# Patient Record
Sex: FEMALE | Race: WHITE | NOT HISPANIC OR LATINO | Employment: PART TIME | URBAN - METROPOLITAN AREA
[De-identification: names, ages, dates, MRNs, and addresses within clinical notes are randomized per-mention and may not be internally consistent; named-entity substitution may affect disease eponyms.]

---

## 2018-12-07 ENCOUNTER — HOSPITAL ENCOUNTER (EMERGENCY)
Facility: HOSPITAL | Age: 43
Discharge: HOME | End: 2018-12-07
Attending: EMERGENCY MEDICINE | Admitting: EMERGENCY MEDICINE
Payer: COMMERCIAL

## 2018-12-07 ENCOUNTER — APPOINTMENT (EMERGENCY)
Dept: RADIOLOGY | Facility: HOSPITAL | Age: 43
End: 2018-12-07
Payer: COMMERCIAL

## 2018-12-07 VITALS
HEART RATE: 62 BPM | RESPIRATION RATE: 18 BRPM | WEIGHT: 293 LBS | SYSTOLIC BLOOD PRESSURE: 121 MMHG | TEMPERATURE: 97.9 F | DIASTOLIC BLOOD PRESSURE: 80 MMHG | OXYGEN SATURATION: 95 %

## 2018-12-07 DIAGNOSIS — R20.0 RIGHT ARM NUMBNESS: ICD-10-CM

## 2018-12-07 DIAGNOSIS — M54.12 RIGHT CERVICAL RADICULOPATHY: Primary | ICD-10-CM

## 2018-12-07 PROCEDURE — 73030 X-RAY EXAM OF SHOULDER: CPT

## 2018-12-07 PROCEDURE — 72125 CT NECK SPINE W/O DYE: CPT

## 2018-12-07 PROCEDURE — 96374 THER/PROPH/DIAG INJ IV PUSH: CPT

## 2018-12-07 PROCEDURE — 99284 EMERGENCY DEPT VISIT MOD MDM: CPT

## 2018-12-07 RX ORDER — KETOROLAC TROMETHAMINE 30 MG/ML
15 INJECTION, SOLUTION INTRAMUSCULAR; INTRAVENOUS ONCE
Status: COMPLETED | OUTPATIENT
Start: 2018-12-07 | End: 2018-12-07

## 2018-12-07 RX ADMIN — KETOROLAC TROMETHAMINE 15 MG: 30 INJECTION, SOLUTION INTRAMUSCULAR at 17:41

## 2018-12-07 NOTE — ED PROVIDER NOTES
History  Chief Complaint   Patient presents with    Numbness     Pt states that she woke from a nap unable to move her right arm  Pt states that she has pain from the right shoulder that extends down to the hand  Pt states that she has had similar symptoms before when she had a shoulder injury   Arm Pain     This is a 16-year-old female with a history of psychiatric disorder and diabetes who presents with right shoulder pain and right arm numbness  Patient states that she woke up this morning at 6:30 a m  And everything was normal   She then took a nap and woke up at 2:45 a m  Dara Soulier She noticed severe right shoulder pain and right arm numbness/tingling  States that she had this sensation before when she had a shoulder sprain  When asked why she did not call EMS sooner, the patient states that she needed to get dressed for work  Denies any trauma to the area  Denies fever/chills, nausea/vomiting, lightheadedness/dizziness, headache, change in vision, URI symptoms, neck pain, chest pain, palpitations, shortness of breath, cough, back pain, flank pain, abdominal pain, diarrhea, hematochezia, melena, dysuria, hematuria, abnormal vaginal discharge/bleeding  None       Past Medical History:   Diagnosis Date    Anxiety     Chronic pain     Diabetes mellitus ([facility] [address] )     Disease of thyroid gland     Lymphedema     left leg    Manic bipolar I disorder (HCC)     OCD (obsessive compulsive disorder)     PTSD (post-traumatic stress disorder)        No past surgical history on file  No family history on file  I have reviewed and agree with the history as documented  Social History   Substance Use Topics    Smoking status: Not on file    Smokeless tobacco: Not on file    Alcohol use Not on file        Review of Systems   Constitutional: Negative for chills and fever  HENT: Negative for rhinorrhea, sore throat and trouble swallowing  Eyes: Negative for photophobia and visual disturbance  Respiratory: Negative for cough, chest tightness and shortness of breath  Cardiovascular: Negative for chest pain, palpitations and leg swelling  Gastrointestinal: Negative for abdominal pain, blood in stool, diarrhea, nausea and vomiting  Endocrine: Negative for polyuria  Genitourinary: Negative for dysuria, flank pain, hematuria, vaginal bleeding and vaginal discharge  Musculoskeletal: Positive for arthralgias  Negative for back pain and neck pain  Skin: Negative for color change and rash  Allergic/Immunologic: Negative for immunocompromised state  Neurological: Positive for weakness and numbness  Negative for dizziness, light-headedness and headaches  All other systems reviewed and are negative  Physical Exam  ED Triage Vitals [12/07/18 1631]   Temperature Pulse Respirations Blood Pressure SpO2   97 9 °F (36 6 °C) 62 18 119/67 96 %      Temp Source Heart Rate Source Patient Position - Orthostatic VS BP Location FiO2 (%)   Oral Monitor Sitting Left arm --      Pain Score       8           Orthostatic Vital Signs  Vitals:    12/07/18 1631 12/07/18 1745 12/07/18 1930   BP: 119/67 117/62 121/80   Pulse: 62 64 62   Patient Position - Orthostatic VS: Sitting Sitting        Physical Exam   Constitutional: Vital signs are normal  She appears well-developed  She is cooperative  No distress  HENT:   Mouth/Throat: Uvula is midline, oropharynx is clear and moist and mucous membranes are normal    Eyes: Pupils are equal, round, and reactive to light  Conjunctivae and EOM are normal    Neck: Trachea normal  No thyroid mass and no thyromegaly present  Cardiovascular: Normal rate, regular rhythm, normal heart sounds, intact distal pulses and normal pulses  No murmur heard  Pulmonary/Chest: Effort normal and breath sounds normal    Abdominal: Soft  Normal appearance and bowel sounds are normal  There is no tenderness  There is no rebound, no guarding and no CVA tenderness     Obese abdomen Musculoskeletal:   No evidence of trauma throughout right upper extremity  Tenderness over right shoulder  No sensation to light touch in median, ulnar, radial distribution  No sensation to sharp/pain in the radial, ulnar, median distribution  4/5 strength throughout right upper extremity  She has a lot of pain with passive range of motion of right shoulder  No C-spine tenderness  No reproduction of symptoms with axial loading of neck  Neurological: She is alert  Skin: Skin is warm, dry and intact  Psychiatric: She has a normal mood and affect  Her speech is normal and behavior is normal  Thought content normal        ED Medications  Medications   ketorolac (TORADOL) injection 15 mg (15 mg Intravenous Given 12/7/18 8079)       Diagnostic Studies  Results Reviewed     None                 XR shoulder 2+ views RIGHT   Final Result by Ben Fry MD (12/08 7261)      No acute osseous abnormality  Workstation performed: [id number]         CT cervical spine without contrast   Final Result by Gloria Carmona MD (12/07 8617)   Degenerative change noted with a C5-6 disc osteophyte complex including right greater than left uncovertebral hypertrophy resulting in moderate right and mild left foraminal encroachment  Correlate clinically for right greater than left C6    radiculopathy  Of note, degenerative changes appear stable compared with prior study  Remaining foramina are patent  Workstation performed: [id number]               Procedures  Procedures      Phone Consults  ED Phone Contact    ED Course                               MDM  Number of Diagnoses or Management Options  Diagnosis management comments: Likely a peripheral nerve palsy  I will check a CT neck and an x-ray of the right shoulder  Likely place arm in a sling and follow up with Neurology      CritCare Time    Disposition  Final diagnoses:   Right cervical radiculopathy   Right arm numbness     Time reflects when diagnosis was documented in both MDM as applicable and the Disposition within this note     Time User Action Codes Description Comment    12/7/2018  7:56 PM Song Avila Add [O12 01] Right cervical radiculopathy     12/7/2018  7:58 PM RuslanSully Iraida Add [R20 2] Right arm numbness       ED Disposition     ED Disposition Condition Comment    Discharge  Sierra Fallow discharge to home/self care  Condition at discharge: Stable        Follow-up Information     Follow up With Specialties Details Why Contact Info    Maynor Reeder MD Family Medicine  within 2-3 days for reevaluation  Aleksandr Payne [address] [place] [id number]      Margareth Guillen MD Orthopedic Surgery  Call to schedule appointment for further evaluation of presenting symptoms [address] [place] [address]  [phone]            There are no discharge medications for this patient  No discharge procedures on file  ED Provider  Attending physically available and evaluated Sierra Tracy  I managed the patient along with the ED Attending      Electronically Signed by         Yoel Blanton MD  12/08/18 5758

## 2018-12-07 NOTE — ED ATTENDING ATTESTATION
Yaa Cordoba MD, saw and evaluated the patient  I have discussed the patient with the resident/non-physician practitioner and agree with the resident's/non-physician practitioner's findings, Plan of Care, and MDM as documented in the resident's/non-physician practitioner's note, except where noted  All available labs and Radiology studies were reviewed  At this point I agree with the current assessment done in the Emergency Department  I have conducted an independent evaluation of this patient a history and physical is as follows:      Critical Care Time  CritCare Time    Procedures     45 yo female c/o right arm numbness started at 2:45 pm when she woke up  Pt with hx of similar symptoms with right shoulder injury many years ago  Pt with numbness from shoulder down and not able to move it, with pain in shoulder  No cp, no sob, no headache, no neck pain, no n/v/d, no fever  pmh ptsd, dm, bipolar  Vss, afebrile, lungs cta, rrr, abdomen soft nontender, decreased sensation on right arm, 4/5 strength in arm, no spinal tenderness  Shoulder xray, ct cspine, likely peripheral etiology, no concern for central etiology like stroke

## 2018-12-08 NOTE — DISCHARGE INSTRUCTIONS
Use sling when not sleeping  Follow up with Ortho Spine for further evaluation of presenting symptoms  Return to ED if new or worsening symptoms for immediate reevaluation  Cervical Radiculopathy   WHAT YOU NEED TO KNOW:   Cervical radiculopathy is a painful condition that happens when a spinal nerve in your neck is pinched or irritated  DISCHARGE INSTRUCTIONS:   Medicines: You may need any of the following:  · NSAIDs  help decrease swelling and pain  This medicine can be bought without a doctor's order  This medicine can cause stomach bleeding or kidney problems in certain people  If you take blood thinner medicine, always ask your healthcare provider if NSAIDs are safe for you  Always read the medicine label and follow the directions on it before using this medicine  · Prescription pain medicine  helps decrease pain  Do not wait until the pain is severe before you take this medicine  · Steroids  help decrease pain and swelling  These may be given as a pill or as an injection in your neck  You may need more than 1 injection if your symptoms do not improve after the first treatment  · Take your medicine as directed  Contact your healthcare provider if you think your medicine is not helping or if you have side effects  Tell him of her if you are allergic to any medicine  Keep a list of the medicines, vitamins, and herbs you take  Include the amounts, and when and why you take them  Bring the list or the pill bottles to follow-up visits  Carry your medicine list with you in case of an emergency  Follow up with your healthcare provider or spine specialist as directed:  Write down your questions so you remember to ask them during your visits  Physical therapy:  Your healthcare provider may suggest physical therapy to stretch and strengthen your muscles  Your physical therapist can teach you how to improve your posture and the way you hold your neck   He may also teach you how to be safely active and avoid further injury  He can also help you develop an exercise program that is safe for your back and neck  Self-care:   · Ice  helps decrease swelling and pain  Ice may also help prevent tissue damage  Use an ice pack, or put crushed ice in a plastic bag  Cover it with a towel and place it on your neck for 15 to 20 minutes every hour or as directed  · Rest  when you feel it is needed  Slowly start to do more each day  Return to your daily activities as directed  · Wear a soft collar  You may be given a soft collar to support your neck while you sleep  Wear the soft collar only as directed  · Do light stretches and regular exercise  Your healthcare provider may suggest light stretches to help decrease stiffness in your neck and arm as you recover  After your pain is controlled, you may benefit from regular exercise  Ask what type of exercise is safe for your back and neck  · Review your work area  A comfortable work area can help prevent neck strain  Ask your employer for an ergonomic review to check the position of your desk, chair, phone, and computer  Make any necessary adjustments for your comfort  Contact your healthcare provider or spine specialist if:   · You have a fever  · You are losing weight without trying  · Your pain is worse, even with medicine  · One or both hands feel more numb than before, or you cannot move your fingers well  · You have questions or concerns about your condition or care  © 2017 [address] Information is for End User's use only and may not be sold, redistributed or otherwise used for commercial purposes  All illustrations and images included in CareNotes® are the copyrighted property of A D A M [employer] Inc  or [employer]  The above information is an  only  It is not intended as medical advice for individual conditions or treatments   Talk to your doctor, nurse or pharmacist before following any medical regimen to see if it is safe and effective for you

## 2019-05-08 ENCOUNTER — HOSPITAL ENCOUNTER (EMERGENCY)
Facility: HOSPITAL | Age: 44
Discharge: HOME | End: 2019-05-08
Attending: EMERGENCY MEDICINE | Admitting: EMERGENCY MEDICINE
Payer: COMMERCIAL

## 2019-05-08 ENCOUNTER — APPOINTMENT (EMERGENCY)
Dept: RADIOLOGY | Facility: HOSPITAL | Age: 44
End: 2019-05-08
Payer: COMMERCIAL

## 2019-05-08 ENCOUNTER — PROCEDURE VISIT (OUTPATIENT)
Dept: PODIATRY | Facility: CLINIC | Age: 44
End: 2019-05-08
Payer: COMMERCIAL

## 2019-05-08 VITALS
WEIGHT: 269 LBS | DIASTOLIC BLOOD PRESSURE: 88 MMHG | BODY MASS INDEX: 44.82 KG/M2 | SYSTOLIC BLOOD PRESSURE: 148 MMHG | HEIGHT: 65 IN

## 2019-05-08 VITALS
TEMPERATURE: 97 F | HEIGHT: 65 IN | WEIGHT: 269 LBS | RESPIRATION RATE: 16 BRPM | HEART RATE: 62 BPM | BODY MASS INDEX: 44.82 KG/M2 | DIASTOLIC BLOOD PRESSURE: 78 MMHG | OXYGEN SATURATION: 99 % | SYSTOLIC BLOOD PRESSURE: 128 MMHG

## 2019-05-08 DIAGNOSIS — M71.072 ABSCESS OF BURSA OF LEFT FOOT: ICD-10-CM

## 2019-05-08 DIAGNOSIS — E11.42 DIABETIC POLYNEUROPATHY ASSOCIATED WITH TYPE 2 DIABETES MELLITUS (HCC): ICD-10-CM

## 2019-05-08 DIAGNOSIS — E66.01 CLASS 3 SEVERE OBESITY DUE TO EXCESS CALORIES WITH BODY MASS INDEX (BMI) OF 45.0 TO 49.9 IN ADULT, UNSPECIFIED WHETHER SERIOUS COMORBIDITY PRESENT (HCC): Primary | ICD-10-CM

## 2019-05-08 DIAGNOSIS — I89.0 LYMPHEDEMA: ICD-10-CM

## 2019-05-08 DIAGNOSIS — L97.522 FOOT ULCER WITH FAT LAYER EXPOSED, LEFT (HCC): Primary | ICD-10-CM

## 2019-05-08 DIAGNOSIS — L97.522 ULCER OF LEFT FOOT, WITH FAT LAYER EXPOSED (HCC): ICD-10-CM

## 2019-05-08 DIAGNOSIS — L03.116 CELLULITIS OF LEFT FOOT: ICD-10-CM

## 2019-05-08 LAB
ANION GAP SERPL CALCULATED.3IONS-SCNC: 6 MMOL/L (ref 4–13)
BASOPHILS # BLD AUTO: 0.03 THOUSANDS/ΜL (ref 0–0.1)
BASOPHILS NFR BLD AUTO: 0 % (ref 0–1)
BUN SERPL-MCNC: 9 MG/DL (ref 5–25)
CALCIUM SERPL-MCNC: 9.1 MG/DL (ref 8.3–10.1)
CHLORIDE SERPL-SCNC: 103 MMOL/L (ref 100–108)
CO2 SERPL-SCNC: 28 MMOL/L (ref 21–32)
CREAT SERPL-MCNC: 0.86 MG/DL (ref 0.6–1.3)
EOSINOPHIL # BLD AUTO: 0.44 THOUSAND/ΜL (ref 0–0.61)
EOSINOPHIL NFR BLD AUTO: 5 % (ref 0–6)
ERYTHROCYTE [DISTWIDTH] IN BLOOD BY AUTOMATED COUNT: 13.2 % (ref 11.6–15.1)
GFR SERPL CREATININE-BSD FRML MDRD: 83 ML/MIN/1.73SQ M
GLUCOSE SERPL-MCNC: 131 MG/DL (ref 65–140)
HCT VFR BLD AUTO: 46.6 % (ref 34.8–46.1)
HGB BLD-MCNC: 14.8 G/DL (ref 11.5–15.4)
IMM GRANULOCYTES # BLD AUTO: 0.03 THOUSAND/UL (ref 0–0.2)
IMM GRANULOCYTES NFR BLD AUTO: 0 % (ref 0–2)
LYMPHOCYTES # BLD AUTO: 3.09 THOUSANDS/ΜL (ref 0.6–4.47)
LYMPHOCYTES NFR BLD AUTO: 33 % (ref 14–44)
MCH RBC QN AUTO: 27.9 PG (ref 26.8–34.3)
MCHC RBC AUTO-ENTMCNC: 31.8 G/DL (ref 31.4–37.4)
MCV RBC AUTO: 88 FL (ref 82–98)
MONOCYTES # BLD AUTO: 0.55 THOUSAND/ΜL (ref 0.17–1.22)
MONOCYTES NFR BLD AUTO: 6 % (ref 4–12)
NEUTROPHILS # BLD AUTO: 5.23 THOUSANDS/ΜL (ref 1.85–7.62)
NEUTS SEG NFR BLD AUTO: 56 % (ref 43–75)
NRBC BLD AUTO-RTO: 0 /100 WBCS
PLATELET # BLD AUTO: 264 THOUSANDS/UL (ref 149–390)
PMV BLD AUTO: 9.7 FL (ref 8.9–12.7)
POTASSIUM SERPL-SCNC: 4 MMOL/L (ref 3.5–5.3)
RBC # BLD AUTO: 5.31 MILLION/UL (ref 3.81–5.12)
SODIUM SERPL-SCNC: 137 MMOL/L (ref 136–145)
WBC # BLD AUTO: 9.37 THOUSAND/UL (ref 4.31–10.16)

## 2019-05-08 PROCEDURE — 99284 EMERGENCY DEPT VISIT MOD MDM: CPT | Performed by: PODIATRIST

## 2019-05-08 PROCEDURE — 36415 COLL VENOUS BLD VENIPUNCTURE: CPT | Performed by: EMERGENCY MEDICINE

## 2019-05-08 PROCEDURE — 85025 COMPLETE CBC W/AUTO DIFF WBC: CPT | Performed by: EMERGENCY MEDICINE

## 2019-05-08 PROCEDURE — 96374 THER/PROPH/DIAG INJ IV PUSH: CPT

## 2019-05-08 PROCEDURE — 99284 EMERGENCY DEPT VISIT MOD MDM: CPT | Performed by: EMERGENCY MEDICINE

## 2019-05-08 PROCEDURE — 80048 BASIC METABOLIC PNL TOTAL CA: CPT | Performed by: EMERGENCY MEDICINE

## 2019-05-08 PROCEDURE — 99213 OFFICE O/P EST LOW 20 MIN: CPT | Performed by: PODIATRIST

## 2019-05-08 PROCEDURE — 99284 EMERGENCY DEPT VISIT MOD MDM: CPT

## 2019-05-08 PROCEDURE — 73701 CT LOWER EXTREMITY W/DYE: CPT

## 2019-05-08 RX ORDER — OXYCODONE HCL 5 MG/5 ML
SOLUTION, ORAL ORAL
COMMUNITY
Start: 2019-03-19 | End: 2021-09-08 | Stop reason: HOSPADM

## 2019-05-08 RX ORDER — ZIPRASIDONE HYDROCHLORIDE 80 MG/1
80 CAPSULE ORAL 2 TIMES DAILY WITH MEALS
COMMUNITY
Start: 2018-05-15

## 2019-05-08 RX ORDER — HYDROCODONE BITARTRATE AND ACETAMINOPHEN 5; 325 MG/1; MG/1
TABLET ORAL
COMMUNITY
Start: 2019-04-13 | End: 2021-09-08 | Stop reason: HOSPADM

## 2019-05-08 RX ORDER — CLONIDINE HYDROCHLORIDE 0.1 MG/1
0.2 TABLET ORAL EVERY 12 HOURS SCHEDULED
COMMUNITY
Start: 2016-04-25 | End: 2021-09-08 | Stop reason: HOSPADM

## 2019-05-08 RX ORDER — LEVOTHYROXINE SODIUM 0.05 MG/1
50 TABLET ORAL DAILY
COMMUNITY
Start: 2019-03-08

## 2019-05-08 RX ORDER — INSULIN LISPRO 100 U/ML
INJECTION, SOLUTION SUBCUTANEOUS
COMMUNITY
Start: 2019-03-20 | End: 2021-09-08 | Stop reason: HOSPADM

## 2019-05-08 RX ORDER — CEPHALEXIN 250 MG/1
500 CAPSULE ORAL ONCE
Status: COMPLETED | OUTPATIENT
Start: 2019-05-08 | End: 2019-05-08

## 2019-05-08 RX ORDER — SYRING-NEEDL,DISP,INSUL,0.3 ML 31 GX5/16"
SYRINGE, EMPTY DISPOSABLE MISCELLANEOUS
COMMUNITY
Start: 2019-02-02 | End: 2022-06-16 | Stop reason: HOSPADM

## 2019-05-08 RX ORDER — ESCITALOPRAM OXALATE 10 MG/1
175 TABLET ORAL
COMMUNITY
Start: 2017-05-27 | End: 2021-09-08 | Stop reason: HOSPADM

## 2019-05-08 RX ORDER — ONDANSETRON 4 MG/1
TABLET, ORALLY DISINTEGRATING ORAL
COMMUNITY
Start: 2019-03-19

## 2019-05-08 RX ORDER — HYDROXYZINE PAMOATE 50 MG/1
50 CAPSULE ORAL
COMMUNITY
Start: 2018-08-07 | End: 2021-09-08 | Stop reason: HOSPADM

## 2019-05-08 RX ORDER — CEPHALEXIN 500 MG/1
500 CAPSULE ORAL EVERY 6 HOURS SCHEDULED
Qty: 28 CAPSULE | Refills: 0 | OUTPATIENT
Start: 2019-05-08 | End: 2019-05-15

## 2019-05-08 RX ORDER — HYDROMORPHONE HCL/PF 1 MG/ML
1 SYRINGE (ML) INJECTION ONCE
Status: COMPLETED | OUTPATIENT
Start: 2019-05-08 | End: 2019-05-08

## 2019-05-08 RX ORDER — BLOOD-GLUCOSE METER
EACH MISCELLANEOUS
COMMUNITY
Start: 2019-03-20 | End: 2022-06-16 | Stop reason: HOSPADM

## 2019-05-08 RX ORDER — INSULIN GLARGINE 100 [IU]/ML
INJECTION, SOLUTION SUBCUTANEOUS
COMMUNITY
Start: 2019-03-20 | End: 2021-09-08 | Stop reason: HOSPADM

## 2019-05-08 RX ORDER — CEPHALEXIN 500 MG/1
500 CAPSULE ORAL EVERY 6 HOURS SCHEDULED
Qty: 28 CAPSULE | Refills: 0 | Status: SHIPPED | OUTPATIENT
Start: 2019-05-08 | End: 2019-05-15

## 2019-05-08 RX ORDER — ALBUTEROL SULFATE 90 UG/1
2 AEROSOL, METERED RESPIRATORY (INHALATION) EVERY 6 HOURS PRN
COMMUNITY
Start: 2018-12-04 | End: 2019-12-04

## 2019-05-08 RX ORDER — BUPROPION HYDROCHLORIDE 300 MG/1
300 TABLET ORAL
COMMUNITY
End: 2021-09-08 | Stop reason: HOSPADM

## 2019-05-08 RX ORDER — PANTOPRAZOLE SODIUM 40 MG/1
40 TABLET, DELAYED RELEASE ORAL DAILY
COMMUNITY
Start: 2019-04-23 | End: 2020-04-22

## 2019-05-08 RX ADMIN — HYDROMORPHONE HYDROCHLORIDE 1 MG: 1 INJECTION, SOLUTION INTRAMUSCULAR; INTRAVENOUS; SUBCUTANEOUS at 16:18

## 2019-05-08 RX ADMIN — IOHEXOL 100 ML: 350 INJECTION, SOLUTION INTRAVENOUS at 17:00

## 2019-05-08 RX ADMIN — CEPHALEXIN 500 MG: 250 CAPSULE ORAL at 17:54

## 2019-05-24 ENCOUNTER — APPOINTMENT (OUTPATIENT)
Dept: WOUND CARE | Facility: HOSPITAL | Age: 44
End: 2019-05-24
Payer: COMMERCIAL

## 2019-05-24 PROCEDURE — 99213 OFFICE O/P EST LOW 20 MIN: CPT

## 2019-05-24 PROCEDURE — 11042 DBRDMT SUBQ TIS 1ST 20SQCM/<: CPT

## 2019-05-31 ENCOUNTER — APPOINTMENT (OUTPATIENT)
Dept: WOUND CARE | Facility: HOSPITAL | Age: 44
End: 2019-05-31
Payer: COMMERCIAL

## 2019-05-31 PROCEDURE — 11042 DBRDMT SUBQ TIS 1ST 20SQCM/<: CPT

## 2019-06-14 ENCOUNTER — APPOINTMENT (OUTPATIENT)
Dept: WOUND CARE | Facility: HOSPITAL | Age: 44
End: 2019-06-14
Payer: COMMERCIAL

## 2019-06-14 PROCEDURE — 99211 OFF/OP EST MAY X REQ PHY/QHP: CPT

## 2019-06-21 ENCOUNTER — APPOINTMENT (OUTPATIENT)
Dept: WOUND CARE | Facility: HOSPITAL | Age: 44
End: 2019-06-21
Payer: COMMERCIAL

## 2019-06-21 PROCEDURE — 99211 OFF/OP EST MAY X REQ PHY/QHP: CPT

## 2019-06-28 ENCOUNTER — APPOINTMENT (INPATIENT)
Dept: RADIOLOGY | Facility: HOSPITAL | Age: 44
DRG: 380 | End: 2019-06-28
Payer: COMMERCIAL

## 2019-06-28 ENCOUNTER — APPOINTMENT (OUTPATIENT)
Dept: WOUND CARE | Facility: HOSPITAL | Age: 44
End: 2019-06-28
Payer: COMMERCIAL

## 2019-06-28 ENCOUNTER — HOSPITAL ENCOUNTER (INPATIENT)
Facility: HOSPITAL | Age: 44
LOS: 3 days | Discharge: HOME HEALTH | DRG: 380 | End: 2019-07-01
Attending: PODIATRIST | Admitting: PODIATRIST
Payer: COMMERCIAL

## 2019-06-28 DIAGNOSIS — F41.9 ANXIETY: ICD-10-CM

## 2019-06-28 DIAGNOSIS — L97.426 DIABETIC ULCER OF LEFT MIDFOOT ASSOCIATED WITH DIABETES MELLITUS DUE TO UNDERLYING CONDITION, WITH BONE INVOLVEMENT WITHOUT EVIDENCE OF NECROSIS (HCC): Primary | ICD-10-CM

## 2019-06-28 DIAGNOSIS — L03.116 CELLULITIS OF LEFT FOOT: ICD-10-CM

## 2019-06-28 DIAGNOSIS — E11.42 TYPE 2 DIABETES MELLITUS WITH DIABETIC POLYNEUROPATHY, WITH LONG-TERM CURRENT USE OF INSULIN (HCC): ICD-10-CM

## 2019-06-28 DIAGNOSIS — L03.116 CELLULITIS OF LEFT FOOT: Primary | ICD-10-CM

## 2019-06-28 DIAGNOSIS — E66.01 MORBID OBESITY (HCC): ICD-10-CM

## 2019-06-28 DIAGNOSIS — Z79.4 TYPE 2 DIABETES MELLITUS WITH DIABETIC POLYNEUROPATHY, WITH LONG-TERM CURRENT USE OF INSULIN (HCC): ICD-10-CM

## 2019-06-28 DIAGNOSIS — Z98.84 H/O BARIATRIC SURGERY: ICD-10-CM

## 2019-06-28 DIAGNOSIS — E08.621 DIABETIC ULCER OF LEFT MIDFOOT ASSOCIATED WITH DIABETES MELLITUS DUE TO UNDERLYING CONDITION, WITH BONE INVOLVEMENT WITHOUT EVIDENCE OF NECROSIS (HCC): Primary | ICD-10-CM

## 2019-06-28 PROBLEM — Z01.818 PREOPERATIVE CLEARANCE: Status: ACTIVE | Noted: 2019-06-28

## 2019-06-28 PROBLEM — L97.406: Status: ACTIVE | Noted: 2019-06-28

## 2019-06-28 LAB
ALBUMIN SERPL BCP-MCNC: 3.2 G/DL (ref 3.5–5)
ALP SERPL-CCNC: 216 U/L (ref 46–116)
ALT SERPL W P-5'-P-CCNC: 31 U/L (ref 12–78)
ANION GAP SERPL CALCULATED.3IONS-SCNC: 6 MMOL/L (ref 4–13)
AST SERPL W P-5'-P-CCNC: 18 U/L (ref 5–45)
ATRIAL RATE: 78 BPM
B-HCG SERPL-ACNC: <2 MIU/ML
BASOPHILS # BLD AUTO: 0.04 THOUSANDS/ΜL (ref 0–0.1)
BASOPHILS NFR BLD AUTO: 0 % (ref 0–1)
BILIRUB SERPL-MCNC: 0.2 MG/DL (ref 0.2–1)
BUN SERPL-MCNC: 11 MG/DL (ref 5–25)
CALCIUM SERPL-MCNC: 9.4 MG/DL (ref 8.3–10.1)
CHLORIDE SERPL-SCNC: 103 MMOL/L (ref 100–108)
CO2 SERPL-SCNC: 27 MMOL/L (ref 21–32)
CREAT SERPL-MCNC: 0.72 MG/DL (ref 0.6–1.3)
CRP SERPL QL: 42.9 MG/L
EOSINOPHIL # BLD AUTO: 0.45 THOUSAND/ΜL (ref 0–0.61)
EOSINOPHIL NFR BLD AUTO: 5 % (ref 0–6)
ERYTHROCYTE [DISTWIDTH] IN BLOOD BY AUTOMATED COUNT: 13.6 % (ref 11.6–15.1)
ERYTHROCYTE [SEDIMENTATION RATE] IN BLOOD: 50 MM/HOUR (ref 0–20)
GFR SERPL CREATININE-BSD FRML MDRD: 103 ML/MIN/1.73SQ M
GLUCOSE SERPL-MCNC: 119 MG/DL (ref 65–140)
GLUCOSE SERPL-MCNC: 121 MG/DL (ref 65–140)
GLUCOSE SERPL-MCNC: 135 MG/DL (ref 65–140)
GLUCOSE SERPL-MCNC: 157 MG/DL (ref 65–140)
HCT VFR BLD AUTO: 44.9 % (ref 34.8–46.1)
HGB BLD-MCNC: 14.3 G/DL (ref 11.5–15.4)
IMM GRANULOCYTES # BLD AUTO: 0.06 THOUSAND/UL (ref 0–0.2)
IMM GRANULOCYTES NFR BLD AUTO: 1 % (ref 0–2)
LYMPHOCYTES # BLD AUTO: 3.01 THOUSANDS/ΜL (ref 0.6–4.47)
LYMPHOCYTES NFR BLD AUTO: 31 % (ref 14–44)
MCH RBC QN AUTO: 27.6 PG (ref 26.8–34.3)
MCHC RBC AUTO-ENTMCNC: 31.8 G/DL (ref 31.4–37.4)
MCV RBC AUTO: 87 FL (ref 82–98)
MONOCYTES # BLD AUTO: 0.5 THOUSAND/ΜL (ref 0.17–1.22)
MONOCYTES NFR BLD AUTO: 5 % (ref 4–12)
NEUTROPHILS # BLD AUTO: 5.57 THOUSANDS/ΜL (ref 1.85–7.62)
NEUTS SEG NFR BLD AUTO: 58 % (ref 43–75)
NRBC BLD AUTO-RTO: 0 /100 WBCS
P AXIS: 42 DEGREES
PLATELET # BLD AUTO: 426 THOUSANDS/UL (ref 149–390)
PMV BLD AUTO: 9.2 FL (ref 8.9–12.7)
POTASSIUM SERPL-SCNC: 4 MMOL/L (ref 3.5–5.3)
PR INTERVAL: 170 MS
PROCALCITONIN SERPL-MCNC: <0.05 NG/ML
PROT SERPL-MCNC: 7.9 G/DL (ref 6.4–8.2)
QRS AXIS: 38 DEGREES
QRSD INTERVAL: 96 MS
QT INTERVAL: 408 MS
QTC INTERVAL: 465 MS
RBC # BLD AUTO: 5.19 MILLION/UL (ref 3.81–5.12)
SODIUM SERPL-SCNC: 136 MMOL/L (ref 136–145)
T WAVE AXIS: 38 DEGREES
VENTRICULAR RATE: 78 BPM
WBC # BLD AUTO: 9.63 THOUSAND/UL (ref 4.31–10.16)

## 2019-06-28 PROCEDURE — 85025 COMPLETE CBC W/AUTO DIFF WBC: CPT | Performed by: STUDENT IN AN ORGANIZED HEALTH CARE EDUCATION/TRAINING PROGRAM

## 2019-06-28 PROCEDURE — 84702 CHORIONIC GONADOTROPIN TEST: CPT | Performed by: PODIATRIST

## 2019-06-28 PROCEDURE — 0JBR0ZZ EXCISION OF LEFT FOOT SUBCUTANEOUS TISSUE AND FASCIA, OPEN APPROACH: ICD-10-PCS | Performed by: PODIATRIST

## 2019-06-28 PROCEDURE — 82948 REAGENT STRIP/BLOOD GLUCOSE: CPT

## 2019-06-28 PROCEDURE — 85652 RBC SED RATE AUTOMATED: CPT | Performed by: STUDENT IN AN ORGANIZED HEALTH CARE EDUCATION/TRAINING PROGRAM

## 2019-06-28 PROCEDURE — 87040 BLOOD CULTURE FOR BACTERIA: CPT | Performed by: STUDENT IN AN ORGANIZED HEALTH CARE EDUCATION/TRAINING PROGRAM

## 2019-06-28 PROCEDURE — 80053 COMPREHEN METABOLIC PANEL: CPT | Performed by: STUDENT IN AN ORGANIZED HEALTH CARE EDUCATION/TRAINING PROGRAM

## 2019-06-28 PROCEDURE — 73630 X-RAY EXAM OF FOOT: CPT

## 2019-06-28 PROCEDURE — 71045 X-RAY EXAM CHEST 1 VIEW: CPT

## 2019-06-28 PROCEDURE — 93005 ELECTROCARDIOGRAM TRACING: CPT

## 2019-06-28 PROCEDURE — 99223 1ST HOSP IP/OBS HIGH 75: CPT | Performed by: INTERNAL MEDICINE

## 2019-06-28 PROCEDURE — 84145 PROCALCITONIN (PCT): CPT | Performed by: STUDENT IN AN ORGANIZED HEALTH CARE EDUCATION/TRAINING PROGRAM

## 2019-06-28 PROCEDURE — 87075 CULTR BACTERIA EXCEPT BLOOD: CPT | Performed by: STUDENT IN AN ORGANIZED HEALTH CARE EDUCATION/TRAINING PROGRAM

## 2019-06-28 PROCEDURE — 86140 C-REACTIVE PROTEIN: CPT | Performed by: STUDENT IN AN ORGANIZED HEALTH CARE EDUCATION/TRAINING PROGRAM

## 2019-06-28 PROCEDURE — 87205 SMEAR GRAM STAIN: CPT | Performed by: STUDENT IN AN ORGANIZED HEALTH CARE EDUCATION/TRAINING PROGRAM

## 2019-06-28 PROCEDURE — 87147 CULTURE TYPE IMMUNOLOGIC: CPT | Performed by: STUDENT IN AN ORGANIZED HEALTH CARE EDUCATION/TRAINING PROGRAM

## 2019-06-28 PROCEDURE — 11042 DBRDMT SUBQ TIS 1ST 20SQCM/<: CPT | Performed by: PODIATRIST

## 2019-06-28 PROCEDURE — 99212 OFFICE O/P EST SF 10 MIN: CPT

## 2019-06-28 PROCEDURE — 93010 ELECTROCARDIOGRAM REPORT: CPT | Performed by: INTERNAL MEDICINE

## 2019-06-28 PROCEDURE — 87070 CULTURE OTHR SPECIMN AEROBIC: CPT | Performed by: STUDENT IN AN ORGANIZED HEALTH CARE EDUCATION/TRAINING PROGRAM

## 2019-06-28 PROCEDURE — 99223 1ST HOSP IP/OBS HIGH 75: CPT | Performed by: PODIATRIST

## 2019-06-28 PROCEDURE — 87186 SC STD MICRODIL/AGAR DIL: CPT | Performed by: STUDENT IN AN ORGANIZED HEALTH CARE EDUCATION/TRAINING PROGRAM

## 2019-06-28 RX ORDER — LEVOTHYROXINE SODIUM 0.05 MG/1
50 TABLET ORAL
Status: DISCONTINUED | OUTPATIENT
Start: 2019-06-28 | End: 2019-06-29

## 2019-06-28 RX ORDER — ALBUTEROL SULFATE 90 UG/1
2 AEROSOL, METERED RESPIRATORY (INHALATION) EVERY 6 HOURS PRN
Status: DISCONTINUED | OUTPATIENT
Start: 2019-06-28 | End: 2019-07-01 | Stop reason: HOSPADM

## 2019-06-28 RX ORDER — CEFAZOLIN SODIUM 2 G/50ML
2000 SOLUTION INTRAVENOUS EVERY 8 HOURS
Status: DISCONTINUED | OUTPATIENT
Start: 2019-06-28 | End: 2019-06-30

## 2019-06-28 RX ORDER — OXYCODONE HYDROCHLORIDE 10 MG/1
10 TABLET ORAL EVERY 4 HOURS PRN
Status: DISCONTINUED | OUTPATIENT
Start: 2019-06-28 | End: 2019-07-01 | Stop reason: HOSPADM

## 2019-06-28 RX ORDER — BUPROPION HYDROCHLORIDE 150 MG/1
300 TABLET ORAL DAILY
Status: DISCONTINUED | OUTPATIENT
Start: 2019-06-28 | End: 2019-07-01 | Stop reason: HOSPADM

## 2019-06-28 RX ORDER — HYDROMORPHONE HCL/PF 1 MG/ML
0.2 SYRINGE (ML) INJECTION EVERY 4 HOURS PRN
Status: DISCONTINUED | OUTPATIENT
Start: 2019-06-28 | End: 2019-07-01 | Stop reason: HOSPADM

## 2019-06-28 RX ORDER — ONDANSETRON 2 MG/ML
4 INJECTION INTRAMUSCULAR; INTRAVENOUS EVERY 6 HOURS PRN
Status: DISCONTINUED | OUTPATIENT
Start: 2019-06-28 | End: 2019-07-01 | Stop reason: HOSPADM

## 2019-06-28 RX ORDER — POLYETHYLENE GLYCOL 3350 17 G/17G
17 POWDER, FOR SOLUTION ORAL DAILY PRN
Status: DISCONTINUED | OUTPATIENT
Start: 2019-06-28 | End: 2019-07-01 | Stop reason: HOSPADM

## 2019-06-28 RX ORDER — OXYCODONE HYDROCHLORIDE 5 MG/1
5 TABLET ORAL EVERY 4 HOURS PRN
Status: DISCONTINUED | OUTPATIENT
Start: 2019-06-28 | End: 2019-06-28

## 2019-06-28 RX ORDER — CLONIDINE HYDROCHLORIDE 0.1 MG/1
0.1 TABLET ORAL EVERY MORNING
Status: DISCONTINUED | OUTPATIENT
Start: 2019-06-29 | End: 2019-07-01 | Stop reason: HOSPADM

## 2019-06-28 RX ORDER — METRONIDAZOLE 500 MG/1
500 TABLET ORAL EVERY 8 HOURS SCHEDULED
Status: DISCONTINUED | OUTPATIENT
Start: 2019-06-28 | End: 2019-07-01

## 2019-06-28 RX ORDER — OXYCODONE HYDROCHLORIDE 5 MG/1
5 TABLET ORAL EVERY 4 HOURS PRN
Status: DISCONTINUED | OUTPATIENT
Start: 2019-06-28 | End: 2019-07-01 | Stop reason: HOSPADM

## 2019-06-28 RX ORDER — ESCITALOPRAM OXALATE 10 MG/1
10 TABLET ORAL EVERY MORNING
Status: DISCONTINUED | OUTPATIENT
Start: 2019-06-28 | End: 2019-07-01 | Stop reason: HOSPADM

## 2019-06-28 RX ORDER — OXYCODONE HYDROCHLORIDE 5 MG/1
5 TABLET ORAL EVERY 6 HOURS PRN
Status: DISCONTINUED | OUTPATIENT
Start: 2019-06-28 | End: 2019-06-28

## 2019-06-28 RX ORDER — CLONIDINE HYDROCHLORIDE 0.1 MG/1
0.2 TABLET ORAL EVERY EVENING
Status: DISCONTINUED | OUTPATIENT
Start: 2019-06-28 | End: 2019-07-01 | Stop reason: HOSPADM

## 2019-06-28 RX ORDER — PANTOPRAZOLE SODIUM 40 MG/1
40 TABLET, DELAYED RELEASE ORAL
Status: DISCONTINUED | OUTPATIENT
Start: 2019-06-29 | End: 2019-07-01 | Stop reason: HOSPADM

## 2019-06-28 RX ORDER — INSULIN GLARGINE 100 [IU]/ML
55 INJECTION, SOLUTION SUBCUTANEOUS EVERY 12 HOURS SCHEDULED
Status: DISCONTINUED | OUTPATIENT
Start: 2019-06-28 | End: 2019-06-29

## 2019-06-28 RX ORDER — CLONIDINE HYDROCHLORIDE 0.1 MG/1
0.1 TABLET ORAL 3 TIMES DAILY
Status: DISCONTINUED | OUTPATIENT
Start: 2019-06-28 | End: 2019-06-28

## 2019-06-28 RX ORDER — ZIPRASIDONE HYDROCHLORIDE 40 MG/1
80 CAPSULE ORAL 2 TIMES DAILY WITH MEALS
Status: DISCONTINUED | OUTPATIENT
Start: 2019-06-28 | End: 2019-07-01 | Stop reason: HOSPADM

## 2019-06-28 RX ORDER — HYDROXYZINE 50 MG/1
50 TABLET, FILM COATED ORAL
Status: DISCONTINUED | OUTPATIENT
Start: 2019-06-28 | End: 2019-07-01 | Stop reason: HOSPADM

## 2019-06-28 RX ADMIN — METRONIDAZOLE 500 MG: 500 TABLET, FILM COATED ORAL at 21:25

## 2019-06-28 RX ADMIN — OXYCODONE HYDROCHLORIDE 5 MG: 5 TABLET ORAL at 13:46

## 2019-06-28 RX ADMIN — ENOXAPARIN SODIUM 40 MG: 40 INJECTION SUBCUTANEOUS at 17:13

## 2019-06-28 RX ADMIN — CLONIDINE HYDROCHLORIDE 0.2 MG: 0.1 TABLET ORAL at 21:25

## 2019-06-28 RX ADMIN — CEFAZOLIN SODIUM 2000 MG: 2 SOLUTION INTRAVENOUS at 21:24

## 2019-06-28 RX ADMIN — ZIPRASIDONE HCL 80 MG: 40 CAPSULE ORAL at 21:24

## 2019-06-28 RX ADMIN — INSULIN GLARGINE 55 UNITS: 100 INJECTION, SOLUTION SUBCUTANEOUS at 13:52

## 2019-06-28 RX ADMIN — HYDROXYZINE HYDROCHLORIDE 50 MG: 50 TABLET, FILM COATED ORAL at 21:20

## 2019-06-28 RX ADMIN — HYDROMORPHONE HYDROCHLORIDE 0.2 MG: 1 INJECTION, SOLUTION INTRAMUSCULAR; INTRAVENOUS; SUBCUTANEOUS at 18:48

## 2019-06-28 RX ADMIN — OXYCODONE HYDROCHLORIDE 10 MG: 10 TABLET ORAL at 21:25

## 2019-06-28 RX ADMIN — METRONIDAZOLE 500 MG: 500 TABLET, FILM COATED ORAL at 13:46

## 2019-06-28 RX ADMIN — CEFAZOLIN SODIUM 2000 MG: 2 SOLUTION INTRAVENOUS at 14:16

## 2019-06-28 NOTE — H&P
H&P Exam - Penelope Chavez 38 y o  female MRN: [id number]    Unit/Bed#: [facility] 756-20 Encounter: [id number]    Assessment:  45yo female with IDDM, recent bariatric surgery presents with left foot DFU with cellulitis and clinical suspicion of osteomyelitis    1) Left foot diabetic foot ulcer with suspicion for osteomyelitis  2) Left foot cellulitis  3) Insulin-dependent diabetes  4) S/p bariatric surgery  5) Anxiety   6) Morbid obesity  7) Smoker    Plan:  - admit under podiatry service Dr Jaime Jolley, expect greater than 2 midnight stay for IV antibiotics and surgical intervention  - started on IV ancef/PO flagyl, f/u wound and blood cx  - f/u left foot XR and MRI, ESR, CRP, clinical suspicion of osteomyelitis   - patient is afebrile and hemodynamically stable, f/u admission labs  - all home meds continued with insulin sliding scale added, appreciate SLIM for medical mgmt and pre-op clearance, per patient she has not needed any insulin as her blood sugar has been around 100 since bariatric surgery  - appreciate ID consultation for abx mgmt  - wound was excisionally debrided with 10 blade to remove all nonviable and devitalized tissue down to red and granular base, post debridement measures 2 cm x 2 cm x 1 cm, probes to capsule, <20cmsq of tissue removed  - patient to WBAT in surgical shoe for now    History of Present Illness   Ms Bernabe Campo is a 27-year-old female with past medical history significant for insulin-dependent diabetes, chronic lymphedema of left lower leg, recent history of bariatric surgery, anxiety presents direct admission from Dr Jaime Jolley, she was seen at [facility] this AM and there was concern for possible osteomyelitis of her left foot  Patient states she has had a wound on the bottom her foot for about a month and has been seeing Dr Jaime Jolley since then  She has visiting nurses that comes every M/W for dressing changes    She has been experiencing increase of pain on her left foot and started seeing redness last weekend however she didn't have an appointment until today and did not see anyone else since then  States she usually does not have much feeling in her feet  Also complaining of some fatigue since last week in and loss of appetite  States she has diabetes and has been using insulin for about 3 years  She recently underwent bariatric surgery in March and has not been taking her insulin as much due to decreased and blood sugar  Admits to smoking about a half a pack a day  Review of Systems   Constitutional: Positive for appetite change and fatigue  Negative for chills and fever  HENT: Negative  Negative for facial swelling and sore throat  Eyes: Negative  Negative for discharge and redness  Respiratory: Negative  Negative for cough, chest tightness and shortness of breath  Cardiovascular: Negative  Negative for chest pain and palpitations  Gastrointestinal: Negative  Negative for diarrhea, nausea and vomiting  Genitourinary: Negative  Negative for dysuria and hematuria  Musculoskeletal: Positive for myalgias  Skin: Positive for color change and wound         Historical Information   Past Medical History:   Diagnosis Date    Anxiety     Arthritis     Asthma     Chronic pain     Diabetes mellitus ([address] )     Disease of thyroid gland     Lymphedema     left leg    Manic bipolar I disorder (HCC)     Morbid obesity (HCC)     OCD (obsessive compulsive disorder)     PTSD (post-traumatic stress disorder)      Past Surgical History:   Procedure Laterality Date    CHOLECYSTECTOMY       Social History   Social History     Substance and Sexual Activity   Alcohol Use Never    Frequency: Never    Drinks per session: Patient refused    Binge frequency: Never     Social History     Substance and Sexual Activity   Drug Use Never     Social History     Tobacco Use   Smoking Status Current Every Day Smoker    Packs/day: 0 50    Years: 15 00  Pack years: 7 50   Smokeless Tobacco Never Used     Family History: History reviewed  No pertinent family history  Meds/Allergies   PTA meds:   Prior to Admission Medications   Prescriptions Last Dose Informant Patient Reported? Taking?    ADMELOG SOLOSTAR 100 units/mL injection pen   Yes No   BASAGLAR KWIKPEN 100 units/mL injection pen   Yes No   EASY COMFORT PEN NEEDLES 32G X 4 MM MISC   Yes No   EASY TOUCH INSULIN SYRINGE 31G X 5/16" 0 3 ML MISC   Yes No   HYDROcodone-acetaminophen (NORCO) 5-325 mg per tablet   Yes No   SUMAtriptan Succinate (IMITREX STATDOSE REFILL) 6 MG/0 5ML SOCT   Yes No   Sig: Inject 6 mg under the skin every 2 (two) hours as needed   albuterol (VENTOLIN HFA) 90 mcg/act inhaler   Yes No   Sig: Inhale 2 puffs every 6 (six) hours as needed   buPROPion (WELLBUTRIN XL) 300 mg 24 hr tablet   Yes No   Sig: Take 300 mg by mouth   cholecalciferol (VITAMIN D3) 1,000 units tablet   Yes No   Sig: Take by mouth Three times a day   cloNIDine (CATAPRES) 0 1 mg tablet   Yes No   Si 1 mg Three times a day   escitalopram (LEXAPRO) 10 mg tablet   Yes No   Sig: TAKE 1 TABLET BY MOUTH IN THE MORNING   hydrOXYzine pamoate (VISTARIL) 50 mg capsule   Yes No   Sig: Take 50 mg by mouth   insulin glargine (LANTUS SOLOSTAR) 100 units/mL injection pen   Yes No   Sig: Inject 55 Units under the skin 2 (two) times a day   insulin lispro (HUMALOG KWIKPEN) 100 units/mL injection pen   Yes No   Sig: Inject 1-15 Units under the skin   levothyroxine 50 mcg tablet   Yes No   Sig: Take 50 mcg by mouth daily   ondansetron (ZOFRAN-ODT) 4 mg disintegrating tablet   Yes No   oxyCODONE (ROXICODONE) 5 mg/5 mL solution   Yes No   pantoprazole (PROTONIX) 40 mg tablet   Yes No   Sig: Take 40 mg by mouth daily   ziprasidone (GEODON) 80 mg capsule   Yes No   Sig: Take 80 mg by mouth      Facility-Administered Medications: None     Allergies   Allergen Reactions    Aspirin     Barium Iodide Itching     Face became very red and itchy     Codeine     Latex     Vancomycin     Penicillins Other (See Comments) and Rash       Objective   First Vitals:   Blood Pressure: 127/88 (06/28/19 1115)  Pulse: 94 (06/28/19 1115)  Temperature: 97 9 °F (36 6 °C) (06/28/19 1115)  Temp Source: Oral (06/28/19 1115)  Respirations: 18 (06/28/19 1115)  Height: 5' 5" (165 1 cm) (06/28/19 1115)  Weight - Scale: 120 kg (264 lb 8 8 oz) (06/28/19 1115)  SpO2: 97 % (06/28/19 1115)    Current Vitals:   Blood Pressure: 127/88 (06/28/19 1115)  Pulse: 94 (06/28/19 1115)  Temperature: 97 9 °F (36 6 °C) (06/28/19 1115)  Temp Source: Oral (06/28/19 1115)  Respirations: 18 (06/28/19 1115)  Height: 5' 5" (165 1 cm) (06/28/19 1115)  Weight - Scale: 120 kg (264 lb 8 8 oz) (06/28/19 1115)  SpO2: 97 % (06/28/19 1115)    No intake or output data in the 24 hours ending 06/28/19 1311    Invasive Devices     None                 Physical Exam   Constitutional: She is oriented to person, place, and time  She appears well-developed and well-nourished  HENT:   Head: Normocephalic and atraumatic  Eyes: Pupils are equal, round, and reactive to light  EOM are normal    Neck: Normal range of motion  Neck supple  No tracheal deviation present  Cardiovascular: Normal rate, regular rhythm, normal heart sounds and intact distal pulses  DP/PT pulses 2/4 bilateral   Pulmonary/Chest: Effort normal and breath sounds normal  No respiratory distress  Abdominal: Soft  Bowel sounds are normal  There is no tenderness  Musculoskeletal: Normal range of motion  She exhibits tenderness  Tenderness to palpation of left dorsal foot   Neurological: She is alert and oriented to person, place, and time  A sensory deficit is present  Gross sensation diminished; protective sensation absent   Skin: Skin is warm and dry  Capillary refill takes less than 2 seconds  There is erythema     Wound on plantar aspect of left foot, likely [id number], wound bed is mostly red and granular with some fibrotic tissue centrally, measures 2cm x 2cm x 1cm, probes to capsule, no malodor, no drainage                     Code Status: Level 1 - Full Code  Advance Directive and Living Will:      Power of :    POLST:      Counseling / Coordination of Care: Total floor / unit time spent today 30 minutes

## 2019-06-28 NOTE — SOCIAL WORK
Cm met with patient to explain Cm role and discuss discharge planning  Patient lives alone in 4th floor highrise apartment with elevator access  Patient's emergency contacts are her friend Dora Ty [phone] and son Lianne Crigler [phone]  Patient does not have a POA/living will  Patient reported being independent with ADLs PTA and uses cane as needed (when in pain per patient)  Patient [facility] (SNF name unknownby patient), reported being open to [facility] for RN services Monday & Wednesday (A post acute care recommendation was made by your care team for Radha 79  Discussed Freedom of Choice with patient  Choice is to return/continue services  Referral placed to resume care per patient's choice/request), reported mental health diagnoses of biploar, PTSD, anxiety, and OCD (is prescribed meds through psychiatrist & reported  stay at [facility] 1 1/2 yrs ago), and denied ETOH treatment in the past   Patient does not drive and reported using [employer]  Patient is on medical leave from work at this time  Pharmacy: [facility]  PCP: Dr Steven Bourne  Cm following  CM reviewed d/c planning process including the following: identifying help at home, patient preference for d/c planning needs, Discharge Lounge, [facility] Meds to Bed program, availability of treatment team to discuss questions or concerns patient and/or family may have regarding understanding medications and recognizing signs and symptoms once discharged  CM also encouraged patient to follow up with all recommended appointments after discharge  Patient advised of importance for patient and family to participate in managing patients medical well being

## 2019-06-28 NOTE — PLAN OF CARE
Problem: PAIN - ADULT  Goal: Verbalizes/displays adequate comfort level or baseline comfort level  Description  Interventions:  - Encourage patient to monitor pain and request assistance  - Assess pain using appropriate pain scale  - Administer analgesics based on type and severity of pain and evaluate response  - Implement non-pharmacological measures as appropriate and evaluate response  - Consider cultural and social influences on pain and pain management  - Notify physician/advanced practitioner if interventions unsuccessful or patient reports new pain  Outcome: Progressing     Problem: INFECTION - ADULT  Goal: Absence or prevention of progression during hospitalization  Description  INTERVENTIONS:  - Assess and monitor for signs and symptoms of infection  - Monitor lab/diagnostic results  - Monitor all insertion sites, i e  indwelling lines, tubes, and drains  - Monitor endotracheal (as able) and nasal secretions for changes in amount and color  - [place] appropriate cooling/warming therapies per order  - Administer medications as ordered  - Instruct and encourage patient and family to use good hand hygiene technique  - Identify and instruct in appropriate isolation precautions for identified infection/condition  Outcome: Progressing  Goal: Absence of fever/infection during neutropenic period  Description  INTERVENTIONS:  - Monitor WBC  - Implement neutropenic guidelines  Outcome: Progressing     Problem: SAFETY ADULT  Goal: Patient will remain free of falls  Description  INTERVENTIONS:  - Assess patient frequently for physical needs  -  Identify cognitive and physical deficits and behaviors that affect risk of falls    -  [place] fall precautions as indicated by assessment   - Educate patient/family on patient safety including physical limitations  - Instruct patient to call for assistance with activity based on assessment  - Modify environment to reduce risk of injury  - Consider OT/PT consult to assist with strengthening/mobility  Outcome: Progressing  Goal: Maintain or return to baseline ADL function  Description  INTERVENTIONS:  -  Assess patient's ability to carry out ADLs; assess patient's baseline for ADL function and identify physical deficits which impact ability to perform ADLs (bathing, care of mouth/teeth, toileting, grooming, dressing, etc )  - Assess/evaluate cause of self-care deficits   - Assess range of motion  - Assess patient's mobility; develop plan if impaired  - Assess patient's need for assistive devices and provide as appropriate  - Encourage maximum independence but intervene and supervise when necessary  ¯ Involve family in performance of ADLs  ¯ Assess for home care needs following discharge   ¯ Request OT consult to assist with ADL evaluation and planning for discharge  ¯ Provide patient education as appropriate  Outcome: Progressing  Goal: Maintain or return mobility status to optimal level  Description  INTERVENTIONS:  - Assess patient's baseline mobility status (ambulation, transfers, stairs, etc )    - Identify cognitive and physical deficits and behaviors that affect mobility  - Identify mobility aids required to assist with transfers and/or ambulation (gait belt, sit-to-stand, lift, walker, cane, etc )  - [place] fall precautions as indicated by assessment  - Record patient progress and toleration of activity level on Mobility SBAR; progress patient to next Phase/Stage  - Instruct patient to call for assistance with activity based on assessment  - Request Rehabilitation consult to assist with strengthening/weightbearing, etc   Outcome: Progressing     Problem: DISCHARGE PLANNING  Goal: Discharge to home or other facility with appropriate resources  Description  INTERVENTIONS:  - Identify barriers to discharge w/patient and caregiver  - Arrange for needed discharge resources and transportation as appropriate  - Identify discharge learning needs (meds, wound care, etc )  - Arrange for interpretive services to assist at discharge as needed  - Refer to Case Management Department for coordinating discharge planning if the patient needs post-hospital services based on physician/advanced practitioner order or complex needs related to functional status, cognitive ability, or social support system  Outcome: Progressing     Problem: Knowledge Deficit  Goal: Patient/family/caregiver demonstrates understanding of disease process, treatment plan, medications, and discharge instructions  Description  Complete learning assessment and assess knowledge base    Interventions:  - Provide teaching at level of understanding  - Provide teaching via preferred learning methods  Outcome: Progressing

## 2019-06-28 NOTE — CONSULTS
Consultation - Infectious Disease   Roberto Carlos Danielson 38 y o  female MRN: [id number]  Unit/Bed#: [facility] 182-41 Encounter: [id number]      IMPRESSION & RECOMMENDATIONS:   Impression/Recommendations: This is a 38 y o  female, with history of DM, venous stasis and chronic left ulcer, admitted earlier today with left foot cellulitis and concern for osteomyelitis  Patient has no fever leukocytosis  1  Left foot cellulitis  Source is most likely a chronic foot ulcer  Patient is clinically and systemically well, without sepsis or systemic toxicity  She has no fever leukocytosis  Therefore, it is reasonable to treat patient with narrow spectrum antibiotic to cover for likely skin pathogens  If she does not improve clinically, we can consider escalating antibiotic regimen then  Agree with IV cefazolin/p o  Flagyl for now  Follow-up on wound culture  Follow-up on blood cultures  Serial foot exams  2  Chronic left foot ulcer  Given chronicity of ulcer and relatively deep probing, there is moderately high risk of osteomyelitis  Foot x-ray did not show obvious osteomyelitis, but does not have sensitivity for picking up early osteomyelitis  Patient should get MRI  Antibiotic plan as in above  Recommend MRI  3  Venous stasis, with moderate leg edema  This may contribute to poor wound healing  Patient should keep her legs elevated to control edema  Keep legs elevated  4  DM  Blood sugar reasonably well control  Management per Medicine Service  5  Cigarette abuse  This probably contributes to poor wound healing  Patient counseled regarding the need to stop smoking  Discussed with patient in detail regarding the above plan    Thank you for this consultation  We will follow along with you  HISTORY OF PRESENT ILLNESS:  Reason for Consult:  Left foot cellulitis      HPI: Roberto Carlos Danielson is a 38 y o  female, with history of DM and venous stasis, has chronic left foot ulcer, followed by   Lizbeth Kitchen from Podiatry at [facility]  At her routine visit today, she was found to have cellulitis, with concern for osteomyelitis  Therefore, patient was admitted for IV antibiotic and workup  Cefazolin/Flagyl were started  We are asked to evaluate the patient  At present, patient is comfortable  She has minimal pain around the ulcer  However, she is neuropathic and has no sensation in the bottom of her foot  Patient states she had brief fever/chills late last week but none since  Patient thinks that she developed foot ulcer from wearing flip-flop earlier  Patient denies prior surgery in foot  Patient is morbidly obese  She underwent gastric bypass in March of this year  Since then, she has lost 50 lb  She smokes half a pack a day  REVIEW OF SYSTEMS:  A complete 12 point system-based review was done  Except for what is noted in HPI above, ROS of systems is otherwise negative  PAST MEDICAL HISTORY:  Past Medical History:   Diagnosis Date    Anxiety     Arthritis     Asthma     Chronic pain     Diabetes mellitus ([facility] [address] )     Disease of thyroid gland     Lymphedema     left leg    Manic bipolar I disorder ([facility] [address] [facility]     Morbid obesity (HCC)     OCD (obsessive compulsive disorder)     PTSD (post-traumatic stress disorder)      Past Surgical History:   Procedure Laterality Date    CHOLECYSTECTOMY       Problem list reviewed      FAMILY HISTORY:  Non-contributory    SOCIAL HISTORY:  Social History     Substance and Sexual Activity   Alcohol Use Never    Frequency: Never    Drinks per session: Patient refused    Binge frequency: Never     Social History     Substance and Sexual Activity   Drug Use Never     Social History     Tobacco Use   Smoking Status Current Every Day Smoker    Packs/day: 0 50    Years: 15 00    Pack years: 7 50   Smokeless Tobacco Never Used       ALLERGIES:  Allergies   Allergen Reactions    Aspirin     Barium Iodide Itching     Face became very red and itchy     Codeine     Latex     Vancomycin     Penicillins Other (See Comments) and Rash       MEDICATIONS:  All current active medications have been reviewed  Patient is currently on cefazolin/Flagyl  PHYSICAL EXAM:  Vitals:  Temp:  [97 9 °F (36 6 °C)-99 1 °F (37 3 °C)] 99 1 °F (37 3 °C)  HR:  [88-94] 88  Resp:  [18-20] 20  BP: (127-135)/(84-88) 135/84  SpO2:  [95 %-97 %] 95 %  Temp (24hrs), Av 5 °F (36 9 °C), Min:97 9 °F (36 6 °C), Max:99 1 °F (37 3 °C)  Current: Temperature: 99 1 °F (37 3 °C)     Physical Exam:  General:  Morbidly obese, comfortable, nontoxic, in no acute distress  Awake, alert and oriented x 3  Eyes:  Conjunctive clear with no hemorrhages or effusions  Oropharynx:  No ulcers, no lesions, pharynx benign, no tonsillitis  Neck:  Supple, no lymphadenopathy, no mass, nontender  Lungs:  Expansion symmetric, no rales, no wheezing, no accessory muscle use  Cardiac:  Regular rate and rhythm, normal S1, normal S2, no murmurs  Abdomen:  Soft, nondistended, non-tender, no HSM  Extremities:  1+ leg edema  Left foot with ulcer on plantar surface, at 3rd MT head  Base with fibrotic tissue but no necrosis  No purulence  Mild to moderate surrounding erythema  No tenderness  Ulcer not quite probed to bone  Skin:  No rashes, no ulcers  Neurological:  Moves all four extremities spontaneously, sensation grossly intact    LABS, IMAGING, & OTHER STUDIES:  Lab Results:  I have personally reviewed pertinent labs  Results from last 7 days   Lab Units 19  1705   POTASSIUM mmol/L 4 0   CHLORIDE mmol/L 103   CO2 mmol/L 27   BUN mg/dL 11   CREATININE mg/dL 0 72   EGFR ml/min/1 73sq m 103   CALCIUM mg/dL 9 4   AST U/L 18   ALT U/L 31   ALK PHOS U/L 216*     Results from last 7 days   Lab Units 19  1311   WBC Thousand/uL 9 63   HEMOGLOBIN g/dL 14 3   PLATELETS Thousands/uL 426*           Imaging Studies:   I have personally reviewed pertinent imaging study reports and images in PACS    Left foot x-ray reviewed personally  No evidence of osteomyelitis  EKG, Pathology, and Other Studies:   I have personally reviewed pertinent reports

## 2019-06-28 NOTE — PLAN OF CARE
Problem: PAIN - ADULT  Goal: Verbalizes/displays adequate comfort level or baseline comfort level  Description  Interventions:  - Encourage patient to monitor pain and request assistance  - Assess pain using appropriate pain scale  - Administer analgesics based on type and severity of pain and evaluate response  - Implement non-pharmacological measures as appropriate and evaluate response  - Consider cultural and social influences on pain and pain management  - Notify physician/advanced practitioner if interventions unsuccessful or patient reports new pain  Outcome: Progressing     Problem: INFECTION - ADULT  Goal: Absence or prevention of progression during hospitalization  Description  INTERVENTIONS:  - Assess and monitor for signs and symptoms of infection  - Monitor lab/diagnostic results  - Monitor all insertion sites, i e  indwelling lines, tubes, and drains  - Monitor endotracheal (as able) and nasal secretions for changes in amount and color  - [place] appropriate cooling/warming therapies per order  - Administer medications as ordered  - Instruct and encourage patient and family to use good hand hygiene technique  - Identify and instruct in appropriate isolation precautions for identified infection/condition  Outcome: Progressing  Goal: Absence of fever/infection during neutropenic period  Description  INTERVENTIONS:  - Monitor WBC  - Implement neutropenic guidelines  Outcome: Progressing

## 2019-06-29 PROBLEM — Z71.6 ENCOUNTER FOR SMOKING CESSATION COUNSELING: Status: ACTIVE | Noted: 2019-06-29

## 2019-06-29 PROBLEM — E03.9 HYPOTHYROIDISM: Status: ACTIVE | Noted: 2019-06-29

## 2019-06-29 PROBLEM — F31.10 MANIC BIPOLAR I DISORDER (HCC): Status: ACTIVE | Noted: 2019-06-29

## 2019-06-29 PROBLEM — R78.81 GRAM-POSITIVE BACTEREMIA: Status: ACTIVE | Noted: 2019-06-29

## 2019-06-29 LAB
ANION GAP SERPL CALCULATED.3IONS-SCNC: 6 MMOL/L (ref 4–13)
BUN SERPL-MCNC: 12 MG/DL (ref 5–25)
CALCIUM SERPL-MCNC: 9.6 MG/DL (ref 8.3–10.1)
CHLORIDE SERPL-SCNC: 102 MMOL/L (ref 100–108)
CO2 SERPL-SCNC: 30 MMOL/L (ref 21–32)
CREAT SERPL-MCNC: 0.83 MG/DL (ref 0.6–1.3)
ERYTHROCYTE [DISTWIDTH] IN BLOOD BY AUTOMATED COUNT: 13.6 % (ref 11.6–15.1)
GFR SERPL CREATININE-BSD FRML MDRD: 87 ML/MIN/1.73SQ M
GLUCOSE SERPL-MCNC: 125 MG/DL (ref 65–140)
GLUCOSE SERPL-MCNC: 152 MG/DL (ref 65–140)
GLUCOSE SERPL-MCNC: 168 MG/DL (ref 65–140)
GLUCOSE SERPL-MCNC: 174 MG/DL (ref 65–140)
GLUCOSE SERPL-MCNC: 180 MG/DL (ref 65–140)
GLUCOSE SERPL-MCNC: 187 MG/DL (ref 65–140)
GLUCOSE SERPL-MCNC: 194 MG/DL (ref 65–140)
HCT VFR BLD AUTO: 45 % (ref 34.8–46.1)
HGB BLD-MCNC: 14.2 G/DL (ref 11.5–15.4)
MCH RBC QN AUTO: 27.4 PG (ref 26.8–34.3)
MCHC RBC AUTO-ENTMCNC: 31.6 G/DL (ref 31.4–37.4)
MCV RBC AUTO: 87 FL (ref 82–98)
PLATELET # BLD AUTO: 412 THOUSANDS/UL (ref 149–390)
PMV BLD AUTO: 8.9 FL (ref 8.9–12.7)
POTASSIUM SERPL-SCNC: 3.8 MMOL/L (ref 3.5–5.3)
RBC # BLD AUTO: 5.18 MILLION/UL (ref 3.81–5.12)
SODIUM SERPL-SCNC: 138 MMOL/L (ref 136–145)
T4 FREE SERPL-MCNC: 1.1 NG/DL (ref 0.76–1.46)
TSH SERPL DL<=0.05 MIU/L-ACNC: 5.58 UIU/ML (ref 0.36–3.74)
WBC # BLD AUTO: 8.44 THOUSAND/UL (ref 4.31–10.16)

## 2019-06-29 PROCEDURE — 80048 BASIC METABOLIC PNL TOTAL CA: CPT | Performed by: STUDENT IN AN ORGANIZED HEALTH CARE EDUCATION/TRAINING PROGRAM

## 2019-06-29 PROCEDURE — 84443 ASSAY THYROID STIM HORMONE: CPT | Performed by: NURSE PRACTITIONER

## 2019-06-29 PROCEDURE — 99253 IP/OBS CNSLTJ NEW/EST LOW 45: CPT | Performed by: INTERNAL MEDICINE

## 2019-06-29 PROCEDURE — 82948 REAGENT STRIP/BLOOD GLUCOSE: CPT

## 2019-06-29 PROCEDURE — 85027 COMPLETE CBC AUTOMATED: CPT | Performed by: STUDENT IN AN ORGANIZED HEALTH CARE EDUCATION/TRAINING PROGRAM

## 2019-06-29 PROCEDURE — 84439 ASSAY OF FREE THYROXINE: CPT | Performed by: NURSE PRACTITIONER

## 2019-06-29 PROCEDURE — 99232 SBSQ HOSP IP/OBS MODERATE 35: CPT | Performed by: INTERNAL MEDICINE

## 2019-06-29 PROCEDURE — 99232 SBSQ HOSP IP/OBS MODERATE 35: CPT | Performed by: PODIATRIST

## 2019-06-29 RX ORDER — INSULIN GLARGINE 100 [IU]/ML
20 INJECTION, SOLUTION SUBCUTANEOUS EVERY 12 HOURS SCHEDULED
Status: DISCONTINUED | OUTPATIENT
Start: 2019-06-29 | End: 2019-06-29

## 2019-06-29 RX ORDER — LEVOTHYROXINE SODIUM 0.07 MG/1
75 TABLET ORAL
Status: DISCONTINUED | OUTPATIENT
Start: 2019-06-30 | End: 2019-07-01 | Stop reason: HOSPADM

## 2019-06-29 RX ADMIN — CLONIDINE HYDROCHLORIDE 0.1 MG: 0.1 TABLET ORAL at 08:40

## 2019-06-29 RX ADMIN — OXYCODONE HYDROCHLORIDE 10 MG: 10 TABLET ORAL at 15:57

## 2019-06-29 RX ADMIN — ENOXAPARIN SODIUM 40 MG: 40 INJECTION SUBCUTANEOUS at 17:09

## 2019-06-29 RX ADMIN — INSULIN LISPRO 2 UNITS: 100 INJECTION, SOLUTION INTRAVENOUS; SUBCUTANEOUS at 12:16

## 2019-06-29 RX ADMIN — OXYCODONE HYDROCHLORIDE 10 MG: 10 TABLET ORAL at 03:20

## 2019-06-29 RX ADMIN — Medication 1 TABLET: at 12:17

## 2019-06-29 RX ADMIN — CEFAZOLIN SODIUM 2000 MG: 2 SOLUTION INTRAVENOUS at 13:26

## 2019-06-29 RX ADMIN — OXYCODONE HYDROCHLORIDE 10 MG: 10 TABLET ORAL at 20:02

## 2019-06-29 RX ADMIN — METRONIDAZOLE 500 MG: 500 TABLET, FILM COATED ORAL at 21:54

## 2019-06-29 RX ADMIN — ZIPRASIDONE HCL 80 MG: 40 CAPSULE ORAL at 07:42

## 2019-06-29 RX ADMIN — CEFAZOLIN SODIUM 2000 MG: 2 SOLUTION INTRAVENOUS at 05:58

## 2019-06-29 RX ADMIN — CLONIDINE HYDROCHLORIDE 0.2 MG: 0.1 TABLET ORAL at 21:55

## 2019-06-29 RX ADMIN — METRONIDAZOLE 500 MG: 500 TABLET, FILM COATED ORAL at 13:26

## 2019-06-29 RX ADMIN — INSULIN LISPRO 2 UNITS: 100 INJECTION, SOLUTION INTRAVENOUS; SUBCUTANEOUS at 17:09

## 2019-06-29 RX ADMIN — LEVOTHYROXINE SODIUM 50 MCG: 50 TABLET ORAL at 05:58

## 2019-06-29 RX ADMIN — CEFAZOLIN SODIUM 2000 MG: 2 SOLUTION INTRAVENOUS at 21:56

## 2019-06-29 RX ADMIN — INSULIN LISPRO 2 UNITS: 100 INJECTION, SOLUTION INTRAVENOUS; SUBCUTANEOUS at 10:00

## 2019-06-29 RX ADMIN — PANTOPRAZOLE SODIUM 40 MG: 40 TABLET, DELAYED RELEASE ORAL at 07:41

## 2019-06-29 RX ADMIN — BUPROPION HYDROCHLORIDE 300 MG: 150 TABLET, FILM COATED, EXTENDED RELEASE ORAL at 08:40

## 2019-06-29 RX ADMIN — ESCITALOPRAM OXALATE 10 MG: 10 TABLET ORAL at 08:40

## 2019-06-29 RX ADMIN — HYDROXYZINE HYDROCHLORIDE 50 MG: 50 TABLET, FILM COATED ORAL at 21:55

## 2019-06-29 RX ADMIN — INSULIN LISPRO 2 UNITS: 100 INJECTION, SOLUTION INTRAVENOUS; SUBCUTANEOUS at 21:59

## 2019-06-29 RX ADMIN — OXYCODONE HYDROCHLORIDE 10 MG: 10 TABLET ORAL at 08:40

## 2019-06-29 RX ADMIN — ZIPRASIDONE HCL 80 MG: 40 CAPSULE ORAL at 21:55

## 2019-06-29 RX ADMIN — METRONIDAZOLE 500 MG: 500 TABLET, FILM COATED ORAL at 05:58

## 2019-06-29 RX ADMIN — HYDROMORPHONE HYDROCHLORIDE 0.2 MG: 1 INJECTION, SOLUTION INTRAMUSCULAR; INTRAVENOUS; SUBCUTANEOUS at 10:28

## 2019-06-29 NOTE — ASSESSMENT & PLAN NOTE
Performed at [facility] AT THE [facility] on 3/19/2019  GASTRECTOMY, LAPAROSCOPIC SLEEVE AND EGD   - pt dt to affordability cannot afford post gastic bypass vitamins she states she was told ok to take the flintstone complete I ordered it for the pt now   - 06/29/2019 discussed with nutritionist to arrange that patient gets frequent small meals due to her recent gastric bypass  - at this time patient continues to receive regular meals she reports that they did call her in the trying to work it in the system  - this morning patient had vomiting episodes status post full meal I suspect due to the fact that it is a larger portion that she should be having

## 2019-06-29 NOTE — UTILIZATION REVIEW
Initial Clinical Review    Admission: Date/Time/Statement: 6/28/19 @ 1109     Orders Placed This Encounter   Procedures    Inpatient Admission     Standing Status:   Standing     Number of Occurrences:   1     Order Specific Question:   Admitting Physician     Answer:   Zelalem Sizer     Order Specific Question:   Level of Care     Answer:   Med Surg [[id number]]     Order Specific Question:   Estimated length of stay     Answer:   More than 2 Midnights     Order Specific Question:   Certification     Answer:   I certify that inpatient services are medically necessary for this patient for a duration of greater than two midnights  See H&P and MD Progress Notes for additional information about the patient's course of treatment  ED Arrival Information     Patient not seen in ED -- Direct admission from podiatrist's office                     Chief complaint:  Left foot wound    Assessment/Plan: 37 y/o female with Pmhx of IDDM, chronic lymphedema of LLE, recent h/o bariatric surgery, anxiety presents as direct admission from Dr Jorge Tang, she was seen [facility] this AM and there was concern for possible osteomyelitis of her left foot  Pt states she had a wound on the bottom of her foot for ~1 mo & has seeing seeing podiatry for it, has visiting nurses who come to house every M/W for dressing changes  She has been experiencing increased pain and redness since last weekend  Dx:  Left foot DFU with cellulitis and clinical suspicion of osteomyelitis  Plan:  Admit under inpatient status to M/S unit, IV abx, wound and blood cxs taken, continue all home meds and insulin, fingerstick glucose checks ac & hs, ID consulted, wound was excisionally debrided with 10 blade to remove all nonviable and devitalized tissue down to red and granular base, post debridement measures 2 cm x 2 cm x 1 cm, probes to capsule, <20cmsq of tissue removed, WBAT in surgical shoe for now      ID consult 6/28 --- left foot cellulitis -- agree with IV cefazolin/po, flagyl for now, f/u on wound and blood cxs, MRI of foot, keep legs elevated    6/29 -- Med consult -- No c/o pain today   1/2 blood cxs (+) for gram-pos cocci in pairs and chains, ID aware and recommending current abx regimen, wound dressed with maxorb     Additional Vital Signs:   Date/Time  Temp  Pulse  Resp  BP  MAP (mmHg)  SpO2  O2 Device   06/29/19 07:48:08  97 4 °F (36 3 °C)Abnormal   70  20  126/75  92  94 %     06/29/19 07:47:40  97 4 °F (36 3 °C)Abnormal   66  20  126/75  92  97 %     06/28/19 22:52:22  98 8 °F (37 1 °C)  80  18  121/84  96  95 %     06/28/19 21:24:21    84    123/84  97  95 %     06/28/19 2124        123/84         06/28/19 14:56:49  99 1 °F (37 3 °C)  88  20  135/84  101  95 %     06/28/19 1115  97 9 °F (36 6 °C)  94  18  127/88  101  97 %  None (Room air)       Pertinent Labs/Diagnostic Test Results:   Results from last 7 days   Lab Units 06/29/19  0525 06/28/19  1311   WBC Thousand/uL 8 44 9 63   HEMOGLOBIN g/dL 14 2 14 3   HEMATOCRIT % 45 0 44 9   PLATELETS Thousands/uL 412* 426*   NEUTROS ABS Thousands/µL  --  5 57     Results from last 7 days   Lab Units 06/29/19  0525 06/28/19  1705   SODIUM mmol/L 138 136   POTASSIUM mmol/L 3 8 4 0   CHLORIDE mmol/L 102 103   CO2 mmol/L 30 27   ANION GAP mmol/L 6 6   BUN mg/dL 12 11   CREATININE mg/dL 0 83 0 72   EGFR ml/min/1 73sq m 87 103   CALCIUM mg/dL 9 6 9 4     Results from last 7 days   Lab Units 06/28/19  1705   AST U/L 18   ALT U/L 31   ALK PHOS U/L 216*   TOTAL PROTEIN g/dL 7 9   ALBUMIN g/dL 3 2*   TOTAL BILIRUBIN mg/dL 0 20     Results from last 7 days   Lab Units 06/29/19  1349 06/29/19  1049 06/29/19  0958 06/28/19  2052 06/28/19  1826 06/28/19  1346   POC GLUCOSE mg/dl 187* 168* 194* 121 135 157*     Results from last 7 days   Lab Units 06/29/19  0525 06/28/19  1705   GLUCOSE RANDOM mg/dL 125 119     Results from last 7 days   Lab Units 06/28/19  1705 PROCALCITONIN ng/ml <0 05     Results from last 7 days   Lab Units 06/28/19  1705 06/28/19  1309   CRP mg/L 42 9*  --    SED RATE mm/hour  --  50*     Results from last 7 days   Lab Units 06/29/19  1058 06/28/19  1310   GRAM STAIN RESULT  Gram positive cocci in pairs and chains* No polys seen*  2+ RBC's*  2+ Gram positive cocci in pairs*  1+ Gram positive rods*   WOUND CULTURE   --  Culture results to follow  XR left foot 6/28 --- No radiographic evidence of osteomyelitis  CXR 6/28 -- No acute cardiopulmonary disease        Past Medical History:   Diagnosis Date    Anxiety     Arthritis     Asthma     Chronic pain     [facility] mellitus ([facility] [address] [facility]     Disease of thyroid gland     Lymphedema     left leg    Manic bipolar I disorder (HCC)     Morbid obesity (HCC)     OCD (obsessive compulsive disorder)     PTSD (post-traumatic stress disorder)      Present on Admission:   Cellulitis of left foot   Hypothyroidism   Manic bipolar I disorder ([facility] [address] [facility]   Gram-positive bacteremia      Admitting Diagnosis: Cellulitis of left foot [L03 [id number]]  Age/Sex: 38 y o  female  Admission Orders:  Fingerstick glucose checks ac & hs  Up as robson  SCD's to RLE    Current Facility-Administered Medications:  albuterol 2 puff Inhalation Q6H PRN   buPROPion 300 mg Oral Daily   cefazolin 2,000 mg Intravenous Q8H   cloNIDine 0 1 mg Oral QAM   cloNIDine 0 2 mg Oral QPM   enoxaparin 40 mg Subcutaneous Q24H   escitalopram 10 mg Oral QAM   HYDROmorphone 0 2 mg Intravenous Q4H PRN   hydrOXYzine HCL 50 mg Oral HS   insulin glargine 20 Units Subcutaneous Q12H RADHA   insulin lispro 2-12 Units Subcutaneous TID AC   insulin lispro 2-12 Units Subcutaneous HS   [START ON 6/30/2019] levothyroxine 75 mcg Oral Early Morning   metroNIDAZOLE 500 mg Oral Q8H RADHA   multivitamin 1 tablet Oral Daily   ondansetron 4 mg Intravenous Q6H PRN   oxyCODONE 10 mg Oral Q4H PRN   oxyCODONE 5 mg Oral Q4H PRN   pantoprazole 40 mg Oral Daily Before Breakfast polyethylene glycol 17 g Oral Daily PRN   ziprasidone 80 mg Oral BID With Meals       IP CONSULT TO INFECTIOUS DISEASES  IP CONSULT TO INTERNAL MEDICINE  IP CONSULT TO AlberKali Delgado Utilization Review Department  Phone: [phone]; Fax [phone]  [email or web address]  ATTENTION: Please call with any questions or concerns to [phone]  and carefully listen to the prompts so that you are directed to the right person  Send all requests for admission clinical reviews, approved or denied determinations and any other requests to fax [phone]   All voicemails are confidential

## 2019-06-29 NOTE — ASSESSMENT & PLAN NOTE
Close follow up with [facility] bariatric surgery   - close nutritional follow up   - monitor labs for anemia

## 2019-06-29 NOTE — PROGRESS NOTES
Progress Note - Infectious Disease   Dianna Martin 38 y o  female MRN: [id number]  Unit/Bed#: [facility] 814-01 Encounter: [id number]      Impression/Recommendations:  1  Left foot cellulitis  Source is most likely a chronic foot ulcer  Patient is clinically and systemically well, without sepsis or systemic toxicity  She has no fever or leukocytosis  Cellulitis is improved on current antibiotic regimen  Agree with IV cefazolin/p o  Flagyl for now  Follow-up on wound culture  Serial foot exams      2   Bacteremia, with 1 out of 2 admission blood cultures positive for GPC in pairs and chains  This will most likely beta-hemolytic streptococci  Above antibiotic regimen should be adequate  Continue current antibiotic regimen  Follow-up on final blood cultures  Recheck blood cultures in a [facility] Check 2D echo  3  Chronic left foot ulcer  Given chronicity of ulcer and relatively deep probing, there is moderately high risk of osteomyelitis  Foot x-ray did not show obvious osteomyelitis, but does not have sensitivity for picking up early osteomyelitis  Patient should get MRI  Antibiotic plan as in above  Follow-up MRI      4  Venous stasis, with moderate leg edema  This may contribute to poor wound healing  Patient should keep her legs elevated to control edema  Keep legs elevated      5  DM  Blood sugar reasonably well control  Management per Medicine Service      6  Cigarette abuse  This probably contributes to poor wound healing  Patient counseled regarding the need to stop smoking      Discussed with patient in detail regarding the above plan  Discussed with slim service  Antibiotics:  Cefazolin/Flagyl # 2     Subjective:  Patient still has pain on top of foot  No pain in bottom of foot  Temperature is down  No further chills  She is tolerating antibiotic well  No nausea, vomiting or diarrhea      Objective:  Vitals:  Temp:  [97 4 °F (36 3 °C)-99 1 °F (37 3 °C)] 97 4 °F (36 3 °C)  HR:  [place] 70  Resp:  [18-20] 20  BP: (121-135)/(75-84) 126/75  SpO2:  [94 %-97 %] 94 %  Temp (24hrs), Av 2 °F (36 8 °C), Min:97 4 °F (36 3 °C), Max:99 1 °F (37 3 °C)  Current: Temperature: (!) 97 4 °F (36 3 °C)    Physical Exam:     General: Awake, alert, cooperative, no distress  Neck:  Supple  No mass  No lymphadenopathy  Lungs: Expansion symmetric, no rales, no wheezing, respirations unlabored  Heart:  Regular rate and rhythm, S1 and S2 normal, no murmur  Abdomen: Soft, nondistended, non-tender, bowel sounds active all four quadrants,        no masses, no organomegaly  Extremities: Stable leg edema  Stable foot ulcer  No purulence  Improved erythema/warmth  Tenderness dorsally   Skin:  No rash  Neuro: Moves all extremities  Invasive Devices     Peripheral Intravenous Line            Peripheral IV 19 Left Antecubital 1 day                Labs studies:   I have personally reviewed pertinent labs  Results from last 7 days   Lab Units 19  0525 19  1705   POTASSIUM mmol/L 3 8 4 0   CHLORIDE mmol/L 102 103   CO2 mmol/L 30 27   BUN mg/dL 12 11   CREATININE mg/dL 0 83 0 72   EGFR ml/min/1 73sq m 87 103   CALCIUM mg/dL 9 6 9 4   AST U/L  --  18   ALT U/L  --  31   ALK PHOS U/L  --  216*     Results from last 7 days   Lab Units 19  0525 19  1311   WBC Thousand/uL 8 44 9 63   HEMOGLOBIN g/dL 14 2 14 3   PLATELETS Thousands/uL 412* 426*     Results from last 7 days   Lab Units 19  1058 19  1310   GRAM STAIN RESULT  Gram positive cocci in pairs and chains* No polys seen*  2+ RBC's*  2+ Gram positive cocci in pairs*  1+ Gram positive rods*   WOUND CULTURE   --  Culture results to follow  Imaging Studies:   I have personally reviewed pertinent imaging study reports and images in PACS  EKG, Pathology, and Other Studies:   I have personally reviewed pertinent reports

## 2019-06-29 NOTE — ASSESSMENT & PLAN NOTE
One blood culture of pts two is positive for gram positive cocci in pairs and chains ( alpha hemolytic strep) , discussed with Dr Poncho Barroso   -continue current regimen (Flagyl and cefazolin day 3)  - follow up and repeat cultures  - wound cultures with   - will need 2 d echo

## 2019-06-29 NOTE — CONSULTS
Consult- Bisi Garcia 1975, 38 y o  female MRN: [id number]    Unit/Bed#: [facility] 814-01 Encounter: [id number]    Primary Care Provider: Sterling Tang DO   Date and time admitted to hospital: 6/28/2019 11:09 AM      Inpatient consult to Internal Medicine  Consult performed by: JOSEE Rod  Consult ordered by: Mikhail Abrams DPM          Gram-positive bacteremia  Assessment & Plan  One blood culture of pts two is positive for gram positive cocci in pairs and chains, discussed with Dr Luberta Nageotte   Likely strep continue current regimen of abx     Preoperative clearance  Assessment & Plan  Attending Dr Leamon Barthel will follow later this morning for final recommendation  - EKG on admission : with Normal sinus however compared to prior pts qt interval has increased 408  (pt on geodon, wellbutrin , and lexapro) in addition to cardiac   meds   - Continue  Clonidine 0 1mg in am and 0 2mg at hs currently with adequate bp control since admission  - pt was seen by [facility] cardiology  on 3/5/2019 for preop clearance for bariatric surgery   ( [facility] )   - recommendations at that time were to continue cardiac medication   - stick to heart healthy diet (low sat fat, trans fat and cholesterol)  - limit concentrated sweets (soda, desserts and candy)   - Healthy weight loss   - follow up in three months from that visit   * pt reports that she has had to see cards preop because after either her "gallbladder or hernia repair surgery, she woke up and "they " told her she had flat lined "that was years ago"  - Pt was ordered a nuclear stress test that was performed on: 3/12/19 which demonstrated as quoted from [facility] report:     "IMPRESSIONS         1  The myocardial perfusion imaging is abnormal  There is a small      size, mild intensity, fixed, anterior and anteroseptal perfusion      defect  Prone imaging not performed  Findings consistent with a prior      LAD territory infarct or breast attenuation artifact  In the setting      of normal wall motion, favor breast attenuation artifact        2  There is TID present  TID value 1 29 with Lexiscan administration       This is the upper limit of normal for a pharmacological nuclear      stress test         3  Normal left ventricular size and ejection fraction  LVEF > 70%        Normal left ventricular wall motion  Normal left ventricular wall      thickening        4   No abnormal ST or  T wave changes with Lexiscan administration "    * 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, with bone involvement without evidence of necrosis [facility])  Assessment & Plan  Admitted under Dr Vj Dietz for left foot diabetic ulcer with suspicion for osteomyelitis   - on iv antibiotics started on po flagyl and iv ancef per primary service   - left foot xray with: No radiographic evidence of osteomyelitis  - will perform MRI of foot per ID and podiatry   - pt remains afebrile and stable will continue to monitor labs  - appreciate ID   - ON admission 6/28  podiatry performed excisional debridement at the bedside   - patient may be WBAT in a surgical shoe at this time         No results found for: HGBA1C    Recent Labs     06/28/19  1826 06/28/19  2052 06/29/19  0958 06/29/19  1049   POCGLU 135 121 194* 168*       Blood Sugar Average: Last 72 hrs:  (P) 155   a1c ordered for am   Ada diet   Will modify insulin regimen when npo for surgery   home dose lantus 55 units q12 hrs (pt did receive 55 units last night and refused dose today, however she was getting bs chks 2 post eating (changed sliding scale and reduced 55 lantus to 20 units bid )   Pt states since her surgery she has not taken any lantus at home   Continue ssi changed to with meal chks  Currently stable       Manic bipolar I disorder ([facility] [address] )  Assessment & Plan  Pt sees psychiatrist outpt   - for hx of anxiety , manic bipolar, OCD , and PTSD   - she reports compliance with medications   - states she was just put on the Wellbutrin for smoking cessation       Encounter for smoking cessation counseling  Assessment & Plan  Pt reports 1/2 pack a day smoker  "she reports usually less than that"  - pt was recently started on wellbutrin for smoking cessation    Cellulitis of left foot  Assessment & Plan  Appreciate ID  - source suspected to be active chronic foot ulcer, just debrided 6/28 at bedside  (unsure what plan is for surgery or what anticipated surgery would be per podiatry note)   - ok per id to treat with narrow spectrum abx  - fu wound cultures   No polys seenAbnormal        2+ RBC'sAbnormal      2+ Gram positive cocci in pairsAbnormal      1+ Gram positive rodsAbnormal        - follow blood cultures one of two drawn in process  - recommends MRI of foot regardless of xray results : still pending  - good diabetic management and education s/p bariatric surgery     Morbid obesity ([facility] [address] )  Assessment & Plan  Close follow up with [facility] bariatric surgery   - close nutritional follow up   - monitor labs for anemia     H/O bariatric surgery  Assessment & Plan  Performed at [address] [place] [address] [address] on 3/19/2019  GASTRECTOMY, LAPAROSCOPIC SLEEVE AND EGD   - pt dt to affordability cannot afford post gastic bypass vitamins she states she was told ok to take the flintstone complete I ordered it for the pt now       Hypothyroidism  Assessment & Plan  Continue synthroid will chk tsh 6/29 5 580 (will obtain t4 off of am lab)   ? Absorption since pt just had gastric bypass but she reports that she is due about now to have thyroid labs chkd     Educated pt on the fact that I am increasing to 75mcg daily starting 6/30 she will need to have repeat labs in 4 - 6 wks   On 2/21/19 tsh was 3 53     Anxiety  Assessment & Plan  Continue psychiatric meds for control   Continue to monitor       VTE Prophylaxis: Sequential compression device (Venodyne)  and Enoxaparin (Lovenox)  / sequential compression device     Recommendations for Discharge:  · Follow with pcp in one week   · Continue to follow with bariatric surgery to optimized weight management and diabetic control    Counseling / Coordination of Care Time: 45 minutes  Greater than 50% of total time spent on patient counseling and coordination of care  Collaboration of Care: Were Recommendations Directly Discussed with Primary Treatment Team? - No     History of Present Illness:    Rohith Dudley is a 38 y o  female who is originally admitted to the podiatry teams service due to left leg cellulitis and possible osteomyelitis   We are consulted for preop clearance, diabetic and hypertensive management  Pt has hx of morbid obesity with recent gastric bypass and uncontrolled type 2 diabetes, HTN, bipolar, anxiety, hypothyroidism,  lymphedema of left lower leg  and hx of smoker  She was admitted under the podiatry teams service  Pt was at the [facility] wound care center morning of admission and was asked to come over to the hospital with concern for possible osteomyelitis  Pt had reported that she has had a wound on her foot for about a month for which she has been seeing Dr Karen Calero  Pt does have visiting nurses that come to her home every Monday and Wednesday for her dressing changes  Pt had reported an increase in pain in her left foot for which she had noted that it was starting to have worsening erythema but she only really reported it when she came into her scheduled appointment   Pt does report some signs of neuropathy along with increased fatigue and a loss of appetite which started about a week prior to this admission  Pt is noted to be a diabetic for which she states she was started on insulin about three years ago   In march of this year pt underwent gastric bypass surgery at [facility] and reports that her diabetic insulin needs have decreased, in fact she has not been taking lantus since her surgery  Pt continues to smoke about half a pack a day    We are consulted for preop clearance and medical management     Review of Systems:    Review of Systems   Constitutional: Positive for activity change, appetite change, fatigue and fever (pt reports noted at wound care center and at home)  HENT: Positive for sore throat (started about a week ago)  Respiratory: Positive for cough (recently started about a week ago )  Negative for choking and chest tightness  Cardiovascular: Negative for chest pain, palpitations and leg swelling  Gastrointestinal: Positive for nausea (occasional nausea)  Negative for abdominal distention, abdominal pain, constipation and diarrhea  Genitourinary: Negative for difficulty urinating and frequency  Musculoskeletal: Positive for gait problem (due to neuropathy and wound on foot )  Negative for arthralgias, back pain, neck pain and neck stiffness  Skin: Positive for wound (foot covered by curlex )  Negative for color change, pallor and rash  Neurological: Positive for weakness and numbness (bl lower extremeties some area with sensation)  Negative for dizziness, tremors, syncope, speech difficulty and headaches  Psychiatric/Behavioral: Positive for sleep disturbance  Negative for agitation and self-injury  The patient is nervous/anxious (pt reports ptsd, ocd , depresson and manic bipolar)  Past Medical and Surgical History:     Past Medical History:   Diagnosis Date    Anxiety     Arthritis     Asthma     Chronic pain     Diabetes mellitus ([facility] [address] )     Disease of thyroid gland     Lymphedema     left leg    Manic bipolar I disorder (HCC)     Morbid obesity (HCC)     OCD (obsessive compulsive disorder)     PTSD (post-traumatic stress disorder)        Past Surgical History:   Procedure Laterality Date    CHOLECYSTECTOMY      CHOLECYSTECTOMY         Meds/Allergies:    PTA meds:   Prior to Admission Medications   Prescriptions Last Dose Informant Patient Reported? Taking?    ADMELOG SOLOSTAR 100 units/mL injection pen   Yes No   BASAGLAR KWIKPEN 100 units/mL injection pen   Yes No   EASY COMFORT PEN NEEDLES 32G X 4 MM MISC   Yes No   EASY TOUCH INSULIN SYRINGE 31G X 5/16" 0 3 ML MISC   Yes No   HYDROcodone-acetaminophen (NORCO) 5-325 mg per tablet   Yes No   SUMAtriptan Succinate (IMITREX STATDOSE REFILL) 6 MG/0 5ML SOCT   Yes No   Sig: Inject 6 mg under the skin every 2 (two) hours as needed   albuterol (VENTOLIN HFA) 90 mcg/act inhaler   Yes No   Sig: Inhale 2 puffs every 6 (six) hours as needed   buPROPion (WELLBUTRIN XL) 300 mg 24 hr tablet   Yes No   Sig: Take 300 mg by mouth   cholecalciferol (VITAMIN D3) 1,000 units tablet   Yes No   Sig: Take by mouth Three times a day   cloNIDine (CATAPRES) 0 1 mg tablet   Yes No   Si 1 mg 2 (two) times a day Per pt she takes 0 1mg in am and 0 2mg in pm   escitalopram (LEXAPRO) 10 mg tablet Not Taking at Unknown time  Yes No   Sig: TAKE 1 TABLET BY MOUTH IN THE MORNING   hydrOXYzine pamoate (VISTARIL) 50 mg capsule   Yes No   Sig: Take 50 mg by mouth   insulin glargine (LANTUS SOLOSTAR) 100 units/mL injection pen   Yes No   Sig: Inject 55 Units under the skin 2 (two) times a day   insulin lispro (HUMALOG KWIKPEN) 100 units/mL injection pen   Yes No   Sig: Inject 1-15 Units under the skin   levothyroxine 50 mcg tablet   Yes No   Sig: Take 50 mcg by mouth daily   ondansetron (ZOFRAN-ODT) 4 mg disintegrating tablet   Yes No   oxyCODONE (ROXICODONE) 5 mg/5 mL solution   Yes No   pantoprazole (PROTONIX) 40 mg tablet   Yes No   Sig: Take 40 mg by mouth daily   ziprasidone (GEODON) 80 mg capsule   Yes No   Sig: Take 80 mg by mouth      Facility-Administered Medications: None       Allergies:    Allergies   Allergen Reactions    Aspirin     Barium Iodide Itching     Face became very red and itchy     Codeine     Latex     Vancomycin     Penicillins Other (See Comments) and Rash       Social History:     Marital Status: Single/ lives alone    Substance Use History:   Social History     Substance and Sexual Activity Alcohol Use Never    Frequency: Never    Drinks per session: Patient refused    Binge frequency: Never     Social History     Tobacco Use   Smoking Status Current Every Day Smoker    Packs/day: 0 50    Years: 15 00    Pack years: 7 50   Smokeless Tobacco Never Used     Social History     Substance and Sexual Activity   Drug Use Never       Family History:    Family History   Problem Relation Age of Onset    Hyperlipidemia Mother     Diabetes Mother     Hypertension Mother     Heart attack Father        Physical Exam:     Vitals:   Blood Pressure: 126/75 (06/29/19 0748)  Pulse: 70 (06/29/19 0748)  Temperature: (!) 97 4 °F (36 3 °C) (06/29/19 0748)  Temp Source: Axillary (06/29/19 0748)  Respirations: 20 (06/29/19 0748)  Height: 5' 5" (165 1 cm) (06/28/19 1115)  Weight - Scale: 120 kg (264 lb 8 8 oz) (06/28/19 1115)  SpO2: 94 % (06/29/19 0748)    Physical Exam   Constitutional: She is oriented to person, place, and time  Morbidly obese   HENT:   Head: Normocephalic and atraumatic  Eyes: Conjunctivae are normal  Right eye exhibits no discharge  Left eye exhibits no discharge  No scleral icterus  Neck: No JVD present  No tracheal deviation present  No thyromegaly present  Cardiovascular: Normal rate  Exam reveals no gallop and no friction rub  No murmur heard  Pulmonary/Chest: No stridor  No respiratory distress  She has no wheezes  She has no rales  She exhibits no tenderness  Poor effort    Abdominal: Soft  She exhibits no distension and no mass  There is no tenderness  There is no rebound and no guarding  No hernia  Multiple about three trocar scars from surgery healed    Musculoskeletal: She exhibits no edema, tenderness or deformity  Lymphadenopathy:     She has no cervical adenopathy  Neurological: She is oriented to person, place, and time  Skin: She is not diaphoretic  Psychiatric:   Flat ?  Lethargic            Additional Data:     Lab Results: I have personally reviewed pertinent reports  Results from last 7 days   Lab Units 06/29/19  0525 06/28/19  1311   WBC Thousand/uL 8 44 9 63   HEMOGLOBIN g/dL 14 2 14 3   HEMATOCRIT % 45 0 44 9   PLATELETS Thousands/uL 412* 426*   NEUTROS PCT %  --  58   LYMPHS PCT %  --  31   MONOS PCT %  --  5   EOS PCT %  --  5     Results from last 7 days   Lab Units 06/29/19  0525 06/28/19  1705   SODIUM mmol/L 138 136   POTASSIUM mmol/L 3 8 4 0   CHLORIDE mmol/L 102 103   CO2 mmol/L 30 27   BUN mg/dL 12 11   CREATININE mg/dL 0 83 0 72   ANION GAP mmol/L 6 6   CALCIUM mg/dL 9 6 9 4   ALBUMIN g/dL  --  3 2*   TOTAL BILIRUBIN mg/dL  --  0 20   ALK PHOS U/L  --  216*   ALT U/L  --  31   AST U/L  --  18   GLUCOSE RANDOM mg/dL 125 119             No results found for: HGBA1C  Results from last 7 days   Lab Units 06/29/19  1049 06/29/19  0958 06/28/19  2052 06/28/19  1826 06/28/19  1346   POC GLUCOSE mg/dl 168* 194* 121 135 157*     Results from last 7 days   Lab Units 06/28/19  1705   PROCALCITONIN ng/ml <0 05       Imaging: I have personally reviewed pertinent reports  XR chest portable   Final Result by Leslie Suarez MD (06/28 1508)      No acute cardiopulmonary disease  Workstation performed: [id number]         XR foot 3+ vw left   Final Result by Leslie Suarez MD (06/28 1516)      No radiographic evidence of osteomyelitis  Workstation performed: [id number]         MRI foot/forefoot toes left w wo contrast    (Results Pending)       EKG, Pathology, and Other Studies Reviewed on Admission:   EKG :Normal sinus rhythm  Low voltage QRS  Borderline ECG  When compared with ECG of 09-SEP-2015 16:11,  QT has lengthened  · Confirmed by Kathya Hough ([id number]) on 6/28/2019 1:17:22 PM:     ** Please Note: This note has been constructed using a voice recognition system   **

## 2019-06-29 NOTE — PROGRESS NOTES
Progress Note - Podiatry  Jatin Elkinsi 38 y o  female MRN: [id number]  Unit/Bed#: [facility] 260-42 Encounter: [id number]    Assessment:    71-year-old female with diabetes and recent bariatric surgery presents with a left foot diabetic foot ulcers and cellulitis- suspect osteomyelitis    1  Left foot diabetic foot ulcer with possible osteomyelitis  2  Left foot cellulitis  3  Insulin-dependent diabetes  4  Status post bariatric surgery  5  Anxiety  6  Morbid obesity  7  Smoker     Plan:  - patient is afebrile and hemodynamically stable  - left foot wound continues probe to capsule, tendon and muscle  Cellulitis present on the dorsal aspect of the left foot  Dressed maxorb and DSD  - MRI pending, talked to MRI technician MRI mostly tomorrow or Monday  If MRI positive for bone infection patient will most likely need some sort of podiatric surgical intervention during this admission    - continue IV and 7 and p o  Flagyl, appreciate ID for antibiotic recommendation  - preliminary wound cultures growing 2+ Gram-positive cocci in pairs and 1+ Gram-positive rods  - preliminary 1 of the blood culture growing gram-positive cocci in pairs and chains  - patient to weightbear as tolerated in surgical shoe for now  - appreciate slim for medical management and preop clearance    Subjective/Objective   Chief Complaint: No chief complaint on file  Subjective: 38 y o  y/o female was seen and evaluated at bedside  Patient denies any acute events overnight  States pain is controlled with pain medications  Blood pressure 126/75, pulse 70, temperature (!) 97 4 °F (36 3 °C), temperature source Axillary, resp  rate 20, height 5' 5" (1 651 m), weight 120 kg (264 lb 8 8 oz), SpO2 94 %  ,Body mass index is 44 02 kg/m²      Invasive Devices     Peripheral Intravenous Line            Peripheral IV 06/28/19 Left Antecubital less than 1 day                Physical Exam:   General: Alert, cooperative and no distress  Lungs: Non labored breathing  Heart: Positive S1, S2  Abdomen: Soft, non-tender  Extremity:  Left plantar submetatarsal 3 head wound appears to be probing to capsule muscle and tendon  There is serosanguineous drainage from the wound  Mild malodor noted  Wound bed appears to be mixture of granular and fibrotic  Yvonne wound appears to be hyperkeratotic  Erythema present dorsal aspect of the forefoot extending proximally to the level of midfoot  Lymphedema noted  Neurovascular and musculoskeletal status at baseline                  Lab, Imaging and other studies:   CBC:   Lab Results   Component Value Date    WBC 8 44 06/29/2019    HGB 14 2 06/29/2019    HCT 45 0 06/29/2019    MCV 87 06/29/2019     (H) 06/29/2019    MCH 27 4 06/29/2019    MCHC 31 6 06/29/2019    RDW 13 6 06/29/2019    MPV 8 9 06/29/2019   , CMP:   Lab Results   Component Value Date    SODIUM 138 06/29/2019    K 3 8 06/29/2019     06/29/2019    CO2 30 06/29/2019    BUN 12 06/29/2019    CREATININE 0 83 06/29/2019    CALCIUM 9 6 06/29/2019    AST 18 06/28/2019    ALT 31 06/28/2019    ALKPHOS 216 (H) 06/28/2019    EGFR 87 06/29/2019       Imaging: I have personally reviewed pertinent films in PACS  EKG, Pathology, and Other Studies: I have personally reviewed pertinent reports    VTE Pharmacologic Prophylaxis: Enoxaparin (Lovenox)

## 2019-06-29 NOTE — MALNUTRITION/BMI
This medical record reflects one or more clinical indicators suggestive of morbid obesity  BMI Findings:  BMI Classifications: Morbid Obesity 40-44 9     BMI 42 61    See Nutrition note dated 6/29/19 for additional details  Completed nutrition assessment is viewable in the nutrition documentation

## 2019-06-29 NOTE — ASSESSMENT & PLAN NOTE
Attending Dr Lauryn Guzman will follow later this morning for final recommendation  - EKG on admission : with Normal sinus however compared to prior pts qt interval has increased 408  (pt on geodon, wellbutrin , and lexapro) in addition to cardiac   meds   - Continue  Clonidine 0 1mg in am and 0 2mg at hs currently with adequate bp control since admission  - pt was seen by [facility] cardiology  on 3/5/2019 for preop clearance for bariatric surgery   ( [facility] )   - recommendations at that time were to continue cardiac medication   - stick to heart healthy diet (low sat fat, trans fat and cholesterol)  - limit concentrated sweets (soda, desserts and candy)   - Healthy weight loss   - follow up in three months from that visit   * pt reports that she has had to see cards preop because after either her "gallbladder or hernia repair surgery, she woke up and "they " told her she had flat lined "that was years ago"  - Pt was ordered a nuclear stress test that was performed on: 3/12/19 which demonstrated as quoted from [facility] report:     "IMPRESSIONS         1  The myocardial perfusion imaging is abnormal  There is a small      size, mild intensity, fixed, anterior and anteroseptal perfusion      defect  Prone imaging not performed  Findings consistent with a prior      LAD territory infarct or breast attenuation artifact  In the setting      of normal wall motion, favor breast attenuation artifact        2  There is TID present  TID value 1 29 with Lexiscan administration       This is the upper limit of normal for a pharmacological nuclear      stress test         3  Normal left ventricular size and ejection fraction  LVEF > 70%        Normal left ventricular wall motion  Normal left ventricular wall      thickening        4   No abnormal ST or  T wave changes with Lexiscan administration "

## 2019-06-29 NOTE — ASSESSMENT & PLAN NOTE
Pt reports 1/2 pack a day smoker  "she reports usually less than that"  - pt was recently started on wellbutrin for smoking cessation

## 2019-06-29 NOTE — ASSESSMENT & PLAN NOTE
Pt sees psychiatrist outpt   - for hx of anxiety , manic bipolar, OCD , and PTSD   - she reports compliance with medications   - states she was just put on the Wellbutrin for smoking cessation

## 2019-06-29 NOTE — ASSESSMENT & PLAN NOTE
Admitted under Dr Small Room for left foot diabetic ulcer with suspicion for osteomyelitis   - on iv antibiotics started on po flagyl and iv ancef per primary service   - left foot xray with: No radiographic evidence of osteomyelitis  - will perform MRI of foot per ID and podiatry   - pt remains afebrile and stable will continue to monitor labs  - appreciate ID   - ON admission 6/28  podiatry performed excisional debridement at the bedside   - patient may be WBAT in a surgical shoe at this time         No results found for: HGBA1C    Recent Labs     06/28/19  1826 06/28/19  2052 06/29/19  0958 06/29/19  1049   POCGLU 135 121 194* 168*       Blood Sugar Average: Last 72 hrs:  (P) 155   a1c ordered for am   Ada diet   Will modify insulin regimen when npo for surgery   home dose lantus 55 units q12 hrs (pt did receive 55 units last night and refused dose today, however she was getting bs chks 2 post eating (changed sliding scale and reduced 55 lantus to 20 units bid )   Pt states since her surgery she has not taken any lantus at home   Continue ssi changed to with meal chks  Currently stable

## 2019-06-29 NOTE — ASSESSMENT & PLAN NOTE
Continue synthroid will chk tsh 6/29 5 580 (will obtain t4 off of am lab)   ? Absorption since pt just had gastric bypass but she reports that she is due about now to have thyroid labs padma     Educated pt on the fact that I am increasing to 75mcg daily starting 6/30 she will need to have repeat labs in 4 - 6 wks   On 2/21/19 tsh was 3 53

## 2019-06-29 NOTE — ASSESSMENT & PLAN NOTE
Appreciate ID  - source suspected to be active chronic foot ulcer, just debrided 6/28 at bedside  (unsure what plan is for surgery or what anticipated surgery would be per podiatry note)   - ok per id to treat with narrow spectrum abx  - fu wound cultures     No polys seenAbnormal        2+ RBC'sAbnormal      2+ Gram positive cocci in pairsAbnormal      1+ Gram positive rodsAbnormal        - follow blood cultures one of two drawn in process  - recommends MRI of foot regardless of xray results : still pending  - good diabetic management and education s/p bariatric surgery

## 2019-06-29 NOTE — ASSESSMENT & PLAN NOTE
Admitted under Dr Vj Dietz for left foot diabetic ulcer with suspicion for osteomyelitis   - on iv antibiotics started on po flagyl and iv ancef per primary service   - left foot xray with: No radiographic evidence of osteomyelitis  - will perform MRI of foot per ID and podiatry   - pt remains afebrile and stable will continue to monitor labs  - appreciate ID   - ON admission 6/28  podiatry performed excisional debridement at the bedside   - patient may be WBAT in a surgical shoe at this time         No results found for: HGBA1C    Recent Labs     06/28/19  1346 06/28/19  1826 06/28/19  2052   POCGLU 157* 135 121       Blood Sugar Average: Last 72 hrs:  (P) 462 [id number]   a1c ordered for am   Ada diet   Will modify insulin regimen when npo for surgery   home dose lantus 55 units q12 hrs (pt did receive 55 units last night and refused dose today, however she was getting bs chks 2 post eating (changed sliding scale and reduced 55 lantus to 20 units bid )   Pt states since her surgery she has not taken any lantus at home   Continue ssi changed to with meal chks  Currently stable

## 2019-06-29 NOTE — ASSESSMENT & PLAN NOTE
Attending Dr Lauryn Guzman will follow later this morning for final recommendation  - EKG on admission : with Normal sinus however compared to prior pts qt interval has increased 408  (pt on geodon, wellbutrin , and lexapro) in addition to cardiac   meds   - Continue  Clonidine 0 1mg in am and 0 2mg at hs currently with adequate bp control since admission  - pt was seen by [facility] cardiology  on 3/5/2019 for preop clearance for bariatric surgery   ( [facility] )   - recommendations at that time were to continue cardiac medication   - stick to heart healthy diet (low sat fat, trans fat and cholesterol)  - limit concentrated sweets (soda, desserts and candy)   - Healthy weight loss   - follow up in three months from that visit   * pt reports that she has had to see cards preop because after either her "gallbladder or hernia repair surgery, she woke up and "they " told her she had flat lined "that was years ago"  - Pt was ordered a nuclear stress test that was performed on: 3/12/19 which demonstrated as quoted from [facility] report:     "IMPRESSIONS         1  The myocardial perfusion imaging is abnormal  There is a small      size, mild intensity, fixed, anterior and anteroseptal perfusion      defect  Prone imaging not performed  Findings consistent with a prior      LAD territory infarct or breast attenuation artifact  In the setting      of normal wall motion, favor breast attenuation artifact        2  There is TID present  TID value 1 29 with Lexiscan administration       This is the upper limit of normal for a pharmacological nuclear      stress test         3  Normal left ventricular size and ejection fraction  LVEF > 70%        Normal left ventricular wall motion  Normal left ventricular wall      thickening        4   No abnormal ST or  T wave changes with Lexiscan administration "

## 2019-06-29 NOTE — ASSESSMENT & PLAN NOTE
Appreciate ID  - source suspected to be active chronic foot ulcer, just debrided 6/28 at bedside  (unsure what plan is for surgery or what anticipated surgery would be per podiatry note)   - ok per id to treat with narrow spectrum abx  - wound cultures        2+ Growth of Enterobacter cloacae complexAbnormal       2+ Growth of Staphylococcus aureusAbnormal      3+ Growth of Beta Hemolytic Streptococcus Group BAbnormal        - follow blood cultures one of two drawn 1 of 2 demonstrates alpha hemolytic strep  - recommends MRI of foot regardless of xray results : still pending final result  - good diabetic management and education s/p bariatric surgery patient refusing long-acting insulin

## 2019-06-30 ENCOUNTER — APPOINTMENT (INPATIENT)
Dept: NON INVASIVE DIAGNOSTICS | Facility: HOSPITAL | Age: 44
DRG: 380 | End: 2019-06-30
Payer: COMMERCIAL

## 2019-06-30 ENCOUNTER — APPOINTMENT (INPATIENT)
Dept: RADIOLOGY | Facility: HOSPITAL | Age: 44
DRG: 380 | End: 2019-06-30
Payer: COMMERCIAL

## 2019-06-30 LAB
ANION GAP SERPL CALCULATED.3IONS-SCNC: 6 MMOL/L (ref 4–13)
BACTERIA SPEC ANAEROBE CULT: NORMAL
BUN SERPL-MCNC: 13 MG/DL (ref 5–25)
CALCIUM SERPL-MCNC: 9.1 MG/DL (ref 8.3–10.1)
CHLORIDE SERPL-SCNC: 101 MMOL/L (ref 100–108)
CO2 SERPL-SCNC: 29 MMOL/L (ref 21–32)
CREAT SERPL-MCNC: 0.86 MG/DL (ref 0.6–1.3)
ERYTHROCYTE [DISTWIDTH] IN BLOOD BY AUTOMATED COUNT: 13.5 % (ref 11.6–15.1)
GFR SERPL CREATININE-BSD FRML MDRD: 83 ML/MIN/1.73SQ M
GLUCOSE SERPL-MCNC: 127 MG/DL (ref 65–140)
GLUCOSE SERPL-MCNC: 138 MG/DL (ref 65–140)
GLUCOSE SERPL-MCNC: 139 MG/DL (ref 65–140)
GLUCOSE SERPL-MCNC: 154 MG/DL (ref 65–140)
GLUCOSE SERPL-MCNC: 165 MG/DL (ref 65–140)
GLUCOSE SERPL-MCNC: 167 MG/DL (ref 65–140)
GLUCOSE SERPL-MCNC: 215 MG/DL (ref 65–140)
HCT VFR BLD AUTO: 44.9 % (ref 34.8–46.1)
HGB BLD-MCNC: 14.2 G/DL (ref 11.5–15.4)
MCH RBC QN AUTO: 27.5 PG (ref 26.8–34.3)
MCHC RBC AUTO-ENTMCNC: 31.6 G/DL (ref 31.4–37.4)
MCV RBC AUTO: 87 FL (ref 82–98)
PLATELET # BLD AUTO: 391 THOUSANDS/UL (ref 149–390)
PMV BLD AUTO: 9.2 FL (ref 8.9–12.7)
POTASSIUM SERPL-SCNC: 4 MMOL/L (ref 3.5–5.3)
RBC # BLD AUTO: 5.16 MILLION/UL (ref 3.81–5.12)
SODIUM SERPL-SCNC: 136 MMOL/L (ref 136–145)
WBC # BLD AUTO: 7.36 THOUSAND/UL (ref 4.31–10.16)

## 2019-06-30 PROCEDURE — 99232 SBSQ HOSP IP/OBS MODERATE 35: CPT | Performed by: INTERNAL MEDICINE

## 2019-06-30 PROCEDURE — 82948 REAGENT STRIP/BLOOD GLUCOSE: CPT

## 2019-06-30 PROCEDURE — A9585 GADOBUTROL INJECTION: HCPCS | Performed by: PODIATRIST

## 2019-06-30 PROCEDURE — 99232 SBSQ HOSP IP/OBS MODERATE 35: CPT | Performed by: PODIATRIST

## 2019-06-30 PROCEDURE — 93306 TTE W/DOPPLER COMPLETE: CPT

## 2019-06-30 PROCEDURE — 93306 TTE W/DOPPLER COMPLETE: CPT | Performed by: INTERNAL MEDICINE

## 2019-06-30 PROCEDURE — 73720 MRI LWR EXTREMITY W/O&W/DYE: CPT

## 2019-06-30 PROCEDURE — 87040 BLOOD CULTURE FOR BACTERIA: CPT | Performed by: INTERNAL MEDICINE

## 2019-06-30 PROCEDURE — 85027 COMPLETE CBC AUTOMATED: CPT | Performed by: STUDENT IN AN ORGANIZED HEALTH CARE EDUCATION/TRAINING PROGRAM

## 2019-06-30 PROCEDURE — 80048 BASIC METABOLIC PNL TOTAL CA: CPT | Performed by: STUDENT IN AN ORGANIZED HEALTH CARE EDUCATION/TRAINING PROGRAM

## 2019-06-30 RX ADMIN — HYDROXYZINE HYDROCHLORIDE 50 MG: 50 TABLET, FILM COATED ORAL at 21:00

## 2019-06-30 RX ADMIN — ZIPRASIDONE HCL 80 MG: 40 CAPSULE ORAL at 08:10

## 2019-06-30 RX ADMIN — METRONIDAZOLE 500 MG: 500 TABLET, FILM COATED ORAL at 13:31

## 2019-06-30 RX ADMIN — METRONIDAZOLE 500 MG: 500 TABLET, FILM COATED ORAL at 05:19

## 2019-06-30 RX ADMIN — HYDROMORPHONE HYDROCHLORIDE 0.2 MG: 1 INJECTION, SOLUTION INTRAMUSCULAR; INTRAVENOUS; SUBCUTANEOUS at 21:54

## 2019-06-30 RX ADMIN — Medication 1 TABLET: at 08:11

## 2019-06-30 RX ADMIN — CEFAZOLIN SODIUM 2000 MG: 2 SOLUTION INTRAVENOUS at 13:31

## 2019-06-30 RX ADMIN — PANTOPRAZOLE SODIUM 40 MG: 40 TABLET, DELAYED RELEASE ORAL at 05:20

## 2019-06-30 RX ADMIN — OXYCODONE HYDROCHLORIDE 10 MG: 10 TABLET ORAL at 05:19

## 2019-06-30 RX ADMIN — INSULIN LISPRO 4 UNITS: 100 INJECTION, SOLUTION INTRAVENOUS; SUBCUTANEOUS at 11:58

## 2019-06-30 RX ADMIN — OXYCODONE HYDROCHLORIDE 10 MG: 10 TABLET ORAL at 19:40

## 2019-06-30 RX ADMIN — CLONIDINE HYDROCHLORIDE 0.1 MG: 0.1 TABLET ORAL at 08:10

## 2019-06-30 RX ADMIN — OXYCODONE HYDROCHLORIDE 10 MG: 10 TABLET ORAL at 00:38

## 2019-06-30 RX ADMIN — GADOBUTROL 12 ML: 604.72 INJECTION INTRAVENOUS at 10:30

## 2019-06-30 RX ADMIN — OXYCODONE HYDROCHLORIDE 10 MG: 10 TABLET ORAL at 11:06

## 2019-06-30 RX ADMIN — CLONIDINE HYDROCHLORIDE 0.2 MG: 0.1 TABLET ORAL at 21:00

## 2019-06-30 RX ADMIN — ONDANSETRON 4 MG: 2 INJECTION INTRAMUSCULAR; INTRAVENOUS at 12:55

## 2019-06-30 RX ADMIN — INSULIN LISPRO 2 UNITS: 100 INJECTION, SOLUTION INTRAVENOUS; SUBCUTANEOUS at 08:02

## 2019-06-30 RX ADMIN — ENOXAPARIN SODIUM 40 MG: 40 INJECTION SUBCUTANEOUS at 17:48

## 2019-06-30 RX ADMIN — HYDROMORPHONE HYDROCHLORIDE 0.2 MG: 1 INJECTION, SOLUTION INTRAMUSCULAR; INTRAVENOUS; SUBCUTANEOUS at 14:35

## 2019-06-30 RX ADMIN — LEVOTHYROXINE SODIUM 75 MCG: 75 TABLET ORAL at 05:19

## 2019-06-30 RX ADMIN — INSULIN LISPRO 2 UNITS: 100 INJECTION, SOLUTION INTRAVENOUS; SUBCUTANEOUS at 21:00

## 2019-06-30 RX ADMIN — ZIPRASIDONE HCL 80 MG: 40 CAPSULE ORAL at 21:00

## 2019-06-30 RX ADMIN — CEFEPIME HYDROCHLORIDE 2000 MG: 2 INJECTION, POWDER, FOR SOLUTION INTRAVENOUS at 14:36

## 2019-06-30 RX ADMIN — CEFAZOLIN SODIUM 2000 MG: 2 SOLUTION INTRAVENOUS at 05:19

## 2019-06-30 RX ADMIN — BUPROPION HYDROCHLORIDE 300 MG: 150 TABLET, FILM COATED, EXTENDED RELEASE ORAL at 08:09

## 2019-06-30 RX ADMIN — METRONIDAZOLE 500 MG: 500 TABLET, FILM COATED ORAL at 21:00

## 2019-06-30 NOTE — PROGRESS NOTES
Progress Note - Podiatry  Mariaelena Duarte 38 y o  female MRN: [id number]  Unit/Bed#: [facility] 131-72 Encounter: [id number]    Assessment:  44-year-old female with diabetes and recent bariatric surgery presents with a left foot diabetic foot ulcers and cellulitis- suspect osteomyelitis     1  Left foot diabetic foot ulcer with possible osteomyelitis  2  Left foot cellulitis  3  Insulin-dependent diabetes  4  Status post bariatric surgery  5  Anxiety  6  Morbid obesity  7  Smoker      Plan:  - patient is afebrile and hemodynamically stable  - left foot wound continues to probe to capsule tendon and muscle  Cellulitis present on the dorsal aspect of the left foot which appears to be regressing  Dressed with Maxorb and DSD   - MRI pending  - appreciate ID for IV antibiotics, change and set to cefepime and continue Flagyl  - wound cultures permanently results growing 2+ growth of Enterobacter cloacae complex, 2+ growth of Staph aureus, 3+ growth of beta-hemolytic Streptococcus group B  - new blood cultures pending, previous blood culture grew alpha hemolytic strep  - weightbear as tolerated in surgical shoe for now  - appreciated slim for medical management  Subjective/Objective   Chief Complaint: No chief complaint on file  Subjective: 38 y o  y/o female was seen and evaluated at bedside  Patient denies any acute events overnight  Denies nausea vomiting fever chills shortness of breath  Blood pressure 112/75, pulse 68, temperature 98 5 °F (36 9 °C), resp  rate 18, height 5' 5" (1 651 m), weight 120 kg (264 lb 8 8 oz), SpO2 93 %  ,Body mass index is 44 02 kg/m²  Invasive Devices     Peripheral Intravenous Line            Peripheral IV 06/28/19 Left Antecubital 2 days                Physical Exam:   General: Alert, cooperative and no distress  Lungs: Non labored breathing  Heart: Positive S1, S2  Abdomen: Soft, non-tender  Extremity:  Left foot cellulitis appears to be regressing    Tender to touch dorsal aspect of the foot  Left plantar submetatarsal 3 head wound continues to appeared similar to her last evaluated  Neurovascular musculoskeletal status at baseline  Lab, Imaging and other studies:   CBC:   Lab Results   Component Value Date    WBC 7 36 06/30/2019    HGB 14 2 06/30/2019    HCT 44 9 06/30/2019    MCV 87 06/30/2019     (H) 06/30/2019    MCH 27 5 06/30/2019    MCHC 31 6 06/30/2019    RDW 13 5 06/30/2019    MPV 9 2 06/30/2019   , CMP:   Lab Results   Component Value Date    SODIUM 136 06/30/2019    K 4 0 06/30/2019     06/30/2019    CO2 29 06/30/2019    BUN 13 06/30/2019    CREATININE 0 86 06/30/2019    CALCIUM 9 1 06/30/2019    EGFR 83 06/30/2019       Imaging: I have personally reviewed pertinent films in PACS  EKG, Pathology, and Other Studies: I have personally reviewed pertinent reports    VTE Pharmacologic Prophylaxis: Enoxaparin (Lovenox)

## 2019-06-30 NOTE — PROGRESS NOTES
Progress Note - Infectious Disease   Robert Gould 38 y o  female MRN: [id number]  Unit/Bed#: [facility] 814-01 Encounter: [id number]      Impression/Recommendations:  1  Left foot cellulitis   Source is most likely a chronic foot ulcer   Patient is clinically and systemically well, without sepsis or systemic toxicity   She has no fever or leukocytosis  Cellulitis is improved on current antibiotic regimen patient has persistent significant pain on dorsum of foot  Wound culture thus far is growing Staph aureus, GBS and Enterobacter  Although not all pathogens in wound culture is pathogenic, given persistent significant pain, will broaden antibiotic regimen  Change cefazolin to cefepime  Continue Flagyl  Follow-up on final wound culture  Serial foot exams      2   Bacteremia, with 1 out of 2 admission blood cultures positive for alpha strep  Patient remains systemically and hemodynamically well  I suspect this is contaminant  Antibiotic plan as in above  Follow-up on final blood cultures  Recheck blood cultures  Follow-up 2D echo      3  Chronic left foot ulcer   Given chronicity of ulcer and relatively deep probing, there is moderately high risk of osteomyelitis   Foot x-ray did not show obvious osteomyelitis, but does not have sensitivity for picking up early osteomyelitis   Patient is status post MRI earlier today  Antibiotic plan as in above  Follow-up MRI results      4  Venous stasis, with moderate leg edema   This may contribute to poor wound healing   Patient should keep her legs elevated to control edema  Keep legs elevated      5  DM   Blood sugar reasonably well control  Management per Medicine Service      6  Cigarette abuse   This probably contributes to poor wound healing   Patient counseled regarding the need to stop smoking      Discussed with patient in detail regarding the above plan    Discussed with slim service earlier      Antibiotics:  Cefazolin/Flagyl # 3      Subjective:  Patient still has a lot of pain on top of foot  No pain in bottom of foot  Temperature stays down  No further chills  She is tolerating antibiotic well  No nausea, vomiting or diarrhea  Objective:  Vitals:  Temp:  [98 5 °F (36 9 °C)-98 9 °F (37 2 °C)] 98 5 °F (36 9 °C)  HR:  [68-82] 68  Resp:  [16-20] 18  BP: (112-114)/(75-79) 112/75  SpO2:  [93 %-95 %] 93 %  Temp (24hrs), Av 6 °F (37 °C), Min:98 5 °F (36 9 °C), Max:98 9 °F (37 2 °C)  Current: Temperature: 98 5 °F (36 9 °C)    Physical Exam:     General: Awake, alert, cooperative, no distress  Neck:  Supple  No mass  No lymphadenopathy  Lungs: Expansion symmetric, no rales, no wheezing, respirations unlabored  Heart:  Regular rate and rhythm, S1 and S2 normal, no murmur  Abdomen: Soft, nondistended, non-tender, bowel sounds active all four quadrants,        no masses, no organomegaly  Extremities: Stable edema  Stable ulcer on plantar surface  No purulence  Slightly improved erythema/warmth  Unchanged tenderness on dorsal foot  Skin:  No rash  Neuro: Moves all extremities  Invasive Devices     Peripheral Intravenous Line            Peripheral IV 19 Left Antecubital 1 day                Labs studies:   I have personally reviewed pertinent labs    Results from last 7 days   Lab Units 19  0540 19  0525 19  1705   POTASSIUM mmol/L 4 0 3 8 4 0   CHLORIDE mmol/L 101 102 103   CO2 mmol/L 29 30 27   BUN mg/dL 13 12 11   CREATININE mg/dL 0 86 0 83 0 72   EGFR ml/min/1 73sq m 83 87 103   CALCIUM mg/dL 9 1 9 6 9 4   AST U/L  --   --  18   ALT U/L  --   --  31   ALK PHOS U/L  --   --  216*     Results from last 7 days   Lab Units 19  0540 19  0525 19  1311   WBC Thousand/uL 7 36 8 44 9 63   HEMOGLOBIN g/dL 14 2 14 2 14 3   PLATELETS Thousands/uL 391* 412* 426*     Results from last 7 days   Lab Units 19  1311 19  1310 19   BLOOD CULTURE  No Growth at 24 hrs   --  Alpha Hemolytic Streptococcus NOT Enterococcus, NOT S  pneumoniae*   GRAM STAIN RESULT   --  No polys seen*  2+ RBC's*  2+ Gram positive cocci in pairs*  1+ Gram positive rods* Gram positive cocci in pairs and chains*   WOUND CULTURE   --  2+ Growth of Enterobacter cloacae complex*  2+ Growth of Staphylococcus aureus*  3+ Growth of Beta Hemolytic Streptococcus Group B*  --        Imaging Studies:   I have personally reviewed pertinent imaging study reports and images in PACS  EKG, Pathology, and Other Studies:   I have personally reviewed pertinent reports

## 2019-06-30 NOTE — PLAN OF CARE
Problem: PAIN - ADULT  Goal: Verbalizes/displays adequate comfort level or baseline comfort level  Description  Interventions:  - Encourage patient to monitor pain and request assistance  - Assess pain using appropriate pain scale  - Administer analgesics based on type and severity of pain and evaluate response  - Implement non-pharmacological measures as appropriate and evaluate response  - Consider cultural and social influences on pain and pain management  - Notify physician/advanced practitioner if interventions unsuccessful or patient reports new pain  Outcome: Progressing     Problem: INFECTION - ADULT  Goal: Absence or prevention of progression during hospitalization  Description  INTERVENTIONS:  - Assess and monitor for signs and symptoms of infection  - Monitor lab/diagnostic results  - Monitor all insertion sites, i e  indwelling lines, tubes, and drains  - Monitor endotracheal (as able) and nasal secretions for changes in amount and color  - [place] appropriate cooling/warming therapies per order  - Administer medications as ordered  - Instruct and encourage patient and family to use good hand hygiene technique  - Identify and instruct in appropriate isolation precautions for identified infection/condition  Outcome: Progressing  Goal: Absence of fever/infection during neutropenic period  Description  INTERVENTIONS:  - Monitor WBC  - Implement neutropenic guidelines  Outcome: Progressing     Problem: SAFETY ADULT  Goal: Patient will remain free of falls  Description  INTERVENTIONS:  - Assess patient frequently for physical needs  -  Identify cognitive and physical deficits and behaviors that affect risk of falls    -  [place] fall precautions as indicated by assessment   - Educate patient/family on patient safety including physical limitations  - Instruct patient to call for assistance with activity based on assessment  - Modify environment to reduce risk of injury  - Consider OT/PT consult to assist with strengthening/mobility  Outcome: Progressing  Goal: Maintain or return to baseline ADL function  Description  INTERVENTIONS:  -  Assess patient's ability to carry out ADLs; assess patient's baseline for ADL function and identify physical deficits which impact ability to perform ADLs (bathing, care of mouth/teeth, toileting, grooming, dressing, etc )  - Assess/evaluate cause of self-care deficits   - Assess range of motion  - Assess patient's mobility; develop plan if impaired  - Assess patient's need for assistive devices and provide as appropriate  - Encourage maximum independence but intervene and supervise when necessary  ¯ Involve family in performance of ADLs  ¯ Assess for home care needs following discharge   ¯ Request OT consult to assist with ADL evaluation and planning for discharge  ¯ Provide patient education as appropriate  Outcome: Progressing  Goal: Maintain or return mobility status to optimal level  Description  INTERVENTIONS:  - Assess patient's baseline mobility status (ambulation, transfers, stairs, etc )    - Identify cognitive and physical deficits and behaviors that affect mobility  - Identify mobility aids required to assist with transfers and/or ambulation (gait belt, sit-to-stand, lift, walker, cane, etc )  - [place] fall precautions as indicated by assessment  - Record patient progress and toleration of activity level on Mobility SBAR; progress patient to next Phase/Stage  - Instruct patient to call for assistance with activity based on assessment  - Request Rehabilitation consult to assist with strengthening/weightbearing, etc   Outcome: Progressing     Problem: DISCHARGE PLANNING  Goal: Discharge to home or other facility with appropriate resources  Description  INTERVENTIONS:  - Identify barriers to discharge w/patient and caregiver  - Arrange for needed discharge resources and transportation as appropriate  - Identify discharge learning needs (meds, wound care, etc )  - Arrange for interpretive services to assist at discharge as needed  - Refer to Case Management Department for coordinating discharge planning if the patient needs post-hospital services based on physician/advanced practitioner order or complex needs related to functional status, cognitive ability, or social support system  Outcome: Progressing     Problem: Knowledge Deficit  Goal: Patient/family/caregiver demonstrates understanding of disease process, treatment plan, medications, and discharge instructions  Description  Complete learning assessment and assess knowledge base  Interventions:  - Provide teaching at level of understanding  - Provide teaching via preferred learning methods  Outcome: Progressing     Problem: Nutrition/Hydration-ADULT  Goal: Nutrient/Hydration intake appropriate for improving, restoring or maintaining nutritional needs  Description  Monitor and assess patient's nutrition/hydration status for malnutrition (ex- brittle hair, bruises, dry skin, pale skin and conjunctiva, muscle wasting, smooth red tongue, and disorientation)  Collaborate with interdisciplinary team and initiate plan and interventions as ordered  Monitor patient's weight and dietary intake as ordered or per policy  Utilize nutrition screening tool and intervene per policy  Determine patient's food preferences and provide high-protein, high-caloric foods as appropriate       INTERVENTIONS:  - Monitor oral intake, urinary output, labs, and treatment plans  - Assess nutrition and hydration status and recommend course of action  - Evaluate amount of meals eaten  - Assist patient with eating if necessary   - Allow adequate time for meals  - Recommend/ encourage appropriate diets, oral nutritional supplements, and vitamin/mineral supplements  - Order, calculate, and assess calorie counts as needed  - Recommend, monitor, and adjust tube feedings and TPN/PPN based on assessed needs  - Assess need for intravenous fluids  - Provide specific nutrition/hydration education as appropriate  - Include patient/family/caregiver in decisions related to nutrition  Outcome: Progressing

## 2019-06-30 NOTE — PROGRESS NOTES
Progress Note - Ricardo Lambert 1975, 38 y o  female MRN: [id number]    Unit/Bed#: [facility] 814-01 Encounter: [id number]    Primary Care Provider: Donivan Goldberg, DO   Date and time admitted to hospital: 6/28/2019 11:09 AM        Gram-positive bacteremia  Assessment & Plan  One blood culture of pts two is positive for gram positive cocci in pairs and chains ( alpha hemolytic strep) , discussed with Dr Gerald Dc   -continue current regimen (Flagyl and cefazolin day 3)  - follow up and repeat cultures  - wound cultures with   - will need 2 d echo    Preoperative clearance  Assessment & Plan  Attending Dr Stuart Nascimento will follow later this morning for final recommendation  - EKG on admission : with Normal sinus however compared to prior pts qt interval has increased 408  (pt on geodon, wellbutrin , and lexapro) in addition to cardiac   meds   - Continue  Clonidine 0 1mg in am and 0 2mg at hs currently with adequate bp control since admission  - pt was seen by [facility] cardiology  on 3/5/2019 for preop clearance for bariatric surgery   ( [facility] )   - recommendations at that time were to continue cardiac medication   - stick to heart healthy diet (low sat fat, trans fat and cholesterol)  - limit concentrated sweets (soda, desserts and candy)   - Healthy weight loss   - follow up in three months from that visit   * pt reports that she has had to see cards preop because after either her "gallbladder or hernia repair surgery, she woke up and "they " told her she had flat lined "that was years ago"  - Pt was ordered a nuclear stress test that was performed on: 3/12/19 which demonstrated as quoted from [facility] report:     "IMPRESSIONS         1  The myocardial perfusion imaging is abnormal  There is a small      size, mild intensity, fixed, anterior and anteroseptal perfusion      defect  Prone imaging not performed  Findings consistent with a prior      LAD territory infarct or breast attenuation artifact   In the setting      of normal wall motion, favor breast attenuation artifact        2  There is TID present  TID value 1 29 with Lexiscan administration       This is the upper limit of normal for a pharmacological nuclear      stress test         3  Normal left ventricular size and ejection fraction  LVEF > 70%        Normal left ventricular wall motion  Normal left ventricular wall      thickening        4   No abnormal ST or  T wave changes with Lexiscan administration "    * 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, with bone involvement without evidence of necrosis [facility])  Assessment & Plan  Admitted under Dr Geronimo Mendoza for left foot diabetic ulcer with suspicion for osteomyelitis   - on iv antibiotics started on po flagyl and iv ancef per primary service   - left foot xray with: No radiographic evidence of osteomyelitis  - will perform MRI of foot per ID and podiatry   - pt remains afebrile and stable will continue to monitor labs  - appreciate ID   - ON admission 6/28  podiatry performed excisional debridement at the bedside   - patient may be WBAT in a surgical shoe at this time         No results found for: HGBA1C    Recent Labs     06/28/19  1826 06/28/19  2052 06/29/19  0958 06/29/19  1049   POCGLU 135 121 194* 168*       Blood Sugar Average: Last 72 hrs:  (P) 155   a1c ordered for am   Ada diet   Will modify insulin regimen when npo for surgery   home dose lantus 55 units q12 hrs (pt did receive 55 units last night and refused dose today, however she was getting bs chks 2 post eating (changed sliding scale and reduced 55 lantus to 20 units bid )   Pt states since her surgery she has not taken any lantus at home   Continue ssi changed to with meal chks  Currently stable       Manic bipolar I disorder ([facility] [address] )  Assessment & Plan  Pt sees psychiatrist outpt   - for hx of anxiety , manic bipolar, OCD , and PTSD   - she reports compliance with medications   - states she was just put on the Wellbutrin for smoking cessation       Hypothyroidism  Assessment & Plan  Continue synthroid will chk tsh 6/29 5 580 (will obtain t4 off of am lab)   ? Absorption since pt just had gastric bypass but she reports that she is due about now to have thyroid labs chkd  Educated pt on the fact that I am increasing to 75mcg daily starting 6/30 she will need to have repeat labs in 4 - 6 wks   On 2/21/19 tsh was 3 53     Encounter for smoking cessation counseling  Assessment & Plan  Pt reports 1/2 pack a day smoker  "she reports usually less than that"  - pt was recently started on wellbutrin for smoking cessation    Cellulitis of left foot  Assessment & Plan  Appreciate ID  - source suspected to be active chronic foot ulcer, just debrided 6/28 at bedside  (unsure what plan is for surgery or what anticipated surgery would be per podiatry note)   - ok per id to treat with narrow spectrum abx  - wound cultures        2+ Growth of Enterobacter cloacae complexAbnormal       2+ Growth of Staphylococcus aureusAbnormal      3+ Growth of Beta Hemolytic Streptococcus Group BAbnormal        - follow blood cultures one of two drawn 1 of 2 demonstrates alpha hemolytic strep  - recommends MRI of foot regardless of xray results : still pending final result  - good diabetic management and education s/p bariatric surgery patient refusing long-acting insulin    Morbid obesity ([address] )  Assessment & Plan  Close follow up with [facility] bariatric surgery   - close nutritional follow up   - monitor labs for anemia     Anxiety  Assessment & Plan  Continue psychiatric meds for control   Continue to monitor     H/O bariatric surgery  Assessment & Plan  Performed at [address] on 3/19/2019  GASTRECTOMY, LAPAROSCOPIC SLEEVE AND EGD   - pt dt to affordability cannot afford post gastic bypass vitamins she states she was told ok to take the flintstone complete I ordered it for the pt now   - 06/29/2019 discussed with nutritionist to arrange that patient gets frequent small meals due to her recent gastric bypass  - at this time patient continues to receive regular meals she reports that they did call her in the trying to work it in the system  - this morning patient had vomiting episodes status post full meal I suspect due to the fact that it is a larger portion that she should be having  VTE Pharmacologic Prophylaxis:   Pharmacologic: Enoxaparin (Lovenox)  Mechanical VTE Prophylaxis in Place: Yes    Patient Centered Rounds: I have performed bedside rounds with nursing staff today  Discussions with Specialists or Other Care Team Provider: nursing     Education and Discussions with Family / Patient: patient    Time Spent for Care: 30 minutes  More than 50% of total time spent on counseling and coordination of care as described above  Current Length of Stay: 2 day(s)    Current Patient Status: Inpatient   Certification Statement: The patient will continue to require additional inpatient hospital stay due to ongoing workup per primary team iv abx and possible osteo    Discharge Plan: will depend on outcome of findings and plan of care     Code Status: Level 1 - Full Code      Subjective:   Patient is sitting in chair  She states that she feels really sleepy  Patient reports that she did have a vomiting episode this morning she thinks it is because she ate too much  Yesterday I had discussion with nutritional services to supply patient with frequent small meals  Discussed with patient thus far unable to do so I suspect patient vomited due to eating too much at 1 meal      Objective:     Vitals:   Temp (24hrs), Av 6 °F (37 °C), Min:98 5 °F (36 9 °C), Max:98 9 °F (37 2 °C)    Temp:  [98 5 °F (36 9 °C)-98 9 °F (37 2 °C)] 98 6 °F (37 °C)  HR:  [68-82] 70  Resp:  [16-20] 18  BP: (112-125)/(63-79) 125/63  SpO2:  [93 %-95 %] 93 %  Body mass index is 44 02 kg/m²  Input and Output Summary (last 24 hours):        Intake/Output Summary (Last 24 hours) at 6/30/2019 1600  Last data filed at 6/30/2019 1346  Gross per 24 hour   Intake 730 ml   Output 1300 ml   Net -570 ml       Physical Exam:     Physical Exam   Constitutional: She is oriented to person, place, and time  HENT:   Head: Normocephalic and atraumatic  Mouth/Throat: No oropharyngeal exudate  hirsutism   Eyes: Conjunctivae are normal  Right eye exhibits no discharge  Left eye exhibits no discharge  No scleral icterus  Neck: Normal range of motion  No JVD present  No tracheal deviation present  No thyromegaly present  Cardiovascular: Normal rate  Exam reveals no gallop and no friction rub  No murmur heard  Pulmonary/Chest: Effort normal  No stridor  No respiratory distress  She has no wheezes  She has no rales  She exhibits no tenderness  Abdominal: Soft  She exhibits no distension and no mass  There is no tenderness  There is no rebound and no guarding  No hernia  Obese    Musculoskeletal: She exhibits edema (lower extremity right with curlex wrap )  She exhibits no tenderness or deformity  Lymphadenopathy:     She has no cervical adenopathy  Neurological: She is oriented to person, place, and time  Skin: No rash noted  No erythema  No pallor  Psychiatric:   flat       Additional Data:     Labs:    Results from last 7 days   Lab Units 06/30/19  0540  06/28/19  1311   WBC Thousand/uL 7 36   < > 9 63   HEMOGLOBIN g/dL 14 2   < > 14 3   HEMATOCRIT % 44 9   < > 44 9   PLATELETS Thousands/uL 391*   < > 426*   NEUTROS PCT %  --   --  58   LYMPHS PCT %  --   --  31   MONOS PCT %  --   --  5   EOS PCT %  --   --  5    < > = values in this interval not displayed       Results from last 7 days   Lab Units 06/30/19  0540  06/28/19  1705   SODIUM mmol/L 136   < > 136   POTASSIUM mmol/L 4 0   < > 4 0   CHLORIDE mmol/L 101   < > 103   CO2 mmol/L 29   < > 27   BUN mg/dL 13   < > 11   CREATININE mg/dL 0 86   < > 0 72   ANION GAP mmol/L 6   < > 6   CALCIUM mg/dL 9 1   < > 9 4   ALBUMIN g/dL --   --  3 2*   TOTAL BILIRUBIN mg/dL  --   --  0 20   ALK PHOS U/L  --   --  216*   ALT U/L  --   --  31   AST U/L  --   --  18   GLUCOSE RANDOM mg/dL 138   < > 119    < > = values in this interval not displayed  Results from last 7 days   Lab Units 06/30/19  1448 06/30/19  1120 06/30/19  0800 06/30/19  0525 06/29/19  2050 06/29/19  1842 06/29/19  1637 06/29/19  1349 06/29/19  1049 06/29/19  0958 06/28/19  2052 06/28/19  1826   POC GLUCOSE mg/dl 165* 215* 167* 139 152* 174* 180* 187* 168* 194* 121 135         Results from last 7 days   Lab Units 06/28/19  1705   PROCALCITONIN ng/ml <0 05           * I Have Reviewed All Lab Data Listed Above  * Additional Pertinent Lab Tests Reviewed:  All Labs Within Last 24 Hours Reviewed    Imaging:    Imaging Reports Reviewed Today Include: reviewed     Recent Cultures (last 7 days):     Results from last 7 days   Lab Units 06/28/19  1311 06/28/19  1310 06/28/19   BLOOD CULTURE  No Growth at 24 hrs   --  Alpha Hemolytic Streptococcus NOT Enterococcus, NOT S  pneumoniae*   GRAM STAIN RESULT   --  No polys seen*  2+ RBC's*  2+ Gram positive cocci in pairs*  1+ Gram positive rods* Gram positive cocci in pairs and chains*   WOUND CULTURE   --  2+ Growth of Enterobacter cloacae complex*  2+ Growth of Staphylococcus aureus*  3+ Growth of Beta Hemolytic Streptococcus Group B*  --        Last 24 Hours Medication List:     Current Facility-Administered Medications:  albuterol 2 puff Inhalation Q6H PRN Calderon Christina, DPM    buPROPion 300 mg Oral Daily Calderon Christina, DPM    cefepime 2,000 mg Intravenous Q12H Duyen Grace MD Last Rate: 2,000 mg (06/30/19 1436)   cloNIDine 0 1 mg Oral QAM Cheryle Randall PA-C    cloNIDine 0 2 mg Oral QPM Cheryle Randall PA-C    enoxaparin 40 mg Subcutaneous Q24H Calderon Christina, DPHOLLAND    escitalopram 10 mg Oral QAM Calderon Corn, DPM    HYDROmorphone 0 2 mg Intravenous Q4H PRN Cheryle Randall PA-C    hydrOXYzine HCL 50 mg Oral HS Calderon Corn, DPM insulin lispro 2-12 Units Subcutaneous TID AC JOSEE Sotelo    insulin lispro 2-12 Units Subcutaneous HS JOSEE Wallace    levothyroxine 75 mcg Oral Early Morning JOSEE Wallace    metroNIDAZOLE 500 mg Oral [facility] Marguerite Mtoa DPM    multivitamin 1 tablet Oral Daily JOSEE Wallace    ondansetron 4 mg Intravenous Q6H PRN Marguerite Mota DPM    oxyCODONE 10 mg Oral Q4H PRN Cheryle Randall PA-C    oxyCODONE 5 mg Oral Q4H PRN Shi Savage MD    pantoprazole 40 mg Oral Daily Before Breakfast Marguerite Mota DPM    polyethylene glycol 17 g Oral Daily PRN Marguerite Mota DPM    ziprasidone 80 mg Oral BID With Meals Marguerite Mota DPM         Today, Patient Was Seen By: JOSEE Wallace    ** Please Note: Dictation voice to text software may have been used in the creation of this document   **

## 2019-07-01 VITALS
DIASTOLIC BLOOD PRESSURE: 75 MMHG | WEIGHT: 264.55 LBS | HEIGHT: 65 IN | SYSTOLIC BLOOD PRESSURE: 115 MMHG | OXYGEN SATURATION: 92 % | RESPIRATION RATE: 16 BRPM | BODY MASS INDEX: 44.08 KG/M2 | HEART RATE: 52 BPM | TEMPERATURE: 98 F

## 2019-07-01 PROBLEM — R78.81 GRAM-POSITIVE BACTEREMIA: Status: RESOLVED | Noted: 2019-06-29 | Resolved: 2019-07-01

## 2019-07-01 LAB
ANION GAP SERPL CALCULATED.3IONS-SCNC: 4 MMOL/L (ref 4–13)
BACTERIA BLD CULT: ABNORMAL
BACTERIA WND AEROBE CULT: ABNORMAL
BUN SERPL-MCNC: 10 MG/DL (ref 5–25)
CALCIUM SERPL-MCNC: 9.2 MG/DL (ref 8.3–10.1)
CHLORIDE SERPL-SCNC: 100 MMOL/L (ref 100–108)
CO2 SERPL-SCNC: 31 MMOL/L (ref 21–32)
CREAT SERPL-MCNC: 0.86 MG/DL (ref 0.6–1.3)
ERYTHROCYTE [DISTWIDTH] IN BLOOD BY AUTOMATED COUNT: 13.6 % (ref 11.6–15.1)
GFR SERPL CREATININE-BSD FRML MDRD: 83 ML/MIN/1.73SQ M
GLUCOSE SERPL-MCNC: 153 MG/DL (ref 65–140)
GLUCOSE SERPL-MCNC: 160 MG/DL (ref 65–140)
GLUCOSE SERPL-MCNC: 173 MG/DL (ref 65–140)
GRAM STN SPEC: ABNORMAL
HCT VFR BLD AUTO: 46.6 % (ref 34.8–46.1)
HGB BLD-MCNC: 14.9 G/DL (ref 11.5–15.4)
MCH RBC QN AUTO: 28.1 PG (ref 26.8–34.3)
MCHC RBC AUTO-ENTMCNC: 32 G/DL (ref 31.4–37.4)
MCV RBC AUTO: 88 FL (ref 82–98)
PLATELET # BLD AUTO: 401 THOUSANDS/UL (ref 149–390)
PMV BLD AUTO: 9.2 FL (ref 8.9–12.7)
POTASSIUM SERPL-SCNC: 4.3 MMOL/L (ref 3.5–5.3)
RBC # BLD AUTO: 5.3 MILLION/UL (ref 3.81–5.12)
SODIUM SERPL-SCNC: 135 MMOL/L (ref 136–145)
WBC # BLD AUTO: 8.61 THOUSAND/UL (ref 4.31–10.16)

## 2019-07-01 PROCEDURE — 80048 BASIC METABOLIC PNL TOTAL CA: CPT | Performed by: STUDENT IN AN ORGANIZED HEALTH CARE EDUCATION/TRAINING PROGRAM

## 2019-07-01 PROCEDURE — 82948 REAGENT STRIP/BLOOD GLUCOSE: CPT

## 2019-07-01 PROCEDURE — 99232 SBSQ HOSP IP/OBS MODERATE 35: CPT | Performed by: NURSE PRACTITIONER

## 2019-07-01 PROCEDURE — 85027 COMPLETE CBC AUTOMATED: CPT | Performed by: STUDENT IN AN ORGANIZED HEALTH CARE EDUCATION/TRAINING PROGRAM

## 2019-07-01 PROCEDURE — 99232 SBSQ HOSP IP/OBS MODERATE 35: CPT | Performed by: INTERNAL MEDICINE

## 2019-07-01 PROCEDURE — 99232 SBSQ HOSP IP/OBS MODERATE 35: CPT | Performed by: PODIATRIST

## 2019-07-01 RX ORDER — DOXYCYCLINE HYCLATE 100 MG/1
100 CAPSULE ORAL EVERY 12 HOURS SCHEDULED
Qty: 14 CAPSULE | Refills: 0 | Status: SHIPPED | OUTPATIENT
Start: 2019-07-01 | End: 2019-07-08

## 2019-07-01 RX ORDER — CEPHALEXIN 500 MG/1
500 CAPSULE ORAL EVERY 6 HOURS SCHEDULED
Qty: 28 CAPSULE | Refills: 0 | Status: SHIPPED | OUTPATIENT
Start: 2019-07-01 | End: 2019-07-08

## 2019-07-01 RX ORDER — DOXYCYCLINE HYCLATE 100 MG/1
100 CAPSULE ORAL EVERY 12 HOURS SCHEDULED
Status: DISCONTINUED | OUTPATIENT
Start: 2019-07-01 | End: 2019-07-01 | Stop reason: HOSPADM

## 2019-07-01 RX ORDER — CEPHALEXIN 500 MG/1
500 CAPSULE ORAL EVERY 6 HOURS SCHEDULED
Status: DISCONTINUED | OUTPATIENT
Start: 2019-07-01 | End: 2019-07-01 | Stop reason: HOSPADM

## 2019-07-01 RX ADMIN — INSULIN LISPRO 2 UNITS: 100 INJECTION, SOLUTION INTRAVENOUS; SUBCUTANEOUS at 07:42

## 2019-07-01 RX ADMIN — ZIPRASIDONE HCL 80 MG: 40 CAPSULE ORAL at 07:45

## 2019-07-01 RX ADMIN — OXYCODONE HYDROCHLORIDE 10 MG: 10 TABLET ORAL at 06:00

## 2019-07-01 RX ADMIN — METRONIDAZOLE 500 MG: 500 TABLET, FILM COATED ORAL at 06:00

## 2019-07-01 RX ADMIN — PANTOPRAZOLE SODIUM 40 MG: 40 TABLET, DELAYED RELEASE ORAL at 06:00

## 2019-07-01 RX ADMIN — DOXYCYCLINE 100 MG: 100 CAPSULE ORAL at 14:07

## 2019-07-01 RX ADMIN — CEFEPIME HYDROCHLORIDE 2000 MG: 2 INJECTION, POWDER, FOR SOLUTION INTRAVENOUS at 01:51

## 2019-07-01 RX ADMIN — INSULIN LISPRO 2 UNITS: 100 INJECTION, SOLUTION INTRAVENOUS; SUBCUTANEOUS at 12:00

## 2019-07-01 RX ADMIN — LEVOTHYROXINE SODIUM 75 MCG: 75 TABLET ORAL at 06:00

## 2019-07-01 RX ADMIN — BUPROPION HYDROCHLORIDE 300 MG: 150 TABLET, FILM COATED, EXTENDED RELEASE ORAL at 08:47

## 2019-07-01 RX ADMIN — CLONIDINE HYDROCHLORIDE 0.1 MG: 0.1 TABLET ORAL at 08:47

## 2019-07-01 RX ADMIN — CEPHALEXIN 500 MG: 500 CAPSULE ORAL at 14:06

## 2019-07-01 RX ADMIN — Medication 1 TABLET: at 08:45

## 2019-07-01 NOTE — UTILIZATION REVIEW
Initial Clinical Review     Admission: Date/Time/Statement: 6/28/19 @ 1109            Orders Placed This Encounter   Procedures    Inpatient Admission       Standing Status:   Standing       Number of Occurrences:   1       Order Specific Question:   Admitting Physician       Answer:   [de-identified] [[id number]]       Order Specific Question:   Level of Care       Answer:   Med Surg [16]       Order Specific Question:   Estimated length of stay       Answer:   More than 2 Midnights       Order Specific Question:   Certification       Answer:   I certify that inpatient services are medically necessary for this patient for a duration of greater than two midnights  See H&P and MD Progress Notes for additional information about the patient's course of treatment           ED Arrival Information          Patient not seen in ED -- Direct admission from podiatrist's office                         Chief complaint:  Left foot wound     Assessment/Plan: 37 y/o female with Pmhx of IDDM, chronic lymphedema of LLE, recent h/o bariatric surgery, anxiety presents as direct admission from Dr Donovan Lui, she was seen at [address] [place] [address] there was concern for possible osteomyelitis of her left foot  Pt states she had a wound on the bottom of her foot for ~1 mo & has seeing seeing podiatry for it, has visiting nurses who come to house every M/W for dressing changes  She has been experiencing increased pain and redness since last weekend  Dx:  Left foot DFU with cellulitis and clinical suspicion of osteomyelitis    Plan:  Admit under inpatient status to M/S unit, IV abx, wound and blood cxs taken, continue all home meds and insulin, fingerstick glucose checks ac & hs, ID consulted, wound was excisionally debrided with 10 blade to remove all nonviable and devitalized tissue down to red and granular base, post debridement measures 2 cm x 2 cm x 1 cm, probes to capsule, <20cmsq of tissue removed, WBAT in surgical shoe for now      ID consult 6/28 --- left foot cellulitis -- agree with IV cefazolin/po, flagyl for now, f/u on wound and blood cxs, MRI of foot, keep legs elevated     6/29 -- Med consult -- No c/o pain today   1/2 blood cxs (+) for gram-pos cocci in pairs and chains, ID aware and recommending current abx regimen, wound dressed with maxorb      Additional Vital Signs:   Date/Time   Temp   Pulse   Resp   BP   MAP (mmHg)   SpO2   O2 Device   06/29/19 07:48:08   97 4 °F (36 3 °C)Abnormal    70   20   126/75   92   94 %      06/29/19 07:47:40   97 4 °F (36 3 °C)Abnormal    66   20   126/75   92   97 %      06/28/19 22:52:22   98 8 °F (37 1 °C)   80   18   121/84   96   95 %      06/28/19 21:24:21      84      123/84   97   95 %      06/28/19 2124            123/84            06/28/19 14:56:49   99 1 °F (37 3 °C)   88   20   135/84   101   95 %      06/28/19 1115   97 9 °F (36 6 °C)   94   18   127/88   101   97 %   None (Room air)         Pertinent Labs/Diagnostic Test Results:         Results from last 7 days   Lab Units 06/29/19  0525 06/28/19  1311   WBC Thousand/uL 8 44 9 63   HEMOGLOBIN g/dL 14 2 14 3   HEMATOCRIT % 45 0 44 9   PLATELETS Thousands/uL 412* 426*   NEUTROS ABS Thousands/µL  --  5 57            Results from last 7 days   Lab Units 06/29/19  0525 06/28/19  1705   SODIUM mmol/L 138 136   POTASSIUM mmol/L 3 8 4 0   CHLORIDE mmol/L 102 103   CO2 mmol/L 30 27   ANION GAP mmol/L 6 6   BUN mg/dL 12 11   CREATININE mg/dL 0 83 0 72   EGFR ml/min/1 73sq m 87 103   CALCIUM mg/dL 9 6 9 4           Results from last 7 days   Lab Units 06/28/19  1705   AST U/L 18   ALT U/L 31   ALK PHOS U/L 216*   TOTAL PROTEIN g/dL 7 9   ALBUMIN g/dL 3 2*   TOTAL BILIRUBIN mg/dL 0 20      Results from last 7 days   Lab Units 06/29/19  1349 06/29/19  1049 06/29/19  0958 06/28/19  2052 06/28/19  1826 06/28/19  1346   POC GLUCOSE mg/dl 187* 168* 194* 121 135 157*            Results from last 7 days   Lab Units 06/29/19  0525 06/28/19  1705   GLUCOSE RANDOM mg/dL 125 119           Results from last 7 days   Lab Units 06/28/19  1705   PROCALCITONIN ng/ml <0 05            Results from last 7 days   Lab Units 06/28/19  1705 06/28/19  1309   CRP mg/L 42 9*  --    SED RATE mm/hour  --  50*            Results from last 7 days   Lab Units 06/29/19  1058 06/28/19  1310   GRAM STAIN RESULT   Gram positive cocci in pairs and chains* No polys seen*  2+ RBC's*  2+ Gram positive cocci in pairs*  1+ Gram positive rods*   WOUND CULTURE    --  Culture results to follow       XR left foot 6/28 --- No radiographic evidence of osteomyelitis    CXR 6/28 -- No acute cardiopulmonary disease         Medical History        Past Medical History:   Diagnosis Date    Anxiety      Arthritis      Asthma      Chronic pain      Diabetes mellitus ([address] )      Disease of thyroid gland      Lymphedema       left leg    Manic bipolar I disorder (HCC)      Morbid obesity (HCC)      OCD (obsessive compulsive disorder)      PTSD (post-traumatic stress disorder)           Present on Admission:   Cellulitis of left foot   Hypothyroidism   Manic bipolar I disorder (HCC)   Gram-positive bacteremia        Admitting Diagnosis: Cellulitis of left foot [L03 [id number]]  Age/Sex: 38 y o  female  Admission Orders:  Fingerstick glucose checks ac & hs  Up as robson  SCD's to RLE     Current Facility-Administered Medications:  albuterol 2 puff Inhalation Q6H PRN   buPROPion 300 mg Oral Daily   cefazolin 2,000 mg Intravenous Q8H   cloNIDine 0 1 mg Oral QAM   cloNIDine 0 2 mg Oral QPM   enoxaparin 40 mg Subcutaneous Q24H   escitalopram 10 mg Oral QAM   HYDROmorphone 0 2 mg Intravenous Q4H PRN   hydrOXYzine HCL 50 mg Oral HS   insulin glargine 20 Units Subcutaneous Q12H RADHA   insulin lispro 2-12 Units Subcutaneous TID AC   insulin lispro 2-12 Units Subcutaneous HS   [START ON 6/30/2019] levothyroxine 75 mcg Oral Early Morning   metroNIDAZOLE 500 mg Oral [facility] multivitamin 1 tablet Oral Daily   ondansetron 4 mg Intravenous Q6H PRN   oxyCODONE 10 mg Oral Q4H PRN   oxyCODONE 5 mg Oral Q4H PRN   pantoprazole 40 mg Oral Daily Before Breakfast   polyethylene glycol 17 g Oral Daily PRN   ziprasidone 80 mg Oral BID With Meals         IP CONSULT TO INFECTIOUS DISEASES  IP CONSULT TO INTERNAL MEDICINE  IP CONSULT TO NUTRITION SERVICES     Network Utilization Review Department  Phone: [phone]; Fax [phone]  [email or web address]  ATTENTION: Please call with any questions or concerns to [phone]  and carefully listen to the prompts so that you are directed to the right person  Send all requests for admission clinical reviews, approved or denied determinations and any other requests to fax [phone]   All voicemails are confidential

## 2019-07-01 NOTE — PROGRESS NOTES
Progress Note - Jorge Vasquez 1975, 38 y o  female MRN: [id number]    Unit/Bed#: [facility] 814-01 Encounter: [id number]    Primary Care Provider: Heather Barclay DO   Date and time admitted to hospital: 6/28/2019 11:09 AM        Preoperative clearance  Assessment & Plan  Attending Dr Silverio Vázquez will follow later this morning for final recommendation  - EKG on admission : with Normal sinus however compared to prior pts qt interval has increased 408  (pt on geodon, wellbutrin , and lexapro) in addition to cardiac   meds   - Continue  Clonidine 0 1mg in am and 0 2mg at hs currently with adequate bp control since admission  - pt was seen by [facility] cardiology  on 3/5/2019 for preop clearance for bariatric surgery   ( [facility] )   - recommendations at that time were to continue cardiac medication   - stick to heart healthy diet (low sat fat, trans fat and cholesterol)  - limit concentrated sweets (soda, desserts and candy)   - Healthy weight loss   - follow up in three months from that visit   * pt reports that she has had to see cards preop because after either her "gallbladder or hernia repair surgery, she woke up and "they " told her she had flat lined "that was years ago"  - Pt was ordered a nuclear stress test that was performed on: 3/12/19 which demonstrated as quoted from [facility] report:     "IMPRESSIONS         1  The myocardial perfusion imaging is abnormal  There is a small      size, mild intensity, fixed, anterior and anteroseptal perfusion      defect  Prone imaging not performed  Findings consistent with a prior      LAD territory infarct or breast attenuation artifact  In the setting      of normal wall motion, favor breast attenuation artifact        2  There is TID present  TID value 1 29 with Lexiscan administration       This is the upper limit of normal for a pharmacological nuclear      stress test         3  Normal left ventricular size and ejection fraction  LVEF > 70%        Normal left ventricular wall motion  Normal left ventricular wall      thickening        4   No abnormal ST or  T wave changes with Lexiscan administration "    * 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, with bone involvement without evidence of necrosis [facility])  Assessment & Plan  Admitted under Dr Monty Vasquez for left foot diabetic ulcer with suspicion for osteomyelitis   - on iv antibiotics started on po flagyl and iv ancef per primary service   - left foot xray with: No radiographic evidence of osteomyelitis  - will perform MRI of foot per ID and podiatry   - pt remains afebrile and stable will continue to monitor labs  - appreciate ID   - ON admission 6/28  podiatry performed excisional debridement at the bedside   - patient may be WBAT in a surgical shoe at this time         No results found for: HGBA1C    Recent Labs     06/30/19  1448 06/30/19  1624 06/30/19  2051 07/01/19  0719   POCGLU 165* 127 154* 173*       Blood Sugar Average: Last 72 hrs:  (P) 163   a1c ordered for am   Ada diet   home dose lantus 55 units q12 hrs however pt reports no longer taking at home since her gastric bypass    Continue ssi changed to with meal chks  Currently stable       Gram-positive bacteremiaresolved as of 7/1/2019  Assessment & Plan  One blood culture of pts two is positive for gram positive cocci in pairs and chains ( alpha hemolytic strep) , discussed with Dr Carmella Hernandez  Who has now handed over to dr Pankaj Aeljandro discussed with her this am  -continue current regimen (Flagyl and cefazolin day 4)  - follow up and repeat cultures, thus far at 4 days no growth   - wound cultures with  mrsa this was discussed with podiatry with plan to dc today  - 2 d echo ef 60% no vegetation noted    Manic bipolar I disorder ([facility] [address] )  Assessment & Plan  Pt sees psychiatrist outpt   - for hx of anxiety , manic bipolar, OCD , and PTSD   - she reports compliance with medications   - states she was just put on the Wellbutrin for smoking cessation       Hypothyroidism  Assessment & Plan  Continue synthroid will chk tsh 6/29 5 580 normal obtain labs in 4 weeks  ? Absorption since pt just had gastric bypass but she reports that she is due about now to have thyroid labs chkd  Educated pt on the fact that I am increasing to 75mcg daily starting 6/30 she will need to have repeat labs in 4 - 6 wks   On 2/21/19 tsh was 3 53     Encounter for smoking cessation counseling  Assessment & Plan  Pt reports 1/2 pack a day smoker  "she reports usually less than that"  - pt was recently started on wellbutrin for smoking cessation    Cellulitis of left foot  Assessment & Plan  Appreciate ID  - source suspected to be active chronic foot ulcer, just debrided 6/28 at bedside  (unsure what plan is for surgery or what anticipated surgery would be per podiatry note)   - ok per id to treat with narrow spectrum abx  - wound cultures        2+ Growth of Enterobacter cloacae complexAbnormal       2+ Growth of Staphylococcus aureusAbnormal      3+ Growth of Beta Hemolytic Streptococcus Group BAbnormal        - follow blood cultures one of two drawn 1 of 2 demonstrates alpha hemolytic strep  - recommends MRI of foot regardless of xray results : still pending final result  - good diabetic management and education s/p bariatric surgery patient refusing long-acting insulin    Morbid obesity ([address] )  Assessment & Plan  Close follow up with [facility] bariatric surgery   - close nutritional follow up   - monitor labs for anemia     Anxiety  Assessment & Plan  Continue psychiatric meds for control   Continue to monitor     H/O bariatric surgery  Assessment & Plan  Performed at [facility] AT THE [facility] on 3/19/2019  GASTRECTOMY, LAPAROSCOPIC SLEEVE AND EGD   - pt dt to affordability cannot afford post gastic bypass vitamins she states she was told ok to take the flintstone complete I ordered it for the pt now   - 06/29/2019 discussed with nutritionist to arrange that patient gets frequent small meals due to her recent gastric bypass  - at this time patient continues to receive regular meals she reports that they did call her in the trying to work it in the system  - this morning patient had vomiting episodes status post full meal I suspect due to the fact that it is a larger portion that she should be having  VTE Pharmacologic Prophylaxis:   Pharmacologic: Enoxaparin (Lovenox)  Mechanical VTE Prophylaxis in Place: Yes    Patient Centered Rounds: I have performed bedside rounds with nursing staff today  Discussions with Specialists or Other Care Team Provider: nursing    Education and Discussions with Family / Patient: patient    Time Spent for Care: 30 minutes  More than 50% of total time spent on counseling and coordination of care as described above  Current Length of Stay: 3 day(s)    Current Patient Status: Inpatient   Certification Statement: The patient will continue to require additional inpatient hospital stay due to per primary team and id     Discharge Plan: discharge home most likely per podiatry    Code Status: Prior      Subjective:   Pt is flat   No complaints rather tired and a little disinterested in talking  Dicussed her follow up with pcp     Objective:     Vitals:   No data recorded  Body mass index is 44 02 kg/m²  Input and Output Summary (last 24 hours):     No intake or output data in the 24 hours ending 07/04/19 2015    Physical Exam:     Physical Exam   Constitutional: She is oriented to person, place, and time  Eyes: Pupils are equal, round, and reactive to light  Conjunctivae are normal  Right eye exhibits no discharge  Left eye exhibits no discharge  Neck: No tracheal deviation present  No thyromegaly present  Cardiovascular: Exam reveals no gallop and no friction rub  No murmur heard  Pulmonary/Chest:   Poor effort    Abdominal: Soft  She exhibits no distension and no mass  There is no tenderness  There is no rebound and no guarding   No hernia  Musculoskeletal: She exhibits deformity (foot dsd with curlex )  She exhibits no edema or tenderness  Neurological: She is oriented to person, place, and time  Skin: No erythema  No pallor  Psychiatric:   flat         Additional Data:     Labs:    Results from last 7 days   Lab Units 07/01/19  0557  06/28/19  1311   WBC Thousand/uL 8 61   < > 9 63   HEMOGLOBIN g/dL 14 9   < > 14 3   HEMATOCRIT % 46 6*   < > 44 9   PLATELETS Thousands/uL 401*   < > 426*   NEUTROS PCT %  --   --  58   LYMPHS PCT %  --   --  31   MONOS PCT %  --   --  5   EOS PCT %  --   --  5    < > = values in this interval not displayed  Results from last 7 days   Lab Units 07/01/19  0557  06/28/19  1705   SODIUM mmol/L 135*   < > 136   POTASSIUM mmol/L 4 3   < > 4 0   CHLORIDE mmol/L 100   < > 103   CO2 mmol/L 31   < > 27   BUN mg/dL 10   < > 11   CREATININE mg/dL 0 86   < > 0 72   ANION GAP mmol/L 4   < > 6   CALCIUM mg/dL 9 2   < > 9 4   ALBUMIN g/dL  --   --  3 2*   TOTAL BILIRUBIN mg/dL  --   --  0 20   ALK PHOS U/L  --   --  216*   ALT U/L  --   --  31   AST U/L  --   --  18   GLUCOSE RANDOM mg/dL 153*   < > 119    < > = values in this interval not displayed  Results from last 7 days   Lab Units 07/01/19  1102 07/01/19  0719 06/30/19  2051 06/30/19  1624 06/30/19  1448 06/30/19  1120 06/30/19  0800 06/30/19  0525 06/29/19  2050 06/29/19  1842 06/29/19  1637 06/29/19  1349   POC GLUCOSE mg/dl 160* 173* 154* 127 165* 215* 167* 139 152* 174* 180* 187*         Results from last 7 days   Lab Units 06/28/19  1705   PROCALCITONIN ng/ml <0 05           * I Have Reviewed All Lab Data Listed Above  * Additional Pertinent Lab Tests Reviewed: All Labs Within Last 24 Hours Reviewed    Imaging:    Imaging Reports Reviewed Today Include: reviewed     Recent Cultures (last 7 days):     Results from last 7 days   Lab Units 06/30/19  0550 06/28/19  1311 06/28/19  1310 06/28/19   BLOOD CULTURE  No Growth After 4 Days    No Growth After 4 Days  No Growth After 5 Days  --  Alpha Hemolytic Streptococcus NOT Enterococcus, NOT S  pneumoniae*   GRAM STAIN RESULT   --   --  No polys seen*  2+ RBC's*  2+ Gram positive cocci in pairs*  1+ Gram positive rods* Gram positive cocci in pairs and chains*   WOUND CULTURE   --   --  2+ Growth of Enterobacter cloacae complex*  2+ Growth of Methicillin Resistant Staphylococcus aureus*  3+ Growth of Beta Hemolytic Streptococcus Group B*  3+ Growth of   --        Last 24 Hours Medication List:        Today, Patient Was Seen By: JOSEE Jha    ** Please Note: Dictation voice to text software may have been used in the creation of this document   **

## 2019-07-01 NOTE — ASSESSMENT & PLAN NOTE
Performed at [facility] AT THE [facility] on 3/19/2019  GASTRECTOMY, LAPAROSCOPIC SLEEVE AND EGD   - pt dt to affordability cannot afford post gastic bypass vitamins she states she was told ok to take the flintstone complete I ordered it for the pt now   - 06/29/2019 discussed with nutritionist to arrange that patient gets frequent small meals due to her recent gastric bypass  - at this time patient continues to receive regular meals she reports that they did call her in the trying to work it in the system  - this morning patient had vomiting episodes status post full meal I suspect due to the fact that it is a larger portion that she should be having

## 2019-07-01 NOTE — ASSESSMENT & PLAN NOTE
One blood culture of pts two is positive for gram positive cocci in pairs and chains ( alpha hemolytic strep) , discussed with Dr Diana Wei  Who has now handed over to dr Rayshawn Dee discussed with her this am  -continue current regimen (Flagyl and cefazolin day 4)  - follow up and repeat cultures, thus far at 4 days no growth   - wound cultures with  mrsa this was discussed with podiatry with plan to dc today  - 2 d echo ef 60% no vegetation noted

## 2019-07-01 NOTE — PROGRESS NOTES
Progress Note - Infectious Disease   Bailey Cerna 38 y o  female MRN: [id number]  Unit/Bed#: [facility] 814-01 Encounter: [id number]      Impression/Recommendations:  1  Left foot cellulitis   Source is most likely a chronic foot ulcer   Wound culture with MRSA, GBS, Enterobacter  Patient is clinically and systemically well, without sepsis or systemic toxicity  Rec:  · Change antibiotics to Keflex/doxycycline to continue for 7 more days through 2019     2   Alpha strep bacteremia  Single set with late growth likely represents contaminant  Repeat blood cultures, TTE negative  Rec:  · No additional workup or treatment from an ID perspective     3   Chronic left foot DM ulcer   No probe to bone and X-ray and MRI negative for osteomyelitis  Rec:  · Continue LWC and close outpatient follow-up per Podiatry      4  Venous stasis, with moderate leg edema  Risk factor for poor wound healing  Rec:  · Continue edema control and leg elevation  Keep legs elevated      5  DM   Blood sugar reasonably well control  Rec:  · Continue management as per the Medicine service     6  Tobacco abuse   Risk factor for poor wound healing  Need cessation      Discussed with patient and the podiatry service in detail regarding the above plan  The patient is stable from an ID standpoint for D/C home     Antibiotics:  Cefepime #2  Flagyl #4    Subjective:  Patient seen on PM rounds  Denies fevers, chills, sweats, nausea, vomiting, or diarrhea  "Tired"    24 Hour Events:  No documented fevers, chills, sweats, nausea, vomiting, or diarrhea      Objective:  Vitals:  Temp:  [98 °F (36 7 °C)-99 2 °F (37 3 °C)] 98 °F (36 7 °C)  HR:  [52-70] 52  Resp:  [16-18] 16  BP: (100-125)/(63-76) 115/75  SpO2:  [92 %] 92 %  Temp (24hrs), Av 6 °F (37 °C), Min:98 °F (36 7 °C), Max:99 2 °F (37 3 °C)  Current: Temperature: 98 °F (36 7 °C)    Physical Exam:   General:  No acute distress  Psychiatric:  Awake and alert  Pulmonary:  Normal respiratory excursion without accessory muscle use  Abdomen:  Soft, nontender  Extremities:  Nonpitting leg edema  Skin:  No rashes  Left foot:  Podiatry pictures from today reviewed  Clean base ulcer over plantar surface  Mild residual erythema on dorsum of foot slightly improved from admission  Lab Results:  I have personally reviewed pertinent labs  Results from last 7 days   Lab Units 07/01/19  0557 06/30/19  0540 06/29/19  0525 06/28/19  1705   POTASSIUM mmol/L 4 3 4 0 3 8 4 0   CHLORIDE mmol/L 100 101 102 103   CO2 mmol/L 31 29 30 27   BUN mg/dL 10 13 12 11   CREATININE mg/dL 0 86 0 86 0 83 0 72   EGFR ml/min/1 73sq m 83 83 87 103   CALCIUM mg/dL 9 2 9 1 9 6 9 4   AST U/L  --   --   --  18   ALT U/L  --   --   --  31   ALK PHOS U/L  --   --   --  216*     Results from last 7 days   Lab Units 07/01/19  0557 06/30/19  0540 06/29/19  0525   WBC Thousand/uL 8 61 7 36 8 44   HEMOGLOBIN g/dL 14 9 14 2 14 2   PLATELETS Thousands/uL 401* 391* 412*     Results from last 7 days   Lab Units 06/30/19  0550 06/28/19  1311 06/28/19  1310 06/28/19   BLOOD CULTURE  No Growth at 24 hrs  No Growth at 24 hrs  No Growth at 48 hrs  --  Alpha Hemolytic Streptococcus NOT Enterococcus, NOT S  pneumoniae*   GRAM STAIN RESULT   --   --  No polys seen*  2+ RBC's*  2+ Gram positive cocci in pairs*  1+ Gram positive rods* Gram positive cocci in pairs and chains*   WOUND CULTURE   --   --  2+ Growth of Enterobacter cloacae complex*  2+ Growth of Methicillin Resistant Staphylococcus aureus*  3+ Growth of Beta Hemolytic Streptococcus Group B*  3+ Growth of   --        Imaging Studies:   I have personally reviewed pertinent imaging study reports and images in PACS  MRI no osteomyelitis    EKG, Pathology, and Other Studies:   I have personally reviewed pertinent reports

## 2019-07-01 NOTE — DISCHARGE INSTRUCTIONS
Discharge Instructions - Podiatry    Weight Bearing Status:   Weight bear as tolerated in CAM boot to left foot                                          Pain: Continue analgesics as directed    F/u appointment Instructions: Please make an appointment within one week of discharge with Dr Dameon Wilson at [address] [place] [address]   Contact sooner if any increase in pain, or signs of infection occur    Wound Care: Change dressings every other day: alginate, DSD to left foot wound

## 2019-07-01 NOTE — DISCHARGE SUMMARY
Discharge Summary -   Oumou Andre 38 y o  female MRN: [id number]  Unit/Bed#: [facility] 314-49 Encounter: [id number]    Admission Date: 6/28/2019     Admitting Diagnosis: Cellulitis of left foot [L03 [id number]]    HPI: Ms Marty Baires is a 50-year-old female with PMH significant for insulin-dependent diabetes, chronic lymphedema of left lower leg, recent history of bariatric surgery, anxiety presents as direct admission from Dr Narcisa Torres  She was seen at [address] [place] [address] the morning prior and there was concern for possible acute osteomyelitis, cellulitis of left foot  Patient states she's had a wound on the bottom of her foot for about a month and has been seeing Dr Narcisa Torres at the wound care center since  She has visiting nurses that comes every M/W for dressing changes  She has been experiencing increase of pain to her left foot and started seeing redness last weekend however didn't have an appointment until day of admission  She states she usually does not have much feeling to her feet  She is also complaining of some fatigue since last weekend and loss of appetite  States she has diabetes and has been using insulin for about 3 years  She recently underwent bariatric surgery in March and has not been taking her insulin much due to decreased in blood sugar  She also states she has chronic pain and gets vicodin prescribed by her PCP yearly, 3 scripts at a time  Admits to smoking about a half pack a day  Procedures Performed: * Cannot find OR case *    Hospital Course: Patient was admitted under podiatry service Dr Fran Magdaleno for left foot cellulitis and possible osteomyelitis  She was started on empiric antibiotic IV cefazolin and oral metronidazole  All home medications were continued  The following services were consulted for multidisciplinary approach: internal medicine and infectious disease  Wound and blood cultures were obtained on admission   1 of 2 blood cultures had late growth of alpha hemolytic streptococcus which infectious disease is speculating to be a contaminant  Repeat blood cultures were done on 6/30/19 with no growth to date  Patient also underwent 2D echo which was negative for endocarditis  A left foot x-ray was ordered on admission which was negative however with wound probing to capsule and sudden change with cellulitis, MRI was ordered as well  This was completed on 6/30/19 and results were negative for osteomyelitis of left 3rd metatarsal, however there were reactive edema noted which correlates to open wound  Wound cultures were positive for MRSA, enterobacter cloacae and beta hemolytic streptococcus group B  Her antibiotics has been adjusted according to sensitivities, ID is recommending oral Keflex and doxycycline to continue until 7/8/19  Due to negative osteomyelitis from MRI and patient has remained afebrile and hemodynamically stable, she is stable for discharge from all services  Significant Findings, Care, Treatment and Services Provided:   MRI -LEFT FOOT WITH AND WITHOUT CONTRAST     INDICATION:   nonhealing wound; suspicious for OM of 3rd metatarsal; surgical planning for resection      COMPARISON:  Left foot plain films from 6/28/2019, and left foot CT from 5/8/2019      TECHNIQUE:  MR sequences were obtained of the left foot including the following:  Precontrast sequences: localizers, sagittal STIR, sagittal T1, coronal T1, coronal T2 fat sat, axial T2 fat sat, axial PD, coronal T1 fat sat  Postcontrast sequences:   Coronal T1 fat sat, sagittal T1 fat sat        IV Contrast:  12 mL of gadobutrol injection (MULTI-DOSE)      FINDINGS:     SUBCUTANEOUS TISSUES: There is a 3rd submetatarsal foot ulcer, with surrounding subcutaneous edema consistent with cellulitis but no abscess    Ulcer is in close proximity to the 3rd metatarsophalangeal joint (series 11 image 30 )     BONES:  Associated with the 3rd submetatarsal foot ulcer, there is minimal reactive edema in the 3rd toe proximal phalanx base, without osteomyelitis (series 6 image 11, and series 11 images 27 through 36 )     FIRST MTP JOINT:  Intact      SESAMOID BONES:  Intact      OTHER ARTICULAR SURFACES:  Normal      PLANTAR FASCIA:  Intact      LISFRANC LIGAMENT:  Intact      FOREFOOT TENDONS:  Intact      INTERMETATARSAL REGIONS:  No bursitis or Emery's neuroma      MUSCULATURE: Intact      IMPRESSION:     There is a 3rd submetatarsal foot ulcer, with surrounding cellulitis but no abscess (series 11 image 30  )  In the adjacent bony structures, there is only mild reactive marrow edema in the 3rd toe proximal phalanx base, without osteomyelitis (series 6   image 11, and series 11 images 27 through 36 )     Transthoracic Echocardiogram     Study date:  2019     Patient: Francy HOLDER  MR number: [id number]  Account number: [de-identified]  : 1975  Age: 38 years  Gender: Female  Status: Inpatient  Location: Bedside  Height: 65 in  Weight: 260 lb  BP: 114/ 79 mmHg     Indications: R/O Endocarditis     Sonographer:  JALEESA Fortune  Referring Physician: Lorie Ramirez MD  Group:  [facility] [id number]  Interpreting Physician:  Vanessa Stern MD     SUMMARY     LEFT VENTRICLE:  Systolic function was normal  Ejection fraction was estimated to be 60 %  There were no regional wall motion abnormalities  Wall thickness was mildly increased      LEFT ATRIUM:  The atrium was mildly dilated      TRICUSPID VALVE:  There was trace regurgitation  Pulmonary artery systolic pressure was within the normal range      PULMONIC VALVE:  There was trace regurgitation      HISTORY: PRIOR HISTORY: S/P Bariatric surgery; Lymphedema; IDDM, Obesity; Psychiatric disorder; Bacteremia      PROCEDURE: The procedure was performed at the bedside  This was a routine study  The transthoracic approach was used  The heart rate was 67 bpm, at the start of the study   Images were obtained from the parasternal, apical, subcostal, and  suprasternal notch acoustic windows  Image quality was adequate      LEFT VENTRICLE: Size was normal  Systolic function was normal  Ejection fraction was estimated to be 60 %  There were no regional wall motion abnormalities  Wall thickness was mildly increased  No evidence of apical thrombus  DOPPLER: Left  ventricular diastolic function parameters were normal      RIGHT VENTRICLE: The size was normal  Systolic function was normal  Wall thickness was normal      LEFT ATRIUM: The atrium was mildly dilated      RIGHT ATRIUM: Size was normal      MITRAL VALVE: Valve structure was normal  There was normal leaflet separation  DOPPLER: The transmitral velocity was within the normal range  There was no evidence for stenosis  There was no significant regurgitation      AORTIC VALVE: The valve was trileaflet  Leaflets exhibited mildly increased thickness and normal cuspal separation  DOPPLER: Transaortic velocity was within the normal range  There was no evidence for stenosis  There was no significant  regurgitation      TRICUSPID VALVE: The valve structure was normal  There was normal leaflet separation  DOPPLER: The transtricuspid velocity was within the normal range  There was no evidence for stenosis  There was trace regurgitation  Pulmonary artery  systolic pressure was within the normal range      PULMONIC VALVE: Leaflets exhibited normal thickness, no calcification, and normal cuspal separation  DOPPLER: The transpulmonic velocity was within the normal range  There was trace regurgitation      PERICARDIUM: There was no pericardial effusion   The pericardium was normal in appearance      AORTA: The root exhibited normal size      SYSTEMIC VEINS: IVC: The inferior vena cava was normal in size      SYSTEM MEASUREMENT TABLES     2D  %FS: 43 17 %  Ao Diam: 3 23 cm  EDV(Teich): 58 9 ml  EF(Teich): 75 11 %  ESV(Teich): 14 66 ml  IVSd: 1 44 cm  LA Area: 12 38 cm2  LA Diam: 4 08 cm  LVEDV MOD A4C: 66 33 ml  LVEF MOD A4C: 75 28 %  LVESV MOD A4C: 16 4 ml  LVIDd: 3 72 cm  LVIDs: 2 11 cm  LVLd A4C: 6 85 cm  LVLs A4C: 5 46 cm  LVPWd: 1 2 cm  RA Area: 9 15 cm2  RVIDd: 2 55 cm  SV MOD A4C: 49 94 ml  SV(Teich): 44 24 ml     CW  AV Vmax: 1 56 m/s  AV maxP 8 mmHg  TR Vmax: 1 62 m/s  TR maxPG: 10 56 mmHg     MM  TAPSE: 2 15 cm     PW  E': 0 1 m/s  E/E': 9 78  MV A Flynn: 0 86 m/s  MV Dec Nicholas: 3 32 m/s2  MV DecT: 305 32 ms  MV E Flynn: 1 01 m/s  MV E/A Ratio: 1 17  MV PHT: 88 54 ms  MVA By PHT: 2 48 cm2     [facility] Accredited Echocardi[facility]     Prepared and electronically signed by     Rama Servin MD  Signed 2019 29:84:95     Complications: none    Discharge Diagnosis: Left foot cellulitis    Condition at Discharge: good     Discharge instructions/Information to patient and family:   See after visit summary for information provided to patient and family  Provisions for Follow-Up Care/Important appointments:    See after visit summary for information related to follow-up care and any pertinent home health orders  Disposition: See After Visit Summary for discharge disposition information  Planned Readmission: No    Discharge Statement   I spent 30 minutes discharging the patient  This time was spent on the day of discharge  I had direct contact with the patient on the day of discharge  The details of this patient's discharge     Discharge Medications:  See after visit summary for reconciled discharge medications provided to patient and family

## 2019-07-01 NOTE — PROGRESS NOTES
Progress Note - Podiatry  Dianna Martin 38 y o  female MRN: [id number]  Unit/Bed#: [facility] 149-54 Encounter: [id number]    Assessment:  28-year-old female with diabetes and recent bariatric surgery presents with a left foot diabetic foot ulcers and cellulitis     1  Left foot diabetic foot ulcer  2  Left foot cellulitis - resolving   3  Insulin-dependent diabetes  4  Status post bariatric surgery  5  Anxiety  6  Morbid obesity  7  Smoker   8  Lymphedema      Plan:  - Left foot MRI reviewed shows: There is a 3rd submetatarsal foot ulcer, with surrounding cellulitis but no abscess  In the adjacent bony structures, there is only mild reactive marrow edema in the 3rd toe proximal phalanx base, without osteomyelitis  - Left foot wound continues to probe deep but not to bone  Stable with no clinical sings of infection  Erythema to the left dorsal foot has resolved  Swelling present secondary to lymphedema  Dressed with maxorb DSD    - WBAT in cam walker to the left lower extremity   - New blood cultures pending, one of the previous blood culture grew alpha hemolytic strep  - Wound cultures growing MRSA, Enterobacter cloacae complex, beta hemolytic strep group B   - Continue abx as per ID recommendations, changed ancef to cefepime and continue flagyl   - appreciate slim for medical management   - Will update attending     Subjective/Objective   Chief Complaint: No chief complaint on file  Subjective: 38 y o  y/o female was seen and evaluated at bedside  Patient denies any acute events overnight  Complaints of left lower extremity pain which is not new  She states due to lymphedema she always have pain  Denies any other complaints  Blood pressure 115/75, pulse (!) 52, temperature 98 °F (36 7 °C), resp  rate 16, height 5' 5" (1 651 m), weight 120 kg (264 lb 8 8 oz), SpO2 92 %  ,Body mass index is 44 02 kg/m²      Invasive Devices     Peripheral Intravenous Line            Peripheral IV 06/28/19 Left Antecubital 2 days Physical Exam:   General: Alert, cooperative and no distress  Lungs: Non labored breathing  Heart: Positive S1, S2  Abdomen: Soft, non-tender  Extremity: left foot wound continues to probe deep however not to bone  Wound base is mixture of granular and fibrotic with hyperkeratotic periwound  Mild serosanguinous drainage and mild malodor noted from the wound  Erythema to the dorsal foot as resolved significantly  Edema presents to the left lower extremity  No clinica sings of infection noted  7/1 7/1        Lab, Imaging and other studies:   CBC:   Lab Results   Component Value Date    WBC 8 61 07/01/2019    HGB 14 9 07/01/2019    HCT 46 6 (H) 07/01/2019    MCV 88 07/01/2019     (H) 07/01/2019    MCH 28 1 07/01/2019    MCHC 32 0 07/01/2019    RDW 13 6 07/01/2019    MPV 9 2 07/01/2019   , CMP:   Lab Results   Component Value Date    SODIUM 135 (L) 07/01/2019    K 4 3 07/01/2019     07/01/2019    CO2 31 07/01/2019    BUN 10 07/01/2019    CREATININE 0 86 07/01/2019    CALCIUM 9 2 07/01/2019    EGFR 83 07/01/2019       Imaging: I have personally reviewed pertinent films in PACS  EKG, Pathology, and Other Studies: I have personally reviewed pertinent reports    VTE Pharmacologic Prophylaxis: Enoxaparin (Lovenox)

## 2019-07-01 NOTE — ASSESSMENT & PLAN NOTE
Continue synthroid will chk tsh 6/29 5 580 normal obtain labs in 4 weeks  ? Absorption since pt just had gastric bypass but she reports that she is due about now to have thyroid labs chanai     Educated pt on the fact that I am increasing to 75mcg daily starting 6/30 she will need to have repeat labs in 4 - 6 wks   On 2/21/19 tsh was 3 53

## 2019-07-01 NOTE — ASSESSMENT & PLAN NOTE
Admitted under Dr Neris Jacobs for left foot diabetic ulcer with suspicion for osteomyelitis   - on iv antibiotics started on po flagyl and iv ancef per primary service   - left foot xray with: No radiographic evidence of osteomyelitis  - will perform MRI of foot per ID and podiatry   - pt remains afebrile and stable will continue to monitor labs  - appreciate ID   - ON admission 6/28  podiatry performed excisional debridement at the bedside   - patient may be WBAT in a surgical shoe at this time         No results found for: HGBA1C    Recent Labs     06/30/19  1448 06/30/19  1624 06/30/19  2051 07/01/19  0719   POCGLU 165* 127 154* 173*       Blood Sugar Average: Last 72 hrs:  (P) 163   a1c ordered for am   Ada diet   home dose lantus 55 units q12 hrs however pt reports no longer taking at home since her gastric bypass    Continue ssi changed to with meal chks  Currently stable

## 2019-07-01 NOTE — SOCIAL WORK
Cm reviewed patient during care coordination rounds  Patient stable for discharge today  VAUGHN aware of discharge and to resume nursing services for wound care upon discharge  Patient reported she has been unable to reach anyone to assist with transporting her home  Patient continues to try reaching family/friends  If unable to reach, patient has credit card to pay for cab if needed  Cm following and awaiting determination if patient will find help to transport home vs needing cab  RN Haider Brambila aware as well  Patient unable to find transport through family/friends  Shuttle services through [facility] able to transport patient home  Patient aware and agreeable to transport  Aide to take patient to entrance A  Cm following

## 2019-07-03 LAB — BACTERIA BLD CULT: NORMAL

## 2019-07-05 ENCOUNTER — APPOINTMENT (OUTPATIENT)
Dept: WOUND CARE | Facility: HOSPITAL | Age: 44
End: 2019-07-05
Payer: COMMERCIAL

## 2019-07-05 LAB
BACTERIA BLD CULT: NORMAL
BACTERIA BLD CULT: NORMAL

## 2019-07-05 PROCEDURE — 29445 APPL RIGID TOT CNTC LEG CAST: CPT | Performed by: PODIATRIST

## 2019-07-05 NOTE — ASSESSMENT & PLAN NOTE
Attending Dr Pedrito Ravi will follow later this morning for final recommendation  - EKG on admission : with Normal sinus however compared to prior pts qt interval has increased 408  (pt on geodon, wellbutrin , and lexapro) in addition to cardiac   meds   - Continue  Clonidine 0 1mg in am and 0 2mg at hs currently with adequate bp control since admission  - pt was seen by [facility] cardiology  on 3/5/2019 for preop clearance for bariatric surgery   ( [facility] )   - recommendations at that time were to continue cardiac medication   - stick to heart healthy diet (low sat fat, trans fat and cholesterol)  - limit concentrated sweets (soda, desserts and candy)   - Healthy weight loss   - follow up in three months from that visit   * pt reports that she has had to see cards preop because after either her "gallbladder or hernia repair surgery, she woke up and "they " told her she had flat lined "that was years ago"  - Pt was ordered a nuclear stress test that was performed on: 3/12/19 which demonstrated as quoted from [facility] report:     "IMPRESSIONS         1  The myocardial perfusion imaging is abnormal  There is a small      size, mild intensity, fixed, anterior and anteroseptal perfusion      defect  Prone imaging not performed  Findings consistent with a prior      LAD territory infarct or breast attenuation artifact  In the setting      of normal wall motion, favor breast attenuation artifact        2  There is TID present  TID value 1 29 with Lexiscan administration       This is the upper limit of normal for a pharmacological nuclear      stress test         3  Normal left ventricular size and ejection fraction  LVEF > 70%        Normal left ventricular wall motion  Normal left ventricular wall      thickening        4   No abnormal ST or  T wave changes with Lexiscan administration "

## 2019-07-12 ENCOUNTER — APPOINTMENT (OUTPATIENT)
Dept: WOUND CARE | Facility: HOSPITAL | Age: 44
End: 2019-07-12
Payer: COMMERCIAL

## 2019-07-12 PROCEDURE — 29445 APPL RIGID TOT CNTC LEG CAST: CPT

## 2019-07-19 ENCOUNTER — APPOINTMENT (OUTPATIENT)
Dept: WOUND CARE | Facility: HOSPITAL | Age: 44
End: 2019-07-19
Payer: COMMERCIAL

## 2019-07-19 PROCEDURE — 11042 DBRDMT SUBQ TIS 1ST 20SQCM/<: CPT

## 2019-07-26 ENCOUNTER — APPOINTMENT (OUTPATIENT)
Dept: WOUND CARE | Facility: HOSPITAL | Age: 44
End: 2019-07-26
Payer: COMMERCIAL

## 2019-07-26 PROCEDURE — 29445 APPL RIGID TOT CNTC LEG CAST: CPT

## 2019-08-02 ENCOUNTER — APPOINTMENT (OUTPATIENT)
Dept: WOUND CARE | Facility: HOSPITAL | Age: 44
End: 2019-08-02
Payer: COMMERCIAL

## 2019-08-02 PROCEDURE — 11042 DBRDMT SUBQ TIS 1ST 20SQCM/<: CPT

## 2019-08-09 ENCOUNTER — APPOINTMENT (OUTPATIENT)
Dept: WOUND CARE | Facility: HOSPITAL | Age: 44
End: 2019-08-09
Payer: COMMERCIAL

## 2019-08-09 PROCEDURE — 29445 APPL RIGID TOT CNTC LEG CAST: CPT

## 2019-08-16 ENCOUNTER — APPOINTMENT (OUTPATIENT)
Dept: WOUND CARE | Facility: HOSPITAL | Age: 44
End: 2019-08-16
Payer: COMMERCIAL

## 2019-08-16 PROCEDURE — 99211 OFF/OP EST MAY X REQ PHY/QHP: CPT

## 2019-08-23 ENCOUNTER — TRANSCRIBE ORDERS (OUTPATIENT)
Dept: RADIOLOGY | Facility: HOSPITAL | Age: 44
End: 2019-08-23

## 2019-08-23 ENCOUNTER — APPOINTMENT (OUTPATIENT)
Dept: WOUND CARE | Facility: HOSPITAL | Age: 44
End: 2019-08-23
Payer: COMMERCIAL

## 2019-08-23 ENCOUNTER — HOSPITAL ENCOUNTER (OUTPATIENT)
Dept: RADIOLOGY | Facility: HOSPITAL | Age: 44
Discharge: HOME | End: 2019-08-23
Attending: PODIATRIST
Payer: COMMERCIAL

## 2019-08-23 DIAGNOSIS — L97.426 DIABETIC ULCER OF LEFT MIDFOOT ASSOCIATED WITH DIABETES MELLITUS DUE TO UNDERLYING CONDITION, WITH BONE INVOLVEMENT WITHOUT EVIDENCE OF NECROSIS (HCC): ICD-10-CM

## 2019-08-23 DIAGNOSIS — E08.621 DIABETIC ULCER OF LEFT MIDFOOT ASSOCIATED WITH DIABETES MELLITUS DUE TO UNDERLYING CONDITION, WITH BONE INVOLVEMENT WITHOUT EVIDENCE OF NECROSIS (HCC): ICD-10-CM

## 2019-08-23 PROCEDURE — 11042 DBRDMT SUBQ TIS 1ST 20SQCM/<: CPT

## 2019-08-23 PROCEDURE — 73630 X-RAY EXAM OF FOOT: CPT

## 2019-08-30 ENCOUNTER — APPOINTMENT (OUTPATIENT)
Dept: WOUND CARE | Facility: HOSPITAL | Age: 44
End: 2019-08-30
Payer: COMMERCIAL

## 2019-08-30 DIAGNOSIS — L97.426 DIABETIC ULCER OF LEFT MIDFOOT ASSOCIATED WITH DIABETES MELLITUS DUE TO UNDERLYING CONDITION, WITH BONE INVOLVEMENT WITHOUT EVIDENCE OF NECROSIS (HCC): Primary | ICD-10-CM

## 2019-08-30 DIAGNOSIS — E08.621 DIABETIC ULCER OF LEFT MIDFOOT ASSOCIATED WITH DIABETES MELLITUS DUE TO UNDERLYING CONDITION, WITH BONE INVOLVEMENT WITHOUT EVIDENCE OF NECROSIS (HCC): Primary | ICD-10-CM

## 2019-08-30 PROCEDURE — 99213 OFFICE O/P EST LOW 20 MIN: CPT

## 2019-08-30 RX ORDER — CLINDAMYCIN HYDROCHLORIDE 300 MG/1
300 CAPSULE ORAL 4 TIMES DAILY
Qty: 28 CAPSULE | Refills: 0 | Status: SHIPPED | OUTPATIENT
Start: 2019-08-30 | End: 2019-09-11 | Stop reason: HOSPADM

## 2019-09-06 ENCOUNTER — APPOINTMENT (INPATIENT)
Dept: RADIOLOGY | Facility: HOSPITAL | Age: 44
DRG: 305 | End: 2019-09-06
Payer: COMMERCIAL

## 2019-09-06 ENCOUNTER — HOSPITAL ENCOUNTER (INPATIENT)
Facility: HOSPITAL | Age: 44
LOS: 5 days | Discharge: HOME HEALTH | DRG: 305 | End: 2019-09-11
Attending: PODIATRIST | Admitting: PODIATRIST
Payer: COMMERCIAL

## 2019-09-06 ENCOUNTER — APPOINTMENT (OUTPATIENT)
Dept: WOUND CARE | Facility: HOSPITAL | Age: 44
DRG: 305 | End: 2019-09-06
Payer: COMMERCIAL

## 2019-09-06 DIAGNOSIS — E08.621 DIABETIC ULCER OF LEFT MIDFOOT ASSOCIATED WITH DIABETES MELLITUS DUE TO UNDERLYING CONDITION, WITH BONE INVOLVEMENT WITHOUT EVIDENCE OF NECROSIS (HCC): ICD-10-CM

## 2019-09-06 DIAGNOSIS — E03.9 HYPOTHYROIDISM, UNSPECIFIED TYPE: ICD-10-CM

## 2019-09-06 DIAGNOSIS — E11.9 TYPE 2 DIABETES MELLITUS (HCC): ICD-10-CM

## 2019-09-06 DIAGNOSIS — F31.10 MANIC BIPOLAR I DISORDER (HCC): ICD-10-CM

## 2019-09-06 DIAGNOSIS — M86.172 OTHER ACUTE OSTEOMYELITIS OF LEFT FOOT (HCC): ICD-10-CM

## 2019-09-06 DIAGNOSIS — F41.9 ANXIETY: ICD-10-CM

## 2019-09-06 DIAGNOSIS — Z01.818 PREOPERATIVE CLEARANCE: Primary | ICD-10-CM

## 2019-09-06 DIAGNOSIS — L03.116 CELLULITIS OF LEFT FOOT: ICD-10-CM

## 2019-09-06 DIAGNOSIS — L97.426 DIABETIC ULCER OF LEFT MIDFOOT ASSOCIATED WITH DIABETES MELLITUS DUE TO UNDERLYING CONDITION, WITH BONE INVOLVEMENT WITHOUT EVIDENCE OF NECROSIS (HCC): ICD-10-CM

## 2019-09-06 PROBLEM — E11.49 TYPE 2 DIABETES MELLITUS WITH NEUROLOGIC COMPLICATION, WITH LONG-TERM CURRENT USE OF INSULIN (HCC): Status: ACTIVE | Noted: 2019-09-06

## 2019-09-06 PROBLEM — Z79.4 TYPE 2 DIABETES MELLITUS WITH NEUROLOGIC COMPLICATION, WITH LONG-TERM CURRENT USE OF INSULIN (HCC): Status: ACTIVE | Noted: 2019-09-06

## 2019-09-06 PROBLEM — Z72.0 TOBACCO ABUSE: Status: ACTIVE | Noted: 2019-09-06

## 2019-09-06 LAB
ALBUMIN SERPL BCP-MCNC: 3.6 G/DL (ref 3.5–5)
ALP SERPL-CCNC: 216 U/L (ref 46–116)
ALT SERPL W P-5'-P-CCNC: 25 U/L (ref 12–78)
ANION GAP SERPL CALCULATED.3IONS-SCNC: 8 MMOL/L (ref 4–13)
AST SERPL W P-5'-P-CCNC: 12 U/L (ref 5–45)
ATRIAL RATE: 93 BPM
BASOPHILS # BLD AUTO: 0.05 THOUSANDS/ΜL (ref 0–0.1)
BASOPHILS NFR BLD AUTO: 0 % (ref 0–1)
BILIRUB SERPL-MCNC: 0.34 MG/DL (ref 0.2–1)
BUN SERPL-MCNC: 10 MG/DL (ref 5–25)
CALCIUM SERPL-MCNC: 10.1 MG/DL (ref 8.3–10.1)
CHLORIDE SERPL-SCNC: 101 MMOL/L (ref 100–108)
CO2 SERPL-SCNC: 26 MMOL/L (ref 21–32)
CREAT SERPL-MCNC: 0.79 MG/DL (ref 0.6–1.3)
CRP SERPL QL: >90 MG/L
EOSINOPHIL # BLD AUTO: 0.51 THOUSAND/ΜL (ref 0–0.61)
EOSINOPHIL NFR BLD AUTO: 4 % (ref 0–6)
ERYTHROCYTE [DISTWIDTH] IN BLOOD BY AUTOMATED COUNT: 13.7 % (ref 11.6–15.1)
ERYTHROCYTE [SEDIMENTATION RATE] IN BLOOD: 69 MM/HOUR (ref 0–20)
EST. AVERAGE GLUCOSE BLD GHB EST-MCNC: 180 MG/DL
GFR SERPL CREATININE-BSD FRML MDRD: 92 ML/MIN/1.73SQ M
GLUCOSE SERPL-MCNC: 149 MG/DL (ref 65–140)
GLUCOSE SERPL-MCNC: 159 MG/DL (ref 65–140)
GLUCOSE SERPL-MCNC: 167 MG/DL (ref 65–140)
GLUCOSE SERPL-MCNC: 176 MG/DL (ref 65–140)
HBA1C MFR BLD: 7.9 % (ref 4.2–6.3)
HCT VFR BLD AUTO: 42.3 % (ref 34.8–46.1)
HGB BLD-MCNC: 13.8 G/DL (ref 11.5–15.4)
IMM GRANULOCYTES # BLD AUTO: 0.07 THOUSAND/UL (ref 0–0.2)
IMM GRANULOCYTES NFR BLD AUTO: 1 % (ref 0–2)
LYMPHOCYTES # BLD AUTO: 4.04 THOUSANDS/ΜL (ref 0.6–4.47)
LYMPHOCYTES NFR BLD AUTO: 27 % (ref 14–44)
MCH RBC QN AUTO: 27.9 PG (ref 26.8–34.3)
MCHC RBC AUTO-ENTMCNC: 32.6 G/DL (ref 31.4–37.4)
MCV RBC AUTO: 86 FL (ref 82–98)
MONOCYTES # BLD AUTO: 0.87 THOUSAND/ΜL (ref 0.17–1.22)
MONOCYTES NFR BLD AUTO: 6 % (ref 4–12)
NEUTROPHILS # BLD AUTO: 9.21 THOUSANDS/ΜL (ref 1.85–7.62)
NEUTS SEG NFR BLD AUTO: 62 % (ref 43–75)
NRBC BLD AUTO-RTO: 0 /100 WBCS
P AXIS: 32 DEGREES
PLATELET # BLD AUTO: 426 THOUSANDS/UL (ref 149–390)
PLATELET # BLD AUTO: 466 THOUSANDS/UL (ref 149–390)
PMV BLD AUTO: 9.2 FL (ref 8.9–12.7)
PMV BLD AUTO: 9.5 FL (ref 8.9–12.7)
POTASSIUM SERPL-SCNC: 4.2 MMOL/L (ref 3.5–5.3)
PR INTERVAL: 168 MS
PROT SERPL-MCNC: 8.3 G/DL (ref 6.4–8.2)
QRS AXIS: 40 DEGREES
QRSD INTERVAL: 94 MS
QT INTERVAL: 364 MS
QTC INTERVAL: 452 MS
RBC # BLD AUTO: 4.95 MILLION/UL (ref 3.81–5.12)
SODIUM SERPL-SCNC: 135 MMOL/L (ref 136–145)
T WAVE AXIS: 30 DEGREES
VENTRICULAR RATE: 93 BPM
WBC # BLD AUTO: 14.75 THOUSAND/UL (ref 4.31–10.16)

## 2019-09-06 PROCEDURE — 85049 AUTOMATED PLATELET COUNT: CPT | Performed by: STUDENT IN AN ORGANIZED HEALTH CARE EDUCATION/TRAINING PROGRAM

## 2019-09-06 PROCEDURE — 87040 BLOOD CULTURE FOR BACTERIA: CPT | Performed by: STUDENT IN AN ORGANIZED HEALTH CARE EDUCATION/TRAINING PROGRAM

## 2019-09-06 PROCEDURE — 11044 DBRDMT BONE 1ST 20 SQ CM/<: CPT | Performed by: PODIATRIST

## 2019-09-06 PROCEDURE — 82948 REAGENT STRIP/BLOOD GLUCOSE: CPT

## 2019-09-06 PROCEDURE — 87077 CULTURE AEROBIC IDENTIFY: CPT | Performed by: STUDENT IN AN ORGANIZED HEALTH CARE EDUCATION/TRAINING PROGRAM

## 2019-09-06 PROCEDURE — 87070 CULTURE OTHR SPECIMN AEROBIC: CPT | Performed by: STUDENT IN AN ORGANIZED HEALTH CARE EDUCATION/TRAINING PROGRAM

## 2019-09-06 PROCEDURE — 86140 C-REACTIVE PROTEIN: CPT | Performed by: STUDENT IN AN ORGANIZED HEALTH CARE EDUCATION/TRAINING PROGRAM

## 2019-09-06 PROCEDURE — 99254 IP/OBS CNSLTJ NEW/EST MOD 60: CPT | Performed by: FAMILY MEDICINE

## 2019-09-06 PROCEDURE — 80053 COMPREHEN METABOLIC PANEL: CPT | Performed by: STUDENT IN AN ORGANIZED HEALTH CARE EDUCATION/TRAINING PROGRAM

## 2019-09-06 PROCEDURE — 83036 HEMOGLOBIN GLYCOSYLATED A1C: CPT | Performed by: STUDENT IN AN ORGANIZED HEALTH CARE EDUCATION/TRAINING PROGRAM

## 2019-09-06 PROCEDURE — 85652 RBC SED RATE AUTOMATED: CPT | Performed by: STUDENT IN AN ORGANIZED HEALTH CARE EDUCATION/TRAINING PROGRAM

## 2019-09-06 PROCEDURE — 73630 X-RAY EXAM OF FOOT: CPT

## 2019-09-06 PROCEDURE — 99212 OFFICE O/P EST SF 10 MIN: CPT

## 2019-09-06 PROCEDURE — 93005 ELECTROCARDIOGRAM TRACING: CPT

## 2019-09-06 PROCEDURE — 87147 CULTURE TYPE IMMUNOLOGIC: CPT | Performed by: STUDENT IN AN ORGANIZED HEALTH CARE EDUCATION/TRAINING PROGRAM

## 2019-09-06 PROCEDURE — 87186 SC STD MICRODIL/AGAR DIL: CPT | Performed by: STUDENT IN AN ORGANIZED HEALTH CARE EDUCATION/TRAINING PROGRAM

## 2019-09-06 PROCEDURE — 73718 MRI LOWER EXTREMITY W/O DYE: CPT

## 2019-09-06 PROCEDURE — 85025 COMPLETE CBC W/AUTO DIFF WBC: CPT | Performed by: STUDENT IN AN ORGANIZED HEALTH CARE EDUCATION/TRAINING PROGRAM

## 2019-09-06 PROCEDURE — 99222 1ST HOSP IP/OBS MODERATE 55: CPT | Performed by: PODIATRIST

## 2019-09-06 PROCEDURE — 0QBP0ZZ EXCISION OF LEFT METATARSAL, OPEN APPROACH: ICD-10-PCS | Performed by: PODIATRIST

## 2019-09-06 PROCEDURE — 93010 ELECTROCARDIOGRAM REPORT: CPT | Performed by: INTERNAL MEDICINE

## 2019-09-06 PROCEDURE — 87205 SMEAR GRAM STAIN: CPT | Performed by: STUDENT IN AN ORGANIZED HEALTH CARE EDUCATION/TRAINING PROGRAM

## 2019-09-06 RX ORDER — LEVOTHYROXINE SODIUM 0.05 MG/1
50 TABLET ORAL
Status: DISCONTINUED | OUTPATIENT
Start: 2019-09-06 | End: 2019-09-11 | Stop reason: HOSPADM

## 2019-09-06 RX ORDER — CLONIDINE HYDROCHLORIDE 0.1 MG/1
0.1 TABLET ORAL EVERY 12 HOURS SCHEDULED
Status: DISCONTINUED | OUTPATIENT
Start: 2019-09-06 | End: 2019-09-06

## 2019-09-06 RX ORDER — ZIPRASIDONE HYDROCHLORIDE 40 MG/1
80 CAPSULE ORAL 2 TIMES DAILY WITH MEALS
Status: DISCONTINUED | OUTPATIENT
Start: 2019-09-06 | End: 2019-09-06

## 2019-09-06 RX ORDER — MELATONIN
500 DAILY
Status: DISCONTINUED | OUTPATIENT
Start: 2019-09-06 | End: 2019-09-11 | Stop reason: HOSPADM

## 2019-09-06 RX ORDER — HYDROXYZINE 50 MG/1
50 TABLET, FILM COATED ORAL
Refills: 0 | COMMUNITY
Start: 2019-09-03

## 2019-09-06 RX ORDER — ONDANSETRON 2 MG/ML
4 INJECTION INTRAMUSCULAR; INTRAVENOUS EVERY 6 HOURS PRN
Status: DISCONTINUED | OUTPATIENT
Start: 2019-09-06 | End: 2019-09-11 | Stop reason: HOSPADM

## 2019-09-06 RX ORDER — PANTOPRAZOLE SODIUM 40 MG/1
40 TABLET, DELAYED RELEASE ORAL DAILY
Status: DISCONTINUED | OUTPATIENT
Start: 2019-09-06 | End: 2019-09-11 | Stop reason: HOSPADM

## 2019-09-06 RX ORDER — SUMATRIPTAN 6 MG/.5ML
6 INJECTION, SOLUTION SUBCUTANEOUS ONCE AS NEEDED
Status: DISCONTINUED | OUTPATIENT
Start: 2019-09-06 | End: 2019-09-11 | Stop reason: HOSPADM

## 2019-09-06 RX ORDER — METRONIDAZOLE 500 MG/1
500 TABLET ORAL EVERY 8 HOURS SCHEDULED
Status: DISCONTINUED | OUTPATIENT
Start: 2019-09-06 | End: 2019-09-06

## 2019-09-06 RX ORDER — ACETAMINOPHEN 325 MG/1
975 TABLET ORAL EVERY 6 HOURS SCHEDULED
Status: DISCONTINUED | OUTPATIENT
Start: 2019-09-06 | End: 2019-09-11 | Stop reason: HOSPADM

## 2019-09-06 RX ORDER — ESCITALOPRAM OXALATE 10 MG/1
10 TABLET ORAL EVERY MORNING
Status: DISCONTINUED | OUTPATIENT
Start: 2019-09-06 | End: 2019-09-06

## 2019-09-06 RX ORDER — NICOTINE 21 MG/24HR
1 PATCH, TRANSDERMAL 24 HOURS TRANSDERMAL DAILY
Status: DISCONTINUED | OUTPATIENT
Start: 2019-09-06 | End: 2019-09-06

## 2019-09-06 RX ORDER — OXYCODONE HYDROCHLORIDE 5 MG/1
5 TABLET ORAL EVERY 4 HOURS PRN
Status: DISCONTINUED | OUTPATIENT
Start: 2019-09-06 | End: 2019-09-11 | Stop reason: HOSPADM

## 2019-09-06 RX ORDER — ZIPRASIDONE HYDROCHLORIDE 40 MG/1
80 CAPSULE ORAL 2 TIMES DAILY WITH MEALS
Status: DISCONTINUED | OUTPATIENT
Start: 2019-09-06 | End: 2019-09-11 | Stop reason: HOSPADM

## 2019-09-06 RX ORDER — BUPROPION HYDROCHLORIDE 150 MG/1
300 TABLET ORAL DAILY
Status: DISCONTINUED | OUTPATIENT
Start: 2019-09-06 | End: 2019-09-06 | Stop reason: SDUPTHER

## 2019-09-06 RX ORDER — DIPHENHYDRAMINE HCL 25 MG
25 TABLET ORAL EVERY 6 HOURS PRN
Status: DISCONTINUED | OUTPATIENT
Start: 2019-09-06 | End: 2019-09-11 | Stop reason: HOSPADM

## 2019-09-06 RX ORDER — CLONIDINE HYDROCHLORIDE 0.1 MG/1
0.1 TABLET ORAL DAILY
Status: DISCONTINUED | OUTPATIENT
Start: 2019-09-07 | End: 2019-09-11 | Stop reason: HOSPADM

## 2019-09-06 RX ORDER — HEPARIN SODIUM 5000 [USP'U]/ML
5000 INJECTION, SOLUTION INTRAVENOUS; SUBCUTANEOUS EVERY 8 HOURS SCHEDULED
Status: DISCONTINUED | OUTPATIENT
Start: 2019-09-06 | End: 2019-09-11 | Stop reason: HOSPADM

## 2019-09-06 RX ORDER — DOCUSATE SODIUM 100 MG/1
100 CAPSULE, LIQUID FILLED ORAL 2 TIMES DAILY PRN
Status: DISCONTINUED | OUTPATIENT
Start: 2019-09-06 | End: 2019-09-09

## 2019-09-06 RX ORDER — HYDROXYZINE 50 MG/1
50 TABLET, FILM COATED ORAL
Status: DISCONTINUED | OUTPATIENT
Start: 2019-09-06 | End: 2019-09-11 | Stop reason: HOSPADM

## 2019-09-06 RX ORDER — CLONIDINE HYDROCHLORIDE 0.1 MG/1
0.2 TABLET ORAL
Status: DISCONTINUED | OUTPATIENT
Start: 2019-09-06 | End: 2019-09-11 | Stop reason: HOSPADM

## 2019-09-06 RX ORDER — ACETAMINOPHEN 325 MG/1
650 TABLET ORAL EVERY 6 HOURS PRN
Status: DISCONTINUED | OUTPATIENT
Start: 2019-09-06 | End: 2019-09-06

## 2019-09-06 RX ORDER — ALBUTEROL SULFATE 90 UG/1
2 AEROSOL, METERED RESPIRATORY (INHALATION) EVERY 6 HOURS PRN
Status: DISCONTINUED | OUTPATIENT
Start: 2019-09-06 | End: 2019-09-11 | Stop reason: HOSPADM

## 2019-09-06 RX ORDER — BUPROPION HYDROCHLORIDE 150 MG/1
300 TABLET ORAL DAILY
Status: DISCONTINUED | OUTPATIENT
Start: 2019-09-06 | End: 2019-09-11 | Stop reason: HOSPADM

## 2019-09-06 RX ORDER — TRAMADOL HYDROCHLORIDE 50 MG/1
50 TABLET ORAL EVERY 6 HOURS PRN
Status: DISCONTINUED | OUTPATIENT
Start: 2019-09-06 | End: 2019-09-11 | Stop reason: HOSPADM

## 2019-09-06 RX ORDER — CLINDAMYCIN PHOSPHATE 600 MG/50ML
600 INJECTION INTRAVENOUS EVERY 8 HOURS
Status: DISCONTINUED | OUTPATIENT
Start: 2019-09-06 | End: 2019-09-07

## 2019-09-06 RX ADMIN — INSULIN LISPRO 1 UNITS: 100 INJECTION, SOLUTION INTRAVENOUS; SUBCUTANEOUS at 13:10

## 2019-09-06 RX ADMIN — CLINDAMYCIN PHOSPHATE 600 MG: 600 INJECTION, SOLUTION INTRAVENOUS at 21:55

## 2019-09-06 RX ADMIN — HEPARIN SODIUM 5000 UNITS: 5000 INJECTION INTRAVENOUS; SUBCUTANEOUS at 13:06

## 2019-09-06 RX ADMIN — BUPROPION HYDROCHLORIDE 300 MG: 150 TABLET, FILM COATED, EXTENDED RELEASE ORAL at 13:04

## 2019-09-06 RX ADMIN — CLINDAMYCIN PHOSPHATE 600 MG: 600 INJECTION, SOLUTION INTRAVENOUS at 14:27

## 2019-09-06 RX ADMIN — HYDROXYZINE HYDROCHLORIDE 50 MG: 50 TABLET ORAL at 21:55

## 2019-09-06 RX ADMIN — CLONIDINE HYDROCHLORIDE 0.2 MG: 0.1 TABLET ORAL at 21:55

## 2019-09-06 RX ADMIN — LEVOTHYROXINE SODIUM 50 MCG: 50 TABLET ORAL at 13:03

## 2019-09-06 RX ADMIN — TRAMADOL HYDROCHLORIDE 50 MG: 50 TABLET, FILM COATED ORAL at 13:46

## 2019-09-06 RX ADMIN — OXYCODONE HYDROCHLORIDE 5 MG: 5 TABLET ORAL at 17:34

## 2019-09-06 RX ADMIN — HEPARIN SODIUM 5000 UNITS: 5000 INJECTION INTRAVENOUS; SUBCUTANEOUS at 22:00

## 2019-09-06 RX ADMIN — CLONIDINE HYDROCHLORIDE 0.1 MG: 0.1 TABLET ORAL at 13:05

## 2019-09-06 RX ADMIN — ACETAMINOPHEN 975 MG: 325 TABLET ORAL at 17:34

## 2019-09-06 RX ADMIN — OXYCODONE HYDROCHLORIDE 5 MG: 5 TABLET ORAL at 21:55

## 2019-09-06 RX ADMIN — ZIPRASIDONE HCL 80 MG: 40 CAPSULE ORAL at 21:58

## 2019-09-06 RX ADMIN — INSULIN LISPRO 1 UNITS: 100 INJECTION, SOLUTION INTRAVENOUS; SUBCUTANEOUS at 16:52

## 2019-09-06 NOTE — H&P
H&P Exam - Podiatry   Cristel Cody 38 y o  female MRN: [id number]  Unit/Bed#: [facility] 815-01 Encounter: [id number]    Assessment/Plan     Assessment:  1  Ulceration located on plantar left foot with cellulitis and suspicion for osteomyelitis  2  Diabetes mellitus  3  Hypothyroidism  4  Lymphedema    Plan:  · Local wound care for now  Plan is for surgical intervention pending LEADS and advanced imaging results  · Pre-debridement wound measures 1 cm x 1 cm x 1 5 cm  Excisional debridement was performed with a #15 blade to remove non-viable necrotic tissue, skin and fat  Wound was debrided down to the level of periosteum and bone  Hemostasis was controlled with pressure  No pain noted during procedure  Post debridement measures 1 cm x 1cm x 2 cm  for a total of <20 square centimeters  Wound appears to be fibrotic in nature with hyperkeratotic margins and periwound maceration associated erythema and edema of the left  · Applied plain packing, Betadine, 4 x 4 gauze, and Kerlix to left foot  · Weight-bearing as tolerated with use of surgical shoe on the left foot  · Ordered plain film radiographs and MRI of left foot  · Ordered LEADS to assess healing potential   · CRP and ESR pending  · Wound culture and blood culture results pending  · Started on empiric IV clindamycin  Will tailor accordingly as soon as wound and blood culture results are finalized  · Consulted Internal Medicine for surgical clearance and medical management  History of Present Illness     HPI:  Cristel Cody is a 38 y o  female who presents with a chronic nonhealing ulceration on the plantar aspect of the left foot with cellulitis  States that she frequently receives normal wound care and dressing changes  She has home nurse visit 2 times per week and goes to podiatrist Dr Tyrell Zimmerman every Friday  Within the last several weeks her foot has become increasingly erythematous and edematous    She sees podiatrist Dr Yoko Wilson as outpatient  She was placed on oral clindamycin 300 mg q 6h for 7 days  Oral antibiotic therapy failed  She was directly admitted by Dr Marylynn Cushing to [facility] today  She rates her pain as 7/10 on a visual analog scale today  She reports feeling chills at this time  She denies nausea, vomiting, fever, shortness of breath, or chest pain  Review of Systems   Constitutional: Negative  HENT: Negative  Eyes: Negative  Respiratory: Negative  Cardiovascular: Negative  Gastrointestinal: Negative  Musculoskeletal: Negative  Skin:  Ulceration, erythema, edema   Neurological: Negative          Historical Information   Past Medical History:   Diagnosis Date    Anxiety     Arthritis     Asthma     Chronic pain     Diabetes mellitus ([address] )     Disease of thyroid gland     Lymphedema     left leg    Manic bipolar I disorder (HCC)     Morbid obesity (HCC)     OCD (obsessive compulsive disorder)     PTSD (post-traumatic stress disorder)      Past Surgical History:   Procedure Laterality Date    CHOLECYSTECTOMY      CHOLECYSTECTOMY       Social History   Social History     Substance and Sexual Activity   Alcohol Use Never    Frequency: Never    Drinks per session: Patient refused    Binge frequency: Never     Social History     Substance and Sexual Activity   Drug Use Never     Social History     Tobacco Use   Smoking Status Current Every Day Smoker    Packs/day: 0 50    Years: 15 00    Pack years: 7 50   Smokeless Tobacco Never Used     Family History:   Family History   Problem Relation Age of Onset    Hyperlipidemia Mother     Diabetes Mother     Hypertension Mother     Heart attack Father        Meds/Allergies   Medications Prior to Admission   Medication    ADMELOG SOLOSTAR 100 units/mL injection pen    albuterol (VENTOLIN HFA) 90 mcg/act inhaler    BASAGLAR KWIKPEN 100 units/mL injection pen    buPROPion (WELLBUTRIN XL) 300 mg 24 hr tablet    cholecalciferol (VITAMIN D3) 1,000 units tablet    clindamycin (CLEOCIN) 300 MG capsule    cloNIDine (CATAPRES) 0 1 mg tablet    EASY COMFORT PEN NEEDLES 32G X 4 MM MISC    EASY TOUCH INSULIN SYRINGE 31G X 5/16" 0 3 ML MISC    escitalopram (LEXAPRO) 10 mg tablet    HYDROcodone-acetaminophen (NORCO) 5-325 mg per tablet    hydrOXYzine HCL (ATARAX) 50 mg tablet    hydrOXYzine pamoate (VISTARIL) 50 mg capsule    insulin glargine (LANTUS SOLOSTAR) 100 units/mL injection pen    insulin lispro (HUMALOG KWIKPEN) 100 units/mL injection pen    levothyroxine 50 mcg tablet    ondansetron (ZOFRAN-ODT) 4 mg disintegrating tablet    oxyCODONE (ROXICODONE) 5 mg/5 mL solution    pantoprazole (PROTONIX) 40 mg tablet    SUMAtriptan Succinate (IMITREX STATDOSE REFILL) 6 MG/0 5ML SOCT    ziprasidone (GEODON) 80 mg capsule     Allergies   Allergen Reactions    Aspirin     Barium Iodide Itching     Face became very red and itchy     Codeine     Latex     Vancomycin     Penicillins Other (See Comments) and Rash       Objective   First Vitals:   Blood Pressure: 122/77 (09/06/19 1102)  Pulse: 95 (09/06/19 1102)  Temperature: 98 3 °F (36 8 °C) (09/06/19 1102)  Respirations: 16 (09/06/19 1102)  Height: 5' 5" (165 1 cm) (09/06/19 1102)  Weight - Scale: 112 kg (248 lb) (09/06/19 1102)  SpO2: 95 % (09/06/19 1102)    Current Vitals:   Blood Pressure: 122/77 (09/06/19 1102)  Pulse: 95 (09/06/19 1102)  Temperature: 98 3 °F (36 8 °C) (09/06/19 1102)  Respirations: 16 (09/06/19 1102)  Height: 5' 5" (165 1 cm) (09/06/19 1102)  Weight - Scale: 112 kg (248 lb) (09/06/19 1102)  SpO2: 95 % (09/06/19 1102)        /77 (BP Location: Right arm)   Pulse 95   Temp 98 3 °F (36 8 °C)   Resp 16   Ht 5' 5" (1 651 m)   Wt 112 kg (248 lb)   SpO2 95%   BMI 41 27 kg/m²     General Appearance: Alert, cooperative, no distress    HEENT: Normocephalic head, moist mucous membranes  Ocular motion intact      Neck: Supple, no JVD    Heart: Regular rate and rhythm  Positive S1 & S2   No gallop  Lungs: Clear breath sounds bilaterally  No rales or wheezing  Abdomen: Soft  No tenderness  Positive bowel sounds  Back: No tenderness  Extremities:   Vascular:  PT pulses are 2/4 bilaterally  DP pulse is nonpalpable on the left, DP pulse is 1 out of 4 on the right  Capillary refill time is less than 3 seconds at the digits bilaterally  Lower extremity edema noted bilaterally, but worse on the left  Pedal hair absent  Musculoskeletal: MMT 5/5 in all muscle groups bilaterally  No gross deformities noted  Dermatological:  Ulceration located on the plantar aspect of the left forefoot  Wound measures approximately 1 x 1 x 2 cm  Positive probe to bone  Wound base is fibrotic in nature  No drainage or malodor noted  Circumferential undermining noted  Wound margins are well demarcated and hyperkeratotic  Periwound area is macerated  There is global erythema and edema of the left foot  Neurological:  Gross sensation is intact  Protective sensation is absent  Left foot- Pre debridement      Left foot dorsal      Left foot Post debridement      Lab Results:   Admission on 09/06/2019   Component Date Value    POC Glucose 09/06/2019 176*                   Invalid input(s): LABAEARO          Imaging: I have personally reviewed pertinent films in PACS  EKG, Pathology, and Other Studies: I have personally reviewed pertinent reports          Code Status: Level 1 - Full Code  Advance Directive and Living Will:      Power of :    POLST:

## 2019-09-06 NOTE — ASSESSMENT & PLAN NOTE
- continue home medications including Atarax 50 mg q h s , Geodon 80 mg b i d , clonidine 0 1 mg a m   And 0 2 mg p m , Wellbutrin 300 mg daily  - the patient reports that she is no longer taking Lexapro

## 2019-09-06 NOTE — ASSESSMENT & PLAN NOTE
- the patient reports smoking 6 to 7 cigarettes daily, she declines nicotine patch as it contains latex which she has an allergy to

## 2019-09-06 NOTE — ASSESSMENT & PLAN NOTE
- patient was undergoing outpatient wound care as well as oral clindamycin for diabetic foot ulcer   - podiatry managing currently on IV clindamycin 600 mg q 8 hours with wound care pending decision for surgery given results of foot x-ray and MRI of foot per Podiatry if concern for osteomyelitis  - pending blood cultures, CRP, ESR, BMP, CBC and wound culture, will follow up on results of these tests  - patient reports that she has self discontinued her insulin for her diabetes given that her home blood sugars have been 90 to 120 since discharge from the hospital last month, she is also status post bariatric surgery for she had sleeve in March 2019 and she has subsequently lost 100 lb since then    - she has not had a follow-up A1c which will be drawn today, will start on sliding scale insulin at meals and at bedtime and see the patient's insulin needs, advised her that the importance of taking insulin to control her blood sugars for wound healing

## 2019-09-06 NOTE — SOCIAL WORK
Cm met with patient to explain Cm role and discuss discharge planning  Patient lives alone in 4th floor highrise apartment with elevator access  Patient's emergency contacts are her friend Anderson Taveras [phone] and son Lenoer Camilo [phone]  Patient does not have a POA/living will  Patient reported being independent with ADLs PTA and uses cane as needed  Patient reported being at SNF in [place] 3 years ago (SNF name unknownby patient), reported being open to [facility] for RN services Monday & Wednesday (A post acute care recommendation was made by your care team for Rad[facility] 79  Discussed Freedom of Choice with patient  Choice is to return/continue services  Referral placed to resume care per patient's choice/request), reported mental health diagnoses of biploar, PTSD, anxiety, and OCD (is prescribed meds through psychiatrist & reported  stay at [facility] 1 1/2 yrs ago), and denied ETOH treatment in the past   Patient does not drive and reported using [employer]  Patient is on medical leave from work at this time  Pharmacy: [facility] off [facility]  PCP: Dr Elan Luo  Patient may need transport home at time of discharge  Cm following      CM reviewed d/c planning process including the following: identifying help at home, patient preference for d/c planning needs, Discharge Lounge, [facility] Meds to Bed program, availability of treatment team to discuss questions or concerns patient and/or family may have regarding understanding medications and recognizing signs and symptoms once discharged  CM also encouraged patient to follow up with all recommended appointments after discharge  Patient advised of importance for patient and family to participate in managing patients medical well being

## 2019-09-06 NOTE — CONSULTS
Consult- Kiera Pathak 1975, 38 y o  female MRN: [id number]    Unit/Bed#: [facility] 815-01 Encounter: [id number]    Primary Care Provider: Silverio Prather DO   Date and time admitted to hospital: 9/6/2019 10:30 AM      Inpatient consult to Internal Medicine  Consult performed by: Nkechi Zamudio DO  Consult ordered by: Valentín Lion DPM        Tobacco abuse  Assessment & Plan  - the patient reports smoking 6 to 7 cigarettes daily, she declines nicotine patch as it contains latex which she has an allergy to  Pre-operative examination  Assessment & Plan  - patient is low risk for low risk operation, pending BMP for creatinine however creatinine was 0 8 1 month ago  MACE score is 1 due to insulin-dependent diabetes, however, home sugars have been in normal range since loosing 100 lbs s/p bariatric surgery  - reviewed EKG from 6/30/19 which was normal except prolonged QTC therefore overall order another EKG  Also reports reviewed echo report in June which was normal   No additional testing needed aside from EKG for surgery  Manic bipolar I disorder ([facility] [address] )  Assessment & Plan  - continue home medications including Atarax 50 mg q h s , Geodon 80 mg b i d , clonidine 0 1 mg a m   And 0 2 mg p m , Wellbutrin 300 mg daily  - the patient reports that she is no longer taking Lexapro    Hypothyroidism  Assessment & Plan  - continue home regimen of levothyroxine 50 mcg daily    Cellulitis of left foot  Assessment & Plan  - continue IV clindamycin 600 mg Q 8 as above    Anxiety  Assessment & Plan  Well controlled on Atarax 50 mg at bedtime and clonidine    * 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, with bone involvement without evidence of necrosis [facility])  Assessment & Plan  - patient was undergoing outpatient wound care as well as oral clindamycin for diabetic foot ulcer   - podiatry managing currently on IV clindamycin 600 mg q 8 hours with wound care pending decision for surgery given results of foot x-ray and MRI of foot per Podiatry if concern for osteomyelitis  - pending blood cultures, CRP, ESR, BMP, CBC and wound culture, will follow up on results of these tests  - patient reports that she has self discontinued her insulin for her diabetes given that her home blood sugars have been 90 to 120 since discharge from the hospital last month, she is also status post bariatric surgery for she had sleeve in March 2019 and she has subsequently lost 100 lb since then  - she has not had a follow-up A1c which will be drawn today, will start on sliding scale insulin at meals and at bedtime and see the patient's insulin needs, advised her that the importance of taking insulin to control her blood sugars for wound healing        VTE Prophylaxis: Heparin  / sequential compression device       Counseling / Coordination of Care Time: 40 minutes  Greater than 50% of total time spent on patient counseling and coordination of care  Collaboration of Care: Were Recommendations Directly Discussed with Primary Treatment Team? - No     Inpatient Medical Consultation - [facility] Internal Medicine    Patient Information: Brittany Waterman 38 y o  female MRN: [id number]  [facility] Encounter: [id number]  PCP: Moriah Baez DO  Date of Admission:  9/6/2019  Date of Consultation: 09/06/19  Requesting Physician: Gregg Mann DPM    Reason For Consultation:  Preop examination and medical management for bipolar disorder, type 2 diabetes, and hypothyroidism    History of Present Illness:    Brittany Waterman is a 38 y o  female who is originally admitted to the Podiatry service on 9/6/2019 due to  ulceration on plantar left foot with associated cellulitis and suspicion for osteomyelitis  We are consulted for preop examination and medical management of chronic conditions including bipolar disorder, hypothyroidism, type 2 diabetes    The patient reports since last month she has been dealing with her foot ulceration that she has been taking p o  Clindamycin and getting wound care  She reports that in March 2019 she underwent bariatric surgery in which a sleeve was placed and since then has lost 100 lb  She reports that she has been checking her home glucose which has ranged from 90 to 120 and the highest reading she has gotten at home has been 135, therefore she has not been taking her home Lantus or Humalog and she does not remember her previous dose  For her bipolar disorder she is well controlled on Wellbutrin, clonidine, Atarax, Geodon  She reports that she is no longer taking Lexapro  She denies history of MI or stroke  She does follow up with Endocrinology and has not seen them since her surgery in March 2019 therefore has not had any follow-up A1cs and did not contact them that she discontinued her insulin regimen  She also has hypothyroidism that she is compliant with levothyroxine 50 mcg daily  She denies recent illness  She denies chest pain or shortness of breath  She reports that she is a smoker 6 to 7 cigarettes per day however declines nicotine patch given allergy to Latex  She reports that she has prescription for oxycodone 5 to 325 in which she takes as an as needed basis once a day and reports her last dose was last night  PDMP was checked and appropriate  Review of Systems:    Review of Systems   Constitutional: Negative for chills, fatigue and fever  HENT: Negative for congestion, rhinorrhea and sore throat  Respiratory: Negative for cough and shortness of breath  Cardiovascular: Positive for leg swelling  Negative for chest pain and palpitations  Gastrointestinal: Negative for abdominal distention, abdominal pain, blood in stool, constipation, diarrhea, nausea and vomiting  Endocrine: Negative for polydipsia, polyphagia and polyuria  Genitourinary: Negative for difficulty urinating, dysuria and hematuria  Musculoskeletal: Negative for back pain  Skin: Positive for rash and wound  As noted in HPI   Neurological: Negative for dizziness and headaches  Psychiatric/Behavioral: Negative for agitation and behavioral problems  Past Medical and Surgical History:     Past Medical History:   Diagnosis Date    Anxiety     Arthritis     Asthma     Chronic pain     Diabetes mellitus ([address] )     Disease of thyroid gland     Lymphedema     left leg    Manic bipolar I disorder (HCC)     Morbid obesity (HCC)     OCD (obsessive compulsive disorder)     PTSD (post-traumatic stress disorder)        Past Surgical History:   Procedure Laterality Date    CHOLECYSTECTOMY      CHOLECYSTECTOMY         Meds/Allergies:    all medications and allergies reviewed    Allergies:    Allergies   Allergen Reactions    Aspirin     Barium Iodide Itching     Face became very red and itchy     Codeine     Latex     Vancomycin     Penicillins Other (See Comments) and Rash       Social History:     Marital Status: Single    Substance Use History:   Social History     Substance and Sexual Activity   Alcohol Use Never    Frequency: Never    Drinks per session: Patient refused    Binge frequency: Never     Social History     Tobacco Use   Smoking Status Current Every Day Smoker    Packs/day: 0 50    Years: 15 00    Pack years: 7 50   Smokeless Tobacco Never Used     Social History     Substance and Sexual Activity   Drug Use Never       Family History:    Family History   Problem Relation Age of Onset    Hyperlipidemia Mother     Diabetes Mother     Hypertension Mother     Heart attack Father        Physical Exam:     Vitals:   Blood Pressure: 122/77 (09/06/19 1102)  Pulse: 95 (09/06/19 1102)  Temperature: 98 3 °F (36 8 °C) (09/06/19 1102)  Respirations: 16 (09/06/19 1102)  Height: 5' 5" (165 1 cm) (09/06/19 1102)  Weight - Scale: 112 kg (248 lb) (09/06/19 1102)  SpO2: 95 % (09/06/19 1102)    Physical Exam   Constitutional: She is oriented to person, place, and time  She appears well-developed and well-nourished  No distress  HENT:   Mouth/Throat: Oropharynx is clear and moist    Neck: Normal range of motion  Neck supple  Cardiovascular: Normal rate, regular rhythm, normal heart sounds and intact distal pulses  Exam reveals no gallop and no friction rub  No murmur heard  Pulmonary/Chest: Effort normal and breath sounds normal  No stridor  No respiratory distress  She has no wheezes  She has no rales  Abdominal: Soft  Bowel sounds are normal  She exhibits no distension  There is no tenderness  Musculoskeletal: She exhibits edema  Neurological: She is alert and oriented to person, place, and time  Skin: Skin is warm and dry  Capillary refill takes less than 2 seconds  She is not diaphoretic  Left lower extremity is wrapped, swelling and erythema present in all toes of the left foot consistent with cellulitis  1+ pitting edema up to mid shin  Psychiatric: She has a normal mood and affect  Her behavior is normal  Judgment and thought content normal    Vitals reviewed  Additional Data:     Lab Results:  Pending blood culture, CBC, CMP, ESR, CRP  Imaging: pending XRAY of foot     X-ray Foot Left 3+ Views    Result Date: 8/30/2019  Narrative: LEFT FOOT INDICATION:   Left plantar foot ulcer    COMPARISON:  6/28/2019 VIEWS:  XR FOOT 3+ VW LEFT FINDINGS: There is no acute fracture or dislocation  Small calcaneal plantar and posterior spurs are present No lytic or blastic lesions seen  Plantar soft tissue defect and soft tissue swelling noted  No radiopaque foreign body  No soft tissue air  Impression: No acute osseous abnormality  Workstation performed: [id number]       EKG, Pathology, and Other Studies Reviewed on Admission:   · EKG: pending results EKG ordered today, previous EKG on 6/30/19 was NSR with lengthened QTc    ** Please Note: This note has been constructed using a voice recognition system   **

## 2019-09-06 NOTE — ASSESSMENT & PLAN NOTE
- patient is low risk for low risk operation, pending BMP for creatinine however creatinine was 0 8 1 month ago  MACE score is 1 due to insulin-dependent diabetes, however, home sugars have been in normal range since loosing 100 lbs s/p bariatric surgery  - reviewed EKG from 6/30/19 which was normal except prolonged QTC therefore overall order another EKG  Also reports reviewed echo report in June which was normal   No additional testing needed aside from EKG for surgery

## 2019-09-07 ENCOUNTER — APPOINTMENT (INPATIENT)
Dept: NON INVASIVE DIAGNOSTICS | Facility: HOSPITAL | Age: 44
DRG: 305 | End: 2019-09-07
Payer: COMMERCIAL

## 2019-09-07 ENCOUNTER — ANESTHESIA EVENT (INPATIENT)
Dept: PERIOP | Facility: HOSPITAL | Age: 44
DRG: 305 | End: 2019-09-07
Payer: COMMERCIAL

## 2019-09-07 PROBLEM — M86.9 PYOGENIC INFLAMMATION OF BONE (HCC): Status: ACTIVE | Noted: 2019-09-06

## 2019-09-07 LAB
ANION GAP SERPL CALCULATED.3IONS-SCNC: 8 MMOL/L (ref 4–13)
BUN SERPL-MCNC: 9 MG/DL (ref 5–25)
CALCIUM SERPL-MCNC: 9.4 MG/DL (ref 8.3–10.1)
CHLORIDE SERPL-SCNC: 103 MMOL/L (ref 100–108)
CO2 SERPL-SCNC: 25 MMOL/L (ref 21–32)
CREAT SERPL-MCNC: 0.76 MG/DL (ref 0.6–1.3)
ERYTHROCYTE [DISTWIDTH] IN BLOOD BY AUTOMATED COUNT: 13.5 % (ref 11.6–15.1)
GFR SERPL CREATININE-BSD FRML MDRD: 96 ML/MIN/1.73SQ M
GLUCOSE SERPL-MCNC: 134 MG/DL (ref 65–140)
GLUCOSE SERPL-MCNC: 153 MG/DL (ref 65–140)
GLUCOSE SERPL-MCNC: 161 MG/DL (ref 65–140)
GLUCOSE SERPL-MCNC: 174 MG/DL (ref 65–140)
GLUCOSE SERPL-MCNC: 219 MG/DL (ref 65–140)
HCT VFR BLD AUTO: 40.7 % (ref 34.8–46.1)
HGB BLD-MCNC: 13.1 G/DL (ref 11.5–15.4)
MCH RBC QN AUTO: 27.8 PG (ref 26.8–34.3)
MCHC RBC AUTO-ENTMCNC: 32.2 G/DL (ref 31.4–37.4)
MCV RBC AUTO: 86 FL (ref 82–98)
PLATELET # BLD AUTO: 373 THOUSANDS/UL (ref 149–390)
PMV BLD AUTO: 9.5 FL (ref 8.9–12.7)
POTASSIUM SERPL-SCNC: 3.9 MMOL/L (ref 3.5–5.3)
RBC # BLD AUTO: 4.71 MILLION/UL (ref 3.81–5.12)
SODIUM SERPL-SCNC: 136 MMOL/L (ref 136–145)
WBC # BLD AUTO: 10.09 THOUSAND/UL (ref 4.31–10.16)

## 2019-09-07 PROCEDURE — 82948 REAGENT STRIP/BLOOD GLUCOSE: CPT

## 2019-09-07 PROCEDURE — 99233 SBSQ HOSP IP/OBS HIGH 50: CPT | Performed by: PODIATRIST

## 2019-09-07 PROCEDURE — 99232 SBSQ HOSP IP/OBS MODERATE 35: CPT | Performed by: NURSE PRACTITIONER

## 2019-09-07 PROCEDURE — 93923 UPR/LXTR ART STDY 3+ LVLS: CPT

## 2019-09-07 PROCEDURE — 93925 LOWER EXTREMITY STUDY: CPT

## 2019-09-07 PROCEDURE — 99255 IP/OBS CONSLTJ NEW/EST HI 80: CPT | Performed by: INTERNAL MEDICINE

## 2019-09-07 PROCEDURE — 80048 BASIC METABOLIC PNL TOTAL CA: CPT | Performed by: STUDENT IN AN ORGANIZED HEALTH CARE EDUCATION/TRAINING PROGRAM

## 2019-09-07 PROCEDURE — 85027 COMPLETE CBC AUTOMATED: CPT | Performed by: STUDENT IN AN ORGANIZED HEALTH CARE EDUCATION/TRAINING PROGRAM

## 2019-09-07 RX ORDER — CLINDAMYCIN PHOSPHATE 600 MG/50ML
600 INJECTION INTRAVENOUS EVERY 8 HOURS
Status: DISCONTINUED | OUTPATIENT
Start: 2019-09-07 | End: 2019-09-07

## 2019-09-07 RX ORDER — CLINDAMYCIN PHOSPHATE 900 MG/50ML
900 INJECTION INTRAVENOUS EVERY 8 HOURS
Status: DISCONTINUED | OUTPATIENT
Start: 2019-09-07 | End: 2019-09-07

## 2019-09-07 RX ORDER — METRONIDAZOLE 500 MG/1
500 TABLET ORAL EVERY 8 HOURS SCHEDULED
Status: DISCONTINUED | OUTPATIENT
Start: 2019-09-07 | End: 2019-09-08

## 2019-09-07 RX ORDER — SODIUM CHLORIDE 9 MG/ML
75 INJECTION, SOLUTION INTRAVENOUS CONTINUOUS
Status: DISCONTINUED | OUTPATIENT
Start: 2019-09-08 | End: 2019-09-08

## 2019-09-07 RX ORDER — METRONIDAZOLE 500 MG/1
500 TABLET ORAL EVERY 8 HOURS SCHEDULED
Status: DISCONTINUED | OUTPATIENT
Start: 2019-09-07 | End: 2019-09-07

## 2019-09-07 RX ADMIN — ZIPRASIDONE HCL 80 MG: 40 CAPSULE ORAL at 08:42

## 2019-09-07 RX ADMIN — CLINDAMYCIN PHOSPHATE 600 MG: 600 INJECTION, SOLUTION INTRAVENOUS at 13:30

## 2019-09-07 RX ADMIN — CLONIDINE HYDROCHLORIDE 0.1 MG: 0.1 TABLET ORAL at 08:41

## 2019-09-07 RX ADMIN — HEPARIN SODIUM 5000 UNITS: 5000 INJECTION INTRAVENOUS; SUBCUTANEOUS at 05:36

## 2019-09-07 RX ADMIN — ACETAMINOPHEN 975 MG: 325 TABLET ORAL at 05:38

## 2019-09-07 RX ADMIN — CEFEPIME HYDROCHLORIDE 1000 MG: 1 INJECTION, POWDER, FOR SOLUTION INTRAMUSCULAR; INTRAVENOUS at 22:41

## 2019-09-07 RX ADMIN — BUPROPION HYDROCHLORIDE 300 MG: 150 TABLET, FILM COATED, EXTENDED RELEASE ORAL at 08:41

## 2019-09-07 RX ADMIN — OXYCODONE HYDROCHLORIDE 5 MG: 5 TABLET ORAL at 19:52

## 2019-09-07 RX ADMIN — LEVOTHYROXINE SODIUM 50 MCG: 50 TABLET ORAL at 05:36

## 2019-09-07 RX ADMIN — ACETAMINOPHEN 975 MG: 325 TABLET ORAL at 11:26

## 2019-09-07 RX ADMIN — INSULIN LISPRO 2 UNITS: 100 INJECTION, SOLUTION INTRAVENOUS; SUBCUTANEOUS at 11:26

## 2019-09-07 RX ADMIN — PANTOPRAZOLE SODIUM 40 MG: 40 TABLET, DELAYED RELEASE ORAL at 05:36

## 2019-09-07 RX ADMIN — OXYCODONE HYDROCHLORIDE 5 MG: 5 TABLET ORAL at 10:32

## 2019-09-07 RX ADMIN — HEPARIN SODIUM 5000 UNITS: 5000 INJECTION INTRAVENOUS; SUBCUTANEOUS at 13:29

## 2019-09-07 RX ADMIN — INSULIN LISPRO 1 UNITS: 100 INJECTION, SOLUTION INTRAVENOUS; SUBCUTANEOUS at 17:16

## 2019-09-07 RX ADMIN — PANTOPRAZOLE SODIUM 40 MG: 40 TABLET, DELAYED RELEASE ORAL at 21:35

## 2019-09-07 RX ADMIN — HEPARIN SODIUM 5000 UNITS: 5000 INJECTION INTRAVENOUS; SUBCUTANEOUS at 21:35

## 2019-09-07 RX ADMIN — OXYCODONE HYDROCHLORIDE 5 MG: 5 TABLET ORAL at 01:57

## 2019-09-07 RX ADMIN — CLINDAMYCIN PHOSPHATE 600 MG: 600 INJECTION, SOLUTION INTRAVENOUS at 05:35

## 2019-09-07 RX ADMIN — METRONIDAZOLE 500 MG: 500 TABLET, FILM COATED ORAL at 21:35

## 2019-09-07 RX ADMIN — HYDROXYZINE HYDROCHLORIDE 50 MG: 50 TABLET ORAL at 21:35

## 2019-09-07 RX ADMIN — ZIPRASIDONE HCL 80 MG: 40 CAPSULE ORAL at 21:35

## 2019-09-07 RX ADMIN — INSULIN LISPRO 1 UNITS: 100 INJECTION, SOLUTION INTRAVENOUS; SUBCUTANEOUS at 08:42

## 2019-09-07 RX ADMIN — ACETAMINOPHEN 975 MG: 325 TABLET ORAL at 17:17

## 2019-09-07 NOTE — UTILIZATION REVIEW
Initial Clinical Review    Admission: Date/Time/Statement: Inpatient Admission Orders (From admission, onward)     Ordered        09/06/19 1212  Inpatient Admission  Once             Orders Placed This Encounter   Procedures    Inpatient Admission     Standing Status:   Standing     Number of Occurrences:   1     Order Specific Question:   Admitting Physician     Answer:   Frances Marks [[id number]]     Order Specific Question:   Level of Care     Answer:   Med Surg [[id number]]     Order Specific Question:   Estimated length of stay     Answer:   More than 2 Midnights     Order Specific Question:   Certification     Answer:   I certify that inpatient services are medically necessary for this patient for a duration of greater than two midnights  See H&P and MD Progress Notes for additional information about the patient's course of treatment  ED Arrival Information     Patient not seen in ED                     Chief complaint:  Nonhealing ulcer on Left foot    Assessment/Plan: 39 y/o female who presents to hospital directly form podiatrist office with chronic nonhealing ulceration on plantar aspect of L foot with cellulitis  Pt has home nurse 2x/wk for wound care and dressing changes, and sees podiatrist q Friday  Within last week foot has become increasingly more erythematous and edematous  Started clindamycin po for 7 days with no improvement  Rates pain 7/10  Admit to M/S unit with ulceration plantar L foot with cellulitis and suspicion for osteomyelitis, local wound care, LEADS, WBAT, IV abx, reg diet        Left foot dorsal  Left foot- Pre debridement        9/7 --- MRI revealed acute OM on 3rd metatarsal head    Plan to take to OR 9/8 for partial 3rd ray resection with possible toe fillet for wound closure  Npo after MN, IVF, NWB LLE,       ED Triage Vitals   Temperature Pulse Respirations Blood Pressure SpO2   09/06/19 1102 09/06/19 1102 09/06/19 1102 09/06/19 1102 09/06/19 1102   98 3 °F (36 8 °C) 95 16 122/77 95 %      Temp src Heart Rate Source Patient Position - Orthostatic VS BP Location FiO2 (%)   -- -- 09/06/19 1102 09/06/19 1102 --     Sitting Right arm       Pain Score       09/06/19 1346       Worst Possible Pain        Wt Readings from Last 1 Encounters:   09/06/19 112 kg (248 lb)     Additional Vital Signs:   Date/Time  Temp  Pulse  Resp  BP  MAP (mmHg)  SpO2  O2 Device   09/07/19 15:49:46  98 3 °F (36 8 °C)  78  16  108/71  83  96 %     09/07/19 07:27:02  98 1 °F (36 7 °C)  60  16  111/65  80  96 %     09/06/19 23:19:21  98 6 °F (37 °C)  74  18  99/61  74  95 %     09/06/19 15:26:27  99 7 °F (37 6 °C)  89  18  114/74  87  95 %     09/06/19 11:02:53  98 3 °F (36 8 °C)  95  16  122/77  92  95 %  None (Room air)       Pertinent Labs/Diagnostic Test Results:   Results from last 7 days   Lab Units 09/07/19  0603 09/06/19  1732 09/06/19  1324   WBC Thousand/uL 10 09 14 75*  --    HEMOGLOBIN g/dL 13 1 13 8  --    HEMATOCRIT % 40 7 42 3  --    PLATELETS Thousands/uL 373 426* 466*   NEUTROS ABS Thousands/µL  --  9 21*  --      Results from last 7 days   Lab Units 09/07/19  0603 09/06/19  1732   SODIUM mmol/L 136 135*   POTASSIUM mmol/L 3 9 4 2   CHLORIDE mmol/L 103 101   CO2 mmol/L 25 26   ANION GAP mmol/L 8 8   BUN mg/dL 9 10   CREATININE mg/dL 0 76 0 79   EGFR ml/min/1 73sq m 96 92   CALCIUM mg/dL 9 4 10 1     Results from last 7 days   Lab Units 09/06/19  1732   AST U/L 12   ALT U/L 25   ALK PHOS U/L 216*   TOTAL PROTEIN g/dL 8 3*   ALBUMIN g/dL 3 6   TOTAL BILIRUBIN mg/dL 0 34     Results from last 7 days   Lab Units 09/07/19  1117 09/07/19  0739 09/06/19  2127 09/06/19  1635 09/06/19  1115   POC GLUCOSE mg/dl 219* 174* 149* 167* 176*     Results from last 7 days   Lab Units 09/07/19  0603 09/06/19  1732   GLUCOSE RANDOM mg/dL 153* 159*       Results from last 7 days   Lab Units 09/06/19  1325   HEMOGLOBIN A1C % 7 9*   EAG mg/dl 180     Results from last 7 days   Lab Units 09/06/19  1325   CRP mg/L >90 0*   SED RATE mm/hour 69*     Results from last 7 days   Lab Units 09/06/19  1252   GRAM STAIN RESULT  No polys seen*  3+ Gram positive cocci in pairs and chains*  2+ Gram negative rods*   WOUND CULTURE  3+ Growth of Beta Hemolytic Streptococcus Group B*  2+ Growth of Gram Negative Dino*     XR L foot 9/7 -- No osseous destruction  Widened third MCP joint compatible with septic arthritis seen on subsequent MRI  MRI foot 9/7 -- Small plantar soft tissue wound/ulcer at the level of the 3rd MTP joint and regional cellulitis   10 x 10 x 8 mm underlying subcutaneous focal hyperintensity may represent saturated post debridement packing material   Small abscess not excluded  Acute osteomyelitis plantar 3rd metatarsal head  Reactive edema remainder of the 3rd metatarsal and 3rd and 4th proximal phalangeal bases  3rd MTP complex effusion suspicious for septic arthropathy      Past Medical History:   Diagnosis Date    Anxiety     Arthritis     Asthma     Chronic pain     Diabetes mellitus ([facility] [address] )     Disease of thyroid gland     Lymphedema     left leg    [facility] bipolar [facility] disorder [facility])     Morbid obesity ([facility])     OCD (obsessive compulsive disorder)     PTSD (post-traumatic stress disorder)      Present on Admission:   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, with bone involvement without evidence of necrosis ([facility])   Cellulitis of left foot   Hypothyroidism   Manic bipolar I disorder ([facility] [address] )   Anxiety      Admitting Diagnosis: Left foot infection [L08 9]  Age/Sex: 38 y o  female  Admission Orders:  Current Facility-Administered Medications:  acetaminophen 975 mg Oral Q6H [address]   albuterol 2 puff Inhalation Q6H PRN   buPROPion 300 mg Oral Daily   cholecalciferol 500 Units Oral Daily   clindamycin 600 mg Intravenous Q8H   cloNIDine 0 1 mg Oral Daily   cloNIDine 0 2 mg Oral HS   diphenhydrAMINE 25 mg Oral Q6H PRN   docusate sodium 100 mg Oral BID PRN   heparin (porcine) 5,000 Units Subcutaneous Q8H [address]   hydrOXYzine HCL 50 mg Oral HS   insulin lispro 1-6 Units Subcutaneous TID AC   insulin lispro 1-6 Units Subcutaneous HS   levothyroxine 50 mcg Oral Early Morning   ondansetron 4 mg Intravenous Q6H PRN   oxyCODONE 5 mg Oral Q4H PRN 9/6 x2, 9/7 x2   pantoprazole 40 mg Oral Daily   [START ON 9/8/2019] sodium chloride 75 mL/hr Intravenous Continuous   SUMAtriptan 6 mg Subcutaneous Once PRN   traMADol 50 mg Oral Q6H PRN 9/6 x1   ziprasidone 80 mg Oral BID With Meals     NWB LLE  SCD's  Up as robson  Wound care L foot with maxorb, 4x4, abd pad, kerlex  Cons carb diet, npo after MN 9/8        IP CONSULT TO INTERNAL MEDICINE  IP CONSULT TO INFECTIOUS DISEASES  IP CONSULT TO CASE MANAGEMENT    Network Utilization Review Department  Phone: [phone]; Fax [phone]  [email or web address]  ATTENTION: Please call with any questions or concerns to [phone]  and carefully listen to the prompts so that you are directed to the right person  Send all requests for admission clinical reviews, approved or denied determinations and any other requests to fax [phone]   All voicemails are confidential

## 2019-09-07 NOTE — ASSESSMENT & PLAN NOTE
- patient is low risk for low risk operation,creatinine 0 79   MACE score is 1 due to insulin-dependent diabetes, however, home sugars have been in normal range since loosing 100 lbs s/p bariatric surgery  - reviewed EKG from 6/30/19 which was normal except prolonged QTC therefore overall order another EKG    - echo report in June which was normal     - No additional testing    -ekg performed with no significant change per cards

## 2019-09-07 NOTE — ANESTHESIA PREPROCEDURE EVALUATION
Review of Systems/Medical History  Patient summary reviewed        Cardiovascular  Negative cardio ROS    Pulmonary  Negative pulmonary ROS Asthma ,        GI/Hepatic  Negative GI/hepatic ROS          Negative  ROS        Endo/Other  Negative endo/other ROS Diabetes type 2 , History of thyroid disease , hypothyroidism,   Obesity  morbid obesity   GYN  Negative gynecology ROS          Hematology  Negative hematology ROS      Musculoskeletal  Negative musculoskeletal ROS        Neurology  Negative neurology ROS      Psychology   Negative psychology ROS Anxiety,              Physical Exam    Airway    Mallampati score: III  TM Distance: >3 FB  Neck ROM: full     Dental   No notable dental hx     Cardiovascular  Comment: Negative ROS,     Pulmonary      Other Findings        Anesthesia Plan  ASA Score- 3     Anesthesia Type- IV sedation with anesthesia with ASA Monitors  Additional Monitors:   Airway Plan:     Comment: I have seen the patient and reviewed the history  Patient to receive IV sedation with full ASA monitors  Risks discussed with the patient, consent signed        Plan Factors-    Induction- intravenous  Postoperative Plan-     Informed Consent- Anesthetic plan and risks discussed with patient  I personally reviewed this patient with the CRNA  Discussed and agreed on the Anesthesia Plan with the CRNA  Atiya Perkins

## 2019-09-07 NOTE — PROGRESS NOTES
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, with bone involvement without evidence of necrosis [facility])  Assessment & Plan  - patient was undergoing outpatient wound care as well as oral clindamycin for diabetic foot ulcer   - podiatry managing currently on IV clindamycin 600 mg q 8 hours with wound care with plan for surgery per podiatry sp foot x-ray and MRI of foot per Podiatry with concern for osteomyelitis  - pending blood cultures in process ,   - CRP, ESR,   -  wound culture, will follow up on results of these tests  - patient reports that she has self discontinued her insulin for her diabetes given that her home blood sugars have been 90 to 120 since discharge from the hospital last month, she is also status post bariatric surgery for she had sleeve in March 2019 and she has subsequently lost 100 lb since then  -  A1c  7 9    sliding scale insulin at meals and at bedtime and see the patient's insulin needs,   advised her that the importance of taking insulin to control her blood sugars for wound healing pt however adamant that she no longer has diabetes due to her bariatric procedure         Pre-operative examination  Assessment & Plan  - patient is low risk for low risk operation,creatinine 0 79   MACE score is 1 due to insulin-dependent diabetes, however, home sugars have been in normal range since loosing 100 lbs s/p bariatric surgery  - reviewed EKG from 6/30/19 which was normal except prolonged QTC therefore overall order another EKG    - echo report in June which was normal     - No additional testing    -ekg performed with no significant change per cards     Manic bipolar I disorder ([facility] [address] )  Assessment & Plan  - continue home medications including Atarax 50 mg q h s , Geodon 80 mg b i d , clonidine 0 1 mg a m  And 0 2 mg p m , Wellbutrin 300 mg daily  - the patient reports that she is no longer taking Lexapro    Hypothyroidism  Assessment & Plan  - continue home regimen of levothyroxine 50 mcg daily    Cellulitis of left foot  Assessment & Plan  - continue IV clindamycin 600 mg Q 8 as above, per primary team for or in am     Morbid obesity ([facility] [address] )  Assessment & Plan  In setting of recent bariatric surgery  - continue diet consultation pt needs ongoing education   - lives alone   - dietary consult     Anxiety  Assessment & Plan  Well controlled on Atarax 50 mg at bedtime and clonidine    Tobacco abuse  Assessment & Plan  - the patient reports smoking 6 to 7 cigarettes daily, she declines nicotine patch as it contains latex which she has an allergy to  VTE Pharmacologic Prophylaxis:   Pharmacologic: Heparin  Mechanical VTE Prophylaxis in Place: Yes    Patient Centered Rounds: I have performed bedside rounds with nursing staff today  Discussions with Specialists or Other Care Team Provider: nursing     Education and Discussions with Family / Patient: patient     Time Spent for Care: 45 minutes  More than 50% of total time spent on counseling and coordination of care as described above  Current Length of Stay: 1 day(s)    Current Patient Status: Inpatient   Certification Statement: The patient will continue to require additional inpatient hospital stay due to pending surgery tomorrow under primary service care     Discharge Plan: will need significant home care and nursing lives alone     Code Status: Level 1 - Full Code      Subjective:   Patient reports she has little to no sensation on lower extremity however there is pain is on the dorsal aspect of the foot  No n/v/d no sob at this time   No chestpain or palpitations     Objective:     Vitals:   Temp (24hrs), Av 3 °F (36 8 °C), Min:98 1 °F (36 7 °C), Max:98 6 °F (37 °C)    Temp:  [98 1 °F (36 7 °C)-98 6 °F (37 °C)] 98 3 °F (36 8 °C)  HR:  [60-79] 79  Resp:  [16-18] 16  BP: ()/(61-71) 106/71  SpO2:  [95 %-96 %] 95 %  Body mass index is 41 27 kg/m²  Input and Output Summary (last 24 hours): Intake/Output Summary (Last 24 hours) at 9/7/2019 2229  Last data filed at 9/7/2019 2125  Gross per 24 hour   Intake 1432 ml   Output 1800 ml   Net -368 ml       Physical Exam:     Physical Exam   Constitutional: She is oriented to person, place, and time  No distress  HENT:   Head: Normocephalic and atraumatic  Eyes: Right eye exhibits no discharge  Left eye exhibits no discharge  No scleral icterus  Neck: No tracheal deviation present  No thyromegaly present  Cardiovascular: Normal rate  Exam reveals no gallop and no friction rub  No murmur heard  Pulmonary/Chest: Effort normal  No stridor  No respiratory distress  She has no wheezes  She has no rales  Abdominal: Soft  She exhibits no distension and no mass  There is no tenderness  There is no rebound and no guarding  No hernia  Musculoskeletal: She exhibits edema  She exhibits no tenderness  Lymphadenopathy:     She has no cervical adenopathy  Neurological: She is oriented to person, place, and time  Skin: No rash noted  She is not diaphoretic  No erythema  No pallor  Psychiatric: She has a normal mood and affect           Additional Data:     Labs:    Results from last 7 days   Lab Units 09/07/19  0603 09/06/19  1732   WBC Thousand/uL 10 09 14 75*   HEMOGLOBIN g/dL 13 1 13 8   HEMATOCRIT % 40 7 42 3   PLATELETS Thousands/uL 373 426*   NEUTROS PCT %  --  62   LYMPHS PCT %  --  27   MONOS PCT %  --  6   EOS PCT %  --  4     Results from last 7 days   Lab Units 09/07/19  0603 09/06/19  1732   SODIUM mmol/L 136 135*   POTASSIUM mmol/L 3 9 4 2   CHLORIDE mmol/L 103 101   CO2 mmol/L 25 26   BUN mg/dL 9 10   CREATININE mg/dL 0 76 0 79   ANION GAP mmol/L 8 8   CALCIUM mg/dL 9 4 10 1   ALBUMIN g/dL  --  3 6 TOTAL BILIRUBIN mg/dL  --  0 34   ALK PHOS U/L  --  216*   ALT U/L  --  25   AST U/L  --  12   GLUCOSE RANDOM mg/dL 153* 159*         Results from last 7 days   Lab Units 09/07/19  2134 09/07/19  1551 09/07/19  1117 09/07/19  0739 09/06/19  2127 09/06/19  1635 09/06/19  1115   POC GLUCOSE mg/dl 134 161* 219* 174* 149* 167* 176*     Results from last 7 days   Lab Units 09/06/19  1325   HEMOGLOBIN A1C % 7 9*               * I Have Reviewed All Lab Data Listed Above  * Additional Pertinent Lab Tests Reviewed: All Labs Within Last 24 Hours Reviewed    Imaging:    Imaging Reports Reviewed Today Include: reviewed     Recent Cultures (last 7 days):     Results from last 7 days   Lab Units 09/06/19  1341 09/06/19  1324 09/06/19  1252   BLOOD CULTURE  No Growth at 24 hrs   No Growth at 24 hrs   --    GRAM STAIN RESULT   --   --  No polys seen*  3+ Gram positive cocci in pairs and chains*  2+ Gram negative rods*   WOUND CULTURE   --   --  3+ Growth of Beta Hemolytic Streptococcus Group B*  2+ Growth of Gram Negative Dino*       Last 24 Hours Medication List:     Current Facility-Administered Medications:  acetaminophen 975 mg Oral Q6H [address] Tosha Binning, DPM   albuterol 2 puff Inhalation Q6H PRN Oleta Pillow, DPM   buPROPion 300 mg Oral Daily Oleta Pillow, DPM   cefepime 1,000 mg Intravenous Q12H Laverne Warren MD   cholecalciferol 500 Units Oral Daily Oleta Pillow, DPM   cloNIDine 0 1 mg Oral Daily Mackenzie Mis, DO   cloNIDine 0 2 mg Oral HS Mackenzie Mis, DO   diphenhydrAMINE 25 mg Oral Q6H PRN Oleta Pillow, DPM   docusate sodium 100 mg Oral BID PRN Oleta Pillow, DPM   heparin (porcine) 5,000 Units Subcutaneous [facility] Oleta Pillow, DPM   hydrOXYzine HCL 50 mg Oral HS Oleta Pillow, DPM   insulin lispro 1-6 Units Subcutaneous TID AC Oleta Pillow, DPM   insulin lispro 1-6 Units Subcutaneous HS Oleta Pillow, DPM   levothyroxine 50 mcg Oral Early Morning Oleta Pillow, DPM   metroNIDAZOLE 500 mg Oral Q8H [address] Shi Hayden MD   ondansetron 4 mg Intravenous Q6H PRN Caryl Servin DPM   oxyCODONE 5 mg Oral Q4H PRN Verner Rua, DPM   pantoprazole 40 mg Oral Daily Caryl Servin DPM   [START ON 9/8/2019] sodium chloride 75 mL/hr Intravenous Continuous Verner Rua, DPM   SUMAtriptan 6 mg Subcutaneous Once PRN Caryl Servin DPM   traMADol 50 mg Oral Q6H PRN Caryl Servin DPM   ziprasidone 80 mg Oral BID With Meals Noel Walsh DO        Today, Patient Was Seen By: JOSEE Obregon    ** Please Note: Dictation voice to text software may have been used in the creation of this document   **

## 2019-09-07 NOTE — PLAN OF CARE
Problem: Potential for Falls  Goal: Patient will remain free of falls  Description  INTERVENTIONS:  - Assess patient frequently for physical needs  -  Identify cognitive and physical deficits and behaviors that affect risk of falls  -  Joseph fall precautions as indicated by assessment   - Educate patient/family on patient safety including physical limitations  - Instruct patient to call for assistance with activity based on assessment  - Modify environment to reduce risk of injury  - Consider OT/PT consult to assist with strengthening/mobility  Outcome: Progressing     Problem: Nutrition/Hydration-ADULT  Goal: Nutrient/Hydration intake appropriate for improving, restoring or maintaining nutritional needs  Description  Monitor and assess patient's nutrition/hydration status for malnutrition  Collaborate with interdisciplinary team and initiate plan and interventions as ordered  Monitor patient's weight and dietary intake as ordered or per policy  Utilize nutrition screening tool and intervene as necessary  Determine patient's food preferences and provide high-protein, high-caloric foods as appropriate       INTERVENTIONS:  - Monitor oral intake, urinary output, labs, and treatment plans  - Assess nutrition and hydration status and recommend course of action  - Evaluate amount of meals eaten  - Assist patient with eating if necessary   - Allow adequate time for meals  - Recommend/ encourage appropriate diets, oral nutritional supplements, and vitamin/mineral supplements  - Order, calculate, and assess calorie counts as needed  - Recommend, monitor, and adjust tube feedings and TPN/PPN based on assessed needs  - Assess need for intravenous fluids  - Provide specific nutrition/hydration education as appropriate  - Include patient/family/caregiver in decisions related to nutrition  Outcome: Progressing

## 2019-09-07 NOTE — ASSESSMENT & PLAN NOTE
In setting of recent bariatric surgery  - continue diet consultation pt needs ongoing education   - lives alone   - dietary consult

## 2019-09-07 NOTE — PROGRESS NOTES
Dressing changed left foot maxsorb to plantar wound site, foul smelling sersang drainage from wound , covered with 4X3's and curlex gauze pt wanted the 4x3's taped to her foot for security

## 2019-09-07 NOTE — ASSESSMENT & PLAN NOTE
- patient was undergoing outpatient wound care as well as oral clindamycin for diabetic foot ulcer   - podiatry managing currently on IV clindamycin 600 mg q 8 hours with wound care with plan for surgery per podiatry sp foot x-ray and MRI of foot per Podiatry with concern for osteomyelitis  - pending blood cultures in process ,   - CRP, ESR,   -  wound culture, will follow up on results of these tests  - patient reports that she has self discontinued her insulin for her diabetes given that her home blood sugars have been 90 to 120 since discharge from the hospital last month, she is also status post bariatric surgery for she had sleeve in March 2019 and she has subsequently lost 100 lb since then    -  A1c  7 9    sliding scale insulin at meals and at bedtime and see the patient's insulin needs,   advised her that the importance of taking insulin to control her blood sugars for wound healing pt however adamant that she no longer has diabetes due to her bariatric procedure

## 2019-09-07 NOTE — PROGRESS NOTES
Progress Note - Podiatry  Ricardo Lambert 38 y o  female MRN: [id number]  Unit/Bed#: [facility] 815-01 Encounter: [id number]    Assessment/Plan:  45yo female with left foot osteomyelitis and cellulitis    1) Left foot osteomyelitis and cellulitis  2) Diabetes with polyneuropathy  3) Hypothyroidism  4) Hx of bariatric surgery  5) Anxiety   6) Bipolar disorder    Plan:  - MRI reviewed, acute OM noted on 3rd metatarsal head  - to OR 9/8 for partial 3rd ray resection with possible toe fillet for wound closure, informed consent was reviewed with patient by attending, all questions and concerns were addressed to patient's satisfaction, appreciate risk stratification by SLIM  - NPO at MN w IVF  - NWB to LLE  - LEADs pending  - wound cultures prelim reviewed, GBS and GNR, continue with IV clindamycin 600 q8, ID consult pending  - medical mgmt per SLIM    Subjective/Objective   Chief Complaint: No chief complaint on file  Subjective: 38 y o  y/o female was seen and evaluated at bedside  No acute events overnight  Reports pain to left foot, rates 7 out of 10  Denies nausea, vomiting, fever, chills, shortness of breath, chest pain, diarrhea, abdominal pain  Blood pressure 111/65, pulse 60, temperature 98 1 °F (36 7 °C), resp  rate 16, height 5' 5" (1 651 m), weight 112 kg (248 lb), SpO2 96 %, not currently breastfeeding  ,Body mass index is 41 27 kg/m²      Invasive Devices     Peripheral Intravenous Line            Peripheral IV 09/06/19 Left Antecubital less than 1 day                Physical Exam:   General: Alert, cooperative and no distress  Lungs: Non labored breathing  Heart: Regular rhythm and rate  Abdomen: non-tender, non-distended  Lower Extremities: left foot dressings left clean, dry and intact, no tenderness to calf bilaterally, cap refill time to digits is brisk bilaterally      Lab, Imaging and other studies:   CBC:   Lab Results   Component Value Date    WBC 10 09 09/07/2019    HGB 13 1 09/07/2019    HCT 40 7 09/07/2019    MCV 86 09/07/2019     09/07/2019    MCH 27 8 09/07/2019    MCHC 32 2 09/07/2019    RDW 13 5 09/07/2019    MPV 9 5 09/07/2019    NRBC 0 09/06/2019       Imaging: I have personally reviewed pertinent films in PACS  EKG, Pathology, and Other Studies: I have personally reviewed pertinent reports  VTE Pharmacologic Prophylaxis: Heparin    Portions of the record may have been created with voice recognition software  Occasional wrong word or "sound a like" substitutions may have occurred due to the inherent limitations of voice recognition software  Read the chart carefully and recognize, using context, where substitutions have occurred

## 2019-09-07 NOTE — UTILIZATION REVIEW
Notification of Inpatient Admission/Inpatient Authorization Request  This is a Notification of Inpatient Admission/Request for Inpatient Authorization for our facility [place] [address]  Be advised that this patient was admitted to our facility under Inpatient Status  Please contact the Utilization Review Department where the patient is receiving care services for additional admission information  Place of Service Code: 24   Place of Service Name: Inpatient Hospital  Presentation Date & Time: 9/6/2019 10:30 AM  Inpatient Admission Date & Time: 9/6/19 1030  Discharge Date & Time: No discharge date for patient encounter  Discharge Disposition (if discharged): Home with [address] [facility]  Attending Physician and NPI#: Lamin Wooten [[id number]]  Admission Orders (From admission, onward)     Ordered        09/06/19 1212  Inpatient Admission  Once         09/06/19 1212  Inpatient Admission  Once                 Facility: [address])  [facility] Utilization Review Department  Phone: [phone]; Fax [phone]  [email or web address]  ATTENTION: Please call with any questions or concerns to [phone]  and carefully listen to the prompts so that you are directed to the right person  Send all requests for admission clinical reviews, approved or denied determinations and any other requests to fax [phone]   All voicemails are confidential

## 2019-09-07 NOTE — UTILIZATION REVIEW
Notification of Inpatient Admission/Inpatient Authorization Request  This is a Notification of Inpatient Admission/Request for Inpatient Authorization for our facility [place] [address]  Be advised that this patient was admitted to our facility under Inpatient Status  Please contact the Utilization Review Department where the patient is receiving care services for additional admission information  Place of Service Code: 24   Place of Service Name: Inpatient Hospital  Presentation Date & Time: 9/6/2019 10:30 AM  Inpatient Admission Date & Time: 9/6/19 1030  Discharge Date & Time: No discharge date for patient encounter  Discharge Disposition (if discharged): Home with [facility]  Attending Physician and NPI#: Clarence Katz [[id number]]  Admission Orders (From admission, onward)   Ordered     09/06/19 1212 Inpatient Admission  Once        09/06/19 1212 Inpatient Admission  Once           Facility: [address])  [facility] Utilization Review Department  Phone: [phone]; Fax [phone]  [email or web address]  ATTENTION: Please call with any questions or concerns to [phone]  and carefully listen to the prompts so that you are directed to the right person  Send all requests for admission clinical reviews, approved or denied determinations and any other requests to fax [phone]   All voicemails are confidential

## 2019-09-08 ENCOUNTER — ANESTHESIA (INPATIENT)
Dept: PERIOP | Facility: HOSPITAL | Age: 44
DRG: 305 | End: 2019-09-08
Payer: COMMERCIAL

## 2019-09-08 ENCOUNTER — APPOINTMENT (INPATIENT)
Dept: RADIOLOGY | Facility: HOSPITAL | Age: 44
DRG: 305 | End: 2019-09-08
Payer: COMMERCIAL

## 2019-09-08 LAB
ANION GAP SERPL CALCULATED.3IONS-SCNC: 7 MMOL/L (ref 4–13)
BACTERIA WND AEROBE CULT: ABNORMAL
BUN SERPL-MCNC: 9 MG/DL (ref 5–25)
CALCIUM SERPL-MCNC: 9 MG/DL (ref 8.3–10.1)
CHLORIDE SERPL-SCNC: 102 MMOL/L (ref 100–108)
CO2 SERPL-SCNC: 25 MMOL/L (ref 21–32)
CREAT SERPL-MCNC: 0.76 MG/DL (ref 0.6–1.3)
ERYTHROCYTE [DISTWIDTH] IN BLOOD BY AUTOMATED COUNT: 13.6 % (ref 11.6–15.1)
GFR SERPL CREATININE-BSD FRML MDRD: 96 ML/MIN/1.73SQ M
GLUCOSE SERPL-MCNC: 114 MG/DL (ref 65–140)
GLUCOSE SERPL-MCNC: 133 MG/DL (ref 65–140)
GLUCOSE SERPL-MCNC: 147 MG/DL (ref 65–140)
GLUCOSE SERPL-MCNC: 151 MG/DL (ref 65–140)
GLUCOSE SERPL-MCNC: 158 MG/DL (ref 65–140)
GRAM STN SPEC: ABNORMAL
HCT VFR BLD AUTO: 42.4 % (ref 34.8–46.1)
HGB BLD-MCNC: 13.9 G/DL (ref 11.5–15.4)
MCH RBC QN AUTO: 28.1 PG (ref 26.8–34.3)
MCHC RBC AUTO-ENTMCNC: 32.8 G/DL (ref 31.4–37.4)
MCV RBC AUTO: 86 FL (ref 82–98)
PLATELET # BLD AUTO: 395 THOUSANDS/UL (ref 149–390)
PMV BLD AUTO: 9.4 FL (ref 8.9–12.7)
POTASSIUM SERPL-SCNC: 4 MMOL/L (ref 3.5–5.3)
RBC # BLD AUTO: 4.94 MILLION/UL (ref 3.81–5.12)
SODIUM SERPL-SCNC: 134 MMOL/L (ref 136–145)
WBC # BLD AUTO: 9.94 THOUSAND/UL (ref 4.31–10.16)

## 2019-09-08 PROCEDURE — 87176 TISSUE HOMOGENIZATION CULTR: CPT | Performed by: PODIATRIST

## 2019-09-08 PROCEDURE — 88307 TISSUE EXAM BY PATHOLOGIST: CPT | Performed by: PATHOLOGY

## 2019-09-08 PROCEDURE — 28810 AMPUTATION TOE & METATARSAL: CPT | Performed by: PODIATRIST

## 2019-09-08 PROCEDURE — 87186 SC STD MICRODIL/AGAR DIL: CPT | Performed by: PODIATRIST

## 2019-09-08 PROCEDURE — 93925 LOWER EXTREMITY STUDY: CPT | Performed by: SURGERY

## 2019-09-08 PROCEDURE — 99232 SBSQ HOSP IP/OBS MODERATE 35: CPT | Performed by: INTERNAL MEDICINE

## 2019-09-08 PROCEDURE — 82948 REAGENT STRIP/BLOOD GLUCOSE: CPT

## 2019-09-08 PROCEDURE — 85027 COMPLETE CBC AUTOMATED: CPT | Performed by: STUDENT IN AN ORGANIZED HEALTH CARE EDUCATION/TRAINING PROGRAM

## 2019-09-08 PROCEDURE — 88311 DECALCIFY TISSUE: CPT | Performed by: PATHOLOGY

## 2019-09-08 PROCEDURE — 14350 FILLETED FINGER/TOE FLAP: CPT | Performed by: PODIATRIST

## 2019-09-08 PROCEDURE — 87205 SMEAR GRAM STAIN: CPT | Performed by: PODIATRIST

## 2019-09-08 PROCEDURE — 87076 CULTURE ANAEROBE IDENT EACH: CPT | Performed by: PODIATRIST

## 2019-09-08 PROCEDURE — 0JXR0ZC TRANSFER LEFT FOOT SUBCUTANEOUS TISSUE AND FASCIA WITH SKIN, SUBCUTANEOUS TISSUE AND FASCIA, OPEN APPROACH: ICD-10-PCS | Performed by: PODIATRIST

## 2019-09-08 PROCEDURE — 93922 UPR/L XTREMITY ART 2 LEVELS: CPT | Performed by: SURGERY

## 2019-09-08 PROCEDURE — 99232 SBSQ HOSP IP/OBS MODERATE 35: CPT | Performed by: NURSE PRACTITIONER

## 2019-09-08 PROCEDURE — 87077 CULTURE AEROBIC IDENTIFY: CPT | Performed by: PODIATRIST

## 2019-09-08 PROCEDURE — 87185 SC STD ENZYME DETCJ PER NZM: CPT | Performed by: PODIATRIST

## 2019-09-08 PROCEDURE — 87070 CULTURE OTHR SPECIMN AEROBIC: CPT | Performed by: PODIATRIST

## 2019-09-08 PROCEDURE — 80048 BASIC METABOLIC PNL TOTAL CA: CPT | Performed by: STUDENT IN AN ORGANIZED HEALTH CARE EDUCATION/TRAINING PROGRAM

## 2019-09-08 PROCEDURE — 87147 CULTURE TYPE IMMUNOLOGIC: CPT | Performed by: PODIATRIST

## 2019-09-08 PROCEDURE — 87075 CULTR BACTERIA EXCEPT BLOOD: CPT | Performed by: PODIATRIST

## 2019-09-08 PROCEDURE — 73630 X-RAY EXAM OF FOOT: CPT

## 2019-09-08 PROCEDURE — 0Y6N0ZC DETACHMENT AT LEFT FOOT, PARTIAL 3RD RAY, OPEN APPROACH: ICD-10-PCS | Performed by: PODIATRIST

## 2019-09-08 RX ORDER — ONDANSETRON 2 MG/ML
INJECTION INTRAMUSCULAR; INTRAVENOUS AS NEEDED
Status: DISCONTINUED | OUTPATIENT
Start: 2019-09-08 | End: 2019-09-08 | Stop reason: SURG

## 2019-09-08 RX ORDER — SODIUM CHLORIDE, SODIUM LACTATE, POTASSIUM CHLORIDE, CALCIUM CHLORIDE 600; 310; 30; 20 MG/100ML; MG/100ML; MG/100ML; MG/100ML
125 INJECTION, SOLUTION INTRAVENOUS CONTINUOUS
Status: DISCONTINUED | OUTPATIENT
Start: 2019-09-08 | End: 2019-09-08

## 2019-09-08 RX ORDER — MEPERIDINE HYDROCHLORIDE 25 MG/ML
12.5 INJECTION INTRAMUSCULAR; INTRAVENOUS; SUBCUTANEOUS AS NEEDED
Status: DISCONTINUED | OUTPATIENT
Start: 2019-09-08 | End: 2019-09-08 | Stop reason: HOSPADM

## 2019-09-08 RX ORDER — MIDAZOLAM HYDROCHLORIDE 1 MG/ML
INJECTION INTRAMUSCULAR; INTRAVENOUS AS NEEDED
Status: DISCONTINUED | OUTPATIENT
Start: 2019-09-08 | End: 2019-09-08 | Stop reason: SURG

## 2019-09-08 RX ORDER — FENTANYL CITRATE 50 UG/ML
INJECTION, SOLUTION INTRAMUSCULAR; INTRAVENOUS AS NEEDED
Status: DISCONTINUED | OUTPATIENT
Start: 2019-09-08 | End: 2019-09-08 | Stop reason: SURG

## 2019-09-08 RX ORDER — CEFAZOLIN SODIUM 2 G/50ML
2000 SOLUTION INTRAVENOUS ONCE
Status: COMPLETED | OUTPATIENT
Start: 2019-09-08 | End: 2019-09-08

## 2019-09-08 RX ORDER — PROPOFOL 10 MG/ML
INJECTION, EMULSION INTRAVENOUS CONTINUOUS PRN
Status: DISCONTINUED | OUTPATIENT
Start: 2019-09-08 | End: 2019-09-08 | Stop reason: SURG

## 2019-09-08 RX ORDER — ONDANSETRON 2 MG/ML
4 INJECTION INTRAMUSCULAR; INTRAVENOUS ONCE AS NEEDED
Status: DISCONTINUED | OUTPATIENT
Start: 2019-09-08 | End: 2019-09-08 | Stop reason: HOSPADM

## 2019-09-08 RX ORDER — ALBUTEROL SULFATE 2.5 MG/3ML
2.5 SOLUTION RESPIRATORY (INHALATION) ONCE AS NEEDED
Status: DISCONTINUED | OUTPATIENT
Start: 2019-09-08 | End: 2019-09-08 | Stop reason: HOSPADM

## 2019-09-08 RX ORDER — PROMETHAZINE HYDROCHLORIDE 25 MG/ML
12.5 INJECTION, SOLUTION INTRAMUSCULAR; INTRAVENOUS ONCE AS NEEDED
Status: DISCONTINUED | OUTPATIENT
Start: 2019-09-08 | End: 2019-09-08 | Stop reason: HOSPADM

## 2019-09-08 RX ORDER — HYDROMORPHONE HCL/PF 1 MG/ML
0.5 SYRINGE (ML) INJECTION
Status: DISCONTINUED | OUTPATIENT
Start: 2019-09-08 | End: 2019-09-08 | Stop reason: HOSPADM

## 2019-09-08 RX ORDER — LABETALOL 20 MG/4 ML (5 MG/ML) INTRAVENOUS SYRINGE
5
Status: DISCONTINUED | OUTPATIENT
Start: 2019-09-08 | End: 2019-09-08 | Stop reason: HOSPADM

## 2019-09-08 RX ORDER — PROPOFOL 10 MG/ML
INJECTION, EMULSION INTRAVENOUS AS NEEDED
Status: DISCONTINUED | OUTPATIENT
Start: 2019-09-08 | End: 2019-09-08 | Stop reason: SURG

## 2019-09-08 RX ORDER — KETAMINE HYDROCHLORIDE 50 MG/ML
INJECTION, SOLUTION, CONCENTRATE INTRAMUSCULAR; INTRAVENOUS AS NEEDED
Status: DISCONTINUED | OUTPATIENT
Start: 2019-09-08 | End: 2019-09-08 | Stop reason: SURG

## 2019-09-08 RX ORDER — FENTANYL CITRATE/PF 50 MCG/ML
25 SYRINGE (ML) INJECTION
Status: DISCONTINUED | OUTPATIENT
Start: 2019-09-08 | End: 2019-09-08 | Stop reason: HOSPADM

## 2019-09-08 RX ORDER — MAGNESIUM HYDROXIDE 1200 MG/15ML
LIQUID ORAL AS NEEDED
Status: DISCONTINUED | OUTPATIENT
Start: 2019-09-08 | End: 2019-09-08 | Stop reason: HOSPADM

## 2019-09-08 RX ADMIN — ONDANSETRON 4 MG: 2 INJECTION INTRAMUSCULAR; INTRAVENOUS at 08:54

## 2019-09-08 RX ADMIN — OXYCODONE HYDROCHLORIDE 5 MG: 5 TABLET ORAL at 05:53

## 2019-09-08 RX ADMIN — ACETAMINOPHEN 975 MG: 325 TABLET ORAL at 05:53

## 2019-09-08 RX ADMIN — ACETAMINOPHEN 975 MG: 325 TABLET ORAL at 18:13

## 2019-09-08 RX ADMIN — SODIUM CHLORIDE 75 ML/HR: 0.9 INJECTION, SOLUTION INTRAVENOUS at 00:01

## 2019-09-08 RX ADMIN — METRONIDAZOLE 500 MG: 500 TABLET, FILM COATED ORAL at 05:53

## 2019-09-08 RX ADMIN — FENTANYL CITRATE 25 MCG: 50 INJECTION, SOLUTION INTRAMUSCULAR; INTRAVENOUS at 08:36

## 2019-09-08 RX ADMIN — KETAMINE HYDROCHLORIDE 30 MG: 50 INJECTION, SOLUTION INTRAMUSCULAR; INTRAVENOUS at 08:19

## 2019-09-08 RX ADMIN — CLONIDINE HYDROCHLORIDE 0.2 MG: 0.1 TABLET ORAL at 21:29

## 2019-09-08 RX ADMIN — ZIPRASIDONE HCL 80 MG: 40 CAPSULE ORAL at 10:30

## 2019-09-08 RX ADMIN — CEFAZOLIN SODIUM 2000 MG: 2 SOLUTION INTRAVENOUS at 08:01

## 2019-09-08 RX ADMIN — HEPARIN SODIUM 5000 UNITS: 5000 INJECTION INTRAVENOUS; SUBCUTANEOUS at 21:29

## 2019-09-08 RX ADMIN — METRONIDAZOLE 500 MG: 500 TABLET, FILM COATED ORAL at 13:36

## 2019-09-08 RX ADMIN — MIDAZOLAM 2 MG: 1 INJECTION INTRAMUSCULAR; INTRAVENOUS at 08:06

## 2019-09-08 RX ADMIN — ACETAMINOPHEN 975 MG: 325 TABLET ORAL at 12:34

## 2019-09-08 RX ADMIN — SODIUM CHLORIDE: 0.9 INJECTION, SOLUTION INTRAVENOUS at 09:06

## 2019-09-08 RX ADMIN — OXYCODONE HYDROCHLORIDE 5 MG: 5 TABLET ORAL at 12:34

## 2019-09-08 RX ADMIN — MIDAZOLAM 2 MG: 1 INJECTION INTRAMUSCULAR; INTRAVENOUS at 08:00

## 2019-09-08 RX ADMIN — HYDROXYZINE HYDROCHLORIDE 50 MG: 50 TABLET ORAL at 21:29

## 2019-09-08 RX ADMIN — BUPROPION HYDROCHLORIDE 300 MG: 150 TABLET, FILM COATED, EXTENDED RELEASE ORAL at 10:28

## 2019-09-08 RX ADMIN — PANTOPRAZOLE SODIUM 40 MG: 40 TABLET, DELAYED RELEASE ORAL at 21:29

## 2019-09-08 RX ADMIN — FENTANYL CITRATE 50 MCG: 50 INJECTION, SOLUTION INTRAMUSCULAR; INTRAVENOUS at 09:07

## 2019-09-08 RX ADMIN — ZIPRASIDONE HCL 80 MG: 40 CAPSULE ORAL at 21:29

## 2019-09-08 RX ADMIN — PROPOFOL 50 MG: 10 INJECTION, EMULSION INTRAVENOUS at 08:09

## 2019-09-08 RX ADMIN — HEPARIN SODIUM 5000 UNITS: 5000 INJECTION INTRAVENOUS; SUBCUTANEOUS at 13:36

## 2019-09-08 RX ADMIN — OXYCODONE HYDROCHLORIDE 5 MG: 5 TABLET ORAL at 18:13

## 2019-09-08 RX ADMIN — OXYCODONE HYDROCHLORIDE 5 MG: 5 TABLET ORAL at 22:47

## 2019-09-08 RX ADMIN — LEVOTHYROXINE SODIUM 50 MCG: 50 TABLET ORAL at 05:53

## 2019-09-08 RX ADMIN — CEFEPIME HYDROCHLORIDE 1000 MG: 1 INJECTION, POWDER, FOR SOLUTION INTRAMUSCULAR; INTRAVENOUS at 10:37

## 2019-09-08 RX ADMIN — FENTANYL CITRATE 25 MCG: 50 INJECTION, SOLUTION INTRAMUSCULAR; INTRAVENOUS at 08:13

## 2019-09-08 RX ADMIN — CEFEPIME HYDROCHLORIDE 1000 MG: 1 INJECTION, POWDER, FOR SOLUTION INTRAMUSCULAR; INTRAVENOUS at 21:30

## 2019-09-08 RX ADMIN — INSULIN LISPRO 1 UNITS: 100 INJECTION, SOLUTION INTRAVENOUS; SUBCUTANEOUS at 21:30

## 2019-09-08 RX ADMIN — PROPOFOL 100 MCG/KG/MIN: 10 INJECTION, EMULSION INTRAVENOUS at 08:09

## 2019-09-08 NOTE — OP NOTE
OPERATIVE REPORT  PATIENT NAME: Robert Gould    :  1975  MRN: [id number]  Pt Location: BE OR ROOM 05    SURGERY DATE: 2019    Surgeon(s) and Role:     * Juana Rinne, DPM - Primary     * Charlotte Guerra DPM - Assisting    Preop Diagnosis:  Cellulitis of left foot [L03 116]  Other acute osteomyelitis of left foot ([facility] [address] ) [M86 172]    Post-Op Diagnosis Codes:     * Cellulitis of left foot [L03 116]     * Other acute osteomyelitis of left foot ([facility] [address] ) [M86 172]    Procedure(s) (LRB):  PARTIAL 3RD RAY RESECTION, TOE FILLET FLAP (Left)  61513 CPT Code filleted finger/toe flap  30137 Amputation, metatarsal, with toe, single    Specimen(s):  ID Type Source Tests Collected by Time Destination   1 : 3rd Ray bone and tissue, left  Tissue Toe, Left TISSUE EXAM Juana Rinne, DPM 2019 0841    A : 3rd MTP joint deep and superficial tissue Tissue Foot, Left ANAEROBIC CULTURE AND GRAM STAIN, CULTURE, TISSUE AND GRAM STAIN Juana Rinne, DPM 2019 0833    B : 3rd MTP joint deep tissue Tissue Foot, Left ANAEROBIC CULTURE AND GRAM STAIN, CULTURE, TISSUE AND GRAM STAIN Juana Rinne, DPM 2019 0834        Estimated Blood Loss:   Minimal    Drains:  Open Drain Left Foot (Active)   Number of days: 0       Anesthesia Type:   Choice    Operative Indications:  Cellulitis of left foot [L03 116]  Other acute osteomyelitis of left foot (HCC) [M86 172]    Operative Findings:  Septic 3rd MTPJ with purulent drainage, necrotic 3rd met head; presumed surgical cure    Complications:   None    Procedure and Technique:  Under mild sedation patient was brought operating was transferred open table in supine position  IV sedation was achieved by anesthesia team and universal time-out was performed were all parties in agreement the correct patient, correct procedure and correct site  20 cc of 1 1 mixture 1% lidocaine plain and 0 25% Marcaine plain was injected into the left foot and of 3rd ray block fashion    A pneumatic ankle tourniquet was placed over left ankle with ample padding  The foot was then prepped and draped in usual aseptic manner  An Esmarch bandage was used exsanguinate the foot and the pneumatic tourniquet was inflated to 250 mmHg  Attention was directed to the plantar ulceration on the left foot  An excisional eclipse incision was made over the wound using a 15 blade, dissection was carried down using scalpel down to the level of bone  The 3rd metatarsal head and 3rd MTPJ was identified, purulence drainage noted at the MPJ  Deep wound cultures were obtained at this time  Using a sagittal saw approximately a 3rd of the distal aspect of 3rd metatarsal was removed and sent off for routine pathology review  Using a 15 blade the incision was extended distally, the entire nail and nail matrix was removed, each additional phalanges from the 3rd toe was then filleted thus creating of toe filet flap  The flap was then rotated plantarly to help cover the excised wound  The wound was then irrigated with copious amounts of normal saline using pulse lavage  All remaining soft tissue and bone appeared to be viable and healthy  The skin was reapproximated using 3-0 nylon  An 8 in Penrose drain was inserted in a plantar aspect  The pneumatic tourniquet was deflated at approximately 40 minutes and proper hyperemic response was noted to all digits, adequate perfusion was noted at the flap  The incision was dressed with Adaptic and dry sterile dressing and Ace wrap  Dr Lanie Christensen was present for the entire procedure and participated in all key aspects of the procedure      Patient Disposition:  PACU  and hemodynamically stable    SIGNATURE: Felix Santos DPM  DATE: September 8, 2019  TIME: 9:21 AM

## 2019-09-08 NOTE — ASSESSMENT & PLAN NOTE
- patient was undergoing outpatient wound care as well as oral clindamycin for diabetic foot ulcer   - podiatry managing currently on IV clindamycin 600 mg q 8 hours with wound care with plan for surgery per podiatry sp foot x-ray and MRI of foot per Podiatry with concern for osteomyelitis  - pending blood cultures in process ,no growth in 24 hrs    - now Day# 0 left foot partial 3rd ray resection with psb toe fillet flap  Pt is non weight bearing at this time did have to discuss with her as upon arrival to the room foot is resting on the floor in dsd  - wound cultures Klebsiella oxytoca on cefepime could likely narrow will discuss with dr Aurora Kothari ID   - patient reports that she has self discontinued her insulin for her diabetes given that her home blood sugars have been 90 to 120 since discharge from the hospital last month, she is also status post bariatric surgery for she had sleeve in March 2019 and she has subsequently lost 100 lb since then    -  A1c  7 9    sliding scale insulin at meals and at bedtime and see the patient's insulin needs,   advised her that the importance of taking insulin to control her blood sugars for wound healing pt however adamant that she no longer has diabetes due to her bariatric procedure

## 2019-09-08 NOTE — PROGRESS NOTES
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   2) Left foot cellulitis  3) Diabetes mellitus with polyneuropathy  4) Hypothroidism  5) Hx of bariatric surgery  6) Anxiety  7) Bipolar disorder    Plan:  - to OR today for left foot partial 3rd ray resection with psb toe fillet flap for wound closure, patient is NPO w IVF  - patient will be NWB to LLE, PT/OT consult pending  - prelim results from LEADs showed adequate perfusion and no significant focal stenosis per vasc tech  - continue with IV cefepime and oral metronidazole per ID  - medical mgmt per SLIM      Subjective/Objective   Chief Complaint: No chief complaint on file  Subjective: 38 y o  y/o female was seen and evaluated at bedside  No acute events overnight  Reports being anxious about procedure today  Denies nausea, vomiting, fever, chills, shortness of breath, chest pain  Blood pressure 113/90, pulse 79, temperature 98 4 °F (36 9 °C), resp  rate 16, height 5' 5" (1 651 m), weight 112 kg (248 lb), SpO2 94 %, not currently breastfeeding  ,Body mass index is 41 27 kg/m²  Invasive Devices     Peripheral Intravenous Line            Peripheral IV 09/06/19 Left Antecubital 1 day                Physical Exam:   General: Alert, cooperative and no distress  Lungs: Non labored breathing  Heart: Regular rhythm and rate  Abdomen: non-tender, non-distended  Lower Extremities: Left foot dressings clean, dry and intact, cap refill time at digits is brisk      Lab, Imaging and other studies:   I have personally reviewed pertinent lab results  Imaging: I have personally reviewed pertinent films in PACS  EKG, Pathology, and Other Studies: I have personally reviewed pertinent reports    VTE Pharmacologic Prophylaxis: Enoxaparin (Lovenox), currently on hold for OR    Portions of the record may have been created with voice recognition software  Occasional wrong word or "sound a like" substitutions may have occurred due to the inherent limitations of voice recognition software  Read the chart carefully and recognize, using context, where substitutions have occurred

## 2019-09-08 NOTE — ANESTHESIA POSTPROCEDURE EVALUATION
Post-Op Assessment Note    CV Status:  Stable  Pain Score: 0    Pain management: adequate     Mental Status:  Alert and awake   Hydration Status:  Stable   PONV Controlled:  None   Airway Patency:  Patent   Post Op Vitals Reviewed: Yes      Staff: CRNA           BP   109/66   Temp   97 3   Pulse  96   Resp   16   SpO2   95

## 2019-09-08 NOTE — ASSESSMENT & PLAN NOTE
- patient is low risk for low risk operation,creatinine 0 79   MACE score is 1 due to insulin-dependent diabetes, however, home sugars have been in normal range since loosing 100 lbs s/p bariatric surgery  - reviewed EKG from 6/30/19 which was normal except prolonged QTC therefore overall order another EKG  - echo report in June which was normal     - No additional testing    - ekg performed with no significant change per cards   - pt now sp or day #0 left foot partial 3rd ray resection with psb toe fillet flap for wound closure

## 2019-09-08 NOTE — PHYSICAL THERAPY NOTE
Physical Therapy Cancellation Note    PT order received  Chart review performed  At this time, PT evaluation held as patient in OR  PT will follow and evaluate as appropriate      Susannah Sumanth, PT

## 2019-09-08 NOTE — PROGRESS NOTES
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, with bone involvement without evidence of necrosis [facility])  Assessment & Plan  - patient was undergoing outpatient wound care as well as oral clindamycin for diabetic foot ulcer   - podiatry managing currently on IV clindamycin 600 mg q 8 hours with wound care with plan for surgery per podiatry sp foot x-ray and MRI of foot per Podiatry with concern for osteomyelitis  - pending blood cultures in process ,no growth in 24 hrs    - now Day# 0 left foot partial 3rd ray resection with psb toe fillet flap  Pt is non weight bearing at this time did have to discuss with her as upon arrival to the room foot is resting on the floor in dsd  - wound cultures Klebsiella oxytoca on cefepime could likely narrow will discuss with dr Aurora Kothari ID   - patient reports that she has self discontinued her insulin for her diabetes given that her home blood sugars have been 90 to 120 since discharge from the hospital last month, she is also status post bariatric surgery for she had sleeve in March 2019 and she has subsequently lost 100 lb since then  -  A1c  7 9    sliding scale insulin at meals and at bedtime and see the patient's insulin needs,   advised her that the importance of taking insulin to control her blood sugars for wound healing pt however adamant that she no longer has diabetes due to her bariatric procedure         Pre-operative examination  Assessment & Plan  - patient is low risk for low risk operation,creatinine 0 79   MACE score is 1 due to insulin-dependent diabetes, however, home sugars have been in normal range since loosing 100 lbs s/p bariatric surgery     - reviewed EKG from 6/30/19 which was normal except prolonged QTC therefore overall order another EKG  - echo report in June which was normal     - No additional testing    - ekg performed with no significant change per cards   - pt now sp or day #0 left foot partial 3rd ray resection with psb toe fillet flap for wound closure  [facility]  Assessment & Plan  - continue home medications including Atarax 50 mg q h s , Geodon 80 mg b i d , clonidine 0 1 mg a m  And 0 2 mg p m , Wellbutrin 300 mg daily  - the patient reports that she is no longer taking Lexapro    Hypothyroidism  Assessment & Plan  - continue home regimen of levothyroxine 50 mcg daily    Cellulitis of left foot  Assessment & Plan  - continue IV clindamycin 600 mg Q 8 as above, per primary team for or in am     Morbid obesity ([facility] )  Assessment & Plan  In setting of recent bariatric surgery  - continue diet consultation pt needs ongoing education   - lives alone   - dietary consult     Anxiety  Assessment & Plan  Well controlled on Atarax 50 mg at bedtime and clonidine    Tobacco abuse  Assessment & Plan  - the patient reports smoking 6 to 7 cigarettes daily, she declines nicotine patch as it contains latex which she has an allergy to  VTE Pharmacologic Prophylaxis:   Pharmacologic: Heparin  Mechanical VTE Prophylaxis in Place: Yes    Patient Centered Rounds: I have performed bedside rounds with nursing staff today  Discussions with Specialists or Other Care Team Provider: nursing     Education and Discussions with Family / Patient: patient     Time Spent for Care: 45 minutes  More than 50% of total time spent on counseling and coordination of care as described above      Current Length of Stay: 2 day(s)    Current Patient Status: Inpatient   Certification Statement: The patient will continue to require additional inpatient hospital stay due to sp toe amputation a few hrs ago     Discharge Plan: pending pt eval     Code Status: Level 1 - Full Code      Subjective: Patient is sitting on edge of bed upon arrival to room  She is just postop  Left foot resting on the floor and dry sterile dressing  Report patient she should be elevating leg and should not be bearing weight on her lower extremity  Reiterated with her  Nursing at bedside  Patient had concerns about walking to the bathroom  Recommended having bedside commode placed extubated so patient stand and pivot at this time until seen by Physical therapy for further recommendations and her ability to not bear weight to that lower extremity  Patient denies any chest pain shortness of breath no nausea vomiting or diarrhea  No dizziness or lightheadedness    Objective:     Vitals:   Temp (24hrs), Av 1 °F (36 7 °C), Min:97 3 °F (36 3 °C), Max:98 4 °F (36 9 °C)    Temp:  [97 3 °F (36 3 °C)-98 4 °F (36 9 °C)] 98 4 °F (36 9 °C)  HR:  [74-82] 74  Resp:  [15-20] 15  BP: (100-116)/(66-97) 101/72  SpO2:  [92 %-96 %] 95 %  Body mass index is 41 27 kg/m²  Input and Output Summary (last 24 hours): Intake/Output Summary (Last 24 hours) at 2019 1227  Last data filed at 2019 6024  Gross per 24 hour   Intake 1902 ml   Output 1600 ml   Net 302 ml       Physical Exam:     Physical Exam   Constitutional: She is oriented to person, place, and time  HENT:   Head: Normocephalic and atraumatic  Mouth/Throat: No oropharyngeal exudate  Eyes: Conjunctivae are normal  Right eye exhibits no discharge  Left eye exhibits no discharge  No scleral icterus  Neck: Normal range of motion  No JVD present  No tracheal deviation present  No thyromegaly present  Cardiovascular: Normal rate  Exam reveals no gallop and no friction rub  No murmur heard  Pulmonary/Chest: Effort normal  No stridor  No respiratory distress  She has no wheezes  She has no rales  She exhibits no tenderness  Abdominal: Soft  She exhibits no distension and no mass  There is no tenderness  There is no rebound and no guarding  No hernia  Musculoskeletal: She exhibits deformity (left foot dsd intact with ace wraps and curlex )  She exhibits no tenderness  Lymphadenopathy:     She has no cervical adenopathy  Neurological: She is oriented to person, place, and time  Skin: No rash noted  No erythema  No pallor  Psychiatric: She has a normal mood and affect  Additional Data:     Labs:    Results from last 7 days   Lab Units 09/08/19  0545  09/06/19  1732   WBC Thousand/uL 9 94   < > 14 75*   HEMOGLOBIN g/dL 13 9   < > 13 8   HEMATOCRIT % 42 4   < > 42 3   PLATELETS Thousands/uL 395*   < > 426*   NEUTROS PCT %  --   --  62   LYMPHS PCT %  --   --  27   MONOS PCT %  --   --  6   EOS PCT %  --   --  4    < > = values in this interval not displayed  Results from last 7 days   Lab Units 09/08/19  0545  09/06/19  1732   SODIUM mmol/L 134*   < > 135*   POTASSIUM mmol/L 4 0   < > 4 2   CHLORIDE mmol/L 102   < > 101   CO2 mmol/L 25   < > 26   BUN mg/dL 9   < > 10   CREATININE mg/dL 0 76   < > 0 79   ANION GAP mmol/L 7   < > 8   CALCIUM mg/dL 9 0   < > 10 1   ALBUMIN g/dL  --   --  3 6   TOTAL BILIRUBIN mg/dL  --   --  0 34   ALK PHOS U/L  --   --  216*   ALT U/L  --   --  25   AST U/L  --   --  12   GLUCOSE RANDOM mg/dL 133   < > 159*    < > = values in this interval not displayed  Results from last 7 days   Lab Units 09/08/19  1017 09/08/19  0923 09/07/19  2134 09/07/19  1551 09/07/19  1117 09/07/19  0739 09/06/19  2127 09/06/19  1635 09/06/19  1115   POC GLUCOSE mg/dl 147* 151* 134 161* 219* 174* 149* 167* 176*     Results from last 7 days   Lab Units 09/06/19  1325   HEMOGLOBIN A1C % 7 9*               * I Have Reviewed All Lab Data Listed Above  * Additional Pertinent Lab Tests Reviewed:  All Labs Within Last 24 Hours Reviewed    Imaging:    Imaging Reports Reviewed Today Include: reviewed   Recent Cultures (last 7 days):     Results from last 7 days   Lab Units 09/06/19  1341 09/06/19  1324 09/06/19  1252   BLOOD CULTURE  No Growth at 24 hrs  No Growth at 24 hrs   --    GRAM STAIN RESULT   --   --  No polys seen*  3+ Gram positive cocci in pairs and chains*  2+ Gram negative rods*   WOUND CULTURE   --   --  3+ Growth of Beta Hemolytic Streptococcus Group B*  2+ Growth of Klebsiella oxytoca*  2+ Growth of        Last 24 Hours Medication List:     Current Facility-Administered Medications:  acetaminophen 975 mg Oral Q6H [facility] Kelly [place], DPM    albuterol 2 puff Inhalation Q6H PRN Kelly [place], DPM    buPROPion 300 mg Oral Daily Kelly [place], DPM    cefepime 1,000 mg Intravenous Q12H Kelly [place], DPM Last Rate: 1,000 mg (09/08/19 1037)   cholecalciferol 500 Units Oral Daily Kelly [place], DPM    cloNIDine 0 1 mg Oral Daily Kelly [place], DPM    cloNIDine 0 2 mg Oral HS Kelly [place], DPM    diphenhydrAMINE 25 mg Oral Q6H PRN Kelly [place], DPM    docusate sodium 100 mg Oral BID PRN Kelly [place], DPM    heparin (porcine) 5,000 Units Subcutaneous [facility] Kelly [place], DPM    hydrOXYzine HCL 50 mg Oral HS [facility] [place], DPM    insulin lispro 1-6 Units Subcutaneous TID AC Jassie Marco, DPM    insulin lispro 1-6 Units Subcutaneous HS Kelly [place], DPM    levothyroxine 50 mcg Oral Early Morning Kelly [place], DPM    metroNIDAZOLE 500 mg Oral [facility], DPM    ondansetron 4 mg Intravenous Q6H PRN Kelly [place], DPM    oxyCODONE 5 mg Oral Q4H PRN Kelly [place], DPM    pantoprazole 40 mg Oral Daily Kelly [place], DPM    SUMAtriptan 6 mg Subcutaneous Once PRN Kelly [place], DPM    traMADol 50 mg Oral Q6H PRN Kelly [place], DPM    ziprasidone 80 mg Oral BID With Meals Kelly [place], DPM         Today, Patient Was Seen By: JOSEE Scott    ** Please Note: Dictation voice to text software may have been used in the creation of this document   **

## 2019-09-09 PROBLEM — E13.9 DIABETES 1.5, MANAGED AS TYPE 2 (HCC): Status: ACTIVE | Noted: 2019-09-09

## 2019-09-09 LAB
ANION GAP SERPL CALCULATED.3IONS-SCNC: 7 MMOL/L (ref 4–13)
BUN SERPL-MCNC: 9 MG/DL (ref 5–25)
CALCIUM SERPL-MCNC: 9.2 MG/DL (ref 8.3–10.1)
CHLORIDE SERPL-SCNC: 102 MMOL/L (ref 100–108)
CO2 SERPL-SCNC: 25 MMOL/L (ref 21–32)
CREAT SERPL-MCNC: 0.84 MG/DL (ref 0.6–1.3)
ERYTHROCYTE [DISTWIDTH] IN BLOOD BY AUTOMATED COUNT: 13.6 % (ref 11.6–15.1)
GFR SERPL CREATININE-BSD FRML MDRD: 85 ML/MIN/1.73SQ M
GLUCOSE SERPL-MCNC: 118 MG/DL (ref 65–140)
GLUCOSE SERPL-MCNC: 142 MG/DL (ref 65–140)
GLUCOSE SERPL-MCNC: 156 MG/DL (ref 65–140)
GLUCOSE SERPL-MCNC: 158 MG/DL (ref 65–140)
GLUCOSE SERPL-MCNC: 175 MG/DL (ref 65–140)
HCT VFR BLD AUTO: 40.3 % (ref 34.8–46.1)
HGB BLD-MCNC: 12.7 G/DL (ref 11.5–15.4)
MCH RBC QN AUTO: 27.3 PG (ref 26.8–34.3)
MCHC RBC AUTO-ENTMCNC: 31.5 G/DL (ref 31.4–37.4)
MCV RBC AUTO: 87 FL (ref 82–98)
PLATELET # BLD AUTO: 374 THOUSANDS/UL (ref 149–390)
PMV BLD AUTO: 9.1 FL (ref 8.9–12.7)
POTASSIUM SERPL-SCNC: 4.1 MMOL/L (ref 3.5–5.3)
RBC # BLD AUTO: 4.65 MILLION/UL (ref 3.81–5.12)
SODIUM SERPL-SCNC: 134 MMOL/L (ref 136–145)
WBC # BLD AUTO: 8.52 THOUSAND/UL (ref 4.31–10.16)

## 2019-09-09 PROCEDURE — 85027 COMPLETE CBC AUTOMATED: CPT | Performed by: STUDENT IN AN ORGANIZED HEALTH CARE EDUCATION/TRAINING PROGRAM

## 2019-09-09 PROCEDURE — 97167 OT EVAL HIGH COMPLEX 60 MIN: CPT

## 2019-09-09 PROCEDURE — 80048 BASIC METABOLIC PNL TOTAL CA: CPT | Performed by: STUDENT IN AN ORGANIZED HEALTH CARE EDUCATION/TRAINING PROGRAM

## 2019-09-09 PROCEDURE — 99024 POSTOP FOLLOW-UP VISIT: CPT | Performed by: PODIATRIST

## 2019-09-09 PROCEDURE — G8988 SELF CARE GOAL STATUS: HCPCS

## 2019-09-09 PROCEDURE — G8978 MOBILITY CURRENT STATUS: HCPCS

## 2019-09-09 PROCEDURE — G8979 MOBILITY GOAL STATUS: HCPCS

## 2019-09-09 PROCEDURE — G8987 SELF CARE CURRENT STATUS: HCPCS

## 2019-09-09 PROCEDURE — 99232 SBSQ HOSP IP/OBS MODERATE 35: CPT | Performed by: INTERNAL MEDICINE

## 2019-09-09 PROCEDURE — 82948 REAGENT STRIP/BLOOD GLUCOSE: CPT

## 2019-09-09 PROCEDURE — 97163 PT EVAL HIGH COMPLEX 45 MIN: CPT

## 2019-09-09 RX ORDER — DOCUSATE SODIUM 100 MG/1
100 CAPSULE, LIQUID FILLED ORAL 2 TIMES DAILY
Status: DISCONTINUED | OUTPATIENT
Start: 2019-09-09 | End: 2019-09-09

## 2019-09-09 RX ORDER — DOCUSATE SODIUM 100 MG/1
100 CAPSULE, LIQUID FILLED ORAL 2 TIMES DAILY
Status: DISCONTINUED | OUTPATIENT
Start: 2019-09-09 | End: 2019-09-11 | Stop reason: HOSPADM

## 2019-09-09 RX ORDER — POLYETHYLENE GLYCOL 3350 17 G/17G
17 POWDER, FOR SOLUTION ORAL DAILY
Status: DISCONTINUED | OUTPATIENT
Start: 2019-09-09 | End: 2019-09-11 | Stop reason: HOSPADM

## 2019-09-09 RX ADMIN — DOCUSATE SODIUM 100 MG: 100 CAPSULE, LIQUID FILLED ORAL at 11:22

## 2019-09-09 RX ADMIN — ACETAMINOPHEN 975 MG: 325 TABLET ORAL at 11:23

## 2019-09-09 RX ADMIN — LEVOTHYROXINE SODIUM 50 MCG: 50 TABLET ORAL at 05:52

## 2019-09-09 RX ADMIN — OXYCODONE HYDROCHLORIDE 5 MG: 5 TABLET ORAL at 11:01

## 2019-09-09 RX ADMIN — OXYCODONE HYDROCHLORIDE 5 MG: 5 TABLET ORAL at 16:04

## 2019-09-09 RX ADMIN — ACETAMINOPHEN 975 MG: 325 TABLET ORAL at 17:44

## 2019-09-09 RX ADMIN — HEPARIN SODIUM 5000 UNITS: 5000 INJECTION INTRAVENOUS; SUBCUTANEOUS at 05:53

## 2019-09-09 RX ADMIN — CLONIDINE HYDROCHLORIDE 0.2 MG: 0.1 TABLET ORAL at 23:06

## 2019-09-09 RX ADMIN — HEPARIN SODIUM 5000 UNITS: 5000 INJECTION INTRAVENOUS; SUBCUTANEOUS at 13:50

## 2019-09-09 RX ADMIN — ACETAMINOPHEN 975 MG: 325 TABLET ORAL at 05:52

## 2019-09-09 RX ADMIN — OXYCODONE HYDROCHLORIDE 5 MG: 5 TABLET ORAL at 23:13

## 2019-09-09 RX ADMIN — ZIPRASIDONE HCL 80 MG: 40 CAPSULE ORAL at 08:12

## 2019-09-09 RX ADMIN — INSULIN LISPRO 1 UNITS: 100 INJECTION, SOLUTION INTRAVENOUS; SUBCUTANEOUS at 11:51

## 2019-09-09 RX ADMIN — CEFEPIME HYDROCHLORIDE 1000 MG: 1 INJECTION, POWDER, FOR SOLUTION INTRAMUSCULAR; INTRAVENOUS at 11:00

## 2019-09-09 RX ADMIN — HEPARIN SODIUM 5000 UNITS: 5000 INJECTION INTRAVENOUS; SUBCUTANEOUS at 23:09

## 2019-09-09 RX ADMIN — HYDROXYZINE HYDROCHLORIDE 50 MG: 50 TABLET ORAL at 23:06

## 2019-09-09 RX ADMIN — CEFEPIME HYDROCHLORIDE 1000 MG: 1 INJECTION, POWDER, FOR SOLUTION INTRAMUSCULAR; INTRAVENOUS at 23:46

## 2019-09-09 RX ADMIN — ACETAMINOPHEN 975 MG: 325 TABLET ORAL at 23:05

## 2019-09-09 RX ADMIN — OXYCODONE HYDROCHLORIDE 5 MG: 5 TABLET ORAL at 03:06

## 2019-09-09 RX ADMIN — PANTOPRAZOLE SODIUM 40 MG: 40 TABLET, DELAYED RELEASE ORAL at 23:06

## 2019-09-09 RX ADMIN — ZIPRASIDONE HCL 80 MG: 40 CAPSULE ORAL at 23:06

## 2019-09-09 RX ADMIN — VITAMIN D, TAB 1000IU (100/BT) 500 UNITS: 25 TAB at 08:12

## 2019-09-09 RX ADMIN — DOCUSATE SODIUM 100 MG: 100 CAPSULE, LIQUID FILLED ORAL at 17:44

## 2019-09-09 RX ADMIN — BUPROPION HYDROCHLORIDE 300 MG: 150 TABLET, FILM COATED, EXTENDED RELEASE ORAL at 08:12

## 2019-09-09 RX ADMIN — INSULIN LISPRO 1 UNITS: 100 INJECTION, SOLUTION INTRAVENOUS; SUBCUTANEOUS at 23:06

## 2019-09-09 RX ADMIN — CLONIDINE HYDROCHLORIDE 0.1 MG: 0.1 TABLET ORAL at 08:12

## 2019-09-09 RX ADMIN — POLYETHYLENE GLYCOL 3350 17 G: 17 POWDER, FOR SOLUTION ORAL at 11:22

## 2019-09-09 NOTE — PLAN OF CARE
Problem: OCCUPATIONAL THERAPY ADULT  Goal: Performs self-care activities at highest level of function for planned discharge setting  See evaluation for individualized goals  Description  Treatment Interventions: ADL retraining, Functional transfer training, Endurance training, Patient/family training, Equipment evaluation/education, Compensatory technique education, Energy conservation, Activityengagement  Equipment Recommended: Bedside commode       See flowsheet documentation for full assessment, interventions and recommendations  Note:   Limitation: Decreased ADL status, Decreased Safe judgement during ADL, Decreased endurance, Decreased self-care trans, Decreased high-level ADLs  Prognosis: Fair  Assessment: Pt is a 38 y o  female who was admitted to [facility] on 9/6/2019 with 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, with bone involvement without evidence of necrosis ([facility] ) s/p Left partial 3rd ray resection with flap on 9/8/19 and now NWB to LLE, aniexty, manic bipolar I disorder, tobacco use, pyogenic inflammation of bone, morbid obesity, diabetic foot ulcer  Pt's problem list also includes PMH of history of bariatric surgery, cellulitis of left foot, diabetes type 2, smoking cessation  At baseline pt was completing ADl;s/IADL's with I, no AD with functional ambulation Pt lives alone in a 4th floor apartment with elevator access  Currently pt requires min a  for overall ADLS and min a for functional mobility/transfers  Pt currently presents with impairments in the following categories -limited home support, difficulty performing ADLS, difficulty performing IADLS , limited insight into deficits, compliance and health management  activity tolerance, endurance, standing balance/tolerance, insight, safety  and judgement    These impairments, as well as pt's fatigue, decreased caregiver support, risk for falls and home environment  limit pt's ability to safely engage in all baseline areas of occupation, includingbathing, dressing, toileting, functional mobility/transfers, community mobility, laundry , driving, house maintenance, medication management, meal prep and cleaning From OT standpoint, recommend home OT services upon D/C  OT will continue to follow to address the below stated goals        OT Discharge Recommendation: Home OT  OT - OK to Discharge: Yes(when medically cleared)

## 2019-09-09 NOTE — PLAN OF CARE
Problem: PHYSICAL THERAPY ADULT  Goal: Performs mobility at highest level of function for planned discharge setting  See evaluation for individualized goals  Description  Treatment/Interventions: Functional transfer training, LE strengthening/ROM, Therapeutic exercise, Endurance training, Cognitive reorientation, Patient/family training, Equipment eval/education, Bed mobility, Gait training, Spoke to nursing, Spoke to case management  Equipment Recommended: Wheelchair, RaySat  Knee scooter for increased mobility)       See flowsheet documentation for full assessment, interventions and recommendations  Note:   Prognosis: Good  Problem List: Decreased strength, Decreased endurance, Decreased range of motion, Impaired balance, Decreased coordination, Decreased mobility, Decreased cognition, Decreased safety awareness, Decreased skin integrity, Orthopedic restrictions  Assessment: Pt  is a 39 yo F who presents s/p L partial 3rd Ray Resection with Flap  Dx: Diabetic foot ulcer of Midfoot  order placed for PT eval and tx, w/ activity order of NWB L LE  pt presents w/ comorbidities of Anxiety, mood disorder, cellulitis of L foot, hypothyroidism, manic bipolar 1 disorder, Tobacco abuse, DM2 and personal factors of mobilizing w/ assistive device, inability to ambulate household distances, inability to navigate community distances, inability to navigate level surfaces w/o external assistance, unable to perform dynamic tasks in community, limited home support, decreased initiation and engagement, impulsivity, unable to perform physical activity, inability to perform IADLs, inability to perform ADLs and inability to live alone  pt presents w/ weakness, decreased endurance, impaired balance, gait deviations, impaired coordination, decreased safety awareness, fall risk and LE edema   these impairments are evident in findings from physical examination (weakness and impaired coordination), mobility assessment (need for Lazaro assist w/ all phases of mobility when usually mobilizing independently, tolerance to only 0 feet of ambulation and need for cueing for mobility technique), and Barthel Index: 40/100  pt needed input for task focus and mobility technique  pt is at risk for falls due to physical and safety awareness deficits  pt's clinical presentation is unstable/unpredictable (evident in need for assist w/ all phases of mobility when usually mobilizing independently, tolerance to only 0 feet of ambulation, need for input for mobility technique, need for input for task focus and mobility technique and need for input for mobility technique/safety)  pt needs inpatient PT tx to improve mobility deficits  discharge recommendation is for home w/ family support and home PT to reduce fall risk and maximize level of functional independence  Recommendation: Home with family support, Home PT     PT - OK to Discharge: Yes    See flowsheet documentation for full assessment

## 2019-09-09 NOTE — ASSESSMENT & PLAN NOTE
Lab Results   Component Value Date    HGBA1C 7 9 (H) 09/06/2019       Recent Labs     09/08/19  1550 09/08/19  2101 09/09/19  0726 09/09/19  1105   POCGLU 114 158* 142* 158*       Blood Sugar Average: Last 72 hrs:  (P) 605 [id number]     Patient reports that she has been off insulin since March after her bariatric surgery  Does not take any insulin at home  At home, her blood sugar ranges between  per her  A1c of 7 9 noted  Continue with insulin sliding scale and Accu-Cheks  Consider discharging on metformin versus 5 units Lantus when otherwise stable  Follows up with endocrinologist at [facility]

## 2019-09-09 NOTE — ASSESSMENT & PLAN NOTE
· Status post partial 3rd ray resection with filleted flap on 9/8  · Cultures from 09/06 are showing beta-hemolytic strep group B and Klebsiella oxytoca that is intermediate to cefazolin  · Cultures from 9/8 are growing gram-positive cocci in pairs  · Blood cultures are negative for 48 hours  · Follow final culture results  · Continue IV cefepime per Infectious Disease for at least 48 hours postop  Management of antibiotics per Infectious Diseases    · Wound care per primary team

## 2019-09-09 NOTE — PROGRESS NOTES
Progress Note - Infectious Disease   Ellen Powellfast 38 y o  female MRN: [id number]  Unit/Bed#: [facility] 815-01 Encounter: [id number]      Impression:  1  Infected DM left foot ulcer with osteomyelitis of the 3rd metatarsal head and septic MTP arthritis with secondary cellulitis  S/P partial 3rd ray resection with filleted flap POD 1    Recommendations  Patient was seen postop after her left partial 3rd ray resection with filleted finger/toe flap  1  Check final wound cultures from  better showing 2+ beta-hemolytic strep group B and a gram-negative claudia that is likely going to turn out to be Klebsiella oxytoca     Cultures from  are showing beta-hemolytic strep group B and Klebsiella oxytoca that is intermediate to cefazolin  2   For now will continue cefepime 1 g q  12 hours IV   Will possibly be able to discontinue all antibiotics 48 hours postop if patient is stable      Antibiotics:  1  Cefepime 1 g q 12 hours IV, day 3 total Rx     Subjective: The patient still has some postoperative pain  Denies fevers, chills, or sweats  Denies nausea, vomiting, or diarrhea  Objective:  Vitals:  Temp:  [97 8 °F (36 6 °C)-98 6 °F (37 °C)] 98 1 °F (36 7 °C)  HR:  [67-70] 67  Resp:  [16] 16  BP: (111-119)/(73-84) 119/73  SpO2:  [94 %-96 %] 94 %  Temp (24hrs), Av 2 °F (36 8 °C), Min:97 8 °F (36 6 °C), Max:98 6 °F (37 °C)  Current: Temperature: 98 1 °F (36 7 °C)    Physical Exam:     General Appearance:  Alert, nontoxic, no acute distress  Throat: Oropharynx moist without lesions  Lips, mucosa, and tongue normal   Neck: Supple, symmetrical, trachea midline, no adenopathy,  no tenderness/mass/nodules   Lungs:   Clear to auscultation bilaterally, no audible wheezes, rhonchi or rales; respirations unlabored   Heart:  Regular rate and rhythm, S1, S2 normal, no murmur, rub or gallop   Abdomen:   Soft, non-tender, non-distended, positive bowel sounds    No masses, no organomegaly    No CVA tenderness   Extremities: Left foot pictures noted  There may be some necrotic tissue at the plantar aspect of the wound   Skin: As above  Invasive Devices     Peripheral Intravenous Line            Peripheral IV 09/06/19 Left Antecubital 3 days                Labs, Imaging, & Other studies:   All pertinent labs were personally reviewed  Results from last 7 days   Lab Units 09/09/19  0526 09/08/19  0545 09/07/19  0603   WBC Thousand/uL 8 52 9 94 10 09   HEMOGLOBIN g/dL 12 7 13 9 13 1   PLATELETS Thousands/uL 374 395* 373     Results from last 7 days   Lab Units 09/09/19  0526 09/08/19  0545 09/07/19  0603 09/06/19  1732   SODIUM mmol/L 134* 134* 136 135*   POTASSIUM mmol/L 4 1 4 0 3 9 4 2   CHLORIDE mmol/L 102 102 103 101   CO2 mmol/L 25 25 25 26   BUN mg/dL 9 9 9 10   CREATININE mg/dL 0 84 0 76 0 76 0 79   EGFR ml/min/1 73sq m 85 96 96 92   CALCIUM mg/dL 9 2 9 0 9 4 10 1   AST U/L  --   --   --  12   ALT U/L  --   --   --  25   ALK PHOS U/L  --   --   --  216*     Results from last 7 days   Lab Units 09/08/19  0834 09/08/19  0833 09/06/19  1341 09/06/19  1324 09/06/19  1252   BLOOD CULTURE   --   --  No Growth at 72 hrs   No Growth at 72 hrs   --    GRAM STAIN RESULT  1+ Polys*  1+ Gram positive cocci in pairs* 1+ Polys*  1+ Gram positive cocci in pairs*  --   --  No polys seen*  3+ Gram positive cocci in pairs and chains*  2+ Gram negative rods*   WOUND CULTURE   --   --   --   --  3+ Growth of Beta Hemolytic Streptococcus Group B*  2+ Growth of Klebsiella oxytoca*  2+ Growth of

## 2019-09-09 NOTE — PLAN OF CARE
Problem: Potential for Falls  Goal: Patient will remain free of falls  Description  INTERVENTIONS:  - Assess patient frequently for physical needs  -  Identify cognitive and physical deficits and behaviors that affect risk of falls  -  [place] fall precautions as indicated by assessment   - Educate patient/family on patient safety including physical limitations  - Instruct patient to call for assistance with activity based on assessment  - Modify environment to reduce risk of injury  - Consider OT/PT consult to assist with strengthening/mobility  Outcome: Progressing     Problem: Nutrition/Hydration-ADULT  Goal: Nutrient/Hydration intake appropriate for improving, restoring or maintaining nutritional needs  Description  Monitor and assess patient's nutrition/hydration status for malnutrition  Collaborate with interdisciplinary team and initiate plan and interventions as ordered  Monitor patient's weight and dietary intake as ordered or per policy  Utilize nutrition screening tool and intervene as necessary  Determine patient's food preferences and provide high-protein, high-caloric foods as appropriate       INTERVENTIONS:  - Monitor oral intake, urinary output, labs, and treatment plans  - Assess nutrition and hydration status and recommend course of action  - Evaluate amount of meals eaten  - Assist patient with eating if necessary   - Allow adequate time for meals  - Recommend/ encourage appropriate diets, oral nutritional supplements, and vitamin/mineral supplements  - Order, calculate, and assess calorie counts as needed  - Recommend, monitor, and adjust tube feedings and TPN/PPN based on assessed needs  - Assess need for intravenous fluids  - Provide specific nutrition/hydration education as appropriate  - Include patient/family/caregiver in decisions related to nutrition  Outcome: Progressing     Problem: Prexisting or High Potential for Compromised Skin Integrity  Goal: Skin integrity is maintained or improved  Description  INTERVENTIONS:  - Identify patients at risk for skin breakdown  - Assess and monitor skin integrity  - Assess and monitor nutrition and hydration status  - Monitor labs   - Assess for incontinence   - Turn and reposition patient  - Assist with mobility/ambulation  - Relieve pressure over bony prominences  - Avoid friction and shearing  - Provide appropriate hygiene as needed including keeping skin clean and dry  - Evaluate need for skin moisturizer/barrier cream  - Collaborate with interdisciplinary team   - Patient/family teaching  - Consider wound care consult   Outcome: Progressing

## 2019-09-09 NOTE — CONSULTS
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, with bone involvement without evidence of necrosis [facility])  Assessment & Plan  · Status post partial 3rd ray resection with filleted flap  on 9/8  · Cultures from 09/06 are showing beta-hemolytic strep group B and Klebsiella oxytoca that is intermediate to cefazolin  · Cultures from 9/8 are growing gram-positive cocci in pairs  · Blood cultures are negative for 48 hours  · Follow final culture results  · Continue IV cefepime per Infectious Disease for at least 48 hours postop  Management of antibiotics per Infectious Diseases  · Wound care per primary team     Diabetes 1 5, managed as type 2 [facility])  Assessment & Plan  Lab Results   Component Value Date    HGBA1C 7 9 (H) 09/06/2019       Recent Labs     09/08/19  1550 09/08/19  2101 09/09/19  0726 09/09/19  1105   POCGLU 114 158* 142* 158*       Blood Sugar Average: Last 72 hrs:  (P) 285 [id number]     Patient reports that she has been off insulin since March after her bariatric surgery  Does not take any insulin at home  At home, her blood sugar ranges between  per her  A1c of 7 9 noted  Continue with insulin sliding scale and Accu-Cheks  Consider discharging on metformin versus 5 units Lantus when otherwise stable  Follows up with endocrinologist at [facility]  Tobacco abuse  Assessment & Plan  - the patient reports smoking 6 to 7 cigarettes daily, she declines nicotine patch as it contains latex which she has an allergy to  Manic bipolar I disorder ([facility] [address] )  Assessment & Plan  - continue home medications including Atarax 50 mg q h s , Geodon 80 mg b i d , clonidine 0 1 mg a m   And 0 2 mg p m , Wellbutrin 300 mg daily  - the patient reports that she is no longer taking Lexapro    Hypothyroidism  Assessment & Plan  - continue home regimen of levothyroxine 50 mcg daily    Morbid obesity ([address] )  Assessment & Plan  In setting of recent bariatric surgery  - continue diet consultation pt needs ongoing education   - lives alone   - dietary consult     Anxiety  Assessment & Plan  Well controlled on Atarax 50 mg at bedtime and clonidine        [facility] Consultation Follow Up Progress Note  Patient: Nicolle Cost 38 y o  female   MRN: [id number]  PCP: Belen Zuñiga DO  Unit/Bed#: [facility] 815-01 Encounter: [id number]  Date Of Visit: 19    Primary Service: Eldon Null DPM  Reason for Consultation:  Medical management    Assessment & Plan:    · As above    VTE Pharmacologic Prophylaxis: Heparin / sequential compression device    Education and Discussions with Family / Patient:  Discussed plan of care with the patient    Time Spent for Care: 30 minutes  More than 50% of total time spent on counseling and coordination of care as described above  Subjective:   No overnight events reported  Patient denies any acute discomfort  Blood sugars ranging between 120-160  No hypoglycemia events noted  Patient reports that she has not been using any insulin since March after bariatric surgery and her blood sugar at home ranges between   Objective:     Vitals:   Temp (24hrs), Av 4 °F (36 9 °C), Min:97 8 °F (36 6 °C), Max:98 7 °F (37 1 °C)    Temp:  [97 8 °F (36 6 °C)-98 7 °F (37 1 °C)] 97 8 °F (36 6 °C)  HR:  [69-78] 70  Resp:  [16] 16  BP: (103-117)/(65-88) 111/79  SpO2:  [89 %-96 %] 96 %  Body mass index is 41 27 kg/m²  Input and Output Summary (last 24 hours): Intake/Output Summary (Last 24 hours) at 2019 1334  Last data filed at 2019 1250  Gross per 24 hour   Intake 1650 ml   Output 3575 ml   Net -1925 ml       Physical Exam:     Physical Exam   Constitutional: No distress     Eyes: Pupils are equal, round, and reactive to light  Cardiovascular: Normal rate, regular rhythm and normal heart sounds  No murmur heard  Pulmonary/Chest: Effort normal and breath sounds normal  No respiratory distress  She has no wheezes  She has no rales  Abdominal: Soft  Bowel sounds are normal  She exhibits no distension  There is no tenderness  Musculoskeletal: She exhibits no edema  Left lower extremity dressing noted without any active discharge   Neurological: She is alert  Awake and communicative  Nonfocal   Skin: Skin is warm  Additional Data:     Results from last 7 days   Lab Units 09/09/19  0526  09/06/19  1732   WBC Thousand/uL 8 52   < > 14 75*   HEMOGLOBIN g/dL 12 7   < > 13 8   HEMATOCRIT % 40 3   < > 42 3   PLATELETS Thousands/uL 374   < > 426*   NEUTROS PCT %  --   --  62   LYMPHS PCT %  --   --  27   MONOS PCT %  --   --  6   EOS PCT %  --   --  4    < > = values in this interval not displayed  Results from last 7 days   Lab Units 09/09/19  0526  09/06/19  1732   POTASSIUM mmol/L 4 1   < > 4 2   CHLORIDE mmol/L 102   < > 101   CO2 mmol/L 25   < > 26   BUN mg/dL 9   < > 10   CREATININE mg/dL 0 84   < > 0 79   CALCIUM mg/dL 9 2   < > 10 1   ALK PHOS U/L  --   --  216*   ALT U/L  --   --  25   AST U/L  --   --  12    < > = values in this interval not displayed  * I Have Reviewed All Lab Data Listed Above  * Additional Pertinent Lab Tests Reviewed: All Labs Within Last 24 Hours Reviewed    Imaging: I have personally reviewed pertinent reports        Last 24 Hours Medication List:     Current Facility-Administered Medications:  acetaminophen 975 mg Oral Q6H [address] Alma Ghazi, DPM    albuterol 2 puff Inhalation Q6H PRN Alma Ghazi, DPM    buPROPion 300 mg Oral Daily Alma Ghazi, DPM    cefepime 1,000 mg Intravenous Q12H Alma Ghazi, DPM Last Rate: Stopped (09/09/19 1200)   cholecalciferol 500 Units Oral Daily Alma Ghazi, DPM    cloNIDine 0 1 mg Oral Daily Kathi Cheri Hernandez, DPM    cloNIDine 0 2 mg Oral HS Kelly Woodbridge, DPM    diphenhydrAMINE 25 mg Oral Q6H PRN Kelly Woodbridge, DPM    docusate sodium 100 mg Oral BID Kelly Woodbridge, DPM    heparin (porcine) 5,000 Units Subcutaneous [facility] Kelly Woodbridge, DPM    hydrOXYzine HCL 50 mg Oral HS Kelly Woodbridge, DPM    insulin lispro 1-6 Units Subcutaneous TID AC Jassie Marco, DPM    insulin lispro 1-6 Units Subcutaneous HS Kelly Woodbridge, DPM    levothyroxine 50 mcg Oral Early Morning Kelly Woodbridge, DPM    ondansetron 4 mg Intravenous Q6H PRN Kelly Woodbridge, DPM    oxyCODONE 5 mg Oral Q4H PRN Kelly Woodbridge, DPM    pantoprazole 40 mg Oral Daily Kelly Woodbridge, DPM    polyethylene glycol 17 g Oral Daily Kelly Woodbridge, DPM    SUMAtriptan 6 mg Subcutaneous Once PRN Kelly Woodbridge, DPM    traMADol 50 mg Oral Q6H PRN Kelly Woodbridge, DPM    ziprasidone 80 mg Oral BID With Meals Kelly Woodbridge, DPM         Today, Patient Was Seen By: Jose Colbert MD    ** Please Note: This note has been constructed using a voice recognition system   **

## 2019-09-09 NOTE — PHYSICAL THERAPY NOTE
PHYSICAL THERAPY Evaluation NOTE    Patient Name: Jorge Vasquez  ATQIB'Q Date: 9/9/2019     AGE:   38 y o  Mrn:   [id number]  ADMIT DX:  Left foot infection [L08 9]    Past Medical History:   Diagnosis Date    Anxiety     Arthritis     Asthma     Chronic pain     [facility]     Disease of thyroid gland     Lymphedema     left leg    Manic bipolar I disorder ([facility] )     Morbid obesity (HCC)     OCD (obsessive compulsive disorder)     PTSD (post-traumatic stress disorder)      Length Of Stay: 3  PHYSICAL THERAPY EVALUATION :    09/09/19 1256   Note Type   Note type Eval only   Pain Assessment   Pain Assessment 0-10   Pain Score No Pain   Home Living   Type of Home Apartment  (4th floor, elevator access)   Home Layout One level   Bathroom Shower/Tub Tub/shower unit   Bathroom Toilet Standard   Bathroom Equipment Grab bars in shower; Shower chair   Bathroom Accessibility Accessible   Additional Comments Pt  lives alone in a 4th floor apartment with elevator access   Prior Function   Level of [place] Independent with ADLs and functional mobility   Lives With Alone   Receives Help From Family   ADL Assistance Independent   IADLs Independent   Falls in the last 6 months 0   Vocational Unemployed   Comments PTA, Pt  reports INDEP with ADLs, IADLs and functional mobility without AD   Restrictions/Precautions   Weight Bearing Precautions Per Order Yes   LLE Weight Bearing Per Order NWB   Other Precautions WBS; Fall Risk; Chair Alarm; Bed Alarm; Impulsive   General   Additional Pertinent History Pt  is a 37 yo F who presents s/p L partial 3rd Ray Resection with Flap  Dx: Diabetic foot ulcer of Midfoot  Comorbiditites include, Anxiety, mood disorder, cellulitis of L foot, hypothyroidism, manic bipolar 1 disorder, Tobacco abuse, DM2      Family/Caregiver Present No   Cognition   Overall Cognitive Status Impaired Arousal/Participation Cooperative   Orientation Level Oriented X4   Memory Decreased recall of precautions   Following Commands Follows one step commands inconsistently   Comments Pt  identified with full name and birthdate, noted to be impulsive with all functional tasks   RLE Assessment   RLE Assessment   (functionally > 3+/5)   LLE Assessment   LLE Assessment   (functioanlly > 3+/5)   Coordination   Movements are Fluid and Coordinated 0   Coordination and Movement Description impulsive and unsteady   Sensation WFL   Light Touch   RLE Light Touch Grossly intact   LLE Light Touch Grossly intact   Bed Mobility   Supine to Sit 6  Modified independent   Additional items HOB elevated; Bedrails   Sit to Supine Unable to assess   Transfers   Sit to Stand 5  Supervision   Additional items Assist x 1; Increased time required;Verbal cues  (for hand placement and sequencing)   Stand to Sit 5  Supervision   Additional items Assist x 1; Impulsive;Verbal cues  (to reach back to control descent)   Stand pivot 4  Minimal assistance   Additional items Assist x 1; Increased time required;Verbal cues  (for hand placement and sequenicng)   Balance   Static Sitting Fair +   Dynamic Sitting Fair   Static Standing Fair -   Dynamic Standing Fair -   Ambulatory Fair -   Endurance Deficit   Endurance Deficit Yes   Endurance Deficit Description postural and gait degradation noted with faitgue   Activity Tolerance   Activity Tolerance Patient limited by fatigue   Medical Staff Made Aware Spoke to CM and OT   Nurse Made Aware Spoke to RN   Assessment   Prognosis Good   Problem List Decreased strength;Decreased endurance;Decreased range of motion; Impaired balance;Decreased coordination;Decreased mobility; Decreased cognition;Decreased safety awareness;Decreased skin integrity;Orthopedic restrictions   Assessment Pt  is a 39 yo F who presents s/p L partial 3rd Ray Resection with Flap  Dx: Diabetic foot ulcer of Midfoot   order placed for PT eval and tx, w/ activity order of NWB L LE  pt presents w/ comorbidities of Anxiety, mood disorder, cellulitis of L foot, hypothyroidism, manic bipolar 1 disorder, Tobacco abuse, DM2 and personal factors of mobilizing w/ assistive device, inability to ambulate household distances, inability to navigate community distances, inability to navigate level surfaces w/o external assistance, unable to perform dynamic tasks in community, limited home support, decreased initiation and engagement, impulsivity, unable to perform physical activity, inability to perform IADLs, inability to perform ADLs and inability to live alone  pt presents w/ weakness, decreased endurance, impaired balance, gait deviations, impaired coordination, decreased safety awareness, fall risk and LE edema  these impairments are evident in findings from physical examination (weakness and impaired coordination), mobility assessment (need for Lazaro assist w/ all phases of mobility when usually mobilizing independently, tolerance to only 0 feet of ambulation and need for cueing for mobility technique), and Barthel Index: 40/100  pt needed input for task focus and mobility technique  pt is at risk for falls due to physical and safety awareness deficits  pt's clinical presentation is unstable/unpredictable (evident in need for assist w/ all phases of mobility when usually mobilizing independently, tolerance to only 0 feet of ambulation, need for input for mobility technique, need for input for task focus and mobility technique and need for input for mobility technique/safety)  pt needs inpatient PT tx to improve mobility deficits  discharge recommendation is for home w/ family support and home PT to reduce fall risk and maximize level of functional independence  Goals   Patient Goals to get a Knee scooter so I can do more on my own   STG Expiration Date 09/19/19   Short Term Goal #1 pt will:  Increase bilateral LE strength 1/2 grade to facilitate independent mobility, Perform all bed mobility tasks independently to decrease fall risk factors, Perform all transfers w/ supervision to improve independence, Increase all balance 1/2 grade to decrease risk for falls and Improve Barthel Index score to 65 or greater to facilitate independence   Treatment Day 0   Plan   Treatment/Interventions Functional transfer training;LE strengthening/ROM; Therapeutic exercise; Endurance training;Cognitive reorientation;Patient/family training;Equipment eval/education; Bed mobility;Gait training;Spoke to nursing;Spoke to case management   PT Frequency   (3-6x/week)   Recommendation   Recommendation Home with family support;Home PT   Equipment Recommended Wheelchair;Walker  (vs  Knee scooter for increased mobility)   PT - OK to Discharge Yes   Additional Comments when medically approriate   Barthel Index   Feeding 10   Bathing 0   Grooming Score 0   Dressing Score 5   Bladder Score 0   Bowels Score 10   Toilet Use Score 5   Transfers (Bed/Chair) Score 10   Mobility (Level Surface) Score 0   Stairs Score 0   Barthel Index Score 40      Skilled PT recommended while in hospital and upon DC to progress pt toward treatment goals       Valarie Rockwell, PT, DPT 9/9/2019

## 2019-09-09 NOTE — PROGRESS NOTES
Progress Note - Podiatry  Levyjanessa Gould 38 y o  female MRN: [id number]  [facility] Encounter: [id number]    Assessment/Plan:  45yo female with left foot osteomyelitis and cellulitis s/p left partial 3rd ray resection with toe fillet flap (DOS 9/8/19)    1) Left foot OM of 3rd met head s/p partial 3rd ray resection with toe fillet flap  2) Diabetes mellitus with polyneuropathy  3) Hypothyroidism  4) Hx of bariatric surgery  5) Anxiety  6) Bipolar disorder    Plan:   - penrose drain removed today, packed with 1/2" plain packing, incision site appears well approximated with sutures intact, capillary refill time at fillet flap is brisk with no signs of necrosis at this time, some residual erythema noted  - patient to remain NWB on LLE during post-operative period, PT/OT consult noted, recommend home PT/OT with use of wheelchair to maintain safety while at home  - continue IV abx per ID, follow-up intra-op cultures from 9/8/19  - appreciate medial mgmt per SLIM  - dispo planning    Subjective/Objective   Chief Complaint: No chief complaint on file  Subjective: 38 y o  y/o female was seen and evaluated at bedside  No acute events overnight  Reports some pain to surgical foot but well controlled with current pain regiment  Reports one episode of vomiting due to lunch  States she's doing well otherwise  Blood pressure 111/79, pulse 70, temperature 97 8 °F (36 6 °C), resp  rate 16, height 5' 5" (1 651 m), weight 112 kg (248 lb), SpO2 96 %, not currently breastfeeding  ,Body mass index is 41 27 kg/m²      Invasive Devices     Peripheral Intravenous Line            Peripheral IV 09/06/19 Left Antecubital 3 days          Drain            Open Drain Left Foot 1 day                Physical Exam:   General: Alert, cooperative and no distress  Lungs: Non labored breathing  Heart: Regular rhythm and rate  Abdomen: non-tender, non-distended  Lower Extremities: left surgical incision appears well approximated with sutures intact, fillet flap appears well perfused, cap refill time is brisk to fillet flap with no signs of necrosis at this time, some residual erythema on dorsal foot noted                  Lab, Imaging and other studies:   I have personally reviewed pertinent lab results  Imaging: I have personally reviewed pertinent films in PACS  EKG, Pathology, and Other Studies: I have personally reviewed pertinent reports  VTE Pharmacologic Prophylaxis: Enoxaparin (Lovenox)    Portions of the record may have been created with voice recognition software  Occasional wrong word or "sound a like" substitutions may have occurred due to the inherent limitations of voice recognition software  Read the chart carefully and recognize, using context, where substitutions have occurred

## 2019-09-09 NOTE — PLAN OF CARE
Problem: Potential for Falls  Goal: Patient will remain free of falls  Description  INTERVENTIONS:  - Assess patient frequently for physical needs  -  Identify cognitive and physical deficits and behaviors that affect risk of falls    -  [place] fall precautions as indicated by assessment   - Educate patient/family on patient safety including physical limitations  - Instruct patient to call for assistance with activity based on assessment  - Modify environment to reduce risk of injury  - Consider OT/PT consult to assist with strengthening/mobility  Outcome: Progressing

## 2019-09-09 NOTE — DISCHARGE INSTRUCTIONS
Discharge Instructions - Podiatry    Weight Bearing Status:  NON weight bearing to left lower extremity                                           Pain: Continue analgesics as directed    F/u appointment Instructions: Please make an appointment within one week of discharge with Dr Nathanial Buerger   Contact sooner if any increase in pain, or signs of infection occur    Wound Care: change packing daily to left plantar foot with 1/2" plain packing, adaptic over incision and 4x4, ABD and kerlex, 4" ACE

## 2019-09-09 NOTE — OCCUPATIONAL THERAPY NOTE
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, with bone involvement without evidence of necrosis ([facility] )  Active Problems:    Anxiety    Morbid obesity ([facility] )    Hypothyroidism    Manic bipolar I disorder ([facility] )    Pre-operative examination    Tobacco abuse    Pyogenic inflammation of bone (HCC)    Diabetes 1 5, managed as type 2 ([facility] )    Past Medical History  Past Medical History:   Diagnosis Date    Anxiety     Arthritis     Asthma     Chronic pain     Diabetes mellitus ([facility] )     Disease of thyroid gland     Lymphedema     left leg    Manic bipolar I disorder ([facility] )     Morbid obesity ([facility] )     OCD (obsessive compulsive disorder)     PTSD (post-traumatic stress disorder)      Past Surgical History  Past Surgical History:   Procedure Laterality Date    CHOLECYSTECTOMY      CHOLECYSTECTOMY      [place] AMPUTATION METATARSAL+TOE,SINGLE Left 9/8/2019    Procedure: PARTIAL 3RD RAY RESECTION, PSB TOE FILLET FLAP;  Surgeon: Shyam Brennan DPM;  Location:  MAIN OR;  Service: Podiatry             09/09/19 1257   Note Type   Note type Eval only   Restrictions/Precautions   Weight Bearing Precautions Per Order Yes   LLE Weight Bearing Per Order NWB   Other Precautions WBS; Fall Risk;Pain;Telemetry;Contact/isolation;Cognitive; Impulsive   Pain Assessment   Pain Assessment No/denies pain   Pain Score No Pain   Home Living   Type of Home Apartment   Home Layout Elevator; One level  (4th floor apartment)   Bathroom Shower/Tub Tub/shower unit   Bathroom Toilet Standard   Bathroom Equipment Grab bars in shower; Shower chair   Bathroom Accessibility Accessible   Prior Function   Level of Young Independent with ADLs and functional mobility   Lives With Alone   Receives Help From Family   ADL Assistance Independent   IADLs Independent   Falls in the last 6 months 0   Vocational Unemployed   Lifestyle   Autonomy I ADl's/IADL's, no AD with functional ambulation, unemployed, +drives   Reciprocal Relationships family   Service to Others unemployed   [address] staying active   Psychosocial   Psychosocial (WDL) WDL   Subjective   Subjective pt agreeable to therapy and stating "I want a knee scooter the doctor said I should have one:"   ADL   Where Assessed Edge of bed   Eating Assistance 7  Independent   Grooming Assistance 7  [address]  Supervision/Setup   LB [address] 4  Minimal Assistance   LB [address] 5  Supervision/Setup   LB Dressing Assistance 5  Supervision/Setup   LB Dressing Deficit Don/doff R sock  (Right LE supported on bed)   Toileting Assistance  5  Supervision/Setup   Bed Mobility   Supine to Sit 6  Modified independent   Additional items HOB elevated   Transfers   Sit to Stand 5  Supervision   Additional items Assist x 1;Verbal cues; Impulsive  (+RW)   Stand to Sit 5  Supervision   Additional items Impulsive;Verbal cues   Stand pivot 4  Minimal assistance   Additional items Assist x 1; Increased time required;Verbal cues; Impulsive  (impulsive, verbal cues for safety/WBS-educated on safe/proper stand pivot transfer with RW)   Functional Mobility   Additional Comments unsafe to assess   Balance   Static Sitting Good   Dynamic Sitting Fair +   Static Standing Fair   Dynamic Standing Fair -   Ambulatory Poor +   Activity Tolerance   Activity Tolerance Patient limited by fatigue   Medical Staff Made Aware CM, PT   Nurse Made Aware RN cleared pt for therapy   RUE Assessment   RUE Assessment WFL   LUE Assessment   LUE Assessment WFL   Hand Function   Gross Motor Coordination Functional   Fine Motor Coordination Functional   Vision-Basic Assessment   Current Vision Wears glasses all the time   Cognition   Overall Cognitive Status Impaired   Arousal/Participation Alert; Responsive   Attention Difficulty attending to directions   Orientation Level Oriented X4   Memory Decreased recall of precautions   Following Commands Follows one step commands inconsistently   Comments Decreased safety awareness/judgement with impulsivity during mobility tasks and decreased insight with fair (-) ability to maintain WBS with mobility tasks  (Suspect pt is impulsive 2* to baseline [place] level)   Assessment   Limitation Decreased ADL status; Decreased Safe judgement during ADL;Decreased endurance;Decreased self-care trans;Decreased high-level ADLs   Prognosis Fair   Assessment Pt is a 38 y o  female who was [facility] to [facility] on [facility]/6/2019 with 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, with bone involvement without evidence of necrosis ([facility] ) s/p Left partial 3rd ray resection with flap on 9/8/19 and now NWB to LLE, aniexty, manic bipolar I disorder, tobacco use, pyogenic inflammation of bone, morbid obesity, diabetic foot ulcer  Pt's problem list also includes PMH of history of bariatric surgery, cellulitis of left foot, diabetes type 2, smoking cessation  At baseline pt was completing ADl;s/IADL's with I, no AD with functional ambulation Pt lives alone in a 4th floor apartment with elevator access  Currently pt requires min a  for overall ADLS and min a for functional mobility/transfers  Pt currently presents with impairments in the following categories -limited home support, difficulty performing ADLS, difficulty performing IADLS , limited insight into deficits, compliance and health management  activity tolerance, endurance, standing balance/tolerance, insight, safety  and judgement    These impairments, as well as pt's fatigue, decreased caregiver support, risk for falls and home environment  limit pt's ability to safely engage in all baseline areas of occupation, includingbathing, dressing, toileting, functional mobility/transfers, community mobility, laundry , driving, house maintenance, medication management, meal prep and cleaning From OT standpoint, recommend home OT services upon D/C  OT will continue to follow to address the below stated goals  Goals   Patient Goals use a knee scooter   LTG Time Frame 10-14   Plan   Treatment Interventions ADL retraining;Functional transfer training; Endurance training;Patient/family training;Equipment evaluation/education; Compensatory technique education; Energy conservation; Activityengagement   Goal Expiration Date 09/23/19   OT Frequency 3-5x/wk   Recommendation   OT Discharge Recommendation Home OT   Equipment Recommended Bedside commode   OT - OK to Discharge Yes  (when medically cleared)   Barthel Index   Feeding 10   Bathing 0   Grooming Score 5   Dressing Score 5   Bladder Score 10   Bowels Score 10   Toilet Use Score 5   Transfers (Bed/Chair) Score 10   Mobility (Level Surface) Score 0   Stairs Score 0   Barthel Index Score 55   Modified Bi Scale   Modified Coffee Scale 4      Occupational Therapy Goals:    *Mod I with bed mobility to engage in functional tasks  *Mod I Adl's after setup with use of AE PRN  *Mod I toileting and clothing management   *Mod I transfers to/from all surfaces with Fair + dynamic balance and safety maintaining NWB to LLE  *OTR to assess functional mobility w/c or knee scooter level  *Assess DME needs   *Increase activity tolerance to 25-30 minutes for participation in adls and enjoyable activities  *Pt to participate in further cognitive testing with good attention and participation to assist with safe discharge and driving recommendations  *Mod I with Simulated IADL management task at w/c or knee scooter level    Julieth RAHMAN, OTR/L

## 2019-09-10 PROBLEM — E11.621 DIABETIC FOOT ULCER (HCC): Status: ACTIVE | Noted: 2019-09-10

## 2019-09-10 PROBLEM — E11.9 TYPE 2 DIABETES MELLITUS (HCC): Status: ACTIVE | Noted: 2019-09-09

## 2019-09-10 PROBLEM — L97.509 DIABETIC FOOT ULCER (HCC): Status: ACTIVE | Noted: 2019-09-10

## 2019-09-10 LAB
ANION GAP SERPL CALCULATED.3IONS-SCNC: 4 MMOL/L (ref 4–13)
BUN SERPL-MCNC: 13 MG/DL (ref 5–25)
CALCIUM SERPL-MCNC: 9.5 MG/DL (ref 8.3–10.1)
CHLORIDE SERPL-SCNC: 102 MMOL/L (ref 100–108)
CO2 SERPL-SCNC: 28 MMOL/L (ref 21–32)
CREAT SERPL-MCNC: 0.87 MG/DL (ref 0.6–1.3)
GFR SERPL CREATININE-BSD FRML MDRD: 82 ML/MIN/1.73SQ M
GLUCOSE SERPL-MCNC: 131 MG/DL (ref 65–140)
GLUCOSE SERPL-MCNC: 133 MG/DL (ref 65–140)
GLUCOSE SERPL-MCNC: 147 MG/DL (ref 65–140)
GLUCOSE SERPL-MCNC: 160 MG/DL (ref 65–140)
GLUCOSE SERPL-MCNC: 166 MG/DL (ref 65–140)
POTASSIUM SERPL-SCNC: 4.4 MMOL/L (ref 3.5–5.3)
SODIUM SERPL-SCNC: 134 MMOL/L (ref 136–145)

## 2019-09-10 PROCEDURE — 99232 SBSQ HOSP IP/OBS MODERATE 35: CPT | Performed by: INTERNAL MEDICINE

## 2019-09-10 PROCEDURE — 97116 GAIT TRAINING THERAPY: CPT

## 2019-09-10 PROCEDURE — 82948 REAGENT STRIP/BLOOD GLUCOSE: CPT

## 2019-09-10 PROCEDURE — 99024 POSTOP FOLLOW-UP VISIT: CPT | Performed by: PODIATRIST

## 2019-09-10 PROCEDURE — 80048 BASIC METABOLIC PNL TOTAL CA: CPT | Performed by: INTERNAL MEDICINE

## 2019-09-10 RX ORDER — GABAPENTIN 100 MG/1
100 CAPSULE ORAL ONCE
Status: COMPLETED | OUTPATIENT
Start: 2019-09-10 | End: 2019-09-10

## 2019-09-10 RX ORDER — LANOLIN ALCOHOL/MO/W.PET/CERES
3 CREAM (GRAM) TOPICAL
Status: DISCONTINUED | OUTPATIENT
Start: 2019-09-10 | End: 2019-09-11 | Stop reason: HOSPADM

## 2019-09-10 RX ADMIN — CEFEPIME HYDROCHLORIDE 1000 MG: 1 INJECTION, POWDER, FOR SOLUTION INTRAMUSCULAR; INTRAVENOUS at 10:34

## 2019-09-10 RX ADMIN — ACETAMINOPHEN 975 MG: 325 TABLET ORAL at 17:51

## 2019-09-10 RX ADMIN — PANTOPRAZOLE SODIUM 40 MG: 40 TABLET, DELAYED RELEASE ORAL at 21:05

## 2019-09-10 RX ADMIN — ACETAMINOPHEN 975 MG: 325 TABLET ORAL at 23:20

## 2019-09-10 RX ADMIN — HEPARIN SODIUM 5000 UNITS: 5000 INJECTION INTRAVENOUS; SUBCUTANEOUS at 13:42

## 2019-09-10 RX ADMIN — CLONIDINE HYDROCHLORIDE 0.1 MG: 0.1 TABLET ORAL at 08:55

## 2019-09-10 RX ADMIN — ACETAMINOPHEN 975 MG: 325 TABLET ORAL at 11:37

## 2019-09-10 RX ADMIN — HYDROXYZINE HYDROCHLORIDE 50 MG: 50 TABLET ORAL at 21:04

## 2019-09-10 RX ADMIN — OXYCODONE HYDROCHLORIDE 5 MG: 5 TABLET ORAL at 21:04

## 2019-09-10 RX ADMIN — VITAMIN D, TAB 1000IU (100/BT) 500 UNITS: 25 TAB at 08:55

## 2019-09-10 RX ADMIN — CEFEPIME HYDROCHLORIDE 1000 MG: 1 INJECTION, POWDER, FOR SOLUTION INTRAMUSCULAR; INTRAVENOUS at 21:04

## 2019-09-10 RX ADMIN — HEPARIN SODIUM 5000 UNITS: 5000 INJECTION INTRAVENOUS; SUBCUTANEOUS at 05:07

## 2019-09-10 RX ADMIN — ZIPRASIDONE HCL 80 MG: 40 CAPSULE ORAL at 08:55

## 2019-09-10 RX ADMIN — INSULIN LISPRO 1 UNITS: 100 INJECTION, SOLUTION INTRAVENOUS; SUBCUTANEOUS at 12:12

## 2019-09-10 RX ADMIN — HEPARIN SODIUM 5000 UNITS: 5000 INJECTION INTRAVENOUS; SUBCUTANEOUS at 21:09

## 2019-09-10 RX ADMIN — ZIPRASIDONE HCL 80 MG: 40 CAPSULE ORAL at 21:04

## 2019-09-10 RX ADMIN — OXYCODONE HYDROCHLORIDE 5 MG: 5 TABLET ORAL at 16:24

## 2019-09-10 RX ADMIN — INSULIN LISPRO 1 UNITS: 100 INJECTION, SOLUTION INTRAVENOUS; SUBCUTANEOUS at 17:51

## 2019-09-10 RX ADMIN — BUPROPION HYDROCHLORIDE 300 MG: 150 TABLET, FILM COATED, EXTENDED RELEASE ORAL at 08:55

## 2019-09-10 RX ADMIN — OXYCODONE HYDROCHLORIDE 5 MG: 5 TABLET ORAL at 11:40

## 2019-09-10 RX ADMIN — MELATONIN 3 MG: 3 TAB ORAL at 01:39

## 2019-09-10 RX ADMIN — MELATONIN 3 MG: 3 TAB ORAL at 21:04

## 2019-09-10 RX ADMIN — GABAPENTIN 100 MG: 100 CAPSULE ORAL at 01:39

## 2019-09-10 RX ADMIN — LEVOTHYROXINE SODIUM 50 MCG: 50 TABLET ORAL at 05:07

## 2019-09-10 NOTE — ASSESSMENT & PLAN NOTE
· Status post partial 3rd ray resection with filleted flap on 9/8  · Polymicrobial  wound culture  · Blood cultures are negative for 72 hours    · On IV cefepime per ID  · Wound care per primary team

## 2019-09-10 NOTE — SOCIAL WORK
Cm reviewed patient during care coordination rounds with Podiatry  Patient anticipated for possible discharge today vs tomorrow pending ID clearance and DME status  Patient stated she did not hear from Dr Tomas Nieves office regarding status of insurance approval for knee scooter yet and did not hear update from [facility] regarding the same  PT worked with patient and stated patient is able to discharge home with Radha Kearney with family support and bariatric RW  Patient stated her friend Elvia Marie is living with her and is able to help with needs  Patient agreeable to pricing out bariatric RW and shower bench  Patient not agreeable to [facility]  Cm placed [facility] DME referral for bariatric RW and shower bench  Awaiting cost/coverage determination  Patient may need transport arranged vs patient's friend or son to transport  Cm following

## 2019-09-10 NOTE — PROGRESS NOTES
Progress Note - Penelope Chavez 1975, 38 y o  female MRN: [id number]    Unit/Bed#: [facility] 815-01 Encounter: [id number]    Primary Care Provider: Betzaida Graham DO   Date and time admitted to hospital: 9/6/2019 10:30 AM        Type 2 diabetes mellitus [facility])  Assessment & Plan  Lab Results   Component Value Date    HGBA1C 7 9 (H) 09/06/2019       Recent Labs     09/09/19  1604 09/09/19  2305 09/10/19  0728 09/10/19  1057   POCGLU 118 175* 133 166*       Blood Sugar Average: Last 72 hrs:  (P) 153 [id number]     Patient reports that she has been off insulin since March after her bariatric surgery  Does not take any insulin at home  At home, her blood sugar ranges between  per her  A1c of 7 9 noted  Continue with insulin sliding scale and Accu-Cheks  Patient does not want to take insulin at home  Plan  to discharge on metformin b i d  Follows up with endocrinologist at [facility]  Tobacco abuse  Assessment & Plan  Patient reports smoking 6 to 7 cigarettes daily, she declines nicotine patch as it contains latex which she has an allergy to  Manic bipolar I disorder (HCC)  Assessment & Plan  Continue home medications including Atarax 50 mg q h s , Geodon 80 mg b i d , clonidine 0 1 mg a m  And 0 2 mg p m , Wellbutrin 300 mg daily  Patient reports that she is no longer taking Lexapro    Morbid obesity ([facility] )  Assessment & Plan  S/p recent bariatric surgery  Continue supportive care    * 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, with bone involvement without evidence of necrosis ([facility] [address] )  Assessment & Plan  · Status post partial 3rd ray resection with filleted flap on 9/8  · Polymicrobial  wound culture  · Blood cultures are negative for 72 hours    · On IV cefepime per ID  · Wound care per primary team            VTE Pharmacologic Prophylaxis:   Pharmacologic: Heparin  Mechanical VTE Prophylaxis in Place: Yes    Patient Centered Rounds: I have performed bedside rounds with nursing staff today  Discussions with Specialists or Other Care Team Provider:     Education and Discussions with Family / Patient:  Patient    Time Spent for Care: 30 minutes  More than 50% of total time spent on counseling and coordination of care as described above  Current Length of Stay: 4 day(s)    Current Patient Status: Inpatient   Certification Statement: The patient will continue to require additional inpatient hospital stay due to Above      Code Status: Level 1 - Full Code      Subjective:   Patient seen and examined  Comfortable sitting in bed, eating lunch  No chest pain or shortness of breath    Objective:     Vitals:   Temp (24hrs), Av 1 °F (36 7 °C), Min:98 1 °F (36 7 °C), Max:98 1 °F (36 7 °C)    Temp:  [98 1 °F (36 7 °C)] 98 1 °F (36 7 °C)  HR:  [53-76] 53  Resp:  [16-18] 16  BP: (114-127)/(73-75) 127/75  SpO2:  [94 %-98 %] 98 %  Body mass index is 41 27 kg/m²  Input and Output Summary (last 24 hours): Intake/Output Summary (Last 24 hours) at 9/10/2019 1216  Last data filed at 9/10/2019 0900  Gross per 24 hour   Intake 240 ml   Output 750 ml   Net -510 ml       Physical Exam:     Physical Exam  Patient is awake alert in no acute distress  Lung clear to auscultation bilateral  Heart positive S1-S2 no murmur  Abdomen soft nontender  Trace left lower extremity edema  Left foot heavily dressed    Additional Data:     Labs:    Results from last 7 days   Lab Units 19  0526  19  1732   WBC Thousand/uL 8 52   < > 14 75*   HEMOGLOBIN g/dL 12 7   < > 13 8   HEMATOCRIT % 40 3   < > 42 3   PLATELETS Thousands/uL 374   < > 426*   NEUTROS PCT %  --   --  62   LYMPHS PCT %  --   --  27   MONOS PCT %  --   --  6   EOS PCT %  --   --  4    < > = values in this interval not displayed       Results from last 7 days   Lab Units 09/10/19  0504  19  1732   POTASSIUM mmol/L 4 4   < > 4 2   CHLORIDE mmol/L 102   < > 101   CO2 mmol/L 28   < > 26   BUN mg/dL 13   < > 10   CREATININE mg/dL 0 87   < > 0 79   CALCIUM mg/dL 9 5   < > 10 1   ALK PHOS U/L  --   --  216*   ALT U/L  --   --  25   AST U/L  --   --  12    < > = values in this interval not displayed  * I Have Reviewed All Lab Data Listed Above  * Additional Pertinent Lab Tests Reviewed: [address] Admission Reviewed    Imaging:    Imaging Reports Reviewed Today Include:   Imaging Personally Reviewed by Myself Includes:      Recent Cultures (last 7 days):     Results from last 7 days   Lab Units 09/08/19  0834 09/08/19  0833 09/06/19  1341 09/06/19  1324 09/06/19  1252   BLOOD CULTURE   --   --  No Growth at 72 hrs   No Growth at 72 hrs   --    GRAM STAIN RESULT  1+ Polys*  1+ Gram positive cocci in pairs* 1+ Polys*  1+ Gram positive cocci in pairs*  --   --  No polys seen*  3+ Gram positive cocci in pairs and chains*  2+ Gram negative rods*   WOUND CULTURE   --   --   --   --  3+ Growth of Beta Hemolytic Streptococcus Group B*  2+ Growth of Klebsiella oxytoca*  2+ Growth of        Last 24 Hours Medication List:     Current Facility-Administered Medications:  acetaminophen 975 mg Oral Q6H [address] Leary Punch, DPM    albuterol 2 puff Inhalation Q6H PRN Leary Punch, DPM    buPROPion 300 mg Oral Daily Leary Punch, DPM    cefepime 1,000 mg Intravenous Q12H Leary Punch, DPM Last Rate: 1,000 mg (09/10/19 1034)   cholecalciferol 500 Units Oral Daily Leary Punch, DPM    cloNIDine 0 1 mg Oral Daily Leary Punch, DPM    cloNIDine 0 2 mg Oral HS Leary Punch, DPM    diphenhydrAMINE 25 mg Oral Q6H PRN Leary Punch, DPM    docusate sodium 100 mg Oral BID Leary Punch, DPM    heparin (porcine) 5,000 Units Subcutaneous [facility] Leary Punch, DPM    hydrOXYzine HCL 50 mg Oral HS Jassie Marco, DPM    insulin lispro 1-6 Units Subcutaneous TID AC Jassie Marco, DPM    insulin lispro 1-6 Units Subcutaneous HS Leary Punch, DPM    levothyroxine 50 mcg Oral Early Morning Frost Agreste, DPM    melatonin 3 mg Oral HS Mary ZORAIDA Berry    ondansetron 4 mg Intravenous Q6H PRN Frost Agreste, DPM    oxyCODONE 5 mg Oral Q4H PRN Frost Agreste, DPM    pantoprazole 40 mg Oral Daily Frost Agreste, DPM    polyethylene glycol 17 g Oral Daily Frost Agreste, DPM    SUMAtriptan 6 mg Subcutaneous Once PRN Frost Agreste, DPM    traMADol 50 mg Oral Q6H PRN Frost Agreste, DPM    ziprasidone 80 mg Oral BID With Meals Frost Agreste, DPM         Today, Patient Was Seen By: Esperanza Dolan DO    ** Please Note: This note has been constructed using a voice recognition system   **

## 2019-09-10 NOTE — ASSESSMENT & PLAN NOTE
Patient reports smoking 6 to 7 cigarettes daily, she declines nicotine patch as it contains latex which she has an allergy to

## 2019-09-10 NOTE — PHYSICAL THERAPY NOTE
PHYSICAL THERAPY Treatment NOTE  Patient Name: Ike Villatoro  LGNLO'R Date: 9/10/2019        09/10/19 1545   Pain Assessment   Pain Assessment 0-10   Pain Score 6   Pain Location Foot   Pain Orientation Left   Restrictions/Precautions   Weight Bearing Precautions Per Order Yes   LLE Weight Bearing Per Order NWB   Other Precautions WBS; Fall Risk;Telemetry;Contact/isolation;Cognitive; Impulsive   General   Chart Reviewed Yes   Additional Pertinent History Pt  is a 37 yo F who presents s/p 3rd Ray resection of LLE   Family/Caregiver Present No   Cognition   Overall Cognitive Status Impaired   Arousal/Participation Cooperative   Attention Attends with cues to redirect   Orientation Level Oriented X4   Memory Within functional limits   Following Commands Follows multistep commands with increased time or repetition   Comments Pt  was identified with full name and birthdate   Subjective   Subjective Pt  agreeable to PT session   Bed Mobility   Additional Comments unable to assess Pt  seated OOB in chair prior to and following PT session   Transfers   Sit to Stand 5  Supervision   Additional items Assist x 1; Increased time required;Verbal cues  (for hand placement and sequencing with WBS)   Stand to Sit 5  Supervision   Additional items Assist x 1; Impulsive;Verbal cues  (to reach back to control descent)   Ambulation/Elevation   Gait pattern   (RLE hoping)   Gait Assistance 4  Minimal assist   Additional items Assist x 1;Verbal cues  (for AD managment and posture)   Assistive Device Bariatric Rolling walker   Distance 30'x1   Balance   Static Sitting Good   Dynamic Sitting Fair +   Static Standing Fair   Dynamic Standing Fair -   Ambulatory Poor +   Endurance Deficit   Endurance Deficit Yes   Endurance Deficit Description Moderate SOB noted following ambulaiton   Activity Tolerance   Activity Tolerance Patient limited by fatigue   Medical Staff Made Aware Spoke to CM and OT   Nurse Made Aware Spoke to RN   Assessment   Prognosis Good   Problem List Decreased strength; Impaired balance;Decreased endurance;Decreased range of motion;Decreased mobility; Decreased coordination;Decreased cognition;Decreased safety awareness;Decreased skin integrity;Orthopedic restrictions   Assessment Pt  is a 39 yo F who presents s/p 3rd Ray resection of LLE  NWB LLE  Pt  was identified with full name and birthdate  Pt  agreeable to PT session  Pt  Demonstrated improved independence with functional mobility and increased ability to perform gait training with bariatric RW and hoping for means household mobility  Pt  Demonstrated NWB of LLE throughout entirety of session  Pt  Will benefit from continued skilled therapy to increase functional independence and aid patient in return to PLOF with decreased burden of care  Goals   Patient Goals to get home   STG Expiration Date 09/19/19   Treatment Day 1   Plan   Treatment/Interventions Functional transfer training;LE strengthening/ROM; Therapeutic exercise; Endurance training;Cognitive reorientation;Patient/family training;Equipment eval/education; Bed mobility;Gait training;Spoke to nursing;Spoke to case management;OT   Progress Progressing toward goals   PT Frequency   (3-6x/week)   Recommendation   Recommendation Home with family support   Equipment Recommended Walker  (Bariatric RW)   PT - OK to Discharge Yes   Additional Comments when medically appropriate     Alexey Zhong, PT, DPT 9/10/2019

## 2019-09-10 NOTE — PROGRESS NOTES
Progress Note - Infectious Disease   Janis Fajardo 38 y o  female MRN: [id number]  Unit/Bed#: [facility] 815-01 Encounter: [id number]      Impression:  1  Infected DM left foot ulcer with osteomyelitis of the 3rd metatarsal head and septic MTP arthritis with secondary cellulitis  S/P partial 3rd ray resection with filleted flap POD 2    Recommendations  Currently denies pain  1  Check final wound cultures from  better showing 2+ beta-hemolytic strep group B and 1+  Klebsiella oxytoca  And enterococcus faecalis  Cultures from  are showing beta-hemolytic strep group B and Klebsiella oxytoca that is intermediate to cefazolin  2   With wound healing well will not cover the Enterococcus  For now will continue cefepime 1 g q  12 hours IV until discharge in a m  3  Patient seen and discussed with Podiatry service     Antibiotics:  1  Cefepime 1 g q 12 hours IV, day 4 total Rx     Subjective:  Pain currently controlled  Denies fevers, chills, or sweats  Denies nausea, vomiting, or diarrhea  Objective:  Vitals:  Temp:  [97 9 °F (36 6 °C)-98 1 °F (36 7 °C)] 97 9 °F (36 6 °C)  HR:  [53-76] 61  Resp:  [16-18] 16  BP: ()/(63-75) 92/63  SpO2:  [94 %-98 %] 95 %  Temp (24hrs), Av °F (36 7 °C), Min:97 9 °F (36 6 °C), Max:98 1 °F (36 7 °C)  Current: Temperature: 97 9 °F (36 6 °C)    Physical Exam:     General Appearance:  Alert, nontoxic, no acute distress  Throat: Oropharynx moist without lesions  Lips, mucosa, and tongue normal   Neck: Supple, symmetrical, trachea midline, no adenopathy,  no tenderness/mass/nodules   Lungs:   Clear to auscultation bilaterally, no audible wheezes, rhonchi or rales; respirations unlabored   Heart:  Regular rate and rhythm, S1, S2 normal, no murmur, rub or gallop   Abdomen:   Soft, non-tender, non-distended, positive bowel sounds  No masses, no organomegaly    No CVA tenderness   Extremities: Left foot pictures noted  There is no evidence of acute infection       Skin: As above  Invasive Devices     Peripheral Intravenous Line            Peripheral IV 09/10/19 Left Forearm less than 1 day                Labs, Imaging, & Other studies:   All pertinent labs were personally reviewed  Results from last 7 days   Lab Units 09/09/19  0526 09/08/19  0545 09/07/19  0603   WBC Thousand/uL 8 52 9 94 10 09   HEMOGLOBIN g/dL 12 7 13 9 13 1   PLATELETS Thousands/uL 374 395* 373     Results from last 7 days   Lab Units 09/10/19  0504 09/09/19  0526 09/08/19  0545  09/06/19  1732   SODIUM mmol/L 134* 134* 134*   < > 135*   POTASSIUM mmol/L 4 4 4 1 4 0   < > 4 2   CHLORIDE mmol/L 102 102 102   < > 101   CO2 mmol/L 28 25 25   < > 26   BUN mg/dL 13 9 9   < > 10   CREATININE mg/dL 0 87 0 84 0 76   < > 0 79   EGFR ml/min/1 73sq m 82 85 96   < > 92   CALCIUM mg/dL 9 5 9 2 9 0   < > 10 1   AST U/L  --   --   --   --  12   ALT U/L  --   --   --   --  25   ALK PHOS U/L  --   --   --   --  216*    < > = values in this interval not displayed  Results from last 7 days   Lab Units 09/08/19  0834 09/08/19  0833 09/06/19  1341 09/06/19  1324 09/06/19  1252   BLOOD CULTURE   --   --  No Growth at 72 hrs   No Growth at 72 hrs   --    GRAM STAIN RESULT  1+ Polys*  1+ Gram positive cocci in pairs* 1+ Polys*  1+ Gram positive cocci in pairs*  --   --  No polys seen*  3+ Gram positive cocci in pairs and chains*  2+ Gram negative rods*   WOUND CULTURE   --   --   --   --  3+ Growth of Beta Hemolytic Streptococcus Group B*  2+ Growth of Klebsiella oxytoca*  2+ Growth of

## 2019-09-10 NOTE — PLAN OF CARE
Problem: Potential for Falls  Goal: Patient will remain free of falls  Description  INTERVENTIONS:  - Assess patient frequently for physical needs  -  Identify cognitive and physical deficits and behaviors that affect risk of falls    -  [place] fall precautions as indicated by assessment   - Educate patient/family on patient safety including physical limitations  - Instruct patient to call for assistance with activity based on assessment  - Modify environment to reduce risk of injury  - Consider OT/PT consult to assist with strengthening/mobility  9/10/2019 1032 by Mandeep Jarrett RN  Outcome: Progressing  9/10/2019 1031 by Mandeep Jarrett RN  Outcome: Progressing

## 2019-09-10 NOTE — PLAN OF CARE
Problem: Potential for Falls  Goal: Patient will remain free of falls  Description  INTERVENTIONS:  - Assess patient frequently for physical needs  -  Identify cognitive and physical deficits and behaviors that affect risk of falls  -  [place] fall precautions as indicated by assessment   - Educate patient/family on patient safety including physical limitations  - Instruct patient to call for assistance with activity based on assessment  - Modify environment to reduce risk of injury  - Consider OT/PT consult to assist with strengthening/mobility  Outcome: Progressing     Problem: Nutrition/Hydration-ADULT  Goal: Nutrient/Hydration intake appropriate for improving, restoring or maintaining nutritional needs  Description  Monitor and assess patient's nutrition/hydration status for malnutrition  Collaborate with interdisciplinary team and initiate plan and interventions as ordered  Monitor patient's weight and dietary intake as ordered or per policy  Utilize nutrition screening tool and intervene as necessary  Determine patient's food preferences and provide high-protein, high-caloric foods as appropriate       INTERVENTIONS:  - Monitor oral intake, urinary output, labs, and treatment plans  - Assess nutrition and hydration status and recommend course of action  - Evaluate amount of meals eaten  - Assist patient with eating if necessary   - Allow adequate time for meals  - Recommend/ encourage appropriate diets, oral nutritional supplements, and vitamin/mineral supplements  - Order, calculate, and assess calorie counts as needed  - Recommend, monitor, and adjust tube feedings and TPN/PPN based on assessed needs  - Assess need for intravenous fluids  - Provide specific nutrition/hydration education as appropriate  - Include patient/family/caregiver in decisions related to nutrition  Outcome: Progressing     Problem: Prexisting or High Potential for Compromised Skin Integrity  Goal: Skin integrity is maintained or improved  Description  INTERVENTIONS:  - Identify patients at risk for skin breakdown  - Assess and monitor skin integrity  - Assess and monitor nutrition and hydration status  - Monitor labs   - Assess for incontinence   - Turn and reposition patient  - Assist with mobility/ambulation  - Relieve pressure over bony prominences  - Avoid friction and shearing  - Provide appropriate hygiene as needed including keeping skin clean and dry  - Evaluate need for skin moisturizer/barrier cream  - Collaborate with interdisciplinary team   - Patient/family teaching  - Consider wound care consult   Outcome: Progressing     Problem: GASTROINTESTINAL - ADULT  Goal: Minimal or absence of nausea and/or vomiting  Description  INTERVENTIONS:  - Administer IV fluids if ordered to ensure adequate hydration  - Maintain NPO status until nausea and vomiting are resolved  - Nasogastric tube if ordered  - Administer ordered antiemetic medications as needed  - Provide nonpharmacologic comfort measures as appropriate  - Advance diet as tolerated, if ordered  - Consider nutrition services referral to assist patient with adequate nutrition and appropriate food choices  Outcome: Progressing     Problem: METABOLIC, FLUID AND ELECTROLYTES - ADULT  Goal: Glucose maintained within target range  Description  INTERVENTIONS:  - Monitor Blood Glucose as ordered  - Assess for signs and symptoms of hyperglycemia and hypoglycemia  - Administer ordered medications to maintain glucose within target range  - Assess nutritional intake and initiate nutrition service referral as needed  Outcome: Progressing     Problem: SKIN/TISSUE INTEGRITY - ADULT  Goal: Incision(s), wounds(s) or drain site(s) healing without S/S of infection  Description  INTERVENTIONS  - Assess and document risk factors for skin impairment   - Assess and document dressing, incision, wound bed, drain sites and surrounding tissue  - Consider nutrition services referral as needed  - Oral mucous membranes remain intact  - Provide patient/ family education  Outcome: Progressing     Problem: MUSCULOSKELETAL - ADULT  Goal: Maintain or return mobility to safest level of function  Description  INTERVENTIONS:  - Assess patient's ability to carry out ADLs; assess patient's baseline for ADL function and identify physical deficits which impact ability to perform ADLs (bathing, care of mouth/teeth, toileting, grooming, dressing, etc )  - Assess/evaluate cause of self-care deficits   - Assess range of motion  - Assess patient's mobility  - Assess patient's need for assistive devices and provide as appropriate  - Encourage maximum independence but intervene and supervise when necessary  - Involve family in performance of ADLs  - Assess for home care needs following discharge   - Consider OT consult to assist with ADL evaluation and planning for discharge  - Provide patient education as appropriate  Outcome: Progressing  Goal: Maintain proper alignment of affected body part  Description  INTERVENTIONS:  - Support, maintain and protect limb and body alignment  - Provide patient/ family with appropriate education  Outcome: Progressing

## 2019-09-10 NOTE — PLAN OF CARE
Problem: Potential for Falls  Goal: Patient will remain free of falls  Description  INTERVENTIONS:  - Assess patient frequently for physical needs  -  Identify cognitive and physical deficits and behaviors that affect risk of falls  -  [place] fall precautions as indicated by assessment   - Educate patient/family on patient safety including physical limitations  - Instruct patient to call for assistance with activity based on assessment  - Modify environment to reduce risk of injury  - Consider OT/PT consult to assist with strengthening/mobility  Outcome: Progressing     Problem: Nutrition/Hydration-ADULT  Goal: Nutrient/Hydration intake appropriate for improving, restoring or maintaining nutritional needs  Description  Monitor and assess patient's nutrition/hydration status for malnutrition  Collaborate with interdisciplinary team and initiate plan and interventions as ordered  Monitor patient's weight and dietary intake as ordered or per policy  Utilize nutrition screening tool and intervene as necessary  Determine patient's food preferences and provide high-protein, high-caloric foods as appropriate       INTERVENTIONS:  - Monitor oral intake, urinary output, labs, and treatment plans  - Assess nutrition and hydration status and recommend course of action  - Evaluate amount of meals eaten  - Assist patient with eating if necessary   - Allow adequate time for meals  - Recommend/ encourage appropriate diets, oral nutritional supplements, and vitamin/mineral supplements  - Order, calculate, and assess calorie counts as needed  - Recommend, monitor, and adjust tube feedings and TPN/PPN based on assessed needs  - Assess need for intravenous fluids  - Provide specific nutrition/hydration education as appropriate  - Include patient/family/caregiver in decisions related to nutrition  Outcome: Progressing     Problem: Prexisting or High Potential for Compromised Skin Integrity  Goal: Skin integrity is maintained or improved  Description  INTERVENTIONS:  - Identify patients at risk for skin breakdown  - Assess and monitor skin integrity  - Assess and monitor nutrition and hydration status  - Monitor labs   - Assess for incontinence   - Turn and reposition patient  - Assist with mobility/ambulation  - Relieve pressure over bony prominences  - Avoid friction and shearing  - Provide appropriate hygiene as needed including keeping skin clean and dry  - Evaluate need for skin moisturizer/barrier cream  - Collaborate with interdisciplinary team   - Patient/family teaching  - Consider wound care consult   Outcome: Progressing

## 2019-09-10 NOTE — ASSESSMENT & PLAN NOTE
Lab Results   Component Value Date    HGBA1C 7 9 (H) 09/06/2019       Recent Labs     09/09/19  1604 09/09/19  2305 09/10/19  0728 09/10/19  1057   POCGLU 118 175* 133 166*       Blood Sugar Average: Last 72 hrs:  (P) 153 [id number]     Patient reports that she has been off insulin since March after her bariatric surgery  Does not take any insulin at home  At home, her blood sugar ranges between  per her  A1c of 7 9 noted  Continue with insulin sliding scale and Accu-Cheks  Patient does not want to take insulin at home  Plan  to discharge on metformin b i d  Follows up with endocrinologist at [facility]

## 2019-09-10 NOTE — PLAN OF CARE
Problem: PHYSICAL THERAPY ADULT  Goal: Performs mobility at highest level of function for planned discharge setting  See evaluation for individualized goals  Description  Treatment/Interventions: Functional transfer training, LE strengthening/ROM, Therapeutic exercise, Endurance training, Cognitive reorientation, Patient/family training, Equipment eval/education, Bed mobility, Gait training, Spoke to nursing, Spoke to case management  Equipment Recommended: Wheelchair, [employer]  Knee scooter for increased mobility)       See flowsheet documentation for full assessment, interventions and recommendations  Outcome: Progressing  Note:   Prognosis: Good  Problem List: Decreased strength, Impaired balance, Decreased endurance, Decreased range of motion, Decreased mobility, Decreased coordination, Decreased cognition, Decreased safety awareness, Decreased skin integrity, Orthopedic restrictions  Assessment: Pt  is a 39 yo F who presents s/p 3rd Ray resection of LLE  NWB LLE  Pt  was identified with full name and birthdate  Pt  agreeable to PT session  Pt  Demonstrated improved independence with functional mobility and increased ability to perform gait training with bariatric RW and hoping for means household mobility  Pt  Demonstrated NWB of LLE throughout entirety of session  Pt  Will benefit from continued skilled therapy to increase functional independence and aid patient in return to PLOF with decreased burden of care  Recommendation: Home with family support     PT - OK to Discharge: Yes    See flowsheet documentation for full assessment

## 2019-09-10 NOTE — ASSESSMENT & PLAN NOTE
Continue home medications including Atarax 50 mg q h s , Geodon 80 mg b i d , clonidine 0 1 mg a m   And 0 2 mg p m , Wellbutrin 300 mg daily  Patient reports that she is no longer taking Lexapro

## 2019-09-10 NOTE — PLAN OF CARE
Problem: Potential for Falls  Goal: Patient will remain free of falls  Description  INTERVENTIONS:  - Assess patient frequently for physical needs  -  Identify cognitive and physical deficits and behaviors that affect risk of falls    -  [place] fall precautions as indicated by assessment   - Educate patient/family on patient safety including physical limitations  - Instruct patient to call for assistance with activity based on assessment  - Modify environment to reduce risk of injury  - Consider OT/PT consult to assist with strengthening/mobility  9/10/2019 1230 by Brittnee Barrera RN  Outcome: Progressing  9/10/2019 1032 by Brittnee Barrera RN  Outcome: Progressing  9/10/2019 1031 by Brittnee Barrera RN  Outcome: Progressing

## 2019-09-10 NOTE — PROGRESS NOTES
Progress Note - Podiatry  Penelope Chavez 38 y o  female MRN: [id number]  Unit/Bed#: [facility] 815-01 Encounter: [id number]    Assessment:  45yo female with left foot osteomyelitis and cellulitis s/p left partial 3rd ray resection with toe fillet flap (DOS 9/8/19)     1) Left foot OM of 3rd met head s/p partial 3rd ray resection with toe fillet flap  2) Diabetes mellitus with polyneuropathy  3) Hypothyroidism  4) Hx of bariatric surgery  5) Anxiety  6) Bipolar disorder    Plan:  - left foot incision site appears stable with intact sutures, cap refill time at flap less then 2 seconds and is viable  Packed with 1/2 plain packing in the proximal open wound  Overall incision site appears stable with minimal maceration and no signs of acute infection or necrosis at this time  - Patient to remain NWB to LLE, with knee scooter or bariatric RW and shower chair as per PT recommendations   - continue abx as per ID recommendations, will discuss with Dr Ferro Members about transitioning to PO abx    - final intra-op wound cultures pending   - appreciate medical management per SLIM   - appreciate CM for VNA setup and discharge planning   - anticipate discharge possibly today     Subjective/Objective   Chief Complaint: No chief complaint on file  Subjective: 38 y o  y/o female was seen and evaluated at bedside  Patient denies any acute events overnight  Blood pressure 127/75, pulse (!) 53, temperature 98 1 °F (36 7 °C), resp  rate 16, height 5' 5" (1 651 m), weight 112 kg (248 lb), SpO2 98 %, not currently breastfeeding  ,Body mass index is 41 27 kg/m²  Invasive Devices     Peripheral Intravenous Line            Peripheral IV 09/10/19 Left Forearm less than 1 day                Physical Exam:   General: Alert, cooperative and no distress  Lungs: Non labored breathing  Heart: Positive S1, S2  Abdomen: Soft, non-tender    Extremity: lefy foot incision site appears stable with intact sutures, cap refill time at flap less then 2 seconds, well perfused and is viable  NO signs of flap necrosis  Overall incision site appears stable with minimal maceration and no signs of acute infection  9/10    9/10        Lab, Imaging and other studies:   CBC: No results found for: WBC, HGB, HCT, MCV, PLT, ADJUSTEDWBC, MCH, MCHC, RDW, MPV, NRBC, CMP:   Lab Results   Component Value Date    SODIUM 134 (L) 09/10/2019    K 4 4 09/10/2019     09/10/2019    CO2 28 09/10/2019    BUN 13 09/10/2019    CREATININE 0 87 09/10/2019    CALCIUM 9 5 09/10/2019    EGFR 82 09/10/2019       Imaging: I have personally reviewed pertinent films in PACS  EKG, Pathology, and Other Studies: I have personally reviewed pertinent reports

## 2019-09-11 VITALS
RESPIRATION RATE: 18 BRPM | BODY MASS INDEX: 41.32 KG/M2 | HEART RATE: 56 BPM | HEIGHT: 65 IN | OXYGEN SATURATION: 95 % | SYSTOLIC BLOOD PRESSURE: 118 MMHG | TEMPERATURE: 98 F | WEIGHT: 248 LBS | DIASTOLIC BLOOD PRESSURE: 74 MMHG

## 2019-09-11 LAB
BACTERIA BLD CULT: NORMAL
BACTERIA BLD CULT: NORMAL
BACTERIA TISS AEROBE CULT: ABNORMAL
GLUCOSE SERPL-MCNC: 140 MG/DL (ref 65–140)
GLUCOSE SERPL-MCNC: 155 MG/DL (ref 65–140)
GRAM STN SPEC: ABNORMAL

## 2019-09-11 PROCEDURE — 99232 SBSQ HOSP IP/OBS MODERATE 35: CPT | Performed by: INTERNAL MEDICINE

## 2019-09-11 PROCEDURE — 99024 POSTOP FOLLOW-UP VISIT: CPT | Performed by: PODIATRIST

## 2019-09-11 PROCEDURE — 82948 REAGENT STRIP/BLOOD GLUCOSE: CPT

## 2019-09-11 RX ADMIN — LEVOTHYROXINE SODIUM 50 MCG: 50 TABLET ORAL at 05:00

## 2019-09-11 RX ADMIN — BUPROPION HYDROCHLORIDE 300 MG: 150 TABLET, FILM COATED, EXTENDED RELEASE ORAL at 08:45

## 2019-09-11 RX ADMIN — HEPARIN SODIUM 5000 UNITS: 5000 INJECTION INTRAVENOUS; SUBCUTANEOUS at 05:00

## 2019-09-11 RX ADMIN — ACETAMINOPHEN 975 MG: 325 TABLET ORAL at 05:00

## 2019-09-11 RX ADMIN — VITAMIN D, TAB 1000IU (100/BT) 500 UNITS: 25 TAB at 08:45

## 2019-09-11 RX ADMIN — OXYCODONE HYDROCHLORIDE 5 MG: 5 TABLET ORAL at 08:46

## 2019-09-11 RX ADMIN — ZIPRASIDONE HCL 80 MG: 40 CAPSULE ORAL at 08:47

## 2019-09-11 RX ADMIN — OXYCODONE HYDROCHLORIDE 5 MG: 5 TABLET ORAL at 13:45

## 2019-09-11 RX ADMIN — CLONIDINE HYDROCHLORIDE 0.1 MG: 0.1 TABLET ORAL at 08:46

## 2019-09-11 RX ADMIN — CEFEPIME HYDROCHLORIDE 1000 MG: 1 INJECTION, POWDER, FOR SOLUTION INTRAMUSCULAR; INTRAVENOUS at 11:00

## 2019-09-11 NOTE — SOCIAL WORK
Cm reviewed patient during care coordination rounds with [facility]  Patient anticipated for discharge home today  RW to be delivered to patient's bedside  Patient not agreeable to transfer bench cost ($60 OOP)  [facility] aware  [facility] following for RN/PT/OT services and aware of discharge today  Patient's friend Mena Bunch to transport  Cm following

## 2019-09-11 NOTE — PLAN OF CARE
Problem: Potential for Falls  Goal: Patient will remain free of falls  Description  INTERVENTIONS:  - Assess patient frequently for physical needs  -  Identify cognitive and physical deficits and behaviors that affect risk of falls  -  [place] fall precautions as indicated by assessment   - Educate patient/family on patient safety including physical limitations  - Instruct patient to call for assistance with activity based on assessment  - Modify environment to reduce risk of injury  - Consider OT/PT consult to assist with strengthening/mobility  Outcome: Progressing     Problem: Nutrition/Hydration-ADULT  Goal: Nutrient/Hydration intake appropriate for improving, restoring or maintaining nutritional needs  Description  Monitor and assess patient's nutrition/hydration status for malnutrition  Collaborate with interdisciplinary team and initiate plan and interventions as ordered  Monitor patient's weight and dietary intake as ordered or per policy  Utilize nutrition screening tool and intervene as necessary  Determine patient's food preferences and provide high-protein, high-caloric foods as appropriate       INTERVENTIONS:  - Monitor oral intake, urinary output, labs, and treatment plans  - Assess nutrition and hydration status and recommend course of action  - Evaluate amount of meals eaten  - Assist patient with eating if necessary   - Allow adequate time for meals  - Recommend/ encourage appropriate diets, oral nutritional supplements, and vitamin/mineral supplements  - Order, calculate, and assess calorie counts as needed  - Recommend, monitor, and adjust tube feedings and TPN/PPN based on assessed needs  - Assess need for intravenous fluids  - Provide specific nutrition/hydration education as appropriate  - Include patient/family/caregiver in decisions related to nutrition  Outcome: Progressing     Problem: Prexisting or High Potential for Compromised Skin Integrity  Goal: Skin integrity is maintained or improved  Description  INTERVENTIONS:  - Identify patients at risk for skin breakdown  - Assess and monitor skin integrity  - Assess and monitor nutrition and hydration status  - Monitor labs   - Assess for incontinence   - Turn and reposition patient  - Assist with mobility/ambulation  - Relieve pressure over bony prominences  - Avoid friction and shearing  - Provide appropriate hygiene as needed including keeping skin clean and dry  - Evaluate need for skin moisturizer/barrier cream  - Collaborate with interdisciplinary team   - Patient/family teaching  - Consider wound care consult   Outcome: Progressing     Problem: GASTROINTESTINAL - ADULT  Goal: Minimal or absence of nausea and/or vomiting  Description  INTERVENTIONS:  - Administer IV fluids if ordered to ensure adequate hydration  - Maintain NPO status until nausea and vomiting are resolved  - Nasogastric tube if ordered  - Administer ordered antiemetic medications as needed  - Provide nonpharmacologic comfort measures as appropriate  - Advance diet as tolerated, if ordered  - Consider nutrition services referral to assist patient with adequate nutrition and appropriate food choices  Outcome: Progressing     Problem: METABOLIC, FLUID AND ELECTROLYTES - ADULT  Goal: Glucose maintained within target range  Description  INTERVENTIONS:  - Monitor Blood Glucose as ordered  - Assess for signs and symptoms of hyperglycemia and hypoglycemia  - Administer ordered medications to maintain glucose within target range  - Assess nutritional intake and initiate nutrition service referral as needed  Outcome: Progressing     Problem: SKIN/TISSUE INTEGRITY - ADULT  Goal: Incision(s), wounds(s) or drain site(s) healing without S/S of infection  Description  INTERVENTIONS  - Assess and document risk factors for skin impairment   - Assess and document dressing, incision, wound bed, drain sites and surrounding tissue  - Consider nutrition services referral as needed  - Oral mucous membranes remain intact  - Provide patient/ family education  Outcome: Progressing     Problem: MUSCULOSKELETAL - ADULT  Goal: Maintain or return mobility to safest level of function  Description  INTERVENTIONS:  - Assess patient's ability to carry out ADLs; assess patient's baseline for ADL function and identify physical deficits which impact ability to perform ADLs (bathing, care of mouth/teeth, toileting, grooming, dressing, etc )  - Assess/evaluate cause of self-care deficits   - Assess range of motion  - Assess patient's mobility  - Assess patient's need for assistive devices and provide as appropriate  - Encourage maximum independence but intervene and supervise when necessary  - Involve family in performance of ADLs  - Assess for home care needs following discharge   - Consider OT consult to assist with ADL evaluation and planning for discharge  - Provide patient education as appropriate  Outcome: Progressing  Goal: Maintain proper alignment of affected body part  Description  INTERVENTIONS:  - Support, maintain and protect limb and body alignment  - Provide patient/ family with appropriate education  Outcome: Progressing

## 2019-09-11 NOTE — PROGRESS NOTES
Progress Note - Podiatry  Penelope Chavez 38 y o  female MRN: [id number]  Unit/Bed#: [facility] 815-01 Encounter: [id number]    Assessment:  1  POD#3 s/p partial 3rd ray resection with toe fillet flap (DOS: 09/08/2019) secondary to left foot osteomyelitis  2  Diabetes mellitus with polyneuropathy  3  Hypothyroidism  4  Hx of bariatric surgery  5  Anxiety  6  Bipolar disorder    Plan:  · Incision site located on the distal and plantar aspect of the left foot appears stable at this time  Wound margins remain well approximated and sutures intact  Small area of minimal maceration on the lateral margin of the flap, but we are not concerned at this time  Capillary fill time to the flap remains less than 3 seconds  There are no signs of infection noted at this time  · Small wound posterior to the plantar incision site was packed open with half-inch plain packing  Adaptic was applied to surgical site and the entire foot was dressed with DSD  · Patient to continue nonweightbearing to the left lower extremity with the use of a walker  · Possible discharge today pending approval for DME  Referral was placed at [facility] for a walker  · Continue antibiotics per Infectious Disease  Subjective/Objective   Chief Complaint: No chief complaint on file  Subjective: 38 y o  y/o female was seen and evaluated at bedside  She was awake, alert, in good spirits  Denies pain at this time  States that her chills has subsided since the surgery  Denies N/V/F/SOB/CP  Blood pressure 118/74, pulse 56, temperature 98 °F (36 7 °C), resp  rate 18, height 5' 5" (1 651 m), weight 112 kg (248 lb), SpO2 95 %, not currently breastfeeding  ,Body mass index is 41 27 kg/m²  Invasive Devices     Peripheral Intravenous Line            Peripheral IV 09/10/19 Left Forearm 1 day                Physical Exam:   General: Alert, cooperative and no distress  Lungs: Non labored breathing  Abdomen: Soft, non-tender  Extremity:     NVS at baseline B/l  MSK function at baseline B/l  No calf tenderness noted B/l  Left Foot- plantar      Lab, Imaging and other studies:   I have personally reviewed pertinent lab results  , CBC: No results found for: WBC, HGB, HCT, MCV, PLT, ADJUSTEDWBC, MCH, MCHC, RDW, MPV, NRBC, CMP: No results found for: SODIUM, K, CL, CO2, ANIONGAP, BUN, CREATININE, GLUCOSE, CALCIUM, AST, ALT, ALKPHOS, PROT, BILITOT, EGFR    Imaging: I have personally reviewed pertinent films in PACS  EKG, Pathology, and Other Studies: I have personally reviewed pertinent reports  Portions of the record may have been created with voice recognition software  Occasional wrong word or "sound a like" substitutions may have occurred due to the inherent limitations of voice recognition software  Read the chart carefully and recognize, using context, where substitutions have occurred

## 2019-09-11 NOTE — PROGRESS NOTES
Progress Note - Anne-Marie Frazier 1975, 38 y o  female MRN: [id number]    [facility] Encounter: [id number]    Primary Care Provider: Verna Driver DO   Date and time admitted to hospital: 9/6/2019 10:30 AM        Type 2 diabetes mellitus [facility])  Assessment & Plan  Lab Results   Component Value Date    HGBA1C 7 9 (H) 09/06/2019       Recent Labs     09/10/19  1616 09/10/19  2055 09/11/19  0732 09/11/19  1055   POCGLU 160* 147* 155* 140       Blood Sugar Average: Last 72 hrs:  (P) 043 [id number]     Patient reports that she has been off insulin since March after her bariatric surgery  Does not take any insulin at home  At home, her blood sugar ranges between  per her  A1c of 7 9 noted  Continue with insulin sliding scale and Accu-Cheks  Patient does not want to take insulin at home  Plan  to discharge on metformin b i d  Follows up with endocrinologist at [place]  Prescription left on the chart      Manic bipolar I disorder (HCC)  Assessment & Plan  Continue home medications including Atarax 50 mg q h s , Geodon 80 mg b i d , clonidine 0 1 mg a m  And 0 2 mg p m , Wellbutrin 300 mg daily  Patient reports that she is no longer taking Lexapro    Morbid obesity ([facility] [address] )  Assessment & Plan  S/p recent bariatric surgery  Continue supportive care    * 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, with bone involvement without evidence of necrosis ([facility] [address] )  Assessment & Plan  · Status post partial 3rd ray resection with filleted flap on 9/8  · Polymicrobial  wound culture  · Blood cultures are negative for 72 hours  · On IV cefepime per ID  · Wound care per primary team           Stable from [address] for discharge home    VTE Pharmacologic Prophylaxis:   Pharmacologic: Heparin  Mechanical VTE Prophylaxis in Place: Yes    Patient Centered Rounds: I have performed bedside rounds with nursing staff today      Discussions with Specialists or Other Care Team Provider:     Education and Discussions with Family / Patient:  Patient    Time Spent for Care: 30 minutes  More than 50% of total time spent on counseling and coordination of care as described above  Current Length of Stay: 5 day(s)    Current Patient Status: Inpatient       Code Status: Level 1 - Full Code      Subjective:   Patient seen and examined  Comfortable sitting in bed  Tolerating oral diet  Anxious to go home    Objective:     Vitals:   Temp (24hrs), Av °F (36 7 °C), Min:97 9 °F (36 6 °C), Max:98 °F (36 7 °C)    Temp:  [97 9 °F (36 6 °C)-98 °F (36 7 °C)] 98 °F (36 7 °C)  HR:  [56-62] 56  Resp:  [16-18] 18  BP: ()/(60-74) 118/74  SpO2:  [95 %-97 %] 95 %  Body mass index is 41 27 kg/m²  Input and Output Summary (last 24 hours): Intake/Output Summary (Last 24 hours) at 2019 1142  Last data filed at 2019 1130  Gross per 24 hour   Intake 290 ml   Output 2000 ml   Net -1710 ml       Physical Exam:     Physical Exam    Patient is awake alert in no acute distress  Lung clear to auscultation bilateral  Heart positive S1-S2 no murmur  Abdomen soft nontender  Trace left lower extremity edema  Left foot heavily dressed       Additional Data:     Labs:    Results from last 7 days   Lab Units 19  0526  19  1732   WBC Thousand/uL 8 52   < > 14 75*   HEMOGLOBIN g/dL 12 7   < > 13 8   HEMATOCRIT % 40 3   < > 42 3   PLATELETS Thousands/uL 374   < > 426*   NEUTROS PCT %  --   --  62   LYMPHS PCT %  --   --  27   MONOS PCT %  --   --  6   EOS PCT %  --   --  4    < > = values in this interval not displayed       Results from last 7 days   Lab Units 09/10/19  0504  19  1732   POTASSIUM mmol/L 4 4   < > 4 2   CHLORIDE mmol/L 102   < > 101   CO2 mmol/L 28   < > 26   BUN mg/dL 13   < > 10   CREATININE mg/dL 0 87   < > 0 79   CALCIUM mg/dL 9 5   < > 10 1   ALK PHOS U/L  --   --  216*   ALT U/L  --   --  25   AST U/L  --   --  12    < > = values in this interval not displayed  * I Have Reviewed All Lab Data Listed Above  * Additional Pertinent Lab Tests Reviewed: [address] Admission Reviewed    Imaging:    Imaging Reports Reviewed Today Include:   Imaging Personally Reviewed by Myself Includes:      Recent Cultures (last 7 days):     Results from last 7 days   Lab Units 09/08/19  0834 09/08/19  0833 09/06/19  1341 09/06/19  1324 09/06/19  1252   BLOOD CULTURE   --   --  No Growth After 4 Days  No Growth After 4 Days    --    GRAM STAIN RESULT  1+ Polys*  1+ Gram positive cocci in pairs* 1+ Polys*  1+ Gram positive cocci in pairs*  --   --  No polys seen*  3+ Gram positive cocci in pairs and chains*  2+ Gram negative rods*   WOUND CULTURE   --   --   --   --  3+ Growth of Beta Hemolytic Streptococcus Group B*  2+ Growth of Klebsiella oxytoca*  2+ Growth of        Last 24 Hours Medication List:     Current Facility-Administered Medications:  acetaminophen 975 mg Oral Q6H [facility] Fallon Alec, DPM    albuterol 2 puff Inhalation Q6H PRN Fallon Alec, DPM    buPROPion 300 mg Oral Daily Fallon Alec, DPM    cefepime 1,000 mg Intravenous Q12H Fallon Alec, DPM Last Rate: Stopped (09/11/19 1130)   cholecalciferol 500 Units Oral Daily Fallon Alec, DPM    cloNIDine 0 1 mg Oral Daily Fallon Alec, DPM    cloNIDine 0 2 mg Oral HS Fallon Alec, DPM    diphenhydrAMINE 25 mg Oral Q6H PRN Fallon Alec, DPM    docusate sodium 100 mg Oral BID Fallon Alec, DPM    heparin (porcine) 5,000 Units Subcutaneous [facility] Fallon Alec, DPM    hydrOXYzine HCL 50 mg Oral HS Dilanssmohsen Marco, DPM    insulin lispro 1-6 Units Subcutaneous TID AC Jassie Marco, DPM    insulin lispro 1-6 Units Subcutaneous HS Fallon Alec, DPM    levothyroxine 50 mcg Oral Early Morning Fallon Alec, DPM    melatonin 3 mg Oral HS Betsy De Guzman PA-C    ondansetron 4 mg Intravenous Q6H PRN Fallon Alec, DPM    oxyCODONE 5 mg Oral Q4H PRN Fallon Alec, DPM    pantoprazole 40 mg Oral Daily Chantel Hilts, DPM    polyethylene glycol 17 g Oral Daily Chantel Hilts, DPM    SUMAtriptan 6 mg Subcutaneous Once PRN Chantel Hilts, DPM    traMADol 50 mg Oral Q6H PRN Chantel Hilts, DPM    ziprasidone 80 mg Oral BID With Meals Chantel Hilts, DPM         Today, Patient Was Seen By: Venkatesh Carlson DO    ** Please Note: This note has been constructed using a voice recognition system   **

## 2019-09-11 NOTE — DISCHARGE SUMMARY
Discharge Summary -   Magali Chun 38 y o  female MRN: [id number]  Unit/Bed#: [facility] 226-50 Encounter: [id number]    Admission Date: 9/6/2019     Admitting Diagnosis: Left foot infection [L08 9]    HPI: Magali Chun is a 38 y o  female who presented with a chronic nonhealing ulceration on the plantar aspect of the left foot with cellulitis on 9/6/19  Stated that she frequently receives normal wound care and dressing changes  She has home nurse visit 2 times per week and goes to podiatrist Dr Easley Prior every Friday  Within the last several weeks her foot has become increasingly erythematous and edematous  She sees podiatrist Dr Maggie Willingham as outpatient  She was placed on oral clindamycin 300 mg q 6h for 7 days  Oral antibiotic therapy failed  She was directly admitted by Dr Maggie Willingham to [facility] today  She rates her pain as 7/10 on a visual analog scale today  She reported feeling chills at this time  She denied nausea, vomiting, fever, shortness of breath, or chest pain  Procedures Performed: PARTIAL 3RD RAY RESECTION, PSB TOE FILLET FLAP: 43756 (CPT®)    Hospital Course: Magali Chun a 39 y/o female was admitted under podiatry Dr Adriana Chavez service for evaluation of left plantar foot wound infection  Upon admission left foot xray, MRI and LEADs were ordered  Patient was also started on IV antibiotics  ID was consulted for antibiotic management placed patient on cefepime and flagyl  SLIM was consulted for pre-operative clearance and medical management  MRI was consistent with reactive edema of the 3rd metatarsal and 3rd and 4th proximal phalangeal bases  Patient was scheduled for OR for partial 3rd ray amputation with toe fillet flap  Patient is recommended strict non weight bearing  PT was consulted to assist patient in ambulation with LLE NWB  PT recommended knee scooter or a roller walker   Patient is working with her insurance company for knee scooter approval meantime patient is OK from PT to discharge home with home PT and bariatric roller walker with home PT  Patient will need VNA daily for dressing changes for short term  Patient will follow up with Dr Jorge Tang at [facility] wound [facility] center once a week  As per ID patient does not need any oral antibiotic upon discharge as wound is healing well and low concern for soft tissue infection currently  Patient is to start all her home medications upon discharge  Patient is stable for discharge on 9/11/19 from all medical teams  Denies N/V/F/SOB and chest pain today  Significant Findings, Care, Treatment and Services Provided: please see hospital course     Complications: none    Discharge Diagnosis: Status post left foot surgery     Condition at Discharge: stable     Discharge instructions/Information to patient and family:   See after visit summary for information provided to patient and family  Provisions for Follow-Up Care/Important appointments:    See after visit summary for information related to follow-up care and any pertinent home health orders  Disposition: Home    Planned Readmission: No    Discharge Statement   I spent 30 minutes discharging the patient  This time was spent on the day of discharge  I had direct contact with the patient on the day of discharge  The details of this patient's discharge     Discharge Medications:  See after visit summary for reconciled discharge medications provided to patient and family

## 2019-09-12 LAB
BACTERIA SPEC ANAEROBE CULT: ABNORMAL
BACTERIA SPEC ANAEROBE CULT: ABNORMAL

## 2019-09-12 NOTE — UTILIZATION REVIEW
Notification of Discharge  This is a Notification of Discharge from our facility [address]  Please be advised that this patient has been discharge from our facility  Below you will find the admission and discharge date and time including the patients disposition  PRESENTATION DATE: 9/6/2019 10:30 AM  OBS ADMISSION DATE:   IP ADMISSION DATE: 9/6/19 1030   DISCHARGE DATE: 9/11/2019  3:05 PM  DISPOSITION: Home with [facility] with [facility]   Admission Orders listed below:  Admission Orders (From admission, onward)     Ordered        09/06/19 1212  Inpatient Admission  Once         09/06/19 1212  Inpatient Admission  Once                   Please contact the UR Department if additional information is required to close this patient's authorization/case  [address]  Utilization Review Department  Phone: [phone]; Fax [phone]  [email or web address]  ATTENTION: Please call with any questions or concerns to [phone]  and carefully listen to the prompts so that you are directed to the right person  Send all requests for admission clinical reviews, approved or denied determinations and any other requests to fax [phone]   All voicemails are confidential

## 2019-09-13 ENCOUNTER — APPOINTMENT (OUTPATIENT)
Dept: WOUND CARE | Facility: HOSPITAL | Age: 44
End: 2019-09-13
Payer: COMMERCIAL

## 2019-09-13 PROCEDURE — 99213 OFFICE O/P EST LOW 20 MIN: CPT

## 2019-09-20 ENCOUNTER — APPOINTMENT (OUTPATIENT)
Dept: WOUND CARE | Facility: HOSPITAL | Age: 44
End: 2019-09-20
Payer: COMMERCIAL

## 2019-09-20 PROCEDURE — 99212 OFFICE O/P EST SF 10 MIN: CPT

## 2019-09-27 ENCOUNTER — APPOINTMENT (OUTPATIENT)
Dept: WOUND CARE | Facility: HOSPITAL | Age: 44
End: 2019-09-27
Payer: COMMERCIAL

## 2019-09-27 PROCEDURE — 11042 DBRDMT SUBQ TIS 1ST 20SQCM/<: CPT

## 2019-10-04 ENCOUNTER — APPOINTMENT (OUTPATIENT)
Dept: WOUND CARE | Facility: HOSPITAL | Age: 44
End: 2019-10-04
Payer: COMMERCIAL

## 2019-10-04 PROCEDURE — 29445 APPL RIGID TOT CNTC LEG CAST: CPT

## 2019-10-11 ENCOUNTER — APPOINTMENT (OUTPATIENT)
Dept: WOUND CARE | Facility: HOSPITAL | Age: 44
End: 2019-10-11
Payer: COMMERCIAL

## 2019-10-11 PROCEDURE — 29445 APPL RIGID TOT CNTC LEG CAST: CPT

## 2019-10-18 ENCOUNTER — APPOINTMENT (OUTPATIENT)
Dept: WOUND CARE | Facility: HOSPITAL | Age: 44
End: 2019-10-18
Payer: COMMERCIAL

## 2019-10-18 PROCEDURE — 29445 APPL RIGID TOT CNTC LEG CAST: CPT

## 2019-10-25 ENCOUNTER — APPOINTMENT (OUTPATIENT)
Dept: WOUND CARE | Facility: HOSPITAL | Age: 44
End: 2019-10-25
Payer: COMMERCIAL

## 2019-10-25 PROCEDURE — 29445 APPL RIGID TOT CNTC LEG CAST: CPT

## 2019-11-01 ENCOUNTER — APPOINTMENT (OUTPATIENT)
Dept: WOUND CARE | Facility: HOSPITAL | Age: 44
End: 2019-11-01
Payer: COMMERCIAL

## 2019-11-01 PROCEDURE — 29445 APPL RIGID TOT CNTC LEG CAST: CPT

## 2019-11-08 ENCOUNTER — APPOINTMENT (OUTPATIENT)
Dept: WOUND CARE | Facility: HOSPITAL | Age: 44
End: 2019-11-08
Payer: COMMERCIAL

## 2019-11-08 PROCEDURE — 29445 APPL RIGID TOT CNTC LEG CAST: CPT

## 2019-11-15 ENCOUNTER — APPOINTMENT (OUTPATIENT)
Dept: WOUND CARE | Facility: HOSPITAL | Age: 44
End: 2019-11-15
Payer: COMMERCIAL

## 2019-11-15 PROCEDURE — 99212 OFFICE O/P EST SF 10 MIN: CPT

## 2019-11-22 ENCOUNTER — APPOINTMENT (OUTPATIENT)
Dept: WOUND CARE | Facility: HOSPITAL | Age: 44
End: 2019-11-22
Payer: COMMERCIAL

## 2019-11-22 PROCEDURE — 99212 OFFICE O/P EST SF 10 MIN: CPT

## 2019-12-09 ENCOUNTER — OFFICE VISIT (OUTPATIENT)
Dept: PODIATRY | Facility: CLINIC | Age: 44
End: 2019-12-09
Payer: COMMERCIAL

## 2019-12-09 VITALS
BODY MASS INDEX: 42.02 KG/M2 | DIASTOLIC BLOOD PRESSURE: 85 MMHG | WEIGHT: 252.2 LBS | HEIGHT: 65 IN | HEART RATE: 80 BPM | SYSTOLIC BLOOD PRESSURE: 125 MMHG

## 2019-12-09 DIAGNOSIS — Z72.0 TOBACCO ABUSE: ICD-10-CM

## 2019-12-09 DIAGNOSIS — E11.42 TYPE 2 DIABETES MELLITUS WITH DIABETIC POLYNEUROPATHY, WITH LONG-TERM CURRENT USE OF INSULIN (HCC): Primary | ICD-10-CM

## 2019-12-09 DIAGNOSIS — Z79.4 TYPE 2 DIABETES MELLITUS WITH DIABETIC POLYNEUROPATHY, WITH LONG-TERM CURRENT USE OF INSULIN (HCC): Primary | ICD-10-CM

## 2019-12-09 DIAGNOSIS — Z89.422 ACQUIRED ABSENCE OF OTHER LEFT TOE(S) (HCC): ICD-10-CM

## 2019-12-09 PROBLEM — Z01.818 PRE-OPERATIVE EXAMINATION: Status: RESOLVED | Noted: 2019-09-06 | Resolved: 2019-12-09

## 2019-12-09 PROCEDURE — 99213 OFFICE O/P EST LOW 20 MIN: CPT | Performed by: PODIATRIST

## 2019-12-09 NOTE — PROGRESS NOTES
Assessment/Plan:     Diagnoses and all orders for this visit:    Type 2 diabetes mellitus with diabetic polyneuropathy, with long-term current use of insulin ([facility] [address] )  -     Diabetic Shoe    Tobacco abuse    Acquired absence of other left toe(s) ([facility] [address] )  -     Diabetic Shoe      -Educated on DM risk to lower extremities, proper shoe gear, and daily monitoring of feet    -Educated on A1C and the risks of poorly controlled Diabetes   -Discussed weight loss and suitable exercise regiment    -Lengthy discussion about smoking cessation and risks to lower extremity  She has decreased her habit, I am hopeful this will continue    -High risk foot care every 3 months  Subjective:      Patient ID: Farzad John is a 38 y o  female  PAtient underwent left 3rd toe and metatarsal amputation in September from a DM foot infection  She healed in wound care with casting  She has cut smoking down to 3 per day  She feels well  She has no acute concerns  The following portions of the patient's history were reviewed and updated as appropriate: allergies, current medications, past family history, past medical history, past social history, past surgical history and problem list     Review of Systems   Constitutional: Negative  Respiratory: Negative for shortness of breath  Gastrointestinal: Negative for diarrhea and nausea  Musculoskeletal: Negative for arthralgias and gait problem  Skin: Negative for color change  Neurological: Positive for numbness  Objective:      /85   Pulse 80   Ht 5' 5" (1 651 m) Comment: verbal  Wt 114 kg (252 lb 3 2 oz)   BMI 41 97 kg/m²          Physical Exam   Constitutional: She appears well-developed and well-nourished  No distress  Obese     Cardiovascular: Pulses are no weak pulses  Pulses:       Dorsalis pedis pulses are 2+ on the right side, and 2+ on the left side  Posterior tibial pulses are 2+ on the right side, and 2+ on the left side     Feet:   Right Foot:   Skin Integrity: Negative for ulcer or callus  Left Foot:   Skin Integrity: Negative for ulcer or callus  Psychiatric: Her mood appears not anxious  She is not agitated  Vitals reviewed  Diabetic Foot Exam    Patient's shoes and socks removed  Right Foot/Ankle   Right Foot Inspection  Skin Exam: skin intact and abnormal color (nails thick and yellow x9) no callus, no pre-ulcer, no ulcer and no callus                          Toe Exam: swellingno tenderness and  no right toe deformity  Sensory   Vibration: absent  Proprioception: absent   Monofilament testing: absent  Vascular  Capillary refills: < 3 seconds  The right DP pulse is 2+  The right PT pulse is 2+  Left Foot/Ankle  Left Foot Inspection  Skin Exam: skin intactno pre-ulcer, no ulcer and no callus                         Toe Exam: swelling and left toe deformity (amputation 3rd toe)no tenderness                   Sensory   Vibration: absent  Proprioception: absent  Monofilament: absent  Vascular  Capillary refills: < 3 seconds  The left DP pulse is 2+  The left PT pulse is 2+  Assign Risk Category:  Deformity present; Loss of protective sensation;  No weak pulses       Risk: 2

## 2019-12-09 NOTE — PATIENT INSTRUCTIONS
Foot Care for People with Diabetes   WHAT YOU NEED TO KNOW:   · Foot care helps protect your feet and prevent foot ulcers or sores  Long-term high blood sugar levels can damage the blood vessels and nerves in your legs and feet  This damage makes it hard to feel pressure, pain, temperature, and touch  You may not be able to feel a cut or sore, or shoes that are too tight  Foot care is needed to prevent serious problems, such as an infection or amputation  · Diabetes may cause your toes to become crooked or curved under  These changes may affect the way you walk and can lead to increased pressure on your foot  The pressure can decrease blood flow to your feet  Lack of blood flow increases your risk for a foot ulcer  Do not ignore small problems, such as dry skin or small wounds  These can become life-threatening over time without proper care  DISCHARGE INSTRUCTIONS:   Contact your healthcare provider if:   · Your feet become numb, weak, or hard to move  · You have pus draining from a sore on your foot  · You have a wound on your foot that gets bigger, deeper, or does not heal      · You see blisters, cuts, scratches, calluses, or sores on your foot  · You have a fever, and your feet become red, warm, and swollen  · Your toenails become thick, curled, or yellow  · You find it hard to check your feet because your vision is poor  · You have questions or concerns about your condition or care  Foot care:   · Check your feet each day  Look at your whole foot, including the bottom, and between and under your toes  Check for wounds, corns, and calluses  Use a mirror to see the bottom of your feet  The skin on your feet may be shiny, tight, or darker than normal  Your feet may also be cold and pale  Feel your feet by running your hands along the tops, bottoms, sides, and between your toes  Redness, swelling, and warmth are signs of blood flow problems that can lead to a foot ulcer   Do not try to remove corns or calluses yourself  · Wash your feet each day with soap and warm water  Do not use hot water, because this can injure your foot  Dry your feet gently with a towel after you wash them  Dry between and under your toes  · Apply lotion or a moisturizer on your dry feet  Ask your healthcare provider what lotions are best to use  Do not put lotion or moisturizer between your toes  · Cut your toenails correctly  File or cut your toenails straight across  Use a soft brush to clean around your toenails  If your toenails are very thick, you may need to have a healthcare provider or specialist cut them  · Protect your feet  Do not walk barefoot or wear your shoes without socks  Check your shoes for rocks or other objects that can hurt your feet  Wear cotton socks to help keep your feet dry  Wear socks without toe seams, or wear them with the seams inside out  Change your socks each day  Do not wear socks that are dirty or damp  · Wear shoes that fit well  Wear shoes that do not rub against any area of your feet  Your shoes should be ½ to ¾ inch (1 to 2 centimeters) longer than your feet  Your shoes should also have extra space around the widest part of your feet  Walking or athletic shoes with laces or straps that adjust are best  Ask your healthcare provider for help to choose shoes that fit you best  Ask him if you need to wear an insert, orthotic, or bandage on your feet  Follow up with your healthcare provider or foot specialist as directed: You will need to have your feet checked at least once a year  You may need a foot exam more often if you have nerve damage, foot deformities, or ulcers  Write down your questions so you remember to ask them during your visits  © 2017 [employer] Information is for End User's use only and may not be sold, redistributed or otherwise used for commercial purposes   All illustrations and images included in CareNotes® are the copyrighted property of [employer]  or [employer]  The above information is an  only  It is not intended as medical advice for individual conditions or treatments  Talk to your doctor, nurse or pharmacist before following any medical regimen to see if it is safe and effective for you

## 2020-08-19 ENCOUNTER — HOSPITAL ENCOUNTER (EMERGENCY)
Facility: HOSPITAL | Age: 45
Discharge: HOME | End: 2020-08-19
Attending: EMERGENCY MEDICINE
Payer: COMMERCIAL

## 2020-08-19 ENCOUNTER — APPOINTMENT (EMERGENCY)
Dept: RADIOLOGY | Facility: HOSPITAL | Age: 45
End: 2020-08-19
Payer: COMMERCIAL

## 2020-08-19 VITALS
HEART RATE: 68 BPM | TEMPERATURE: 97.8 F | DIASTOLIC BLOOD PRESSURE: 69 MMHG | BODY MASS INDEX: 43.32 KG/M2 | HEIGHT: 65 IN | RESPIRATION RATE: 18 BRPM | SYSTOLIC BLOOD PRESSURE: 133 MMHG | OXYGEN SATURATION: 97 % | WEIGHT: 260 LBS

## 2020-08-19 DIAGNOSIS — R10.9 ABDOMINAL PAIN: Primary | ICD-10-CM

## 2020-08-19 LAB
ALBUMIN SERPL BCP-MCNC: 3.6 G/DL (ref 3.5–5)
ALP SERPL-CCNC: 99 U/L (ref 46–116)
ALT SERPL W P-5'-P-CCNC: 31 U/L (ref 12–78)
ANION GAP SERPL CALCULATED.3IONS-SCNC: 8 MMOL/L (ref 4–13)
AST SERPL W P-5'-P-CCNC: 34 U/L (ref 5–45)
BASOPHILS # BLD AUTO: 0.05 THOUSANDS/ΜL (ref 0–0.1)
BASOPHILS NFR BLD AUTO: 1 % (ref 0–1)
BILIRUB SERPL-MCNC: 0.3 MG/DL (ref 0.2–1)
BILIRUB UR QL STRIP: NEGATIVE
BUN SERPL-MCNC: 10 MG/DL (ref 5–25)
CALCIUM SERPL-MCNC: 9.1 MG/DL (ref 8.3–10.1)
CHLORIDE SERPL-SCNC: 106 MMOL/L (ref 100–108)
CLARITY UR: CLEAR
CO2 SERPL-SCNC: 24 MMOL/L (ref 21–32)
COLOR UR: YELLOW
COLOR, POC: YELLOW
CREAT SERPL-MCNC: 0.96 MG/DL (ref 0.6–1.3)
EOSINOPHIL # BLD AUTO: 0.39 THOUSAND/ΜL (ref 0–0.61)
EOSINOPHIL NFR BLD AUTO: 4 % (ref 0–6)
ERYTHROCYTE [DISTWIDTH] IN BLOOD BY AUTOMATED COUNT: 13.1 % (ref 11.6–15.1)
EXT PREG TEST URINE: NEGATIVE
EXT. CONTROL ED NAV: NORMAL
GFR SERPL CREATININE-BSD FRML MDRD: 72 ML/MIN/1.73SQ M
GLUCOSE SERPL-MCNC: 152 MG/DL (ref 65–140)
GLUCOSE UR STRIP-MCNC: NEGATIVE MG/DL
HCT VFR BLD AUTO: 45.5 % (ref 34.8–46.1)
HGB BLD-MCNC: 14.5 G/DL (ref 11.5–15.4)
HGB UR QL STRIP.AUTO: NEGATIVE
IMM GRANULOCYTES # BLD AUTO: 0.03 THOUSAND/UL (ref 0–0.2)
IMM GRANULOCYTES NFR BLD AUTO: 0 % (ref 0–2)
KETONES UR STRIP-MCNC: ABNORMAL MG/DL
LEUKOCYTE ESTERASE UR QL STRIP: NEGATIVE
LIPASE SERPL-CCNC: 154 U/L (ref 73–393)
LYMPHOCYTES # BLD AUTO: 3.47 THOUSANDS/ΜL (ref 0.6–4.47)
LYMPHOCYTES NFR BLD AUTO: 37 % (ref 14–44)
MCH RBC QN AUTO: 26.8 PG (ref 26.8–34.3)
MCHC RBC AUTO-ENTMCNC: 31.9 G/DL (ref 31.4–37.4)
MCV RBC AUTO: 84 FL (ref 82–98)
MONOCYTES # BLD AUTO: 0.54 THOUSAND/ΜL (ref 0.17–1.22)
MONOCYTES NFR BLD AUTO: 6 % (ref 4–12)
NEUTROPHILS # BLD AUTO: 4.97 THOUSANDS/ΜL (ref 1.85–7.62)
NEUTS SEG NFR BLD AUTO: 52 % (ref 43–75)
NITRITE UR QL STRIP: NEGATIVE
NRBC BLD AUTO-RTO: 0 /100 WBCS
PH UR STRIP.AUTO: 5.5 [PH] (ref 4.5–8)
PLATELET # BLD AUTO: 308 THOUSANDS/UL (ref 149–390)
PMV BLD AUTO: 9.7 FL (ref 8.9–12.7)
POTASSIUM SERPL-SCNC: 4.5 MMOL/L (ref 3.5–5.3)
PROT SERPL-MCNC: 7.7 G/DL (ref 6.4–8.2)
PROT UR STRIP-MCNC: NEGATIVE MG/DL
RBC # BLD AUTO: 5.41 MILLION/UL (ref 3.81–5.12)
SODIUM SERPL-SCNC: 138 MMOL/L (ref 136–145)
SP GR UR STRIP.AUTO: >=1.03 (ref 1–1.03)
UROBILINOGEN UR QL STRIP.AUTO: 0.2 E.U./DL
WBC # BLD AUTO: 9.45 THOUSAND/UL (ref 4.31–10.16)

## 2020-08-19 PROCEDURE — 74177 CT ABD & PELVIS W/CONTRAST: CPT

## 2020-08-19 PROCEDURE — 85025 COMPLETE CBC W/AUTO DIFF WBC: CPT | Performed by: EMERGENCY MEDICINE

## 2020-08-19 PROCEDURE — 36415 COLL VENOUS BLD VENIPUNCTURE: CPT

## 2020-08-19 PROCEDURE — G1004 CDSM NDSC: HCPCS

## 2020-08-19 PROCEDURE — 83690 ASSAY OF LIPASE: CPT | Performed by: EMERGENCY MEDICINE

## 2020-08-19 PROCEDURE — 81025 URINE PREGNANCY TEST: CPT | Performed by: EMERGENCY MEDICINE

## 2020-08-19 PROCEDURE — 96375 TX/PRO/DX INJ NEW DRUG ADDON: CPT

## 2020-08-19 PROCEDURE — 80053 COMPREHEN METABOLIC PANEL: CPT | Performed by: EMERGENCY MEDICINE

## 2020-08-19 PROCEDURE — 99285 EMERGENCY DEPT VISIT HI MDM: CPT | Performed by: EMERGENCY MEDICINE

## 2020-08-19 PROCEDURE — 99284 EMERGENCY DEPT VISIT MOD MDM: CPT

## 2020-08-19 PROCEDURE — 81003 URINALYSIS AUTO W/O SCOPE: CPT

## 2020-08-19 PROCEDURE — 96374 THER/PROPH/DIAG INJ IV PUSH: CPT

## 2020-08-19 RX ORDER — HYDROMORPHONE HCL/PF 1 MG/ML
1 SYRINGE (ML) INJECTION ONCE
Status: COMPLETED | OUTPATIENT
Start: 2020-08-19 | End: 2020-08-19

## 2020-08-19 RX ORDER — KETOROLAC TROMETHAMINE 30 MG/ML
15 INJECTION, SOLUTION INTRAMUSCULAR; INTRAVENOUS ONCE
Status: COMPLETED | OUTPATIENT
Start: 2020-08-19 | End: 2020-08-19

## 2020-08-19 RX ORDER — ONDANSETRON 2 MG/ML
4 INJECTION INTRAMUSCULAR; INTRAVENOUS ONCE
Status: COMPLETED | OUTPATIENT
Start: 2020-08-19 | End: 2020-08-19

## 2020-08-19 RX ADMIN — HYDROMORPHONE HYDROCHLORIDE 1 MG: 1 INJECTION, SOLUTION INTRAMUSCULAR; INTRAVENOUS; SUBCUTANEOUS at 21:20

## 2020-08-19 RX ADMIN — KETOROLAC TROMETHAMINE 15 MG: 30 INJECTION, SOLUTION INTRAMUSCULAR at 19:26

## 2020-08-19 RX ADMIN — IOHEXOL 100 ML: 350 INJECTION, SOLUTION INTRAVENOUS at 21:30

## 2020-08-19 RX ADMIN — ONDANSETRON 4 MG: 2 INJECTION INTRAMUSCULAR; INTRAVENOUS at 19:26

## 2020-08-19 NOTE — ED PROVIDER NOTES
hHistory  Chief Complaint   Patient presents with    Flank Pain     pt stated right sided flank pain that started about 25min ago  pt stated, "It hurts when i breath abd cough "     78-year-old female presents for right lower abdominal pain  Onset was about 30 minutes prior to arrival when she was sitting in a chair  Pain was sudden acute and on onset, she describes as a sharp stabbing pain that is constant  Patient denies radiation of pain  Patient has nausea without vomiting  Patient was in her usual state of health earlier in the day  She denies urinary symptoms or complaints, vaginal bleeding or discharge, constipation  Patient denies previous history of kidney stones, she has had a cholecystectomy however still has her appendix  Patient reports a history of a ovarian cyst many years ago  She denies trauma or falls to this area  Patient is a , she has history of tubal ligation  Prior to Admission Medications   Prescriptions Last Dose Informant Patient Reported? Taking?    ADMELOG SOLOSTAR 100 units/mL injection pen   Yes No   BASAGLAR KWIKPEN 100 units/mL injection pen   Yes No   EASY COMFORT PEN NEEDLES 32G X 4 MM MISC   Yes No   EASY TOUCH INSULIN SYRINGE 31G X 5/16" 0 3 ML MISC   Yes No   HYDROcodone-acetaminophen (NORCO) 5-325 mg per tablet   Yes No   SUMAtriptan Succinate (IMITREX STATDOSE REFILL) 6 MG/0 5ML SOCT   Yes No   Sig: Inject 6 mg under the skin every 2 (two) hours as needed   buPROPion (WELLBUTRIN XL) 300 mg 24 hr tablet   Yes No   Sig: Take 300 mg by mouth   cholecalciferol (VITAMIN D3) 1,000 units tablet   Yes No   Sig: Take by mouth Three times a day   cloNIDine (CATAPRES) 0 1 mg tablet   Yes No   Si 1 mg 2 (two) times a day Per pt she takes 0 1mg in am and 0 2mg in pm   escitalopram (LEXAPRO) 10 mg tablet   Yes No   Sig: TAKE 1 TABLET BY MOUTH IN THE MORNING   hydrOXYzine HCL (ATARAX) 50 mg tablet   Yes No   Sig: Take 50 mg by mouth daily at bedtime   hydrOXYzine pamoate (VISTARIL) 50 mg capsule   Yes No   Sig: Take 50 mg by mouth   insulin glargine (LANTUS SOLOSTAR) 100 units/mL injection pen   Yes No   Sig: Inject 55 Units under the skin 2 (two) times a day   insulin lispro (HUMALOG KWIKPEN) 100 units/mL injection pen   Yes No   Sig: Inject 1-15 Units under the skin   levothyroxine 50 mcg tablet   Yes No   Sig: Take 50 mcg by mouth daily   metFORMIN (GLUCOPHAGE) 500 mg tablet   No No   Sig: Take 1 tablet (500 mg total) by mouth 2 (two) times a day with meals   ondansetron (ZOFRAN-ODT) 4 mg disintegrating tablet   Yes No   oxyCODONE (ROXICODONE) 5 mg/5 mL solution   Yes No   ziprasidone (GEODON) 80 mg capsule   Yes No   Sig: Take 80 mg by mouth      Facility-Administered Medications: None       Past Medical History:   Diagnosis Date    Anxiety     Arthritis     Asthma     Chronic pain     Diabetes mellitus (HCC)     Disease of thyroid gland     Lymphedema     left leg    Manic bipolar I disorder (HCC)     Morbid obesity (HCC)     OCD (obsessive compulsive disorder)     PTSD (post-traumatic stress disorder)        Past Surgical History:   Procedure Laterality Date    CHOLECYSTECTOMY      CHOLECYSTECTOMY      VT AMPUTATION METATARSAL+TOE,SINGLE Left 9/8/2019    Procedure: PARTIAL 3RD RAY RESECTION, PSB TOE FILLET FLAP;  Surgeon: Gabbi Gonzalez DPM;  Location: [facility];  Service: Podiatry       Family History   Problem Relation Age of Onset    Hyperlipidemia Mother     Diabetes Mother     Hypertension Mother     Heart attack Father      I have reviewed and agree with the history as documented      E-Cigarette/Vaping     E-Cigarette/Vaping Substances     Social History     Tobacco Use    Smoking status: Current Every Day Smoker     Packs/day: 0 50     Years: 15 00     Pack years: 7 50    Smokeless tobacco: Never Used   Substance Use Topics    Alcohol use: Never     Frequency: Never     Drinks per session: Patient refused     Binge frequency: Never    Drug use: Never        Review of Systems   Constitutional: Negative for appetite change, chills and fever  Respiratory: Negative for shortness of breath  Cardiovascular: Negative for chest pain  Gastrointestinal: Positive for abdominal pain and nausea  Negative for constipation and vomiting  Genitourinary: Negative for dysuria, flank pain, menstrual problem, vaginal bleeding and vaginal discharge  Musculoskeletal: Negative for back pain  Skin: Negative for wound  All other systems reviewed and are negative  Physical Exam  ED Triage Vitals   Temperature Pulse Respirations Blood Pressure SpO2   08/19/20 1905 08/19/20 1904 08/19/20 1904 08/19/20 1904 08/19/20 1904   97 8 °F (36 6 °C) 76 18 138/65 98 %      Temp Source Heart Rate Source Patient Position - Orthostatic VS BP Location FiO2 (%)   08/19/20 1905 08/19/20 1904 08/19/20 1904 08/19/20 1904 --   Oral Monitor Lying Right arm       Pain Score       08/19/20 1904       8             Orthostatic Vital Signs  Vitals:    08/19/20 1904 08/19/20 2029   BP: 138/65 133/69   Pulse: 76 68   Patient Position - Orthostatic VS: Lying        Physical Exam  Vitals signs reviewed  Constitutional:       Appearance: She is obese  She is not toxic-appearing or diaphoretic  HENT:      Head: Normocephalic and atraumatic  Eyes:      General: No scleral icterus  Right eye: No discharge  Left eye: No discharge  Cardiovascular:      Rate and Rhythm: Normal rate and regular rhythm  Pulmonary:      Effort: Pulmonary effort is normal  No respiratory distress  Breath sounds: Normal breath sounds  Abdominal:      General: There is no distension  Palpations: Abdomen is soft  Tenderness: There is abdominal tenderness (RLQ)  There is no right CVA tenderness, left CVA tenderness, guarding or rebound  Musculoskeletal:         General: Deformity (right toe amputation) present  No signs of injury  Skin:     General: Skin is warm  Coloration: Skin is not pale  Findings: No erythema  Neurological:      General: No focal deficit present  Mental Status: She is alert        Coordination: Coordination normal          ED Medications  Medications   ketorolac (TORADOL) injection 15 mg (15 mg Intravenous Given 8/19/20 1926)   ondansetron (ZOFRAN) injection 4 mg (4 mg Intravenous Given 8/19/20 1926)   HYDROmorphone (DILAUDID) injection 1 mg (1 mg Intravenous Given 8/19/20 2120)   iohexol (OMNIPAQUE) 350 MG/ML injection (MULTI-DOSE) 100 mL (100 mL Intravenous Given 8/19/20 2130)       Diagnostic Studies  Results Reviewed     Procedure Component Value Units Date/Time    POCT urinalysis dipstick [[id number]]  (Normal) Resulted:  08/19/20 2020    Lab Status:  Final result Specimen:  Urine Updated:  08/19/20 2020     Color, UA yellow    POCT pregnancy, urine [[id number]]  (Normal) Resulted:  08/19/20 2020    Lab Status:  Final result Updated:  08/19/20 2020     EXT PREG TEST UR (Ref: Negative) negative     Control valid    Urine Macroscopic, POC [[id number]]  (Abnormal) Collected:  08/19/20 2016    Lab Status:  Final result Specimen:  Urine Updated:  08/19/20 2018     Color, UA Yellow     Clarity, UA Clear     pH, UA 5 5     Leukocytes, UA Negative     Nitrite, UA Negative     Protein, UA Negative mg/dl      Glucose, UA Negative mg/dl      Ketones, UA Trace mg/dl      Urobilinogen, UA 0 2 E U /dl      Bilirubin, UA Negative     Blood, UA Negative     Specific Gravity, UA >=1 030    Narrative:       CLINITEK RESULT    Lipase [[id number]]  (Normal) Collected:  08/19/20 1921    Lab Status:  Final result Specimen:  Blood from Arm, Left Updated:  08/19/20 1956     Lipase 154 u/L     Comprehensive metabolic panel [[id number]]  (Abnormal) Collected:  08/19/20 1921    Lab Status:  Final result Specimen:  Blood from Arm, Left Updated:  08/19/20 1956     Sodium 138 mmol/L      Potassium 4 5 mmol/L      Chloride 106 mmol/L      CO2 24 mmol/L      ANION GAP 8 mmol/L      BUN 10 mg/dL      Creatinine 0 96 mg/dL      Glucose 152 mg/dL      Calcium 9 1 mg/dL      AST 34 U/L      ALT 31 U/L      Alkaline Phosphatase 99 U/L      Total Protein 7 7 g/dL      Albumin 3 6 g/dL      Total Bilirubin 0 30 mg/dL      eGFR 72 ml/min/1 73sq m     Narrative:       National Kidney Disease Foundation guidelines for Chronic Kidney Disease (CKD):     Stage 1 with normal or high GFR (GFR > 90 mL/min/1 73 square meters)    Stage 2 Mild CKD (GFR = 60-89 mL/min/1 73 square meters)    Stage 3A Moderate CKD (GFR = 45-59 mL/min/1 73 square meters)    Stage 3B Moderate CKD (GFR = 30-44 mL/min/1 73 square meters)    Stage 4 Severe CKD (GFR = 15-29 mL/min/1 73 square meters)    Stage 5 End Stage CKD (GFR <15 mL/min/1 73 square meters)  Note: GFR calculation is accurate only with a steady state creatinine    CBC and differential [[id number]]  (Abnormal) Collected:  08/19/20 1921    Lab Status:  Final result Specimen:  Blood from Arm, Left Updated:  08/19/20 1942     WBC 9 45 Thousand/uL      RBC 5 41 Million/uL      Hemoglobin 14 5 g/dL      Hematocrit 45 5 %      MCV 84 fL      MCH 26 8 pg      MCHC 31 9 g/dL      RDW 13 1 %      MPV 9 7 fL      Platelets 040 Thousands/uL      nRBC 0 /100 WBCs      Neutrophils Relative 52 %      Immat GRANS % 0 %      Lymphocytes Relative 37 %      Monocytes Relative 6 %      Eosinophils Relative 4 %      Basophils Relative 1 %      Neutrophils Absolute 4 97 Thousands/µL      Immature Grans Absolute 0 03 Thousand/uL      Lymphocytes Absolute 3 47 Thousands/µL      Monocytes Absolute 0 54 Thousand/µL      Eosinophils Absolute 0 39 Thousand/µL      Basophils Absolute 0 05 Thousands/µL                  CT abdomen pelvis with contrast   Final Result by Olimpia Hernandez MD (08/19 2205)         1  Hepatic steatosis and hepatomegaly  2  Colonic diverticulosis  Other chronic findings as above              Workstation performed: [id number] Procedures  Procedures      ED Course  ED Course as of Aug 21 1256   Wed Aug 19, 2020   2110 Patient updated on lab results, pain not improved  2220 Pain improved, CT results discussed  Patient will be discharged home, she is going call her sister for a ride home  US AUDIT      Most Recent Value   Initial Alcohol Screen: US AUDIT-C    1  How often do you have a drink containing alcohol?  0 Filed at: 08/19/2020 1906   2  How many drinks containing alcohol do you have on a typical day you are drinking? 0 Filed at: 08/19/2020 1906   3b  FEMALE Any Age, or MALE 65+: How often do you have 4 or more drinks on one occassion? 0 Filed at: 08/19/2020 1906   Audit-C Score  0 Filed at: 08/19/2020 1906                  EDGARD/DAST-10      Most Recent Value   How many times in the past year have you    Used an illegal drug or used a prescription medication for non-medical reasons? Never Filed at: 08/19/2020 1906                              [facility]  Number of Diagnoses or Management Options  Abdominal pain:   Diagnosis management comments: 40yo female presents for acute onset abdominal pain  Hx of tubal ligation, cholecystectomy, remote history of ovarian cyst  On examination she has pain in her RLQ but appears non-toxic  Labs show no acute abnormalities, CT a/p shows no evidence of intraabdominal or  source of pain  Pain was greatly relieved with medication, she was PO challenged successfully, and discharged to home  Disposition  Final diagnoses:   Abdominal pain     Time reflects when diagnosis was documented in both MDM as applicable and the Disposition within this note     Time User Action Codes Description Comment    8/19/2020 10:24 PM Rell Diaz Add [R10 9] Abdominal pain       ED Disposition     ED Disposition Condition Date/Time Comment    Discharge Stable Wed Aug 19, 2020 10:24 PM Eve De Jesus discharge to home/self care              Follow-up Information     Follow up With Specialties Details Why Contact Info Additional Information    Sharri Estrada, DO Internal Medicine  As needed [address]  [address] [facility] [address] [phone]       [facility] [address] Emergency Department Emergency Medicine  If symptoms worsen [address]  [phone]  ED, [address] [facility] [address], [place], [address] [facility], [address]   [phone]          Discharge Medication List as of 8/19/2020 10:24 PM      CONTINUE these medications which have NOT CHANGED    Details   ADMELOG SOLOSTAR 100 units/mL injection pen Starting Wed 3/20/2019, Historical Med      !!  BASAGLAR KWIKPEN 100 units/mL injection pen Starting Wed 3/20/2019, Historical Med      buPROPion (WELLBUTRIN XL) 300 mg 24 hr tablet Take 300 mg by mouth, Historical Med      cholecalciferol (VITAMIN D3) 1,000 units tablet Take by mouth Three times a day, Historical Med      cloNIDine (CATAPRES) 0 1 mg tablet 0 1 mg 2 (two) times a day Per pt she takes 0 1mg in am and 0 2mg in pm, Starting Mon 4/25/2016, Historical Med      EASY COMFORT PEN NEEDLES 32G X 4 MM MISC Starting Wed 3/20/2019, Historical Med      EASY TOUCH INSULIN SYRINGE 31G X 5/16" 0 3 ML MISC Starting Sat 2/2/2019, Historical Med      escitalopram (LEXAPRO) 10 mg tablet TAKE 1 TABLET BY MOUTH IN THE MORNING, Historical Med      HYDROcodone-acetaminophen (NORCO) 5-325 mg per tablet Starting Sat 4/13/2019, Historical Med      hydrOXYzine HCL (ATARAX) 50 mg tablet Take 50 mg by mouth daily at bedtime, Starting Tue 9/3/2019, Historical Med      hydrOXYzine pamoate (VISTARIL) 50 mg capsule Take 50 mg by mouth, Starting Tue 8/7/2018, Historical Med      !! insulin glargine (LANTUS SOLOSTAR) 100 units/mL injection pen Inject 55 Units under the skin 2 (two) times a day, Starting Wed 3/20/2019, Historical Med      insulin lispro (HUMALOG KWIKPEN) 100 units/mL injection pen Inject 1-15 Units under the skin, Starting Wed 3/20/2019, Historical Med levothyroxine 50 mcg tablet Take 50 mcg by mouth daily, Starting Fri 3/8/2019, Historical Med      metFORMIN (GLUCOPHAGE) 500 mg tablet Take 1 tablet (500 mg total) by mouth 2 (two) times a day with meals, Starting Tue 9/10/2019, Print      ondansetron (ZOFRAN-ODT) 4 mg disintegrating tablet Starting Tue 3/19/2019, Historical Med      oxyCODONE (ROXICODONE) 5 mg/5 mL solution Starting Tue 3/19/2019, Historical Med      SUMAtriptan Succinate (IMITREX STATDOSE REFILL) 6 MG/0 5ML SOCT Inject 6 mg under the skin every 2 (two) hours as needed, Starting Tue 11/29/2016, Historical Med      ziprasidone (GEODON) 80 mg capsule Take 80 mg by mouth, Starting Tue 5/15/2018, Historical Med       !! - Potential duplicate medications found  Please discuss with provider  No discharge procedures on file  PDMP Review     None           ED Provider  Attending physically available and evaluated Mynor Pena I managed the patient along with the ED Attending      Electronically Signed by         Geovanna Moody DO  08/21/20 2887

## 2020-08-20 NOTE — ED ATTENDING ATTESTATION
8/19/2020  IJada, DO, saw and evaluated the patient  I have discussed the patient with the resident/non-physician practitioner and agree with the resident's/non-physician practitioner's findings, Plan of Care, and MDM as documented in the resident's/non-physician practitioner's note, except where noted  All available labs and Radiology studies were reviewed  I was present for key portions of any procedure(s) performed by the resident/non-physician practitioner and I was immediately available to provide assistance  At this point I agree with the current assessment done in the Emergency Department  I have conducted an independent evaluation of this patient a history and physical is as follows:    38yo female presents with RLQ pain that was sudden in onset about 30 min pta  Pt states pain is stabbing and constant, worse when going over bumps in car  Also has nausea and vomiting  No urin complaints, no vag d/c  Has h/o ovarian cyst   On exam - nad, heart reg, no resp distress, abd soft, obese, tender in RLQ    Plan - CT abd, check labs and urine    ED Course         Critical Care Time  Procedures

## 2020-11-16 ENCOUNTER — OFFICE VISIT (OUTPATIENT)
Dept: PODIATRY | Facility: CLINIC | Age: 45
End: 2020-11-16
Payer: COMMERCIAL

## 2020-11-16 VITALS — BODY MASS INDEX: 42.99 KG/M2 | HEIGHT: 65 IN | WEIGHT: 258 LBS | TEMPERATURE: 96.4 F

## 2020-11-16 DIAGNOSIS — M72.2 PLANTAR FASCIITIS, BILATERAL: Primary | ICD-10-CM

## 2020-11-16 DIAGNOSIS — Z79.4 TYPE 2 DIABETES MELLITUS WITH DIABETIC POLYNEUROPATHY, WITH LONG-TERM CURRENT USE OF INSULIN (HCC): ICD-10-CM

## 2020-11-16 DIAGNOSIS — E11.42 TYPE 2 DIABETES MELLITUS WITH DIABETIC POLYNEUROPATHY, WITH LONG-TERM CURRENT USE OF INSULIN (HCC): ICD-10-CM

## 2020-11-16 DIAGNOSIS — Z72.0 TOBACCO ABUSE: ICD-10-CM

## 2020-11-16 PROCEDURE — 99213 OFFICE O/P EST LOW 20 MIN: CPT | Performed by: PODIATRIST

## 2020-11-16 RX ORDER — LAMOTRIGINE 25 MG/1
50 TABLET ORAL
COMMUNITY
Start: 2020-07-08 | End: 2021-09-08 | Stop reason: HOSPADM

## 2020-11-23 ENCOUNTER — EVALUATION (OUTPATIENT)
Dept: PHYSICAL THERAPY | Facility: REHABILITATION | Age: 45
End: 2020-11-23
Payer: COMMERCIAL

## 2020-11-23 DIAGNOSIS — M72.2 PLANTAR FASCIITIS, BILATERAL: ICD-10-CM

## 2020-11-23 DIAGNOSIS — M79.671 BILATERAL FOOT PAIN: Primary | ICD-10-CM

## 2020-11-23 DIAGNOSIS — M79.672 BILATERAL FOOT PAIN: Primary | ICD-10-CM

## 2020-11-23 PROCEDURE — 97110 THERAPEUTIC EXERCISES: CPT | Performed by: PHYSICAL THERAPIST

## 2020-11-23 PROCEDURE — 97161 PT EVAL LOW COMPLEX 20 MIN: CPT | Performed by: PHYSICAL THERAPIST

## 2020-12-02 ENCOUNTER — HOSPITAL ENCOUNTER (EMERGENCY)
Facility: HOSPITAL | Age: 45
Discharge: HOME | End: 2020-12-02
Attending: EMERGENCY MEDICINE | Admitting: EMERGENCY MEDICINE
Payer: COMMERCIAL

## 2020-12-02 ENCOUNTER — APPOINTMENT (EMERGENCY)
Dept: RADIOLOGY | Facility: HOSPITAL | Age: 45
End: 2020-12-02
Payer: COMMERCIAL

## 2020-12-02 VITALS
WEIGHT: 239 LBS | HEART RATE: 86 BPM | TEMPERATURE: 97.5 F | RESPIRATION RATE: 18 BRPM | OXYGEN SATURATION: 96 % | BODY MASS INDEX: 38.41 KG/M2 | HEIGHT: 66 IN | SYSTOLIC BLOOD PRESSURE: 120 MMHG | DIASTOLIC BLOOD PRESSURE: 75 MMHG

## 2020-12-02 DIAGNOSIS — L03.90 CELLULITIS: Primary | ICD-10-CM

## 2020-12-02 DIAGNOSIS — S30.0XXA COCCYX CONTUSION, INITIAL ENCOUNTER: ICD-10-CM

## 2020-12-02 LAB
ANION GAP SERPL CALCULATED.3IONS-SCNC: 5 MMOL/L (ref 4–13)
BASOPHILS # BLD AUTO: 0.06 THOUSANDS/ΜL (ref 0–0.1)
BASOPHILS NFR BLD AUTO: 1 % (ref 0–1)
BUN SERPL-MCNC: 11 MG/DL (ref 5–25)
CALCIUM SERPL-MCNC: 9.8 MG/DL (ref 8.3–10.1)
CHLORIDE SERPL-SCNC: 102 MMOL/L (ref 100–108)
CO2 SERPL-SCNC: 26 MMOL/L (ref 21–32)
CREAT SERPL-MCNC: 0.88 MG/DL (ref 0.6–1.3)
EOSINOPHIL # BLD AUTO: 0.4 THOUSAND/ΜL (ref 0–0.61)
EOSINOPHIL NFR BLD AUTO: 4 % (ref 0–6)
ERYTHROCYTE [DISTWIDTH] IN BLOOD BY AUTOMATED COUNT: 14 % (ref 11.6–15.1)
GFR SERPL CREATININE-BSD FRML MDRD: 80 ML/MIN/1.73SQ M
GLUCOSE SERPL-MCNC: 129 MG/DL (ref 65–140)
HCT VFR BLD AUTO: 44.4 % (ref 34.8–46.1)
HGB BLD-MCNC: 13.9 G/DL (ref 11.5–15.4)
IMM GRANULOCYTES # BLD AUTO: 0.03 THOUSAND/UL (ref 0–0.2)
IMM GRANULOCYTES NFR BLD AUTO: 0 % (ref 0–2)
LYMPHOCYTES # BLD AUTO: 2.52 THOUSANDS/ΜL (ref 0.6–4.47)
LYMPHOCYTES NFR BLD AUTO: 26 % (ref 14–44)
MCH RBC QN AUTO: 25.5 PG (ref 26.8–34.3)
MCHC RBC AUTO-ENTMCNC: 31.3 G/DL (ref 31.4–37.4)
MCV RBC AUTO: 82 FL (ref 82–98)
MONOCYTES # BLD AUTO: 0.56 THOUSAND/ΜL (ref 0.17–1.22)
MONOCYTES NFR BLD AUTO: 6 % (ref 4–12)
NEUTROPHILS # BLD AUTO: 6.03 THOUSANDS/ΜL (ref 1.85–7.62)
NEUTS SEG NFR BLD AUTO: 63 % (ref 43–75)
NRBC BLD AUTO-RTO: 0 /100 WBCS
PLATELET # BLD AUTO: 342 THOUSANDS/UL (ref 149–390)
PMV BLD AUTO: 9.5 FL (ref 8.9–12.7)
POTASSIUM SERPL-SCNC: 4 MMOL/L (ref 3.5–5.3)
RBC # BLD AUTO: 5.45 MILLION/UL (ref 3.81–5.12)
SODIUM SERPL-SCNC: 133 MMOL/L (ref 136–145)
WBC # BLD AUTO: 9.6 THOUSAND/UL (ref 4.31–10.16)

## 2020-12-02 PROCEDURE — 99285 EMERGENCY DEPT VISIT HI MDM: CPT | Performed by: EMERGENCY MEDICINE

## 2020-12-02 PROCEDURE — 96365 THER/PROPH/DIAG IV INF INIT: CPT

## 2020-12-02 PROCEDURE — 36415 COLL VENOUS BLD VENIPUNCTURE: CPT | Performed by: EMERGENCY MEDICINE

## 2020-12-02 PROCEDURE — 72220 X-RAY EXAM SACRUM TAILBONE: CPT

## 2020-12-02 PROCEDURE — 96375 TX/PRO/DX INJ NEW DRUG ADDON: CPT

## 2020-12-02 PROCEDURE — 99284 EMERGENCY DEPT VISIT MOD MDM: CPT

## 2020-12-02 PROCEDURE — 99244 OFF/OP CNSLTJ NEW/EST MOD 40: CPT | Performed by: PODIATRIST

## 2020-12-02 PROCEDURE — 80048 BASIC METABOLIC PNL TOTAL CA: CPT | Performed by: EMERGENCY MEDICINE

## 2020-12-02 PROCEDURE — 85025 COMPLETE CBC W/AUTO DIFF WBC: CPT | Performed by: EMERGENCY MEDICINE

## 2020-12-02 RX ORDER — PANTOPRAZOLE SODIUM 40 MG/1
40 TABLET, DELAYED RELEASE ORAL DAILY
COMMUNITY
Start: 2020-03-04 | End: 2021-03-04

## 2020-12-02 RX ORDER — DOXYCYCLINE HYCLATE 100 MG/1
100 CAPSULE ORAL 2 TIMES DAILY
Qty: 14 CAPSULE | Refills: 0 | Status: SHIPPED | OUTPATIENT
Start: 2020-12-02 | End: 2020-12-09

## 2020-12-02 RX ORDER — ALBUTEROL SULFATE 90 UG/1
2 AEROSOL, METERED RESPIRATORY (INHALATION) EVERY 6 HOURS PRN
COMMUNITY

## 2020-12-02 RX ORDER — HYDROMORPHONE HCL/PF 1 MG/ML
1 SYRINGE (ML) INJECTION ONCE
Status: COMPLETED | OUTPATIENT
Start: 2020-12-02 | End: 2020-12-02

## 2020-12-02 RX ORDER — HYDROCODONE BITARTRATE AND ACETAMINOPHEN 5; 325 MG/1; MG/1
1 TABLET ORAL EVERY 6 HOURS PRN
Qty: 7 TABLET | Refills: 0 | Status: SHIPPED | OUTPATIENT
Start: 2020-12-02

## 2020-12-02 RX ORDER — CLINDAMYCIN PHOSPHATE 600 MG/50ML
600 INJECTION INTRAVENOUS ONCE
Status: COMPLETED | OUTPATIENT
Start: 2020-12-02 | End: 2020-12-02

## 2020-12-02 RX ADMIN — HYDROMORPHONE HYDROCHLORIDE 1 MG: 1 INJECTION, SOLUTION INTRAMUSCULAR; INTRAVENOUS; SUBCUTANEOUS at 13:10

## 2020-12-02 RX ADMIN — CLINDAMYCIN IN 5 PERCENT DEXTROSE 600 MG: 12 INJECTION, SOLUTION INTRAVENOUS at 13:11

## 2021-01-04 ENCOUNTER — APPOINTMENT (EMERGENCY)
Dept: RADIOLOGY | Facility: HOSPITAL | Age: 46
End: 2021-01-04
Payer: COMMERCIAL

## 2021-01-04 ENCOUNTER — HOSPITAL ENCOUNTER (EMERGENCY)
Facility: HOSPITAL | Age: 46
Discharge: HOME | End: 2021-01-04
Attending: EMERGENCY MEDICINE
Payer: COMMERCIAL

## 2021-01-04 VITALS
BODY MASS INDEX: 38.84 KG/M2 | OXYGEN SATURATION: 98 % | SYSTOLIC BLOOD PRESSURE: 142 MMHG | RESPIRATION RATE: 18 BRPM | TEMPERATURE: 97.6 F | WEIGHT: 237 LBS | DIASTOLIC BLOOD PRESSURE: 81 MMHG | HEART RATE: 71 BPM

## 2021-01-04 DIAGNOSIS — E08.621 DIABETIC ULCER OF MIDFOOT ASSOCIATED WITH DIABETES MELLITUS DUE TO UNDERLYING CONDITION, WITH BONE INVOLVEMENT WITHOUT EVIDENCE OF NECROSIS, UNSPECIFIED LATERALITY (HCC): Primary | ICD-10-CM

## 2021-01-04 DIAGNOSIS — L97.406 DIABETIC ULCER OF MIDFOOT ASSOCIATED WITH DIABETES MELLITUS DUE TO UNDERLYING CONDITION, WITH BONE INVOLVEMENT WITHOUT EVIDENCE OF NECROSIS, UNSPECIFIED LATERALITY (HCC): Primary | ICD-10-CM

## 2021-01-04 LAB
ANION GAP SERPL CALCULATED.3IONS-SCNC: 5 MMOL/L (ref 4–13)
BASOPHILS # BLD AUTO: 0.05 THOUSANDS/ΜL (ref 0–0.1)
BASOPHILS NFR BLD AUTO: 1 % (ref 0–1)
BUN SERPL-MCNC: 8 MG/DL (ref 5–25)
CALCIUM SERPL-MCNC: 9.7 MG/DL (ref 8.3–10.1)
CHLORIDE SERPL-SCNC: 106 MMOL/L (ref 100–108)
CO2 SERPL-SCNC: 26 MMOL/L (ref 21–32)
CREAT SERPL-MCNC: 0.8 MG/DL (ref 0.6–1.3)
CRP SERPL QL: 16.8 MG/L
EOSINOPHIL # BLD AUTO: 0.34 THOUSAND/ΜL (ref 0–0.61)
EOSINOPHIL NFR BLD AUTO: 4 % (ref 0–6)
ERYTHROCYTE [DISTWIDTH] IN BLOOD BY AUTOMATED COUNT: 14.3 % (ref 11.6–15.1)
ERYTHROCYTE [SEDIMENTATION RATE] IN BLOOD: 36 MM/HOUR (ref 0–19)
GFR SERPL CREATININE-BSD FRML MDRD: 90 ML/MIN/1.73SQ M
GLUCOSE SERPL-MCNC: 112 MG/DL (ref 65–140)
HCT VFR BLD AUTO: 44.3 % (ref 34.8–46.1)
HGB BLD-MCNC: 13.9 G/DL (ref 11.5–15.4)
IMM GRANULOCYTES # BLD AUTO: 0.02 THOUSAND/UL (ref 0–0.2)
IMM GRANULOCYTES NFR BLD AUTO: 0 % (ref 0–2)
LYMPHOCYTES # BLD AUTO: 3.27 THOUSANDS/ΜL (ref 0.6–4.47)
LYMPHOCYTES NFR BLD AUTO: 41 % (ref 14–44)
MCH RBC QN AUTO: 25.4 PG (ref 26.8–34.3)
MCHC RBC AUTO-ENTMCNC: 31.4 G/DL (ref 31.4–37.4)
MCV RBC AUTO: 81 FL (ref 82–98)
MONOCYTES # BLD AUTO: 0.48 THOUSAND/ΜL (ref 0.17–1.22)
MONOCYTES NFR BLD AUTO: 6 % (ref 4–12)
NEUTROPHILS # BLD AUTO: 3.82 THOUSANDS/ΜL (ref 1.85–7.62)
NEUTS SEG NFR BLD AUTO: 48 % (ref 43–75)
NRBC BLD AUTO-RTO: 0 /100 WBCS
PLATELET # BLD AUTO: 357 THOUSANDS/UL (ref 149–390)
PMV BLD AUTO: 9.1 FL (ref 8.9–12.7)
POTASSIUM SERPL-SCNC: 4 MMOL/L (ref 3.5–5.3)
RBC # BLD AUTO: 5.47 MILLION/UL (ref 3.81–5.12)
SODIUM SERPL-SCNC: 137 MMOL/L (ref 136–145)
WBC # BLD AUTO: 7.98 THOUSAND/UL (ref 4.31–10.16)

## 2021-01-04 PROCEDURE — NC001 PR NO CHARGE: Performed by: PODIATRIST

## 2021-01-04 PROCEDURE — 99284 EMERGENCY DEPT VISIT MOD MDM: CPT | Performed by: EMERGENCY MEDICINE

## 2021-01-04 PROCEDURE — 86140 C-REACTIVE PROTEIN: CPT | Performed by: EMERGENCY MEDICINE

## 2021-01-04 PROCEDURE — 85025 COMPLETE CBC W/AUTO DIFF WBC: CPT | Performed by: EMERGENCY MEDICINE

## 2021-01-04 PROCEDURE — 80048 BASIC METABOLIC PNL TOTAL CA: CPT | Performed by: EMERGENCY MEDICINE

## 2021-01-04 PROCEDURE — 99284 EMERGENCY DEPT VISIT MOD MDM: CPT

## 2021-01-04 PROCEDURE — 85652 RBC SED RATE AUTOMATED: CPT | Performed by: EMERGENCY MEDICINE

## 2021-01-04 PROCEDURE — 36415 COLL VENOUS BLD VENIPUNCTURE: CPT | Performed by: EMERGENCY MEDICINE

## 2021-01-04 PROCEDURE — 73630 X-RAY EXAM OF FOOT: CPT

## 2021-01-04 RX ORDER — OXYCODONE HYDROCHLORIDE 5 MG/1
5 CAPSULE ORAL EVERY 6 HOURS PRN
Qty: 8 CAPSULE | Refills: 0 | Status: SHIPPED | OUTPATIENT
Start: 2021-01-04 | End: 2021-01-06

## 2021-01-04 RX ORDER — DOXYCYCLINE HYCLATE 100 MG/1
100 CAPSULE ORAL ONCE
Status: COMPLETED | OUTPATIENT
Start: 2021-01-04 | End: 2021-01-04

## 2021-01-04 RX ORDER — OXYCODONE HYDROCHLORIDE AND ACETAMINOPHEN 5; 325 MG/1; MG/1
1 TABLET ORAL ONCE
Status: COMPLETED | OUTPATIENT
Start: 2021-01-04 | End: 2021-01-04

## 2021-01-04 RX ORDER — DOXYCYCLINE HYCLATE 100 MG/1
100 CAPSULE ORAL 2 TIMES DAILY
Qty: 14 CAPSULE | Refills: 0 | Status: SHIPPED | OUTPATIENT
Start: 2021-01-04 | End: 2021-01-14 | Stop reason: HOSPADM

## 2021-01-04 RX ADMIN — OXYCODONE HYDROCHLORIDE AND ACETAMINOPHEN 1 TABLET: 5; 325 TABLET ORAL at 18:51

## 2021-01-04 RX ADMIN — DOXYCYCLINE 100 MG: 100 CAPSULE ORAL at 18:51

## 2021-01-04 NOTE — ED PROVIDER NOTES
History  Chief Complaint   Patient presents with    Toe Injury     Pt states, "I have a hole in my toe since saturday "      Pt is a 38yo F who presents for a hole in her toe  Patient states that 2 days ago she was having pain in her right foot  Patient states she had her son look at her foot at that time and he noticed a crack in the skin on the bottom of her 2nd toe  Patient states she took a shower and wash the area thoroughly and an attempt to not let it worsen  Patient states she went to work the next day and when she returned and took off her socks, she noted skin that came with it  Patient's son looked at her foot again at this time and stated there was a hole in the area  Patient states she is having pain at this site as well as at the entirety of the ball of her right foot  Patient states she still is able to ambulate, however due to pain she is not able to wear her diabetic shoes  Patient states she has mostly been wearing slippers due to this  Patient denies any wound or injury to the area prior to onset of symptoms  Patient states she has been taking Advil with minimal relief  Patient has history of osteomyelitis with amputation on her left foot, however has no history of any injury or infection to her right foot  Rest of review of systems is largely negative aside from chronic cough and chronic headache  Patient denies any fevers or chills  Patient states she has been taking her medications regularly and has no other concerns at this time  Prior to Admission Medications   Prescriptions Last Dose Informant Patient Reported? Taking?    ADMELOG SOLOSTAR 100 units/mL injection pen   Yes No   BASAGLAR KWIKPEN 100 units/mL injection pen   Yes No   EASY COMFORT PEN NEEDLES 32G X 4 MM MISC   Yes No   EASY TOUCH INSULIN SYRINGE 31G X 5/16" 0 3 ML MISC   Yes No   HYDROcodone-acetaminophen (NORCO) 5-325 mg per tablet   Yes No   HYDROcodone-acetaminophen (NORCO) 5-325 mg per tablet   No No   Sig: Take 1 tablet by mouth every 6 (six) hours as needed for painMax Daily Amount: 4 tablets   SUMAtriptan Succinate (IMITREX STATDOSE REFILL) 6 MG/0 5ML SOCT   Yes No   Sig: Inject 6 mg under the skin every 2 (two) hours as needed   albuterol (PROVENTIL HFA,VENTOLIN HFA) 90 mcg/act inhaler   Yes No   Sig: Inhale 2 puffs every 6 (six) hours as needed for wheezing   buPROPion (WELLBUTRIN XL) 300 mg 24 hr tablet   Yes No   Sig: Take 300 mg by mouth   cholecalciferol (VITAMIN D3) 1,000 units tablet   Yes No   Sig: Take by mouth Three times a day   cloNIDine (CATAPRES) 0 1 mg tablet   Yes No   Si 2 mg every 12 (twelve) hours    escitalopram (LEXAPRO) 10 mg tablet   Yes No   Sig: TAKE 1 TABLET BY MOUTH IN THE MORNING   hydrOXYzine HCL (ATARAX) 50 mg tablet   Yes No   Sig: Take 50 mg by mouth daily at bedtime   hydrOXYzine pamoate (VISTARIL) 50 mg capsule   Yes No   Sig: Take 50 mg by mouth   insulin glargine (LANTUS SOLOSTAR) 100 units/mL injection pen   Yes No   Sig: Inject 55 Units under the skin 2 (two) times a day   insulin lispro (HUMALOG KWIKPEN) 100 units/mL injection pen   Yes No   Sig: Inject 1-15 Units under the skin   lamoTRIgine (LaMICtal) 25 mg tablet   Yes No   Sig: Take 150 mg by mouth daily    levothyroxine 50 mcg tablet   Yes No   Sig: Take 50 mcg by mouth daily   metFORMIN (GLUCOPHAGE) 500 mg tablet   No No   Sig: Take 1 tablet (500 mg total) by mouth 2 (two) times a day with meals   ondansetron (ZOFRAN-ODT) 4 mg disintegrating tablet   Yes No   oxyCODONE (ROXICODONE) 5 mg/5 mL solution   Yes No   pantoprazole (PROTONIX) 40 mg tablet   Yes No   Sig: Take 40 mg by mouth daily   ziprasidone (GEODON) 80 mg capsule   Yes No   Sig: Take 80 mg by mouth 2 (two) times a day with meals       Facility-Administered Medications: None       Past Medical History:   Diagnosis Date    Anxiety     Arthritis     Asthma     Chronic pain     Diabetes mellitus (HCC)     Disease of thyroid gland     Lymphedema     left leg    Manic bipolar I disorder (HCC)     Morbid obesity (HCC)     OCD (obsessive compulsive disorder)     PTSD (post-traumatic stress disorder)        Past Surgical History:   Procedure Laterality Date    CHOLECYSTECTOMY      CHOLECYSTECTOMY      VT AMPUTATION METATARSAL+TOE,SINGLE Left 9/8/2019    Procedure: PARTIAL 3RD RAY RESECTION, PSB TOE FILLET FLAP;  Surgeon: Aaron Rosales DPM;  Location: [facility];  Service: Podiatry       Family History   Problem Relation Age of Onset    Hyperlipidemia Mother     Diabetes Mother     Hypertension Mother     Heart attack Father      I have reviewed and agree with the history as documented  E-Cigarette/Vaping    E-Cigarette Use Current Some Day User      E-Cigarette/Vaping Substances    Nicotine No     THC No     CBD No     Flavoring No     Other No     Unknown No      Social History     Tobacco Use    Smoking status: Current Every Day Smoker     Packs/day: 0 50     Years: 15 00     Pack years: 7 50    Smokeless tobacco: Never Used   Substance Use Topics    Alcohol use: Never     Frequency: Never     Drinks per session: Patient refused     Binge frequency: Never    Drug use: Never        Review of Systems   Constitutional: Negative for activity change, appetite change, chills and fever  HENT: Negative for ear pain, sinus pressure, sinus pain and sore throat  Eyes: Negative for pain  Respiratory: Positive for cough (chronic; unchanged)  Negative for chest tightness, shortness of breath and wheezing  Cardiovascular: Negative for chest pain and palpitations  Gastrointestinal: Negative for abdominal pain, diarrhea, nausea and vomiting  Genitourinary: Negative for dysuria, frequency, hematuria and urgency  Musculoskeletal: Negative for back pain, gait problem, neck pain and neck stiffness  R 3rd toe pain    Skin: Positive for color change (redness of R 3rd toe) and wound (R 3rd toe)   Negative for pallor and rash    Neurological: Positive for headaches (chronic; unchanged)  Negative for dizziness, tremors, seizures, syncope, weakness, light-headedness and numbness  Psychiatric/Behavioral: Negative for agitation, behavioral problems and confusion  The patient is not nervous/anxious  Physical Exam  ED Triage Vitals [01/04/21 1147]   Temperature Pulse Respirations Blood Pressure SpO2   97 6 °F (36 4 °C) 68 20 138/85 99 %      Temp Source Heart Rate Source Patient Position - Orthostatic VS BP Location FiO2 (%)   Tympanic Monitor Lying Left arm --      Pain Score       9             Orthostatic Vital Signs  Vitals:    01/04/21 1147 01/04/21 1928   BP: 138/85 142/81   Pulse: 68 71   Patient Position - Orthostatic VS: Lying        Physical Exam  Vitals signs reviewed  Constitutional:       General: She is not in acute distress  Appearance: Normal appearance  She is well-developed  She is obese  She is not ill-appearing, toxic-appearing or diaphoretic  HENT:      Head: Normocephalic and atraumatic  Right Ear: External ear normal       Left Ear: External ear normal       Nose: Nose normal    Eyes:      Conjunctiva/sclera: Conjunctivae normal       Pupils: Pupils are equal, round, and reactive to light  Neck:      Musculoskeletal: Normal range of motion and neck supple  Cardiovascular:      Rate and Rhythm: Normal rate and regular rhythm  Heart sounds: Normal heart sounds  No murmur  Pulmonary:      Effort: Pulmonary effort is normal  No respiratory distress  Breath sounds: Normal breath sounds  No stridor  No wheezing  Abdominal:      General: There is no distension  Palpations: Abdomen is soft  There is no mass  Tenderness: There is no abdominal tenderness  Musculoskeletal: Normal range of motion  Right lower leg: No edema  Left lower leg: No edema  Feet:    Skin:     General: Skin is warm and dry        Capillary Refill: Capillary refill takes less than 2 seconds  Neurological:      Mental Status: She is alert and oriented to person, place, and time  Psychiatric:         Mood and Affect: Mood normal          Behavior: Behavior normal          Thought Content: Thought content normal          Judgment: Judgment normal          ED Medications  Medications   oxyCODONE-acetaminophen (PERCOCET) 5-325 mg per tablet 1 tablet (1 tablet Oral Given 1/4/21 1851)   doxycycline hyclate (VIBRAMYCIN) capsule 100 mg (100 mg Oral Given 1/4/21 1851)       Diagnostic Studies  Results Reviewed     Procedure Component Value Units Date/Time    Sedimentation rate, automated [[id number]]  (Abnormal) Collected: 01/04/21 1744    Lab Status: Final result Specimen: Blood from Arm, Right Updated: 01/04/21 1836     Sed Rate 36 mm/hour     Narrative:      New method- Test performed using  automated Rheology Technology  If following a patient's inflammatory disease during treatment, a new baseline should be established      Basic metabolic panel [[id number]] Collected: 01/04/21 1745    Lab Status: Final result Specimen: Blood from Arm, Right Updated: 01/04/21 1817     Sodium 137 mmol/L      Potassium 4 0 mmol/L      Chloride 106 mmol/L      CO2 26 mmol/L      ANION GAP 5 mmol/L      BUN 8 mg/dL      Creatinine 0 80 mg/dL      Glucose 112 mg/dL      Calcium 9 7 mg/dL      eGFR 90 ml/min/1 73sq m     Narrative:      [place] guidelines for Chronic Kidney Disease (CKD):     Stage 1 with normal or high GFR (GFR > 90 mL/min/1 73 square meters)    Stage 2 Mild CKD (GFR = 60-89 mL/min/1 73 square meters)    Stage 3A Moderate CKD (GFR = 45-59 mL/min/1 73 square meters)    Stage 3B Moderate CKD (GFR = 30-44 mL/min/1 73 square meters)    Stage 4 Severe CKD (GFR = 15-29 mL/min/1 73 square meters)    Stage 5 End Stage CKD (GFR <15 mL/min/1 73 square meters)  Note: GFR calculation is accurate only with a steady state creatinine    C-reactive protein [[id number]]  (Abnormal) Collected: 01/04/21 1745    Lab Status: Final result Specimen: Blood from Arm, Right Updated: 01/04/21 1817     CRP 16 8 mg/L     CBC and differential [[id number]]  (Abnormal) Collected: 01/04/21 1744    Lab Status: Final result Specimen: Blood from Arm, Right Updated: 01/04/21 1801     WBC 7 98 Thousand/uL      RBC 5 47 Million/uL      Hemoglobin 13 9 g/dL      Hematocrit 44 3 %      MCV 81 fL      MCH 25 4 pg      MCHC 31 4 g/dL      RDW 14 3 %      MPV 9 1 fL      Platelets 060 Thousands/uL      nRBC 0 /100 WBCs      Neutrophils Relative 48 %      Immat GRANS % 0 %      Lymphocytes Relative 41 %      Monocytes Relative 6 %      Eosinophils Relative 4 %      Basophils Relative 1 %      Neutrophils Absolute 3 82 Thousands/µL      Immature Grans Absolute 0 02 Thousand/uL      Lymphocytes Absolute 3 27 Thousands/µL      Monocytes Absolute 0 48 Thousand/µL      Eosinophils Absolute 0 34 Thousand/µL      Basophils Absolute 0 05 Thousands/µL                  XR foot 3+ views RIGHT   Final Result by Nicolasa Tenorio MD (01/05 4873)      No radiographic evidence of osteomyelitis  Workstation performed: [id number]               Procedures  Procedures      ED Course  ED Course as of Jan 07 2241 Mon Jan 04, 2021   1711 Podiatry to see  2345 [facility] saw and recommended one week oral doxy upon DC as well as post-op shoe  Pt has follow-up 1/11  Pt also requesting note for work  1807 Reviewed and without actionable derangement  CBC and differential(!)   1820 Reviewed and without derangement  Basic metabolic panel   2187 Elevated CRP  Non-diagnostic  C-reactive protein(!)   1841 ESR elevated  Non-diagnostic  Sedimentation rate, automated(!)   1910 No obvious abnormality of 2nd toe on wet read      XR foot 3+ views RIGHT                                       MDM  Number of Diagnoses or Management Options  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, with bone involvement without evidence of necrosis, unspecified laterality [facility]):   Diagnosis management comments: Pt is a 40yo F who presents with "hole in her toe"  Exam pertinent for ulcer on the 2nd toe with surrounding erythema, significantly tender to palpation  Differential diagnosis to include but not limited to diabetic ulcer, osteomyelitis, cellulitis  Based on patient's presentation, will get x-ray to rule out obvious osteomyelitis  Will also get basic labs including inflammatory markers  Plan to consult Podiatry following lab and imaging results  Labs show elevated inflammatory markers without other actionable derangement  X-ray showed no obvious abnormality or evidence of osteomyelitis  Podiatry recommends 1 week of oral doxycycline as well as postop shoe  Patient placed in postop shoe and given instructions for her antibiotics  Patient has follow-up appointment with Podiatry in 1 week  Patient agreeable to plan  Plan to discharge patient with follow-up to Podiatry  Patient encouraged to keep her upcoming appointment in 1 week for follow-up  Patient given instructions for her postop shoe  Discussed returning the ED with significant worsening of symptoms or development of fevers  Discussed use of over the counter medications as stated on the bottle as needed for pain  Discussed using prescribed oxycodone only as needed if over-the-counter medications are not sufficient  Discussed taking antibiotic as prescribed and to completion  Pt expressed understanding of discharge instructions, return precautions, and medication instructions  All questions were answered and pt was discharged without incident              Amount and/or Complexity of Data Reviewed  Clinical lab tests: ordered and reviewed  Tests in the radiology section of CPT®: ordered and reviewed  Discussion of test results with the performing providers: yes        Disposition  Final diagnoses:   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, with bone involvement without evidence of necrosis, unspecified laterality ([address] )     Time reflects when diagnosis was documented in both MDM as applicable and the Disposition within this note     Time User Action Codes Description Comment    1/4/2021  7:09 PM Brody Sharp Add [X82 [id number],  [id number]] 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, with bone involvement without evidence of necrosis, unspecified laterality [facility])       ED Disposition     ED Disposition Condition Date/Time Comment    Discharge Stable Mon Jan 4, 2021  7:08 PM Yara Balderrama discharge to home/self care  Follow-up Information     Follow up With Specialties Details Why Contact Info    Elizabeth Jules DPM Podiatry, Wound Care Follow up  [id number] [place]  [phone]            Discharge Medication List as of 1/4/2021  7:16 PM      START taking these medications    Details   doxycycline hyclate (VIBRAMYCIN) 100 mg capsule Take 1 capsule (100 mg total) by mouth 2 (two) times a day for 7 days, Starting Mon 1/4/2021, Until Mon 1/11/2021, Normal      oxyCODONE (OXY-IR) 5 MG capsule Take 1 capsule (5 mg total) by mouth every 6 (six) hours as needed for severe pain for up to 2 daysMax Daily Amount: 20 mg, Starting Mon 1/4/2021, Until Wed 1/6/2021, Print         CONTINUE these medications which have NOT CHANGED    Details   ADMELOG SOLOSTAR 100 units/mL injection pen Starting Wed 3/20/2019, Historical Med      albuterol (PROVENTIL HFA,VENTOLIN HFA) 90 mcg/act inhaler Inhale 2 puffs every 6 (six) hours as needed for wheezing, Historical Med      !!  BASAGLAR KWIKPEN 100 units/mL injection pen Starting Wed 3/20/2019, Historical Med      buPROPion (WELLBUTRIN XL) 300 mg 24 hr tablet Take 300 mg by mouth, Historical Med      cholecalciferol (VITAMIN D3) 1,000 units tablet Take by mouth Three times a day, Historical Med cloNIDine (CATAPRES) 0 1 mg tablet 0 2 mg every 12 (twelve) hours , Starting Mon 4/25/2016, Historical Med      EASY COMFORT PEN NEEDLES 32G X 4 MM MISC Starting Wed 3/20/2019, Historical Med      EASY TOUCH INSULIN SYRINGE 31G X 5/16" 0 3 ML MISC Starting Sat 2/2/2019, Historical Med      escitalopram (LEXAPRO) 10 mg tablet TAKE 1 TABLET BY MOUTH IN THE MORNING, Historical Med      !! HYDROcodone-acetaminophen (NORCO) 5-325 mg per tablet Starting Sat 4/13/2019, Historical Med      !! HYDROcodone-acetaminophen (NORCO) 5-325 mg per tablet Take 1 tablet by mouth every 6 (six) hours as needed for painMax Daily Amount: 4 tablets, Starting Wed 12/2/2020, Normal      hydrOXYzine HCL (ATARAX) 50 mg tablet Take 50 mg by mouth daily at bedtime, Starting Tue 9/3/2019, Historical Med      hydrOXYzine pamoate (VISTARIL) 50 mg capsule Take 50 mg by mouth, Starting Tue 8/7/2018, Historical Med      !! insulin glargine (LANTUS SOLOSTAR) 100 units/mL injection pen Inject 55 Units under the skin 2 (two) times a day, Starting Wed 3/20/2019, Historical Med      insulin lispro (HUMALOG KWIKPEN) 100 units/mL injection pen Inject 1-15 Units under the skin, Starting Wed 3/20/2019, Historical Med      lamoTRIgine (LaMICtal) 25 mg tablet Take 150 mg by mouth daily , Starting Wed 7/8/2020, Until Thu 7/8/2021, Historical Med      levothyroxine 50 mcg tablet Take 50 mcg by mouth daily, Starting Fri 3/8/2019, Historical Med      metFORMIN (GLUCOPHAGE) 500 mg tablet Take 1 tablet (500 mg total) by mouth 2 (two) times a day with meals, Starting Tue 9/10/2019, Print      ondansetron (ZOFRAN-ODT) 4 mg disintegrating tablet Starting Tue 3/19/2019, Historical Med      oxyCODONE (ROXICODONE) 5 mg/5 mL solution Starting Tue 3/19/2019, Historical Med      pantoprazole (PROTONIX) 40 mg tablet Take 40 mg by mouth daily, Starting Wed 3/4/2020, Until Thu 3/4/2021, Historical Med      SUMAtriptan Succinate (IMITREX STATDOSE REFILL) 6 MG/0 5ML SOCT Inject 6 mg under the skin every 2 (two) hours as needed, Starting Tue 11/29/2016, Historical Med      ziprasidone (GEODON) 80 mg capsule Take 80 mg by mouth 2 (two) times a day with meals , Starting Tue 5/15/2018, Historical Med       !! - Potential duplicate medications found  Please discuss with provider  No discharge procedures on file  PDMP Review     None           ED Provider  Attending physically available and evaluated Jenna Arthur I managed the patient along with the ED Attending      Electronically Signed by         Thalia Estes MD  01/07/21 2304

## 2021-01-04 NOTE — Clinical Note
Alena Stockton was seen and treated in our emergency department on 1/4/2021  Diagnosis: Ulcer of R foot    Gerson Oconnor  may return to work on return date  She may return on this date: 01/11/2021         If you have any questions or concerns, please don't hesitate to call        Erick Kayser, MD    ______________________________           _______________          _______________  Hospital Representative                              Date                                Time

## 2021-01-04 NOTE — CONSULTS
Podiatry - Consultation    Patient Information:   Valente De Luna 41 y o  female MRN: [id number]  Unit/Bed#: ED 26 Encounter: [id number]  PCP: Yessi Snider DO  Date of Admission:  1/4/2021  Date of Consultation: 01/04/21  Requesting Physician: Marco Caballero MD      ASSESSMENT:    Valente De Luna is a 41 y o  female with:    1  Right 2rd digit wound- minor 1  2  T2DM (A1c  3  Obesity    PLAN:    · Right 2nd digit wound  Local wound care with maxorb, dsd (wound care supplies were provided to the pt)  Erythema, edema noted limited to second digit  No ascending erythema, no crepitus, no purulence, no active drainage, no fluctuance  · Xrays were reviewed: no crepitus, no osseous abnormalities noted  Increased radiodense dot signal noted to 2nd digit possibly a rock  Wound was cleaned  · WBAT with surgical shoe  · PO doxycycline 7 days out pt  · F/u with Dr Teresa Radford out pt  Pt already has an appointment for 01/11/20  · Rest of Medical care per primary team   · Will discuss this plan with my attending and update as needed  SUBJECTIVE:    History of Present Illness:    Valente De Luna is a 41 y o  female who was seen 1/4/2021 due to right 2nd toe wound  Patient has a past medical history of T2DM, obesity, anxiety  Pt reports that she first noticed the wound on Saturday (01/02/20)  Pt reports that the wound drained mildly  Pt states that she always walk with shoes and does not recall stepping on and or cutting the digit  Pt reports that edema and erythema has been present but has not progressed  Pt denies nausea, vomiting chest pain  Pt reports that she has an appointment to see Dr Teresa Radford 01/11/20  We are consulted for right second toe wound    Review of Systems:    Constitutional: Negative  HENT: Negative  Eyes: Negative  Respiratory: Negative  Cardiovascular: Negative  Gastrointestinal: Negative      Musculoskeletal: pain left digit  Skin:wound  Neurological: negative  Psych: Negative       Past Medical and Surgical History:     Past Medical History:   Diagnosis Date    Anxiety     Arthritis     Asthma     Chronic pain     Diabetes mellitus ([address] )     Disease of thyroid gland     Lymphedema     left leg    Manic bipolar I disorder (HCC)     Morbid obesity (HCC)     OCD (obsessive compulsive disorder)     PTSD (post-traumatic stress disorder)        Past Surgical History:   Procedure Laterality Date    CHOLECYSTECTOMY      CHOLECYSTECTOMY      TX AMPUTATION METATARSAL+TOE,SINGLE Left 9/8/2019    Procedure: PARTIAL 3RD RAY RESECTION, PSB TOE FILLET FLAP;  Surgeon: Jose Luis Nixon DPM;  Location: [facility];  Service: Podiatry       Meds/Allergies:    (Not in a hospital admission)      Allergies   Allergen Reactions    Aspirin     Barium Iodide Itching     Face became very red and itchy     Codeine     Latex     Vancomycin     Penicillins Rash and Other (See Comments)     Patient has tolerated cefepime without difficulty       Social History:     Marital Status: Single    Substance Use History:   Social History     Substance and Sexual Activity   Alcohol Use Never    Frequency: Never    Drinks per session: Patient refused    Binge frequency: Never     Social History     Tobacco Use   Smoking Status Current Every Day Smoker    Packs/day: 0 50    Years: 15 00    Pack years: 7 50   Smokeless Tobacco Never Used     Social History     Substance and Sexual Activity   Drug Use Never       Family History:    Family History   Problem Relation Age of Onset    Hyperlipidemia Mother     Diabetes Mother     Hypertension Mother     Heart attack Father          OBJECTIVE:    Vitals:   Blood Pressure: 138/85 (01/04/21 1147)  Pulse: 68 (01/04/21 1147)  Temperature: 97 6 °F (36 4 °C) (01/04/21 1147)  Temp Source: Tympanic (01/04/21 1147)  Respirations: 20 (01/04/21 1147)  Weight - Scale: 108 kg (237 lb) (01/04/21 1147)  SpO2: 99 % (01/04/21 1147)    Physical Exam:    General Appearance: Alert, cooperative, no distress  HEENT: Head normocephalic, atraumatic, without obvious abnormality  Heart: Normal rate and rhythm  Lungs: Non-labored breathing  No respiratory distress  Abdomen: Without distension  Psychiatric: AAOx3  Lower Extremity:  Vascular:   Right DP and PT pulses are present  Left DP and PT pulses are present  CRT < 3 seconds at the digits  +2/4 edema noted at right second digit  Pedal hair is present  Skin temperature is warm on the right wnl on the left bilaterally  Musculoskeletal:  MMT is 5/5 in all muscle compartments bilaterally  Pain on palpation of  right second digit  Dermatological:  Lower extremity wound(s) as noted below:    Wound 1 located right second digit, measures 3 5 cm x 3 cm x 0 3 cm  without sinus tracking or undermining  Wound bed appears granular, slough and fibrotic with light exudate  Deepest tissue noted Fat/adipose  Malodor is not noticed  Wound edge appears Attached  Yvonne-wound skin appears intact  Probe to bone is,  negative   Edema and erythema noted limited to right second digit  No crepitus, no fluctuance, no purulence noted at this time  Neurological:  Gross sensation is intact  Protective sensation is absent  Clinical Images 01/04/21:              Additional data:     Lab Results: I have personally reviewed pertinent labs including:              Invalid input(s): LABALBU        Cultures: I have personally reviewed pertinent cultures including:              Imaging: I have personally reviewed pertinent reports in PACS  EKG, Pathology, and Other Studies: I have personally reviewed pertinent reports  ** Please Note: Portions of the record may have been created with voice recognition software  Occasional wrong word or "sound a like" substitutions may have occurred due to the inherent limitations of voice recognition software   Read the chart carefully and recognize, using context, where substitutions have occurred   **

## 2021-01-04 NOTE — DISCHARGE INSTRUCTIONS
Discharge Instructions - Podiatry    Weight Bearing Status: Weight bearing as tolerated with surgical shoe  Rest, elevate, and offload right lower extremity as much as possible  Follow-up appointment instructions: Appointment with  Dr Yohan Mathews 1/11/20  Contact sooner if any increase in pain, or signs of infection occur  Wound Care: maxorb, gauze, Adolfo daily (wound care supplies provided)  Keep dressings clean between dressings changes

## 2021-01-04 NOTE — ED ATTENDING ATTESTATION
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, with bone involvement without evidence of necrosis, unspecified laterality ([facility] )           I, Sarah Qureshi MD, saw and evaluated the patient  All available labs and X-rays were ordered by me or the resident and have been reviewed by myself  I discussed the patient with the resident / non-physician and agree with the resident's / non-physician practitioner's findings and plan as documented in the resident's / non-physician practicitioner's note, except where noted  At this point, I agree with the current assessment done in the ED  I was present during key portions of all procedures performed unless otherwise stated  Chief Complaint   Patient presents with    Toe Injury     Pt states, "I have a hole in my toe since saturday "      This is a 41 y o  female presenting for evaluation of toe pain  The patient since Saturday, she has an ulcer of the RIGHT 2nd toe  She had bloody discharge then, saw a split in the skin, and washed it  She went to work and because it's been worsening, came in  She has been on her feet, using diabetic shoes  Today she had increasing pain + discharge at location of ulcer  No f/ch/n/v/cp/sob  Advil for pain hasn't helped  Took advil last dose yesterday  PMH:   has a past medical history of Anxiety, Arthritis, Asthma, Chronic pain, Diabetes mellitus ([facility] ), Disease of thyroid gland, Lymphedema, Manic bipolar I disorder ([facility] ), Morbid obesity ([facility] ), OCD (obsessive compulsive disorder), and PTSD (post-traumatic stress disorder)  Diabetes: recently taken off of insulin b/c she had a gastric bypass  LEFT 3rd toe was amputated 1 year ago  A1C 7 1% in January  PSH:   has a past surgical history that includes Cholecystectomy; Cholecystectomy; and pr amputation metatarsal+toe,single (Left, 9/8/2019)      Social:  Social History     Substance and Sexual Activity Alcohol Use Never    Frequency: Never    Drinks per session: Patient refused    Binge frequency: Never     Social History     Tobacco Use   Smoking Status Current Every Day Smoker    Packs/day: 0 50    Years: 15 00    Pack years: 7 50   Smokeless Tobacco Never Used     Social History     Substance and Sexual Activity   Drug Use Never     PE:  Vitals:    01/04/21 1147 01/04/21 1928   BP: 138/85 142/81   BP Location: Left arm    Pulse: 68 71   Resp: 20 18   Temp: 97 6 °F (36 4 °C)    TempSrc: Tympanic    SpO2: 99% 98%   Weight: 108 kg (237 lb)    General: VS reviewed  Appears in NAD  awake, alert  Well-nourished, well-developed  Appears stated age  Speaking normally in full sentences  Head: Normocephalic, atraumatic  Eyes: EOM-I  No diplopia  No hyphema  No subconjunctival hemorrhages  Symmetrical lids  ENT: Atraumatic external nose and ears  MMM  No malocclusion  No stridor  Normal phonation  No drooling  Normal swallowing  Neck: No JVD  CV: No pallor noted  Lungs:   No tachypnea  No respiratory distress  MSK:   FROM spontaneously  Skin: Dry, avulsion/ulcer of the planat aspect of the 2nd toe  Tender  No foul smell  Minimal discharge    Neuro: Awake, alert, GCS15, CN II-XII grossly intact  Motor grossly intact  Psychiatric/Behavioral: Appropriate mood and affect   Exam: deferred  A:  - Toe ulcer  P:  - XR  - Talk to podiatry  - 13 point ROS was performed and all are normal unless stated in the history above  - Nursing note reviewed  Vitals reviewed  - Orders placed by myself and/or advanced practitioner / resident     - Previous chart was reviewed  - No language barrier    - History obtained from patient  - There are no limitations to the history obtained  - Critical care time: Not applicable for this patient       Code Status: Prior  Advance Directive and Living Will:      Power of :    POLST:      Medications   oxyCODONE-acetaminophen (PERCOCET) 5-325 mg per tablet 1 tablet (1 tablet Oral Given 1/4/21 1851)   doxycycline hyclate (VIBRAMYCIN) capsule 100 mg (100 mg Oral Given 1/4/21 1851)     XR foot 3+ views RIGHT   Final Result      No radiographic evidence of osteomyelitis  Workstation performed: [id number]           Orders Placed This Encounter   Procedures    XR foot 3+ views RIGHT    CBC and differential    Basic metabolic panel    Sedimentation rate, automated    C-reactive protein    Inpatient consult to Podiatry     Labs Reviewed   CBC AND DIFFERENTIAL - Abnormal       Result Value Ref Range Status    WBC 7 98  4 31 - 10 16 Thousand/uL Final    RBC 5 47 (*) 3 81 - 5 12 Million/uL Final    Hemoglobin 13 9  11 5 - 15 4 g/dL Final    Hematocrit 44 3  34 8 - 46 1 % Final    MCV 81 (*) 82 - 98 fL Final    MCH 25 4 (*) 26 8 - 34 3 pg Final    MCHC 31 4  31 4 - 37 4 g/dL Final    RDW 14 3  11 6 - 15 1 % Final    MPV 9 1  8 9 - 12 7 fL Final    Platelets 332  735 - 390 Thousands/uL Final    nRBC 0  /100 WBCs Final    Neutrophils Relative 48  43 - 75 % Final    Immat GRANS % 0  0 - 2 % Final    Lymphocytes Relative 41  14 - 44 % Final    Monocytes Relative 6  4 - 12 % Final    Eosinophils Relative 4  0 - 6 % Final    Basophils Relative 1  0 - 1 % Final    Neutrophils Absolute 3 82  1 85 - 7 62 Thousands/µL Final    Immature Grans Absolute 0 02  0 00 - 0 20 Thousand/uL Final    Lymphocytes Absolute 3 27  0 60 - 4 47 Thousands/µL Final    Monocytes Absolute 0 48  0 17 - 1 22 Thousand/µL Final    Eosinophils Absolute 0 34  0 00 - 0 61 Thousand/µL Final    Basophils Absolute 0 05  0 00 - 0 10 Thousands/µL Final   SEDIMENTATION RATE - Abnormal    Sed Rate 36 (*) 0 - 19 mm/hour Final    Narrative:     New method- Test performed using  automated Rheology Technology  If following a patient's inflammatory disease during treatment, a new baseline should be established     C-REACTIVE PROTEIN - Abnormal    CRP 16 8 (*) <3 0 mg/L Final   BASIC METABOLIC PANEL    Sodium 728 136 - 145 mmol/L Final    Potassium 4 0  3 5 - 5 3 mmol/L Final    Chloride 106  100 - 108 mmol/L Final    CO2 26  21 - 32 mmol/L Final    ANION GAP 5  4 - 13 mmol/L Final    BUN 8  5 - 25 mg/dL Final    Creatinine 0 80  0 60 - 1 30 mg/dL Final    Comment: Standardized to IDMS reference method    Glucose 112  65 - 140 mg/dL Final    Comment: If the patient is fasting, the ADA then defines impaired fasting glucose as > 100 mg/dL and diabetes as > or equal to 123 mg/dL  Specimen collection should occur prior to Sulfasalazine administration due to the potential for falsely depressed results  Specimen collection should occur prior to Sulfapyridine administration due to the potential for falsely elevated results  Calcium 9 7  8 3 - 10 1 mg/dL Final    eGFR 90  ml/min/1 73sq m Final    Narrative:     [place] guidelines for Chronic Kidney Disease (CKD):     Stage 1 with normal or high GFR (GFR > 90 mL/min/1 73 square meters)    Stage 2 Mild CKD (GFR = 60-89 mL/min/1 73 square meters)    Stage 3A Moderate CKD (GFR = 45-59 mL/min/1 73 square meters)    Stage 3B Moderate CKD (GFR = 30-44 mL/min/1 73 square meters)    Stage 4 Severe CKD (GFR = 15-29 mL/min/1 73 square meters)    Stage 5 End Stage CKD (GFR <15 mL/min/1 73 square meters)  Note: GFR calculation is accurate only with a steady state creatinine     Time reflects when diagnosis was documented in both MDM as applicable and the Disposition within this note     Time User Action Codes Description Comment    1/4/2021  7:09 PM Malena Olea Add [E01 814,  B03 [id number]] 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, with bone involvement without evidence of necrosis, unspecified laterality [facility])       ED Disposition     ED Disposition Condition Date/Time Comment    Discharge Stable Mon Jan 4, 2021  7:08 PM Myriam Alvarado discharge to home/self care              Follow-up Information     Follow up With Specialties Details Why Contact Info    Paulina Hammond DPM Podiatry, Wound Care Follow up  [id number]  [phone]          Discharge Medication List as of 1/4/2021  7:16 PM      START taking these medications    Details   doxycycline hyclate (VIBRAMYCIN) 100 mg capsule Take 1 capsule (100 mg total) by mouth 2 (two) times a day for 7 days, Starting Mon 1/4/2021, Until Mon 1/11/2021, Normal      oxyCODONE (OXY-IR) 5 MG capsule Take 1 capsule (5 mg total) by mouth every 6 (six) hours as needed for severe pain for up to 2 daysMax Daily Amount: 20 mg, Starting Mon 1/4/2021, Until Wed 1/6/2021, Print         CONTINUE these medications which have NOT CHANGED    Details   ADMELOG SOLOSTAR 100 units/mL injection pen Starting Wed 3/20/2019, Historical Med      albuterol (PROVENTIL HFA,VENTOLIN HFA) 90 mcg/act inhaler Inhale 2 puffs every 6 (six) hours as needed for wheezing, Historical Med      !!  BASAGLAR KWIKPEN 100 units/mL injection pen Starting Wed 3/20/2019, Historical Med      buPROPion (WELLBUTRIN XL) 300 mg 24 hr tablet Take 300 mg by mouth, Historical Med      cholecalciferol (VITAMIN D3) 1,000 units tablet Take by mouth Three times a day, Historical Med      cloNIDine (CATAPRES) 0 1 mg tablet 0 2 mg every 12 (twelve) hours , Starting Mon 4/25/2016, Historical Med      EASY COMFORT PEN NEEDLES 32G X 4 MM MISC Starting Wed 3/20/2019, Historical Med      EASY TOUCH INSULIN SYRINGE 31G X 5/16" 0 3 ML MISC Starting Sat 2/2/2019, Historical Med      escitalopram (LEXAPRO) 10 mg tablet TAKE 1 TABLET BY MOUTH IN THE MORNING, Historical Med      !! HYDROcodone-acetaminophen (NORCO) 5-325 mg per tablet Starting Sat 4/13/2019, Historical Med      !! HYDROcodone-acetaminophen (NORCO) 5-325 mg per tablet Take 1 tablet by mouth every 6 (six) hours as needed for painMax Daily Amount: 4 tablets, Starting Wed 12/2/2020, Normal      hydrOXYzine HCL (ATARAX) 50 mg tablet Take 50 mg by mouth daily at bedtime, Starting Tue 9/3/2019, Historical Med      hydrOXYzine pamoate (VISTARIL) 50 mg capsule Take 50 mg by mouth, Starting Tue 8/7/2018, Historical Med      !! insulin glargine (LANTUS SOLOSTAR) 100 units/mL injection pen Inject 55 Units under the skin 2 (two) times a day, Starting Wed 3/20/2019, Historical Med      insulin lispro (HUMALOG KWIKPEN) 100 units/mL injection pen Inject 1-15 Units under the skin, Starting Wed 3/20/2019, Historical Med      lamoTRIgine (LaMICtal) 25 mg tablet Take 150 mg by mouth daily , Starting Wed 7/8/2020, Until Thu 7/8/2021, Historical Med      levothyroxine 50 mcg tablet Take 50 mcg by mouth daily, Starting Fri 3/8/2019, Historical Med      metFORMIN (GLUCOPHAGE) 500 mg tablet Take 1 tablet (500 mg total) by mouth 2 (two) times a day with meals, Starting Tue 9/10/2019, Print      ondansetron (ZOFRAN-ODT) 4 mg disintegrating tablet Starting Tue 3/19/2019, Historical Med      oxyCODONE (ROXICODONE) 5 mg/5 mL solution Starting Tue 3/19/2019, Historical Med      pantoprazole (PROTONIX) 40 mg tablet Take 40 mg by mouth daily, Starting Wed 3/4/2020, Until Thu 3/4/2021, Historical Med      SUMAtriptan Succinate (IMITREX STATDOSE REFILL) 6 MG/0 5ML SOCT Inject 6 mg under the skin every 2 (two) hours as needed, Starting Tue 11/29/2016, Historical Med      ziprasidone (GEODON) 80 mg capsule Take 80 mg by mouth 2 (two) times a day with meals , Starting Tue 5/15/2018, Historical Med       !! - Potential duplicate medications found  Please discuss with provider  No discharge procedures on file  Prior to Admission Medications   Prescriptions Last Dose Informant Patient Reported? Taking?    ADMELOG SOLOSTAR 100 units/mL injection pen   Yes No   BASAGLAR KWIKPEN 100 units/mL injection pen   Yes No   EASY COMFORT PEN NEEDLES 32G X 4 MM MISC   Yes No   EASY TOUCH INSULIN SYRINGE 31G X 5/16" 0 3 ML MISC   Yes No   HYDROcodone-acetaminophen (NORCO) 5-325 mg per tablet   Yes No HYDROcodone-acetaminophen (NORCO) 5-325 mg per tablet   No No   Sig: Take 1 tablet by mouth every 6 (six) hours as needed for painMax Daily Amount: 4 tablets   SUMAtriptan Succinate (IMITREX STATDOSE REFILL) 6 MG/0 5ML SOCT   Yes No   Sig: Inject 6 mg under the skin every 2 (two) hours as needed   albuterol (PROVENTIL HFA,VENTOLIN HFA) 90 mcg/act inhaler   Yes No   Sig: Inhale 2 puffs every 6 (six) hours as needed for wheezing   buPROPion (WELLBUTRIN XL) 300 mg 24 hr tablet   Yes No   Sig: Take 300 mg by mouth   cholecalciferol (VITAMIN D3) 1,000 units tablet   Yes No   Sig: Take by mouth Three times a day   cloNIDine (CATAPRES) 0 1 mg tablet   Yes No   Si 2 mg every 12 (twelve) hours    escitalopram (LEXAPRO) 10 mg tablet   Yes No   Sig: TAKE 1 TABLET BY MOUTH IN THE MORNING   hydrOXYzine HCL (ATARAX) 50 mg tablet   Yes No   Sig: Take 50 mg by mouth daily at bedtime   hydrOXYzine pamoate (VISTARIL) 50 mg capsule   Yes No   Sig: Take 50 mg by mouth   insulin glargine (LANTUS SOLOSTAR) 100 units/mL injection pen   Yes No   Sig: Inject 55 Units under the skin 2 (two) times a day   insulin lispro (HUMALOG KWIKPEN) 100 units/mL injection pen   Yes No   Sig: Inject 1-15 Units under the skin   lamoTRIgine (LaMICtal) 25 mg tablet   Yes No   Sig: Take 150 mg by mouth daily    levothyroxine 50 mcg tablet   Yes No   Sig: Take 50 mcg by mouth daily   metFORMIN (GLUCOPHAGE) 500 mg tablet   No No   Sig: Take 1 tablet (500 mg total) by mouth 2 (two) times a day with meals   ondansetron (ZOFRAN-ODT) 4 mg disintegrating tablet   Yes No   oxyCODONE (ROXICODONE) 5 mg/5 mL solution   Yes No   pantoprazole (PROTONIX) 40 mg tablet   Yes No   Sig: Take 40 mg by mouth daily   ziprasidone (GEODON) 80 mg capsule   Yes No   Sig: Take 80 mg by mouth 2 (two) times a day with meals       Facility-Administered Medications: None       Portions of the record may have been created with voice recognition software   Occasional wrong word or "sound a like" substitutions may have occurred due to the inherent limitations of voice recognition software  Read the chart carefully and recognize, using context, where substitutions have occurred      Electronically signed by:  oY Vazquez

## 2021-01-11 ENCOUNTER — HOSPITAL ENCOUNTER (INPATIENT)
Facility: HOSPITAL | Age: 46
LOS: 3 days | Discharge: HOME | DRG: 314 | End: 2021-01-14
Attending: PODIATRIST | Admitting: PODIATRIST
Payer: COMMERCIAL

## 2021-01-11 ENCOUNTER — APPOINTMENT (INPATIENT)
Dept: RADIOLOGY | Facility: HOSPITAL | Age: 46
DRG: 314 | End: 2021-01-11
Payer: COMMERCIAL

## 2021-01-11 ENCOUNTER — OFFICE VISIT (OUTPATIENT)
Dept: PODIATRY | Facility: CLINIC | Age: 46
End: 2021-01-11
Payer: COMMERCIAL

## 2021-01-11 VITALS
WEIGHT: 239.4 LBS | HEART RATE: 76 BPM | HEIGHT: 66 IN | DIASTOLIC BLOOD PRESSURE: 85 MMHG | SYSTOLIC BLOOD PRESSURE: 145 MMHG | BODY MASS INDEX: 38.47 KG/M2

## 2021-01-11 DIAGNOSIS — E03.9 ACQUIRED HYPOTHYROIDISM: ICD-10-CM

## 2021-01-11 DIAGNOSIS — Z01.818 PREOPERATIVE CLEARANCE: ICD-10-CM

## 2021-01-11 DIAGNOSIS — E11.621 DIABETIC ULCER OF TOE OF RIGHT FOOT ASSOCIATED WITH TYPE 2 DIABETES MELLITUS, WITH NECROSIS OF BONE (HCC): ICD-10-CM

## 2021-01-11 DIAGNOSIS — M86.9 OSTEOMYELITIS OF SECOND TOE OF RIGHT FOOT (HCC): ICD-10-CM

## 2021-01-11 DIAGNOSIS — Z79.4 TYPE 2 DIABETES MELLITUS WITH DIABETIC POLYNEUROPATHY, WITH LONG-TERM CURRENT USE OF INSULIN (HCC): ICD-10-CM

## 2021-01-11 DIAGNOSIS — L03.115 CELLULITIS OF RIGHT FOOT: Primary | ICD-10-CM

## 2021-01-11 DIAGNOSIS — L97.514 DIABETIC ULCER OF TOE OF RIGHT FOOT ASSOCIATED WITH TYPE 2 DIABETES MELLITUS, WITH NECROSIS OF BONE (HCC): ICD-10-CM

## 2021-01-11 DIAGNOSIS — Z79.4 TYPE 2 DIABETES MELLITUS WITH DIABETIC NEUROPATHY, WITH LONG-TERM CURRENT USE OF INSULIN (HCC): Primary | ICD-10-CM

## 2021-01-11 DIAGNOSIS — G89.18 POST-OP PAIN: ICD-10-CM

## 2021-01-11 DIAGNOSIS — F41.9 ANXIETY: ICD-10-CM

## 2021-01-11 DIAGNOSIS — L97.514 ULCER OF GREAT TOE, RIGHT, WITH NECROSIS OF BONE (HCC): ICD-10-CM

## 2021-01-11 DIAGNOSIS — L97.514 NEUROPATHIC ULCER OF RIGHT FOOT WITH NECROSIS OF BONE (HCC): ICD-10-CM

## 2021-01-11 DIAGNOSIS — E11.40 TYPE 2 DIABETES MELLITUS WITH DIABETIC NEUROPATHY, WITH LONG-TERM CURRENT USE OF INSULIN (HCC): Primary | ICD-10-CM

## 2021-01-11 DIAGNOSIS — L03.115 CELLULITIS OF RIGHT FOOT: ICD-10-CM

## 2021-01-11 DIAGNOSIS — Z72.0 TOBACCO ABUSE: ICD-10-CM

## 2021-01-11 DIAGNOSIS — E11.42 TYPE 2 DIABETES MELLITUS WITH DIABETIC POLYNEUROPATHY, WITH LONG-TERM CURRENT USE OF INSULIN (HCC): ICD-10-CM

## 2021-01-11 PROBLEM — I10 HTN (HYPERTENSION): Status: ACTIVE | Noted: 2021-01-11

## 2021-01-11 LAB
ALBUMIN SERPL BCP-MCNC: 3.3 G/DL (ref 3.5–5)
ALP SERPL-CCNC: 113 U/L (ref 46–116)
ALT SERPL W P-5'-P-CCNC: 19 U/L (ref 12–78)
ANION GAP SERPL CALCULATED.3IONS-SCNC: 5 MMOL/L (ref 4–13)
AST SERPL W P-5'-P-CCNC: 16 U/L (ref 5–45)
ATRIAL RATE: 79 BPM
BASOPHILS # BLD AUTO: 0.05 THOUSANDS/ΜL (ref 0–0.1)
BASOPHILS NFR BLD AUTO: 1 % (ref 0–1)
BILIRUB SERPL-MCNC: 0.39 MG/DL (ref 0.2–1)
BUN SERPL-MCNC: 9 MG/DL (ref 5–25)
CALCIUM ALBUM COR SERPL-MCNC: 10.3 MG/DL (ref 8.3–10.1)
CALCIUM SERPL-MCNC: 9.7 MG/DL (ref 8.3–10.1)
CHLORIDE SERPL-SCNC: 103 MMOL/L (ref 100–108)
CO2 SERPL-SCNC: 23 MMOL/L (ref 21–32)
CREAT SERPL-MCNC: 0.79 MG/DL (ref 0.6–1.3)
EOSINOPHIL # BLD AUTO: 0.4 THOUSAND/ΜL (ref 0–0.61)
EOSINOPHIL NFR BLD AUTO: 4 % (ref 0–6)
ERYTHROCYTE [DISTWIDTH] IN BLOOD BY AUTOMATED COUNT: 14.4 % (ref 11.6–15.1)
EST. AVERAGE GLUCOSE BLD GHB EST-MCNC: 143 MG/DL
GFR SERPL CREATININE-BSD FRML MDRD: 91 ML/MIN/1.73SQ M
GLUCOSE SERPL-MCNC: 126 MG/DL (ref 65–140)
GLUCOSE SERPL-MCNC: 135 MG/DL (ref 65–140)
GLUCOSE SERPL-MCNC: 150 MG/DL (ref 65–140)
HBA1C MFR BLD: 6.6 %
HCT VFR BLD AUTO: 44.4 % (ref 34.8–46.1)
HGB BLD-MCNC: 14.1 G/DL (ref 11.5–15.4)
IMM GRANULOCYTES # BLD AUTO: 0.03 THOUSAND/UL (ref 0–0.2)
IMM GRANULOCYTES NFR BLD AUTO: 0 % (ref 0–2)
LYMPHOCYTES # BLD AUTO: 2.57 THOUSANDS/ΜL (ref 0.6–4.47)
LYMPHOCYTES NFR BLD AUTO: 26 % (ref 14–44)
MCH RBC QN AUTO: 25.8 PG (ref 26.8–34.3)
MCHC RBC AUTO-ENTMCNC: 31.8 G/DL (ref 31.4–37.4)
MCV RBC AUTO: 81 FL (ref 82–98)
MONOCYTES # BLD AUTO: 0.62 THOUSAND/ΜL (ref 0.17–1.22)
MONOCYTES NFR BLD AUTO: 6 % (ref 4–12)
NEUTROPHILS # BLD AUTO: 6.42 THOUSANDS/ΜL (ref 1.85–7.62)
NEUTS SEG NFR BLD AUTO: 63 % (ref 43–75)
NRBC BLD AUTO-RTO: 0 /100 WBCS
P AXIS: 45 DEGREES
PLATELET # BLD AUTO: 428 THOUSANDS/UL (ref 149–390)
PMV BLD AUTO: 9 FL (ref 8.9–12.7)
POTASSIUM SERPL-SCNC: 4.1 MMOL/L (ref 3.5–5.3)
PR INTERVAL: 172 MS
PROT SERPL-MCNC: 7.7 G/DL (ref 6.4–8.2)
QRS AXIS: 35 DEGREES
QRSD INTERVAL: 98 MS
QT INTERVAL: 366 MS
QTC INTERVAL: 419 MS
RBC # BLD AUTO: 5.46 MILLION/UL (ref 3.81–5.12)
SODIUM SERPL-SCNC: 131 MMOL/L (ref 136–145)
T WAVE AXIS: 25 DEGREES
VENTRICULAR RATE: 79 BPM
WBC # BLD AUTO: 10.09 THOUSAND/UL (ref 4.31–10.16)

## 2021-01-11 PROCEDURE — 99222 1ST HOSP IP/OBS MODERATE 55: CPT | Performed by: PODIATRIST

## 2021-01-11 PROCEDURE — 93010 ELECTROCARDIOGRAM REPORT: CPT | Performed by: INTERNAL MEDICINE

## 2021-01-11 PROCEDURE — 83036 HEMOGLOBIN GLYCOSYLATED A1C: CPT | Performed by: STUDENT IN AN ORGANIZED HEALTH CARE EDUCATION/TRAINING PROGRAM

## 2021-01-11 PROCEDURE — 99213 OFFICE O/P EST LOW 20 MIN: CPT | Performed by: PODIATRIST

## 2021-01-11 PROCEDURE — 99253 IP/OBS CNSLTJ NEW/EST LOW 45: CPT | Performed by: INTERNAL MEDICINE

## 2021-01-11 PROCEDURE — 93005 ELECTROCARDIOGRAM TRACING: CPT

## 2021-01-11 PROCEDURE — 85025 COMPLETE CBC W/AUTO DIFF WBC: CPT | Performed by: STUDENT IN AN ORGANIZED HEALTH CARE EDUCATION/TRAINING PROGRAM

## 2021-01-11 PROCEDURE — 73630 X-RAY EXAM OF FOOT: CPT

## 2021-01-11 PROCEDURE — 87040 BLOOD CULTURE FOR BACTERIA: CPT | Performed by: STUDENT IN AN ORGANIZED HEALTH CARE EDUCATION/TRAINING PROGRAM

## 2021-01-11 PROCEDURE — 82948 REAGENT STRIP/BLOOD GLUCOSE: CPT

## 2021-01-11 PROCEDURE — 80053 COMPREHEN METABOLIC PANEL: CPT | Performed by: STUDENT IN AN ORGANIZED HEALTH CARE EDUCATION/TRAINING PROGRAM

## 2021-01-11 RX ORDER — DIPHENHYDRAMINE HCL 25 MG
25 TABLET ORAL EVERY 6 HOURS PRN
Status: DISCONTINUED | OUTPATIENT
Start: 2021-01-11 | End: 2021-01-14 | Stop reason: HOSPADM

## 2021-01-11 RX ORDER — PANTOPRAZOLE SODIUM 40 MG/1
40 TABLET, DELAYED RELEASE ORAL
Status: DISCONTINUED | OUTPATIENT
Start: 2021-01-12 | End: 2021-01-14 | Stop reason: HOSPADM

## 2021-01-11 RX ORDER — HYDROXYZINE 50 MG/1
50 TABLET, FILM COATED ORAL
Status: DISCONTINUED | OUTPATIENT
Start: 2021-01-11 | End: 2021-01-14 | Stop reason: HOSPADM

## 2021-01-11 RX ORDER — SUMATRIPTAN 6 MG/.5ML
6 INJECTION, SOLUTION SUBCUTANEOUS ONCE AS NEEDED
Status: DISCONTINUED | OUTPATIENT
Start: 2021-01-11 | End: 2021-01-14 | Stop reason: HOSPADM

## 2021-01-11 RX ORDER — HYDROMORPHONE HCL/PF 1 MG/ML
0.2 SYRINGE (ML) INJECTION EVERY 6 HOURS PRN
Status: DISCONTINUED | OUTPATIENT
Start: 2021-01-11 | End: 2021-01-14 | Stop reason: HOSPADM

## 2021-01-11 RX ORDER — OXYCODONE HYDROCHLORIDE 10 MG/1
10 TABLET ORAL EVERY 4 HOURS PRN
Status: DISCONTINUED | OUTPATIENT
Start: 2021-01-11 | End: 2021-01-14 | Stop reason: HOSPADM

## 2021-01-11 RX ORDER — SODIUM CHLORIDE 9 MG/ML
75 INJECTION, SOLUTION INTRAVENOUS CONTINUOUS
Status: DISCONTINUED | OUTPATIENT
Start: 2021-01-12 | End: 2021-01-11

## 2021-01-11 RX ORDER — HEPARIN SODIUM 5000 [USP'U]/ML
5000 INJECTION, SOLUTION INTRAVENOUS; SUBCUTANEOUS EVERY 8 HOURS SCHEDULED
Status: DISCONTINUED | OUTPATIENT
Start: 2021-01-11 | End: 2021-01-14 | Stop reason: HOSPADM

## 2021-01-11 RX ORDER — ACETAMINOPHEN 325 MG/1
650 TABLET ORAL EVERY 6 HOURS PRN
Status: DISCONTINUED | OUTPATIENT
Start: 2021-01-11 | End: 2021-01-14 | Stop reason: HOSPADM

## 2021-01-11 RX ORDER — ALBUTEROL SULFATE 90 UG/1
2 AEROSOL, METERED RESPIRATORY (INHALATION) EVERY 6 HOURS PRN
Status: DISCONTINUED | OUTPATIENT
Start: 2021-01-11 | End: 2021-01-14 | Stop reason: HOSPADM

## 2021-01-11 RX ORDER — MELATONIN
1000 DAILY
Status: DISCONTINUED | OUTPATIENT
Start: 2021-01-12 | End: 2021-01-14 | Stop reason: HOSPADM

## 2021-01-11 RX ORDER — LEVOTHYROXINE SODIUM 0.05 MG/1
50 TABLET ORAL
Status: DISCONTINUED | OUTPATIENT
Start: 2021-01-12 | End: 2021-01-14 | Stop reason: HOSPADM

## 2021-01-11 RX ORDER — CLONIDINE HYDROCHLORIDE 0.1 MG/1
0.2 TABLET ORAL EVERY 12 HOURS SCHEDULED
Status: DISCONTINUED | OUTPATIENT
Start: 2021-01-11 | End: 2021-01-14 | Stop reason: HOSPADM

## 2021-01-11 RX ORDER — ZIPRASIDONE HYDROCHLORIDE 40 MG/1
80 CAPSULE ORAL 2 TIMES DAILY WITH MEALS
Status: DISCONTINUED | OUTPATIENT
Start: 2021-01-11 | End: 2021-01-14 | Stop reason: HOSPADM

## 2021-01-11 RX ORDER — METRONIDAZOLE 500 MG/1
500 TABLET ORAL 3 TIMES DAILY
Status: DISCONTINUED | OUTPATIENT
Start: 2021-01-11 | End: 2021-01-14 | Stop reason: HOSPADM

## 2021-01-11 RX ORDER — SENNOSIDES 8.6 MG
1 TABLET ORAL
Status: DISCONTINUED | OUTPATIENT
Start: 2021-01-11 | End: 2021-01-14 | Stop reason: HOSPADM

## 2021-01-11 RX ORDER — CEFAZOLIN SODIUM 2 G/50ML
2000 SOLUTION INTRAVENOUS EVERY 8 HOURS
Status: DISCONTINUED | OUTPATIENT
Start: 2021-01-11 | End: 2021-01-14 | Stop reason: HOSPADM

## 2021-01-11 RX ORDER — OXYCODONE HYDROCHLORIDE 5 MG/1
5 TABLET ORAL EVERY 4 HOURS PRN
Status: DISCONTINUED | OUTPATIENT
Start: 2021-01-11 | End: 2021-01-14 | Stop reason: HOSPADM

## 2021-01-11 RX ADMIN — ZIPRASIDONE HYDROCHLORIDE 80 MG: 40 CAPSULE ORAL at 21:19

## 2021-01-11 RX ADMIN — METRONIDAZOLE 500 MG: 500 TABLET ORAL at 21:18

## 2021-01-11 RX ADMIN — METRONIDAZOLE 500 MG: 500 TABLET ORAL at 18:15

## 2021-01-11 RX ADMIN — DIPHENHYDRAMINE HCL 25 MG: 25 TABLET ORAL at 21:18

## 2021-01-11 RX ADMIN — CEFAZOLIN SODIUM 2000 MG: 2 SOLUTION INTRAVENOUS at 18:16

## 2021-01-11 RX ADMIN — OXYCODONE HYDROCHLORIDE 10 MG: 10 TABLET ORAL at 18:20

## 2021-01-11 RX ADMIN — HEPARIN SODIUM 5000 UNITS: 5000 INJECTION INTRAVENOUS; SUBCUTANEOUS at 18:15

## 2021-01-11 RX ADMIN — HYDROXYZINE HYDROCHLORIDE 50 MG: 50 TABLET, FILM COATED ORAL at 21:20

## 2021-01-11 RX ADMIN — CLONIDINE HYDROCHLORIDE 0.2 MG: 0.1 TABLET ORAL at 21:18

## 2021-01-11 NOTE — ASSESSMENT & PLAN NOTE
· Admitted by the podiatry service for possible amputation of right second toe  · Agree with current antibiotic regimen, continue cefazolin and metronidazole  · Await blood cultures  · monitor temps, WBCs  · Local wound care per primary service

## 2021-01-11 NOTE — H&P
[address] Podiatry - H&P  Felice Choi 40 y o  female MRN: [id number]  Unit/Bed#: [facility] 502-06 Encounter: [id number]  Admission Date: 01/11/21    ASSESSMENT:    Felice Choi is a 40 y o  female with:    1  Diabetic ulceration right 2nd digit with suspicion of osteomyelitis, Gimenez 3 - POA  2  Cellulitis of right foot  3  Type 2 diabetes mellitus with neuropathy  4  Hypothyroidism  5  Anxiety  6  Bipolar disorder    PLAN:    · Ordered right foot xray to assess underlying osseous structures for extent of osteomyelitis  Will follow up  · Will plan for right 2nd digit amputation  Date/time TBD  · NPO/IVF at midnight  · Ordered blood cultures, labs, chemistry  Will follow up  · Start IV Ancef/Flagyl  Patient has history of MRSA in 06/2019  Will consult ID for abx recs if no improvement noted  · Consult internal medicine  Continue home meds  Hold oral antihyperglycemics  Start SSI for coverage  · WBAT  · Will discuss this plan with my attending and update as needed  Antibiotics started: Ancef and metronidazole  Pharmacologic VTE Prophylaxis: Heparin   Mechanical VTE Prophylaxis: sequential compression device       Disposition:  Patient requires >2 midnight stay for right foot surgery, IV antibiotics  Code Status: Level 1 - Full Code      SUBJECTIVE    History of Present Illness:    Felice Choi is a 40 y o  female with pmh of DM2, neuropathy, tobacco abuse, hypothyroidism, morbid obesity, anxiety, bipolar disorder, who presents with right foot ulcer with cellulitis  Patient previously underwent left 3rd partial ray amp on 09/08/2019 after being found to have OM  She has followed up in outpatient setting with Dr Papo Olivarez  Patient previously seen in the ED on 01/04/2021 for right 2nd toe wound, xrays were negative, she was discharged with 7 days PO doxycycline which the patient was unable to tolerate (rash)   She followed up with podiatrist today who noted worsening of the right 2nd toe and erythema and sent the patient for admission to [facility] for IV antibiotics and foot surgery  Review of Systems:    Constitutional: Negative  HENT: Negative  Eyes: Negative  Respiratory: Negative  Cardiovascular: Negative  Gastrointestinal: Negative  Musculoskeletal: right foot pain   Skin:right 2nd toe ulcer, right foot cellulitis   Neurological: neuropathy  Psych: Negative       Past Medical and Surgical History:     Past Medical History:   Diagnosis Date    Anxiety     Arthritis     Asthma     Chronic pain     Diabetes mellitus ([address] )     Disease of thyroid gland     Lymphedema     left leg    Manic bipolar I disorder ([facility] )     Morbid obesity (HCC)     OCD (obsessive compulsive disorder)     PTSD (post-traumatic stress disorder)        Past Surgical History:   Procedure Laterality Date    CHOLECYSTECTOMY      CHOLECYSTECTOMY      MO AMPUTATION METATARSAL+TOE,SINGLE Left 9/8/2019    Procedure: PARTIAL 3RD RAY RESECTION, PSB TOE FILLET FLAP;  Surgeon: Ubaldo Boateng DPM;  Location: [facility];  Service: Podiatry       Meds/Allergies:    Medications Prior to Admission   Medication    ADMELOG SOLOSTAR 100 units/mL injection pen    albuterol (PROVENTIL HFA,VENTOLIN HFA) 90 mcg/act inhaler    BASAGLAR KWIKPEN 100 units/mL injection pen    buPROPion (WELLBUTRIN XL) 300 mg 24 hr tablet    cholecalciferol (VITAMIN D3) 1,000 units tablet    cloNIDine (CATAPRES) 0 1 mg tablet    doxycycline hyclate (VIBRAMYCIN) 100 mg capsule    EASY COMFORT PEN NEEDLES 32G X 4 MM MISC    EASY TOUCH INSULIN SYRINGE 31G X 5/16" 0 3 ML MISC    escitalopram (LEXAPRO) 10 mg tablet    HYDROcodone-acetaminophen (NORCO) 5-325 mg per tablet    HYDROcodone-acetaminophen (NORCO) 5-325 mg per tablet    hydrOXYzine HCL (ATARAX) 50 mg tablet    hydrOXYzine pamoate (VISTARIL) 50 mg capsule    insulin glargine (LANTUS SOLOSTAR) 100 units/mL injection pen    insulin lispro (HUMALOG KWIKPEN) 100 units/mL injection pen    lamoTRIgine (LaMICtal) 25 mg tablet    levothyroxine 50 mcg tablet    metFORMIN (GLUCOPHAGE) 500 mg tablet    ondansetron (ZOFRAN-ODT) 4 mg disintegrating tablet    oxyCODONE (ROXICODONE) 5 mg/5 mL solution    pantoprazole (PROTONIX) 40 mg tablet    SUMAtriptan Succinate (IMITREX STATDOSE REFILL) 6 MG/0 5ML SOCT    ziprasidone (GEODON) 80 mg capsule       Allergies   Allergen Reactions    Aspirin     Barium Iodide Itching     Face became very red and itchy     Codeine     Latex     Vancomycin     Penicillins Rash and Other (See Comments)     Patient has tolerated cefepime without difficulty       Social History:    Social History     Marital Status: Single    Substance Use History:   Social History     Substance and Sexual Activity   Alcohol Use Never    Frequency: Never    Drinks per session: Patient refused    Binge frequency: Never     Social History     Tobacco Use   Smoking Status Current Every Day Smoker    Packs/day: 0 50    Years: 15 00    Pack years: 7 50   Smokeless Tobacco Never Used     Social History     Substance and Sexual Activity   Drug Use Never       Family History:    Family History   Problem Relation Age of Onset    Hyperlipidemia Mother     Diabetes Mother     Hypertension Mother     Heart attack Father          OBJECTIVE:    First Vitals:   Blood Pressure: 140/73 (01/11/21 1500)  Pulse: 88 (01/11/21 1500)  Temperature: 97 5 °F (36 4 °C) (01/11/21 1500)  Temp Source: Oral (01/11/21 1500)  Respirations: 16 (01/11/21 1500)  SpO2: 99 % (01/11/21 1500)    Current Vitals:   Blood Pressure: 140/73 (01/11/21 1500)  Pulse: 88 (01/11/21 1500)  Temperature: 97 5 °F (36 4 °C) (01/11/21 1500)  Temp Source: Oral (01/11/21 1500)  Respirations: 16 (01/11/21 1500)  SpO2: 99 % (01/11/21 1500)      Physical Exam:    General Appearance: Alert, cooperative, no distress  HEENT: Head normocephalic, atraumatic, without obvious abnormality  Heart: Normal rate and rhythm    Lungs: Non-labored breathing  No respiratory distress  Abdomen: Without distension  Psychiatric: AAOx3  Lower Extremity:  Vascular:   Pedal pulses on the left are present  Pedal pulses on the right are present  CRT brisk at the digits  +2/4 edema noted at bilateral lower extremities  Pedal hair is absent  Skin temperature is warm on the right foot  Musculoskeletal:  MMT is 5/5 in all muscle compartments bilaterally  ROM at the 1st MPJ and ankle joint are decreased bilaterally with the leg extended  No Pain on palpation of right foot/2nd toe  Absent left 3rd toe noted  Dermatological:  Right foot erythema, increased temp compared to contralateral side, edema noted     Lower extremity wounds as noted below:    Wound (1) located plantar right 2nd toe, measures approximately 0 7 cm x 1 cm x 0 3 cm  without sinus tracking or undermining  Wound bed appears fibrotic with no drainage  The wound base is 0% red/granular, 100% yellow/fibrous, 0% black/necrotic  Deepest tissue noted in base is tendon/capsule  Malodor is not noticed  Wound edge appears Attached  Yvonne-wound skin appears intact and erythematous  Probe to bone is negative   Signs of infection are present at this time  Neurological:  Gross sensation is diminished  Protective sensation is absent  Patient Reports numbness and/or paresthesias  Clinical Images 01/11/21:    Right foot plantar      Right foot dorsal    Lab Results:   No visits with results within 1 Day(s) from this visit     Latest known visit with results is:   Admission on 01/04/2021, Discharged on 01/04/2021   Component Date Value    WBC 01/04/2021 7 98     RBC 01/04/2021 5 47*    Hemoglobin 01/04/2021 13 9     Hematocrit 01/04/2021 44 3     MCV 01/04/2021 81*    MCH 01/04/2021 25 4*    MCHC 01/04/2021 31 4     RDW 01/04/2021 14 3     MPV 01/04/2021 9 1     Platelets 03/83/7109 357     nRBC 01/04/2021 0     Neutrophils Relative 01/04/2021 48     Immat GRANS % 01/04/2021 0     Lymphocytes Relative 01/04/2021 41     Monocytes Relative 01/04/2021 6     Eosinophils Relative 01/04/2021 4     Basophils Relative 01/04/2021 1     Neutrophils Absolute 01/04/2021 3 82     Immature Grans Absolute 01/04/2021 0 02     Lymphocytes Absolute 01/04/2021 3 27     Monocytes Absolute 01/04/2021 0 48     Eosinophils Absolute 01/04/2021 0 34     Basophils Absolute 01/04/2021 0 05     Sodium 01/04/2021 137     Potassium 01/04/2021 4 0     Chloride 01/04/2021 106     CO2 01/04/2021 26     ANION GAP 01/04/2021 5     BUN 01/04/2021 8     Creatinine 01/04/2021 0 80     Glucose 01/04/2021 112     Calcium 01/04/2021 9 7     eGFR 01/04/2021 90     Sed Rate 01/04/2021 36*    CRP 01/04/2021 16 8*       Cultures:            Imaging: I have personally reviewed pertinent films in PACS  No results found  EKG, Pathology, and Other Studies: I have personally reviewed pertinent reports

## 2021-01-11 NOTE — PROGRESS NOTES
Assessment/Plan:    No problem-specific Assessment & Plan notes found for this encounter  Diagnoses and all orders for this visit:    Cellulitis of right foot  -     Transfer to other facility    Osteomyelitis of second toe of right foot ([facility] [address] )  -     Transfer to other facility    Neuropathic ulcer of right foot with necrosis of bone ([facility] )  -     Transfer to other facility    Type 2 diabetes mellitus with diabetic polyneuropathy, with long-term current use of insulin ([facility] )  -     Transfer to other facility    Tobacco abuse          Patient will need admission to [facility] for right 2nd toe amputation and IV antibiotics  Check blood cultures     the toe has gotten much worse since seen in the emergency room while the patient has been on oral doxycycline  Given today's exam the wound probes to the joint capsule with exposed tendon  Given the appearance clinically of the digit patient will likely need amputation of the 2nd toe  I am concerned with the cellulitis spreading onto the foot while she has been on doxycycline so I would recommend admission for IV antibiotics  We discussed the relationship between cigarette smoking, atherosclerotic disease, and its effects on the lower extremity  I emphasized the importance of smoking cessation       Subjective:      Patient ID: Christina Gonzalez is a 40 y o  female  Patient has acute right foot wound  She noticed it last week  She went to the ED, xray did not show bone infection but there was a small stone in the wound  She was put on doxycycline but developed a rash  She is still taking the medication and has been on it for a week  The following portions of the patient's history were reviewed and updated as appropriate: allergies, current medications, past family history, past medical history, past social history, past surgical history and problem list     Review of Systems   Constitutional: Negative      Gastrointestinal: Negative for diarrhea, nausea and vomiting  Musculoskeletal: Positive for joint swelling  Negative for arthralgias  Skin: Positive for color change and wound  Neurological: Positive for numbness  Negative for weakness  Objective:      /85   Pulse 76   Ht 5' 5 5" (1 664 m) Comment: verbal  Wt 109 kg (239 lb 6 4 oz)   BMI 39 23 kg/m²          Physical Exam  Vitals signs reviewed  Constitutional:       Appearance: She is obese  She is not ill-appearing or diaphoretic  Cardiovascular:      Rate and Rhythm: Normal rate  Pulses: Normal pulses  Pulmonary:      Effort: Pulmonary effort is normal  No respiratory distress  Musculoskeletal:         General: Deformity (  Third rays amputated on the left) present  Skin:     Capillary Refill: Capillary refill takes less than 2 seconds  Comments: Ulceration plantar aspect 2nd toe right foot with probe to tendon and possible joint capsule  Strong malodor and cellulitis and edema to the entire digit extending onto the foot   Neurological:      General: No focal deficit present  Mental Status: She is alert and oriented to person, place, and time  Sensory: Sensory deficit present        Gait: Gait normal

## 2021-01-11 NOTE — ASSESSMENT & PLAN NOTE
· EKG personally reviewed, normal sinus rhythm  · She denies any shortness of breath or chest pressure with any of her routine ADLs  · She is able to ascend a flight of stairs without dyspnea  · Based on the above she is able to tolerate greater then 4 metabolic equivalents and is considered average risk for age for cardiac complications in the perioperative period  This was discussed with her  · She may proceed to surgery as there are no modifiable risk factors to address

## 2021-01-11 NOTE — PLAN OF CARE
Problem: Potential for Falls  Goal: Patient will remain free of falls  Description: INTERVENTIONS:  - Assess patient frequently for physical needs  -  Identify cognitive and physical deficits and behaviors that affect risk of falls  -  Jennings fall precautions as indicated by assessment   - Educate patient/family on patient safety including physical limitations  - Instruct patient to call for assistance with activity based on assessment  - Modify environment to reduce risk of injury  - Consider OT/PT consult to assist with strengthening/mobility  Outcome: Progressing     Problem: Nutrition/Hydration-ADULT  Goal: Nutrient/Hydration intake appropriate for improving, restoring or maintaining nutritional needs  Description: Monitor and assess patient's nutrition/hydration status for malnutrition  Collaborate with interdisciplinary team and initiate plan and interventions as ordered  Monitor patient's weight and dietary intake as ordered or per policy  Utilize nutrition screening tool and intervene as necessary  Determine patient's food preferences and provide high-protein, high-caloric foods as appropriate       INTERVENTIONS:  - Monitor oral intake, urinary output, labs, and treatment plans  - Assess nutrition and hydration status and recommend course of action  - Evaluate amount of meals eaten  - Assist patient with eating if necessary   - Allow adequate time for meals  - Recommend/ encourage appropriate diets, oral nutritional supplements, and vitamin/mineral supplements  - Order, calculate, and assess calorie counts as needed  - Recommend, monitor, and adjust tube feedings and TPN/PPN based on assessed needs  - Assess need for intravenous fluids  - Provide specific nutrition/hydration education as appropriate  - Include patient/family/caregiver in decisions related to nutrition  Outcome: Progressing

## 2021-01-12 ENCOUNTER — ANESTHESIA EVENT (INPATIENT)
Dept: PERIOP | Facility: HOSPITAL | Age: 46
DRG: 314 | End: 2021-01-12
Payer: COMMERCIAL

## 2021-01-12 PROBLEM — M19.90 ARTHRITIS: Status: ACTIVE | Noted: 2021-01-12

## 2021-01-12 PROBLEM — G89.29 CHRONIC PAIN: Status: ACTIVE | Noted: 2021-01-12

## 2021-01-12 LAB
ANION GAP SERPL CALCULATED.3IONS-SCNC: 4 MMOL/L (ref 4–13)
BUN SERPL-MCNC: 10 MG/DL (ref 5–25)
CALCIUM SERPL-MCNC: 9.3 MG/DL (ref 8.3–10.1)
CHLORIDE SERPL-SCNC: 103 MMOL/L (ref 100–108)
CO2 SERPL-SCNC: 26 MMOL/L (ref 21–32)
CREAT SERPL-MCNC: 0.77 MG/DL (ref 0.6–1.3)
ERYTHROCYTE [DISTWIDTH] IN BLOOD BY AUTOMATED COUNT: 14.1 % (ref 11.6–15.1)
GFR SERPL CREATININE-BSD FRML MDRD: 94 ML/MIN/1.73SQ M
GLUCOSE SERPL-MCNC: 118 MG/DL (ref 65–140)
GLUCOSE SERPL-MCNC: 141 MG/DL (ref 65–140)
GLUCOSE SERPL-MCNC: 143 MG/DL (ref 65–140)
GLUCOSE SERPL-MCNC: 148 MG/DL (ref 65–140)
GLUCOSE SERPL-MCNC: 149 MG/DL (ref 65–140)
HCG SERPL QL: NEGATIVE
HCT VFR BLD AUTO: 40.8 % (ref 34.8–46.1)
HGB BLD-MCNC: 13 G/DL (ref 11.5–15.4)
MCH RBC QN AUTO: 25.5 PG (ref 26.8–34.3)
MCHC RBC AUTO-ENTMCNC: 31.9 G/DL (ref 31.4–37.4)
MCV RBC AUTO: 80 FL (ref 82–98)
PLATELET # BLD AUTO: 351 THOUSANDS/UL (ref 149–390)
PMV BLD AUTO: 9.4 FL (ref 8.9–12.7)
POTASSIUM SERPL-SCNC: 4 MMOL/L (ref 3.5–5.3)
RBC # BLD AUTO: 5.1 MILLION/UL (ref 3.81–5.12)
SODIUM SERPL-SCNC: 133 MMOL/L (ref 136–145)
WBC # BLD AUTO: 10.11 THOUSAND/UL (ref 4.31–10.16)

## 2021-01-12 PROCEDURE — 80048 BASIC METABOLIC PNL TOTAL CA: CPT | Performed by: STUDENT IN AN ORGANIZED HEALTH CARE EDUCATION/TRAINING PROGRAM

## 2021-01-12 PROCEDURE — 82948 REAGENT STRIP/BLOOD GLUCOSE: CPT

## 2021-01-12 PROCEDURE — 85027 COMPLETE CBC AUTOMATED: CPT | Performed by: STUDENT IN AN ORGANIZED HEALTH CARE EDUCATION/TRAINING PROGRAM

## 2021-01-12 PROCEDURE — 84703 CHORIONIC GONADOTROPIN ASSAY: CPT | Performed by: NURSE PRACTITIONER

## 2021-01-12 PROCEDURE — 99232 SBSQ HOSP IP/OBS MODERATE 35: CPT | Performed by: PODIATRIST

## 2021-01-12 RX ADMIN — ACETAMINOPHEN 650 MG: 325 TABLET, FILM COATED ORAL at 17:02

## 2021-01-12 RX ADMIN — LEVOTHYROXINE SODIUM 50 MCG: 50 TABLET ORAL at 05:19

## 2021-01-12 RX ADMIN — OXYCODONE HYDROCHLORIDE 10 MG: 10 TABLET ORAL at 17:02

## 2021-01-12 RX ADMIN — OXYCODONE HYDROCHLORIDE 10 MG: 10 TABLET ORAL at 03:43

## 2021-01-12 RX ADMIN — CEFAZOLIN SODIUM 2000 MG: 2 SOLUTION INTRAVENOUS at 16:48

## 2021-01-12 RX ADMIN — PANTOPRAZOLE SODIUM 40 MG: 40 TABLET, DELAYED RELEASE ORAL at 05:19

## 2021-01-12 RX ADMIN — CLONIDINE HYDROCHLORIDE 0.2 MG: 0.1 TABLET ORAL at 22:09

## 2021-01-12 RX ADMIN — HEPARIN SODIUM 5000 UNITS: 5000 INJECTION INTRAVENOUS; SUBCUTANEOUS at 14:37

## 2021-01-12 RX ADMIN — CLONIDINE HYDROCHLORIDE 0.2 MG: 0.1 TABLET ORAL at 08:50

## 2021-01-12 RX ADMIN — METRONIDAZOLE 500 MG: 500 TABLET ORAL at 08:41

## 2021-01-12 RX ADMIN — LAMOTRIGINE 150 MG: 100 TABLET ORAL at 08:41

## 2021-01-12 RX ADMIN — CEFAZOLIN SODIUM 2000 MG: 2 SOLUTION INTRAVENOUS at 08:41

## 2021-01-12 RX ADMIN — HEPARIN SODIUM 5000 UNITS: 5000 INJECTION INTRAVENOUS; SUBCUTANEOUS at 05:19

## 2021-01-12 RX ADMIN — METRONIDAZOLE 500 MG: 500 TABLET ORAL at 16:48

## 2021-01-12 RX ADMIN — ZIPRASIDONE HYDROCHLORIDE 80 MG: 40 CAPSULE ORAL at 08:41

## 2021-01-12 RX ADMIN — HEPARIN SODIUM 5000 UNITS: 5000 INJECTION INTRAVENOUS; SUBCUTANEOUS at 22:10

## 2021-01-12 RX ADMIN — METRONIDAZOLE 500 MG: 500 TABLET ORAL at 22:10

## 2021-01-12 RX ADMIN — CEFAZOLIN SODIUM 2000 MG: 2 SOLUTION INTRAVENOUS at 01:11

## 2021-01-12 RX ADMIN — ZIPRASIDONE HYDROCHLORIDE 80 MG: 40 CAPSULE ORAL at 22:09

## 2021-01-12 RX ADMIN — HYDROXYZINE HYDROCHLORIDE 50 MG: 50 TABLET, FILM COATED ORAL at 22:10

## 2021-01-12 NOTE — PROGRESS NOTES
Podiatry - Progress Note  Patient: Di Massey 40 y o  female   MRN: [id number]  PCP: Paula Christy DO  [facility] Encounter: [id number]  Date Of Visit: 21    ASSESSMENT:    Di Massey is a 40 y o  female with:    1  Diabetic ulceration right 2nd digit with suspicion of osteomyelitis, Gimenez 3 - POA  2  Cellulitis of right foot  3  Type 2 diabetes mellitus with neuropathy  4  Hypothyroidism  5  Anxiety  6  Bipolar disorder    PLAN:    · Plan for surgical intervention consisting of right second toe amputation 20  Consent signed and in pt chart  NPO at midnight  · Dressings were changed today  Wound continue to remain stable  Soft tissue erythema mildly decreased in comparison to previous pictures  · Xray reviewed by myself: no signs of osteomyelitis at this time  · Antibiotic: Ancef and metronidazole  · WBAT  · VSS, a febrile , no leukocytosis  · Appreciate internal medicine consult     Pharmacologic VTE Prophylaxis: Heparin   Mechanical VTE Prophylaxis: sequential compression device   SUBJECTIVE:     The patient was seen, evaluated, and assessed at bedside today  The patient was awake, alert, and in no acute distress  No acute events overnight  The patient reports she is feeling ok  Patient denies N/V/F/chills/SOB/CP  OBJECTIVE:     Vitals:   /78   Pulse 75   Temp 97 9 °F (36 6 °C) (Oral)   Resp 18   Ht 5' 5 5" (1 664 m)   Wt 109 kg (240 lb 4 8 oz)   SpO2 95%   BMI 39 38 kg/m²     Temp (24hrs), Av 8 °F (36 6 °C), Min:97 5 °F (36 4 °C), Max:98 1 °F (36 7 °C)      Physical Exam:     General:  Alert, cooperative, and in no distress  Lower extremity exam:  Cardiovascular status at baseline  Neurological status at baseline  Musculoskeletal status at baseline  No calf tenderness noted  Lower extremity wound(s) as noted below:    Wound 1 located planta second toe wound, measures 0 7 cm x 1 cm x 0 3 cm  without sinus tracking or undermining   Wound bed appears slough and fibrotic with no exudate  Deepest tissue noted Capsule and Tendon  Malodor is not noticed  Wound edge appears Attached  Yvonne-wound skin appears intact and erythematous  Soft tissue erythema appears to be mildly decreased when compared to previous pictures      Clinical Images 01/12/21:              Additional Data:     Labs:    Results from last 7 days   Lab Units 01/12/21  0522 01/11/21  1704   WBC Thousand/uL 10 11 10 09   HEMOGLOBIN g/dL 13 0 14 1   HEMATOCRIT % 40 8 44 4   PLATELETS Thousands/uL 351 428*   NEUTROS PCT %  --  63   LYMPHS PCT %  --  26   MONOS PCT %  --  6   EOS PCT %  --  4     Results from last 7 days   Lab Units 01/12/21  0522 01/11/21  1704   POTASSIUM mmol/L 4 0 4 1   CHLORIDE mmol/L 103 103   CO2 mmol/L 26 23   BUN mg/dL 10 9   CREATININE mg/dL 0 77 0 79   CALCIUM mg/dL 9 3 9 7   ALK PHOS U/L  --  113   ALT U/L  --  19   AST U/L  --  16           * I Have Reviewed All Lab Data Listed Above  Recent Cultures (last 7 days):     Results from last 7 days   Lab Units 01/11/21  1707 01/11/21  1706   BLOOD CULTURE  Received in Microbiology Lab  Culture in Progress  Received in Microbiology Lab  Culture in Progress  Imaging: I have personally reviewed pertinent films in PACS  EKG, Pathology, and Other Studies: I have personally reviewed pertinent reports  ** Please Note: Portions of the record may have been created with voice recognition software  Occasional wrong word or "sound a like" substitutions may have occurred due to the inherent limitations of voice recognition software  Read the chart carefully and recognize, using context, where substitutions have occurred   **

## 2021-01-12 NOTE — ASSESSMENT & PLAN NOTE
Lab Results   Component Value Date    HGBA1C 6 6 (H) 01/11/2021     · She denies use of insulin as an outpatient and states she only uses metformin  · Recommend holding metformin while inpatient  · Recommend level 2 diabetic diet  · Agree with sliding scale if patient is agreeable  · Start basal bolus insulin while inpatient if patient is agreeable, currently she is not         Recent Labs     01/11/21  1712   POCGLU 150*       Blood Sugar Average: Last 72 hrs:  (P) 150

## 2021-01-12 NOTE — UTILIZATION REVIEW
Notification of Inpatient Admission/Inpatient Authorization Request   This is a Notification of Inpatient Admission for [address]  Be advised that this patient was admitted to our facility under Inpatient Status  Contact Isiah Smith at [phone] for additional admission information  [facility] DE[facility]  DEDICATED -[phone]  Patient Name:   Stu Berg   YOB: 1975       [address]   [address]:   [place] [address]  Tax ID: [id number]  NPI: [id number] Attending Provider/NPI:  Phone:  Address: Mel Davison [[id number]]  [phone]  Same as BIANCA/[address]lim[address] of Service Code: 24 Place of Service Name:  [address] [place] [address]   Start Date: 1/11/21 1532 Discharge Date & Time: No discharge date for patient encounter  Type of Admission: Inpatient Status Discharge Disposition (if discharged): Home/Self Care   Patient Diagnoses: Cellulitis and abscess of toe, right [L03 [id number], L02 [id number]]     Orders: Admission Orders (From admission, onward)     Ordered        01/11/21 1624  Inpatient Admission  Once                    Assigned Utilization Review Contact: Isiah Smith  Utilization ,   Network Utilization Review Department  Phone: [phone]; Fax [phone]   Email: [email or web address]   ATTENTION PAYERS: Please call the assigned Utilization  directly with any questions or concerns ALL voicemails in the department are confidential  Send all requests for admission clinical reviews, approved or denied determinations and any other requests to dedicated fax number belonging to the campus where the patient is receiving treatment

## 2021-01-12 NOTE — CONSULTS
Consult- Valente De Luna 1975, 40 y o  female MRN: [id number]    Unit/Bed#: [facility] 318-01 Encounter: [id number]    Primary Care Provider: Yessi Snider DO   Date and time admitted to hospital: 1/11/2021  3:32 PM      Inpatient consult to Internal Medicine  Consult performed by: Davidson Callahan MD  Consult ordered by: Abel Bravo DPM          * Diabetic ulcer of toe of right foot associated with type 2 diabetes mellitus, with necrosis of bone ([facility] [address] )  Assessment & Plan  · Admitted by the podiatry service for possible amputation of right second toe  · Agree with current antibiotic regimen, continue cefazolin and metronidazole  · Await blood cultures  · monitor temps, WBCs  · Local wound care per primary service  Encounter for preoperative assessment  Assessment & Plan  · EKG personally reviewed, normal sinus rhythm  · She denies any shortness of breath or chest pressure with any of her routine ADLs  · She is able to ascend a flight of stairs without dyspnea  · Based on the above she is able to tolerate greater then 4 metabolic equivalents and is considered average risk for age for cardiac complications in the perioperative period  This was discussed with her  · She may proceed to surgery as there are no modifiable risk factors to address  Cellulitis of right foot  Assessment & Plan  · As above    Type 2 diabetes mellitus with neurologic complication, with long-term current use of insulin ([facility])  Assessment & Plan  Lab Results   Component Value Date    HGBA1C 6 6 (H) 01/11/2021     · She denies use of insulin as an outpatient and states she only uses metformin  · Recommend holding metformin while inpatient  · Recommend level 2 diabetic diet  · Agree with sliding scale if patient is agreeable  · Start basal bolus insulin while inpatient if patient is agreeable, currently she is not         Recent Labs     01/11/21  1712   POCGLU 150*       Blood Sugar Average: Last 72 hrs:  (P) 150    HTN (hypertension)  Assessment & Plan  · Bp is acceptable  · Continue clonidine      Inpatient Medical Consultation - [facility]    Patient Information: Stu Berg 40 y o  female MRN: [id number]  Unit/Bed#: [facility] 318-01 Encounter: [id number]  PCP: Tosha Alba DO  Date of Admission:  1/11/2021  Date of Consultation: 01/11/21  Requesting Physician: Denny Connell DPM    Reason For Consultation:   Preoperative evaluation    Assessment/Plan:    · See above    VTE Prophylaxis: Sequential compression device (Venodyne)  and Enoxaparin (Lovenox)  / sequential compression device     Recommendations for Discharge:  · To be determined    Counseling / Coordination of Care Time: 30 minutes  Greater than 50% of total time spent on patient counseling and coordination of care  Collaboration of Care: Were Recommendations Directly Discussed with Primary Treatment Team? - Yes     History of Present Illness:    Stu Berg is a 40 y o  female who is originally admitted to the podiatry service on 1/11/2021 due to cellulitis of the right 2nd toe  We are consulted for preoperative evaluation  The patient reports pain in her right 2nd toe, she presents to the Podiatry directly for inpatient treatment  She reports chills but has not measured her temperature  She does not there is a wound which is draining yellow pus  Currently Podiatry is planned to do an amputation of toe has requested Internal Medicine evaluate for preoperative cardiac risk  The patient states that she is able to perform all her ADLs without having to catch her breath  She denies any chest pain with ADLs  She is able to ascend a flight of steps without having to stop or developing any ischemic symptoms  Review of Systems:    Review of Systems   All other systems reviewed and are negative        Past Medical and Surgical History:     Past Medical History:   Diagnosis Date    Anxiety     Arthritis     Asthma     Chronic pain  Diabetes mellitus ([facility] [address] )     Disease of thyroid gland     Lymphedema     left leg    Manic bipolar I disorder (HCC)     Morbid obesity (HCC)     OCD (obsessive compulsive disorder)     PTSD (post-traumatic stress disorder)        Past Surgical History:   Procedure Laterality Date    CHOLECYSTECTOMY      CHOLECYSTECTOMY      NE AMPUTATION METATARSAL+TOE,SINGLE Left 9/8/2019    Procedure: PARTIAL 3RD RAY RESECTION, PSB TOE FILLET FLAP;  Surgeon: Nancy De La Cruz DPM;  Location: [facility];  Service: Podiatry       Meds/Allergies:    all medications and allergies reviewed    Allergies: Allergies   Allergen Reactions    Aspirin     Barium Iodide Itching     Face became very red and itchy     Codeine     Latex     Vancomycin     Penicillins Rash and Other (See Comments)     Patient has tolerated cefepime without difficulty       Social History:     Marital Status: Single    Substance Use History:   Social History     Substance and Sexual Activity   Alcohol Use Never    Frequency: Never    Drinks per session: Patient refused    Binge frequency: Never     Social History     Tobacco Use   Smoking Status Current Every Day Smoker    Packs/day: 0 50    Years: 15 00    Pack years: 7 50   Smokeless Tobacco Never Used     Social History     Substance and Sexual Activity   Drug Use Never       Family History:    non-contributory    Physical Exam:     Vitals:   Blood Pressure: 140/73 (01/11/21 1500)  Pulse: 88 (01/11/21 1500)  Temperature: 97 5 °F (36 4 °C) (01/11/21 1500)  Temp Source: Oral (01/11/21 1500)  Respirations: 16 (01/11/21 1500)  Height: 5' 5 5" (166 4 cm) (01/11/21 1721)  Weight - Scale: 109 kg (240 lb 4 8 oz) (01/11/21 1721)  SpO2: 99 % (01/11/21 1723)    Physical Exam  Constitutional:       General: She is not in acute distress  Appearance: She is obese  She is not toxic-appearing  HENT:      Head: Normocephalic and atraumatic        Nose: Nose normal       Mouth/Throat:      Mouth: Mucous membranes are moist       Pharynx: Oropharynx is clear  Eyes:      Extraocular Movements: Extraocular movements intact  Neck:      Comments: No JVD  Cardiovascular:      Rate and Rhythm: Normal rate and regular rhythm  Heart sounds: No murmur  Pulmonary:      Effort: Pulmonary effort is normal       Breath sounds: No wheezing or rales  Musculoskeletal:      Comments: Media section reviewed   Skin:     General: Skin is warm and dry  Neurological:      General: No focal deficit present  Mental Status: She is alert and oriented to person, place, and time  Psychiatric:         Mood and Affect: Mood normal          Behavior: Behavior normal              Additional Data:     Lab Results: I have personally reviewed pertinent reports  Results from last 7 days   Lab Units 01/11/21  1704   WBC Thousand/uL 10 09   HEMOGLOBIN g/dL 14 1   HEMATOCRIT % 44 4   PLATELETS Thousands/uL 428*   NEUTROS PCT % 63   LYMPHS PCT % 26   MONOS PCT % 6   EOS PCT % 4     Results from last 7 days   Lab Units 01/11/21  1704   POTASSIUM mmol/L 4 1   CHLORIDE mmol/L 103   CO2 mmol/L 23   BUN mg/dL 9   CREATININE mg/dL 0 79   CALCIUM mg/dL 9 7   ALK PHOS U/L 113   ALT U/L 19   AST U/L 16           Imaging: I have personally reviewed pertinent reports  Xr Foot 3+ Views Right    Result Date: 1/5/2021  Narrative: RIGHT FOOT INDICATION:   Ulcer 3rd digit; R/o osteo  COMPARISON:  None VIEWS:  XR FOOT 3+ VW RIGHT FINDINGS: There is no acute fracture or dislocation  No significant degenerative changes  No periosteal reaction or cortical destruction to suggest osteomyelitis  Ulceration of the soft tissues at the plantar aspect of the 2nd toe  No evidence of soft tissue gas  Impression: No radiographic evidence of osteomyelitis   Workstation performed: [id number]       EKG, Pathology, and Other Studies Reviewed on Admission:   · EKG: personally reviewed, normal sinus rhythm    ** Please Note: This note has been constructed using a voice recognition system   **

## 2021-01-12 NOTE — UTILIZATION REVIEW
Initial Clinical Review    Admission: Date/Time/Statement:   Admission Orders (From admission, onward)     Ordered        01/11/21 1624  Inpatient Admission  Once                   Orders Placed This Encounter   Procedures    Inpatient Admission     Standing Status:   Standing     Number of Occurrences:   1     Order Specific Question:   Admitting Physician     Answer:   Camilo Bello     Order Specific Question:   Level of Care     Answer:   Med Surg [16]     Order Specific Question:   Estimated length of stay     Answer:   More than 2 Midnights     Order Specific Question:   Certification     Answer:   I certify that inpatient services are medically necessary for this patient for a duration of greater than two midnights  See H&P and MD Progress Notes for additional information about the patient's course of treatment  Assessment/Plan:   Ms Joel Harmon is a 39 yo female who presents as a direct admission from [place] [address] with worsening R 2nd toe erythema and pain  She was seen in ED on 1/4 with R toe wound and was d/c on Doxycycline  She could not tolerate that medication r/t development of rash  She is admitted to INPATIENT status with worsening of R 2nd toe erythema and probable acute OM - IV antibiotics, Xrays , OR for R 2nd digit amputation, NPO at [facility], IV fluids, blood culture, ID consult, consult Medicine, WBAT, PRN analgesia  PMH: IDDM2, neuropathy, tobacco abuse, hypothyroidism, morbid obesity, anxiety, bipolar disorder, previous L 3rd partial ray amp on 09/08/2019 for OM       1/11 Medicine Consult - diabetic ulcer R toe with Type 2 DM and Necrosis to bone  - IV antibiotics, follow blood cultures, local wound care  Type 2 IDDM - SSI cover           Pain Score       01/11/21 1721       Worst Possible Pain          Wt Readings from Last 1 Encounters:   01/11/21 109 kg (240 lb 4 8 oz)     Additional Vital Signs:  01/12/21 0850        117/78           01/12/21 0700  97 9 °F (36 6 °C)  75  18  110/62    95 %  None (Room air)  Lying   01/11/21 2300  98 1 °F (36 7 °C)  78  18  106/66  80  97 %  None (Room air)  Lying   01/11/21 2000              None (Room air)     01/11/21 1723            99 %  None (Room air)     01/11/21 1500  97 5 °F (36 4 °C)  88  16  140/73    99 %  None (Room air)  Lying     Pertinent Labs/Diagnostic Test Results:     1/22 Xray R foot - No radiographic evidence of osteomyelitis      1/22 ECG - Normal sinus rhythm  Incomplete right bundle branch block  Normal ECG  When compared with ECG of 06-SEP-2019 14:52,  No significant change was found    Results from last 7 days   Lab Units 01/12/21  0522 01/11/21  1704   WBC Thousand/uL 10 11 10 09   HEMOGLOBIN g/dL 13 0 14 1   HEMATOCRIT % 40 8 44 4   PLATELETS Thousands/uL 351 428*   NEUTROS ABS Thousands/µL  --  6 42         Results from last 7 days   Lab Units 01/12/21  0522 01/11/21  1704   SODIUM mmol/L 133* 131*   POTASSIUM mmol/L 4 0 4 1   CHLORIDE mmol/L 103 103   CO2 mmol/L 26 23   ANION GAP mmol/L 4 5   BUN mg/dL 10 9   CREATININE mg/dL 0 77 0 79   EGFR ml/min/1 73sq m 94 91   CALCIUM mg/dL 9 3 9 7     Results from last 7 days   Lab Units 01/11/21  1704   AST U/L 16   ALT U/L 19   ALK PHOS U/L 113   TOTAL PROTEIN g/dL 7 7   ALBUMIN g/dL 3 3*   TOTAL BILIRUBIN mg/dL 0 39     Results from last 7 days   Lab Units 01/12/21  0613 01/11/21  2055 01/11/21  1712   POC GLUCOSE mg/dl 143* 126 150*     Results from last 7 days   Lab Units 01/12/21  0522 01/11/21  1704   GLUCOSE RANDOM mg/dL 141* 135         Results from last 7 days   Lab Units 01/11/21  1705   HEMOGLOBIN A1C % 6 6*   EAG mg/dl 143     Results from last 7 days   Lab Units 01/11/21  1707 01/11/21  1706   BLOOD CULTURE  Received in Microbiology Lab  Culture in Progress  Received in Microbiology Lab  Culture in Progress       Past Medical History:   Diagnosis Date    Anxiety     Arthritis     Asthma     Chronic pain     Diabetes mellitus ([address] )     Disease of thyroid gland     Lymphedema     left leg    Manic bipolar I disorder (HCC)     Morbid obesity (HCC)     OCD (obsessive compulsive disorder)     PTSD (post-traumatic stress disorder)      Present on Admission:   Diabetic ulcer of toe of right foot associated with type 2 diabetes mellitus, with necrosis of bone (HCC)   Cellulitis of right foot   Morbid obesity ([facility] [address] )   Hypothyroidism   Anxiety    Admitting Diagnosis: Cellulitis and abscess of toe, right [L03 031, L02 611]     Age/Sex: 40 y o  female     Admission Orders:    Scheduled Medications:    cefazolin, 2,000 mg, Intravenous, Q8H  cholecalciferol, 1,000 Units, Oral, Daily  cloNIDine, 0 2 mg, Oral, Q12H RADHA  heparin (porcine), 5,000 Units, Subcutaneous, Q8H RADHA  hydrOXYzine HCL, 50 mg, Oral, HS  insulin lispro, 1-6 Units, Subcutaneous, TID AC  insulin lispro, 1-6 Units, Subcutaneous, HS  lamoTRIgine, 150 mg, Oral, Daily  levothyroxine, 50 mcg, Oral, Early Morning  metroNIDAZOLE, 500 mg, Oral, TID  nicotine, 1 patch, Transdermal, Daily  pantoprazole, 40 mg, Oral, Early Morning  ziprasidone, 80 mg, Oral, BID With Meals    Continuous IV Infusions:     PRN Meds:  acetaminophen, 650 mg, Oral, Q6H PRN  albuterol, 2 puff, Inhalation, Q6H PRN  diphenhydrAMINE, 25 mg, Oral, Q6H PRN - x 1 1/11  HYDROmorphone, 0 2 mg, Intravenous, Q6H PRN  oxyCODONE, 10 mg, Oral, Q4H PRN - x 1 1/11, 1/12  oxyCODONE, 5 mg, Oral, Q4H PRN  senna, 1 tablet, Oral, HS PRN  SUMAtriptan, 6 mg, Subcutaneous, Once PRN    SCDs  POC GLUCOSE AC/HS WITH SSI COVERAGE   Xray R foot   IP CONSULT TO INTERNAL MEDICINE    Network Utilization Review Department  ATTENTION: Please call with any questions or concerns to [phone] and carefully listen to the prompts so that you are directed to the right person   All voicemails are confidential   Manasa Ling all requests for admission clinical reviews, approved or denied determinations and any other requests to dedicated fax number below belonging to the campus where the patient is receiving treatment   List of dedicated fax numbers for the Facilities:  [address] DENIALS (Administrative/Medical Necessity) [phone]   [address] (Maternity/NICU/Pediatrics) [phone] [address] Ervin [address]ac[address] (  [address]) [address] [place] [address] [address] [place] [address] [phone]

## 2021-01-12 NOTE — CASE MANAGEMENT
LOS: 1 day  Re-admission: No  Risk of re-admission: Green  Bundle: No    CM spoke with pt and introduced self/role with dcp  Pt reports she resides with her son, Vicente Ferris, in a 2 story home with 2 KORY  Bedroom/bathroom on 2nd floor  No half bathroom  Independent with ADLs and using a Rolator for ambulation  Pt reports she owns a shower chair but it is too big for her shower  Pt reports she is on SSI but works part time  Uses [employer] or friends for transportation  Hx of VNA  No hx of STR  [facility] treatment at [facility]  Pt reports he was at [facility] in 2020 for [facility] tx  No hx of drug/alcohol abuse  Pharmacy: [facility]  No POA or LW  Pt reports on, Vicente Ferris, is emergency contact  Pt reports her son is home and does not work  CM reviewed d/c planning process including the following: identifying help at home, patient preference for d/c planning needs, Discharge Lounge, [facility] Meds to Bed program, availability of treatment team to discuss questions or concerns patient and/or family may have regarding understanding medications and recognizing signs and symptoms once discharged  CM also encouraged patient to follow up with all recommended appointments after discharge  Patient advised of importance for patient and family to participate in managing patients medical well being

## 2021-01-13 ENCOUNTER — APPOINTMENT (INPATIENT)
Dept: RADIOLOGY | Facility: HOSPITAL | Age: 46
DRG: 314 | End: 2021-01-13
Payer: COMMERCIAL

## 2021-01-13 ENCOUNTER — ANESTHESIA (INPATIENT)
Dept: PERIOP | Facility: HOSPITAL | Age: 46
DRG: 314 | End: 2021-01-13
Payer: COMMERCIAL

## 2021-01-13 VITALS — HEART RATE: 72 BPM

## 2021-01-13 LAB
ANION GAP SERPL CALCULATED.3IONS-SCNC: 4 MMOL/L (ref 4–13)
BUN SERPL-MCNC: 11 MG/DL (ref 5–25)
CALCIUM SERPL-MCNC: 9.5 MG/DL (ref 8.3–10.1)
CHLORIDE SERPL-SCNC: 102 MMOL/L (ref 100–108)
CO2 SERPL-SCNC: 28 MMOL/L (ref 21–32)
CREAT SERPL-MCNC: 0.77 MG/DL (ref 0.6–1.3)
ERYTHROCYTE [DISTWIDTH] IN BLOOD BY AUTOMATED COUNT: 14 % (ref 11.6–15.1)
GFR SERPL CREATININE-BSD FRML MDRD: 94 ML/MIN/1.73SQ M
GLUCOSE SERPL-MCNC: 106 MG/DL (ref 65–140)
GLUCOSE SERPL-MCNC: 109 MG/DL (ref 65–140)
GLUCOSE SERPL-MCNC: 114 MG/DL (ref 65–140)
GLUCOSE SERPL-MCNC: 129 MG/DL (ref 65–140)
GLUCOSE SERPL-MCNC: 134 MG/DL (ref 65–140)
GLUCOSE SERPL-MCNC: 201 MG/DL (ref 65–140)
HCT VFR BLD AUTO: 42.2 % (ref 34.8–46.1)
HGB BLD-MCNC: 13.5 G/DL (ref 11.5–15.4)
MCH RBC QN AUTO: 25.6 PG (ref 26.8–34.3)
MCHC RBC AUTO-ENTMCNC: 32 G/DL (ref 31.4–37.4)
MCV RBC AUTO: 80 FL (ref 82–98)
PLATELET # BLD AUTO: 410 THOUSANDS/UL (ref 149–390)
PMV BLD AUTO: 8.9 FL (ref 8.9–12.7)
POTASSIUM SERPL-SCNC: 4 MMOL/L (ref 3.5–5.3)
RBC # BLD AUTO: 5.28 MILLION/UL (ref 3.81–5.12)
SODIUM SERPL-SCNC: 134 MMOL/L (ref 136–145)
WBC # BLD AUTO: 9.46 THOUSAND/UL (ref 4.31–10.16)

## 2021-01-13 PROCEDURE — 0Y6R0Z0 DETACHMENT AT RIGHT 2ND TOE, COMPLETE, OPEN APPROACH: ICD-10-PCS | Performed by: PODIATRIST

## 2021-01-13 PROCEDURE — NC001 PR NO CHARGE: Performed by: PODIATRIST

## 2021-01-13 PROCEDURE — 80048 BASIC METABOLIC PNL TOTAL CA: CPT | Performed by: STUDENT IN AN ORGANIZED HEALTH CARE EDUCATION/TRAINING PROGRAM

## 2021-01-13 PROCEDURE — 73630 X-RAY EXAM OF FOOT: CPT

## 2021-01-13 PROCEDURE — 28820 AMPUTATION OF TOE: CPT | Performed by: PODIATRIST

## 2021-01-13 PROCEDURE — 82948 REAGENT STRIP/BLOOD GLUCOSE: CPT

## 2021-01-13 PROCEDURE — 88305 TISSUE EXAM BY PATHOLOGIST: CPT | Performed by: PATHOLOGY

## 2021-01-13 PROCEDURE — 99232 SBSQ HOSP IP/OBS MODERATE 35: CPT | Performed by: PHYSICIAN ASSISTANT

## 2021-01-13 PROCEDURE — 88311 DECALCIFY TISSUE: CPT | Performed by: PATHOLOGY

## 2021-01-13 PROCEDURE — 85027 COMPLETE CBC AUTOMATED: CPT | Performed by: STUDENT IN AN ORGANIZED HEALTH CARE EDUCATION/TRAINING PROGRAM

## 2021-01-13 RX ORDER — PROPOFOL 10 MG/ML
INJECTION, EMULSION INTRAVENOUS CONTINUOUS PRN
Status: DISCONTINUED | OUTPATIENT
Start: 2021-01-13 | End: 2021-01-13

## 2021-01-13 RX ORDER — FENTANYL CITRATE/PF 50 MCG/ML
25 SYRINGE (ML) INJECTION
Status: DISCONTINUED | OUTPATIENT
Start: 2021-01-13 | End: 2021-01-13

## 2021-01-13 RX ORDER — SODIUM CHLORIDE 9 MG/ML
75 INJECTION, SOLUTION INTRAVENOUS CONTINUOUS
Status: DISCONTINUED | OUTPATIENT
Start: 2021-01-13 | End: 2021-01-13

## 2021-01-13 RX ORDER — ONDANSETRON 2 MG/ML
INJECTION INTRAMUSCULAR; INTRAVENOUS AS NEEDED
Status: DISCONTINUED | OUTPATIENT
Start: 2021-01-13 | End: 2021-01-13

## 2021-01-13 RX ORDER — MIDAZOLAM HYDROCHLORIDE 2 MG/2ML
INJECTION, SOLUTION INTRAMUSCULAR; INTRAVENOUS AS NEEDED
Status: DISCONTINUED | OUTPATIENT
Start: 2021-01-13 | End: 2021-01-13

## 2021-01-13 RX ORDER — ONDANSETRON 2 MG/ML
4 INJECTION INTRAMUSCULAR; INTRAVENOUS ONCE AS NEEDED
Status: DISCONTINUED | OUTPATIENT
Start: 2021-01-13 | End: 2021-01-13

## 2021-01-13 RX ORDER — GLYCOPYRROLATE 0.2 MG/ML
INJECTION INTRAMUSCULAR; INTRAVENOUS AS NEEDED
Status: DISCONTINUED | OUTPATIENT
Start: 2021-01-13 | End: 2021-01-13

## 2021-01-13 RX ORDER — SODIUM CHLORIDE, SODIUM LACTATE, POTASSIUM CHLORIDE, CALCIUM CHLORIDE 600; 310; 30; 20 MG/100ML; MG/100ML; MG/100ML; MG/100ML
75 INJECTION, SOLUTION INTRAVENOUS CONTINUOUS
Status: DISCONTINUED | OUTPATIENT
Start: 2021-01-13 | End: 2021-01-14 | Stop reason: HOSPADM

## 2021-01-13 RX ORDER — KETAMINE HCL IN NACL, ISO-OSM 100MG/10ML
SYRINGE (ML) INJECTION AS NEEDED
Status: DISCONTINUED | OUTPATIENT
Start: 2021-01-13 | End: 2021-01-13

## 2021-01-13 RX ADMIN — Medication 1000 UNITS: at 07:53

## 2021-01-13 RX ADMIN — SODIUM CHLORIDE 75 ML/HR: 0.9 INJECTION, SOLUTION INTRAVENOUS at 08:09

## 2021-01-13 RX ADMIN — GLYCOPYRROLATE 0.1 MG: 0.2 INJECTION, SOLUTION INTRAMUSCULAR; INTRAVENOUS at 11:55

## 2021-01-13 RX ADMIN — INSULIN LISPRO 2 UNITS: 100 INJECTION, SOLUTION INTRAVENOUS; SUBCUTANEOUS at 17:11

## 2021-01-13 RX ADMIN — Medication 10 MG: at 12:06

## 2021-01-13 RX ADMIN — MIDAZOLAM 2 MG: 1 INJECTION INTRAMUSCULAR; INTRAVENOUS at 11:48

## 2021-01-13 RX ADMIN — ZIPRASIDONE HYDROCHLORIDE 80 MG: 40 CAPSULE ORAL at 07:50

## 2021-01-13 RX ADMIN — SODIUM CHLORIDE, SODIUM LACTATE, POTASSIUM CHLORIDE, AND CALCIUM CHLORIDE 75 ML/HR: .6; .31; .03; .02 INJECTION, SOLUTION INTRAVENOUS at 14:10

## 2021-01-13 RX ADMIN — Medication 10 MG: at 12:00

## 2021-01-13 RX ADMIN — METRONIDAZOLE 500 MG: 500 TABLET ORAL at 21:06

## 2021-01-13 RX ADMIN — HEPARIN SODIUM 5000 UNITS: 5000 INJECTION INTRAVENOUS; SUBCUTANEOUS at 06:00

## 2021-01-13 RX ADMIN — ONDANSETRON 4 MG: 2 INJECTION INTRAMUSCULAR; INTRAVENOUS at 11:55

## 2021-01-13 RX ADMIN — Medication 10 MG: at 12:09

## 2021-01-13 RX ADMIN — METRONIDAZOLE 500 MG: 500 TABLET ORAL at 17:11

## 2021-01-13 RX ADMIN — FENTANYL CITRATE 25 MCG: 50 INJECTION INTRAMUSCULAR; INTRAVENOUS at 13:53

## 2021-01-13 RX ADMIN — HEPARIN SODIUM 5000 UNITS: 5000 INJECTION INTRAVENOUS; SUBCUTANEOUS at 14:42

## 2021-01-13 RX ADMIN — LAMOTRIGINE 150 MG: 100 TABLET ORAL at 07:51

## 2021-01-13 RX ADMIN — CEFAZOLIN SODIUM 2000 MG: 2 SOLUTION INTRAVENOUS at 17:11

## 2021-01-13 RX ADMIN — FENTANYL CITRATE 25 MCG: 50 INJECTION INTRAMUSCULAR; INTRAVENOUS at 13:50

## 2021-01-13 RX ADMIN — LEVOTHYROXINE SODIUM 50 MCG: 50 TABLET ORAL at 06:00

## 2021-01-13 RX ADMIN — PROPOFOL 25 MCG/KG/MIN: 10 INJECTION, EMULSION INTRAVENOUS at 12:17

## 2021-01-13 RX ADMIN — Medication 10 MG: at 12:12

## 2021-01-13 RX ADMIN — Medication 10 MG: at 12:40

## 2021-01-13 RX ADMIN — Medication 10 MG: at 12:03

## 2021-01-13 RX ADMIN — ZIPRASIDONE HYDROCHLORIDE 80 MG: 40 CAPSULE ORAL at 21:06

## 2021-01-13 RX ADMIN — OXYCODONE HYDROCHLORIDE 10 MG: 10 TABLET ORAL at 02:41

## 2021-01-13 RX ADMIN — CEFAZOLIN SODIUM 2000 MG: 2 SOLUTION INTRAVENOUS at 00:35

## 2021-01-13 RX ADMIN — CLONIDINE HYDROCHLORIDE 0.2 MG: 0.1 TABLET ORAL at 07:53

## 2021-01-13 RX ADMIN — OXYCODONE HYDROCHLORIDE 10 MG: 10 TABLET ORAL at 20:04

## 2021-01-13 RX ADMIN — Medication 10 MG: at 12:30

## 2021-01-13 RX ADMIN — CLONIDINE HYDROCHLORIDE 0.2 MG: 0.1 TABLET ORAL at 21:06

## 2021-01-13 RX ADMIN — METRONIDAZOLE 500 MG: 500 TABLET ORAL at 07:51

## 2021-01-13 RX ADMIN — HYDROXYZINE HYDROCHLORIDE 50 MG: 50 TABLET, FILM COATED ORAL at 21:07

## 2021-01-13 NOTE — OP NOTE
OPERATIVE REPORT - Podiatry  PATIENT NAME: Christina Gonzalez    :  1975  MRN: [id number]  Pt Location: [facility] OR ROOM 10    SURGERY DATE: 2021    Surgeon(s) and Role:     * Luda Rutherford DPM - Primary     * Mimi Bryant - Assisting    Pre-op Diagnosis:  Cellulitis of right foot [L03 115]  Diabetic ulcer of toe of right foot associated with type 2 diabetes mellitus, with necrosis of bone ([facility] [address] ) [K54 878, L97 514]    Post-Op Diagnosis Codes:     * Cellulitis of right foot [L03 115]     * Diabetic ulcer of toe of right foot associated with type 2 diabetes mellitus, with necrosis of bone ([address] ) [E11 621, L97 514]    Procedure(s) (LRB):  AMPUTATION 2ND TOE (Right)    Specimen(s):  ID Type Source Tests Collected by Time Destination   1 : right second toe Tissue Toe, Right TISSUE EXAM Luda Rutherford DPM 2021 1227        Estimated Blood Loss:   Minimal    Drains:  * No LDAs found *    Anesthesia Type:   Choice with 20 ml of 1% Lidocaine and 0 5% Bupivacaine in a 1:1 mixture    Hemostasis:  Surgical dissection  Materials:  Nylon    Operative Findings:  No abscess, no sinus tract, purulence noted at this time  Viable bleeding tissue noted    Complications:   None    Procedure and Technique:     Under mild sedation, the patient was brought into the operating room and placed on the operating room table in the supine position  A time out was performed to confirm the correct patient, procedure and site with all parties in agreement  Following IV sedation, a local block was performed consisting of 20 ml of 1% Lidocaine and 0 5% Bupivacaine in a 1:1 mixture  The foot was then scrubbed, prepped and draped in the usual aseptic manner  The foot was placed on the operating room table  Attention was directed to the Laterally: right 2 digit where a racket type of incision was made  Utilizing a sterile 15 blade, this incision was carried deep straight to bone   Soft tissue structures were then reflected off the proximal phalanx  Utilizing a sterile 15 blade, all capsular structures were severed and the toe was then disarticulated at the level of the MTPJ and then placed on the back table  It was noted that all tissue margins were bleeding and viable  No deep sinus tracts or areas of purulence were visualized  The remaining bone on the proximal aspect of the joint was noted to be of hard and viable quality  Any remaining tendinous structures were identified and severed proximally to remove any nidus of infection  The surgical incision was irrigated with copious amounts of normal sterile saline  Skin edges were reapproximated and closure was obtained utilizing interrupted retention sutures utilizing 3-0  Nylon  The foot was then cleansed and dried  The incision site was dressed with Xeroform, 4x4 gauze  This was then covered with a fletcher and an ACE wrap  The patient tolerated the procedure and anesthesia well and was transported to the PACU with vital signs stable  Dr Dre Luna was present during the entire procedure and participated in all key aspects  SIGNATURE: Mimi Acuña  DATE: January 13, 2021  TIME: 1:23 PM      Portions of the record may have been created with voice recognition software  Occasional wrong word or "sound a like" substitutions may have occurred due to the inherent limitations of voice recognition software  Read the chart carefully and recognize, using context, where substitutions have occurred

## 2021-01-13 NOTE — ASSESSMENT & PLAN NOTE
Lab Results   Component Value Date    HGBA1C 6 6 (H) 01/11/2021     Recent Labs     01/12/21  2103 01/13/21  0635 01/13/21  1029 01/13/21  1306   POCGLU 149* 106 129 109       Blood Sugar Average: Last 72 hrs:  (P) 623 [id number]     · She denies use of insulin as an outpatient and states she only uses metformin  · Recommend holding metformin while inpatient  · Continue level 2 diabetic diet  · QID accuchecks with SSI coverage   · Monitor and adjust regimen as needed

## 2021-01-13 NOTE — ANESTHESIA PREPROCEDURE EVALUATION
Procedure:  AMPUTATION 2ND TOE (Right Toe)    Relevant Problems   CARDIO   (+) HTN (hypertension)      ENDO   (+) Hypothyroidism   (+) Type 2 diabetes mellitus (HCC)   (+) Type 2 diabetes mellitus with neurologic complication, with long-term current use of insulin (HCC)      GI/HEPATIC   (+) H/O bariatric surgery      MUSCULOSKELETAL   (+) Arthritis      NEURO/PSYCH   (+) Anxiety   (+) Chronic pain   (+) OCD (obsessive compulsive disorder)   (+) PTSD (post-traumatic stress disorder)      PULMONARY   (+) Smoking      Other   (+) Diabetic foot ulcer (HCC)   (+) Manic bipolar I disorder (HCC)   (+) Pyogenic inflammation of bone ([address] )      Recent labs personally reviewed:  Lab Results   Component Value Date    WBC 9 46 2021    HGB 13 5 2021     (H) 2021     Lab Results   Component Value Date     2015    K 4 0 2021    BUN 11 2021    CREATININE 0 77 2021    GLUCOSE 99 2015     No results found for: PTT   No results found for: INR    Lab Results   Component Value Date    HGBA1C 6 6 (H) 2021     Transthoracic Echocardiogram     Study date:  2019     Patient: Ashlee HOLDER  MR number: [id number]  Account number: [de-identified]  : 1975  Age: 38 years  Gender: Female  Status: Inpatient  Location: Bedside  Height: 65 in  Weight: 260 lb  BP: 114/ 79 mmHg     Indications: R/O Endocarditis     Sonographer:  JALEESA Roa  Referring Physician: Meme Porras MD  Group:  [id number]  Interpreting Physician:  Moriah Mayfield MD     SUMMARY     LEFT VENTRICLE:  Systolic function was normal  Ejection fraction was estimated to be 60 %  There were no regional wall motion abnormalities  Wall thickness was mildly increased      LEFT ATRIUM:  The atrium was mildly dilated      TRICUSPID VALVE:  There was trace regurgitation    Pulmonary artery systolic pressure was within the normal range      PULMONIC VALVE:  There was trace regurgitation      HISTORY: PRIOR HISTORY: S/P Bariatric surgery; Lymphedema; IDDM, Obesity; Psychiatric disorder; Bacteremia      PROCEDURE: The procedure was performed at the bedside  This was a routine study  The transthoracic approach was used  The heart rate was 67 bpm, at the start of the study  Images were obtained from the parasternal, apical, subcostal, and  suprasternal notch acoustic windows  Image quality was adequate      LEFT VENTRICLE: Size was normal  Systolic function was normal  Ejection fraction was estimated to be 60 %  There were no regional wall motion abnormalities  Wall thickness was mildly increased  No evidence of apical thrombus  DOPPLER: Left  ventricular diastolic function parameters were normal      RIGHT VENTRICLE: The size was normal  Systolic function was normal  Wall thickness was normal      LEFT ATRIUM: The atrium was mildly dilated      RIGHT ATRIUM: Size was normal      MITRAL VALVE: Valve structure was normal  There was normal leaflet separation  DOPPLER: The transmitral velocity was within the normal range  There was no evidence for stenosis  There was no significant regurgitation      AORTIC VALVE: The valve was trileaflet  Leaflets exhibited mildly increased thickness and normal cuspal separation  DOPPLER: Transaortic velocity was within the normal range  There was no evidence for stenosis  There was no significant  regurgitation      TRICUSPID VALVE: The valve structure was normal  There was normal leaflet separation  DOPPLER: The transtricuspid velocity was within the normal range  There was no evidence for stenosis  There was trace regurgitation  Pulmonary artery  systolic pressure was within the normal range      PULMONIC VALVE: Leaflets exhibited normal thickness, no calcification, and normal cuspal separation  DOPPLER: The transpulmonic velocity was within the normal range  There was trace regurgitation      PERICARDIUM: There was no pericardial effusion   The pericardium was normal in appearance      AORTA: The root exhibited normal size      SYSTEMIC VEINS: IVC: The inferior vena cava was normal in size      Physical Exam    Airway    Mallampati score: III  TM Distance: >3 FB  Neck ROM: full     Dental       Cardiovascular  Rhythm: regular, Rate: normal, Cardiovascular exam normal    Pulmonary  Pulmonary exam normal Breath sounds clear to auscultation,     Other Findings        Anesthesia Plan  ASA Score- 3     Anesthesia Type- IV sedation with anesthesia with ASA Monitors  Additional Monitors:   Airway Plan:     Comment: Plan for IV Sedation with GA backup  All risks/benefits and alternatives were discussed with the patient including the possibility of escalation of care in terms of anesthesia to GA and the possibility of rare anesthetic and surgical emergencies  Patient agreed and had no further questions prior to procedure  Austyn Nice MD, have personally seen and evaluated the patient prior to anesthetic care  I have reviewed the pre-anesthetic record, and other medical records if appropriate to the anesthetic care  If a CRNA is involved in the case, I have reviewed the CRNA assessment, if present, and agree          Plan Factors-Exercise tolerance (METS): <4 METS  Chart reviewed  EKG reviewed  Existing labs reviewed  Patient summary reviewed  Patient is not a current smoker  Induction- intravenous  Postoperative Plan- Plan for postoperative opioid use  Informed Consent- Anesthetic plan and risks discussed with patient  I personally reviewed this patient with the CRNA  Discussed and agreed on the Anesthesia Plan with the CRNA  Matheus Lundy

## 2021-01-13 NOTE — PLAN OF CARE
Problem: Potential for Falls  Goal: Patient will remain free of falls  Description: INTERVENTIONS:  - Assess patient frequently for physical needs  -  Identify cognitive and physical deficits and behaviors that affect risk of falls  -  [place] fall precautions as indicated by assessment   - Educate patient/family on patient safety including physical limitations  - Instruct patient to call for assistance with activity based on assessment  - Modify environment to reduce risk of injury  - Consider OT/PT consult to assist with strengthening/mobility  Outcome: Progressing     Problem: Nutrition/Hydration-ADULT  Goal: Nutrient/Hydration intake appropriate for improving, restoring or maintaining nutritional needs  Description: Monitor and assess patient's nutrition/hydration status for malnutrition  Collaborate with interdisciplinary team and initiate plan and interventions as ordered  Monitor patient's weight and dietary intake as ordered or per policy  Utilize nutrition screening tool and intervene as necessary  Determine patient's food preferences and provide high-protein, high-caloric foods as appropriate       INTERVENTIONS:  - Monitor oral intake, urinary output, labs, and treatment plans  - Assess nutrition and hydration status and recommend course of action  - Evaluate amount of meals eaten  - Assist patient with eating if necessary   - Allow adequate time for meals  - Recommend/ encourage appropriate diets, oral nutritional supplements, and vitamin/mineral supplements  - Order, calculate, and assess calorie counts as needed  - Recommend, monitor, and adjust tube feedings and TPN/PPN based on assessed needs  - Assess need for intravenous fluids  - Provide specific nutrition/hydration education as appropriate  - Include patient/family/caregiver in decisions related to nutrition  Outcome: Progressing     Problem: METABOLIC, FLUID AND ELECTROLYTES - ADULT  Goal: Glucose maintained within target range  Description: INTERVENTIONS:  - Monitor Blood Glucose as ordered  - Assess for signs and symptoms of hyperglycemia and hypoglycemia  - Administer ordered medications to maintain glucose within target range  - Assess nutritional intake and initiate nutrition service referral as needed  Outcome: Progressing     Problem: SKIN/TISSUE INTEGRITY - ADULT  Goal: Skin integrity remains intact  Description: INTERVENTIONS  - Identify patients at risk for skin breakdown  - Assess and monitor skin integrity  - Assess and monitor nutrition and hydration status  - Monitor labs (i e  albumin)  - Assess for incontinence   - Turn and reposition patient  - Assist with mobility/ambulation  - Relieve pressure over bony prominences  - Avoid friction and shearing  - Provide appropriate hygiene as needed including keeping skin clean and dry  - Evaluate need for skin moisturizer/barrier cream  - Collaborate with interdisciplinary team (i e  Nutrition, Rehabilitation, etc )   - Patient/family teaching  Outcome: Progressing  Goal: Incision(s), wounds(s) or drain site(s) healing without S/S of infection  Description: INTERVENTIONS  - Assess and document risk factors for skin impairment   - Assess and document dressing, incision, wound bed, drain sites and surrounding tissue  - Consider nutrition services referral as needed  - Oral mucous membranes remain intact  - Provide patient/ family education  Outcome: Progressing     Problem: MUSCULOSKELETAL - ADULT  Goal: Maintain or return mobility to safest level of function  Description: INTERVENTIONS:  - Assess patient's ability to carry out ADLs; assess patient's baseline for ADL function and identify physical deficits which impact ability to perform ADLs (bathing, care of mouth/teeth, toileting, grooming, dressing, etc )  - Assess/evaluate cause of self-care deficits   - Assess range of motion  - Assess patient's mobility  - Assess patient's need for assistive devices and provide as appropriate  - Encourage maximum independence but intervene and supervise when necessary  - Involve family in performance of ADLs  - Assess for home care needs following discharge   - Consider OT consult to assist with ADL evaluation and planning for discharge  - Provide patient education as appropriate  Outcome: Progressing  Goal: Maintain proper alignment of affected body part  Description: INTERVENTIONS:  - Support, maintain and protect limb and body alignment  - Provide patient/ family with appropriate education  Outcome: Progressing     Problem: PAIN - ADULT  Goal: Verbalizes/displays adequate comfort level or baseline comfort level  Description: Interventions:  - Encourage patient to monitor pain and request assistance  - Assess pain using appropriate pain scale  - Administer analgesics based on type and severity of pain and evaluate response  - Implement non-pharmacological measures as appropriate and evaluate response  - Consider cultural and social influences on pain and pain management  - Notify physician/advanced practitioner if interventions unsuccessful or patient reports new pain  Outcome: Progressing     Problem: INFECTION - ADULT  Goal: Absence or prevention of progression during hospitalization  Description: INTERVENTIONS:  - Assess and monitor for signs and symptoms of infection  - Monitor lab/diagnostic results  - Monitor all insertion sites, i e  indwelling lines, tubes, and drains  - Monitor endotracheal if appropriate and nasal secretions for changes in amount and color  - [place] appropriate cooling/warming therapies per order  - Administer medications as ordered  - Instruct and encourage patient and family to use good hand hygiene technique  - Identify and instruct in appropriate isolation precautions for identified infection/condition  Outcome: Progressing     Problem: SAFETY ADULT  Goal: Patient will remain free of falls  Description: INTERVENTIONS:  - Assess patient frequently for physical needs  -  Identify cognitive and physical deficits and behaviors that affect risk of falls    -  [place] fall precautions as indicated by assessment   - Educate patient/family on patient safety including physical limitations  - Instruct patient to call for assistance with activity based on assessment  - Modify environment to reduce risk of injury  - Consider OT/PT consult to assist with strengthening/mobility  Outcome: Progressing  Goal: Maintain or return to baseline ADL function  Description: INTERVENTIONS:  -  Assess patient's ability to carry out ADLs; assess patient's baseline for ADL function and identify physical deficits which impact ability to perform ADLs (bathing, care of mouth/teeth, toileting, grooming, dressing, etc )  - Assess/evaluate cause of self-care deficits   - Assess range of motion  - Assess patient's mobility; develop plan if impaired  - Assess patient's need for assistive devices and provide as appropriate  - Encourage maximum independence but intervene and supervise when necessary  - Involve family in performance of ADLs  - Assess for home care needs following discharge   - Consider OT consult to assist with ADL evaluation and planning for discharge  - Provide patient education as appropriate  Outcome: Progressing  Goal: Maintain or return mobility status to optimal level  Description: INTERVENTIONS:  - Assess patient's baseline mobility status (ambulation, transfers, stairs, etc )    - Identify cognitive and physical deficits and behaviors that affect mobility  - Identify mobility aids required to assist with transfers and/or ambulation (gait belt, sit-to-stand, lift, walker, cane, etc )  - [place] fall precautions as indicated by assessment  - Record patient progress and toleration of activity level on Mobility SBAR; progress patient to next Phase/Stage  - Instruct patient to call for assistance with activity based on assessment  - Consider rehabilitation consult to assist with strengthening/weightbearing, etc   Outcome: Progressing     Problem: DISCHARGE PLANNING  Goal: Discharge to home or other facility with appropriate resources  Description: INTERVENTIONS:  - Identify barriers to discharge w/patient and caregiver  - Arrange for needed discharge resources and transportation as appropriate  - Identify discharge learning needs (meds, wound care, etc )  - Arrange for interpretive services to assist at discharge as needed  - Refer to Case Management Department for coordinating discharge planning if the patient needs post-hospital services based on physician/advanced practitioner order or complex needs related to functional status, cognitive ability, or social support system  Outcome: Progressing     Problem: Knowledge Deficit  Goal: Patient/family/caregiver demonstrates understanding of disease process, treatment plan, medications, and discharge instructions  Description: Complete learning assessment and assess knowledge base    Interventions:  - Provide teaching at level of understanding  - Provide teaching via preferred learning methods  Outcome: Progressing

## 2021-01-13 NOTE — PROGRESS NOTES
Podiatry - Progress Note  Patient: Aleksander Maldonado 40 y o  female   MRN: [id number]  PCP: Enrrique Richey DO  Unit/Bed#: [facility] 318-01 Encounter: [id number]  Date Of Visit: 21    ASSESSMENT:    Aleksander Maldonado is a 40 y o  female with:    1  Diabetic ulceration right 2nd digit with suspicion of osteomyelitis, [place] 3 - POA  2  Cellulitis of right foot  3  Type 2 diabetes mellitus with neuropathy  4  Hypothyroidism  5  Anxiety  6  Bipolar disorder    PLAN:    · Patient to go to OR today, 21, for  Right 2nd toe amputation with Dr Dre Luna  · Consent placed in chart  To be signed with surgeon prior to procedure  · Confirmed NPO status  · H&P, vitals, and current labs reviewed  No acute changes noted  · Alternatives, risks, and complications discussed with patient  · All questions answered  No guarantees given to outcome of procedure  SUBJECTIVE:     The patient was seen, evaluated, and assessed at bedside today  The patient was awake, alert, and in no acute distress  Patient confirmed NPO status  Pt reports that she is hungry  All questions and concerns regarding the surgical procedure addressed  Patient understands risks vs benefits of procedure and remains amenable with plan for surgery today  Patient denies N/V/F/chills/SOB/CP  OBJECTIVE:     Vitals:   /60   Pulse 63   Temp 98 6 °F (37 °C) (Oral)   Resp 20   Ht 5' 5 5" (1 664 m)   Wt 109 kg (240 lb 4 8 oz)   SpO2 96%   BMI 39 38 kg/m²     Temp (24hrs), Av 3 °F (36 8 °C), Min:98 °F (36 7 °C), Max:98 6 °F (37 °C)      Physical Exam:     General:  Alert, cooperative, and in no distress  Lower extremity exam:  Cardiovascular status at baseline  Neurological status at baseline  Musculoskeletal status at baseline  No calf tenderness noted bilaterally  Dressing left intact to the Operating Room          Additional Data:     Labs:    Results from last 7 days   Lab Units 21  0522  21  1704   WBC Thousand/uL 9 46 < > 10 09   HEMOGLOBIN g/dL 13 5   < > 14 1   HEMATOCRIT % 42 2   < > 44 4   PLATELETS Thousands/uL 410*   < > 428*   NEUTROS PCT %  --   --  63   LYMPHS PCT %  --   --  26   MONOS PCT %  --   --  6   EOS PCT %  --   --  4    < > = values in this interval not displayed  Results from last 7 days   Lab Units 01/13/21  0522  01/11/21  1704   POTASSIUM mmol/L 4 0   < > 4 1   CHLORIDE mmol/L 102   < > 103   CO2 mmol/L 28   < > 23   BUN mg/dL 11   < > 9   CREATININE mg/dL 0 77   < > 0 79   CALCIUM mg/dL 9 5   < > 9 7   ALK PHOS U/L  --   --  113   ALT U/L  --   --  19   AST U/L  --   --  16    < > = values in this interval not displayed  * I Have Reviewed All Lab Data Listed Above  Recent Cultures (last 7 days):     Results from last 7 days   Lab Units 01/11/21  1707 01/11/21  1706   BLOOD CULTURE  No Growth at 24 hrs  No Growth at 24 hrs  Imaging: I have personally reviewed pertinent films in PACS  EKG, Pathology, and Other Studies: I have personally reviewed pertinent reports  ** Please Note: Portions of the record may have been created with voice recognition software  Occasional wrong word or "sound a like" substitutions may have occurred due to the inherent limitations of voice recognition software  Read the chart carefully and recognize, using context, where substitutions have occurred   **

## 2021-01-13 NOTE — PROGRESS NOTES
Progress Note - Chrissy Chavez 1975, 40 y o  female MRN: [id number]  Unit/Bed#: [facility] 318-01 Encounter: [id number]  Primary Care Provider: Lidia Merida DO   Date and time admitted to hospital: 1/11/2021  3:32 PM    Patient was not personally seen or examined today  Chart thoroughly reviewed including vital sings, laboratory studies and progress notes  Patient is POD #0 R 2nd toe amputation with Podiatry  Antibiotics and local wound care per primary  Blood sugars acceptable, continue SSI with meals and at bedtime  Will continue to follow and adjust insulin regimen as needed       * Diabetic ulcer of toe of right foot associated with type 2 diabetes mellitus, with necrosis of bone ([facility] [place] [address] )  Assessment & Plan  · Admitted by the podiatry service   · S/p amputation of R 2nd toe   · Antibiotics and local wound care per primary   · Blood cultures negative x 24 hours    Cellulitis of right foot  Assessment & Plan  · As above    Type 2 diabetes mellitus with neurologic complication, with long-term current use of insulin [facility])  Assessment & Plan  Lab Results   Component Value Date    HGBA1C 6 6 (H) 01/11/2021     Recent Labs     01/12/21  2103 01/13/21  0635 01/13/21  1029 01/13/21  1306   POCGLU 149* 106 129 109       Blood Sugar Average: Last 72 hrs:  (P) 180 [id number]     · She denies use of insulin as an outpatient and states she only uses metformin  · Recommend holding metformin while inpatient  · Continue level 2 diabetic diet  · QID accuchecks with SSI coverage   · Monitor and adjust regimen as needed     Essential hypertension  Assessment & Plan  · BP is acceptable  · Continue home dose clonidine    Morbid obesity (HCC)  Assessment & Plan  · Encourage diet and lifestyle modification     Hypothyroidism  Assessment & Plan  · Continue home dose synthroid

## 2021-01-13 NOTE — ANESTHESIA POSTPROCEDURE EVALUATION
Post-Op Assessment Note    CV Status:  Stable  Pain Score: 0    Pain management: adequate     Mental Status:  Awake   Hydration Status:  Stable   PONV Controlled:  None   Airway Patency:  Patent      Post Op Vitals Reviewed: Yes      Staff: CRNA   Comments: Pt  to [facility]  Report given  Course uneventful  VSS  Airway patent  BS= 109  Neuro  intact  No complications documented      BP 93/76 (01/13/21 1304)    Temp      Pulse 77 (01/13/21 1304)   Resp 18 (01/13/21 1304)    SpO2 98 % (01/13/21 1304)

## 2021-01-13 NOTE — ASSESSMENT & PLAN NOTE
· Admitted by the podiatry service   · S/p amputation of R 2nd toe   · Antibiotics and local wound care per primary   · Blood cultures negative x 24 hours

## 2021-01-14 VITALS
HEART RATE: 54 BPM | TEMPERATURE: 98 F | SYSTOLIC BLOOD PRESSURE: 87 MMHG | OXYGEN SATURATION: 96 % | RESPIRATION RATE: 18 BRPM | DIASTOLIC BLOOD PRESSURE: 47 MMHG | WEIGHT: 240.3 LBS | HEIGHT: 66 IN | BODY MASS INDEX: 38.62 KG/M2

## 2021-01-14 PROBLEM — L97.514 DIABETIC ULCER OF TOE OF RIGHT FOOT ASSOCIATED WITH TYPE 2 DIABETES MELLITUS, WITH NECROSIS OF BONE (HCC): Status: RESOLVED | Noted: 2021-01-11 | Resolved: 2021-01-14

## 2021-01-14 PROBLEM — E11.621 DIABETIC ULCER OF TOE OF RIGHT FOOT ASSOCIATED WITH TYPE 2 DIABETES MELLITUS, WITH NECROSIS OF BONE (HCC): Status: RESOLVED | Noted: 2021-01-11 | Resolved: 2021-01-14

## 2021-01-14 PROBLEM — L03.115 CELLULITIS OF RIGHT FOOT: Status: RESOLVED | Noted: 2021-01-11 | Resolved: 2021-01-14

## 2021-01-14 LAB
ANION GAP SERPL CALCULATED.3IONS-SCNC: 3 MMOL/L (ref 4–13)
BUN SERPL-MCNC: 10 MG/DL (ref 5–25)
CALCIUM SERPL-MCNC: 10 MG/DL (ref 8.3–10.1)
CHLORIDE SERPL-SCNC: 102 MMOL/L (ref 100–108)
CO2 SERPL-SCNC: 29 MMOL/L (ref 21–32)
CREAT SERPL-MCNC: 0.81 MG/DL (ref 0.6–1.3)
ERYTHROCYTE [DISTWIDTH] IN BLOOD BY AUTOMATED COUNT: 14 % (ref 11.6–15.1)
GFR SERPL CREATININE-BSD FRML MDRD: 88 ML/MIN/1.73SQ M
GLUCOSE SERPL-MCNC: 117 MG/DL (ref 65–140)
GLUCOSE SERPL-MCNC: 122 MG/DL (ref 65–140)
GLUCOSE SERPL-MCNC: 163 MG/DL (ref 65–140)
HCT VFR BLD AUTO: 44 % (ref 34.8–46.1)
HGB BLD-MCNC: 13.7 G/DL (ref 11.5–15.4)
MCH RBC QN AUTO: 25.6 PG (ref 26.8–34.3)
MCHC RBC AUTO-ENTMCNC: 31.1 G/DL (ref 31.4–37.4)
MCV RBC AUTO: 82 FL (ref 82–98)
PLATELET # BLD AUTO: 350 THOUSANDS/UL (ref 149–390)
PMV BLD AUTO: 9.6 FL (ref 8.9–12.7)
POTASSIUM SERPL-SCNC: 4.4 MMOL/L (ref 3.5–5.3)
RBC # BLD AUTO: 5.35 MILLION/UL (ref 3.81–5.12)
SODIUM SERPL-SCNC: 134 MMOL/L (ref 136–145)
WBC # BLD AUTO: 9.67 THOUSAND/UL (ref 4.31–10.16)

## 2021-01-14 PROCEDURE — 80048 BASIC METABOLIC PNL TOTAL CA: CPT | Performed by: STUDENT IN AN ORGANIZED HEALTH CARE EDUCATION/TRAINING PROGRAM

## 2021-01-14 PROCEDURE — 99238 HOSP IP/OBS DSCHRG MGMT 30/<: CPT | Performed by: PODIATRIST

## 2021-01-14 PROCEDURE — 82948 REAGENT STRIP/BLOOD GLUCOSE: CPT

## 2021-01-14 PROCEDURE — 85027 COMPLETE CBC AUTOMATED: CPT | Performed by: STUDENT IN AN ORGANIZED HEALTH CARE EDUCATION/TRAINING PROGRAM

## 2021-01-14 RX ORDER — SENNOSIDES 8.6 MG
650 CAPSULE ORAL EVERY 8 HOURS PRN
Qty: 24 TABLET | Refills: 0 | Status: SHIPPED | OUTPATIENT
Start: 2021-01-14 | End: 2021-01-14 | Stop reason: HOSPADM

## 2021-01-14 RX ORDER — DOXYCYCLINE 100 MG/1
100 TABLET ORAL 2 TIMES DAILY
Qty: 20 TABLET | Refills: 0 | Status: SHIPPED | OUTPATIENT
Start: 2021-01-14 | End: 2021-01-14 | Stop reason: HOSPADM

## 2021-01-14 RX ORDER — LANOLIN ALCOHOL/MO/W.PET/CERES
400 CREAM (GRAM) TOPICAL DAILY
Status: DISCONTINUED | OUTPATIENT
Start: 2021-01-14 | End: 2021-01-14 | Stop reason: HOSPADM

## 2021-01-14 RX ORDER — SULFAMETHOXAZOLE AND TRIMETHOPRIM 800; 160 MG/1; MG/1
1 TABLET ORAL EVERY 12 HOURS SCHEDULED
Qty: 14 TABLET | Refills: 0 | Status: SHIPPED | OUTPATIENT
Start: 2021-01-14 | End: 2021-01-21

## 2021-01-14 RX ADMIN — ZIPRASIDONE HYDROCHLORIDE 80 MG: 40 CAPSULE ORAL at 08:42

## 2021-01-14 RX ADMIN — LAMOTRIGINE 150 MG: 100 TABLET ORAL at 08:43

## 2021-01-14 RX ADMIN — CEFAZOLIN SODIUM 2000 MG: 2 SOLUTION INTRAVENOUS at 08:44

## 2021-01-14 RX ADMIN — LEVOTHYROXINE SODIUM 50 MCG: 50 TABLET ORAL at 06:12

## 2021-01-14 RX ADMIN — HEPARIN SODIUM 5000 UNITS: 5000 INJECTION INTRAVENOUS; SUBCUTANEOUS at 06:12

## 2021-01-14 RX ADMIN — OXYCODONE HYDROCHLORIDE 10 MG: 10 TABLET ORAL at 00:10

## 2021-01-14 RX ADMIN — OXYCODONE HYDROCHLORIDE 10 MG: 10 TABLET ORAL at 04:51

## 2021-01-14 RX ADMIN — METRONIDAZOLE 500 MG: 500 TABLET ORAL at 08:41

## 2021-01-14 RX ADMIN — CLONIDINE HYDROCHLORIDE 0.2 MG: 0.1 TABLET ORAL at 08:40

## 2021-01-14 RX ADMIN — CEFAZOLIN SODIUM 2000 MG: 2 SOLUTION INTRAVENOUS at 00:08

## 2021-01-14 RX ADMIN — OXYCODONE HYDROCHLORIDE 10 MG: 10 TABLET ORAL at 08:42

## 2021-01-14 NOTE — QUICK NOTE
Patient was not personally seen or examined today  Chart thoroughly reviewed including vital signs, laboratory studies and progress notes  Patient is POD #1 right 2nd toe amputation with podiatry  Antibiotics and local wound care as per primary  Blood pressure and blood sugars remain adequately controlled  Resume home medical management discharge    Folic acid supplementation added today per patient request

## 2021-01-14 NOTE — PLAN OF CARE
Problem: Potential for Falls  Goal: Patient will remain free of falls  Description: INTERVENTIONS:  - Assess patient frequently for physical needs  -  Identify cognitive and physical deficits and behaviors that affect risk of falls  -  [place] fall precautions as indicated by assessment   - Educate patient/family on patient safety including physical limitations  - Instruct patient to call for assistance with activity based on assessment  - Modify environment to reduce risk of injury  - Consider OT/PT consult to assist with strengthening/mobility  1/14/2021 1430 by Coral Simon RN  Outcome: Adequate for Discharge  1/14/2021 1146 by Coral Simon RN  Outcome: Progressing     Problem: Nutrition/Hydration-ADULT  Goal: Nutrient/Hydration intake appropriate for improving, restoring or maintaining nutritional needs  Description: Monitor and assess patient's nutrition/hydration status for malnutrition  Collaborate with interdisciplinary team and initiate plan and interventions as ordered  Monitor patient's weight and dietary intake as ordered or per policy  Utilize nutrition screening tool and intervene as necessary  Determine patient's food preferences and provide high-protein, high-caloric foods as appropriate       INTERVENTIONS:  - Monitor oral intake, urinary output, labs, and treatment plans  - Assess nutrition and hydration status and recommend course of action  - Evaluate amount of meals eaten  - Assist patient with eating if necessary   - Allow adequate time for meals  - Recommend/ encourage appropriate diets, oral nutritional supplements, and vitamin/mineral supplements  - Order, calculate, and assess calorie counts as needed  - Recommend, monitor, and adjust tube feedings and TPN/PPN based on assessed needs  - Assess need for intravenous fluids  - Provide specific nutrition/hydration education as appropriate  - Include patient/family/caregiver in decisions related to nutrition  1/14/2021 1430 by Pedro Sim RN  Outcome: Adequate for Discharge  1/14/2021 1146 by Pedro Sim RN  Outcome: Progressing     Problem: METABOLIC, FLUID AND ELECTROLYTES - ADULT  Goal: Glucose maintained within target range  Description: INTERVENTIONS:  - Monitor Blood Glucose as ordered  - Assess for signs and symptoms of hyperglycemia and hypoglycemia  - Administer ordered medications to maintain glucose within target range  - Assess nutritional intake and initiate nutrition service referral as needed  1/14/2021 1430 by Pedro Sim RN  Outcome: Adequate for Discharge  1/14/2021 1146 by Pedro Sim RN  Outcome: Progressing     Problem: SKIN/TISSUE INTEGRITY - ADULT  Goal: Skin integrity remains intact  Description: INTERVENTIONS  - Identify patients at risk for skin breakdown  - Assess and monitor skin integrity  - Assess and monitor nutrition and hydration status  - Monitor labs (i e  albumin)  - Assess for incontinence   - Turn and reposition patient  - Assist with mobility/ambulation  - Relieve pressure over bony prominences  - Avoid friction and shearing  - Provide appropriate hygiene as needed including keeping skin clean and dry  - Evaluate need for skin moisturizer/barrier cream  - Collaborate with interdisciplinary team (i e  Nutrition, Rehabilitation, etc )   - Patient/family teaching  1/14/2021 1430 by Pedro Sim RN  Outcome: Adequate for Discharge  1/14/2021 1146 by Pedro iSm RN  Outcome: Progressing  Goal: Incision(s), wounds(s) or drain site(s) healing without S/S of infection  Description: INTERVENTIONS  - Assess and document risk factors for skin impairment   - Assess and document dressing, incision, wound bed, drain sites and surrounding tissue  - Consider nutrition services referral as needed  - Oral mucous membranes remain intact  - Provide patient/ family education  1/14/2021 1430 by Pedro Sim RN  Outcome: Adequate for Discharge  1/14/2021 1146 by Pedro Sim RN  Outcome: Progressing     Problem: MUSCULOSKELETAL - ADULT  Goal: Maintain or return mobility to safest level of function  Description: INTERVENTIONS:  - Assess patient's ability to carry out ADLs; assess patient's baseline for ADL function and identify physical deficits which impact ability to perform ADLs (bathing, care of mouth/teeth, toileting, grooming, dressing, etc )  - Assess/evaluate cause of self-care deficits   - Assess range of motion  - Assess patient's mobility  - Assess patient's need for assistive devices and provide as appropriate  - Encourage maximum independence but intervene and supervise when necessary  - Involve family in performance of ADLs  - Assess for home care needs following discharge   - Consider OT consult to assist with ADL evaluation and planning for discharge  - Provide patient education as appropriate  1/14/2021 1430 by Vinay Jones RN  Outcome: Adequate for Discharge  1/14/2021 1146 by Vinay Jones RN  Outcome: Progressing  Goal: Maintain proper alignment of affected body part  Description: INTERVENTIONS:  - Support, maintain and protect limb and body alignment  - Provide patient/ family with appropriate education  1/14/2021 1430 by Vinay Jones RN  Outcome: Adequate for Discharge  1/14/2021 1146 by Vinay Jones RN  Outcome: Progressing     Problem: PAIN - ADULT  Goal: Verbalizes/displays adequate comfort level or baseline comfort level  Description: Interventions:  - Encourage patient to monitor pain and request assistance  - Assess pain using appropriate pain scale  - Administer analgesics based on type and severity of pain and evaluate response  - Implement non-pharmacological measures as appropriate and evaluate response  - Consider cultural and social influences on pain and pain management  - Notify physician/advanced practitioner if interventions unsuccessful or patient reports new pain  1/14/2021 1430 by Vinay Jones RN  Outcome: Adequate for Discharge  1/14/2021 1146 by Pedro Smi RN  Outcome: Progressing     Problem: PAIN - ADULT  Goal: Verbalizes/displays adequate comfort level or baseline comfort level  Description: Interventions:  - Encourage patient to monitor pain and request assistance  - Assess pain using appropriate pain scale  - Administer analgesics based on type and severity of pain and evaluate response  - Implement non-pharmacological measures as appropriate and evaluate response  - Consider cultural and social influences on pain and pain management  - Notify physician/advanced practitioner if interventions unsuccessful or patient reports new pain  1/14/2021 1430 by Pedro Sim RN  Outcome: Adequate for Discharge  1/14/2021 1146 by Pedro Sim RN  Outcome: Progressing

## 2021-01-14 NOTE — NURSING NOTE
Patient was given AVS -Patient verbalized understanding of all follow-up appointments and medications  Patient denied any questions in regards to discharge instructions

## 2021-01-14 NOTE — DISCHARGE INSTRUCTIONS
Discharge Instructions - Podiatry    Weight Bearing Status: Weight bearing as tolerated with surgical shoes  Rest and elevate with 2-3 pillows right lower extremity as much as possible  Pain: Continue analgesics as directed    Follow-up appointment instructions: Please make an appointment within one week of discharge with Dr Colby Do   Contact sooner if any increase in pain, or signs of infection occur    Wound Care: Leave dressings clean, dry, and intact between professional dressing changes

## 2021-01-14 NOTE — DISCHARGE SUMMARY
PODIATRY DISCHARGE SUMMARY     Patient Name: Christina Gonzalez   Age & Sex: 40 y o  female   MRN: [id number]  Unit/Bed#: [facility] 318-01   Encounter: [id number]  Length of Stay: 3 days    Active Problems: Morbid obesity ([address] )    Hypothyroidism    Type 2 diabetes mellitus with neurologic complication, with long-term current use of insulin ([facility] [address] )    Essential hypertension      No new Assessment & Plan notes have been filed under this hospital service since the last note was generated  Service: Podiatry      SUBJECTIVE     The patient was seen, evaluated, and assessed at bedside today  The patient was awake, alert, and in no acute distress  No acute events overnight  The patient reports she is feeling well  Pt states that she feels no pain at this time  She states pain of 0/10  She states that sometimes she feels pain  She states the "pain is normally 2 x a day"  Patient denies N/V/F/chills/SOB/CP  Review of Systems  Constitutional: Negative  HENT: Negative  Eyes: Negative  Respiratory: Negative  Cardiovascular: Negative  Gastrointestinal: Negative  Musculoskeletal: negative  Skin: Incision site right second toe amp  Neurological: decreased sensation to extremity  Psych: Negative  OBJECTIVE     Physical Exam:     General:  Alert, cooperative, and in no distress  Lower extremity exam:  Cardiovascular status at baseline  Neurological status at baseline  Musculoskeletal status at baseline  No calf tenderness noted bilaterally  Incision site assessed to right second toe amputation site appears well coapted w/o dehiscence with all sutures intact, no SOI  No ascending erythema, no drainage, no purulence, no crepitus  Clinical Images 01/14/21:              [address] is a 40 y o  female with pmh of DM2, neuropathy, tobacco abuse, hypothyroidism, morbid obesity, anxiety, bipolar disorder, who presents with right foot ulcer with cellulitis   Patient previously underwent left 3rd partial ray amp on 09/08/2019 after being found to have OM  Throughout  hospital stay pt was started on IV Ancef and PO metronidazole  Internal medicine was consulted for pre op clearance and medical management  New xray to right foot was order to further asccess bone involvement  Pt was taken to OR on 1/13/20 for right second toe amputation at the level of MTPJ  Pt is hemodynamically stable  Pt is to follow up with Dr Leana Guerra out pt for surgical incision site check  WBAT with surgical shoes  Pt was instructed to elevate and rest right lower extremity as much as possible  Pt was D/c with Bactrim for 7 days  DISCHARGE INFORMATION     PCP at Discharge: Tosha Alba DO    Admitting Provider: Leana Guerra  Admission Date: 1/11/2021     Discharge Provider: Aleksandra Wilson  Discharge Date: 01/14/21    Discharge Disposition: Home  Discharge Condition: Stable  Discharge with Lines: No  Discharge Diet: diabetic  Activity Restrictions: WBAT with surgical shoes  Test Results Pending at Discharge: none  Medications at Discharge: See after visit summary for reconciled discharge medications provided to patient and family  Discharge Diagnoses: Active Problems: Morbid obesity ([address] )    Hypothyroidism    Type 2 diabetes mellitus with neurologic complication, with long-term current use of insulin ([address] )    Essential hypertension      Consulting Providers:  Dr Rhys Henry     Diagnostic & Therapeutic Procedures Performed:  Xr Foot 3+ Vw Right    Result Date: 1/13/2021  Impression: No x-ray evidence of osteomyelitis Workstation performed: [id number]       Code Status: Level 1 - Full Code  Advance Directive and Living Will:      Power of :    POLST:      FOLLOW-UP     PCP Outpatient Follow-up: Dr Higinio Servin Providers Follow-up: none    Active Issues Requiring Follow-Up: Right second toe amputation incision site check      Discharge Statement:  I spent 25 minutes discharging the patient  This time was spent on the day of discharge  I had direct contact with the patient on the day of discharge  Additional documentation is required if more than 30 minutes were spent on discharge  Portions of the record may have been created with voice recognition software  Occasional wrong word or "sound a like" substitutions may have occurred due to the inherent limitations of voice recognition software    Read the chart carefully and recognize, using context, where substitutions have occurred   ==  Christina Torres, [address] [facility]  Podiatric Medicine & Surgery PGY-2

## 2021-01-14 NOTE — ASSESSMENT & PLAN NOTE
Lab Results   Component Value Date    HGBA1C 6 6 (H) 01/11/2021     Recent Labs     01/13/21  1306 01/13/21  1615 01/13/21 2052 01/14/21  0758   POCGLU 109 201* 134 122       Blood Sugar Average: Last 72 hrs:  (P) 136 25     · She denies use of insulin as an outpatient and states she only uses metformin  · Recommend holding metformin while inpatient and resume at discharge   · Continue level 2 diabetic diet  · QID accuchecks with SSI coverage   · Monitor and adjust regimen as needed

## 2021-01-14 NOTE — PLAN OF CARE
Problem: Potential for Falls  Goal: Patient will remain free of falls  Description: INTERVENTIONS:  - Assess patient frequently for physical needs  -  Identify cognitive and physical deficits and behaviors that affect risk of falls  -  [place] fall precautions as indicated by assessment   - Educate patient/family on patient safety including physical limitations  - Instruct patient to call for assistance with activity based on assessment  - Modify environment to reduce risk of injury  - Consider OT/PT consult to assist with strengthening/mobility  Outcome: Progressing     Problem: Nutrition/Hydration-ADULT  Goal: Nutrient/Hydration intake appropriate for improving, restoring or maintaining nutritional needs  Description: Monitor and assess patient's nutrition/hydration status for malnutrition  Collaborate with interdisciplinary team and initiate plan and interventions as ordered  Monitor patient's weight and dietary intake as ordered or per policy  Utilize nutrition screening tool and intervene as necessary  Determine patient's food preferences and provide high-protein, high-caloric foods as appropriate       INTERVENTIONS:  - Monitor oral intake, urinary output, labs, and treatment plans  - Assess nutrition and hydration status and recommend course of action  - Evaluate amount of meals eaten  - Assist patient with eating if necessary   - Allow adequate time for meals  - Recommend/ encourage appropriate diets, oral nutritional supplements, and vitamin/mineral supplements  - Order, calculate, and assess calorie counts as needed  - Recommend, monitor, and adjust tube feedings and TPN/PPN based on assessed needs  - Assess need for intravenous fluids  - Provide specific nutrition/hydration education as appropriate  - Include patient/family/caregiver in decisions related to nutrition  Outcome: Progressing     Problem: METABOLIC, FLUID AND ELECTROLYTES - ADULT  Goal: Glucose maintained within target range  Description: INTERVENTIONS:  - Monitor Blood Glucose as ordered  - Assess for signs and symptoms of hyperglycemia and hypoglycemia  - Administer ordered medications to maintain glucose within target range  - Assess nutritional intake and initiate nutrition service referral as needed  Outcome: Progressing     Problem: SKIN/TISSUE INTEGRITY - ADULT  Goal: Skin integrity remains intact  Description: INTERVENTIONS  - Identify patients at risk for skin breakdown  - Assess and monitor skin integrity  - Assess and monitor nutrition and hydration status  - Monitor labs (i e  albumin)  - Assess for incontinence   - Turn and reposition patient  - Assist with mobility/ambulation  - Relieve pressure over bony prominences  - Avoid friction and shearing  - Provide appropriate hygiene as needed including keeping skin clean and dry  - Evaluate need for skin moisturizer/barrier cream  - Collaborate with interdisciplinary team (i e  Nutrition, Rehabilitation, etc )   - Patient/family teaching  Outcome: Progressing  Goal: Incision(s), wounds(s) or drain site(s) healing without S/S of infection  Description: INTERVENTIONS  - Assess and document risk factors for skin impairment   - Assess and document dressing, incision, wound bed, drain sites and surrounding tissue  - Consider nutrition services referral as needed  - Oral mucous membranes remain intact  - Provide patient/ family education  Outcome: Progressing     Problem: MUSCULOSKELETAL - ADULT  Goal: Maintain or return mobility to safest level of function  Description: INTERVENTIONS:  - Assess patient's ability to carry out ADLs; assess patient's baseline for ADL function and identify physical deficits which impact ability to perform ADLs (bathing, care of mouth/teeth, toileting, grooming, dressing, etc )  - Assess/evaluate cause of self-care deficits   - Assess range of motion  - Assess patient's mobility  - Assess patient's need for assistive devices and provide as appropriate  - Encourage maximum independence but intervene and supervise when necessary  - Involve family in performance of ADLs  - Assess for home care needs following discharge   - Consider OT consult to assist with ADL evaluation and planning for discharge  - Provide patient education as appropriate  Outcome: Progressing  Goal: Maintain proper alignment of affected body part  Description: INTERVENTIONS:  - Support, maintain and protect limb and body alignment  - Provide patient/ family with appropriate education  Outcome: Progressing     Problem: PAIN - ADULT  Goal: Verbalizes/displays adequate comfort level or baseline comfort level  Description: Interventions:  - Encourage patient to monitor pain and request assistance  - Assess pain using appropriate pain scale  - Administer analgesics based on type and severity of pain and evaluate response  - Implement non-pharmacological measures as appropriate and evaluate response  - Consider cultural and social influences on pain and pain management  - Notify physician/advanced practitioner if interventions unsuccessful or patient reports new pain  Outcome: Progressing     Problem: INFECTION - ADULT  Goal: Absence or prevention of progression during hospitalization  Description: INTERVENTIONS:  - Assess and monitor for signs and symptoms of infection  - Monitor lab/diagnostic results  - Monitor all insertion sites, i e  indwelling lines, tubes, and drains  - Monitor endotracheal if appropriate and nasal secretions for changes in amount and color  - [place] appropriate cooling/warming therapies per order  - Administer medications as ordered  - Instruct and encourage patient and family to use good hand hygiene technique  - Identify and instruct in appropriate isolation precautions for identified infection/condition  Outcome: Progressing     Problem: SAFETY ADULT  Goal: Patient will remain free of falls  Description: INTERVENTIONS:  - Assess patient frequently for physical needs  -  Identify cognitive and physical deficits and behaviors that affect risk of falls    -  [place] fall precautions as indicated by assessment   - Educate patient/family on patient safety including physical limitations  - Instruct patient to call for assistance with activity based on assessment  - Modify environment to reduce risk of injury  - Consider OT/PT consult to assist with strengthening/mobility  Outcome: Progressing  Goal: Maintain or return to baseline ADL function  Description: INTERVENTIONS:  -  Assess patient's ability to carry out ADLs; assess patient's baseline for ADL function and identify physical deficits which impact ability to perform ADLs (bathing, care of mouth/teeth, toileting, grooming, dressing, etc )  - Assess/evaluate cause of self-care deficits   - Assess range of motion  - Assess patient's mobility; develop plan if impaired  - Assess patient's need for assistive devices and provide as appropriate  - Encourage maximum independence but intervene and supervise when necessary  - Involve family in performance of ADLs  - Assess for home care needs following discharge   - Consider OT consult to assist with ADL evaluation and planning for discharge  - Provide patient education as appropriate  Outcome: Progressing  Goal: Maintain or return mobility status to optimal level  Description: INTERVENTIONS:  - Assess patient's baseline mobility status (ambulation, transfers, stairs, etc )    - Identify cognitive and physical deficits and behaviors that affect mobility  - Identify mobility aids required to assist with transfers and/or ambulation (gait belt, sit-to-stand, lift, walker, cane, etc )  - [place] fall precautions as indicated by assessment  - Record patient progress and toleration of activity level on Mobility SBAR; progress patient to next Phase/Stage  - Instruct patient to call for assistance with activity based on assessment  - Consider rehabilitation consult to assist with strengthening/weightbearing, etc   Outcome: Progressing     Problem: DISCHARGE PLANNING  Goal: Discharge to home or other facility with appropriate resources  Description: INTERVENTIONS:  - Identify barriers to discharge w/patient and caregiver  - Arrange for needed discharge resources and transportation as appropriate  - Identify discharge learning needs (meds, wound care, etc )  - Arrange for interpretive services to assist at discharge as needed  - Refer to Case Management Department for coordinating discharge planning if the patient needs post-hospital services based on physician/advanced practitioner order or complex needs related to functional status, cognitive ability, or social support system  Outcome: Progressing     Problem: Knowledge Deficit  Goal: Patient/family/caregiver demonstrates understanding of disease process, treatment plan, medications, and discharge instructions  Description: Complete learning assessment and assess knowledge base    Interventions:  - Provide teaching at level of understanding  - Provide teaching via preferred learning methods  Outcome: Progressing

## 2021-01-15 NOTE — UTILIZATION REVIEW
Notification of Discharge  This is a Notification of Discharge from our facility [address]  Please be advised that this patient has been discharge from our facility  Below you will find the admission and discharge date and time including the patients disposition  PRESENTATION DATE: 1/11/2021  3:32 PM  OBS ADMISSION DATE:   IP ADMISSION DATE: 1/11/21 1532   DISCHARGE DATE: 1/14/2021  2:29 PM  DISPOSITION: Home/Self Care Home/Self Care   Admission Orders listed below:  Admission Orders (From admission, onward)     Ordered        01/11/21 1624  Inpatient Admission  Once                   Please contact the UR Department if additional information is required to close this patient's authorization/case  [address] Utilization Review Department  Main: [phone] x carefully listen to the prompts  All voicemails are confidential   [email or web address]  Send all requests for admission clinical reviews, approved or denied determinations and any other requests to dedicated fax number below belonging to the campus where the patient is receiving treatment   List of dedicated fax numbers:  [address] DENIALS (Administrative/Medical Necessity) [phone]   [address] (Maternity/NICU/Pediatrics) [phone] [address] [facility] [phone]   William Sole [phone]   Rony Rose [phone]   [address] [phone]   [facility]  [phone]   [address] [facility] [address]  [address] [address] [address] [facility] [phone]

## 2021-01-16 LAB
BACTERIA BLD CULT: NORMAL
BACTERIA BLD CULT: NORMAL

## 2021-01-18 ENCOUNTER — OFFICE VISIT (OUTPATIENT)
Dept: PODIATRY | Facility: CLINIC | Age: 46
End: 2021-01-18

## 2021-01-18 VITALS — HEIGHT: 66 IN | BODY MASS INDEX: 39.38 KG/M2

## 2021-01-18 DIAGNOSIS — Z09 POSTOP CHECK: ICD-10-CM

## 2021-01-18 DIAGNOSIS — L97.514 NEUROPATHIC ULCER OF RIGHT FOOT WITH NECROSIS OF BONE (HCC): Primary | ICD-10-CM

## 2021-01-18 PROCEDURE — 99024 POSTOP FOLLOW-UP VISIT: CPT | Performed by: PODIATRIST

## 2021-01-18 NOTE — PROGRESS NOTES
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 ([address] )    Postop check        Patient is stable postop  There is some mild maceration  Instructed her to keep weight on her heel  She is to change the dressing every other day  Swab with Betadine and covered dry sterile dressing which she has  Check incision site next week  Finish antibiotics as written from the hospital     Subjective:     Patient ID: Jameson Lieberman is a 40 y o  female  Patient underwent right 2nd toe amputation last Wednesday  She is 5 days postop  She is doing well  Dressing is intact  Review of Systems   Constitutional: Negative  Objective:     Physical Exam    Right foot exam:   Mild maceration to the 2nd toe amputation site but no significant dehiscence or erythema  No cellulitis  No pus  Sutures intact  Pedal pulses intact  Lack of pinprick sensation  No calf pain with compression

## 2021-01-25 ENCOUNTER — OFFICE VISIT (OUTPATIENT)
Dept: PODIATRY | Facility: CLINIC | Age: 46
End: 2021-01-25
Payer: COMMERCIAL

## 2021-01-25 VITALS — BODY MASS INDEX: 39.38 KG/M2 | HEIGHT: 66 IN

## 2021-01-25 DIAGNOSIS — L97.514 NEUROPATHIC ULCER OF RIGHT FOOT WITH NECROSIS OF BONE (HCC): Primary | ICD-10-CM

## 2021-01-25 DIAGNOSIS — T87.81 DEHISCENCE OF AMPUTATION STUMP (HCC): ICD-10-CM

## 2021-01-25 DIAGNOSIS — M86.9 OSTEOMYELITIS OF SECOND TOE OF RIGHT FOOT (HCC): ICD-10-CM

## 2021-01-25 DIAGNOSIS — G89.18 ACUTE POST-OPERATIVE PAIN: ICD-10-CM

## 2021-01-25 PROCEDURE — 99214 OFFICE O/P EST MOD 30 MIN: CPT | Performed by: PODIATRIST

## 2021-01-25 RX ORDER — SENNOSIDES 8.6 MG
650 CAPSULE ORAL EVERY 8 HOURS PRN
Qty: 30 TABLET | Refills: 0 | Status: SHIPPED | OUTPATIENT
Start: 2021-01-25 | End: 2021-03-08 | Stop reason: SDUPTHER

## 2021-01-25 RX ORDER — CEPHALEXIN 500 MG/1
500 CAPSULE ORAL EVERY 6 HOURS SCHEDULED
Qty: 20 CAPSULE | Refills: 0 | Status: SHIPPED | OUTPATIENT
Start: 2021-01-25 | End: 2021-01-30

## 2021-01-25 NOTE — PROGRESS NOTES
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 ([address] )  -     cephalexin (KEFLEX) 500 mg capsule; Take 1 capsule (500 mg total) by mouth every 6 (six) hours for 5 days    Osteomyelitis of second toe of right foot (HCC)  -     cephalexin (KEFLEX) 500 mg capsule; Take 1 capsule (500 mg total) by mouth every 6 (six) hours for 5 days    Dehiscence of amputation stump (HCC)  -     cephalexin (KEFLEX) 500 mg capsule; Take 1 capsule (500 mg total) by mouth every 6 (six) hours for 5 days    Acute post-operative pain  -     acetaminophen (TYLENOL) 650 mg CR tablet; Take 1 tablet (650 mg total) by mouth every 8 (eight) hours as needed for mild pain      patient has some mild superficial dehiscence of her amputation site  Given the amount of pain and mild increase in drainage I recommended a 5 day course of Keflex  I did extensive review of her recent hospitalization including notes from Internal Medicine infectious disease as well as her lab work  Patient is still smoking which is causing ischemia to her surgical incision and likely dehiscence  We continue to struggle with getting her to protect her foot and control her blood sugars well as refrain from nicotine cessation  Some of the sutures were removed today as they were torn from the incision site and no longer functioning  She is to put Betadine wet to dry on this area  I would like to see next week  Patient is at extremely high risk for reinfection, further loss of limb, amputation of limb given her comorbid conditions and nicotine addiction  Extra 30 minutes spent during the visit on counseling  Subjective:     Patient ID: Jenna Arthur is a 40 y o  female  PAtient had right 2nd toe amputation 1/13/2021  She has been painting with betadyne and covering with DSD in surgical shoe  She is still smoking daily  She is getting some "phantom pain" in the toe         Review of Systems HENT: Negative for sinus pressure and sinus pain  Respiratory: Negative for cough and shortness of breath  Cardiovascular: Negative for chest pain and leg swelling  Gastrointestinal: Negative for diarrhea, nausea and vomiting  Musculoskeletal: Positive for joint swelling  Negative for arthralgias  Skin: Positive for color change  Negative for rash and wound  Neurological: Positive for numbness  Negative for weakness  Objective:     Physical Exam      Vitals reviewed    Constitutional: Patient is not distressed  Patient is well developed  Patient is obese  Vascular: Dorsalis pedis and posterior tibial pulses palpable  Capillary refill time within normal limits to all digits  No erythema  No edema  No significant varicosities  Dermatology: No rash  There is some dehiscence of the surgical wounds incision on the plantar distal aspect of the 2nd toe amputation on the right foot  Open area measures 1 x 0 5 x 0 3 cm  Wound bed is granular  There is no probe to bone or deeper structure  It appears to be superficial dermal dehiscence  Sutures are loose in this area and torn through skin  The surgical site is tender to palpation  Absent pedal hair  Skin has poor turgor  Musculoskeletal: Normal range of motion to ankle, subtalar joint, and midtarsal joint  Normal range of motion first MTPJ  Manual muscle testing 5 out of 5 for inversion/eversion/dorsiflexion/plantarflexion  On stance patients feet show pes planus with calcaneal valgus  No posterior tibial tendon pain  The 2nd digit on the right foot is amputated  The 3rd digit on the left foot is amputated  Neurological: Monofilament sensation is absent  Vibratory sensation is absent  Achilles reflex is normal    Proprioception is diminished    Respiratory: Normal respiratory effort, no distress    Psych: Patient is AAOx3  Normal mood       Lymphatic: nonpalpable popliteal lymph nodes  Nonpalpable groin lymph nodes

## 2021-02-01 ENCOUNTER — TELEPHONE (OUTPATIENT)
Dept: PODIATRY | Facility: CLINIC | Age: 46
End: 2021-02-01

## 2021-02-01 NOTE — TELEPHONE ENCOUNTER
Called patient and left message to reschedule her appointment for today with Dr Yohan Mathews due to inclement weather

## 2021-02-08 ENCOUNTER — OFFICE VISIT (OUTPATIENT)
Dept: PODIATRY | Facility: CLINIC | Age: 46
End: 2021-02-08
Payer: COMMERCIAL

## 2021-02-08 VITALS — BODY MASS INDEX: 39.38 KG/M2 | HEIGHT: 66 IN

## 2021-02-08 DIAGNOSIS — T87.81 DEHISCENCE OF AMPUTATION STUMP (HCC): Primary | ICD-10-CM

## 2021-02-08 DIAGNOSIS — E11.621 DIABETIC ULCER OF RIGHT MIDFOOT ASSOCIATED WITH TYPE 2 DIABETES MELLITUS, WITH FAT LAYER EXPOSED (HCC): ICD-10-CM

## 2021-02-08 DIAGNOSIS — L97.412 DIABETIC ULCER OF RIGHT MIDFOOT ASSOCIATED WITH TYPE 2 DIABETES MELLITUS, WITH FAT LAYER EXPOSED (HCC): ICD-10-CM

## 2021-02-08 PROCEDURE — 99214 OFFICE O/P EST MOD 30 MIN: CPT | Performed by: PODIATRIST

## 2021-02-08 NOTE — PROGRESS NOTES
Assessment/Plan:      Diagnoses and all orders for this visit:    Dehiscence of amputation stump ([facility] )    Diabetic ulcer of right midfoot associated with type 2 diabetes mellitus, with fat layer exposed ([facility] [address] )      patient has dehiscence which is now involved to a diabetic ulcer on the forefoot  This is likely secondary to poorly controlled diabetes, nicotine addiction, over ambulation  Her surgical shoe is heavily worn and it is unclear whether she keeps the dressing over her foot is there is debris within the wound  Continue to discuss compliance and basic wound care with patient  The wound is not deep in should resolve with rest and local wound care  I did provide wound care dressings and would like to check in 2 weeks  She should only put weight on the heel  She must quit smoking  There is no deep probe or sign of infection today  Imaging and antibiotics are unnecessary at this point but may be indicated in the future  She should call with any changes to the ulceration between now and next visit  We discussed the relationship between cigarette smoking, atherosclerotic disease, and its effects on the lower extremity  I emphasized the importance of smoking cessation       Acute or Chronic Illness or Injury that Poses a Threat to Life or Bodily Injury:  1  Diabetic neuropathy, nicotine addiction, previous amputation/ulceration     Dysvascular and Diabetic Lower Extremity Ulcer Risk Factors     1  The lifetime risk of developing a foot ulcer for someone with diabetes is 25%  [iv]  2  Every year, about 1-4% of people with diabetes develop a new foot ulcer  [iv]  3  Between 10-15% of diabetic foot ulcers do not heal [v]  4  Of diabetic foot ulcers that do not heal, 25% will require amputation  [v]     iv Blu Addison V , Samina Chua , and Daina LYLES , Foot Ulcers in the Diabetic Patient, Prevention and Treatment  Vascular Health Risk Management   2007 Feb; 3(1): 65-76  v KEILA Carter , Rebeca Guerrero , RAFAL Fontaine Charlestine Browning, [place]ul[place], T T , Risk factors for lower extremity amputation in patients with diabetic foot ulcers: a hospital-based case-control study  Diabetic Foot & Ankle, 2015  6:10  Subjective:     Patient ID: Lisset Lara is a 40 y o  female  Patient returns to discuss her toe amputation  Her surgery was January 13th where we removed the 2nd digit on the right foot  Patient has diabetic neuropathy and smokes heavily  There was some dehiscence at the distal amputation stump plantarly  She thinks the wound might be healed and has a very light dressing over it today  She is wearing a surgical shoe but it is heavily dirty  There is debris on the dressing  She would like to return to work  Review of Systems   Constitutional: Negative  HENT: Negative for sinus pressure and sinus pain  Respiratory: Negative for cough and shortness of breath  Cardiovascular: Negative for chest pain and leg swelling  Gastrointestinal: Negative for diarrhea, nausea and vomiting  Musculoskeletal: Negative for arthralgias and gait problem  Skin: Positive for color change  Negative for wound  Neurological: Positive for numbness  Negative for weakness  Psychiatric/Behavioral: The patient is not nervous/anxious  Objective:     Physical Exam      Vitals reviewed    Constitutional: Patient is not distressed  Patient is well developed  Patient is morbidly obese  Vascular: Dorsalis pedis and posterior tibial pulses palpable  Capillary refill time within normal limits to all digits  No erythema  Bilateral lower extremity edema  No significant varicosities  Dermatology: There is an ulceration to the previous amputation stump of the 2nd digit on the right foot plantar distally  Ulceration measures 0 5 x 0 5 x 0 4 cm  Wound bed fiber granular  No probe to bone or muscle  Minimal drainage  Wound is dry and desiccated    Periwound is intact without cellulitis  Musculoskeletal: Normal range of motion to ankle, subtalar joint, and midtarsal joint  Digital amputation bilaterally  Neurological: Monofilament sensation is absent  Vibratory sensation is absent  Achilles reflex is normal    Proprioception is absent    Respiratory: Normal respiratory effort, no distress    Psych: Patient is AAOx3  Normal mood       Lymphatic: nonpalpable popliteal lymph nodes  Nonpalpable groin lymph nodes

## 2021-02-22 ENCOUNTER — OFFICE VISIT (OUTPATIENT)
Dept: PODIATRY | Facility: CLINIC | Age: 46
End: 2021-02-22
Payer: COMMERCIAL

## 2021-02-22 VITALS
SYSTOLIC BLOOD PRESSURE: 137 MMHG | HEART RATE: 85 BPM | HEIGHT: 66 IN | DIASTOLIC BLOOD PRESSURE: 87 MMHG | BODY MASS INDEX: 39.38 KG/M2

## 2021-02-22 DIAGNOSIS — L97.514 NEUROPATHIC ULCER OF RIGHT FOOT WITH NECROSIS OF BONE (HCC): ICD-10-CM

## 2021-02-22 DIAGNOSIS — T87.81 DEHISCENCE OF AMPUTATION STUMP (HCC): Primary | ICD-10-CM

## 2021-02-22 PROCEDURE — 99213 OFFICE O/P EST LOW 20 MIN: CPT | Performed by: PODIATRIST

## 2021-02-22 NOTE — PROGRESS NOTES
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 ([facility] [address] )       Patient's toe amputation is slowly healing  I anticipate resolution in the next few weeks  I would like to check in 2 weeks as she is returning to work next week  She should be still be off her foot as much as possible  A small amount of Neosporin and dry gauze dressing should be applied daily  We discussed the relationship between cigarette smoking, atherosclerotic disease, and its effects on the lower extremity  I emphasized the importance of smoking cessation       -Educated on DM risk to lower extremities, proper shoe gear, and daily monitoring of feet    -Educated on A1C and the risks of poorly controlled Diabetes   -Discussed weight loss and suitable exercise regiment  Subjective:     Patient ID: Ovidio Colon is a 40 y o  female  Patient is s/p right 2nd toe amputation  She has mild dehiscence  She thinks it is healed  She states she is smoking less and may be only 1 or 2 per day  Review of Systems   Constitutional: Negative  Gastrointestinal: Negative for diarrhea, nausea and vomiting  Musculoskeletal: Negative for arthralgias and gait problem  Skin: Positive for color change and wound  Neurological: Positive for numbness  Negative for weakness  Objective:     Physical Exam  Vitals signs reviewed  Constitutional:       Appearance: She is obese  She is not ill-appearing or diaphoretic  Cardiovascular:      Pulses: Normal pulses  Dorsalis pedis pulses are 2+ on the right side and 2+ on the left side  Posterior tibial pulses are 2+ on the right side and 2+ on the left side  Musculoskeletal:      Right foot: Deformity present  Left foot: Deformity present  Feet:      Right foot:      Protective Sensation: 10 sites tested  0 sites sensed        Skin integrity: Ulcer ( 0 2 x 0 3 x 0 2 cm wound to the distal amputation stump of the 2nd digit  Wound is granular without any deep probe into fat or muscle  No sign of infection ) present  Left foot:      Protective Sensation: 10 sites tested  0 sites sensed  Skin:     Capillary Refill: Capillary refill takes less than 2 seconds  Neurological:      Mental Status: She is alert and oriented to person, place, and time  Sensory: Sensory deficit present             0 3cm open superficial

## 2021-02-22 NOTE — LETTER
February 22, 2021     Patient: Mckenna Cisneros   YOB: 1975   Date of Visit: 2/22/2021       To Whom it May Concern:    Echo Khoi is under my professional care  She was seen in my office on 2/22/2021  She may return to work on March 3     If you have any questions or concerns, please don't hesitate to call           Sincerely,          Nery Abraham DPM        CC: No Recipients

## 2021-03-08 ENCOUNTER — OFFICE VISIT (OUTPATIENT)
Dept: PODIATRY | Facility: CLINIC | Age: 46
End: 2021-03-08
Payer: COMMERCIAL

## 2021-03-08 VITALS — WEIGHT: 227 LBS | HEIGHT: 66 IN | BODY MASS INDEX: 36.48 KG/M2

## 2021-03-08 DIAGNOSIS — E11.42 DIABETIC POLYNEUROPATHY ASSOCIATED WITH TYPE 2 DIABETES MELLITUS (HCC): Primary | ICD-10-CM

## 2021-03-08 DIAGNOSIS — Z89.422 ACQUIRED ABSENCE OF OTHER LEFT TOE(S) (HCC): ICD-10-CM

## 2021-03-08 DIAGNOSIS — G89.18 ACUTE POST-OPERATIVE PAIN: ICD-10-CM

## 2021-03-08 PROCEDURE — 99212 OFFICE O/P EST SF 10 MIN: CPT | Performed by: PODIATRIST

## 2021-03-08 RX ORDER — CLONIDINE HYDROCHLORIDE 0.2 MG/1
0.2 TABLET ORAL 2 TIMES DAILY
COMMUNITY
Start: 2021-02-15

## 2021-03-08 RX ORDER — FENOFIBRATE 54 MG/1
TABLET ORAL
COMMUNITY
Start: 2021-02-27

## 2021-03-08 RX ORDER — SENNOSIDES 8.6 MG
650 CAPSULE ORAL EVERY 8 HOURS PRN
Qty: 30 TABLET | Refills: 2 | Status: SHIPPED | OUTPATIENT
Start: 2021-03-08 | End: 2021-04-23 | Stop reason: SDUPTHER

## 2021-03-08 RX ORDER — LAMOTRIGINE 150 MG/1
150 TABLET ORAL DAILY
COMMUNITY
Start: 2021-02-21 | End: 2021-09-08 | Stop reason: HOSPADM

## 2021-03-08 RX ORDER — FOLIC ACID 1 MG/1
1000 TABLET ORAL DAILY
COMMUNITY
Start: 2021-02-22

## 2021-03-08 RX ORDER — ERGOCALCIFEROL (VITAMIN D2) 1250 MCG
50000 CAPSULE ORAL
COMMUNITY
Start: 2021-03-05 | End: 2021-05-28

## 2021-03-08 RX ORDER — PANTOPRAZOLE SODIUM 40 MG/1
40 TABLET, DELAYED RELEASE ORAL
COMMUNITY
Start: 2020-12-07 | End: 2021-12-07

## 2021-03-08 RX ORDER — DIPHENOXYLATE HYDROCHLORIDE AND ATROPINE SULFATE 2.5; .025 MG/1; MG/1
1 TABLET ORAL DAILY
COMMUNITY

## 2021-03-08 RX ORDER — DOXYCYCLINE HYCLATE 100 MG
TABLET ORAL
COMMUNITY
Start: 2021-01-05 | End: 2021-09-08 | Stop reason: HOSPADM

## 2021-03-08 RX ORDER — ALCOHOL ANTISEPTIC PADS
PADS, MEDICATED (EA) TOPICAL
COMMUNITY
Start: 2021-02-25 | End: 2022-06-16 | Stop reason: HOSPADM

## 2021-03-08 RX ORDER — METHOCARBAMOL 750 MG/1
TABLET, FILM COATED ORAL
COMMUNITY
Start: 2021-03-02

## 2021-03-08 NOTE — PROGRESS NOTES
Assessment/Plan:      Diagnoses and all orders for this visit:    Diabetic polyneuropathy associated with type 2 diabetes mellitus ([facility] )  -     Diabetic Shoe  -     Diabetic Shoe Inserts    Acquired absence of other left toe(s) ([facility] )  -     Diabetic Shoe  -     Diabetic Shoe Inserts    Acute post-operative pain  -     acetaminophen (TYLENOL) 650 mg CR tablet; Take 1 tablet (650 mg total) by mouth every 8 (eight) hours as needed for mild pain    Other orders  -     glucose blood test strip; USE TO MONITOR BG 3 TIMES A DAY  E11 9  -     multivitamin (THERAGRAN) TABS; Take 1 tablet by mouth daily  -     Ultra Thin Lancets 31G MISC; USE TO MONITOR BLOOD GLUCOSE 3 TIMES A DAY AS DIRECTED  -     ergocalciferol (ERGOCALCIFEROL) 1 25 MG (74793 UT) capsule; Take 50,000 Units by mouth  -     pantoprazole (PROTONIX) 40 mg tablet; Take 40 mg by mouth daily  -     methocarbamol (ROBAXIN) 750 mg tablet; TAKE 1 TABLET (750 MG TOTAL) BY MOUTH 4 (FOUR) TIMES A DAY AS NEEDED FOR MUSCLE SPASMS  -     folic acid (FOLVITE) 1 mg tablet; Take 1,000 mcg by mouth daily  -     fenofibrate (TRICOR) 54 MG tablet; TAKE 1 TABLET (54 MG TOTAL) BY MOUTH DAILY  -     doxycycline hyclate (VIBRA-TABS) 100 mg tablet; TAKE 1 TABLET (100 MG TOTAL) BY MOUTH 2 (TWO) TIMES A DAY FOR 7 DAYS  -     lamoTRIgine (LaMICtal) 150 MG tablet; Take 150 mg by mouth daily  -     cloNIDine (CATAPRES) 0 2 mg tablet; Take 0 2 mg by mouth 2 (two) times a day        Amputation right 2nd digit is healed  She is back at work  She has some minimal tenderness but this should continue to resolve  Resume at-risk foot care  Subjective:     Patient ID: Lorena Waggoner is a 40 y o  female  HPI Patients right 2nd toe amputation has healed  She is back at work  Review of Systems      Objective:       Physical Exam      Right foot exam shows intact pedal pulses  The 2nd toe amputation site is fully healed    There is some mild skin discoloration but no cellulitis drainage or open wound

## 2021-03-22 ENCOUNTER — OFFICE VISIT (OUTPATIENT)
Dept: PODIATRY | Facility: CLINIC | Age: 46
End: 2021-03-22
Payer: COMMERCIAL

## 2021-03-22 VITALS — HEIGHT: 66 IN | BODY MASS INDEX: 36.48 KG/M2 | WEIGHT: 227 LBS

## 2021-03-22 DIAGNOSIS — E11.40 TYPE 2 DIABETES MELLITUS WITH DIABETIC NEUROPATHY, WITH LONG-TERM CURRENT USE OF INSULIN (HCC): ICD-10-CM

## 2021-03-22 DIAGNOSIS — E08.621 DIABETIC ULCER OF OTHER PART OF RIGHT FOOT ASSOCIATED WITH DIABETES MELLITUS DUE TO UNDERLYING CONDITION, WITH FAT LAYER EXPOSED (HCC): Primary | ICD-10-CM

## 2021-03-22 DIAGNOSIS — L97.512 DIABETIC ULCER OF OTHER PART OF RIGHT FOOT ASSOCIATED WITH DIABETES MELLITUS DUE TO UNDERLYING CONDITION, WITH FAT LAYER EXPOSED (HCC): Primary | ICD-10-CM

## 2021-03-22 DIAGNOSIS — Z79.4 TYPE 2 DIABETES MELLITUS WITH DIABETIC NEUROPATHY, WITH LONG-TERM CURRENT USE OF INSULIN (HCC): ICD-10-CM

## 2021-03-22 DIAGNOSIS — Z89.422 ACQUIRED ABSENCE OF OTHER LEFT TOE(S) (HCC): ICD-10-CM

## 2021-03-22 PROCEDURE — 99214 OFFICE O/P EST MOD 30 MIN: CPT | Performed by: PODIATRIST

## 2021-03-22 NOTE — PROGRESS NOTES
Assessment/Plan:         Diagnoses and all orders for this visit:    Diabetic ulcer of other part of right foot associated with diabetes mellitus due to underlying condition, with fat layer exposed ([address] )  -     mupirocin (BACTROBAN) 2 % ointment; Apply topically 3 (three) times a day  -     Ambulatory referral to Wound Care; Future    Type 2 diabetes mellitus with diabetic neuropathy, with long-term current use of insulin (HCC)    Acquired absence of other left toe(s) (HCC)       Diagnosis and options discussed  Refer to wound center  She should weightbear to heel in the surgical shoe which she has on today  Local wound care instructions well Bactroban ointment and dry sterile dressing with rest       No written to get patient out of work  Patient high risk for amputation given medical history, previous amputations, diabetic neuropathy  Data Reviewed: Independent review of image: none  Labs: A1C 2/25/21 was 6 6      Risk of Complication:  Acute or Chronic Illness or Injury that Poses a Threat to Life or Bodily Injury:  1  DM neuropathy, previous amputation, smoker     Dysvascular and Diabetic Lower Extremity Ulcer Risk Factors     1  The lifetime risk of developing a foot ulcer for someone with diabetes is 25%  [iv]  2  Every year, about 1-4% of people with diabetes develop a new foot ulcer  [iv]  3  Between 10-15% of diabetic foot ulcers do not heal [v]  4  Of diabetic foot ulcers that do not heal, 25% will require amputation  [v]     iv Blu Ruiz, V , Damion Crystal , and Daina D , Foot Ulcers in the Diabetic Patient, Prevention and Treatment  Vascular Health Risk Management  2007 Feb; 3(1): 65-76  v KEILA Leiva , RUFINO Liu , RAFAL Fontaine , MAXIMUS Schroeder , Franco T T , Risk factors for lower extremity amputation in patients with diabetic foot ulcers: a hospital-based case-control study  Diabetic Foot & Ankle, 2015  6:10      We discussed the relationship between cigarette smoking, atherosclerotic disease, and its effects on the lower extremity  I emphasized the importance of smoking cessation       Subjective:      Patient ID: Sai Serna is a 40 y o  female  Patient presents with a new wound on her right foot  Medical history significant for diabetic neuropathy, nicotine addiction, previous amputations from infection  About a week ago a wound opened up on the bottom of her right foot  She has been cleaning daily and dressing it  She has been wearing her surgical shoe to reduce the pressure to the area  She   Just had hemoglobin A1c tested at the end of February and was 6 6  She had stop smoking for 2 weeks but states the anxiety about her foot ulcer caused her to smoke last night  She does not want to smoke anymore and does not have any cigarettes on her today  The following portions of the patient's history were reviewed and updated as appropriate: allergies, current medications, past family history, past medical history, past social history, past surgical history and problem list     Review of Systems   Constitutional: Negative  HENT: Negative for sinus pressure and sinus pain  Respiratory: Negative for cough and shortness of breath  Cardiovascular: Negative for chest pain and leg swelling  Gastrointestinal: Negative for diarrhea, nausea and vomiting  Musculoskeletal: Positive for joint swelling  Negative for arthralgias and gait problem  Skin: Positive for color change and wound  Negative for rash  Neurological: Positive for numbness  Negative for weakness           Objective:      Ht 5' 5 5" (1 664 m) Comment: verbal  Wt 103 kg (227 lb) Comment: verbal, pt refused scale  BMI 37 20 kg/m²     HEMOGLOBIN A1C  Order: [id number]  Status:  Final result   Next appt:  None   Ref Range & Units 2/25/21 9:40 AM   Hemoglobin A1C <5 7 % 6 6High     Comment: Reference Range   Non-diabetic                     <5 7   Pre-diabetic                     5 7-6 4   Diabetic                         >=6 5   ADA target for diabetic control  <=7   eAG, EST AVG Glucose <154 mg/dL 143    Comment:    Ordering Provider: Liset Centeno   Report Copied to : Susannah Harris         Specimen Collected: 02/25/21  9:40 AM   Last Resulted: 02/25/21 10:28 PM   Received From: [facility]    Result Received: 03/08/21  9:15 AM                Physical Exam  Constitutional:       Appearance: She is obese  She is not ill-appearing or diaphoretic  Cardiovascular:      Pulses: Normal pulses  Dorsalis pedis pulses are 2+ on the right side and 2+ on the left side  Posterior tibial pulses are 2+ on the right side and 2+ on the left side  Pulmonary:      Effort: Pulmonary effort is normal  No respiratory distress  Musculoskeletal:         General: Deformity (  Right 2nd digit and left 3rd digit are amputated) present  Feet:      Right foot:      Protective Sensation: 10 sites tested  0 sites sensed  Skin integrity: Ulcer present  Toenail Condition: Right toenails are abnormally thick  Left foot:      Protective Sensation: 10 sites tested  0 sites sensed  Toenail Condition: Left toenails are abnormally thick  Skin:     Capillary Refill: Capillary refill takes less than 2 seconds  Neurological:      Mental Status: She is alert and oriented to person, place, and time  Sensory: Sensory deficit present  Motor: No weakness        Gait: Gait normal

## 2021-04-02 ENCOUNTER — OFFICE VISIT (OUTPATIENT)
Dept: WOUND CARE | Facility: HOSPITAL | Age: 46
End: 2021-04-02
Payer: COMMERCIAL

## 2021-04-02 VITALS
TEMPERATURE: 97.8 F | WEIGHT: 225 LBS | HEART RATE: 64 BPM | RESPIRATION RATE: 18 BRPM | DIASTOLIC BLOOD PRESSURE: 78 MMHG | SYSTOLIC BLOOD PRESSURE: 118 MMHG | BODY MASS INDEX: 37.49 KG/M2 | HEIGHT: 65 IN

## 2021-04-02 DIAGNOSIS — E11.40 TYPE 2 DIABETES MELLITUS WITH DIABETIC NEUROPATHY, WITH LONG-TERM CURRENT USE OF INSULIN (HCC): ICD-10-CM

## 2021-04-02 DIAGNOSIS — Z79.4 TYPE 2 DIABETES MELLITUS WITH DIABETIC NEUROPATHY, WITH LONG-TERM CURRENT USE OF INSULIN (HCC): ICD-10-CM

## 2021-04-02 DIAGNOSIS — L97.512 RIGHT FOOT ULCER, WITH FAT LAYER EXPOSED (HCC): Primary | ICD-10-CM

## 2021-04-02 PROBLEM — L97.509 DIABETIC FOOT ULCER (HCC): Status: RESOLVED | Noted: 2019-09-10 | Resolved: 2021-04-02

## 2021-04-02 PROBLEM — L03.116 CELLULITIS OF LEFT FOOT: Status: RESOLVED | Noted: 2019-06-28 | Resolved: 2021-04-02

## 2021-04-02 PROBLEM — E11.621 DIABETIC FOOT ULCER (HCC): Status: RESOLVED | Noted: 2019-09-10 | Resolved: 2021-04-02

## 2021-04-02 PROCEDURE — 11042 DBRDMT SUBQ TIS 1ST 20SQCM/<: CPT | Performed by: PODIATRIST

## 2021-04-02 PROCEDURE — 99213 OFFICE O/P EST LOW 20 MIN: CPT | Performed by: PODIATRIST

## 2021-04-02 NOTE — PATIENT INSTRUCTIONS
Orders Placed This Encounter   Procedures    Wound cleansing and dressings     Right foot     Wash your hands with soap and water  Remove old dressing, discard into plastic bag and place in trash  Cleanse the wound with NSS prior to applying a clean dressing  Do not use tissue or cotton balls  Do not scrub the wound  Pat dry using gauze  Shower: do not get dressing or wound wet      Apply derma gran to the wound bed  Cover with bulky football dressing    Dressing changed today at wound care center    Need to wear surgical shoe provided today at Wound center    Leave dressing intact until seen in wound care center next week     Standing Status:   Future     Standing Expiration Date:   4/2/2022     Debridement   WHAT YOU NEED TO KNOW:   Debridement is the removal of infected, damaged, or dead tissue so a wound can heal properly  You may need more than one debridement  DISCHARGE INSTRUCTIONS:   Medicines:   · Medicines  can help decrease pain or prevent or treat an infection  · Take your medicine as directed  Contact your healthcare provider if you think your medicine is not helping or if you have side effects  Tell him of her if you are allergic to any medicine  Keep a list of the medicines, vitamins, and herbs you take  Include the amounts, and when and why you take them  Bring the list or the pill bottles to follow-up visits  Carry your medicine list with you in case of an emergency  Follow up with your healthcare provider as directed: You may need to return to have your wound checked  Write down your questions so you remember to ask them during your visits  Care for your wound as directed:   · Keep your wound clean and dry  You may need to cover your wound when you bathe  · Limit movements,  such as stretching, to prevent bleeding, tearing, and swelling in your wound  · Protect your wound  Avoid sunlight for at least 6 months   Apply mild, unscented lotion or cream to the skin around your wound to keep it moist     · Do not smoke  If you smoke, it is never too late to quit  Smoking decreases blood flow to the wound and delays healing  Ask for information if you need help quitting  · Drink liquids as directed  Ask how much liquid to drink each day and which liquids are best for you  Liquids help keep your skin moist so your wound can heal      · Eat a variety of healthy foods  Foods rich in protein, such as meat, eggs, and dairy products, help repair tissue  Carbohydrate-rich foods, such as bread and cereals, help increase cell growth and decrease the risk for wound infection  Do not have caffeine  Ask if you should take vitamins  Vitamins A and C may help tissue formation and increase scar tissue strength  Contact your healthcare provider if:   · You have a fever  · Your pain gets worse or does not go away, even after treatment  · Your skin is red, swollen, or draining pus  · You have questions or concerns about your condition or care  Return to the emergency department if:   · Blood soaks through your bandage  · You have severe pain  © Copyright [address] Information is for End User's use only and may not be sold, redistributed or otherwise used for commercial purposes  All illustrations and images included in CareNotes® are the copyrighted property of A D A M , Inc  or [address] [place] [address] [place]   The above information is an  only  It is not intended as medical advice for individual conditions or treatments  Talk to your doctor, nurse or pharmacist before following any medical regimen to see if it is safe and effective for you

## 2021-04-02 NOTE — PROGRESS NOTES
Patient ID: Linda Felix is a 40 y o  female Date of Birth 1975     My role is Foot, Ankle and Wound Specialist    Chief Complaint   Patient presents with   [facility] Patient Visit     wound right foot       Diabetic foot ulcer    Subjective:   Neftali Murcia has developed a new plantar diabetic foot ulcer  It developed spontaneously she states         The following portions of the patient's history were reviewed and updated as appropriate:   Patient Active Problem List   Diagnosis    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, with bone involvement without evidence of necrosis ([address] )    H/O bariatric surgery    Anxiety    Morbid obesity ([address] )    Preoperative clearance    Cellulitis of left foot    Encounter for smoking cessation counseling    Hypothyroidism    Manic bipolar I disorder ([facility] )    Type 2 diabetes mellitus with neurologic complication, with long-term current use of insulin ([address] )    Tobacco abuse    Pyogenic inflammation of bone (HCC)    Type 2 diabetes mellitus ([address] )    Diabetic foot ulcer ([facility] )    Acquired absence of other left toe(s) ([address] )    Essential hypertension    Arthritis    Chronic pain    OCD (obsessive compulsive disorder)    PTSD (post-traumatic stress disorder)    Smoking     Past Medical History:   Diagnosis Date    Anxiety     Arthritis     Asthma     Chronic pain     Diabetes mellitus ([address] )     Disease of thyroid gland     Lymphedema     left leg    Manic bipolar I disorder ([address] )     Morbid obesity ([address] )     OCD (obsessive compulsive disorder)     PTSD (post-traumatic stress disorder)      Past Surgical History:   Procedure Laterality Date    CHOLECYSTECTOMY      CHOLECYSTECTOMY      VT AMPUTATION METATARSAL+TOE,SINGLE Left 9/8/2019    Procedure: PARTIAL 3RD RAY RESECTION, PSB TOE FILLET FLAP;  Surgeon: Luisana Christianson DPM;  Location: [facility];  Service: Podiatry    TOE AMPUTATION Right 1/13/2021    Procedure: AMPUTATION 2ND TOE;  Surgeon: Garland Bence, DPM;  Location: [facility];  Service: Podiatry     Social History     Socioeconomic History    Marital status: Single     Spouse name: None    Number of children: None    Years of education: None    Highest education level: None   Occupational History    None   Social Needs    Financial resource strain: None    Food insecurity     Worry: None     Inability: None    Transportation needs     Medical: None     Non-medical: None   Tobacco Use    Smoking status: Current Every Day Smoker     Packs/day: 0 50     Years: 15 00     Pack years: 7 50    Smokeless tobacco: Never Used   Substance and Sexual Activity    Alcohol use: Never     Frequency: Never     Drinks per session: Patient refused     Binge frequency: Never    Drug use: Never    Sexual activity: Not Currently   Lifestyle    Physical activity     Days per week: None     Minutes per session: None    Stress: None   Relationships    Social connections     Talks on phone: None     Gets together: None     Attends [employer] orthodox service: None     Active member of club or organization: None     Attends meetings of clubs or organizations: None     Relationship status: None    Intimate partner violence     Fear of current or ex partner: None     Emotionally abused: None     Physically abused: None     Forced sexual activity: None   Other Topics Concern    None   Social History Narrative    None        Current Outpatient Medications:     acetaminophen (TYLENOL) 650 mg CR tablet, Take 1 tablet (650 mg total) by mouth every 8 (eight) hours as needed for mild pain, Disp: 30 tablet, Rfl: 2    ADMELOG SOLOSTAR 100 units/mL injection pen, , Disp: , Rfl:     albuterol (PROVENTIL HFA,VENTOLIN HFA) 90 mcg/act inhaler, Inhale 2 puffs every 6 (six) hours as needed for wheezing, Disp: , Rfl:     BASAGLAR KWIKPEN 100 units/mL injection pen, , Disp: , Rfl:     buPROPion (WELLBUTRIN XL) 300 mg 24 hr tablet, Take 300 mg by mouth, Disp: , Rfl:     cholecalciferol (VITAMIN D3) 1,000 units tablet, Take by mouth Three times a day, Disp: , Rfl:     cloNIDine (CATAPRES) 0 1 mg tablet, 0 2 mg every 12 (twelve) hours , Disp: , Rfl:     cloNIDine (CATAPRES) 0 2 mg tablet, Take 0 2 mg by mouth 2 (two) times a day, Disp: , Rfl:     doxycycline hyclate (VIBRA-TABS) 100 mg tablet, TAKE 1 TABLET (100 MG TOTAL) BY MOUTH 2 (TWO) TIMES A DAY FOR 7 DAYS, Disp: , Rfl:     EASY COMFORT PEN NEEDLES 32G X 4 MM MISC, , Disp: , Rfl:     EASY TOUCH INSULIN SYRINGE 31G X 5/16" 0 3 ML MISC, , Disp: , Rfl:     ergocalciferol (ERGOCALCIFEROL) 1 25 MG (69855 UT) capsule, Take 50,000 Units by mouth, Disp: , Rfl:     escitalopram (LEXAPRO) 10 mg tablet, 175 mg , Disp: , Rfl:     fenofibrate (TRICOR) 54 MG tablet, TAKE 1 TABLET (54 MG TOTAL) BY MOUTH DAILY  , Disp: , Rfl:     folic acid (FOLVITE) 1 mg tablet, Take 1,000 mcg by mouth daily, Disp: , Rfl:     glucose blood test strip, USE TO MONITOR BG 3 TIMES A DAY   E11 9, Disp: , Rfl:     HYDROcodone-acetaminophen (NORCO) 5-325 mg per tablet, , Disp: , Rfl:     HYDROcodone-acetaminophen (NORCO) 5-325 mg per tablet, Take 1 tablet by mouth every 6 (six) hours as needed for painMax Daily Amount: 4 tablets (Patient not taking: Reported on 3/22/2021), Disp: 7 tablet, Rfl: 0    hydrOXYzine HCL (ATARAX) 50 mg tablet, Take 50 mg by mouth daily at bedtime, Disp: , Rfl: 0    hydrOXYzine pamoate (VISTARIL) 50 mg capsule, Take 50 mg by mouth, Disp: , Rfl:     insulin glargine (LANTUS SOLOSTAR) 100 units/mL injection pen, Inject 55 Units under the skin 2 (two) times a day, Disp: , Rfl:     insulin lispro (HUMALOG KWIKPEN) 100 units/mL injection pen, Inject 1-15 Units under the skin, Disp: , Rfl:     lamoTRIgine (LaMICtal) 150 MG tablet, Take 150 mg by mouth daily, Disp: , Rfl:     lamoTRIgine (LaMICtal) 25 mg tablet, Take 150 mg by mouth daily , Disp: , Rfl:     levothyroxine 50 mcg tablet, Take 50 mcg by mouth daily, Disp: , Rfl:     metFORMIN (GLUCOPHAGE) 500 mg tablet, Take 1 tablet (500 mg total) by mouth 2 (two) times a day with meals, Disp: 60 tablet, Rfl: 0    methocarbamol (ROBAXIN) 750 mg tablet, TAKE 1 TABLET (750 MG TOTAL) BY MOUTH 4 (FOUR) TIMES A DAY AS NEEDED FOR MUSCLE SPASMS , Disp: , Rfl:     multivitamin (THERAGRAN) TABS, Take 1 tablet by mouth daily, Disp: , Rfl:     mupirocin (BACTROBAN) 2 % ointment, Apply topically 3 (three) times a day, Disp: 22 g, Rfl: 0    ondansetron (ZOFRAN-ODT) 4 mg disintegrating tablet, , Disp: , Rfl:     oxyCODONE (ROXICODONE) 5 mg/5 mL solution, , Disp: , Rfl:     pantoprazole (PROTONIX) 40 mg tablet, Take 40 mg by mouth daily, Disp: , Rfl:     SUMAtriptan Succinate (IMITREX STATDOSE REFILL) 6 MG/0 5ML SOCT, Inject 6 mg under the skin every 2 (two) hours as needed, Disp: , Rfl:     Ultra Thin Lancets 31G MISC, USE TO MONITOR BLOOD GLUCOSE 3 TIMES A DAY AS DIRECTED , Disp: , Rfl:     ziprasidone (GEODON) 80 mg capsule, Take 80 mg by mouth 2 (two) times a day with meals , Disp: , Rfl:   Family History   Problem Relation Age of Onset    Hyperlipidemia Mother     Diabetes Mother     Hypertension Mother     Heart attack Father       Review of Systems   Constitutional: Negative  Respiratory: Negative  Cardiovascular: Negative  Gastrointestinal: Negative  Skin: Positive for wound  Neurological: Positive for numbness  Allergies  Aspirin, Barium iodide, Codeine, Latex - food allergy, Vancomycin, and Penicillins    Objective:  /78   Pulse 64   Temp 97 8 °F (36 6 °C)   Resp 18   Ht 5' 5" (1 651 m)   Wt 102 kg (225 lb)   BMI 37 44 kg/m²     Physical Exam  Vitals signs and nursing note reviewed  Constitutional:       General: She is not in acute distress  Appearance: She is obese  She is not ill-appearing, toxic-appearing or diaphoretic     Cardiovascular:      Pulses:           Dorsalis pedis pulses are 2+ on the right side and 2+ on the left side  Posterior tibial pulses are 2+ on the right side and 2+ on the left side  Musculoskeletal:      Comments: Third toe amputated Right  Feet:      Right foot:      Protective Sensation: 10 sites tested  0 sites sensed  Left foot:      Protective Sensation: 10 sites tested  0 sites sensed  Neurological:      Mental Status: She is alert  Wound 01/13/21 Heel Right (Active)   Wound Image    04/02/21 0952   Wound Description Yellow;[place] 04/02/21 0955   Wound Length (cm) 1 6 cm 04/02/21 0955   Wound Width (cm) 0 9 cm 04/02/21 0955   Wound Depth (cm) 0 1 cm 04/02/21 0955   Wound Surface Area (cm^2) 1 44 cm^2 04/02/21 0955   Wound Volume (cm^3) 0 14 cm^3 04/02/21 0955   Calculated Wound Volume (cm^3) 0 14 cm^3 04/02/21 0955   Drainage Amount Small 04/02/21 0955   Drainage Description Serosanguineous 04/02/21 0955   Non-staged Wound Description Full thickness 04/02/21 0955   Dressing Status Intact 04/02/21 0955           Wound 01/13/21 Heel Right (Active)   Wound Image    04/02/21 0952   Wound Description Yellow;[place] 04/02/21 0955   Wound Length (cm) 1 6 cm 04/02/21 0955   Wound Width (cm) 0 9 cm 04/02/21 0955   Wound Depth (cm) 0 1 cm 04/02/21 0955   Wound Surface Area (cm^2) 1 44 cm^2 04/02/21 0955   Wound Volume (cm^3) 0 14 cm^3 04/02/21 0955   Calculated Wound Volume (cm^3) 0 14 cm^3 04/02/21 0955   Drainage Amount Small 04/02/21 0955   Drainage Description Serosanguineous 04/02/21 0955   Non-staged Wound Description Full thickness 04/02/21 0955   Dressing Status Intact 04/02/21 0955                         Diagnosis:  1  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, with bone involvement without evidence of necrosis, unspecified laterality ([address] )  -     Wound cleansing and dressings; Future        Diagnosis ICD-10-CM Associated Orders   1   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, with bone involvement without evidence of necrosis, unspecified laterality ([facility] )  [id number] Wound cleansing and dressings    [id number]         ASSESSMENT    Problems:    Chronic illness / Problem not at goal with exacerbation, progression or side effects of treatment  1  Diabetic foot ulcer        PLAN    Discussion of Management /  Recommendations  Offloading with felt, bulky offloading "football" dressing  Dermagran gauze to wound base    Procedures Performed  Sharp debridement performed    Debridement   Wound 01/13/21 Heel Right    Universal Protocol:  Consent: Verbal consent obtained  Risks and benefits: risks, benefits and alternatives were discussed  Time out: Immediately prior to procedure a "time out" was called to verify the correct patient, procedure, equipment, support staff and site/side marked as required    Timeout called at: 4/2/2021 11:40 AM   Patient understanding: patient states understanding of the procedure being performed  Patient identity confirmed: verbally with patient      Performed by: physician  Debridement type: surgical  Level of debridement: subcutaneous tissue  Pain control: lidocaine 4%  Pre-debridement measurements  Length (cm): 1 6  Width (cm): 0 9  Depth (cm): 0 1  Surface Area (cm^2): 1 44  Volume (cm^3): 0 14    Post-debridement measurements  Length (cm): 1 6  Width (cm): 0 9  Depth (cm): 0 1  Percent debrided: 100%  Surface Area (cm^2): 1 44  Area debrided (cm^2): 1 44  Volume (cm^3): 0 14  Tissue and other material debrided: adipose and subcutaneous tissue  Devitalized tissue debrided: biofilm, callus and fibrin  Instrument(s) utilized: blade  Bleeding: small  Hemostasis obtained with: pressure  Procedural pain (0-10): insensate  Post-procedural pain: insensate   Response to treatment: procedure was tolerated well

## 2021-04-06 DIAGNOSIS — G89.18 ACUTE POST-OPERATIVE PAIN: ICD-10-CM

## 2021-04-06 RX ORDER — ACETAMINOPHEN 650 MG/1
TABLET, FILM COATED, EXTENDED RELEASE ORAL
Qty: 30 TABLET | Refills: 2 | OUTPATIENT
Start: 2021-04-06

## 2021-04-06 NOTE — TELEPHONE ENCOUNTER
Corina requesting refill of high dose tylenol  Three weeks ago I prescribed 30 pills with 2 refills  If she went through all of that already she is taking too much

## 2021-04-08 ENCOUNTER — OFFICE VISIT (OUTPATIENT)
Dept: WOUND CARE | Facility: HOSPITAL | Age: 46
End: 2021-04-08
Payer: COMMERCIAL

## 2021-04-08 VITALS
TEMPERATURE: 95 F | RESPIRATION RATE: 16 BRPM | SYSTOLIC BLOOD PRESSURE: 110 MMHG | DIASTOLIC BLOOD PRESSURE: 60 MMHG | HEART RATE: 64 BPM

## 2021-04-08 DIAGNOSIS — E11.40 TYPE 2 DIABETES MELLITUS WITH DIABETIC NEUROPATHY, WITH LONG-TERM CURRENT USE OF INSULIN (HCC): ICD-10-CM

## 2021-04-08 DIAGNOSIS — Z79.4 TYPE 2 DIABETES MELLITUS WITH DIABETIC NEUROPATHY, WITH LONG-TERM CURRENT USE OF INSULIN (HCC): ICD-10-CM

## 2021-04-08 DIAGNOSIS — L97.512 RIGHT FOOT ULCER, WITH FAT LAYER EXPOSED (HCC): Primary | ICD-10-CM

## 2021-04-08 PROCEDURE — 11042 DBRDMT SUBQ TIS 1ST 20SQCM/<: CPT | Performed by: PODIATRIST

## 2021-04-08 NOTE — PROGRESS NOTES
Patient ID: Lori Alejandre is a 40 y o  female Date of Birth 1975     Chief Complaint  Chief Complaint   Patient presents with    Follow Up Wound Care Visit     right foot wound       Allergies  Aspirin, Barium iodide, Codeine, Latex - food allergy, Vancomycin, and Penicillins    Assessment:    No problem-specific Assessment & Plan notes found for this encounter  Diagnoses and all orders for this visit:    Right foot ulcer, with fat layer exposed ([facility] [address] )  -     Wound cleansing and dressings; Future  -     Debridement    Type 2 diabetes mellitus with diabetic neuropathy, with long-term current use of insulin ([facility] [address] )  -     Debridement              Debridement   Wound 01/13/21 Heel Right    Universal Protocol:  Consent: Verbal consent obtained  Risks and benefits: risks, benefits and alternatives were discussed  Consent given by: patient  Time out: Immediately prior to procedure a "time out" was called to verify the correct patient, procedure, equipment, support staff and site/side marked as required  Timeout called at: 4/8/2021 9:10 AM   Patient understanding: patient states understanding of the procedure being performed  Patient identity confirmed: verbally with patient      Performed by: physician  Debridement type: surgical  Level of debridement: subcutaneous tissue  Pain control: lidocaine 4%  Post-debridement measurements  Length (cm): 1 7  Width (cm): 1 1  Depth (cm): 0 2  Percent debrided: 100%  Surface Area (cm^2): 1 87  Area debrided (cm^2): 1 87  Volume (cm^3): 0 37  Tissue and other material debrided: dermis, epidermis and subcutaneous tissue  Devitalized tissue debrided: biofilm, callus, fibrin and slough  Instrument(s) utilized: blade  Bleeding: small  Hemostasis obtained with: pressure  Procedural pain (0-10): 0  Post-procedural pain: 0   Response to treatment: procedure was tolerated well          Plan:  1  Continue serial debridement and bulky dressing    2  Possible TCC depending on the progress  3  Instructed her to call if there is any increased pain, worsening, or signs of infection  4  RA in 1 week  Wound 01/13/21 Heel Right (Active)   Wound Image Images linked 04/08/21 0843   Wound Description Yellow;Pink 04/08/21 0821   Yvonne-wound Assessment Maceration;Dry;Callus 04/08/21 0821   Wound Length (cm) 1 7 cm 04/08/21 0821   Wound Width (cm) 1 cm 04/08/21 0821   Wound Depth (cm) 0 2 cm 04/08/21 0821   Wound Surface Area (cm^2) 1 7 cm^2 04/08/21 0821   Wound Volume (cm^3) 0 34 cm^3 04/08/21 0821   Calculated Wound Volume (cm^3) 0 34 cm^3 04/08/21 0821   Change in Wound Size % -142 86 04/08/21 0821   Drainage Amount Small 04/08/21 0821   Drainage Description Serosanguineous 04/08/21 0821   Non-staged Wound Description Full thickness 04/08/21 0821       Wound 01/13/21 Heel Right (Active)   Date First Assessed/Time First Assessed: 01/13/21 1232   Location: Heel  Wound Location Orientation: Right  Incision's 1st Dressing: DRESSING XEROFORM 1 X 8 (x1), SPONGE GAUZE 4 X 4 16 PLY STRL PLASTIC TRAY LF (x1), BANDAGE SHERI 4 IN X 2 1 Y STRL LF     [REMOVED] Wound 06/28/19 Foot Left (Removed)   Resolved Date: 04/02/21  Date First Assessed/Time First Assessed: 06/28/19 1239   Pre-Existing Wound: Yes  Location: Foot  Wound Location Orientation: Left  Dressing Status: Intact       [REMOVED] Wound 09/08/19 Foot Left (Removed)   Resolved Date: 04/02/21  Date First Assessed/Time First Assessed: 09/08/19 0907   Location: Foot  Wound Location Orientation: Left  Incision's 1st Dressing: DRESSING OIL EMULSION 3 X 8 IN (x1), KERLIX SUPER SPONGE 6 IN, BANDAGE SHERI 4 IN X 2 1 Y STRL    Subjective:        HPI  Sonali Vicente presents for wound care  She has right foot ulcer  No pain  Presents with bulky dressing  BS is under control, but she did not check her BS today          The following portions of the patient's history were reviewed and updated as appropriate: allergies, current medications, past family history, past medical history, past social history, past surgical history and problem list     PAST MEDICAL HISTORY:  Past Medical History:   Diagnosis Date    Anxiety     Arthritis     Asthma     Chronic pain     Diabetes mellitus ([facility] [address] )     Disease of thyroid gland     Lymphedema     left leg    [facility]     Morbid obesity ([facility] )     OCD (obsessive compulsive disorder)     PTSD (post-traumatic stress disorder)        PAST SURGICAL HISTORY:  Past Surgical History:   Procedure Laterality Date    CHOLECYSTECTOMY      CHOLECYSTECTOMY      OH AMPUTATION METATARSAL+TOE,SINGLE Left 9/8/2019    Procedure: PARTIAL 3RD RAY RESECTION, PSB TOE FILLET FLAP;  Surgeon: Sasha Salazar DPM;  Location: [facility];  Service: Podiatry    TOE AMPUTATION Right 1/13/2021    Procedure: AMPUTATION 2ND TOE;  Surgeon: Christine Carl DPM;  Location: [facility];  Service: Podiatry        ALLERGIES:  Aspirin, Barium iodide, Codeine, Latex - food allergy, Vancomycin, and Penicillins    MEDICATIONS:  Current Outpatient Medications   Medication Sig Dispense Refill    acetaminophen (TYLENOL) 650 mg CR tablet Take 1 tablet (650 mg total) by mouth every 8 (eight) hours as needed for mild pain 30 tablet 2    ADMELOG SOLOSTAR 100 units/mL injection pen       albuterol (PROVENTIL HFA,VENTOLIN HFA) 90 mcg/act inhaler Inhale 2 puffs every 6 (six) hours as needed for wheezing      BASAGLAR KWIKPEN 100 units/mL injection pen       buPROPion (WELLBUTRIN XL) 300 mg 24 hr tablet Take 300 mg by mouth      cholecalciferol (VITAMIN D3) 1,000 units tablet Take by mouth Three times a day      cloNIDine (CATAPRES) 0 1 mg tablet 0 2 mg every 12 (twelve) hours       cloNIDine (CATAPRES) 0 2 mg tablet Take 0 2 mg by mouth 2 (two) times a day      doxycycline hyclate (VIBRA-TABS) 100 mg tablet TAKE 1 TABLET (100 MG TOTAL) BY MOUTH 2 (TWO) TIMES A DAY FOR 7 DAYS      EASY COMFORT PEN NEEDLES 32G X 4 MM MISC  EASY TOUCH INSULIN SYRINGE 31G X 5/16" 0 3 ML MISC       ergocalciferol (ERGOCALCIFEROL) 1 25 MG (15613 UT) capsule Take 50,000 Units by mouth      escitalopram (LEXAPRO) 10 mg tablet 175 mg       fenofibrate (TRICOR) 54 MG tablet TAKE 1 TABLET (54 MG TOTAL) BY MOUTH DAILY   folic acid (FOLVITE) 1 mg tablet Take 1,000 mcg by mouth daily      glucose blood test strip USE TO MONITOR BG 3 TIMES A DAY  E11 9      HYDROcodone-acetaminophen (NORCO) 5-325 mg per tablet       HYDROcodone-acetaminophen (NORCO) 5-325 mg per tablet Take 1 tablet by mouth every 6 (six) hours as needed for painMax Daily Amount: 4 tablets (Patient not taking: Reported on 3/22/2021) 7 tablet 0    hydrOXYzine HCL (ATARAX) 50 mg tablet Take 50 mg by mouth daily at bedtime  0    hydrOXYzine pamoate (VISTARIL) 50 mg capsule Take 50 mg by mouth      insulin glargine (LANTUS SOLOSTAR) 100 units/mL injection pen Inject 55 Units under the skin 2 (two) times a day      insulin lispro (HUMALOG KWIKPEN) 100 units/mL injection pen Inject 1-15 Units under the skin      lamoTRIgine (LaMICtal) 150 MG tablet Take 150 mg by mouth daily      lamoTRIgine (LaMICtal) 25 mg tablet Take 150 mg by mouth daily       levothyroxine 50 mcg tablet Take 50 mcg by mouth daily      metFORMIN (GLUCOPHAGE) 500 mg tablet Take 1 tablet (500 mg total) by mouth 2 (two) times a day with meals 60 tablet 0    methocarbamol (ROBAXIN) 750 mg tablet TAKE 1 TABLET (750 MG TOTAL) BY MOUTH 4 (FOUR) TIMES A DAY AS NEEDED FOR MUSCLE SPASMS        multivitamin (THERAGRAN) TABS Take 1 tablet by mouth daily      mupirocin (BACTROBAN) 2 % ointment Apply topically 3 (three) times a day 22 g 0    ondansetron (ZOFRAN-ODT) 4 mg disintegrating tablet       oxyCODONE (ROXICODONE) 5 mg/5 mL solution       pantoprazole (PROTONIX) 40 mg tablet Take 40 mg by mouth daily      SUMAtriptan Succinate (IMITREX STATDOSE REFILL) 6 MG/0 5ML SOCT Inject 6 mg under the skin every 2 (two) hours as needed      Ultra Thin Lancets 31G MISC USE TO MONITOR BLOOD GLUCOSE 3 TIMES A DAY AS DIRECTED   ziprasidone (GEODON) 80 mg capsule Take 80 mg by mouth 2 (two) times a day with meals        No current facility-administered medications for this visit  SOCIAL HISTORY:  Social History     Socioeconomic History    Marital status: Single     Spouse name: Not on file    Number of children: Not on file    Years of education: Not on file    Highest education level: Not on file   Occupational History    Not on file   Social Needs    Financial resource strain: Not on file    Food insecurity     Worry: Not on file     Inability: Not on file    Transportation needs     Medical: Not on file     Non-medical: Not on file   Tobacco Use    Smoking status: Current Every Day Smoker     Packs/day: 0 50     Years: 15 00     Pack years: 7 50    Smokeless tobacco: Never Used   Substance and Sexual Activity    Alcohol use: Never     Frequency: Never     Drinks per session: Patient refused     Binge frequency: Never    Drug use: Never    Sexual activity: Not Currently   Lifestyle    Physical activity     Days per week: Not on file     Minutes per session: Not on file    Stress: Not on file   Relationships    Social connections     Talks on phone: Not on file     Gets together: Not on file     Attends Latter day service: Not on file     Active member of club or organization: Not on file     Attends meetings of clubs or organizations: Not on file     Relationship status: Not on file    Intimate partner violence     Fear of current or ex partner: Not on file     Emotionally abused: Not on file     Physically abused: Not on file     Forced sexual activity: Not on file   Other Topics Concern    Not on file   Social History Narrative    Not on file      Review of Systems   Constitutional: Negative for appetite change, chills and fever  Respiratory: Negative for cough and shortness of breath  Cardiovascular: Negative for chest pain  Gastrointestinal: Negative for diarrhea, nausea and vomiting  Skin: Positive for wound  Objective:       Wound 01/13/21 Heel Right (Active)   Wound Image Images linked 04/08/21 0843   Wound Description Yellow;Pink 04/08/21 0821   Yvonne-wound Assessment Maceration;Dry;Callus 04/08/21 0821   Wound Length (cm) 1 7 cm 04/08/21 0821   Wound Width (cm) 1 cm 04/08/21 0821   Wound Depth (cm) 0 2 cm 04/08/21 0821   Wound Surface Area (cm^2) 1 7 cm^2 04/08/21 0821   Wound Volume (cm^3) 0 34 cm^3 04/08/21 0821   Calculated Wound Volume (cm^3) 0 34 cm^3 04/08/21 0821   Change in Wound Size % -142 86 04/08/21 0821   Drainage Amount Small 04/08/21 0821   Drainage Description Serosanguineous 04/08/21 0821   Non-staged Wound Description Full thickness 04/08/21 0821       /60   Pulse 64   Temp (!) 95 °F (35 °C) (Tympanic)   Resp 16     Physical Exam  Vitals signs and nursing note reviewed  Constitutional:       General: She is not in acute distress  Appearance: She is obese  She is not toxic-appearing  Cardiovascular:      Rate and Rhythm: Normal rate and regular rhythm  Pulses: Normal pulses  Pulmonary:      Effort: Pulmonary effort is normal  No respiratory distress  Musculoskeletal:         General: No tenderness or signs of injury  Right foot: No foot drop  Left foot: No foot drop  Skin:     General: Skin is warm  Capillary Refill: Capillary refill takes less than 2 seconds  Coloration: Skin is not cyanotic or mottled  Findings: No erythema or rash  Nails: There is no clubbing  Comments: Ulcer right plantar foot  No deep probing or sinus tract  No infection  Increased granular tissues  See wound assessments  Neurological:      General: No focal deficit present  Mental Status: She is alert and oriented to person, place, and time  Cranial Nerves: No cranial nerve deficit        Sensory: Sensory deficit present  Psychiatric:         Mood and Affect: Mood normal          Behavior: Behavior normal            Wound Instructions:  Orders Placed This Encounter   Procedures    Wound cleansing and dressings     Wound cleansing and dressings       Right foot      Wash your hands with soap and water  Remove old dressing, discard into plastic bag and place in trash  Cleanse the wound with NSS prior to applying a clean dressing  Do not use tissue or cotton balls  Do not scrub the wound  Pat dry using gauze      Shower: do not get dressing or wound wet       Apply derma gran to the wound bed  Cover with bulky football dressing     Dressing changed today at wound care center     Need to wear surgical shoe provided today at Wound center     Leave dressing intact until seen in wound care center next week     Standing Status:   Future     Standing Expiration Date:   4/8/2022    Debridement     This order was created via procedure documentation        Diagnosis ICD-10-CM Associated Orders   1  Right foot ulcer, with fat layer exposed ([facility] [address] )  L97 512 Wound cleansing and dressings     Debridement   2   Type 2 diabetes mellitus with diabetic neuropathy, with long-term current use of insulin ([facility])  E11 40 Debridement    Z79 4

## 2021-04-08 NOTE — PATIENT INSTRUCTIONS
Orders Placed This Encounter   Procedures    Wound cleansing and dressings     Wound cleansing and dressings       Right foot      Wash your hands with soap and water  Remove old dressing, discard into plastic bag and place in trash  Cleanse the wound with NSS prior to applying a clean dressing  Do not use tissue or cotton balls  Do not scrub the wound  Pat dry using gauze      Shower: do not get dressing or wound wet       Apply derma gran to the wound bed    Cover with bulky football dressing     Dressing changed today at wound care center     Need to wear surgical shoe provided today at Wound center     Leave dressing intact until seen in wound care center next week     Standing Status:   Future     Standing Expiration Date:   4/8/2022

## 2021-04-10 ENCOUNTER — HOSPITAL ENCOUNTER (EMERGENCY)
Facility: HOSPITAL | Age: 46
Discharge: HOME | End: 2021-04-10
Attending: EMERGENCY MEDICINE
Payer: COMMERCIAL

## 2021-04-10 VITALS
OXYGEN SATURATION: 98 % | RESPIRATION RATE: 16 BRPM | TEMPERATURE: 97 F | DIASTOLIC BLOOD PRESSURE: 59 MMHG | HEART RATE: 78 BPM | SYSTOLIC BLOOD PRESSURE: 127 MMHG

## 2021-04-10 DIAGNOSIS — Z20.822 CLOSE EXPOSURE TO COVID-19 VIRUS: Primary | ICD-10-CM

## 2021-04-10 LAB — SARS-COV-2 RNA RESP QL NAA+PROBE: NEGATIVE

## 2021-04-10 PROCEDURE — 99283 EMERGENCY DEPT VISIT LOW MDM: CPT

## 2021-04-10 PROCEDURE — 99281 EMR DPT VST MAYX REQ PHY/QHP: CPT | Performed by: PHYSICIAN ASSISTANT

## 2021-04-10 PROCEDURE — 87635 SARS-COV-2 COVID-19 AMP PRB: CPT | Performed by: PHYSICIAN ASSISTANT

## 2021-04-10 NOTE — ED PROVIDER NOTES
History  Chief Complaint   Patient presents with    Evaluation of Abnormal Diagnostic Test     "Im here for a COVID test" no symptoms but was exposed to a family member a few days ago     40yo female who presets to ER for evaluation of Covid19  States her daughter in-law whom she was in contact with 4 days ago just tested positive  Patient states she is just worried about being exposed and needs to know if she has it or not  Denies symptoms  States she is eating and drinking normally  No cough, sob, or chest pain  No n/v  She is up to date with her doctors regarding her chronic medical conditions  History provided by:  Patient  Evaluation of Abnormal Diagnostic Test      Prior to Admission Medications   Prescriptions Last Dose Informant Patient Reported? Taking?    ADMELOG SOLOSTAR 100 units/mL injection pen   Yes No   BASAGLAR KWIKPEN 100 units/mL injection pen   Yes No   EASY COMFORT PEN NEEDLES 32G X 4 MM MISC   Yes No   EASY TOUCH INSULIN SYRINGE 31G X 5/16" 0 3 ML MISC   Yes No   HYDROcodone-acetaminophen (NORCO) 5-325 mg per tablet   Yes No   HYDROcodone-acetaminophen (NORCO) 5-325 mg per tablet   No No   Sig: Take 1 tablet by mouth every 6 (six) hours as needed for painMax Daily Amount: 4 tablets   Patient not taking: Reported on 3/22/2021   SUMAtriptan Succinate (IMITREX STATDOSE REFILL) 6 MG/0 5ML SOCT   Yes No   Sig: Inject 6 mg under the skin every 2 (two) hours as needed   Ultra Thin Lancets 31G MISC   Yes No   Sig: USE TO MONITOR BLOOD GLUCOSE 3 TIMES A DAY AS DIRECTED    acetaminophen (TYLENOL) 650 mg CR tablet   No No   Sig: Take 1 tablet (650 mg total) by mouth every 8 (eight) hours as needed for mild pain   albuterol (PROVENTIL HFA,VENTOLIN HFA) 90 mcg/act inhaler   Yes No   Sig: Inhale 2 puffs every 6 (six) hours as needed for wheezing   buPROPion (WELLBUTRIN XL) 300 mg 24 hr tablet   Yes No   Sig: Take 300 mg by mouth   cholecalciferol (VITAMIN D3) 1,000 units tablet   Yes No   Sig: Take by mouth Three times a day   cloNIDine (CATAPRES) 0 1 mg tablet   Yes No   Si 2 mg every 12 (twelve) hours    cloNIDine (CATAPRES) 0 2 mg tablet   Yes No   Sig: Take 0 2 mg by mouth 2 (two) times a day   doxycycline hyclate (VIBRA-TABS) 100 mg tablet   Yes No   Sig: TAKE 1 TABLET (100 MG TOTAL) BY MOUTH 2 (TWO) TIMES A DAY FOR 7 DAYS   ergocalciferol (ERGOCALCIFEROL) 1 25 MG (02858 UT) capsule   Yes No   Sig: Take 50,000 Units by mouth   escitalopram (LEXAPRO) 10 mg tablet  Self Yes No   Si mg    fenofibrate (TRICOR) 54 MG tablet   Yes No   Sig: TAKE 1 TABLET (54 MG TOTAL) BY MOUTH DAILY  folic acid (FOLVITE) 1 mg tablet   Yes No   Sig: Take 1,000 mcg by mouth daily   glucose blood test strip   Yes No   Sig: USE TO MONITOR BG 3 TIMES A DAY   E11 9   hydrOXYzine HCL (ATARAX) 50 mg tablet   Yes No   Sig: Take 50 mg by mouth daily at bedtime   hydrOXYzine pamoate (VISTARIL) 50 mg capsule   Yes No   Sig: Take 50 mg by mouth   insulin glargine (LANTUS SOLOSTAR) 100 units/mL injection pen   Yes No   Sig: Inject 55 Units under the skin 2 (two) times a day   insulin lispro (HUMALOG KWIKPEN) 100 units/mL injection pen   Yes No   Sig: Inject 1-15 Units under the skin   lamoTRIgine (LaMICtal) 150 MG tablet   Yes No   Sig: Take 150 mg by mouth daily   lamoTRIgine (LaMICtal) 25 mg tablet   Yes No   Sig: Take 150 mg by mouth daily    levothyroxine 50 mcg tablet   Yes No   Sig: Take 50 mcg by mouth daily   metFORMIN (GLUCOPHAGE) 500 mg tablet   No No   Sig: Take 1 tablet (500 mg total) by mouth 2 (two) times a day with meals   methocarbamol (ROBAXIN) 750 mg tablet   Yes No   Sig: TAKE 1 TABLET (750 MG TOTAL) BY MOUTH 4 (FOUR) TIMES A DAY AS NEEDED FOR MUSCLE SPASMS    multivitamin (THERAGRAN) TABS   Yes No   Sig: Take 1 tablet by mouth daily   mupirocin (BACTROBAN) 2 % ointment   No No   Sig: Apply topically 3 (three) times a day   ondansetron (ZOFRAN-ODT) 4 mg disintegrating tablet   Yes No   oxyCODONE (ROXICODONE) 5 mg/5 mL solution   Yes No   pantoprazole (PROTONIX) 40 mg tablet   Yes No   Sig: Take 40 mg by mouth daily   ziprasidone (GEODON) 80 mg capsule   Yes No   Sig: Take 80 mg by mouth 2 (two) times a day with meals       Facility-Administered Medications: None       Past Medical History:   Diagnosis Date    Anxiety     Arthritis     Asthma     Chronic pain     Diabetes mellitus ([facility] [address] )     Disease of thyroid gland     Lymphedema     left leg    Manic bipolar I disorder (HCC)     Morbid obesity (HCC)     OCD (obsessive compulsive disorder)     PTSD (post-traumatic stress disorder)        Past Surgical History:   Procedure Laterality Date    CHOLECYSTECTOMY      CHOLECYSTECTOMY      MA AMPUTATION METATARSAL+TOE,SINGLE Left 9/8/2019    Procedure: PARTIAL 3RD RAY RESECTION, PSB TOE FILLET FLAP;  Surgeon: Nael Calvin DPM;  Location: [facility];  Service: Podiatry    TOE AMPUTATION Right 1/13/2021    Procedure: AMPUTATION 2ND TOE;  Surgeon: Glenroy Solomon DPM;  Location: [facility];  Service: Podiatry       Family History   Problem Relation Age of Onset    Hyperlipidemia Mother     Diabetes Mother     Hypertension Mother     Heart attack Father      I have reviewed and agree with the history as documented  E-Cigarette/Vaping    E-Cigarette Use Current Some Day User      E-Cigarette/Vaping Substances    Nicotine No     THC No     CBD No     Flavoring No     Other No     Unknown No      Social History     Tobacco Use    Smoking status: Current Every Day Smoker     Packs/day: 0 50     Years: 15 00     Pack years: 7 50    Smokeless tobacco: Never Used   Substance Use Topics    Alcohol use: Never     Frequency: Never     Drinks per session: Patient refused     Binge frequency: Never    Drug use: Never       Review of Systems   Constitutional: Negative for chills and fever  HENT: Negative for congestion  Respiratory: Negative for cough and shortness of breath      Cardiovascular: Negative for chest pain  Gastrointestinal: Negative for nausea and vomiting  Genitourinary: Negative for difficulty urinating  Musculoskeletal: Negative for myalgias  Skin: Negative for rash  Neurological: Negative for dizziness and headaches  Physical Exam  Physical Exam  Vitals signs and nursing note reviewed  Constitutional:       Appearance: Normal appearance  HENT:      Head: Normocephalic and atraumatic  Eyes:      General: No scleral icterus  Conjunctiva/sclera: Conjunctivae normal    Cardiovascular:      Rate and Rhythm: Normal rate  Pulmonary:      Effort: No respiratory distress  Abdominal:      General: There is no distension  Musculoskeletal: Normal range of motion  Skin:     Findings: No erythema  Neurological:      Mental Status: She is alert and oriented to person, place, and time  Psychiatric:         Mood and Affect: Mood normal          Vital Signs  ED Triage Vitals [04/10/21 1023]   Temperature Pulse Respirations Blood Pressure SpO2   (!) 97 °F (36 1 °C) 78 16 127/59 98 %      Temp src Heart Rate Source Patient Position - Orthostatic VS BP Location FiO2 (%)   -- -- -- -- --      Pain Score       --           Vitals:    04/10/21 1023   BP: 127/59   Pulse: 78         Visual Acuity      ED Medications  Medications - No data to display    Diagnostic Studies  Results Reviewed     Procedure Component Value Units Date/Time    Novel Coronavirus [facility] [[id number]] Updated: 04/10/21 1050    Lab Status: In process Specimen: Nares from Nose                  No orders to display              Procedures  Procedures         ED Course                                           MDM  Number of Diagnoses or Management Options  Close exposure to COVID-19 virus:   Risk of Complications, Morbidity, and/or Mortality  General comments: Discussed with patient that we will call her with COVID results      Patient Progress  Patient progress: stable      Disposition  Final diagnoses: Close exposure to COVID-19 virus     Time reflects when diagnosis was documented in both MDM as applicable and the Disposition within this note     Time User Action Codes Description Comment    4/10/2021 10:29 AM Aishwarya Cantu Add [Z20 822] Close exposure to COVID-19 virus       ED Disposition     ED Disposition Condition Date/Time Comment    Discharge Stable Sat Apr 10, 2021 10:29 AM Willistoncelio Aquinoraymond discharge to home/self care  Follow-up Information     Follow up With Specialties Details Why Contact Info Additional [address], DO Internal Medicine In 3 days  [address]  [address] [place] [id number]  [facility] Emergency Department Emergency Medicine  If symptoms worsen [address]  [address] [facility] [address] Emergency Department, [address], [place], [facility] [address]          Discharge Medication List as of 4/10/2021 10:36 AM      CONTINUE these medications which have NOT CHANGED    Details   acetaminophen (TYLENOL) 650 mg CR tablet Take 1 tablet (650 mg total) by mouth every 8 (eight) hours as needed for mild pain, Starting Mon 3/8/2021, Normal      ADMELOG SOLOSTAR 100 units/mL injection pen Starting Wed 3/20/2019, Historical Med      albuterol (PROVENTIL HFA,VENTOLIN HFA) 90 mcg/act inhaler Inhale 2 puffs every 6 (six) hours as needed for wheezing, Historical Med      !!  BASAGLAR KWIKPEN 100 units/mL injection pen Starting Wed 3/20/2019, Historical Med      buPROPion (WELLBUTRIN XL) 300 mg 24 hr tablet Take 300 mg by mouth, Historical Med      cholecalciferol (VITAMIN D3) 1,000 units tablet Take by mouth Three times a day, Historical Med      !! cloNIDine (CATAPRES) 0 1 mg tablet 0 2 mg every 12 (twelve) hours , Starting Mon 4/25/2016, Historical Med      !! cloNIDine (CATAPRES) 0 2 mg tablet Take 0 2 mg by mouth 2 (two) times a day, Starting Mon 2/15/2021, Historical Med      doxycycline hyclate (VIBRA-TABS) 100 mg tablet TAKE 1 TABLET (100 MG TOTAL) BY MOUTH 2 (TWO) TIMES A DAY FOR 7 DAYS, Historical Med      EASY COMFORT PEN NEEDLES 32G X 4 MM MISC Starting Wed 3/20/2019, Historical Med      EASY TOUCH INSULIN SYRINGE 31G X 5/16" 0 3 ML MISC Starting Sat 2/2/2019, Historical Med      ergocalciferol (ERGOCALCIFEROL) 1 25 MG (79902 UT) capsule Take 50,000 Units by mouth, Starting Fri 3/5/2021, Until Fri 5/28/2021, Historical Med      escitalopram (LEXAPRO) 10 mg tablet 175 mg , Historical Med      fenofibrate (TRICOR) 54 MG tablet TAKE 1 TABLET (54 MG TOTAL) BY MOUTH DAILY  , Historical Med      folic acid (FOLVITE) 1 mg tablet Take 1,000 mcg by mouth daily, Starting Mon 2/22/2021, Historical Med      glucose blood test strip USE TO MONITOR BG 3 TIMES A DAY   E11 9, Historical Med      !! HYDROcodone-acetaminophen (NORCO) 5-325 mg per tablet Starting Sat 4/13/2019, Historical Med      !! HYDROcodone-acetaminophen (NORCO) 5-325 mg per tablet Take 1 tablet by mouth every 6 (six) hours as needed for painMax Daily Amount: 4 tablets, Starting Wed 12/2/2020, Normal      hydrOXYzine HCL (ATARAX) 50 mg tablet Take 50 mg by mouth daily at bedtime, Starting Tue 9/3/2019, Historical Med      hydrOXYzine pamoate (VISTARIL) 50 mg capsule Take 50 mg by mouth, Starting Tue 8/7/2018, Historical Med      !! insulin glargine (LANTUS SOLOSTAR) 100 units/mL injection pen Inject 55 Units under the skin 2 (two) times a day, Starting Wed 3/20/2019, Historical Med      insulin lispro (HUMALOG KWIKPEN) 100 units/mL injection pen Inject 1-15 Units under the skin, Starting Wed 3/20/2019, Historical Med      !! lamoTRIgine (LaMICtal) 150 MG tablet Take 150 mg by mouth daily, Starting Sun 2/21/2021, Historical Med      !! lamoTRIgine (LaMICtal) 25 mg tablet Take 150 mg by mouth daily , Starting Wed 7/8/2020, Until Thu 7/8/2021, Historical Med      levothyroxine 50 mcg tablet Take 50 mcg by mouth daily, Starting Fri 3/8/2019, Historical Med      metFORMIN (GLUCOPHAGE) 500 mg tablet Take 1 tablet (500 mg total) by mouth 2 (two) times a day with meals, Starting Tue 9/10/2019, Print      methocarbamol (ROBAXIN) 750 mg tablet TAKE 1 TABLET (750 MG TOTAL) BY MOUTH 4 (FOUR) TIMES A DAY AS NEEDED FOR MUSCLE SPASMS , Historical Med      multivitamin (THERAGRAN) TABS Take 1 tablet by mouth daily, Historical Med      mupirocin (BACTROBAN) 2 % ointment Apply topically 3 (three) times a day, Starting Mon 3/22/2021, Normal      ondansetron (ZOFRAN-ODT) 4 mg disintegrating tablet Starting Tue 3/19/2019, Historical Med      oxyCODONE (ROXICODONE) 5 mg/5 mL solution Starting Tue 3/19/2019, Historical Med      pantoprazole (PROTONIX) 40 mg tablet Take 40 mg by mouth daily, Starting Mon 12/7/2020, Until Tue 12/7/2021, Historical Med      SUMAtriptan Succinate (IMITREX STATDOSE REFILL) 6 MG/0 5ML SOCT Inject 6 mg under the skin every 2 (two) hours as needed, Starting Tue 11/29/2016, Historical Med      Ultra Thin Lancets 31G MISC USE TO MONITOR BLOOD GLUCOSE 3 TIMES A DAY AS DIRECTED , Historical Med      ziprasidone (GEODON) 80 mg capsule Take 80 mg by mouth 2 (two) times a day with meals , Starting Tue 5/15/2018, Historical Med       !! - Potential duplicate medications found  Please discuss with provider  No discharge procedures on file      PDMP Review     None          ED Provider  Electronically Signed by           Viral Zhong PA-C  04/10/21 3886

## 2021-04-10 NOTE — DISCHARGE INSTRUCTIONS
COVID-19 and Chronic Health Conditions   WHAT YOU NEED TO KNOW:   Your chronic condition may increase your risk for COVID-19 or serious problems it can cause, such as pneumonia  Healthcare providers might need to make changes that affect how you usually manage your chronic health condition  Providers may change hours of operation or not have patients come in to be seen  You may not be able to make appointments to get blood drawn or to have tests or procedures  This may continue until the virus that causes COVID-19 is controlled  Until then, you can take steps to manage your condition  The steps will also lower your risk for COVID-19 or the serious problems it causes  DISCHARGE INSTRUCTIONS:   If you think you may be infected with the new coronavirus,  do the following to protect others:  · If emergency care is needed,  tell the  about the possible infection, or call ahead and tell the emergency department  · Call a healthcare provider  for instructions if symptoms are mild  Do not  arrive without calling first  Your provider will need to protect staff members and other patients  · Cover your mouth and nose  while you are getting medical care  This will help lower the risk of infecting others  Call your local emergency number [phone] in the [address] [place] [address],[address]) or emergency department if:   · You have trouble breathing or shortness of breath  · You have chest pain or pressure that lasts longer than 5 minutes  · You become confused or hard to wake  · Your lips or face are blue  · You have a fever of 104°F (40°C) or higher  Call your doctor or healthcare provider if:   · You do not  have symptoms of COVID-19 but had close physical contact within 14 days with someone who tested positive  · You have questions or concerns about your COVID-19 or your chronic condition      The following may increase your risk for serious effects of COVID-19:   · Age 72 years or older    · Obesity, diabetes, cancer, or a liver disease    · Kidney failure, chronic kidney disease, or sickle cell disease    · Lung disease, COPD, moderate-to-severe asthma, cystic fibrosis, or pulmonary fibrosis (scarring in your lungs)    · A severe heart disease, such as coronary artery disease or heart failure    · A brain blood vessel disorder or disease, or a condition such as dementia    · Living in a nursing home or long-term care facility    · Smoking cigarettes    · Hypertension (high blood pressure) or thalassemia    · An immune system problem, HIV or AIDS, or a blood or bone marrow transplant    · Long-term use of certain medicines, such as corticosteroids    · Pregnancy    Manage your chronic health condition during this time:  If you do not have a regular healthcare provider, experts recommend you contact a local [facility] health center or health department  The following can help you manage your condition and prevent COVID-19:  · Get emergency care for your condition if needed  Talk to your healthcare providers about symptoms of your chronic condition that need immediate care  Your providers can help you create a plan or add exacerbation management to your plan  The plan will include when to go to an emergency department and when to call your local emergency number  This will depend on where you live and the services that are available during this time  · Go to dialysis appointments as scheduled  It is important to stay on schedule  You will need to have enough food to be able to follow the emergency diet plan if you must miss a session  The emergency diet needs to be part of the management plan for your condition  · Reschedule any upcoming appointments as needed  Medical facilities may be closed until the new coronavirus is better controlled  This means you may need to reschedule a surgery, procedure, or check-up appointment  If you cannot have a phone or video appointment, you will need to make a new appointment   Some providers may be scheduling appointments several months in advance  Some surgeries and procedures will happen as scheduled  This depends on the medical condition and the reason for the surgery or procedure  You may need to have extra testing for COVID-19 several days before  · Follow any regular management plan you use  Your healthcare provider will tell you if you need to make any changes to your regular management plan  For example, if you have asthma, continue to follow your asthma action plan  If you have diabetes, you may need to check your blood sugar level more often  Stress and illness can make blood sugar levels go up  You may need to adjust medicine such as insulin  If you have a heart condition or high blood pressure, you may need to check your blood pressure more often  Stress and illness can also raise your blood pressure  · Talk to your healthcare providers about your medicines  You may be able to get more than 1 month of medicine at a time  This will lower the number of times you need to go to a pharmacy to get your medicines  Make sure you have enough medicine if you have a condition that can lead to an emergency  Examples include asthma medicines, insulin, or an epinephrine pen  Check the expiration dates on the medicines you currently have  Ask for refills as soon as possible, if needed  If it is not time to refill prescriptions, you may be able to get an emergency supply of some medicines  Medicine plans vary, so ask your healthcare provider or pharmacist for options  · Have supplies available in your home  If possible, get extra supplies you use regularly  Examples include absorbent pads, syringes, and wound cleaning solutions  This will limit the number of trips out of your home to get supplies  · Know the signs and symptoms of COVID-19  Signs and symptoms usually start about 5 days after infection but can take 2 to 14 days  Signs and symptoms range from mild to severe   You may feel like you have the flu or a bad cold  Your chronic health condition may cause some of the same symptoms COVID-19 causes  This can make it hard to know if a symptom is from COVID-19 or your chronic condition  Keep a record of any new or worsening symptom you have  This is especially important if you have a condition that often causes shortness of breath  Your provider can tell you if you should be tested for COVID-19  Information is still being learned  Tell your healthcare provider if you think you were infected but develop signs or symptoms not listed below:    ? A cough    ? Shortness of breath or trouble breathing that may become severe    ? A fever of at least 100 4°F, or 38°C (may be lower in adults 65 or older)    ? Chills that might include shaking    ? Muscle pain, body aches, or a headache    ? A sore throat    ? Suddenly not being able to taste or smell anything    ? Feeling very tired (fatigue)    ? Congestion (stuffy head and nose), or a runny nose    ? Diarrhea, nausea, or vomiting    What you can do to prevent having to go out of your home during this time:   · Ask your healthcare provider for other ways to have appointments  You may be able to have appointments without having to go into the provider's office  Some providers offer phone, video, or other types of appointments  · Have food, medicines, and other supplies delivered  Some pharmacies can send certain medicines to you through the mail  Grocery stores and restaurants may be able to deliver food and other items  If possible, have delivered items placed somewhere  Try not to have someone hand you an item  You will be so close to the person that the virus can spread between you  Wash your hands after you put the delivered items away  · Ask someone to get items you need  The person can get groceries, medicines, or other needed items for you  Choose a person who does not have signs or symptoms of COVID-19 or has tested negative for it   The person should not be waiting for test results  He or she should not have a condition that increases the risk for COVID-19 or serious problems it causes  Lower your risk for COVID-19:  The virus that causes COVID-19 spreads quickly and easily  It mainly spreads through droplets that form when a person talks, coughs, or sneezes  An infected person may also be able to leave the virus on objects and surfaces  Another person can get the virus on his or her hands by touching the object or surface  Infection happens if the person then touches his or her eyes or mouth with unwashed hands  The best way to prevent infection is to avoid anyone who is infected, but this can be hard to do  An infected person can spread the virus before signs or symptoms begin, or even if signs or symptoms never develop  The following are ways to prevent the spread of the virus and lower your risk for COVID-19:      · Wash your hands often throughout the day  Use soap and water  Rub your soapy hands together, lacing your fingers  Wash the front and back of each hand, and in between your fingers  Use the fingers of one hand to scrub under the fingernails of the other hand  Wash for at least 20 seconds  Rinse with warm, running water for several seconds  Then dry your hands with a clean towel or paper towel  Use hand  that contains alcohol if soap and water are not available  Do not touch your eyes, nose, or mouth without washing your hands first  Teach children how to wash their hands  · Cover sneezes and coughs  Turn your face away and cover your mouth and nose with a tissue  Throw the tissue away  Use the bend of your arm if a tissue is not available  Then wash your hands well with soap and water or use hand   Turn your head and cover your face if someone near you is coughing or sneezing  Teach children how to cover a cough or sneeze  Remind them to wash their hands  · Make a habit of not touching your face    If you get the virus on your hands, you can transfer it to your eyes, nose, or mouth and become infected  · Clean and disinfect high-touch surfaces and objects in your home often  Do this regularly, even if you think no one living in or coming to your home is infected with the virus  Surfaces include countertops, cupboard doors, desks, handles, handrails, doorknobs, toilet handles, faucets, chairs, and light switches  Objects include keys, phones, computer keyboards and mice, video games, remote controls, and children's toys  Some surfaces and objects may need to be cleaned several times each day, depending on how often they are used  ? Use disinfecting wipes or a disinfecting solution  You can make a solution by mixing 4 teaspoons of bleach with 1 quart (4 cups) of water  Clean with a sponge or cloth that can be thrown away or washed in hot, soapy water and reused  Clean used dishes and utensils in hot, soapy water or in the   ? Be careful with   Do not use any cleaning or disinfecting products that can trigger an asthma attack or other breathing problems  Open windows or have circulating air as you clean  Do not  mix ammonia with bleach  This will create toxic fumes  Read the labels of all products you use  · Ask about vaccines you may need  No vaccine is available yet for the new coronavirus  It is still a good idea to get other vaccines to help protect your immune system  Get the influenza (flu) vaccine as soon as recommended each year, usually starting in September or October  A flu infection can make COVID-19 worse if you have them at the same time  The flu can also cause signs and symptoms that are similar to COVID-19  Get the pneumonia vaccine if recommended  Your healthcare provider can tell you if you also need other vaccines, and when to get them    Follow social distancing guidelines if you must go out of your home during this time:  Social distancing means people stay far enough apart physically that the virus cannot spread from one person to another  National and local social distancing rules vary  Rules may change over time as restrictions are lifted, but they may return if an outbreak happens where you live  It is important to know and follow all current social distancing rules in your area  The following are general guidelines:  · Limit trips out of your home  Most local guidelines allow leaving the home to buy food or supplies, or to seek medical care if necessary  · Stay at least 6 feet (2 meters) away from anyone who does not live in your home  Do not shake hands with, hug, or kiss a person as a greeting  Stand or walk as far from others as possible, especially around anyone who is sneezing or coughing  If you must use public transportation (such as a bus or subway), try to sit or stand away from others  You can stay safely connected with others through phone calls, e-mail messages, social media websites, and video chats  Check in on anyone who may be having a hard time socially distancing, or who lives alone  Ask administrators at nursing homes or long-term care facilities how you can safely communicate with someone living there  · Wear a cloth face covering (mask) when you must go out  Only do this if your chronic condition does not cause breathing problems  You can make a face covering out of a thick cloth  This will save medical masks for healthcare workers and first responders  Choose fabric that holds its shape after it is washed and dried, such as cotton  Put the top half of a paper coffee filter in the middle of the fabric to create an extra filter for you  Check that you can breathe through the fabric when it is folded into several layers  Fold the fabric into a long band  Put each end through a rubber band or hair tie to create ear loops  Fold the ends of the fabric toward the middle  Hold the covering to your face   Adjust it so it covers your nose and mouth completely  Hook the loops onto your ears to keep the covering in place  The following are important points to remember:     ? Do not use a plastic face shield instead of a cloth covering  A face shield will not protect others from droplets  If a face shield must be used, choose one that wraps around both sides of the face and goes below the chin  Do not use disposable shields more than 1 time  Arleta Speaks that can be used again need to be cleaned and disinfected between uses  Do not put a face shield or a cloth covering on a  or infant  These increase the risk for sudden infant death syndrome (SIDS)  ? Continue social distancing while you are out  A face covering does not mean you can have close physical contact with others  You still need to stay at least 6 feet (2 meters) away from others  ? Do not touch your face covering or eyes while you are wearing the covering  Your eyes will not be covered by the cloth  You can be infected if the virus is on your hand and you touch your eyes  Wash your hands with soap and water for 20 seconds or use hand  before you remove your face covering  ? Do not remove your face covering to talk, sneeze, or cough  Your face covering will help protect you and others around you  ? Wash face coverings often  Wash them in a washing machine in the warmest water the fabric allows  Make sure the fabric is completely dry before you use the face covering again  Only wear clean coverings when you go out  Use a new coffee filter each time  ? Certain individuals should not use face coverings  Do not put a face covering on anyone who is younger than 2 years  Lugenia Pouch children may not be able to breathe through the covering  Do not put a face covering on anyone who cannot remove it by himself or herself  ? You can use a clear face covering if others need to read your lips    A clear covering may be helpful if you communicate with someone who is deaf or hard of hearing  A clear covering is made of cloth but has plastic over the mouth area  This will help the person see your lips more easily  Do not use a plastic face shield instead  ? Do not use face coverings that have breathing valves or vents  Valves or vents on the sides are designed to allow breathed air to go out  This makes breathing easier, but the virus can travel out of the valve or vent and be spread to others  · Do not have anyone over to your home unless it is necessary  It is okay to have medical workers in to provide care  Wear your face covering, and remind medical workers to wear a mask or face covering  Remind them to wash their hands when they arrive and before they leave  Do not  have family members, friends, or neighbors over, even if they are not sick  A person can pass the new virus to others before symptoms of COVID-19 begin  Some people never even develop symptoms  Children commonly have mild symptoms or no symptoms  It is important that you continue social distancing with everyone, including children  It may be hard to tell a child not to hug or kiss you  Explain that this is how he or she can help you stay healthy  · Do not go to someone else's home unless it is necessary  Do not go over to visit, even if the person is lonely  Only go if you need to help him or her  · Avoid large gatherings and crowds  Gatherings or crowds of 10 or more individuals can cause the virus to spread  Examples of gatherings include parties, sporting events, [employer] orth[employer] services, and conferences  Crowds may form at beaches, bach, and tourist attractions  You can continue to protect yourself by staying away from large gatherings and crowds  · Stay safe if you must go out to work  You may have a job only done outside your home that is considered essential  Keep physical distance between you and other workers as much as possible  Follow your employer's rules so everyone stays safe      Help strengthen your immune system:   · Do not smoke  Nicotine and other chemicals in cigarettes and cigars can cause lung damage and increase your risk for infections such as bronchitis or pneumonia  Ask your healthcare provider for information if you currently smoke and need help to quit  E-cigarettes or smokeless tobacco still contain nicotine  Talk to your healthcare provider before you use these products  · Eat a variety of healthy foods  Examples include vegetables, fruits, whole-grain breads and cereals, lean meats and poultry, fish, low-fat dairy products, and cooked beans  Healthy foods contain nutrients that help keep your immune system strong  · Find ways to manage stress  You may be feeling more stressed than usual because of the COVID-19 outbreak  The situation is very stressful to many people  Talk to your healthcare providers about ways to manage stress during this time  Stress can lead to breathing problems or make the problems worse  Stress can trigger an attack or exacerbation of many health conditions  It is important to do things that help you feel more relaxed, such as the following:     ? Pick 1 or 2 times a day to watch the news  Constant news watching can increase your stress levels  ? Talk to a friend on the phone or through a video chat  ? Take a warm, soothing bath  ? Listen to music  ? Exercise can also help relieve stress  This may be hard if your regular gym or outdoor exercise area is closed  If you do not have exercise equipment at home, try walking inside your home  You can walk quickly or turn on music and dance  Follow up with your doctor or healthcare provider as directed: Your providers will tell you when you can come in for tests, procedures, or check-ups  Bring your symptom record with you to all appointments  Write down your questions so you remember to ask them during your visits    For more information:   · Centers for Disease Control and Prevention  [address]   [place] , [address]  Phone: 1- 768 - 1881394  Phone: 0- 402 - 2804725  Web Address: [employer] com br    © Copyright [address] Information is for End User's use only and may not be sold, redistributed or otherwise used for commercial purposes  All illustrations and images included in CareNotes® are the copyrighted property of [employer]  or [facility] Citlali Segundo   The above information is an  only  It is not intended as medical advice for individual conditions or treatments  Talk to your doctor, nurse or pharmacist before following any medical regimen to see if it is safe and effective for you

## 2021-04-16 ENCOUNTER — OFFICE VISIT (OUTPATIENT)
Dept: WOUND CARE | Facility: HOSPITAL | Age: 46
End: 2021-04-16
Payer: COMMERCIAL

## 2021-04-16 VITALS
DIASTOLIC BLOOD PRESSURE: 92 MMHG | TEMPERATURE: 97.3 F | RESPIRATION RATE: 18 BRPM | HEART RATE: 72 BPM | SYSTOLIC BLOOD PRESSURE: 150 MMHG

## 2021-04-16 DIAGNOSIS — E11.40 TYPE 2 DIABETES MELLITUS WITH DIABETIC NEUROPATHY, WITH LONG-TERM CURRENT USE OF INSULIN (HCC): ICD-10-CM

## 2021-04-16 DIAGNOSIS — Z79.4 TYPE 2 DIABETES MELLITUS WITH DIABETIC NEUROPATHY, WITH LONG-TERM CURRENT USE OF INSULIN (HCC): ICD-10-CM

## 2021-04-16 DIAGNOSIS — L97.512 RIGHT FOOT ULCER, WITH FAT LAYER EXPOSED (HCC): Primary | ICD-10-CM

## 2021-04-16 PROCEDURE — 29445 APPL RIGID TOT CNTC LEG CAST: CPT | Performed by: PODIATRIST

## 2021-04-16 PROCEDURE — 99213 OFFICE O/P EST LOW 20 MIN: CPT | Performed by: PODIATRIST

## 2021-04-16 NOTE — PROGRESS NOTES
Patient ID: Vani Holman is a 40 y o  female Date of Birth 1975     My role is Foot, Ankle and Wound Specialist    Chief Complaint   Patient presents with    Follow Up Wound Care Visit     right foot wound        Diabetic foot ulcer    Subjective:   Berny Thorpe here with  plantar diabetic foot ulcer  She's in a bulky offloading dressing that is somewhat disrupted noted by RN staff  Patient relates she is "sleepwalking" a lot on the foot HS        The following portions of the patient's history were reviewed and updated as appropriate:   Patient Active Problem List   Diagnosis    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, with bone involvement without evidence of necrosis ([address] )    H/O bariatric surgery    Anxiety    Morbid obesity ([address] )    Preoperative clearance    Encounter for smoking cessation counseling    Hypothyroidism    Manic bipolar I disorder ([address] )    Type 2 diabetes mellitus with neurologic complication, with long-term current use of insulin ([address] )    Tobacco abuse    Pyogenic inflammation of bone ([address] )    Type 2 diabetes mellitus ([address] )    Acquired absence of other left toe(s) ([address] )    Essential hypertension    Arthritis    Chronic pain    OCD (obsessive compulsive disorder)    PTSD (post-traumatic stress disorder)    Smoking    Right foot ulcer, with fat layer exposed ([address] )     Past Medical History:   Diagnosis Date    Anxiety     Arthritis     Asthma     Chronic pain     Diabetes mellitus ([address] )     Disease of thyroid gland     Lymphedema     left leg    Manic bipolar I disorder ([address] )     Morbid obesity ([address] )     OCD (obsessive compulsive disorder)     PTSD (post-traumatic stress disorder)      Past Surgical History:   Procedure Laterality Date    CHOLECYSTECTOMY      CHOLECYSTECTOMY      NV AMPUTATION METATARSAL+TOE,SINGLE Left 9/8/2019    Procedure: PARTIAL 3RD RAY RESECTION, PSB TOE FILLET FLAP;  Surgeon: Earnest Pond, SORAYA;  [facility]: [facility];  Service: Podiatry    TOE AMPUTATION Right 1/13/2021    Procedure: AMPUTATION 2ND TOE;  Surgeon: Martha Patton DPM;  Location: [facility];  Service: Podiatry     Social History     Socioeconomic History    Marital status: Single     Spouse name: None    Number of children: None    Years of education: None    Highest education level: None   Occupational History    None   Social Needs    Financial resource strain: None    Food insecurity     Worry: None     Inability: None    Transportation needs     Medical: None     Non-medical: None   Tobacco Use    Smoking status: Current Every Day Smoker     Packs/day: 0 50     Years: 15 00     Pack years: 7 50    Smokeless tobacco: Never Used   Substance and Sexual Activity    Alcohol use: Never     Frequency: Never     Drinks per session: Patient refused     Binge frequency: Never    Drug use: Never    Sexual activity: Not Currently   Lifestyle    Physical activity     Days per week: None     Minutes per session: None    Stress: None   Relationships    Social connections     Talks on phone: None     Gets together: None     Attends Oriental orthodox service: None     Active member of club or organization: None     Attends meetings of clubs or organizations: None     Relationship status: None    Intimate partner violence     Fear of current or ex partner: None     Emotionally abused: None     Physically abused: None     Forced sexual activity: None   Other Topics Concern    None   Social History Narrative    None        Current Outpatient Medications:     acetaminophen (TYLENOL) 650 mg CR tablet, Take 1 tablet (650 mg total) by mouth every 8 (eight) hours as needed for mild pain, Disp: 30 tablet, Rfl: 2    ADMELOG SOLOSTAR 100 units/mL injection pen, , Disp: , Rfl:     albuterol (PROVENTIL HFA,VENTOLIN HFA) 90 mcg/act inhaler, Inhale 2 puffs every 6 (six) hours as needed for wheezing, Disp: , Rfl:     BASAGLAR KWIKPEN 100 units/mL injection pen, , Disp: , Rfl:     buPROPion (WELLBUTRIN XL) 300 mg 24 hr tablet, Take 300 mg by mouth, Disp: , Rfl:     cholecalciferol (VITAMIN D3) 1,000 units tablet, Take by mouth Three times a day, Disp: , Rfl:     cloNIDine (CATAPRES) 0 1 mg tablet, 0 2 mg every 12 (twelve) hours , Disp: , Rfl:     cloNIDine (CATAPRES) 0 2 mg tablet, Take 0 2 mg by mouth 2 (two) times a day, Disp: , Rfl:     doxycycline hyclate (VIBRA-TABS) 100 mg tablet, TAKE 1 TABLET (100 MG TOTAL) BY MOUTH 2 (TWO) TIMES A DAY FOR 7 DAYS, Disp: , Rfl:     EASY COMFORT PEN NEEDLES 32G X 4 MM MISC, , Disp: , Rfl:     EASY TOUCH INSULIN SYRINGE 31G X 5/16" 0 3 ML MISC, , Disp: , Rfl:     ergocalciferol (ERGOCALCIFEROL) 1 25 MG (88560 UT) capsule, Take 50,000 Units by mouth, Disp: , Rfl:     escitalopram (LEXAPRO) 10 mg tablet, 175 mg , Disp: , Rfl:     fenofibrate (TRICOR) 54 MG tablet, TAKE 1 TABLET (54 MG TOTAL) BY MOUTH DAILY  , Disp: , Rfl:     folic acid (FOLVITE) 1 mg tablet, Take 1,000 mcg by mouth daily, Disp: , Rfl:     glucose blood test strip, USE TO MONITOR BG 3 TIMES A DAY   E11 9, Disp: , Rfl:     HYDROcodone-acetaminophen (NORCO) 5-325 mg per tablet, , Disp: , Rfl:     HYDROcodone-acetaminophen (NORCO) 5-325 mg per tablet, Take 1 tablet by mouth every 6 (six) hours as needed for painMax Daily Amount: 4 tablets (Patient not taking: Reported on 3/22/2021), Disp: 7 tablet, Rfl: 0    hydrOXYzine HCL (ATARAX) 50 mg tablet, Take 50 mg by mouth daily at bedtime, Disp: , Rfl: 0    hydrOXYzine pamoate (VISTARIL) 50 mg capsule, Take 50 mg by mouth, Disp: , Rfl:     insulin glargine (LANTUS SOLOSTAR) 100 units/mL injection pen, Inject 55 Units under the skin 2 (two) times a day, Disp: , Rfl:     insulin lispro (HUMALOG KWIKPEN) 100 units/mL injection pen, Inject 1-15 Units under the skin, Disp: , Rfl:     lamoTRIgine (LaMICtal) 150 MG tablet, Take 150 mg by mouth daily, Disp: , Rfl:     lamoTRIgine (LaMICtal) 25 mg tablet, Take 50 mg by mouth daily at bedtime , Disp: , Rfl:     levothyroxine 50 mcg tablet, Take 50 mcg by mouth daily, Disp: , Rfl:     metFORMIN (GLUCOPHAGE) 500 mg tablet, Take 1 tablet (500 mg total) by mouth 2 (two) times a day with meals, Disp: 60 tablet, Rfl: 0    methocarbamol (ROBAXIN) 750 mg tablet, TAKE 1 TABLET (750 MG TOTAL) BY MOUTH 4 (FOUR) TIMES A DAY AS NEEDED FOR MUSCLE SPASMS , Disp: , Rfl:     multivitamin (THERAGRAN) TABS, Take 1 tablet by mouth daily, Disp: , Rfl:     mupirocin (BACTROBAN) 2 % ointment, Apply topically 3 (three) times a day, Disp: 22 g, Rfl: 0    ondansetron (ZOFRAN-ODT) 4 mg disintegrating tablet, , Disp: , Rfl:     oxyCODONE (ROXICODONE) 5 mg/5 mL solution, , Disp: , Rfl:     pantoprazole (PROTONIX) 40 mg tablet, Take 40 mg by mouth daily, Disp: , Rfl:     SUMAtriptan Succinate (IMITREX STATDOSE REFILL) 6 MG/0 5ML SOCT, Inject 6 mg under the skin every 2 (two) hours as needed, Disp: , Rfl:     Ultra Thin Lancets 31G MISC, USE TO MONITOR BLOOD GLUCOSE 3 TIMES A DAY AS DIRECTED , Disp: , Rfl:     ziprasidone (GEODON) 80 mg capsule, Take 80 mg by mouth 2 (two) times a day with meals , Disp: , Rfl:   Family History   Problem Relation Age of Onset    Hyperlipidemia Mother     Diabetes Mother     Hypertension Mother     Heart attack Father       Review of Systems   Constitutional: Negative  Respiratory: Negative  Cardiovascular: Negative  Gastrointestinal: Negative  Skin: Positive for wound  Neurological: Positive for numbness  Allergies  Aspirin, Barium iodide, Codeine, Latex, Vancomycin, and Penicillins    Objective:  /92   Pulse 72   Temp (!) 97 3 °F (36 3 °C)   Resp 18   LMP  (LMP Unknown)     Physical Exam  Vitals signs and nursing note reviewed  Constitutional:       General: She is not in acute distress  Appearance: She is obese  She is not ill-appearing, toxic-appearing or diaphoretic     Cardiovascular:      Pulses: Dorsalis pedis pulses are 2+ on the right side and 2+ on the left side  Posterior tibial pulses are 2+ on the right side and 2+ on the left side  Musculoskeletal:      Comments: Third toe amputated Right  Feet:      Right foot:      Protective Sensation: 10 sites tested  0 sites sensed  Left foot:      Protective Sensation: 10 sites tested  0 sites sensed  Neurological:      Mental Status: She is alert  Wound 01/13/21 Diabetic Ulcer Foot Right (Active)   Wound Image   04/16/21 0952   Wound Description Yellow;Pink 04/16/21 0957   Yvonne-wound Assessment Dry;Callus 04/16/21 0957   Wound Length (cm) 1 4 cm 04/16/21 0957   Wound Width (cm) 0 9 cm 04/16/21 0957   Wound Depth (cm) 0 2 cm 04/16/21 0957   Wound Surface Area (cm^2) 1 26 cm^2 04/16/21 0957   Wound Volume (cm^3) 0 25 cm^3 04/16/21 0957   Calculated Wound Volume (cm^3) 0 25 cm^3 04/16/21 0957   Change in Wound Size % -78 57 04/16/21 0957   Drainage Amount Small 04/16/21 0957   Drainage Description Serosanguineous 04/16/21 0957   Non-staged Wound Description Full thickness 04/16/21 0957   Dressing Status Intact 04/02/21 0955           Wound 01/13/21 Diabetic Ulcer Foot Right (Active)   Wound Image   04/16/21 0952   Wound Description Yellow;Pink 04/16/21 0957   Yvonne-wound Assessment Dry;Callus 04/16/21 0957   Wound Length (cm) 1 4 cm 04/16/21 0957   Wound Width (cm) 0 9 cm 04/16/21 0957   Wound Depth (cm) 0 2 cm 04/16/21 0957   Wound Surface Area (cm^2) 1 26 cm^2 04/16/21 0957   Wound Volume (cm^3) 0 25 cm^3 04/16/21 0957   Calculated Wound Volume (cm^3) 0 25 cm^3 04/16/21 0957   Change in Wound Size % -78 57 04/16/21 0957   Drainage Amount Small 04/16/21 0957   Drainage Description Serosanguineous 04/16/21 0957   Non-staged Wound Description Full thickness 04/16/21 0957   Dressing Status Intact 04/02/21 0955                         Diagnosis:  1   Right foot ulcer, with fat layer exposed ([facility] )  -     Wound cleansing and dressings; Future  -     Wound off loading; Future    2  Type 2 diabetes mellitus with diabetic neuropathy, with long-term current use of insulin ([facility])  -     Wound cleansing and dressings; Future  -     Wound off loading; Future        Diagnosis ICD-10-CM Associated Orders   1  Right foot ulcer, with fat layer exposed ([facility] [address] )  L97 512 Wound cleansing and dressings     Wound off loading   2  Type 2 diabetes mellitus with diabetic neuropathy, with long-term current use of insulin ([facility])  E11 40 Wound cleansing and dressings    Z79 4 Wound off loading        ASSESSMENT    Problems:    Chronic illness / Problem not at goal with exacerbation, progression or side effects of treatment  1  Diabetic foot ulcer        PLAN    Discussion of Management /  Recommendations    I feel she needs a TCC to heal in light of the situation  She is agreeable  Procedures Performed  I recommended application of the TCC to facilitate healing particularly in light of the failure to heal previously without TCC  After application of the primary dressing, a TCC was applied in a modified Carville method using stockinette, cast padding between the toes, foam over the toes, cast padding toes to knee, additional felt over the [facility] and tibial crest and finally with the foot at a 90 degree angle to the leg, a well-formed fiberglass shell with additional thickness added plantarly to avoid breakdown  A cast shoe was applied for traction  Strict instructions to keep the cast clean,dry and intact were given and also instructions not to introduce powders or foreign objects into the cast was given  Instructions were given to call if any cast breakdown, or any irritation, odor, drainage or pain was noted within the cast      I explained that good wound care and compliance are necessary to allow healing and to prevent toe loss or limb loss       No guarantees of wound healing were given and I explained clearly that there is some risk with the use of TCC such as cast "rub", cast ulcers, or hidden infection but that the benefits of the TCC far outweight the risks if patient compliance is in place  Cast Application    Date/Time: 4/16/2021 11:45 AM  Performed by: Marga Sanchez DPM  Authorized by: Marga Sanchez DPM   Universal Protocol:  Consent: Verbal consent obtained  Risks and benefits: risks, benefits and alternatives were discussed  Consent given by: patient  Time out: Immediately prior to procedure a "time out" was called to verify the correct patient, procedure, equipment, support staff and site/side marked as required  Timeout called at: 4/16/2021 11:45 AM   Patient understanding: patient states understanding of the procedure being performed  Patient identity confirmed: verbally with patient      Pre-procedure details:     Sensation:  Numbness  Procedure details:     Location:  Leg    Leg:  R lower legCast type:   Total contact    Supplies:  Cotton padding and fiberglass

## 2021-04-16 NOTE — PATIENT INSTRUCTIONS
Orders Placed This Encounter   Procedures    Wound cleansing and dressings     Right foot wound:  Do not get cast wet  Use cast cover or sponge bath    At wound care center today  Foot washed with soap and water  Wound cleansed with normal saline  Patted dry  Silver alginate applied to wound  Covered with foam  Secured with tape  tcc ez applied    Change dressing weekly at wound care center  Standing Status:   Future     Standing Expiration Date:   4/16/2022    Wound off loading     Off-loading Instructions: Total Contact Cast Instructions: Do not get cast wet  Contact wound center if there is a foul odor or becomes uncomfortable due to feeling tight or swelling  Do not use objects down inside of cast to scratch  Do not walk on cast without walking boot  Standing Status:   Future     Standing Expiration Date:   4/16/2022      TOTAL CONTACT CAST  Total Contact Casting is a method of pressure relief that has been shown to have the highest rate of wound healing  Total Contact Casting is considered the gold standard for reducing foot wound pressure  What is a Total Contact Cast?  This casting method helps promote healing and reduce pressure in the area of the wound  The cast works to redistribute weight, minimize pressure and reduce friction on the foot  The cast consists of two parts - the hard cast and a walking boot  Certain types of wounds heal better and more efficiently with Total Contact Casting  Your doctor has determined that this method of treatment is best for your wound  How long will I be wearing a cast?  The length of treatment varies from patient to patient  We will reassess your wound weekly for continued need for casting  Usually, the cast is changed weekly  Sometimes, as wounds become smaller and drain less, the cast may be changed every 2-3 weeks depending on your doctor's assessment  How will this cast change my lifestyle?    The cast takes 24 hours to cure and we ask that you absolutely minimize weight bearing during this time   Never put anything into the cast such as powders or something to scratch the skin under the cast     You will need to wear the walking boot at all times - you can cover it with a pillow case while in bed  A pillow case on the cast and a long thick sock on your other foot will protect the non-casted foot from getting irritated by the cast    The cast must be kept dry at all times  Do not take showers while the cast is on  Cover the cast with a plastic bag to protect it from getting wet during poor weather  It's VERY important that you call the office immediately if you get the cast wet (or) you feel any type of pain or even mild irritation in the cast    Do not walk without the outer walking boot firmly attached to the cast    Since you will not be walking as much as you normally do, you will need some assistance from family and friends for certain tasks  This is common when wearing any type of cast and you will find that people are eager to assist you while you are taking the necessary time to heal   If you have any questions about what you should or should not be doing, please feel free to contact us  Once your wound is healed, you will need to be fitted for proper footwear to ensure that the wound does not return  You may be casted until this process is completed  It is important to avoid old shoes and habits that may have led to the development of your wound

## 2021-04-20 ENCOUNTER — TELEPHONE (OUTPATIENT)
Dept: PODIATRY | Facility: CLINIC | Age: 46
End: 2021-04-20

## 2021-04-20 NOTE — TELEPHONE ENCOUNTER
Carolina Murray is calling for a refill of Tylenol 650 mg , she still has pain on the bottom of her right foot and she is reaching out to you as you originally prescribe it

## 2021-04-20 NOTE — TELEPHONE ENCOUNTER
I called [facility] and relayed the message from Dr Ayaka Merino  She will address the medication at her next wound care appointment

## 2021-04-23 ENCOUNTER — OFFICE VISIT (OUTPATIENT)
Dept: WOUND CARE | Facility: HOSPITAL | Age: 46
End: 2021-04-23
Payer: COMMERCIAL

## 2021-04-23 VITALS
RESPIRATION RATE: 16 BRPM | HEART RATE: 76 BPM | DIASTOLIC BLOOD PRESSURE: 80 MMHG | SYSTOLIC BLOOD PRESSURE: 100 MMHG | TEMPERATURE: 96 F

## 2021-04-23 DIAGNOSIS — Z79.4 TYPE 2 DIABETES MELLITUS WITH DIABETIC NEUROPATHY, WITH LONG-TERM CURRENT USE OF INSULIN (HCC): ICD-10-CM

## 2021-04-23 DIAGNOSIS — G89.18 ACUTE POST-OPERATIVE PAIN: ICD-10-CM

## 2021-04-23 DIAGNOSIS — E11.40 TYPE 2 DIABETES MELLITUS WITH DIABETIC NEUROPATHY, WITH LONG-TERM CURRENT USE OF INSULIN (HCC): ICD-10-CM

## 2021-04-23 DIAGNOSIS — L97.512 RIGHT FOOT ULCER, WITH FAT LAYER EXPOSED (HCC): Primary | ICD-10-CM

## 2021-04-23 PROCEDURE — 29445 APPL RIGID TOT CNTC LEG CAST: CPT | Performed by: PODIATRIST

## 2021-04-23 PROCEDURE — 99213 OFFICE O/P EST LOW 20 MIN: CPT | Performed by: PODIATRIST

## 2021-04-23 RX ORDER — SENNOSIDES 8.6 MG
650 CAPSULE ORAL EVERY 8 HOURS PRN
Qty: 30 TABLET | Refills: 2 | Status: SHIPPED | OUTPATIENT
Start: 2021-04-23 | End: 2021-04-26

## 2021-04-23 NOTE — PATIENT INSTRUCTIONS
Orders Placed This Encounter   Procedures    Wound cleansing and dressings     Right foot wound:  Do not get cast wet  Use cast cover or sponge bath     At wound care center today  Foot washed with soap and water  Wound cleansed with normal saline  Patted dry  acticoat 7 applied to wound  Covered with foam  Secured with tape  tcc ez applied with extra fiberglass layer      Change dressing weekly at wound care center                Standing Status:   Future     Standing Expiration Date:   4/23/2022    Wound off loading         Wound off loading      Off-loading Instructions:     Total Contact Cast Instructions: Do not get cast wet  Contact wound center if there is a foul odor or becomes uncomfortable due to feeling tight or swelling  Do not use objects down inside of cast to scratch  Do not walk on cast without walking boot       Standing Status:   Future     Standing Expiration Date:   4/23/2022

## 2021-04-23 NOTE — PROGRESS NOTES
Patient ID: Racheal Charles is a 40 y o  female Date of Birth 1975     My role is Foot, Ankle and Wound Specialist    Chief Complaint   Patient presents with    Follow Up Wound Care Visit     RLE wound       DFU    Subjective:   Mackenzie Camps here with  plantar diabetic foot ulcer  She's in a TCC EZ  She notes it "broke" during the week         The following portions of the patient's history were reviewed and updated as appropriate:   Patient Active Problem List   Diagnosis    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, with bone involvement without evidence of necrosis ([address] )    H/O bariatric surgery    Anxiety    Morbid obesity ([address] )    Preoperative clearance    Encounter for smoking cessation counseling    Hypothyroidism    Manic bipolar I disorder ([address] )    Type 2 diabetes mellitus with neurologic complication, with long-term current use of insulin ([address] )    Tobacco abuse    Pyogenic inflammation of bone ([address] )    Type 2 diabetes mellitus ([address] )    Acquired absence of other left toe(s) ([address] )    Essential hypertension    Arthritis    Chronic pain    OCD (obsessive compulsive disorder)    PTSD (post-traumatic stress disorder)    Smoking    Right foot ulcer, with fat layer exposed ([address] )     Past Medical History:   Diagnosis Date    Anxiety     Arthritis     Asthma     Chronic pain     Diabetes mellitus ([address] )     Disease of thyroid gland     Lymphedema     left leg    Manic bipolar I disorder ([address] )     Morbid obesity ([address] )     OCD (obsessive compulsive disorder)     PTSD (post-traumatic stress disorder)      Past Surgical History:   Procedure Laterality Date    CHOLECYSTECTOMY      CHOLECYSTECTOMY      CT AMPUTATION METATARSAL+TOE,SINGLE Left 9/8/2019    Procedure: PARTIAL 3RD RAY RESECTION, PSB TOE FILLET FLAP;  Surgeon: Blanquita Crews DPM;  Location: [facility];  Service: Podiatry    TOE AMPUTATION Right 1/13/2021    Procedure: AMPUTATION 2ND TOE;  Surgeon: Nissa Morris DPM;  Location: [facility];  Service: Podiatry     Social History     Socioeconomic History    Marital status: Single     Spouse name: Not on file    Number of children: Not on file    Years of education: Not on file    Highest education level: Not on file   Occupational History    Not on file   Social Needs    Financial resource strain: Not on file    Food insecurity     Worry: Not on file     Inability: Not on file    Transportation needs     Medical: Not on file     Non-medical: Not on file   Tobacco Use    Smoking status: Current Every Day Smoker     Packs/day: 0 50     Years: 15 00     Pack years: 7 50    Smokeless tobacco: Never Used   Substance and Sexual Activity    Alcohol use: Never     Frequency: Never     Drinks per session: Patient refused     Binge frequency: Never    Drug use: Never    Sexual activity: Not Currently   Lifestyle    Physical activity     Days per week: Not on file     Minutes per session: Not on file    Stress: Not on file   Relationships    Social connections     Talks on phone: Not on file     Gets together: Not on file     Attends Congregation service: Not on file     Active member of club or organization: Not on file     Attends meetings of clubs or organizations: Not on file     Relationship status: Not on file    Intimate partner violence     Fear of current or ex partner: Not on file     Emotionally abused: Not on file     Physically abused: Not on file     Forced sexual activity: Not on file   Other Topics Concern    Not on file   Social History Narrative    Not on file        Current Outpatient Medications:     acetaminophen (TYLENOL) 650 mg CR tablet, Take 1 tablet (650 mg total) by mouth every 8 (eight) hours as needed for mild pain, Disp: 30 tablet, Rfl: 2    ADMELOG SOLOSTAR 100 units/mL injection pen, , Disp: , Rfl:     albuterol (PROVENTIL HFA,VENTOLIN HFA) 90 mcg/act inhaler, Inhale 2 puffs every 6 (six) hours as needed for wheezing, Disp: , Rfl:     BASAGLAR KWIKPEN 100 units/mL injection pen, , Disp: , Rfl:     buPROPion (WELLBUTRIN XL) 300 mg 24 hr tablet, Take 300 mg by mouth, Disp: , Rfl:     cholecalciferol (VITAMIN D3) 1,000 units tablet, Take by mouth Three times a day, Disp: , Rfl:     cloNIDine (CATAPRES) 0 1 mg tablet, 0 2 mg every 12 (twelve) hours , Disp: , Rfl:     cloNIDine (CATAPRES) 0 2 mg tablet, Take 0 2 mg by mouth 2 (two) times a day, Disp: , Rfl:     doxycycline hyclate (VIBRA-TABS) 100 mg tablet, TAKE 1 TABLET (100 MG TOTAL) BY MOUTH 2 (TWO) TIMES A DAY FOR 7 DAYS, Disp: , Rfl:     EASY COMFORT PEN NEEDLES 32G X 4 MM MISC, , Disp: , Rfl:     EASY TOUCH INSULIN SYRINGE 31G X 5/16" 0 3 ML MISC, , Disp: , Rfl:     ergocalciferol (ERGOCALCIFEROL) 1 25 MG (22859 UT) capsule, Take 50,000 Units by mouth, Disp: , Rfl:     escitalopram (LEXAPRO) 10 mg tablet, 175 mg , Disp: , Rfl:     fenofibrate (TRICOR) 54 MG tablet, TAKE 1 TABLET (54 MG TOTAL) BY MOUTH DAILY  , Disp: , Rfl:     folic acid (FOLVITE) 1 mg tablet, Take 1,000 mcg by mouth daily, Disp: , Rfl:     glucose blood test strip, USE TO MONITOR BG 3 TIMES A DAY   E11 9, Disp: , Rfl:     HYDROcodone-acetaminophen (NORCO) 5-325 mg per tablet, , Disp: , Rfl:     HYDROcodone-acetaminophen (NORCO) 5-325 mg per tablet, Take 1 tablet by mouth every 6 (six) hours as needed for painMax Daily Amount: 4 tablets (Patient not taking: Reported on 3/22/2021), Disp: 7 tablet, Rfl: 0    hydrOXYzine HCL (ATARAX) 50 mg tablet, Take 50 mg by mouth daily at bedtime, Disp: , Rfl: 0    hydrOXYzine pamoate (VISTARIL) 50 mg capsule, Take 50 mg by mouth, Disp: , Rfl:     insulin glargine (LANTUS SOLOSTAR) 100 units/mL injection pen, Inject 55 Units under the skin 2 (two) times a day, Disp: , Rfl:     insulin lispro (HUMALOG KWIKPEN) 100 units/mL injection pen, Inject 1-15 Units under the skin, Disp: , Rfl:     lamoTRIgine (LaMICtal) 150 MG tablet, Take 150 mg by mouth daily, Disp: , Rfl:     lamoTRIgine (LaMICtal) 25 mg tablet, Take 50 mg by mouth daily at bedtime , Disp: , Rfl:     levothyroxine 50 mcg tablet, Take 50 mcg by mouth daily, Disp: , Rfl:     metFORMIN (GLUCOPHAGE) 500 mg tablet, Take 1 tablet (500 mg total) by mouth 2 (two) times a day with meals, Disp: 60 tablet, Rfl: 0    methocarbamol (ROBAXIN) 750 mg tablet, TAKE 1 TABLET (750 MG TOTAL) BY MOUTH 4 (FOUR) TIMES A DAY AS NEEDED FOR MUSCLE SPASMS , Disp: , Rfl:     multivitamin (THERAGRAN) TABS, Take 1 tablet by mouth daily, Disp: , Rfl:     mupirocin (BACTROBAN) 2 % ointment, Apply topically 3 (three) times a day, Disp: 22 g, Rfl: 0    ondansetron (ZOFRAN-ODT) 4 mg disintegrating tablet, , Disp: , Rfl:     oxyCODONE (ROXICODONE) 5 mg/5 mL solution, , Disp: , Rfl:     pantoprazole (PROTONIX) 40 mg tablet, Take 40 mg by mouth daily, Disp: , Rfl:     SUMAtriptan Succinate (IMITREX STATDOSE REFILL) 6 MG/0 5ML SOCT, Inject 6 mg under the skin every 2 (two) hours as needed, Disp: , Rfl:     Ultra Thin Lancets 31G MISC, USE TO MONITOR BLOOD GLUCOSE 3 TIMES A DAY AS DIRECTED , Disp: , Rfl:     ziprasidone (GEODON) 80 mg capsule, Take 80 mg by mouth 2 (two) times a day with meals , Disp: , Rfl:   Family History   Problem Relation Age of Onset    Hyperlipidemia Mother     Diabetes Mother     Hypertension Mother     Heart attack Father       Review of Systems   Constitutional: Negative  Respiratory: Negative  Cardiovascular: Negative  Gastrointestinal: Negative  Skin: Positive for wound  Neurological: Positive for numbness         Allergies  Aspirin, Barium iodide, Codeine, Latex, Vancomycin, and Penicillins    Objective:  /80   Pulse 76   Temp (!) 96 °F (35 6 °C)   Resp 16   LMP  (LMP Unknown)     Physical Exam      Wound 01/13/21 Diabetic Ulcer Foot Right (Active)   Wound Image   04/16/21 0933   Wound Description Pink;Yellow 04/23/21 0942   Yvonne-wound Assessment Maceration 04/23/21 0942   Wound Length (cm) 1 5 cm 04/23/21 0942   Wound Width (cm) 0 8 cm 04/23/21 0942   Wound Depth (cm) 0 3 cm 04/23/21 0942   Wound Surface Area (cm^2) 1 2 cm^2 04/23/21 0942   Wound Volume (cm^3) 0 36 cm^3 04/23/21 0942   Calculated Wound Volume (cm^3) 0 36 cm^3 04/23/21 0942   Change in Wound Size % -157 14 04/23/21 0942   Undermining 0 4 04/23/21 0942   Undermining is depth extending from 1-4oclock 04/23/21 0942   Drainage Amount Small 04/23/21 0942   Drainage Description Serosanguineous; Chris 04/23/21 0942   Non-staged Wound Description Full thickness 04/23/21 0942   Dressing Status Intact 04/23/21 0942           Wound 01/13/21 Diabetic Ulcer Foot Right (Active)   Wound Image   04/16/21 0952   Wound Description Pink;Yellow 04/23/21 0942   Yvonne-wound Assessment Maceration 04/23/21 0942   Wound Length (cm) 1 5 cm 04/23/21 0942   Wound Width (cm) 0 8 cm 04/23/21 0942   Wound Depth (cm) 0 3 cm 04/23/21 0942   Wound Surface Area (cm^2) 1 2 cm^2 04/23/21 0942   Wound Volume (cm^3) 0 36 cm^3 04/23/21 0942   Calculated Wound Volume (cm^3) 0 36 cm^3 04/23/21 0942   Change in Wound Size % -157 14 04/23/21 0942   Undermining 0 4 04/23/21 0942   Undermining is depth extending from 1-4oclock 04/23/21 0942   Drainage Amount Small 04/23/21 0942   Drainage Description Serosanguineous; Chris 04/23/21 0942   Non-staged Wound Description Full thickness 04/23/21 0942   Dressing Status Intact 04/23/21 0942                         Diagnosis:  1  Right foot ulcer, with fat layer exposed ([address] )  -     Wound cleansing and dressings; Future  -     Wound off loading; Future    2  Type 2 diabetes mellitus with diabetic neuropathy, with long-term current use of insulin ([facility])  -     Wound cleansing and dressings; Future  -     Wound off loading; Future    3  Acute post-operative pain  -     acetaminophen (TYLENOL) 650 mg CR tablet;  Take 1 tablet (650 mg total) by mouth every 8 (eight) hours as needed for mild pain        Diagnosis ICD-10-CM Associated Orders   1  Right foot ulcer, with fat layer exposed ([facility] )  L97 512 Wound cleansing and dressings     Wound off loading   2  Type 2 diabetes mellitus with diabetic neuropathy, with long-term current use of insulin ([facility]CC)  E11 40 Wound cleansing and dressings    Z79 4 Wound off loading   3  Acute post-operative pain  G89 18 acetaminophen (TYLENOL) 650 mg CR tablet        ASSESSMENT    Problems:    Chronic illness / Problem not at goal with exacerbation, progression or side effects of treatment  1  DFU      PLAN    Discussion of Management /  Recommendations  I recommend we continue TCC management  Procedures Performed  I recommended application of the TCC to facilitate healing particularly in light of the failure to heal previously without TCC  After application of the primary dressing, a TCC was applied in a modified Carville method using stockinette, cast padding between the toes, foam over the toes, cast padding toes to knee, additional felt over the [facility] and tibial crest and finally with the foot at a 90 degree angle to the leg, a well-formed fiberglass shell with additional thickness added plantarly to avoid breakdown  A cast shoe was applied for traction  Strict instructions to keep the cast clean,dry and intact were given and also instructions not to introduce powders or foreign objects into the cast was given  Instructions were given to call if any cast breakdown, or any irritation, odor, drainage or pain was noted within the cast      I explained that good wound care and compliance are necessary to allow healing and to prevent toe loss or limb loss  No guarantees of wound healing were given and I explained clearly that there is some risk with the use of TCC such as cast "rub", cast ulcers, or hidden infection but that the benefits of the TCC far outweight the risks if patient compliance is in place       Cast Application    Date/Time: 4/23/2021 10:27 AM  Performed by: Hanna Darby DPM  Authorized by: Hanna Darby DPM   Universal Protocol:  Consent: Verbal consent obtained  Risks and benefits: risks, benefits and alternatives were discussed  Consent given by: patient  Time out: Immediately prior to procedure a "time out" was called to verify the correct patient, procedure, equipment, support staff and site/side marked as required  Timeout called at: 4/23/2021 10:27 AM   Patient understanding: patient states understanding of the procedure being performed  Patient identity confirmed: verbally with patient      Pre-procedure details:     Sensation:  Numbness  Procedure details:     Location:  Leg    Leg:  R lower legCast type:   Total contact    Supplies:  Cotton padding and fiberglass

## 2021-04-26 RX ORDER — SENNOSIDES 8.6 MG
650 CAPSULE ORAL EVERY 8 HOURS PRN
Qty: 30 TABLET | Refills: 2 | Status: SHIPPED | OUTPATIENT
Start: 2021-04-26 | End: 2021-06-25 | Stop reason: SDUPTHER

## 2021-04-30 ENCOUNTER — OFFICE VISIT (OUTPATIENT)
Dept: WOUND CARE | Facility: HOSPITAL | Age: 46
End: 2021-04-30
Payer: COMMERCIAL

## 2021-04-30 VITALS
DIASTOLIC BLOOD PRESSURE: 68 MMHG | RESPIRATION RATE: 16 BRPM | TEMPERATURE: 96.3 F | HEART RATE: 64 BPM | SYSTOLIC BLOOD PRESSURE: 94 MMHG

## 2021-04-30 DIAGNOSIS — L97.512 RIGHT FOOT ULCER, WITH FAT LAYER EXPOSED (HCC): Primary | ICD-10-CM

## 2021-04-30 DIAGNOSIS — E11.40 TYPE 2 DIABETES MELLITUS WITH DIABETIC NEUROPATHY, WITH LONG-TERM CURRENT USE OF INSULIN (HCC): ICD-10-CM

## 2021-04-30 DIAGNOSIS — Z79.4 TYPE 2 DIABETES MELLITUS WITH DIABETIC NEUROPATHY, WITH LONG-TERM CURRENT USE OF INSULIN (HCC): ICD-10-CM

## 2021-04-30 PROCEDURE — 29445 APPL RIGID TOT CNTC LEG CAST: CPT | Performed by: PODIATRIST

## 2021-04-30 NOTE — PATIENT INSTRUCTIONS
Orders Placed This Encounter   Procedures    Wound cleansing and dressings     Right foot wound:  Do not get cast wet  Use cast cover or sponge bath  At wound care center today  Foot washed with soap and water  Wound cleansed with normal saline  Patted dry  Acticoat Flex 7 applied to wound  Covered with foam  Secured with tape  TCC ez applied with extra fiberglass layer  Change dressing weekly at wound care center  Follow up in 1 week with Dr Mary Loco     Standing Status:   Future     Standing Expiration Date:   4/30/2022    Wound off loading     Total Contact Cast Instructions:   Do not get cast wet  Contact wound center if there is a foul odor or becomes uncomfortable due to feeling tight or swelling  Do not use objects down inside of cast to scratch  Do not walk on cast without walking boot       Standing Status:   Future     Standing Expiration Date:   4/30/2022

## 2021-05-03 PROBLEM — M86.9 PYOGENIC INFLAMMATION OF BONE (HCC): Status: RESOLVED | Noted: 2019-09-06 | Resolved: 2021-05-03

## 2021-05-03 NOTE — PROGRESS NOTES
Patient ID: Linda Felix is a 40 y o  female Date of Birth 1975     My role is Foot, Ankle and Wound Specialist    Chief Complaint   Patient presents with    Follow Up Wound Care Visit     RLE       Diabetic foot ulcer    Subjective:   Neftali Murcia here with  plantar diabetic foot ulcer  She's in a TCC EZ         The following portions of the patient's history were reviewed and updated as appropriate:   Patient Active Problem List   Diagnosis    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, with bone involvement without evidence of necrosis ([address] )    H/O bariatric surgery    Anxiety    Morbid obesity ([address] )    Preoperative clearance    Encounter for smoking cessation counseling    Hypothyroidism    Manic bipolar I disorder ([address] )    Type 2 diabetes mellitus with neurologic complication, with long-term current use of insulin ([address] )    Tobacco abuse    Type 2 diabetes mellitus ([address] )    Acquired absence of other left toe(s) ([address] )    Essential hypertension    Arthritis    Chronic pain    OCD (obsessive compulsive disorder)    PTSD (post-traumatic stress disorder)    Smoking    Right foot ulcer, with fat layer exposed ([address] )     Past Medical History:   Diagnosis Date    Anxiety     Arthritis     Asthma     Chronic pain     Diabetes mellitus ([address] )     Disease of thyroid gland     Lymphedema     left leg    Manic bipolar I disorder ([address] )     Morbid obesity ([address] )     OCD (obsessive compulsive disorder)     PTSD (post-traumatic stress disorder)      Past Surgical History:   Procedure Laterality Date    CHOLECYSTECTOMY      CHOLECYSTECTOMY      CO AMPUTATION METATARSAL+TOE,SINGLE Left 9/8/2019    Procedure: PARTIAL 3RD RAY RESECTION, PSB TOE FILLET FLAP;  Surgeon: Luisana Christianson DPM;  [facility];  Service: Podiatry    TOE AMPUTATION Right 1/13/2021    Procedure: AMPUTATION 2ND TOE;  Surgeon: Rebecca Nicolas DPM;  Location: [facility]; Service: Podiatry     Social History     Socioeconomic History    Marital status: Single     Spouse name: None    Number of children: None    Years of education: None    Highest education level: None   Occupational History    None   Social Needs    Financial resource strain: None    Food insecurity     Worry: None     Inability: None    Transportation needs     Medical: None     Non-medical: None   Tobacco Use    Smoking status: Former Smoker     Packs/day: 0 50     Years: 15 00     Pack years: 7 50    Smokeless tobacco: Never Used   Substance and Sexual Activity    Alcohol use: Never     Frequency: Never     Drinks per session: Patient refused     Binge frequency: Never    Drug use: Never    Sexual activity: Not Currently   Lifestyle    Physical activity     Days per week: None     Minutes per session: None    Stress: None   Relationships    Social connections     Talks on phone: None     Gets together: None     Attends [employer] service: None     Active member of club or organization: None     Attends meetings of clubs or organizations: None     Relationship status: None    Intimate partner violence     Fear of current or ex partner: None     Emotionally abused: None     Physically abused: None     Forced sexual activity: None   Other Topics Concern    None   Social History Narrative    None        Current Outpatient Medications:     acetaminophen (TYLENOL) 650 mg CR tablet, TAKE 1 TABLET (650 MG TOTAL) BY MOUTH EVERY 8 (EIGHT) HOURS AS NEEDED FOR MILD PAIN, Disp: 30 tablet, Rfl: 2    ADMELOG SOLOSTAR 100 units/mL injection pen, , Disp: , Rfl:     albuterol (PROVENTIL HFA,VENTOLIN HFA) 90 mcg/act inhaler, Inhale 2 puffs every 6 (six) hours as needed for wheezing, Disp: , Rfl:     BASAGLAR KWIKPEN 100 units/mL injection pen, , Disp: , Rfl:     buPROPion (WELLBUTRIN XL) 300 mg 24 hr tablet, Take 300 mg by mouth, Disp: , Rfl:     cholecalciferol (VITAMIN D3) 1,000 units tablet, Take by mouth Three times a day, Disp: , Rfl:     cloNIDine (CATAPRES) 0 1 mg tablet, 0 2 mg every 12 (twelve) hours , Disp: , Rfl:     cloNIDine (CATAPRES) 0 2 mg tablet, Take 0 2 mg by mouth 2 (two) times a day, Disp: , Rfl:     doxycycline hyclate (VIBRA-TABS) 100 mg tablet, TAKE 1 TABLET (100 MG TOTAL) BY MOUTH 2 (TWO) TIMES A DAY FOR 7 DAYS, Disp: , Rfl:     EASY COMFORT PEN NEEDLES 32G X 4 MM MISC, , Disp: , Rfl:     EASY TOUCH INSULIN SYRINGE 31G X 5/16" 0 3 ML MISC, , Disp: , Rfl:     ergocalciferol (ERGOCALCIFEROL) 1 25 MG (32510 UT) capsule, Take 50,000 Units by mouth, Disp: , Rfl:     escitalopram (LEXAPRO) 10 mg tablet, 175 mg , Disp: , Rfl:     fenofibrate (TRICOR) 54 MG tablet, TAKE 1 TABLET (54 MG TOTAL) BY MOUTH DAILY  , Disp: , Rfl:     folic acid (FOLVITE) 1 mg tablet, Take 1,000 mcg by mouth daily, Disp: , Rfl:     glucose blood test strip, USE TO MONITOR BG 3 TIMES A DAY   E11 9, Disp: , Rfl:     HYDROcodone-acetaminophen (NORCO) 5-325 mg per tablet, , Disp: , Rfl:     HYDROcodone-acetaminophen (NORCO) 5-325 mg per tablet, Take 1 tablet by mouth every 6 (six) hours as needed for painMax Daily Amount: 4 tablets (Patient not taking: Reported on 3/22/2021), Disp: 7 tablet, Rfl: 0    hydrOXYzine HCL (ATARAX) 50 mg tablet, Take 50 mg by mouth daily at bedtime, Disp: , Rfl: 0    hydrOXYzine pamoate (VISTARIL) 50 mg capsule, Take 50 mg by mouth, Disp: , Rfl:     insulin glargine (LANTUS SOLOSTAR) 100 units/mL injection pen, Inject 55 Units under the skin 2 (two) times a day, Disp: , Rfl:     insulin lispro (HUMALOG KWIKPEN) 100 units/mL injection pen, Inject 1-15 Units under the skin, Disp: , Rfl:     lamoTRIgine (LaMICtal) 150 MG tablet, Take 150 mg by mouth daily, Disp: , Rfl:     lamoTRIgine (LaMICtal) 25 mg tablet, Take 50 mg by mouth daily at bedtime , Disp: , Rfl:     levothyroxine 50 mcg tablet, Take 50 mcg by mouth daily, Disp: , Rfl:     metFORMIN (GLUCOPHAGE) 500 mg tablet, Take 1 tablet (500 mg total) by mouth 2 (two) times a day with meals, Disp: 60 tablet, Rfl: 0    methocarbamol (ROBAXIN) 750 mg tablet, TAKE 1 TABLET (750 MG TOTAL) BY MOUTH 4 (FOUR) TIMES A DAY AS NEEDED FOR MUSCLE SPASMS , Disp: , Rfl:     multivitamin (THERAGRAN) TABS, Take 1 tablet by mouth daily, Disp: , Rfl:     mupirocin (BACTROBAN) 2 % ointment, Apply topically 3 (three) times a day, Disp: 22 g, Rfl: 0    ondansetron (ZOFRAN-ODT) 4 mg disintegrating tablet, , Disp: , Rfl:     oxyCODONE (ROXICODONE) 5 mg/5 mL solution, , Disp: , Rfl:     pantoprazole (PROTONIX) 40 mg tablet, Take 40 mg by mouth daily, Disp: , Rfl:     SUMAtriptan Succinate (IMITREX STATDOSE REFILL) 6 MG/0 5ML SOCT, Inject 6 mg under the skin every 2 (two) hours as needed, Disp: , Rfl:     Ultra Thin Lancets 31G MISC, USE TO MONITOR BLOOD GLUCOSE 3 TIMES A DAY AS DIRECTED , Disp: , Rfl:     ziprasidone (GEODON) 80 mg capsule, Take 80 mg by mouth 2 (two) times a day with meals , Disp: , Rfl:   Family History   Problem Relation Age of Onset    Hyperlipidemia Mother     Diabetes Mother     Hypertension Mother     Heart attack Father       Review of Systems   Constitutional: Negative  Respiratory: Negative  Cardiovascular: Negative  Gastrointestinal: Negative  Skin: Positive for wound  Neurological: Positive for numbness  Allergies  Aspirin, Barium iodide, Codeine, Latex, Vancomycin, and Penicillins    Objective:  BP 94/68   Pulse 64   Temp (!) 96 3 °F (35 7 °C)   Resp 16   LMP  (LMP Unknown)     Physical Exam  Vitals signs and nursing note reviewed  Constitutional:       General: She is not in acute distress  Appearance: She is obese  She is not ill-appearing, toxic-appearing or diaphoretic  Cardiovascular:      Pulses:           Dorsalis pedis pulses are 2+ on the right side and 2+ on the left side  Posterior tibial pulses are 2+ on the right side and 2+ on the left side     Musculoskeletal:      Comments: Third toe amputated Right  Feet:      Right foot:      Protective Sensation: 10 sites tested  0 sites sensed  Left foot:      Protective Sensation: 10 sites tested  0 sites sensed  Neurological:      Mental Status: She is alert  Wound 01/13/21 Diabetic Ulcer Foot Right (Active)   Wound Image   04/30/21 1123   Wound Description Yellow;Pink;Epithelialization 04/30/21 1123   Yvonne-wound Assessment Callus 04/30/21 1123   Wound Length (cm) 0 9 cm 04/30/21 1123   Wound Width (cm) 0 4 cm 04/30/21 1123   Wound Depth (cm) 0 1 cm 04/30/21 1123   Wound Surface Area (cm^2) 0 36 cm^2 04/30/21 1123   Wound Volume (cm^3) 0 04 cm^3 04/30/21 1123   Calculated Wound Volume (cm^3) 0 04 cm^3 04/30/21 1123   Change in Wound Size % 71 43 04/30/21 1123   Undermining 0 4 04/23/21 0942   Undermining is depth extending from 1-4oclock 04/23/21 0942   Drainage Amount Small 04/30/21 1123   Drainage Description Serosanguineous 04/30/21 1123   Non-staged Wound Description Full thickness 04/30/21 1123   Dressing Status Intact 04/23/21 0942           Wound 01/13/21 Diabetic Ulcer Foot Right (Active)   Wound Image   04/30/21 1123   Wound Description Yellow;Pink;Epithelialization 04/30/21 1123   Yvonne-wound Assessment Callus 04/30/21 1123   Wound Length (cm) 0 9 cm 04/30/21 1123   Wound Width (cm) 0 4 cm 04/30/21 1123   Wound Depth (cm) 0 1 cm 04/30/21 1123   Wound Surface Area (cm^2) 0 36 cm^2 04/30/21 1123   Wound Volume (cm^3) 0 04 cm^3 04/30/21 1123   Calculated Wound Volume (cm^3) 0 04 cm^3 04/30/21 1123   Change in Wound Size % 71 43 04/30/21 1123   Undermining 0 4 04/23/21 0942   Undermining is depth extending from 1-4oclock 04/23/21 0942   Drainage Amount Small 04/30/21 1123   Drainage Description Serosanguineous 04/30/21 1123   Non-staged Wound Description Full thickness 04/30/21 1123   Dressing Status Intact 04/23/21 0937                         Diagnosis:  1   Right foot ulcer, with fat layer exposed ([facility] [address] )  -     Wound cleansing and dressings; Future  -     Wound off loading; Future    2  Type 2 diabetes mellitus with diabetic neuropathy, with long-term current use of insulin ([facility] [address] )        Diagnosis ICD-10-CM Associated Orders   1  Right foot ulcer, with fat layer exposed ([facility] [address] )  L97 512 Wound cleansing and dressings     Wound off loading   2  Type 2 diabetes mellitus with diabetic neuropathy, with long-term current use of insulin ([facility])  E11 40     Z79 4         ASSESSMENT    Problems:    Chronic illness / Problem not at goal with exacerbation, progression or side effects of treatment  1  Diabetic foot ulcer        PLAN    Discussion of Management /  Recommendations  I recommend we continue TCC  I recommended application of the TCC to facilitate healing particularly in light of the failure to heal previously without TCC  After application of the primary dressing, a TCC was applied in a modified Carville method using stockinette, cast padding between the toes, foam over the toes, cast padding toes to knee, additional felt over the [facility] and tibial crest and finally with the foot at a 90 degree angle to the leg, a well-formed fiberglass shell with additional thickness added plantarly to avoid breakdown  A cast shoe was applied for traction  Strict instructions to keep the cast clean,dry and intact were given and also instructions not to introduce powders or foreign objects into the cast was given  Instructions were given to call if any cast breakdown, or any irritation, odor, drainage or pain was noted within the cast      I explained that good wound care and compliance are necessary to allow healing and to prevent toe loss or limb loss  No guarantees of wound healing were given and I explained clearly that there is some risk with the use of TCC such as cast "rub", cast ulcers, or hidden infection but that the benefits of the TCC far outweight the risks if patient compliance is in place   Cast Application    Date/Time: 5/3/2021 7:53 AM  Performed by: Sasha Salazar DPM  Authorized by: Sasha Salazar DPM   Universal Protocol:  Consent: Verbal consent obtained  Risks and benefits: risks, benefits and alternatives were discussed  Consent given by: patient  Time out: Immediately prior to procedure a "time out" was called to verify the correct patient, procedure, equipment, support staff and site/side marked as required  Timeout called at: 4/30/2021 7:53 AM   Patient understanding: patient states understanding of the procedure being performed  Patient identity confirmed: verbally with patient      Pre-procedure details:     Sensation:  Numbness  Procedure details:     Laterality:  Right    Location:  Leg    Leg:  R lower legCast type:   Total contact      Supplies:  Cotton padding and fiberglass

## 2021-05-07 ENCOUNTER — OFFICE VISIT (OUTPATIENT)
Dept: WOUND CARE | Facility: HOSPITAL | Age: 46
End: 2021-05-07
Payer: COMMERCIAL

## 2021-05-07 VITALS
RESPIRATION RATE: 16 BRPM | HEART RATE: 64 BPM | SYSTOLIC BLOOD PRESSURE: 118 MMHG | DIASTOLIC BLOOD PRESSURE: 62 MMHG | TEMPERATURE: 97.1 F

## 2021-05-07 DIAGNOSIS — E11.40 TYPE 2 DIABETES MELLITUS WITH DIABETIC NEUROPATHY, WITH LONG-TERM CURRENT USE OF INSULIN (HCC): ICD-10-CM

## 2021-05-07 DIAGNOSIS — Z79.4 TYPE 2 DIABETES MELLITUS WITH DIABETIC NEUROPATHY, WITH LONG-TERM CURRENT USE OF INSULIN (HCC): ICD-10-CM

## 2021-05-07 DIAGNOSIS — L97.512 RIGHT FOOT ULCER, WITH FAT LAYER EXPOSED (HCC): Primary | ICD-10-CM

## 2021-05-07 PROCEDURE — 29445 APPL RIGID TOT CNTC LEG CAST: CPT | Performed by: PODIATRIST

## 2021-05-07 NOTE — PATIENT INSTRUCTIONS
Orders Placed This Encounter   Procedures    Wound cleansing and dressings     Right foot wound:  Do not get cast wet  Use cast cover or sponge bath  At wound care center today  Foot washed with soap and water  Wound cleansed with normal saline  Patted dry  Acticoat Flex 7 applied to wound  Covered with foam  Secured with tape  TCC ez applied with extra fiberglass layer  Change dressing weekly at wound care center  Follow up in 1 week with Dr Ponce Jasso     Standing Status:   Future     Standing Expiration Date:   5/7/2022    Wound off loading     Total Contact Cast Instructions:  Do not get cast wet  Contact wound center if there is a foul odor or becomes uncomfortable due to feeling tight or swelling  Do not use objects down inside of cast to scratch    Do not walk on cast without walking boot     -----Please ensure to take smaller steps as well as lighter foot steps to help prevent the cast from cracking-----     Standing Status:   Future     Standing Expiration Date:   5/7/2022

## 2021-05-07 NOTE — PROGRESS NOTES
Patient ID: Luc Lizama is a 40 y o  female Date of Birth 1975     My role is Foot, Ankle and Wound Specialist    Chief Complaint   Patient presents with    Follow Up Wound Care Visit     right foot wound       Diabetic foot ulcer    Subjective:   Rob Springerjad here with  plantar diabetic foot ulcer  She's in a TCC EZ         The following portions of the patient's history were reviewed and updated as appropriate:   Patient Active Problem List   Diagnosis    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, with bone involvement without evidence of necrosis ([address] )    H/O bariatric surgery    Anxiety    Morbid obesity ([address] )    Preoperative clearance    Encounter for smoking cessation counseling    Hypothyroidism    Manic bipolar I disorder ([address] )    Type 2 diabetes mellitus with neurologic complication, with long-term current use of insulin ([address] )    Tobacco abuse    Type 2 diabetes mellitus ([address] )    Acquired absence of other left toe(s) ([address] )    Essential hypertension    Arthritis    Chronic pain    OCD (obsessive compulsive disorder)    PTSD (post-traumatic stress disorder)    Smoking    Right foot ulcer, with fat layer exposed ([address] )     Past Medical History:   Diagnosis Date    Anxiety     Arthritis     Asthma     Chronic pain     Diabetes mellitus ([address] )     Disease of thyroid gland     Lymphedema     left leg    Manic bipolar I disorder ([address] )     Morbid obesity ([address] )     OCD (obsessive compulsive disorder)     PTSD (post-traumatic stress disorder)      Past Surgical History:   Procedure Laterality Date    CHOLECYSTECTOMY      CHOLECYSTECTOMY      PA AMPUTATION METATARSAL+TOE,SINGLE Left 9/8/2019    Procedure: PARTIAL 3RD RAY RESECTION, PSB TOE FILLET FLAP;  Surgeon: Lynne Mccabe DPM;  [facility];  Service: Podiatry    TOE AMPUTATION Right 1/13/2021    Procedure: AMPUTATION 2ND TOE;  Surgeon: Sasha Blanco DPM;  [facility];  Service: Podiatry     Social History     Socioeconomic History    Marital status: Single     Spouse name: None    Number of children: None    Years of education: None    Highest education level: None   Occupational History    None   Social Needs    Financial resource strain: None    Food insecurity     Worry: None     Inability: None    Transportation needs     Medical: None     Non-medical: None   Tobacco Use    Smoking status: Former Smoker     Packs/day: 0 50     Years: 15 00     Pack years: 7 50    Smokeless tobacco: Never Used   Substance and Sexual Activity    Alcohol use: Never     Frequency: Never     Drinks per session: Patient refused     Binge frequency: Never    Drug use: Never    Sexual activity: Not Currently   Lifestyle    Physical activity     Days per week: None     Minutes per session: None    Stress: None   Relationships    Social connections     Talks on phone: None     Gets together: None     Attends Baptism service: None     Active member of club or organization: None     Attends meetings of clubs or organizations: None     Relationship status: None    Intimate partner violence     Fear of current or ex partner: None     Emotionally abused: None     Physically abused: None     Forced sexual activity: None   Other Topics Concern    None   Social History Narrative    None        Current Outpatient Medications:     acetaminophen (TYLENOL) 650 mg CR tablet, TAKE 1 TABLET (650 MG TOTAL) BY MOUTH EVERY 8 (EIGHT) HOURS AS NEEDED FOR MILD PAIN, Disp: 30 tablet, Rfl: 2    ADMELOG SOLOSTAR 100 units/mL injection pen, , Disp: , Rfl:     albuterol (PROVENTIL HFA,VENTOLIN HFA) 90 mcg/act inhaler, Inhale 2 puffs every 6 (six) hours as needed for wheezing, Disp: , Rfl:     BASAGLAR KWIKPEN 100 units/mL injection pen, , Disp: , Rfl:     buPROPion (WELLBUTRIN XL) 300 mg 24 hr tablet, Take 300 mg by mouth, Disp: , Rfl:     cholecalciferol (VITAMIN D3) 1,000 units tablet, Take by mouth Three times a day, Disp: , Rfl:     cloNIDine (CATAPRES) 0 1 mg tablet, 0 2 mg every 12 (twelve) hours , Disp: , Rfl:     cloNIDine (CATAPRES) 0 2 mg tablet, Take 0 2 mg by mouth 2 (two) times a day, Disp: , Rfl:     doxycycline hyclate (VIBRA-TABS) 100 mg tablet, TAKE 1 TABLET (100 MG TOTAL) BY MOUTH 2 (TWO) TIMES A DAY FOR 7 DAYS, Disp: , Rfl:     EASY COMFORT PEN NEEDLES 32G X 4 MM MISC, , Disp: , Rfl:     EASY TOUCH INSULIN SYRINGE 31G X 5/16" 0 3 ML MISC, , Disp: , Rfl:     ergocalciferol (ERGOCALCIFEROL) 1 25 MG (06893 UT) capsule, Take 50,000 Units by mouth, Disp: , Rfl:     escitalopram (LEXAPRO) 10 mg tablet, 175 mg , Disp: , Rfl:     fenofibrate (TRICOR) 54 MG tablet, TAKE 1 TABLET (54 MG TOTAL) BY MOUTH DAILY  , Disp: , Rfl:     folic acid (FOLVITE) 1 mg tablet, Take 1,000 mcg by mouth daily, Disp: , Rfl:     glucose blood test strip, USE TO MONITOR BG 3 TIMES A DAY   E11 9, Disp: , Rfl:     HYDROcodone-acetaminophen (NORCO) 5-325 mg per tablet, , Disp: , Rfl:     HYDROcodone-acetaminophen (NORCO) 5-325 mg per tablet, Take 1 tablet by mouth every 6 (six) hours as needed for painMax Daily Amount: 4 tablets (Patient not taking: Reported on 3/22/2021), Disp: 7 tablet, Rfl: 0    hydrOXYzine HCL (ATARAX) 50 mg tablet, Take 50 mg by mouth daily at bedtime, Disp: , Rfl: 0    hydrOXYzine pamoate (VISTARIL) 50 mg capsule, Take 50 mg by mouth, Disp: , Rfl:     insulin glargine (LANTUS SOLOSTAR) 100 units/mL injection pen, Inject 55 Units under the skin 2 (two) times a day, Disp: , Rfl:     insulin lispro (HUMALOG KWIKPEN) 100 units/mL injection pen, Inject 1-15 Units under the skin, Disp: , Rfl:     lamoTRIgine (LaMICtal) 150 MG tablet, Take 150 mg by mouth daily, Disp: , Rfl:     lamoTRIgine (LaMICtal) 25 mg tablet, Take 50 mg by mouth daily at bedtime , Disp: , Rfl:     levothyroxine 50 mcg tablet, Take 50 mcg by mouth daily, Disp: , Rfl:     metFORMIN (GLUCOPHAGE) 500 mg tablet, Take 1 tablet (500 mg total) by mouth 2 (two) times a day with meals, Disp: 60 tablet, Rfl: 0    methocarbamol (ROBAXIN) 750 mg tablet, TAKE 1 TABLET (750 MG TOTAL) BY MOUTH 4 (FOUR) TIMES A DAY AS NEEDED FOR MUSCLE SPASMS , Disp: , Rfl:     multivitamin (THERAGRAN) TABS, Take 1 tablet by mouth daily, Disp: , Rfl:     mupirocin (BACTROBAN) 2 % ointment, Apply topically 3 (three) times a day, Disp: 22 g, Rfl: 0    ondansetron (ZOFRAN-ODT) 4 mg disintegrating tablet, , Disp: , Rfl:     oxyCODONE (ROXICODONE) 5 mg/5 mL solution, , Disp: , Rfl:     pantoprazole (PROTONIX) 40 mg tablet, Take 40 mg by mouth daily, Disp: , Rfl:     SUMAtriptan Succinate (IMITREX STATDOSE REFILL) 6 MG/0 5ML SOCT, Inject 6 mg under the skin every 2 (two) hours as needed, Disp: , Rfl:     Ultra Thin Lancets 31G MISC, USE TO MONITOR BLOOD GLUCOSE 3 TIMES A DAY AS DIRECTED , Disp: , Rfl:     ziprasidone (GEODON) 80 mg capsule, Take 80 mg by mouth 2 (two) times a day with meals , Disp: , Rfl:   Family History   Problem Relation Age of Onset    Hyperlipidemia Mother     Diabetes Mother     Hypertension Mother     Heart attack Father       Review of Systems   Constitutional: Negative  Respiratory: Negative  Cardiovascular: Negative  Gastrointestinal: Negative  Skin: Positive for wound  Neurological: Positive for numbness  Allergies  Aspirin, Barium iodide, Codeine, Latex, Vancomycin, and Penicillins    Objective:  /62   Pulse 64   Temp (!) 97 1 °F (36 2 °C)   Resp 16   LMP  (LMP Unknown)     Physical Exam  Vitals signs and nursing note reviewed  Constitutional:       General: She is not in acute distress  Appearance: She is obese  She is not ill-appearing, toxic-appearing or diaphoretic  Cardiovascular:      Pulses:           Dorsalis pedis pulses are 2+ on the right side and 2+ on the left side  Posterior tibial pulses are 2+ on the right side and 2+ on the left side     Musculoskeletal: Comments: Third toe amputated Right  Feet:      Right foot:      Protective Sensation: 10 sites tested  0 sites sensed  Left foot:      Protective Sensation: 10 sites tested  0 sites sensed  Neurological:      Mental Status: She is alert  Wound 01/13/21 Diabetic Ulcer Foot Right (Active)   Wound Image   05/07/21 1022   Wound Description Epithelialization;Pink 05/07/21 1022   Yvonne-wound Assessment Callus 05/07/21 1022   Wound Length (cm) 1 cm 05/07/21 1022   Wound Width (cm) 0 3 cm 05/07/21 1022   Wound Depth (cm) 0 1 cm 05/07/21 1022   Wound Surface Area (cm^2) 0 3 cm^2 05/07/21 1022   Wound Volume (cm^3) 0 03 cm^3 05/07/21 1022   Calculated Wound Volume (cm^3) 0 03 cm^3 05/07/21 1022   Change in Wound Size % 78 57 05/07/21 1022   Undermining 0 4 04/23/21 0942   Undermining is depth extending from 1-4oclock 04/23/21 0942   Drainage Amount Small 05/07/21 1022   Drainage Description Serosanguineous 05/07/21 1022   Non-staged Wound Description Full thickness 05/07/21 1022   Dressing Status Intact 04/23/21 0942           Wound 01/13/21 Diabetic Ulcer Foot Right (Active)   Wound Image   05/07/21 1022   Wound Description Epithelialization;Pink 05/07/21 1022   Yvonne-wound Assessment Callus 05/07/21 1022   Wound Length (cm) 1 cm 05/07/21 1022   Wound Width (cm) 0 3 cm 05/07/21 1022   Wound Depth (cm) 0 1 cm 05/07/21 1022   Wound Surface Area (cm^2) 0 3 cm^2 05/07/21 1022   Wound Volume (cm^3) 0 03 cm^3 05/07/21 1022   Calculated Wound Volume (cm^3) 0 03 cm^3 05/07/21 1022   Change in Wound Size % 78 57 05/07/21 1022   Undermining 0 4 04/23/21 0942   Undermining is depth extending from 1-4oclock 04/23/21 0942   Drainage Amount Small 05/07/21 1022   Drainage Description Serosanguineous 05/07/21 1022   Non-staged Wound Description Full thickness 05/07/21 1022   Dressing Status Intact 04/23/21 0942                         Diagnosis:  1   Right foot ulcer, with fat layer exposed ([address] )  -     Wound cleansing and dressings; Future  -     Wound off loading; Future    2  Type 2 diabetes mellitus with diabetic neuropathy, with long-term current use of insulin ([facility] )        Diagnosis ICD-10-CM Associated Orders   1  Right foot ulcer, with fat layer exposed ([facility] )  L97 512 Wound cleansing and dressings     Wound off loading   2  Type 2 diabetes mellitus with diabetic neuropathy, with long-term current use of insulin ([facility])  E11 40     Z79 4         ASSESSMENT    Problems:    Chronic illness / Problem not at goal with exacerbation, progression or side effects of treatment  1  Diabetic foot ulcer          PLAN    Discussion of Management /  Recommendations  Continue TCC    Procedures Performed  I recommended application of the TCC to facilitate healing particularly in light of the failure to heal previously without TCC  After application of the primary dressing, a TCC was applied in a modified Carville method using stockinette, cast padding between the toes, foam over the toes, cast padding toes to knee, additional felt over the [facility] and tibial crest and finally with the foot at a 90 degree angle to the leg, a well-formed fiberglass shell with additional thickness added plantarly to avoid breakdown  A cast shoe was applied for traction  Strict instructions to keep the cast clean,dry and intact were given and also instructions not to introduce powders or foreign objects into the cast was given  Instructions were given to call if any cast breakdown, or any irritation, odor, drainage or pain was noted within the cast      I explained that good wound care and compliance are necessary to allow healing and to prevent toe loss or limb loss  No guarantees of wound healing were given and I explained clearly that there is some risk with the use of TCC such as cast "rub", cast ulcers, or hidden infection but that the benefits of the TCC far outweight the risks if patient compliance is in place       Cast Application    Date/Time: 5/7/2021 11:31 AM  Performed by: Luisana Christianson DPM  Authorized by: Luisana Christianson DPM   Universal Protocol:  Consent: Verbal consent obtained  Risks and benefits: risks, benefits and alternatives were discussed  Consent given by: patient  Time out: Immediately prior to procedure a "time out" was called to verify the correct patient, procedure, equipment, support staff and site/side marked as required  Timeout called at: 5/7/2021 11:31 AM   Patient understanding: patient states understanding of the procedure being performed  Patient identity confirmed: verbally with patient      Pre-procedure details:     Sensation:  Numbness  Procedure details:     Laterality:  Right    Location:  Leg    Leg:  R lower legCast type:   Total contact      Supplies:  Fiberglass and cotton padding            Review prior external note  Review result  Test Ordered  Independent Historian Information  Independent Interpretation of Test  Test Interpretation Discussion with Patient  Referrals Made  Prescriptions Given  Risk of Complications and/or Morbidity or Mortality  Discussion Regarding Surgery  Discussion Regarding Hospitalization  Drug Therapy Requiring Toxicity Monitoring

## 2021-05-14 ENCOUNTER — OFFICE VISIT (OUTPATIENT)
Dept: WOUND CARE | Facility: HOSPITAL | Age: 46
End: 2021-05-14
Payer: COMMERCIAL

## 2021-05-14 VITALS
SYSTOLIC BLOOD PRESSURE: 107 MMHG | TEMPERATURE: 97.3 F | HEART RATE: 66 BPM | RESPIRATION RATE: 16 BRPM | DIASTOLIC BLOOD PRESSURE: 54 MMHG

## 2021-05-14 DIAGNOSIS — L97.512 RIGHT FOOT ULCER, WITH FAT LAYER EXPOSED (HCC): Primary | ICD-10-CM

## 2021-05-14 PROCEDURE — 29445 APPL RIGID TOT CNTC LEG CAST: CPT | Performed by: PODIATRIST

## 2021-05-14 NOTE — PROGRESS NOTES
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, with bone involvement without evidence of necrosis ([address] )    H/O bariatric surgery    Anxiety    Morbid obesity ([address] )    Preoperative clearance    Encounter for smoking cessation counseling    Hypothyroidism    Manic bipolar I disorder ([address] )    Type 2 diabetes mellitus with neurologic complication, with long-term current use of insulin ([address] )    Tobacco abuse    Type 2 diabetes mellitus ([address] )    Acquired absence of other left toe(s) ([address] )    Essential hypertension    Arthritis    Chronic pain    OCD (obsessive compulsive disorder)    PTSD (post-traumatic stress disorder)    Smoking    Right foot ulcer, with fat layer exposed ([address] )     Past Medical History:   Diagnosis Date    Anxiety     Arthritis     Asthma     Chronic pain     Diabetes mellitus ([address] )     Disease of thyroid gland     Lymphedema     left leg    Manic bipolar I disorder ([address] )     Morbid obesity ([address] )     OCD (obsessive compulsive disorder)     PTSD (post-traumatic stress disorder)      Past Surgical History:   Procedure Laterality Date    CHOLECYSTECTOMY      CHOLECYSTECTOMY      NC AMPUTATION METATARSAL+TOE,SINGLE Left 9/8/2019    Procedure: PARTIAL 3RD RAY RESECTION, PSB TOE FILLET FLAP;  Surgeon: Johann Collins DPM;  [facility];  Service: Podiatry    TOE AMPUTATION Right 1/13/2021    Procedure: AMPUTATION 2ND TOE;  Surgeon: Garland Bence, DPM;  [facility];  Service: Podiatry     Social History     Socioeconomic History    Marital status: Single     Spouse name: Not on file    Number of children: Not on file    Years of education: Not on file    Highest education level: Not on file   Occupational History    Not on file   Social Needs    Financial resource strain: Not on file    Food insecurity     Worry: Not on file     Inability: Not on file    Transportation needs     Medical: Not on file     Non-medical: Not on file   Tobacco Use    Smoking status: Former Smoker     Packs/day: 0 50     Years: 15 00     Pack years: 7 50    Smokeless tobacco: Never Used   Substance and Sexual Activity    Alcohol use: Never     Frequency: Never     Drinks per session: Patient refused     Binge frequency: Never    Drug use: Never    Sexual activity: Not Currently   Lifestyle    Physical activity     Days per week: Not on file     Minutes per session: Not on file    Stress: Not on file   Relationships    Social connections     Talks on phone: Not on file     Gets together: Not on file     Attends Confucianism service: Not on file     Active member of club or organization: Not on file     Attends meetings of clubs or organizations: Not on file     Relationship status: Not on file    Intimate partner violence     Fear of current or ex partner: Not on file     Emotionally abused: Not on file     Physically abused: Not on file     Forced sexual activity: Not on file   Other Topics Concern    Not on file   Social History Narrative    Not on file        Current Outpatient Medications:     acetaminophen (TYLENOL) 650 mg CR tablet, TAKE 1 TABLET (650 MG TOTAL) BY MOUTH EVERY 8 (EIGHT) HOURS AS NEEDED FOR MILD PAIN, Disp: 30 tablet, Rfl: 2    ADMELOG SOLOSTAR 100 units/mL injection pen, , Disp: , Rfl:     albuterol (PROVENTIL HFA,VENTOLIN HFA) 90 mcg/act inhaler, Inhale 2 puffs every 6 (six) hours as needed for wheezing, Disp: , Rfl:     BASAGLAR KWIKPEN 100 units/mL injection pen, , Disp: , Rfl:     buPROPion (WELLBUTRIN XL) 300 mg 24 hr tablet, Take 300 mg by mouth, Disp: , Rfl:     cholecalciferol (VITAMIN D3) 1,000 units tablet, Take by mouth Three times a day, Disp: , Rfl:     cloNIDine (CATAPRES) 0 1 mg tablet, 0 2 mg every 12 (twelve) hours , Disp: , Rfl:     cloNIDine (CATAPRES) 0 2 mg tablet, Take 0 2 mg by mouth 2 (two) times a day, Disp: , Rfl:     doxycycline hyclate (VIBRA-TABS) 100 mg tablet, TAKE 1 TABLET (100 MG TOTAL) BY MOUTH 2 (TWO) TIMES A DAY FOR 7 DAYS, Disp: , Rfl:     EASY COMFORT PEN NEEDLES 32G X 4 MM MISC, , Disp: , Rfl:     EASY TOUCH INSULIN SYRINGE 31G X 5/16" 0 3 ML MISC, , Disp: , Rfl:     ergocalciferol (ERGOCALCIFEROL) 1 25 MG (58996 UT) capsule, Take 50,000 Units by mouth, Disp: , Rfl:     escitalopram (LEXAPRO) 10 mg tablet, 175 mg , Disp: , Rfl:     fenofibrate (TRICOR) 54 MG tablet, TAKE 1 TABLET (54 MG TOTAL) BY MOUTH DAILY  , Disp: , Rfl:     folic acid (FOLVITE) 1 mg tablet, Take 1,000 mcg by mouth daily, Disp: , Rfl:     glucose blood test strip, USE TO MONITOR BG 3 TIMES A DAY   E11 9, Disp: , Rfl:     HYDROcodone-acetaminophen (NORCO) 5-325 mg per tablet, , Disp: , Rfl:     HYDROcodone-acetaminophen (NORCO) 5-325 mg per tablet, Take 1 tablet by mouth every 6 (six) hours as needed for painMax Daily Amount: 4 tablets (Patient not taking: Reported on 3/22/2021), Disp: 7 tablet, Rfl: 0    hydrOXYzine HCL (ATARAX) 50 mg tablet, Take 50 mg by mouth daily at bedtime, Disp: , Rfl: 0    hydrOXYzine pamoate (VISTARIL) 50 mg capsule, Take 50 mg by mouth, Disp: , Rfl:     insulin glargine (LANTUS SOLOSTAR) 100 units/mL injection pen, Inject 55 Units under the skin 2 (two) times a day, Disp: , Rfl:     insulin lispro (HUMALOG KWIKPEN) 100 units/mL injection pen, Inject 1-15 Units under the skin, Disp: , Rfl:     lamoTRIgine (LaMICtal) 150 MG tablet, Take 150 mg by mouth daily, Disp: , Rfl:     lamoTRIgine (LaMICtal) 25 mg tablet, Take 50 mg by mouth daily at bedtime , Disp: , Rfl:    levothyroxine 50 mcg tablet, Take 50 mcg by mouth daily, Disp: , Rfl:     metFORMIN (GLUCOPHAGE) 500 mg tablet, Take 1 tablet (500 mg total) by mouth 2 (two) times a day with meals, Disp: 60 tablet, Rfl: 0    methocarbamol (ROBAXIN) 750 mg tablet, TAKE 1 TABLET (750 MG TOTAL) BY MOUTH 4 (FOUR) TIMES A DAY AS NEEDED FOR MUSCLE SPASMS , Disp: , Rfl:     multivitamin (THERAGRAN) TABS, Take 1 tablet by mouth daily, Disp: , Rfl:     mupirocin (BACTROBAN) 2 % ointment, Apply topically 3 (three) times a day, Disp: 22 g, Rfl: 0    ondansetron (ZOFRAN-ODT) 4 mg disintegrating tablet, , Disp: , Rfl:     oxyCODONE (ROXICODONE) 5 mg/5 mL solution, , Disp: , Rfl:     pantoprazole (PROTONIX) 40 mg tablet, Take 40 mg by mouth daily, Disp: , Rfl:     SUMAtriptan Succinate (IMITREX STATDOSE REFILL) 6 MG/0 5ML SOCT, Inject 6 mg under the skin every 2 (two) hours as needed, Disp: , Rfl:     Ultra Thin Lancets 31G MISC, USE TO MONITOR BLOOD GLUCOSE 3 TIMES A DAY AS DIRECTED , Disp: , Rfl:     ziprasidone (GEODON) 80 mg capsule, Take 80 mg by mouth 2 (two) times a day with meals , Disp: , Rfl:   Family History   Problem Relation Age of Onset    Hyperlipidemia Mother     Diabetes Mother     Hypertension Mother     Heart attack Father       Review of Systems   Constitutional: Negative  Respiratory: Negative  Cardiovascular: Negative  Gastrointestinal: Negative  Skin: Positive for wound  Neurological: Positive for numbness  Allergies  Aspirin, Barium iodide, Codeine, Latex, Vancomycin, and Penicillins    Objective: There were no vitals taken for this visit  Physical Exam  Vitals signs and nursing note reviewed  Constitutional:       General: She is not in acute distress  Appearance: She is obese  She is not ill-appearing, toxic-appearing or diaphoretic  Cardiovascular:      Pulses:           Dorsalis pedis pulses are 2+ on the right side and 2+ on the left side          Posterior tibial pulses are 2+ on the right side and 2+ on the left side  Musculoskeletal:      Comments: Third toe amputated Right  Feet:      Right foot:      Protective Sensation: 10 sites tested  0 sites sensed  Left foot:      Protective Sensation: 10 sites tested  0 sites sensed  Neurological:      Mental Status: She is alert  Wound 01/13/21 Diabetic Ulcer Foot Right (Active)   Wound Image   05/14/21 1059   Wound Description Yellow;Pink 05/14/21 1059   Yvonne-wound Assessment Callus 05/14/21 1059   Wound Length (cm) 0 6 cm 05/14/21 1059   Wound Width (cm) 0 2 cm 05/14/21 1059   Wound Depth (cm) 0 1 cm 05/14/21 1059   Wound Surface Area (cm^2) 0 12 cm^2 05/14/21 1059   Wound Volume (cm^3) 0 01 cm^3 05/14/21 1059   Calculated Wound Volume (cm^3) 0 01 cm^3 05/14/21 1059   Change in Wound Size % 92 86 05/14/21 1059   Drainage Amount Scant 05/14/21 1059   Drainage Description Serosanguineous 05/14/21 1059   Non-staged Wound Description Full thickness 05/14/21 1059           Wound 01/13/21 Diabetic Ulcer Foot Right (Active)   Wound Image   05/14/21 1059   Wound Description Yellow;Pink 05/14/21 1059   Yvonne-wound Assessment Callus 05/14/21 1059   Wound Length (cm) 0 6 cm 05/14/21 1059   Wound Width (cm) 0 2 cm 05/14/21 1059   Wound Depth (cm) 0 1 cm 05/14/21 1059   Wound Surface Area (cm^2) 0 12 cm^2 05/14/21 1059   Wound Volume (cm^3) 0 01 cm^3 05/14/21 1059   Calculated Wound Volume (cm^3) 0 01 cm^3 05/14/21 1059   Change in Wound Size % 92 86 05/14/21 1059   Drainage Amount Scant 05/14/21 1059   Drainage Description Serosanguineous 05/14/21 1059   Non-staged Wound Description Full thickness 05/14/21 1059                         Diagnosis:  1  Right foot ulcer, with fat layer exposed ([address] )  -     Wound cleansing and dressings; Future  -     Wound off loading; Future        Diagnosis ICD-10-CM Associated Orders   1   Right foot ulcer, with fat layer exposed ([facility] [address] [facility] )  L97 512 Wound cleansing and dressings     Wound off loading ASSESSMENT    Problems:    Chronic illness / Problem not at goal with exacerbation, progression or side effects of treatment  1  Diabetic foot ulcer      PLAN    Discussion of Management /  Recommendations  Continue TCC    Cast Application    Date/Time: 5/14/2021 11:11 AM  Performed by: Kristine Hollis DPM  Authorized by: Kristine Hollis DPM   Universal Protocol:  Consent: Verbal consent obtained  Risks and benefits: risks, benefits and alternatives were discussed  Consent given by: patient  Time out: Immediately prior to procedure a "time out" was called to verify the correct patient, procedure, equipment, support staff and site/side marked as required  Timeout called at: 5/14/2021 11:11 AM   Patient understanding: patient states understanding of the procedure being performed  Patient identity confirmed: verbally with patient      Pre-procedure details:     Sensation:  Numbness  Procedure details:     Location:  Leg    Leg:  R lower legCast type:   Total contact      Supplies:  [place]otton padding and fiberglass

## 2021-05-14 NOTE — PATIENT INSTRUCTIONS
Orders Placed This Encounter   Procedures    Wound cleansing and dressings     Right foot wound:  Do not get cast wet  Use cast cover or sponge bath  At wound care center today  Foot washed with soap and water  Wound cleansed with normal saline  Patted dry  Acticoat Flex 7 applied to wound  Covered with foam  Secured with tape  TCC ez applied with extra fiberglass layer  Change dressing weekly at wound care center  Follow up in 1 week with Dr Tristin Conde     Standing Status:   Future     Standing Expiration Date:   5/14/2022    Wound off loading     Total Contact Cast Instructions:  Do not get cast wet  Contact wound center if there is a foul odor or becomes uncomfortable due to feeling tight or swelling  Do not use objects down inside of cast to scratch    Do not walk on cast without walking boot       -----Please ensure to take smaller steps as well as lighter foot steps to help prevent the cast from cracking-----     Standing Status:   Future     Standing Expiration Date:   5/14/2022

## 2021-05-21 ENCOUNTER — OFFICE VISIT (OUTPATIENT)
Dept: WOUND CARE | Facility: HOSPITAL | Age: 46
End: 2021-05-21
Payer: COMMERCIAL

## 2021-05-21 VITALS
HEART RATE: 77 BPM | SYSTOLIC BLOOD PRESSURE: 129 MMHG | TEMPERATURE: 97.2 F | DIASTOLIC BLOOD PRESSURE: 78 MMHG | RESPIRATION RATE: 18 BRPM

## 2021-05-21 DIAGNOSIS — L97.512 RIGHT FOOT ULCER, WITH FAT LAYER EXPOSED (HCC): Primary | ICD-10-CM

## 2021-05-21 DIAGNOSIS — L97.416 DIABETIC ULCER OF RIGHT MIDFOOT ASSOCIATED WITH DIABETES MELLITUS DUE TO UNDERLYING CONDITION, WITH BONE INVOLVEMENT WITHOUT EVIDENCE OF NECROSIS (HCC): ICD-10-CM

## 2021-05-21 DIAGNOSIS — E08.621 DIABETIC ULCER OF RIGHT MIDFOOT ASSOCIATED WITH DIABETES MELLITUS DUE TO UNDERLYING CONDITION, WITH BONE INVOLVEMENT WITHOUT EVIDENCE OF NECROSIS (HCC): ICD-10-CM

## 2021-05-21 PROCEDURE — 99213 OFFICE O/P EST LOW 20 MIN: CPT | Performed by: PODIATRIST

## 2021-05-21 NOTE — PATIENT INSTRUCTIONS
Orders Placed This Encounter   Procedures    Wound cleansing and dressings     As of today your right foot is healed  The doctor has made a shoe for you to wear every step until you are refitted  Please look at your feet everyday to look for any wounds or blisters  No need to cover wound at this time as per doctor  The skin is fragile so please be careful with the wound  You should not return to work until your shoes are refitted  See back in 2 weeks to reassure wound stays closed  Standing Status:   Future     Standing Expiration Date:   5/21/2022     Foot Care for People with Diabetes   WHAT YOU NEED TO KNOW:   · Foot care helps protect your feet and prevent foot ulcers or sores  Long-term high blood sugar levels can damage the blood vessels and nerves in your legs and feet  This damage makes it hard to feel pressure, pain, temperature, and touch  You may not be able to feel a cut or sore, or shoes that are too tight  Foot care is needed to prevent serious problems, such as an infection or amputation  · Diabetes may cause your toes to become crooked or curved under  These changes may affect the way you walk and can lead to increased pressure on your foot  The pressure can decrease blood flow to your feet  Lack of blood flow increases your risk for a foot ulcer  Do not ignore small problems, such as dry skin or small wounds  These can become life-threatening over time without proper care  DISCHARGE INSTRUCTIONS:   Call your care team provider if:   · Your feet become numb, weak, or hard to move  · You have pus draining from a sore on your foot  · You have a wound on your foot that gets bigger, deeper, or does not heal      · You see blisters, cuts, scratches, calluses, or sores on your foot  · You have a fever, and your feet become red, warm, and swollen  · Your toenails become thick, curled, or yellow  · You find it hard to check your feet because your vision is poor       · You have questions or concerns about your condition or care  Foot care:   · Check your feet each day  Look at your whole foot, including the bottom, and between and under your toes  Check for wounds, corns, and calluses  Use a mirror to see the bottom of your feet  The skin on your feet may be shiny, tight, or darker than normal  Your feet may also be cold and pale  Feel your feet by running your hands along the tops, bottoms, sides, and between your toes  Redness, swelling, and warmth are signs of blood flow problems that can lead to a foot ulcer  Do not try to remove corns or calluses yourself  · Wash your feet each day with soap and warm water  Do not use hot water, because this can injure your foot  Dry your feet gently with a towel after you wash them  Dry between and under your toes  · Apply lotion or a moisturizer on your dry feet  Ask your care team provider what lotions are best to use  Do not put lotion or moisturizer between your toes  Moisture between your toes could lead to skin breakdown  · Cut your toenails correctly  File or cut your toenails straight across  Use a soft brush to clean around your toenails  If your toenails are very thick, you may need to have a care team provider or specialist cut them  · Protect your feet  Do not walk barefoot or wear your shoes without socks  Check your shoes for rocks or other objects that can hurt your feet  Wear cotton socks to help keep your feet dry  Wear socks without toe seams, or wear them with the seams inside out  Change your socks each day  Do not wear socks that are dirty or damp  · Wear shoes that fit well  Wear shoes that do not rub against any area of your feet  Your shoes should be ½ to ¾ inch (1 to 2 centimeters) longer than your feet  Your shoes should also have extra space around the widest part of your feet   Walking or athletic shoes with laces or straps that adjust are best  Ask your care team provider for help to choose shoes that fit you best  Ask him or her if you need to wear an insert, orthotic, or bandage on your feet  · Do not smoke  Smoking can damage your blood vessels and put you at increased risk for foot ulcers  Ask your care team provider for information if you currently smoke and need help to quit  E-cigarettes or smokeless tobacco still contain nicotine  Talk to your care team provider before you use these products  Follow up with your diabetes care team provider or foot specialist as directed: You will need to have your feet checked at least once a year  You may need a foot exam more often if you have nerve damage, foot deformities, or ulcers  Write down your questions so you remember to ask them during your visits  © Copyright [address] Information is for End User's use only and may not be sold, redistributed or otherwise used for commercial purposes  All illustrations and images included in CareNotes® are the copyrighted property of A D A M , Inc  or [facility] Citlali Segundo   The above information is an  only  It is not intended as medical advice for individual conditions or treatments  Talk to your doctor, nurse or pharmacist before following any medical regimen to see if it is safe and effective for you

## 2021-05-24 NOTE — PROGRESS NOTES
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, with bone involvement without evidence of necrosis ([address] )    H/O bariatric surgery    Anxiety    Morbid obesity ([address] )    Preoperative clearance    Encounter for smoking cessation counseling    Hypothyroidism    Manic bipolar I disorder ([address] )    Type 2 diabetes mellitus with neurologic complication, with long-term current use of insulin ([address] )    Tobacco abuse    Type 2 diabetes mellitus ([address] )    Acquired absence of other left toe(s) ([address] )    Essential hypertension    Arthritis    Chronic pain    OCD (obsessive compulsive disorder)    PTSD (post-traumatic stress disorder)    Smoking    Right foot ulcer, with fat layer exposed ([address] )     Past Medical History:   Diagnosis Date    Anxiety     Arthritis     Asthma     Chronic pain     Diabetes mellitus ([address] )     Disease of thyroid gland     Lymphedema     left leg    Manic bipolar I disorder ([address] )     Morbid obesity ([address] )     OCD (obsessive compulsive disorder)     PTSD (post-traumatic stress disorder)      Past Surgical History:   Procedure Laterality Date    CHOLECYSTECTOMY      CHOLECYSTECTOMY      NM AMPUTATION METATARSAL+TOE,SINGLE Left 9/8/2019    Procedure: PARTIAL 3RD RAY RESECTION, PSB TOE FILLET FLAP;  Surgeon: Karlene Bruno DPM;  [facility];  Service: Podiatry    TOE AMPUTATION Right 1/13/2021    Procedure: AMPUTATION 2ND TOE;  Surgeon: Fidel Griffiths DPM;  Location[facility];  Service: Podiatry     Social History     Socioeconomic History    Marital status: Single     Spouse name: None    Number of children: None    Years of education: None    Highest education level: None   Occupational History    None   Tobacco Use    Smoking status: Former Smoker     Packs/day: 0 50     Years: 15 00     Pack years: 7 50    Smokeless tobacco: Never Used   Vaping Use    Vaping Use: Some days   Substance and Sexual Activity    Alcohol use: Never    Drug use: Never    Sexual activity: Not Currently   Other Topics Concern    None   Social History Narrative    None     Social Determinants of Health     Financial Resource Strain:     Difficulty of Paying Living Expenses:    Food Insecurity:     Worried About Running Out of Food in the Last Year:     Ran Out of Food in the Last Year:    Transportation Needs:     Lack of Transportation (Medical):      Lack of Transportation (Non-Medical):    Physical Activity:     Days of Exercise per Week:     Minutes of Exercise per Session:    Stress:     Feeling of Stress :    Social Connections:     Frequency of Communication with Friends and Family:     Frequency of Social Gatherings with Friends and Family:     Attends Yarsani Services:     Active Member of Clubs or Organizations:     Attends Club or Organization Meetings:     Marital Status:    Intimate Partner Violence:     Fear of Current or Ex-Partner:     Emotionally Abused:     Physically Abused:     Sexually Abused:         Current Outpatient Medications:     acetaminophen (TYLENOL) 650 mg CR tablet, TAKE 1 TABLET (650 MG TOTAL) BY MOUTH EVERY 8 (EIGHT) HOURS AS NEEDED FOR MILD PAIN, Disp: 30 tablet, Rfl: 2    ADMELOG SOLOSTAR 100 units/mL injection pen, , Disp: , Rfl:     albuterol (PROVENTIL HFA,VENTOLIN HFA) 90 mcg/act inhaler, Inhale 2 puffs every 6 (six) hours as needed for wheezing, Disp: , Rfl:     BASAGLAR KWIKPEN 100 units/mL injection pen, , Disp: , Rfl:     buPROPion (WELLBUTRIN XL) 300 mg 24 hr tablet, Take 300 mg by mouth, Disp: , Rfl:     cholecalciferol (VITAMIN D3) 1,000 units tablet, Take by mouth Three times a day, Disp: , Rfl:     cloNIDine (CATAPRES) 0 1 mg tablet, 0 2 mg every 12 (twelve) hours , Disp: , Rfl:     cloNIDine (CATAPRES) 0 2 mg tablet, Take 0 2 mg by mouth 2 (two) times a day, Disp: , Rfl:     doxycycline hyclate (VIBRA-TABS) 100 mg tablet, TAKE 1 TABLET (100 MG TOTAL) BY MOUTH 2 (TWO) TIMES A DAY FOR 7 DAYS, Disp: , Rfl:     EASY COMFORT PEN NEEDLES 32G X 4 MM MISC, , Disp: , Rfl:     EASY TOUCH INSULIN SYRINGE 31G X 5/16" 0 3 ML MISC, , Disp: , Rfl:     ergocalciferol (ERGOCALCIFEROL) 1 25 MG (88636 UT) capsule, Take 50,000 Units by mouth, Disp: , Rfl:     escitalopram (LEXAPRO) 10 mg tablet, 175 mg , Disp: , Rfl:     fenofibrate (TRICOR) 54 MG tablet, TAKE 1 TABLET (54 MG TOTAL) BY MOUTH DAILY  , Disp: , Rfl:     folic acid (FOLVITE) 1 mg tablet, Take 1,000 mcg by mouth daily, Disp: , Rfl:     glucose blood test strip, USE TO MONITOR BG 3 TIMES A DAY   E11 9, Disp: , Rfl:     HYDROcodone-acetaminophen (NORCO) 5-325 mg per tablet, , Disp: , Rfl:     HYDROcodone-acetaminophen (NORCO) 5-325 mg per tablet, Take 1 tablet by mouth every 6 (six) hours as needed for painMax Daily Amount: 4 tablets (Patient not taking: Reported on 3/22/2021), Disp: 7 tablet, Rfl: 0    hydrOXYzine HCL (ATARAX) 50 mg tablet, Take 50 mg by mouth daily at bedtime, Disp: , Rfl: 0    hydrOXYzine pamoate (VISTARIL) 50 mg capsule, Take 50 mg by mouth, Disp: , Rfl:     insulin glargine (LANTUS SOLOSTAR) 100 units/mL injection pen, Inject 55 Units under the skin 2 (two) times a day, Disp: , Rfl:     insulin lispro (HUMALOG KWIKPEN) 100 units/mL injection pen, Inject 1-15 Units under the skin, Disp: , Rfl:     lamoTRIgine (LaMICtal) 150 MG tablet, Take 150 mg by mouth daily, Disp: , Rfl:     lamoTRIgine (LaMICtal) 25 mg tablet, Take 50 mg by mouth daily at bedtime , Disp: , Rfl:     levothyroxine 50 mcg tablet, Take 50 mcg by mouth daily, Disp: , Rfl:     metFORMIN (GLUCOPHAGE) 500 mg tablet, Take 1 tablet (500 mg total) by mouth 2 (two) times a day with meals, Disp: 60 tablet, Rfl: 0    methocarbamol (ROBAXIN) 750 mg tablet, TAKE 1 TABLET (750 MG TOTAL) BY MOUTH 4 (FOUR) TIMES A DAY AS NEEDED FOR MUSCLE SPASMS , Disp: , Rfl:     multivitamin (THERAGRAN) TABS, Take 1 tablet by mouth daily, Disp: , Rfl:     mupirocin (BACTROBAN) 2 % ointment, Apply topically 3 (three) times a day, Disp: 22 g, Rfl: 0    ondansetron (ZOFRAN-ODT) 4 mg disintegrating tablet, , Disp: , Rfl:     oxyCODONE (ROXICODONE) 5 mg/5 mL solution, , Disp: , Rfl:     pantoprazole (PROTONIX) 40 mg tablet, Take 40 mg by mouth daily, Disp: , Rfl:     SUMAtriptan Succinate (IMITREX STATDOSE REFILL) 6 MG/0 5ML SOCT, Inject 6 mg under the skin every 2 (two) hours as needed, Disp: , Rfl:     Ultra Thin Lancets 31G MISC, USE TO MONITOR BLOOD GLUCOSE 3 TIMES A DAY AS DIRECTED , Disp: , Rfl:     ziprasidone (GEODON) 80 mg capsule, Take 80 mg by mouth 2 (two) times a day with meals , Disp: , Rfl:   Family History   Problem Relation Age of Onset    Hyperlipidemia Mother     Diabetes Mother     Hypertension Mother     Heart attack Father       Review of Systems   Constitutional: Negative  Respiratory: Negative  Cardiovascular: Negative  Gastrointestinal: Negative  Skin: Positive for wound  Neurological: Positive for numbness  Allergies  Aspirin, Barium iodide, Codeine, Latex, Vancomycin, and Penicillins    Objective:  /78   Pulse 77   Temp (!) 97 2 °F (36 2 °C)   Resp 18     Physical Exam  Vitals signs and nursing note reviewed  Constitutional:       General: She is not in acute distress  Appearance: She is obese  She is not ill-appearing, toxic-appearing or diaphoretic  Cardiovascular:      Pulses:           Dorsalis pedis pulses are 2+ on the right side and 2+ on the left side  Posterior tibial pulses are 2+ on the right side and 2+ on the left side  Musculoskeletal:      Comments: Third toe amputated Right  Feet:      Right foot:      Protective Sensation: 10 sites tested  0 sites sensed  Left foot:      Protective Sensation: 10 sites tested  0 sites sensed  Neurological:      Mental Status: She is alert  Wound 01/13/21 Diabetic Ulcer Foot Right (Active)   Wound Image   05/21/21 1057   Wound Description Pink 05/21/21 1000   Yvonne-wound Assessment Callus 05/21/21 1000   Wound Length (cm) 0 1 cm 05/21/21 1000   Wound Width (cm) 0 1 cm 05/21/21 1000   Wound Depth (cm) 0 1 cm 05/21/21 1000   Wound Surface Area (cm^2) 0 01 cm^2 05/21/21 1000   Wound Volume (cm^3) 0 cm^3 05/21/21 1000   Calculated Wound Volume (cm^3) 0 cm^3 05/21/21 1000   Change in Wound Size % 100 05/21/21 1000   Undermining 0 4 04/23/21 0942   Undermining is depth extending from 1-4oclock 04/23/21 0942   Drainage Amount Scant 05/21/21 1000   Drainage Description Serosanguineous 05/21/21 1000   Non-staged Wound Description Full thickness 05/21/21 1000   Dressing Status Intact; Other (Comment) 05/21/21 1000           Wound 01/13/21 Diabetic Ulcer Foot Right (Active)   Wound Image   05/21/21 1057   Wound Description Pink 05/21/21 1000   Yvonne-wound Assessment Callus 05/21/21 1000   Wound Length (cm) 0 1 cm 05/21/21 1000   Wound Width (cm) 0 1 cm 05/21/21 1000   Wound Depth (cm) 0 1 cm 05/21/21 1000   Wound Surface Area (cm^2) 0 01 cm^2 05/21/21 1000   Wound Volume (cm^3) 0 cm^3 05/21/21 1000   Calculated Wound Volume (cm^3) 0 cm^3 05/21/21 1000   Change in Wound Size % 100 05/21/21 1000   Undermining 0 4 04/23/21 0942   Undermining is depth extending from 1-4oclock 04/23/21 0942   Drainage Amount Scant 05/21/21 1000   Drainage Description Serosanguineous 05/21/21 1000   Non-staged Wound Description Full thickness 05/21/21 1000   Dressing Status Intact; Other (Comment) 05/21/21 1000 Diagnosis:  1  Right foot ulcer, with fat layer exposed ([facility] [address] )    2  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, with bone involvement without evidence of necrosis ([facility] )        Diagnosis ICD-10-CM Associated Orders   1  Right foot ulcer, with fat layer exposed ([facility] [address] )  L97 [id number]    2  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, with bone involvement without evidence of necrosis [facility])  [id number]     L97 [id number]         ASSESSMENT    Problems:    Chronic illness / Problem not at goal with exacerbation, progression or side effects of treatment  1  Healed diabetic foot ulcer        PLAN    Discussion of Management /  Recommendations    High risk  foot precautions reviewed with patient including the need to wear protective shoegear and her custom offloading insoles at all times when walking including in the home, the need to check feet all surfaces daily with a mirror and report and skin breaks to podiatry, the need to apply an emollient to skin of feet daily

## 2021-06-04 ENCOUNTER — OFFICE VISIT (OUTPATIENT)
Dept: WOUND CARE | Facility: HOSPITAL | Age: 46
End: 2021-06-04
Payer: COMMERCIAL

## 2021-06-04 VITALS
TEMPERATURE: 97.7 F | RESPIRATION RATE: 16 BRPM | SYSTOLIC BLOOD PRESSURE: 130 MMHG | HEART RATE: 74 BPM | DIASTOLIC BLOOD PRESSURE: 79 MMHG

## 2021-06-04 DIAGNOSIS — L97.512 RIGHT FOOT ULCER, WITH FAT LAYER EXPOSED (HCC): Primary | ICD-10-CM

## 2021-06-04 DIAGNOSIS — S91.301S OPEN WOUND OF FOOT, RIGHT, SEQUELA: ICD-10-CM

## 2021-06-04 DIAGNOSIS — L97.512 DIABETIC ULCER OF TOE OF RIGHT FOOT ASSOCIATED WITH TYPE 2 DIABETES MELLITUS, WITH FAT LAYER EXPOSED (HCC): ICD-10-CM

## 2021-06-04 DIAGNOSIS — E11.621 DIABETIC ULCER OF TOE OF RIGHT FOOT ASSOCIATED WITH TYPE 2 DIABETES MELLITUS, WITH FAT LAYER EXPOSED (HCC): ICD-10-CM

## 2021-06-04 PROCEDURE — 99213 OFFICE O/P EST LOW 20 MIN: CPT | Performed by: PODIATRIST

## 2021-06-04 PROCEDURE — 29445 APPL RIGID TOT CNTC LEG CAST: CPT | Performed by: PODIATRIST

## 2021-06-04 NOTE — PATIENT INSTRUCTIONS
Orders Placed This Encounter   Procedures    Wound cleansing and dressings     Right foot wound:  Do not get cast wet  Use cast cover or sponge bath    At wound care center today  Wound cleansed with normal saline  Patted dry  Silver alginate applied to wound  Covered with ABD  Secured with tape  TCC ez applied with extra fiberglass layer  Change dressing weekly at wound care center                    Standing Status:   Future     Standing Expiration Date:   6/4/2022    Wound off loading      Wound off loading      Total Contact Cast Instructions:  Do not get cast wet  Contact wound center if there is a foul odor or becomes uncomfortable due to feeling tight or swelling  Do not use objects down inside of cast to scratch  Do not walk on cast without walking boot         -----Please ensure to take smaller steps as well as lighter foot steps to help prevent the cast from cracking-----     Standing Status:   Future     Standing Expiration Date:   6/4/2022      TOTAL CONTACT CAST  Total Contact Casting is a method of pressure relief that has been shown to have the highest rate of wound healing  Total Contact Casting is considered the gold standard for reducing foot wound pressure  What is a Total Contact Cast?  This casting method helps promote healing and reduce pressure in the area of the wound  The cast works to redistribute weight, minimize pressure and reduce friction on the foot  The cast consists of two parts - the hard cast and a walking boot  Certain types of wounds heal better and more efficiently with Total Contact Casting  Your doctor has determined that this method of treatment is best for your wound  How long will I be wearing a cast?  The length of treatment varies from patient to patient  We will reassess your wound weekly for continued need for casting  Usually, the cast is changed weekly    Sometimes, as wounds become smaller and drain less, the cast may be changed every 2-3 weeks depending on your doctor's assessment  How will this cast change my lifestyle?  The cast takes 24 hours to cure and we ask that you absolutely minimize weight bearing during this time   Never put anything into the cast such as powders or something to scratch the skin under the cast     You will need to wear the walking boot at all times - you can cover it with a pillow case while in bed  A pillow case on the cast and a long thick sock on your other foot will protect the non-casted foot from getting irritated by the cast    The cast must be kept dry at all times  Do not take showers while the cast is on  Cover the cast with a plastic bag to protect it from getting wet during poor weather  It's VERY important that you call the office immediately if you get the cast wet (or) you feel any type of pain or even mild irritation in the cast    Do not walk without the outer walking boot firmly attached to the cast    Since you will not be walking as much as you normally do, you will need some assistance from family and friends for certain tasks  This is common when wearing any type of cast and you will find that people are eager to assist you while you are taking the necessary time to heal   If you have any questions about what you should or should not be doing, please feel free to contact us  Once your wound is healed, you will need to be fitted for proper footwear to ensure that the wound does not return  You may be casted until this process is completed  It is important to avoid old shoes and habits that may have led to the development of your wound

## 2021-06-04 NOTE — PROGRESS NOTES
Patient ID: Anthony Herrera is a 40 y o  female Date of Birth 1975     My role is Foot, Ankle and Wound Specialist    Chief Complaint   Patient presents with    Follow Up Wound Care Visit     right foot wound        DFU    Subjective:   Abhilash Rey is back for evaluation of her previously healed wound site  She was healed and was wearing her wedge shoe (consistently she says) and transitioned yesterday to her DM shoe with insole yesterday  A recurrent wound is noted by the RN on intake today        The following portions of the patient's history were reviewed and updated as appropriate:   Patient Active Problem List   Diagnosis    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, with bone involvement without evidence of necrosis ([address] )    H/O bariatric surgery    Anxiety    Morbid obesity ([address] )    Preoperative clearance    Encounter for smoking cessation counseling    Hypothyroidism    Manic bipolar I disorder ([address] )    Type 2 diabetes mellitus with neurologic complication, with long-term current use of insulin ([address] )    Tobacco abuse    Type 2 diabetes mellitus ([address] )    Acquired absence of other left toe(s) ([address] )    Essential hypertension    Arthritis    Chronic pain    OCD (obsessive compulsive disorder)    PTSD (post-traumatic stress disorder)    Smoking    Right foot ulcer, with fat layer exposed ([address] )     Past Medical History:   Diagnosis Date    Anxiety     Arthritis     Asthma     Chronic pain     Diabetes mellitus ([address] )     Disease of thyroid gland     Lymphedema     left leg    Manic bipolar I disorder ([address] )     Morbid obesity ([address] )     OCD (obsessive compulsive disorder)     PTSD (post-traumatic stress disorder)      Past Surgical History:   Procedure Laterality Date    CHOLECYSTECTOMY      CHOLECYSTECTOMY      OH AMPUTATION METATARSAL+TOE,SINGLE Left 9/8/2019    Procedure: PARTIAL 3RD RAY RESECTION, PSB TOE FILLET FLAP;  Surgeon: Markie Crooks Saniya Nolan DPM;  [facility];  Service: Podiatry    TOE AMPUTATION Right 1/13/2021    Procedure: AMPUTATION 2ND TOE;  Surgeon: Marly Bone DPM;  [facility];  Service: Podiatry     Social History     Socioeconomic History    Marital status: Single     Spouse name: None    Number of children: None    Years of education: None    Highest education level: None   Occupational History    None   Social Needs    Financial resource strain: None    Food insecurity     Worry: None     Inability: None    Transportation needs     Medical: None     Non-medical: None   Tobacco Use    Smoking status: Former Smoker     Packs/day: 0 50     Years: 15 00     Pack years: 7 50    Smokeless tobacco: Never Used   Substance and Sexual Activity    Alcohol use: Never     Frequency: Never     Drinks per session: Patient refused     Binge frequency: Never    Drug use: Never    Sexual activity: Not Currently   Lifestyle    Physical activity     Days per week: None     Minutes per session: None    Stress: None   Relationships    Social connections     Talks on phone: None     Gets together: None     Attends Muslim service: None     Active member of club or organization: None     Attends meetings of clubs or organizations: None     Relationship status: None    Intimate partner violence     Fear of current or ex partner: None     Emotionally abused: None     Physically abused: None     Forced sexual activity: None   Other Topics Concern    None   Social History Narrative    None        Current Outpatient Medications:     acetaminophen (TYLENOL) 650 mg CR tablet, TAKE 1 TABLET (650 MG TOTAL) BY MOUTH EVERY 8 (EIGHT) HOURS AS NEEDED FOR MILD PAIN, Disp: 30 tablet, Rfl: 2    ADMELOG SOLOSTAR 100 units/mL injection pen, , Disp: , Rfl:     albuterol (PROVENTIL HFA,VENTOLIN HFA) 90 mcg/act inhaler, Inhale 2 puffs every 6 (six) hours as needed for wheezing, Disp: , Rfl:     BASAGLAR KWIKPEN 100 units/mL injection pen, , Disp: , Rfl:     buPROPion (WELLBUTRIN XL) 300 mg 24 hr tablet, Take 300 mg by mouth, Disp: , Rfl:     cholecalciferol (VITAMIN D3) 1,000 units tablet, Take by mouth Three times a day, Disp: , Rfl:     cloNIDine (CATAPRES) 0 1 mg tablet, 0 2 mg every 12 (twelve) hours , Disp: , Rfl:     cloNIDine (CATAPRES) 0 2 mg tablet, Take 0 2 mg by mouth 2 (two) times a day, Disp: , Rfl:     doxycycline hyclate (VIBRA-TABS) 100 mg tablet, TAKE 1 TABLET (100 MG TOTAL) BY MOUTH 2 (TWO) TIMES A DAY FOR 7 DAYS, Disp: , Rfl:     EASY COMFORT PEN NEEDLES 32G X 4 MM MISC, , Disp: , Rfl:     EASY TOUCH INSULIN SYRINGE 31G X 5/16" 0 3 ML MISC, , Disp: , Rfl:     ergocalciferol (ERGOCALCIFEROL) 1 25 MG (71217 UT) capsule, Take 50,000 Units by mouth, Disp: , Rfl:     escitalopram (LEXAPRO) 10 mg tablet, 175 mg , Disp: , Rfl:     fenofibrate (TRICOR) 54 MG tablet, TAKE 1 TABLET (54 MG TOTAL) BY MOUTH DAILY  , Disp: , Rfl:     folic acid (FOLVITE) 1 mg tablet, Take 1,000 mcg by mouth daily, Disp: , Rfl:     glucose blood test strip, USE TO MONITOR BG 3 TIMES A DAY   E11 9, Disp: , Rfl:     HYDROcodone-acetaminophen (NORCO) 5-325 mg per tablet, , Disp: , Rfl:     HYDROcodone-acetaminophen (NORCO) 5-325 mg per tablet, Take 1 tablet by mouth every 6 (six) hours as needed for painMax Daily Amount: 4 tablets (Patient not taking: Reported on 3/22/2021), Disp: 7 tablet, Rfl: 0    hydrOXYzine HCL (ATARAX) 50 mg tablet, Take 50 mg by mouth daily at bedtime, Disp: , Rfl: 0    hydrOXYzine pamoate (VISTARIL) 50 mg capsule, Take 50 mg by mouth, Disp: , Rfl:     insulin glargine (LANTUS SOLOSTAR) 100 units/mL injection pen, Inject 55 Units under the skin 2 (two) times a day, Disp: , Rfl:     insulin lispro (HUMALOG KWIKPEN) 100 units/mL injection pen, Inject 1-15 Units under the skin, Disp: , Rfl:     lamoTRIgine (LaMICtal) 150 MG tablet, Take 150 mg by mouth daily, Disp: , Rfl:     lamoTRIgine (LaMICtal) 25 mg tablet, Take 50 mg by mouth daily at bedtime , Disp: , Rfl:     levothyroxine 50 mcg tablet, Take 50 mcg by mouth daily, Disp: , Rfl:     metFORMIN (GLUCOPHAGE) 500 mg tablet, Take 1 tablet (500 mg total) by mouth 2 (two) times a day with meals, Disp: 60 tablet, Rfl: 0    methocarbamol (ROBAXIN) 750 mg tablet, TAKE 1 TABLET (750 MG TOTAL) BY MOUTH 4 (FOUR) TIMES A DAY AS NEEDED FOR MUSCLE SPASMS , Disp: , Rfl:     multivitamin (THERAGRAN) TABS, Take 1 tablet by mouth daily, Disp: , Rfl:     mupirocin (BACTROBAN) 2 % ointment, Apply topically 3 (three) times a day, Disp: 22 g, Rfl: 0    ondansetron (ZOFRAN-ODT) 4 mg disintegrating tablet, , Disp: , Rfl:     oxyCODONE (ROXICODONE) 5 mg/5 mL solution, , Disp: , Rfl:     pantoprazole (PROTONIX) 40 mg tablet, Take 40 mg by mouth daily, Disp: , Rfl:     SUMAtriptan Succinate (IMITREX STATDOSE REFILL) 6 MG/0 5ML SOCT, Inject 6 mg under the skin every 2 (two) hours as needed, Disp: , Rfl:     Ultra Thin Lancets 31G MISC, USE TO MONITOR BLOOD GLUCOSE 3 TIMES A DAY AS DIRECTED , Disp: , Rfl:     ziprasidone (GEODON) 80 mg capsule, Take 80 mg by mouth 2 (two) times a day with meals , Disp: , Rfl:   Family History   Problem Relation Age of Onset    Hyperlipidemia Mother     Diabetes Mother     Hypertension Mother     Heart attack Father       Review of Systems   Constitutional: Negative  Respiratory: Negative  Cardiovascular: Negative  Gastrointestinal: Negative  Skin: Positive for wound  Neurological: Positive for numbness  Allergies  Aspirin, Barium iodide, Codeine, Latex, Vancomycin, and Penicillins    Objective:  /79   Pulse 74   Temp 97 7 °F (36 5 °C)   Resp 16     Physical Exam  Vitals signs and nursing note reviewed  Constitutional:       General: She is not in acute distress  Appearance: She is obese  She is not ill-appearing, toxic-appearing or diaphoretic     Cardiovascular:      Pulses:           Dorsalis pedis pulses are 2+ on the right side and 2+ on the left side  Posterior tibial pulses are 2+ on the right side and 2+ on the left side  Musculoskeletal:      Comments: Third toe amputated Right  Feet:      Right foot:      Protective Sensation: 10 sites tested  0 sites sensed  Left foot:      Protective Sensation: 10 sites tested  0 sites sensed  Neurological:      Mental Status: She is alert  Wound 01/13/21 Diabetic Ulcer Foot Right (Active)   Wound Image   06/04/21 0813   Wound Description Granulation tissue; Yellow 06/04/21 0810   Yvonne-wound Assessment Callus; Maceration 06/04/21 0810   Wound Length (cm) 0 3 cm 06/04/21 0810   Wound Width (cm) 0 1 cm 06/04/21 0810   Wound Depth (cm) 0 2 cm 06/04/21 0810   Wound Surface Area (cm^2) 0 03 cm^2 06/04/21 0810   Wound Volume (cm^3) 0 01 cm^3 06/04/21 0810   Calculated Wound Volume (cm^3) 0 01 cm^3 06/04/21 0810   Change in Wound Size % 92 86 06/04/21 0810   Undermining 0 4 04/23/21 0942   Undermining is depth extending from 1-4oclock 04/23/21 0942   Drainage Amount Scant 06/04/21 0810   Drainage Description Serosanguineous 06/04/21 0810   Non-staged Wound Description Full thickness 06/04/21 0810   Dressing Status Intact; Other (Comment) 05/21/21 1000           Wound 01/13/21 Diabetic Ulcer Foot Right (Active)   Wound Image   06/04/21 0813   Wound Description Granulation tissue; Yellow 06/04/21 0810   Yvonne-wound Assessment Callus; Maceration 06/04/21 0810   Wound Length (cm) 0 3 cm 06/04/21 0810   Wound Width (cm) 0 1 cm 06/04/21 0810   Wound Depth (cm) 0 2 cm 06/04/21 0810   Wound Surface Area (cm^2) 0 03 cm^2 06/04/21 0810   Wound Volume (cm^3) 0 01 cm^3 06/04/21 0810   Calculated Wound Volume (cm^3) 0 01 cm^3 06/04/21 0810   Change in Wound Size % 92 86 06/04/21 0810   Undermining 0 4 04/23/21 0942   Undermining is depth extending from 1-4oclock 04/23/21 0942   Drainage Amount Scant 06/04/21 0810   Drainage Description Serosanguineous 06/04/21 0810 Non-staged Wound Description Full thickness 06/04/21 0810   Dressing Status Intact; Other (Comment) 05/21/21 1000                         Diagnosis:  1  Open wound of foot, right, sequela  -     Wound cleansing and dressings; Future  -     Wound off loading; Future        Diagnosis ICD-10-CM Associated Orders   1  Open wound of foot, right, sequela  S91 301S Wound cleansing and dressings     Wound off loading        ASSESSMENT    Problems:    Chronic illness / Problem not at goal with exacerbation, progression or side effects of treatment  1  Diabetic peripheral neuropathy with foot ulcer        PLAN    Discussion of Management /  Recommendations  Re-institute TCC today  Will need to customize her insole to better offload the area when we attempt to transition back to shoegear once healed  Cast Application    Date/Time: 6/4/2021 8:33 AM  Performed by: Karlene Bruno DPM  Authorized by: Karlene Bruno DPM   Universal Protocol:  Consent: Verbal consent obtained  Risks and benefits: risks, benefits and alternatives were discussed  Consent given by: patient  Time out: Immediately prior to procedure a "time out" was called to verify the correct patient, procedure, equipment, support staff and site/side marked as required  Timeout called at: 6/4/2021 8:33 AM   Patient understanding: patient states understanding of the procedure being performed  Patient identity confirmed: verbally with patient      Pre-procedure details:     Sensation:  Numbness  Procedure details:     Location:  Leg    Leg:  R upper legCast type:   Total contact      Supplies:  Fiberglass and cotton padding

## 2021-06-11 ENCOUNTER — OFFICE VISIT (OUTPATIENT)
Dept: WOUND CARE | Facility: HOSPITAL | Age: 46
End: 2021-06-11
Payer: COMMERCIAL

## 2021-06-11 VITALS
DIASTOLIC BLOOD PRESSURE: 82 MMHG | TEMPERATURE: 97.1 F | HEART RATE: 63 BPM | SYSTOLIC BLOOD PRESSURE: 130 MMHG | RESPIRATION RATE: 16 BRPM

## 2021-06-11 DIAGNOSIS — S91.301S OPEN WOUND OF FOOT, RIGHT, SEQUELA: Primary | ICD-10-CM

## 2021-06-11 DIAGNOSIS — L97.512 RIGHT FOOT ULCER, WITH FAT LAYER EXPOSED (HCC): ICD-10-CM

## 2021-06-11 PROCEDURE — 29445 APPL RIGID TOT CNTC LEG CAST: CPT | Performed by: PODIATRIST

## 2021-06-11 NOTE — PROGRESS NOTES
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, with bone involvement without evidence of necrosis ([address] )    H/O bariatric surgery    Anxiety    Morbid obesity ([address] )    Preoperative clearance    Encounter for smoking cessation counseling    Hypothyroidism    Manic bipolar I disorder ([address] )    Type 2 diabetes mellitus with neurologic complication, with long-term current use of insulin ([address] )    Tobacco abuse    Type 2 diabetes mellitus ([address] )    Acquired absence of other left toe(s) ([address] )    Essential hypertension    Arthritis    Chronic pain    OCD (obsessive compulsive disorder)    PTSD (post-traumatic stress disorder)    Smoking    Right foot ulcer, with fat layer exposed ([address] )     Past Medical History:   Diagnosis Date    Anxiety     Arthritis     Asthma     Chronic pain     Diabetes mellitus ([address] )     Disease of thyroid gland     Lymphedema     left leg    Manic bipolar I disorder ([address] )     Morbid obesity ([address] )     OCD (obsessive compulsive disorder)     PTSD (post-traumatic stress disorder)      Past Surgical History:   Procedure Laterality Date    CHOLECYSTECTOMY      CHOLECYSTECTOMY      AR AMPUTATION METATARSAL+TOE,SINGLE Left 9/8/2019    Procedure: PARTIAL 3RD RAY RESECTION, PSB TOE FILLET FLAP;  Surgeon: Jenny Song DPM;  [facility];  Service: Podiatry    TOE AMPUTATION Right 1/13/2021    Procedure: AMPUTATION 2ND TOE;  Surgeon: Mariam Langford DPM;  [facility];  Service: Podiatry     Social History     Socioeconomic History    Marital status: Single     Spouse name: None    Number of children: None    Years of education: None    Highest education level: None   Occupational History    None   Social Needs    Financial resource strain: None    Food insecurity     Worry: None     Inability: None    Transportation needs     Medical: None     Non-medical: None   Tobacco Use    Smoking status: Former Smoker     Packs/day: 0 50     Years: 15 00     Pack years: 7 50    Smokeless tobacco: Never Used   Substance and Sexual Activity    Alcohol use: Never     Frequency: Never     Drinks per session: Patient refused     Binge frequency: Never    Drug use: Never    Sexual activity: Not Currently   Lifestyle    Physical activity     Days per week: None     Minutes per session: None    Stress: None   Relationships    Social connections     Talks on phone: None     Gets together: None     Attends Christianity service: None     Active member of club or organization: None     Attends meetings of clubs or organizations: None     Relationship status: None    Intimate partner violence     Fear of current or ex partner: None     Emotionally abused: None     Physically abused: None     Forced sexual activity: None   Other Topics Concern    None   Social History Narrative    None        Current Outpatient Medications:     acetaminophen (TYLENOL) 650 mg CR tablet, TAKE 1 TABLET (650 MG TOTAL) BY MOUTH EVERY 8 (EIGHT) HOURS AS NEEDED FOR MILD PAIN, Disp: 30 tablet, Rfl: 2    ADMELOG SOLOSTAR 100 units/mL injection pen, , Disp: , Rfl:     albuterol (PROVENTIL HFA,VENTOLIN HFA) 90 mcg/act inhaler, Inhale 2 puffs every 6 (six) hours as needed for wheezing, Disp: , Rfl:     BASAGLAR KWIKPEN 100 units/mL injection pen, , Disp: , Rfl:     buPROPion (WELLBUTRIN XL) 300 mg 24 hr tablet, Take 300 mg by mouth, Disp: , Rfl:     cholecalciferol (VITAMIN D3) 1,000 units tablet, Take by mouth Three times a day, Disp: , Rfl:     cloNIDine (CATAPRES) 0 1 mg tablet, 0 2 mg every 12 (twelve) hours , Disp: , Rfl:     cloNIDine (CATAPRES) 0 2 mg tablet, Take 0 2 mg by mouth 2 (two) times a day, Disp: , Rfl:     doxycycline hyclate (VIBRA-TABS) 100 mg tablet, TAKE 1 TABLET (100 MG TOTAL) BY MOUTH 2 (TWO) TIMES A DAY FOR 7 DAYS, Disp: , Rfl:     EASY COMFORT PEN NEEDLES 32G X 4 MM MISC, , Disp: , Rfl:     EASY TOUCH INSULIN SYRINGE 31G X 5/16" 0 3 ML MISC, , Disp: , Rfl:     ergocalciferol (ERGOCALCIFEROL) 1 25 MG (08818 UT) capsule, Take 50,000 Units by mouth, Disp: , Rfl:     escitalopram (LEXAPRO) 10 mg tablet, 175 mg , Disp: , Rfl:     fenofibrate (TRICOR) 54 MG tablet, TAKE 1 TABLET (54 MG TOTAL) BY MOUTH DAILY  , Disp: , Rfl:     folic acid (FOLVITE) 1 mg tablet, Take 1,000 mcg by mouth daily, Disp: , Rfl:     glucose blood test strip, USE TO MONITOR BG 3 TIMES A DAY   E11 9, Disp: , Rfl:     HYDROcodone-acetaminophen (NORCO) 5-325 mg per tablet, , Disp: , Rfl:     HYDROcodone-acetaminophen (NORCO) 5-325 mg per tablet, Take 1 tablet by mouth every 6 (six) hours as needed for painMax Daily Amount: 4 tablets (Patient not taking: Reported on 3/22/2021), Disp: 7 tablet, Rfl: 0    hydrOXYzine HCL (ATARAX) 50 mg tablet, Take 50 mg by mouth daily at bedtime, Disp: , Rfl: 0    hydrOXYzine pamoate (VISTARIL) 50 mg capsule, Take 50 mg by mouth, Disp: , Rfl:     insulin glargine (LANTUS SOLOSTAR) 100 units/mL injection pen, Inject 55 Units under the skin 2 (two) times a day, Disp: , Rfl:     insulin lispro (HUMALOG KWIKPEN) 100 units/mL injection pen, Inject 1-15 Units under the skin, Disp: , Rfl:     lamoTRIgine (LaMICtal) 150 MG tablet, Take 150 mg by mouth daily, Disp: , Rfl:     lamoTRIgine (LaMICtal) 25 mg tablet, Take 50 mg by mouth daily at bedtime , Disp: , Rfl:     levothyroxine 50 mcg tablet, Take 50 mcg by mouth daily, Disp: , Rfl:     metFORMIN (GLUCOPHAGE) 500 mg tablet, Take 1 tablet (500 mg total) by mouth 2 (two) times a day with meals, Disp: 60 tablet, Rfl: 0    methocarbamol (ROBAXIN) 750 mg tablet, TAKE 1 TABLET (750 MG TOTAL) BY MOUTH 4 (FOUR) TIMES A DAY AS NEEDED FOR MUSCLE SPASMS , Disp: , Rfl:     multivitamin (THERAGRAN) TABS, Take 1 tablet by mouth daily, Disp: , Rfl:     mupirocin (BACTROBAN) 2 % ointment, Apply topically 3 (three) times a day, Disp: 22 g, Rfl: 0    ondansetron (ZOFRAN-ODT) 4 mg disintegrating tablet, , Disp: , Rfl:     oxyCODONE (ROXICODONE) 5 mg/5 mL solution, , Disp: , Rfl:     pantoprazole (PROTONIX) 40 mg tablet, Take 40 mg by mouth daily, Disp: , Rfl:     SUMAtriptan Succinate (IMITREX STATDOSE REFILL) 6 MG/0 5ML SOCT, Inject 6 mg under the skin every 2 (two) hours as needed, Disp: , Rfl:     Ultra Thin Lancets 31G MISC, USE TO MONITOR BLOOD GLUCOSE 3 TIMES A DAY AS DIRECTED , Disp: , Rfl:     ziprasidone (GEODON) 80 mg capsule, Take 80 mg by mouth 2 (two) times a day with meals , Disp: , Rfl:   Family History   Problem Relation Age of Onset    Hyperlipidemia Mother     Diabetes Mother     Hypertension Mother     Heart attack Father       Review of Systems   Constitutional: Negative  Respiratory: Negative  Cardiovascular: Negative  Gastrointestinal: Negative  Skin: Positive for wound  Neurological: Positive for numbness  Allergies  Aspirin, Barium iodide, Codeine, Latex, Vancomycin, and Penicillins    Objective:  /82   Pulse 63   Temp (!) 97 1 °F (36 2 °C)   Resp 16     Physical Exam  Vitals signs and nursing note reviewed  Constitutional:       General: She is not in acute distress  Appearance: She is obese  She is not ill-appearing, toxic-appearing or diaphoretic  Cardiovascular:      Pulses:           Dorsalis pedis pulses are 2+ on the right side and 2+ on the left side  Posterior tibial pulses are 2+ on the right side and 2+ on the left side  Musculoskeletal:      Comments: Third toe amputated Right  Feet:      Right foot:      Protective Sensation: 10 sites tested  0 sites sensed  Left foot:      Protective Sensation: 10 sites tested  0 sites sensed  Neurological:      Mental Status: She is alert  Wound 01/13/21 Diabetic Ulcer Foot Right (Active)   Wound Image   06/11/21 0910   Wound Description Granulation tissue; Yellow 06/11/21 0920   Yvonne-wound Assessment Callus 06/11/21 0920   Wound Length (cm) 0 6 cm 06/11/21 0920   Wound Width (cm) 0 3 cm 06/11/21 0920   Wound Depth (cm) 0 1 cm 06/11/21 0920   Wound Surface Area (cm^2) 0 18 cm^2 06/11/21 0920   Wound Volume (cm^3) 0 02 cm^3 06/11/21 0920   Calculated Wound Volume (cm^3) 0 02 cm^3 06/11/21 0920   Change in Wound Size % 85 71 06/11/21 0920   Undermining 0 1 06/11/21 0920   Undermining is depth extending from 8-2 oclock 06/11/21 0920   Drainage Amount Moderate 06/11/21 0920   Drainage Description Serosanguineous 06/11/21 0920   Non-staged Wound Description Full thickness 06/11/21 0920   Dressing Status Intact 06/11/21 0920           Wound 01/13/21 Diabetic Ulcer Foot Right (Active)   Wound Image   06/11/21 0910   Wound Description Granulation tissue; Yellow 06/11/21 0920   Yvonne-wound Assessment Callus 06/11/21 0920   Wound Length (cm) 0 6 cm 06/11/21 0920   Wound Width (cm) 0 3 cm 06/11/21 0920   Wound Depth (cm) 0 1 cm 06/11/21 0920   Wound Surface Area (cm^2) 0 18 cm^2 06/11/21 0920   Wound Volume (cm^3) 0 02 cm^3 06/11/21 0920   Calculated Wound Volume (cm^3) 0 02 cm^3 06/11/21 0920   Change in Wound Size % 85 71 06/11/21 0920   Undermining 0 1 06/11/21 0920   Undermining is depth extending from 8-2 oclock 06/11/21 0920   Drainage Amount Moderate 06/11/21 0920   Drainage Description Serosanguineous 06/11/21 0920   Non-staged Wound Description Full thickness 06/11/21 0920   Dressing Status Intact 06/11/21 0920                         Diagnosis:  1  Open wound of foot, right, sequela  -     Wound off loading;  Future  -     Wound cleansing and dressings; Future    2  Right foot ulcer, with fat layer exposed ([address] )  -     Wound off loading; Future  -     Wound cleansing and dressings; Future        Diagnosis ICD-10-CM Associated Orders   1  Open wound of foot, right, sequela  S91 301S Wound off loading     Wound cleansing and dressings   2  Right foot ulcer, with fat layer exposed ([address] )  L97 512 Wound off loading     Wound cleansing and dressings        ASSESSMENT    Problems:    Chronic illness / Problem not at goal with exacerbation, progression or side effects of treatment  1  DFU        PLAN    Discussion of Management /  Recommendations  Continue TCC    Procedures Performed    Cast Application    Date/Time: 6/11/2021 10:09 AM  Performed by: Lynne Mccabe DPM  Authorized by: Lynne Mccabe DPM   Universal Protocol:  Consent: Verbal consent obtained  Risks and benefits: risks, benefits and alternatives were discussed  Consent given by: patient  Time out: Immediately prior to procedure a "time out" was called to verify the correct patient, procedure, equipment, support staff and site/side marked as required  Timeout called at: 6/11/2021 10:09 AM   Patient understanding: patient states understanding of the procedure being performed  Patient identity confirmed: verbally with patient      Pre-procedure details:     Sensation:  Numbness  Procedure details:     Location:  Leg    Leg:  R lower legCast type:   Total contact      Supplies:  [employer] padding and fiberglass

## 2021-06-25 ENCOUNTER — OFFICE VISIT (OUTPATIENT)
Dept: WOUND CARE | Facility: HOSPITAL | Age: 46
End: 2021-06-25
Payer: COMMERCIAL

## 2021-06-25 VITALS
RESPIRATION RATE: 12 BRPM | HEART RATE: 63 BPM | SYSTOLIC BLOOD PRESSURE: 124 MMHG | DIASTOLIC BLOOD PRESSURE: 77 MMHG | TEMPERATURE: 96.5 F

## 2021-06-25 DIAGNOSIS — M79.673 PAIN OF FOOT, UNSPECIFIED LATERALITY: ICD-10-CM

## 2021-06-25 DIAGNOSIS — L97.512 DIABETIC ULCER OF RIGHT FOOT ASSOCIATED WITH TYPE 2 DIABETES MELLITUS, WITH FAT LAYER EXPOSED, UNSPECIFIED PART OF FOOT (HCC): ICD-10-CM

## 2021-06-25 DIAGNOSIS — E11.621 DIABETIC ULCER OF RIGHT FOOT ASSOCIATED WITH TYPE 2 DIABETES MELLITUS, WITH FAT LAYER EXPOSED, UNSPECIFIED PART OF FOOT (HCC): ICD-10-CM

## 2021-06-25 DIAGNOSIS — L97.512 RIGHT FOOT ULCER, WITH FAT LAYER EXPOSED (HCC): Primary | ICD-10-CM

## 2021-06-25 PROCEDURE — 29445 APPL RIGID TOT CNTC LEG CAST: CPT | Performed by: PODIATRIST

## 2021-06-25 RX ORDER — SENNOSIDES 8.6 MG
650 CAPSULE ORAL EVERY 8 HOURS PRN
Qty: 30 TABLET | Refills: 2 | Status: SHIPPED | OUTPATIENT
Start: 2021-06-25 | End: 2021-09-08 | Stop reason: HOSPADM

## 2021-06-25 RX ORDER — LAMOTRIGINE 200 MG/1
200 TABLET ORAL EVERY MORNING
COMMUNITY
Start: 2021-06-16

## 2021-06-25 NOTE — PROGRESS NOTES
Patient ID: Eve De Jesus is a 40 y o  female Date of Birth 1975     My role is Foot, Ankle and Wound Specialist    Chief Complaint   Patient presents with    Follow Up Wound Care Visit     right foot wound       [facility]    Subjective:   Shaquille Villaseñor here with  plantar diabetic foot ulcer  She's in a TCC EZ         The following portions of the patient's history were reviewed and updated as appropriate:   Patient Active Problem List   Diagnosis    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, with bone involvement without evidence of necrosis ([address] )    H/O bariatric surgery    Anxiety    Morbid obesity ([address] )    Preoperative clearance    Encounter for smoking cessation counseling    Hypothyroidism    Manic bipolar I disorder ([address] )    Type 2 diabetes mellitus with neurologic complication, with long-term current use of insulin ([address] )    Tobacco abuse    Type 2 diabetes mellitus ([address] )    Acquired absence of other left toe(s) ([address] )    Essential hypertension    Arthritis    Chronic pain    OCD (obsessive compulsive disorder)    PTSD (post-traumatic stress disorder)    Smoking    Right foot ulcer, with fat layer exposed ([address] )     Past Medical History:   Diagnosis Date    Anxiety     Arthritis     Asthma     Chronic pain     Diabetes mellitus ([address]     Disease of thyroid gland     Lymphedema     left leg    Manic bipolar I disorder ([address]     Morbid obesity ([address]     OCD (obsessive compulsive disorder)     PTSD (post-traumatic stress disorder)      Past Surgical History:   Procedure Laterality Date    CHOLECYSTECTOMY      CHOLECYSTECTOMY      MO AMPUTATION METATARSAL+TOE,SINGLE Left 9/8/2019    Procedure: PARTIAL 3RD RAY RESECTION, PSB TOE FILLET FLAP;  Surgeon: Adeel Pineda DPM;  [facility];  Service: Podiatry    TOE AMPUTATION Right 1/13/2021    Procedure: AMPUTATION 2ND TOE;  Surgeon: Vee Mae DPM;  Location: [facility]; Service: Podiatry     Social History     Socioeconomic History    Marital status: Single     Spouse name: Not on file    Number of children: Not on file    Years of education: Not on file    Highest education level: Not on file   Occupational History    Not on file   Tobacco Use    Smoking status: Former Smoker     Packs/day: 0 50     Years: 15 00     Pack years: 7 50    Smokeless tobacco: Never Used   Vaping Use    Vaping Use: Some days   Substance and Sexual Activity    Alcohol use: Never    Drug use: Never    Sexual activity: Not Currently   Other Topics Concern    Not on file   Social History Narrative    Not on file     Social Determinants of Health     Financial Resource Strain:     Difficulty of Paying Living Expenses:    Food Insecurity:     Worried About Running Out of Food in the Last Year:     [address] in the Last Year:    Transportation Needs:     Lack of Transportation (Medical):      Lack of Transportation (Non-Medical):    Physical Activity:     Days of Exercise per Week:     Minutes of Exercise per Session:    Stress:     Feeling of Stress :    Social Connections:     Frequency of Communication with Friends and Family:     Frequency of Social Gatherings with Friends and Family:     Attends Mu-ism Services:     Active Member of Clubs or Organizations:     Attends Club or Organization Meetings:     Marital Status:    Intimate Partner Violence:     Fear of Current or Ex-Partner:     Emotionally Abused:     Physically Abused:     Sexually Abused:         Current Outpatient Medications:     acetaminophen (TYLENOL) 650 mg CR tablet, Take 1 tablet (650 mg total) by mouth every 8 (eight) hours as needed for mild pain, Disp: 30 tablet, Rfl: 2    ADMELOG SOLOSTAR 100 units/mL injection pen, , Disp: , Rfl:     albuterol (PROVENTIL HFA,VENTOLIN HFA) 90 mcg/act inhaler, Inhale 2 puffs every 6 (six) hours as needed for wheezing, Disp: , Rfl:     BASAGLAR KWIKPEN 100 units/mL injection pen, , Disp: , Rfl:     buPROPion (WELLBUTRIN XL) 300 mg 24 hr tablet, Take 300 mg by mouth, Disp: , Rfl:     cholecalciferol (VITAMIN D3) 1,000 units tablet, Take by mouth Three times a day, Disp: , Rfl:     cloNIDine (CATAPRES) 0 1 mg tablet, 0 2 mg every 12 (twelve) hours , Disp: , Rfl:     cloNIDine (CATAPRES) 0 2 mg tablet, Take 0 2 mg by mouth 2 (two) times a day, Disp: , Rfl:     doxycycline hyclate (VIBRA-TABS) 100 mg tablet, TAKE 1 TABLET (100 MG TOTAL) BY MOUTH 2 (TWO) TIMES A DAY FOR 7 DAYS, Disp: , Rfl:     EASY COMFORT PEN NEEDLES 32G X 4 MM MISC, , Disp: , Rfl:     EASY TOUCH INSULIN SYRINGE 31G X 5/16" 0 3 ML MISC, , Disp: , Rfl:     ergocalciferol (ERGOCALCIFEROL) 1 25 MG (89095 UT) capsule, Take 50,000 Units by mouth, Disp: , Rfl:     escitalopram (LEXAPRO) 10 mg tablet, 175 mg , Disp: , Rfl:     fenofibrate (TRICOR) 54 MG tablet, TAKE 1 TABLET (54 MG TOTAL) BY MOUTH DAILY  , Disp: , Rfl:     folic acid (FOLVITE) 1 mg tablet, Take 1,000 mcg by mouth daily, Disp: , Rfl:     glucose blood test strip, USE TO MONITOR BG 3 TIMES A DAY   E11 9, Disp: , Rfl:     HYDROcodone-acetaminophen (NORCO) 5-325 mg per tablet, , Disp: , Rfl:     HYDROcodone-acetaminophen (NORCO) 5-325 mg per tablet, Take 1 tablet by mouth every 6 (six) hours as needed for painMax Daily Amount: 4 tablets (Patient not taking: Reported on 3/22/2021), Disp: 7 tablet, Rfl: 0    hydrOXYzine HCL (ATARAX) 50 mg tablet, Take 50 mg by mouth daily at bedtime, Disp: , Rfl: 0    hydrOXYzine pamoate (VISTARIL) 50 mg capsule, Take 50 mg by mouth, Disp: , Rfl:     insulin glargine (LANTUS SOLOSTAR) 100 units/mL injection pen, Inject 55 Units under the skin 2 (two) times a day, Disp: , Rfl:     insulin lispro (HUMALOG KWIKPEN) 100 units/mL injection pen, Inject 1-15 Units under the skin, Disp: , Rfl:     lamoTRIgine (LaMICtal) 150 MG tablet, Take 150 mg by mouth daily (Patient not taking: Reported on 6/25/2021), Disp: , Rfl:     lamoTRIgine (LaMICtal) 200 MG tablet, Take 200 mg by mouth every morning, Disp: , Rfl:     lamoTRIgine (LaMICtal) 25 mg tablet, Take 50 mg by mouth daily at bedtime  (Patient not taking: Reported on 6/25/2021), Disp: , Rfl:     levothyroxine 50 mcg tablet, Take 50 mcg by mouth daily, Disp: , Rfl:     metFORMIN (GLUCOPHAGE) 500 mg tablet, Take 1 tablet (500 mg total) by mouth 2 (two) times a day with meals, Disp: 60 tablet, Rfl: 0    methocarbamol (ROBAXIN) 750 mg tablet, TAKE 1 TABLET (750 MG TOTAL) BY MOUTH 4 (FOUR) TIMES A DAY AS NEEDED FOR MUSCLE SPASMS , Disp: , Rfl:     multivitamin (THERAGRAN) TABS, Take 1 tablet by mouth daily, Disp: , Rfl:     mupirocin (BACTROBAN) 2 % ointment, Apply topically 3 (three) times a day, Disp: 22 g, Rfl: 0    ondansetron (ZOFRAN-ODT) 4 mg disintegrating tablet, , Disp: , Rfl:     oxyCODONE (ROXICODONE) 5 mg/5 mL solution, , Disp: , Rfl:     pantoprazole (PROTONIX) 40 mg tablet, Take 40 mg by mouth daily, Disp: , Rfl:     SUMAtriptan Succinate (IMITREX STATDOSE REFILL) 6 MG/0 5ML SOCT, Inject 6 mg under the skin every 2 (two) hours as needed, Disp: , Rfl:     Ultra Thin Lancets 31G MISC, USE TO MONITOR BLOOD GLUCOSE 3 TIMES A DAY AS DIRECTED , Disp: , Rfl:     ziprasidone (GEODON) 80 mg capsule, Take 80 mg by mouth 2 (two) times a day with meals , Disp: , Rfl:   Family History   Problem Relation Age of Onset    Hyperlipidemia Mother     Diabetes Mother     Hypertension Mother     Heart attack Father       Review of Systems   Constitutional: Negative  Respiratory: Negative  Cardiovascular: Negative  Gastrointestinal: Negative  Skin: Positive for wound  Neurological: Positive for numbness  Allergies  Aspirin, Barium iodide, Codeine, Latex, Vancomycin, and Penicillins    Objective:  /77   Pulse 63   Temp (!) 96 5 °F (35 8 °C)   Resp 12     Physical Exam  Vitals and nursing note reviewed     Constitutional:       General: She is not in acute distress  Appearance: She is obese  She is not ill-appearing, toxic-appearing or diaphoretic  Cardiovascular:      Pulses:           Dorsalis pedis pulses are 2+ on the right side and 2+ on the left side  Posterior tibial pulses are 2+ on the right side and 2+ on the left side  Musculoskeletal:      Comments: Third toe amputated Right  Feet:      Right foot:      Protective Sensation: 10 sites tested  0 sites sensed  Left foot:      Protective Sensation: 10 sites tested  0 sites sensed  Neurological:      Mental Status: She is alert  Wound 01/13/21 Diabetic Ulcer Foot Right (Active)   Wound Image   06/25/21 0854   Wound Description Granulation tissue; Yellow 06/25/21 0854   Yvonne-wound Assessment Callus; Maceration 06/25/21 0854   Wound Length (cm) 0 3 cm 06/25/21 0854   Wound Width (cm) 0 1 cm 06/25/21 0854   Wound Depth (cm) 0 2 cm 06/25/21 0854   Wound Surface Area (cm^2) 0 03 cm^2 06/25/21 0854   Wound Volume (cm^3) 0 006 cm^3 06/25/21 0854   Calculated Wound Volume (cm^3) 0 01 cm^3 06/25/21 0854   Change in Wound Size % 92 86 06/25/21 0854   Undermining 0 2 06/25/21 0854   Undermining is depth extending from 8-3 o'clock 06/25/21 0854   Drainage Amount Moderate 06/25/21 0854   Drainage Description Serosanguineous 06/25/21 0854   Non-staged Wound Description Full thickness 06/25/21 0854   Dressing Status Intact 06/25/21 0854           Wound 01/13/21 Diabetic Ulcer Foot Right (Active)   Wound Image   06/25/21 0854   Wound Description Granulation tissue; Yellow 06/25/21 0854   Yvonne-wound Assessment Callus; Maceration 06/25/21 0854   Wound Length (cm) 0 3 cm 06/25/21 0854   Wound Width (cm) 0 1 cm 06/25/21 0854   Wound Depth (cm) 0 2 cm 06/25/21 0854   Wound Surface Area (cm^2) 0 03 cm^2 06/25/21 0854   Wound Volume (cm^3) 0 006 cm^3 06/25/21 0854   Calculated Wound Volume (cm^3) 0 01 cm^3 06/25/21 0854   Change in Wound Size % 92 86 06/25/21 0854   Undermining 0 2 06/25/21 0854   Undermining is depth extending from 8-3 o'clock 06/25/21 0854   Drainage Amount Moderate 06/25/21 0854   Drainage Description Serosanguineous 06/25/21 0854   Non-staged Wound Description Full thickness 06/25/21 0854   Dressing Status Intact 06/25/21 0854                         Diagnosis:  1  Open wound of foot, right, sequela  -     Wound cleansing and dressings; Future  -     Wound off loading; Future    2  Acute post-operative pain  -     acetaminophen (TYLENOL) 650 mg CR tablet; Take 1 tablet (650 mg total) by mouth every 8 (eight) hours as needed for mild pain        Diagnosis ICD-10-CM Associated Orders   1  Open wound of foot, right, sequela  S91 301S Wound cleansing and dressings     Wound off loading   2  Acute post-operative pain  G89 18 acetaminophen (TYLENOL) 650 mg CR tablet        ASSESSMENT    Problems:    Chronic illness / Problem not at goal with exacerbation, progression or side effects of treatment  1  Diabetic peripheral neuropathy with foot ulcer      PLAN    Discussion of Management /  Recommendations  Good progress noted  Continue TCC  Procedures Performed  I recommended application of the TCC to facilitate healing particularly in light of the failure to heal previously without TCC  After application of the primary dressing, a TCC was applied in a modified Carville method using stockinette, cast padding between the toes, foam over the toes, cast padding toes to knee, additional felt over the [facility] and tibial crest and finally with the foot at a 90 degree angle to the leg, a well-formed fiberglass shell with additional thickness added plantarly to avoid breakdown  A cast shoe was applied for traction  Strict instructions to keep the cast clean,dry and intact were given and also instructions not to introduce powders or foreign objects into the cast was given   Instructions were given to call if any cast breakdown, or any irritation, odor, drainage or pain was noted within the cast      I explained that good wound care and compliance are necessary to allow healing and to prevent toe loss or limb loss  No guarantees of wound healing were given and I explained clearly that there is some risk with the use of TCC such as cast "rub", cast ulcers, or hidden infection but that the benefits of the TCC far outweight the risks if patient compliance is in place  Cast Application    Date/Time: 6/25/2021 9:31 AM  Performed by: Jenny Song DPM  Authorized by: Jenny Song DPM   Universal Protocol:  Consent: Verbal consent obtained  Risks and benefits: risks, benefits and alternatives were discussed  Consent given by: patient  Time out: Immediately prior to procedure a "time out" was called to verify the correct patient, procedure, equipment, support staff and site/side marked as required  Timeout called at: 6/25/2021 9:31 AM   Patient understanding: patient states understanding of the procedure being performed  Patient identity confirmed: verbally with patient      Pre-procedure details:     Sensation:  Numbness  Procedure details:     Location:  Leg    Leg:  R lower legCast type:   Total contact      Supplies:  Cotton padding and fiberglass

## 2021-06-25 NOTE — PATIENT INSTRUCTIONS
Orders Placed This Encounter   Procedures    Wound cleansing and dressings     Right foot wound:  Do not get cast wet  Use cast cover or sponge bathe      At wound care center today  Leg washed with soap and water  Wound cleansed with normal saline  Patted dry with gauze  Puraocl to wound base covered with Silver alginate applied to wound  Covered with ABD secured with cast padding      Change dressing weekly at wound care center  Standing Status:   Future     Standing Expiration Date:   6/25/2022    Wound off loading     Wound off loading  TCC EZ Instructions:  Do not get cast wet  Contact wound center if there is a foul odor or becomes uncomfortable due to feeling tight or swelling  Do not use objects down inside of cast to scratch  Do not walk on cast without walking boot  Ensure to take smaller steps as well as lighter foot steps to help prevent the cast from cracking; extra layer of fiberglass applied around ankle and dorsal foot area  Standing Status:   Future     Standing Expiration Date:   6/25/2022     TOTAL CONTACT CAST  Total Contact Casting is a method of pressure relief that has been shown to have the highest rate of wound healing  Total Contact Casting is considered the gold standard for reducing foot wound pressure  What is a Total Contact Cast?  This casting method helps promote healing and reduce pressure in the area of the wound  The cast works to redistribute weight, minimize pressure and reduce friction on the foot  The cast consists of two parts - the hard cast and a walking boot  Certain types of wounds heal better and more efficiently with Total Contact Casting  Your doctor has determined that this method of treatment is best for your wound  How long will I be wearing a cast?  The length of treatment varies from patient to patient  We will reassess your wound weekly for continued need for casting  Usually, the cast is changed weekly    Sometimes, as wounds become smaller and drain less, the cast may be changed every 2-3 weeks depending on your doctor's assessment  How will this cast change my lifestyle?  The cast takes 24 hours to cure and we ask that you absolutely minimize weight bearing during this time   Never put anything into the cast such as powders or something to scratch the skin under the cast     You will need to wear the walking boot at all times - you can cover it with a pillow case while in bed  A pillow case on the cast and a long thick sock on your other foot will protect the non-casted foot from getting irritated by the cast    The cast must be kept dry at all times  Do not take showers while the cast is on  Cover the cast with a plastic bag to protect it from getting wet during poor weather  It's VERY important that you call the office immediately if you get the cast wet (or) you feel any type of pain or even mild irritation in the cast    Do not walk without the outer walking boot firmly attached to the cast    Since you will not be walking as much as you normally do, you will need some assistance from family and friends for certain tasks  This is common when wearing any type of cast and you will find that people are eager to assist you while you are taking the necessary time to heal   If you have any questions about what you should or should not be doing, please feel free to contact us  Once your wound is healed, you will need to be fitted for proper footwear to ensure that the wound does not return  You may be casted until this process is completed  It is important to avoid old shoes and habits that may have led to the development of your wound

## 2021-07-02 ENCOUNTER — OFFICE VISIT (OUTPATIENT)
Dept: WOUND CARE | Facility: HOSPITAL | Age: 46
End: 2021-07-02
Payer: COMMERCIAL

## 2021-07-02 VITALS
RESPIRATION RATE: 16 BRPM | TEMPERATURE: 97.2 F | HEART RATE: 63 BPM | SYSTOLIC BLOOD PRESSURE: 113 MMHG | DIASTOLIC BLOOD PRESSURE: 53 MMHG

## 2021-07-02 DIAGNOSIS — L97.512 DIABETIC ULCER OF RIGHT FOOT ASSOCIATED WITH TYPE 2 DIABETES MELLITUS, WITH FAT LAYER EXPOSED, UNSPECIFIED PART OF FOOT (HCC): Primary | ICD-10-CM

## 2021-07-02 DIAGNOSIS — E11.621 DIABETIC ULCER OF RIGHT FOOT ASSOCIATED WITH TYPE 2 DIABETES MELLITUS, WITH FAT LAYER EXPOSED, UNSPECIFIED PART OF FOOT (HCC): Primary | ICD-10-CM

## 2021-07-02 PROCEDURE — 11042 DBRDMT SUBQ TIS 1ST 20SQCM/<: CPT | Performed by: PODIATRIST

## 2021-07-02 PROCEDURE — 99213 OFFICE O/P EST LOW 20 MIN: CPT | Performed by: PODIATRIST

## 2021-07-02 NOTE — PROGRESS NOTES
Patient ID: Ric Lira is a 40 y o  female Date of Birth 1975     My role is Foot, Ankle and Wound Specialist    Chief Complaint   Patient presents with    Follow Up Wound Care Visit     RIGHT FOOT WOUND        [facility]    Subjective:   Yesenia Terrell here with  plantar diabetic foot ulcer  She's in a T[facility] EZ         The following portions of the patient's history were reviewed and updated as appropriate:   Patient Active Problem List   Diagnosis    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, with bone involvement without evidence of necrosis ([address] )    H/O bariatric surgery    Anxiety    Morbid obesity ([address] )    Preoperative clearance    Encounter for smoking cessation counseling    Hypothyroidism    Manic bipolar I disorder ([address] )    Type 2 diabetes mellitus with neurologic complication, with long-term current use of insulin ([address] )    Tobacco abuse    Type 2 diabetes mellitus ([address] )    Acquired absence of other left toe(s) ([address] )    Essential hypertension    Arthritis    Chronic pain    OCD (obsessive compulsive disorder)    PTSD (post-traumatic stress disorder)    Smoking    Right foot ulcer, with fat layer exposed ([address] )    Pain of foot     Past Medical History:   Diagnosis Date    Anxiety     Arthritis     Asthma     Chronic pain     Diabetes mellitus ([address] )     Disease of thyroid gland     Lymphedema     left leg    Manic bipolar I disorder ([address] )     Morbid obesity ([address] )     OCD (obsessive compulsive disorder)     PTSD (post-traumatic stress disorder)      Past Surgical History:   Procedure Laterality Date    CHOLECYSTECTOMY      CHOLECYSTECTOMY      VT AMPUTATION METATARSAL+TOE,SINGLE Left 9/8/2019    Procedure: PARTIAL 3RD RAY RESECTION, PSB TOE FILLET FLAP;  Surgeon: Fernando Martin DPM;  [facility];  Service: Podiatry    TOE AMPUTATION Right 1/13/2021    Procedure: AMPUTATION 2ND TOE;  Surgeon: Brendan Bhatt DPM;  [facility];  Service: Podiatry     Social History     Socioeconomic History    Marital status: Single     Spouse name: None    Number of children: None    Years of education: None    Highest education level: None   Occupational History    None   Tobacco Use    Smoking status: Former Smoker     Packs/day: 0 50     Years: 15 00     Pack years: 7 50    Smokeless tobacco: Never Used   Vaping Use    Vaping Use: Some days   Substance and Sexual Activity    Alcohol use: Never    Drug use: Never    Sexual activity: Not Currently   Other Topics Concern    None   Social History Narrative    None     Social Determinants of Health     Financial Resource Strain:     Difficulty of Paying Living Expenses:    Food Insecurity:     Worried About Running Out of Food in the Last Year:     Ran Out of Food in the Last Year:    Transportation Needs:     Lack of Transportation (Medical):      Lack of Transportation (Non-Medical):    Physical Activity:     Days of Exercise per Week:     Minutes of Exercise per Session:    Stress:     Feeling of Stress :    Social Connections:     Frequency of Communication with Friends and Family:     Frequency of Social Gatherings with Friends and Family:     Attends Yarsani Services:     Active Member of Clubs or Organizations:     Attends Club or Organization Meetings:     Marital Status:    Intimate Partner Violence:     Fear of Current or Ex-Partner:     Emotionally Abused:     Physically Abused:     Sexually Abused:         Current Outpatient Medications:     acetaminophen (TYLENOL) 650 mg CR tablet, Take 1 tablet (650 mg total) by mouth every 8 (eight) hours as needed for mild pain, Disp: 30 tablet, Rfl: 2    ADMELOG SOLOSTAR 100 units/mL injection pen, , Disp: , Rfl:     albuterol (PROVENTIL HFA,VENTOLIN HFA) 90 mcg/act inhaler, Inhale 2 puffs every 6 (six) hours as needed for wheezing, Disp: , Rfl:     BASAGLAR KWIKPEN 100 units/mL injection pen, , Disp: , Rfl:   buPROPion (WELLBUTRIN XL) 300 mg 24 hr tablet, Take 300 mg by mouth, Disp: , Rfl:     cholecalciferol (VITAMIN D3) 1,000 units tablet, Take by mouth Three times a day, Disp: , Rfl:     cloNIDine (CATAPRES) 0 1 mg tablet, 0 2 mg every 12 (twelve) hours , Disp: , Rfl:     cloNIDine (CATAPRES) 0 2 mg tablet, Take 0 2 mg by mouth 2 (two) times a day, Disp: , Rfl:     doxycycline hyclate (VIBRA-TABS) 100 mg tablet, TAKE 1 TABLET (100 MG TOTAL) BY MOUTH 2 (TWO) TIMES A DAY FOR 7 DAYS, Disp: , Rfl:     EASY COMFORT PEN NEEDLES 32G X 4 MM MISC, , Disp: , Rfl:     EASY TOUCH INSULIN SYRINGE 31G X 5/16" 0 3 ML MISC, , Disp: , Rfl:     ergocalciferol (ERGOCALCIFEROL) 1 25 MG (77160 UT) capsule, Take 50,000 Units by mouth, Disp: , Rfl:     escitalopram (LEXAPRO) 10 mg tablet, 175 mg , Disp: , Rfl:     fenofibrate (TRICOR) 54 MG tablet, TAKE 1 TABLET (54 MG TOTAL) BY MOUTH DAILY  , Disp: , Rfl:     folic acid (FOLVITE) 1 mg tablet, Take 1,000 mcg by mouth daily, Disp: , Rfl:     glucose blood test strip, USE TO MONITOR BG 3 TIMES A DAY   E11 9, Disp: , Rfl:     HYDROcodone-acetaminophen (NORCO) 5-325 mg per tablet, , Disp: , Rfl:     HYDROcodone-acetaminophen (NORCO) 5-325 mg per tablet, Take 1 tablet by mouth every 6 (six) hours as needed for painMax Daily Amount: 4 tablets (Patient not taking: Reported on 3/22/2021), Disp: 7 tablet, Rfl: 0    hydrOXYzine HCL (ATARAX) 50 mg tablet, Take 50 mg by mouth daily at bedtime, Disp: , Rfl: 0    hydrOXYzine pamoate (VISTARIL) 50 mg capsule, Take 50 mg by mouth, Disp: , Rfl:     insulin glargine (LANTUS SOLOSTAR) 100 units/mL injection pen, Inject 55 Units under the skin 2 (two) times a day, Disp: , Rfl:     insulin lispro (HUMALOG KWIKPEN) 100 units/mL injection pen, Inject 1-15 Units under the skin, Disp: , Rfl:     lamoTRIgine (LaMICtal) 150 MG tablet, Take 150 mg by mouth daily (Patient not taking: Reported on 6/25/2021), Disp: , Rfl:     lamoTRIgine (LaMICtal) 200 MG tablet, Take 200 mg by mouth every morning, Disp: , Rfl:     lamoTRIgine (LaMICtal) 25 mg tablet, Take 50 mg by mouth daily at bedtime  (Patient not taking: Reported on 6/25/2021), Disp: , Rfl:     levothyroxine 50 mcg tablet, Take 50 mcg by mouth daily, Disp: , Rfl:     metFORMIN (GLUCOPHAGE) 500 mg tablet, Take 1 tablet (500 mg total) by mouth 2 (two) times a day with meals, Disp: 60 tablet, Rfl: 0    methocarbamol (ROBAXIN) 750 mg tablet, TAKE 1 TABLET (750 MG TOTAL) BY MOUTH 4 (FOUR) TIMES A DAY AS NEEDED FOR MUSCLE SPASMS , Disp: , Rfl:     multivitamin (THERAGRAN) TABS, Take 1 tablet by mouth daily, Disp: , Rfl:     mupirocin (BACTROBAN) 2 % ointment, Apply topically 3 (three) times a day, Disp: 22 g, Rfl: 0    ondansetron (ZOFRAN-ODT) 4 mg disintegrating tablet, , Disp: , Rfl:     oxyCODONE (ROXICODONE) 5 mg/5 mL solution, , Disp: , Rfl:     pantoprazole (PROTONIX) 40 mg tablet, Take 40 mg by mouth daily, Disp: , Rfl:     SUMAtriptan Succinate (IMITREX STATDOSE REFILL) 6 MG/0 5ML SOCT, Inject 6 mg under the skin every 2 (two) hours as needed, Disp: , Rfl:     Ultra Thin Lancets 31G MISC, USE TO MONITOR BLOOD GLUCOSE 3 TIMES A DAY AS DIRECTED , Disp: , Rfl:     ziprasidone (GEODON) 80 mg capsule, Take 80 mg by mouth 2 (two) times a day with meals , Disp: , Rfl:   Family History   Problem Relation Age of Onset    Hyperlipidemia Mother     Diabetes Mother     Hypertension Mother     Heart attack Father       Review of Systems   Constitutional: Negative  Respiratory: Negative  Cardiovascular: Negative  Gastrointestinal: Negative  Skin: Positive for wound  Neurological: Positive for numbness  Allergies  Aspirin, Barium iodide, Codeine, Latex, Vancomycin, and Penicillins    Objective:  /53   Pulse 63   Temp (!) 97 2 °F (36 2 °C)   Resp 16     Physical Exam  Vitals and nursing note reviewed  Constitutional:       General: She is not in acute distress  Appearance: She is obese  She is not ill-appearing, toxic-appearing or diaphoretic  Cardiovascular:      Pulses:           Dorsalis pedis pulses are 2+ on the right side and 2+ on the left side  Posterior tibial pulses are 2+ on the right side and 2+ on the left side  Musculoskeletal:      Comments: Third toe amputated Right  Feet:      Right foot:      Protective Sensation: 10 sites tested  0 sites sensed  Left foot:      Protective Sensation: 10 sites tested  0 sites sensed  Neurological:      Mental Status: She is alert  Wound 01/13/21 Diabetic Ulcer Foot Right (Active)   Wound Image    07/02/21 1110   Wound Description Yellow;Pink 07/02/21 1113   Yvonne-wound Assessment Callus; Maceration 07/02/21 1113   Wound Length (cm) 0 4 cm 07/02/21 1113   Wound Width (cm) 0 3 cm 07/02/21 1113   Wound Depth (cm) 0 2 cm 07/02/21 1113   Wound Surface Area (cm^2) 0 12 cm^2 07/02/21 1113   Wound Volume (cm^3) 0 024 cm^3 07/02/21 1113   Calculated Wound Volume (cm^3) 0 02 cm^3 07/02/21 1113   Change in Wound Size % 85 71 07/02/21 1113   Undermining 0 2 06/25/21 0854   Undermining is depth extending from 8-3 o'clock 06/25/21 0854   Drainage Amount Small 07/02/21 1113   Drainage Description Serosanguineous; Chris 07/02/21 1113   Non-staged Wound Description Full thickness 07/02/21 1113   Dressing Status Intact 06/25/21 0854           Wound 01/13/21 Diabetic Ulcer Foot Right (Active)   Wound Image    07/02/21 1110   Wound Description Yellow;Pink 07/02/21 1113   Yvonne-wound Assessment Callus; Maceration 07/02/21 1113   Wound Length (cm) 0 4 cm 07/02/21 1113   Wound Width (cm) 0 3 cm 07/02/21 1113   Wound Depth (cm) 0 2 cm 07/02/21 1113   Wound Surface Area (cm^2) 0 12 cm^2 07/02/21 1113   Wound Volume (cm^3) 0 024 cm^3 07/02/21 1113   Calculated Wound Volume (cm^3) 0 02 cm^3 07/02/21 1113   Change in Wound Size % 85 71 07/02/21 1113   Undermining 0 2 06/25/21 0854   Undermining is depth extending from 8-3 o'clock 06/25/21 0854   Drainage Amount Small 07/02/21 1113   Drainage Description Serosanguineous; Chris 07/02/21 1113   Non-staged Wound Description Full thickness 07/02/21 1113   Dressing Status Intact 06/25/21 0854                         Diagnosis:  1  Diabetic ulcer of right foot associated with type 2 diabetes mellitus, with fat layer exposed, unspecified part of foot ([address] )  -     Wound off loading; Future  -     Wound cleansing and dressings; Future        Diagnosis ICD-10-CM Associated Orders   1  Diabetic ulcer of right foot associated with type 2 diabetes mellitus, with fat layer exposed, unspecified part of foot ([address] )  E11 621 Wound off loading    L97 512 Wound cleansing and dressings        ASSESSMENT    Problems:    Chronic illness / Problem not at goal with exacerbation, progression or side effects of treatment  1  DFU          PLAN    Discussion of Management /  Recommendations  Will transition now from TCC to offloading football dressing    Procedures Performed  Sharp debridement performed  Debridement   Wound 01/13/21 Diabetic Ulcer Foot Right    Universal Protocol:  Consent: Verbal consent obtained  Consent given by: patient  Time out: Immediately prior to procedure a "time out" was called to verify the correct patient, procedure, equipment, support staff and site/side marked as required    Timeout called at: 7/2/2021 11:33 AM   Patient understanding: patient states understanding of the procedure being performed  Patient identity confirmed: verbally with patient      Performed by: physician  Debridement type: surgical  Level of debridement: subcutaneous tissue  Pain control: lidocaine 4%  Pre-debridement measurements  Length (cm): 0 4  Width (cm): 0 3  Depth (cm): 0 2  Surface Area (cm^2): 0 12  Volume (cm^3): 0 02    Post-debridement measurements  Length (cm): 0 4  Width (cm): 0 3  Depth (cm): 0 2  Percent debrided: 100%  Surface Area (cm^2): 0 12  Area debrided (cm^2): 0 12  Volume (cm^3): 0 02  Tissue and other material debrided: subcutaneous tissue  Devitalized tissue debrided: biofilm and callus  Instrument(s) utilized: blade  Bleeding: small  Hemostasis obtained with: pressure  Procedural pain (0-10): insensate  Post-procedural pain: insensate   Response to treatment: procedure was tolerated well

## 2021-07-02 NOTE — PATIENT INSTRUCTIONS
Orders Placed This Encounter   Procedures    Wound off loading     Off-loading Instructions:    Keep weight and pressure off wound at all times  and Wear off-loading device as directed by your physician (surgical shoe)  Put on immediately when rising in the morning and remove when going to bed  Standing Status:   Future     Standing Expiration Date:   7/2/2022    Wound cleansing and dressings     Right foot wound  Do not get dressing wet  Use cast cover or sponge bathe  At wound care center today Leg washed with soap and water  Wound cleansed with normal saline  Patted dry with gauze  Felt padding nestor wound x2   Acticoat 7 to wound base covered with gauze  Secured with cast padding x2 , kerlix, coban   tubifast blue      Change dressing weekly at wound care center     Standing Status:   Future     Standing Expiration Date:   7/2/2022

## 2021-07-09 ENCOUNTER — OFFICE VISIT (OUTPATIENT)
Dept: WOUND CARE | Facility: HOSPITAL | Age: 46
End: 2021-07-09
Payer: COMMERCIAL

## 2021-07-09 VITALS
SYSTOLIC BLOOD PRESSURE: 110 MMHG | HEART RATE: 55 BPM | RESPIRATION RATE: 16 BRPM | TEMPERATURE: 97.9 F | DIASTOLIC BLOOD PRESSURE: 53 MMHG

## 2021-07-09 DIAGNOSIS — E11.621 DIABETIC ULCER OF RIGHT FOOT ASSOCIATED WITH TYPE 2 DIABETES MELLITUS, WITH FAT LAYER EXPOSED, UNSPECIFIED PART OF FOOT (HCC): Primary | ICD-10-CM

## 2021-07-09 DIAGNOSIS — L97.512 DIABETIC ULCER OF RIGHT FOOT ASSOCIATED WITH TYPE 2 DIABETES MELLITUS, WITH FAT LAYER EXPOSED, UNSPECIFIED PART OF FOOT (HCC): Primary | ICD-10-CM

## 2021-07-09 PROCEDURE — 99213 OFFICE O/P EST LOW 20 MIN: CPT | Performed by: PODIATRIST

## 2021-07-09 PROCEDURE — 99212 OFFICE O/P EST SF 10 MIN: CPT | Performed by: PODIATRIST

## 2021-07-09 NOTE — PATIENT INSTRUCTIONS
Orders Placed This Encounter   Procedures    Wound cleansing and dressings     The wound is healed  At wound center today felt applied to offload the very fragile healed skin  You may remove the felt when you get home and begin using the re-modified inserts in your shoes  Do not take any steps without the shoes with inserts  If the wound reopens call for appointment       Standing Status:   Future     Standing Expiration Date:   7/9/2022

## 2021-07-09 NOTE — PROGRESS NOTES
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, with bone involvement without evidence of necrosis ([address] )    H/O bariatric surgery    Anxiety    Morbid obesity ([address] )    Preoperative clearance    Encounter for smoking cessation counseling    Hypothyroidism    Manic bipolar I disorder ([address] )    Type 2 diabetes mellitus with neurologic complication, with long-term current use of insulin ([address] )    Tobacco abuse    Type 2 diabetes mellitus ([address] )    Acquired absence of other left toe(s) ([address] )    Essential hypertension    Arthritis    Chronic pain    OCD (obsessive compulsive disorder)    PTSD (post-traumatic stress disorder)    Smoking    Right foot ulcer, with fat layer exposed ([address] )    Pain of foot     Past Medical History:   Diagnosis Date    Anxiety     Arthritis     Asthma     Chronic pain     Diabetes mellitus ([address] )     Disease of thyroid gland     Lymphedema     left leg    Manic bipolar I disorder ([address] )     Morbid obesity ([address] )     OCD (obsessive compulsive disorder)     PTSD (post-traumatic stress disorder)      Past Surgical History:   Procedure Laterality Date    CHOLECYSTECTOMY      CHOLECYSTECTOMY      WI AMPUTATION METATARSAL+TOE,SINGLE Left 9/8/2019    Procedure: PARTIAL 3RD RAY RESECTION, PSB TOE FILLET FLAP;  Surgeon: Darius Lowery DPM;  [facility];  Service: Podiatry    TOE AMPUTATION Right 1/13/2021    Procedure: AMPUTATION 2ND TOE;  Surgeon: Miguel A Leavitt DPM;  [facility];  Service: Podiatry     Social History     Socioeconomic History    Marital status: Single     Spouse name: Not on file    Number of children: Not on file    Years of education: Not on file    Highest education level: Not on file   Occupational History    Not on file   Tobacco Use    Smoking status: Former Smoker     Packs/day: 0 50     Years: 15 00     Pack years: 7 50    Smokeless tobacco: Never Used   Vaping Use    Vaping Use: Some days   Substance and Sexual Activity    Alcohol use: Never    Drug use: Never    Sexual activity: Not Currently   Other Topics Concern    Not on file   Social History Narrative    Not on file     Social Determinants of Health     Financial Resource Strain:     Difficulty of Paying Living Expenses:    Food Insecurity:     Worried About Running Out of Food in the Last Year:     [address] in the Last Year:    Transportation Needs:     Lack of Transportation (Medical):      Lack of Transportation (Non-Medical):    Physical Activity:     Days of Exercise per Week:     Minutes of Exercise per Session:    Stress:     Feeling of Stress :    Social Connections:     Frequency of Communication with Friends and Family:     Frequency of Social Gatherings with Friends and Family:     Attends Mandaeism Services:     Active Member of Clubs or Organizations:     Attends Club or Organization Meetings:     Marital Status:    Intimate Partner Violence:     Fear of Current or Ex-Partner:     Emotionally Abused:     Physically Abused:     Sexually Abused:         Current Outpatient Medications:     acetaminophen (TYLENOL) 650 mg CR tablet, Take 1 tablet (650 mg total) by mouth every 8 (eight) hours as needed for mild pain, Disp: 30 tablet, Rfl: 2    ADMELOG SOLOSTAR 100 units/mL injection pen, , Disp: , Rfl:     albuterol (PROVENTIL HFA,VENTOLIN HFA) 90 mcg/act inhaler, Inhale 2 puffs every 6 (six) hours as needed for wheezing, Disp: , Rfl:     BASAGLAR KWIKPEN 100 units/mL injection pen, , Disp: , Rfl:     buPROPion (WELLBUTRIN XL) 300 mg 24 hr tablet, Take 300 mg by mouth, Disp: , Rfl:     cholecalciferol (VITAMIN D3) 1,000 units tablet, Take by mouth Three times a day, Disp: , Rfl:     cloNIDine (CATAPRES) 0 1 mg tablet, 0 2 mg every 12 (twelve) hours , Disp: , Rfl:     cloNIDine (CATAPRES) 0 2 mg tablet, Take 0 2 mg by mouth 2 (two) times a day, Disp: , Rfl:     doxycycline hyclate (VIBRA-TABS) 100 mg tablet, TAKE 1 TABLET (100 MG TOTAL) BY MOUTH 2 (TWO) TIMES A DAY FOR 7 DAYS, Disp: , Rfl:     EASY COMFORT PEN NEEDLES 32G X 4 MM MISC, , Disp: , Rfl:     EASY TOUCH INSULIN SYRINGE 31G X 5/16" 0 3 ML MISC, , Disp: , Rfl:     ergocalciferol (ERGOCALCIFEROL) 1 25 MG (70861 UT) capsule, Take 50,000 Units by mouth, Disp: , Rfl:     escitalopram (LEXAPRO) 10 mg tablet, 175 mg , Disp: , Rfl:     fenofibrate (TRICOR) 54 MG tablet, TAKE 1 TABLET (54 MG TOTAL) BY MOUTH DAILY  , Disp: , Rfl:     folic acid (FOLVITE) 1 mg tablet, Take 1,000 mcg by mouth daily, Disp: , Rfl:     glucose blood test strip, USE TO MONITOR BG 3 TIMES A DAY   E11 9, Disp: , Rfl:     HYDROcodone-acetaminophen (NORCO) 5-325 mg per tablet, , Disp: , Rfl:     HYDROcodone-acetaminophen (NORCO) 5-325 mg per tablet, Take 1 tablet by mouth every 6 (six) hours as needed for painMax Daily Amount: 4 tablets (Patient not taking: Reported on 3/22/2021), Disp: 7 tablet, Rfl: 0    hydrOXYzine HCL (ATARAX) 50 mg tablet, Take 50 mg by mouth daily at bedtime, Disp: , Rfl: 0    hydrOXYzine pamoate (VISTARIL) 50 mg capsule, Take 50 mg by mouth, Disp: , Rfl:     insulin glargine (LANTUS SOLOSTAR) 100 units/mL injection pen, Inject 55 Units under the skin 2 (two) times a day, Disp: , Rfl:     insulin lispro (HUMALOG KWIKPEN) 100 units/mL injection pen, Inject 1-15 Units under the skin, Disp: , Rfl:     lamoTRIgine (LaMICtal) 150 MG tablet, Take 150 mg by mouth daily (Patient not taking: Reported on 6/25/2021), Disp: , Rfl:     lamoTRIgine (LaMICtal) 200 MG tablet, Take 200 mg by mouth every morning, Disp: , Rfl:     lamoTRIgine (LaMICtal) 25 mg tablet, Take 50 mg by mouth daily at bedtime  (Patient not taking: Reported on 6/25/2021), Disp: , Rfl:     levothyroxine 50 mcg tablet, Take 50 mcg by mouth daily, Disp: , Rfl:     metFORMIN (GLUCOPHAGE) 500 mg tablet, Take 1 tablet (500 mg total) by mouth 2 (two) times a day with meals, Disp: 60 tablet, Rfl: 0    methocarbamol (ROBAXIN) 750 mg tablet, TAKE 1 TABLET (750 MG TOTAL) BY MOUTH 4 (FOUR) TIMES A DAY AS NEEDED FOR MUSCLE SPASMS , Disp: , Rfl:     multivitamin (THERAGRAN) TABS, Take 1 tablet by mouth daily, Disp: , Rfl:     mupirocin (BACTROBAN) 2 % ointment, Apply topically 3 (three) times a day, Disp: 22 g, Rfl: 0    ondansetron (ZOFRAN-ODT) 4 mg disintegrating tablet, , Disp: , Rfl:     oxyCODONE (ROXICODONE) 5 mg/5 mL solution, , Disp: , Rfl:     pantoprazole (PROTONIX) 40 mg tablet, Take 40 mg by mouth daily, Disp: , Rfl:     SUMAtriptan Succinate (IMITREX STATDOSE REFILL) 6 MG/0 5ML SOCT, Inject 6 mg under the skin every 2 (two) hours as needed, Disp: , Rfl:     Ultra Thin Lancets 31G MISC, USE TO MONITOR BLOOD GLUCOSE 3 TIMES A DAY AS DIRECTED , Disp: , Rfl:     ziprasidone (GEODON) 80 mg capsule, Take 80 mg by mouth 2 (two) times a day with meals , Disp: , Rfl:   Family History   Problem Relation Age of Onset    Hyperlipidemia Mother     Diabetes Mother     Hypertension Mother     Heart attack Father       Review of Systems   Constitutional: Negative  Respiratory: Negative  Cardiovascular: Negative  Gastrointestinal: Negative  Skin: Positive for wound  Neurological: Positive for numbness  Allergies  Aspirin, Barium iodide, Codeine, Latex, Vancomycin, and Penicillins    Objective: There were no vitals taken for this visit  Physical Exam  Vitals and nursing note reviewed  Constitutional:       General: She is not in acute distress  Appearance: She is obese   She is not ill-appearing, toxic-appearing or diaphoretic  Cardiovascular:      Pulses:           Dorsalis pedis pulses are 2+ on the right side and 2+ on the left side  Posterior tibial pulses are 2+ on the right side and 2+ on the left side  Musculoskeletal:      Comments: Third toe amputated Right  Feet:      Right foot:      Protective Sensation: 10 sites tested  0 sites sensed  Left foot:      Protective Sensation: 10 sites tested  0 sites sensed  Neurological:      Mental Status: She is alert  Wound 01/13/21 Diabetic Ulcer Foot Right (Active)           Wound 01/13/21 Diabetic Ulcer Foot Right (Active)                         Diagnosis:  There are no diagnoses linked to this encounter  No diagnosis found  ASSESSMENT    Problems:    Chronic illness / Problem not at goal with exacerbation, progression or side effects of treatment  1  Healed DFU          PLAN    Discussion of Management /  Recommendations  She may discontinue TCC  She will resume her diabetic shoe and the insoles which I modified to offload the wound area  She may return to work  She will reappoint here if she notes any recurrent drainage  High risk  foot precautions reviewed with patient including the need to wear protective shoegear at all times when walking including in the home, the need to check feet all surfaces daily with a mirror and report and skin breaks to podiatry, the need to apply an emollient to skin of feet daily

## 2021-07-09 NOTE — LETTER
July 9, 2021     Patient: Penelope Chavez   YOB: 1975   Date of Visit: 7/9/2021       To Whom it May Concern:    Spikebunny Campo is under my professional care  She was seen in my office on 7/9/2021  She may return to work on 7/10/21  If you have any questions or concerns, please don't hesitate to call           Sincerely,          Barbra Alas DPM        CC:   No Recipients

## 2021-08-31 ENCOUNTER — APPOINTMENT (EMERGENCY)
Dept: RADIOLOGY | Facility: HOSPITAL | Age: 46
DRG: 305 | End: 2021-08-31
Payer: COMMERCIAL

## 2021-08-31 ENCOUNTER — HOSPITAL ENCOUNTER (EMERGENCY)
Facility: HOSPITAL | Age: 46
Discharge: HOME | DRG: 305 | End: 2021-08-31
Attending: EMERGENCY MEDICINE
Payer: COMMERCIAL

## 2021-08-31 VITALS
HEART RATE: 82 BPM | OXYGEN SATURATION: 97 % | TEMPERATURE: 98.6 F | DIASTOLIC BLOOD PRESSURE: 63 MMHG | RESPIRATION RATE: 18 BRPM | SYSTOLIC BLOOD PRESSURE: 109 MMHG

## 2021-08-31 DIAGNOSIS — E11.621 DIABETIC FOOT ULCER (HCC): ICD-10-CM

## 2021-08-31 DIAGNOSIS — L97.519 ULCER OF RIGHT FOOT (HCC): Primary | ICD-10-CM

## 2021-08-31 DIAGNOSIS — L97.509 DIABETIC FOOT ULCER (HCC): ICD-10-CM

## 2021-08-31 DIAGNOSIS — M86.9 OSTEOMYELITIS (HCC): ICD-10-CM

## 2021-08-31 LAB
ANION GAP SERPL CALCULATED.3IONS-SCNC: 4 MMOL/L (ref 4–13)
BASOPHILS # BLD AUTO: 0.04 THOUSANDS/ΜL (ref 0–0.1)
BASOPHILS NFR BLD AUTO: 0 % (ref 0–1)
BUN SERPL-MCNC: 9 MG/DL (ref 5–25)
CALCIUM SERPL-MCNC: 9.2 MG/DL (ref 8.3–10.1)
CHLORIDE SERPL-SCNC: 105 MMOL/L (ref 100–108)
CO2 SERPL-SCNC: 26 MMOL/L (ref 21–32)
CREAT SERPL-MCNC: 0.72 MG/DL (ref 0.6–1.3)
CRP SERPL QL: 29.3 MG/L
EOSINOPHIL # BLD AUTO: 0.49 THOUSAND/ΜL (ref 0–0.61)
EOSINOPHIL NFR BLD AUTO: 4 % (ref 0–6)
ERYTHROCYTE [DISTWIDTH] IN BLOOD BY AUTOMATED COUNT: 14.4 % (ref 11.6–15.1)
ERYTHROCYTE [SEDIMENTATION RATE] IN BLOOD: 46 MM/HOUR (ref 0–19)
GFR SERPL CREATININE-BSD FRML MDRD: 101 ML/MIN/1.73SQ M
GLUCOSE SERPL-MCNC: 97 MG/DL (ref 65–140)
HCT VFR BLD AUTO: 44.3 % (ref 34.8–46.1)
HGB BLD-MCNC: 14 G/DL (ref 11.5–15.4)
IMM GRANULOCYTES # BLD AUTO: 0.03 THOUSAND/UL (ref 0–0.2)
IMM GRANULOCYTES NFR BLD AUTO: 0 % (ref 0–2)
LYMPHOCYTES # BLD AUTO: 4.08 THOUSANDS/ΜL (ref 0.6–4.47)
LYMPHOCYTES NFR BLD AUTO: 33 % (ref 14–44)
MCH RBC QN AUTO: 25.9 PG (ref 26.8–34.3)
MCHC RBC AUTO-ENTMCNC: 31.6 G/DL (ref 31.4–37.4)
MCV RBC AUTO: 82 FL (ref 82–98)
MONOCYTES # BLD AUTO: 0.94 THOUSAND/ΜL (ref 0.17–1.22)
MONOCYTES NFR BLD AUTO: 8 % (ref 4–12)
NEUTROPHILS # BLD AUTO: 6.92 THOUSANDS/ΜL (ref 1.85–7.62)
NEUTS SEG NFR BLD AUTO: 56 % (ref 43–75)
PLATELET # BLD AUTO: 444 THOUSANDS/UL (ref 149–390)
PMV BLD AUTO: 9.4 FL (ref 8.9–12.7)
POTASSIUM SERPL-SCNC: 5.3 MMOL/L (ref 3.5–5.3)
RBC # BLD AUTO: 5.4 MILLION/UL (ref 3.81–5.12)
SODIUM SERPL-SCNC: 135 MMOL/L (ref 136–145)
WBC # BLD AUTO: 12.5 THOUSAND/UL (ref 4.31–10.16)

## 2021-08-31 PROCEDURE — 87186 SC STD MICRODIL/AGAR DIL: CPT | Performed by: STUDENT IN AN ORGANIZED HEALTH CARE EDUCATION/TRAINING PROGRAM

## 2021-08-31 PROCEDURE — 87040 BLOOD CULTURE FOR BACTERIA: CPT | Performed by: STUDENT IN AN ORGANIZED HEALTH CARE EDUCATION/TRAINING PROGRAM

## 2021-08-31 PROCEDURE — 87185 SC STD ENZYME DETCJ PER NZM: CPT | Performed by: STUDENT IN AN ORGANIZED HEALTH CARE EDUCATION/TRAINING PROGRAM

## 2021-08-31 PROCEDURE — 96375 TX/PRO/DX INJ NEW DRUG ADDON: CPT

## 2021-08-31 PROCEDURE — 87075 CULTR BACTERIA EXCEPT BLOOD: CPT | Performed by: STUDENT IN AN ORGANIZED HEALTH CARE EDUCATION/TRAINING PROGRAM

## 2021-08-31 PROCEDURE — 85025 COMPLETE CBC W/AUTO DIFF WBC: CPT | Performed by: STUDENT IN AN ORGANIZED HEALTH CARE EDUCATION/TRAINING PROGRAM

## 2021-08-31 PROCEDURE — 99285 EMERGENCY DEPT VISIT HI MDM: CPT | Performed by: EMERGENCY MEDICINE

## 2021-08-31 PROCEDURE — 85652 RBC SED RATE AUTOMATED: CPT | Performed by: STUDENT IN AN ORGANIZED HEALTH CARE EDUCATION/TRAINING PROGRAM

## 2021-08-31 PROCEDURE — 80048 BASIC METABOLIC PNL TOTAL CA: CPT | Performed by: STUDENT IN AN ORGANIZED HEALTH CARE EDUCATION/TRAINING PROGRAM

## 2021-08-31 PROCEDURE — 87205 SMEAR GRAM STAIN: CPT | Performed by: STUDENT IN AN ORGANIZED HEALTH CARE EDUCATION/TRAINING PROGRAM

## 2021-08-31 PROCEDURE — 86140 C-REACTIVE PROTEIN: CPT | Performed by: STUDENT IN AN ORGANIZED HEALTH CARE EDUCATION/TRAINING PROGRAM

## 2021-08-31 PROCEDURE — 87070 CULTURE OTHR SPECIMN AEROBIC: CPT | Performed by: STUDENT IN AN ORGANIZED HEALTH CARE EDUCATION/TRAINING PROGRAM

## 2021-08-31 PROCEDURE — NC001 PR NO CHARGE: Performed by: PODIATRIST

## 2021-08-31 PROCEDURE — 87076 CULTURE ANAEROBE IDENT EACH: CPT | Performed by: STUDENT IN AN ORGANIZED HEALTH CARE EDUCATION/TRAINING PROGRAM

## 2021-08-31 PROCEDURE — 36415 COLL VENOUS BLD VENIPUNCTURE: CPT | Performed by: STUDENT IN AN ORGANIZED HEALTH CARE EDUCATION/TRAINING PROGRAM

## 2021-08-31 PROCEDURE — 96374 THER/PROPH/DIAG INJ IV PUSH: CPT

## 2021-08-31 PROCEDURE — 73630 X-RAY EXAM OF FOOT: CPT

## 2021-08-31 PROCEDURE — 99284 EMERGENCY DEPT VISIT MOD MDM: CPT

## 2021-08-31 RX ORDER — SULFAMETHOXAZOLE AND TRIMETHOPRIM 800; 160 MG/1; MG/1
1 TABLET ORAL 2 TIMES DAILY
Qty: 14 TABLET | Refills: 0 | Status: SHIPPED | OUTPATIENT
Start: 2021-08-31 | End: 2021-09-08 | Stop reason: HOSPADM

## 2021-08-31 RX ORDER — SULFAMETHOXAZOLE AND TRIMETHOPRIM 800; 160 MG/1; MG/1
1 TABLET ORAL ONCE
Status: COMPLETED | OUTPATIENT
Start: 2021-08-31 | End: 2021-08-31

## 2021-08-31 RX ORDER — ACETAMINOPHEN 325 MG/1
975 TABLET ORAL ONCE
Status: COMPLETED | OUTPATIENT
Start: 2021-08-31 | End: 2021-08-31

## 2021-08-31 RX ORDER — KETOROLAC TROMETHAMINE 30 MG/ML
15 INJECTION, SOLUTION INTRAMUSCULAR; INTRAVENOUS ONCE
Status: COMPLETED | OUTPATIENT
Start: 2021-08-31 | End: 2021-08-31

## 2021-08-31 RX ORDER — MORPHINE SULFATE 4 MG/ML
4 INJECTION, SOLUTION INTRAMUSCULAR; INTRAVENOUS ONCE
Status: DISCONTINUED | OUTPATIENT
Start: 2021-08-31 | End: 2021-08-31

## 2021-08-31 RX ORDER — HYDROMORPHONE HCL/PF 1 MG/ML
0.5 SYRINGE (ML) INJECTION ONCE
Status: COMPLETED | OUTPATIENT
Start: 2021-08-31 | End: 2021-08-31

## 2021-08-31 RX ADMIN — ACETAMINOPHEN 975 MG: 325 TABLET, FILM COATED ORAL at 16:48

## 2021-08-31 RX ADMIN — KETOROLAC TROMETHAMINE 15 MG: 30 INJECTION, SOLUTION INTRAMUSCULAR; INTRAVENOUS at 16:48

## 2021-08-31 RX ADMIN — HYDROMORPHONE HYDROCHLORIDE 0.5 MG: 1 INJECTION, SOLUTION INTRAMUSCULAR; INTRAVENOUS; SUBCUTANEOUS at 18:36

## 2021-08-31 RX ADMIN — SULFAMETHOXAZOLE AND TRIMETHOPRIM 1 TABLET: 800; 160 TABLET ORAL at 19:46

## 2021-08-31 NOTE — ED NOTES
Per pt "was supposed to go to wound care in [place] office but my ride never showed up so I came here because I dont want my foot chopped off  My foot wound opened up about 1 5 weeks ago   Theres a foul order coming from it but I dont have any other bandages because I didn't get to see wound care yet "     iNcky Rouse, ANDER  08/31/21 4731

## 2021-08-31 NOTE — ED ATTENDING ATTESTATION
8/31/2021  IAbdulaziz MD, saw and evaluated the patient  I have discussed the patient with the resident/non-physician practitioner and agree with the resident's/non-physician practitioner's findings, Plan of Care, and MDM as documented in the resident's/non-physician practitioner's note, except where noted  All available labs and Radiology studies were reviewed  I was present for key portions of any procedure(s) performed by the resident/non-physician practitioner and I was immediately available to provide assistance  At this point I agree with the current assessment done in the Emergency Department    I have conducted an independent evaluation of this patient a history and physical is as follows:  Wound evaluation  1 and a half weeks    Right plantar at 3rd mtp area     minimal surrounding erythema some necrotic tissue in the middle the wound the wound is quite deep   There is some necrotic tissue in the middle of the wound there is no significant drainage noted there is no crepitation noted  Neurovascular status is intact  Planned x-ray Labs podiatry consult  ED Course         Critical Care Time  Procedures

## 2021-08-31 NOTE — ED CARE HANDOFF
Emergency Department Sign Out Note        Sign out and transfer of care from Dr Brianda Garay  See Separate Emergency Department note  The patient, Ellen Marie, was evaluated by the previous provider for Foot ulcer  Workup Completed:  See ED course    ED Course / Workup Pending (followup):  See ED course                                  ED Course as of Aug 31 1955   Tue Aug 31, 2021   1852 SO  Diabetic foot wound  Podiatry eval currently happening  Dispo per podiatry recommendations       5404 Alerted by podiatry that there is a high suspicion for osteomyelitis  Patient wants to leave AMA and patient should at least be on bactrim  1953 Patient demonstrated capacity regarding this decision by verbalizing the risks of leaving against medical advice, insight into the nature of their condition, and appropriate rationalization of their desire leave/not undergo care  Patient also demonstrated the ability to manipulate data by rephrasing the above-mentioned information  I discussed with the patient alternatives including: outpatient antibiotics  She was in agreement with this and states that she will return to the ED tomorrow once her dog is taken care of  A cell phone  was offered at this time, as the patient stated that she needed to make arrangements; however, she declined and stated that she wants to go home  There are no apparent signs of intoxication or psychiatric disturbance on my evaluation                      Procedures  MDM  Number of Diagnoses or Management Options  Diabetic foot ulcer ([address] )  Osteomyelitis ([address] )  Ulcer of right foot ([address] )  Diagnosis management comments: See ED course      Disposition  Final diagnoses:   Ulcer of right foot ([address] )   Osteomyelitis ([address] )   Diabetic foot ulcer ([address] )     Time reflects when diagnosis was documented in both MDM as applicable and the Disposition within this note     Time User Action Codes Description Comment    8/31/2021  3:44 PM Jil Roman Add [L97 519] Ulcer of right foot ([facility] )     8/31/2021  7:42 PM Gisele Smith FANY Add [M86 9] Osteomyelitis ([facility] )     8/31/2021  7:44 PM Mundo Jimenes Add [B78 123,  L97 509] Diabetic foot ulcer [facility])       ED Disposition     ED Disposition Condition Date/Time Comment    LINDAFANY Matamoros Aug 31, 2021  7:39 PM       Follow-up Information    None       Patient's Medications   Discharge Prescriptions    SULFAMETHOXAZOLE-TRIMETHOPRIM (BACTRIM DS) 800-160 MG PER TABLET    Take 1 tablet by mouth 2 (two) times a day for 7 days smx-tmp DS (BACTRIM) 800-160 mg tabs (1tab q12 D10)       Start Date: 8/31/2021 End Date: 9/7/2021       Order Dose: 1 tablet       Quantity: 14 tablet    Refills: 0     No discharge procedures on file         ED Provider  Electronically Signed by     Sheba Dallas MD  08/31/21 9583

## 2021-08-31 NOTE — ED NOTES
Pt reports I will be back tomorrow, I have to go home and take care of my dog and find someone to watch him while I am in the hospital     Noé Vasquez RN  08/31/21 1949

## 2021-08-31 NOTE — DISCHARGE INSTRUCTIONS
You were evaluated in the emergency department for: foot ulcer  You can access your current and pending results through [facility]  A radiologist will take a second look at your X-Rays, if you had any, and you will be contacted with any new findings  You are leaving against medical advice  You have been prescribed an antibiotic; however, you should return to the hospital as soon as possible  You are welcome back any time  You should follow-up with your primary care provider, as soon as possible, for re-evaluation  If you do not have a primary care provider, I have referred you to [facility]  You will be contacted about scheduling an appointment  Their phone number is also included on this paperwork  You should return to the emergency department as soon as possible  Additionally, please, return to the emergency department if you experience new or worsening symptoms, fever, chest pain, shortness of breath, difficulty breathing, dizziness, abdominal pain, persistent nausea/vomiting, syncope or passing out, blood in your urine or stool, coughing up blood, leg swelling/pain, urinary retention, bowel or bladder incontinence, numbness between your legs

## 2021-08-31 NOTE — CONSULTS
[address] Podiatry - Consultation    Patient Information:   Rohith Dudley 40 y o  female MRN: [id number]  Unit/Bed#: [facility] Encounter: [id number]  PCP: Magalys Carranza DO  Date of Admission:  8/31/2021  Date of Consultation: 08/31/21  Requesting Physician: Trudy Mcdaniel MD      ASSESSMENT:    Rohith Dudley is a 40 y o  female with:    1  Right foot diabetic ulcer minor - 3 POA  2  Clinical osteomyelitis  3  T2DM  4  Tobacco abuse    PLAN:    · Patient is leaving Against medical advice because there is nobody that can take care of her dog and she can't leave him alone  · Anaerobic and Aerobic cultures taken  · Reviewed right foot Xrays negative for osteomyelitis and KORY  · Recommend Bactrim for 7 days  · Recommend admission for Osteomyelitis work up and possible surgery  Positive probe to bone and malodor  · Recommend MRI for Osteomyelitis  · Rest of care per primary team   · Will discuss this plan with my attending and update as needed  Antibiotics: Bactrim  Weight Bearing Status: NWB to right lower extremity    SUBJECTIVE    History of Present Illness:    Rohith Dudley is a 40 y o  female with past medical history including T2DM, anxiety, bipolar disorder, obesity, hypothyroidism, and smoking who presents with a right foot diabetic ulcer  Amputation of the Left 3rd digit and ray by Dr Karen Calero  On the right foot she previously had a 2nd digit amputation 1/13/21 with Dr Miriam Block  She has been following outpatient for woundcare with Dr Karen Calero and was last seen 7/9/21 at which time total contact casting was discontinued and she transitioned into diabetic shoes  She states that she was cured and discharged from wound care  She now presents with an ulcer in the submetatarsal 3 region the RIGHT foot that she reports has been present for about 2 weeks  In the last 1 5 weeks she has had increase in pain and swelling to the area until she needed to come in today   She has been dressing it with betadine and gauze at home and has run out of dressing materials  She reports she was supposed to go to wound care in [facility] but her ride never showed up so she came to the ED  After discussing the severity of her situation and describing the probably bone infection and possibility that this could get a lot worse really quickly with the possible need for urgent surgery, the patient refused admission on account of her dog being home alone and needing someone to take care of it  She states that she could not make arrangements from the hospital and must go home  She promises that she'll come back but really can't stay tonight  I explained that this was against medical advice and I recommend admission  Patient understands and is willing to take the risk upon herself  Review of Systems:    Constitutional: Negative  HENT: Negative  Eyes: Negative  Respiratory: Negative  Cardiovascular: Negative  Gastrointestinal: Negative  Musculoskeletal: pain Right foot   Skin:Ulcer right foot   Neurological: neuropathy   Psych: Negative       Past Medical and Surgical History:     Past Medical History:   Diagnosis Date    Anxiety     Arthritis     Asthma     Chronic pain     [facility] )     Disease of thyroid gland     Lymphedema     left leg    Man[facility] I disorder [facility]     Morbid obesity (HCC)     OCD (obsessive compulsive disorder)     PTSD (post-traumatic stress disorder)        Past Surgical History:   Procedure Laterality Date    CHOLECYSTECTOMY      CHOLECYSTECTOMY      MI AMPUTATION METATARSAL+TOE,SINGLE Left 9/8/2019    Procedure: PARTIAL 3RD RAY RESECTION, PSB TOE FILLET FLAP;  Surgeon: Ching Tapia DPM;  Location: [facility];  Service: Podiatry    TOE AMPUTATION Right 1/13/2021    Procedure: AMPUTATION 2ND TOE;  Surgeon: Fabian Rangel DPM;  Location: [facility];  Service: Podiatry       Meds/Allergies:    (Not in a hospital admission)      Allergies   Allergen Reactions    Aspirin     Barium Iodide Itching     Face became very red and itchy     Codeine     Latex     Vancomycin     Penicillins Rash and Other (See Comments)     Patient has tolerated cefepime without difficulty       Social History:     Marital Status: Single    Substance Use History:   Social History     Substance and Sexual Activity   Alcohol Use Never     Social History     Tobacco Use   Smoking Status Former Smoker    Packs/day: 0 50    Years: 15 00    Pack years: 7 50   Smokeless Tobacco Never Used     Social History     Substance and Sexual Activity   Drug Use Never       Family History:    Family History   Problem Relation Age of Onset    Hyperlipidemia Mother     Diabetes Mother     Hypertension Mother     Heart attack Father          OBJECTIVE:    Vitals:   Blood Pressure: 120/81 (08/31/21 1358)  Pulse: 96 (08/31/21 1358)  Temperature: 98 4 °F (36 9 °C) (08/31/21 1358)  Respirations: 18 (08/31/21 1358)  SpO2: 96 % (08/31/21 1358)    Physical Exam:     General Appearance: Alert, cooperative, no distress  HEENT: Head normocephalic, atraumatic, without obvious abnormality  Heart: Normal rate and rhythm  Lungs: Non-labored breathing  No respiratory distress  Abdomen: Without distension  Psychiatric: AAOx3  Lower Extremity:  Vascular:   DP/PT pedal pulses on the left are weak  DP/PT pedal pulses on the right are weak  CRT brisk at the digits  Pedal hair is absent  1+ edema noted at bilateral lower extremities  Skin temperature is abnormal warm onthe RIGHT compared to the left foot     Musculoskeletal:  MMT is 5/5 in all muscle compartments bilaterally  Passive ROM at the 1st MPJ and ankle joint are Normal bilaterally with the leg extended  Active ROM at the lesser digits is intact  No Pain on palpation of foot and ankle (due to Dilaudid)  Deformities noted  Amputation of R second toe  R Hallux valgus  L 3rd digit amputation  Dermatological:  Swollen to the right forefoot   With erythema immediately around the ulceration and dorsally to the top of the foot and lesser digits  Ulceration described bellow  Wound #: 1  Location: Right plantar forefoot sub 3rd metatarsal head  Length 1 5cm: Width 2 0cm: Depth 4 0cm:   Deepest Tissue Noted in Base: bone  Probe to Bone: Yes  Peripheral Skin Description: Attached and hyperkaratotic  Granulation: 0% Fibrotic Tissue: 90% Necrotic Tissue: 10%   Drainage Amount: minimal  Signs of Infection: Yes      Neurological:  Gross sensation is diminished  Protective sensation is absent  Patient Reports numbness and/or paresthesias  Clinical Images 08/31/21: Additional Data:     Lab Results: I have personally reviewed pertinent labs including:              Invalid input(s): LABALBU        Cultures: I have personally reviewed pertinent cultures including:              Imaging: I have personally reviewed pertinent films in PACS  EKG, Pathology, and Other Studies: I have personally reviewed pertinent reports  ** Please Note: Portions of the record may have been created with voice recognition software  Occasional wrong word or "sound a like" substitutions may have occurred due to the inherent limitations of voice recognition software  Read the chart carefully and recognize, using context, where substitutions have occurred   **

## 2021-09-01 ENCOUNTER — APPOINTMENT (INPATIENT)
Dept: RADIOLOGY | Facility: HOSPITAL | Age: 46
DRG: 305 | End: 2021-09-01
Payer: COMMERCIAL

## 2021-09-01 ENCOUNTER — HOSPITAL ENCOUNTER (INPATIENT)
Facility: HOSPITAL | Age: 46
LOS: 7 days | Discharge: HOME HEALTH | DRG: 305 | End: 2021-09-08
Attending: EMERGENCY MEDICINE | Admitting: INTERNAL MEDICINE
Payer: COMMERCIAL

## 2021-09-01 DIAGNOSIS — E11.9 TYPE 2 DIABETES MELLITUS (HCC): ICD-10-CM

## 2021-09-01 DIAGNOSIS — K59.00 CONSTIPATION, UNSPECIFIED CONSTIPATION TYPE: ICD-10-CM

## 2021-09-01 DIAGNOSIS — M86.271 SUBACUTE OSTEOMYELITIS OF RIGHT FOOT (HCC): ICD-10-CM

## 2021-09-01 DIAGNOSIS — E11.621 DIABETIC FOOT ULCER (HCC): Primary | ICD-10-CM

## 2021-09-01 DIAGNOSIS — L97.512 RIGHT FOOT ULCER, WITH FAT LAYER EXPOSED (HCC): ICD-10-CM

## 2021-09-01 DIAGNOSIS — L97.509 DIABETIC FOOT ULCER (HCC): Primary | ICD-10-CM

## 2021-09-01 PROBLEM — K21.9 GERD (GASTROESOPHAGEAL REFLUX DISEASE): Status: ACTIVE | Noted: 2021-09-01

## 2021-09-01 PROBLEM — G43.909 MIGRAINE WITHOUT STATUS MIGRAINOSUS, NOT INTRACTABLE: Status: ACTIVE | Noted: 2021-09-01

## 2021-09-01 LAB
ANION GAP SERPL CALCULATED.3IONS-SCNC: 4 MMOL/L (ref 4–13)
BASOPHILS # BLD AUTO: 0.03 THOUSANDS/ΜL (ref 0–0.1)
BASOPHILS NFR BLD AUTO: 0 % (ref 0–1)
BUN SERPL-MCNC: 11 MG/DL (ref 5–25)
CALCIUM SERPL-MCNC: 9.2 MG/DL (ref 8.3–10.1)
CHLORIDE SERPL-SCNC: 106 MMOL/L (ref 100–108)
CO2 SERPL-SCNC: 28 MMOL/L (ref 21–32)
CREAT SERPL-MCNC: 1.12 MG/DL (ref 0.6–1.3)
EOSINOPHIL # BLD AUTO: 0.39 THOUSAND/ΜL (ref 0–0.61)
EOSINOPHIL NFR BLD AUTO: 4 % (ref 0–6)
ERYTHROCYTE [DISTWIDTH] IN BLOOD BY AUTOMATED COUNT: 14.2 % (ref 11.6–15.1)
EST. AVERAGE GLUCOSE BLD GHB EST-MCNC: 146 MG/DL
GFR SERPL CREATININE-BSD FRML MDRD: 59 ML/MIN/1.73SQ M
GLUCOSE SERPL-MCNC: 112 MG/DL (ref 65–140)
GLUCOSE SERPL-MCNC: 141 MG/DL (ref 65–140)
HBA1C MFR BLD: 6.7 %
HCT VFR BLD AUTO: 41.7 % (ref 34.8–46.1)
HGB BLD-MCNC: 13.1 G/DL (ref 11.5–15.4)
IMM GRANULOCYTES # BLD AUTO: 0.02 THOUSAND/UL (ref 0–0.2)
IMM GRANULOCYTES NFR BLD AUTO: 0 % (ref 0–2)
LYMPHOCYTES # BLD AUTO: 3.11 THOUSANDS/ΜL (ref 0.6–4.47)
LYMPHOCYTES NFR BLD AUTO: 30 % (ref 14–44)
MCH RBC QN AUTO: 25.8 PG (ref 26.8–34.3)
MCHC RBC AUTO-ENTMCNC: 31.4 G/DL (ref 31.4–37.4)
MCV RBC AUTO: 82 FL (ref 82–98)
MONOCYTES # BLD AUTO: 0.69 THOUSAND/ΜL (ref 0.17–1.22)
MONOCYTES NFR BLD AUTO: 7 % (ref 4–12)
NEUTROPHILS # BLD AUTO: 6.26 THOUSANDS/ΜL (ref 1.85–7.62)
NEUTS SEG NFR BLD AUTO: 59 % (ref 43–75)
NRBC BLD AUTO-RTO: 0 /100 WBCS
PLATELET # BLD AUTO: 398 THOUSANDS/UL (ref 149–390)
PMV BLD AUTO: 8.9 FL (ref 8.9–12.7)
POTASSIUM SERPL-SCNC: 3.7 MMOL/L (ref 3.5–5.3)
RBC # BLD AUTO: 5.07 MILLION/UL (ref 3.81–5.12)
SODIUM SERPL-SCNC: 138 MMOL/L (ref 136–145)
WBC # BLD AUTO: 10.5 THOUSAND/UL (ref 4.31–10.16)

## 2021-09-01 PROCEDURE — 36415 COLL VENOUS BLD VENIPUNCTURE: CPT | Performed by: PHYSICIAN ASSISTANT

## 2021-09-01 PROCEDURE — 80048 BASIC METABOLIC PNL TOTAL CA: CPT | Performed by: PHYSICIAN ASSISTANT

## 2021-09-01 PROCEDURE — 85025 COMPLETE CBC W/AUTO DIFF WBC: CPT | Performed by: PHYSICIAN ASSISTANT

## 2021-09-01 PROCEDURE — 82948 REAGENT STRIP/BLOOD GLUCOSE: CPT

## 2021-09-01 PROCEDURE — 83036 HEMOGLOBIN GLYCOSYLATED A1C: CPT | Performed by: PHYSICIAN ASSISTANT

## 2021-09-01 PROCEDURE — 99223 1ST HOSP IP/OBS HIGH 75: CPT | Performed by: INTERNAL MEDICINE

## 2021-09-01 PROCEDURE — 99284 EMERGENCY DEPT VISIT MOD MDM: CPT | Performed by: EMERGENCY MEDICINE

## 2021-09-01 PROCEDURE — 99244 OFF/OP CNSLTJ NEW/EST MOD 40: CPT | Performed by: PODIATRIST

## 2021-09-01 PROCEDURE — 73630 X-RAY EXAM OF FOOT: CPT

## 2021-09-01 PROCEDURE — 99285 EMERGENCY DEPT VISIT HI MDM: CPT

## 2021-09-01 RX ORDER — CLONIDINE HYDROCHLORIDE 0.2 MG/1
0.2 TABLET ORAL 2 TIMES DAILY
Status: DISCONTINUED | OUTPATIENT
Start: 2021-09-01 | End: 2021-09-08 | Stop reason: HOSPADM

## 2021-09-01 RX ORDER — SENNOSIDES 8.6 MG
1 TABLET ORAL DAILY
Status: DISCONTINUED | OUTPATIENT
Start: 2021-09-01 | End: 2021-09-08 | Stop reason: HOSPADM

## 2021-09-01 RX ORDER — LAMOTRIGINE 100 MG/1
200 TABLET ORAL EVERY MORNING
Status: DISCONTINUED | OUTPATIENT
Start: 2021-09-02 | End: 2021-09-08 | Stop reason: HOSPADM

## 2021-09-01 RX ORDER — SUMATRIPTAN 6 MG/.5ML
6 INJECTION, SOLUTION SUBCUTANEOUS
Status: DISCONTINUED | OUTPATIENT
Start: 2021-09-01 | End: 2021-09-08 | Stop reason: HOSPADM

## 2021-09-01 RX ORDER — ONDANSETRON 2 MG/ML
4 INJECTION INTRAMUSCULAR; INTRAVENOUS EVERY 6 HOURS PRN
Status: DISCONTINUED | OUTPATIENT
Start: 2021-09-01 | End: 2021-09-08 | Stop reason: HOSPADM

## 2021-09-01 RX ORDER — HYDROMORPHONE HCL/PF 1 MG/ML
0.5 SYRINGE (ML) INJECTION EVERY 4 HOURS PRN
Status: DISCONTINUED | OUTPATIENT
Start: 2021-09-01 | End: 2021-09-02

## 2021-09-01 RX ORDER — HYDROXYZINE HYDROCHLORIDE 25 MG/1
50 TABLET, FILM COATED ORAL
Status: DISCONTINUED | OUTPATIENT
Start: 2021-09-01 | End: 2021-09-02

## 2021-09-01 RX ORDER — FENOFIBRATE 48 MG/1
48 TABLET, COATED ORAL DAILY
Status: DISCONTINUED | OUTPATIENT
Start: 2021-09-01 | End: 2021-09-08 | Stop reason: HOSPADM

## 2021-09-01 RX ORDER — CALCIUM CARBONATE 200(500)MG
1000 TABLET,CHEWABLE ORAL DAILY PRN
Status: DISCONTINUED | OUTPATIENT
Start: 2021-09-01 | End: 2021-09-08 | Stop reason: HOSPADM

## 2021-09-01 RX ORDER — OXYCODONE HYDROCHLORIDE 10 MG/1
10 TABLET ORAL EVERY 4 HOURS PRN
Status: DISCONTINUED | OUTPATIENT
Start: 2021-09-01 | End: 2021-09-08 | Stop reason: HOSPADM

## 2021-09-01 RX ORDER — HYDROXYZINE 50 MG/1
50 TABLET, FILM COATED ORAL EVERY MORNING
COMMUNITY

## 2021-09-01 RX ORDER — PANTOPRAZOLE SODIUM 40 MG/1
40 TABLET, DELAYED RELEASE ORAL
Status: DISCONTINUED | OUTPATIENT
Start: 2021-09-01 | End: 2021-09-08 | Stop reason: HOSPADM

## 2021-09-01 RX ORDER — LEVOTHYROXINE SODIUM 0.05 MG/1
50 TABLET ORAL DAILY
Status: DISCONTINUED | OUTPATIENT
Start: 2021-09-01 | End: 2021-09-08 | Stop reason: HOSPADM

## 2021-09-01 RX ORDER — FOLIC ACID 1 MG/1
1000 TABLET ORAL DAILY
Status: DISCONTINUED | OUTPATIENT
Start: 2021-09-01 | End: 2021-09-08 | Stop reason: HOSPADM

## 2021-09-01 RX ORDER — POLYETHYLENE GLYCOL 3350 17 G/17G
17 POWDER, FOR SOLUTION ORAL DAILY
Status: DISCONTINUED | OUTPATIENT
Start: 2021-09-01 | End: 2021-09-08 | Stop reason: HOSPADM

## 2021-09-01 RX ORDER — METHOCARBAMOL 750 MG/1
750 TABLET, FILM COATED ORAL 4 TIMES DAILY PRN
Status: DISCONTINUED | OUTPATIENT
Start: 2021-09-01 | End: 2021-09-08 | Stop reason: HOSPADM

## 2021-09-01 RX ORDER — DOCUSATE SODIUM 100 MG/1
100 CAPSULE, LIQUID FILLED ORAL 2 TIMES DAILY
Status: DISCONTINUED | OUTPATIENT
Start: 2021-09-01 | End: 2021-09-08 | Stop reason: HOSPADM

## 2021-09-01 RX ORDER — CEFAZOLIN SODIUM 2 G/50ML
2000 SOLUTION INTRAVENOUS ONCE
Status: COMPLETED | OUTPATIENT
Start: 2021-09-01 | End: 2021-09-01

## 2021-09-01 RX ORDER — ZIPRASIDONE HYDROCHLORIDE 40 MG/1
80 CAPSULE ORAL 2 TIMES DAILY WITH MEALS
Status: DISCONTINUED | OUTPATIENT
Start: 2021-09-01 | End: 2021-09-08 | Stop reason: HOSPADM

## 2021-09-01 RX ORDER — CLINDAMYCIN PHOSPHATE 900 MG/50ML
900 INJECTION INTRAVENOUS ONCE
Status: DISCONTINUED | OUTPATIENT
Start: 2021-09-01 | End: 2021-09-01

## 2021-09-01 RX ORDER — OXYCODONE HYDROCHLORIDE 5 MG/1
5 TABLET ORAL EVERY 4 HOURS PRN
Status: DISCONTINUED | OUTPATIENT
Start: 2021-09-01 | End: 2021-09-08 | Stop reason: HOSPADM

## 2021-09-01 RX ORDER — ALBUTEROL SULFATE 90 UG/1
2 AEROSOL, METERED RESPIRATORY (INHALATION) EVERY 6 HOURS PRN
Status: DISCONTINUED | OUTPATIENT
Start: 2021-09-01 | End: 2021-09-08 | Stop reason: HOSPADM

## 2021-09-01 RX ORDER — ACETAMINOPHEN 325 MG/1
650 TABLET ORAL EVERY 6 HOURS PRN
Status: DISCONTINUED | OUTPATIENT
Start: 2021-09-01 | End: 2021-09-08 | Stop reason: HOSPADM

## 2021-09-01 RX ORDER — HYDROXYZINE HYDROCHLORIDE 25 MG/1
50 TABLET, FILM COATED ORAL EVERY MORNING
Status: DISCONTINUED | OUTPATIENT
Start: 2021-09-02 | End: 2021-09-08 | Stop reason: HOSPADM

## 2021-09-01 RX ADMIN — METRONIDAZOLE 500 MG: 500 INJECTION, SOLUTION INTRAVENOUS at 18:35

## 2021-09-01 RX ADMIN — OXYCODONE HYDROCHLORIDE 10 MG: 10 TABLET ORAL at 21:40

## 2021-09-01 RX ADMIN — HYDROXYZINE HYDROCHLORIDE 50 MG: 25 TABLET ORAL at 21:36

## 2021-09-01 RX ADMIN — PANTOPRAZOLE SODIUM 40 MG: 40 TABLET, DELAYED RELEASE ORAL at 21:37

## 2021-09-01 RX ADMIN — HYDROMORPHONE HYDROCHLORIDE 0.5 MG: 1 INJECTION, SOLUTION INTRAMUSCULAR; INTRAVENOUS; SUBCUTANEOUS at 17:53

## 2021-09-01 RX ADMIN — CEFAZOLIN SODIUM 2000 MG: 2 SOLUTION INTRAVENOUS at 16:03

## 2021-09-01 RX ADMIN — CLONIDINE HYDROCHLORIDE 0.2 MG: 0.2 TABLET ORAL at 21:37

## 2021-09-01 RX ADMIN — HYDROMORPHONE HYDROCHLORIDE 0.5 MG: 1 INJECTION, SOLUTION INTRAMUSCULAR; INTRAVENOUS; SUBCUTANEOUS at 23:23

## 2021-09-01 RX ADMIN — ACETAMINOPHEN 650 MG: 325 TABLET, FILM COATED ORAL at 16:27

## 2021-09-01 NOTE — H&P
[address] [facility]  H&P- Robert Gould 1975, 40 y o  female MRN: [id number]  Unit/Bed#: ED 25 Encounter: [id number]  Primary Care Provider: Suzanna Bennett DO   Date and time admitted to hospital: 9/1/2021  1:35 PM    * 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, with bone involvement without evidence of necrosis [facility])  Assessment & Plan  Lab Results   Component Value Date    HGBA1C 6 6 (H) 02/25/2021     No results for input(s): POCGLU in the last 72 hours  Blood Sugar Average: Last 72 hrs:    · Patient arrived to ED yesterday 8/31 for evaluation of a diabetic foot ulcer  Patient left AMA yesterday, returned today for treatment  Patient reports that she noticed burning on the sole of her R foot around 1 5 weeks ago, which progressively got worse  Patient also notices some fluid draining a couple of days ago and a foul odor  She had attempted dressing and cleaning the wound, was supposed to go to outpatient wound care in [facility] but her ride never showed up, arrived to the ED on 8/31  · S/p left 3rd toe amputation, s/p right toe amputation 1/2021 from previous diabetic foot ulcers  · Was given Bactrim after leaving [place], received 1 dose last night  · Right foot Xray 8/31: no acute fracture  Suspicion of periosteal response, 3rd proximal phalangeal shaft, high suspicion for osteomyelitis  · Pending repeat Xray today  · Elevated CRP, ESR  · Blood cultures x2 pending  · Wound culture sensitivity pending  · WBCs elevated at 12 5 yesterday 8/31, pending CBC today  · Patient afebrile, vitals stable, does not currently meet sepsis criteria  · Podiatry following:  · IV Ancef and Flagyl  · MRI ordered  · Concern for abscess in R foot  · Consider consulting surgery, concern for osteomyelitis, pending MRI results  · PT/OT evaluation  · Pain regimen on board:  Tylenol, oxycodone, Dilaudid p r n      Migraine without status migrainosus, not intractable  Assessment & Plan  · Has been managed well with Imitrex injections p r n , continue    GERD (gastroesophageal reflux disease)  Assessment & Plan  · Continue Protonix    Chronic pain  Assessment & Plan  · History of chronic back pain  · Continue Robaxin    Essential hypertension  Assessment & Plan  · Stable, continue clonodine    Acquired absence of other left toe(s) ([facility] )  Assessment & Plan  · 2/2 diabetic foot ulcers  · See above    Type 2 diabetes mellitus with neurologic complication, without long-term current use of insulin ([facility])  Assessment & Plan  Lab Results   Component Value Date    HGBA1C 6 6 (H) 02/25/2021       No results for input(s): POCGLU in the last 72 hours  Blood Sugar Average: Last 72 hrs:  · Takes metformin at home, refuses insulin at home  · SSI and hypoglycemia protocol while inpatient    Manic bipolar I disorder ([place] [address] )  Assessment & Plan  · Managed by psychiatry outpatient, has been managed well recently  · Continue lamictal and Geodon    Hypothyroidism  Assessment & Plan  · Takes levothyroxine at home, last TSH wnl  · Continue levothyroxine    Morbid obesity ([place] [address] )  Assessment & Plan  · S/p sleeve gastrectomy  · Encourage dietary/lifestyle modifications    Anxiety  Assessment & Plan  · Takes Atarax at home, continue    H/O bariatric surgery  Assessment & Plan  · S/p sleeve gastrectomy 2577, no complications  · Continue folic acid    VTE Pharmacologic Prophylaxis: VTE Score: 3 Moderate Risk (Score 3-4) - Pharmacological DVT Prophylaxis Ordered: enoxaparin (Lovenox)  Code Status: Level 1 - Full Code   Discussion with family: Updated  (son) via phone  Anticipated Length of Stay: Patient will be admitted on an inpatient basis with an anticipated length of stay of greater than 2 midnights secondary to IV antibiotic treatment      Total Time for Visit, including Counseling / Coordination of Care: 45 minutes Greater than 50% of this total time spent on direct patient counseling and coordination of care  Chief Complaint: R foot ulcer    History of Present Illness:  Akhil Person is a 40 y o  female with a PMH of type 2 DM with neuropathy and foot ulcers, HTN, bipolar disorder, anxiety, s/p sleeve gastrectomy, hypothyroidism, who presents with a right foot diabetic ulcer  Around 1 5 weeks ago, patient started noticing burning on the sole of her right foot, pain progressively got worse, started noticing fluid draining a couple days ago and a foul odor  Patient denies chills, sweats, fever, nausea/vomiting  Patient attempted to clean the wound with Betadine, dressed with gauze, but ran out of materials at home, was supposed to have outpatient wound care appointment on 08/31, however her ride did not show up, which led to patient coming to ED  Initial evaluation included X-ray on right foot, which showed suspicion for osteomyelitis, labs showed elevated CRP and ESR, leukocytosis at 12 5, wound cultures and blood cultures pending  Patient was followed by Podiatry, was planning on starting on IV antibiotics, however patient left AMA on 8/31, as she had to go home and get care set up for her dogs, returned today for treatment  Patient was given Bactrim after leaving AMA, does admit to having 1 dose of Bactrim last night  Patient follows with Dr Violetta Cuenca for wound care, last seen 07/09/2021  Patient has a history of diabetic foot ulcers, complicated by neuropathy, has toe amputations on both left and right foot from previous diabetic ulcers  Patient reports compliance with oral diabetic medications, however adamantly refuses to take insulin at home  Patient denies any recent antibiotic use, denies recent surgeries  Review of Systems:  Review of Systems   Constitutional: Negative for activity change, appetite change, chills, diaphoresis, fatigue, fever and unexpected weight change  HENT: Negative for trouble swallowing      Respiratory: Negative for cough, chest tightness, shortness of breath and wheezing  Cardiovascular: Positive for leg swelling  Negative for chest pain and palpitations  Gastrointestinal: Negative for abdominal pain, constipation, diarrhea, nausea and vomiting  Genitourinary: Negative for difficulty urinating, dysuria and urgency  Musculoskeletal: Positive for back pain and joint swelling  Negative for gait problem  Skin: Positive for color change and wound  Negative for pallor and rash  Neurological: Positive for numbness  Negative for dizziness, tremors, syncope, weakness, light-headedness and headaches  Hematological: Does not bruise/bleed easily  Psychiatric/Behavioral: Negative for confusion  Past Medical and Surgical History:   Past Medical History:   Diagnosis Date    Anxiety     Arthritis     Asthma     Chronic pain     Diabetes mellitus ([facility] )     Disease of thyroid gland     Lymphedema     left leg    Manic bipolar I disorder ([facility] )     Morbid obesity (HCC)     OCD (obsessive compulsive disorder)     PTSD (post-traumatic stress disorder)        Past Surgical History:   Procedure Laterality Date    CHOLECYSTECTOMY      CHOLECYSTECTOMY      UT AMPUTATION METATARSAL+TOE,SINGLE Left 9/8/2019    Procedure: PARTIAL 3RD RAY RESECTION, PSB TOE FILLET FLAP;  Surgeon: Dora Hauser DPM;  [facility];  Service: Podiatry    TOE AMPUTATION Right 1/13/2021    Procedure: AMPUTATION 2ND TOE;  Surgeon: Kasey Perez DPM;  [facility];  Service: Podiatry       Meds/Allergies:  Prior to Admission medications    Medication Sig Start Date End Date Taking?  Authorizing Provider   hydrOXYzine HCL (ATARAX) 50 mg tablet Take 50 mg by mouth every morning   Yes Historical Provider, MD   acetaminophen (TYLENOL) 650 mg CR tablet Take 1 tablet (650 mg total) by mouth every 8 (eight) hours as needed for mild pain  Patient not taking: Reported on 9/1/2021 6/25/21   Dora Hauser DPM   ADMELOG SOLOSTAR 100 units/mL injection pen  3/20/19   Historical Provider, MD   albuterol (PROVENTIL HFA,VENTOLIN HFA) 90 mcg/act inhaler Inhale 2 puffs every 6 (six) hours as needed for wheezing    Historical Provider, MD Perla Beltran 100 units/mL injection pen  3/20/19   Historical Provider, MD   buPROPion (WELLBUTRIN XL) 300 mg 24 hr tablet Take 300 mg by mouth  Patient not taking: Reported on 9/1/2021    Historical Provider, MD   cholecalciferol (VITAMIN D3) 1,000 units tablet Take by mouth once a week     Historical Provider, MD   cloNIDine (CATAPRES) 0 1 mg tablet 0 2 mg every 12 (twelve) hours   Patient not taking: Reported on 9/1/2021 4/25/16   Historical Provider, MD   cloNIDine (CATAPRES) 0 2 mg tablet Take 0 2 mg by mouth 2 (two) times a day 2/15/21   Historical Provider, MD   doxycycline hyclate (VIBRA-TABS) 100 mg tablet TAKE 1 TABLET (100 MG TOTAL) BY MOUTH 2 (TWO) TIMES A DAY FOR 7 DAYS  Patient not taking: Reported on 9/1/2021 1/5/21   Historical Provider, MD   EASY COMFORT PEN NEEDLES 32G X 4 MM [address]  3/20/19   Historical Provider, MD   [address] [facility] X 5/16" 0 3 ML MISC  2/2/19   Historical Provider, MD   ergocalciferol (ERGOCALCIFEROL) 1 25 MG (13259 UT) capsule Take 50,000 Units by mouth 3/5/21 5/28/21  Historical Provider, MD   escitalopram (LEXAPRO) 10 mg tablet 175 mg   Patient not taking: Reported on 9/1/2021 5/27/17   Historical Provider, MD   fenofibrate (TRICOR) 54 MG tablet TAKE 1 TABLET (54 MG TOTAL) BY MOUTH DAILY  2/27/21   Historical Provider, MD   folic acid (FOLVITE) 1 mg tablet Take 1,000 mcg by mouth daily 2/22/21   Historical Provider, MD   glucose blood test strip USE TO MONITOR BG 3 TIMES A DAY   E11 9  Patient not taking: Reported on 9/1/2021 11/3/20   Historical Provider, MD   HYDROcodone-acetaminophen [facility]) 5-325 mg per tablet  4/13/19   Historical Provider, MD   HYDROcodone-acetaminophen (NORCO) 5-325 mg per tablet Take 1 tablet by mouth every 6 (six) hours as needed for painMax Daily Amount: 4 tablets  Patient not taking: Reported on 3/22/2021 12/2/20   Ulises Longoria MD   hydrOXYzine HCL (ATARAX) 50 mg tablet Take 50 mg by mouth daily at bedtime 9/3/19   Historical Provider, MD   hydrOXYzine pamoate (VISTARIL) 50 mg capsule Take 50 mg by mouth  Patient not taking: Reported on 9/1/2021 8/7/18   Historical Provider, MD   insulin glargine (LANTUS SOLOSTAR) 100 units/mL injection pen Inject 55 Units under the skin 2 (two) times a day  Patient not taking: Reported on 9/1/2021 3/20/19   Historical Provider, MD   insulin lispro (HUMALOG KWIKPEN) 100 units/mL injection pen Inject 1-15 Units under the skin  Patient not taking: Reported on 9/1/2021 3/20/19   Historical Provider, MD   lamoTRIgine (LaMICtal) 150 MG tablet Take 150 mg by mouth daily  Patient not taking: Reported on 6/25/2021 2/21/21   Historical Provider, MD   lamoTRIgine (LaMICtal) 200 MG tablet Take 200 mg by mouth every morning 6/16/21   Historical Provider, MD   lamoTRIgine (LaMICtal) 25 mg tablet Take 50 mg by mouth daily at bedtime   Patient not taking: Reported on 6/25/2021 7/8/20 7/8/21  Historical Provider, MD   levothyroxine 50 mcg tablet Take 50 mcg by mouth daily 3/8/19   Historical Provider, MD   metFORMIN (GLUCOPHAGE) 500 mg tablet Take 1 tablet (500 mg total) by mouth 2 (two) times a day with meals 9/10/19   Himanshu Leroy DO   methocarbamol (ROBAXIN) 750 mg tablet TAKE 1 TABLET (750 MG TOTAL) BY MOUTH 4 (FOUR) TIMES A DAY AS NEEDED FOR MUSCLE SPASMS  3/2/21   Historical Provider, MD   multivitamin (THERAGRAN) TABS Take 1 tablet by mouth daily  Patient not taking: Reported on 9/1/2021    Historical Provider, MD   mupirocin (BACTROBAN) 2 % ointment Apply topically 3 (three) times a day  Patient not taking: Reported on 9/1/2021 3/22/21   Laureen Grant DPM   ondansetron (ZOFRAN-ODT) 4 mg disintegrating tablet  3/19/19   Historical Provider, MD   oxyCODONE (ROXICODONE) 5 mg/5 mL solution  3/19/19   Historical Provider, MD   pantoprazole (PROTONIX) 40 mg tablet Take 40 mg by mouth daily at bedtime  12/7/20 12/7/21  Historical Provider, MD   sulfamethoxazole-trimethoprim (BACTRIM DS) 800-160 mg per tablet Take 1 tablet by mouth 2 (two) times a day for 7 days smx-tmp DS (BACTRIM) 800-160 mg tabs (1tab q12 D10) 8/31/21 9/7/21  Jyoti Johnson MD   SUMAtriptan Succinate (IMITREX STATDOSE REFILL) 6 MG/0 5ML SOCT Inject 6 mg under the skin every 2 (two) hours as needed 11/29/16   Historical Provider, MD   Ultra Thin Lancets 31G MISC USE TO MONITOR BLOOD GLUCOSE 3 TIMES A DAY AS DIRECTED  Patient not taking: Reported on 9/1/2021 2/25/21   Historical Provider, MD   ziprasidone (GEODON) 80 mg capsule Take 80 mg by mouth 2 (two) times a day with meals  5/15/18   Historical Provider, MD     I have reviewed home medications with patient personally  Allergies:    Allergies   Allergen Reactions    Aspirin Vomiting    Barium Iodide Itching     Face became very red and itchy     Codeine Vomiting    Latex Hives    Penicillins Rash and Other (See Comments)     Patient has tolerated cefepime without difficulty    Vancomycin Rash       Social History:  Marital Status: Single   Occupation: NA  Patient Pre-hospital Living Situation: Home  Patient Pre-hospital Level of Mobility: walks  Patient Pre-hospital Diet Restrictions: none  Substance Use History:   Social History     Substance and Sexual Activity   Alcohol Use Never     Social History     Tobacco Use   Smoking Status Former Smoker    Packs/day: 0 50    Years: 15 00    Pack years: 7 50   Smokeless Tobacco Never Used     Social History     Substance and Sexual Activity   Drug Use Never       Family History:  Family History   Problem Relation Age of Onset    Hyperlipidemia Mother     Diabetes Mother     Hypertension Mother     Heart attack Father        Physical Exam:     Vitals:   Blood Pressure: 129/57 (09/01/21 1615)  Pulse: 71 (09/01/21 1603)  Temperature: 97 7 °F (36 5 °C) (09/01/21 1344)  Temp Source: Tympanic (09/01/21 1344)  Respirations: 16 (09/01/21 1603)  SpO2: 100 % (09/01/21 1603)    Physical Exam  Constitutional:       General: She is not in acute distress  Appearance: She is obese  She is not ill-appearing, toxic-appearing or diaphoretic  HENT:      Mouth/Throat:      Mouth: Mucous membranes are moist    Cardiovascular:      Rate and Rhythm: Normal rate and regular rhythm  Heart sounds: Normal heart sounds  Comments: Diminished distal pulses in RLE 2/2 swelling, otherwise intact  Pulmonary:      Effort: Pulmonary effort is normal       Breath sounds: Normal breath sounds  Abdominal:      General: Bowel sounds are normal       Palpations: Abdomen is soft  Tenderness: There is no abdominal tenderness  Musculoskeletal:         General: Swelling and tenderness present  No signs of injury  Right lower leg: Edema present  Skin:     General: Skin is warm  Findings: Erythema and lesion present  No bruising  Comments: Skin on R forefoot is fluctuant  See pictures below   Neurological:      General: No focal deficit present  Mental Status: She is alert and oriented to person, place, and time  Sensory: Sensory deficit present  Motor: No weakness        Comments: Chronic decreased sensation B/L LEs   Psychiatric:         Mood and Affect: Mood normal                   Additional Data:     Lab Results:  Results from last 7 days   Lab Units 08/31/21  1553   WBC Thousand/uL 12 50*   HEMOGLOBIN g/dL 14 0   HEMATOCRIT % 44 3   PLATELETS Thousands/uL 444*   NEUTROS PCT % 56   LYMPHS PCT % 33   MONOS PCT % 8   EOS PCT % 4     Results from last 7 days   Lab Units 08/31/21  1553   SODIUM mmol/L 135*   POTASSIUM mmol/L 5 3   CHLORIDE mmol/L 105   CO2 mmol/L 26   BUN mg/dL 9   CREATININE mg/dL 0 72   ANION GAP mmol/L 4   CALCIUM mg/dL 9 2   GLUCOSE RANDOM mg/dL 97                       Imaging: Reviewed radiology reports from this admission including: xray(s)  MRI inpatient order    (Results Pending)   XR foot 3+ vw right    (Results Pending)       EKG and Other Studies Reviewed on Admission:   · EKG: No EKG obtained  ** Please Note: This note has been constructed using a voice recognition system   **

## 2021-09-01 NOTE — CONSULTS
[address] Podiatry - Consultation    Patient Information:   Roberto Carlos Danielson 40 y o  female MRN: [id number]  Unit/Bed#: ED 25 Encounter: [id number]  PCP: Helena Grissom DO  Date of Admission:  9/1/2021  Date of Consultation: 09/01/21  Requesting Physician: Cynthia Isaac MD      ASSESSMENT:    Roberto Carlos Danielson is a 40 y o  female with:    1  Right foot diabetic ulcer minor - 3 POA  2  Clinical osteomyelitis  3  T2DM  4  Tobacco abuse    PLAN:    · Patient being admitted for MRI, Osteomyelitis work up for surgical intervention  Elevated WBC, CRP, and ESR  · Bedside drainage performed today in ED  3-5cc of serosanguinous fluid expressed  · Antibiotics: Ancef and Flagyl per primary team  One dose of Bactrim yesterday  · MRI ordered  · Repeat xrays ordered: for progressive physical presentation since yesterday  · Toe offloading shoe ordered  · WB as for transfers and activities of daily living to the heel only  · Rest of care per primary team   · Will discuss this plan with my attending and update as needed  SUBJECTIVE    History of Present Illness:    Roberto Carlos Danielson is a 40 y o  female with past medical history of including type 2 diabetes, anxiety, bipolar disorder, obesity, hypothyroidism, and smoking  Who presents with right foot diabetic ulceration sub 3rd metatarsal head  Amputation of the 3rd digit on the left foot by Dr Jack Cedillo, and amputation of the 2nd digit on the right foot by Dr Thomas Carrion earlier this year was reported  She has been following with Dr Jack Cedillo wound care and was last seen on 07/09/2021 at which time total contact casting was discontinued she transitioned into diabetic shoes  She states that she was cured and discharge from wound care  She now presents with an ulcer in right forefoot which she states has been there for 2 weeks  In the last week and a half there was increased pain until yesterday when she came into the ED    She could not be admitted yesterday because her dog would be alone and she was worried about him  So she came back today to be admitted for workup of osteomyelitis and possible surgical intervention of the foot  Patient states there has been increase in pain  And she is not been off of the foot but has been walking on it which is evident by the dressings  She states that has increased in drainage since yesterday and swelling  Patient states that she did not receive her antibiotics yet but did have 1 dose of Bactrim in the ED last night  Other than that she has not touched the dressings that were placed on her foot yesterday  Review of Systems:    Constitutional: Negative  HENT: Negative  Eyes: Negative  Respiratory: Negative  Cardiovascular: Negative  Gastrointestinal: Negative  Musculoskeletal:  Pain right foot   Skin:  Ulcer sub 3rd metatarsal right   Neurological:  Neuropathy   Psych: Negative       Past Medical and Surgical History:     Past Medical History:   Diagnosis Date    Anxiety     Arthritis     Asthma     Chronic pain     [facility]     Disease of thyroid gland     Lymphedema     left leg    Manic bipolar I disorder ([facility]     Morbid obesity (HCC)     OCD (obsessive compulsive disorder)     PTSD (post-traumatic stress disorder)        Past Surgical History:   Procedure Laterality Date    CHOLECYSTECTOMY      CHOLECYSTECTOMY      ME AMPUTATION METATARSAL+TOE,SINGLE Left 9/8/2019    Procedure: PARTIAL 3RD RAY RESECTION, PSB TOE FILLET FLAP;  Surgeon: Kwasi Amaya DPM;  [facility];  Service: Podiatry    TOE AMPUTATION Right 1/13/2021    Procedure: AMPUTATION 2ND TOE;  Surgeon: Chantel Holland DPM;  [facility];  Service: Podiatry       Meds/Allergies:    (Not in a hospital admission)      Allergies   Allergen Reactions    Aspirin Vomiting    Barium Iodide Itching     Face became very red and itchy     Codeine Vomiting    Latex Hives    Penicillins Rash and Other (See Comments)     Patient has tolerated cefepime without difficulty    Vancomycin Rash       Social History:     Marital Status: Single    Substance Use History:   Social History     Substance and Sexual Activity   Alcohol Use Never     Social History     Tobacco Use   Smoking Status Former Smoker    Packs/day: 0 50    Years: 15 00    Pack years: 7 50   Smokeless Tobacco Never Used     Social History     Substance and Sexual Activity   Drug Use Never       Family History:    Family History   Problem Relation Age of Onset    Hyperlipidemia Mother     Diabetes Mother     Hypertension Mother     Heart attack Father          OBJECTIVE:    Vitals:   Blood Pressure: 135/71 (09/01/21 1344)  Pulse: 86 (09/01/21 1344)  Temperature: 97 7 °F (36 5 °C) (09/01/21 1344)  Temp Source: Tympanic (09/01/21 1344)  Respirations: 18 (09/01/21 1344)  SpO2: 97 % (09/01/21 1344)    Physical Exam:     General Appearance: Alert, cooperative, no distress  HEENT: Head normocephalic, atraumatic, without obvious abnormality  Heart: Normal rate and rhythm  Lungs: Non-labored breathing  No respiratory distress  Abdomen: Without distension  Psychiatric: AAOx3  Lower Extremity:  Vascular:   DP/PT pedal pulses on the left are weak  DP/PT pedal pulses on the right are weak  CRT brisk at the digits  Pedal hair is absent  1+ edema noted at bilateral lower extremities  Skin temperature is abnormal increased compared to the left      Musculoskeletal:  MMT is 5/5 in all muscle compartments bilaterally  Passive ROM at the 1st MPJ and ankle joint are Normal bilaterally with the leg extended  Active ROM at the lesser digits is intact  Pain on palpation of distal Right foot  Deformities noted:  Left 3rd digit amputation  Right 2nd digit amputation  Dermatological:  Swollen right forefoot  With erythema medially around the ulcer and extending up to the dorsum of the foot    New since yesterday is an area of fluctuance dorsally approximately 6 cm x 2 cm in size  Wound #: 1  Location: Right plantar forefoot and sub 3rd metatarsal head  Length 1 5cm: Width 2 0cm: Depth 4 0cm:   Deepest Tissue Noted in Base: bone  Probe to Bone: Yes  Peripheral Skin Description: Attached and hyperkaratosis  Granulation: 0% Fibrotic Tissue: 50% Necrotic Tissue: 50%   Drainage Amount: moderate 3-5 cc of serosanguinous fluid expressed  Signs of Infection: Yes malodor, erythema, swelling, increase in temperature, probe to bone  Total debrided 0 square centimeters      Neurological:  Gross sensation is diminished  Protective sensation is diminished  Patient Reports numbness and/or paresthesias  Clinical Images 09/01/21: Additional Data:     Lab Results: I have personally reviewed pertinent labs including:    Results from last 7 days   Lab Units 08/31/21  1553   WBC Thousand/uL 12 50*   HEMOGLOBIN g/dL 14 0   HEMATOCRIT % 44 3   PLATELETS Thousands/uL 444*   NEUTROS PCT % 56   LYMPHS PCT % 33   MONOS PCT % 8   EOS PCT % 4     Results from last 7 days   Lab Units 08/31/21  1553   POTASSIUM mmol/L 5 3   CHLORIDE mmol/L 105   CO2 mmol/L 26   BUN mg/dL 9   CREATININE mg/dL 0 72   CALCIUM mg/dL 9 2           Cultures: I have personally reviewed pertinent cultures including:    Results from last 7 days   Lab Units 08/31/21  1930 08/31/21  1553   BLOOD CULTURE   --  Received in Microbiology Lab  Culture in Progress  Received in Microbiology Lab  Culture in Progress  GRAM STAIN RESULT  4+ Gram positive cocci in pairs, chains and clusters*  3+ Gram negative rods*  2+ Polys*  --      Results from last 7 days   Lab Units 08/31/21  1930   ANAEROBIC CULTURE  Culture results to follow  Imaging: I have personally reviewed pertinent films in PACS  EKG, Pathology, and Other Studies: I have personally reviewed pertinent reports  ** Please Note: Portions of the record may have been created with voice recognition software   Occasional wrong word or "sound a like" substitutions may have occurred due to the inherent limitations of voice recognition software  Read the chart carefully and recognize, using context, where substitutions have occurred   **

## 2021-09-01 NOTE — ED PROVIDER NOTES
History  Chief Complaint   Patient presents with    Wound Infection     reports having a diabetic foot ulcer that re opened 1 5 weeks ago, was supposed to see wound center today but unable to get transport there  Patient is a 39 YO F, PMHx of DM on merformin, who presents to the ED for a wound to the bottom of her right foot  Patient states she first noticed the wound about 1 5 weeks ago  She states since onset, the wound has increased in size, has become more painful, and has begun to smell  Pain is worse with walking  There are no relieving factors  Associated symptoms include chills  She denies any recent trauma to her foot  She denies any chest pain, shortness of breath, fevers, abdominal pain, n/v/d, or any other new or concerning symptoms  Patient states that she takes her metformin like she is supposed to, and her most recent A1c was around 6 5    Of note, she states that she was supposed to go to the wound care doctor today for her wound, but could not get a ride  Prior to Admission Medications   Prescriptions Last Dose Informant Patient Reported? Taking?    ADMELOG SOLOSTAR 100 units/mL injection pen   Yes No   BASAGLAR KWIKPEN 100 units/mL injection pen   Yes No   EASY COMFORT PEN NEEDLES 32G X 4 MM MISC   Yes No   EASY TOUCH INSULIN SYRINGE 31G X 5/16" 0 3 ML MISC   Yes No   HYDROcodone-acetaminophen (NORCO) 5-325 mg per tablet   Yes No   HYDROcodone-acetaminophen (NORCO) 5-325 mg per tablet   No No   Sig: Take 1 tablet by mouth every 6 (six) hours as needed for painMax Daily Amount: 4 tablets   Patient not taking: Reported on 3/22/2021   SUMAtriptan Succinate (IMITREX STATDOSE REFILL) 6 MG/0 5ML SOCT   Yes No   Sig: Inject 6 mg under the skin every 2 (two) hours as needed   Ultra Thin Lancets 31G MISC   Yes No   Sig: USE TO MONITOR BLOOD GLUCOSE 3 TIMES A DAY AS DIRECTED    acetaminophen (TYLENOL) 650 mg CR tablet   No No   Sig: Take 1 tablet (650 mg total) by mouth every 8 (eight) hours as needed for mild pain   albuterol (PROVENTIL HFA,VENTOLIN HFA) 90 mcg/act inhaler   Yes No   Sig: Inhale 2 puffs every 6 (six) hours as needed for wheezing   buPROPion (WELLBUTRIN XL) 300 mg 24 hr tablet   Yes No   Sig: Take 300 mg by mouth   cholecalciferol (VITAMIN D3) 1,000 units tablet   Yes No   Sig: Take by mouth Three times a day   cloNIDine (CATAPRES) 0 1 mg tablet   Yes No   Si 2 mg every 12 (twelve) hours    cloNIDine (CATAPRES) 0 2 mg tablet   Yes No   Sig: Take 0 2 mg by mouth 2 (two) times a day   doxycycline hyclate (VIBRA-TABS) 100 mg tablet   Yes No   Sig: TAKE 1 TABLET (100 MG TOTAL) BY MOUTH 2 (TWO) TIMES A DAY FOR 7 DAYS   ergocalciferol (ERGOCALCIFEROL) 1 25 MG (51480 UT) capsule   Yes No   Sig: Take 50,000 Units by mouth   escitalopram (LEXAPRO) 10 mg tablet  Self Yes No   Si mg    fenofibrate (TRICOR) 54 MG tablet   Yes No   Sig: TAKE 1 TABLET (54 MG TOTAL) BY MOUTH DAILY  folic acid (FOLVITE) 1 mg tablet   Yes No   Sig: Take 1,000 mcg by mouth daily   glucose blood test strip   Yes No   Sig: USE TO MONITOR BG 3 TIMES A DAY   E11 9   hydrOXYzine HCL (ATARAX) 50 mg tablet   Yes No   Sig: Take 50 mg by mouth daily at bedtime   hydrOXYzine pamoate (VISTARIL) 50 mg capsule   Yes No   Sig: Take 50 mg by mouth   insulin glargine (LANTUS SOLOSTAR) 100 units/mL injection pen   Yes No   Sig: Inject 55 Units under the skin 2 (two) times a day   insulin lispro (HUMALOG KWIKPEN) 100 units/mL injection pen   Yes No   Sig: Inject 1-15 Units under the skin   lamoTRIgine (LaMICtal) 150 MG tablet   Yes No   Sig: Take 150 mg by mouth daily   Patient not taking: Reported on 2021   lamoTRIgine (LaMICtal) 200 MG tablet   Yes No   Sig: Take 200 mg by mouth every morning   lamoTRIgine (LaMICtal) 25 mg tablet   Yes No   Sig: Take 50 mg by mouth daily at bedtime    Patient not taking: Reported on 2021   levothyroxine 50 mcg tablet   Yes No   Sig: Take 50 mcg by mouth daily   metFORMIN (GLUCOPHAGE) 500 mg tablet   No No   Sig: Take 1 tablet (500 mg total) by mouth 2 (two) times a day with meals   methocarbamol (ROBAXIN) 750 mg tablet   Yes No   Sig: TAKE 1 TABLET (750 MG TOTAL) BY MOUTH 4 (FOUR) TIMES A DAY AS NEEDED FOR MUSCLE SPASMS    multivitamin (THERAGRAN) TABS   Yes No   Sig: Take 1 tablet by mouth daily   mupirocin (BACTROBAN) 2 % ointment   No No   Sig: Apply topically 3 (three) times a day   ondansetron (ZOFRAN-ODT) 4 mg disintegrating tablet   Yes No   oxyCODONE (ROXICODONE) 5 mg/5 mL solution   Yes No   pantoprazole (PROTONIX) 40 mg tablet   Yes No   Sig: Take 40 mg by mouth daily   ziprasidone (GEODON) 80 mg capsule   Yes No   Sig: Take 80 mg by mouth 2 (two) times a day with meals       Facility-Administered Medications: None       Past Medical History:   Diagnosis Date    Anxiety     Arthritis     Asthma     Chronic pain     Diabetes mellitus (HCC)     Disease of thyroid gland     Lymphedema     left leg    Manic bipolar I disorder (HCC)     Morbid obesity (HCC)     OCD (obsessive compulsive disorder)     PTSD (post-traumatic stress disorder)        Past Surgical History:   Procedure Laterality Date    CHOLECYSTECTOMY      CHOLECYSTECTOMY      NC AMPUTATION METATARSAL+TOE,SINGLE Left 9/8/2019    Procedure: PARTIAL 3RD RAY RESECTION, PSB TOE FILLET FLAP;  Surgeon: Esteban Aguilar DPM;  Location: [facility];  Service: Podiatry    TOE AMPUTATION Right 1/13/2021    Procedure: AMPUTATION 2ND TOE;  Surgeon: Ashley Ortiz DPM;  Location: [facility];  Service: Podiatry       Family History   Problem Relation Age of Onset    Hyperlipidemia Mother     Diabetes Mother     Hypertension Mother     Heart attack Father      I have reviewed and agree with the history as documented      E-Cigarette/Vaping    E-Cigarette Use Current Some Day User      E-Cigarette/Vaping Substances    Nicotine No     THC No     CBD No     Flavoring No     Other No     Unknown No Social History     Tobacco Use    Smoking status: Former Smoker     Packs/day: 0 50     Years: 15 00     Pack years: 7 50    Smokeless tobacco: Never Used   Vaping Use    Vaping Use: Some days   Substance Use Topics    Alcohol use: Never    Drug use: Never        Review of Systems   Constitutional: Positive for chills  Negative for fever  Respiratory: Negative for shortness of breath  Cardiovascular: Negative for chest pain  Gastrointestinal: Negative for abdominal pain, diarrhea, nausea and vomiting  Musculoskeletal: Positive for gait problem  Skin: Positive for wound  Negative for rash  All other systems reviewed and are negative  Physical Exam  ED Triage Vitals   Temperature Pulse Respirations Blood Pressure SpO2   08/31/21 1358 08/31/21 1358 08/31/21 1358 08/31/21 1358 08/31/21 1358   98 4 °F (36 9 °C) 96 18 120/81 96 %      Temp Source Heart Rate Source Patient Position - Orthostatic VS BP Location FiO2 (%)   08/31/21 1833 08/31/21 1833 08/31/21 1833 08/31/21 1833 --   Oral Monitor Sitting Left arm       Pain Score       08/31/21 1648       9             Orthostatic Vital Signs  Vitals:    08/31/21 1358 08/31/21 1833   BP: 120/81 109/63   Pulse: 96 82   Patient Position - Orthostatic VS:  Sitting       Physical Exam  Vitals and nursing note reviewed  Constitutional:       General: She is not in acute distress  Appearance: She is well-developed  She is not diaphoretic  HENT:      Head: Normocephalic and atraumatic  Right Ear: External ear normal       Left Ear: External ear normal       Nose: Nose normal    Eyes:      General: Lids are normal  No scleral icterus  Cardiovascular:      Rate and Rhythm: Normal rate and regular rhythm  Heart sounds: Normal heart sounds  No murmur heard  No friction rub  No gallop  Pulmonary:      Effort: Pulmonary effort is normal  No respiratory distress  Breath sounds: Normal breath sounds  No wheezing or rales     Abdominal: Palpations: Abdomen is soft  Tenderness: There is no abdominal tenderness  There is no guarding or rebound  Musculoskeletal:         General: No deformity  Skin:     General: Skin is warm and dry  Findings: Wound present  Comments: Pt's R foot is s/p 2nd toe removal  There is about a  75 cm wound to the bottom of the right foot, approximately overlying the 3rd metatarsal head  Wound appears deep with necrotic tissue present  Foul smelling  No obvious discharge  There is a mild amount of surrounding erythema  The area is tender to palpation  Decreased sensation of the bilateral feet, but patient states this is chronic and 2/2 to her diabetes  Peripheral pulses intact  No other wounds visualized  See attached pictures  Neurological:      General: No focal deficit present  Mental Status: She is alert  Psychiatric:         Mood and Affect: Mood normal          Behavior: Behavior normal                      ED Medications  Medications   acetaminophen (TYLENOL) tablet 975 mg (975 mg Oral Given 8/31/21 1648)   ketorolac (TORADOL) injection 15 mg (15 mg Intravenous Given 8/31/21 1648)   HYDROmorphone (DILAUDID) injection 0 5 mg (0 5 mg Intravenous Given 8/31/21 1836)   sulfamethoxazole-trimethoprim (BACTRIM DS) 800-160 mg per tablet 1 tablet (1 tablet Oral Given 8/31/21 1946)       Diagnostic Studies  Results Reviewed     Procedure Component Value Units Date/Time    Anaerobic culture and Gram stain [[id number]] Collected: 08/31/21 1930    Lab Status: Preliminary result Specimen: Wound Updated: 09/01/21 0950     Anaerobic Culture Culture results to follow      Wound culture and Gram stain [[id number]]  (Abnormal) Collected: 08/31/21 1930    Lab Status: Preliminary result Specimen: Wound from Foot, Right Updated: 09/01/21 9930     Gram Stain Result 4+ Gram positive cocci in pairs, chains and clusters      3+ Gram negative rods      2+ Polys    Blood culture #1 [[id number]] Collected: 08/31/21 1553    Lab Status: Preliminary result Specimen: Blood from Arm, Left Updated: 08/31/21 2001     Blood Culture Received in Microbiology Lab  Culture in Progress  Blood culture #2 [[id number]] Collected: 08/31/21 1553    Lab Status: Preliminary result Specimen: Blood from Arm, Left Updated: 08/31/21 2001     Blood Culture Received in Microbiology Lab  Culture in Progress      CBC and differential [[id number]]  (Abnormal) Collected: 08/31/21 1553    Lab Status: Final result Specimen: Blood from Arm, Left Updated: 08/31/21 1708     WBC 12 50 Thousand/uL      RBC 5 40 Million/uL      Hemoglobin 14 0 g/dL      Hematocrit 44 3 %      MCV 82 fL      MCH 25 9 pg      MCHC 31 6 g/dL      RDW 14 4 %      MPV 9 4 fL      Platelets 107 Thousands/uL      Neutrophils Relative 56 %      Immat GRANS % 0 %      Lymphocytes Relative 33 %      Monocytes Relative 8 %      Eosinophils Relative 4 %      Basophils Relative 0 %      Neutrophils Absolute 6 92 Thousands/µL      Immature Grans Absolute 0 03 Thousand/uL      Lymphocytes Absolute 4 08 Thousands/µL      Monocytes Absolute 0 94 Thousand/µL      Eosinophils Absolute 0 49 Thousand/µL      Basophils Absolute 0 04 Thousands/µL     Basic metabolic panel [[id number]]  (Abnormal) Collected: 08/31/21 1553    Lab Status: Final result Specimen: Blood from Arm, Left Updated: 08/31/21 1623     Sodium 135 mmol/L      Potassium 5 3 mmol/L      Chloride 105 mmol/L      CO2 26 mmol/L      ANION GAP 4 mmol/L      BUN 9 mg/dL      Creatinine 0 72 mg/dL      Glucose 97 mg/dL      Calcium 9 2 mg/dL      eGFR 101 ml/min/1 73sq m     Narrative:      [place] guidelines for Chronic Kidney Disease (CKD):     Stage 1 with normal or high GFR (GFR > 90 mL/min/1 73 square meters)    Stage 2 Mild CKD (GFR = 60-89 mL/min/1 73 square meters)    Stage 3A Moderate CKD (GFR = 45-59 mL/min/1 73 square meters)    Stage 3B Moderate CKD (GFR = 30-44 mL/min/1 73 square meters)    Stage 4 Severe CKD (GFR = 15-29 mL/min/1 73 square meters)    Stage 5 End Stage CKD (GFR <15 mL/min/1 73 square meters)  Note: GFR calculation is accurate only with a steady state creatinine    C-reactive protein [[id number]]  (Abnormal) Collected: 08/31/21 1553    Lab Status: Final result Specimen: Blood from Arm, Left Updated: 08/31/21 1623     CRP 29 3 mg/L     Sedimentation rate, automated [[id number]]  (Abnormal) Collected: 08/31/21 1553    Lab Status: Final result Specimen: Blood from Arm, Left Updated: 08/31/21 1612     Sed Rate 46 mm/hour                  XR foot 3+ views RIGHT   Final Result by Cristhian Marrero MD (09/01 1100)      No acute fracture  Although no bone destruction is seen, there is suspicion of periosteal response, 3rd proximal phalangeal shaft  If further evaluation is warranted from a clinical standpoint, consider MRI or nuclear white blood cell scan  Findings    concur with notes in Psychiatric from podiatry, describing high suspicion for osteomyelitis  The examination demonstrates a significant  finding and was documented as such in Psychiatric for liaison and referring practitioner notification  Workstation performed: [id number]               Procedures  Procedures      ED Course  ED Course as of Sep 01 1404   Tue Aug 31, 2021   1545 [place] text sent to Podiatry  Will give tylenol and Toradol for pain  1624 C-REACTIVE PROTEIN(!): 29 3   1624 Sodium(!): 135   1624 Potassium: 5 3   1708 WBC(!): 12 50   1708 Hemoglobin: 14 0   1757 Patient was re-evaluated  States she still in pain  Will give four of morphine  Pending podiatry eval      [id number] Patient refusing morphine  Requesting Dilaudid  Will give 0 5 mg of Dilaudid  1900 Patient signed out to Dr Marjorie Raza  Dispo pending podiatry evaluation                                SBIRT 20yo+      Most Recent Value   SBIRT (24 yo +)   In order to provide better care to our patients, we are screening all of our patients for alcohol and drug use  Would it be okay to ask you these screening questions? Unable to answer at this time Filed at: 08/31/2021 4461                [facility]  Number of Diagnoses or Management Options  Diabetic foot ulcer ([facility] [address] )  Osteomyelitis ([facility] [address] )  Ulcer of right foot ([facility] [address] )  Diagnosis management comments: Patient is a 40 y o  female who presents to the ED for a right foot wound  Significant physical exam findings include a wound to the bottom of the R w/ necrotic tissue  Patient is non-toxic appearing  Differential includes but is not limited to: Diabetic foot ulcer, osteomyelitis, cellulitis    Plan: Labs, plain films of the R foot, pain control p r n , podiatry consult                 Portions of the record may have been created with voice recognition software  Occasional wrong word or "sound a like" substitutions may have occurred due to the inherent limitations of voice recognition software  Read the chart carefully and recognize, using context, where substitutions have occurred           Amount and/or Complexity of Data Reviewed  Clinical lab tests: ordered and reviewed  Tests in the radiology section of CPT®: ordered and reviewed  Tests in the medicine section of CPT®: ordered and reviewed  Discuss the patient with other providers: yes  Independent visualization of images, tracings, or specimens: yes    Risk of Complications, Morbidity, and/or Mortality  Presenting problems: high  Diagnostic procedures: moderate  Management options: high    Patient Progress  Patient progress: stable      Disposition  Final diagnoses:   Ulcer of right foot ([facility] [place] [address] )   [facility]   Diabetic foot ulcer ([facility] [place] [address] )     Time reflects when diagnosis was documented in both MDM as applicable and the Disposition within this note     Time User Action Codes Description Comment    8/31/2021  3:44 PM Moises Garcia Add [G03 664] Ulcer of right foot ([facility] [place] [address] )     8/31/2021  7:42 PM Maria Guadalupe Vigil Add [M86 9] Osteomyelitis ([facility] [place] [address] )     8/31/2021  7:44 PM Josh Mariscal Add [F22 703,  L99 509] Diabetic foot ulcer [facility])       ED Disposition     ED Disposition Condition Date/Time Comment    BRYAN  Tue Aug 31, 2021  7:39 PM       Follow-up Information    None         Discharge Medication List as of 8/31/2021  7:46 PM      START taking these medications    Details   sulfamethoxazole-trimethoprim (BACTRIM DS) 800-160 mg per tablet Take 1 tablet by mouth 2 (two) times a day for 7 days smx-tmp DS (BACTRIM) 800-160 mg tabs (1tab q12 D10), Starting Tue 8/31/2021, Until Tue 9/7/2021, Normal         CONTINUE these medications which have NOT CHANGED    Details   acetaminophen (TYLENOL) 650 mg CR tablet Take 1 tablet (650 mg total) by mouth every 8 (eight) hours as needed for mild pain, Starting Fri 6/25/2021, Normal      ADMELOG SOLOSTAR 100 units/mL injection pen Starting Wed 3/20/2019, Historical Med      albuterol (PROVENTIL HFA,VENTOLIN HFA) 90 mcg/act inhaler Inhale 2 puffs every 6 (six) hours as needed for wheezing, Historical Med      !!  BASAGLAR KWIKPEN 100 units/mL injection pen Starting Wed 3/20/2019, Historical Med      buPROPion (WELLBUTRIN XL) 300 mg 24 hr tablet Take 300 mg by mouth, Historical Med      cholecalciferol (VITAMIN D3) 1,000 units tablet Take by mouth Three times a day, Historical Med      !! cloNIDine (CATAPRES) 0 1 mg tablet 0 2 mg every 12 (twelve) hours , Starting Mon 4/25/2016, Historical Med      !! cloNIDine (CATAPRES) 0 2 mg tablet Take 0 2 mg by mouth 2 (two) times a day, Starting Mon 2/15/2021, Historical Med      doxycycline hyclate (VIBRA-TABS) 100 mg tablet TAKE 1 TABLET (100 MG TOTAL) BY MOUTH 2 (TWO) TIMES A DAY FOR 7 DAYS, Historical Med      EASY COMFORT PEN NEEDLES 32G X 4 MM MISC Starting Wed 3/20/2019, Historical Med      EASY TOUCH INSULIN SYRINGE 31G X 5/16" 0 3 ML MISC Starting Sat 2/2/2019, Historical Med      ergocalciferol (ERGOCALCIFEROL) 1 25 MG (48525 UT) capsule Take 50,000 Units by mouth, Starting Fri 3/5/2021, Until Fri 5/28/2021, Historical Med      escitalopram (LEXAPRO) 10 mg tablet 175 mg , Historical Med      fenofibrate (TRICOR) 54 MG tablet TAKE 1 TABLET (54 MG TOTAL) BY MOUTH DAILY  , Historical Med      folic acid (FOLVITE) 1 mg tablet Take 1,000 mcg by mouth daily, Starting Mon 2/22/2021, Historical Med      glucose blood test strip USE TO MONITOR BG 3 TIMES A DAY   E11 9, Historical Med      !! HYDROcodone-acetaminophen (NORCO) 5-325 mg per tablet Starting Sat 4/13/2019, Historical Med      !! HYDROcodone-acetaminophen (NORCO) 5-325 mg per tablet Take 1 tablet by mouth every 6 (six) hours as needed for painMax Daily Amount: 4 tablets, Starting Wed 12/2/2020, Normal      hydrOXYzine HCL (ATARAX) 50 mg tablet Take 50 mg by mouth daily at bedtime, Starting Tue 9/3/2019, Historical Med      hydrOXYzine pamoate (VISTARIL) 50 mg capsule Take 50 mg by mouth, Starting Tue 8/7/2018, Historical Med      !! insulin glargine (LANTUS SOLOSTAR) 100 units/mL injection pen Inject 55 Units under the skin 2 (two) times a day, Starting Wed 3/20/2019, Historical Med      insulin lispro (HUMALOG KWIKPEN) 100 units/mL injection pen Inject 1-15 Units under the skin, Starting Wed 3/20/2019, Historical Med      !! lamoTRIgine (LaMICtal) 150 MG tablet Take 150 mg by mouth daily, Starting Sun 2/21/2021, Historical Med      !! lamoTRIgine (LaMICtal) 200 MG tablet Take 200 mg by mouth every morning, Starting Wed 6/16/2021, Historical Med      levothyroxine 50 mcg tablet Take 50 mcg by mouth daily, Starting Fri 3/8/2019, Historical Med      metFORMIN (GLUCOPHAGE) 500 mg tablet Take 1 tablet (500 mg total) by mouth 2 (two) times a day with meals, Starting Tue 9/10/2019, Print      methocarbamol (ROBAXIN) 750 mg tablet TAKE 1 TABLET (750 MG TOTAL) BY MOUTH 4 (FOUR) TIMES A DAY AS NEEDED FOR MUSCLE SPASMS , Historical Med      multivitamin (THERAGRAN) TABS Take 1 tablet by mouth daily, Historical Med      mupirocin (BACTROBAN) 2 % ointment Apply topically 3 (three) times a day, Starting Mon 3/22/2021, Normal      ondansetron (ZOFRAN-ODT) 4 mg disintegrating tablet Starting Tue 3/19/2019, Historical Med      oxyCODONE (ROXICODONE) 5 mg/5 mL solution Starting Tue 3/19/2019, Historical Med      pantoprazole (PROTONIX) 40 mg tablet Take 40 mg by mouth daily, Starting Mon 12/7/2020, Until Tue 12/7/2021, Historical Med      SUMAtriptan Succinate (IMITREX STATDOSE REFILL) 6 MG/0 5ML SOCT Inject 6 mg under the skin every 2 (two) hours as needed, Starting Tue 11/29/2016, Historical Med      Ultra Thin Lancets 31G MISC USE TO MONITOR BLOOD GLUCOSE 3 TIMES A DAY AS DIRECTED , Historical Med      ziprasidone (GEODON) 80 mg capsule Take 80 mg by mouth 2 (two) times a day with meals , Starting Tue 5/15/2018, Historical Med       !! - Potential duplicate medications found  Please discuss with provider  No discharge procedures on file  PDMP Review     None           ED Provider  Attending physically available and evaluated Roberto Carlos Danielson I managed the patient along with the ED Attending      Electronically Signed by         Melda Hatchet, DO  09/01/21 9063

## 2021-09-01 NOTE — ASSESSMENT & PLAN NOTE
Lab Results   Component Value Date    HGBA1C 6 6 (H) 02/25/2021       No results for input(s): POCGLU in the last 72 hours      Blood Sugar Average: Last 72 hrs:  · Takes metformin at home, refuses insulin at home  · SSI and hypoglycemia protocol while inpatient

## 2021-09-01 NOTE — ASSESSMENT & PLAN NOTE
Lab Results   Component Value Date    HGBA1C 6 6 (H) 02/25/2021     No results for input(s): POCGLU in the last 72 hours  Blood Sugar Average: Last 72 hrs:    · Patient arrived to ED yesterday 8/31 for evaluation of a diabetic foot ulcer  Patient left AMA yesterday, returned today for treatment  Patient reports that she noticed burning on the sole of her R foot around 1 5 weeks ago, which progressively got worse  Patient also notices some fluid draining a couple of days ago and a foul odor  She had attempted dressing and cleaning the wound, was supposed to go to outpatient wound care in [facility] but her ride never showed up, arrived to the ED on 8/31  · S/p left 3rd toe amputation, s/p right toe amputation 1/2021 from previous diabetic foot ulcers  · Was given Bactrim after leaving AM, received 1 dose last night  · Right foot Xray 8/31: no acute fracture  Suspicion of periosteal response, 3rd proximal phalangeal shaft, high suspicion for osteomyelitis  · Pending repeat Xray today  · Elevated CRP, ESR  · Blood cultures x2 pending  · Wound culture sensitivity pending  · WBCs elevated at 12 5 yesterday 8/31, pending CBC today  · Patient afebrile, vitals stable, does not currently meet sepsis criteria  · Podiatry following:  · IV Ancef and Flagyl  · MRI ordered  · Concern for abscess in R foot  · Consider consulting surgery, concern for osteomyelitis, pending MRI results  · PT/OT evaluation  · Pain regimen on board:  Tylenol, oxycodone, Dilaudid p r n

## 2021-09-01 NOTE — ED PROVIDER NOTES
History  Chief Complaint   Patient presents with    Foot Ulcer     pt returning after making arrangements for her dog to be admitted for a right foot ulcer that doctors wanted to treat with IV ABX      HPI  56-year-old woman with history of insulin-dependent diabetes presenting for admission  Patient has a ulceration to the right foot and was seen here yesterday, podiatry was consulted and they recommended admission on IV antibiotics  However the patient left AMA because she needed to set up care for her dog, she has now returned today  She states that the foot pain is about the same as it was yesterday, however she thinks the wound may look a little worse now  She denies any fever, chills, nausea or vomiting, any other systemic symptoms  Prior to Admission Medications   Prescriptions Last Dose Informant Patient Reported? Taking?    ADMELOG SOLOSTAR 100 units/mL injection pen   Yes No   BASAGLAR KWIKPEN 100 units/mL injection pen   Yes No   EASY COMFORT PEN NEEDLES 32G X 4 MM MISC   Yes No   EASY TOUCH INSULIN SYRINGE 31G X 5/16" 0 3 ML MISC   Yes No   HYDROcodone-acetaminophen (NORCO) 5-325 mg per tablet   Yes No   HYDROcodone-acetaminophen (NORCO) 5-325 mg per tablet   No No   Sig: Take 1 tablet by mouth every 6 (six) hours as needed for painMax Daily Amount: 4 tablets   Patient not taking: Reported on 3/22/2021   SUMAtriptan Succinate (IMITREX STATDOSE REFILL) 6 MG/0 5ML SOCT   Yes No   Sig: Inject 6 mg under the skin every 2 (two) hours as needed   Ultra Thin Lancets 31G MISC   Yes No   Sig: USE TO MONITOR BLOOD GLUCOSE 3 TIMES A DAY AS DIRECTED    acetaminophen (TYLENOL) 650 mg CR tablet   No No   Sig: Take 1 tablet (650 mg total) by mouth every 8 (eight) hours as needed for mild pain   albuterol (PROVENTIL HFA,VENTOLIN HFA) 90 mcg/act inhaler   Yes No   Sig: Inhale 2 puffs every 6 (six) hours as needed for wheezing   buPROPion (WELLBUTRIN XL) 300 mg 24 hr tablet   Yes No   Sig: Take 300 mg by mouth cholecalciferol (VITAMIN D3) 1,000 units tablet   Yes No   Sig: Take by mouth Three times a day   cloNIDine (CATAPRES) 0 1 mg tablet   Yes No   Si 2 mg every 12 (twelve) hours    cloNIDine (CATAPRES) 0 2 mg tablet   Yes No   Sig: Take 0 2 mg by mouth 2 (two) times a day   doxycycline hyclate (VIBRA-TABS) 100 mg tablet   Yes No   Sig: TAKE 1 TABLET (100 MG TOTAL) BY MOUTH 2 (TWO) TIMES A DAY FOR 7 DAYS   ergocalciferol (ERGOCALCIFEROL) 1 25 MG (27947 UT) capsule   Yes No   Sig: Take 50,000 Units by mouth   escitalopram (LEXAPRO) 10 mg tablet  Self Yes No   Si mg    fenofibrate (TRICOR) 54 MG tablet   Yes No   Sig: TAKE 1 TABLET (54 MG TOTAL) BY MOUTH DAILY  folic acid (FOLVITE) 1 mg tablet   Yes No   Sig: Take 1,000 mcg by mouth daily   glucose blood test strip   Yes No   Sig: USE TO MONITOR BG 3 TIMES A DAY  E11 9   hydrOXYzine HCL (ATARAX) 50 mg tablet   Yes No   Sig: Take 50 mg by mouth daily at bedtime   hydrOXYzine pamoate (VISTARIL) 50 mg capsule   Yes No   Sig: Take 50 mg by mouth   insulin glargine (LANTUS SOLOSTAR) 100 units/mL injection pen   Yes No   Sig: Inject 55 Units under the skin 2 (two) times a day   insulin lispro (HUMALOG KWIKPEN) 100 units/mL injection pen   Yes No   Sig: Inject 1-15 Units under the skin   lamoTRIgine (LaMICtal) 150 MG tablet   Yes No   Sig: Take 150 mg by mouth daily   Patient not taking: Reported on 2021   lamoTRIgine (LaMICtal) 200 MG tablet   Yes No   Sig: Take 200 mg by mouth every morning   lamoTRIgine (LaMICtal) 25 mg tablet   Yes No   Sig: Take 50 mg by mouth daily at bedtime    Patient not taking: Reported on 2021   levothyroxine 50 mcg tablet   Yes No   Sig: Take 50 mcg by mouth daily   metFORMIN (GLUCOPHAGE) 500 mg tablet   No No   Sig: Take 1 tablet (500 mg total) by mouth 2 (two) times a day with meals   methocarbamol (ROBAXIN) 750 mg tablet   Yes No   Sig: TAKE 1 TABLET (750 MG TOTAL) BY MOUTH 4 (FOUR) TIMES A DAY AS NEEDED FOR MUSCLE SPASMS  multivitamin (THERAGRAN) TABS   Yes No   Sig: Take 1 tablet by mouth daily   mupirocin (BACTROBAN) 2 % ointment   No No   Sig: Apply topically 3 (three) times a day   ondansetron (ZOFRAN-ODT) 4 mg disintegrating tablet   Yes No   oxyCODONE (ROXICODONE) 5 mg/5 mL solution   Yes No   pantoprazole (PROTONIX) 40 mg tablet   Yes No   Sig: Take 40 mg by mouth daily   sulfamethoxazole-trimethoprim (BACTRIM DS) 800-160 mg per tablet   No No   Sig: Take 1 tablet by mouth 2 (two) times a day for 7 days smx-tmp DS (BACTRIM) 800-160 mg tabs (1tab q12 D10)   ziprasidone (GEODON) 80 mg capsule   Yes No   Sig: Take 80 mg by mouth 2 (two) times a day with meals       Facility-Administered Medications: None       Past Medical History:   Diagnosis Date    Anxiety     Arthritis     Asthma     Chronic pain     Diabetes mellitus ([facility] )     Disease of thyroid gland     Lymphedema     left leg    Manic bipolar I disorder (HCC)     Morbid obesity (HCC)     OCD (obsessive compulsive disorder)     PTSD (post-traumatic stress disorder)        Past Surgical History:   Procedure Laterality Date    CHOLECYSTECTOMY      CHOLECYSTECTOMY      ME AMPUTATION METATARSAL+TOE,SINGLE Left 9/8/2019    Procedure: PARTIAL 3RD RAY RESECTION, PSB TOE FILLET FLAP;  Surgeon: Linda Payne DPM;  [facility];  Service: Podiatry    TOE AMPUTATION Right 1/13/2021    Procedure: AMPUTATION 2ND TOE;  Surgeon: Myranda Nicholson DPM;  [facility];  Service: Podiatry       Family History   Problem Relation Age of Onset    Hyperlipidemia Mother     Diabetes Mother     Hypertension Mother     Heart attack Father      I have reviewed and agree with the history as documented      E-Cigarette/Vaping    E-Cigarette Use Current Some Day User      E-Cigarette/Vaping Substances    Nicotine No     THC No     CBD No     Flavoring No     Other No     Unknown No      Social History     Tobacco Use    Smoking status: Former Smoker Packs/day: 0 50     Years: 15 00     Pack years: 7 50    Smokeless tobacco: Never Used   Vaping Use    Vaping Use: Some days   Substance Use Topics    Alcohol use: Never    Drug use: Never       Review of Systems   Constitutional: Negative for chills and fever  Respiratory: Negative for cough and shortness of breath  Cardiovascular: Negative for chest pain and palpitations  Gastrointestinal: Negative for abdominal pain, nausea and vomiting  Genitourinary: Negative  Musculoskeletal:        Per HPI   Hematological: Negative  Physical Exam  Physical Exam  Vitals reviewed  Constitutional:       General: She is not in acute distress  Appearance: Normal appearance  HENT:      Head: Normocephalic and atraumatic  Mouth/Throat:      Mouth: Mucous membranes are moist       Pharynx: Oropharynx is clear  Eyes:      Conjunctiva/sclera: Conjunctivae normal       Pupils: Pupils are equal, round, and reactive to light  Cardiovascular:      Rate and Rhythm: Normal rate and regular rhythm  Heart sounds: No murmur heard  No friction rub  No gallop  Pulmonary:      Effort: Pulmonary effort is normal  No respiratory distress  Breath sounds: Normal breath sounds  Abdominal:      General: There is no distension  Palpations: Abdomen is soft  Tenderness: There is no abdominal tenderness  Musculoskeletal:      Cervical back: Neck supple  Comments: There is a large, deep ulceration to the plantar aspect of the right foot over the 3rd MTP  Scant amount of yellow discharge  2+ DP pulse  Skin:     General: Skin is warm and dry  Neurological:      Mental Status: She is alert           Vital Signs  ED Triage Vitals [09/01/21 1344]   Temperature Pulse Respirations Blood Pressure SpO2   97 7 °F (36 5 °C) 86 18 135/71 97 %      Temp Source Heart Rate Source Patient Position - Orthostatic VS BP Location FiO2 (%)   Tympanic -- -- -- --      Pain Score       8 Vitals:    09/01/21 1344   BP: 135/71   Pulse: 86         Visual Acuity      ED Medications  Medications   clindamycin (CLEOCIN) IVPB (premix in dextrose) 900 mg 50 mL (has no administration in time range)       Diagnostic Studies  Results Reviewed     None                 No orders to display              Procedures  Procedures         ED Course                             SBIRT 20yo+      Most Recent Value   SBIRT (22 yo +)   In order to provide better care to our patients, we are screening all of our patients for alcohol and drug use  Would it be okay to ask you these screening questions? Yes Filed at: 09/01/2021 1400   Initial Alcohol Screen: US AUDIT-C    1  How often do you have a drink containing alcohol?  0 Filed at: 09/01/2021 1400   2  How many drinks containing alcohol do you have on a typical day you are drinking? 0 Filed at: 09/01/2021 1400   3a  Male UNDER 65: How often do you have five or more drinks on one occasion? 0 Filed at: 09/01/2021 1400   3b  FEMALE Any Age, or MALE 65+: How often do you have 4 or more drinks on one occassion? 0 Filed at: 09/01/2021 1400   Audit-C Score  0 Filed at: 09/01/2021 1400   EDGARD: How many times in the past year have you    Used an illegal drug or used a prescription medication for non-medical reasons? Never Filed at: 09/01/2021 1400                    MDM  Number of Diagnoses or Management Options  Diabetic foot ulcer ([facility] )  Right foot ulcer, with fat layer exposed ([facility] )  Diagnosis management comments: 28-year-old woman presenting with diabetic foot ulcer, it had been recommended to her that she be admitted yesterday for IV antibiotics but she left AMA, is now returning for admission  Will start the patient on IV antibiotics, consult Podiatry and admit  Patient is overall well-appearing clinically and has normal vital signs  Blood and wound cultures were sent yesterday        Disposition  Final diagnoses:   Right foot ulcer, with fat layer exposed [facility])   Diabetic foot ulcer ([facility] )     Time reflects when diagnosis was documented in both MDM as applicable and the Disposition within this note     Time User Action Codes Description Comment    9/1/2021  1:46 PM Dorcas Sprung A Add [W29 761] Right foot ulcer, with fat layer exposed ([facility] )     9/1/2021  2:10 PM Kelli Larch Add [X74 557,  T16 949] Diabetic foot ulcer ([facility] )     9/1/2021  2:10 PM Dorcas Sprung A Modify [B38 844] Right foot ulcer, with fat layer exposed ([facility] )     9/1/2021  2:10 PM Kelli Larch Modify [U61 221,  L97 509] Diabetic foot ulcer [facility])       ED Disposition     ED Disposition Condition Date/Time Comment    Admit Stable Wed Sep 1, 2021  2:10 PM Case was discussed with Dr Yakov Villalba and the patient's admission status was agreed to be Admission Status: inpatient status to the service of Dr Yakov Villalba   Follow-up Information    None         Patient's Medications   Discharge Prescriptions    No medications on file     No discharge procedures on file      PDMP Review     None          ED Provider  Electronically Signed by           Mayank Hillman MD  09/01/21 3595

## 2021-09-02 ENCOUNTER — APPOINTMENT (INPATIENT)
Dept: RADIOLOGY | Facility: HOSPITAL | Age: 46
DRG: 305 | End: 2021-09-02
Payer: COMMERCIAL

## 2021-09-02 LAB
ANION GAP SERPL CALCULATED.3IONS-SCNC: 4 MMOL/L (ref 4–13)
BASOPHILS # BLD AUTO: 0.06 THOUSANDS/ΜL (ref 0–0.1)
BASOPHILS NFR BLD AUTO: 1 % (ref 0–1)
BUN SERPL-MCNC: 13 MG/DL (ref 5–25)
CALCIUM SERPL-MCNC: 9.8 MG/DL (ref 8.3–10.1)
CHLORIDE SERPL-SCNC: 103 MMOL/L (ref 100–108)
CO2 SERPL-SCNC: 28 MMOL/L (ref 21–32)
CREAT SERPL-MCNC: 0.82 MG/DL (ref 0.6–1.3)
EOSINOPHIL # BLD AUTO: 0.41 THOUSAND/ΜL (ref 0–0.61)
EOSINOPHIL NFR BLD AUTO: 5 % (ref 0–6)
ERYTHROCYTE [DISTWIDTH] IN BLOOD BY AUTOMATED COUNT: 14.1 % (ref 11.6–15.1)
GFR SERPL CREATININE-BSD FRML MDRD: 87 ML/MIN/1.73SQ M
GLUCOSE SERPL-MCNC: 105 MG/DL (ref 65–140)
GLUCOSE SERPL-MCNC: 123 MG/DL (ref 65–140)
GLUCOSE SERPL-MCNC: 126 MG/DL (ref 65–140)
GLUCOSE SERPL-MCNC: 142 MG/DL (ref 65–140)
HCT VFR BLD AUTO: 43.9 % (ref 34.8–46.1)
HGB BLD-MCNC: 13.4 G/DL (ref 11.5–15.4)
IMM GRANULOCYTES # BLD AUTO: 0.03 THOUSAND/UL (ref 0–0.2)
IMM GRANULOCYTES NFR BLD AUTO: 0 % (ref 0–2)
LYMPHOCYTES # BLD AUTO: 3.37 THOUSANDS/ΜL (ref 0.6–4.47)
LYMPHOCYTES NFR BLD AUTO: 38 % (ref 14–44)
MCH RBC QN AUTO: 25.4 PG (ref 26.8–34.3)
MCHC RBC AUTO-ENTMCNC: 30.5 G/DL (ref 31.4–37.4)
MCV RBC AUTO: 83 FL (ref 82–98)
MONOCYTES # BLD AUTO: 0.82 THOUSAND/ΜL (ref 0.17–1.22)
MONOCYTES NFR BLD AUTO: 9 % (ref 4–12)
NEUTROPHILS # BLD AUTO: 4.29 THOUSANDS/ΜL (ref 1.85–7.62)
NEUTS SEG NFR BLD AUTO: 47 % (ref 43–75)
NRBC BLD AUTO-RTO: 0 /100 WBCS
PLATELET # BLD AUTO: 388 THOUSANDS/UL (ref 149–390)
PMV BLD AUTO: 9 FL (ref 8.9–12.7)
POTASSIUM SERPL-SCNC: 4 MMOL/L (ref 3.5–5.3)
RBC # BLD AUTO: 5.28 MILLION/UL (ref 3.81–5.12)
SODIUM SERPL-SCNC: 135 MMOL/L (ref 136–145)
WBC # BLD AUTO: 8.98 THOUSAND/UL (ref 4.31–10.16)

## 2021-09-02 PROCEDURE — G1004 CDSM NDSC: HCPCS

## 2021-09-02 PROCEDURE — 82948 REAGENT STRIP/BLOOD GLUCOSE: CPT

## 2021-09-02 PROCEDURE — A9585 GADOBUTROL INJECTION: HCPCS | Performed by: PHYSICIAN ASSISTANT

## 2021-09-02 PROCEDURE — 99233 SBSQ HOSP IP/OBS HIGH 50: CPT | Performed by: STUDENT IN AN ORGANIZED HEALTH CARE EDUCATION/TRAINING PROGRAM

## 2021-09-02 PROCEDURE — 80048 BASIC METABOLIC PNL TOTAL CA: CPT | Performed by: PHYSICIAN ASSISTANT

## 2021-09-02 PROCEDURE — 85025 COMPLETE CBC W/AUTO DIFF WBC: CPT | Performed by: PHYSICIAN ASSISTANT

## 2021-09-02 PROCEDURE — 97166 OT EVAL MOD COMPLEX 45 MIN: CPT

## 2021-09-02 PROCEDURE — 73720 MRI LWR EXTREMITY W/O&W/DYE: CPT

## 2021-09-02 RX ORDER — HYDROXYZINE HYDROCHLORIDE 25 MG/1
100 TABLET, FILM COATED ORAL
Status: DISCONTINUED | OUTPATIENT
Start: 2021-09-02 | End: 2021-09-08 | Stop reason: HOSPADM

## 2021-09-02 RX ORDER — CEFAZOLIN SODIUM 2 G/50ML
2000 SOLUTION INTRAVENOUS EVERY 8 HOURS
Status: DISCONTINUED | OUTPATIENT
Start: 2021-09-02 | End: 2021-09-04

## 2021-09-02 RX ORDER — HYDROMORPHONE HCL/PF 1 MG/ML
0.5 SYRINGE (ML) INJECTION EVERY 6 HOURS PRN
Status: DISCONTINUED | OUTPATIENT
Start: 2021-09-02 | End: 2021-09-05

## 2021-09-02 RX ADMIN — DOCUSATE SODIUM 100 MG: 100 CAPSULE, LIQUID FILLED ORAL at 08:09

## 2021-09-02 RX ADMIN — ZIPRASIDONE HYDROCHLORIDE 80 MG: 40 CAPSULE ORAL at 08:55

## 2021-09-02 RX ADMIN — METHOCARBAMOL TABLETS 750 MG: 750 TABLET, COATED ORAL at 08:08

## 2021-09-02 RX ADMIN — OXYCODONE HYDROCHLORIDE 10 MG: 10 TABLET ORAL at 19:32

## 2021-09-02 RX ADMIN — OXYCODONE HYDROCHLORIDE 10 MG: 10 TABLET ORAL at 13:01

## 2021-09-02 RX ADMIN — METRONIDAZOLE 500 MG: 500 INJECTION, SOLUTION INTRAVENOUS at 14:44

## 2021-09-02 RX ADMIN — FENOFIBRATE 48 MG: 48 TABLET ORAL at 08:56

## 2021-09-02 RX ADMIN — ENOXAPARIN SODIUM 40 MG: 40 INJECTION SUBCUTANEOUS at 08:15

## 2021-09-02 RX ADMIN — CLONIDINE HYDROCHLORIDE 0.2 MG: 0.2 TABLET ORAL at 21:17

## 2021-09-02 RX ADMIN — HYDROMORPHONE HYDROCHLORIDE 0.5 MG: 1 INJECTION, SOLUTION INTRAMUSCULAR; INTRAVENOUS; SUBCUTANEOUS at 04:14

## 2021-09-02 RX ADMIN — ZIPRASIDONE HYDROCHLORIDE 80 MG: 40 CAPSULE ORAL at 21:18

## 2021-09-02 RX ADMIN — LAMOTRIGINE 200 MG: 100 TABLET ORAL at 08:09

## 2021-09-02 RX ADMIN — LEVOTHYROXINE SODIUM 50 MCG: 50 TABLET ORAL at 06:22

## 2021-09-02 RX ADMIN — HYDROXYZINE HYDROCHLORIDE 50 MG: 25 TABLET, FILM COATED ORAL at 08:08

## 2021-09-02 RX ADMIN — OXYCODONE HYDROCHLORIDE 10 MG: 10 TABLET ORAL at 08:10

## 2021-09-02 RX ADMIN — HYDROXYZINE HYDROCHLORIDE 100 MG: 25 TABLET, FILM COATED ORAL at 21:16

## 2021-09-02 RX ADMIN — FOLIC ACID 1000 MCG: 1 TABLET ORAL at 08:09

## 2021-09-02 RX ADMIN — CLONIDINE HYDROCHLORIDE 0.2 MG: 0.2 TABLET ORAL at 08:09

## 2021-09-02 RX ADMIN — PANTOPRAZOLE SODIUM 40 MG: 40 TABLET, DELAYED RELEASE ORAL at 21:16

## 2021-09-02 RX ADMIN — SENNOSIDES 8.6 MG: 8.6 TABLET ORAL at 08:09

## 2021-09-02 RX ADMIN — HYDROMORPHONE HYDROCHLORIDE 0.5 MG: 1 INJECTION, SOLUTION INTRAMUSCULAR; INTRAVENOUS; SUBCUTANEOUS at 14:05

## 2021-09-02 RX ADMIN — CEFAZOLIN SODIUM 2000 MG: 2 SOLUTION INTRAVENOUS at 14:08

## 2021-09-02 RX ADMIN — METRONIDAZOLE 500 MG: 500 INJECTION, SOLUTION INTRAVENOUS at 22:30

## 2021-09-02 RX ADMIN — GADOBUTROL 10 ML: 604.72 INJECTION INTRAVENOUS at 23:50

## 2021-09-02 RX ADMIN — CEFAZOLIN SODIUM 2000 MG: 2 SOLUTION INTRAVENOUS at 21:16

## 2021-09-02 RX ADMIN — HYDROMORPHONE HYDROCHLORIDE 0.5 MG: 1 INJECTION, SOLUTION INTRAMUSCULAR; INTRAVENOUS; SUBCUTANEOUS at 21:29

## 2021-09-02 NOTE — ASSESSMENT & PLAN NOTE
Lab Results   Component Value Date    HGBA1C 6 7 (H) 09/01/2021     Recent Labs     09/01/21  1850 09/02/21  0606 09/02/21  1040   POCGLU 112 123 142*       Blood Sugar Average: Last 72 hrs:    · Patient arrived to ED 8/31 for evaluation of a diabetic foot ulcer  Patient left AMA at that time, returned 9/1 for treatment  Patient reports that she noticed burning on the sole of her R foot around 1 5 weeks ago, which progressively got worse  Patient also noticed some fluid draining a couple of days ago and a foul odor  She had attempted dressing and cleaning the wound, was supposed to go to outpatient wound care in [facility] but her ride never showed up, arrived to the ED on 8/31  · S/p left 3rd toe amputation, s/p right toe amputation 1/2021 from previous diabetic foot ulcers  · Was given Bactrim after leaving AM, received 1 dose on 8/31  · Right foot Xray 8/31: no acute fracture  Suspicion of periosteal response, 3rd proximal phalangeal shaft, high suspicion for osteomyelitis  · Repeat Right foot Xray 9/1: No acute interval change  Question faint periosteal reaction base of the 3rd proximal phalanx as before without overt bony destruction  Possibility of osteomyelitis cannot be entirely excluded  Soft tissue ulceration plantar aspect 3rd digit without diffuse subcutaneous emphysema  · Elevated CRP, ESR  · Blood cultures x2 on 8/31: NG @ 1 day  · Wound culture: 4+ staph aureus   · Leukocytosis res  · Patient afebrile, vitals stable, does not currently meet sepsis criteria  · Podiatry following:  · Continue IV Ancef and Flagyl for now  · MRI ordered; still pending   · Concern for abscess in R foot  · Consider consulting surgery, concern for osteomyelitis, pending MRI results  · PT/OT evaluation  · Pain regimen on board:  Tylenol, oxycodone, Dilaudid p r n

## 2021-09-02 NOTE — UTILIZATION REVIEW
Inpatient Admission Authorization Request   NOTIFICATION OF INPATIENT ADMISSION/INPATIENT AUTHORIZATION REQUEST   SERVICING FACILITY:   [facility]  Address: [address] [place] [address], [address] [place] [address]  Tax ID: [id number]  NPI: [id number]  Place of Service: Inpatient [address] [facility] Code: 24     ATTENDING PROVIDER:  Attending Name and NPI#: Montse Plata Md [[id number]]  Address: [address] [place] [address], [address] [place] [address]  Phone: [phone]     UTILIZATION REVIEW CONTACT:  Shi Sanchez, Utilization   Network Utilization Review Department  Phone: [phone]  Fax: [phone]  Email: [email or web address]     PHYSICIAN ADVISORY SERVICES:  FOR [facility] REVIEW - MEDICAL NECESSITY DENIAL  Phone: [phone]  Fax: [phone]  Email: [email or web address]     TYPE OF REQUEST:  Inpatient Status     ADMISSION INFORMATION:  ADMISSION DATE/TIME: 9/1/21  2:10 PM  PATIENT DIAGNOSIS CODE/DESCRIPTION:  Foot ulcer ([facility] ) [K63 116]  Diabetic foot ulcer ([facility] ) [R01 245, L97 509]  Right foot ulcer, with fat layer exposed ([facility] ) [L97 512]  DISCHARGE DATE/TIME: No discharge date for patient encounter  DISCHARGE DISPOSITION (IF DISCHARGED): Home/Self Care     IMPORTANT INFORMATION:  Please contact the Shi Sanchez directly with any questions or concerns regarding this request  Department voicemails are confidential     Send requests for admission clinical reviews, concurrent reviews, approvals, and administrative denials due to lack of clinical to fax [phone]

## 2021-09-02 NOTE — PROGRESS NOTES
[facility]  Progress Note - Cristel Cody 1975, 40 y o  female MRN: [id number]  Unit/Bed#: -[place] Encounter: [id number]  Primary Care Provider: Ivette Araujo DO   Date and time admitted to hospital: 9/1/2021  1:35 PM    * 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, with bone involvement without evidence of necrosis [facility])  Assessment & Plan  Lab Results   Component Value Date    HGBA1C 6 7 (H) 09/01/2021     Recent Labs     09/01/21  1850 09/02/21  0606 09/02/21  1040   POCGLU 112 123 142*       Blood Sugar Average: Last 72 hrs:    · Patient arrived to ED 8/31 for evaluation of a diabetic foot ulcer  Patient left AMA at that time, returned 9/1 for treatment  Patient reports that she noticed burning on the sole of her R foot around 1 5 weeks ago, which progressively got worse  Patient also noticed some fluid draining a couple of days ago and a foul odor  She had attempted dressing and cleaning the wound, was supposed to go to outpatient wound care in [facility] but her ride never showed up, arrived to the ED on 8/31  · S/p left 3rd toe amputation, s/p right toe amputation 1/2021 from previous diabetic foot ulcers  · Was given Bactrim after leaving [place], received 1 dose on 8/31  · Right foot Xray 8/31: no acute fracture  Suspicion of periosteal response, 3rd proximal phalangeal shaft, high suspicion for osteomyelitis  · Repeat Right foot Xray 9/1: No acute interval change  Question faint periosteal reaction base of the 3rd proximal phalanx as before without overt bony destruction  Possibility of osteomyelitis cannot be entirely excluded  Soft tissue ulceration plantar aspect 3rd digit without diffuse subcutaneous emphysema    · Elevated CRP, ESR  · Blood cultures x2 on 8/31: NG @ 1 day  · Wound culture: 4+ staph aureus   · Leukocytosis res  · Patient afebrile, vitals stable, does not currently meet sepsis criteria  · Podiatry following:  · Continue IV Ancef and Flagyl for now  · MRI ordered; still pending   · Concern for abscess in R foot  · Consider consulting surgery, concern for osteomyelitis, pending MRI results  · PT/OT evaluation  · Pain regimen on board:  Tylenol, oxycodone, Dilaudid p r n  Type 2 diabetes mellitus with neurologic complication, without long-term current use of insulin [facility])  Assessment & Plan  Lab Results   Component Value Date    HGBA1C 6 7 (H) 09/01/2021       Recent Labs     09/01/21  1850 09/02/21  0606 09/02/21  1040   POCGLU 112 123 142*       Blood Sugar Average: Last 72 hrs:  · Takes metformin at home, refuses insulin at home  · SSI and hypoglycemia protocol while inpatient    H/O bariatric surgery  Assessment & Plan  · S/p sleeve gastrectomy 0222, no complications  · Continue folic acid    Anxiety  Assessment & Plan  · Takes Atarax at home, continue    Morbid obesity ([facility] )  Assessment & Plan  · S/p sleeve gastrectomy  · Encourage dietary/lifestyle modifications    Hypothyroidism  Assessment & Plan  · Takes levothyroxine at home, last TSH wnl  · Continue levothyroxine    Manic bipolar I disorder ([facility] )  Assessment & Plan  · Managed by psychiatry outpatient, has been managed well recently  · Continue lamictal and Geodon    Acquired absence of other left toe(s) ([facility] )  Assessment & Plan  · 2/2 diabetic foot ulcers  · See above    Essential hypertension  Assessment & Plan  · Stable, continue clonodine    Chronic pain  Assessment & Plan  · History of chronic back pain  · Continue Robaxin    GERD (gastroesophageal reflux disease)  Assessment & Plan  · Continue Protonix    Migraine without status migrainosus, not intractable  Assessment & Plan  · Has been managed well with Imitrex injections p r n , continue          VTE Pharmacologic Prophylaxis: VTE Score: 3 Moderate Risk (Score 3-4) - Pharmacological DVT Prophylaxis Ordered: enoxaparin (Lovenox)      Patient Centered Rounds: I performed bedside rounds with nursing staff today  Discussions with Specialists or Other Care Team Provider: podiatry     Education and Discussions with Family / Patient: Attempted to update  (son) via phone  Left voicemail  Time Spent for Care: 35  More than 50% of total time spent on counseling and coordination of care as described above  Current Length of Stay: 1 day(s)  Current Patient Status: Inpatient   Certification Statement: The patient will continue to require additional inpatient hospital stay due to diabetic foot ulcer  Discharge Plan: Anticipate discharge in 48-72 hrs to home vs rehab pending clinical course and PT evaluation    Code Status: Level 1 - Full Code    Subjective:   Very sleepy on evaluation  Awakens to touch  States that her foot hurts but quickly falls back asleep  Objective:     Vitals:   Temp (24hrs), Av 9 °F (36 6 °C), Min:97 7 °F (36 5 °C), Max:98 1 °F (36 7 °C)    Temp:  [97 7 °F (36 5 °C)-98 1 °F (36 7 °C)] 97 7 °F (36 5 °C)  HR:  [70-74] 74  Resp:  [16-18] 18  BP: (112-139)/(57-81) 128/80  SpO2:  [96 %-100 %] 96 %  Body mass index is 36 87 kg/m²  Input and Output Summary (last 24 hours): Intake/Output Summary (Last 24 hours) at 2021 1354  Last data filed at 2021 1256  Gross per 24 hour   Intake 1110 ml   Output --   Net 1110 ml       Physical Exam:   Physical Exam  Vitals and nursing note reviewed  Constitutional:       General: She is not in acute distress  Appearance: She is obese  HENT:      Head: Normocephalic and atraumatic  Mouth/Throat:      Pharynx: Oropharynx is clear  Eyes:      Extraocular Movements: Extraocular movements intact  Cardiovascular:      Rate and Rhythm: Normal rate and regular rhythm  Pulses: Normal pulses  Heart sounds: No murmur heard  Pulmonary:      Effort: Pulmonary effort is normal  No respiratory distress  Breath sounds: Normal breath sounds     Abdominal:      General: Bowel sounds are normal  Palpations: Abdomen is soft  Tenderness: There is no abdominal tenderness  Musculoskeletal:      Left lower leg: No edema  Comments: R foot wrapped in gauze  Skin:     General: Skin is warm and dry  Neurological:      General: No focal deficit present  Mental Status: She is alert and oriented to person, place, and time  Mental status is at baseline  Additional Data:     Labs:  Results from last 7 days   Lab Units 09/02/21  0601   WBC Thousand/uL 8 98   HEMOGLOBIN g/dL 13 4   HEMATOCRIT % 43 9   PLATELETS Thousands/uL 388   NEUTROS PCT % 47   LYMPHS PCT % 38   MONOS PCT % 9   EOS PCT % 5     Results from last 7 days   Lab Units 09/02/21  0601   SODIUM mmol/L 135*   POTASSIUM mmol/L 4 0   CHLORIDE mmol/L 103   CO2 mmol/L 28   BUN mg/dL 13   CREATININE mg/dL 0 82   ANION GAP mmol/L 4   CALCIUM mg/dL 9 8   GLUCOSE RANDOM mg/dL 105         Results from last 7 days   Lab Units 09/02/21  1040 09/02/21  0606 09/01/21  1850   POC GLUCOSE mg/dl 142* 123 112     Results from last 7 days   Lab Units 09/01/21  1842   HEMOGLOBIN A1C % 6 7*           Lines/Drains:  Invasive Devices     Peripheral Intravenous Line            Peripheral IV 09/01/21 Right Antecubital <1 day                      Imaging: Reviewed radiology reports from this admission including: xray(s)    Recent Cultures (last 7 days):   Results from last 7 days   Lab Units 08/31/21  1930 08/31/21  1553   BLOOD CULTURE   --  No Growth at 24 hrs     GRAM STAIN RESULT  4+ Gram positive cocci in pairs, chains and clusters*  3+ Gram negative rods*  2+ Polys* Gram positive cocci in clusters*   WOUND CULTURE  4+ Growth of Staphylococcus aureus*  4+ Growth of   --        Last 24 Hours Medication List:   Current Facility-Administered Medications   Medication Dose Route Frequency Provider Last Rate    acetaminophen  650 mg Oral Q6H PRN [place] Cap, PA-C      albuterol  2 puff Inhalation Q6H PRN [place] Cap, PA-C      calcium carbonate 1,000 mg Oral Daily PRN Yessica Bad, PA-C      cefazolin  2,000 mg Intravenous Q8H Camron Garcia MD      cloNIDine  0 2 mg Oral BID Yessica Bad, PA-C      docusate sodium  100 mg Oral BID Yessica Bad, PA-C      enoxaparin  40 mg Subcutaneous Daily Yessica Bad, PA-C      fenofibrate  48 mg Oral Daily Yessica Bad, PA-C      folic acid  2,853 mcg Oral Daily Yessica Bad, PA-C      HYDROmorphone  0 5 mg Intravenous Q6H PRN Camron Garcia MD      hydrOXYzine HCL  50 mg Oral HS Thania Petit, PA-C      hydrOXYzine HCL  50 mg Oral QAM Yessica Bad, PA-C      insulin lispro  1-6 Units Subcutaneous TID AC Julián Martin MD      lamoTRIgine  200 mg Oral QAM Yessica Bad, PA-C      levothyroxine  50 mcg Oral Daily Yessica Bad, PA-C      methocarbamol  750 mg Oral 4x Daily PRN Yessica Bad, PA-C      metroNIDAZOLE  500 mg Intravenous Q8H Donnellson Zhen Root MD      ondansetron  4 mg Intravenous Q6H PRN Yessica Bad, PA-C      oxyCODONE  10 mg Oral Q4H PRN Yessica Bad, PA-C      oxyCODONE  5 mg Oral Q4H PRN Yessica Bad, PA-C      pantoprazole  40 mg Oral HS Yessica Bad, PA-C      polyethylene glycol  17 g Oral Daily Yessica Bad, PA-C      senna  1 tablet Oral Daily Yessica Bad, PA-C      SUMAtriptan  6 mg Subcutaneous Q2H PRN Max 3/day Yessica Bad, PA-C      ziprasidone  80 mg Oral BID With Meals Yessica Bad, PA-C          Today, Patient Was Seen By: Camron Garcia MD    **Please Note: This note may have been constructed using a voice recognition system  **

## 2021-09-02 NOTE — PLAN OF CARE
Problem: Nutrition/Hydration-ADULT  Goal: Nutrient/Hydration intake appropriate for improving, restoring or maintaining nutritional needs  Description: Monitor and assess patient's nutrition/hydration status for malnutrition  Collaborate with interdisciplinary team and initiate plan and interventions as ordered  Monitor patient's weight and dietary intake as ordered or per policy  Utilize nutrition screening tool and intervene as necessary  Determine patient's food preferences and provide high-protein, high-caloric foods as appropriate       INTERVENTIONS:  - Monitor oral intake, urinary output, labs, and treatment plans  - Assess nutrition and hydration status and recommend course of action  - Evaluate amount of meals eaten  - Assist patient with eating if necessary   - Allow adequate time for meals  - Recommend/ encourage appropriate diets, oral nutritional supplements, and vitamin/mineral supplements  - Order, calculate, and assess calorie counts as needed  - Recommend, monitor, and adjust tube feedings and TPN/PPN based on assessed needs  - Assess need for intravenous fluids  - Provide specific nutrition/hydration education as appropriate  - Include patient/family/caregiver in decisions related to nutrition  Outcome: Progressing     Problem: MOBILITY - ADULT  Goal: Maintain or return to baseline ADL function  Description: INTERVENTIONS:  -  Assess patient's ability to carry out ADLs; assess patient's baseline for ADL function and identify physical deficits which impact ability to perform ADLs (bathing, care of mouth/teeth, toileting, grooming, dressing, etc )  - Assess/evaluate cause of self-care deficits   - Assess range of motion  - Assess patient's mobility; develop plan if impaired  - Assess patient's need for assistive devices and provide as appropriate  - Encourage maximum independence but intervene and supervise when necessary  - Involve family in performance of ADLs  - Assess for home care needs following discharge   - Consider OT consult to assist with ADL evaluation and planning for discharge  - Provide patient education as appropriate  Outcome: Progressing     Problem: Potential for Falls  Goal: Patient will remain free of falls  Description: INTERVENTIONS:  - Educate patient/family on patient safety including physical limitations  - Instruct patient to call for assistance with activity   - Consult OT/PT to assist with strengthening/mobility   - Keep Call bell within reach  - Keep bed low and locked with side rails adjusted as appropriate  - Keep care items and personal belongings within reach  - Initiate and maintain comfort rounds  - Make Fall Risk Sign visible to staff  - Apply yellow socks and bracelet for high fall risk patients  - Consider moving patient to room near nurses station  Outcome: Progressing

## 2021-09-02 NOTE — RESTORATIVE TECHNICIAN NOTE
Restorative Technician Note      Patient Name: Cristel Cody     Restorative Tech Visit Date: 09/02/21  Note Type: Bracing, Initial consult  Patient Position Upon Consult: Bedside chair  Brace Applied: Darco Wedge Shoe (Toe Unloading) (size small, RLE)  Patient Position When Brace Applied: Seated  Education Provided: Yes  Patient Position at End of Consult: Bedside chair  Nurse Communication: Nurse aware of consult, application of brace    Please call [phone] at extension [phone] with any questions      DANIELLA Marti

## 2021-09-02 NOTE — CASE MANAGEMENT
Pt is not a <30 day readmission or current bundle  Risk of unplanned readmission score is 17 (green)  Pt admitted due to a diabetic foot ulcer, may require amputation  Pt documented as alert and oriented x4  CM met with pt at bedside to introduce CM role and begin discharge planning  Pt lives with her son, Razia Gaines ([phone]) in a 2 story house that has 2 KORY  Pt reports being independent with ADLs PTA, no use of DME but has access to a single point cane and shower chair that pt reports is too big for her shower  Pt reports history of [facility]  Pt indicates a history of STR but is unable to recall facility  Pt reports history of inpatient [address] treatment 14 years ago and chart review indicates history of [facility] in 2020  No reported history of D/A treatment  Pt receives SSI benefits and uses "[employer] for transportation  PCP is Dr Drinda Cabot ([phone]) and pt uses [address] for prescriptions  Pt reports that she may require assistance with transportation at time of discharge  CM department to remain available for discharge concerns or needs  CM reviewed d/c planning process including the following: identifying help at home, patient preference for d/c planning needs, Discharge Lounge, [facility] Meds to Bed program, availability of treatment team to discuss questions or concerns patient and/or family may have regarding understanding medications and recognizing signs and symptoms once discharged  CM also encouraged patient to follow up with all recommended appointments after discharge  Patient advised of importance for patient and family to participate in managing patients medical well being

## 2021-09-02 NOTE — OCCUPATIONAL THERAPY NOTE
Occupational Therapy Evaluation     Patient Name: Ric Lira  SQPHQ'B Date: 9/2/2021  Problem List  Principal Problem:    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, with bone involvement without evidence of necrosis ([place] [address] )  Active Problems:    H/O bariatric surgery    Anxiety    Morbid obesity ([facility] )    Hypothyroidism    Manic bipolar I disorder ([place] [address] )    Type 2 diabetes mellitus with neurologic complication, without long-term current use of insulin ([place] [address] )    Acquired absence of other left toe(s) ([place] [address] )    Essential hypertension    Chronic pain    GERD (gastroesophageal reflux disease)    Migraine without status migrainosus, not intractable    Past Medical History  Past Medical History:   Diagnosis Date    Anxiety     Arthritis     Asthma     Chronic pain     Diabetes mellitus ([place] [address] )     Disease of thyroid gland     Lymphedema     left leg    Manic bipolar I disorder ([place] [address] )     Morbid obesity ([place] [address] )     OCD (obsessive compulsive disorder)     PTSD (post-traumatic stress disorder)      Past Surgical History  Past Surgical History:   Procedure Laterality Date    CHOLECYSTECTOMY      CHOLECYSTECTOMY      AK AMPUTATION METATARSAL+TOE,SINGLE Left 9/8/2019    Procedure: PARTIAL 3RD RAY RESECTION, PSB TOE FILLET FLAP;  Surgeon: Fernando Martin DPM;  [facility];  Service: Podiatry    TOE AMPUTATION Right 1/13/2021    Procedure: AMPUTATION 2ND TOE;  Surgeon: Brendan Bhatt DPM;  [facility];  Service: Podiatry         09/02/21 0745   OT Last Visit   OT Visit Date 09/02/21   Note Type   Note type Evaluation   Restrictions/Precautions   Weight Bearing Precautions Per Order Yes   RLE Weight Bearing Per Order PWB  (placed in toe-unloading shoe-Pt only to bear weight on heel)   Braces or Orthoses Other (Comment)  (toe-unloading shoe)   Other Precautions Impulsive; Fall Risk;Pain   Pain Assessment   Pain Assessment Tool 0-10   Pain Score 7   Pain Location/Orientation Orientation: Right;Location: Foot   Home Living   Type of [address] Two level;Bed/bath upstairs;Stairs to enter without rails  (2STE)   Bathroom Shower/Tub Tub/shower unit   Bathroom Toilet Raised   Bathroom Equipment Other (Comment)  (Pt denies)   [address]   Additional Comments Pt reports living in a [facility] with 2STE   Prior Function   Level of [place] Independent with ADLs and functional mobility   Lives With Son   Receives Help From Family   ADL Assistance Independent   IADLs Needs assistance  (does not drive, manages meds and finances independently)   Falls in the last 6 months 1 to 4  (Pt reports 2)   Vocational Part time employment  (at [employer])   Comments Pt reports living with her son who works   Lifestyle   Autonomy independent in ADLs and functional mobility (no AD/DME) PTA, needs assist with driving   Reciprocal Relationships supportive son who can assist when he is home   Service to Others works PT    [address] enjoys taking care of her dog   Psychosocial   Psychosocial (WDL) [address] "What do I need occupational therapy for?"   ADL   Where Assessed Chair   Eating Assistance 5  Supervision/Setup   Grooming Assistance 5  Supervision/Setup   UB Bathing Assistance 5  Supervision/Setup   LB Bathing Assistance 5  Supervision/Setup   UB Dressing Assistance 5  Supervision/Setup   LB Dressing Assistance 5  [address]  5  Supervision/Setup   Bed Mobility   Supine to Sit 5  Supervision   Additional Comments Pt lying supine in bed upon OT arrival, Pt left in chair with all needs met and call bell in reach   Transfers   Sit to Stand 5  Supervision   Additional items Assist x 1; Increased time required;Verbal cues; Impulsive   Stand to Sit 5  Supervision   Additional items Assist x 1; Increased time required;Verbal cues; Impulsive   Additional Comments Pt utilized RW for functional transfers   Functional Mobility   Functional Mobility 5  Supervision   Additional items Rolling walker   Balance   Static Sitting Fair +   Dynamic Sitting Fair +   Static Standing Fair   Dynamic Standing [address] -   Activity Tolerance   Activity Tolerance Patient tolerated treatment well;Patient limited by pain   Medical Staff Made Aware OT Jose Linda   Nurse Made Aware nursing cleared session   RUE Assessment   RUE Assessment WFL   LUE Assessment   LUE Assessment WFL   Cognition   Overall Cognitive Status WFL   Arousal/Participation Alert; Responsive; Cooperative   Attention Attends with cues to redirect   Orientation Level Oriented X4   Memory Within functional limits   Following Commands Follows all commands and directions without difficulty   Comments Pt agreeable and cooperative with therapy   Assessment   Limitation Decreased ADL status; Decreased Safe judgement during ADL;Decreased high-level ADLs   Prognosis Fair   Assessment Pt is a 40 y o  female admitted to [facility] on 9/1/2021 with 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, with bone involvement without evidence of necrosis ([address] )  PT is currently PWB (heel only)  Pt placed in toe unloading shoe  Pt  has a past medical history of Anxiety, Arthritis, Asthma, Chronic pain, Diabetes mellitus ([address] ), Disease of thyroid gland, Lymphedema, Manic bipolar I disorder ([address] ), Morbid obesity ([address] ), OCD (obsessive compulsive disorder), and PTSD (post-traumatic stress disorder)  PTA, Pt's baseline was Independent in ADLs and functional mobility (no AD/DME)- Pt does not drive- does manage meds and finances  Pt lives at home with her son in a 2 story house with 2  KORY without railings  Pt is currently Supervision with ADLs and functional mobility (with use of RW)  Pt is demonstrating deficits including decreased ADL status, decreased self-care trans and decreased high-level ADLs   Pt has supportive son that can assist prn  No further OT needs indicated at this time- Home with social support- No AD/DME needed at this time  Pt ok to d/c when medically cleared  D/c from caseload  Goals   Patient Goals to not be in pain   Plan   OT Frequency Eval only   Recommendation   OT Discharge Recommendation No rehabilitation needs   OT - OK to Discharge Yes  (when medically cleared)   AM-PAC Daily Activity Inpatient   Lower Body Dressing 3   Bathing 3   Toileting 4   Upper Body Dressing 4   Grooming 4   Eating 4   Daily Activity Raw Score 22   Daily Activity Standardized Score (Calc for Raw Score >=11) 47  1   AM-PAC Applied Cognition Inpatient   Following a Speech/Presentation 3   Understanding Ordinary Conversation 4   Taking Medications 4   Remembering Where Things Are Placed or Put Away 4   Remembering List of 4-5 Errands 4   Taking Care of Complicated Tasks 3   Applied Cognition Raw Score 22   Applied Cognition Standardized Score 47 83       The patient's raw score on the AM-PAC Daily Activity inpatient short form is 22, standardized score is 47 1, greater than 39 4  Patients at this level are likely to benefit from discharge to home  Please refer to the recommendation of the Occupational Therapist for safe discharge planning       JULIAN Vallecillo

## 2021-09-02 NOTE — UTILIZATION REVIEW
Initial Clinical Review    Admission: Date/Time/Statement:   Admission Orders (From admission, onward)     Ordered        09/01/21 1410  Inpatient Admission  Once                   Orders Placed This Encounter   Procedures    Inpatient Admission     Standing Status:   Standing     Number of Occurrences:   1     Order Specific Question:   Level of Care     Answer:   Med Surg [16]     Order Specific Question:   Estimated length of stay     Answer:   More than 2 Midnights     Order Specific Question:   Certification     Answer:   I certify that inpatient services are medically necessary for this patient for a duration of greater than two midnights  See H&P and MD Progress Notes for additional information about the patient's course of treatment  ED Arrival Information     Expected Arrival Acuity    - 9/1/2021 13:20 Urgent         Means of arrival Escorted by Service Admission type    Walk-In Self Hospitalist Urgent         Arrival complaint    Foot wound check        Chief Complaint   Patient presents with    Foot Ulcer     pt returning after making arrangements for her dog to be admitted for a right foot ulcer that doctors wanted to treat with IV ABX      Initial Presentation:   46y Female to ED presents with right foot diabetic ulcer  PMH of DM type 2 with neuropathy and foot ulcers, HTN, bipolar disorder, anxiety, s/p sleeve gastrectomy, hypothyroidism  Noted burning on the sole of her right foot about 1 5 wks ago, pain progressively got worse, started noticing fluid draining a couple days ago and a foul odor  Initially seen in ED on 8/31 and had X-ray on right foot, which showed suspicion for osteomyelitis, labs showed elevated CRP and ESR, leukocytosis at 12 5, wound cultures and blood cultures pending  Podiatry was planning for IV antibiotics but pt left Against Medical Advice [facility]) on 8/31 as she had to go home and get care set up for her dogs, returned today for treatment   She was given Bactrim after leaving AMA, took 1 dose of Bactrim last night  Admit Inpatient level of care for Diabetic ulcer of midfoot assoc with DM with bone involvement  Noticed burning on the sole of her R foot around 1 5 weeks ago, which progressively got worse,  fluid draining few days ago and foul odor  S/p left 3rd toe amputation, s/p right toe amputation 1/2021 from previous diabetic foot ulcers  Repeat xray today  Elevated CRP and ER  Bld cultures x2 pending  Wound culture sensitivity pending  Wbc elevated yesterday, Cbc pending for today  Iv antibiotics  MRI right foot/forefoot, concern for right foot abscess  Pain control  9/1 Podiatry cons; Right foot diabetic ulcer minor - 3  Clinical osteomyelitis  MRI, Osteomyelitis work up for surgical intervention  Elevated WBC, CRP, and ESR  Bedside drainage performed today in ED  3-5cc of serosanguinous fluid expressed  Continue Iv antibiotics  Toe offloading shoe ordered  Date: 9/2   Day 2:   Progress notes; Wound culture: 4+ staph aureus  Continue Iv antibiotics  MRI still pending  Pain control  On exam; Very sleepy on evaluation  Awakens to touch  States that her foot hurts but quickly falls back asleep  Repeat Right foot Xray 9/1: No acute interval change  Question faint periosteal reaction base of the 3rd proximal phalanx as before without overt bony destruction   Possibility of osteomyelitis cannot be entirely excluded  Soft tissue ulceration plantar aspect 3rd digit without diffuse subcutaneous emphysema      ED Triage Vitals   Temperature Pulse Respirations Blood Pressure SpO2   09/01/21 1344 09/01/21 1344 09/01/21 1344 09/01/21 1344 09/01/21 1344   97 7 °F (36 5 °C) 86 18 135/71 97 %      Temp Source Heart Rate Source Patient Position - Orthostatic VS BP Location FiO2 (%)   09/01/21 1344 09/01/21 1603 09/01/21 1603 09/01/21 1603 --   Tympanic Monitor Lying Left arm       Pain Score       09/01/21 1344       8          Wt Readings from Last 1 Encounters:   09/01/21 102 kg (225 lb)     Additional Vital Signs:   09/02/21 07:31:32  97 7 °F (36 5 °C)  74  18  128/80  --  96 %  --  --   09/01/21 2323  --  --  --  --  --  --  None (Room air)  --   09/01/21 22:38:50  98 1 °F (36 7 °C)  71  18  112/74  --  96 %  --  --   09/01/21 2034  --  70  16  139/81  --  100 %         Pertinent Labs/Diagnostic Test Results:   9/1  Xray Right Foot - No acute interval change  Question faint periosteal reaction base of the 3rd proximal phalanx as before without overt bony destruction  Possibility of osteomyelitis cannot be entirely excluded  Soft tissue ulceration plantar aspect 3rd digit without diffuse subcutaneous emphysema  9/2  MRI Right foot/forefoot toes -       Results from last 7 days   Lab Units 09/02/21  0601 09/01/21 1842 08/31/21  1553   WBC Thousand/uL 8 98 10 50* 12 50*   HEMOGLOBIN g/dL 13 4 13 1 14 0   HEMATOCRIT % 43 9 41 7 44 3   PLATELETS Thousands/uL 388 398* 444*   NEUTROS ABS Thousands/µL 4 29 6 26 6 92         Results from last 7 days   Lab Units 09/02/21  0601 09/01/21  1842 08/31/21  1553   SODIUM mmol/L 135* 138 135*   POTASSIUM mmol/L 4 0 3 7 5 3   CHLORIDE mmol/L 103 106 105   CO2 mmol/L 28 28 26   ANION GAP mmol/L 4 4 4   BUN mg/dL 13 11 9   CREATININE mg/dL 0 82 1 12 0 72   EGFR ml/min/1 73sq m 87 59 101   CALCIUM mg/dL 9 8 9 2 9 2         Results from last 7 days   Lab Units 09/02/21  1040 09/02/21  0606 09/01/21  1850   POC GLUCOSE mg/dl 142* 123 112     Results from last 7 days   Lab Units 09/02/21  0601 09/01/21  1842 08/31/21  1553   GLUCOSE RANDOM mg/dL 105 141* 97         Results from last 7 days   Lab Units 09/01/21  1842   HEMOGLOBIN A1C % 6 7*   EAG mg/dl 146       Results from last 7 days   Lab Units 08/31/21  1553   CRP mg/L 29 3*   SED RATE mm/hour 46*       Results from last 7 days   Lab Units 08/31/21  1930 08/31/21  1553   BLOOD CULTURE   --  No Growth at 24 hrs     GRAM STAIN RESULT  4+ Gram positive cocci in pairs, chains and clusters*  3+ Gram negative rods*  2+ Polys* Gram positive cocci in clusters*     ED Treatment:   Medication Administration from 09/01/2021 1319 to 09/01/2021 2045       Date/Time Order Dose Route Action     09/01/2021 1603 ceFAZolin (ANCEF) IVPB (premix in dextrose) 2,000 mg 50 mL 2,000 mg Intravenous New Bag     09/01/2021 1835 metroNIDAZOLE (FLAGYL) IVPB (premix) 500 mg 100 mL 500 mg Intravenous New Bag     09/01/2021 1627 acetaminophen (TYLENOL) tablet 650 mg 650 mg Oral Given     09/01/2021 1753 HYDROmorphone (DILAUDID) injection 0 5 mg 0 5 mg Intravenous Given        Past Medical History:   Diagnosis Date    Anxiety     Arthritis     Asthma     Chronic pain     [facility] )     Disease of thyroid gland     Lymphedema     left leg    Manic bipolar I disorder (HCC)     [facility]     OCD (obsessive compulsive disorder)     PTSD (post-traumatic stress disorder)      Present on Admission:   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, with bone involvement without evidence of necrosis ([facility] )   Mor[facility]   Anxiety   Manic bipolar I disorder ([facility] )   Essential hypertension   Hypothyroidism   Chronic pain      Admitting Diagnosis: Foot ulcer ([place] [address] ) [L97 509]  Diabetic foot ulcer ([place] [address] ) [Z26 [id number], L97 509]  Right foot ulcer, with fat layer exposed ([place] [address] ) [L97 512]  Age/Sex: 40 y o  female     Admission Orders:  Scheduled Medications:  cloNIDine, 0 2 mg, Oral, BID  docusate sodium, 100 mg, Oral, BID  enoxaparin, 40 mg, Subcutaneous, Daily  fenofibrate, 48 mg, Oral, Daily  folic acid, 4,396 mcg, Oral, Daily  hydrOXYzine HCL, 50 mg, Oral, HS  hydrOXYzine HCL, 50 mg, Oral, QAM  insulin lispro, 1-6 Units, Subcutaneous, TID AC  lamoTRIgine, 200 mg, Oral, QAM  levothyroxine, 50 mcg, Oral, Daily  pantoprazole, 40 mg, Oral, HS  polyethylene glycol, 17 g, Oral, Daily  senna, 1 tablet, Oral, Daily  ziprasidone, 80 mg, Oral, BID With Meals    ceFAZolin (ANCEF) IVPB (premix in dextrose) 2,000 mg 50 mL   Dose: 2,000 mg  Freq: Every 8 hours Route: IV  Last Dose: 2,000 mg (09/02/21 1408)  Start: 09/02/21 1400    metroNIDAZOLE (FLAGYL) IVPB (premix) 500 mg 100 mL   Dose: 500 mg  Freq: Every 8 hours Route: IV  Last Dose: 500 mg (09/02/21 1444)  Start: 09/02/21 1400    Continuous IV Infusions: None     PRN Meds:  acetaminophen, 650 mg, Oral, Q6H PRN  albuterol, 2 puff, Inhalation, Q6H PRN  calcium carbonate, 1,000 mg, Oral, Daily PRN  HYDROmorphone, 0 5 mg, Intravenous, Q4H PRN 9/1 x1, 9/2 x1  methocarbamol, 750 mg, Oral, 4x Daily PRN 9/2 x1  ondansetron, 4 mg, Intravenous, Q6H PRN  oxyCODONE, 10 mg, Oral, Q4H PRN 9/1 x1, 9/2 x1  oxyCODONE, 5 mg, Oral, Q4H PRN  SUMAtriptan, 6 mg, Subcutaneous, Q2H PRN Max 3/day      Wound culture and Gram stain  Anaerobic culture and Gram stain  Bld cultures x2  IP CONSULT TO CASE MANAGEMENT    Network Utilization Review Department  ATTENTION: Please call with any questions or concerns to [phone] and carefully listen to the prompts so that you are directed to the right person  All voicemails are confidential   Luciano Gonzalez all requests for admission clinical reviews, approved or denied determinations and any other requests to dedicated fax number below belonging to the campus where the patient is receiving treatment   List of dedicated fax numbers for the Facilities:  [address] DENIALS (Administrative/Medical Necessity) [phone]   [address] (Maternity/NICU/Pediatrics) [phone] [address] [phone]   [address] Ervin Delacruz 3618 [id number] [place] [address] [phone] Stan [address] 8080 [facility] 95 [phone]

## 2021-09-02 NOTE — PHYSICAL THERAPY NOTE
Physical Therapy Cancellation Note    PT orders received chart review completed  Pt is currently pending surgical plan and not appropriate to participate in skilled PT at this time  PT will follow and eval as medically appropriate       09/02/21 1600   Note Type   Cancel Reasons Other     Jim Poag, PT

## 2021-09-03 ENCOUNTER — APPOINTMENT (INPATIENT)
Dept: RADIOLOGY | Facility: HOSPITAL | Age: 46
DRG: 305 | End: 2021-09-03
Payer: COMMERCIAL

## 2021-09-03 ENCOUNTER — ANESTHESIA EVENT (INPATIENT)
Dept: PERIOP | Facility: HOSPITAL | Age: 46
DRG: 305 | End: 2021-09-03
Payer: COMMERCIAL

## 2021-09-03 LAB
ANION GAP SERPL CALCULATED.3IONS-SCNC: 3 MMOL/L (ref 4–13)
BACTERIA BLD CULT: ABNORMAL
BACTERIA BLD CULT: ABNORMAL
BACTERIA SPEC ANAEROBE CULT: ABNORMAL
BACTERIA SPEC ANAEROBE CULT: ABNORMAL
BACTERIA WND AEROBE CULT: ABNORMAL
BACTERIA WND AEROBE CULT: ABNORMAL
BASOPHILS # BLD AUTO: 0.04 THOUSANDS/ΜL (ref 0–0.1)
BASOPHILS NFR BLD AUTO: 1 % (ref 0–1)
BUN SERPL-MCNC: 13 MG/DL (ref 5–25)
CALCIUM SERPL-MCNC: 9.7 MG/DL (ref 8.3–10.1)
CHLORIDE SERPL-SCNC: 104 MMOL/L (ref 100–108)
CO2 SERPL-SCNC: 29 MMOL/L (ref 21–32)
CREAT SERPL-MCNC: 0.8 MG/DL (ref 0.6–1.3)
EOSINOPHIL # BLD AUTO: 0.39 THOUSAND/ΜL (ref 0–0.61)
EOSINOPHIL NFR BLD AUTO: 5 % (ref 0–6)
ERYTHROCYTE [DISTWIDTH] IN BLOOD BY AUTOMATED COUNT: 14 % (ref 11.6–15.1)
GFR SERPL CREATININE-BSD FRML MDRD: 89 ML/MIN/1.73SQ M
GLUCOSE SERPL-MCNC: 113 MG/DL (ref 65–140)
GLUCOSE SERPL-MCNC: 124 MG/DL (ref 65–140)
GLUCOSE SERPL-MCNC: 132 MG/DL (ref 65–140)
GLUCOSE SERPL-MCNC: 170 MG/DL (ref 65–140)
GRAM STN SPEC: ABNORMAL
HCT VFR BLD AUTO: 42.6 % (ref 34.8–46.1)
HGB BLD-MCNC: 13.2 G/DL (ref 11.5–15.4)
IMM GRANULOCYTES # BLD AUTO: 0.01 THOUSAND/UL (ref 0–0.2)
IMM GRANULOCYTES NFR BLD AUTO: 0 % (ref 0–2)
LYMPHOCYTES # BLD AUTO: 3.02 THOUSANDS/ΜL (ref 0.6–4.47)
LYMPHOCYTES NFR BLD AUTO: 41 % (ref 14–44)
MAGNESIUM SERPL-MCNC: 2.4 MG/DL (ref 1.6–2.6)
MCH RBC QN AUTO: 25.6 PG (ref 26.8–34.3)
MCHC RBC AUTO-ENTMCNC: 31 G/DL (ref 31.4–37.4)
MCV RBC AUTO: 83 FL (ref 82–98)
MONOCYTES # BLD AUTO: 0.64 THOUSAND/ΜL (ref 0.17–1.22)
MONOCYTES NFR BLD AUTO: 9 % (ref 4–12)
NEUTROPHILS # BLD AUTO: 3.25 THOUSANDS/ΜL (ref 1.85–7.62)
NEUTS SEG NFR BLD AUTO: 44 % (ref 43–75)
NRBC BLD AUTO-RTO: 0 /100 WBCS
PLATELET # BLD AUTO: 384 THOUSANDS/UL (ref 149–390)
PMV BLD AUTO: 9 FL (ref 8.9–12.7)
POTASSIUM SERPL-SCNC: 4.3 MMOL/L (ref 3.5–5.3)
RBC # BLD AUTO: 5.16 MILLION/UL (ref 3.81–5.12)
SODIUM SERPL-SCNC: 136 MMOL/L (ref 136–145)
WBC # BLD AUTO: 7.35 THOUSAND/UL (ref 4.31–10.16)

## 2021-09-03 PROCEDURE — 71046 X-RAY EXAM CHEST 2 VIEWS: CPT

## 2021-09-03 PROCEDURE — 80048 BASIC METABOLIC PNL TOTAL CA: CPT | Performed by: STUDENT IN AN ORGANIZED HEALTH CARE EDUCATION/TRAINING PROGRAM

## 2021-09-03 PROCEDURE — 82948 REAGENT STRIP/BLOOD GLUCOSE: CPT

## 2021-09-03 PROCEDURE — 85025 COMPLETE CBC W/AUTO DIFF WBC: CPT | Performed by: STUDENT IN AN ORGANIZED HEALTH CARE EDUCATION/TRAINING PROGRAM

## 2021-09-03 PROCEDURE — 83735 ASSAY OF MAGNESIUM: CPT | Performed by: STUDENT IN AN ORGANIZED HEALTH CARE EDUCATION/TRAINING PROGRAM

## 2021-09-03 PROCEDURE — 99232 SBSQ HOSP IP/OBS MODERATE 35: CPT | Performed by: PHYSICIAN ASSISTANT

## 2021-09-03 RX ORDER — SODIUM CHLORIDE 9 MG/ML
75 INJECTION, SOLUTION INTRAVENOUS CONTINUOUS
Status: DISCONTINUED | OUTPATIENT
Start: 2021-09-04 | End: 2021-09-05

## 2021-09-03 RX ORDER — SODIUM CHLORIDE 9 MG/ML
75 INJECTION, SOLUTION INTRAVENOUS CONTINUOUS
Status: DISCONTINUED | OUTPATIENT
Start: 2021-09-05 | End: 2021-09-03

## 2021-09-03 RX ADMIN — ZIPRASIDONE HYDROCHLORIDE 80 MG: 40 CAPSULE ORAL at 22:08

## 2021-09-03 RX ADMIN — SENNOSIDES 8.6 MG: 8.6 TABLET ORAL at 08:07

## 2021-09-03 RX ADMIN — ZIPRASIDONE HYDROCHLORIDE 80 MG: 40 CAPSULE ORAL at 13:28

## 2021-09-03 RX ADMIN — HYDROXYZINE HYDROCHLORIDE 50 MG: 25 TABLET, FILM COATED ORAL at 08:07

## 2021-09-03 RX ADMIN — HYDROXYZINE HYDROCHLORIDE 100 MG: 25 TABLET, FILM COATED ORAL at 22:07

## 2021-09-03 RX ADMIN — LEVOTHYROXINE SODIUM 50 MCG: 50 TABLET ORAL at 05:59

## 2021-09-03 RX ADMIN — METRONIDAZOLE 500 MG: 500 INJECTION, SOLUTION INTRAVENOUS at 13:28

## 2021-09-03 RX ADMIN — ACETAMINOPHEN 650 MG: 325 TABLET, FILM COATED ORAL at 08:08

## 2021-09-03 RX ADMIN — HYDROMORPHONE HYDROCHLORIDE 0.5 MG: 1 INJECTION, SOLUTION INTRAMUSCULAR; INTRAVENOUS; SUBCUTANEOUS at 09:35

## 2021-09-03 RX ADMIN — HYDROMORPHONE HYDROCHLORIDE 0.5 MG: 1 INJECTION, SOLUTION INTRAMUSCULAR; INTRAVENOUS; SUBCUTANEOUS at 18:33

## 2021-09-03 RX ADMIN — METHOCARBAMOL TABLETS 750 MG: 750 TABLET, COATED ORAL at 08:08

## 2021-09-03 RX ADMIN — CEFAZOLIN SODIUM 2000 MG: 2 SOLUTION INTRAVENOUS at 05:59

## 2021-09-03 RX ADMIN — OXYCODONE HYDROCHLORIDE 10 MG: 10 TABLET ORAL at 05:59

## 2021-09-03 RX ADMIN — DOCUSATE SODIUM 100 MG: 100 CAPSULE, LIQUID FILLED ORAL at 08:07

## 2021-09-03 RX ADMIN — FENOFIBRATE 48 MG: 48 TABLET ORAL at 08:09

## 2021-09-03 RX ADMIN — CEFAZOLIN SODIUM 2000 MG: 2 SOLUTION INTRAVENOUS at 22:13

## 2021-09-03 RX ADMIN — POLYETHYLENE GLYCOL 3350 17 G: 17 POWDER, FOR SOLUTION ORAL at 08:07

## 2021-09-03 RX ADMIN — PANTOPRAZOLE SODIUM 40 MG: 40 TABLET, DELAYED RELEASE ORAL at 22:08

## 2021-09-03 RX ADMIN — METRONIDAZOLE 500 MG: 500 INJECTION, SOLUTION INTRAVENOUS at 08:08

## 2021-09-03 RX ADMIN — CLONIDINE HYDROCHLORIDE 0.2 MG: 0.2 TABLET ORAL at 08:07

## 2021-09-03 RX ADMIN — METHOCARBAMOL TABLETS 750 MG: 750 TABLET, COATED ORAL at 13:27

## 2021-09-03 RX ADMIN — OXYCODONE HYDROCHLORIDE 10 MG: 10 TABLET ORAL at 23:49

## 2021-09-03 RX ADMIN — CEFAZOLIN SODIUM 2000 MG: 2 SOLUTION INTRAVENOUS at 13:28

## 2021-09-03 RX ADMIN — OXYCODONE HYDROCHLORIDE 10 MG: 10 TABLET ORAL at 13:27

## 2021-09-03 RX ADMIN — METRONIDAZOLE 500 MG: 500 INJECTION, SOLUTION INTRAVENOUS at 23:01

## 2021-09-03 RX ADMIN — FOLIC ACID 1000 MCG: 1 TABLET ORAL at 08:07

## 2021-09-03 RX ADMIN — OXYCODONE HYDROCHLORIDE 10 MG: 10 TABLET ORAL at 17:09

## 2021-09-03 RX ADMIN — ENOXAPARIN SODIUM 40 MG: 40 INJECTION SUBCUTANEOUS at 08:07

## 2021-09-03 RX ADMIN — ACETAMINOPHEN 650 MG: 325 TABLET, FILM COATED ORAL at 13:27

## 2021-09-03 RX ADMIN — LAMOTRIGINE 200 MG: 100 TABLET ORAL at 08:07

## 2021-09-03 NOTE — PHYSICAL THERAPY NOTE
Physical Therapy Cancellation Note     09/03/21 1300   PT Last Visit   PT Visit Date 09/03/21   Note Type   Note type Evaluation   Cancel Reasons Other  (planned OR Monday- will defer PT eval until post op)       Orders received and chart review completed pt to OR on Monday- will defer PT eval until post op for mobilization and d/c needs

## 2021-09-03 NOTE — TREATMENT PLAN
MRI came back today for right foot  Changes indicating early osteomyelitis, septic arthropathy, abscess formation to the 3rd digit, metatarsal and surrounding tissue  Podiatry planning to take to the OR for transmetatarsal amputation Saturday morning 09/04/2021 with Dr Tejinder Baer from primary team for surgery pending  Discussed treatment with patient and she agrees with treatment plan  States that the sooner it has done better

## 2021-09-03 NOTE — ASSESSMENT & PLAN NOTE
· Patient arrived to ED 8/31 for evaluation of a diabetic foot ulcer  Patient left AMA at that time, returned 9/1 for treatment  Patient reports that she noticed burning on the sole of her R foot around 1 5 weeks ago, which progressively got worse  Patient also noticed some fluid draining a couple of days ago and a foul odor  She had attempted dressing and cleaning the wound, was supposed to go to outpatient wound care in [facility] but her ride never showed up  Provided Bactrim after leaving [place], received 1 dose on 8/31  · S/p left 3rd toe amputation and right toe amputation 1/2021 from previous diabetic foot ulcers  · Right foot Xray 8/31: no acute fracture  Suspicion of periosteal response, 3rd proximal phalangeal shaft, high suspicion for osteomyelitis  · Repeat Right foot Xray 9/1: No acute interval change  Question faint periosteal reaction base of the 3rd proximal phalanx as before without overt bony destruction  Possibility of osteomyelitis cannot be entirely excluded  Soft tissue ulceration plantar aspect 3rd digit without diffuse subcutaneous emphysema   · Elevated CRP, ESR  · Blood cultures x2 with no growth to date   · Wound culture: 4+ staph aureus   · MRI R foot 9/3:   1  Plantar skin ulceration at the 3rd MTP with marrow changes at the base of the 3rd proximal phalanx suspicious for early changes of osteomyelitis   No corresponding marrow abnormality at the 3rd metatarsal head although 3rd MTP septic arthropathy would still be difficult to exclude  2   Peripherally enhancing dorsal soft tissue collection between the 3rd and 4th rays measuring 1 4 x 1 0 x 1 0 cm worrisome for abscess formation communicating with the 3rd intermetatarsal bursa  3   Diminished enhancement of the plantar soft tissues between the 3rd and 5th MTPs concerning for early changes of tissue necrosis   No soft tissue emphysema at this time   Correlate with clinical examination    · Podiatry following, appreciate recommendations   · Continue IV Ancef and Flagyl for now  · Plan for transmetatarsal amputation Neo morning 09/05/2021 with Dr Randall Desir  · PT/OT evaluations postoperatively   · Analgesics as needed

## 2021-09-03 NOTE — ASSESSMENT & PLAN NOTE
Lab Results   Component Value Date    HGBA1C 6 7 (H) 09/01/2021       Recent Labs     09/02/21  1040 09/02/21  1602 09/03/21  0610 09/03/21  1112   POCGLU 142* 126 124 132       Blood Sugar Average: Last 72 hrs:  · Takes metformin at home, refuses insulin at home  · QID accuchecks with SSI while inpatient   · Diabetic diet   · Hypoglycemia protocol

## 2021-09-03 NOTE — PROGRESS NOTES
[facility]  Progress Note - Jorge Sicks 1975, 40 y o  female MRN: [id number]  Unit/Bed#: -[place] Encounter: [id number]  Primary Care Provider: Heather Barclay DO   Date and time admitted to hospital: 9/1/2021  1:35 PM    * 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, with bone involvement without evidence of necrosis [facility])  Assessment & Plan  · Patient arrived to ED 8/31 for evaluation of a diabetic foot ulcer  Patient left AMA at that time, returned 9/1 for treatment  Patient reports that she noticed burning on the sole of her R foot around 1 5 weeks ago, which progressively got worse  Patient also noticed some fluid draining a couple of days ago and a foul odor  She had attempted dressing and cleaning the wound, was supposed to go to outpatient wound care in [facility] but her ride never showed up  Provided Bactrim after leaving AM, received 1 dose on 8/31  · S/p left 3rd toe amputation and right toe amputation 1/2021 from previous diabetic foot ulcers  · Right foot Xray 8/31: no acute fracture  Suspicion of periosteal response, 3rd proximal phalangeal shaft, high suspicion for osteomyelitis  · Repeat Right foot Xray 9/1: No acute interval change  Question faint periosteal reaction base of the 3rd proximal phalanx as before without overt bony destruction  Possibility of osteomyelitis cannot be entirely excluded  Soft tissue ulceration plantar aspect 3rd digit without diffuse subcutaneous emphysema   · Elevated CRP, ESR  · Blood cultures x2 with no growth to date   · Wound culture: 4+ staph aureus   · MRI R foot 9/3:   1  Plantar skin ulceration at the 3rd MTP with marrow changes at the base of the 3rd proximal phalanx suspicious for early changes of osteomyelitis   No corresponding marrow abnormality at the 3rd metatarsal head although 3rd MTP septic arthropathy would still be difficult to exclude     2   Peripherally enhancing dorsal soft tissue collection between the 3rd and 4th rays measuring 1 4 x 1 0 x 1 0 cm worrisome for abscess formation communicating with the 3rd intermetatarsal bursa  3   Diminished enhancement of the plantar soft tissues between the 3rd and 5th MTPs concerning for early changes of tissue necrosis   No soft tissue emphysema at this time   Correlate with clinical examination    · Podiatry following, appreciate recommendations   · Continue IV Ancef and Flagyl for now  · Plan for transmetatarsal amputation Neo morning 09/05/2021 with Dr Navarro Santacruz  · PT/OT evaluations postoperatively   · Analgesics as needed     Type 2 diabetes mellitus with neurologic complication, without long-term current use of insulin [facility])  Assessment & Plan  Lab Results   Component Value Date    HGBA1C 6 7 (H) 09/01/2021       Recent Labs     09/02/21  1040 09/02/21  1602 09/03/21  0610 09/03/21  1112   POCGLU 142* 126 124 132       Blood Sugar Average: Last 72 hrs:  · Takes metformin at home, refuses insulin at home  · QID accuchecks with SSI while inpatient   · Diabetic diet   · Hypoglycemia protocol     Essential hypertension  Assessment & Plan  · Stable, continue clonodine    Chronic pain  Assessment & Plan  · History of chronic back pain  · Continue Robaxin    Manic bipolar I disorder ([facility] [address] )  Assessment & Plan  · Managed by psychiatry outpatient, has been managed well recently  · Continue lamictal and Geodon    Morbid obesity ([facility] [address] )  Assessment & Plan  · S/p sleeve gastrectomy  · Encourage dietary/lifestyle modifications    Migraine without status migrainosus, not intractable  Assessment & Plan  · Has been managed well with Imitrex injections p r n , continue    Hypothyroidism  Assessment & Plan  · Takes levothyroxine at home, last TSH wnl  · Continue levothyroxine    GERD (gastroesophageal reflux disease)  Assessment & Plan  · Continue Protonix    H/O bariatric surgery  Assessment & Plan  · S/p sleeve gastrectomy 2019, no complications  · Continue folic acid    Acquired absence of other left toe(s) ([address] )  Assessment & Plan  · 2/2 diabetic foot ulcers  · See above    Anxiety  Assessment & Plan  · Takes Atarax at home, continue      VTE Pharmacologic Prophylaxis: VTE Score: 3 Moderate Risk (Score 3-4) - Pharmacological DVT Prophylaxis Ordered: enoxaparin (Lovenox)  Patient Centered Rounds: I performed bedside rounds with nursing staff today  Discussions with Specialists or Other Care Team Provider: primary RN, case management     Education and Discussions with Family / Patient: Updated  (friends/family) at bedside  Time Spent for Care: 20 minutes  More than 50% of total time spent on counseling and coordination of care as described above  Current Length of Stay: 2 day(s)  Current Patient Status: Inpatient   Certification Statement: The patient will continue to require additional inpatient hospital stay due to OR with podaitry, therapy evaluations   Discharge Plan: Anticipate discharge in >72 hrs to discharge location to be determined pending rehab evaluations  Code Status: Level 1 - Full Code    Subjective:   Patient reports 7-8/10 pain in her foot at this time, she states pain is well controlled on current regimen  She is aware plan is for amputation on   She states she would not go to rehab facility if that were recommended  She notes that she has been urinating more frequently since admission  Objective:     Vitals:   Temp (24hrs), Av 7 °F (36 5 °C), Min:97 4 °F (36 3 °C), Max:97 9 °F (36 6 °C)    Temp:  [97 4 °F (36 3 °C)-97 9 °F (36 6 °C)] 97 4 °F (36 3 °C)  HR:  [64-66] 66  Resp:  [16-20] 20  BP: (109-125)/(70-78) 109/71  SpO2:  [93 %-99 %] 93 %  Body mass index is 36 87 kg/m²  Input and Output Summary (last 24 hours):      Intake/Output Summary (Last 24 hours) at 9/3/2021 1314  Last data filed at 9/3/2021 0741  Gross per 24 hour   Intake 330 ml   Output --   Net 330 ml Physical Exam:   Physical Exam  Vitals and nursing note reviewed  Constitutional:       General: She is not in acute distress  Cardiovascular:      Rate and Rhythm: Normal rate and regular rhythm  Pulses: Normal pulses  Heart sounds: No murmur heard  Pulmonary:      Effort: Pulmonary effort is normal  No respiratory distress  Breath sounds: No wheezing or rales  Abdominal:      General: Abdomen is flat  There is no distension  Palpations: Abdomen is soft  Tenderness: There is no abdominal tenderness  Musculoskeletal:      Right lower leg: No edema  Left lower leg: No edema  Skin:     General: Skin is warm and dry  Coloration: Skin is not pale  Findings: No erythema  Comments: R foot dressing c/d/i   Neurological:      General: No focal deficit present  Mental Status: She is alert and oriented to person, place, and time  Mental status is at baseline         Additional Data:     Labs:  Results from last 7 days   Lab Units 09/03/21  0604   WBC Thousand/uL 7 35   HEMOGLOBIN g/dL 13 2   HEMATOCRIT % 42 6   PLATELETS Thousands/uL 384   NEUTROS PCT % 44   LYMPHS PCT % 41   MONOS PCT % 9   EOS PCT % 5     Results from last 7 days   Lab Units 09/03/21  0604   SODIUM mmol/L 136   POTASSIUM mmol/L 4 3   CHLORIDE mmol/L 104   CO2 mmol/L 29   BUN mg/dL 13   CREATININE mg/dL 0 80   ANION GAP mmol/L 3*   CALCIUM mg/dL 9 7   GLUCOSE RANDOM mg/dL 113         Results from last 7 days   Lab Units 09/03/21  1112 09/03/21  0610 09/02/21  1602 09/02/21  1040 09/02/21  0606 09/01/21  1850   POC GLUCOSE mg/dl 132 124 126 142* 123 112     Results from last 7 days   Lab Units 09/01/21  1842   HEMOGLOBIN A1C % 6 7*           Lines/Drains:  Invasive Devices     Peripheral Intravenous Line            Peripheral IV 09/03/21 Left Antecubital <1 day                      Imaging: Reviewed radiology reports from this admission including: MRI foot    Recent Cultures (last 7 days): Results from last 7 days   Lab Units 08/31/21  1930 08/31/21  1553   BLOOD CULTURE   --  Staphylococcus coagulase negative*  Dermobacter hominis*  No Growth at 48 hrs     GRAM STAIN RESULT  4+ Gram positive cocci in pairs, chains and clusters*  3+ Gram negative rods*  2+ Polys* Gram positive cocci in clusters*   WOUND CULTURE  4+ Growth of Staphylococcus aureus*  4+ Growth of   --        Last 24 Hours Medication List:   Current Facility-Administered Medications   Medication Dose Route Frequency Provider Last Rate    acetaminophen  650 mg Oral Q6H PRN Yessica Bad, PA-C      albuterol  2 puff Inhalation Q6H PRN Yessica Bad, PA-C      calcium carbonate  1,000 mg Oral Daily PRN Yessica Bad, PA-C      cefazolin  2,000 mg Intravenous Q8H [place] Zhen Root MD 2,000 mg (09/03/21 0559)    cloNIDine  0 2 mg Oral BID Yessica Bad, PA-C      docusate sodium  100 mg Oral BID Yessica Bad, PA-C      enoxaparin  40 mg Subcutaneous Daily Yessica Bad, PA-C      fenofibrate  48 mg Oral Daily Yessica Bad, PA-C      folic acid  8,030 mcg Oral Daily Yessica Bad, PA-C      HYDROmorphone  0 5 mg Intravenous Q6H PRN Camron Garcia MD      hydrOXYzine HCL  100 mg Oral HS Alexa Taylor MD      hydrOXYzine HCL  50 mg Oral QAM Julieta Petit PA-C      insulin lispro  1-6 Units Subcutaneous TID AC Julián Martin MD      lamoTRIgine  200 mg Oral QAM Yessica Bad, PA-C      levothyroxine  50 mcg Oral Daily Yessica Bad, PA-C      methocarbamol  750 mg Oral 4x Daily PRN Yessica Bad, PA-C      metroNIDAZOLE  500 mg Intravenous Q8H Haritha Root  mg (09/03/21 0808)    ondansetron  4 mg Intravenous Q6H PRN Yessica Bad, PA-C      oxyCODONE  10 mg Oral Q4H PRN Yessica Bad, PA-C      oxyCODONE  5 mg Oral Q4H PRN Yessica Bad, PA-C      pantoprazole  40 mg Oral HS SUSAN Bhatia-BIANCA      polyethylene glycol  17 g Oral Daily Thania [place] ZORAIDA Petit      senna  1 tablet Oral Daily Sheron Mondragon PA-C      [START ON 9/5/2021] sodium chloride  75 mL/hr Intravenous Continuous [place] Dad, DPM      SUMAtriptan  6 mg Subcutaneous Q2H PRN Max 3/day Sheron Mondragon PA-C      ziprasidone  80 mg Oral BID With Meals Sheron Mondragon PA-C          Today, Patient Was Seen By: Kala Manning PA-C    **Please Note: This note may have been constructed using a voice recognition system  **

## 2021-09-03 NOTE — CASE MANAGEMENT
Patient reviewed during care coordination rounds with PA-BIANCA Held who informed that Podiatry plan is pending  Pt may need surgery followed by PT/OT hollis  CM met with pt and pt's PA [facility] , Ponce Lam ([phone]) at bedside  As pt has an ICM, pt qualifies for a level 2 PASRR if she were to need STR at time of discharge  CM informed ZORAIDA Held of same

## 2021-09-04 ENCOUNTER — APPOINTMENT (INPATIENT)
Dept: RADIOLOGY | Facility: HOSPITAL | Age: 46
DRG: 305 | End: 2021-09-04
Payer: COMMERCIAL

## 2021-09-04 ENCOUNTER — ANESTHESIA (INPATIENT)
Dept: PERIOP | Facility: HOSPITAL | Age: 46
DRG: 305 | End: 2021-09-04
Payer: COMMERCIAL

## 2021-09-04 LAB
GLUCOSE SERPL-MCNC: 160 MG/DL (ref 65–140)
GLUCOSE SERPL-MCNC: 194 MG/DL (ref 65–140)
GLUCOSE SERPL-MCNC: 197 MG/DL (ref 65–140)
GLUCOSE SERPL-MCNC: 89 MG/DL (ref 65–140)
GLUCOSE SERPL-MCNC: 99 MG/DL (ref 65–140)

## 2021-09-04 PROCEDURE — 88311 DECALCIFY TISSUE: CPT | Performed by: PATHOLOGY

## 2021-09-04 PROCEDURE — 73630 X-RAY EXAM OF FOOT: CPT

## 2021-09-04 PROCEDURE — 28805 AMPUTATION THRU METATARSAL: CPT | Performed by: PODIATRIST

## 2021-09-04 PROCEDURE — 0Y6M0ZD DETACHMENT AT RIGHT FOOT, PARTIAL 4TH RAY, OPEN APPROACH: ICD-10-PCS | Performed by: PODIATRIST

## 2021-09-04 PROCEDURE — 0Y6M0Z9 DETACHMENT AT RIGHT FOOT, PARTIAL 1ST RAY, OPEN APPROACH: ICD-10-PCS | Performed by: PODIATRIST

## 2021-09-04 PROCEDURE — 82948 REAGENT STRIP/BLOOD GLUCOSE: CPT

## 2021-09-04 PROCEDURE — 0Y6M0ZB DETACHMENT AT RIGHT FOOT, PARTIAL 2ND RAY, OPEN APPROACH: ICD-10-PCS | Performed by: PODIATRIST

## 2021-09-04 PROCEDURE — 88305 TISSUE EXAM BY PATHOLOGIST: CPT | Performed by: PATHOLOGY

## 2021-09-04 PROCEDURE — NC001 PR NO CHARGE: Performed by: PODIATRIST

## 2021-09-04 PROCEDURE — 99232 SBSQ HOSP IP/OBS MODERATE 35: CPT | Performed by: PHYSICIAN ASSISTANT

## 2021-09-04 PROCEDURE — 0Y6M0ZF DETACHMENT AT RIGHT FOOT, PARTIAL 5TH RAY, OPEN APPROACH: ICD-10-PCS | Performed by: PODIATRIST

## 2021-09-04 PROCEDURE — 0Y6M0ZC DETACHMENT AT RIGHT FOOT, PARTIAL 3RD RAY, OPEN APPROACH: ICD-10-PCS | Performed by: PODIATRIST

## 2021-09-04 RX ORDER — LIDOCAINE HYDROCHLORIDE 10 MG/ML
INJECTION, SOLUTION EPIDURAL; INFILTRATION; INTRACAUDAL; PERINEURAL AS NEEDED
Status: DISCONTINUED | OUTPATIENT
Start: 2021-09-04 | End: 2021-09-04

## 2021-09-04 RX ORDER — SODIUM CHLORIDE 9 MG/ML
75 INJECTION, SOLUTION INTRAVENOUS CONTINUOUS
Status: DISCONTINUED | OUTPATIENT
Start: 2021-09-04 | End: 2021-09-05

## 2021-09-04 RX ORDER — GLYCOPYRROLATE 0.2 MG/ML
INJECTION INTRAMUSCULAR; INTRAVENOUS AS NEEDED
Status: DISCONTINUED | OUTPATIENT
Start: 2021-09-04 | End: 2021-09-04

## 2021-09-04 RX ORDER — LIDOCAINE HYDROCHLORIDE 10 MG/ML
INJECTION, SOLUTION EPIDURAL; INFILTRATION; INTRACAUDAL; PERINEURAL AS NEEDED
Status: DISCONTINUED | OUTPATIENT
Start: 2021-09-04 | End: 2021-09-04 | Stop reason: HOSPADM

## 2021-09-04 RX ORDER — ONDANSETRON 2 MG/ML
4 INJECTION INTRAMUSCULAR; INTRAVENOUS ONCE AS NEEDED
Status: DISCONTINUED | OUTPATIENT
Start: 2021-09-04 | End: 2021-09-04 | Stop reason: HOSPADM

## 2021-09-04 RX ORDER — ONDANSETRON 2 MG/ML
INJECTION INTRAMUSCULAR; INTRAVENOUS AS NEEDED
Status: DISCONTINUED | OUTPATIENT
Start: 2021-09-04 | End: 2021-09-04

## 2021-09-04 RX ORDER — PROPOFOL 10 MG/ML
INJECTION, EMULSION INTRAVENOUS CONTINUOUS PRN
Status: DISCONTINUED | OUTPATIENT
Start: 2021-09-04 | End: 2021-09-04

## 2021-09-04 RX ORDER — MIDAZOLAM HYDROCHLORIDE 2 MG/2ML
INJECTION, SOLUTION INTRAMUSCULAR; INTRAVENOUS AS NEEDED
Status: DISCONTINUED | OUTPATIENT
Start: 2021-09-04 | End: 2021-09-04

## 2021-09-04 RX ORDER — CEFAZOLIN SODIUM 2 G/50ML
SOLUTION INTRAVENOUS AS NEEDED
Status: DISCONTINUED | OUTPATIENT
Start: 2021-09-04 | End: 2021-09-04

## 2021-09-04 RX ORDER — PROPOFOL 10 MG/ML
INJECTION, EMULSION INTRAVENOUS AS NEEDED
Status: DISCONTINUED | OUTPATIENT
Start: 2021-09-04 | End: 2021-09-04

## 2021-09-04 RX ORDER — HYDROMORPHONE HCL IN WATER/PF 6 MG/30 ML
0.2 PATIENT CONTROLLED ANALGESIA SYRINGE INTRAVENOUS
Status: DISCONTINUED | OUTPATIENT
Start: 2021-09-04 | End: 2021-09-04 | Stop reason: HOSPADM

## 2021-09-04 RX ORDER — SODIUM CHLORIDE 9 MG/ML
INJECTION, SOLUTION INTRAVENOUS CONTINUOUS PRN
Status: DISCONTINUED | OUTPATIENT
Start: 2021-09-04 | End: 2021-09-04

## 2021-09-04 RX ORDER — BUPIVACAINE HYDROCHLORIDE 5 MG/ML
INJECTION, SOLUTION PERINEURAL AS NEEDED
Status: DISCONTINUED | OUTPATIENT
Start: 2021-09-04 | End: 2021-09-04 | Stop reason: HOSPADM

## 2021-09-04 RX ORDER — KETAMINE HYDROCHLORIDE 50 MG/ML
INJECTION, SOLUTION, CONCENTRATE INTRAMUSCULAR; INTRAVENOUS AS NEEDED
Status: DISCONTINUED | OUTPATIENT
Start: 2021-09-04 | End: 2021-09-04

## 2021-09-04 RX ORDER — FENTANYL CITRATE/PF 50 MCG/ML
25 SYRINGE (ML) INJECTION
Status: DISCONTINUED | OUTPATIENT
Start: 2021-09-04 | End: 2021-09-04 | Stop reason: HOSPADM

## 2021-09-04 RX ORDER — MAGNESIUM HYDROXIDE 1200 MG/15ML
LIQUID ORAL AS NEEDED
Status: DISCONTINUED | OUTPATIENT
Start: 2021-09-04 | End: 2021-09-04 | Stop reason: HOSPADM

## 2021-09-04 RX ADMIN — CLONIDINE HYDROCHLORIDE 0.2 MG: 0.2 TABLET ORAL at 18:08

## 2021-09-04 RX ADMIN — LIDOCAINE HYDROCHLORIDE 50 MG: 10 INJECTION, SOLUTION EPIDURAL; INFILTRATION; INTRACAUDAL; PERINEURAL at 08:05

## 2021-09-04 RX ADMIN — FOLIC ACID 1000 MCG: 1 TABLET ORAL at 10:10

## 2021-09-04 RX ADMIN — SODIUM CHLORIDE 75 ML/HR: 0.9 INJECTION, SOLUTION INTRAVENOUS at 00:25

## 2021-09-04 RX ADMIN — PROPOFOL 100 MCG/KG/MIN: 10 INJECTION, EMULSION INTRAVENOUS at 08:05

## 2021-09-04 RX ADMIN — KETAMINE HYDROCHLORIDE 50 MG: 50 INJECTION, SOLUTION INTRAMUSCULAR; INTRAVENOUS at 08:05

## 2021-09-04 RX ADMIN — METRONIDAZOLE 500 MG: 500 INJECTION, SOLUTION INTRAVENOUS at 05:26

## 2021-09-04 RX ADMIN — OXYCODONE HYDROCHLORIDE 10 MG: 10 TABLET ORAL at 14:52

## 2021-09-04 RX ADMIN — ONDANSETRON 4 MG: 2 INJECTION INTRAMUSCULAR; INTRAVENOUS at 10:05

## 2021-09-04 RX ADMIN — MIDAZOLAM 2 MG: 1 INJECTION INTRAMUSCULAR; INTRAVENOUS at 07:56

## 2021-09-04 RX ADMIN — SODIUM CHLORIDE: 0.9 INJECTION, SOLUTION INTRAVENOUS at 07:59

## 2021-09-04 RX ADMIN — CEFAZOLIN SODIUM 2000 MG: 2 SOLUTION INTRAVENOUS at 05:26

## 2021-09-04 RX ADMIN — PROPOFOL 30 MG: 10 INJECTION, EMULSION INTRAVENOUS at 08:05

## 2021-09-04 RX ADMIN — ZIPRASIDONE HYDROCHLORIDE 80 MG: 40 CAPSULE ORAL at 21:29

## 2021-09-04 RX ADMIN — OXYCODONE HYDROCHLORIDE 10 MG: 10 TABLET ORAL at 10:12

## 2021-09-04 RX ADMIN — FENOFIBRATE 48 MG: 48 TABLET ORAL at 10:10

## 2021-09-04 RX ADMIN — ONDANSETRON 4 MG: 2 INJECTION INTRAMUSCULAR; INTRAVENOUS at 08:27

## 2021-09-04 RX ADMIN — GLYCOPYRROLATE 0.2 MG: 0.2 INJECTION, SOLUTION INTRAMUSCULAR; INTRAVENOUS at 08:05

## 2021-09-04 RX ADMIN — INSULIN LISPRO 1 UNITS: 100 INJECTION, SOLUTION INTRAVENOUS; SUBCUTANEOUS at 10:59

## 2021-09-04 RX ADMIN — INSULIN LISPRO 2 UNITS: 100 INJECTION, SOLUTION INTRAVENOUS; SUBCUTANEOUS at 16:35

## 2021-09-04 RX ADMIN — PANTOPRAZOLE SODIUM 40 MG: 40 TABLET, DELAYED RELEASE ORAL at 21:30

## 2021-09-04 RX ADMIN — HYDROXYZINE HYDROCHLORIDE 50 MG: 25 TABLET, FILM COATED ORAL at 10:11

## 2021-09-04 RX ADMIN — CLONIDINE HYDROCHLORIDE 0.2 MG: 0.2 TABLET ORAL at 10:09

## 2021-09-04 RX ADMIN — ZIPRASIDONE HYDROCHLORIDE 80 MG: 40 CAPSULE ORAL at 10:16

## 2021-09-04 RX ADMIN — OXYCODONE HYDROCHLORIDE 10 MG: 10 TABLET ORAL at 21:31

## 2021-09-04 RX ADMIN — HYDROMORPHONE HYDROCHLORIDE 0.5 MG: 1 INJECTION, SOLUTION INTRAMUSCULAR; INTRAVENOUS; SUBCUTANEOUS at 01:08

## 2021-09-04 RX ADMIN — HYDROXYZINE HYDROCHLORIDE 100 MG: 25 TABLET, FILM COATED ORAL at 21:30

## 2021-09-04 RX ADMIN — LAMOTRIGINE 200 MG: 100 TABLET ORAL at 10:11

## 2021-09-04 RX ADMIN — METHOCARBAMOL TABLETS 750 MG: 750 TABLET, COATED ORAL at 21:31

## 2021-09-04 RX ADMIN — CEFAZOLIN SODIUM 2000 MG: 2 SOLUTION INTRAVENOUS at 08:05

## 2021-09-04 RX ADMIN — ENOXAPARIN SODIUM 40 MG: 40 INJECTION SUBCUTANEOUS at 10:10

## 2021-09-04 RX ADMIN — HYDROMORPHONE HYDROCHLORIDE 0.5 MG: 1 INJECTION, SOLUTION INTRAMUSCULAR; INTRAVENOUS; SUBCUTANEOUS at 16:32

## 2021-09-04 NOTE — ANESTHESIA PREPROCEDURE EVALUATION
Procedure:  AMPUTATION TRANSMETATARSAL (TMA) (Right Foot)    Relevant Problems   CARDIO   (+) Essential hypertension      ENDO   (+) Hypothyroidism   (+) Type 2 diabetes mellitus (HCC)   (+) Type 2 diabetes mellitus with neurologic complication, without long-term current use of insulin (HCC)      GI/HEPATIC   (+) GERD (gastroesophageal reflux disease)   (+) H/O bariatric surgery      MUSCULOSKELETAL   (+) Arthritis      NEURO/PSYCH   (+) Anxiety   (+) Chronic pain   (+) OCD (obsessive compulsive disorder)   (+) PTSD (post-traumatic stress disorder)      PULMONARY   (+) Smoking             Anesthesia Plan  ASA Score- 3     Anesthesia Type- IV sedation with anesthesia with ASA Monitors  Additional Monitors:   Airway Plan:     Comment: IV sedation, GA back up; standard ASA monitors  Risks and benefits discussed with patient; patient consented and agrees to proceed  I saw and evaluated the patient  If seen with CRNA, we have discussed the anesthetic plan and I am in agreement that the plan is appropriate for the patient  Pt has had tubal ligation, 9 months since last period; I offered UPT, she declines  Plan Factors-    Induction- intravenous  Postoperative Plan-     Informed Consent- Anesthetic plan and risks discussed with patient  I personally reviewed this patient with the CRNA  Discussed and agreed on the Anesthesia Plan with the CRNA  Nany Forbes

## 2021-09-04 NOTE — PROGRESS NOTES
Podiatry - Progress Note  Patient: Rupert Alvares 40 y o  female   MRN: [id number]  PCP: Danielle Mcguire, DO  Unit/Bed#: -09 Encounter: [id number]  Date Of Visit: 21    ASSESSMENT:    Rupert Alvares is a 40 y o  female with:    1  Right foot diabetic ulcer- minor 3- POA with clinical OM  2  OM of 3rd digit  3  T2DM (HbA1c 21 6 7%)  4  Tobacco abuse      PLAN:    · Patient to go to OR today,21, for Right TMA with Dr Jess Javier  · Consent signed  · Confirmed NPO status  · H&P, vitals, and current labs reviewed  No acute changes noted  · Alternatives, risks, and complications discussed with patient  · All questions answered  No guarantees given of outcome  · Rest of medical care per primary team        SUBJECTIVE:     The patient was seen, evaluated, and assessed at bedside today  The patient was awake, alert, and in no acute distress  Patient confirmed NPO status  All questions and concerns regarding the surgical procedure addressed  Patient understands risks vs benefits of procedure and remains amenable with plan for surgery today  Patient denies N/V/F/chills/SOB/CP  OBJECTIVE:     Vitals:   /78 (BP Location: Right arm)   Pulse 64   Temp 97 7 °F (36 5 °C) (Oral)   Resp 18   Ht 5' 5 5" (1 664 m)   Wt 102 kg (225 lb)   SpO2 98%   BMI 36 87 kg/m²     Temp (24hrs), Av 7 °F (36 5 °C), Min:97 4 °F (36 3 °C), Max:98 °F (36 7 °C)      Physical Exam:     General:  Alert, cooperative, and in no distress  Lower extremity exam:  Cardiovascular status at baseline  Neurological status at baseline  Musculoskeletal status at baseline  No calf tenderness noted bilaterally  Dressing left intact to the Operating Room       Additional Data:     Labs:    Results from last 7 days   Lab Units 21  0604   WBC Thousand/uL 7 35   HEMOGLOBIN g/dL 13 2   HEMATOCRIT % 42 6   PLATELETS Thousands/uL 384   NEUTROS PCT % 44   LYMPHS PCT % 41   MONOS PCT % 9   EOS PCT % 5     Results from last 7 days   Lab Units 09/03/21  0604   POTASSIUM mmol/L 4 3   CHLORIDE mmol/L 104   CO2 mmol/L 29   BUN mg/dL 13   CREATININE mg/dL 0 80   CALCIUM mg/dL 9 7           * I Have Reviewed All Lab Data Listed Above  Recent Cultures (last 7 days):     Results from last 7 days   Lab Units 08/31/21 1930 08/31/21  1553   BLOOD CULTURE   --  No Growth at 72 hrs  Staphylococcus coagulase negative*  Dermobacter hominis*   GRAM STAIN RESULT  4+ Gram positive cocci in pairs, chains and clusters*  3+ Gram negative rods*  2+ Polys* Gram positive cocci in clusters*   WOUND CULTURE  4+ Growth of Staphylococcus aureus*  4+ Growth of   --      Results from last 7 days   Lab Units 08/31/21 1930   ANAEROBIC CULTURE  2+ Growth of - Prevotella timonensis - Prevotella species*  1+ Growth of - Presumptive Prevotella bergensis - Prevotella species*       Imaging: I have personally reviewed pertinent films in PACS  EKG, Pathology, and Other Studies: I have personally reviewed pertinent reports  ** Please Note: Portions of the record may have been created with voice recognition software  Occasional wrong word or "sound a like" substitutions may have occurred due to the inherent limitations of voice recognition software  Read the chart carefully and recognize, using context, where substitutions have occurred   **

## 2021-09-04 NOTE — PROGRESS NOTES
[facility]  Progress Note - Akhil Person 1975, 40 y o  female MRN: [id number]  Unit/Bed#: -01 Encounter: [id number]  Primary Care Provider: Manuel Chacko,    Date and time admitted to hospital: 9/1/2021  1:35 PM    * 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, with bone involvement without evidence of necrosis [facility])  Assessment & Plan  · Patient arrived to ED 8/31 for evaluation of a diabetic foot ulcer  Patient left AMA at that time, returned 9/1 for treatment  Patient reports that she noticed burning on the sole of her R foot around 1 5 weeks ago, which progressively got worse  Patient also noticed some fluid draining a couple of days ago and a foul odor  She had attempted dressing and cleaning the wound, was supposed to go to outpatient wound care in [facility] but her ride never showed up  Provided Bactrim after leaving [place], received 1 dose on 8/31  · S/p left 3rd toe amputation and right toe amputation 1/2021 from previous diabetic foot ulcers  · Right foot Xray 8/31: no acute fracture  Suspicion of periosteal response, 3rd proximal phalangeal shaft, high suspicion for osteomyelitis  · Repeat Right foot Xray 9/1: No acute interval change  Question faint periosteal reaction base of the 3rd proximal phalanx as before without overt bony destruction  Possibility of osteomyelitis cannot be entirely excluded  Soft tissue ulceration plantar aspect 3rd digit without diffuse subcutaneous emphysema   · Elevated CRP, ESR  · Blood cultures x2 with no growth to date   · Wound culture: 4+ staph aureus   · MRI R foot 9/3:   1  Plantar skin ulceration at the 3rd MTP with marrow changes at the base of the 3rd proximal phalanx suspicious for early changes of osteomyelitis   No corresponding marrow abnormality at the 3rd metatarsal head although 3rd MTP septic arthropathy would still be difficult to exclude     2   Peripherally enhancing dorsal soft tissue collection between the 3rd and 4th rays measuring 1 4 x 1 0 x 1 0 cm worrisome for abscess formation communicating with the 3rd intermetatarsal bursa  3   Diminished enhancement of the plantar soft tissues between the 3rd and 5th MTPs concerning for early changes of tissue necrosis   No soft tissue emphysema at this time   Correlate with clinical examination  · Podiatry following, appreciate recommendations   · S/p right transmetatarsal amputation today, 09/05  · Discontinue IV Ancef and Flagyl as surgical cure achieved  No signs of infection at the margin as per OR note    · PT/OT evaluations postoperatively   · Analgesics as needed     Type 2 diabetes mellitus with neurologic complication, without long-term current use of insulin [facility])  Assessment & Plan  Lab Results   Component Value Date    HGBA1C 6 7 (H) 09/01/2021       Recent Labs     09/03/21  0610 09/03/21  1112 09/03/21  1606 09/04/21  0713   POCGLU 124 132 170* 89       Blood Sugar Average: Last 72 hrs:  · Takes metformin at home, refuses insulin at home  · QID accuchecks with SSI while inpatient   · Diabetic diet   · Hypoglycemia protocol     Essential hypertension  Assessment & Plan  · Stable, continue clonodine    Manic bipolar I disorder ([facility] [address] )  Assessment & Plan  · Managed by psychiatry outpatient, has been managed well recently  · Continue lamictal and Geodon    Morbid obesity ([facility] [address] )  Assessment & Plan  · S/p sleeve gastrectomy  · Encourage dietary/lifestyle modifications    Migraine without status migrainosus, not intractable  Assessment & Plan  · Has been managed well with Imitrex injections p r n , continue    Hypothyroidism  Assessment & Plan  · Takes levothyroxine at home, last TSH wnl  · Continue levothyroxine    GERD (gastroesophageal reflux disease)  Assessment & Plan  · Continue Protonix    H/O bariatric surgery  Assessment & Plan  · S/p sleeve gastrectomy 3895, no complications  · Continue folic acid    VTE Pharmacologic Prophylaxis: VTE Score: 3 Moderate Risk (Score 3-4) - Pharmacological DVT Prophylaxis Ordered: enoxaparin (Lovenox)  Patient Centered Rounds: I performed bedside rounds with nursing staff today  Discussions with Specialists or Other Care Team Provider: primary RN, case management     Education and Discussions with Family / Patient: Patient declined call to   Time Spent for Care: 15 minutes  More than 50% of total time spent on counseling and coordination of care as described above  Current Length of Stay: 3 day(s)  Current Patient Status: Inpatient   Certification Statement: The patient will continue to require additional inpatient hospital stay due to postoperative monitoring, ongoing managemnet per podiatey, therapy evalutions  Discharge Plan: Anticipate discharge in >72 hrs to discharge location to be determined pending rehab evaluations  Code Status: Level 1 - Full Code    Subjective:   Patient seen and examined shortly after returning from OR  She is sleeping upright in bed, easily arouses to voice but falls right back to sleep  Denies pain at this time  Objective:     Vitals:   Temp (24hrs), Av 7 °F (36 5 °C), Min:97 2 °F (36 2 °C), Max:98 6 °F (37 °C)    Temp:  [97 2 °F (36 2 °C)-98 6 °F (37 °C)] 98 6 °F (37 °C)  HR:  [61-96] 66  Resp:  [16-18] 18  BP: (100-152)/(58-86) 100/58  SpO2:  [91 %-98 %] 91 %  Body mass index is 36 87 kg/m²  Input and Output Summary (last 24 hours): Intake/Output Summary (Last 24 hours) at 2021 1212  Last data filed at 2021 0855  Gross per 24 hour   Intake 1168 75 ml   Output 20 ml   Net 1148 75 ml       Physical Exam:   Physical Exam  Vitals and nursing note reviewed  Constitutional:       General: She is sleeping  She is not in acute distress  Cardiovascular:      Rate and Rhythm: Normal rate and regular rhythm  Heart sounds: No murmur heard       Pulmonary:      Effort: Pulmonary effort is normal  No respiratory distress  Abdominal:      General: Abdomen is flat  There is no distension  Palpations: Abdomen is soft  Tenderness: There is no abdominal tenderness  Skin:     Comments: R foot dressing c/d/i   Neurological:      Mental Status: She is easily aroused  Additional Data:     Labs:  Results from last 7 days   Lab Units 09/03/21  0604   WBC Thousand/uL 7 35   HEMOGLOBIN g/dL 13 2   HEMATOCRIT % 42 6   PLATELETS Thousands/uL 384   NEUTROS PCT % 44   LYMPHS PCT % 41   MONOS PCT % 9   EOS PCT % 5     Results from last 7 days   Lab Units 09/03/21  0604   SODIUM mmol/L 136   POTASSIUM mmol/L 4 3   CHLORIDE mmol/L 104   CO2 mmol/L 29   BUN mg/dL 13   CREATININE mg/dL 0 80   ANION GAP mmol/L 3*   CALCIUM mg/dL 9 7   GLUCOSE RANDOM mg/dL 113         Results from last 7 days   Lab Units 09/04/21  1048 09/04/21  0910 09/04/21  0713 09/03/21  1606 09/03/21  1112 09/03/21  0610 09/02/21  1602 09/02/21  1040 09/02/21  0606 09/01/21  1850   POC GLUCOSE mg/dl 160* 99 89 170* 132 124 126 142* 123 112     Results from last 7 days   Lab Units 09/01/21  1842   HEMOGLOBIN A1C % 6 7*           Lines/Drains:  Invasive Devices     Peripheral Intravenous Line            Peripheral IV 09/03/21 Left Antecubital 1 day                      Imaging: No pertinent imaging reviewed  Recent Cultures (last 7 days):   Results from last 7 days   Lab Units 08/31/21  1930 08/31/21  1553   BLOOD CULTURE   --  No Growth at 72 hrs    Staphylococcus coagulase negative*  Dermobacter hominis*   GRAM STAIN RESULT  4+ Gram positive cocci in pairs, chains and clusters*  3+ Gram negative rods*  2+ Polys* Gram positive cocci in clusters*   WOUND CULTURE  4+ Growth of Staphylococcus aureus*  4+ Growth of   --        Last 24 Hours Medication List:   Current Facility-Administered Medications   Medication Dose Route Frequency Provider Last Rate    acetaminophen  650 mg Oral Q6H PRN Jef Esters, DPM      albuterol  2 puff Inhalation Q6H PRN Monica Longoria, DPM      calcium carbonate  1,000 mg Oral Daily PRN Monica Longoria, JORDYM      cloNIDine  0 2 mg Oral BID Monica Longoria, [facility]      docusate sodium  100 mg Oral BID Monica Longoria, [facility]      enoxaparin  40 mg Subcutaneous Daily Monica Longoria, JORDYM      fenofibrate  48 mg Oral Daily Monica [facility] [place]      folic acid  5,010 mcg Oral Daily Monica [place], [place]      HYDROmorphone  0 5 mg Intravenous Q6H PRN Monica Longoria, DPM      hydrOXYzine HCL  100 mg Oral HS Monica Longoria, JORDYM      hydrOXYzine HCL  50 mg Oral QAM Monica Longoria, JORDYM      insulin lispro  1-6 Units Subcutaneous TID [facility] Monica Longoria, JORDYM      lamoTRIgine  200 mg Oral QAM Monica Longoria, DPM      levothyroxine  50 mcg Oral Daily Monica Longoria [place]      methocarbamol  750 mg Oral 4x Daily PRN Monica Longoria, SORAYA      ondansetron  4 mg Intravenous Q6H PRN Monica Longoria, JORDYM      oxyCODONE  10 mg Oral Q4H PRN Monica Longoria, JORDYM      oxyCODONE  5 mg Oral Q4H PRN Monica Longoria, DPM      pantoprazole  40 mg Oral HS Monica Longoria, [facility]      polyethylene glycol  17 g Oral Daily Monica Longoria [place]      senna  1 tablet Oral Daily Monica [place], [place]      sodium chloride  75 mL/hr Intravenous Continuous Monica Longoria DPM 75 mL/hr (09/04/21 0025)    sodium chloride  75 mL/hr Intravenous Continuous Kandis Marmolejo MD 75 mL/hr (09/04/21 0914)    SUMAtriptan  6 mg Subcutaneous Q2H PRN Max 3/day Monica Longoria, SORAYA      ziprasidone  80 mg Oral BID With Meals Monica Longoria DPM          Today, Patient Was Seen By: Sepideh Mathur PA-C    **Please Note: This note may have been constructed using a voice recognition system  **

## 2021-09-04 NOTE — ASSESSMENT & PLAN NOTE
· Patient arrived to ED 8/31 for evaluation of a diabetic foot ulcer  Patient left AMA at that time, returned 9/1 for treatment  Patient reports that she noticed burning on the sole of her R foot around 1 5 weeks ago, which progressively got worse  Patient also noticed some fluid draining a couple of days ago and a foul odor  She had attempted dressing and cleaning the wound, was supposed to go to outpatient wound care in [facility] but her ride never showed up  Provided Bactrim after leaving [place], received 1 dose on 8/31  · S/p left 3rd toe amputation and right toe amputation 1/2021 from previous diabetic foot ulcers  · Right foot Xray 8/31: no acute fracture  Suspicion of periosteal response, 3rd proximal phalangeal shaft, high suspicion for osteomyelitis  · Repeat Right foot Xray 9/1: No acute interval change  Question faint periosteal reaction base of the 3rd proximal phalanx as before without overt bony destruction  Possibility of osteomyelitis cannot be entirely excluded  Soft tissue ulceration plantar aspect 3rd digit without diffuse subcutaneous emphysema   · Elevated CRP, ESR  · Blood cultures x2 with no growth to date   · Wound culture: 4+ staph aureus   · MRI R foot 9/3:   1  Plantar skin ulceration at the 3rd MTP with marrow changes at the base of the 3rd proximal phalanx suspicious for early changes of osteomyelitis   No corresponding marrow abnormality at the 3rd metatarsal head although 3rd MTP septic arthropathy would still be difficult to exclude  2   Peripherally enhancing dorsal soft tissue collection between the 3rd and 4th rays measuring 1 4 x 1 0 x 1 0 cm worrisome for abscess formation communicating with the 3rd intermetatarsal bursa  3   Diminished enhancement of the plantar soft tissues between the 3rd and 5th MTPs concerning for early changes of tissue necrosis   No soft tissue emphysema at this time   Correlate with clinical examination    · Podiatry following, appreciate recommendations   · S/p right transmetatarsal amputation today, 09/05  · Discontinue IV Ancef and Flagyl as surgical cure achieved  No signs of infection at the margin as per OR note    · PT/OT evaluations postoperatively   · Analgesics as needed

## 2021-09-04 NOTE — PLAN OF CARE
Problem: Nutrition/Hydration-ADULT  Goal: Nutrient/Hydration intake appropriate for improving, restoring or maintaining nutritional needs  Description: Monitor and assess patient's nutrition/hydration status for malnutrition  Collaborate with interdisciplinary team and initiate plan and interventions as ordered  Monitor patient's weight and dietary intake as ordered or per policy  Utilize nutrition screening tool and intervene as necessary  Determine patient's food preferences and provide high-protein, high-caloric foods as appropriate       INTERVENTIONS:  - Monitor oral intake, urinary output, labs, and treatment plans  - Assess nutrition and hydration status and recommend course of action  - Evaluate amount of meals eaten  - Assist patient with eating if necessary   - Allow adequate time for meals  - Recommend/ encourage appropriate diets, oral nutritional supplements, and vitamin/mineral supplements  - Order, calculate, and assess calorie counts as needed  - Recommend, monitor, and adjust tube feedings and TPN/PPN based on assessed needs  - Assess need for intravenous fluids  - Provide specific nutrition/hydration education as appropriate  - Include patient/family/caregiver in decisions related to nutrition  Outcome: Progressing     Problem: MOBILITY - ADULT  Goal: Maintain or return to baseline ADL function  Description: INTERVENTIONS:  -  Assess patient's ability to carry out ADLs; assess patient's baseline for ADL function and identify physical deficits which impact ability to perform ADLs (bathing, care of mouth/teeth, toileting, grooming, dressing, etc )  - Assess/evaluate cause of self-care deficits   - Assess range of motion  - Assess patient's mobility; develop plan if impaired  - Assess patient's need for assistive devices and provide as appropriate  - Encourage maximum independence but intervene and supervise when necessary  - Involve family in performance of ADLs  - Assess for home care needs following discharge   - Consider OT consult to assist with ADL evaluation and planning for discharge  - Provide patient education as appropriate  Outcome: Progressing  Goal: Maintains/Returns to pre admission functional level  Description: INTERVENTIONS:  - Perform BMAT or MOVE assessment daily    - Set and communicate daily mobility goal to care team and patient/family/caregiver  - Collaborate with rehabilitation services on mobility goals if consulted  - Perform Range of Motion 3 times a day  - Reposition patient every 2 hours    - Dangle patient 3 times a day  - Stand patient 3 times a day  - Ambulate patient 3 times a day  - Out of bed to chair 3 times a day   - Out of bed for meals 3 times a day  - Out of bed for toileting  - Record patient progress and toleration of activity level   Outcome: Progressing     Problem: Potential for Falls  Goal: Patient will remain free of falls  Description: INTERVENTIONS:  - Educate patient/family on patient safety including physical limitations  - Instruct patient to call for assistance with activity   - Consult OT/PT to assist with strengthening/mobility   - Keep Call bell within reach  - Keep bed low and locked with side rails adjusted as appropriate  - Keep care items and personal belongings within reach  - Initiate and maintain comfort rounds  - Make Fall Risk Sign visible to staff  - Offer Toileting every 2 Hours, in advance of need  - Initiate/Maintain alarm  - Obtain necessary fall risk management equipment:   - Apply yellow socks and bracelet for high fall risk patients  - Consider moving patient to room near nurses station  Outcome: Progressing

## 2021-09-04 NOTE — OP NOTE
OPERATIVE REPORT - Podiatry  PATIENT NAME: Robert Gould    :  1975  MRN: [id number]  Pt Location:  OR ROOM 07    SURGERY DATE: 2021    Surgeon(s) and Role:     * Tristian Bennett DPM - Primary     * Reese Payan DPM - Assisting    Pre-op Diagnosis:  Diabetic foot ulcer ([facility] [address] ) [Q84 744, L97 509]  Subacute osteomyelitis of right foot ([address] ) [M86 271]    Post-Op Diagnosis Codes:     * Diabetic foot ulcer ([address] ) [H36 181, L97 509]     * Subacute osteomyelitis of right foot ([address] ) [M86 271]    Procedure(s) (LRB):  AMPUTATION TRANSMETATARSAL (TMA) (Right)    Specimen(s):  ID Type Source Tests Collected by Time Destination   1 : right TMA Tissue Foot, Right TISSUE EXAM Tristian Bennett DPM 2021 0820        Estimated Blood Loss:   20 mL    Drains:  * No LDAs found *    Anesthesia Type:   Choice with 20 ml of 1% Lidocaine and 0 5% Bupivacaine in a 1:1 mixture    Hemostasis:  Pneumatic ankle tourniquet set at 250 mmHg for 15 mins  Direct compression, electrocautery  EBL <150mL    Materials:  * No implants in log *      Injectables:  None    Operative Findings:  Consistent with Diagnosis    Complications:   None    Procedure and Technique:    Under mild sedation, the patient was brought into the operating room and placed on operating table in the supine position  IV sedation was achieved by anesthesia team and a universal timeout was performed where all parties are in agreement of correct patient, correct procedure and correct site  A pneumatic tourniquet was then placed over the patient's right lower extremity with ample padding  A ankle block was performed consisting of 20 ml of local anesthetics  The foot was then prepped and draped in the usual aseptic manner  The pneumatic tourniquet was then inflated to 250 mmHg    A inverted T-type incision was drawn out over the forefoot  Utilizing a #15 blade a full thickness incision was made down to bone   The forefoot was then sharply dissected out to isolate the metatarsal shafts  Utilizing a key elevator soft tissues were freed from each of the metatarsals  A sagittal saw was then used to make the transmetatarsal amputation osteotomies  Each metatarsal was cut in a dorsal distal to plantar proximal fashion with care taken to maintain the metatarsal parabola  The entire forefoot was then removed from the table and passed off  Any residual prominences were removed utilizing a rongeur  The remaining wound bed was then inspected  No remaining purulent sinus tracts were visualized  Any necrotic or nonviable soft tissues were sharply excised from the wound utilizing metzenbaum scissors  Any residual exposed tendons were excised proximally to prevent any infection from tracking proximally  Bones proximal to the amputation site was noted to be of hard viable quality  The remaining surgical wound was red granular with adequate bleeding noted  The surgical incision was irrigated with copious amounts of normal sterile saline  Subcutaneous closure was obtained utilizing vicryl  Skin edges were reapproximated and closure was obtained utilizing nylon  The foot was then cleansed and dried  The incision site was dressed with xeroform, gauze  This was then covered with a Kerlix and an ACE wrap  The tourniquet was deflated at approximately 15 min and normal hyperemic response was noted to TMA stump site along closed incision  The patient tolerated the procedure and anesthesia well without immediate complications and transferred to PACU with vital signs stable  As with many limb salvage procedures, we contemplate the possibility of performing further stages to this procedure  Procedures may include debridements, delayed closure, plastic surgery techniques, or more proximal amputations  This procedure may be considered part of a multi-staged limb salvage treatment plan       Dr Navarro Santacruz was present during the entire procedure and participated in all key aspects  SIGNATURE: Megan Crooks DPM  DATE: September 4, 2021  TIME: 9:09 AM      Portions of the record may have been created with voice recognition software  Occasional wrong word or "sound a like" substitutions may have occurred due to the inherent limitations of voice recognition software  Read the chart carefully and recognize, using context, where substitutions have occurred

## 2021-09-04 NOTE — ASSESSMENT & PLAN NOTE
Lab Results   Component Value Date    HGBA1C 6 7 (H) 09/01/2021       Recent Labs     09/03/21  0610 09/03/21  1112 09/03/21  1606 09/04/21  0713   POCGLU 124 132 170* 89       Blood Sugar Average: Last 72 hrs:  · Takes metformin at home, refuses insulin at home  · QID accuchecks with SSI while inpatient   · Diabetic diet   · Hypoglycemia protocol

## 2021-09-04 NOTE — ANESTHESIA POSTPROCEDURE EVALUATION
Post-Op Assessment Note    CV Status:  Stable  Pain Score: 0    Pain management: adequate     Mental Status:  Awake and sleepy   Hydration Status:  Euvolemic   PONV Controlled:  Controlled   Airway Patency:  Patent      Post Op Vitals Reviewed: Yes      Staff: CRNA         No complications documented      BP   125/82   Temp   97 2   Pulse  86   Resp   15   SpO2   100

## 2021-09-05 LAB
BACTERIA BLD CULT: NORMAL
GLUCOSE SERPL-MCNC: 122 MG/DL (ref 65–140)
GLUCOSE SERPL-MCNC: 134 MG/DL (ref 65–140)
GLUCOSE SERPL-MCNC: 190 MG/DL (ref 65–140)

## 2021-09-05 PROCEDURE — 99232 SBSQ HOSP IP/OBS MODERATE 35: CPT | Performed by: PHYSICIAN ASSISTANT

## 2021-09-05 PROCEDURE — 82948 REAGENT STRIP/BLOOD GLUCOSE: CPT

## 2021-09-05 PROCEDURE — 97163 PT EVAL HIGH COMPLEX 45 MIN: CPT

## 2021-09-05 PROCEDURE — 99024 POSTOP FOLLOW-UP VISIT: CPT | Performed by: PODIATRIST

## 2021-09-05 PROCEDURE — 97530 THERAPEUTIC ACTIVITIES: CPT

## 2021-09-05 RX ORDER — HYDROMORPHONE HCL IN WATER/PF 6 MG/30 ML
0.2 PATIENT CONTROLLED ANALGESIA SYRINGE INTRAVENOUS EVERY 6 HOURS PRN
Status: DISCONTINUED | OUTPATIENT
Start: 2021-09-05 | End: 2021-09-08 | Stop reason: HOSPADM

## 2021-09-05 RX ORDER — CEPHALEXIN 500 MG/1
500 CAPSULE ORAL EVERY 6 HOURS SCHEDULED
Status: DISCONTINUED | OUTPATIENT
Start: 2021-09-05 | End: 2021-09-08 | Stop reason: HOSPADM

## 2021-09-05 RX ADMIN — HYDROXYZINE HYDROCHLORIDE 100 MG: 25 TABLET, FILM COATED ORAL at 22:33

## 2021-09-05 RX ADMIN — FENOFIBRATE 48 MG: 48 TABLET ORAL at 08:22

## 2021-09-05 RX ADMIN — CEPHALEXIN 500 MG: 500 CAPSULE ORAL at 12:11

## 2021-09-05 RX ADMIN — OXYCODONE HYDROCHLORIDE 10 MG: 10 TABLET ORAL at 10:36

## 2021-09-05 RX ADMIN — ENOXAPARIN SODIUM 40 MG: 40 INJECTION SUBCUTANEOUS at 08:21

## 2021-09-05 RX ADMIN — ZIPRASIDONE HYDROCHLORIDE 80 MG: 40 CAPSULE ORAL at 22:34

## 2021-09-05 RX ADMIN — HYDROXYZINE HYDROCHLORIDE 50 MG: 25 TABLET, FILM COATED ORAL at 08:20

## 2021-09-05 RX ADMIN — LAMOTRIGINE 200 MG: 100 TABLET ORAL at 08:21

## 2021-09-05 RX ADMIN — CLONIDINE HYDROCHLORIDE 0.2 MG: 0.2 TABLET ORAL at 08:20

## 2021-09-05 RX ADMIN — CLONIDINE HYDROCHLORIDE 0.2 MG: 0.2 TABLET ORAL at 17:45

## 2021-09-05 RX ADMIN — CEPHALEXIN 500 MG: 500 CAPSULE ORAL at 22:33

## 2021-09-05 RX ADMIN — HYDROMORPHONE HYDROCHLORIDE 0.2 MG: 0.2 INJECTION, SOLUTION INTRAMUSCULAR; INTRAVENOUS; SUBCUTANEOUS at 19:43

## 2021-09-05 RX ADMIN — OXYCODONE HYDROCHLORIDE 10 MG: 10 TABLET ORAL at 22:33

## 2021-09-05 RX ADMIN — OXYCODONE HYDROCHLORIDE 10 MG: 10 TABLET ORAL at 06:03

## 2021-09-05 RX ADMIN — OXYCODONE HYDROCHLORIDE 10 MG: 10 TABLET ORAL at 17:45

## 2021-09-05 RX ADMIN — INSULIN LISPRO 2 UNITS: 100 INJECTION, SOLUTION INTRAVENOUS; SUBCUTANEOUS at 11:15

## 2021-09-05 RX ADMIN — ZIPRASIDONE HYDROCHLORIDE 80 MG: 40 CAPSULE ORAL at 10:36

## 2021-09-05 RX ADMIN — CEPHALEXIN 500 MG: 500 CAPSULE ORAL at 08:21

## 2021-09-05 RX ADMIN — PANTOPRAZOLE SODIUM 40 MG: 40 TABLET, DELAYED RELEASE ORAL at 22:33

## 2021-09-05 RX ADMIN — METHOCARBAMOL TABLETS 750 MG: 750 TABLET, COATED ORAL at 06:03

## 2021-09-05 RX ADMIN — FOLIC ACID 1000 MCG: 1 TABLET ORAL at 08:21

## 2021-09-05 RX ADMIN — HYDROMORPHONE HYDROCHLORIDE 0.5 MG: 1 INJECTION, SOLUTION INTRAMUSCULAR; INTRAVENOUS; SUBCUTANEOUS at 00:43

## 2021-09-05 RX ADMIN — HYDROMORPHONE HYDROCHLORIDE 0.5 MG: 1 INJECTION, SOLUTION INTRAMUSCULAR; INTRAVENOUS; SUBCUTANEOUS at 12:11

## 2021-09-05 RX ADMIN — LEVOTHYROXINE SODIUM 50 MCG: 50 TABLET ORAL at 06:03

## 2021-09-05 RX ADMIN — CEPHALEXIN 500 MG: 500 CAPSULE ORAL at 17:41

## 2021-09-05 RX ADMIN — METHOCARBAMOL TABLETS 750 MG: 750 TABLET, COATED ORAL at 19:43

## 2021-09-05 NOTE — PLAN OF CARE
Problem: PHYSICAL THERAPY ADULT  Goal: Performs mobility at highest level of function for planned discharge setting  See evaluation for individualized goals  Description: Treatment/Interventions: LE strengthening/ROM, Functional transfer training, ADL retraining, Elevations, Endurance training, Therapeutic exercise, Patient/family training, Gait training, Bed mobility  Equipment Recommended: Nir Celeste, Wheelchair       See flowsheet documentation for full assessment, interventions and recommendations  Note: Prognosis: Fair  Problem List: Decreased strength, Impaired balance, Decreased mobility, Decreased cognition  Assessment: Pt is 40 y o  female seen for high complexity PT evaluation s/p admission to [facility] on 9/1/21 for diabetic ulcer of midfoot associated with DM  Now post-op s/p R transmetatarsal amputation on 09/4  Comorbidities affecting pt's physical performance at time of assessment include: type 2 DM, essential HTN, maniac bipolar I disorder, morbid obesity, migraine, hypothyroidism, GERD, and history of bariatric surgery  PTA, pt was independent with ADLs, IADLs, and functional mobility without DME  Pt resides with her son in a [facility] with 1 KORY and 2nd floor bed/bath  Sleeps on the 1st floor but must go up stairs for the bathroom  Currently, pt is supervision for transfers, min A x 1 for ambulation with RW, and supervision for static standing with RW  Patient required constant cues to maintain NWB status especially when completing ADLs in standing position  Patient's decreased mobility level and increased fall risk is secondary to deficits in safety awareness, insight into deficits, standing tolerance/balance, gait dysfunction, and generalized weakness/deconditioning  Current clinical presentation is unstable/unpredictable seen in pt's presentation of ongoing medical management, increased reliance on assistance compared to PLOF, impaired judgement/safety awareness, and significant PMH   Pt to benefit from continued PT tx to address deficits as defined above and maximize level of functional independent mobility and consistency  From PT/mobility standpoint, recommendation at time of d/c would be STR however, pt is refusing STR  If patient continues to refuse STR, pt will need BSC, RW, and WC in order to maintain NWB status  Additionally, pt will need to be able to go up/down FF  PT Discharge Recommendation: (S) Post acute rehabilitation services (patient is refusing STR )     PT - OK to Discharge: Yes    See flowsheet documentation for full assessment

## 2021-09-05 NOTE — ASSESSMENT & PLAN NOTE
· Patient arrived to ED 8/31 for evaluation of a diabetic foot ulcer  Patient left AMA at that time, returned 9/1 for treatment  Patient reports that she noticed burning on the sole of her R foot around 1 5 weeks ago, which progressively got worse  Patient also noticed some fluid draining a couple of days ago and a foul odor  She had attempted dressing and cleaning the wound, was supposed to go to outpatient wound care in [facility] but her ride never showed up  Provided Bactrim after leaving AM, received 1 dose on 8/31  · S/p left 3rd toe amputation and right toe amputation 1/2021 from previous diabetic foot ulcers  · Right foot Xray 8/31: no acute fracture  Suspicion of periosteal response, 3rd proximal phalangeal shaft, high suspicion for osteomyelitis  · Repeat Right foot Xray 9/1: No acute interval change  Question faint periosteal reaction base of the 3rd proximal phalanx as before without overt bony destruction  Possibility of osteomyelitis cannot be entirely excluded  Soft tissue ulceration plantar aspect 3rd digit without diffuse subcutaneous emphysema   · Elevated CRP, ESR  · Blood cultures x2 with no growth to date   · Wound culture: 4+ staph aureus   · MRI R foot 9/3:   1  Plantar skin ulceration at the 3rd MTP with marrow changes at the base of the 3rd proximal phalanx suspicious for early changes of osteomyelitis   No corresponding marrow abnormality at the 3rd metatarsal head although 3rd MTP septic arthropathy would still be difficult to exclude  2   Peripherally enhancing dorsal soft tissue collection between the 3rd and 4th rays measuring 1 4 x 1 0 x 1 0 cm worrisome for abscess formation communicating with the 3rd intermetatarsal bursa  3   Diminished enhancement of the plantar soft tissues between the 3rd and 5th MTPs concerning for early changes of tissue necrosis   No soft tissue emphysema at this time   Correlate with clinical examination    · Appreciate ongoing Podiatry recommendations · S/p right transmetatarsal amputation on 09/05  · IV antibiotics discontinued as surgical cure achieved; no signs of infection at the margin as per OR note  · Podiatry recommending 5 days of PO Keflex given high risk of infection   · PT/OT evaluations postoperatively   · Analgesics as needed

## 2021-09-05 NOTE — ASSESSMENT & PLAN NOTE
Lab Results   Component Value Date    HGBA1C 6 7 (H) 09/01/2021       Recent Labs     09/04/21  1048 09/04/21  1541 09/04/21 2042 09/05/21  0606   POCGLU 160* 194* 197* 122       Blood Sugar Average: Last 72 hrs:  · Takes metformin at home, refuses insulin at home  · QID accuchecks with SSI while inpatient   · Diabetic diet   · Hypoglycemia protocol

## 2021-09-05 NOTE — PROGRESS NOTES
Progress Note - Podiatry  Jatin Baldwin 40 y o  female MRN: [id number]  Unit/Bed#: -01 Encounter: [id number]    Assessment  1  Postop day 1 right transmetatarsal amputation secondary to diabetic foot infection and osteomyelitis    Plan:  1  Patient has already walked on her foot and cause some acute postoperative bleeding  She was clearly told to be nonweightbearing but thought that meant she could walk to the bathroom  Overall the incision is stable and there is no significant dehiscence but I was very clear if she continues to put weight on her foot there will surely be complications which might result in further loss of limb  PT has been consulted for nonweightbearing to the right foot  She has a rolling walker at bedside  She was told to ask for assistance before getting up  2  Dressing was changed  See picture of amputation on media file of her chart  3  Although surgical cure obtained given high risk of infection recommend 5 days of oral antibiotic  Her wound cultures are Staph aureus  I ordered Keflex for 5 days 500 mg q 6 hours  4  From a podiatric standpoint patient is cleared for discharge  Pending physical therapy recommendations  Patient is high risk for complication of her amputation and I would suggest acute rehab however she think she can stay off of it at home with the help of her some  Subjective/Objective   Chief Complaint:   Chief Complaint   Patient presents with    Foot Ulcer     pt returning after making arrangements for her dog to be admitted for a right foot ulcer that doctors wanted to treat with IV ABX        Subjective: 40 y o  y/o female seen and evaluated at bedside  Patient reports some postoperative pain in her foot but is currently resting comfortably  She states she has been walking to the bathroom and there is fresh bleeding on her dressing    No acute events overnight  Denies N/F/V/SOB/CP/cough/diarrhea/constipation       Blood pressure 118/69, pulse 74, temperature 98 4 °F (36 9 °C), resp  rate 16, height 5' 5 5" (1 664 m), weight 102 kg (225 lb), SpO2 92 %, not currently breastfeeding  ,Body mass index is 36 87 kg/m²  Lab Results   Component Value Date    WBC 7 35 09/03/2021    HGB 13 2 09/03/2021    HCT 42 6 09/03/2021    MCV 83 09/03/2021     09/03/2021     Lab Results   Component Value Date    GLUCOSE 99 09/09/2015    CALCIUM 9 7 09/03/2021     09/09/2015    K 4 3 09/03/2021    CO2 29 09/03/2021     09/03/2021    BUN 13 09/03/2021    CREATININE 0 80 09/03/2021         Invasive Devices     Peripheral Intravenous Line            Peripheral IV 09/03/21 Left Antecubital 2 days                Physical Exam:   General: alert, cooperative and no distress  Patient is obese  Vascular:  Palpable pedal pulses  No cellulitis to the right foot  Capillary refill is brisk to the amputation flap  Dermatology:  T-shaped surgical incision over the transmetatarsal amputation  Sutures intact  There is some mild-to-moderate fresh bleeding on the dressing but no active bleeding from the foot currently  Neurological:  Lack of pinprick sensation bilateral lower extremity  MSK:  Right transmetatarsal amputation stable  No calf pain with compression  Normal ankle range of motion  Minimal pain with palpation to the plantar flap  Lab, Imaging and other studies:   Results from last 7 days   Lab Units 08/31/21 1930 08/31/21  1553   GRAM STAIN RESULT  4+ Gram positive cocci in pairs, chains and clusters*  3+ Gram negative rods*  2+ Polys* Gram positive cocci in clusters*       Results from last 7 days   Lab Units 08/31/21  1553   BLOOD CULTURE  No Growth After 4 Days    Staphylococcus coagulase negative*  Dermobacter hominis*         Results from last 7 days   Lab Units 08/31/21  1930   ANAEROBIC CULTURE  2+ Growth of - Prevotella timonensis - Prevotella species*  1+ Growth of - Presumptive Prevotella bergensis - Prevotella species* Results from last 7 days   Lab Units 08/31/21 1930   WOUND CULTURE  4+ Growth of Staphylococcus aureus*  4+ Growth of      Imaging: I have personally reviewed pertinent films in PACS          Portions of the record may have been created with voice recognition software  Occasional wrong word or "sound a like" substitutions may have occurred due to the inherent limitations of voice recognition software  Read the chart carefully and recognize, using context, where substitutions have occurred

## 2021-09-05 NOTE — PHYSICAL THERAPY NOTE
Physical Therapy Evaluation  Patient Name: Adelina Gallego    QTVCO'M Date: 9/5/2021     Problem List  Principal Problem:    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, with bone involvement without evidence of necrosis ([place] [address] )  Active Problems:    H/O bariatric surgery    Anxiety    Morbid obesity ([facility] )    Hypothyroidism    Manic bipolar I disorder ([place] [address] )    Type 2 diabetes mellitus with neurologic complication, without long-term current use of insulin ([facility] )    Acquired absence of other left toe(s) ([place] [address] )    Essential hypertension    Chronic pain    GERD (gastroesophageal reflux disease)    Migraine without status migrainosus, not intractable       Past Medical History  Past Medical History:   Diagnosis Date    Anxiety     Arthritis     Asthma     Chronic pain     Diabetes mellitus ([facility] )     Disease of thyroid gland     Lymphedema     left leg    Manic bipolar I disorder ([place] [address] )     Morbid obesity (HCC)     OCD (obsessive compulsive disorder)     PTSD (post-traumatic stress disorder)         Past Surgical History  Past Surgical History:   Procedure Laterality Date    CHOLECYSTECTOMY      CHOLECYSTECTOMY      W[place] AMPUTATION METATARSAL+TOE,SINGLE Left 9/8/2019    Procedure: PARTIAL 3RD RAY RESECTION, PSB TOE FILLET FLAP;  Surgeon: Diana Bush DPM;  Location: [facility];  Service: Podiatry    TOE AMPUTATION Right 1/13/2021    Procedure: AMPUTATION 2ND TOE;  Surgeon: Jordan Vasquez DPM;  Location: [facility];  Service: Podiatry        09/05/21 0856   PT Last Visit   PT Visit Date 09/05/21   Note Type   Note type Evaluation   Pain Assessment   Pain Assessment Tool 0-10   Pain Score 7   Pain Location/Orientation Orientation: Right;Location: Foot   Home Living   Type of Home House  [facility])   Home Layout Two level;Bed/bath upstairs  (Sleeps on 1st floor, no bathroom on 1st  floor)   Bathroom Toilet Raised Bathroom Equipment   (Shower bench that does not fit in shower)   Additional Comments t resides with her son in a [facility] with 1 KORY and 2nd floor bed/bath  Sleeps on the 1st floor but must go up stairs for the bathroom  Son works FT   Prior Function   Level of [place] Independent with ADLs and functional mobility   Lives With [employer] Help From Family   ADL Assistance Independent   IADLs Needs assistance   Falls in the last 6 months 1 to 4  (2 falls down the steps)   Restrictions/Precautions   Weight Bearing Precautions Per Order Yes   RLE Weight Bearing Per Order NWB   Cognition   Arousal/Participation Cooperative   Attention Within functional limits   Orientation Level Oriented X4   Memory Within functional limits   Following Commands Follows all commands and directions without difficulty   Comments Pleasant cueing for safety awareness, pacing, and NWB status   LUE Assessment   LUE Assessment WFL   Transfers   Sit to Stand 5  Supervision   Additional items Assist x 1; Increased time required;Verbal cues; Impulsive   Stand to Sit 5  Supervision   Additional items Assist x 1; Increased time required;Verbal cues   Toilet transfer 5  Supervision   Additional items Assist x 1; Increased time required;Verbal cues   Additional Comments Cues to maintain NWB status  Used RW   Ambulation/Elevation   Gait pattern Step to;Excessively slow; Improper Weight shift   Gait Assistance 4  Minimal assist   Additional items Assist x 1;Verbal cues   Assistive Device Rolling walker   Distance 10 feet   Balance   Static Sitting Fair -   Dynamic Sitting Fair -   Static Standing Poor +   Dynamic Standing Poor +   Ambulatory Poor +   Activity Tolerance   Activity Tolerance Patient limited by pain   Nurse Made Aware Yes, PT   Assessment   Prognosis Fair   Problem List Decreased strength; Impaired balance;Decreased mobility; Decreased cognition   Assessment Pt is 40 y o  female seen for high complexity PT evaluation s/p admission to [facility] on 9/1/21 for diabetic ulcer of midfoot associated with DM  Now post-op s/p R transmetatarsal amputation on 09/4  Comorbidities affecting pt's physical performance at time of assessment include: type 2 DM, essential HTN, maniac bipolar I disorder, morbid obesity, migraine, hypothyroidism, GERD, and history of bariatric surgery  PTA, pt was independent with ADLs, IADLs, and functional mobility without DME  Pt resides with her son in a [facility] with 1 KORY and 2nd floor bed/bath  Sleeps on the 1st floor but must go up stairs for the bathroom  Currently, pt is supervision for transfers, min A x 1 for ambulation with RW, and supervision for static standing with RW  Patient required constant cues to maintain NWB status especially when completing ADLs in standing position  Patient's decreased mobility level and increased fall risk is secondary to deficits in safety awareness, insight into deficits, standing tolerance/balance, gait dysfunction, and generalized weakness/deconditioning  Current clinical presentation is unstable/unpredictable seen in pt's presentation of ongoing medical management, increased reliance on assistance compared to PLOF, impaired judgement/safety awareness, and significant PMH  Pt to benefit from continued PT tx to address deficits as defined above and maximize level of functional independent mobility and consistency  From PT/mobility standpoint, recommendation at time of d/c would be STR however, pt is refusing STR  If patient continues to refuse STR, pt will need BSC, RW, and WC in order to maintain NWB status  Additionally, pt will need to be able to go up/down FF  Goals   Patient Goals To not go to rehab   STG Expiration Date 09/15/21   Short Term Goal #1 In 1-2 weeks, the patient will complete the following 1) Perform all aspect of bed mobility independently 2) Perform functional transfer with LRAD independently  3) Ambulate >50  feet with LRAD at mod I level   4) Negotiate 12 steps with 1 handrail and supervision while maintain NWB status  5) Patient will improve dynamic standing balance from poor + to fair + in order to perform everyday activities  6) Patient will continue with ongoing rehab services for family education, DME, and D/C planning 7) Maintain NWB status during all functional mobility   Plan   Treatment/Interventions LE strengthening/ROM; Functional transfer training;ADL retraining;Elevations; Endurance training; Therapeutic exercise;Patient/family training;Gait training;Bed mobility   PT Frequency   (3-5x/wk)   Recommendation   PT Discharge Recommendation Post acute rehabilitation services  (patient is refusing STR )   Equipment Recommended [employer]; Wheelchair   PT - OK to Discharge Yes   Additional Comments Yes to STR only (refusing STR)  No to home, need to trial steps    AM-PAC Basic Mobility Inpatient   Turning in Bed Without Bedrails 3   Lying on Back to Sitting on Edge of Flat Bed 3   Moving Bed to Chair 3   Standing Up From Chair 3   Walk in Room 3   Climb 3-5 Stairs 2   Basic Mobility Inpatient Raw Score 17   Basic Mobility Standardized Score 39 67     PT Treatment Note    Time in: 845  Time out: 856  Total time: 11 minutes    S: Receptive to education  O: Education on the importance of maintain NWB status to ensure proper healing of incision, educated on the benefits of STR to allow for proper healing of amputation, educated on discharge planning if pt continues to refuse STR and the recommended DME needed  - Static standing: approx ~1 minute to don underwear after bathroom, cues to maintain NWB status on RLE  - Ambulation 10 feet with RW - min A x 1 for steadying and line management  A: If pt refuses STR, pt will need to be able to complete the steps independently  Son works during the day  Educated if patient can put a BSC on the first floor however, pt was resistive to using a BSC on the first floor because then her son would have to empty it  P:PT to continue to follow   Plan to progress ambulation, elevations, functional transfers, there-ex, and balance exercises       Roya Virk PT, DPT

## 2021-09-05 NOTE — CASE MANAGEMENT
CM made aware patient is now refusing STR at discharge  CM made aware that patient will need RW, BSC, and WC for safe discharge home  [facility]  CM attempted to s/w patient bedside regarding VNA services, patient resting  CM attempted phone call to sonMundo, no answer  CM left VM requesting CB to discuss VNA services  CM department will continue to follow

## 2021-09-05 NOTE — PROGRESS NOTES
[facility] 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, with bone involvement without evidence of necrosis [facility])  Assessment & Plan  · Patient arrived to ED 8/31 for evaluation of a diabetic foot ulcer  Patient left AMA at that time, returned 9/1 for treatment  Patient reports that she noticed burning on the sole of her R foot around 1 5 weeks ago, which progressively got worse  Patient also noticed some fluid draining a couple of days ago and a foul odor  She had attempted dressing and cleaning the wound, was supposed to go to outpatient wound care in [facility] but her ride never showed up  Provided Bactrim after leaving [place], received 1 dose on 8/31  · S/p left 3rd toe amputation and right toe amputation 1/2021 from previous diabetic foot ulcers  · Right foot Xray 8/31: no acute fracture  Suspicion of periosteal response, 3rd proximal phalangeal shaft, high suspicion for osteomyelitis  · Repeat Right foot Xray 9/1: No acute interval change  Question faint periosteal reaction base of the 3rd proximal phalanx as before without overt bony destruction  Possibility of osteomyelitis cannot be entirely excluded  Soft tissue ulceration plantar aspect 3rd digit without diffuse subcutaneous emphysema   · Elevated CRP, ESR  · Blood cultures x2 with no growth to date   · Wound culture: 4+ staph aureus   · MRI R foot 9/3:   1  Plantar skin ulceration at the 3rd MTP with marrow changes at the base of the 3rd proximal phalanx suspicious for early changes of osteomyelitis   No corresponding marrow abnormality at the 3rd metatarsal head although 3rd MTP septic arthropathy would still be difficult to exclude     2   Peripherally enhancing dorsal soft tissue collection between the 3rd and 4th rays measuring 1 4 x 1 0 x 1 0 cm worrisome for abscess formation communicating with the 3rd intermetatarsal bursa  3   Diminished enhancement of the plantar soft tissues between the 3rd and 5th MTPs concerning for early changes of tissue necrosis   No soft tissue emphysema at this time   Correlate with clinical examination    · Appreciate ongoing Podiatry recommendations   · S/p right transmetatarsal amputation on 09/05  · IV antibiotics discontinued as surgical cure achieved; no signs of infection at the margin as per OR note  · Podiatry recommending 5 days of PO Keflex given high risk of infection   · PT/OT evaluations postoperatively   · Analgesics as needed     Type 2 diabetes mellitus with neurologic complication, without long-term current use of insulin [facility])  Assessment & Plan  Lab Results   Component Value Date    HGBA1C 6 7 (H) 09/01/2021       Recent Labs     09/04/21  1048 09/04/21  1541 09/04/21  2042 09/05/21  0606   POCGLU 160* 194* 197* 122       Blood Sugar Average: Last 72 hrs:  · Takes metformin at home, refuses insulin at home  · QID accuchecks with SSI while inpatient   · Diabetic diet   · Hypoglycemia protocol     Essential hypertension  Assessment & Plan  · Stable, continue clonodine    Manic bipolar I disorder ([facility] [address] )  Assessment & Plan  · Managed by psychiatry outpatient, has been managed well recently  · Continue lamictal and Geodon    Morbid obesity ([facility] [address] )  Assessment & Plan  · S/p sleeve gastrectomy  · Encourage dietary/lifestyle modifications    Migraine without status migrainosus, not intractable  Assessment & Plan  · Has been managed well with Imitrex injections p r n , continue    Hypothyroidism  Assessment & Plan  · Takes levothyroxine at home, last TSH wnl  · Continue levothyroxine    GERD (gastroesophageal reflux disease)  Assessment & Plan  · Continue Protonix    H/O bariatric surgery  Assessment & Plan  · S/p sleeve gastrectomy 5985, no complications  · Continue folic acid    VTE Pharmacologic Prophylaxis: VTE Score: 3 Moderate Risk (Score 3-4) - Pharmacological DVT Prophylaxis Ordered: enoxaparin (Lovenox)  Patient Centered Rounds: I performed bedside rounds with nursing staff today  Discussions with Specialists or Other Care Team Provider: primary RN, case management     Education and Discussions with Family / Patient: Patient declined call to   Time Spent for Care: 15 minutes  More than 50% of total time spent on counseling and coordination of care as described above  Current Length of Stay: 4 day(s)  Current Patient Status: Inpatient   Certification Statement: The patient will continue to require additional inpatient hospital stay due to pending pain control and therapy evaluations  Discharge Plan: Anticipate discharge in 24-48 hrs to discharge location to be determined pending rehab evaluations  Code Status: Level 1 - Full Code    Subjective:   Patient sleeping in recliner upon entering the room, easily arouses to voice  She complains of 8/10 pain in her foot at this time  She states podiatry was in to see her this morning and she "got in trouble" because she put weight on her foot causing bleeding  Reiterated the importance of NWB status and patient expressed understanding  She states she also worked with therapy this morning and again says she would not want to go to a rehab facility if recommended  Objective:     Vitals:   Temp (24hrs), Av 2 °F (36 8 °C), Min:97 3 °F (36 3 °C), Max:98 6 °F (37 °C)    Temp:  [97 3 °F (36 3 °C)-98 6 °F (37 °C)] 98 4 °F (36 9 °C)  HR:  [61-79] 74  Resp:  [16-19] 16  BP: (100-127)/(58-86) 118/69  SpO2:  [91 %-95 %] 92 %  Body mass index is 36 87 kg/m²  Input and Output Summary (last 24 hours):      Intake/Output Summary (Last 24 hours) at 2021 0946  Last data filed at 2021 0201  Gross per 24 hour   Intake 760 ml   Output --   Net 760 ml Physical Exam:   Physical Exam  Vitals and nursing note reviewed  Constitutional:       General: She is not in acute distress  Appearance: She is obese  Cardiovascular:      Rate and Rhythm: Normal rate and regular rhythm  Pulses: Normal pulses  Heart sounds: No murmur heard  Pulmonary:      Effort: Pulmonary effort is normal  No respiratory distress  Breath sounds: No wheezing or rales  Abdominal:      General: Abdomen is flat  There is no distension  Palpations: Abdomen is soft  Tenderness: There is no abdominal tenderness  Skin:     General: Skin is warm and dry  Coloration: Skin is not pale  Findings: No erythema  Comments: R foot dressing c/d/i   Neurological:      General: No focal deficit present  Mental Status: She is alert and oriented to person, place, and time  Mental status is at baseline            Additional Data:     Labs:  Results from last 7 days   Lab Units 09/03/21  0604   WBC Thousand/uL 7 35   HEMOGLOBIN g/dL 13 2   HEMATOCRIT % 42 6   PLATELETS Thousands/uL 384   NEUTROS PCT % 44   LYMPHS PCT % 41   MONOS PCT % 9   EOS PCT % 5     Results from last 7 days   Lab Units 09/03/21  0604   SODIUM mmol/L 136   POTASSIUM mmol/L 4 3   CHLORIDE mmol/L 104   CO2 mmol/L 29   BUN mg/dL 13   CREATININE mg/dL 0 80   ANION GAP mmol/L 3*   CALCIUM mg/dL 9 7   GLUCOSE RANDOM mg/dL 113         Results from last 7 days   Lab Units 09/05/21  0606 09/04/21  2042 09/04/21  1541 09/04/21  1048 09/04/21  0910 09/04/21  0713 09/03/21  1606 09/03/21  1112 09/03/21  0610 09/02/21  1602 09/02/21  1040 09/02/21  0606   POC GLUCOSE mg/dl 122 197* 194* 160* 99 89 170* 132 124 126 142* 123     Results from last 7 days   Lab Units 09/01/21  1842   HEMOGLOBIN A1C % 6 7*           Lines/Drains:  Invasive Devices     Peripheral Intravenous Line            Peripheral IV 09/03/21 Left Antecubital 2 days                      Imaging: No pertinent imaging reviewed  Recent Cultures (last 7 days):   Results from last 7 days   Lab Units 08/31/21  1930 08/31/21  1553   BLOOD CULTURE   --  No Growth After 4 Days    Staphylococcus coagulase negative*  Dermobacter hominis*   GRAM STAIN RESULT  4+ Gram positive cocci in pairs, chains and clusters*  3+ Gram negative rods*  2+ Polys* Gram positive cocci in clusters*   WOUND CULTURE  4+ Growth of Staphylococcus aureus*  4+ Growth of   --        Last 24 Hours Medication List:   Current Facility-Administered Medications   Medication Dose Route Frequency Provider Last Rate    acetaminophen  650 mg Oral Q6H PRN Daun Cooler, DPM      albuterol  2 puff Inhalation Q6H PRN Daun Cooler, DPM      calcium carbonate  1,000 mg Oral Daily PRN Daun Cooler, DPM      cephalexin  500 mg Oral Q6H Kirsty [place], DPM      cloNIDine  0 2 mg Oral BID Daun Cooler, DPM      docusate sodium  100 mg Oral BID Daun Cooler, DPM      enoxaparin  40 mg Subcutaneous Daily Daun Cooler, DPM      fenofibrate  48 mg Oral Daily Daun Cooler, [place]      folic acid  4,841 mcg Oral Daily Daun Cooler, [place]      HYDROmorphone  0 5 mg Intravenous Q6H PRN Daun Cooler, DPM      hydrOXYzine HCL  100 mg Oral HS Daun Cooler, DPM      hydrOXYzine HCL  50 mg Oral QAM Daun Cooler, DPM      insulin lispro  1-6 Units Subcutaneous TID [facility] Daun Cooler, DPM      lamoTRIgine  200 mg Oral QAM Daun Cooler, [facility]      levothyroxine  50 mcg Oral Daily Daun Cooler, [place]      methocarbamol  750 mg Oral 4x Daily PRN Daun Cooler, DPM      ondansetron  4 mg Intravenous Q6H PRN Daun Cooler, DPM      oxyCODONE  10 mg Oral Q4H PRN Daun Cooler, DPM      oxyCODONE  5 mg Oral Q4H PRN Daun Cooler, DPM      pantoprazole  40 mg Oral HS Daun Cooler, DPM      polyethylene glycol  17 g Oral Daily Daun Cooler, [place]      senna  1 tablet Oral Daily Daun Cooler, [place]      sodium chloride  75 mL/hr Intravenous Continuous Daun Cooler, DPM 75 mL/hr (09/04/21 0025)    sodium chloride  75 mL/hr Intravenous Continuous Comfort Herron MD 75 mL/hr (09/04/21 0914)    SUMAtriptan  6 mg Subcutaneous Q2H PRN Max 3/day Roro Olmedo DPM      ziprasidone  80 mg Oral BID With Meals Roro Olmedo DPM          Today, Patient Was Seen By: Mer Early PA-C    **Please Note: This note may have been constructed using a voice recognition system  **

## 2021-09-06 PROBLEM — K59.00 CONSTIPATION: Status: ACTIVE | Noted: 2021-09-06

## 2021-09-06 LAB
ANION GAP SERPL CALCULATED.3IONS-SCNC: 1 MMOL/L (ref 4–13)
BUN SERPL-MCNC: 14 MG/DL (ref 5–25)
CALCIUM SERPL-MCNC: 9.5 MG/DL (ref 8.3–10.1)
CHLORIDE SERPL-SCNC: 105 MMOL/L (ref 100–108)
CO2 SERPL-SCNC: 31 MMOL/L (ref 21–32)
CREAT SERPL-MCNC: 0.68 MG/DL (ref 0.6–1.3)
ERYTHROCYTE [DISTWIDTH] IN BLOOD BY AUTOMATED COUNT: 13.7 % (ref 11.6–15.1)
GFR SERPL CREATININE-BSD FRML MDRD: 106 ML/MIN/1.73SQ M
GLUCOSE SERPL-MCNC: 118 MG/DL (ref 65–140)
GLUCOSE SERPL-MCNC: 126 MG/DL (ref 65–140)
GLUCOSE SERPL-MCNC: 136 MG/DL (ref 65–140)
GLUCOSE SERPL-MCNC: 180 MG/DL (ref 65–140)
HCT VFR BLD AUTO: 39.5 % (ref 34.8–46.1)
HGB BLD-MCNC: 12.2 G/DL (ref 11.5–15.4)
MCH RBC QN AUTO: 25.7 PG (ref 26.8–34.3)
MCHC RBC AUTO-ENTMCNC: 30.9 G/DL (ref 31.4–37.4)
MCV RBC AUTO: 83 FL (ref 82–98)
PLATELET # BLD AUTO: 352 THOUSANDS/UL (ref 149–390)
PMV BLD AUTO: 9.5 FL (ref 8.9–12.7)
POTASSIUM SERPL-SCNC: 4.1 MMOL/L (ref 3.5–5.3)
RBC # BLD AUTO: 4.74 MILLION/UL (ref 3.81–5.12)
SODIUM SERPL-SCNC: 137 MMOL/L (ref 136–145)
WBC # BLD AUTO: 7.57 THOUSAND/UL (ref 4.31–10.16)

## 2021-09-06 PROCEDURE — 99232 SBSQ HOSP IP/OBS MODERATE 35: CPT | Performed by: PHYSICIAN ASSISTANT

## 2021-09-06 PROCEDURE — 80048 BASIC METABOLIC PNL TOTAL CA: CPT | Performed by: PHYSICIAN ASSISTANT

## 2021-09-06 PROCEDURE — 85027 COMPLETE CBC AUTOMATED: CPT | Performed by: PHYSICIAN ASSISTANT

## 2021-09-06 PROCEDURE — 82948 REAGENT STRIP/BLOOD GLUCOSE: CPT

## 2021-09-06 RX ADMIN — METHOCARBAMOL TABLETS 750 MG: 750 TABLET, COATED ORAL at 23:33

## 2021-09-06 RX ADMIN — FOLIC ACID 1000 MCG: 1 TABLET ORAL at 08:14

## 2021-09-06 RX ADMIN — HYDROMORPHONE HYDROCHLORIDE 0.2 MG: 0.2 INJECTION, SOLUTION INTRAMUSCULAR; INTRAVENOUS; SUBCUTANEOUS at 11:57

## 2021-09-06 RX ADMIN — CEPHALEXIN 500 MG: 500 CAPSULE ORAL at 21:48

## 2021-09-06 RX ADMIN — HYDROMORPHONE HYDROCHLORIDE 0.2 MG: 0.2 INJECTION, SOLUTION INTRAMUSCULAR; INTRAVENOUS; SUBCUTANEOUS at 19:55

## 2021-09-06 RX ADMIN — ENOXAPARIN SODIUM 40 MG: 40 INJECTION SUBCUTANEOUS at 08:14

## 2021-09-06 RX ADMIN — LEVOTHYROXINE SODIUM 50 MCG: 50 TABLET ORAL at 05:47

## 2021-09-06 RX ADMIN — OXYCODONE HYDROCHLORIDE 10 MG: 10 TABLET ORAL at 11:19

## 2021-09-06 RX ADMIN — METHOCARBAMOL TABLETS 750 MG: 750 TABLET, COATED ORAL at 17:24

## 2021-09-06 RX ADMIN — FENOFIBRATE 48 MG: 48 TABLET ORAL at 08:15

## 2021-09-06 RX ADMIN — CEPHALEXIN 500 MG: 500 CAPSULE ORAL at 11:17

## 2021-09-06 RX ADMIN — OXYCODONE HYDROCHLORIDE 10 MG: 10 TABLET ORAL at 17:25

## 2021-09-06 RX ADMIN — METHOCARBAMOL TABLETS 750 MG: 750 TABLET, COATED ORAL at 05:46

## 2021-09-06 RX ADMIN — METHOCARBAMOL TABLETS 750 MG: 750 TABLET, COATED ORAL at 12:01

## 2021-09-06 RX ADMIN — ZIPRASIDONE HYDROCHLORIDE 80 MG: 40 CAPSULE ORAL at 21:47

## 2021-09-06 RX ADMIN — ACETAMINOPHEN 650 MG: 325 TABLET, FILM COATED ORAL at 08:14

## 2021-09-06 RX ADMIN — CEPHALEXIN 500 MG: 500 CAPSULE ORAL at 05:47

## 2021-09-06 RX ADMIN — HYDROXYZINE HYDROCHLORIDE 50 MG: 25 TABLET, FILM COATED ORAL at 08:14

## 2021-09-06 RX ADMIN — OXYCODONE HYDROCHLORIDE 10 MG: 10 TABLET ORAL at 21:48

## 2021-09-06 RX ADMIN — OXYCODONE HYDROCHLORIDE 10 MG: 10 TABLET ORAL at 05:47

## 2021-09-06 RX ADMIN — HYDROXYZINE HYDROCHLORIDE 100 MG: 25 TABLET, FILM COATED ORAL at 21:47

## 2021-09-06 RX ADMIN — ACETAMINOPHEN 650 MG: 325 TABLET, FILM COATED ORAL at 23:33

## 2021-09-06 RX ADMIN — CLONIDINE HYDROCHLORIDE 0.2 MG: 0.2 TABLET ORAL at 08:13

## 2021-09-06 RX ADMIN — LAMOTRIGINE 200 MG: 100 TABLET ORAL at 08:13

## 2021-09-06 RX ADMIN — PANTOPRAZOLE SODIUM 40 MG: 40 TABLET, DELAYED RELEASE ORAL at 21:47

## 2021-09-06 RX ADMIN — CEPHALEXIN 500 MG: 500 CAPSULE ORAL at 17:25

## 2021-09-06 RX ADMIN — ZIPRASIDONE HYDROCHLORIDE 80 MG: 40 CAPSULE ORAL at 08:14

## 2021-09-06 NOTE — PROGRESS NOTES
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, with bone involvement without evidence of necrosis [facility])  Assessment & Plan  · Patient arrived to ED 8/31 for evaluation of a diabetic foot ulcer  Patient left AMA at that time, returned 9/1 for treatment  · Per record review, patient reported that she noticed burning on the sole of her R foot around 1 5 weeks ago, which progressively got worse  Patient also noticed some fluid draining a couple of days ago and a foul odor  She had attempted dressing and cleaning the wound, was supposed to go to outpatient wound care in [facility] but her ride never showed up  Provided Bactrim after leaving [place], received 1 dose on 8/31  · S/p left 3rd toe amputation and right toe amputation 1/2021 from previous diabetic foot ulcers  · Right foot Xray 8/31: no acute fracture  Suspicion of periosteal response, 3rd proximal phalangeal shaft, high suspicion for osteomyelitis  · Repeat Right foot Xray 9/1: No acute interval change  Question faint periosteal reaction base of the 3rd proximal phalanx as before without overt bony destruction  Possibility of osteomyelitis cannot be entirely excluded  Soft tissue ulceration plantar aspect 3rd digit without diffuse subcutaneous emphysema   · Elevated CRP, ESR  · 1 blood culture growing Dermobacter hominis while the other is negative after 5 days  Suspect contaminant   · Wound culture: 4+ staph aureus  · Anaerobic culture: 2+ Prevotella timonensis and 1+ Presumptive Prevotella bergensis   · MRI R foot 9/3:   1   Plantar skin ulceration at the 3rd MTP with marrow changes at the base of the 3rd proximal phalanx suspicious for early changes of osteomyelitis   No corresponding marrow abnormality at the 3rd metatarsal head although 3rd MTP septic arthropathy would still be difficult to exclude  2   Peripherally enhancing dorsal soft tissue collection between the 3rd and 4th rays measuring 1 4 x 1 0 x 1 0 cm worrisome for abscess formation communicating with the 3rd intermetatarsal bursa  3   Diminished enhancement of the plantar soft tissues between the 3rd and 5th MTPs concerning for early changes of tissue necrosis   No soft tissue emphysema at this time   Correlate with clinical examination  · Appreciate ongoing Podiatry recommendations   · S/p right transmetatarsal amputation on 09/05  · IV antibiotics discontinued as surgical cure achieved; no signs of infection at the margin as per OR note  · Podiatry recommending 5 days of PO Keflex given high risk of infection   · PT/OT recommending STR however patient is refusing  CM arranging VNA  · Analgesics as needed     Type 2 diabetes mellitus with neurologic complication, without long-term current use of insulin [facility])  Assessment & Plan  Lab Results   Component Value Date    HGBA1C 6 7 (H) 09/01/2021       Recent Labs     09/05/21  0606 09/05/21  1112 09/05/21  1602 09/06/21  0549   POCGLU 122 190* 134 126       Blood Sugar Average: Last 72 hrs:  · Takes metformin at home, refuses insulin at home  · QID accuchecks with SSI while inpatient   · Diabetic diet   · Hypoglycemia protocol     Constipation  Assessment & Plan  · Patient reports last BM was 6 days ago which she states is normal for her when she is in the hospital and she is refusing to take bowel regimen despite education   Patient denies abdominal pain, N/V and states she is passing gas    Manic bipolar I disorder ([facility] [address] )  Assessment & Plan  · Managed by psychiatry outpatient, has been managed well recently  · Continue lamictal and Geodon    Essential hypertension  Assessment & Plan  · Stable, continue clonodine    Chronic pain  Assessment & Plan  · History of chronic back pain  · Continue Robaxin    Acquired absence of other left toe(s) ([facility] [address] )  Assessment & Plan  · 2/2 diabetic foot ulcers  · See above    Hypothyroidism  Assessment & Plan  · Takes levothyroxine at home, last TSH wnl  · Continue levothyroxine    H/O bariatric surgery  Assessment & Plan  · S/p sleeve gastrectomy 9916, no complications  · Continue folic acid    Morbid obesity ([facility] [address] )  Assessment & Plan  · S/p sleeve gastrectomy  · Encourage dietary/lifestyle modifications    GERD (gastroesophageal reflux disease)  Assessment & Plan  · Continue Protonix    Anxiety  Assessment & Plan  · Takes Atarax at home, continue    Migraine without status migrainosus, not intractable  Assessment & Plan  · Has been managed well with Imitrex injections p r n , continue        VTE Pharmacologic Prophylaxis: VTE Score: 3 Moderate Risk (Score 3-4) - Pharmacological DVT Prophylaxis Ordered: enoxaparin (Lovenox)  Patient Centered Rounds: I performed bedside rounds with nursing staff today  Jose E Carver  Discussions with Specialists or Other Care Team Provider:     Education and Discussions with Family / Patient: Attempted to update  (son) via phone  Unable to contact  Vj     Time Spent for Care: 30 minutes  More than 50% of total time spent on counseling and coordination of care as described above  Current Length of Stay: 5 day(s)  Current Patient Status: Inpatient   Certification Statement: The patient will continue to require additional inpatient hospital stay due to safe discharge planning   Discharge Plan: Anticipate discharge in 24-48 hrs to home  Patient is refusing STR  Code Status: Level 1 - Full Code    Subjective:   Ms Juana Cordova reports she has not had a BM in 6 days which she says is "normal for me when I'm in the hospital"  She refuses to take bowel regimen  She denies N/V or abdominal pain  She says she is passing gas   She denies CP or SOB    Objective:     Vitals:   Temp (24hrs), Av °F (36 7 °C), Min:97 6 °F (36 4 °C), Max:98 3 °F (36 8 °C)    Temp:  [97 6 °F (36 4 °C)-98 3 °F (36 8 °C)] 98 1 °F (36 7 °C)  HR:  [58-72] 62  Resp:  [16-18] 18  BP: (113-121)/(63-72) 113/64  SpO2:  [93 %-96 %] 96 %  Body mass index is 36 87 kg/m²  Input and Output Summary (last 24 hours): Intake/Output Summary (Last 24 hours) at 2021 0848  Last data filed at 2021 0517  Gross per 24 hour   Intake 1545 ml   Output --   Net 1545 ml       Physical Exam:   Physical Exam  Vitals and nursing note reviewed  Exam conducted with a chaperone present  Constitutional:       Appearance: She is obese  Comments: Patient seen sitting upright in bedside chair with RN present   Cardiovascular:      Rate and Rhythm: Normal rate and regular rhythm  Pulmonary:      Effort: Pulmonary effort is normal  No respiratory distress  Breath sounds: Normal breath sounds  Abdominal:      General: Bowel sounds are normal       Palpations: Abdomen is soft  Tenderness: There is no abdominal tenderness  Musculoskeletal:      Right lower leg: No edema  Left lower leg: No edema  Skin:     General: Skin is warm  Neurological:      Mental Status: She is alert and oriented to person, place, and time     Psychiatric:         Mood and Affect: Mood normal          Behavior: Behavior normal          Additional Data:     Labs:  Results from last 7 days   Lab Units 21  0517 21  0604   WBC Thousand/uL 7 57 7 35   HEMOGLOBIN g/dL 12 2 13 2   HEMATOCRIT % 39 5 42 6   PLATELETS Thousands/uL 352 384   NEUTROS PCT %  --  44   LYMPHS PCT %  --  41   MONOS PCT %  --  9   EOS PCT %  --  5     Results from last 7 days   Lab Units 21  0517   SODIUM mmol/L 137   POTASSIUM mmol/L 4 1   CHLORIDE mmol/L 105   CO2 mmol/L 31   BUN mg/dL 14   CREATININE mg/dL 0 68   ANION GAP mmol/L 1*   CALCIUM mg/dL 9 5   GLUCOSE RANDOM mg/dL 118         Results from last 7 days   Lab Units 21  0549 09/05/21  1602 09/05/21  1112 09/05/21  0606 09/04/21  2042 09/04/21  1541 09/04/21  1048 09/04/21  0910 09/04/21  0713 09/03/21  1606 09/03/21  1112 09/03/21  0610   POC GLUCOSE mg/dl 126 134 190* 122 197* 194* 160* 99 89 170* 132 124     Results from last 7 days   Lab Units 09/01/21  1842   HEMOGLOBIN A1C % 6 7*           Lines/Drains:  Invasive Devices     Peripheral Intravenous Line            Peripheral IV 09/03/21 Left Antecubital 3 days                      Imaging: Reviewed radiology reports from this admission including: MRI foot    Recent Cultures (last 7 days):   Results from last 7 days   Lab Units 08/31/21  1930 08/31/21  1553   BLOOD CULTURE   --  No Growth After 5 Days    Staphylococcus coagulase negative*  Dermobacter hominis*   GRAM STAIN RESULT  4+ Gram positive cocci in pairs, chains and clusters*  3+ Gram negative rods*  2+ Polys* Gram positive cocci in clusters*   WOUND CULTURE  4+ Growth of Staphylococcus aureus*  4+ Growth of   --        Last 24 Hours Medication List:   Current Facility-Administered Medications   Medication Dose Route Frequency Provider Last Rate    acetaminophen  650 mg Oral Q6H PRN Gala Ro, DPM      albuterol  2 puff Inhalation Q6H PRN Gala Ro, DPM      calcium carbonate  1,000 mg Oral Daily PRN Gala Ro, DPM      cephalexin  500 mg Oral Q6H Zina Avina DPM      cloNIDine  0 2 mg Oral BID Gala Ro, DPM      docusate sodium  100 mg Oral BID Gala Ro, DPM      enoxaparin  40 mg Subcutaneous Daily Gala Ro, DPM      fenofibrate  48 mg Oral Daily Gala , [facility]      folic acid  2,662 mcg Oral Daily Gala , [facility]      HYDROmorphone  0 2 mg Intravenous Q6H PRN Simin Mendes PA-C      hydrOXYzine HCL  100 mg Oral HS Gala Ro, DPM      hydrOXYzine HCL  50 mg Oral QAM Gala Ro, DPM      insulin lispro  1-6 Units Subcutaneous TID [facility] Gala Ro, DPM      lamoTRIgine  200 mg Oral QAM Gala Ro, DPM      levothyroxine  50 mcg Oral Daily Gala Ro, [facility]      methocarbamol  750 mg Oral 4x Daily PRN Gala Ro, DPM      ondansetron  4 mg Intravenous Q6H PRN Gala Ro, DPM      oxyCODONE  10 mg Oral Q4H PRN Gala Ro, DPM      oxyCODONE  5 mg Oral Q4H PRN Gala Ro, DPM      pantoprazole  40 mg Oral HS Gala Ro, DPM      polyethylene glycol  17 g Oral Daily Gala Ro, [facility]      senna  1 tablet Oral Daily Gala Ro, [facility]      SUMAtriptan  6 mg Subcutaneous Q2H PRN Max 3/day Gala Ro, DPM      ziprasidone  80 mg Oral BID With Meals Gala Ro, DPM          Today, Patient Was Seen By: Kayli Rushing PA-C    **Please Note: This note may have been constructed using a voice recognition system  **

## 2021-09-06 NOTE — ASSESSMENT & PLAN NOTE
Lab Results   Component Value Date    HGBA1C 6 7 (H) 09/01/2021       Recent Labs     09/05/21  0606 09/05/21  1112 09/05/21  1602 09/06/21  0549   POCGLU 122 190* 134 126       Blood Sugar Average: Last 72 hrs:  · Takes metformin at home, refuses insulin at home  · QID accuchecks with SSI while inpatient   · Diabetic diet   · Hypoglycemia protocol

## 2021-09-06 NOTE — ASSESSMENT & PLAN NOTE
· Patient reports last BM was 6 days ago which she states is normal for her when she is in the hospital and she is refusing to take bowel regimen despite education   Patient denies abdominal pain, N/V and states she is passing gas

## 2021-09-06 NOTE — ASSESSMENT & PLAN NOTE
· Patient arrived to ED 8/31 for evaluation of a diabetic foot ulcer  Patient left [place] at that time, returned 9/1 for treatment  · Per record review, patient reported that she noticed burning on the sole of her R foot around 1 5 weeks ago, which progressively got worse  Patient also noticed some fluid draining a couple of days ago and a foul odor  She had attempted dressing and cleaning the wound, was supposed to go to outpatient wound care in [facility] but her ride never showed up  Provided Bactrim after leaving [place], received 1 dose on 8/31  · S/p left 3rd toe amputation and right toe amputation 1/2021 from previous diabetic foot ulcers  · Right foot Xray 8/31: no acute fracture  Suspicion of periosteal response, 3rd proximal phalangeal shaft, high suspicion for osteomyelitis  · Repeat Right foot Xray 9/1: No acute interval change  Question faint periosteal reaction base of the 3rd proximal phalanx as before without overt bony destruction  Possibility of osteomyelitis cannot be entirely excluded  Soft tissue ulceration plantar aspect 3rd digit without diffuse subcutaneous emphysema   · Elevated CRP, ESR  · 1 blood culture growing Dermobacter hominis while the other is negative after 5 days  Suspect contaminant   · Wound culture: 4+ staph aureus  · Anaerobic culture: 2+ Prevotella timonensis and 1+ Presumptive Prevotella bergensis   · MRI R foot 9/3:   1  Plantar skin ulceration at the 3rd MTP with marrow changes at the base of the 3rd proximal phalanx suspicious for early changes of osteomyelitis   No corresponding marrow abnormality at the 3rd metatarsal head although 3rd MTP septic arthropathy would still be difficult to exclude  2   Peripherally enhancing dorsal soft tissue collection between the 3rd and 4th rays measuring 1 4 x 1 0 x 1 0 cm worrisome for abscess formation communicating with the 3rd intermetatarsal bursa     3   Diminished enhancement of the plantar soft tissues between the 3rd and 5th MTPs concerning for early changes of tissue necrosis   No soft tissue emphysema at this time   Correlate with clinical examination  · Appreciate ongoing Podiatry recommendations   · S/p right transmetatarsal amputation on 09/05  · IV antibiotics discontinued as surgical cure achieved; no signs of infection at the margin as per OR note  · Podiatry recommending 5 days of PO Keflex given high risk of infection   · PT/OT recommending STR however patient is refusing   CM arranging VNA  · Analgesics as needed

## 2021-09-07 PROBLEM — K59.00 CONSTIPATION: Status: RESOLVED | Noted: 2021-09-06 | Resolved: 2021-09-07

## 2021-09-07 LAB
GLUCOSE SERPL-MCNC: 109 MG/DL (ref 65–140)
GLUCOSE SERPL-MCNC: 137 MG/DL (ref 65–140)
GLUCOSE SERPL-MCNC: 142 MG/DL (ref 65–140)

## 2021-09-07 PROCEDURE — NC001 PR NO CHARGE: Performed by: PODIATRIST

## 2021-09-07 PROCEDURE — 82948 REAGENT STRIP/BLOOD GLUCOSE: CPT

## 2021-09-07 PROCEDURE — 99232 SBSQ HOSP IP/OBS MODERATE 35: CPT | Performed by: PHYSICIAN ASSISTANT

## 2021-09-07 PROCEDURE — 97116 GAIT TRAINING THERAPY: CPT

## 2021-09-07 PROCEDURE — 97530 THERAPEUTIC ACTIVITIES: CPT

## 2021-09-07 RX ADMIN — OXYCODONE HYDROCHLORIDE 10 MG: 10 TABLET ORAL at 22:01

## 2021-09-07 RX ADMIN — HYDROXYZINE HYDROCHLORIDE 100 MG: 25 TABLET, FILM COATED ORAL at 22:01

## 2021-09-07 RX ADMIN — LEVOTHYROXINE SODIUM 50 MCG: 50 TABLET ORAL at 06:28

## 2021-09-07 RX ADMIN — HYDROXYZINE HYDROCHLORIDE 50 MG: 25 TABLET, FILM COATED ORAL at 09:16

## 2021-09-07 RX ADMIN — ACETAMINOPHEN 650 MG: 325 TABLET, FILM COATED ORAL at 15:41

## 2021-09-07 RX ADMIN — CLONIDINE HYDROCHLORIDE 0.2 MG: 0.2 TABLET ORAL at 09:16

## 2021-09-07 RX ADMIN — ZIPRASIDONE HYDROCHLORIDE 80 MG: 40 CAPSULE ORAL at 22:01

## 2021-09-07 RX ADMIN — OXYCODONE HYDROCHLORIDE 10 MG: 10 TABLET ORAL at 11:48

## 2021-09-07 RX ADMIN — ZIPRASIDONE HYDROCHLORIDE 80 MG: 40 CAPSULE ORAL at 09:17

## 2021-09-07 RX ADMIN — CEPHALEXIN 500 MG: 500 CAPSULE ORAL at 22:02

## 2021-09-07 RX ADMIN — ENOXAPARIN SODIUM 40 MG: 40 INJECTION SUBCUTANEOUS at 09:17

## 2021-09-07 RX ADMIN — OXYCODONE HYDROCHLORIDE 10 MG: 10 TABLET ORAL at 15:41

## 2021-09-07 RX ADMIN — FENOFIBRATE 48 MG: 48 TABLET ORAL at 09:17

## 2021-09-07 RX ADMIN — PANTOPRAZOLE SODIUM 40 MG: 40 TABLET, DELAYED RELEASE ORAL at 22:01

## 2021-09-07 RX ADMIN — CEPHALEXIN 500 MG: 500 CAPSULE ORAL at 06:28

## 2021-09-07 RX ADMIN — OXYCODONE HYDROCHLORIDE 10 MG: 10 TABLET ORAL at 06:28

## 2021-09-07 RX ADMIN — METHOCARBAMOL TABLETS 750 MG: 750 TABLET, COATED ORAL at 22:00

## 2021-09-07 RX ADMIN — DOCUSATE SODIUM 100 MG: 100 CAPSULE, LIQUID FILLED ORAL at 17:30

## 2021-09-07 RX ADMIN — CEPHALEXIN 500 MG: 500 CAPSULE ORAL at 17:30

## 2021-09-07 RX ADMIN — CEPHALEXIN 500 MG: 500 CAPSULE ORAL at 11:48

## 2021-09-07 RX ADMIN — FOLIC ACID 1000 MCG: 1 TABLET ORAL at 09:16

## 2021-09-07 RX ADMIN — LAMOTRIGINE 200 MG: 100 TABLET ORAL at 09:16

## 2021-09-07 RX ADMIN — METHOCARBAMOL TABLETS 750 MG: 750 TABLET, COATED ORAL at 06:28

## 2021-09-07 NOTE — ASSESSMENT & PLAN NOTE
· Patient arrived to ED 8/31 for evaluation of a diabetic foot ulcer  Patient left AMA at that time, returned 9/1 for treatment  · Per record review, patient reported that she noticed burning on the sole of her R foot around 1 5 weeks ago, which progressively got worse  Patient also noticed some fluid draining a couple of days ago and a foul odor  She had attempted dressing and cleaning the wound, was supposed to go to outpatient wound care in [facility] but her ride never showed up  Provided Bactrim after leaving AM, received 1 dose on 8/31  · S/p left 3rd toe amputation and right toe amputation 1/2021 from previous diabetic foot ulcers  · Right foot Xray 8/31: no acute fracture  Suspicion of periosteal response, 3rd proximal phalangeal shaft, high suspicion for osteomyelitis  · Repeat Right foot Xray 9/1: No acute interval change  Question faint periosteal reaction base of the 3rd proximal phalanx as before without overt bony destruction  Possibility of osteomyelitis cannot be entirely excluded  Soft tissue ulceration plantar aspect 3rd digit without diffuse subcutaneous emphysema   · Elevated CRP, ESR  · 1 blood culture growing Dermobacter hominis while the other is negative after 5 days  Suspect contaminant   · Wound culture: 4+ staph aureus  · Anaerobic culture: 2+ Prevotella timonensis and 1+ Presumptive Prevotella bergensis   · MRI R foot 9/3:   1  Plantar skin ulceration at the 3rd MTP with marrow changes at the base of the 3rd proximal phalanx suspicious for early changes of osteomyelitis   No corresponding marrow abnormality at the 3rd metatarsal head although 3rd MTP septic arthropathy would still be difficult to exclude  2   Peripherally enhancing dorsal soft tissue collection between the 3rd and 4th rays measuring 1 4 x 1 0 x 1 0 cm worrisome for abscess formation communicating with the 3rd intermetatarsal bursa     3   Diminished enhancement of the plantar soft tissues between the 3rd and 5th MTPs concerning for early changes of tissue necrosis   No soft tissue emphysema at this time   Correlate with clinical examination  · Appreciate ongoing Podiatry recommendations   · S/p right transmetatarsal amputation on 09/05  · IV antibiotics discontinued as surgical cure achieved; no signs of infection at the margin as per OR note  · Podiatry recommending 5 days of PO Keflex given high risk of infection   · PT/OT recommending STR however patient is refusing   CM arranging VNA  · Analgesics as needed

## 2021-09-07 NOTE — PHYSICAL THERAPY NOTE
Physical Therapy Progress Note     09/07/21 0850   PT Last Visit   PT Visit Date 09/07/21   Note Type   Note Type Treatment   Pain Assessment   Pain Assessment Tool Pain Assessment not indicated - pt denies pain   Restrictions/Precautions   RLE Weight Bearing Per Order NWB   Braces or Orthoses Other (Comment)  (toe offloading shoe LLE, not wearing at this time)   Other Precautions Impulsive;Pain; Fall Risk   Subjective   Subjective Pt encountered seated in recliner, pleasant and agreeable to treatment  Reports reports no new complaints  States she lives with son, but will not ask him to manage [facility] for her  Pt also reports railing on steps inside is possibly loose, and she has follen as a result in the past    Transfers   Sit to Stand 5  Supervision   Additional items Assist x 1; Armrests; Increased time required   Stand to Sit 5  Supervision   Additional items Assist x 1; Increased time required;Verbal cues  (for hand placement)   Ambulation/Elevation   Gait pattern Excessively slow; Short stride;Decreased L stance;Decreased foot clearance; Antalgic; Improper Weight shift; Shuffling  (3 point gait)   Gait Assistance 5  Supervision   Additional items Assist x 1   Assistive Device Rolling walker   Distance 60', 4' x 4   Stair Management Assistance 3  Moderate assist   Additional items Assist x 1   Stair Management Technique One rail L;Bumping  (mod A to sit/stand form steps)   Curbs x2 curb steps backwards ascending/forwards descending min A   Balance   Static Sitting Fair -   Static Standing Fair -   Ambulatory Fair -   Activity Tolerance   Activity Tolerance Patient tolerated treatment well   Nurse Made Aware yes   Assessment   Prognosis Fair   Problem List Decreased strength; Impaired balance;Decreased mobility; Decreased cognition   Assessment Pt demonstrated improved mobiilty this session, ambulating increased distances while maintaining NWB status, then took seated rests to recover    pt required instructions for hand placement for safety, but was inconsistant about use during session  Was able to negotiate curb steps as noted with RW to enter home, but demontrated significant difficulty sitting & standing from fight of steps to allow access to 2nd floor of home  Pt refuses 1st floor set up with BSC at this time  Will continue to benefit from therapy services to address functional deficits & practice stair trials to ensure safer mobility within home  Discussed discharge plan & safety awareness, but pt refused proposed home set up as noted above  Continue to recommend rehab at this time to ensure safe return to PLOF  Goals   Patient Goals to go home   STG Expiration Date 09/15/21   PT Treatment Day 1   Plan   Treatment/Interventions Functional transfer training;LE strengthening/ROM; Elevations; Therapeutic exercise; Endurance training;Patient/family training;Equipment eval/education; Bed mobility;Gait training   Progress Progressing toward goals   PT Frequency Other (Comment)  (3-5x/week)   Recommendation   PT Discharge Recommendation Post acute rehabilitation services   Equipment Recommended Walker; Other (Comment)  ([facility])   Walker Package Recommended Wheeled walker   AM-PAC Basic Mobility Inpatient   Turning in Bed Without Bedrails 4   Lying on Back to Sitting on Edge of Flat Bed 4   Moving Bed to Chair 4   Standing Up From Chair 4   Walk in Room 3   Climb 3-5 Stairs 2   Basic Mobility Inpatient Raw Score 21   Basic Mobility Standardized Score 45 55   The patient's AM-PAC Basic Mobility Inpatient Short Form Raw Score is 21, Standardized Score is 45 55  A standardized score less than 42 9 suggests the patient may benefit from discharge to post-acute rehabilitation services  Please also refer to the recommendation of the Physical Therapist for safe discharge planning            Guerda Hoffman, PTA

## 2021-09-07 NOTE — UTILIZATION REVIEW
Continued Stay Review    Date: 9/7                         Current Patient Class: Inpatient Current Level of Care: Med Surg    HPI:46 y o  female initially admitted on 9/1    Assessment/Plan: Diabetic ulcer of midfoot assoc with DM with bone involvement  S/p right transmetatarsal amputation on 09/05  Pain control  Short term rehab recommended at discharge but pt refusing  Plan to discharge home with VNA services  Per CM notes; DME - wheelchair and BSC to be delivered to pt's home tomorrow  She is currently NWB to foot  Pt requesting Lyft transport to home  CM explained that a Lyft would not be a safe method of transportation as she is to be NWB on her foot  Reviewed out of pocket cost associated  Pt very concerned about cost and reports that she doesn't even have the money to cover a Lyft    CM confirmed with pt CM department will cover cost for [facility] transport to home       Vital Signs:   09/07/21 15:45:33  97 8 °F (36 6 °C)  --  --  108/70  83  --  --   09/07/21 15:41:50  97 8 °F (36 6 °C)  --  16  --  --  --  --   09/07/21 08:15:43  97 7 °F (36 5 °C)  84  19  158/88  111  94 %       Pertinent Labs/Diagnostic Results:       Results from last 7 days   Lab Units 09/06/21 0517 09/03/21  0604 09/02/21  0601 09/01/21  1842   WBC Thousand/uL 7 57 7 35 8 98 10 50*   HEMOGLOBIN g/dL 12 2 13 2 13 4 13 1   HEMATOCRIT % 39 5 42 6 43 9 41 7   PLATELETS Thousands/uL 352 384 388 398*   NEUTROS ABS Thousands/µL  --  3 25 4 29 6 26         Results from last 7 days   Lab Units 09/06/21  0517 09/03/21  0604 09/02/21  0601 09/01/21  1842   SODIUM mmol/L 137 136 135* 138   POTASSIUM mmol/L 4 1 4 3 4 0 3 7   CHLORIDE mmol/L 105 104 103 106   CO2 mmol/L 31 29 28 28   ANION GAP mmol/L 1* 3* 4 4   BUN mg/dL 14 13 13 11   CREATININE mg/dL 0 68 0 80 0 82 1 12   EGFR ml/min/1 73sq m 106 89 87 59   CALCIUM mg/dL 9 5 9 7 9 8 9 2   MAGNESIUM mg/dL  --  2 4  --   --          Results from last 7 days   Lab Units 09/07/21  1623 09/07/21  1053 09/07/21  0543 09/06/21  1542 09/06/21  1037 09/06/21  0549 09/05/21  1602 09/05/21  1112 09/05/21  0606 09/04/21  2042 09/04/21  1541 09/04/21  1048   POC GLUCOSE mg/dl 137 142* 109 136 180* 126 134 190* 122 197* 194* 160*     Results from last 7 days   Lab Units 09/06/21  0517 09/03/21  0604 09/02/21  0601 09/01/21  1842   GLUCOSE RANDOM mg/dL 118 113 105 141*         Results from last 7 days   Lab Units 09/01/21  1842   HEMOGLOBIN A1C % 6 7*   EAG mg/dl 146       Results from last 7 days   Lab Units 08/31/21  1930   GRAM STAIN RESULT  4+ Gram positive cocci in pairs, chains and clusters*  3+ Gram negative rods*  2+ Polys*   WOUND CULTURE  4+ Growth of Staphylococcus aureus*  4+ Growth of      Medications:   Scheduled Medications:  cephalexin, 500 mg, Oral, Q6H RADHA  cloNIDine, 0 2 mg, Oral, BID  docusate sodium, 100 mg, Oral, BID  enoxaparin, 40 mg, Subcutaneous, Daily  fenofibrate, 48 mg, Oral, Daily  folic acid, 9,143 mcg, Oral, Daily  hydrOXYzine HCL, 100 mg, Oral, HS  hydrOXYzine HCL, 50 mg, Oral, QAM  insulin lispro, 1-6 Units, Subcutaneous, TID AC  lamoTRIgine, 200 mg, Oral, QAM  levothyroxine, 50 mcg, Oral, Daily  pantoprazole, 40 mg, Oral, HS  polyethylene glycol, 17 g, Oral, Daily  senna, 1 tablet, Oral, Daily  ziprasidone, 80 mg, Oral, BID With Meals      Continuous IV Infusions: None     PRN Meds:  acetaminophen, 650 mg, Oral, Q6H PRN 9/7 x1  albuterol, 2 puff, Inhalation, Q6H PRN  calcium carbonate, 1,000 mg, Oral, Daily PRN  HYDROmorphone, 0 2 mg, Intravenous, Q6H PRN  methocarbamol, 750 mg, Oral, 4x Daily PRN 9/7 x1  ondansetron, 4 mg, Intravenous, Q6H PRN  oxyCODONE, 10 mg, Oral, Q4H PRN 9/7 x3  oxyCODONE, 5 mg, Oral, Q4H PRN  SUMAtriptan, 6 mg, Subcutaneous, Q2H PRN Max 3/day    Discharge Plan: TBD    Network Utilization Review Department  ATTENTION: Please call with any questions or concerns to [phone] and carefully listen to the prompts so that you are directed to the right person   All voicemails are confidential   Mikie Garcia all requests for admission clinical reviews, approved or denied determinations and any other requests to dedicated fax number below belonging to the campus where the patient is receiving treatment   List of dedicated fax numbers for the Facilities:  [address] DENIALS (Administrative/Medical Necessity) [phone]   [address] (Maternity/NICU/Pediatrics) [phone]   [address] [place] [address] [place] [address] [id number] [place] [address] [address] [place] [address] [facility] [facility] [phone]

## 2021-09-07 NOTE — PROGRESS NOTES
[address] [facility]  Progress Note - Jaswant Stahl 1975, 40 y o  female MRN: [id number]  Unit/Bed#: -[place] Encounter: [id number]  Primary Care Provider: Júnior Jung DO   Date and time admitted to hospital: 9/1/2021  1:35 PM    * 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, with bone involvement without evidence of necrosis [facility])  Assessment & Plan  · Patient arrived to ED 8/31 for evaluation of a diabetic foot ulcer  Patient left AMA at that time, returned 9/1 for treatment  · Per record review, patient reported that she noticed burning on the sole of her R foot around 1 5 weeks ago, which progressively got worse  Patient also noticed some fluid draining a couple of days ago and a foul odor  She had attempted dressing and cleaning the wound, was supposed to go to outpatient wound care in [facility] but her ride never showed up  Provided Bactrim after leaving [place], received 1 dose on 8/31  · S/p left 3rd toe amputation and right toe amputation 1/2021 from previous diabetic foot ulcers  · Right foot Xray 8/31: no acute fracture  Suspicion of periosteal response, 3rd proximal phalangeal shaft, high suspicion for osteomyelitis  · Repeat Right foot Xray 9/1: No acute interval change  Question faint periosteal reaction base of the 3rd proximal phalanx as before without overt bony destruction  Possibility of osteomyelitis cannot be entirely excluded  Soft tissue ulceration plantar aspect 3rd digit without diffuse subcutaneous emphysema   · Elevated CRP, ESR  · 1 blood culture growing Dermobacter hominis while the other is negative after 5 days  Suspect contaminant   · Wound culture: 4+ staph aureus  · Anaerobic culture: 2+ Prevotella timonensis and 1+ Presumptive Prevotella bergensis   · MRI R foot 9/3:   1   Plantar skin ulceration at the 3rd MTP with marrow changes at the base of the 3rd proximal phalanx suspicious for early changes of osteomyelitis   No corresponding marrow abnormality at the 3rd metatarsal head although 3rd MTP septic arthropathy would still be difficult to exclude  2   Peripherally enhancing dorsal soft tissue collection between the 3rd and 4th rays measuring 1 4 x 1 0 x 1 0 cm worrisome for abscess formation communicating with the 3rd intermetatarsal bursa  3   Diminished enhancement of the plantar soft tissues between the 3rd and 5th MTPs concerning for early changes of tissue necrosis   No soft tissue emphysema at this time   Correlate with clinical examination  · Appreciate ongoing Podiatry recommendations   · S/p right transmetatarsal amputation on 09/05  · IV antibiotics discontinued as surgical cure achieved; no signs of infection at the margin as per OR note  · Podiatry recommending 5 days of PO Keflex given high risk of infection   · PT/OT recommending STR however patient is refusing   CM arranging VNA  · Analgesics as needed     Type 2 diabetes mellitus with neurologic complication, without long-term current use of insulin [facility])  Assessment & Plan  Lab Results   Component Value Date    HGBA1C 6 7 (H) 09/01/2021       Recent Labs     09/06/21  1037 09/06/21  1542 09/07/21  0543 09/07/21  1052   POCGLU 180* 136 109 142*       Blood Sugar Average: Last 72 hrs:  · Takes metformin at home, refuses insulin at home  · QID accuchecks with SSI while inpatient   · Diabetic diet   · Hypoglycemia protocol     Constipation-resolved as of 9/7/2021  Assessment & Plan  · Patient and nurse reports patient had had a BM yesterday  · Patient refusing bowel regimen    Manic bipolar I disorder ([facility] [address] )  Assessment & Plan  · Managed by psychiatry outpatient, has been managed well recently  · Continue lamictal and Geodon    Essential hypertension  Assessment & Plan  · Stable, continue clonodine    Chronic pain  Assessment & Plan  · History of chronic back pain  · Continue Robaxin    Acquired absence of other left toe(s) ([facility] [address] )  Assessment & Plan  · 2/2 diabetic foot ulcers  · See above    Hypothyroidism  Assessment & Plan  · Takes levothyroxine at home, last TSH wnl  · Continue levothyroxine    H/O bariatric surgery  Assessment & Plan  · S/p sleeve gastrectomy 511, no complications  · Continue folic acid    Morbid obesity ([address] )  Assessment & Plan  · S/p sleeve gastrectomy  · Encourage dietary/lifestyle modifications    GERD (gastroesophageal reflux disease)  Assessment & Plan  · Continue Protonix    Anxiety  Assessment & Plan  · Takes Atarax at home, continue        VTE Pharmacologic Prophylaxis: VTE Score: 3 Moderate Risk (Score 3-4) - Pharmacological DVT Prophylaxis Ordered: enoxaparin (Lovenox)  Patient Centered Rounds: I performed bedside rounds with nursing staff today  d/w RN [employer]   Discussions with Specialists or Other Care Team Provider: DEVON     Education and Discussions with Family / Patient: Updated  (son) via phone  Vj     Time Spent for Care: 30 minutes  More than 50% of total time spent on counseling and coordination of care as described above  Current Length of Stay: 6 day(s)  Current Patient Status: Inpatient   Certification Statement: The patient will continue to require additional inpatient hospital stay due to safe discharge planning  Discharge Plan: Anticipate discharge tomorrow to home with home services  Per my discussion with , wheelchair will not be delivered until tomorrow and her insurance will not pay for both wheelchair and walker  Patient continues to refuse rehab despite education  Also CM  Still does not have accepting VNA  Code Status: Level 1 - Full Code    Subjective:   Ms Crow Romo reports that she had a BM yesterday  She continues to refuse to go to rehab   Denies CP or SOB    Objective:     Vitals:   Temp (24hrs), Av 8 °F (36 6 °C), Min:97 7 °F (36 5 °C), Max:97 8 °F (36 6 °C)    Temp:  [97 7 °F (36 5 °C)-97 8 °F (36 6 °C)] 97 7 °F (36 5 °C)  HR:  [60-84] 84  Resp: [18-19] 19  BP: ()/(58-88) 158/88  SpO2:  [94 %-95 %] 94 %  Body mass index is 36 87 kg/m²  Input and Output Summary (last 24 hours): Intake/Output Summary (Last 24 hours) at 9/7/2021 1212  Last data filed at 9/6/2021 2201  Gross per 24 hour   Intake 840 ml   Output --   Net 840 ml       Physical Exam:   Physical Exam  Vitals and nursing note reviewed  Constitutional:       Appearance: She is obese  Comments: Patient seen sitting upright in bedside chair, NAD   Cardiovascular:      Rate and Rhythm: Normal rate and regular rhythm  Pulmonary:      Effort: Pulmonary effort is normal  No respiratory distress  Breath sounds: Normal breath sounds  Abdominal:      General: Bowel sounds are normal       Palpations: Abdomen is soft  Musculoskeletal:      Comments: Right foot wrapped   Neurological:      Mental Status: She is alert and oriented to person, place, and time     Psychiatric:         Mood and Affect: Mood normal          Additional Data:     Labs:  Results from last 7 days   Lab Units 09/06/21  0517 09/03/21  0604   WBC Thousand/uL 7 57 7 35   HEMOGLOBIN g/dL 12 2 13 2   HEMATOCRIT % 39 5 42 6   PLATELETS Thousands/uL 352 384   NEUTROS PCT %  --  44   LYMPHS PCT %  --  41   MONOS PCT %  --  9   EOS PCT %  --  5     Results from last 7 days   Lab Units 09/06/21  0517   SODIUM mmol/L 137   POTASSIUM mmol/L 4 1   CHLORIDE mmol/L 105   CO2 mmol/L 31   BUN mg/dL 14   CREATININE mg/dL 0 68   ANION GAP mmol/L 1*   CALCIUM mg/dL 9 5   GLUCOSE RANDOM mg/dL 118         Results from last 7 days   Lab Units 09/07/21  1052 09/07/21  0543 09/06/21  1542 09/06/21  1037 09/06/21  0549 09/05/21  1602 09/05/21  1112 09/05/21  0606 09/04/21  2042 09/04/21  1541 09/04/21  1048 09/04/21  0910   POC GLUCOSE mg/dl 142* 109 136 180* 126 134 190* 122 197* 194* 160* 99     Results from last 7 days   Lab Units 09/01/21  1842   HEMOGLOBIN A1C % 6 7*           Lines/Drains:  Invasive Devices     None Imaging: No pertinent imaging reviewed  Recent Cultures (last 7 days):   Results from last 7 days   Lab Units 08/31/21  1930 08/31/21  1553   BLOOD CULTURE   --  No Growth After 5 Days    Staphylococcus coagulase negative*  Dermobacter hominis*   GRAM STAIN RESULT  4+ Gram positive cocci in pairs, chains and clusters*  3+ Gram negative rods*  2+ Polys* Gram positive cocci in clusters*   WOUND CULTURE  4+ Growth of Staphylococcus aureus*  4+ Growth of   --        Last 24 Hours Medication List:   Current Facility-Administered Medications   Medication Dose Route Frequency Provider Last Rate    acetaminophen  650 mg Oral Q6H PRN Gerardine Hope, DPM      albuterol  2 puff Inhalation Q6H PRN Gerardine Hope, DPM      calcium carbonate  1,000 mg Oral Daily PRN Gerardine Hope, DPM      cephalexin  500 mg Oral Q6H [address] Kelley Medrano, DPM      cloNIDine  0 2 mg Oral BID Gerardine Hope, DPM      docusate sodium  100 mg Oral BID Gerardine Hope, DPM      enoxaparin  40 mg Subcutaneous Daily Gerardine Hope, DPM      fenofibrate  48 mg Oral Daily Gerardine [facility]      folic acid  9,882 mcg Oral Daily Gerardine Hope, DPM      HYDROmorphone  0 2 mg Intravenous Q6H PRN Simin Mendes PA-C      hydrOXYzine HCL  100 mg Oral HS Gerardine Hope, DPM      hydrOXYzine HCL  50 mg Oral QAM Gerardine Hope, DPM      insulin lispro  1-6 Units Subcutaneous TID [facility] Gerardine Hope, DPM      lamoTRIgine  200 mg Oral QAM Gerardine Hope, DPM      levothyroxine  50 mcg Oral Daily Gerardine [facility]      methocarbamol  750 mg Oral 4x Daily PRN Gerardine Hope, DPM      ondansetron  4 mg Intravenous Q6H PRN Gerardine Hope, DPM      oxyCODONE  10 mg Oral Q4H PRN Gerardine Hope, DPM      oxyCODONE  5 mg Oral Q4H PRN Gerardine Hope, DPM      pantoprazole  40 mg Oral HS Gerardine Hope, DPM      polyethylene glycol  17 g Oral Daily Gerardine [facility]      senna  1 tablet Oral Daily Gerardine Hope, DPM      SUMAtriptan  6 mg Subcutaneous Q2H PRN Max 3/day Elester Sprinkles, DPM      ziprasidone  80 mg Oral BID With Meals Elester Wanda, DPM          Today, Patient Was Seen By: Abraham Yadav PA-C    **Please Note: This note may have been constructed using a voice recognition system  **

## 2021-09-07 NOTE — PROGRESS NOTES
Podiatry - Progress Note  Patient: Bailey Cerna 40 y o  female   MRN: [id number]  PCP: Maricruz Lema, DO  Unit/Bed#: -02 Encounter: [id number]  Date Of Visit: 21    ASSESSMENT:    Bailey Cerna is a 40 y o  female with:    1  S/p right foot TMA (21)  2  T2DM (HbA1c 21 6 7%)  3  Tobacco abuse    PLAN:    · Patient stable for discharge from a podiatric standpoint  Continued to stress the importance of remaining NWB to the right foot  Discussed that weightbearing to the foot could cause dehiscence of the incision and increase risks of infection and further limb loss  She is understanding of this  · Post-surgical dressing changed today  · Elevation on green foam wedges or pillows when non-ambulatory  · Rest of care per primary team      Weightbearing status: strict NWB RLE    SUBJECTIVE:     The patient was seen, evaluated, and assessed at bedside today  The patient was awake, alert, and in no acute distress  No acute events overnight  The patient reports sensitivity with dressing changes  Patient denies N/V/F/chills/SOB/CP  OBJECTIVE:     Vitals:   /88   Pulse 84   Temp 97 7 °F (36 5 °C)   Resp 19   Ht 5' 5 5" (1 664 m)   Wt 102 kg (225 lb)   SpO2 94%   BMI 36 87 kg/m²     Temp (24hrs), Av 8 °F (36 6 °C), Min:97 7 °F (36 5 °C), Max:97 8 °F (36 6 °C)      Physical Exam:     General: Alert, cooperative and no distress  Lungs: Non labored breathing  Abdomen: Soft, non-tender  Lower extremity exam:  Cardiovascular status at baseline  Neurological status at baseline  No calf tenderness noted  Right foot TMA present with coapted T-shaped incision  There is no significant dehiscence present  Sutures intact without signs of active infection: no purulence, no malodor, no ascending erythema, no crepitus, no fluctuance  Plantar flap remains pink, warm, with brisk CFT      Clinical Images 21:          Additional Data:     Labs:    Results from last 7 days   Lab Units 09/06/21  0517 09/03/21  0604   WBC Thousand/uL 7 57 7 35   HEMOGLOBIN g/dL 12 2 13 2   HEMATOCRIT % 39 5 42 6   PLATELETS Thousands/uL 352 384   NEUTROS PCT %  --  44   LYMPHS PCT %  --  41   MONOS PCT %  --  9   EOS PCT %  --  5     Results from last 7 days   Lab Units 09/06/21  0517   POTASSIUM mmol/L 4 1   CHLORIDE mmol/L 105   CO2 mmol/L 31   BUN mg/dL 14   CREATININE mg/dL 0 68   CALCIUM mg/dL 9 5           * I Have Reviewed All Lab Data Listed Above  Recent Cultures (last 7 days):     Results from last 7 days   Lab Units 08/31/21  1930 08/31/21  1553   BLOOD CULTURE   --  No Growth After 5 Days  Staphylococcus coagulase negative*  Dermobacter hominis*   GRAM STAIN RESULT  4+ Gram positive cocci in pairs, chains and clusters*  3+ Gram negative rods*  2+ Polys* Gram positive cocci in clusters*   WOUND CULTURE  4+ Growth of Staphylococcus aureus*  4+ Growth of   --      Results from last 7 days   Lab Units 08/31/21 1930   ANAEROBIC CULTURE  2+ Growth of - Prevotella timonensis - Prevotella species*  1+ Growth of - Presumptive Prevotella bergensis - Prevotella species*       Imaging: I have personally reviewed pertinent films in PACS  EKG, Pathology, and Other Studies: I have personally reviewed pertinent reports  ** Please Note: Portions of the record may have been created with voice recognition software  Occasional wrong word or "sound a like" substitutions may have occurred due to the inherent limitations of voice recognition software  Read the chart carefully and recognize, using context, where substitutions have occurred   **

## 2021-09-07 NOTE — PLAN OF CARE
Problem: PHYSICAL THERAPY ADULT  Goal: Performs mobility at highest level of function for planned discharge setting  See evaluation for individualized goals  Description: Treatment/Interventions: LE strengthening/ROM, Functional transfer training, ADL retraining, Elevations, Endurance training, Therapeutic exercise, Patient/family training, Gait training, Bed mobility  Equipment Recommended: Santos Rand, Wheelchair       See flowsheet documentation for full assessment, interventions and recommendations  Outcome: Progressing  Note: Prognosis: Fair  Problem List: Decreased strength, Impaired balance, Decreased mobility, Decreased cognition  Assessment: Pt demonstrated improved mobiilty this session, ambulating increased distances while maintaining NWB status, then took seated rests to recover  pt required instructions for hand placement for safety, but was inconsistant about use during session  Was able to negotiate curb steps as noted with RW to enter home, but demontrated significant difficulty sitting & standing from fight of steps to allow access to 2nd floor of home  Pt refuses 1st floor set up with BSC at this time  Will continue to benefit from therapy services to address functional deficits & practice stair trials to ensure safer mobility within home  Discussed discharge plan & safety awareness, but pt refused proposed home set up as noted above  Continue to recommend rehab at this time to ensure safe return to PLOF  PT Discharge Recommendation: Post acute rehabilitation services     PT - OK to Discharge: Yes    See flowsheet documentation for full assessment

## 2021-09-07 NOTE — ASSESSMENT & PLAN NOTE
Lab Results   Component Value Date    HGBA1C 6 7 (H) 09/01/2021       Recent Labs     09/06/21  1037 09/06/21  1542 09/07/21  0543 09/07/21  1052   POCGLU 180* 136 109 142*       Blood Sugar Average: Last 72 hrs:  · Takes metformin at home, refuses insulin at home  · QID accuchecks with SSI while inpatient   · Diabetic diet   · Hypoglycemia protocol

## 2021-09-07 NOTE — CASE MANAGEMENT
CM spoke with pt who continues to decline STR through education on benefits of STR discharge  Pt requesting a referral to [facility], referral placed via [facility] for SN/PT/OT  Pt reports that she has not yet been contacted to schedule delivery of her DME  DEVON put pt's cell phone number in [place] communication   department to follow

## 2021-09-07 NOTE — CASE MANAGEMENT
Per [employer], [facility] able to accept for SN/PT/OT  Per [employer], DME will be delivered to pt's home tomorrow  ZORAIDA Segura aware of same  CM spoke with pt who reports that she has no ride home and wants to take a Lyft  CM explained that a Lyft would not be a safe method of transportation as she is to be NWB on her foot  CM discussed [facility] transportation at discharge and reviewed out of pocket cost associated  Pt very concerned about cost and reports that she doesn't even have the money to cover a Lyft  CM spoke with CC CM Irene Linda who confirmed that CM department is able to pay for [address] transportation home  CM sent a message via [employer] requesting pt's wheelchair be delivered to the hospital tomorrow and [facility] be delivered to pt's home tomorrow afternoon  CM spoke with Cheyanne Mcneal from [facility] who confirmed that they are able to provide [address] transportation to pt's residence tomorrow with a 1:30 PM pickup, [facility] aware that pt will have her own wheelchair  CM was informed that transportation will be about $70, email sent to [facility] CM informing of same

## 2021-09-08 ENCOUNTER — TELEPHONE (OUTPATIENT)
Dept: PODIATRY | Facility: CLINIC | Age: 46
End: 2021-09-08

## 2021-09-08 VITALS
DIASTOLIC BLOOD PRESSURE: 83 MMHG | RESPIRATION RATE: 18 BRPM | BODY MASS INDEX: 36.16 KG/M2 | HEART RATE: 87 BPM | HEIGHT: 66 IN | WEIGHT: 225 LBS | OXYGEN SATURATION: 94 % | SYSTOLIC BLOOD PRESSURE: 132 MMHG | TEMPERATURE: 97.6 F

## 2021-09-08 LAB
GLUCOSE SERPL-MCNC: 151 MG/DL (ref 65–140)
GLUCOSE SERPL-MCNC: 169 MG/DL (ref 65–140)

## 2021-09-08 PROCEDURE — 82948 REAGENT STRIP/BLOOD GLUCOSE: CPT

## 2021-09-08 PROCEDURE — 99239 HOSP IP/OBS DSCHRG MGMT >30: CPT | Performed by: PHYSICIAN ASSISTANT

## 2021-09-08 RX ORDER — SENNOSIDES 8.6 MG
8.6 TABLET ORAL DAILY
Qty: 5 TABLET | Refills: 0 | Status: SHIPPED | OUTPATIENT
Start: 2021-09-09

## 2021-09-08 RX ORDER — CEPHALEXIN 500 MG/1
500 CAPSULE ORAL EVERY 6 HOURS SCHEDULED
Qty: 7 CAPSULE | Refills: 0 | Status: SHIPPED | OUTPATIENT
Start: 2021-09-08 | End: 2021-09-10

## 2021-09-08 RX ORDER — DOCUSATE SODIUM 100 MG/1
100 CAPSULE, LIQUID FILLED ORAL 2 TIMES DAILY
Qty: 10 CAPSULE | Refills: 0 | Status: SHIPPED | OUTPATIENT
Start: 2021-09-08

## 2021-09-08 RX ADMIN — LAMOTRIGINE 200 MG: 100 TABLET ORAL at 09:26

## 2021-09-08 RX ADMIN — FENOFIBRATE 48 MG: 48 TABLET ORAL at 09:25

## 2021-09-08 RX ADMIN — ZIPRASIDONE HYDROCHLORIDE 80 MG: 40 CAPSULE ORAL at 07:35

## 2021-09-08 RX ADMIN — LEVOTHYROXINE SODIUM 50 MCG: 50 TABLET ORAL at 05:59

## 2021-09-08 RX ADMIN — OXYCODONE HYDROCHLORIDE 10 MG: 10 TABLET ORAL at 05:59

## 2021-09-08 RX ADMIN — CEPHALEXIN 500 MG: 500 CAPSULE ORAL at 05:59

## 2021-09-08 RX ADMIN — ENOXAPARIN SODIUM 40 MG: 40 INJECTION SUBCUTANEOUS at 09:26

## 2021-09-08 RX ADMIN — DOCUSATE SODIUM 100 MG: 100 CAPSULE, LIQUID FILLED ORAL at 09:25

## 2021-09-08 RX ADMIN — CLONIDINE HYDROCHLORIDE 0.2 MG: 0.2 TABLET ORAL at 09:25

## 2021-09-08 RX ADMIN — CEPHALEXIN 500 MG: 500 CAPSULE ORAL at 11:12

## 2021-09-08 RX ADMIN — SENNOSIDES 8.6 MG: 8.6 TABLET ORAL at 09:26

## 2021-09-08 RX ADMIN — OXYCODONE HYDROCHLORIDE 10 MG: 10 TABLET ORAL at 10:01

## 2021-09-08 RX ADMIN — POLYETHYLENE GLYCOL 3350 17 G: 17 POWDER, FOR SOLUTION ORAL at 09:26

## 2021-09-08 RX ADMIN — METHOCARBAMOL TABLETS 750 MG: 750 TABLET, COATED ORAL at 05:59

## 2021-09-08 RX ADMIN — HYDROXYZINE HYDROCHLORIDE 50 MG: 25 TABLET, FILM COATED ORAL at 09:25

## 2021-09-08 RX ADMIN — FOLIC ACID 1000 MCG: 1 TABLET ORAL at 09:25

## 2021-09-08 NOTE — DISCHARGE SUMMARY
Discharge Summary - [facility] Internal Medicine    Patient Information: Naz Chery 40 y o  female MRN: [id number]  Unit/Bed#: -01 Encounter: [id number]    Discharging Physician / Practitioner: Missy Centeno PA-C  PCP: Paul Beck DO  Admission Date: 9/1/2021  Discharge Date: 09/08/21    Reason for Admission: diabetic foot uler    Discharge Diagnoses:     Principal Problem:    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, with bone involvement without evidence of necrosis ([facility] )  Active Problems:    Type 2 diabetes mellitus with neurologic complication, without long-term current use of insulin ([facility] )    Manic bipolar I disorder [facility] )    Essential hypertension    Chronic pain    Acquired absence of other left toe(s) ([facility] )    Hypothyroidism    H/O bariatric surgery    Morbid obesity ([facility] )    GERD (gastroesophageal reflux disease)    Anxiety    Migraine without status migrainosus, not intractable  Resolved Problems:    Constipation      Consultations During Hospital Stay:  · Podiatry  · PT  · OT  · Case management     Procedures Performed:   · 9/1/21 XR right foot  · MRI foot-forefoot toes right  · CXR  · 9/4/21 XR foot right  · Wound culture and Gram stain  · Anaerobic culture and Gram stain  · Blood cultures  · Hemoglobin A1c  · CBC  · BMP    Significant Findings:  · 9/1/21 XR right foot:  No acute interval change  Question of faint periosteal reaction base of the 3rd proximal phalanx as before without overt bony destruction  Possibility of osteomyelitis cannot be entirely excluded  Soft tissue ulceration plantar aspect 3rd digit without diffuse subcutaneous emphysema  · MRI foot-forefoot toes right: 1  Plantar skin ulceration at the 3rd MTP with marrow changes at the base of the 3rd proximal phalanx suspicious for early changes of osteomyelitis    No corresponding marrow abnormality at the 3rd metatarsal head although 3rd MTP septic arthropathy would still be difficult to exclude  2  Peripherally enhancing dorsal soft tissue collection between the 3rd and 4th rays measuring 1 4 x 1 0 x 1 0 cm worrisome for abscess formation and communicating with the 3rd intermetatarsal bursa  Diminished enhancement of the plantar soft tissues between the 3rd and 5th MTPs concerning for early changes of tissue necrosis  No soft tissue emphysema at this time  Correlate with clinical examination  · CXR:  No acute cardiopulmonary disease  · 9/4/21 XR foot right:  Expected appearance of the right foot status post transmetatarsal amputation  · Wound culture and Gram stain:  4+ growth of Staphylococcus aureus  4+ growth of mixed skin reggie  · Anaerobic culture and Gram stain:  2+ growth of prevotella timonensis  1+ growth of presumptive prevotella bergensis  · Blood cultures:  1 positive for Staphylococcus coagulase negative dermobacter hominis  The other is negative after 5 days  · Hemoglobin A1c: 6 7  · WBCs:  10 50, 8 98, 7 35, 7 57    Incidental Findings:   · None     Test Results Pending at Discharge (will require follow up): · None     Outpatient Tests Requested:  · Follow up with Podiatry  · Follow up with PCP    Complications:  None    Hospital Course:     Bisi Garcia is a 40 y o  female with IDDM2, bipolar, hypothyroidism, obesity (Body mass index is 36 87 kg/m² ) s/p bariatric surgery, anxiety, HTN, GERD, chronic pain, and migraines who originally presented to the hospital on 9/1/2021 due to diabetic foot ulcer  The patient was recently seen in the ED on 8/31/21 but left AMA to care for her dogs  She returned on 9/1/21  X-ray was repeated on admission with no acute interval change per the results report as above  Podiatry was consulted  She was placed on IV antibiotics  An MRI of the right foot was obtained, results as above per the results report    The patient underwent successful right transmetatarsal amputation with no sign of infection at the margin and the infected ulcer was excised along with the forefoot amputation per Podiatry note  Patient was noted to walk on her foot and cause some acute postoperative bleeding  The importance of NWB status was reiterated to the patient  PT recommended rehab  The patient refused rehab  CM arranged for DME, [facility] and transport  Wound cultures as above, Complete course of PO Keflex per Podiatry recs  She had 1 positive blood culture, suspect contaminant; the other blood culture remained negative after 5 days  Patient refuses to use insulin at home  She reports that she is no longer on Wellbutrin, Lexapro, or Vistaril at home  She states that she already has Norco at home  Provide bowel regimen, avoid constipation  Condition at Discharge: stable     Discharge Day Visit / Exam:     Subjective:    Ms Niko Hill reports feeling tired today and having "throbbing" in her foot  She reports having a BM 2 days ago  She denies CP, SOB, abdominal pain    Vitals: Blood Pressure: 132/83 (09/08/21 0801)  Pulse: 87 (09/07/21 2350)  Temperature: 97 6 °F (36 4 °C) (09/08/21 0801)  Temp Source: Oral (09/03/21 2349)  Respirations: 18 (09/08/21 0801)  Height: 5' 5 5" (166 4 cm) (09/01/21 2317)  Weight - Scale: 102 kg (225 lb) (09/01/21 2317)  SpO2: 94 % (09/07/21 2350)  Exam:   Physical Exam  Vitals and nursing note reviewed  Constitutional:       General: She is not in acute distress  Appearance: She is obese  Comments: Patient seen sitting upright comfortably resting in bedside chair with legs elevated   Cardiovascular:      Rate and Rhythm: Normal rate and regular rhythm  Pulmonary:      Effort: Pulmonary effort is normal  No respiratory distress  Breath sounds: Normal breath sounds  Abdominal:      General: Bowel sounds are normal       Palpations: Abdomen is soft  Tenderness: There is no abdominal tenderness  Skin:     General: Skin is warm     Neurological:      Mental Status: She is alert and oriented to person, place, and time    Psychiatric:         Mood and Affect: Mood normal          Behavior: Behavior normal          Discharge instructions/Information to patient and family:   See after visit summary for information provided to patient and family  Provisions for Follow-Up Care:  See after visit summary for information related to follow-up care and any pertinent home health orders  Disposition:     Home with VNA Services (Reminder: Complete face to face encounter) Patient refusing rehab    For Discharges to [facility] SNF:   · Not Applicable to this Patient - Not Applicable to this Patient    Planned Readmission: None     Discharge Statement:  I spent 37 minutes discharging the patient  This time was spent on the day of discharge  I had direct contact with the patient on the day of discharge  Greater than 50% of the total time was spent examining patient, answering all patient questions, arranging and discussing plan of care with patient as well as directly providing post-discharge instructions  Additional time then spent on discharge activities  Discharge Medications:  See after visit summary for reconciled discharge medications provided to patient and family  ** Please Note: Dragon 360 Dictation voice to text software may have been used in the creation of this document   **

## 2021-09-08 NOTE — DISCHARGE INSTRUCTIONS
Discharge Instructions - Podiatry    Weight Bearing Status: Non-weight bearing Right lower extremity                   Pain: Continue analgesics as directed    Follow-up appointment instructions: Please make an appointment within one week of discharge with Dr Itz Valencia  Contact sooner if any increase in pain, or signs of infection occur    Wound Care: Leave dressings clean, dry, and intact between professional dressing changes  If the dressing gets wet or damaged please call the office and get in to have it changed! Nursing Instructions: Please apply Betadine soaked adaptic  Then cover with Gauze and secure with Kerlix and tape  Please change dressing every Monday, Wednesday, and Friday

## 2021-09-09 ENCOUNTER — TELEPHONE (OUTPATIENT)
Dept: OBGYN CLINIC | Facility: OTHER | Age: 46
End: 2021-09-09

## 2021-09-09 ENCOUNTER — TELEPHONE (OUTPATIENT)
Dept: PODIATRY | Facility: CLINIC | Age: 46
End: 2021-09-09

## 2021-09-09 LAB

## 2021-09-09 NOTE — TELEPHONE ENCOUNTER
Received message to schedule p/o appt with Dr Varun Schwartz  Scheduled appt for Tuesday, 9/13 at 2:30    Called patient and left detailed message with appt date/time/location

## 2021-09-09 NOTE — UTILIZATION REVIEW
Notification of Discharge   This is a Notification of Discharge from our facility [address]  Please be advised that this patient has been discharge from our facility  Below you will find the admission and discharge date and time including the patients disposition  UTILIZATION REVIEW CONTACT:  Tierra Núñez  Utilization   Network Utilization Review Department  Phone: [phone] x carefully listen to the prompts  All voicemails are confidential   Email: [email or web address]     PHYSICIAN ADVISORY SERVICES:  FOR [id number]-[id number] REVIEW - MEDICAL NECESSITY DENIAL  Phone: [phone]  Fax: [phone]  Email: [email or web address]     PRESENTATION DATE: 9/1/2021  1:35 PM  OBERVATION ADMISSION DATE:   INPATIENT ADMISSION DATE: 9/1/21  2:10 PM   DISCHARGE DATE: 9/8/2021  2:51 PM  DISPOSITION: Home with [place] with [address] [place] [address] Road INFORMATION:  Send all requests for admission clinical reviews, approved or denied determinations and any other requests to dedicated fax number below belonging to the campus where the patient is receiving treatment   List of dedicated fax numbers:  [address] DENIALS (Administrative/Medical Necessity) [phone]   [address] [place] [address] (Maternity/NICU/Pediatrics) [phone]   Mika Alvarez [phone]   Sonja Mejia [phone]   Travis Roach [phone]   [address] [phone]   [facility]  [phone]   [address] [place] [address], [address]  [address] [facility] [address] [facility] [phone]

## 2021-09-10 ENCOUNTER — DOCUMENTATION (OUTPATIENT)
Dept: SOCIAL WORK | Facility: HOSPITAL | Age: 46
End: 2021-09-10

## 2021-09-10 ENCOUNTER — TELEPHONE (OUTPATIENT)
Dept: OBGYN CLINIC | Facility: OTHER | Age: 46
End: 2021-09-10

## 2021-09-10 NOTE — TELEPHONE ENCOUNTER
Received message that patient is unable to travel to [facility] for her post-op appointment  Rescheduled her to 9/15 @ 2:30 with Dr Kyle Nixon    Called and left message with appointment date/time/location

## 2021-09-10 NOTE — PROGRESS NOTES
Admission Report at [facility] Emergency Department sent to ordering MD Podiatry and faxed to PCP    [facility] has admitted your patient to [address] [place] [address] service with the following disciplines:      SN, PT and OT  This report is informational only, no responses is needed  Primary focus of home health care: Integumentary  Patient stated goals of care: Foot healed and get back to work or get SSI  Anticipated visit pattern: 1w1 2w1 3w7  and next visit date: 9 13 21 Wound car SP Right TMA  Significant clinical findings: Patient reports transport from hospital pulled WC up stairs backwards and dumped patient forward onto right foot in front of home  Patients son helped patient up stairs into house  Sutures intact and only scant dry drainage on the removed dressing  Patient reports poor appetite  MED ISSUES_ Patient would like clarification as to why her tylenol was discontinued  Taking in addition to AVS famotidine 20 mg PRN for indigestion  Patient is not taking the following medciations on the AVS: Vitamin D3 1,000 units weekly, mupriocin ointment, and zofran  Patient is taking Hydoxyzine 50 mg 1 tab in am and two tabs in PM    Needs follow up physician appointments scheduled: TCM with PCP   Potential barriers to goal achievement: Anxiety   Other pertinent information: TC to podiatry spoke to [facility] to request VO for wound care  [facility] deferred RN to TT Dr Malcom Acosta  TT to Dr Malcom Acosta to request VO for wound care to TMA  Cleanse wound with NSS pat dry  Swab incision with betadine and cover with 4x4, kerlex, and ace  NWB RLE  Thank you for allowing us to participate in the care of your patient        Win Menjivar RN

## 2021-09-14 ENCOUNTER — TELEPHONE (OUTPATIENT)
Dept: PODIATRY | Facility: CLINIC | Age: 46
End: 2021-09-14

## 2021-09-14 NOTE — TELEPHONE ENCOUNTER
I received a call from Dee with [facility] physical therapy  Today, Esther Servin complained of tingling in her right foot  This started last night  Also, when transferring onto the commode, she puts her foot down  She was instructed on the importance of non weight bearing    She is scheduled to see Dr Varun Schwartz on 9/15/21

## 2021-09-15 ENCOUNTER — OFFICE VISIT (OUTPATIENT)
Dept: PODIATRY | Facility: CLINIC | Age: 46
End: 2021-09-15

## 2021-09-15 VITALS
HEART RATE: 69 BPM | HEIGHT: 66 IN | SYSTOLIC BLOOD PRESSURE: 115 MMHG | DIASTOLIC BLOOD PRESSURE: 78 MMHG | BODY MASS INDEX: 36.87 KG/M2

## 2021-09-15 DIAGNOSIS — E11.40 TYPE 2 DIABETES MELLITUS WITH DIABETIC NEUROPATHY, WITH LONG-TERM CURRENT USE OF INSULIN (HCC): ICD-10-CM

## 2021-09-15 DIAGNOSIS — Z89.431 S/P TRANSMETATARSAL AMPUTATION OF FOOT, RIGHT (HCC): Primary | ICD-10-CM

## 2021-09-15 DIAGNOSIS — Z79.4 TYPE 2 DIABETES MELLITUS WITH DIABETIC NEUROPATHY, WITH LONG-TERM CURRENT USE OF INSULIN (HCC): ICD-10-CM

## 2021-09-15 PROCEDURE — 99024 POSTOP FOLLOW-UP VISIT: CPT | Performed by: PODIATRIST

## 2021-09-15 RX ORDER — SENNOSIDES 8.6 MG
650 CAPSULE ORAL EVERY 8 HOURS PRN
Qty: 30 TABLET | Refills: 0 | Status: SHIPPED | OUTPATIENT
Start: 2021-09-15

## 2021-09-15 RX ORDER — CEPHALEXIN 500 MG/1
500 CAPSULE ORAL EVERY 6 HOURS SCHEDULED
Qty: 28 CAPSULE | Refills: 0 | Status: SHIPPED | OUTPATIENT
Start: 2021-09-15 | End: 2021-09-22

## 2021-09-15 NOTE — PROGRESS NOTES
Assessment/Plan:      Diagnoses and all orders for this visit:    S/P transmetatarsal amputation of foot, right (HCC)  -     acetaminophen (TYLENOL) 650 mg CR tablet; Take 1 tablet (650 mg total) by mouth every 8 (eight) hours as needed for mild pain  -     cephalexin (KEFLEX) 500 mg capsule; Take 1 capsule (500 mg total) by mouth every 6 (six) hours for 7 days    Type 2 diabetes mellitus with diabetic neuropathy, with long-term current use of insulin (HCC)  -     acetaminophen (TYLENOL) 650 mg CR tablet; Take 1 tablet (650 mg total) by mouth every 8 (eight) hours as needed for mild pain  -     cephalexin (KEFLEX) 500 mg capsule; Take 1 capsule (500 mg total) by mouth every 6 (six) hours for 7 days      Patient is stable postop 10 days    Incision: stable    Dressing instructions given to patient  Rest the foot as much as possible and elevate/ice  if swollen  Ambulatory status: NWB right foot    Images reviewed today: none    RTC: 2 weeks for suture removal  DSD to foot      Subjective:     Patient ID: Jatin Baldwin is a 40 y o  female  DOS: 9/4/21     Procedure: right TMA     Condition: Dressing C/D/I  No drainage  Tenderness to lateral amputation site  Review of Systems      Objective:     Physical Exam    Right TMA stable  Mild erythema  No drainage or dehiscence   Palpable DP/PT

## 2021-09-29 ENCOUNTER — OFFICE VISIT (OUTPATIENT)
Dept: PODIATRY | Facility: CLINIC | Age: 46
End: 2021-09-29

## 2021-09-29 VITALS — HEIGHT: 66 IN | BODY MASS INDEX: 36.87 KG/M2

## 2021-09-29 DIAGNOSIS — Z89.431 S/P TRANSMETATARSAL AMPUTATION OF FOOT, RIGHT (HCC): Primary | ICD-10-CM

## 2021-09-29 PROCEDURE — 99024 POSTOP FOLLOW-UP VISIT: CPT | Performed by: PODIATRIST

## 2021-09-29 NOTE — PATIENT INSTRUCTIONS
Patient's right foot amputation is healed  She may begin to slowly put weight on the right foot and slowly advance as tolerated  I have prescribed an insert for her diabetic shoe  She should use her postop surgical shoes on the right foot until she gets the custom inserts

## 2021-09-29 NOTE — PROGRESS NOTES
Assessment/Plan:      Diagnoses and all orders for this visit:    S/P transmetatarsal amputation of foot, right ([address] )  -     Diabetic Shoe Inserts      Patient is stable postop 4 weeks    Incision: sutures removed  She is healed  Rest the foot as much as possible and elevate/ice  if swollen  Ambulatory status: Begin WB to right foot slowly, prescribed TMA insert for right shoe    Images reviewed today: none    RTC: 4 weeks to begin at risk foot care  Subjective:     Patient ID: Nicolle Monsivais is a 40 y o  female  DOS: 9/4/21     Procedure: right TMA     Condition: Dressing C/D/I  She has been trying to stay off her foot more  She denies significant drainage  She has no acute concerns today  Review of Systems      Objective:     Physical Exam    Right TMA is healed, no drainage, sutures intact  No Evidence of dehiscence or infection  No significant pain but there is tingling and burning at the amputation site consistent with neuropathy  Pedal pulses intact  Capillary refill is brisk the plantar flap  No calf pain with compression

## 2021-09-30 ENCOUNTER — TELEPHONE (OUTPATIENT)
Dept: PODIATRY | Facility: CLINIC | Age: 46
End: 2021-09-30

## 2021-11-03 ENCOUNTER — OFFICE VISIT (OUTPATIENT)
Dept: PODIATRY | Facility: CLINIC | Age: 46
End: 2021-11-03

## 2021-11-03 VITALS — BODY MASS INDEX: 36.87 KG/M2 | HEIGHT: 66 IN

## 2021-11-03 DIAGNOSIS — E11.40 TYPE 2 DIABETES MELLITUS WITH DIABETIC NEUROPATHY, WITH LONG-TERM CURRENT USE OF INSULIN (HCC): ICD-10-CM

## 2021-11-03 DIAGNOSIS — Z79.4 TYPE 2 DIABETES MELLITUS WITH DIABETIC NEUROPATHY, WITH LONG-TERM CURRENT USE OF INSULIN (HCC): ICD-10-CM

## 2021-11-03 DIAGNOSIS — S90.821A BLISTER OF RIGHT FOOT, INITIAL ENCOUNTER: ICD-10-CM

## 2021-11-03 DIAGNOSIS — Z89.431 S/P TRANSMETATARSAL AMPUTATION OF FOOT, RIGHT (HCC): Primary | ICD-10-CM

## 2021-11-03 PROCEDURE — 99024 POSTOP FOLLOW-UP VISIT: CPT | Performed by: PODIATRIST

## 2021-12-08 ENCOUNTER — OFFICE VISIT (OUTPATIENT)
Dept: PODIATRY | Facility: CLINIC | Age: 46
End: 2021-12-08
Payer: COMMERCIAL

## 2021-12-08 VITALS — HEIGHT: 66 IN | BODY MASS INDEX: 36.87 KG/M2

## 2021-12-08 DIAGNOSIS — Z89.431 S/P TRANSMETATARSAL AMPUTATION OF FOOT, RIGHT (HCC): ICD-10-CM

## 2021-12-08 DIAGNOSIS — L97.412 DIABETIC ULCER OF RIGHT MIDFOOT ASSOCIATED WITH TYPE 2 DIABETES MELLITUS, WITH FAT LAYER EXPOSED (HCC): Primary | ICD-10-CM

## 2021-12-08 DIAGNOSIS — E11.621 DIABETIC ULCER OF RIGHT MIDFOOT ASSOCIATED WITH TYPE 2 DIABETES MELLITUS, WITH FAT LAYER EXPOSED (HCC): Primary | ICD-10-CM

## 2021-12-08 PROCEDURE — 99214 OFFICE O/P EST MOD 30 MIN: CPT | Performed by: PODIATRIST

## 2022-02-16 ENCOUNTER — OFFICE VISIT (OUTPATIENT)
Dept: PODIATRY | Facility: CLINIC | Age: 47
End: 2022-02-16
Payer: COMMERCIAL

## 2022-02-16 ENCOUNTER — HOSPITAL ENCOUNTER (INPATIENT)
Facility: HOSPITAL | Age: 47
LOS: 3 days | Discharge: HOME HEALTH | DRG: 048 | End: 2022-02-19
Attending: EMERGENCY MEDICINE | Admitting: INTERNAL MEDICINE
Payer: COMMERCIAL

## 2022-02-16 ENCOUNTER — APPOINTMENT (EMERGENCY)
Dept: RADIOLOGY | Facility: HOSPITAL | Age: 47
DRG: 048 | End: 2022-02-16
Payer: COMMERCIAL

## 2022-02-16 VITALS
SYSTOLIC BLOOD PRESSURE: 144 MMHG | HEIGHT: 66 IN | HEART RATE: 79 BPM | DIASTOLIC BLOOD PRESSURE: 86 MMHG | BODY MASS INDEX: 36.87 KG/M2

## 2022-02-16 DIAGNOSIS — E11.621 TYPE 2 DIABETES MELLITUS WITH LEFT DIABETIC FOOT ULCER (HCC): ICD-10-CM

## 2022-02-16 DIAGNOSIS — L97.509 FOOT ULCER (HCC): ICD-10-CM

## 2022-02-16 DIAGNOSIS — L97.512 RIGHT FOOT ULCER, WITH FAT LAYER EXPOSED (HCC): ICD-10-CM

## 2022-02-16 DIAGNOSIS — L97.416 DIABETIC ULCER OF RIGHT MIDFOOT ASSOCIATED WITH DIABETES MELLITUS DUE TO UNDERLYING CONDITION, WITH BONE INVOLVEMENT WITHOUT EVIDENCE OF NECROSIS (HCC): ICD-10-CM

## 2022-02-16 DIAGNOSIS — L08.9 LEFT FOOT INFECTION: ICD-10-CM

## 2022-02-16 DIAGNOSIS — L97.529 TYPE 2 DIABETES MELLITUS WITH LEFT DIABETIC FOOT ULCER (HCC): ICD-10-CM

## 2022-02-16 DIAGNOSIS — L03.116 CELLULITIS OF LEFT FOOT: Primary | ICD-10-CM

## 2022-02-16 DIAGNOSIS — M79.672 LEFT FOOT PAIN: Primary | ICD-10-CM

## 2022-02-16 DIAGNOSIS — E08.621 DIABETIC ULCER OF RIGHT MIDFOOT ASSOCIATED WITH DIABETES MELLITUS DUE TO UNDERLYING CONDITION, WITH BONE INVOLVEMENT WITHOUT EVIDENCE OF NECROSIS (HCC): ICD-10-CM

## 2022-02-16 DIAGNOSIS — E11.621 DIABETIC ULCER OF TOE OF LEFT FOOT ASSOCIATED WITH TYPE 2 DIABETES MELLITUS, WITH FAT LAYER EXPOSED (HCC): ICD-10-CM

## 2022-02-16 DIAGNOSIS — L97.522 DIABETIC ULCER OF TOE OF LEFT FOOT ASSOCIATED WITH TYPE 2 DIABETES MELLITUS, WITH FAT LAYER EXPOSED (HCC): ICD-10-CM

## 2022-02-16 PROBLEM — G47.33 OBSTRUCTIVE SLEEP APNEA: Status: ACTIVE | Noted: 2022-02-16

## 2022-02-16 LAB
ALBUMIN SERPL BCP-MCNC: 3.8 G/DL (ref 3.5–5)
ALP SERPL-CCNC: 88 U/L (ref 46–116)
ALT SERPL W P-5'-P-CCNC: 18 U/L (ref 12–78)
ANION GAP SERPL CALCULATED.3IONS-SCNC: 6 MMOL/L (ref 4–13)
AST SERPL W P-5'-P-CCNC: 16 U/L (ref 5–45)
BASOPHILS # BLD AUTO: 0.07 THOUSANDS/ΜL (ref 0–0.1)
BASOPHILS NFR BLD AUTO: 1 % (ref 0–1)
BILIRUB SERPL-MCNC: 0.28 MG/DL (ref 0.2–1)
BUN SERPL-MCNC: 6 MG/DL (ref 5–25)
CALCIUM SERPL-MCNC: 9.6 MG/DL (ref 8.3–10.1)
CHLORIDE SERPL-SCNC: 105 MMOL/L (ref 100–108)
CO2 SERPL-SCNC: 25 MMOL/L (ref 21–32)
CREAT SERPL-MCNC: 0.88 MG/DL (ref 0.6–1.3)
CRP SERPL HS-MCNC: 5.48 MG/L
EOSINOPHIL # BLD AUTO: 0.43 THOUSAND/ΜL (ref 0–0.61)
EOSINOPHIL NFR BLD AUTO: 4 % (ref 0–6)
ERYTHROCYTE [DISTWIDTH] IN BLOOD BY AUTOMATED COUNT: 14.4 % (ref 11.6–15.1)
ERYTHROCYTE [SEDIMENTATION RATE] IN BLOOD: 11 MM/HOUR (ref 0–19)
EST. AVERAGE GLUCOSE BLD GHB EST-MCNC: 137 MG/DL
FLUAV RNA RESP QL NAA+PROBE: NEGATIVE
FLUBV RNA RESP QL NAA+PROBE: NEGATIVE
GFR SERPL CREATININE-BSD FRML MDRD: 79 ML/MIN/1.73SQ M
GLUCOSE SERPL-MCNC: 114 MG/DL (ref 65–140)
GLUCOSE SERPL-MCNC: 88 MG/DL (ref 65–140)
HBA1C MFR BLD: 6.4 %
HCT VFR BLD AUTO: 44.7 % (ref 34.8–46.1)
HGB BLD-MCNC: 13.7 G/DL (ref 11.5–15.4)
IMM GRANULOCYTES # BLD AUTO: 0.03 THOUSAND/UL (ref 0–0.2)
IMM GRANULOCYTES NFR BLD AUTO: 0 % (ref 0–2)
LYMPHOCYTES # BLD AUTO: 4.07 THOUSANDS/ΜL (ref 0.6–4.47)
LYMPHOCYTES NFR BLD AUTO: 37 % (ref 14–44)
MCH RBC QN AUTO: 24.3 PG (ref 26.8–34.3)
MCHC RBC AUTO-ENTMCNC: 30.6 G/DL (ref 31.4–37.4)
MCV RBC AUTO: 79 FL (ref 82–98)
MONOCYTES # BLD AUTO: 0.74 THOUSAND/ΜL (ref 0.17–1.22)
MONOCYTES NFR BLD AUTO: 7 % (ref 4–12)
NEUTROPHILS # BLD AUTO: 5.53 THOUSANDS/ΜL (ref 1.85–7.62)
NEUTS SEG NFR BLD AUTO: 51 % (ref 43–75)
NRBC BLD AUTO-RTO: 0 /100 WBCS
PLATELET # BLD AUTO: 349 THOUSANDS/UL (ref 149–390)
PMV BLD AUTO: 10.6 FL (ref 8.9–12.7)
POTASSIUM SERPL-SCNC: 4.4 MMOL/L (ref 3.5–5.3)
PROT SERPL-MCNC: 7.8 G/DL (ref 6.4–8.2)
RBC # BLD AUTO: 5.63 MILLION/UL (ref 3.81–5.12)
RSV RNA RESP QL NAA+PROBE: NEGATIVE
SARS-COV-2 RNA RESP QL NAA+PROBE: NEGATIVE
SODIUM SERPL-SCNC: 136 MMOL/L (ref 136–145)
WBC # BLD AUTO: 10.87 THOUSAND/UL (ref 4.31–10.16)

## 2022-02-16 PROCEDURE — 99255 IP/OBS CONSLTJ NEW/EST HI 80: CPT | Performed by: PODIATRIST

## 2022-02-16 PROCEDURE — 99214 OFFICE O/P EST MOD 30 MIN: CPT | Performed by: PODIATRIST

## 2022-02-16 PROCEDURE — 85025 COMPLETE CBC W/AUTO DIFF WBC: CPT | Performed by: EMERGENCY MEDICINE

## 2022-02-16 PROCEDURE — 85652 RBC SED RATE AUTOMATED: CPT | Performed by: EMERGENCY MEDICINE

## 2022-02-16 PROCEDURE — 99284 EMERGENCY DEPT VISIT MOD MDM: CPT

## 2022-02-16 PROCEDURE — 96365 THER/PROPH/DIAG IV INF INIT: CPT

## 2022-02-16 PROCEDURE — 96375 TX/PRO/DX INJ NEW DRUG ADDON: CPT

## 2022-02-16 PROCEDURE — 0241U HB NFCT DS VIR RESP RNA 4 TRGT: CPT | Performed by: EMERGENCY MEDICINE

## 2022-02-16 PROCEDURE — 99223 1ST HOSP IP/OBS HIGH 75: CPT | Performed by: INTERNAL MEDICINE

## 2022-02-16 PROCEDURE — 80053 COMPREHEN METABOLIC PANEL: CPT | Performed by: EMERGENCY MEDICINE

## 2022-02-16 PROCEDURE — 11042 DBRDMT SUBQ TIS 1ST 20SQCM/<: CPT | Performed by: PODIATRIST

## 2022-02-16 PROCEDURE — 36415 COLL VENOUS BLD VENIPUNCTURE: CPT | Performed by: EMERGENCY MEDICINE

## 2022-02-16 PROCEDURE — 87070 CULTURE OTHR SPECIMN AEROBIC: CPT | Performed by: STUDENT IN AN ORGANIZED HEALTH CARE EDUCATION/TRAINING PROGRAM

## 2022-02-16 PROCEDURE — 73630 X-RAY EXAM OF FOOT: CPT

## 2022-02-16 PROCEDURE — 87205 SMEAR GRAM STAIN: CPT | Performed by: STUDENT IN AN ORGANIZED HEALTH CARE EDUCATION/TRAINING PROGRAM

## 2022-02-16 PROCEDURE — 83036 HEMOGLOBIN GLYCOSYLATED A1C: CPT | Performed by: INTERNAL MEDICINE

## 2022-02-16 PROCEDURE — 82948 REAGENT STRIP/BLOOD GLUCOSE: CPT

## 2022-02-16 PROCEDURE — 86141 C-REACTIVE PROTEIN HS: CPT | Performed by: EMERGENCY MEDICINE

## 2022-02-16 PROCEDURE — 0JBR0ZZ EXCISION OF LEFT FOOT SUBCUTANEOUS TISSUE AND FASCIA, OPEN APPROACH: ICD-10-PCS | Performed by: PODIATRIST

## 2022-02-16 PROCEDURE — 87081 CULTURE SCREEN ONLY: CPT | Performed by: INTERNAL MEDICINE

## 2022-02-16 PROCEDURE — 99285 EMERGENCY DEPT VISIT HI MDM: CPT | Performed by: EMERGENCY MEDICINE

## 2022-02-16 RX ORDER — LANOLIN ALCOHOL/MO/W.PET/CERES
3 CREAM (GRAM) TOPICAL
Status: DISCONTINUED | OUTPATIENT
Start: 2022-02-16 | End: 2022-02-16

## 2022-02-16 RX ORDER — MAGNESIUM HYDROXIDE/ALUMINUM HYDROXICE/SIMETHICONE 120; 1200; 1200 MG/30ML; MG/30ML; MG/30ML
30 SUSPENSION ORAL EVERY 6 HOURS PRN
Status: DISCONTINUED | OUTPATIENT
Start: 2022-02-16 | End: 2022-02-19 | Stop reason: HOSPADM

## 2022-02-16 RX ORDER — METRONIDAZOLE 500 MG/1
500 TABLET ORAL ONCE
Status: COMPLETED | OUTPATIENT
Start: 2022-02-16 | End: 2022-02-16

## 2022-02-16 RX ORDER — SENNOSIDES 8.6 MG
8.6 TABLET ORAL DAILY
Status: DISCONTINUED | OUTPATIENT
Start: 2022-02-17 | End: 2022-02-19 | Stop reason: HOSPADM

## 2022-02-16 RX ORDER — OXYCODONE HYDROCHLORIDE 5 MG/1
5 TABLET ORAL EVERY 6 HOURS PRN
Status: DISCONTINUED | OUTPATIENT
Start: 2022-02-16 | End: 2022-02-19 | Stop reason: HOSPADM

## 2022-02-16 RX ORDER — NYSTATIN 100000 [USP'U]/G
POWDER TOPICAL
COMMUNITY
Start: 2021-12-30

## 2022-02-16 RX ORDER — DOCUSATE SODIUM 100 MG/1
100 CAPSULE, LIQUID FILLED ORAL 2 TIMES DAILY
Status: DISCONTINUED | OUTPATIENT
Start: 2022-02-16 | End: 2022-02-19 | Stop reason: HOSPADM

## 2022-02-16 RX ORDER — ZIPRASIDONE HYDROCHLORIDE 40 MG/1
80 CAPSULE ORAL 2 TIMES DAILY WITH MEALS
Status: DISCONTINUED | OUTPATIENT
Start: 2022-02-17 | End: 2022-02-19 | Stop reason: HOSPADM

## 2022-02-16 RX ORDER — CLONIDINE HYDROCHLORIDE 0.2 MG/1
0.2 TABLET ORAL 2 TIMES DAILY
Status: DISCONTINUED | OUTPATIENT
Start: 2022-02-16 | End: 2022-02-19 | Stop reason: HOSPADM

## 2022-02-16 RX ORDER — CEFAZOLIN SODIUM 2 G/50ML
2000 SOLUTION INTRAVENOUS EVERY 8 HOURS
Status: DISCONTINUED | OUTPATIENT
Start: 2022-02-16 | End: 2022-02-19 | Stop reason: HOSPADM

## 2022-02-16 RX ORDER — ONDANSETRON 2 MG/ML
4 INJECTION INTRAMUSCULAR; INTRAVENOUS EVERY 6 HOURS PRN
Status: DISCONTINUED | OUTPATIENT
Start: 2022-02-16 | End: 2022-02-19 | Stop reason: HOSPADM

## 2022-02-16 RX ORDER — HYDROXYZINE HYDROCHLORIDE 25 MG/1
50 TABLET, FILM COATED ORAL EVERY MORNING
Status: DISCONTINUED | OUTPATIENT
Start: 2022-02-17 | End: 2022-02-19 | Stop reason: HOSPADM

## 2022-02-16 RX ORDER — FENOFIBRATE 48 MG/1
48 TABLET, COATED ORAL DAILY
Status: DISCONTINUED | OUTPATIENT
Start: 2022-02-17 | End: 2022-02-19 | Stop reason: HOSPADM

## 2022-02-16 RX ORDER — HYDROXYZINE 50 MG/1
50 TABLET, FILM COATED ORAL
Status: DISCONTINUED | OUTPATIENT
Start: 2022-02-16 | End: 2022-02-16

## 2022-02-16 RX ORDER — MELATONIN
1000 DAILY
Status: DISCONTINUED | OUTPATIENT
Start: 2022-02-17 | End: 2022-02-19 | Stop reason: HOSPADM

## 2022-02-16 RX ORDER — METHOCARBAMOL 750 MG/1
750 TABLET, FILM COATED ORAL DAILY PRN
Status: DISCONTINUED | OUTPATIENT
Start: 2022-02-16 | End: 2022-02-19 | Stop reason: HOSPADM

## 2022-02-16 RX ORDER — HYDROXYZINE HYDROCHLORIDE 25 MG/1
100 TABLET, FILM COATED ORAL
Status: DISCONTINUED | OUTPATIENT
Start: 2022-02-16 | End: 2022-02-19 | Stop reason: HOSPADM

## 2022-02-16 RX ORDER — LEVOTHYROXINE SODIUM 0.05 MG/1
50 TABLET ORAL DAILY
Status: DISCONTINUED | OUTPATIENT
Start: 2022-02-17 | End: 2022-02-19 | Stop reason: HOSPADM

## 2022-02-16 RX ORDER — PANTOPRAZOLE SODIUM 40 MG/1
40 TABLET, DELAYED RELEASE ORAL DAILY
Status: DISCONTINUED | OUTPATIENT
Start: 2022-02-17 | End: 2022-02-19 | Stop reason: HOSPADM

## 2022-02-16 RX ORDER — LAMOTRIGINE 100 MG/1
200 TABLET ORAL EVERY MORNING
Status: DISCONTINUED | OUTPATIENT
Start: 2022-02-17 | End: 2022-02-19 | Stop reason: HOSPADM

## 2022-02-16 RX ORDER — PANTOPRAZOLE SODIUM 40 MG/1
40 TABLET, DELAYED RELEASE ORAL DAILY
COMMUNITY
Start: 2022-01-04

## 2022-02-16 RX ORDER — KETOROLAC TROMETHAMINE 30 MG/ML
15 INJECTION, SOLUTION INTRAMUSCULAR; INTRAVENOUS ONCE
Status: COMPLETED | OUTPATIENT
Start: 2022-02-16 | End: 2022-02-16

## 2022-02-16 RX ORDER — CEFAZOLIN SODIUM 2 G/50ML
2000 SOLUTION INTRAVENOUS ONCE
Status: COMPLETED | OUTPATIENT
Start: 2022-02-16 | End: 2022-02-16

## 2022-02-16 RX ORDER — ERGOCALCIFEROL 1.25 MG/1
50000 CAPSULE ORAL WEEKLY
Status: DISCONTINUED | OUTPATIENT
Start: 2022-02-16 | End: 2022-02-19 | Stop reason: HOSPADM

## 2022-02-16 RX ORDER — DOCUSATE SODIUM 100 MG/1
100 CAPSULE, LIQUID FILLED ORAL 2 TIMES DAILY
Status: DISCONTINUED | OUTPATIENT
Start: 2022-02-16 | End: 2022-02-16

## 2022-02-16 RX ORDER — NYSTATIN 100000 [USP'U]/G
POWDER TOPICAL 3 TIMES DAILY
Status: DISCONTINUED | OUTPATIENT
Start: 2022-02-16 | End: 2022-02-19 | Stop reason: HOSPADM

## 2022-02-16 RX ORDER — FOLIC ACID 1 MG/1
1000 TABLET ORAL DAILY
Status: DISCONTINUED | OUTPATIENT
Start: 2022-02-17 | End: 2022-02-19 | Stop reason: HOSPADM

## 2022-02-16 RX ORDER — ALBUTEROL SULFATE 90 UG/1
2 AEROSOL, METERED RESPIRATORY (INHALATION) EVERY 6 HOURS PRN
Status: DISCONTINUED | OUTPATIENT
Start: 2022-02-16 | End: 2022-02-19 | Stop reason: HOSPADM

## 2022-02-16 RX ORDER — LANOLIN ALCOHOL/MO/W.PET/CERES
3 CREAM (GRAM) TOPICAL
COMMUNITY
Start: 2021-12-22

## 2022-02-16 RX ORDER — LANOLIN ALCOHOL/MO/W.PET/CERES
9 CREAM (GRAM) TOPICAL
Status: DISCONTINUED | OUTPATIENT
Start: 2022-02-16 | End: 2022-02-19 | Stop reason: HOSPADM

## 2022-02-16 RX ORDER — ACETAMINOPHEN 325 MG/1
975 TABLET ORAL ONCE
Status: COMPLETED | OUTPATIENT
Start: 2022-02-16 | End: 2022-02-16

## 2022-02-16 RX ORDER — HYDROMORPHONE HCL/PF 1 MG/ML
0.5 SYRINGE (ML) INJECTION ONCE
Status: COMPLETED | OUTPATIENT
Start: 2022-02-16 | End: 2022-02-16

## 2022-02-16 RX ADMIN — KETOROLAC TROMETHAMINE 15 MG: 30 INJECTION, SOLUTION INTRAMUSCULAR at 16:31

## 2022-02-16 RX ADMIN — ACETAMINOPHEN 975 MG: 325 TABLET, FILM COATED ORAL at 16:31

## 2022-02-16 RX ADMIN — CEFAZOLIN SODIUM 2000 MG: 2 SOLUTION INTRAVENOUS at 19:20

## 2022-02-16 RX ADMIN — METRONIDAZOLE 500 MG: 500 TABLET ORAL at 16:40

## 2022-02-16 RX ADMIN — Medication 9 MG: at 23:39

## 2022-02-16 RX ADMIN — HYDROXYZINE HYDROCHLORIDE 100 MG: 50 TABLET, FILM COATED ORAL at 23:39

## 2022-02-16 RX ADMIN — OXYCODONE HYDROCHLORIDE 5 MG: 5 TABLET ORAL at 23:39

## 2022-02-16 RX ADMIN — CEFAZOLIN SODIUM 2000 MG: 2 SOLUTION INTRAVENOUS at 16:37

## 2022-02-16 RX ADMIN — HYDROMORPHONE HYDROCHLORIDE 0.5 MG: 1 INJECTION, SOLUTION INTRAMUSCULAR; INTRAVENOUS; SUBCUTANEOUS at 16:31

## 2022-02-16 RX ADMIN — ZIPRASIDONE HYDROCHLORIDE 80 MG: 40 CAPSULE ORAL at 23:37

## 2022-02-16 RX ADMIN — METHOCARBAMOL TABLETS 750 MG: 750 TABLET, COATED ORAL at 23:37

## 2022-02-16 NOTE — ED PROVIDER NOTES
History  Chief Complaint   Patient presents with    Wound Check     Pt seen by foot doctor today and told there is an infection in her L great toe  Wound on bottom of foot began last week  71-year-old female history of diabetes and asthma presenting with concern for left foot/toe infection  Reports wound to plantar aspect of left proximal great toe along with the toe flexes  States noticed a wound approximately 1 week ago on as noted drainage since that time  Reports pain with movement of toe and weight-bearing  Denies systemic symptoms such as fevers or chills or tracking up leg  Seen by Podiatry today sent to ED for labs, IV antibiotics, imaging and likely admission  Denies any other complaints  Denies any headache, vision changes, chest pain, shortness of breath, abdominal pain, nausea/vomiting, fever/chills, or any bladder or bowel changes  Prior to Admission Medications   Prescriptions Last Dose Informant Patient Reported? Taking?    EASY COMFORT PEN NEEDLES 32G X 4 MM MISC   Yes No   EASY TOUCH INSULIN SYRINGE 31G X 5/16" 0 3 ML MISC   Yes No   HYDROcodone-acetaminophen (NORCO) 5-325 mg per tablet 2/15/2022 at 2100  No No   Sig: Take 1 tablet by mouth every 6 (six) hours as needed for painMax Daily Amount: 4 tablets   SUMAtriptan Succinate (IMITREX STATDOSE REFILL) 6 MG/0 5ML SOCT   Yes No   Sig: Inject 6 mg under the skin every 2 (two) hours as needed   Ultra Thin Lancets 31G MISC   Yes No   Sig: USE TO MONITOR BLOOD GLUCOSE 3 TIMES A DAY AS DIRECTED    acetaminophen (TYLENOL) 650 mg CR tablet 2/16/2022 at 2200  No No   Sig: Take 1 tablet (650 mg total) by mouth every 8 (eight) hours as needed for mild pain   albuterol (PROVENTIL HFA,VENTOLIN HFA) 90 mcg/act inhaler Not Taking at Unknown time  Yes No   Sig: Inhale 2 puffs every 6 (six) hours as needed for wheezing   Patient not taking: Reported on 2/16/2022    cholecalciferol (VITAMIN D3) 1,000 units tablet Not Taking at Unknown time  Yes No   Sig: Take by mouth once a week    Patient not taking: Reported on 2/16/2022    cloNIDine (CATAPRES) 0 2 mg tablet 2/16/2022  Yes No   Sig: Take 0 2 mg by mouth 2 (two) times a day   docusate sodium (COLACE) 100 mg capsule More than a month at Unknown time  No No   Sig: Take 1 capsule (100 mg total) by mouth 2 (two) times a day   ergocalciferol (ERGOCALCIFEROL) 1 25 MG (86114 UT) capsule   Yes No   Sig: Take 50,000 Units by mouth   fenofibrate (TRICOR) 54 MG tablet 2/16/2022 at 0800  Yes No   Sig: TAKE 1 TABLET (54 MG TOTAL) BY MOUTH DAILY  folic acid (FOLVITE) 1 mg tablet 2/16/2022 at 0800  Yes No   Sig: Take 1,000 mcg by mouth daily   glucose blood test strip   Yes No   Sig: USE TO MONITOR BG 3 TIMES A DAY   E11 9   hydrOXYzine HCL (ATARAX) 50 mg tablet 2/16/2022  Yes No   Sig: Take 50 mg by mouth daily at bedtime   hydrOXYzine HCL (ATARAX) 50 mg tablet 2/16/2022  Yes No   Sig: Take 50 mg by mouth every morning   lamoTRIgine (LaMICtal) 200 MG tablet 2/16/2022 at 0800  Yes No   Sig: Take 200 mg by mouth every morning   levothyroxine 50 mcg tablet 2/16/2022 at 0800  Yes No   Sig: Take 50 mcg by mouth daily   melatonin 3 mg 2/15/2022 at 2100  Yes No   Sig: Take 3 mg by mouth daily at bedtime as needed   metFORMIN (GLUCOPHAGE) 500 mg tablet 2/16/2022 at 0800  No No   Sig: Take 1 tablet (500 mg total) by mouth 2 (two) times a day with meals   methocarbamol (ROBAXIN) 750 mg tablet   Yes No   Sig: TAKE 1 TABLET (750 MG TOTAL) BY MOUTH 4 (FOUR) TIMES A DAY AS NEEDED FOR MUSCLE SPASMS    multivitamin (THERAGRAN) TABS Not Taking at Unknown time  Yes No   Sig: Take 1 tablet by mouth daily    Patient not taking: Reported on 2/16/2022    mupirocin (BACTROBAN) 2 % ointment Not Taking at Unknown time  No No   Sig: Apply topically 3 (three) times a day   Patient not taking: Reported on 2/16/2022    nystatin (MYCOSTATIN) powder Not Taking at Unknown time  Yes No   Sig: DAILY AFTER SHOWER TO AFFECTED AREAS   Patient not taking: Reported on 2/16/2022   ondansetron (ZOFRAN-ODT) 4 mg disintegrating tablet Not Taking at Unknown time  Yes No   Patient not taking: Reported on 2/16/2022    pantoprazole (PROTONIX) 40 mg tablet 2/15/2022 at 2100  Yes No   Sig: Take 40 mg by mouth daily   senna (SENOKOT) 8 6 mg Not Taking at Unknown time  No No   Sig: Take 1 tablet (8 6 mg total) by mouth daily   Patient not taking: Reported on 2/16/2022    ziprasidone (GEODON) 80 mg capsule 2/16/2022 at 0800  Yes No   Sig: Take 80 mg by mouth 2 (two) times a day with meals       Facility-Administered Medications: None       Past Medical History:   Diagnosis Date    Anxiety     Arthritis     Asthma     Chronic pain     Diabetes mellitus ([address] )     Disease of thyroid gland     Lymphedema     left leg    Manic bipolar I disorder (HCC)     Morbid obesity (HCC)     OCD (obsessive compulsive disorder)     PTSD (post-traumatic stress disorder)        Past Surgical History:   Procedure Laterality Date    CHOLECYSTECTOMY      CHOLECYSTECTOMY      DE AMPUTATION FOOT,TRANSMETATARSAL Right 9/4/2021    Procedure: AMPUTATION TRANSMETATARSAL (TMA);  Surgeon: Sarina French DPM;  Location: [facility];  Service: Podiatry    DE AMPUTATION METATARSAL+TOE,SINGLE Left 9/8/2019    Procedure: PARTIAL 3RD RAY RESECTION, PSB TOE FILLET FLAP;  Surgeon: Bridget Chang DPM;  [facility];  Service: Podiatry    TOE AMPUTATION Right 1/13/2021    Procedure: AMPUTATION 2ND TOE;  Surgeon: Sarina French DPM;  [facility];  Service: Podiatry       Family History   Problem Relation Age of Onset    Hyperlipidemia Mother     Diabetes Mother     Hypertension Mother     Heart attack Father      I have reviewed and agree with the history as documented      E-Cigarette/Vaping    E-Cigarette Use Current Some Day User      E-Cigarette/Vaping Substances    Nicotine No     THC No     CBD No     Flavoring No     Other No     Unknown No      Social History     Tobacco Use    Smoking status: Former Smoker     Packs/day: 0 50     Years: 15 00     Pack years: 7 50    Smokeless tobacco: Never Used   Vaping Use    Vaping Use: Some days   Substance Use Topics    Alcohol use: Never    Drug use: Never        Review of Systems   Constitutional: Negative for appetite change, chills, diaphoresis, fever and unexpected weight change  HENT: Negative for congestion and rhinorrhea  Eyes: Negative for photophobia and visual disturbance  Respiratory: Negative for cough, chest tightness and shortness of breath  Cardiovascular: Negative for chest pain, palpitations and leg swelling  Gastrointestinal: Negative for abdominal distention, abdominal pain, blood in stool, constipation, diarrhea, nausea and vomiting  Genitourinary: Negative for dysuria and hematuria  Musculoskeletal: Negative for back pain, joint swelling, neck pain and neck stiffness  Skin: Positive for wound  Negative for color change, pallor and rash  Neurological: Negative for dizziness, syncope, weakness, light-headedness and headaches  Psychiatric/Behavioral: Negative for agitation  All other systems reviewed and are negative  Physical Exam  ED Triage Vitals   Temperature Pulse Respirations Blood Pressure SpO2   02/16/22 1417 02/16/22 1417 02/16/22 1417 02/16/22 1417 02/16/22 1417   97 5 °F (36 4 °C) 64 18 135/84 97 %      Temp Source Heart Rate Source Patient Position - Orthostatic VS BP Location FiO2 (%)   02/16/22 1417 02/16/22 1925 02/16/22 1925 02/16/22 1925 --   Oral Monitor Sitting Right arm       Pain Score       02/16/22 1417       7             Orthostatic Vital Signs  Vitals:    02/16/22 2219 02/16/22 2354 02/17/22 0746 02/17/22 1450   BP: (!) 139/104 144/84 141/83 158/81   Pulse: 59 58 (!) 49 61   Patient Position - Orthostatic VS:           Physical Exam  Vitals and nursing note reviewed  Constitutional:       General: She is not in acute distress       Appearance: Normal appearance  She is well-developed  She is not ill-appearing, toxic-appearing or diaphoretic  HENT:      Head: Normocephalic and atraumatic  Nose: Nose normal  No congestion or rhinorrhea  Mouth/Throat:      Mouth: Mucous membranes are moist       Pharynx: Oropharynx is clear  No oropharyngeal exudate or posterior oropharyngeal erythema  Eyes:      General: No scleral icterus  Right eye: No discharge  Left eye: No discharge  Extraocular Movements: Extraocular movements intact  Conjunctiva/sclera: Conjunctivae normal       Pupils: Pupils are equal, round, and reactive to light  Neck:      Vascular: No JVD  Trachea: No tracheal deviation  Cardiovascular:      Rate and Rhythm: Normal rate and regular rhythm  Heart sounds: Normal heart sounds  No murmur heard  No friction rub  No gallop  Comments: Normal rate and regular rhythm  2+ DP PT pulses bilaterally  Pulmonary:      Effort: Pulmonary effort is normal  No respiratory distress  Breath sounds: Normal breath sounds  No stridor  No wheezing or rales  Comments: Clear to auscultation bilaterally  Chest:      Chest wall: No tenderness  Abdominal:      General: Bowel sounds are normal  There is no distension  Palpations: Abdomen is soft  Tenderness: There is no abdominal tenderness  There is no right CVA tenderness, left CVA tenderness, guarding or rebound  Comments: Soft, nontender, nondistended  Normal bowel sounds throughout   Musculoskeletal:         General: No swelling, tenderness, deformity or signs of injury  Normal range of motion  Cervical back: Normal range of motion and neck supple  No rigidity  No muscular tenderness  Right lower leg: No edema  Left lower leg: No edema  Lymphadenopathy:      Cervical: No cervical adenopathy  Skin:     General: Skin is warm and dry  Coloration: Skin is not pale  Findings: Erythema and lesion present   No rash       Comments: Wound to plantar aspect of left 1st proximal great toe extends deep into fat layer  Surrounding blanching erythema and tenderness around wound and great toe   Neurological:      General: No focal deficit present  Mental Status: She is alert  Mental status is at baseline  Sensory: Sensory deficit present  Motor: No weakness or abnormal muscle tone  Coordination: Coordination normal       Gait: Gait normal       Comments: Alert  Decreased sensation plantar aspect bilateral feet  Remainder bilateral lower extremities sensation intact  Strength limited left great toe secondary to pain  Strength otherwise intact throughout  Psychiatric:         Behavior: Behavior normal          Thought Content:  Thought content normal          ED Medications  Medications   cholecalciferol (VITAMIN D3) tablet 1,000 Units (1,000 Units Oral Not Given 2/17/22 0826)   levothyroxine tablet 50 mcg (50 mcg Oral Given 2/17/22 1115)   ziprasidone (GEODON) capsule 80 mg (80 mg Oral Given 2/16/22 2337)   albuterol (PROVENTIL HFA,VENTOLIN HFA) inhaler 2 puff (has no administration in time range)   multivitamin stress formula tablet 1 tablet (1 tablet Oral Not Given 2/17/22 0825)   ergocalciferol (VITAMIN D2) capsule 50,000 Units (50,000 Units Oral Not Given 2/16/22 2342)   methocarbamol (ROBAXIN) tablet 750 mg (750 mg Oral Given 6/28/49 6817)   folic acid (FOLVITE) tablet 1,000 mcg (1,000 mcg Oral Not Given 2/17/22 0825)   fenofibrate (TRICOR) tablet 48 mg (48 mg Oral Not Given 2/17/22 0826)   cloNIDine (CATAPRES) tablet 0 2 mg (0 2 mg Oral Not Given 2/17/22 1110)   mupirocin (BACTROBAN) 2 % ointment ( Topical Given 2/17/22 0824)   lamoTRIgine (LaMICtal) tablet 200 mg (200 mg Oral Given 2/17/22 0823)   hydrOXYzine HCL (ATARAX) tablet 50 mg (50 mg Oral Not Given 2/17/22 0825)   docusate sodium (COLACE) capsule 100 mg (100 mg Oral Not Given 2/17/22 0826)   senna (SENOKOT) tablet 8 6 mg (8 6 mg Oral Not Given 2/17/22 0825)   nystatin (MYCOSTATIN) powder ( Topical Given 2/17/22 0825)   pantoprazole (PROTONIX) EC tablet 40 mg (40 mg Oral Not Given 2/17/22 0825)   ondansetron (ZOFRAN) injection 4 mg (has no administration in time range)   aluminum-magnesium hydroxide-simethicone (MYLANTA) oral suspension 30 mL (has no administration in time range)   enoxaparin (LOVENOX) subcutaneous injection 40 mg (0 mg Subcutaneous Hold 2/17/22 0826)   ceFAZolin (ANCEF) IVPB (premix in dextrose) 2,000 mg 50 mL ( Intravenous Dose Auto Held 2/22/22 1900)   insulin lispro (HumaLOG) 100 units/mL subcutaneous injection 1-5 Units ( Subcutaneous Dose Auto Held 2/20/22 1600)   oxyCODONE (ROXICODONE) IR tablet 5 mg ( Oral MAR Hold 2/17/22 1503)   melatonin tablet 9 mg ( Oral MAR Hold 2/17/22 1503)   hydrOXYzine HCL (ATARAX) tablet 100 mg ( Oral Dose Auto Held 2/22/22 2200)   dextrose 5 % and sodium chloride 0 45 % infusion (has no administration in time range)   ceFAZolin (ANCEF) IVPB (premix in dextrose) 2,000 mg 50 mL (0 mg Intravenous Stopped 2/16/22 1825)   metroNIDAZOLE (FLAGYL) tablet 500 mg (500 mg Oral Given 2/16/22 1640)   HYDROmorphone (DILAUDID) injection 0 5 mg (0 5 mg Intravenous Given 2/16/22 1631)   acetaminophen (TYLENOL) tablet 975 mg (975 mg Oral Given 2/16/22 1631)   ketorolac (TORADOL) injection 15 mg (15 mg Intravenous Given 2/16/22 1631)       Diagnostic Studies  Results Reviewed     Procedure Component Value Units Date/Time    Wound culture and Gram stain [[id number]]  (Abnormal) Collected: 02/16/22 1631    Lab Status: Preliminary result Specimen: Wound from Toe, Left Updated: 02/17/22 1135     Wound Culture Culture too young- will reincubate     Gram Stain Result 1+ Gram positive cocci in pairs      1+ Gram positive rods      No polys seen    Platelet count [[id number]]  (Normal) Collected: 02/17/22 0759    Lab Status: Final result Specimen: Blood from Arm, Right Updated: 02/17/22 0807     Platelets 005 Thousands/uL MPV 9 4 fL     Basic metabolic panel [[id number]]  (Abnormal) Collected: 02/17/22 0552    Lab Status: Final result Specimen: Blood from Arm, Right Updated: 02/17/22 0733     Sodium 135 mmol/L      Potassium 4 0 mmol/L      Chloride 106 mmol/L      CO2 23 mmol/L      ANION GAP 6 mmol/L      BUN 10 mg/dL      Creatinine 0 91 mg/dL      Glucose 82 mg/dL      Calcium 9 4 mg/dL      eGFR 75 ml/min/1 73sq m     Narrative:      National Kidney Disease Foundation guidelines for Chronic Kidney Disease (CKD):     Stage 1 with normal or high GFR (GFR > 90 mL/min/1 73 square meters)    Stage 2 Mild CKD (GFR = 60-89 mL/min/1 73 square meters)    Stage 3A Moderate CKD (GFR = 45-59 mL/min/1 73 square meters)    Stage 3B Moderate CKD (GFR = 30-44 mL/min/1 73 square meters)    Stage 4 Severe CKD (GFR = 15-29 mL/min/1 73 square meters)    Stage 5 End Stage CKD (GFR <15 mL/min/1 73 square meters)  Note: GFR calculation is accurate only with a steady state creatinine    CBC and differential [[id number]]  (Abnormal) Collected: 02/17/22 0552    Lab Status: Final result Specimen: Blood from Arm, Right Updated: 02/17/22 0707     WBC 8 51 Thousand/uL      RBC 5 32 Million/uL      Hemoglobin 13 0 g/dL      Hematocrit 41 9 %      MCV 79 fL      MCH 24 4 pg      MCHC 31 0 g/dL      RDW 14 0 %      MPV 9 8 fL      Platelets 718 Thousands/uL      nRBC 0 /100 WBCs      Neutrophils Relative 38 %      Immat GRANS % 0 %      Lymphocytes Relative 48 %      Monocytes Relative 8 %      Eosinophils Relative 5 %      Basophils Relative 1 %      Neutrophils Absolute 3 25 Thousands/µL      Immature Grans Absolute 0 02 Thousand/uL      Lymphocytes Absolute 4 11 Thousands/µL      Monocytes Absolute 0 66 Thousand/µL      Eosinophils Absolute 0 41 Thousand/µL      Basophils Absolute 0 06 Thousands/µL     MRSA culture [[id number]] Collected: 02/16/22 5386    Lab Status:  In process Specimen: Nares from Nose Updated: 02/17/22 0001    Hemoglobin A1C [[id number]]  (Abnormal) Collected: 02/16/22 1630    Lab Status: Final result Specimen: Blood from Arm, Left Updated: 02/16/22 2232     Hemoglobin A1C 6 4 %       mg/dl     COVID/FLU/RSV - 2 hour TAT [[id number]]  (Normal) Collected: 02/16/22 1859    Lab Status: Final result Specimen: Nares from Nasopharyngeal Swab Updated: 02/16/22 2010     SARS-CoV-2 Negative     INFLUENZA A PCR Negative     INFLUENZA B PCR Negative     RSV PCR Negative    Narrative:      FOR PEDIATRIC PATIENTS - copy/paste COVID Guidelines URL to browser: [email or web address]  [employer]    SARS-CoV-2 assay is a Nucleic Acid Amplification assay intended for the  qualitative detection of nucleic acid from SARS-CoV-2 in nasopharyngeal  swabs  Results are for the presumptive identification of SARS-CoV-2 RNA  Positive results are indicative of infection with SARS-CoV-2, the virus  causing COVID-19, but do not rule out bacterial infection or co-infection  with other viruses  Laboratories within the [place] and its  territories are required to report all positive results to the appropriate  public health authorities  Negative results do not preclude SARS-CoV-2  infection and should not be used as the sole basis for treatment or other  patient management decisions  Negative results must be combined with  clinical observations, patient history, and epidemiological information  This test has not been FDA cleared or approved  This test has been authorized by FDA under an Emergency Use Authorization  (EUA)  This test is only authorized for the duration of time the  declaration that circumstances exist justifying the authorization of the  emergency use of an in vitro diagnostic tests for detection of SARS-CoV-2  virus and/or diagnosis of COVID-19 infection under section 564(b)(1) of  the Act, 21 U  S C  444OQD-2(P)(8), unless the authorization is terminated  or revoked sooner   The test has been validated but independent review by FDA  and CLIA is pending  Test performed using [employer] GeneXpert: This RT-PCR assay targets N2,  a region unique to SARS-CoV-2  A conserved region in the E-gene was chosen  for pan-Sarbecovirus detection which includes SARS-CoV-2      Fingerstick Glucose (POCT) [[id number]]  (Normal) Collected: 02/16/22 1934    Lab Status: Final result Updated: 02/16/22 1936     POC Glucose 114 mg/dl     Sedimentation rate, automated [[id number]]  (Normal) Collected: 02/16/22 1630    Lab Status: Final result Specimen: Blood from Arm, Left Updated: 02/16/22 1809     Sed Rate 11 mm/hour     Comprehensive metabolic panel [[id number]] Collected: 02/16/22 1630    Lab Status: Final result Specimen: Blood from Arm, Left Updated: 02/16/22 1702     Sodium 136 mmol/L      Potassium 4 4 mmol/L      Chloride 105 mmol/L      CO2 25 mmol/L      ANION GAP 6 mmol/L      BUN 6 mg/dL      Creatinine 0 88 mg/dL      Glucose 88 mg/dL      Calcium 9 6 mg/dL      AST 16 U/L      ALT 18 U/L      Alkaline Phosphatase 88 U/L      Total Protein 7 8 g/dL      Albumin 3 8 g/dL      Total Bilirubin 0 28 mg/dL      eGFR 79 ml/min/1 73sq m     Narrative:      [place] guidelines for Chronic Kidney Disease (CKD):     Stage 1 with normal or high GFR (GFR > 90 mL/min/1 73 square meters)    Stage 2 Mild CKD (GFR = 60-89 mL/min/1 73 square meters)    Stage 3A Moderate CKD (GFR = 45-59 mL/min/1 73 square meters)    Stage 3B Moderate CKD (GFR = 30-44 mL/min/1 73 square meters)    Stage 4 Severe CKD (GFR = 15-29 mL/min/1 73 square meters)    Stage 5 End Stage CKD (GFR <15 mL/min/1 73 square meters)  Note: GFR calculation is accurate only with a steady state creatinine    High sensitivity CRP [[id number]] Collected: 02/16/22 1630    Lab Status: Final result Specimen: Blood from Arm, Left Updated: 02/16/22 1702     CRP, High Sensitivity 5 48 mg/L     Narrative:            HsCRP Level       Relative Risk <1 0 mg/L          Low           1 0 to 3 0 mg/L    Average           >3 0 mg/L          High        CBC and differential [[id number]]  (Abnormal) Collected: 02/16/22 1630    Lab Status: Final result Specimen: Blood from Arm, Left Updated: 02/16/22 1649     WBC 10 87 Thousand/uL      RBC 5 63 Million/uL      Hemoglobin 13 7 g/dL      Hematocrit 44 7 %      MCV 79 fL      MCH 24 3 pg      MCHC 30 6 g/dL      RDW 14 4 %      MPV 10 6 fL      Platelets 160 Thousands/uL      nRBC 0 /100 WBCs      Neutrophils Relative 51 %      Immat GRANS % 0 %      Lymphocytes Relative 37 %      Monocytes Relative 7 %      Eosinophils Relative 4 %      Basophils Relative 1 %      Neutrophils Absolute 5 53 Thousands/µL      Immature Grans Absolute 0 03 Thousand/uL      Lymphocytes Absolute 4 07 Thousands/µL      Monocytes Absolute 0 74 Thousand/µL      Eosinophils Absolute 0 43 Thousand/µL      Basophils Absolute 0 07 Thousands/µL                  XR foot 3+ views LEFT   Final Result by Maria Ines Alvarez MD (02/16 1640)      No acute osseous abnormality  Workstation performed: [id number]               Procedures  Procedures      ED Course                                       MDM  Number of Diagnoses or Management Options  Foot ulcer ([address] )  Left foot infection  Diagnosis management comments: 31-year-old female history of diabetes and asthma presenting with concern for left foot/toe infection  Deep left foot infection sent in by Podiatry  Plan for podiatry consult  Per Podiatry, plan for labs including CBC, CMP, ESR/CRP and x-ray left foot/great toe  Per podiatry recommendations, plan for IV Ancef and cefepime plus Flagyl  Oral and IV pain medication  Reassess  Labs and imaging no acute process  Admitted         Amount and/or Complexity of Data Reviewed  Clinical lab tests: reviewed and ordered  Tests in the radiology section of CPT®: reviewed and ordered  Tests in the medicine section of CPT®: reviewed and ordered  Review and summarize past medical records: yes  Discuss the patient with other providers: yes  Independent visualization of images, tracings, or specimens: yes    Risk of Complications, Morbidity, and/or Mortality  Presenting problems: moderate  Diagnostic procedures: moderate  Management options: moderate    Patient Progress  Patient progress: improved      Disposition  Final diagnoses:   Left foot infection   Foot ulcer ([facility] )     Time reflects when diagnosis was documented in both MDM as applicable and the Disposition within this note     Time User Action Codes Description Comment    2/16/2022  3:31 PM Michael Miranda Add [L08 9] Left foot infection     2/16/2022  5:37 PM Joselito Irwin Add [[id number]] Foot ulcer [facility])       ED Disposition     ED Disposition Condition Date/Time Comment    Admit Stable Wed Feb 16, 2022  5:37 PM Case was discussed with Dr Sydney Russ and the patient's admission status was agreed to be Admission Status: inpatient status to the service of IM  Follow-up Information    None         Current Discharge Medication List      CONTINUE these medications which have NOT CHANGED    Details   acetaminophen (TYLENOL) 650 mg CR tablet Take 1 tablet (650 mg total) by mouth every 8 (eight) hours as needed for mild pain  Qty: 30 tablet, Refills: 0    Associated Diagnoses: S/P transmetatarsal amputation of foot, right ([facility] ); Type 2 diabetes mellitus with diabetic neuropathy, with long-term current use of insulin (HCC)      albuterol (PROVENTIL HFA,VENTOLIN HFA) 90 mcg/act inhaler Inhale 2 puffs every 6 (six) hours as needed for wheezing    Comments: Substitution to a formulary equivalent within the same pharmaceutical class is authorized        cholecalciferol (VITAMIN D3) 1,000 units tablet Take by mouth once a week       cloNIDine (CATAPRES) 0 2 mg tablet Take 0 2 mg by mouth 2 (two) times a day      docusate sodium (COLACE) 100 mg capsule Take 1 capsule (100 mg total) by mouth 2 (two) times a day  Qty: 10 capsule, Refills: 0    Associated Diagnoses: Constipation, unspecified constipation type      EASY COMFORT PEN NEEDLES 32G X 4 MM MISC       EASY TOUCH INSULIN SYRINGE 31G X 5/16" 0 3 ML MISC       ergocalciferol (ERGOCALCIFEROL) 1 25 MG (76817 UT) capsule Take 50,000 Units by mouth      fenofibrate (TRICOR) 54 MG tablet TAKE 1 TABLET (54 MG TOTAL) BY MOUTH DAILY  folic acid (FOLVITE) 1 mg tablet Take 1,000 mcg by mouth daily      glucose blood test strip USE TO MONITOR BG 3 TIMES A DAY   E11 9      HYDROcodone-acetaminophen (NORCO) 5-325 mg per tablet Take 1 tablet by mouth every 6 (six) hours as needed for painMax Daily Amount: 4 tablets  Qty: 7 tablet, Refills: 0    Associated Diagnoses: Coccyx contusion, initial encounter      !! hydrOXYzine HCL (ATARAX) 50 mg tablet Take 50 mg by mouth daily at bedtime  Refills: 0      !! hydrOXYzine HCL (ATARAX) 50 mg tablet Take 50 mg by mouth every morning      lamoTRIgine (LaMICtal) 200 MG tablet Take 200 mg by mouth every morning      levothyroxine 50 mcg tablet Take 50 mcg by mouth daily      melatonin 3 mg Take 3 mg by mouth daily at bedtime as needed      metFORMIN (GLUCOPHAGE) 500 mg tablet Take 1 tablet (500 mg total) by mouth 2 (two) times a day with meals  Qty: 60 tablet, Refills: 0    Associated Diagnoses: Type 2 diabetes mellitus (HCC)      methocarbamol (ROBAXIN) 750 mg tablet TAKE 1 TABLET (750 MG TOTAL) BY MOUTH 4 (FOUR) TIMES A DAY AS NEEDED FOR MUSCLE SPASMS       multivitamin (THERAGRAN) TABS Take 1 tablet by mouth daily       mupirocin (BACTROBAN) 2 % ointment Apply topically 3 (three) times a day  Qty: 22 g, Refills: 0    Associated Diagnoses: Diabetic ulcer of other part of right foot associated with diabetes mellitus due to underlying condition, with fat layer exposed (HCC)      nystatin (MYCOSTATIN) powder DAILY AFTER SHOWER TO AFFECTED AREAS      ondansetron (ZOFRAN-ODT) 4 mg disintegrating tablet       pantoprazole (PROTONIX) 40 mg tablet Take 40 mg by mouth daily      senna (SENOKOT) 8 6 mg Take 1 tablet (8 6 mg total) by mouth daily  Qty: 5 tablet, Refills: 0    Associated Diagnoses: Constipation, unspecified constipation type      SUMAtriptan Succinate (IMITREX STATDOSE REFILL) 6 MG/0 5ML SOCT Inject 6 mg under the skin every 2 (two) hours as needed      Ultra Thin Lancets 31G MISC USE TO MONITOR BLOOD GLUCOSE 3 TIMES A DAY AS DIRECTED  ziprasidone (GEODON) 80 mg capsule Take 80 mg by mouth 2 (two) times a day with meals        !! - Potential duplicate medications found  Please discuss with provider  No discharge procedures on file  PDMP Review       Value Time User    PDMP Reviewed  Yes 9/1/2021  3:04 PM Noé Art PA-C           ED Provider  Attending physically available and evaluated Linette Curielbury  I managed the patient along with the ED Attending      Electronically Signed by         Renae Starr MD  02/17/22 0193

## 2022-02-16 NOTE — ASSESSMENT & PLAN NOTE
Left diabetic foot ulcer involving grade toe  Podiatry plans for toe amputation likely tomorrow noted  Follow-up on operative cultures  IV cefazolin  Wound care

## 2022-02-16 NOTE — CONSULTS
[address] [facility] - Consultation    Patient Information:   Lorena Gaviria 56 y o  female MRN: [id number]  Unit/Bed#: ED 08 Encounter: [id number]  PCP: Hira Ruiz DO  Date of Admission:  2/16/2022  Date of Consultation: 02/16/22  Requesting Physician: Katie Dorado MD      ASSESSMENT:    Lorena Gaviria is a 56 y o  female with:    1  Diabetic ulceration left great toe sulcus Gimenez 3  2  Cellulitis left foot  3  Diabetic ulceration right foot Gimenez 2  4  Type 2 diabetes (A1c 6 7% 09/01/2021)  5  Peripheral neuropathy    PLAN:    · Podiatry in discussion about possible surgical intervention  · Ulceration left great toe probes to tendon with significant surrounding cellulitis and pain  No luis purulence noted  · Follow-up wound cultures left great toe  · X-rays reviewed no soft tissue emphysema noted, no cortical break down for osteomyelitis  · Labs pending  · Patient is requesting to have the toe removed surgically as she does not believe that the ulceration underneath the toe in the sulcus will ever heal   Further discussion with my attending will be had  · In the meantime admit with IV antibiotics  · Wound debridement of the right foot bedside  As noted below  Left ulcer debridement was denied secondary to pain  · Local wound care consisting of Betadine DSD bilateral wounds  · Rest of care per primary team         Weight Bearing Status: WBAT    SUBJECTIVE    History of Present Illness:    Lorena Gaviria is a 56 y o  female with past medical history significant for type 2 diabetes, diabetic ulcerations, multiple amputations  Transmetatarsal amputation 09/04/2021 on the right foot  And 3rd ray resection on the left foot on 09/08/2019, is admitted for cellulitis left foot infection of ulceration  Supriya Vera is consulted for assessment management of diabetic foot ulcerations  Patient states that the ulceration to the left toe must have happened in the last week    She noticed that her toe was swollen and red this Sunday  But already had an appointment with Dr Demetrius Patterson on Wednesday so she can pick as clean as she could with Betadine and antibiotic ointment  Patient states that she denies fevers, nausea, vomiting, chills  Patient states that the ulcer on the right foot has been there for some time and is of no concern to her at the moment  Patient is concerned about the healing ability of this ulceration whether not there is bone infection  She states that she does not think it will ever heal in that location and will continue to open back up as it is in the sulcus of the toes and she bend her toes a lot  She states that she would like a hallux amputation of possible if this would take care of the problem  Review of Systems:    Constitutional: Negative  HENT: Negative  Eyes: Negative  Respiratory: Negative  Cardiovascular: Negative  Gastrointestinal: Negative  Musculoskeletal:  Pain left great toe   Skin:  Cellulitis left, positive ulceration bilateral   Neurological:  Neuropathy   Psych: Negative       Past Medical and Surgical History:     Past Medical History:   Diagnosis Date    Anxiety     Arthritis     Asthma     Chronic pain     [facility]     Disease of thyroid gland     Lymphedema     left leg    Manic bipolar I disorder [facility]     Morbid obesity (HCC)     OCD (obsessive compulsive disorder)     PTSD (post-traumatic stress disorder)        Past Surgical History:   Procedure Laterality Date    CHOLECYSTECTOMY      CHOLECYSTECTOMY      FL AMPUTATION FOOT,TRANSMETATARSAL Right 9/4/2021    Procedure: AMPUTATION TRANSMETATARSAL (TMA);  Surgeon: Marisol Barnett DPM;  [facility]: [facility];  Service: Podiatry    FL AMPUTATION METATARSAL+TOE,SINGLE Left 9/8/2019    Procedure: PARTIAL 3RD RAY RESECTION, PSB TOE FILLET FLAP;  Surgeon: Jaskaran Schultz DPM;  Location[facility];  Service: Podiatry    TOE AMPUTATION Right 1/13/2021 Procedure: AMPUTATION 2ND TOE;  Surgeon: Ajit Snowden DPM;  [facility]: [facility];  Service: Podiatry       Meds/Allergies:    (Not in a hospital admission)      Allergies   Allergen Reactions    Aspirin Vomiting    Barium Iodide Itching     Face became very red and itchy     Codeine Vomiting    Latex Hives    Penicillins Rash and Other (See Comments)     Patient has tolerated cefepime without difficulty    Vancomycin Rash       Social History:     Marital Status: Single    Substance Use History:   Social History     Substance and Sexual Activity   Alcohol Use Never     Social History     Tobacco Use   Smoking Status Former Smoker    Packs/day: 0 50    Years: 15 00    Pack years: 7 50   Smokeless Tobacco Never Used     Social History     Substance and Sexual Activity   Drug Use Never       Family History:    Family History   Problem Relation Age of Onset    Hyperlipidemia Mother     Diabetes Mother     Hypertension Mother     Heart attack Father          OBJECTIVE:    Vitals:   Blood Pressure: 135/84 (02/16/22 1417)  Pulse: 64 (02/16/22 1417)  Temperature: 97 5 °F (36 4 °C) (02/16/22 1417)  Temp Source: Oral (02/16/22 1417)  Respirations: 18 (02/16/22 1417)  SpO2: 97 % (02/16/22 1417)    Physical Exam:     General Appearance: Alert, cooperative, no distress  HEENT: Head normocephalic, atraumatic, without obvious abnormality  Heart: Normal rate and rhythm  Lungs: Non-labored breathing  No respiratory distress  Abdomen: Without distension    Psychiatric: AAOx3  Lower Extremity:  Vascular:   RIGHT: DP 1+ (normal)             PT 1+ (diminished)             Capillary refill is brisk             LE Edema Minimal             Temperature within normal limits   Hair growth noted: no    LEFT: DP 2+ (normal)               PT 2+ (normal)               Capillary refill is brisk               LE Edema Minimal               Temperature abnormal Hot surrounding great toe   Hair growth noted: no    Musculoskeletal:  MMT is 5/5 in all muscle compartments bilateral  Passive ROM bilateral    Subtalar Decreased  Ankle Decreased   First MPJ Decreased secondary to pain on the left side   Active ROM bilateral  Subtalar / Ankle complex Decreased      Tendons and fascia feel even in thickness without palpable mass or dell in tendons bilateral    severe tenderness on palpation of left great toe   Deformities noted:  3rd ray resection on the left  TMA right    Neurological:  Epicritic sensation: diminished  Protective sensation: absent SWMF   Vibratory sensation:  Absent  Sharp dull sensation: absent to the level of the foot      Dermatological:  Skin appears even smooth, with surrounding erythema of left great toe  And multiple ulcerations as noted below  There is decreased turgor and notable lymphedema noted to the dorsal left foot  Maceration between digits are absent  Nails are discolored nails and thickening    Wounds and Ulcerations noted as follows:    Wound #: 1  Location:  Left great toe sulcus  Length 5 0 cm: Width 0 2 cm: Depth 0 5 cm:   Deepest Tissue Noted in Base:  Tendon  Probe to Bone: No  Peripheral Skin Description: Attached  Granulation: 0% Fibrotic Tissue: 100% Necrotic Tissue: 0%   Drainage Amount: None  Signs of Infection: Yes significant increase in pain, calor, erythema tracking up the foot  Wound #: 2  Location:  Right plantar foot  Length 0 4 cm: Width 0 3 cm: Depth 0 2 cm:   Deepest Tissue Noted in Base:  Subcutaneous  Probe to Bone: No  Peripheral Skin Description: Attached  Granulation: 50% Fibrotic Tissue: 50% Necrotic Tissue: 0%   Drainage Amount: minimal  Signs of Infection: No  Total debrided less than 20 square centimeters    Debridement Procedure:    After  Verbal Consent was obtained and time out performed  Wound located right plantar foot was  excisionally Surgical- Subcutaneous  (CPT: 17966) debrided using scapel   Pre-debridement wound measures 0 1 cm x 0 3 cm x 0 1 cm   Pain was controlled by Lack of sensation due to Neuropathy   Post debridement measurement 0 4 cm x 0 3 cm x 0 2 cm for a total of less than 20 square centimeters, with wound appearing viable and granular  100%  of wound debrided  Tissue debrided includes depth of Subcutaneous with devitalized tissue being debrided including Hyperkeratosis, Slough, Fibrous, Necrotic/eschar and Biofilm  with a small amount of bleeding bleeding was noted during procedure  Hemostasis was achieved using pressure  Pain noted during procedure rated as 0  Pain noted after procedure rated as 0  Procedure was Well tolerated by patient  Clinical Images 02/16/22:   left great toe plantar        Additional Data:     Lab Results: I have personally reviewed pertinent labs including:              Invalid input(s): LABALBU        Cultures: I have personally reviewed pertinent cultures including:              Imaging: I have personally reviewed pertinent films in PACS  EKG, Pathology, and Other Studies: I have personally reviewed pertinent reports  ** Please Note: Portions of the record may have been created with voice recognition software  Occasional wrong word or "sound a like" substitutions may have occurred due to the inherent limitations of voice recognition software  Read the chart carefully and recognize, using context, where substitutions have occurred   **

## 2022-02-16 NOTE — ED ATTENDING ATTESTATION
2/16/2022  IPhilip MD, saw and evaluated the patient  I have discussed the patient with the resident/non-physician practitioner and agree with the resident's/non-physician practitioner's findings, Plan of Care, and MDM as documented in the resident's/non-physician practitioner's note, except where noted  All available labs and Radiology studies were reviewed  I was present for key portions of any procedure(s) performed by the resident/non-physician practitioner and I was immediately available to provide assistance  At this point I agree with the current assessment done in the Emergency Department  I have conducted an independent evaluation of this patient a history and physical is as follows: This is a 56 y o  old female who presents to the ED for evaluation of wound  Sent in by podiatry  Plantar prox great too, deep, draining  Saw podiatry sent here for abx, xray, labs  Possible surgical intervention  VS and nursing notes reviewed  General: Appears in NAD, awake, alert, speaking normally in full sentences  Well-nourished, well-developed  Appears stated age  Head: Normocephalic, atraumatic  Eyes: EOMI  Vision grossly normal  No subconjunctival hemorrhages or occular discharge noted  Symmetrical lids  ENT: Atraumatic external nose and ears  No stridor  Normal phonation  No drooling  Normal swallowing  Neck: No JVD  FROM  No goiter  CV: No pallor  Normal rate  Lungs: No tachypnea  No respiratory distress  MSK: Moving all extremities equally, no peripheral edema  Plantar great toe, L, at the flexion crease, open, fat visible, purulent drainage not visible on my exam  R toes have all been surgically removed  Skin: Dry, intact  No cyanosis  Neuro: Awake, alert, GCS15  CN II-XII grossly intact  Grossly normal gait  Psychiatric/Behavioral: Appropriate mood and affect  A/P: This is a 56 y o  female who presents to the ED for evaluation of wound  Labs, XR   Ancef, flagyl, cefepime at podiatry request - will hold on MRSA coverage for now due to their request  Podiatry consult  Admit      ED Course       Critical Care Time  Procedures

## 2022-02-16 NOTE — PROGRESS NOTES
Assessment/Plan:         Diagnoses and all orders for this visit:    Cellulitis of left foot    Diabetic ulcer of toe of left foot associated with type 2 diabetes mellitus, with fat layer exposed ([address] )    Right foot ulcer, with fat layer exposed ([facility] )    Other orders  -     melatonin 3 mg; Take 3 mg by mouth daily at bedtime as needed  -     nystatin (MYCOSTATIN) powder; DAILY AFTER SHOWER TO AFFECTED AREAS  -     pantoprazole (PROTONIX) 40 mg tablet; Take 40 mg by mouth daily        Patient has acute left diabetic foot ulcer with cellulitis and concern for deeper infection  I recommended she go to the emergency room for blood work and imaging  She likely will need hospital admission  Likely surgery, possible amputation  Patient is agreeable to this plan  We will begin IV antibiotics as soon she gets to the hospital  She is high risk for infection/amputation  Ulcer right foot stable, local wound care  Subjective:      Patient ID: Rica Hinkle is a 56 y o  female  Patient presents with a new wound on her left foot  About a week ago the skin under her great toe opened up and began draining  She was unable to get a ride to the doctor's office  Over the last few days she has been putting Betadine on it and trying to rest   She denies nausea fever vomiting or chills  She has been putting Neosporin on the wound  She has history of diabetic foot infections and amputation on the left  The following portions of the patient's history were reviewed and updated as appropriate: allergies, current medications, past family history, past medical history, past social history, past surgical history and problem list     Review of Systems   Constitutional: Negative  HENT: Negative  Respiratory: Negative  Cardiovascular: Positive for leg swelling  Gastrointestinal: Negative  Musculoskeletal: Positive for arthralgias and joint swelling  Skin: Positive for color change and wound     Neurological: Positive for numbness  Negative for weakness  Psychiatric/Behavioral: The patient is nervous/anxious  Objective:      /86   Pulse 79   Ht 5' 5 5" (1 664 m) Comment: verbal  BMI 36 87 kg/m²          Physical Exam  Vitals reviewed  Constitutional:       Appearance: She is obese  She is not ill-appearing or diaphoretic  HENT:      Nose: No congestion or rhinorrhea  Cardiovascular:      Rate and Rhythm: Normal rate  Pulses: Normal pulses  Dorsalis pedis pulses are 2+ on the right side and 2+ on the left side  Posterior tibial pulses are 2+ on the right side and 2+ on the left side  Pulmonary:      Effort: Pulmonary effort is normal    Musculoskeletal:         General: Deformity (right foot TMA) present  Right foot: Deformity present  Left foot: Deformity (3rd toe amputation) present  Feet:      Right foot:      Protective Sensation: 5 sites tested  0 sites sensed  Skin integrity: Ulcer (ulcer plantar TMA 0 3 x 0 3 x 0 2cm  Granular wound bed, no SOI) present  Left foot:      Protective Sensation: 9 sites tested  0 sites sensed  Skin integrity: Ulcer (Fibrotic ulcer plantar left great toe at the base with tan yellow wound bed  There is cellulitis surrounding the digit extending on the dorsum of the midfoot ) present  Toenail Condition: Left toenails are abnormally thick  Skin:     Capillary Refill: Capillary refill takes less than 2 seconds  Findings: Erythema present  Comments: Fibrotic ulcer plantar left great toe at the base with tan yellow wound bed  There is cellulitis surrounding the digit extending on the dorsum of the midfoot  Neurological:      Mental Status: She is alert

## 2022-02-16 NOTE — H&P
[address] [facility]  H&P- Rica Hinkle 1975, 56 y o  female MRN: [id number]  Unit/Bed#: ED 08 Encounter: [id number]  Primary Care Provider: Camila Kelly DO   Date and time admitted to hospital: 2/16/2022  2:42 PM    * Left foot diabetic ulcer  Assessment & Plan  Left diabetic foot ulcer involving grade toe  Podiatry plans for toe amputation likely tomorrow noted  Follow-up on operative cultures  IV cefazolin  Wound care        Obstructive sleep apnea  Assessment & Plan  Reports CPAP noncompliance due to intolerance    Type 2 diabetes mellitus ([facility] [address] )  Assessment & Plan  Lab Results   Component Value Date    HGBA1C 6 7 (H) 09/01/2021       No results for input(s): POCGLU in the last 72 hours  Blood Sugar Average: Last 72 hrs:     Metformin on hold while inpatient  Monitor Accu-Cheks  Avoid hypoglycemia  Hypoglycemia protocol in place    Manic bipolar I disorder (HCC)  Assessment & Plan  Continue lamotrigine, ziprasidone  Outpatient follow-up      Hypothyroidism  Assessment & Plan  Continue Levothyroxine    Morbid obesity ([facility] )  Assessment & Plan  Therapeutic lifestyle modification encouraged    H/O bariatric surgery  Assessment & Plan  History of diuretics surgery  Outpatient follow-up        VTE Pharmacologic Prophylaxis: VTE Score: 4 Moderate Risk (Score 3-4) - Pharmacological DVT Prophylaxis Ordered: enoxaparin (Lovenox)  Code Status: Level 1 - Full Code   Discussion with family: Discussed with the patient, updated son over phone questions answered  Anticipated Length of Stay: Patient will be admitted on an inpatient basis with an anticipated length of stay of greater than 2 midnights secondary to left toe diabetic ulcer for operative management by Podiatry        Chief Complaint:     Refer to the ED by Podiatry  Nonhealing left great toe ulcer    History of Present Illness:  Rica Hinkle is a 56 y o  female with a PMH of diabetes mellitus type 2, obesity, obstructive sleep apnea CPAP noncompliant due to intolerance, bipolar disorder, anxiety, history of tobacco abuse, who presents with nonhealing left great toe ulcer  She initially noted blistering and skin breakdown of her left great toe base, it was nonhealing, she had a follow-up with Podiatry today  She was noted to have ulceration of her left great toe, with exposure of her tendons and possible necrosis she was referred to the ER to administer IV antibiotics and possible operative intervention  Patient evaluated by Podiatry in the ER with plans for possible amputation tomorrow  She is comfortably lying in bed presently reports feeling okay  She denies history of fever chills sweats constitutional symptoms  Denies cough chest tightness wheezing  Denies chest pain shortness breath palpitations presyncope syncope  Denies urinary symptoms  Denies abdominal pain nausea vomiting diarrhea  Her previous hospitalizations reviewed on epic  Review of Systems:  Review of Systems   All other systems reviewed and are negative        Past Medical and Surgical History:   Past Medical History:   Diagnosis Date    Anxiety     Arthritis     Asthma     Chronic pain     Diabetes mellitus ([facility] [address] )     Disease of thyroid gland     Lymphedema     left leg    Manic bipolar I disorder ([facility] [address] )     Morbid obesity (HCC)     OCD (obsessive compulsive disorder)     PTSD (post-traumatic stress disorder)        Past Surgical History:   Procedure Laterality Date    CHOLECYSTECTOMY      CHOLECYSTECTOMY      MA AMPUTATION FOOT,TRANSMETATARSAL Right 9/4/2021    Procedure: AMPUTATION TRANSMETATARSAL (TMA);  Surgeon: Liliane Rodriguez DPM;  [facility];  Service: Podiatry    MA AMPUTATION METATARSAL+TOE,SINGLE Left 9/8/2019    Procedure: PARTIAL 3RD RAY RESECTION, PSB TOE FILLET FLAP;  Surgeon: Abhay Espinoza DPM;  [facility];  Service: Podiatry    TOE AMPUTATION Right 1/13/2021    Procedure: AMPUTATION 2ND TOE;  Surgeon: Keshia Gamez DPM;  [facility];  Service: Podiatry       Meds/Allergies:  Prior to Admission medications    Medication Sig Start Date End Date Taking? Authorizing Provider   acetaminophen (TYLENOL) 650 mg CR tablet Take 1 tablet (650 mg total) by mouth every 8 (eight) hours as needed for mild pain 9/15/21   Keshia Gamez DPM   albuterol (PROVENTIL HFA,VENTOLIN HFA) 90 mcg/act inhaler Inhale 2 puffs every 6 (six) hours as needed for wheezing    Historical Provider, MD   cholecalciferol (VITAMIN D3) 1,000 units tablet Take by mouth once a week     Historical Provider, MD   cloNIDine (CATAPRES) 0 2 mg tablet Take 0 2 mg by mouth 2 (two) times a day 2/15/21   Historical Provider, MD   docusate sodium (COLACE) 100 mg capsule Take 1 capsule (100 mg total) by mouth 2 (two) times a day 9/8/21   Nya Chapman PA-C   EASY COMFORT PEN NEEDLES 32G X 4 MM MISC  3/20/19   Historical Provider, MD   [address] X 5/16" 0 3 ML MISC  2/2/19   Historical Provider, MD   ergocalciferol (ERGOCALCIFEROL) 1 25 MG (41682 UT) capsule Take 50,000 Units by mouth 3/5/21 5/28/21  Historical Provider, MD   fenofibrate (TRICOR) 54 MG tablet TAKE 1 TABLET (54 MG TOTAL) BY MOUTH DAILY  2/27/21   Historical Provider, MD   folic acid (FOLVITE) 1 mg tablet Take 1,000 mcg by mouth daily 2/22/21   Historical Provider, MD   glucose blood test strip USE TO MONITOR BG 3 TIMES A DAY   E11 9 11/3/20   Historical Provider, MD   HYDROcodone-acetaminophen (NORCO) 5-325 mg per tablet Take 1 tablet by mouth every 6 (six) hours as needed for painMax Daily Amount: 4 tablets 12/2/20   Brianne Vela MD   hydrOXYzine HCL (ATARAX) 50 mg tablet Take 50 mg by mouth daily at bedtime 9/3/19   Historical Provider, MD   hydrOXYzine HCL (ATARAX) 50 mg tablet Take 50 mg by mouth every morning    Historical Provider, MD   lamoTRIgine (LaMICtal) 200 MG tablet Take 200 mg by mouth every morning 6/16/21   Historical Provider, MD   levothyroxine 50 mcg tablet Take 50 mcg by mouth daily 3/8/19   Historical Provider, MD   melatonin 3 mg Take 3 mg by mouth daily at bedtime as needed 12/22/21   Historical Provider, MD   metFORMIN (GLUCOPHAGE) 500 mg tablet Take 1 tablet (500 mg total) by mouth 2 (two) times a day with meals 9/10/19   Derrell Kevin DO   methocarbamol (ROBAXIN) 750 mg tablet TAKE 1 TABLET (750 MG TOTAL) BY MOUTH 4 (FOUR) TIMES A DAY AS NEEDED FOR MUSCLE SPASMS  3/2/21   Historical Provider, MD   multivitamin (THERAGRAN) TABS Take 1 tablet by mouth daily     Historical Provider, MD   mupirocin (BACTROBAN) 2 % ointment Apply topically 3 (three) times a day 3/22/21   Oscar Cruz DPM   nystatin (MYCOSTATIN) powder DAILY AFTER SHOWER TO AFFECTED AREAS 12/30/21   Historical Provider, MD   ondansetron (ZOFRAN-ODT) 4 mg disintegrating tablet  3/19/19   Historical Provider, MD   pantoprazole (PROTONIX) 40 mg tablet Take 40 mg by mouth daily 1/4/22   Historical Provider, MD   senna (SENOKOT) 8 6 mg Take 1 tablet (8 6 mg total) by mouth daily 9/9/21   Lila Brunner PA-C   SUMAtriptan Succinate (IMITREX STATDOSE REFILL) 6 MG/0 5ML SOCT Inject 6 mg under the skin every 2 (two) hours as needed 11/29/16   Historical Provider, MD   Ultra Thin Lancets 31G MISC USE TO MONITOR BLOOD GLUCOSE 3 TIMES A DAY AS DIRECTED  2/25/21   Historical Provider, MD   ziprasidone (GEODON) 80 mg capsule Take 80 mg by mouth 2 (two) times a day with meals  5/15/18   Historical Provider, MD     I have reviewed home medications using recent Epic encounter  Allergies:    Allergies   Allergen Reactions    Aspirin Vomiting    Barium Iodide Itching     Face became very red and itchy     Codeine Vomiting    Latex Hives    Penicillins Rash and Other (See Comments)     Patient has tolerated cefepime without difficulty    Vancomycin Rash       Social History:  Marital Status: Single   Occupation:   Patient Pre-hospital Living Situation: Home  Patient Pre-hospital Level of Mobility: walks  Patient Pre-hospital Diet Restrictions: no  Substance Use History:   Social History     Substance and Sexual Activity   Alcohol Use Never     Social History     Tobacco Use   Smoking Status Former Smoker    Packs/day: 0 50    Years: 15 00    Pack years: 7 50   Smokeless Tobacco Never Used     Social History     Substance and Sexual Activity   Drug Use Never       Family History:  Family History   Problem Relation Age of Onset    Hyperlipidemia Mother     Diabetes Mother     Hypertension Mother     Heart attack Father        Physical Exam:     Vitals:   Blood Pressure: 135/84 (02/16/22 1417)  Pulse: 64 (02/16/22 1417)  Temperature: 97 5 °F (36 4 °C) (02/16/22 1417)  Temp Source: Oral (02/16/22 1417)  Respirations: 18 (02/16/22 1417)  SpO2: 97 % (02/16/22 1417)    Physical Exam     Comfortably in bed  Obese  Short thick neck  Lungs diminished breath sounds bilateral  No additional sounds  Heart sounds distant S1-S2 noted  Abdomen soft nontender  Abdominal obesity noted  Awake alert obeys simple commands  Right transmetatarsal amputation noted  Left foot in bandage  No rash    Additional Data:     Lab Results:  Results from last 7 days   Lab Units 02/16/22  1630   WBC Thousand/uL 10 87*   HEMOGLOBIN g/dL 13 7   HEMATOCRIT % 44 7   PLATELETS Thousands/uL 349   NEUTROS PCT % 51   LYMPHS PCT % 37   MONOS PCT % 7   EOS PCT % 4     Results from last 7 days   Lab Units 02/16/22  1630   SODIUM mmol/L 136   POTASSIUM mmol/L 4 4   CHLORIDE mmol/L 105   CO2 mmol/L 25   BUN mg/dL 6   CREATININE mg/dL 0 88   ANION GAP mmol/L 6   CALCIUM mg/dL 9 6   ALBUMIN g/dL 3 8   TOTAL BILIRUBIN mg/dL 0 28   ALK PHOS U/L 88   ALT U/L 18   AST U/L 16   GLUCOSE RANDOM mg/dL 88                       Imaging: Reviewed radiology reports from this admission including: Left foot x-ray  XR foot 3+ views LEFT   Final Result by Liliane Gil MD (02/16 1640)      No acute osseous abnormality  Workstation performed: [id number]             EKG and Other Studies Reviewed on Admission:   · EKG: Sinus rhythm on telemetry  ** Please Note: This note has been constructed using a voice recognition system   **

## 2022-02-16 NOTE — ASSESSMENT & PLAN NOTE
Lab Results   Component Value Date    HGBA1C 6 7 (H) 09/01/2021       No results for input(s): POCGLU in the last 72 hours      Blood Sugar Average: Last 72 hrs:     Metformin on hold while inpatient  Monitor Accu-Cheks  Avoid hypoglycemia  Hypoglycemia protocol in place

## 2022-02-17 ENCOUNTER — ANESTHESIA EVENT (INPATIENT)
Dept: PERIOP | Facility: HOSPITAL | Age: 47
DRG: 048 | End: 2022-02-17
Payer: COMMERCIAL

## 2022-02-17 ENCOUNTER — ANESTHESIA (INPATIENT)
Dept: PERIOP | Facility: HOSPITAL | Age: 47
DRG: 048 | End: 2022-02-17
Payer: COMMERCIAL

## 2022-02-17 PROBLEM — E66.9 OBESITY: Status: ACTIVE | Noted: 2019-06-28

## 2022-02-17 LAB
ANION GAP SERPL CALCULATED.3IONS-SCNC: 6 MMOL/L (ref 4–13)
ATRIAL RATE: 52 BPM
ATRIAL RATE: 65 BPM
BASOPHILS # BLD AUTO: 0.06 THOUSANDS/ΜL (ref 0–0.1)
BASOPHILS NFR BLD AUTO: 1 % (ref 0–1)
BUN SERPL-MCNC: 10 MG/DL (ref 5–25)
CALCIUM SERPL-MCNC: 9.4 MG/DL (ref 8.3–10.1)
CHLORIDE SERPL-SCNC: 106 MMOL/L (ref 100–108)
CO2 SERPL-SCNC: 23 MMOL/L (ref 21–32)
CREAT SERPL-MCNC: 0.91 MG/DL (ref 0.6–1.3)
EOSINOPHIL # BLD AUTO: 0.41 THOUSAND/ΜL (ref 0–0.61)
EOSINOPHIL NFR BLD AUTO: 5 % (ref 0–6)
ERYTHROCYTE [DISTWIDTH] IN BLOOD BY AUTOMATED COUNT: 14 % (ref 11.6–15.1)
GFR SERPL CREATININE-BSD FRML MDRD: 75 ML/MIN/1.73SQ M
GLUCOSE SERPL-MCNC: 104 MG/DL (ref 65–140)
GLUCOSE SERPL-MCNC: 108 MG/DL (ref 65–140)
GLUCOSE SERPL-MCNC: 134 MG/DL (ref 65–140)
GLUCOSE SERPL-MCNC: 82 MG/DL (ref 65–140)
GLUCOSE SERPL-MCNC: 87 MG/DL (ref 65–140)
GLUCOSE SERPL-MCNC: 90 MG/DL (ref 65–140)
HCT VFR BLD AUTO: 41.9 % (ref 34.8–46.1)
HGB BLD-MCNC: 13 G/DL (ref 11.5–15.4)
IMM GRANULOCYTES # BLD AUTO: 0.02 THOUSAND/UL (ref 0–0.2)
IMM GRANULOCYTES NFR BLD AUTO: 0 % (ref 0–2)
LYMPHOCYTES # BLD AUTO: 4.11 THOUSANDS/ΜL (ref 0.6–4.47)
LYMPHOCYTES NFR BLD AUTO: 48 % (ref 14–44)
MCH RBC QN AUTO: 24.4 PG (ref 26.8–34.3)
MCHC RBC AUTO-ENTMCNC: 31 G/DL (ref 31.4–37.4)
MCV RBC AUTO: 79 FL (ref 82–98)
MONOCYTES # BLD AUTO: 0.66 THOUSAND/ΜL (ref 0.17–1.22)
MONOCYTES NFR BLD AUTO: 8 % (ref 4–12)
NEUTROPHILS # BLD AUTO: 3.25 THOUSANDS/ΜL (ref 1.85–7.62)
NEUTS SEG NFR BLD AUTO: 38 % (ref 43–75)
NRBC BLD AUTO-RTO: 0 /100 WBCS
P AXIS: 179 DEGREES
P AXIS: 180 DEGREES
PLATELET # BLD AUTO: 318 THOUSANDS/UL (ref 149–390)
PLATELET # BLD AUTO: 323 THOUSANDS/UL (ref 149–390)
PMV BLD AUTO: 9.4 FL (ref 8.9–12.7)
PMV BLD AUTO: 9.8 FL (ref 8.9–12.7)
POTASSIUM SERPL-SCNC: 4 MMOL/L (ref 3.5–5.3)
PR INTERVAL: 146 MS
PR INTERVAL: 152 MS
QRS AXIS: 33 DEGREES
QRS AXIS: 33 DEGREES
QRSD INTERVAL: 90 MS
QRSD INTERVAL: 90 MS
QT INTERVAL: 458 MS
QT INTERVAL: 458 MS
QTC INTERVAL: 425 MS
QTC INTERVAL: 476 MS
RBC # BLD AUTO: 5.32 MILLION/UL (ref 3.81–5.12)
SODIUM SERPL-SCNC: 135 MMOL/L (ref 136–145)
T WAVE AXIS: 121 DEGREES
T WAVE AXIS: 159 DEGREES
VENTRICULAR RATE: 52 BPM
VENTRICULAR RATE: 65 BPM
WBC # BLD AUTO: 8.51 THOUSAND/UL (ref 4.31–10.16)

## 2022-02-17 PROCEDURE — 11042 DBRDMT SUBQ TIS 1ST 20SQCM/<: CPT | Performed by: PODIATRIST

## 2022-02-17 PROCEDURE — 93010 ELECTROCARDIOGRAM REPORT: CPT | Performed by: INTERNAL MEDICINE

## 2022-02-17 PROCEDURE — 82948 REAGENT STRIP/BLOOD GLUCOSE: CPT

## 2022-02-17 PROCEDURE — 99232 SBSQ HOSP IP/OBS MODERATE 35: CPT | Performed by: INTERNAL MEDICINE

## 2022-02-17 PROCEDURE — 85049 AUTOMATED PLATELET COUNT: CPT | Performed by: INTERNAL MEDICINE

## 2022-02-17 PROCEDURE — 84550 ASSAY OF BLOOD/URIC ACID: CPT | Performed by: STUDENT IN AN ORGANIZED HEALTH CARE EDUCATION/TRAINING PROGRAM

## 2022-02-17 PROCEDURE — 93005 ELECTROCARDIOGRAM TRACING: CPT

## 2022-02-17 PROCEDURE — 0JBQ0ZZ EXCISION OF RIGHT FOOT SUBCUTANEOUS TISSUE AND FASCIA, OPEN APPROACH: ICD-10-PCS | Performed by: PODIATRIST

## 2022-02-17 PROCEDURE — 99232 SBSQ HOSP IP/OBS MODERATE 35: CPT | Performed by: PODIATRIST

## 2022-02-17 PROCEDURE — 80048 BASIC METABOLIC PNL TOTAL CA: CPT | Performed by: INTERNAL MEDICINE

## 2022-02-17 PROCEDURE — 85025 COMPLETE CBC W/AUTO DIFF WBC: CPT | Performed by: INTERNAL MEDICINE

## 2022-02-17 RX ORDER — GLYCOPYRROLATE 0.2 MG/ML
INJECTION INTRAMUSCULAR; INTRAVENOUS AS NEEDED
Status: DISCONTINUED | OUTPATIENT
Start: 2022-02-17 | End: 2022-02-17

## 2022-02-17 RX ORDER — FENTANYL CITRATE/PF 50 MCG/ML
25 SYRINGE (ML) INJECTION
Status: DISCONTINUED | OUTPATIENT
Start: 2022-02-17 | End: 2022-02-17 | Stop reason: HOSPADM

## 2022-02-17 RX ORDER — PROPOFOL 10 MG/ML
INJECTION, EMULSION INTRAVENOUS CONTINUOUS PRN
Status: DISCONTINUED | OUTPATIENT
Start: 2022-02-17 | End: 2022-02-17

## 2022-02-17 RX ORDER — SODIUM CHLORIDE, SODIUM LACTATE, POTASSIUM CHLORIDE, CALCIUM CHLORIDE 600; 310; 30; 20 MG/100ML; MG/100ML; MG/100ML; MG/100ML
INJECTION, SOLUTION INTRAVENOUS CONTINUOUS PRN
Status: DISCONTINUED | OUTPATIENT
Start: 2022-02-17 | End: 2022-02-17

## 2022-02-17 RX ORDER — FENTANYL CITRATE 50 UG/ML
INJECTION, SOLUTION INTRAMUSCULAR; INTRAVENOUS AS NEEDED
Status: DISCONTINUED | OUTPATIENT
Start: 2022-02-17 | End: 2022-02-17

## 2022-02-17 RX ORDER — HYDROMORPHONE HCL IN WATER/PF 6 MG/30 ML
0.2 PATIENT CONTROLLED ANALGESIA SYRINGE INTRAVENOUS
Status: DISCONTINUED | OUTPATIENT
Start: 2022-02-17 | End: 2022-02-17 | Stop reason: HOSPADM

## 2022-02-17 RX ORDER — HYDROMORPHONE HCL/PF 1 MG/ML
0.5 SYRINGE (ML) INJECTION
Status: DISCONTINUED | OUTPATIENT
Start: 2022-02-17 | End: 2022-02-17 | Stop reason: HOSPADM

## 2022-02-17 RX ORDER — DEXTROSE AND SODIUM CHLORIDE 5; .45 G/100ML; G/100ML
75 INJECTION, SOLUTION INTRAVENOUS CONTINUOUS
Status: DISPENSED | OUTPATIENT
Start: 2022-02-17 | End: 2022-02-17

## 2022-02-17 RX ORDER — LABETALOL 20 MG/4 ML (5 MG/ML) INTRAVENOUS SYRINGE
10
Status: DISCONTINUED | OUTPATIENT
Start: 2022-02-17 | End: 2022-02-17 | Stop reason: HOSPADM

## 2022-02-17 RX ORDER — LIDOCAINE HYDROCHLORIDE 10 MG/ML
INJECTION, SOLUTION EPIDURAL; INFILTRATION; INTRACAUDAL; PERINEURAL AS NEEDED
Status: DISCONTINUED | OUTPATIENT
Start: 2022-02-17 | End: 2022-02-17

## 2022-02-17 RX ORDER — HYDRALAZINE HYDROCHLORIDE 20 MG/ML
5 INJECTION INTRAMUSCULAR; INTRAVENOUS
Status: DISCONTINUED | OUTPATIENT
Start: 2022-02-17 | End: 2022-02-17 | Stop reason: HOSPADM

## 2022-02-17 RX ORDER — MIDAZOLAM HYDROCHLORIDE 2 MG/2ML
INJECTION, SOLUTION INTRAMUSCULAR; INTRAVENOUS AS NEEDED
Status: DISCONTINUED | OUTPATIENT
Start: 2022-02-17 | End: 2022-02-17

## 2022-02-17 RX ORDER — METOCLOPRAMIDE HYDROCHLORIDE 5 MG/ML
10 INJECTION INTRAMUSCULAR; INTRAVENOUS ONCE AS NEEDED
Status: DISCONTINUED | OUTPATIENT
Start: 2022-02-17 | End: 2022-02-17 | Stop reason: HOSPADM

## 2022-02-17 RX ORDER — ONDANSETRON 2 MG/ML
4 INJECTION INTRAMUSCULAR; INTRAVENOUS ONCE AS NEEDED
Status: COMPLETED | OUTPATIENT
Start: 2022-02-17 | End: 2022-02-17

## 2022-02-17 RX ORDER — SODIUM CHLORIDE, SODIUM LACTATE, POTASSIUM CHLORIDE, CALCIUM CHLORIDE 600; 310; 30; 20 MG/100ML; MG/100ML; MG/100ML; MG/100ML
125 INJECTION, SOLUTION INTRAVENOUS CONTINUOUS
Status: CANCELLED | OUTPATIENT
Start: 2022-02-17

## 2022-02-17 RX ORDER — PROMETHAZINE HYDROCHLORIDE 25 MG/ML
12.5 INJECTION, SOLUTION INTRAMUSCULAR; INTRAVENOUS ONCE AS NEEDED
Status: DISCONTINUED | OUTPATIENT
Start: 2022-02-17 | End: 2022-02-17 | Stop reason: HOSPADM

## 2022-02-17 RX ORDER — ALBUTEROL SULFATE 2.5 MG/3ML
2.5 SOLUTION RESPIRATORY (INHALATION) ONCE AS NEEDED
Status: DISCONTINUED | OUTPATIENT
Start: 2022-02-17 | End: 2022-02-17 | Stop reason: HOSPADM

## 2022-02-17 RX ORDER — KETAMINE HYDROCHLORIDE 50 MG/ML
INJECTION, SOLUTION, CONCENTRATE INTRAMUSCULAR; INTRAVENOUS AS NEEDED
Status: DISCONTINUED | OUTPATIENT
Start: 2022-02-17 | End: 2022-02-17

## 2022-02-17 RX ORDER — PROPOFOL 10 MG/ML
INJECTION, EMULSION INTRAVENOUS AS NEEDED
Status: DISCONTINUED | OUTPATIENT
Start: 2022-02-17 | End: 2022-02-17

## 2022-02-17 RX ADMIN — OXYCODONE HYDROCHLORIDE 5 MG: 5 TABLET ORAL at 21:36

## 2022-02-17 RX ADMIN — PROPOFOL 50 MG: 10 INJECTION, EMULSION INTRAVENOUS at 15:25

## 2022-02-17 RX ADMIN — LIDOCAINE HYDROCHLORIDE 100 MG: 10 INJECTION, SOLUTION EPIDURAL; INFILTRATION; INTRACAUDAL; PERINEURAL at 15:25

## 2022-02-17 RX ADMIN — DEXTROSE AND SODIUM CHLORIDE 75 ML/HR: 5; .45 INJECTION, SOLUTION INTRAVENOUS at 16:17

## 2022-02-17 RX ADMIN — PROPOFOL 100 MCG/KG/MIN: 10 INJECTION, EMULSION INTRAVENOUS at 15:25

## 2022-02-17 RX ADMIN — CEFAZOLIN SODIUM 2000 MG: 2 SOLUTION INTRAVENOUS at 23:24

## 2022-02-17 RX ADMIN — DOCUSATE SODIUM 100 MG: 100 CAPSULE ORAL at 17:09

## 2022-02-17 RX ADMIN — CEFAZOLIN SODIUM 2000 MG: 2 SOLUTION INTRAVENOUS at 04:43

## 2022-02-17 RX ADMIN — CEFAZOLIN SODIUM 2000 MG: 2 SOLUTION INTRAVENOUS at 15:23

## 2022-02-17 RX ADMIN — OXYCODONE HYDROCHLORIDE 5 MG: 5 TABLET ORAL at 12:43

## 2022-02-17 RX ADMIN — Medication 9 MG: at 21:40

## 2022-02-17 RX ADMIN — FENTANYL CITRATE 50 MCG: 50 INJECTION INTRAMUSCULAR; INTRAVENOUS at 15:32

## 2022-02-17 RX ADMIN — ZIPRASIDONE HYDROCHLORIDE 80 MG: 40 CAPSULE ORAL at 21:36

## 2022-02-17 RX ADMIN — CEFAZOLIN SODIUM 2000 MG: 2 SOLUTION INTRAVENOUS at 11:16

## 2022-02-17 RX ADMIN — MUPIROCIN: 20 OINTMENT TOPICAL at 08:24

## 2022-02-17 RX ADMIN — LEVOTHYROXINE SODIUM 50 MCG: 50 TABLET ORAL at 11:15

## 2022-02-17 RX ADMIN — ONDANSETRON 4 MG: 2 INJECTION INTRAMUSCULAR; INTRAVENOUS at 16:02

## 2022-02-17 RX ADMIN — NYSTATIN: 100000 POWDER TOPICAL at 08:25

## 2022-02-17 RX ADMIN — MIDAZOLAM 2 MG: 1 INJECTION INTRAMUSCULAR; INTRAVENOUS at 15:23

## 2022-02-17 RX ADMIN — CLONIDINE HYDROCHLORIDE 0.2 MG: 0.2 TABLET ORAL at 17:09

## 2022-02-17 RX ADMIN — KETAMINE HYDROCHLORIDE 50 MG: 50 INJECTION, SOLUTION INTRAMUSCULAR; INTRAVENOUS at 15:25

## 2022-02-17 RX ADMIN — HYDROXYZINE HYDROCHLORIDE 100 MG: 50 TABLET, FILM COATED ORAL at 21:36

## 2022-02-17 RX ADMIN — LAMOTRIGINE 200 MG: 100 TABLET ORAL at 08:23

## 2022-02-17 RX ADMIN — SODIUM CHLORIDE, SODIUM LACTATE, POTASSIUM CHLORIDE, AND CALCIUM CHLORIDE: .6; .31; .03; .02 INJECTION, SOLUTION INTRAVENOUS at 15:23

## 2022-02-17 RX ADMIN — GLYCOPYRROLATE 0.2 MG: 0.2 INJECTION, SOLUTION INTRAMUSCULAR; INTRAVENOUS at 15:25

## 2022-02-17 RX ADMIN — FENTANYL CITRATE 50 MCG: 50 INJECTION INTRAMUSCULAR; INTRAVENOUS at 15:25

## 2022-02-17 RX ADMIN — OXYCODONE HYDROCHLORIDE 5 MG: 5 TABLET ORAL at 06:11

## 2022-02-17 NOTE — ANESTHESIA POSTPROCEDURE EVALUATION
Post-Op Assessment Note    CV Status:  Stable  Pain Score: 0    Pain management: adequate     Mental Status:  Alert and awake   Hydration Status:  Euvolemic   PONV Controlled:  Controlled   Airway Patency:  Patent      Post Op Vitals Reviewed: Yes      Staff: CRNA         No complications documented     VSS  Patient awake     Vitals from PACU RN did not pull through- please see flowsheet

## 2022-02-17 NOTE — UTILIZATION REVIEW
Initial Clinical Review    Admission: Date/Time/Statement:   Admission Orders (From admission, onward)     Ordered        02/16/22 1737  Inpatient Admission  Once                      Orders Placed This Encounter   Procedures    Inpatient Admission     Standing Status:   Standing     Number of Occurrences:   1     Order Specific Question:   Level of Care     Answer:   Med Surg [16]     Order Specific Question:   Estimated length of stay     Answer:   More than 2 Midnights     Order Specific Question:   Certification     Answer:   I certify that inpatient services are medically necessary for this patient for a duration of greater than two midnights  See H&P and MD Progress Notes for additional information about the patient's course of treatment  ED Arrival Information     Expected Arrival Acuity    - 2/16/2022 14:07 Urgent         Means of arrival Escorted by Service Admission type    Walk-In Self Hospitalist Urgent         Arrival complaint    wound check left foot         Chief Complaint   Patient presents with    Wound Check     Pt seen by foot doctor today and told there is an infection in her L great toe  Wound on bottom of foot began last week  Initial Presentation:    56  Y O female directly admitted from podiatry office with ulceration left great toe for past few days  Also has an ulcer on right foot, present for some time and   Of no concern to patient  PMH  Is  URI  On  CPAP, Bipolar, anxiety, tobacco abuse, non compliance,   Dm2, diabetic ulcerations,    multiple amputations  Transmetatarsal amputation 09/04/2021 on the right foot  And 3rd ray resection on the left foot on 09/08/2019, is admitted for cellulitis left foot infection of ulceration  Saw podiatry  Earlier in  the day   And sent for admission  No evidence of osteo on imaging     Admit  Ip with Diabetic ulceration left great toe Gimenez  3, Cellulitis left foot, Diabetic ulceration right foot, Gimenez 2 and peripheral neuropathy and plan is   Local wound care,  SAMMIE, bedside  Wound debridement done, monitor labs, follow  Wound cultures and possible surgical intervention  Date:    2/17      Day 2:   Continue  SAMMIE  Still complains of pain  Left foot  For  OR  This  PM for Left Hallux amputation      ED Triage Vitals   Temperature Pulse Respirations Blood Pressure SpO2   02/16/22 1417 02/16/22 1417 02/16/22 1417 02/16/22 1417 02/16/22 1417   97 5 °F (36 4 °C) 64 18 135/84 97 %      Temp Source Heart Rate Source Patient Position - Orthostatic VS BP Location FiO2 (%)   02/16/22 1417 02/16/22 1925 02/16/22 1925 02/16/22 1925 --   Oral Monitor Sitting Right arm       Pain Score       02/16/22 1417       7          Wt Readings from Last 1 Encounters:   09/01/21 102 kg (225 lb)     Additional Vital Signs:   97 4 °F (36 3 °C) Abnormal  49 Abnormal  18 141/83 102 97 % -- --    02/16/22 23:54:19 97 8 °F (36 6 °C) 58 18 144/84 104 97 % -- --   02/16/22 22:19:13 98 5 °F (36 9 °C) 59 17 139/104 Abnormal  116 97 % -- --   02/16/22 1925 -- 59 18 148/77 -- 96 % None (Room air) Sitting   02/16/22 1417 97 5 °F (36 4 °C) 64 18 135/84 -- 97 % --        Pertinent Labs/Diagnostic Test Results:   X ray  L foot  ( 2/16)   No abnormality  Results from last 7 days   Lab Units 02/16/22  1859   SARS-COV-2  Negative     Results from last 7 days   Lab Units 02/17/22  0759 02/17/22  0552 02/16/22  1630   WBC Thousand/uL  --  8 51 10 87*   HEMOGLOBIN g/dL  --  13 0 13 7   HEMATOCRIT %  --  41 9 44 7   PLATELETS Thousands/uL 323 318 349   NEUTROS ABS Thousands/µL  --  3 25 5 53         Results from last 7 days   Lab Units 02/17/22  0552 02/16/22  1630   SODIUM mmol/L 135* 136   POTASSIUM mmol/L 4 0 4 4   CHLORIDE mmol/L 106 105   CO2 mmol/L 23 25   ANION GAP mmol/L 6 6   BUN mg/dL 10 6   CREATININE mg/dL 0 91 0 88   EGFR ml/min/1 73sq m 75 79   CALCIUM mg/dL 9 4 9 6     Results from last 7 days   Lab Units 02/16/22  1630   AST U/L 16   ALT U/L 18   ALK PHOS U/L 88   TOTAL PROTEIN g/dL 7 8   ALBUMIN g/dL 3 8   TOTAL BILIRUBIN mg/dL 0 28     Results from last 7 days   Lab Units 02/17/22  0553 02/16/22  1934   POC GLUCOSE mg/dl 87 114     Results from last 7 days   Lab Units 02/17/22  0552 02/16/22  1630   GLUCOSE RANDOM mg/dL 82 88         Results from last 7 days   Lab Units 02/16/22  1630   HEMOGLOBIN A1C % 6 4*   EAG mg/dl 137           Results from last 7 days   Lab Units 02/16/22  1630   SED RATE mm/hour 11                 Results from last 7 days   Lab Units 02/16/22  1859   INFLUENZA A PCR  Negative   INFLUENZA B PCR  Negative   RSV PCR  Negative               Results from last 7 days   Lab Units 02/16/22  1631   GRAM STAIN RESULT  1+ Gram positive cocci in pairs*  1+ Gram positive rods*  No polys seen*               ED Treatment:   Medication Administration from 02/16/2022 1406 to 02/16/2022 2134       Date/Time Order Dose Route Action Comments     02/16/2022 1825 ceFAZolin (ANCEF) IVPB (premix in dextrose) 2,000 mg 50 mL 0 mg Intravenous Stopped      02/16/2022 1637 ceFAZolin (ANCEF) IVPB (premix in dextrose) 2,000 mg 50 mL 2,000 mg Intravenous New Bag      02/16/2022 1640 metroNIDAZOLE (FLAGYL) tablet 500 mg 500 mg Oral Given      02/16/2022 1658 cefepime (MAXIPIME) 2 g/50 mL dextrose IVPB   Intravenous Canceled Entry      02/16/2022 1631 HYDROmorphone (DILAUDID) injection 0 5 mg 0 5 mg Intravenous Given      02/16/2022 1631 acetaminophen (TYLENOL) tablet 975 mg 975 mg Oral Given      02/16/2022 1631 ketorolac (TORADOL) injection 15 mg 15 mg Intravenous Given      02/16/2022 1924 cloNIDine (CATAPRES) tablet 0 2 mg 0 mg Oral Hold Pt refusing until 2130 02/16/2022 1924 docusate sodium (COLACE) capsule 100 mg 100 mg Oral Not Given      02/16/2022 1920 ceFAZolin (ANCEF) IVPB (premix in dextrose) 2,000 mg 50 mL 2,000 mg Intravenous New Bag         Present on Admission:   Hypothyroidism   Morbid obesity ([address] )   Type 2 diabetes mellitus ([facility] )   Manic bipolar I disorder [facility])      Admitting Diagnosis: Foot ulcer ([address] ) [L97 509]  Visit for wound check [Z51 89]  Left foot infection [L08 9]  Age/Sex: 56 y o  female  Admission Orders:  Scheduled Medications:  cefazolin, 2,000 mg, Intravenous, Q8H  cholecalciferol, 1,000 Units, Oral, Daily  cloNIDine, 0 2 mg, Oral, BID  docusate sodium, 100 mg, Oral, BID  enoxaparin, 40 mg, Subcutaneous, Daily  ergocalciferol, 50,000 Units, Oral, Weekly  fenofibrate, 48 mg, Oral, Daily  folic acid, 7,608 mcg, Oral, Daily  hydrOXYzine HCL, 100 mg, Oral, HS  hydrOXYzine HCL, 50 mg, Oral, QAM  insulin lispro, 1-5 Units, Subcutaneous, TID AC  lamoTRIgine, 200 mg, Oral, QAM  levothyroxine, 50 mcg, Oral, Daily  multivitamin stress formula, 1 tablet, Oral, Daily  mupirocin, , Topical, TID  nystatin, , Topical, TID  pantoprazole, 40 mg, Oral, Daily  senna, 8 6 mg, Oral, Daily  ziprasidone, 80 mg, Oral, BID With Meals      Continuous IV Infusions:     PRN Meds:  albuterol, 2 puff, Inhalation, Q6H PRN  aluminum-magnesium hydroxide-simethicone, 30 mL, Oral, Q6H PRN  melatonin, 9 mg, Oral, HS PRN  methocarbamol, 750 mg, Oral, Daily PRN  ondansetron, 4 mg, Intravenous, Q6H PRN  oxyCODONE, 5 mg, Oral, Q6H PRN        IP CONSULT TO PODIATRY    Network Utilization Review Department  ATTENTION: Please call with any questions or concerns to [phone] and carefully listen to the prompts so that you are directed to the right person  All voicemails are confidential   Jan Elias all requests for admission clinical reviews, approved or denied determinations and any other requests to dedicated fax number below belonging to the campus where the patient is receiving treatment   List of dedicated fax numbers for the Facilities:  [address] DENIALS (Administrative/Medical Necessity) [phone]   [address] (Maternity/NICU/Pediatrics) [phone]   [address] [facility] [phone]   8049 [facility] [phone]   [address] [place] [address] [id number] [place] [address] [phone]   [address] [phone]

## 2022-02-17 NOTE — PROGRESS NOTES
[address] [facility]  Progress Note - Sonido Scarce 1975, 56 y o  female MRN: [id number]  Unit/Bed#: -[place] Encounter: [id number]  Primary Care Provider: Yanick Alex DO   Date and time admitted to hospital: 2/16/2022  2:42 PM    * Left foot diabetic ulcer  Assessment & Plan  · Podiatry following, appreciate their ongoing recommendations - add on for surgery today - surgical debridement at minimum, likely hallux amputation per Podiatry  · XR left foot with no acute osseous abnormality per the results report  · On IV abx  · Pain control  · See attending attestation regarding pre-operative risk stratification  ECG ordered      Type 2 diabetes mellitus [facility])  Assessment & Plan  Lab Results   Component Value Date    HGBA1C 6 4 (H) 02/16/2022       Recent Labs     02/16/22  1934 02/17/22  0553 02/17/22  1042   POCGLU 114 87 90       Blood Sugar Average: Last 72 hrs:  (P) 97     · Orals on hold while hospitalized  · SSI coverage     Manic bipolar I disorder (HCC)  Assessment & Plan  · Continue lamotrigine, ziprasidone  · Outpatient follow-up      Hypothyroidism  Assessment & Plan  · Continue Levothyroxine    H/O bariatric surgery  Assessment & Plan  · Noted history  · Outpatient follow up    Morbid obesity ([address] )  Assessment & Plan  · Therapeutic lifestyle modification encouraged    Obstructive sleep apnea  Assessment & Plan  · Reports CPAP noncompliance due to intolerance      VTE Pharmacologic Prophylaxis: VTE Score: 4 Moderate Risk (Score 3-4) - Pharmacological DVT Prophylaxis Ordered: enoxaparin (Lovenox)  Patient Centered Rounds: I performed bedside rounds with nursing staff today  Tatiana Bruce   Discussions with Specialists or Other Care Team Provider: Podiatry resident and [facility] attending     Education and Discussions with Family / Patient: Patient declined call to   Time Spent for Care: 20 minutes   More than 50% of total time spent on counseling and coordination of care as described above  Current Length of Stay: 1 day(s)  Current Patient Status: Inpatient   Certification Statement: The patient will continue to require additional inpatient hospital stay due to podiatric surgery  Discharge Plan: Anticipate discharge in 48-72 hrs to discharge location to be determined pending rehab evaluations  Code Status: Level 1 - Full Code    Subjective:   Ms Jakob Uriostegui reports left foot pain  She denies CP, SOB, abdominal pain  She declines call to anyone for update and reports that she would rather her podiatrist call her son    Objective:     Vitals:   Temp (24hrs), Av 8 °F (36 6 °C), Min:97 4 °F (36 3 °C), Max:98 5 °F (36 9 °C)    Temp:  [97 4 °F (36 3 °C)-98 5 °F (36 9 °C)] 97 4 °F (36 3 °C)  HR:  [49-64] 49  Resp:  [17-18] 18  BP: (135-148)/() 141/83  SpO2:  [96 %-97 %] 97 %  There is no height or weight on file to calculate BMI  Input and Output Summary (last 24 hours):   No intake or output data in the 24 hours ending 22 1103    Physical Exam:   Physical Exam  Vitals and nursing note reviewed  Constitutional:       Appearance: She is obese  Comments: Patient seen lying in bed comfortably resting, NAD   Cardiovascular:      Rate and Rhythm: Normal rate and regular rhythm  Pulmonary:      Effort: Pulmonary effort is normal  No respiratory distress  Breath sounds: Normal breath sounds  Abdominal:      General: Bowel sounds are normal       Palpations: Abdomen is soft  Tenderness: There is no abdominal tenderness  Musculoskeletal:      Right lower leg: No edema  Left lower leg: No edema  Skin:     General: Skin is warm  Neurological:      Mental Status: She is alert and oriented to person, place, and time     Psychiatric:         Mood and Affect: Mood normal          Behavior: Behavior normal           Additional Data:     Labs:  Results from last 7 days   Lab Units 22  0759 22  0552 22  0552   WBC Thousand/uL --   --  8 51   HEMOGLOBIN g/dL  --   --  13 0   HEMATOCRIT %  --   --  41 9   PLATELETS Thousands/uL 323   < > 318   NEUTROS PCT %  --   --  38*   LYMPHS PCT %  --   --  48*   MONOS PCT %  --   --  8   EOS PCT %  --   --  5    < > = values in this interval not displayed  Results from last 7 days   Lab Units 02/17/22  0552 02/16/22  1630 02/16/22  1630   SODIUM mmol/L 135*   < > 136   POTASSIUM mmol/L 4 0   < > 4 4   CHLORIDE mmol/L 106   < > 105   CO2 mmol/L 23   < > 25   BUN mg/dL 10   < > 6   CREATININE mg/dL 0 91   < > 0 88   ANION GAP mmol/L 6   < > 6   CALCIUM mg/dL 9 4   < > 9 6   ALBUMIN g/dL  --   --  3 8   TOTAL BILIRUBIN mg/dL  --   --  0 28   ALK PHOS U/L  --   --  88   ALT U/L  --   --  18   AST U/L  --   --  16   GLUCOSE RANDOM mg/dL 82   < > 88    < > = values in this interval not displayed           Results from last 7 days   Lab Units 02/17/22  1042 02/17/22  0553 02/16/22  1934   POC GLUCOSE mg/dl 90 87 114     Results from last 7 days   Lab Units 02/16/22  1630   HEMOGLOBIN A1C % 6 4*           Lines/Drains:  Invasive Devices  Report    Peripheral Intravenous Line            Peripheral IV 02/16/22 Left Forearm <1 day                      Imaging: Reviewed radiology reports from this admission including: xray(s)    Recent Cultures (last 7 days):   Results from last 7 days   Lab Units 02/16/22  1631   GRAM STAIN RESULT  1+ Gram positive cocci in pairs*  1+ Gram positive rods*  No polys seen*       Last 24 Hours Medication List:   Current Facility-Administered Medications   Medication Dose Route Frequency Provider Last Rate    albuterol  2 puff Inhalation Q6H PRN Roya Zuniga MD      aluminum-magnesium hydroxide-simethicone  30 mL Oral Q6H PRN Roya Zuniga MD      cefazolin  2,000 mg Intravenous Q8H Roya Zuniga MD 2,000 mg (02/17/22 1905)    cholecalciferol  1,000 Units Oral Daily Roya Zuniga MD      cloNIDine  0 2 mg Oral BID Roya Zuniga MD  docusate sodium  100 mg Oral BID Francesco Randall MD      enoxaparin  40 mg Subcutaneous Daily Francesco Randall MD      ergocalciferol  50,000 Units Oral Weekly Francesco Randall MD      fenofibrate  48 mg Oral Daily Francesco Randall MD      folic acid  3,249 mcg Oral Daily Francesco Randall MD      hydrOXYzine HCL  100 mg Oral HS Sami Arias, ZORAIDA      hydrOXYzine HCL  50 mg Oral QAM Francesco Randall MD      insulin lispro  1-5 Units Subcutaneous TID AC Francesco Randall MD      lamoTRIgine  200 mg Oral QAM Francesco Randall MD      levothyroxine  50 mcg Oral Daily Francesco Randall, MD      melatonin  9 mg Oral HS PRN Sami Arias, ZORAIDA      methocarbamol  750 mg Oral Daily PRN Francesco Randall MD      multivitamin stress formula  1 tablet Oral Daily Francesco Randall MD      mupirocin   Topical TID Francesco Randall MD      nystatin   Topical TID Francesco Randall MD      ondansetron  4 mg Intravenous Q6H PRN Francesco Randall MD      oxyCODONE  5 mg Oral Q6H PRN Francesco Randall MD      pantoprazole  40 mg Oral Daily Francesco Randall, MD      senna  8 6 mg Oral Daily Francesco Randall MD      ziprasidone  80 mg Oral BID With Celeste Marquis MD          Today, Patient Was Seen By: Emmanuelle Mcdonough PA-C    **Please Note: This note may have been constructed using a voice recognition system  **

## 2022-02-17 NOTE — CASE MANAGEMENT
Case Management Assessment & Discharge Planning Note    Patient name Tawanna Covarrubias  Location /-32 MRN [id number]  : 1975 Date 2022       Current Admission Date: 2022  Current Admission Diagnosis:Left foot diabetic ulcer   Patient Active Problem List    Diagnosis Date Noted    Left foot diabetic ulcer 2022    Obstructive sleep apnea 2022    GERD (gastroesophageal reflux disease) 2021    Migraine without status migrainosus, not intractable 2021    Pain of foot 2021    Right foot ulcer, with fat layer exposed ([facility] ) 2021    Arthritis 2021    Chronic pain 2021    OCD (obsessive compulsive disorder)     PTSD (post-traumatic stress disorder)     Smoking     Essential hypertension 2021    Acquired absence of other left toe(s) ([facility] ) 2019    Type 2 diabetes mellitus ([facility] ) 2019    Type 2 diabetes mellitus with neurologic complication, without long-term current use of insulin ([facility] ) 2019    Tobacco abuse 2019    Encounter for smoking cessation counseling 2019    Hypothyroidism 2019    Manic bipolar I disorder ([facility] ) 2019    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, with bone involvement without evidence of necrosis ([facility] ) 2019    H/O bariatric surgery 2019    Anxiety 2019    Morbid obesity ([facility] ) 2019    Preoperative clearance 2019      LOS (days): 1  Geometric Mean LOS (GMLOS) (days):   Days to GMLOS:     OBJECTIVE:    Risk of Unplanned Readmission Score: 18         Current admission status: Inpatient       Preferred Pharmacy:   [facility], [address] [address]  Phone: [phone] Fax: [phone]    Primary Care Provider: Colby Rodríguez DO    Primary Insurance: [address] [place] [address]  Secondary Insurance:     ASSESSMENT:  [employer]     [facility] Representative - Son   Primary Phone: [phone] (Mobile)               Advance Directives  Does patient have a [address]?: Yes  Does patient have Advance Directives?: Yes  Advance Directives: Power of  for health care (Pt will be given a copy of LW as requested by CM )  Primary Contact: Taco Barnes (Son) [phone] (M)              Patient Information  Admitted from[de-identified] Home  Mental Status: Alert  During Assessment patient was accompanied by: Not accompanied during assessment  Assessment information provided by[de-identified] Patient  Primary Caregiver: Self  Support Systems: [address] of Residence: [address] do you live in?: [address] entry access options  Select all that apply : Stairs  Number of steps to enter home  : 1  Do the steps have railings?: Yes  Type of Current Residence: [address] home  Upon entering residence, is there a bedroom on the main floor (no further steps)?: No  A bedroom is located on the following floor levels of residence (select all that apply):: 2nd Floor  Upon entering residence, is there a bathroom on the main floor (no further steps)?: No  Indicate which floors of current residence have a bathroom (select all the apply):: 2nd Floor  Number of steps to 2nd floor from main floor: One Flight  In the last 12 months, was there a time when you were not able to pay the mortgage or rent on time?: No  In the last 12 months, how many places have you lived?: 1  In the last 12 months, was there a time when you did not have a steady place to sleep or slept in a shelter (including now)?: No  Homeless/housing insecurity resource given?: N/A  Living Arrangements: Lives w/ Son  Is patient a ?: No    Activities of Daily Living Prior to Admission  Functional Status: Independent  Completes ADLs independently?: Yes  Ambulates independently?: No  Level of ambulatory dependence: Assistance  Does patient use assisted devices?: Yes  Assisted Devices (DME) used: Straight Cane,Walker,Wheelchair  Does patient currently own DME?: Yes  What DME does the patient currently own?: Straight Cane,Walker,Wheelchair  Does patient have a history of Outpatient Therapy (PT/OT)?: No  Does the patient have a history of Short-Term Rehab?: Yes (Pt has hx of IP STR [facility] [employer] [address] )  Does patient have a history of [facility]?: Yes (Pt has hx of [facility] )  Does patient currently have [facility]?: No         Patient Information Continued  Income Source: Unemployed  Does patient have prescription coverage?: Yes  Within the past 12 months, you worried that your food would run out before you got the money to buy more : Never true  Within the past 12 months, the food you bought just didnt last and you didnt have money to get more : Never true  Food insecurity resource given?: N/A  Does patient receive dialysis treatments?: No  Does patient have a history of substance abuse?: No  Does patient have a history of Mental Health Diagnosis?: Yes (Pt has hx of MH issues with Anxiety, Bipolar Disorder, and PTSD )  Is patient receiving treatment for mental health?: Yes  Has patient received inpatient treatment related to mental health in the last 2 years?: No    PHQ 2/9 Screening   Reviewed PHQ 2/9 Depression Screening Score?: Yes    Means of Transportation  Means of Transport to Appts[de-identified] Family transport  In the past 12 months, has lack of transportation kept you from medical appointments or from getting medications?: No  In the past 12 months, has lack of transportation kept you from meetings, work, or from getting things needed for daily living?: No  Was application for public transport provided?: N/A        DISCHARGE DETAILS:    Discharge planning discussed with[de-identified] Pt  Freedom of Choice: Yes  Comments - Freedom of Choice: FOC to be given for DC planning    CM contacted family/caregiver?: No- see comments  Were Treatment Team discharge recommendations reviewed with patient/caregiver?: No  Did patient/caregiver verbalize understanding of patient care needs?: No  Were patient/caregiver advised of the risks associated with not following Treatment Team discharge recommendations?: No- see comments    Contacts  Patient Contacts: Brandan Peace/Son  Relationship to Patient[de-identified] Family  Contact Method: Phone  Phone Number: [phone]  Reason/Outcome: Continuity of [address]         Is the [place] interested in [place] [address] at discharge?: No    DME Referral Provided  Referral made for DME?: No    Other Referral/Resources/Interventions Provided:  Interventions: None Indicated  Referral Comments: Pt is pending PT/OT recs for DC planning  Would you like to participate in our [address] service program?  : No - Declined    Treatment Team Recommendation: Home  Discharge Destination Plan[de-identified] Home  Transport at Discharge : Auto with designated   Dispatcher Contacted: No              Transport Service Arrived: No     Accompanied by: Family member (Pt's son will pickup the Pt at DC )     CM reviewed d/c planning process including the following: identifying help at home, patient preference for d/c planning needs, Discharge Lounge, [facility] Meds to Bed program, availability of treatment team to discuss questions or concerns patient and/or family may have regarding understanding medications and recognizing signs and symptoms once discharged  CM also encouraged patient to follow up with all recommended appointments after discharge  Patient advised of importance for patient and family to participate in managing patients medical well being

## 2022-02-17 NOTE — PLAN OF CARE
Problem: MOBILITY - ADULT  Goal: Maintain or return to baseline ADL function  Description: INTERVENTIONS:  -  Assess patient's ability to carry out ADLs; assess patient's baseline for ADL function and identify physical deficits which impact ability to perform ADLs (bathing, care of mouth/teeth, toileting, grooming, dressing, etc )  - Assess/evaluate cause of self-care deficits   - Assess range of motion  - Assess patient's mobility; develop plan if impaired  - Assess patient's need for assistive devices and provide as appropriate  - Encourage maximum independence but intervene and supervise when necessary  - Involve family in performance of ADLs  - Assess for home care needs following discharge   - Consider OT consult to assist with ADL evaluation and planning for discharge  - Provide patient education as appropriate  Outcome: Progressing  Goal: Maintains/Returns to pre admission functional level  Description: INTERVENTIONS:  - Perform BMAT or MOVE assessment daily    - Set and communicate daily mobility goal to care team and patient/family/caregiver  - Collaborate with rehabilitation services on mobility goals if consulted  - Perform Range of Motion  times a day  - Reposition patient every  hours    - Dangle patient  times a day  - Stand patient  times a day  - Ambulate patient  times a day  - Out of bed to chair  times a day   - Out of bed for meals  times a day  - Out of bed for toileting  - Record patient progress and toleration of activity level   Outcome: Progressing     Problem: PAIN - ADULT  Goal: Verbalizes/displays adequate comfort level or baseline comfort level  Description: Interventions:  - Encourage patient to monitor pain and request assistance  - Assess pain using appropriate pain scale  - Administer analgesics based on type and severity of pain and evaluate response  - Implement non-pharmacological measures as appropriate and evaluate response  - Consider cultural and social influences on pain and pain management  - Notify physician/advanced practitioner if interventions unsuccessful or patient reports new pain  Outcome: Progressing     Problem: INFECTION - ADULT  Goal: Absence or prevention of progression during hospitalization  Description: INTERVENTIONS:  - Assess and monitor for signs and symptoms of infection  - Monitor lab/diagnostic results  - Monitor all insertion sites, i e  indwelling lines, tubes, and drains  - Monitor endotracheal if appropriate and nasal secretions for changes in amount and color  - Galvin appropriate cooling/warming therapies per order  - Administer medications as ordered  - Instruct and encourage patient and family to use good hand hygiene technique  - Identify and instruct in appropriate isolation precautions for identified infection/condition  Outcome: Progressing  Goal: Absence of fever/infection during neutropenic period  Description: INTERVENTIONS:  - Monitor WBC    Outcome: Progressing     Problem: SAFETY ADULT  Goal: Maintain or return to baseline ADL function  Description: INTERVENTIONS:  -  Assess patient's ability to carry out ADLs; assess patient's baseline for ADL function and identify physical deficits which impact ability to perform ADLs (bathing, care of mouth/teeth, toileting, grooming, dressing, etc )  - Assess/evaluate cause of self-care deficits   - Assess range of motion  - Assess patient's mobility; develop plan if impaired  - Assess patient's need for assistive devices and provide as appropriate  - Encourage maximum independence but intervene and supervise when necessary  - Involve family in performance of ADLs  - Assess for home care needs following discharge   - Consider OT consult to assist with ADL evaluation and planning for discharge  - Provide patient education as appropriate  Outcome: Progressing  Goal: Maintains/Returns to pre admission functional level  Description: INTERVENTIONS:  - Perform BMAT or MOVE assessment daily    - Set and communicate daily mobility goal to care team and patient/family/caregiver  - Collaborate with rehabilitation services on mobility goals if consulted  - Perform Range of Motion  times a day  - Reposition patient every  hours    - Dangle patient  times a day  - Stand patient  times a day  - Ambulate patient  times a day  - Out of bed to chair  times a day   - Out of bed for meals  times a day  - Out of bed for toileting  - Record patient progress and toleration of activity level   Outcome: Progressing  Goal: Patient will remain free of falls  Description: INTERVENTIONS:  - Educate patient/family on patient safety including physical limitations  - Instruct patient to call for assistance with activity   - Consult OT/PT to assist with strengthening/mobility   - Keep Call bell within reach  - Keep bed low and locked with side rails adjusted as appropriate  - Keep care items and personal belongings within reach  - Initiate and maintain comfort rounds  - Make Fall Risk Sign visible to staff  - Offer Toileting every  Hours, in advance of need  - Initiate/Maintain alarm  - Obtain necessary fall risk management equipment:  - Apply yellow socks and bracelet for high fall risk patients  - Consider moving patient to room near nurses station  Outcome: Progressing     Problem: DISCHARGE PLANNING  Goal: Discharge to home or other facility with appropriate resources  Description: INTERVENTIONS:  - Identify barriers to discharge w/patient and caregiver  - Arrange for needed discharge resources and transportation as appropriate  - Identify discharge learning needs (meds, wound care, etc )  - Arrange for interpretive services to assist at discharge as needed  - Refer to Case Management Department for coordinating discharge planning if the patient needs post-hospital services based on physician/advanced practitioner order or complex needs related to functional status, cognitive ability, or social support system  Outcome: Progressing Problem: Knowledge Deficit  Goal: Patient/family/caregiver demonstrates understanding of disease process, treatment plan, medications, and discharge instructions  Description: Complete learning assessment and assess knowledge base    Interventions:  - Provide teaching at level of understanding  - Provide teaching via preferred learning methods  Outcome: Progressing

## 2022-02-17 NOTE — PLAN OF CARE
Problem: MOBILITY - ADULT  Goal: Maintain or return to baseline ADL function  Description: INTERVENTIONS:  -  Assess patient's ability to carry out ADLs; assess patient's baseline for ADL function and identify physical deficits which impact ability to perform ADLs (bathing, care of mouth/teeth, toileting, grooming, dressing, etc )  - Assess/evaluate cause of self-care deficits   - Assess range of motion  - Assess patient's mobility; develop plan if impaired  - Assess patient's need for assistive devices and provide as appropriate  - Encourage maximum independence but intervene and supervise when necessary  - Involve family in performance of ADLs  - Assess for home care needs following discharge   - Consider OT consult to assist with ADL evaluation and planning for discharge  - Provide patient education as appropriate  Outcome: Progressing  Goal: Maintains/Returns to pre admission functional level  Description: INTERVENTIONS:  - Perform BMAT or MOVE assessment daily    - Set and communicate daily mobility goal to care team and patient/family/caregiver  - Collaborate with rehabilitation services on mobility goals if consulted  - Perform Range of Motion  times a day  - Reposition patient every  hours    - Dangle patient  times a day  - Stand patient  times a day  - Ambulate patient  times a day  - Out of bed to chair  times a day   - Out of bed for meals  times a day  - Out of bed for toileting  - Record patient progress and toleration of activity level   Outcome: Progressing     Problem: PAIN - ADULT  Goal: Verbalizes/displays adequate comfort level or baseline comfort level  Description: Interventions:  - Encourage patient to monitor pain and request assistance  - Assess pain using appropriate pain scale  - Administer analgesics based on type and severity of pain and evaluate response  - Implement non-pharmacological measures as appropriate and evaluate response  - Consider cultural and social influences on pain and pain management  - Notify physician/advanced practitioner if interventions unsuccessful or patient reports new pain  Outcome: Progressing     Problem: INFECTION - ADULT  Goal: Absence or prevention of progression during hospitalization  Description: INTERVENTIONS:  - Assess and monitor for signs and symptoms of infection  - Monitor lab/diagnostic results  - Monitor all insertion sites, i e  indwelling lines, tubes, and drains  - Monitor endotracheal if appropriate and nasal secretions for changes in amount and color  - [place] appropriate cooling/warming therapies per order  - Administer medications as ordered  - Instruct and encourage patient and family to use good hand hygiene technique  - Identify and instruct in appropriate isolation precautions for identified infection/condition  Outcome: Progressing  Goal: Absence of fever/infection during neutropenic period  Description: INTERVENTIONS:  - Monitor WBC    Outcome: Progressing     Problem: SAFETY ADULT  Goal: Maintain or return to baseline ADL function  Description: INTERVENTIONS:  -  Assess patient's ability to carry out ADLs; assess patient's baseline for ADL function and identify physical deficits which impact ability to perform ADLs (bathing, care of mouth/teeth, toileting, grooming, dressing, etc )  - Assess/evaluate cause of self-care deficits   - Assess range of motion  - Assess patient's mobility; develop plan if impaired  - Assess patient's need for assistive devices and provide as appropriate  - Encourage maximum independence but intervene and supervise when necessary  - Involve family in performance of ADLs  - Assess for home care needs following discharge   - Consider OT consult to assist with ADL evaluation and planning for discharge  - Provide patient education as appropriate  Outcome: Progressing  Goal: Maintains/Returns to pre admission functional level  Description: INTERVENTIONS:  - Perform BMAT or MOVE assessment daily    - Set and communicate daily mobility goal to care team and patient/family/caregiver  - Collaborate with rehabilitation services on mobility goals if consulted  - Perform Range of Motion  times a day  - Reposition patient every  hours    - Dangle patient  times a day  - Stand patient  times a day  - Ambulate patient  times a day  - Out of bed to chair  times a day   - Out of bed for meals  times a day  - Out of bed for toileting  - Record patient progress and toleration of activity level   Outcome: Progressing  Goal: Patient will remain free of falls  Description: INTERVENTIONS:  - Educate patient/family on patient safety including physical limitations  - Instruct patient to call for assistance with activity   - Consult OT/PT to assist with strengthening/mobility   - Keep Call bell within reach  - Keep bed low and locked with side rails adjusted as appropriate  - Keep care items and personal belongings within reach  - Initiate and maintain comfort rounds  - Make Fall Risk Sign visible to staff  - Offer Toileting every  Hours, in advance of need  - Initiate/Maintain alarm  - Obtain necessary fall risk management equipment:   - Apply yellow socks and bracelet for high fall risk patients  - Consider moving patient to room near nurses station  Outcome: Progressing     Problem: DISCHARGE PLANNING  Goal: Discharge to home or other facility with appropriate resources  Description: INTERVENTIONS:  - Identify barriers to discharge w/patient and caregiver  - Arrange for needed discharge resources and transportation as appropriate  - Identify discharge learning needs (meds, wound care, etc )  - Arrange for interpretive services to assist at discharge as needed  - Refer to Case Management Department for coordinating discharge planning if the patient needs post-hospital services based on physician/advanced practitioner order or complex needs related to functional status, cognitive ability, or social support system  Outcome: Progressing Problem: Knowledge Deficit  Goal: Patient/family/caregiver demonstrates understanding of disease process, treatment plan, medications, and discharge instructions  Description: Complete learning assessment and assess knowledge base  Interventions:  - Provide teaching at level of understanding  - Provide teaching via preferred learning methods  Outcome: Progressing     Problem: Potential for Falls  Goal: Patient will remain free of falls  Description: INTERVENTIONS:  - Educate patient/family on patient safety including physical limitations  - Instruct patient to call for assistance with activity   - Consult OT/PT to assist with strengthening/mobility   - Keep Call bell within reach  - Keep bed low and locked with side rails adjusted as appropriate  - Keep care items and personal belongings within reach  - Initiate and maintain comfort rounds  - Make Fall Risk Sign visible to staff  - Offer Toileting every  Hours, in advance of need  - Initiate/Maintain alarm  - Obtain necessary fall risk management equipment  - Apply yellow socks and bracelet for high fall risk patients  - Consider moving patient to room near nurses station  Outcome: Progressing     Problem: Nutrition/Hydration-ADULT  Goal: Nutrient/Hydration intake appropriate for improving, restoring or maintaining nutritional needs  Description: Monitor and assess patient's nutrition/hydration status for malnutrition  Collaborate with interdisciplinary team and initiate plan and interventions as ordered  Monitor patient's weight and dietary intake as ordered or per policy  Utilize nutrition screening tool and intervene as necessary  Determine patient's food preferences and provide high-protein, high-caloric foods as appropriate       INTERVENTIONS:  - Monitor oral intake, urinary output, labs, and treatment plans  - Assess nutrition and hydration status and recommend course of action  - Evaluate amount of meals eaten  - Assist patient with eating if necessary - Allow adequate time for meals  - Recommend/ encourage appropriate diets, oral nutritional supplements, and vitamin/mineral supplements  - Order, calculate, and assess calorie counts as needed  - Recommend, monitor, and adjust tube feedings and TPN/PPN based on assessed needs  - Assess need for intravenous fluids  - Provide specific nutrition/hydration education as appropriate  - Include patient/family/caregiver in decisions related to nutrition  Outcome: Progressing     Problem: Prexisting or High Potential for Compromised Skin Integrity  Goal: Skin integrity is maintained or improved  Description: INTERVENTIONS:  - Identify patients at risk for skin breakdown  - Assess and monitor skin integrity  - Assess and monitor nutrition and hydration status  - Monitor labs   - Assess for incontinence   - Turn and reposition patient  - Assist with mobility/ambulation  - Relieve pressure over bony prominences  - Avoid friction and shearing  - Provide appropriate hygiene as needed including keeping skin clean and dry  - Evaluate need for skin moisturizer/barrier cream  - Collaborate with interdisciplinary team   - Patient/family teaching  - Consider wound care consult   Outcome: Progressing

## 2022-02-17 NOTE — ASSESSMENT & PLAN NOTE
· Podiatry following, appreciate their ongoing recommendations - add on for surgery today - surgical debridement at minimum, likely hallux amputation per Podiatry  · XR left foot with no acute osseous abnormality per the results report  · On IV abx  · Pain control  · See attending attestation regarding pre-operative risk stratification   ECG ordered

## 2022-02-17 NOTE — ASSESSMENT & PLAN NOTE
Lab Results   Component Value Date    HGBA1C 6 4 (H) 02/16/2022       Recent Labs     02/16/22  1934 02/17/22  0553 02/17/22  1042   POCGLU 114 87 90       Blood Sugar Average: Last 72 hrs:  (P) 97     · Orals on hold while hospitalized  · SSI coverage

## 2022-02-17 NOTE — PROGRESS NOTES
Podiatry - Progress Note  Patient: Vargas Amato 56 y o  female   MRN: [id number]  PCP: Eduardo Ba DO  Unit/Bed#: -01 Encounter: [id number]  Date Of Visit: 22    ASSESSMENT:    Vargas Amato is a 56 y o  female with:    1  Diabetic ulceration left great toe sulcus Gimenez 3  2  Cellulitis left foot  3  Diabetic ulceration right foot Gimenez 2  4  Type 2 diabetes (A1c 6 7% 2021)  5  Peripheral neuropathy        PLAN:    · Patient to go to OR today,22, for  Left Hallux amputation with Dr Neha Persaud  · X-ray left reviewed no acute osseous abnormality noted  · Consent to be signed with surgeon prior to procedure  · Confirmed NPO status  · H&P, vitals, and current labs reviewed  No acute changes noted  · Alternatives, risks, and complications discussed with patient  · All questions answered  No guarantees given to outcome of procedure  · Rest of medical care per primary team        SUBJECTIVE:     The patient was seen, evaluated, and assessed at bedside today  The patient was awake, alert, and in no acute distress  Patient confirmed NPO status  All questions and concerns regarding the surgical procedure addressed  Patient understands risks vs benefits of procedure and remains amenable with plan for surgery today  Patient denies N/V/F/chills/SOB/CP  Discussed patient the risks and benefits of surgery  And possible complications including infection, delayed healing surgical failure  Patient states she understands the risks to amputations she has had previous ones done  And she is anxious to move forward with the procedure  OBJECTIVE:     Vitals:   /83   Pulse (!) 49   Temp (!) 97 4 °F (36 3 °C)   Resp 18   SpO2 97%     Temp (24hrs), Av 8 °F (36 6 °C), Min:97 4 °F (36 3 °C), Max:98 5 °F (36 9 °C)      Physical Exam:     General:  Alert, cooperative, and in no distress  Lower extremity exam:  Cardiovascular status at baseline    Neurological status at baseline  Musculoskeletal status at baseline  No calf tenderness noted bilaterally  Dressing left intact to the Operating Room  Clinical Images 02/17/22: Additional Data:     Labs:    Results from last 7 days   Lab Units 02/17/22  0759 02/17/22  0552 02/17/22  0552   WBC Thousand/uL  --   --  8 51   HEMOGLOBIN g/dL  --   --  13 0   HEMATOCRIT %  --   --  41 9   PLATELETS Thousands/uL 323   < > 318   NEUTROS PCT %  --   --  38*   LYMPHS PCT %  --   --  48*   MONOS PCT %  --   --  8   EOS PCT %  --   --  5    < > = values in this interval not displayed  Results from last 7 days   Lab Units 02/17/22  0552 02/16/22  1630 02/16/22  1630   POTASSIUM mmol/L 4 0   < > 4 4   CHLORIDE mmol/L 106   < > 105   CO2 mmol/L 23   < > 25   BUN mg/dL 10   < > 6   CREATININE mg/dL 0 91   < > 0 88   CALCIUM mg/dL 9 4   < > 9 6   ALK PHOS U/L  --   --  88   ALT U/L  --   --  18   AST U/L  --   --  16    < > = values in this interval not displayed  * I Have Reviewed All Lab Data Listed Above  Recent Cultures (last 7 days):     Results from last 7 days   Lab Units 02/16/22  1631   GRAM STAIN RESULT  1+ Gram positive cocci in pairs*  1+ Gram positive rods*  No polys seen*           Imaging: I have personally reviewed pertinent films in PACS  EKG, Pathology, and Other Studies: I have personally reviewed pertinent reports  ** Please Note: Portions of the record may have been created with voice recognition software  Occasional wrong word or "sound a like" substitutions may have occurred due to the inherent limitations of voice recognition software  Read the chart carefully and recognize, using context, where substitutions have occurred   **

## 2022-02-17 NOTE — OP NOTE
OPERATIVE REPORT - Podiatry  PATIENT NAME: George Mcwilliams    :  1975  MRN: [id number]  Pt Location:  OR ROOM 08    SURGERY DATE: 2022    Surgeon(s) and Role:     * Keshia Gamez DPM - Primary     * Radha Conner DPM - Assisting    Pre-op Diagnosis:  Left foot infection [L08 9]    Post-Op Diagnosis Codes:     * Left foot infection [L08 9]    Procedure(s) (LRB):  DEBRIDEMENT GREAT TOE WOUND (Left)    Specimen(s):  * No specimens in log *    Estimated Blood Loss:   Minimal    Drains:  * No LDAs found *    Anesthesia Type:   Choice with 20 ml of 1% Lidocaine and 0 5% Bupivacaine in a 1:1 mixture    Hemostasis:  Direct compression, electrocautery    Materials:  * No implants in log *      Injectables:  None    Operative Findings:  Consistent with Diagnosis    Complications:   None    Procedure and Technique:     Under mild sedation, the patient was brought into the operating room and placed on the operating room table in the supine position  IV sedation was achieved by anesthesia team and a universal timeout was performed where all parties are in agreement of correct patient, correct procedure and correct site  A regional block was performed consisting of 20 ml of 1% Lidocaine and 0 5% Bupivacaine in a 1:1 mixture  The foot was then prepped and draped in the usual aseptic manner  A    Attention was drawn to the left great toe plantar aspect  Where ulceration was identified measuring 1 5 x 0 5 x 0 3 cm  Using 15 blade the hyperkeratotic tissue surrounding the ulceration was removed and ulceration was debrided in its entirety removing subcutaneous tissue  Resulting measurements of 2 5 x 1 5 x 0 3 cm  Healthy bleeding margins and healthy bleeding base to the ulceration is noted  Ayanna Skipper Ulceration was explored and no sinus tract could be identified  There was no purulence expressed or able to be expressed from great toe  Ruling out suspicion for septic arthritis    Ulceration was flushed with copious amounts sterile saline and dressed with Xeroform gauze [place]  The patient tolerated the procedure and anesthesia well without immediate complications and transferred to PACU with vital signs stable  As with many limb salvage procedures, we contemplate the possibility of performing further stages to this procedure  Procedures may include debridements, delayed closure, plastic surgery techniques, or more proximal amputations  This procedure may be considered part of a multi-staged limb salvage treatment plan  Dr Angelina Gonzalez was present during the entire procedure and participated in all key aspects  Patient is to remain weight-bearing to heel transfers only in toe on loading Darco shoe  SIGNATURE: Karen Ordaz DPM  DATE: February 17, 2022  TIME: 3:51 PM      Portions of the record may have been created with voice recognition software  Occasional wrong word or "sound a like" substitutions may have occurred due to the inherent limitations of voice recognition software  Read the chart carefully and recognize, using context, where substitutions have occurred

## 2022-02-17 NOTE — ANESTHESIA PREPROCEDURE EVALUATION
Procedure:  AMPUTATION TOE LEFT Great Toe (Left Toe)    Relevant Problems   ANESTHESIA (within normal limits)      CARDIO   (+) Essential hypertension   (+) Migraine without status migrainosus, not intractable      ENDO   (+) Hypothyroidism   (+) Left foot diabetic ulcer   (+) Type 2 diabetes mellitus (HCC)   (+) Type 2 diabetes mellitus with neurologic complication, without long-term current use of insulin (HCC)      GI/HEPATIC   (+) GERD (gastroesophageal reflux disease)   (+) H/O bariatric surgery      MUSCULOSKELETAL   (+) Arthritis      NEURO/PSYCH   (+) Anxiety   (+) Chronic pain   (+) Migraine without status migrainosus, not intractable   (+) OCD (obsessive compulsive disorder)   (+) PTSD (post-traumatic stress disorder)      PULMONARY   (+) Obstructive sleep apnea (Non compliant with CPAP)      Other   (+) Manic bipolar I disorder (HCC)   (+) Obesity        Physical Exam    Airway    Mallampati score: II  TM Distance: >3 FB  Neck ROM: full     Dental   upper dentures and lower dentures,     Cardiovascular  Rhythm: regular, Rate: normal, Cardiovascular exam normal    Pulmonary  Pulmonary exam normal Breath sounds clear to auscultation,     Other Findings        Anesthesia Plan  ASA Score- 3     Anesthesia Type- IV sedation with anesthesia with ASA Monitors  Additional Monitors:   Airway Plan:           Plan Factors-Exercise tolerance (METS): <4 METS  Exercise comment: Denies orthopnea/PND  Chart reviewed  EKG reviewed  Existing labs reviewed  Patient summary reviewed  Patient is not a current smoker  Induction-     Postoperative Plan-     Informed Consent- Anesthetic plan and risks discussed with patient  I personally reviewed this patient with the CRNA  Discussed and agreed on the Anesthesia Plan with the CRNA  Stephany Fulton

## 2022-02-18 LAB
GLUCOSE SERPL-MCNC: 124 MG/DL (ref 65–140)
GLUCOSE SERPL-MCNC: 146 MG/DL (ref 65–140)
GLUCOSE SERPL-MCNC: 98 MG/DL (ref 65–140)
GLUCOSE SERPL-MCNC: 99 MG/DL (ref 65–140)
MRSA NOSE QL CULT: ABNORMAL
MRSA NOSE QL CULT: ABNORMAL
URATE SERPL-MCNC: 5.2 MG/DL (ref 2–6.8)

## 2022-02-18 PROCEDURE — 99232 SBSQ HOSP IP/OBS MODERATE 35: CPT | Performed by: PHYSICIAN ASSISTANT

## 2022-02-18 PROCEDURE — 82948 REAGENT STRIP/BLOOD GLUCOSE: CPT

## 2022-02-18 PROCEDURE — 99232 SBSQ HOSP IP/OBS MODERATE 35: CPT | Performed by: PODIATRIST

## 2022-02-18 RX ORDER — COLCHICINE 0.6 MG/1
1.2 TABLET ORAL ONCE
Status: COMPLETED | OUTPATIENT
Start: 2022-02-18 | End: 2022-02-18

## 2022-02-18 RX ORDER — COLCHICINE 0.6 MG/1
0.6 TABLET ORAL 2 TIMES DAILY
Status: DISCONTINUED | OUTPATIENT
Start: 2022-02-18 | End: 2022-02-19

## 2022-02-18 RX ORDER — BENZONATATE 100 MG/1
100 CAPSULE ORAL 3 TIMES DAILY PRN
Status: DISCONTINUED | OUTPATIENT
Start: 2022-02-18 | End: 2022-02-19 | Stop reason: HOSPADM

## 2022-02-18 RX ADMIN — CLONIDINE HYDROCHLORIDE 0.2 MG: 0.2 TABLET ORAL at 07:53

## 2022-02-18 RX ADMIN — PANTOPRAZOLE SODIUM 40 MG: 40 TABLET, DELAYED RELEASE ORAL at 07:55

## 2022-02-18 RX ADMIN — LAMOTRIGINE 200 MG: 100 TABLET ORAL at 07:55

## 2022-02-18 RX ADMIN — COLCHICINE 1.2 MG: 0.6 TABLET ORAL at 12:20

## 2022-02-18 RX ADMIN — FOLIC ACID 1000 MCG: 1 TABLET ORAL at 07:52

## 2022-02-18 RX ADMIN — DOCUSATE SODIUM 100 MG: 100 CAPSULE ORAL at 17:44

## 2022-02-18 RX ADMIN — Medication 1000 UNITS: at 07:52

## 2022-02-18 RX ADMIN — LEVOTHYROXINE SODIUM 50 MCG: 50 TABLET ORAL at 07:53

## 2022-02-18 RX ADMIN — OXYCODONE HYDROCHLORIDE 5 MG: 5 TABLET ORAL at 07:52

## 2022-02-18 RX ADMIN — BENZONATATE 100 MG: 100 CAPSULE ORAL at 21:03

## 2022-02-18 RX ADMIN — CEFAZOLIN SODIUM 2000 MG: 2 SOLUTION INTRAVENOUS at 21:04

## 2022-02-18 RX ADMIN — ZIPRASIDONE HYDROCHLORIDE 80 MG: 40 CAPSULE ORAL at 21:03

## 2022-02-18 RX ADMIN — OXYCODONE HYDROCHLORIDE 5 MG: 5 TABLET ORAL at 21:03

## 2022-02-18 RX ADMIN — OXYCODONE HYDROCHLORIDE 5 MG: 5 TABLET ORAL at 16:05

## 2022-02-18 RX ADMIN — Medication 9 MG: at 21:02

## 2022-02-18 RX ADMIN — ENOXAPARIN SODIUM 40 MG: 40 INJECTION SUBCUTANEOUS at 08:05

## 2022-02-18 RX ADMIN — BENZONATATE 100 MG: 100 CAPSULE ORAL at 17:47

## 2022-02-18 RX ADMIN — STANDARDIZED SENNA CONCENTRATE 8.6 MG: 8.6 TABLET ORAL at 07:50

## 2022-02-18 RX ADMIN — ZIPRASIDONE HYDROCHLORIDE 80 MG: 40 CAPSULE ORAL at 07:54

## 2022-02-18 RX ADMIN — CEFAZOLIN SODIUM 2000 MG: 2 SOLUTION INTRAVENOUS at 12:17

## 2022-02-18 RX ADMIN — FENOFIBRATE 48 MG: 48 TABLET ORAL at 07:54

## 2022-02-18 RX ADMIN — HYDROXYZINE HYDROCHLORIDE 100 MG: 50 TABLET, FILM COATED ORAL at 21:02

## 2022-02-18 RX ADMIN — DOCUSATE SODIUM 100 MG: 100 CAPSULE ORAL at 07:53

## 2022-02-18 RX ADMIN — COLCHICINE 0.6 MG: 0.6 TABLET ORAL at 17:44

## 2022-02-18 RX ADMIN — HYDROXYZINE HYDROCHLORIDE 50 MG: 25 TABLET ORAL at 07:50

## 2022-02-18 RX ADMIN — CLONIDINE HYDROCHLORIDE 0.2 MG: 0.2 TABLET ORAL at 21:10

## 2022-02-18 NOTE — PROGRESS NOTES
[facility] - Progress Note  Patient: Racheal Charles 56 y o  female   MRN: [id number]  PCP: Waldemar Amin DO  [facility] Encounter: [id number]  Date Of Visit: 22  Hospital Stay Days: 2    ASSESSMENT:    Racheal Charles is a 56 y o  female with:    1  Diabetic ulceration left great toe sulcus Gimenez 3  1  S/p Wound debridement of left toe (DOS 22)  2  Cellulitis left foot - resolved  3  Diabetic ulceration right foot Gimenez 2  4  Type 2 diabetes (A1c 6 7% 2021)  5  Peripheral neuropathy  6  Possible gout        PLAN:    · Wound check today  Ulceration appears granular at the base with mild amount of surrounding maceration noted  Patient is still experiencing severe sensitivity to light touch localized to the great toe  Suspicious for gout  · Follow-up uric acid  · Started colchicine  Continue to monitor for pain relief  · Continue local wound care consisting of Maxorb DSD  · Continue antibiotics for now  · Patient may remain weight-bearing to the heel for transfers only in toe on loading Darco shoe  · Rest of care per primary team        SUBJECTIVE:     The patient was seen, evaluated, and assessed at bedside today  The patient was awake, alert, and in no acute distress  No acute events overnight  The patient reports feeling fine although there is still could increased pain to the toe    Patient denies N/V/F/chills/SOB/CP  OBJECTIVE:     Vitals:   /85 (BP Location: Right arm)   Pulse (!) 54   Temp 97 9 °F (36 6 °C)   Resp 18   SpO2 94%     Temp (24hrs), Av 7 °F (36 5 °C), Min:97 1 °F (36 2 °C), Max:98 °F (36 7 °C)      Physical Exam :     General:  Alert, cooperative, and in no distress  Lower extremity exam:  Musculoskeletal:  Neurovascular status at baseline  Dermatologic:  Ulceration appears granular at the base  With surrounding margins slightly macerated from dressing  No drainage, no malodor, no surrounding erythema noted    There is still diffuse tenderness to the left great toe with light touch  This is concerning for gouty  Presentation  Wound #: 1  Location:  Sulcus left great toe  Length 2 5 cm: Width 1 5 cm: Depth 0 3 cm:   Deepest Tissue Noted in Base:  Subcutaneous  Probe to Bone: No  Peripheral Skin Description: Attached  Granulation: 90% Fibrotic Tissue: 10% Necrotic Tissue: 0%   Drainage Amount: minimal  Signs of Infection: No      Clinical Images 02/18/22:   right great toe sulcus        Additional Data:     Labs:    Results from last 7 days   Lab Units 02/17/22  0759 02/17/22  0552 02/17/22  0552   WBC Thousand/uL  --   --  8 51   HEMOGLOBIN g/dL  --   --  13 0   HEMATOCRIT %  --   --  41 9   PLATELETS Thousands/uL 323   < > 318   NEUTROS PCT %  --   --  38*   LYMPHS PCT %  --   --  48*   MONOS PCT %  --   --  8   EOS PCT %  --   --  5    < > = values in this interval not displayed  Results from last 7 days   Lab Units 02/17/22  0552 02/16/22  1630 02/16/22  1630   POTASSIUM mmol/L 4 0   < > 4 4   CHLORIDE mmol/L 106   < > 105   CO2 mmol/L 23   < > 25   BUN mg/dL 10   < > 6   CREATININE mg/dL 0 91   < > 0 88   CALCIUM mg/dL 9 4   < > 9 6   ALK PHOS U/L  --   --  88   ALT U/L  --   --  18   AST U/L  --   --  16    < > = values in this interval not displayed  * I Have Reviewed All Lab Data Listed Above  Recent Cultures (last 7 days):     Results from last 7 days   Lab Units 02/16/22  1631   GRAM STAIN RESULT  1+ Gram positive cocci in pairs*  1+ Gram positive rods*  No polys seen*   WOUND CULTURE  Culture results to follow  Imaging: I have personally reviewed pertinent films in PACS  XR foot 3+ views LEFT    Result Date: 2/16/2022  Impression: No acute osseous abnormality  Workstation performed: [id number]     EKG, Pathology, and Other Studies: I have personally reviewed pertinent reports      Active medications:  Current Facility-Administered Medications   Medication Dose Route Frequency    albuterol (PROVENTIL HFA,VENTOLIN HFA) inhaler 2 puff  2 puff Inhalation Q6H PRN    aluminum-magnesium hydroxide-simethicone (MYLANTA) oral suspension 30 mL  30 mL Oral Q6H PRN    ceFAZolin (ANCEF) IVPB (premix in dextrose) 2,000 mg 50 mL  2,000 mg Intravenous Q8H    cholecalciferol (VITAMIN D3) tablet 1,000 Units  1,000 Units Oral Daily    cloNIDine (CATAPRES) tablet 0 2 mg  0 2 mg Oral BID    colchicine (COLCRYS) tablet 0 6 mg  0 6 mg Oral BID    colchicine (COLCRYS) tablet 1 2 mg  1 2 mg Oral Once    docusate sodium (COLACE) capsule 100 mg  100 mg Oral BID    enoxaparin (LOVENOX) subcutaneous injection 40 mg  40 mg Subcutaneous Daily    ergocalciferol (VITAMIN D2) capsule 50,000 Units  50,000 Units Oral Weekly    fenofibrate (TRICOR) tablet 48 mg  48 mg Oral Daily    folic acid (FOLVITE) tablet 1,000 mcg  1,000 mcg Oral Daily    hydrOXYzine HCL (ATARAX) tablet 100 mg  100 mg Oral HS    hydrOXYzine HCL (ATARAX) tablet 50 mg  50 mg Oral QAM    insulin lispro (HumaLOG) 100 units/mL subcutaneous injection 1-5 Units  1-5 Units Subcutaneous TID AC    lamoTRIgine (LaMICtal) tablet 200 mg  200 mg Oral QAM    levothyroxine tablet 50 mcg  50 mcg Oral Daily    melatonin tablet 9 mg  9 mg Oral HS PRN    methocarbamol (ROBAXIN) tablet 750 mg  750 mg Oral Daily PRN    multivitamin stress formula tablet 1 tablet  1 tablet Oral Daily    mupirocin (BACTROBAN) 2 % ointment   Topical TID    nystatin (MYCOSTATIN) powder   Topical TID    ondansetron (ZOFRAN) injection 4 mg  4 mg Intravenous Q6H PRN    oxyCODONE (ROXICODONE) IR tablet 5 mg  5 mg Oral Q6H PRN    pantoprazole (PROTONIX) EC tablet 40 mg  40 mg Oral Daily    senna (SENOKOT) tablet 8 6 mg  8 6 mg Oral Daily    ziprasidone (GEODON) capsule 80 mg  80 mg Oral BID With Meals         ** Please Note: Portions of the record may have been created with voice recognition software   Occasional wrong word or "sound a like" substitutions may have occurred due to the inherent limitations of voice recognition software  Read the chart carefully and recognize, using context, where substitutions have occurred   **

## 2022-02-18 NOTE — PROGRESS NOTES
[facility]  Progress Note - Christ Herrera 1975, 56 y o  female MRN: [id number]  Unit/Bed#: [facility] 907-01 Encounter: [id number]  Primary Care Provider: Justus Yoder DO   Date and time admitted to hospital: 2/16/2022  2:42 PM    * Left foot diabetic ulcer  Assessment & Plan  · Podiatry following, appreciate their ongoing recommendations   · S/p debridement great toe wound (left) by Dr Ayaka Merino 2/17/22  · XR left foot with no acute osseous abnormality per the results report  · On IV abx  · Pain control - Podiatry starting colchicine for possible gout flare today      Type 2 diabetes mellitus [facility])  Assessment & Plan  Lab Results   Component Value Date    HGBA1C 6 4 (H) 02/16/2022       Recent Labs     02/17/22  1600 02/17/22  1656 02/17/22  2142 02/18/22  0804   POCGLU 104 108 134 124       Blood Sugar Average: Last 72 hrs:  (P) 387 [id number]     · Orals on hold while hospitalized  · SSI coverage     Manic bipolar I disorder (HCC)  Assessment & Plan  · Continue lamotrigine, ziprasidone  · Outpatient follow-up      Hypothyroidism  Assessment & Plan  · Continue Levothyroxine    H/O bariatric surgery  Assessment & Plan  · Noted history  · Outpatient follow up    Obesity  Assessment & Plan  · Therapeutic lifestyle modification encouraged    Obstructive sleep apnea  Assessment & Plan  · Reports CPAP noncompliance due to intolerance        VTE Pharmacologic Prophylaxis: VTE Score: 4 Moderate Risk (Score 3-4) - Pharmacological DVT Prophylaxis Ordered: enoxaparin (Lovenox)  Patient Centered Rounds: I performed bedside rounds with nursing staff today  d/w ANDER Eldridge  Discussions with Specialists or Other Care Team Provider: Podiatry resident     Education and Discussions with Family / Patient: Patient declined call to   Time Spent for Care: 20 minutes   More than 50% of total time spent on counseling and coordination of care as described above     Current Length of Stay: 2 day(s)  Current Patient Status: Inpatient   Certification Statement: The patient will continue to require additional inpatient hospital stay due to continued IV abx, pain control  Discharge Plan: Anticipate discharge in 24-48 hrs to discharge location to be determined pending rehab evaluations  Code Status: Level 1 - Full Code    Subjective:   Ms Nisreen Cavazos reports she had to ask for pain meds this AM  She denies CP, SOB, abdominal pain    Objective:     Vitals:   Temp (24hrs), Av 7 °F (36 5 °C), Min:97 1 °F (36 2 °C), Max:98 °F (36 7 °C)    Temp:  [97 1 °F (36 2 °C)-98 °F (36 7 °C)] 97 9 °F (36 6 °C)  HR:  [49-90] 54  Resp:  [12-18] 18  BP: (129-158)/(70-85) 129/85  SpO2:  [93 %-96 %] 94 %  There is no height or weight on file to calculate BMI  Input and Output Summary (last 24 hours): Intake/Output Summary (Last 24 hours) at 2022 1009  Last data filed at 2022 0856  Gross per 24 hour   Intake 610 ml   Output 1476 ml   Net -866 ml       Physical Exam:   Physical Exam  Vitals and nursing note reviewed  Constitutional:       Appearance: She is obese  Comments: Patient seen sitting in bedside chair  She appeared to be sleeping when I got to the room and opened her eyes and looked at me when I knocked on the door and then shut her eyes and rolled over   Cardiovascular:      Rate and Rhythm: Normal rate and regular rhythm  Pulmonary:      Effort: Pulmonary effort is normal  No respiratory distress  Breath sounds: Normal breath sounds  Abdominal:      General: Bowel sounds are normal       Palpations: Abdomen is soft  Tenderness: There is no abdominal tenderness  Musculoskeletal:      Comments: Left foot in boot   Skin:     General: Skin is warm  Neurological:      Mental Status: She is oriented to person, place, and time     Psychiatric:      Comments: Disengaged          Additional Data:     Labs:  Results from last 7 days   Lab Units 02/17/22  0759 02/17/22  0552 02/17/22  0552   WBC Thousand/uL  --   --  8 51   HEMOGLOBIN g/dL  --   --  13 0   HEMATOCRIT %  --   --  41 9   PLATELETS Thousands/uL 323   < > 318   NEUTROS PCT %  --   --  38*   LYMPHS PCT %  --   --  48*   MONOS PCT %  --   --  8   EOS PCT %  --   --  5    < > = values in this interval not displayed  Results from last 7 days   Lab Units 02/17/22  0552 02/16/22  1630 02/16/22  1630   SODIUM mmol/L 135*   < > 136   POTASSIUM mmol/L 4 0   < > 4 4   CHLORIDE mmol/L 106   < > 105   CO2 mmol/L 23   < > 25   BUN mg/dL 10   < > 6   CREATININE mg/dL 0 91   < > 0 88   ANION GAP mmol/L 6   < > 6   CALCIUM mg/dL 9 4   < > 9 6   ALBUMIN g/dL  --   --  3 8   TOTAL BILIRUBIN mg/dL  --   --  0 28   ALK PHOS U/L  --   --  88   ALT U/L  --   --  18   AST U/L  --   --  16   GLUCOSE RANDOM mg/dL 82   < > 88    < > = values in this interval not displayed  Results from last 7 days   Lab Units 02/18/22  0804 02/17/22  2142 02/17/22  1656 02/17/22  1600 02/17/22  1042 02/17/22  0553 02/16/22  1934   POC GLUCOSE mg/dl 124 134 108 104 90 87 114     Results from last 7 days   Lab Units 02/16/22  1630   HEMOGLOBIN A1C % 6 4*           Lines/Drains:  Invasive Devices  Report    Peripheral Intravenous Line            Peripheral IV 02/16/22 Left Forearm 1 day                      Imaging: No pertinent imaging reviewed  Recent Cultures (last 7 days):   Results from last 7 days   Lab Units 02/16/22  1631   GRAM STAIN RESULT  1+ Gram positive cocci in pairs*  1+ Gram positive rods*  No polys seen*   WOUND CULTURE  Culture results to follow         Last 24 Hours Medication List:   Current Facility-Administered Medications   Medication Dose Route Frequency Provider Last Rate    albuterol  2 puff Inhalation Q6H PRN Thomas Patient, DPM      aluminum-magnesium hydroxide-simethicone  30 mL Oral Q6H PRN Thomas Patient, DPM      cefazolin  2,000 mg Intravenous Q8H Thomas Patient, DPM 2,000 mg (02/17/22 1918)    cholecalciferol  1,000 Units Oral Daily Rudene Ronel, DPM      cloNIDine  0 2 mg Oral BID Rudene Ronel, DPM      colchicine  0 6 mg Oral BID Rudene Ronel, DPM      colchicine  1 2 mg Oral Once Jeff Alcantar, DPM      docusate sodium  100 mg Oral BID Rudene Ronel, DPM      enoxaparin  40 mg Subcutaneous Daily Rudene Ronel, DPM      ergocalciferol  50,000 Units Oral Weekly Rudene Ronel, DPM      fenofibrate  48 mg Oral Daily Rudene Ronel, DPM      folic acid  8,534 mcg Oral Daily Rudene Ronel, DPM      hydrOXYzine HCL  100 mg Oral HS Rudene Ronel, DPM      hydrOXYzine HCL  50 mg Oral QAM Rudene Ronel, DPM      insulin lispro  1-5 Units Subcutaneous TID AC Rudene Ronel, DPM      lamoTRIgine  200 mg Oral QAM Rudene Ronel, DPM      levothyroxine  50 mcg Oral Daily Rudene Ronel, DPM      melatonin  9 mg Oral HS PRN Arsh Rodney, DPM      methocarbamol  750 mg Oral Daily PRN Rudene Ronel, DPM      multivitamin stress formula  1 tablet Oral Daily Rudene Ronel, DPM      mupirocin   Topical TID Rudene Ronel, DPM      nystatin   Topical TID Rudene Ronel, DPM      ondansetron  4 mg Intravenous Q6H PRN Rudene Ronel, DPM      oxyCODONE  5 mg Oral Q6H PRN Rudene Ronel, DPM      pantoprazole  40 mg Oral Daily Rudene Ronel, DPM      senna  8 6 mg Oral Daily Rudene Ronel, DPM      ziprasidone  80 mg Oral BID With Meals Arsh Rodney, DPM          Today, Patient Was Seen By: Ramona Clifford PA-C    **Please Note: This note may have been constructed using a voice recognition system  **

## 2022-02-18 NOTE — PLAN OF CARE
Problem: PAIN - ADULT  Goal: Verbalizes/displays adequate comfort level or baseline comfort level  Description: Interventions:  - Encourage patient to monitor pain and request assistance  - Assess pain using appropriate pain scale  - Administer analgesics based on type and severity of pain and evaluate response  - Implement non-pharmacological measures as appropriate and evaluate response  - Consider cultural and social influences on pain and pain management  - Notify physician/advanced practitioner if interventions unsuccessful or patient reports new pain  Outcome: Progressing     Problem: INFECTION - ADULT  Goal: Absence or prevention of progression during hospitalization  Description: INTERVENTIONS:  - Assess and monitor for signs and symptoms of infection  - Monitor lab/diagnostic results  - Monitor all insertion sites, i e  indwelling lines, tubes, and drains  - Monitor endotracheal if appropriate and nasal secretions for changes in amount and color  - Hebo appropriate cooling/warming therapies per order  - Administer medications as ordered  - Instruct and encourage patient and family to use good hand hygiene technique  - Identify and instruct in appropriate isolation precautions for identified infection/condition  Outcome: Progressing  Goal: Absence of fever/infection during neutropenic period  Description: INTERVENTIONS:  - Monitor WBC    Outcome: Progressing

## 2022-02-18 NOTE — RESTORATIVE TECHNICIAN NOTE
Restorative Technician Note      Patient Name: George Mcwilliams     Restorative Tech Visit Date: 02/18/22  Note Type: Mobility & Bracing, Initial consult  Patient Position Upon Consult: Supine  Activity Performed: Transferred  Assistive Device: Other (Comment) (none)  Brace Applied: Darco Wedge Shoe (Toe Unloading) (size medium)  Additional Brace Ordered: No  Patient Position When Brace Applied: Supine  Education Provided: Yes  Patient Position at End of Consult: Bedside chair  Nurse Communication: Nurse aware of consult, application of brace        Please call the [employer], ext [phone], with any bracing questions and/or adjustments       General W[employer],

## 2022-02-18 NOTE — ASSESSMENT & PLAN NOTE
· Podiatry following, appreciate their ongoing recommendations   · S/p debridement great toe wound (left) by Dr Tiffanie Vance 2/17/22  · XR left foot with no acute osseous abnormality per the results report  · On IV abx  · Pain control - Podiatry starting colchicine for possible gout flare today

## 2022-02-18 NOTE — CASE MANAGEMENT
Case Management Discharge Planning Note    Patient name Geeta Arturo  [facility] [id number]  : 1975 Date 2022       Current Admission Date: 2022  Current Admission Diagnosis:Left foot diabetic ulcer   Patient Active Problem List    Diagnosis Date Noted    Left foot diabetic ulcer 2022    Obstructive sleep apnea 2022    GERD (gastroesophageal reflux disease) 2021    Migraine without status migrainosus, not intractable 2021    Pain of foot 2021    [facility], [facility]    Arthritis 2021    Chronic pain 2021    OCD (obsessive compulsive disorder)     PTSD (post-traumatic stress disorder)     Smoking     Essential hypertension 2021    Acquired absence of other left toe(s) ([facility] ) 2019    Type 2 diabetes mellitus [facility] 2019    Type 2 diabetes mellitus with neurologic complication, without long-term current use of insulin ([facility] ) 2019    Tobacco abuse 2019    Encounter for smoking cessation counseling 2019    Hypothyroidism 2019    Manic bipolar I disorder ([facility] ) 2019    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, with bone involvement without evidence of necrosis ([facility] ) 2019    H/O bariatric surgery 2019    Anxiety 2019    Obesity 2019    Preoperative clearance 2019      LOS (days): 2  Geometric Mean LOS (GMLOS) (days): 2 10  Days to GMLOS:0 2     OBJECTIVE:  Risk of Unplanned Readmission Score: 19         Current admission status: Inpatient   Preferred Pharmacy:   [facility], [address]  Phone: [phone] Fax: [phone]    Primary Care Provider: Remington Pappas DO    Primary Insurance: Sandee Mann  Secondary Insurance:     DISCHARGE DETAILS:      Other Referral/Resources/Interventions Provided:  Referral Comments: d/c is pending PT/OT recommendations at this time - orders for PT/OT now placed

## 2022-02-18 NOTE — ASSESSMENT & PLAN NOTE
Lab Results   Component Value Date    HGBA1C 6 4 (H) 02/16/2022       Recent Labs     02/17/22  1600 02/17/22  1656 02/17/22  2142 02/18/22  0804   POCGLU 104 108 134 124       Blood Sugar Average: Last 72 hrs:  (P) 145 [id number]     · Orals on hold while hospitalized  · SSI coverage

## 2022-02-19 VITALS
SYSTOLIC BLOOD PRESSURE: 124 MMHG | TEMPERATURE: 97.4 F | RESPIRATION RATE: 16 BRPM | DIASTOLIC BLOOD PRESSURE: 70 MMHG | HEART RATE: 50 BPM | OXYGEN SATURATION: 94 %

## 2022-02-19 LAB
ANION GAP SERPL CALCULATED.3IONS-SCNC: 3 MMOL/L (ref 4–13)
BACTERIA WND AEROBE CULT: ABNORMAL
BACTERIA WND AEROBE CULT: ABNORMAL
BUN SERPL-MCNC: 14 MG/DL (ref 5–25)
CALCIUM SERPL-MCNC: 10.3 MG/DL (ref 8.3–10.1)
CHLORIDE SERPL-SCNC: 103 MMOL/L (ref 100–108)
CO2 SERPL-SCNC: 29 MMOL/L (ref 21–32)
CREAT SERPL-MCNC: 0.89 MG/DL (ref 0.6–1.3)
ERYTHROCYTE [DISTWIDTH] IN BLOOD BY AUTOMATED COUNT: 14 % (ref 11.6–15.1)
GFR SERPL CREATININE-BSD FRML MDRD: 77 ML/MIN/1.73SQ M
GLUCOSE SERPL-MCNC: 118 MG/DL (ref 65–140)
GLUCOSE SERPL-MCNC: 143 MG/DL (ref 65–140)
GRAM STN SPEC: ABNORMAL
HCT VFR BLD AUTO: 45.4 % (ref 34.8–46.1)
HGB BLD-MCNC: 14.4 G/DL (ref 11.5–15.4)
MCH RBC QN AUTO: 24.6 PG (ref 26.8–34.3)
MCHC RBC AUTO-ENTMCNC: 31.7 G/DL (ref 31.4–37.4)
MCV RBC AUTO: 78 FL (ref 82–98)
PLATELET # BLD AUTO: 323 THOUSANDS/UL (ref 149–390)
PMV BLD AUTO: 9.9 FL (ref 8.9–12.7)
POTASSIUM SERPL-SCNC: 4.3 MMOL/L (ref 3.5–5.3)
RBC # BLD AUTO: 5.85 MILLION/UL (ref 3.81–5.12)
SODIUM SERPL-SCNC: 135 MMOL/L (ref 136–145)
WBC # BLD AUTO: 9.93 THOUSAND/UL (ref 4.31–10.16)

## 2022-02-19 PROCEDURE — 99239 HOSP IP/OBS DSCHRG MGMT >30: CPT | Performed by: PHYSICIAN ASSISTANT

## 2022-02-19 PROCEDURE — 97162 PT EVAL MOD COMPLEX 30 MIN: CPT

## 2022-02-19 PROCEDURE — 80048 BASIC METABOLIC PNL TOTAL CA: CPT | Performed by: PHYSICIAN ASSISTANT

## 2022-02-19 PROCEDURE — 82948 REAGENT STRIP/BLOOD GLUCOSE: CPT

## 2022-02-19 PROCEDURE — 99232 SBSQ HOSP IP/OBS MODERATE 35: CPT | Performed by: PODIATRIST

## 2022-02-19 PROCEDURE — 85027 COMPLETE CBC AUTOMATED: CPT | Performed by: PHYSICIAN ASSISTANT

## 2022-02-19 PROCEDURE — RECHECK: Performed by: PHYSICIAN ASSISTANT

## 2022-02-19 RX ORDER — CEPHALEXIN 500 MG/1
500 CAPSULE ORAL EVERY 6 HOURS SCHEDULED
Qty: 8 CAPSULE | Refills: 0 | Status: SHIPPED | OUTPATIENT
Start: 2022-02-19 | End: 2022-02-21

## 2022-02-19 RX ADMIN — FOLIC ACID 1000 MCG: 1 TABLET ORAL at 08:51

## 2022-02-19 RX ADMIN — METHOCARBAMOL TABLETS 750 MG: 750 TABLET, COATED ORAL at 12:34

## 2022-02-19 RX ADMIN — PANTOPRAZOLE SODIUM 40 MG: 40 TABLET, DELAYED RELEASE ORAL at 08:51

## 2022-02-19 RX ADMIN — COLCHICINE 0.6 MG: 0.6 TABLET ORAL at 08:51

## 2022-02-19 RX ADMIN — CEFAZOLIN SODIUM 2000 MG: 2 SOLUTION INTRAVENOUS at 04:43

## 2022-02-19 RX ADMIN — LEVOTHYROXINE SODIUM 50 MCG: 50 TABLET ORAL at 08:51

## 2022-02-19 RX ADMIN — FENOFIBRATE 48 MG: 48 TABLET ORAL at 08:50

## 2022-02-19 RX ADMIN — LAMOTRIGINE 200 MG: 100 TABLET ORAL at 08:50

## 2022-02-19 RX ADMIN — CLONIDINE HYDROCHLORIDE 0.2 MG: 0.2 TABLET ORAL at 08:51

## 2022-02-19 RX ADMIN — OXYCODONE HYDROCHLORIDE 5 MG: 5 TABLET ORAL at 04:43

## 2022-02-19 RX ADMIN — ZIPRASIDONE HYDROCHLORIDE 80 MG: 40 CAPSULE ORAL at 08:50

## 2022-02-19 RX ADMIN — BENZONATATE 100 MG: 100 CAPSULE ORAL at 04:43

## 2022-02-19 RX ADMIN — HYDROXYZINE HYDROCHLORIDE 50 MG: 25 TABLET ORAL at 08:51

## 2022-02-19 RX ADMIN — ENOXAPARIN SODIUM 40 MG: 40 INJECTION SUBCUTANEOUS at 08:50

## 2022-02-19 RX ADMIN — STANDARDIZED SENNA CONCENTRATE 8.6 MG: 8.6 TABLET ORAL at 08:51

## 2022-02-19 RX ADMIN — DOCUSATE SODIUM 100 MG: 100 CAPSULE ORAL at 08:51

## 2022-02-19 NOTE — ASSESSMENT & PLAN NOTE
Lab Results   Component Value Date    HGBA1C 6 4 (H) 02/16/2022       Recent Labs     02/18/22  0804 02/18/22  1212 02/18/22  1712 02/18/22 2043   POCGLU 124 146* 99 98       Blood Sugar Average: Last 72 hrs:  (P) 110 4     · Orals on hold while hospitalized  · SSI coverage

## 2022-02-19 NOTE — PHYSICAL THERAPY NOTE
Physical Therapy Evaluation    Patient's Name: Toro Newby    Admitting Diagnosis  Foot ulcer ([facility] ) [V54 910]  Visit for wound check [Z51 89]  Left foot infection [L08 9]    Problem List  Patient Active Problem List   Diagnosis    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, with bone involvement without evidence of necrosis ([facility] )    H/O bariatric surgery    Anxiety    Obesity    Preoperative clearance    Encounter for smoking cessation counseling    Hypothyroidism    Manic bipolar I disorder ([place] [address] )    Type 2 diabetes mellitus with neurologic complication, without long-term current use of insulin ([place] [address] )    Tobacco abuse    Type 2 diabetes mellitus ([facility] )    Acquired absence of other left toe(s) ([place] [address] )    Essential hypertension    Arthritis    Chronic pain    OCD (obsessive compulsive disorder)    PTSD (post-traumatic stress disorder)    Smoking    Right foot ulcer, with fat layer exposed ([place] [address] )    Pain of foot    GERD (gastroesophageal reflux disease)    Migraine without status migrainosus, not intractable    Left foot diabetic ulcer    Obstructive sleep apnea       Past Medical History  Past Medical History:   Diagnosis Date    Anxiety     Arthritis     Asthma     Chronic pain     Diabetes mellitus ([facility] )     Disease of thyroid gland     Lymphedema     left leg    [facility] I disorder ([facility] )     Morbid obesity ([facility] )     OCD (obsessive compulsive disorder)     PTSD (post-traumatic stress disorder)        Past Surgical History  Past Surgical History:   Procedure Laterality Date    CHOLECYSTECTOMY      CHOLECYSTECTOMY      NH AMPUTATION FOOT,TRANSMETATARSAL Right 9/4/2021    Procedure: AMPUTATION TRANSMETATARSAL (TMA);  Surgeon: Ajit Snowden DPM;  [facility];  Service: Podiatry    NH AMPUTATION METATARSAL+TOE,SINGLE Left 9/8/2019    Procedure: PARTIAL 3RD RAY RESECTION, PSB TOE FILLET FLAP;  Surgeon: Kadie Perdomo SORAYA;  [facility];  Service: Podiatry    TOE AMPUTATION Right 1/13/2021    Procedure: AMPUTATION 2ND TOE;  Surgeon: Keshia Gamez DPM;  [facility];  Service: Podiatry    WOUND DEBRIDEMENT Left 2/17/2022    Procedure: DEBRIDEMENT GREAT TOE WOUND;  Surgeon: Keshia Gamez DPM;  [facility];  Service: Podiatry        02/19/22 1308   PT Last Visit   PT Visit Date 02/19/22   Note Type   Note type Evaluation   Pain Assessment   Pain Assessment Tool 0-10   Pain Score 5   Pain Location/Orientation Orientation: Left; Location: Foot   Hospital Pain Intervention(s) Repositioned   Restrictions/Precautions   Weight Bearing Precautions Per Order Yes   LLE Weight Bearing Per Order PWB  (WBing to heel in darco wedge for xfers only)   Braces or Orthoses Other (Comment)  (LLE darco toe unloading shoe)   Other Precautions WBS; Fall Risk;Pain   Home Living   Type of [address] [place] [address] Two level;Stairs to enter with rails; Able to live on main level with bedroom/bathroom  (1 KORY)   Home Equipment Walker;Cane;Wheelchair-manual   Additional Comments Pt lives in a [facility] with 1 KORY, able to stay on 1st floor      Prior Function   Level of [place] Independent with ADLs and functional mobility   Lives With Son  (another son also coming to help upon d/c)   Receives Help From Family   ADL Assistance Independent   IADLs Independent   Falls in the last 6 months 1 to 4  (1 per pt)   Comments pt was ambulating independently without AD PTA   Cognition   Overall Cognitive Status WFL   Orientation Level Oriented X4   Memory Within functional limits   Following Commands Follows one step commands without difficulty   Comments pt is very pleasant and cooperative   RLE Assessment   RLE Assessment WFL   LLE Assessment   LLE Assessment WFL  (hip and knee WFL)   Bed Mobility   Additional Comments pt seated EOB upon arrival   Transfers   Sit to Stand 6  Modified independent   Stand to Sit 6  Modified independent Stand pivot 6  Modified independent   Additional Comments Transfers without AD   Ambulation/Elevation   Gait pattern Improper Weight shift; Excessively slow   Gait Assistance 5  Supervision   Additional items Assist x 1;Verbal cues   Assistive Device None   Distance ~5 steps in room with darco wedge donned and pt demonstrating good carryover of WBing through heel only--> further ambulation deferred as podiatry note requesting WBing through heel for transfers only   Balance   Static Sitting Good   Dynamic Sitting Fair +   Static Standing Fair   Dynamic Standing Fair   Ambulatory Fair -   Activity Tolerance   Activity Tolerance Patient tolerated treatment well   Nurse Made Aware ok to see per RN   Assessment   Prognosis Good   Assessment Pt is a 56 y o  female seen for PT evaluation s/p admit to [facility] on 2/16/2022  Pt was admitted with a primary dx of: left foot diabetic ulcer  Pt is s/p L great toe amputation on 2/17/22  PT now consulted for assessment of mobility and d/c needs  Pt with PT evaluation orders  Per podiatry, pt is WBing through heel in darco toe unloading shoe for transfers  Pts current comorbidities effecting treatment include: anxiety, arthritis, asthma, chronic pain, DM, disease of thyroid gland, lymphedema, manic bipolar disorder, obesity, PTSD  Pts current clinical presentation is Evolving (medium complexity) due to Ongoing medical management for primary dx, Decreased activity tolerance compared to baseline, Fall risk, Current WBS, s/p surgical intervention  Prior to admission, pt was I with ADLs and ambulating without AD  Pt [facility] her [facility] with [address] and [address]  Upon evaluation, pt currently mod (I) for transfers; supervision for ambulation 5 ft w/ no AD (distance limited by [facility] restrictions)  Pt presents at PT eval functioning near baseline mobility level  No acute PT needs identified, PT signing off   Pt denies any concerns about returning home upon d/c; has all necessary DME and good social support  At conclusion of PT session pt seated EOB with phone and call bell within reach  Pt denies any further questions at this time  The patient's AM-PAC Basic Mobility Inpatient Short Form Raw Score is 22  A Raw score of greater than 16 suggests the patient may benefit from discharge to home  Please also refer to the recommendation of the Physical Therapist for safe discharge planning  Recommend home upon hospital D/C  Goals   Patient Goals to go home   PT Treatment Day 0   Plan   PT Frequency Other (Comment)  (1x visit, D/C PT)   Recommendation   PT Discharge Recommendation No rehabilitation needs   Equipment Recommended   (none- pt already owns , [facility] and w/c)   AM-PAC Basic Mobility Inpatient   Turning in Bed Without Bedrails 4   Lying on Back to Sitting on Edge of Flat Bed 4   Moving Bed to Chair 4   Standing Up From Chair 4   Walk in Room 3   Climb 3-5 Stairs 3   Basic Mobility Inpatient Raw Score 22   Basic Mobility Standardized Score 47 4   Highest Level Of Mobility   JH-HLM Goal 7: Walk 25 feet or more   JH-HLM Highest Level of Mobility 5: Stand (1 or more minutes)   JH-HLM Goal Achieved No  (limited by WBing restrictions)   Modified Wolfe Scale   Modified Wolfe Scale 3   End of Consult   Patient Position at End of Consult Seated edge of bed; All needs within reach       Kg Edouard, PT, DPT

## 2022-02-19 NOTE — CASE MANAGEMENT
Case Management Discharge Planning Note    Patient name Tawanna Covarrubias  Location [facility] 090-43 MRN [id number]  : 1975 Date 2022       Current Admission Date: 2022  Current Admission Diagnosis:Left foot diabetic ulcer   Patient Active Problem List    Diagnosis Date Noted    Left foot diabetic ulcer 2022    Obstructive sleep apnea 2022    GERD (gastroesophageal reflux disease) 2021    Migraine without status migrainosus, not intractable 2021    Pain of foot 2021    Right foot ulcer, with fat layer exposed ([facility] ) 2021    Arthritis 2021    Chronic pain 2021    OCD (obsessive compulsive disorder)     PTSD (post-traumatic stress disorder)     Smoking     Essential hypertension 2021    Acquired absence of other left toe(s) ([facility] ) 2019    Type 2 diabetes mellitus ([facility] ) 2019    Type 2 diabetes mellitus with neurologic complication, without long-term current use of insulin ([facility] ) 2019    Tobacco abuse 2019    Encounter for smoking cessation counseling 2019    Hypothyroidism 2019    Manic bipolar I disorder ([facility] ) 2019    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, with bone involvement without evidence of necrosis ([facility] ) 2019    H/O bariatric surgery 2019    Anxiety 2019    Obesity 2019    Preoperative clearance 2019      LOS (days): 3  Geometric Mean LOS (GMLOS) (days): 2 10  Days to GMLOS:-0 6     OBJECTIVE:  Risk of Unplanned Readmission Score: 19         Current admission status: Inpatient   Preferred Pharmacy:   [facility]  Phone: [phone] Fax: [phone]    Primary Care Provider: Colby Rodríguez DO    Primary Insurance: [address] [place] [address]  Secondary Insurance:     DISCHARGE DETAILS:    Discharge planning discussed with[de-identified] pt  Freedom of Choice: Yes  Comments - Freedom of Choice: CM discussed VNA for left foot wound care  CM contacted family/caregiver?: No- see comments  Were Treatment Team discharge recommendations reviewed with patient/caregiver?: Yes  Did patient/caregiver verbalize understanding of patient care needs?: Yes  Were patient/caregiver advised of the risks associated with not following Treatment Team discharge recommendations?: Yes    Contacts  Reason/Outcome: Discharge [address] [facility]         Is the patient interested in [facility] at discharge?: Yes  [facility] requested[de-identified] [address] Name[de-identified] [facility] Provider[de-identified] PCP  Home Health Services Needed[de-identified] Wound/Ostomy Care  Homebound Criteria Met[de-identified] Requires the Assistance of Another Person for Safe Ambulation or to Leave the Home  Supporting Clincal Findings[de-identified] Limited Endurance                   Treatment Team Recommendation: Home with [address] [facility]  Discharge Destination Plan[de-identified] Home with [address] [facility]     PT AGREEABLE TO VNA, [facility]/ [facility] ACCEPTED FOR WOUND CARE

## 2022-02-19 NOTE — PROGRESS NOTES
[address] [facility]  Progress Note - Verba Smart 1975, 56 y o  female MRN: [id number]  Unit/Bed#: [facility] 907-01 Encounter: [id number]  Primary Care Provider: Anthony Chavez DO   Date and time admitted to hospital: 2/16/2022  2:42 PM    * Left foot diabetic ulcer  Assessment & Plan  · Podiatry following; safe discharge  · S/p debridement great toe wound (left) by Dr Giselle Hunt 02/17/22  · XR left foot without acute osseous abnormality per the results report  · Cellulitis resolved; status post cefazolin; 2 days Keflex at discharge  · Pain well managed  · Colchicine given x1 dose; low concern for gout      Type 2 diabetes mellitus [facility])  Assessment & Plan  Lab Results   Component Value Date    HGBA1C 6 4 (H) 02/16/2022       Recent Labs     02/18/22  0804 02/18/22  1212 02/18/22  1712 02/18/22  2043   POCGLU 124 146* 99 98       Blood Sugar Average: Last 72 hrs:  (P) 110 4     · Orals on hold while hospitalized  · SSI coverage     Hypothyroidism  Assessment & Plan  · Continue Levothyroxine    Obstructive sleep apnea  Assessment & Plan  · Reports CPAP noncompliance due to intolerance    Manic bipolar I disorder (HCC)  Assessment & Plan  · Mood stable  · Continue lamotrigine, ziprasidone  · Outpatient follow-up      Obesity  Assessment & Plan  · Therapeutic lifestyle modification encouraged    H/O bariatric surgery  Assessment & Plan  · Noted history  · Outpatient follow up      Discharging Physician / Practitioner: Ivette Nieto PA-C  PCP: Anthony Chavez DO  Admission Date:   Admission Orders (From admission, onward)     Ordered        02/16/22 1737  Inpatient Admission  Once                      Discharge Date: 02/19/22    Medical Problems             Resolved Problems  Date Reviewed: 2/18/2022    None                Consultations During Hospital Stay:  · Podiatry    Procedures Performed:   · Status post debridement great toe wound    Significant Findings / Test Results:   · No osteomyelitis found on x-ray    Incidental Findings:   · None     Test Results Pending at Discharge (will require follow up): · None     Outpatient Tests Requested:  · None    Complications:  None    Reason for Admission:  Left foot infection and cellulitis    Hospital Course:     Luc Lizama is a 56 y o  female patient who originally presented to the hospital on 2/16/2022 due a diabetic foot ulcer with superimposed cellulitis  A Podiatry consultation was performed during hospital stay  Patient received IV cefazolin  Patient is status post a wound debridement of the left great toe by Dr Greer Faulkner on 02/17/2022  At time discharge, patient's cellulitis has resolved  During hospital stay, patient was treated with 1 dose of colchicine with concern of gout  Podiatry did not think patient actually had gout with normal uric acid levels  Patient will receive services from VNA at home  Please see above list of diagnoses and related plan for additional information  Condition at Discharge: good     Discharge Day Visit / Exam:     Subjective:  Patient has no specific complaints  She is tolerating p o  Intake  She admits to flatulence  She is urinating without dysuria  She is able to walk with assistance of a walker  She denies dizziness or lightheadedness  She denies chest pain, shortness breath, or dyspnea on exertion  She is going home with VNA services and assistance from family  Vitals: Blood Pressure: 124/70 (02/19/22 0748)  Pulse: (!) 50 (02/19/22 0748)  Temperature: (!) 97 4 °F (36 3 °C) (02/19/22 0748)  Temp Source: Oral (02/16/22 1417)  Respirations: 16 (02/19/22 0748)  SpO2: 94 % (02/19/22 0748)  Exam:   Physical Exam  Constitutional:       General: She is not in acute distress  Appearance: She is obese  She is not ill-appearing, toxic-appearing or diaphoretic  HENT:      Head: Normocephalic and atraumatic        Nose: Nose normal       Mouth/Throat:      Mouth: Mucous membranes are moist       Pharynx: Oropharynx is clear  Eyes:      Extraocular Movements: Extraocular movements intact  Conjunctiva/sclera: Conjunctivae normal    Cardiovascular:      Rate and Rhythm: Normal rate and regular rhythm  Pulses: Normal pulses  Heart sounds: Normal heart sounds  Pulmonary:      Effort: Pulmonary effort is normal    Abdominal:      General: Abdomen is flat  Bowel sounds are normal       Palpations: Abdomen is soft  Comments: Obese abdomen   Musculoskeletal:         General: Normal range of motion  Cervical back: Normal range of motion and neck supple  Comments: Right transmetatarsal amputation; left foot in cast shoe with clean/dry/intact bandage   Skin:     General: Skin is warm and dry  Capillary Refill: Capillary refill takes less than 2 seconds  Neurological:      General: No focal deficit present  Mental Status: She is alert and oriented to person, place, and time  Mental status is at baseline  Psychiatric:         Mood and Affect: Mood normal          Thought Content: Thought content normal          Judgment: Judgment normal          Discussion with Family:  Patient will update family    Discharge instructions/Information to patient and family:   See after visit summary for information provided to patient and family  Provisions for Follow-Up Care:  See after visit summary for information related to follow-up care and any pertinent home health orders  Disposition:     Home; with VNA     For Discharges to [facility] SNF:   · Not Applicable to this Patient - Not Applicable to this Patient    Planned Readmission:  None     Discharge Statement:  I spent 40 minutes discharging the patient  This time was spent on the day of discharge  I had direct contact with the patient on the day of discharge   Greater than 50% of the total time was spent examining patient, answering all patient questions, arranging and discussing plan of care with patient as well as directly providing post-discharge instructions  Additional time then spent on discharge activities  Discharge Medications:  See after visit summary for reconciled discharge medications provided to patient and family        ** Please Note: This note has been constructed using a voice recognition system **

## 2022-02-19 NOTE — PROGRESS NOTES
[facility] Podiatry - Progress Note  Patient: Cuco Olson 56 y o  female   MRN: [id number]  PCP: Paulino Lutz DO  Unit/Bed#: [facility] 907-01 Encounter: [id number]  Date Of Visit: 22    ASSESSMENT:    Cuco Olson is a 56 y o  female with:    1  Diabetic ulceration left great toe sulcus-Gimenez 3-POA   -S/p Wound debridement of left toe (DOS 22)  2  Cellulitis left foot - resolved  3  Diabetic ulceration right foot- Gimenez 2-resolved  4  Type 2 diabetes (A1c 6 7% 2021)  5  Peripheral neuropathy  6  Possible gout      PLAN:    · Patient stable for discharge from podiatric standpoint  Right foot wound resolved  Left foot wound stable without acute clinical signs of infection  Follow up in wound care with Dr Trejo December  · Discontinued colchicine, uric acid WNL, no concern for gout at this time  · Continue local wound care, Cooper Cervantes, DSD to Left foot, appreciate nursing assistance with dressing changes  · Elevation on green foam wedges or pillows when non-ambulatory  · Rest of care per primary team     Weight bearing status: WB to left heel for transfers in Darco wedge shoe      SUBJECTIVE:     The patient was seen, evaluated, and assessed at bedside today  The patient was awake, alert, and in no acute distress  No acute events overnight  The patient reports improvement in pain from left foot wound  Patient denies N/V/F/chills/SOB/CP  OBJECTIVE:     Vitals:   /70   Pulse (!) 50   Temp (!) 97 4 °F (36 3 °C)   Resp 16   SpO2 94%     Temp (24hrs), Av 8 °F (36 6 °C), Min:97 4 °F (36 3 °C), Max:98 1 °F (36 7 °C)      Physical Exam:     General:  Alert, cooperative, and in no distress  Lower extremity exam:  Cardiovascular status at baseline  Neurological status at baseline  Musculoskeletal status at baseline  No calf tenderness noted  Dressings on left foot left clean, dry and intact today  R plantar foot wound resolved           Additional Data:     Labs:    Results from last 7 days   Lab Units 02/19/22  0538 02/17/22  0759 02/17/22  0552   WBC Thousand/uL 9 93   < > 8 51   HEMOGLOBIN g/dL 14 4   < > 13 0   HEMATOCRIT % 45 4   < > 41 9   PLATELETS Thousands/uL 323   < > 318   NEUTROS PCT %  --   --  38*   LYMPHS PCT %  --   --  48*   MONOS PCT %  --   --  8   EOS PCT %  --   --  5    < > = values in this interval not displayed  Results from last 7 days   Lab Units 02/19/22  0538 02/17/22  0552 02/16/22  1630   POTASSIUM mmol/L 4 3   < > 4 4   CHLORIDE mmol/L 103   < > 105   CO2 mmol/L 29   < > 25   BUN mg/dL 14   < > 6   CREATININE mg/dL 0 89   < > 0 88   CALCIUM mg/dL 10 3*   < > 9 6   ALK PHOS U/L  --   --  88   ALT U/L  --   --  18   AST U/L  --   --  16    < > = values in this interval not displayed  * I Have Reviewed All Lab Data Listed Above  Recent Cultures (last 7 days):     Results from last 7 days   Lab Units 02/16/22  1631   GRAM STAIN RESULT  1+ Gram positive cocci in pairs*  1+ Gram positive rods*  No polys seen*   WOUND CULTURE  Culture results to follow  Imaging: I have personally reviewed pertinent films in PACS  EKG, Pathology, and Other Studies: I have personally reviewed pertinent reports  ** Please Note: Portions of the record may have been created with voice recognition software  Occasional wrong word or "sound a like" substitutions may have occurred due to the inherent limitations of voice recognition software  Read the chart carefully and recognize, using context, where substitutions have occurred   **

## 2022-02-19 NOTE — DISCHARGE INSTRUCTIONS
Please return to the hospital if symptoms worsen or change in anyway  Discharge Instructions - Podiatry    Weight Bearing Status: Partial weight bearing to left heel for transfers, Darco wedge shoe    Pain: Continue analgesics as directed    Follow-up appointment instructions: Please make an appointment within one week of discharge with Dr Uma Martell  Contact sooner if any increase in pain, or signs of infection occur    Wound Care: Leave dressings clean, dry, and intact between professional dressing changes    Nursing Instructions: Please apply Joliedivine Loving  Then cover with Gauze and secure with Kerlix and tape  Please change dressing every Monday, Wednesday, and Friday

## 2022-02-19 NOTE — ASSESSMENT & PLAN NOTE
· Podiatry following; safe discharge  · S/p debridement great toe wound (left) by Dr Ayaka Merino 02/17/22  · XR left foot without acute osseous abnormality per the results report  · Cellulitis resolved; status post cefazolin  · Pain well managed  · Colchicine given x1 dose; low concern for gout

## 2022-02-19 NOTE — PLAN OF CARE
Problem: PAIN - ADULT  Goal: Verbalizes/displays adequate comfort level or baseline comfort level  Description: Interventions:  - Encourage patient to monitor pain and request assistance  - Assess pain using appropriate pain scale  - Administer analgesics based on type and severity of pain and evaluate response  - Implement non-pharmacological measures as appropriate and evaluate response  - Consider cultural and social influences on pain and pain management  - Notify physician/advanced practitioner if interventions unsuccessful or patient reports new pain  Outcome: Progressing     Problem: INFECTION - ADULT  Goal: Absence or prevention of progression during hospitalization  Description: INTERVENTIONS:  - Assess and monitor for signs and symptoms of infection  - Monitor lab/diagnostic results  - Monitor all insertion sites, i e  indwelling lines, tubes, and drains  - Monitor endotracheal if appropriate and nasal secretions for changes in amount and color  - [place] appropriate cooling/warming therapies per order  - Administer medications as ordered  - Instruct and encourage patient and family to use good hand hygiene technique  - Identify and instruct in appropriate isolation precautions for identified infection/condition  Outcome: Progressing  Goal: Absence of fever/infection during neutropenic period  Description: INTERVENTIONS:  - Monitor WBC    Outcome: Progressing

## 2022-02-19 NOTE — PLAN OF CARE
Problem: MOBILITY - ADULT  Goal: Maintain or return to baseline ADL function  Description: INTERVENTIONS:  -  Assess patient's ability to carry out ADLs; assess patient's baseline for ADL function and identify physical deficits which impact ability to perform ADLs (bathing, care of mouth/teeth, toileting, grooming, dressing, etc )  - Assess/evaluate cause of self-care deficits   - Assess range of motion  - Assess patient's mobility; develop plan if impaired  - Assess patient's need for assistive devices and provide as appropriate  - Encourage maximum independence but intervene and supervise when necessary  - Involve family in performance of ADLs  - Assess for home care needs following discharge   - Consider OT consult to assist with ADL evaluation and planning for discharge  - Provide patient education as appropriate  Outcome: Adequate for Discharge  Goal: Maintains/Returns to pre admission functional level  Description: INTERVENTIONS:  - Perform BMAT or MOVE assessment daily    - Set and communicate daily mobility goal to care team and patient/family/caregiver  - Collaborate with rehabilitation services on mobility goals if consulted  - Perform Range of Motion  times a day  - Reposition patient every  hours    - Dangle patient  times a day  - Stand patient  times a day  - Ambulate patient  times a day  - Out of bed to chair  times a day   - Out of bed for meals times a day  - Out of bed for toileting  - Record patient progress and toleration of activity level   Outcome: Adequate for Discharge     Problem: PAIN - ADULT  Goal: Verbalizes/displays adequate comfort level or baseline comfort level  Description: Interventions:  - Encourage patient to monitor pain and request assistance  - Assess pain using appropriate pain scale  - Administer analgesics based on type and severity of pain and evaluate response  - Implement non-pharmacological measures as appropriate and evaluate response  - Consider cultural and social influences on pain and pain management  - Notify physician/advanced practitioner if interventions unsuccessful or patient reports new pain  Outcome: Adequate for Discharge     Problem: INFECTION - ADULT  Goal: Absence or prevention of progression during hospitalization  Description: INTERVENTIONS:  - Assess and monitor for signs and symptoms of infection  - Monitor lab/diagnostic results  - Monitor all insertion sites, i e  indwelling lines, tubes, and drains  - Monitor endotracheal if appropriate and nasal secretions for changes in amount and color  - [place] appropriate cooling/warming therapies per order  - Administer medications as ordered  - Instruct and encourage patient and family to use good hand hygiene technique  - Identify and instruct in appropriate isolation precautions for identified infection/condition  Outcome: Adequate for Discharge  Goal: Absence of fever/infection during neutropenic period  Description: INTERVENTIONS:  - Monitor WBC    Outcome: Adequate for Discharge     Problem: SAFETY ADULT  Goal: Maintain or return to baseline ADL function  Description: INTERVENTIONS:  -  Assess patient's ability to carry out ADLs; assess patient's baseline for ADL function and identify physical deficits which impact ability to perform ADLs (bathing, care of mouth/teeth, toileting, grooming, dressing, etc )  - Assess/evaluate cause of self-care deficits   - Assess range of motion  - Assess patient's mobility; develop plan if impaired  - Assess patient's need for assistive devices and provide as appropriate  - Encourage maximum independence but intervene and supervise when necessary  - Involve family in performance of ADLs  - Assess for home care needs following discharge   - Consider OT consult to assist with ADL evaluation and planning for discharge  - Provide patient education as appropriate  Outcome: Adequate for Discharge  Goal: Maintains/Returns to pre admission functional level  Description: INTERVENTIONS:  - Perform BMAT or MOVE assessment daily    - Set and communicate daily mobility goal to care team and patient/family/caregiver  - Collaborate with rehabilitation services on mobility goals if consulted  - Perform Range of Motion  times a day  - Reposition patient every  hours    - Dangle patient  times a day  - Stand patient  times a day  - Ambulate patient  times a day  - Out of bed to chair  times a day   - Out of bed for meals times a day  - Out of bed for toileting  - Record patient progress and toleration of activity level   Outcome: Adequate for Discharge  Goal: Patient will remain free of falls  Description: INTERVENTIONS:  - Educate patient/family on patient safety including physical limitations  - Instruct patient to call for assistance with activity   - Consult OT/PT to assist with strengthening/mobility   - Keep Call bell within reach  - Keep bed low and locked with side rails adjusted as appropriate  - Keep care items and personal belongings within reach  - Initiate and maintain comfort rounds  - Make Fall Risk Sign visible to staff  - Offer Toileting every Hours, in advance of need  - Initiate/Maintain alarm  - Obtain necessary fall risk management equipment:   - Apply yellow socks and bracelet for high fall risk patients  - Consider moving patient to room near nurses station  Outcome: Adequate for Discharge     Problem: DISCHARGE PLANNING  Goal: Discharge to home or other facility with appropriate resources  Description: INTERVENTIONS:  - Identify barriers to discharge w/patient and caregiver  - Arrange for needed discharge resources and transportation as appropriate  - Identify discharge learning needs (meds, wound care, etc )  - Arrange for interpretive services to assist at discharge as needed  - Refer to Case Management Department for coordinating discharge planning if the patient needs post-hospital services based on physician/advanced practitioner order or complex needs related to functional status, cognitive ability, or social support system  Outcome: Adequate for Discharge     Problem: Knowledge Deficit  Goal: Patient/family/caregiver demonstrates understanding of disease process, treatment plan, medications, and discharge instructions  Description: Complete learning assessment and assess knowledge base  Interventions:  - Provide teaching at level of understanding  - Provide teaching via preferred learning methods  Outcome: Adequate for Discharge     Problem: Potential for Falls  Goal: Patient will remain free of falls  Description: INTERVENTIONS:  - Educate patient/family on patient safety including physical limitations  - Instruct patient to call for assistance with activity   - Consult OT/PT to assist with strengthening/mobility   - Keep Call bell within reach  - Keep bed low and locked with side rails adjusted as appropriate  - Keep care items and personal belongings within reach  - Initiate and maintain comfort rounds  - Make Fall Risk Sign visible to staff  - Offer Toileting every  Hours, in advance of need  - Initiate/Maintain alarm  - Obtain necessary fall risk management equipment:   - Apply yellow socks and bracelet for high fall risk patients  - Consider moving patient to room near nurses station  Outcome: Adequate for Discharge     Problem: Nutrition/Hydration-ADULT  Goal: Nutrient/Hydration intake appropriate for improving, restoring or maintaining nutritional needs  Description: Monitor and assess patient's nutrition/hydration status for malnutrition  Collaborate with interdisciplinary team and initiate plan and interventions as ordered  Monitor patient's weight and dietary intake as ordered or per policy  Utilize nutrition screening tool and intervene as necessary  Determine patient's food preferences and provide high-protein, high-caloric foods as appropriate       INTERVENTIONS:  - Monitor oral intake, urinary output, labs, and treatment plans  - Assess nutrition and hydration status and recommend course of action  - Evaluate amount of meals eaten  - Assist patient with eating if necessary   - Allow adequate time for meals  - Recommend/ encourage appropriate diets, oral nutritional supplements, and vitamin/mineral supplements  - Order, calculate, and assess calorie counts as needed  - Recommend, monitor, and adjust tube feedings and TPN/PPN based on assessed needs  - Assess need for intravenous fluids  - Provide specific nutrition/hydration education as appropriate  - Include patient/family/caregiver in decisions related to nutrition  Outcome: Adequate for Discharge     Problem: Prexisting or High Potential for Compromised Skin Integrity  Goal: Skin integrity is maintained or improved  Description: INTERVENTIONS:  - Identify patients at risk for skin breakdown  - Assess and monitor skin integrity  - Assess and monitor nutrition and hydration status  - Monitor labs   - Assess for incontinence   - Turn and reposition patient  - Assist with mobility/ambulation  - Relieve pressure over bony prominences  - Avoid friction and shearing  - Provide appropriate hygiene as needed including keeping skin clean and dry  - Evaluate need for skin moisturizer/barrier cream  - Collaborate with interdisciplinary team   - Patient/family teaching  - Consider wound care consult   Outcome: Adequate for Discharge

## 2022-02-20 NOTE — DISCHARGE SUMMARY
[facility]  Progress Note - Jose Manuel Hals 1975, 56 y o  female MRN: [id number]  Unit/Bed#: [facility] 907-01 Encounter: [id number]  Primary Care Provider: Saima Ceballos DO   Date and time admitted to hospital: 2/16/2022  2:42 PM     * Left foot diabetic ulcer  Assessment & Plan  · Podiatry following; safe discharge  · S/p debridement great toe wound (left) by Dr Dalia Shook 02/17/22  · XR left foot without acute osseous abnormality per the results report  · Cellulitis resolved; status post cefazolin; 2 days Keflex at discharge  · Pain well managed  ?  Colchicine given x1 dose; low concern for gout        Type 2 diabetes mellitus [facility])  Assessment & Plan  Lab Results   Component Value Date     HGBA1C 6 4 (H) 02/16/2022                Recent Labs     02/18/22  0804 02/18/22  1212 02/18/22  1712 02/18/22  2043   POCGLU 124 146* 99 98         Blood Sugar Average: Last 72 hrs:  (P) 110 4      · Orals on hold while hospitalized  · SSI coverage      Hypothyroidism  Assessment & Plan  · Continue Levothyroxine     Obstructive sleep apnea  Assessment & Plan  · Reports CPAP noncompliance due to intolerance     Manic bipolar I disorder (HCC)  Assessment & Plan  · Mood stable  · Continue lamotrigine, ziprasidone  · Outpatient follow-up        Obesity  Assessment & Plan  · Therapeutic lifestyle modification encouraged     H/O bariatric surgery  Assessment & Plan  · Noted history  · Outpatient follow up        Discharging Physician / Practitioner: Daija Reyes PA-C  PCP: Saima Ceballos DO  Admission Date:       Admission Orders (From admission, onward)              Ordered          02/16/22 1737   Inpatient Admission  Once                          Discharge Date: 02/19/22         Medical Problems                        Resolved Problems  Date Reviewed: 2/18/2022           None                     Consultations During Hospital Stay:  · Podiatry     Procedures Performed:   · Status post debridement great toe wound     Significant Findings / Test Results:   · No osteomyelitis found on x-ray     Incidental Findings:   · None      Test Results Pending at Discharge (will require follow up): · None     Outpatient Tests Requested:  · None     Complications:  None     Reason for Admission:  Left foot infection and cellulitis     Hospital Course:      Debbi Haile is a 56 y o  female patient who originally presented to the hospital on 2/16/2022 due a diabetic foot ulcer with superimposed cellulitis  A Podiatry consultation was performed during hospital stay  Patient received IV cefazolin  Patient is status post a wound debridement of the left great toe by Dr Giselle Hunt on 02/17/2022  At time discharge, patient's cellulitis has resolved  During hospital stay, patient was treated with 1 dose of colchicine with concern of gout  Podiatry did not think patient actually had gout with normal uric acid levels  Patient will receive services from [facility] at home      Please see above list of diagnoses and related plan for additional information       Condition at Discharge: good      Discharge Day Visit / Exam:      Subjective:  Patient has no specific complaints  She is tolerating p o  Intake  She admits to flatulence  She is urinating without dysuria  She is able to walk with assistance of a walker  She denies dizziness or lightheadedness  She denies chest pain, shortness breath, or dyspnea on exertion  She is going home with [facility] services and assistance from family  Vitals: Blood Pressure: 124/70 (02/19/22 0748)  Pulse: (!) 50 (02/19/22 0748)  Temperature: (!) 97 4 °F (36 3 °C) (02/19/22 0748)  Temp Source: Oral (02/16/22 1417)  Respirations: 16 (02/19/22 0748)  SpO2: 94 % (02/19/22 0748)  Exam:   Physical Exam  Constitutional:       General: She is not in acute distress  Appearance: She is obese  She is not ill-appearing, toxic-appearing or diaphoretic     HENT:      Head: Normocephalic and atraumatic  Nose: Nose normal       Mouth/Throat:      Mouth: Mucous membranes are moist       Pharynx: Oropharynx is clear  Eyes:      Extraocular Movements: Extraocular movements intact  Conjunctiva/sclera: Conjunctivae normal    Cardiovascular:      Rate and Rhythm: Normal rate and regular rhythm  Pulses: Normal pulses  Heart sounds: Normal heart sounds  Pulmonary:      Effort: Pulmonary effort is normal    Abdominal:      General: Abdomen is flat  Bowel sounds are normal       Palpations: Abdomen is soft  Comments: Obese abdomen   Musculoskeletal:         General: Normal range of motion  Cervical back: Normal range of motion and neck supple  Comments: Right transmetatarsal amputation; left foot in cast shoe with clean/dry/intact bandage   Skin:     General: Skin is warm and dry  Capillary Refill: Capillary refill takes less than 2 seconds  Neurological:      General: No focal deficit present  Mental Status: She is alert and oriented to person, place, and time  Mental status is at baseline  Psychiatric:         Mood and Affect: Mood normal          Thought Content: Thought content normal          Judgment: Judgment normal             Discussion with Family:  Patient will update family     Discharge instructions/Information to patient and family:   See after visit summary for information provided to patient and family        Provisions for Follow-Up Care:  See after visit summary for information related to follow-up care and any pertinent home health orders        Disposition:      Home; with [facility]      For Discharges to [address] [facility]:   · Not Applicable to this Patient - Not Applicable to this Patient     Planned Readmission:  None     Discharge Statement:  I spent 40 minutes discharging the patient  This time was spent on the day of discharge  I had direct contact with the patient on the day of discharge   Greater than 50% of the total time was spent examining patient, answering all patient questions, arranging and discussing plan of care with patient as well as directly providing post-discharge instructions    Additional time then spent on discharge activities      Discharge Medications:  See after visit summary for reconciled discharge medications provided to patient and family        ** Please Note: This note has been constructed using a voice recognition system **        Cosigned by: Ion Garzon DO at 2/19/2022  7:30 PM

## 2022-02-21 NOTE — UTILIZATION REVIEW
Notification of Discharge   This is a Notification of Discharge from our facility [address]  Please be advised that this patient has been discharge from our facility  Below you will find the admission and discharge date and time including the patients disposition  UTILIZATION REVIEW CONTACT:  Kwame Mcclure  Utilization   Network Utilization Review Department  Phone: [phone] x carefully listen to the prompts  All voicemails are confidential   Email: [email or web address]     PHYSICIAN ADVISORY SERVICES:  FOR [id number] REVIEW - MEDICAL NECESSITY DENIAL  Phone: [phone]  Fax: [phone]  Email: [email or web address]     PRESENTATION DATE: 2/16/2022  2:42 PM  OBERVATION ADMISSION DATE:   INPATIENT ADMISSION DATE: 2/16/22  5:37 PM   DISCHARGE DATE: 2/19/2022  2:30 PM  DISPOSITION: Home with [place] with [address] INFORMATION:  Send all requests for admission clinical reviews, approved or denied determinations and any other requests to dedicated fax number below belonging to the campus where the patient is receiving treatment   List of dedicated fax numbers:  [address] DENIALS (Administrative/Medical Necessity) [phone]   [address] (Maternity/NICU/Pediatrics) [phone]   [facility] [phone]   [address] [place] [address] [phone]   [address] [place] [address] [phone]   [address] [place] [address] [address] [place] [address],[address] [phone]   [facility]  [phone]   [address] [place], [place] [address] [address] [phone]

## 2022-03-01 ENCOUNTER — OFFICE VISIT (OUTPATIENT)
Dept: WOUND CARE | Facility: CLINIC | Age: 47
End: 2022-03-01
Payer: COMMERCIAL

## 2022-03-01 VITALS
SYSTOLIC BLOOD PRESSURE: 106 MMHG | HEART RATE: 62 BPM | RESPIRATION RATE: 16 BRPM | DIASTOLIC BLOOD PRESSURE: 78 MMHG | TEMPERATURE: 95.9 F

## 2022-03-01 DIAGNOSIS — S90.122A CONTUSION OF SECOND TOE OF LEFT FOOT, INITIAL ENCOUNTER: ICD-10-CM

## 2022-03-01 DIAGNOSIS — L97.522 DIABETIC ULCER OF TOE OF LEFT FOOT ASSOCIATED WITH TYPE 2 DIABETES MELLITUS, WITH FAT LAYER EXPOSED (HCC): Primary | ICD-10-CM

## 2022-03-01 DIAGNOSIS — E11.621 DIABETIC ULCER OF TOE OF LEFT FOOT ASSOCIATED WITH TYPE 2 DIABETES MELLITUS, WITH FAT LAYER EXPOSED (HCC): Primary | ICD-10-CM

## 2022-03-01 PROCEDURE — 11042 DBRDMT SUBQ TIS 1ST 20SQCM/<: CPT | Performed by: PODIATRIST

## 2022-03-01 PROCEDURE — 99214 OFFICE O/P EST MOD 30 MIN: CPT | Performed by: PODIATRIST

## 2022-03-01 PROCEDURE — 99213 OFFICE O/P EST LOW 20 MIN: CPT | Performed by: PODIATRIST

## 2022-03-01 RX ORDER — LIDOCAINE 40 MG/G
CREAM TOPICAL ONCE
Status: COMPLETED | OUTPATIENT
Start: 2022-03-01 | End: 2022-03-01

## 2022-03-01 RX ADMIN — LIDOCAINE: 40 CREAM TOPICAL at 10:31

## 2022-03-01 NOTE — PROGRESS NOTES
Patient ID: Lorena Gaviria is a 56 y o  female Date of Birth 1975     Diagnosis:  1  Diabetic ulcer of toe of left foot associated with type 2 diabetes mellitus, with fat layer exposed ([facility] [address] )  -     lidocaine (LMX) 4 % cream  -     Wound cleansing and dressings; Future  -     Debridement    2  Contusion of second toe of left foot, initial encounter  -     XR foot 3+ vw left; Future; Expected date: 03/01/2022        Diagnosis ICD-10-CM Associated Orders   1  Diabetic ulcer of toe of left foot associated with type 2 diabetes mellitus, with fat layer exposed ([facility] [address] )  E11 621 lidocaine (LMX) 4 % cream    L97 522 Wound cleansing and dressings     Debridement   2  Contusion of second toe of left foot, initial encounter  S90 122A XR foot 3+ vw left          Assessment & Plan:  Diagnosis and options discussed with patient  Patient agreeable to the plan as stated below    I reviewed the patient's H&P and discharge summary from her recent hospital stay  I reviewed her imaging  I reviewed her op note  I reviewed her wound cultures  Ulcer stable following surgical debridement and antibiotics  No need to repeat antibiotics today  The wound has improved since her hospitalization  Serial debridement today, see procedure below  Check in 1 week    Patient does have significant edema of her 2nd digit and is very painful to palpation  She does not recall any trauma  I suggested we get a new x-ray  Chief Complaint   Patient presents with   [address] [place] [address] Patient Visit     Pt  presents with wound to left plantar aspect of left great toe  Pt  states wound has been opened since the second week of Feb  Pt  wearing an offloading shoe and states [facility] has been changing dressing three times a week  Subjective:   Patient arrives for initial wound care consult of an acute wound on her left great toe  She was hospitalized on February 16th with cellulitis and diabetic foot ulcer    She underwent surgical debridement on February 17th and discharged 2 days later  She had wound cultures in the hospital that were positive for beta-hemolytic strep group G  She was discharged on Keflex  She has been using a Darco wedge shoe on the left foot to keep weight off the great toe  She also has a chronic callus which sometimes ulcerates on the right foot at the distal end of her transmetatarsal amputation  When she left the hospital it was currently closed  She is in her sneaker today on the right foot  She is using the wedge shoe on her left        The following portions of the patient's history were reviewed and updated as appropriate:   Patient Active Problem List   Diagnosis    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, with bone involvement without evidence of necrosis ([facility] [address] )    H/O bariatric surgery    Anxiety    Obesity    Preoperative clearance    Encounter for smoking cessation counseling    Hypothyroidism    Manic bipolar I disorder ([facility] [address] )    Type 2 diabetes mellitus with neurologic complication, without long-term current use of insulin ([facility] [address] )    Tobacco abuse    Type 2 diabetes mellitus ([facility] [address] )    Acquired absence of other left toe(s) ([address]    Essential hypertension    Arthritis    Chronic pain    OCD (obsessive compulsive disorder)    PTSD (post-traumatic stress disorder)    Smoking    Right foot ulcer, with fat layer exposed ([address]    Pain of foot    GERD (gastroesophageal reflux disease)    Migraine without status migrainosus, not intractable    Left foot diabetic ulcer    Obstructive sleep apnea     Past Medical History:   Diagnosis Date    Anxiety     Arthritis     Asthma     Chronic pain     Diabetes mellitus ([address]     Disease of thyroid gland     Lymphedema     left leg    Manic bipolar I disorder ([address]     Morbid obesity ([address]     OCD (obsessive compulsive disorder)     PTSD (post-traumatic stress disorder)      Past Surgical History:   Procedure Laterality Date    CHOLECYSTECTOMY      CHOLECYSTECTOMY      MI AMPUTATION FOOT,TRANSMETATARSAL Right 9/4/2021    Procedure: AMPUTATION TRANSMETATARSAL (TMA);  Surgeon: Marly Bone DPM;  [facility]: [facility];  Service: Podiatry    MI AMPUTATION METATARSAL+TOE,SINGLE Left 9/8/2019    Procedure: PARTIAL 3RD RAY RESECTION, PSB TOE FILLET FLAP;  Surgeon: Earnest Pond DPM;  [facility];  Service: Podiatry    TOE AMPUTATION Right 1/13/2021    Procedure: AMPUTATION 2ND TOE;  Surgeon: Marly Bone DPM;  [facility];  Service: Podiatry    WOUND DEBRIDEMENT Left 2/17/2022    Procedure: DEBRIDEMENT GREAT TOE WOUND;  Surgeon: Marly Bone DPM;  [facility];  Service: Podiatry     Social History     Socioeconomic History    Marital status: Single     Spouse name: None    Number of children: None    Years of education: None    Highest education level: None   Occupational History    None   Tobacco Use    Smoking status: Former Smoker     Packs/day: 0 50     Years: 15 00     Pack years: 7 50    Smokeless tobacco: Never Used   Vaping Use    Vaping Use: Some days   Substance and Sexual Activity    Alcohol use: Never    Drug use: Never    Sexual activity: Not Currently   Other Topics Concern    None   Social History Narrative    None     Social Determinants of Health     Financial Resource Strain: Not on file   Food Insecurity: No Food Insecurity    Worried About Running Out of Food in the Last Year: Never true    Saud of Food in the Last Year: Never true   Transportation Needs: No Transportation Needs    Lack of Transportation (Medical): No    Lack of Transportation (Non-Medical):  No   Physical Activity: Not on file   Stress: Not on file   Social Connections: Not on file   Intimate Partner Violence: Not on file   Housing Stability: Low Risk     Unable to Pay for Housing in the Last Year: No    Number of Places Lived in the Last Year: 1    Unstable Housing in the Last Year: No        Current Outpatient Medications:     acetaminophen (TYLENOL) 650 mg CR tablet, Take 1 tablet (650 mg total) by mouth every 8 (eight) hours as needed for mild pain, Disp: 30 tablet, Rfl: 0    albuterol (PROVENTIL HFA,VENTOLIN HFA) 90 mcg/act inhaler, Inhale 2 puffs every 6 (six) hours as needed for wheezing (Patient not taking: Reported on 2/16/2022 ), Disp: , Rfl:     cholecalciferol (VITAMIN D3) 1,000 units tablet, Take by mouth once a week  (Patient not taking: Reported on 2/16/2022 ), Disp: , Rfl:     cloNIDine (CATAPRES) 0 2 mg tablet, Take 0 2 mg by mouth 2 (two) times a day, Disp: , Rfl:     docusate sodium (COLACE) 100 mg capsule, Take 1 capsule (100 mg total) by mouth 2 (two) times a day, Disp: 10 capsule, Rfl: 0    EASY COMFORT PEN NEEDLES 32G X 4 MM MISC, , Disp: , Rfl:     EASY TOUCH INSULIN SYRINGE 31G X 5/16" 0 3 ML MISC, , Disp: , Rfl:     ergocalciferol (ERGOCALCIFEROL) 1 25 MG (64095 UT) capsule, Take 50,000 Units by mouth, Disp: , Rfl:     fenofibrate (TRICOR) 54 MG tablet, TAKE 1 TABLET (54 MG TOTAL) BY MOUTH DAILY  , Disp: , Rfl:     folic acid (FOLVITE) 1 mg tablet, Take 1,000 mcg by mouth daily, Disp: , Rfl:     glucose blood test strip, USE TO MONITOR BG 3 TIMES A DAY   E11 9, Disp: , Rfl:     HYDROcodone-acetaminophen (NORCO) 5-325 mg per tablet, Take 1 tablet by mouth every 6 (six) hours as needed for painMax Daily Amount: 4 tablets, Disp: 7 tablet, Rfl: 0    hydrOXYzine HCL (ATARAX) 50 mg tablet, Take 50 mg by mouth daily at bedtime, Disp: , Rfl: 0    hydrOXYzine HCL (ATARAX) 50 mg tablet, Take 50 mg by mouth every morning, Disp: , Rfl:     lamoTRIgine (LaMICtal) 200 MG tablet, Take 200 mg by mouth every morning, Disp: , Rfl:     levothyroxine 50 mcg tablet, Take 50 mcg by mouth daily, Disp: , Rfl:     melatonin 3 mg, Take 3 mg by mouth daily at bedtime as needed, Disp: , Rfl:     metFORMIN (GLUCOPHAGE) 500 mg tablet, Take 1 tablet (500 mg total) by mouth 2 (two) times a day with meals, Disp: 60 tablet, Rfl: 0    methocarbamol (ROBAXIN) 750 mg tablet, TAKE 1 TABLET (750 MG TOTAL) BY MOUTH 4 (FOUR) TIMES A DAY AS NEEDED FOR MUSCLE SPASMS , Disp: , Rfl:     multivitamin (THERAGRAN) TABS, Take 1 tablet by mouth daily  (Patient not taking: Reported on 2/16/2022 ), Disp: , Rfl:     nystatin (MYCOSTATIN) powder, DAILY AFTER SHOWER TO AFFECTED AREAS (Patient not taking: Reported on 2/16/2022), Disp: , Rfl:     ondansetron (ZOFRAN-ODT) 4 mg disintegrating tablet, , Disp: , Rfl:     pantoprazole (PROTONIX) 40 mg tablet, Take 40 mg by mouth daily, Disp: , Rfl:     senna (SENOKOT) 8 6 mg, Take 1 tablet (8 6 mg total) by mouth daily (Patient not taking: Reported on 2/16/2022 ), Disp: 5 tablet, Rfl: 0    SUMAtriptan Succinate (IMITREX STATDOSE REFILL) 6 MG/0 5ML SOCT, Inject 6 mg under the skin every 2 (two) hours as needed, Disp: , Rfl:     Ultra Thin Lancets 31G MISC, USE TO MONITOR BLOOD GLUCOSE 3 TIMES A DAY AS DIRECTED , Disp: , Rfl:     ziprasidone (GEODON) 80 mg capsule, Take 80 mg by mouth 2 (two) times a day with meals , Disp: , Rfl:   No current facility-administered medications for this visit  Family History   Problem Relation Age of Onset    Hyperlipidemia Mother     Diabetes Mother     Hypertension Mother     Heart attack Father       Review of Systems   Constitutional: Negative  HENT: Negative  Respiratory: Negative  Cardiovascular: Positive for leg swelling  Negative for chest pain and palpitations  Gastrointestinal: Negative  Musculoskeletal: Positive for gait problem and joint swelling  Negative for arthralgias  Skin: Positive for color change and wound  Neurological: Positive for numbness  Negative for weakness  Psychiatric/Behavioral: The patient is not nervous/anxious          Allergies  Aspirin, Barium iodide, Codeine, Latex, Penicillins, and Vancomycin    Objective:  /78   Pulse 62   Temp (!) 95 9 °F (35 5 °C)   Resp 16     Physical Exam  Vitals reviewed  Constitutional:       Appearance: She is obese  She is not ill-appearing or diaphoretic  HENT:      Nose: No congestion or rhinorrhea  Cardiovascular:      Rate and Rhythm: Normal rate  Pulses: Normal pulses  Pulmonary:      Effort: Pulmonary effort is normal  No respiratory distress  Musculoskeletal:         General: Tenderness (The left 2nd digit is very swollen and tender to palpation  No anatomic abnormality or instability) and deformity (right TMA) present  Skin:     Capillary Refill: Capillary refill takes less than 2 seconds  Findings: No erythema  Comments: Ulcer plantar great toe showing improvement from hospitalization last week  No sign of infection  No exposed bone or tendon  Mild maceration  See description below   Neurological:      Mental Status: She is alert and oriented to person, place, and time  Sensory: Sensory deficit present  Wound Diabetic Ulcer Foot Plantar (Active)   Wound Image   03/01/22 1012   Wound Description [place] 03/01/22 1010   Yvonne-wound Assessment Erythema;Edema; Maceration 03/01/22 1010   Wound Length (cm) 0 4 cm 03/01/22 1010   Wound Width (cm) 1 3 cm 03/01/22 1010   Wound Depth (cm) 0 3 cm 03/01/22 1010   Wound Surface Area (cm^2) 0 52 cm^2 03/01/22 1010   Wound Volume (cm^3) 0 156 cm^3 03/01/22 1010   Calculated Wound Volume (cm^3) 0 16 cm^3 03/01/22 1010   Undermining 0 2 03/01/22 1013   Undermining is depth extending from 9-2 03/01/22 1013   Drainage Amount Small 03/01/22 1013   Drainage Description Serous 03/01/22 1013                             Debridement   Wound Diabetic Ulcer Foot Plantar    Universal Protocol:  Consent: Verbal consent obtained    Risks and benefits: risks, benefits and alternatives were discussed  Consent given by: patient  Timeout called at: 3/1/2022 10:31 AM   Patient understanding: patient states understanding of the procedure being performed  Patient identity confirmed: verbally with patient      Performed by: physician  Debridement type: surgical  Level of debridement: subcutaneous tissue  Pain control: lidocaine 4%  Post-debridement measurements  Length (cm): 2  Width (cm): 0 7  Depth (cm): 0 6  Percent debrided: 100%  Surface Area (cm^2): 1 4  Area debrided (cm^2): 1 4  Volume (cm^3): 0 84  Tissue and other material debrided: dermis, epidermis and subcutaneous tissue  Devitalized tissue debrided: biofilm, callus, clots, exudate, fibrin, necrotic debris, slough and eschar  Instrument(s) utilized: curette and blade  Bleeding: medium  Hemostasis obtained with: pressure  Procedural pain (0-10): 0  Post-procedural pain: 0   Response to treatment: procedure was tolerated well           Results from last 6 Months   Lab Units 02/16/22  1631   WOUND CULTURE  2+ Growth of Beta Hemolytic Streptococcus Group G*  3+ Growth of        Wound Instructions:  Orders Placed This Encounter   Procedures    Wound cleansing and dressings     Left great toe wound:    Wash your hands with soap and water  Remove old dressing, discard into plastic bag and place in trash  Cleanse the wound with normal saline prior to applying a clean dressing  Do not use tissue or cotton balls  Do not scrub the wound  Pat dry using gauze  Shower no  Do not get dressing wet  Apply alginate AG to the wound bed  Cover with dry gauze  Secure with rolled gauze and ace wrap  (Not tight compression  Wrap lightly)  Change dressing three times a week  Follow up at the wound center in one week  Treatment in the wound center today: Cleansed with normal saline and dressed as above       Standing Status:   Future     Standing Expiration Date:   3/1/2023    Debridement     This order was created via procedure documentation    XR foot 3+ vw left     2nd toe is unusually swollen and tender     Standing Status:   Future     Standing Expiration Date:   3/1/2026     Scheduling Instructions:      Bring along any outside films relating to this procedure  Order Specific Question:   Is the patient pregnant? Answer:   Abby Branch Innocent, DPM    Portions of the record may have been created with voice recognition software  Occasional wrong word or "sound a like" substitutions may have occurred due to the inherent limitations of voice recognition software  Read the chart carefully and recognize, using context, where substitutions have occurred

## 2022-03-01 NOTE — PATIENT INSTRUCTIONS
Orders Placed This Encounter   Procedures    Wound cleansing and dressings     Left great toe wound:    Wash your hands with soap and water  Remove old dressing, discard into plastic bag and place in trash  Cleanse the wound with normal saline prior to applying a clean dressing  Do not use tissue or cotton balls  Do not scrub the wound  Pat dry using gauze  Shower no  Do not get dressing wet  Apply alginate AG to the wound bed  Cover with dry gauze  Secure with rolled gauze and ace wrap  (Not tight compression  Wrap lightly)  Change dressing three times a week  Follow up at the wound center in one week  Treatment in the wound center today: Cleansed with normal saline and dressed as above       Standing Status:   Future     Standing Expiration Date:   3/1/2023    Debridement     This order was created via procedure documentation

## 2022-03-08 ENCOUNTER — OFFICE VISIT (OUTPATIENT)
Dept: WOUND CARE | Facility: CLINIC | Age: 47
End: 2022-03-08
Payer: COMMERCIAL

## 2022-03-08 VITALS
RESPIRATION RATE: 20 BRPM | SYSTOLIC BLOOD PRESSURE: 140 MMHG | TEMPERATURE: 96.2 F | HEART RATE: 68 BPM | DIASTOLIC BLOOD PRESSURE: 80 MMHG

## 2022-03-08 DIAGNOSIS — L97.522 DIABETIC ULCER OF TOE OF LEFT FOOT ASSOCIATED WITH TYPE 2 DIABETES MELLITUS, WITH FAT LAYER EXPOSED (HCC): Primary | ICD-10-CM

## 2022-03-08 DIAGNOSIS — E11.621 DIABETIC ULCER OF TOE OF LEFT FOOT ASSOCIATED WITH TYPE 2 DIABETES MELLITUS, WITH FAT LAYER EXPOSED (HCC): Primary | ICD-10-CM

## 2022-03-08 PROCEDURE — 11042 DBRDMT SUBQ TIS 1ST 20SQCM/<: CPT | Performed by: PODIATRIST

## 2022-03-08 RX ORDER — LIDOCAINE 40 MG/G
CREAM TOPICAL ONCE
Status: COMPLETED | OUTPATIENT
Start: 2022-03-08 | End: 2022-03-08

## 2022-03-08 RX ADMIN — LIDOCAINE: 40 CREAM TOPICAL at 11:56

## 2022-03-08 NOTE — PROGRESS NOTES
Patient ID: Cuco Olson is a 56 y o  female Date of Birth 1975     Diagnosis:  1  Diabetic ulcer of toe of left foot associated with type 2 diabetes mellitus, with fat layer exposed ([facility] [address] )  -     lidocaine (LMX) 4 % cream  -     Wound cleansing and dressings; Future  -     Debridement       Diagnosis ICD-10-CM Associated Orders   1  Diabetic ulcer of toe of left foot associated with type 2 diabetes mellitus, with fat layer exposed ([facility] [address] )  E11 621 lidocaine (LMX) 4 % cream    L97 522 Wound cleansing and dressings     Debridement        Assessment & Plan:  1  Patient got left foot x-ray at [facility]   I do have a copy or of the report but no images  There is no evidence of any fracture or acute concern on the x-ray  They did note her 3rd ray amputation appearing stable  2  Serial debridement of plantar foot ulcer  No sign of infection  See debridement note below  Return to clinic 2 weeks    Chief Complaint   Patient presents with    Follow Up Wound Care Visit     left toe           Subjective:   Patient returns for right foot wound    She got an x-ray last week but got it done at [facility]   I am able to pull the report through the portal   She has no acute concerns today      The following portions of the patient's history were reviewed and updated as appropriate:   Patient Active Problem List   Diagnosis    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, with bone involvement without evidence of necrosis ([facility] [address] )    H/O bariatric surgery    Anxiety    Obesity    Preoperative clearance    Encounter for smoking cessation counseling    Hypothyroidism    Manic bipolar I disorder ([address] )    Type 2 diabetes mellitus with neurologic complication, without long-term current use of insulin ([address] )    Tobacco abuse    Type 2 diabetes mellitus ([address] )    Acquired absence of other left toe(s) ([address] )    Essential hypertension    Arthritis    Chronic pain    OCD (obsessive compulsive disorder)    PTSD (post-traumatic stress disorder)    Smoking    Right foot ulcer, with fat layer exposed (HCC)    Pain of foot    GERD (gastroesophageal reflux disease)    Migraine without status migrainosus, not intractable    Left foot diabetic ulcer    Obstructive sleep apnea     Past Medical History:   Diagnosis Date    Anxiety     Arthritis     Asthma     Chronic pain     Diabetes mellitus ([address] )     Disease of thyroid gland     Lymphedema     left leg    Manic bipolar I disorder (HCC)     Morbid obesity (HCC)     OCD (obsessive compulsive disorder)     PTSD (post-traumatic stress disorder)      Past Surgical History:   Procedure Laterality Date    CHOLECYSTECTOMY      CHOLECYSTECTOMY      OH AMPUTATION FOOT,TRANSMETATARSAL Right 9/4/2021    Procedure: AMPUTATION TRANSMETATARSAL (TMA);  Surgeon: Garland Bence, DPM;  Location: [facility];  Service: Podiatry    OH AMPUTATION METATARSAL+TOE,SINGLE Left 9/8/2019    Procedure: PARTIAL 3RD RAY RESECTION, PSB TOE FILLET FLAP;  Surgeon: Johann Collins DPM;  [facility];  Service: Podiatry    TOE AMPUTATION Right 1/13/2021    Procedure: AMPUTATION 2ND TOE;  Surgeon: Garland Bence, DPM;  [facility];  Service: Podiatry    WOUND DEBRIDEMENT Left 2/17/2022    Procedure: DEBRIDEMENT GREAT TOE WOUND;  Surgeon: Garland Bence, DPM;  Location: [facility];  Service: Podiatry     Social History     Socioeconomic History    Marital status: Single     Spouse name: None    Number of children: None    Years of education: None    Highest education level: None   Occupational History    None   Tobacco Use    Smoking status: Former Smoker     Packs/day: 0 50     Years: 15 00     Pack years: 7 50    Smokeless tobacco: Never Used   Vaping Use    Vaping Use: Some days   Substance and Sexual Activity    Alcohol use: Never    Drug use: Never    Sexual activity: Not Currently Other Topics Concern    None   Social History Narrative    None     Social Determinants of Health     Financial Resource Strain: Not on file   Food Insecurity: No Food Insecurity    Worried About Running Out of Food in the Last Year: Never true    Saud of Food in the Last Year: Never true   Transportation Needs: No Transportation Needs    Lack of Transportation (Medical): No    Lack of Transportation (Non-Medical): No   Physical Activity: Not on file   Stress: Not on file   Social Connections: Not on file   Intimate Partner Violence: Not on file   Housing Stability: Low Risk     Unable to Pay for Housing in the Last Year: No    Number of Places Lived in the Last Year: 1    Unstable Housing in the Last Year: No        Current Outpatient Medications:     acetaminophen (TYLENOL) 650 mg CR tablet, Take 1 tablet (650 mg total) by mouth every 8 (eight) hours as needed for mild pain, Disp: 30 tablet, Rfl: 0    albuterol (PROVENTIL HFA,VENTOLIN HFA) 90 mcg/act inhaler, Inhale 2 puffs every 6 (six) hours as needed for wheezing (Patient not taking: Reported on 2/16/2022 ), Disp: , Rfl:     cholecalciferol (VITAMIN D3) 1,000 units tablet, Take by mouth once a week  (Patient not taking: Reported on 2/16/2022 ), Disp: , Rfl:     cloNIDine (CATAPRES) 0 2 mg tablet, Take 0 2 mg by mouth 2 (two) times a day, Disp: , Rfl:     docusate sodium (COLACE) 100 mg capsule, Take 1 capsule (100 mg total) by mouth 2 (two) times a day, Disp: 10 capsule, Rfl: 0    EASY COMFORT PEN NEEDLES 32G X 4 MM MISC, , Disp: , Rfl:     EASY TOUCH INSULIN SYRINGE 31G X 5/16" 0 3 ML MISC, , Disp: , Rfl:     ergocalciferol (ERGOCALCIFEROL) 1 25 MG (12895 UT) capsule, Take 50,000 Units by mouth, Disp: , Rfl:     fenofibrate (TRICOR) 54 MG tablet, TAKE 1 TABLET (54 MG TOTAL) BY MOUTH DAILY  , Disp: , Rfl:     folic acid (FOLVITE) 1 mg tablet, Take 1,000 mcg by mouth daily, Disp: , Rfl:     glucose blood test strip, USE TO MONITOR BG 3 TIMES A DAY  E11 9, Disp: , Rfl:     HYDROcodone-acetaminophen (NORCO) 5-325 mg per tablet, Take 1 tablet by mouth every 6 (six) hours as needed for painMax Daily Amount: 4 tablets, Disp: 7 tablet, Rfl: 0    hydrOXYzine HCL (ATARAX) 50 mg tablet, Take 50 mg by mouth daily at bedtime, Disp: , Rfl: 0    hydrOXYzine HCL (ATARAX) 50 mg tablet, Take 50 mg by mouth every morning, Disp: , Rfl:     lamoTRIgine (LaMICtal) 200 MG tablet, Take 200 mg by mouth every morning, Disp: , Rfl:     levothyroxine 50 mcg tablet, Take 50 mcg by mouth daily, Disp: , Rfl:     melatonin 3 mg, Take 3 mg by mouth daily at bedtime as needed, Disp: , Rfl:     metFORMIN (GLUCOPHAGE) 500 mg tablet, Take 1 tablet (500 mg total) by mouth 2 (two) times a day with meals, Disp: 60 tablet, Rfl: 0    methocarbamol (ROBAXIN) 750 mg tablet, TAKE 1 TABLET (750 MG TOTAL) BY MOUTH 4 (FOUR) TIMES A DAY AS NEEDED FOR MUSCLE SPASMS , Disp: , Rfl:     multivitamin (THERAGRAN) TABS, Take 1 tablet by mouth daily  (Patient not taking: Reported on 2/16/2022 ), Disp: , Rfl:     nystatin (MYCOSTATIN) powder, DAILY AFTER SHOWER TO AFFECTED AREAS (Patient not taking: Reported on 2/16/2022), Disp: , Rfl:     ondansetron (ZOFRAN-ODT) 4 mg disintegrating tablet, , Disp: , Rfl:     pantoprazole (PROTONIX) 40 mg tablet, Take 40 mg by mouth daily, Disp: , Rfl:     senna (SENOKOT) 8 6 mg, Take 1 tablet (8 6 mg total) by mouth daily (Patient not taking: Reported on 2/16/2022 ), Disp: 5 tablet, Rfl: 0    SUMAtriptan Succinate (IMITREX STATDOSE REFILL) 6 MG/0 5ML SOCT, Inject 6 mg under the skin every 2 (two) hours as needed, Disp: , Rfl:     Ultra Thin Lancets 31G MISC, USE TO MONITOR BLOOD GLUCOSE 3 TIMES A DAY AS DIRECTED , Disp: , Rfl:     ziprasidone (GEODON) 80 mg capsule, Take 80 mg by mouth 2 (two) times a day with meals , Disp: , Rfl:   No current facility-administered medications for this visit    Family History   Problem Relation Age of Onset    Hyperlipidemia Mother     Diabetes Mother     Hypertension Mother     Heart attack Father       Review of Systems  Allergies:  Aspirin, Barium iodide, Codeine, Latex, Penicillins, and Vancomycin      Objective:  /80   Pulse 68   Temp (!) 96 2 °F (35 7 °C)   Resp 20     Physical Exam        Wound Diabetic Ulcer Foot Plantar (Active)   Wound Image   03/08/22 1145   Wound Description Pink;Pale 03/08/22 1119   Yvonne-wound Assessment Callus; Yellow-brown 03/08/22 1119   Wound Length (cm) 0 5 cm 03/08/22 1119   Wound Width (cm) 1 3 cm 03/08/22 1119   Wound Depth (cm) 0 3 cm 03/08/22 1119   Wound Surface Area (cm^2) 0 65 cm^2 03/08/22 1119   Wound Volume (cm^3) 0 195 cm^3 03/08/22 1119   Calculated Wound Volume (cm^3) 0 2 cm^3 03/08/22 1119   Change in Wound Size % -25 03/08/22 1119   Undermining 0 2 03/08/22 1119   Undermining is depth extending from 10-12     and     3 5  0 2 03/08/22 1119   Drainage Amount Moderate 03/08/22 1119   Drainage Description Serosanguineous 03/08/22 1119                         Debridement   Wound Diabetic Ulcer Foot Plantar    Universal Protocol:  Consent: Verbal consent obtained    Risks and benefits: risks, benefits and alternatives were discussed  Consent given by: patient  Timeout called at: 3/8/2022 11:55 AM   Patient understanding: patient states understanding of the procedure being performed  Patient identity confirmed: verbally with patient      Performed by: physician  Debridement type: surgical  Level of debridement: subcutaneous tissue  Pain control: lidocaine 4%  Post-debridement measurements  Length (cm): 0 8  Width (cm): 1 5  Depth (cm): 0 4  Percent debrided: 100%  Surface Area (cm^2): 1 2  Area debrided (cm^2): 1 2  Volume (cm^3): 0 48  Tissue and other material debrided: dermis, epidermis and subcutaneous tissue  Devitalized tissue debrided: biofilm, callus, clots, exudate, fibrin, necrotic debris, slough and eschar  Instrument(s) utilized: blade  Bleeding: medium  Hemostasis obtained with: pressure  Procedural pain (0-10): 1  Post-procedural pain: 0   Response to treatment: procedure was tolerated well                   Results from last 6 Months   Lab Units 02/16/22  1631   WOUND CULTURE  2+ Growth of Beta Hemolytic Streptococcus Group G*  3+ Growth of          Wound Instructions:  Orders Placed This Encounter   Procedures    Wound cleansing and dressings     Wound cleansing and dressings       Left great toe wound:     Wash your hands with soap and water  Remove old dressing, discard into plastic bag and place in trash  Cleanse the wound with normal saline prior to applying a clean dressing  Do not use tissue or cotton balls  Do not scrub the wound  Pat dry using gauze  Shower no  Do not get dressing wet          Apply alginate AG to the wound bed  Cover with dry gauze  Secure with rolled gauze and ace wrap  (Not tight compression  Wrap lightly)  Change dressing three times a week         Follow up at the wound center in two weeks         Treatment in the wound center today: Cleansed with normal saline and dressed as above     Standing Status:   Future     Standing Expiration Date:   3/8/2023    Debridement     This order was created via procedure documentation         Sarina French DPM      Portions of the record may have been created with voice recognition software  Occasional wrong word or "sound a like" substitutions may have occurred due to the inherent limitations of voice recognition software  Read the chart carefully and recognize, using context, where substitutions have occurred

## 2022-03-08 NOTE — PATIENT INSTRUCTIONS
Orders Placed This Encounter   Procedures    Wound cleansing and dressings     Wound cleansing and dressings       Left great toe wound:     Wash your hands with soap and water  Remove old dressing, discard into plastic bag and place in trash  Cleanse the wound with normal saline prior to applying a clean dressing  Do not use tissue or cotton balls  Do not scrub the wound  Pat dry using gauze  Shower no  Do not get dressing wet          Apply alginate AG to the wound bed  Cover with dry gauze  Secure with rolled gauze and ace wrap  (Not tight compression   Wrap lightly)  Change dressing three times a week         Follow up at the wound center in two weeks         Treatment in the wound center today: Cleansed with normal saline and dressed as above     Standing Status:   Future     Standing Expiration Date:   3/8/2023

## 2022-03-22 ENCOUNTER — OFFICE VISIT (OUTPATIENT)
Dept: WOUND CARE | Facility: CLINIC | Age: 47
End: 2022-03-22
Payer: COMMERCIAL

## 2022-03-22 VITALS
HEART RATE: 68 BPM | DIASTOLIC BLOOD PRESSURE: 64 MMHG | TEMPERATURE: 98.6 F | SYSTOLIC BLOOD PRESSURE: 140 MMHG | RESPIRATION RATE: 18 BRPM

## 2022-03-22 DIAGNOSIS — L97.522 DIABETIC ULCER OF TOE OF LEFT FOOT ASSOCIATED WITH TYPE 2 DIABETES MELLITUS, WITH FAT LAYER EXPOSED (HCC): Primary | ICD-10-CM

## 2022-03-22 DIAGNOSIS — E11.621 DIABETIC ULCER OF TOE OF LEFT FOOT ASSOCIATED WITH TYPE 2 DIABETES MELLITUS, WITH FAT LAYER EXPOSED (HCC): Primary | ICD-10-CM

## 2022-03-22 PROCEDURE — 11042 DBRDMT SUBQ TIS 1ST 20SQCM/<: CPT | Performed by: PODIATRIST

## 2022-03-22 RX ORDER — LIDOCAINE HYDROCHLORIDE 40 MG/ML
5 SOLUTION TOPICAL ONCE
Status: COMPLETED | OUTPATIENT
Start: 2022-03-22 | End: 2022-03-22

## 2022-03-22 RX ADMIN — LIDOCAINE HYDROCHLORIDE 5 ML: 40 SOLUTION TOPICAL at 11:24

## 2022-03-22 NOTE — PATIENT INSTRUCTIONS
Orders Placed This Encounter   Procedures    Wound cleansing and dressings     Wound cleansing and dressings                                  Left great toe wound:     Wash your hands with soap and water  Remove old dressing, discard into plastic bag and place in trash  Cleanse the wound with normal saline prior to applying a clean dressing  Do not use tissue or cotton balls  Do not scrub the wound  Pat dry using gauze  Shower no  Do not get dressing wet          Apply alginate AG to the wound bed  Cover with dry gauze  Secure with rolled gauze and ace wrap  (Not tight compression   Wrap lightly)  Change dressing three times a week         Follow up at the wound center in two weeks         Treatment in the wound center today: Cleansed with normal saline and dressed as above     Standing Status:   Future     Standing Expiration Date:   3/22/2023

## 2022-03-22 NOTE — PROGRESS NOTES
Patient ID: Christ Herrera is a 56 y o  female Date of Birth 1975     Diagnosis:  1  Diabetic ulcer of toe of left foot associated with type 2 diabetes mellitus, with fat layer exposed ([address] )  -     lidocaine (XYLOCAINE) 4 % topical solution 5 mL  -     Wound cleansing and dressings; Future  -     Debridement       Diagnosis ICD-10-CM Associated Orders   1  Diabetic ulcer of toe of left foot associated with type 2 diabetes mellitus, with fat layer exposed ([address] )  E11 621 lidocaine (XYLOCAINE) 4 % topical solution 5 mL    L97 522 Wound cleansing and dressings     Debridement        Assessment & Plan:  Wound doing well  Serial debridement below  Check in 2 weeks  Continue local wound care and offloading in wedge shoe    Chief Complaint   Patient presents with    Follow Up Wound Care Visit     Diabetic ulcer of toe left foot associated with type 2 diabetes mellitus, with fat layer exposed ([address] )           Subjective:   Patient returns for left toe ulcer        The following portions of the patient's history were reviewed and updated as appropriate:   Patient Active Problem List   Diagnosis    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, with bone involvement without evidence of necrosis ([address] )    H/O bariatric surgery    Anxiety    Obesity    Preoperative clearance    Encounter for smoking cessation counseling    Hypothyroidism    Manic bipolar I disorder ([address] )    Type 2 diabetes mellitus with neurologic complication, without long-term current use of insulin ([address] )    Tobacco abuse    Type 2 diabetes mellitus ([address] )    Acquired absence of other left toe(s) ([address] )    Essential hypertension    Arthritis    Chronic pain    OCD (obsessive compulsive disorder)    PTSD (post-traumatic stress disorder)    Smoking    Right foot ulcer, with fat layer exposed ([address] )    Pain of foot    GERD (gastroesophageal reflux disease)    Migraine without status migrainosus, not intractable    Left foot diabetic ulcer    Obstructive sleep apnea     Past Medical History:   Diagnosis Date    Anxiety     Arthritis     Asthma     Chronic pain     [facility]     Disease of thyroid gland     Lymphedema     left leg    Manic bipolar I disorder ([facility] )     Morbid obesity (HCC)     OCD (obsessive compulsive disorder)     PTSD (post-traumatic stress disorder)      Past Surgical History:   Procedure Laterality Date    CHOLECYSTECTOMY      CHOLECYSTECTOMY      UT AMPUTATION FOOT,TRANSMETATARSAL Right 9/4/2021    Procedure: AMPUTATION TRANSMETATARSAL (TMA);  Surgeon: Christine Carl DPM;  [facility];  Service: Podiatry    UT AMPUTATION METATARSAL+TOE,SINGLE Left 9/8/2019    Procedure: PARTIAL 3RD RAY RESECTION, PSB TOE FILLET FLAP;  Surgeon: Sasha Salazar DPM;  [facility];  Service: Podiatry    TOE AMPUTATION Right 1/13/2021    Procedure: AMPUTATION 2ND TOE;  Surgeon: Christine Carl DPM;  [facility];  Service: Podiatry    WOUND DEBRIDEMENT Left 2/17/2022    Procedure: DEBRIDEMENT GREAT TOE WOUND;  Surgeon: Christine Carl DPM;  [facility];  Service: Podiatry     Social History     Socioeconomic History    Marital status: Single     Spouse name: Not on file    Number of children: Not on file    Years of education: Not on file    Highest education level: Not on file   Occupational History    Not on file   Tobacco Use    Smoking status: Former Smoker     Packs/day: 0 50     Years: 15 00     Pack years: 7 50    Smokeless tobacco: Never Used   Vaping Use    Vaping Use: Some days   Substance and Sexual Activity    Alcohol use: Never    Drug use: Never    Sexual activity: Not Currently   Other Topics Concern    Not on file   Social History Narrative    Not on file     Social Determinants of Health     Financial Resource Strain: Not on file   Food Insecurity: No Food Insecurity    Worried About Running Out of Food in the Last Year: Never true    [address] [facility] [address] in the Last Year: Never true   Transportation Needs: No Transportation Needs    Lack of Transportation (Medical): No    Lack of Transportation (Non-Medical): No   Physical Activity: Not on file   Stress: Not on file   Social Connections: Not on file   Intimate Partner Violence: Not on file   Housing Stability: Low Risk     Unable to Pay for Housing in the Last Year: No    Number of Places Lived in the Last Year: 1    Unstable Housing in the Last Year: No        Current Outpatient Medications:     acetaminophen (TYLENOL) 650 mg CR tablet, Take 1 tablet (650 mg total) by mouth every 8 (eight) hours as needed for mild pain, Disp: 30 tablet, Rfl: 0    albuterol (PROVENTIL HFA,VENTOLIN HFA) 90 mcg/act inhaler, Inhale 2 puffs every 6 (six) hours as needed for wheezing (Patient not taking: Reported on 2/16/2022 ), Disp: , Rfl:     cholecalciferol (VITAMIN D3) 1,000 units tablet, Take by mouth once a week  (Patient not taking: Reported on 2/16/2022 ), Disp: , Rfl:     cloNIDine (CATAPRES) 0 2 mg tablet, Take 0 2 mg by mouth 2 (two) times a day, Disp: , Rfl:     docusate sodium (COLACE) 100 mg capsule, Take 1 capsule (100 mg total) by mouth 2 (two) times a day, Disp: 10 capsule, Rfl: 0    EASY COMFORT PEN NEEDLES 32G X 4 MM MISC, , Disp: , Rfl:     EASY TOUCH INSULIN SYRINGE 31G X 5/16" 0 3 ML MISC, , Disp: , Rfl:     ergocalciferol (ERGOCALCIFEROL) 1 25 MG (53342 UT) capsule, Take 50,000 Units by mouth, Disp: , Rfl:     fenofibrate (TRICOR) 54 MG tablet, TAKE 1 TABLET (54 MG TOTAL) BY MOUTH DAILY  , Disp: , Rfl:     folic acid (FOLVITE) 1 mg tablet, Take 1,000 mcg by mouth daily, Disp: , Rfl:     glucose blood test strip, USE TO MONITOR BG 3 TIMES A DAY   E11 9, Disp: , Rfl:     HYDROcodone-acetaminophen (NORCO) 5-325 mg per tablet, Take 1 tablet by mouth every 6 (six) hours as needed for painMax Daily Amount: 4 tablets, Disp: 7 tablet, Rfl: 0   hydrOXYzine HCL (ATARAX) 50 mg tablet, Take 50 mg by mouth daily at bedtime, Disp: , Rfl: 0    hydrOXYzine HCL (ATARAX) 50 mg tablet, Take 50 mg by mouth every morning, Disp: , Rfl:     lamoTRIgine (LaMICtal) 200 MG tablet, Take 200 mg by mouth every morning, Disp: , Rfl:     levothyroxine 50 mcg tablet, Take 50 mcg by mouth daily, Disp: , Rfl:     melatonin 3 mg, Take 3 mg by mouth daily at bedtime as needed, Disp: , Rfl:     metFORMIN (GLUCOPHAGE) 500 mg tablet, Take 1 tablet (500 mg total) by mouth 2 (two) times a day with meals, Disp: 60 tablet, Rfl: 0    methocarbamol (ROBAXIN) 750 mg tablet, TAKE 1 TABLET (750 MG TOTAL) BY MOUTH 4 (FOUR) TIMES A DAY AS NEEDED FOR MUSCLE SPASMS , Disp: , Rfl:     multivitamin (THERAGRAN) TABS, Take 1 tablet by mouth daily  (Patient not taking: Reported on 2/16/2022 ), Disp: , Rfl:     nystatin (MYCOSTATIN) powder, DAILY AFTER SHOWER TO AFFECTED AREAS (Patient not taking: Reported on 2/16/2022), Disp: , Rfl:     ondansetron (ZOFRAN-ODT) 4 mg disintegrating tablet, , Disp: , Rfl:     pantoprazole (PROTONIX) 40 mg tablet, Take 40 mg by mouth daily, Disp: , Rfl:     senna (SENOKOT) 8 6 mg, Take 1 tablet (8 6 mg total) by mouth daily (Patient not taking: Reported on 2/16/2022 ), Disp: 5 tablet, Rfl: 0    SUMAtriptan Succinate (IMITREX STATDOSE REFILL) 6 MG/0 5ML SOCT, Inject 6 mg under the skin every 2 (two) hours as needed, Disp: , Rfl:     Ultra Thin Lancets 31G MISC, USE TO MONITOR BLOOD GLUCOSE 3 TIMES A DAY AS DIRECTED , Disp: , Rfl:     ziprasidone (GEODON) 80 mg capsule, Take 80 mg by mouth 2 (two) times a day with meals , Disp: , Rfl:   No current facility-administered medications for this visit    Family History   Problem Relation Age of Onset    Hyperlipidemia Mother     Diabetes Mother     Hypertension Mother     Heart attack Father       Review of Systems  Allergies:  Aspirin, Barium iodide, Codeine, Latex, Penicillins, and Vancomycin      Objective:  BP 140/64   Pulse 68   Temp 98 6 °F (37 °C)   Resp 18     Physical Exam        Wound Diabetic Ulcer Foot Plantar (Active)   Wound Image   03/22/22 1119   Wound Description Pink;Yellow 03/22/22 1058   Yvonne-wound Assessment Maceration;Callus 03/22/22 1058   Wound Length (cm) 0 7 cm 03/22/22 1058   Wound Width (cm) 1 1 cm 03/22/22 1058   Wound Depth (cm) 0 2 cm 03/22/22 1058   Wound Surface Area (cm^2) 0 77 cm^2 03/22/22 1058   Wound Volume (cm^3) 0 154 cm^3 03/22/22 1058   Calculated Wound Volume (cm^3) 0 15 cm^3 03/22/22 1058   Change in Wound Size % 6 25 03/22/22 1058   Undermining 0 2 03/22/22 1058   Undermining is depth extending from 11-1 03/22/22 1058   Drainage Amount Moderate 03/22/22 1058   Drainage Description Serosanguineous 03/22/22 1058   Patient Tolerance Tolerated well 03/22/22 1058   Dressing Status Removed 03/22/22 1058                         Debridement   Universal Protocol:  Consent: Verbal consent obtained  Risks and benefits: risks, benefits and alternatives were discussed  Consent given by: patient  Time out: Immediately prior to procedure a "time out" was called to verify the correct patient, procedure, equipment, support staff and site/side marked as required    Patient understanding: patient states understanding of the procedure being performed  Patient identity confirmed: verbally with patient      Performed by: physician  Debridement type: surgical  Level of debridement: subcutaneous tissue  Pain control: lidocaine 5%  Post-debridement measurements  Length (cm): 1  Width (cm): 1 4  Depth (cm): 0 3  Percent debrided: 100%  Surface Area (cm^2): 1 4  Area debrided (cm^2): 1 4  Volume (cm^3): 0 42  Tissue and other material debrided: dermis, epidermis and subcutaneous tissue  Devitalized tissue debrided: biofilm, callus, clots, exudate, fibrin, necrotic debris, slough and eschar  Instrument(s) utilized: blade  Bleeding: medium  Hemostasis obtained with: pressure  Procedural pain (0-10): 0  Post-procedural pain: 0   Response to treatment: procedure was tolerated well                   Results from last 6 Months   Lab Units 02/16/22  1631   WOUND CULTURE  2+ Growth of Beta Hemolytic Streptococcus Group G*  3+ Growth of          Wound Instructions:  Orders Placed This Encounter   Procedures    Wound cleansing and dressings     Wound cleansing and dressings                                  Left great toe wound:     Wash your hands with soap and water  Remove old dressing, discard into plastic bag and place in trash  Cleanse the wound with normal saline prior to applying a clean dressing  Do not use tissue or cotton balls  Do not scrub the wound  Pat dry using gauze  Shower no  Do not get dressing wet          Apply alginate AG to the wound bed  Cover with dry gauze  Secure with rolled gauze and ace wrap  (Not tight compression  Wrap lightly)  Change dressing three times a week         Follow up at the wound center in two weeks         Treatment in the wound center today: Cleansed with normal saline and dressed as above     Standing Status:   Future     Standing Expiration Date:   3/22/2023    Debridement     This order was created via procedure documentation         Garland Bence, DPM      Portions of the record may have been created with voice recognition software  Occasional wrong word or "sound a like" substitutions may have occurred due to the inherent limitations of voice recognition software  Read the chart carefully and recognize, using context, where substitutions have occurred

## 2022-04-05 ENCOUNTER — OFFICE VISIT (OUTPATIENT)
Dept: WOUND CARE | Facility: CLINIC | Age: 47
End: 2022-04-05
Payer: COMMERCIAL

## 2022-04-05 VITALS
DIASTOLIC BLOOD PRESSURE: 94 MMHG | HEART RATE: 58 BPM | TEMPERATURE: 97.8 F | RESPIRATION RATE: 16 BRPM | SYSTOLIC BLOOD PRESSURE: 146 MMHG

## 2022-04-05 DIAGNOSIS — L97.522 DIABETIC ULCER OF TOE OF LEFT FOOT ASSOCIATED WITH TYPE 2 DIABETES MELLITUS, WITH FAT LAYER EXPOSED (HCC): Primary | ICD-10-CM

## 2022-04-05 DIAGNOSIS — E11.621 DIABETIC ULCER OF TOE OF LEFT FOOT ASSOCIATED WITH TYPE 2 DIABETES MELLITUS, WITH FAT LAYER EXPOSED (HCC): Primary | ICD-10-CM

## 2022-04-05 PROCEDURE — 11042 DBRDMT SUBQ TIS 1ST 20SQCM/<: CPT | Performed by: PODIATRIST

## 2022-04-05 RX ORDER — LAMOTRIGINE 25 MG/1
25 TABLET ORAL
COMMUNITY

## 2022-04-05 NOTE — PROGRESS NOTES
Patient ID: Aileen Lozada is a 56 y o  female Date of Birth 1975     Diagnosis:  1  Diabetic ulcer of toe of left foot associated with type 2 diabetes mellitus, with fat layer exposed ([address] )  -     Wound cleansing and dressings; Future  -     Debridement       Diagnosis ICD-10-CM Associated Orders   1  Diabetic ulcer of toe of left foot associated with type 2 diabetes mellitus, with fat layer exposed ([address] )  E11 621 Wound cleansing and dressings    L97 522 Debridement        Assessment & Plan:  1  Wound is doing well  Heavy callus rim was removed during debridement today  Check in 2 weeks  Maintain weight on heel with wedge shoe  Chief Complaint   Patient presents with    Follow Up Wound Care Visit     left foot wound           Subjective:   Patient arrives for left hallux ulcer  She feels well        The following portions of the patient's history were reviewed and updated as appropriate:   Patient Active Problem List   Diagnosis    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, with bone involvement without evidence of necrosis ([address] )    H/O bariatric surgery    Anxiety    Obesity    Preoperative clearance    Encounter for smoking cessation counseling    Hypothyroidism    Manic bipolar I disorder ([address] )    Type 2 diabetes mellitus with neurologic complication, without long-term current use of insulin ([address] )    Tobacco abuse    Type 2 diabetes mellitus ([address] )    Acquired absence of other left toe(s) ([address] )    Essential hypertension    Arthritis    Chronic pain    OCD (obsessive compulsive disorder)    PTSD (post-traumatic stress disorder)    Smoking    Right foot ulcer, with fat layer exposed ([address] )    Pain of foot    GERD (gastroesophageal reflux disease)    Migraine without status migrainosus, not intractable    Left foot diabetic ulcer    Obstructive sleep apnea     Past Medical History:   Diagnosis Date    Anxiety     Arthritis     Asthma  Chronic pain     [facility]     Disease of thyroid gland     Lymphedema     left leg    Manic bipolar I disorder (HCC)     Morbid obesity (HCC)     OCD (obsessive compulsive disorder)     PTSD (post-traumatic stress disorder)      Past Surgical History:   Procedure Laterality Date    CHOLECYSTECTOMY      CHOLECYSTECTOMY      ME AMPUTATION FOOT,TRANSMETATARSAL Right 9/4/2021    Procedure: AMPUTATION TRANSMETATARSAL (TMA);  Surgeon: Tristin Turk DPM;  [facility];  Service: Podiatry    ME AMPUTATION METATARSAL+TOE,SINGLE Left 9/8/2019    Procedure: PARTIAL 3RD RAY RESECTION, PSB TOE FILLET FLAP;  Surgeon: Nancy De La Cruz DPM;  [facility];  Service: Podiatry    TOE AMPUTATION Right 1/13/2021    Procedure: AMPUTATION 2ND TOE;  Surgeon: Tristin Turk DPM;  [facility];  Service: Podiatry    WOUND DEBRIDEMENT Left 2/17/2022    Procedure: DEBRIDEMENT GREAT TOE WOUND;  Surgeon: Tristin Turk DPM;  [facility];  Service: Podiatry     Social History     Socioeconomic History    Marital status: Single     Spouse name: None    Number of children: None    Years of education: None    Highest education level: None   Occupational History    None   Tobacco Use    Smoking status: Former Smoker     Packs/day: 0 50     Years: 15 00     Pack years: 7 50    Smokeless tobacco: Never Used   Vaping Use    Vaping Use: Some days   Substance and Sexual Activity    Alcohol use: Never    Drug use: Never    Sexual activity: Not Currently   Other Topics Concern    None   Social History Narrative    None     Social Determinants of Health     Financial Resource Strain: Not on file   Food Insecurity: No Food Insecurity    Worried About Running Out of Food in the Last Year: Never true    [place] of Food in the Last Year: Never true   Transportation Needs: No Transportation Needs    Lack of Transportation (Medical):  No    Lack of Transportation (Non-Medical): No   Physical Activity: Not on file   Stress: Not on file   Social Connections: Not on file   Intimate Partner Violence: Not on file   Housing Stability: Low Risk     Unable to Pay for Housing in the Last Year: No    Number of Places Lived in the Last Year: 1    Unstable Housing in the Last Year: No        Current Outpatient Medications:     lamoTRIgine (LaMICtal) 25 mg tablet, Take 25 mg by mouth daily at bedtime, Disp: , Rfl:     acetaminophen (TYLENOL) 650 mg CR tablet, Take 1 tablet (650 mg total) by mouth every 8 (eight) hours as needed for mild pain, Disp: 30 tablet, Rfl: 0    albuterol (PROVENTIL HFA,VENTOLIN HFA) 90 mcg/act inhaler, Inhale 2 puffs every 6 (six) hours as needed for wheezing (Patient not taking: Reported on 2/16/2022 ), Disp: , Rfl:     cholecalciferol (VITAMIN D3) 1,000 units tablet, Take by mouth once a week  (Patient not taking: Reported on 2/16/2022 ), Disp: , Rfl:     cloNIDine (CATAPRES) 0 2 mg tablet, Take 0 2 mg by mouth 2 (two) times a day, Disp: , Rfl:     docusate sodium (COLACE) 100 mg capsule, Take 1 capsule (100 mg total) by mouth 2 (two) times a day, Disp: 10 capsule, Rfl: 0    EASY COMFORT PEN NEEDLES 32G X 4 MM MISC, , Disp: , Rfl:     EASY TOUCH INSULIN SYRINGE 31G X 5/16" 0 3 ML MISC, , Disp: , Rfl:     ergocalciferol (ERGOCALCIFEROL) 1 25 MG (37526 UT) capsule, Take 50,000 Units by mouth, Disp: , Rfl:     fenofibrate (TRICOR) 54 MG tablet, TAKE 1 TABLET (54 MG TOTAL) BY MOUTH DAILY  , Disp: , Rfl:     folic acid (FOLVITE) 1 mg tablet, Take 1,000 mcg by mouth daily, Disp: , Rfl:     glucose blood test strip, USE TO MONITOR BG 3 TIMES A DAY   E11 9, Disp: , Rfl:     HYDROcodone-acetaminophen (NORCO) 5-325 mg per tablet, Take 1 tablet by mouth every 6 (six) hours as needed for painMax Daily Amount: 4 tablets, Disp: 7 tablet, Rfl: 0    hydrOXYzine HCL (ATARAX) 50 mg tablet, Take 50 mg by mouth daily at bedtime, Disp: , Rfl: 0    hydrOXYzine HCL (ATARAX) 50 mg tablet, Take 50 mg by mouth every morning, Disp: , Rfl:     lamoTRIgine (LaMICtal) 200 MG tablet, Take 200 mg by mouth every morning, Disp: , Rfl:     levothyroxine 50 mcg tablet, Take 50 mcg by mouth daily, Disp: , Rfl:     melatonin 3 mg, Take 3 mg by mouth daily at bedtime as needed, Disp: , Rfl:     metFORMIN (GLUCOPHAGE) 500 mg tablet, Take 1 tablet (500 mg total) by mouth 2 (two) times a day with meals, Disp: 60 tablet, Rfl: 0    methocarbamol (ROBAXIN) 750 mg tablet, TAKE 1 TABLET (750 MG TOTAL) BY MOUTH 4 (FOUR) TIMES A DAY AS NEEDED FOR MUSCLE SPASMS , Disp: , Rfl:     multivitamin (THERAGRAN) TABS, Take 1 tablet by mouth daily  (Patient not taking: Reported on 2/16/2022 ), Disp: , Rfl:     nystatin (MYCOSTATIN) powder, DAILY AFTER SHOWER TO AFFECTED AREAS (Patient not taking: Reported on 2/16/2022), Disp: , Rfl:     ondansetron (ZOFRAN-ODT) 4 mg disintegrating tablet, , Disp: , Rfl:     pantoprazole (PROTONIX) 40 mg tablet, Take 40 mg by mouth daily, Disp: , Rfl:     senna (SENOKOT) 8 6 mg, Take 1 tablet (8 6 mg total) by mouth daily (Patient not taking: Reported on 2/16/2022 ), Disp: 5 tablet, Rfl: 0    SUMAtriptan Succinate (IMITREX STATDOSE REFILL) 6 MG/0 5ML SOCT, Inject 6 mg under the skin every 2 (two) hours as needed, Disp: , Rfl:     Ultra Thin Lancets 31G MISC, USE TO MONITOR BLOOD GLUCOSE 3 TIMES A DAY AS DIRECTED , Disp: , Rfl:     ziprasidone (GEODON) 80 mg capsule, Take 80 mg by mouth 2 (two) times a day with meals , Disp: , Rfl:   Family History   Problem Relation Age of Onset    Hyperlipidemia Mother     Diabetes Mother     Hypertension Mother     Heart attack Father       Review of Systems  Allergies:  Aspirin, Barium iodide, Codeine, Latex, Penicillins, and Vancomycin      Objective:  /94   Pulse 58   Temp 97 8 °F (36 6 °C)   Resp 16     Physical Exam        Wound Diabetic Ulcer Foot Plantar;Left (Active)   Wound Image   04/05/22 1139   Wound Description [place] 04/05/22 1135   Yvonne-wound Assessment Callus;Dry 04/05/22 1135   Wound Length (cm) 0 3 cm 04/05/22 1135   Wound Width (cm) 0 5 cm 04/05/22 1135   Wound Depth (cm) 0 2 cm 04/05/22 1135   Wound Surface Area (cm^2) 0 15 cm^2 04/05/22 1135   Wound Volume (cm^3) 0 03 cm^3 04/05/22 1135   Calculated Wound Volume (cm^3) 0 03 cm^3 04/05/22 1135   Change in Wound Size % 81 25 04/05/22 1135   Undermining 0 2 04/05/22 1135   Undermining is depth extending from 11-2 04/05/22 1135   Drainage Amount Scant 04/05/22 1135   Drainage Description Tan 04/05/22 1135   Treatments Irrigation with NSS 04/05/22 1135   Patient Tolerance Tolerated well 03/22/22 1058   Dressing Status Removed 03/22/22 1058                         Debridement   Universal Protocol:  Consent: Verbal consent obtained    Risks and benefits: risks, benefits and alternatives were discussed  Consent given by: patient  Timeout called at: 4/5/2022 11:52 AM   Patient understanding: patient states understanding of the procedure being performed  Patient identity confirmed: verbally with patient      Performed by: physician  Debridement type: surgical  Level of debridement: subcutaneous tissue  Pain control: lidocaine 4%  Post-debridement measurements  Length (cm): 0 8  Width (cm): 0 7  Depth (cm): 0 4  Percent debrided: 100%  Surface Area (cm^2): 0 56  Area debrided (cm^2): 0 56  Volume (cm^3): 0 22  Tissue and other material debrided: dermis, epidermis and subcutaneous tissue  Devitalized tissue debrided: biofilm, callus, clots, exudate, fibrin, necrotic debris and slough  Instrument(s) utilized: curette  Bleeding: medium  Hemostasis obtained with: pressure  Procedural pain (0-10): 1  Post-procedural pain: 0   Response to treatment: procedure was tolerated well           Results from last 6 Months   Lab Units 02/16/22  1631   WOUND CULTURE  2+ Growth of Beta Hemolytic Streptococcus Group G*  3+ Growth of          Wound Instructions:  Orders Placed This Encounter   Procedures    Wound cleansing and dressings                             Left great toe wound:     Wash your hands with soap and water  Remove old dressing, discard into plastic bag and place in trash  Cleanse the wound with normal saline prior to applying a clean dressing  Do not use tissue or cotton balls  Do not scrub the wound  Pat dry using gauze  Shower no  Do not get dressing wet  STOP alginate AG    Apply dermagran to the wound bed  Cover with dry gauze  Secure with rolled gauze  Change dressing three times a week  Wear wedge offloading shoe when ambulating         Follow up at the wound center in two weeks         Treatment in the wound center today: Cleansed with normal saline and dressed as above     Standing Status:   Future     Standing Expiration Date:   4/5/2023    Debridement     This order was created via procedure documentation         Pau Screen, DPM      Portions of the record may have been created with voice recognition software  Occasional wrong word or "sound a like" substitutions may have occurred due to the inherent limitations of voice recognition software  Read the chart carefully and recognize, using context, where substitutions have occurred

## 2022-04-05 NOTE — PATIENT INSTRUCTIONS
Orders Placed This Encounter   Procedures    Wound cleansing and dressings                             Left great toe wound:     Wash your hands with soap and water  Remove old dressing, discard into plastic bag and place in trash  Cleanse the wound with normal saline prior to applying a clean dressing  Do not use tissue or cotton balls  Do not scrub the wound  Pat dry using gauze  Shower no  Do not get dressing wet  STOP alginate AG    Apply dermagran to the wound bed  Cover with dry gauze  Secure with rolled gauze  Change dressing three times a week    Wear wedge offloading shoe when ambulating         Follow up at the wound center in two weeks         Treatment in the wound center today: Cleansed with normal saline and dressed as above     Standing Status:   Future     Standing Expiration Date:   4/5/2023

## 2022-04-19 ENCOUNTER — OFFICE VISIT (OUTPATIENT)
Dept: WOUND CARE | Facility: CLINIC | Age: 47
End: 2022-04-19
Payer: COMMERCIAL

## 2022-04-19 VITALS
HEART RATE: 72 BPM | DIASTOLIC BLOOD PRESSURE: 76 MMHG | SYSTOLIC BLOOD PRESSURE: 122 MMHG | TEMPERATURE: 96.7 F | RESPIRATION RATE: 18 BRPM

## 2022-04-19 DIAGNOSIS — E11.621 DIABETIC ULCER OF TOE OF LEFT FOOT ASSOCIATED WITH TYPE 2 DIABETES MELLITUS, WITH FAT LAYER EXPOSED (HCC): Primary | ICD-10-CM

## 2022-04-19 DIAGNOSIS — L97.522 DIABETIC ULCER OF TOE OF LEFT FOOT ASSOCIATED WITH TYPE 2 DIABETES MELLITUS, WITH FAT LAYER EXPOSED (HCC): Primary | ICD-10-CM

## 2022-04-19 PROCEDURE — 11042 DBRDMT SUBQ TIS 1ST 20SQCM/<: CPT | Performed by: PODIATRIST

## 2022-04-19 RX ORDER — LIDOCAINE 40 MG/G
CREAM TOPICAL ONCE
Status: COMPLETED | OUTPATIENT
Start: 2022-04-19 | End: 2022-04-19

## 2022-04-19 RX ADMIN — LIDOCAINE 1 APPLICATION: 40 CREAM TOPICAL at 11:39

## 2022-04-19 NOTE — PATIENT INSTRUCTIONS
Orders Placed This Encounter   Procedures    Wound cleansing and dressings     Wound cleansing and dressings                               Left great toe wound:     Wash your hands with soap and water  Remove old dressing, discard into plastic bag and place in trash  Cleanse the wound with normal saline prior to applying a clean dressing  Do not use tissue or cotton balls  Do not scrub the wound  Pat dry using gauze  Shower no  Do not get dressing wet  Apply dermagran to the wound bed  Cover with dry gauze  Secure with rolled gauze  Change dressing three times a week    May wear post op shoe when ambulating         Follow up at the wound center in three weeks         Treatment in the wound center today: Cleansed with normal saline and dressed as above     Standing Status:   Future     Standing Expiration Date:   4/19/2023

## 2022-04-19 NOTE — PROGRESS NOTES
Patient ID: Lisset Lara is a 56 y o  female Date of Birth 1975     Diagnosis:  1  Diabetic ulcer of toe of left foot associated with type 2 diabetes mellitus, with fat layer exposed ([address] )  -     lidocaine (LMX) 4 % cream  -     Wound cleansing and dressings; Future  -     Debridement       Diagnosis ICD-10-CM Associated Orders   1  Diabetic ulcer of toe of left foot associated with type 2 diabetes mellitus, with fat layer exposed ([address] )  E11 621 lidocaine (LMX) 4 % cream    L97 522 Wound cleansing and dressings     Debridement        Assessment & Plan:  1  Wound healing well  See debridement below  Check in 2 weeks  Chief Complaint   Patient presents with    Follow Up Wound Care Visit     left foot wound           Subjective:   Patient arrives for left great toe ulcer  She thinks it looks great        The following portions of the patient's history were reviewed and updated as appropriate:   Patient Active Problem List   Diagnosis    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, with bone involvement without evidence of necrosis ([address] )    H/O bariatric surgery    Anxiety    Obesity    Preoperative clearance    Encounter for smoking cessation counseling    Hypothyroidism    Manic bipolar I disorder ([address] )    Type 2 diabetes mellitus with neurologic complication, without long-term current use of insulin ([address] )    Tobacco abuse    Type 2 diabetes mellitus ([address] )    Acquired absence of other left toe(s) ([address] )    Essential hypertension    Arthritis    Chronic pain    OCD (obsessive compulsive disorder)    PTSD (post-traumatic stress disorder)    Smoking    Right foot ulcer, with fat layer exposed ([address] )    Pain of foot    GERD (gastroesophageal reflux disease)    Migraine without status migrainosus, not intractable    Left foot diabetic ulcer    Obstructive sleep apnea     Past Medical History:   Diagnosis Date    Anxiety     Arthritis     Asthma     Chronic pain     [facility]     Disease of thyroid gland     Lymphedema     left leg    Manic bipolar I disorder (HCC)     Morbid obesity (HCC)     OCD (obsessive compulsive disorder)     PTSD (post-traumatic stress disorder)      Past Surgical History:   Procedure Laterality Date    CHOLECYSTECTOMY      CHOLECYSTECTOMY      RI AMPUTATION FOOT,TRANSMETATARSAL Right 9/4/2021    Procedure: AMPUTATION TRANSMETATARSAL (TMA);  Surgeon: Marcie Hurtado DPM;  [facility];  Service: Podiatry    RI AMPUTATION METATARSAL+TOE,SINGLE Left 9/8/2019    Procedure: PARTIAL 3RD RAY RESECTION, PSB TOE FILLET FLAP;  Surgeon: Jennifer Ku DPM;  [facility];  Service: Podiatry    TOE AMPUTATION Right 1/13/2021    Procedure: AMPUTATION 2ND TOE;  Surgeon: Marcie Hurtado DPM;  [facility];  Service: Podiatry    WOUND DEBRIDEMENT Left 2/17/2022    Procedure: DEBRIDEMENT GREAT TOE WOUND;  Surgeon: Marcie Hurtado DPM;  [facility];  Service: Podiatry     Social History     Socioeconomic History    Marital status: Single     Spouse name: None    Number of children: None    Years of education: None    Highest education level: None   Occupational History    None   Tobacco Use    Smoking status: Former Smoker     Packs/day: 0 50     Years: 15 00     Pack years: 7 50    Smokeless tobacco: Never Used   Vaping Use    Vaping Use: Some days   Substance and Sexual Activity    Alcohol use: Never    Drug use: Never    Sexual activity: Not Currently   Other Topics Concern    None   Social History Narrative    None     Social Determinants of Health     Financial Resource Strain: Not on file   Food Insecurity: No Food Insecurity    Worried About Running Out of Food in the Last Year: Never true    Saud of Food in the Last Year: Never true   Transportation Needs: No Transportation Needs    Lack of Transportation (Medical):  No    Lack of Transportation (Non-Medical): No   Physical Activity: Not on file   Stress: Not on file   Social Connections: Not on file   Intimate Partner Violence: Not on file   Housing Stability: Low Risk     Unable to Pay for Housing in the Last Year: No    Number of Places Lived in the Last Year: 1    Unstable Housing in the Last Year: No        Current Outpatient Medications:     acetaminophen (TYLENOL) 650 mg CR tablet, Take 1 tablet (650 mg total) by mouth every 8 (eight) hours as needed for mild pain, Disp: 30 tablet, Rfl: 0    albuterol (PROVENTIL HFA,VENTOLIN HFA) 90 mcg/act inhaler, Inhale 2 puffs every 6 (six) hours as needed for wheezing (Patient not taking: Reported on 2/16/2022 ), Disp: , Rfl:     cholecalciferol (VITAMIN D3) 1,000 units tablet, Take by mouth once a week  (Patient not taking: Reported on 2/16/2022 ), Disp: , Rfl:     cloNIDine (CATAPRES) 0 2 mg tablet, Take 0 2 mg by mouth 2 (two) times a day, Disp: , Rfl:     docusate sodium (COLACE) 100 mg capsule, Take 1 capsule (100 mg total) by mouth 2 (two) times a day, Disp: 10 capsule, Rfl: 0    EASY COMFORT PEN NEEDLES 32G X 4 MM MISC, , Disp: , Rfl:     EASY TOUCH INSULIN SYRINGE 31G X 5/16" 0 3 ML MISC, , Disp: , Rfl:     ergocalciferol (ERGOCALCIFEROL) 1 25 MG (14408 UT) capsule, Take 50,000 Units by mouth, Disp: , Rfl:     fenofibrate (TRICOR) 54 MG tablet, TAKE 1 TABLET (54 MG TOTAL) BY MOUTH DAILY  , Disp: , Rfl:     folic acid (FOLVITE) 1 mg tablet, Take 1,000 mcg by mouth daily, Disp: , Rfl:     glucose blood test strip, USE TO MONITOR BG 3 TIMES A DAY   E11 9, Disp: , Rfl:     HYDROcodone-acetaminophen (NORCO) 5-325 mg per tablet, Take 1 tablet by mouth every 6 (six) hours as needed for painMax Daily Amount: 4 tablets, Disp: 7 tablet, Rfl: 0    hydrOXYzine HCL (ATARAX) 50 mg tablet, Take 50 mg by mouth daily at bedtime, Disp: , Rfl: 0    hydrOXYzine HCL (ATARAX) 50 mg tablet, Take 50 mg by mouth every morning, Disp: , Rfl:    lamoTRIgine (LaMICtal) 200 MG tablet, Take 200 mg by mouth every morning, Disp: , Rfl:     lamoTRIgine (LaMICtal) 25 mg tablet, Take 25 mg by mouth daily at bedtime, Disp: , Rfl:     levothyroxine 50 mcg tablet, Take 50 mcg by mouth daily, Disp: , Rfl:     melatonin 3 mg, Take 3 mg by mouth daily at bedtime as needed, Disp: , Rfl:     metFORMIN (GLUCOPHAGE) 500 mg tablet, Take 1 tablet (500 mg total) by mouth 2 (two) times a day with meals, Disp: 60 tablet, Rfl: 0    methocarbamol (ROBAXIN) 750 mg tablet, TAKE 1 TABLET (750 MG TOTAL) BY MOUTH 4 (FOUR) TIMES A DAY AS NEEDED FOR MUSCLE SPASMS , Disp: , Rfl:     multivitamin (THERAGRAN) TABS, Take 1 tablet by mouth daily  (Patient not taking: Reported on 2/16/2022 ), Disp: , Rfl:     nystatin (MYCOSTATIN) powder, DAILY AFTER SHOWER TO AFFECTED AREAS (Patient not taking: Reported on 2/16/2022), Disp: , Rfl:     ondansetron (ZOFRAN-ODT) 4 mg disintegrating tablet, , Disp: , Rfl:     pantoprazole (PROTONIX) 40 mg tablet, Take 40 mg by mouth daily, Disp: , Rfl:     senna (SENOKOT) 8 6 mg, Take 1 tablet (8 6 mg total) by mouth daily (Patient not taking: Reported on 2/16/2022 ), Disp: 5 tablet, Rfl: 0    SUMAtriptan Succinate (IMITREX STATDOSE REFILL) 6 MG/0 5ML SOCT, Inject 6 mg under the skin every 2 (two) hours as needed, Disp: , Rfl:     Ultra Thin Lancets 31G MISC, USE TO MONITOR BLOOD GLUCOSE 3 TIMES A DAY AS DIRECTED , Disp: , Rfl:     ziprasidone (GEODON) 80 mg capsule, Take 80 mg by mouth 2 (two) times a day with meals , Disp: , Rfl:   No current facility-administered medications for this visit    Family History   Problem Relation Age of Onset    Hyperlipidemia Mother     Diabetes Mother     Hypertension Mother     Heart attack Father       Review of Systems  Allergies:  Aspirin, Barium iodide, Codeine, Latex, Penicillins, and Vancomycin      Objective:  /76   Pulse 72   Temp (!) 96 7 °F (35 9 °C)   Resp 18     Physical Exam        Wound Diabetic Ulcer Foot Plantar;Left (Active)   Wound Image   04/19/22 1130   Wound Description Pink 04/19/22 1131   Yvonne-wound Assessment Intact;Dry 04/19/22 1131   Wound Length (cm) 0 1 cm 04/19/22 1131   Wound Width (cm) 0 3 cm 04/19/22 1131   Wound Depth (cm) 0 2 cm 04/19/22 1131   Wound Surface Area (cm^2) 0 03 cm^2 04/19/22 1131   Wound Volume (cm^3) 0 006 cm^3 04/19/22 1131   Calculated Wound Volume (cm^3) 0 01 cm^3 04/19/22 1131   Change in Wound Size % 93 75 04/19/22 1131   Undermining 0 2 04/05/22 1135   Undermining is depth extending from 11-2 04/05/22 1135   Drainage Amount Scant 04/19/22 1131   Drainage Description Serosanguineous 04/19/22 1131   Non-staged Wound Description Full thickness 04/19/22 1131   Treatments Irrigation with NSS 04/05/22 1135   Patient Tolerance Tolerated well 03/22/22 1058   Dressing Status Removed 03/22/22 1058                         Debridement   Universal Protocol:  Consent: Verbal consent obtained    Risks and benefits: risks, benefits and alternatives were discussed  Consent given by: patient  Timeout called at: 4/19/2022 11:44 AM   Patient understanding: patient states understanding of the procedure being performed  Patient identity confirmed: verbally with patient      Performed by: physician  Debridement type: surgical  Level of debridement: subcutaneous tissue  Pain control: lidocaine 4%  Post-debridement measurements  Length (cm): 0 8  Width (cm): 1  Depth (cm): 0 3  Percent debrided: 100%  Surface Area (cm^2): 0 8  Area debrided (cm^2): 0 8  Volume (cm^3): 0 24  Tissue and other material debrided: dermis, epidermis and subcutaneous tissue  Devitalized tissue debrided: biofilm, callus, fibrin, necrotic debris and slough  Instrument(s) utilized: curette  Bleeding: medium  Hemostasis obtained with: silver nitrate  Procedural pain (0-10): 0  Post-procedural pain: 0   Response to treatment: procedure was tolerated well                   Results from last 6 Months   Lab Units 02/16/22  1631   WOUND CULTURE  2+ Growth of Beta Hemolytic Streptococcus Group G*  3+ Growth of          Wound Instructions:  Orders Placed This Encounter   Procedures    Wound cleansing and dressings     Wound cleansing and dressings                               Left great toe wound:     Wash your hands with soap and water  Remove old dressing, discard into plastic bag and place in trash  Cleanse the wound with normal saline prior to applying a clean dressing  Do not use tissue or cotton balls  Do not scrub the wound  Pat dry using gauze  Shower no  Do not get dressing wet  Apply dermagran to the wound bed  Cover with dry gauze  Secure with rolled gauze  Change dressing three times a week  May wear post op shoe when ambulating         Follow up at the wound center in three weeks         Treatment in the wound center today: Cleansed with normal saline and dressed as above     Standing Status:   Future     Standing Expiration Date:   4/19/2023    Debridement     This order was created via procedure documentation         Lalitha Prado DPM      Portions of the record may have been created with voice recognition software  Occasional wrong word or "sound a like" substitutions may have occurred due to the inherent limitations of voice recognition software  Read the chart carefully and recognize, using context, where substitutions have occurred

## 2022-05-10 ENCOUNTER — OFFICE VISIT (OUTPATIENT)
Dept: WOUND CARE | Facility: CLINIC | Age: 47
End: 2022-05-10
Payer: COMMERCIAL

## 2022-05-10 VITALS
HEART RATE: 72 BPM | SYSTOLIC BLOOD PRESSURE: 102 MMHG | TEMPERATURE: 97.7 F | RESPIRATION RATE: 16 BRPM | DIASTOLIC BLOOD PRESSURE: 62 MMHG

## 2022-05-10 DIAGNOSIS — E11.621 DIABETIC ULCER OF TOE OF LEFT FOOT ASSOCIATED WITH TYPE 2 DIABETES MELLITUS, WITH FAT LAYER EXPOSED (HCC): Primary | ICD-10-CM

## 2022-05-10 DIAGNOSIS — L97.522 DIABETIC ULCER OF TOE OF LEFT FOOT ASSOCIATED WITH TYPE 2 DIABETES MELLITUS, WITH FAT LAYER EXPOSED (HCC): Primary | ICD-10-CM

## 2022-05-10 PROCEDURE — 11042 DBRDMT SUBQ TIS 1ST 20SQCM/<: CPT | Performed by: PODIATRIST

## 2022-05-10 RX ORDER — LIDOCAINE 40 MG/G
CREAM TOPICAL ONCE
Status: COMPLETED | OUTPATIENT
Start: 2022-05-10 | End: 2022-05-10

## 2022-05-10 RX ADMIN — LIDOCAINE 1 APPLICATION: 40 CREAM TOPICAL at 10:34

## 2022-05-10 NOTE — PROGRESS NOTES
Patient ID: Skylar Schwab is a 56 y o  female Date of Birth 1975     Diagnosis:  1  Diabetic ulcer of toe of left foot associated with type 2 diabetes mellitus, with fat layer exposed ([address] )  -     lidocaine (LMX) 4 % cream  -     Wound cleansing and dressings; Future  -     Debridement       Diagnosis ICD-10-CM Associated Orders   1  Diabetic ulcer of toe of left foot associated with type 2 diabetes mellitus, with fat layer exposed ([address] )  E11 621 lidocaine (LMX) 4 % cream    L97 522 Wound cleansing and dressings     Debridement        Assessment & Plan:  Wound is slightly larger this week  I would like her to go back into the wedge shoe  Gait training discussed with patient to keep left foot extended in front to as maintain weight on the heel  See debridement below  Check in 1 week  Chief Complaint   Patient presents with    Follow Up Wound Care Visit     left great toe wound           Subjective:   Patient returns for care of left great toe ulcer  She stopped using the wedge shoe has been using a surgical shoe but the wound has gotten bigger        The following portions of the patient's history were reviewed and updated as appropriate:   Patient Active Problem List   Diagnosis    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, with bone involvement without evidence of necrosis ([address] )    H/O bariatric surgery    Anxiety    Obesity    Preoperative clearance    Encounter for smoking cessation counseling    Hypothyroidism    Manic bipolar I disorder ([address] )    Type 2 diabetes mellitus with neurologic complication, without long-term current use of insulin ([address] )    Tobacco abuse    Type 2 diabetes mellitus ([address] )    Acquired absence of other left toe(s) ([address] )    Essential hypertension    Arthritis    Chronic pain    OCD (obsessive compulsive disorder)    PTSD (post-traumatic stress disorder)    Smoking    Right foot ulcer, with fat layer exposed ([address] )  Pain of foot    GERD (gastroesophageal reflux disease)    Migraine without status migrainosus, not intractable    Left foot diabetic ulcer    Obstructive sleep apnea     Past Medical History:   Diagnosis Date    Anxiety     Arthritis     Asthma     Chronic pain     Diabetes mellitus [facility]     Disease of thyroid gland     Lymphedema     left leg    Manic bipolar I disorder ([facility] )     Morbid obesity (HCC)     OCD (obsessive compulsive disorder)     PTSD (post-traumatic stress disorder)      Past Surgical History:   Procedure Laterality Date    CHOLECYSTECTOMY      CHOLECYSTECTOMY      NE AMPUTATION FOOT,TRANSMETATARSAL Right 9/4/2021    Procedure: AMPUTATION TRANSMETATARSAL (TMA);  Surgeon: Janessa Ngo DPM;  [facility];  Service: Podiatry    NE AMPUTATION METATARSAL+TOE,SINGLE Left 9/8/2019    Procedure: PARTIAL 3RD RAY RESECTION, PSB TOE FILLET FLAP;  Surgeon: Matt Nicole DPM;  [facility];  Service: Podiatry    TOE AMPUTATION Right 1/13/2021    Procedure: AMPUTATION 2ND TOE;  Surgeon: Janessa Ngo DPM;  [facility];  Service: Podiatry    WOUND DEBRIDEMENT Left 2/17/2022    Procedure: DEBRIDEMENT GREAT TOE WOUND;  Surgeon: Janessa Ngo DPM;  [facility];  Service: Podiatry     Social History     Socioeconomic History    Marital status: Single     Spouse name: Not on file    Number of children: Not on file    Years of education: Not on file    Highest education level: Not on file   Occupational History    Not on file   Tobacco Use    Smoking status: Former Smoker     Packs/day: 0 50     Years: 15 00     Pack years: 7 50    Smokeless tobacco: Never Used   Vaping Use    Vaping Use: Some days   Substance and Sexual Activity    Alcohol use: Never    Drug use: Never    Sexual activity: Not Currently   Other Topics Concern    Not on file   Social History Narrative    Not on file     Social Determinants of Health Financial Resource Strain: Not on file   Food Insecurity: No Food Insecurity    Worried About Running Out of Food in the Last Year: Never true    Suad of Food in the Last Year: Never true   Transportation Needs: No Transportation Needs    Lack of Transportation (Medical): No    Lack of Transportation (Non-Medical): No   Physical Activity: Not on file   Stress: Not on file   Social Connections: Not on file   Intimate Partner Violence: Not on file   Housing Stability: Low Risk     Unable to Pay for Housing in the Last Year: No    Number of Places Lived in the Last Year: 1    Unstable Housing in the Last Year: No        Current Outpatient Medications:     acetaminophen (TYLENOL) 650 mg CR tablet, Take 1 tablet (650 mg total) by mouth every 8 (eight) hours as needed for mild pain, Disp: 30 tablet, Rfl: 0    albuterol (PROVENTIL HFA,VENTOLIN HFA) 90 mcg/act inhaler, Inhale 2 puffs every 6 (six) hours as needed for wheezing (Patient not taking: Reported on 2/16/2022 ), Disp: , Rfl:     cholecalciferol (VITAMIN D3) 1,000 units tablet, Take by mouth once a week  (Patient not taking: Reported on 2/16/2022 ), Disp: , Rfl:     cloNIDine (CATAPRES) 0 2 mg tablet, Take 0 2 mg by mouth 2 (two) times a day, Disp: , Rfl:     docusate sodium (COLACE) 100 mg capsule, Take 1 capsule (100 mg total) by mouth 2 (two) times a day, Disp: 10 capsule, Rfl: 0    EASY COMFORT PEN NEEDLES 32G X 4 MM MISC, , Disp: , Rfl:     EASY TOUCH INSULIN SYRINGE 31G X 5/16" 0 3 ML MISC, , Disp: , Rfl:     ergocalciferol (ERGOCALCIFEROL) 1 25 MG (66095 UT) capsule, Take 50,000 Units by mouth, Disp: , Rfl:     fenofibrate (TRICOR) 54 MG tablet, TAKE 1 TABLET (54 MG TOTAL) BY MOUTH DAILY  , Disp: , Rfl:     folic acid (FOLVITE) 1 mg tablet, Take 1,000 mcg by mouth daily, Disp: , Rfl:     glucose blood test strip, USE TO MONITOR BG 3 TIMES A DAY   E11 9, Disp: , Rfl:     HYDROcodone-acetaminophen (NORCO) 5-325 mg per tablet, Take 1 tablet by mouth every 6 (six) hours as needed for painMax Daily Amount: 4 tablets, Disp: 7 tablet, Rfl: 0    hydrOXYzine HCL (ATARAX) 50 mg tablet, Take 50 mg by mouth daily at bedtime, Disp: , Rfl: 0    hydrOXYzine HCL (ATARAX) 50 mg tablet, Take 50 mg by mouth every morning, Disp: , Rfl:     lamoTRIgine (LaMICtal) 200 MG tablet, Take 200 mg by mouth every morning, Disp: , Rfl:     lamoTRIgine (LaMICtal) 25 mg tablet, Take 25 mg by mouth daily at bedtime, Disp: , Rfl:     levothyroxine 50 mcg tablet, Take 50 mcg by mouth daily, Disp: , Rfl:     melatonin 3 mg, Take 3 mg by mouth daily at bedtime as needed, Disp: , Rfl:     metFORMIN (GLUCOPHAGE) 500 mg tablet, Take 1 tablet (500 mg total) by mouth 2 (two) times a day with meals, Disp: 60 tablet, Rfl: 0    methocarbamol (ROBAXIN) 750 mg tablet, TAKE 1 TABLET (750 MG TOTAL) BY MOUTH 4 (FOUR) TIMES A DAY AS NEEDED FOR MUSCLE SPASMS , Disp: , Rfl:     multivitamin (THERAGRAN) TABS, Take 1 tablet by mouth daily  (Patient not taking: Reported on 2/16/2022 ), Disp: , Rfl:     nystatin (MYCOSTATIN) powder, DAILY AFTER SHOWER TO AFFECTED AREAS (Patient not taking: Reported on 2/16/2022), Disp: , Rfl:     ondansetron (ZOFRAN-ODT) 4 mg disintegrating tablet, , Disp: , Rfl:     pantoprazole (PROTONIX) 40 mg tablet, Take 40 mg by mouth daily, Disp: , Rfl:     senna (SENOKOT) 8 6 mg, Take 1 tablet (8 6 mg total) by mouth daily (Patient not taking: Reported on 2/16/2022 ), Disp: 5 tablet, Rfl: 0    SUMAtriptan Succinate (IMITREX STATDOSE REFILL) 6 MG/0 5ML SOCT, Inject 6 mg under the skin every 2 (two) hours as needed, Disp: , Rfl:     Ultra Thin Lancets 31G MISC, USE TO MONITOR BLOOD GLUCOSE 3 TIMES A DAY AS DIRECTED , Disp: , Rfl:     ziprasidone (GEODON) 80 mg capsule, Take 80 mg by mouth 2 (two) times a day with meals , Disp: , Rfl:   No current facility-administered medications for this visit    Family History   Problem Relation Age of Onset    Hyperlipidemia Mother     Diabetes Mother     Hypertension Mother     Heart attack Father       Review of Systems  Allergies:  Aspirin, Barium iodide, Codeine, Latex, Penicillins, and Vancomycin      Objective:  /62   Pulse 72   Temp 97 7 °F (36 5 °C)   Resp 16     Physical Exam  Vitals reviewed  Constitutional:       Appearance: She is obese  Cardiovascular:      Pulses: Normal pulses  Skin:     Capillary Refill: Capillary refill takes less than 2 seconds  Findings: No erythema  Comments: Plantar hallux left with heavy callus  No SOI             Wound Diabetic Ulcer Foot Plantar;Left (Active)   Wound Image   05/10/22 1051   Wound Description Pink 05/10/22 1024   Yvonne-wound Assessment Intact;Dry; White 05/10/22 1024   Wound Length (cm) 0 1 cm 05/10/22 1024   Wound Width (cm) 0 5 cm 05/10/22 1024   Wound Depth (cm) 0 3 cm 05/10/22 1024   Wound Surface Area (cm^2) 0 05 cm^2 05/10/22 1024   Wound Volume (cm^3) 0 015 cm^3 05/10/22 1024   Calculated Wound Volume (cm^3) 0 02 cm^3 05/10/22 1024   Change in Wound Size % 87 5 05/10/22 1024   Undermining 0 3 05/10/22 1024   Undermining is depth extending from 11-2 05/10/22 1024   Drainage Amount Small 05/10/22 1024   Drainage Description Serosanguineous 05/10/22 1024   Non-staged Wound Description Full thickness 05/10/22 1024   Treatments Irrigation with NSS 04/05/22 1135   Patient Tolerance Tolerated well 03/22/22 1058   Dressing Status Removed 03/22/22 1058                         Debridement   Wound Diabetic Ulcer Foot Plantar;Left    Universal Protocol:  Consent: Verbal consent obtained    Risks and benefits: risks, benefits and alternatives were discussed  Consent given by: patient  Timeout called at: 5/10/2022 10:59 AM   Patient understanding: patient states understanding of the procedure being performed  Patient identity confirmed: verbally with patient      Performed by: physician  Debridement type: surgical  Level of debridement: subcutaneous tissue  Pain control: lidocaine 4%  Post-debridement measurements  Length (cm): 0 7  Width (cm): 1  Depth (cm): 0 4  Percent debrided: 100%  Surface Area (cm^2): 0 7  Area debrided (cm^2): 0 7  Volume (cm^3): 0 28  Tissue and other material debrided: adipose, dermis, epidermis and subcutaneous tissue  Devitalized tissue debrided: biofilm, callus, exudate, fibrin, necrotic debris and slough  Instrument(s) utilized: blade  Bleeding: medium  Hemostasis obtained with: pressure  Procedural pain (0-10): insensate  Post-procedural pain: insensate   Response to treatment: procedure was tolerated well                   Results from last 6 Months   Lab Units 02/16/22  1631   WOUND CULTURE  2+ Growth of Beta Hemolytic Streptococcus Group G*  3+ Growth of          Wound Instructions:  Orders Placed This Encounter   Procedures    Wound cleansing and dressings     Wound cleansing and dressings                                                                 Left great toe wound:     Wash your hands with soap and water  Remove old dressing, discard into plastic bag and place in trash  Cleanse the wound with normal saline prior to applying a clean dressing  Do not use tissue or cotton balls  Do not scrub the wound  Pat dry using gauze  Shower no  Do not get dressing wet  Apply calcium alginate to the wound bed  Cover with dry gauze  Secure with rolled gauze  Change dressing three times a week  Wear wedge shoe when ambulating       Follow up at the wound center in one week         Treatment in the wound center today: Cleansed with normal saline and dressed as above     Standing Status:   Future     Standing Expiration Date:   5/10/2023    Debridement     This order was created via procedure documentation         Malcolm Izaguirre DPM      Portions of the record may have been created with voice recognition software   Occasional wrong word or "sound a like" substitutions may have occurred due to the inherent limitations of voice recognition software  Read the chart carefully and recognize, using context, where substitutions have occurred

## 2022-05-10 NOTE — PATIENT INSTRUCTIONS
Orders Placed This Encounter   Procedures    Wound cleansing and dressings     Wound cleansing and dressings                                                                 Left great toe wound:     Wash your hands with soap and water  Remove old dressing, discard into plastic bag and place in trash  Cleanse the wound with normal saline prior to applying a clean dressing  Do not use tissue or cotton balls  Do not scrub the wound  Pat dry using gauze  Shower no  Do not get dressing wet  Apply calcium alginate to the wound bed  Cover with dry gauze  Secure with rolled gauze  Change dressing three times a week    Wear wedge shoe when ambulating       Follow up at the wound center in one week         Treatment in the wound center today: Cleansed with normal saline and dressed as above     Standing Status:   Future     Standing Expiration Date:   5/10/2023

## 2022-05-17 ENCOUNTER — OFFICE VISIT (OUTPATIENT)
Dept: WOUND CARE | Facility: CLINIC | Age: 47
End: 2022-05-17
Payer: COMMERCIAL

## 2022-05-17 VITALS
SYSTOLIC BLOOD PRESSURE: 110 MMHG | DIASTOLIC BLOOD PRESSURE: 86 MMHG | TEMPERATURE: 98.1 F | RESPIRATION RATE: 18 BRPM | HEART RATE: 68 BPM

## 2022-05-17 DIAGNOSIS — L97.511 DIABETIC ULCER OF OTHER PART OF RIGHT FOOT ASSOCIATED WITH DIABETES MELLITUS DUE TO UNDERLYING CONDITION, LIMITED TO BREAKDOWN OF SKIN (HCC): ICD-10-CM

## 2022-05-17 DIAGNOSIS — E11.621 DIABETIC ULCER OF TOE OF LEFT FOOT ASSOCIATED WITH TYPE 2 DIABETES MELLITUS, WITH FAT LAYER EXPOSED (HCC): Primary | ICD-10-CM

## 2022-05-17 DIAGNOSIS — L97.522 DIABETIC ULCER OF TOE OF LEFT FOOT ASSOCIATED WITH TYPE 2 DIABETES MELLITUS, WITH FAT LAYER EXPOSED (HCC): Primary | ICD-10-CM

## 2022-05-17 DIAGNOSIS — E08.621 DIABETIC ULCER OF OTHER PART OF RIGHT FOOT ASSOCIATED WITH DIABETES MELLITUS DUE TO UNDERLYING CONDITION, LIMITED TO BREAKDOWN OF SKIN (HCC): ICD-10-CM

## 2022-05-17 PROCEDURE — 99213 OFFICE O/P EST LOW 20 MIN: CPT | Performed by: PODIATRIST

## 2022-05-17 PROCEDURE — 11042 DBRDMT SUBQ TIS 1ST 20SQCM/<: CPT | Performed by: PODIATRIST

## 2022-05-17 RX ORDER — LIDOCAINE 40 MG/G
CREAM TOPICAL ONCE
Status: COMPLETED | OUTPATIENT
Start: 2022-05-17 | End: 2022-05-17

## 2022-05-17 RX ADMIN — LIDOCAINE: 40 CREAM TOPICAL at 12:11

## 2022-05-17 NOTE — PATIENT INSTRUCTIONS
Orders Placed This Encounter   Procedures    Wound cleansing and dressings     Wound cleansing and dressings                                            left great toe wound:  Wash your hands with soap and water  Remove old dressing, discard into plastic bag and place in trash  Cleanse the wound with normal saline prior to applying a clean dressing  Do not use tissue or cotton balls  Do not scrub the wound  Pat dry using gauze  Shower no  Do not get dressing wet  STOP calcium alginate and apply Acticoat 3 (sent with patient) to the wound bed  Cover with dry gauze  Secure with rolled gauze and paper tape  Change dressing three times a week  R foot:  Wash your hands with soap and water  Remove old dressing, discard into plastic bag and place in trash  Cleanse the wound with normal saline prior to applying a clean dressing  Do not use tissue or cotton balls  Do not scrub the wound  Pat dry using gauze  Shower no  Do not get dressing wet  Apply dry protective gauze dressing  Secure with rolled gauze and paper tape  Change dressing three times a week  Minimize walking but wear wedge shoe when walking on left foot  Wear flat shoe on right foot  Continue VNA for dressing changes 3 x per week, skipping day patient comes to wound center    Follow up at the wound center in one week          Treatment in the wound center today:   Cleansed with normal saline and dressed as above     Standing Status:   Future     Standing Expiration Date:   5/17/2023

## 2022-05-17 NOTE — PROGRESS NOTES
Patient ID: Lisset Lara is a 56 y o  female Date of Birth 1975     Diagnosis:  1  Diabetic ulcer of toe of left foot associated with type 2 diabetes mellitus, with fat layer exposed ([facility] [address] )  -     lidocaine (LMX) 4 % cream  -     Wound cleansing and dressings; Future  -     Debridement    2  Diabetic ulcer of other part of right foot associated with diabetes mellitus due to underlying condition, limited to breakdown of skin ([facility] [address] )  Comments:  ruptured blister, its dry today  Orders:  -     lidocaine (LMX) 4 % cream  -     Wound cleansing and dressings; Future       Diagnosis ICD-10-CM Associated Orders   1  Diabetic ulcer of toe of left foot associated with type 2 diabetes mellitus, with fat layer exposed ([facility] [address] )  E11 621 lidocaine (LMX) 4 % cream    L97 522 Wound cleansing and dressings     Debridement   2  Diabetic ulcer of other part of right foot associated with diabetes mellitus due to underlying condition, limited to breakdown of skin ([facility])  E08 621 lidocaine (LMX) 4 % cream    L97 511 Wound cleansing and dressings    ruptured blister, its dry today        Assessment & Plan:  1  Left great toe was excisionally debrided, see note below  Overall stable  The wound is better in appearance today since she got back in the wedge shoe  Return in 1 week  Switch dressing to Acticoat  2  She has a new blister on the right foot but it is dry  No open wound or drainage today  A dry dressing and rest   I did check her diabetic shoes  The insert on her right foot seems to be fine  Chief Complaint   Patient presents with    Follow Up Wound Care Visit     Left great toe wound  Presents with new wound on the plantar R foot           Subjective:   Patient arrives for chronic left great toe ulcer  She has a new blister on right foot  She states the blister ruptured over the weekend  She is not sure when the blister came  She states she has been sitting around her house and not moving    She does not know how she got the blister  She got back in the wedge shoe on her left foot        The following portions of the patient's history were reviewed and updated as appropriate:   Patient Active Problem List   Diagnosis    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, with bone involvement without evidence of necrosis ([place] [address] )    H/O bariatric surgery    Anxiety    Obesity    Preoperative clearance    Encounter for smoking cessation counseling    Hypothyroidism    Manic bipolar I disorder ([place] [address] )    Type 2 diabetes mellitus with neurologic complication, without long-term current use of insulin ([place] [address] )    Tobacco abuse    Type 2 diabetes mellitus ([place] [address] )    Acquired absence of other left toe(s) ([place] [address] )    Essential hypertension    Arthritis    Chronic pain    OCD (obsessive compulsive disorder)    PTSD (post-traumatic stress disorder)    Smoking    Right foot ulcer, with fat layer exposed ([place] [address] )    Pain of foot    GERD (gastroesophageal reflux disease)    Migraine without status migrainosus, not intractable    Left foot diabetic ulcer    Obstructive sleep apnea     Past Medical History:   Diagnosis Date    Anxiety     Arthritis     Asthma     Chronic pain     Diabetes mellitus ([place] [address] )     Disease of thyroid gland     Lymphedema     left leg    Manic bipolar I disorder ([place] [address] )     Morbid obesity ([place] [address] )     OCD (obsessive compulsive disorder)     PTSD (post-traumatic stress disorder)      Past Surgical History:   Procedure Laterality Date    CHOLECYSTECTOMY      CHOLECYSTECTOMY      WV AMPUTATION FOOT,TRANSMETATARSAL Right 9/4/2021    Procedure: AMPUTATION TRANSMETATARSAL (TMA);  Surgeon: Stef Alas DPM;  [facility];  Service: Podiatry    WV AMPUTATION METATARSAL+TOE,SINGLE Left 9/8/2019    Procedure: PARTIAL 3RD RAY RESECTION, PSB TOE FILLET FLAP;  Surgeon: Rony Ahuja DPM;  [facility];  Service: Podiatry    TOE AMPUTATION Right 1/13/2021    Procedure: AMPUTATION 2ND TOE;  Surgeon: Denny Connell DPM;  [facility];  Service: Podiatry    WOUND DEBRIDEMENT Left 2/17/2022    Procedure: DEBRIDEMENT GREAT TOE WOUND;  Surgeon: Denny Connell DPM;  [facility];  Service: Podiatry     Social History     Socioeconomic History    Marital status: Single     Spouse name: None    Number of children: None    Years of education: None    Highest education level: None   Occupational History    None   Tobacco Use    Smoking status: Former Smoker     Packs/day: 0 50     Years: 15 00     Pack years: 7 50    Smokeless tobacco: Never Used   Vaping Use    Vaping Use: Some days   Substance and Sexual Activity    Alcohol use: Never    Drug use: Never    Sexual activity: Not Currently   Other Topics Concern    None   Social History Narrative    None     Social Determinants of Health     Financial Resource Strain: Not on file   Food Insecurity: No Food Insecurity    Worried About Running Out of Food in the Last Year: Never true    Saud of Food in the Last Year: Never true   Transportation Needs: No Transportation Needs    Lack of Transportation (Medical): No    Lack of Transportation (Non-Medical):  No   Physical Activity: Not on file   Stress: Not on file   Social Connections: Not on file   Intimate Partner Violence: Not on file   Housing Stability: Low Risk     Unable to Pay for Housing in the Last Year: No    Number of Places Lived in the Last Year: 1    Unstable Housing in the Last Year: No        Current Outpatient Medications:     acetaminophen (TYLENOL) 650 mg CR tablet, Take 1 tablet (650 mg total) by mouth every 8 (eight) hours as needed for mild pain, Disp: 30 tablet, Rfl: 0    albuterol (PROVENTIL HFA,VENTOLIN HFA) 90 mcg/act inhaler, Inhale 2 puffs every 6 (six) hours as needed for wheezing (Patient not taking: Reported on 2/16/2022 ), Disp: , Rfl:     cholecalciferol (VITAMIN D3) 1,000 units tablet, Take by mouth once a week  (Patient not taking: Reported on 2/16/2022 ), Disp: , Rfl:     cloNIDine (CATAPRES) 0 2 mg tablet, Take 0 2 mg by mouth 2 (two) times a day, Disp: , Rfl:     docusate sodium (COLACE) 100 mg capsule, Take 1 capsule (100 mg total) by mouth 2 (two) times a day, Disp: 10 capsule, Rfl: 0    EASY COMFORT PEN NEEDLES 32G X 4 MM MISC, , Disp: , Rfl:     EASY TOUCH INSULIN SYRINGE 31G X 5/16" 0 3 ML MISC, , Disp: , Rfl:     ergocalciferol (ERGOCALCIFEROL) 1 25 MG (95599 UT) capsule, Take 50,000 Units by mouth, Disp: , Rfl:     fenofibrate (TRICOR) 54 MG tablet, TAKE 1 TABLET (54 MG TOTAL) BY MOUTH DAILY  , Disp: , Rfl:     folic acid (FOLVITE) 1 mg tablet, Take 1,000 mcg by mouth daily, Disp: , Rfl:     glucose blood test strip, USE TO MONITOR BG 3 TIMES A DAY   E11 9, Disp: , Rfl:     HYDROcodone-acetaminophen (NORCO) 5-325 mg per tablet, Take 1 tablet by mouth every 6 (six) hours as needed for painMax Daily Amount: 4 tablets, Disp: 7 tablet, Rfl: 0    hydrOXYzine HCL (ATARAX) 50 mg tablet, Take 50 mg by mouth daily at bedtime, Disp: , Rfl: 0    hydrOXYzine HCL (ATARAX) 50 mg tablet, Take 50 mg by mouth every morning, Disp: , Rfl:     lamoTRIgine (LaMICtal) 200 MG tablet, Take 200 mg by mouth every morning, Disp: , Rfl:     lamoTRIgine (LaMICtal) 25 mg tablet, Take 25 mg by mouth daily at bedtime, Disp: , Rfl:     levothyroxine 50 mcg tablet, Take 50 mcg by mouth daily, Disp: , Rfl:     melatonin 3 mg, Take 3 mg by mouth daily at bedtime as needed, Disp: , Rfl:     metFORMIN (GLUCOPHAGE) 500 mg tablet, Take 1 tablet (500 mg total) by mouth 2 (two) times a day with meals, Disp: 60 tablet, Rfl: 0    methocarbamol (ROBAXIN) 750 mg tablet, TAKE 1 TABLET (750 MG TOTAL) BY MOUTH 4 (FOUR) TIMES A DAY AS NEEDED FOR MUSCLE SPASMS , Disp: , Rfl:     multivitamin (THERAGRAN) TABS, Take 1 tablet by mouth daily  (Patient not taking: Reported on 2/16/2022 ), Disp: , Rfl:     nystatin (MYCOSTATIN) powder, DAILY AFTER SHOWER TO AFFECTED AREAS (Patient not taking: Reported on 2/16/2022), Disp: , Rfl:     ondansetron (ZOFRAN-ODT) 4 mg disintegrating tablet, , Disp: , Rfl:     pantoprazole (PROTONIX) 40 mg tablet, Take 40 mg by mouth daily, Disp: , Rfl:     senna (SENOKOT) 8 6 mg, Take 1 tablet (8 6 mg total) by mouth daily (Patient not taking: Reported on 2/16/2022 ), Disp: 5 tablet, Rfl: 0    SUMAtriptan Succinate (IMITREX STATDOSE REFILL) 6 MG/0 5ML SOCT, Inject 6 mg under the skin every 2 (two) hours as needed, Disp: , Rfl:     Ultra Thin Lancets 31G MISC, USE TO MONITOR BLOOD GLUCOSE 3 TIMES A DAY AS DIRECTED , Disp: , Rfl:     ziprasidone (GEODON) 80 mg capsule, Take 80 mg by mouth 2 (two) times a day with meals , Disp: , Rfl:   No current facility-administered medications for this visit    Family History   Problem Relation Age of Onset    Hyperlipidemia Mother     Diabetes Mother     Hypertension Mother     Heart attack Father       Review of Systems  Allergies:  Aspirin, Barium iodide, Codeine, Latex, Penicillins, and Vancomycin      Objective:  /86   Pulse 68   Temp 98 1 °F (36 7 °C)   Resp 18     Physical Exam        Wound Diabetic Ulcer Foot Plantar;Left (Active)   Wound Image   05/17/22 1159   Wound Description [facility] 05/17/22 1144   Yvonne-wound Assessment Intact;Dry 05/17/22 1144   Wound Length (cm) 0 2 cm 05/17/22 1144   Wound Width (cm) 0 5 cm 05/17/22 1144   Wound Depth (cm) 0 3 cm 05/17/22 1144   Wound Surface Area (cm^2) 0 1 cm^2 05/17/22 1144   Wound Volume (cm^3) 0 03 cm^3 05/17/22 1144   Calculated Wound Volume (cm^3) 0 03 cm^3 05/17/22 1144   Change in Wound Size % 81 25 05/17/22 1144   Undermining 0 3 05/10/22 1024   Undermining is depth extending from 11-2 05/10/22 1024   Drainage Amount Scant 05/17/22 1144   Drainage Description Brown 05/17/22 1144   Non-staged Wound Description Full thickness 05/17/22 1144   Treatments Irrigation with NSS 04/05/22 1139 Patient Tolerance Tolerated well 03/22/22 1058   Dressing Status Removed 03/22/22 1058       Wound 05/17/22 Diabetic Ulcer Foot Right;Plantar (Active)   Wound Image   05/17/22 1140   Wound Description Epithelialization;Pink 05/17/22 1143   Yvonne-wound Assessment Callus; Other (Comment) 05/17/22 1143   Wound Length (cm) 2 9 cm 05/17/22 1143   Wound Width (cm) 2 cm 05/17/22 1143   Wound Depth (cm) 0 1 cm 05/17/22 1143   Wound Surface Area (cm^2) 5 8 cm^2 05/17/22 1143   Wound Volume (cm^3) 0 58 cm^3 05/17/22 1143   Calculated Wound Volume (cm^3) 0 58 cm^3 05/17/22 1143   Drainage Amount Scant 05/17/22 1143   Drainage Description Clear 05/17/22 1143   Non-staged Wound Description Partial thickness 05/17/22 1143                         Debridement   Universal Protocol:  Consent: Verbal consent obtained    Risks and benefits: risks, benefits and alternatives were discussed  Consent given by: patient  Timeout called at: 5/17/2022 12:20 PM   Patient understanding: patient states understanding of the procedure being performed  Patient identity confirmed: verbally with patient      Performed by: physician  Debridement type: surgical  Level of debridement: subcutaneous tissue  Pain control: lidocaine 4%  Post-debridement measurements  Length (cm): 0 8  Width (cm): 0 7  Depth (cm): 0 4  Percent debrided: 100%  Surface Area (cm^2): 0 56  Area debrided (cm^2): 0 56  Volume (cm^3): 0 22  Tissue and other material debrided: adipose, dermis, epidermis and subcutaneous tissue  Devitalized tissue debrided: biofilm, callus, exudate, fibrin, necrotic debris and slough  Instrument(s) utilized: blade  Bleeding: medium  Hemostasis obtained with: pressure and silver nitrate  Procedural pain (0-10): 0  Post-procedural pain: 0   Response to treatment: procedure was tolerated well                   Results from last 6 Months   Lab Units 02/16/22  1631   WOUND CULTURE  2+ Growth of Beta Hemolytic Streptococcus Group G*  3+ Growth of Wound Instructions:  Orders Placed This Encounter   Procedures    Wound cleansing and dressings     Wound cleansing and dressings                                            left great toe wound:  Wash your hands with soap and water  Remove old dressing, discard into plastic bag and place in trash  Cleanse the wound with normal saline prior to applying a clean dressing  Do not use tissue or cotton balls  Do not scrub the wound  Pat dry using gauze  Shower no  Do not get dressing wet  STOP calcium alginate and apply Acticoat 3 (sent with patient) to the wound bed  Cover with dry gauze  Secure with rolled gauze and paper tape  Change dressing three times a week  R foot:  Wash your hands with soap and water  Remove old dressing, discard into plastic bag and place in trash  Cleanse the wound with normal saline prior to applying a clean dressing  Do not use tissue or cotton balls  Do not scrub the wound  Pat dry using gauze  Shower no  Do not get dressing wet  Apply dry protective gauze dressing  Secure with rolled gauze and paper tape  Change dressing three times a week  Minimize walking but wear wedge shoe when walking on left foot  Wear flat shoe on right foot      Continue VNA for dressing changes 3 x per week, skipping day patient comes to wound center    Follow up at the wound center in one week         Treatment in the wound center today:   Cleansed with normal saline and dressed as above     Standing Status:   Future     Standing Expiration Date:   5/17/2023    Debridement     This order was created via procedure documentation         Nataliya Reese DPM      Portions of the record may have been created with voice recognition software  Occasional wrong word or "sound a like" substitutions may have occurred due to the inherent limitations of voice recognition software  Read the chart carefully and recognize, using context, where substitutions have occurred

## 2022-05-24 ENCOUNTER — OFFICE VISIT (OUTPATIENT)
Dept: WOUND CARE | Facility: CLINIC | Age: 47
End: 2022-05-24
Payer: COMMERCIAL

## 2022-05-24 VITALS
SYSTOLIC BLOOD PRESSURE: 106 MMHG | DIASTOLIC BLOOD PRESSURE: 70 MMHG | HEART RATE: 84 BPM | TEMPERATURE: 95.4 F | RESPIRATION RATE: 18 BRPM

## 2022-05-24 DIAGNOSIS — E11.621 DIABETIC ULCER OF TOE OF LEFT FOOT ASSOCIATED WITH TYPE 2 DIABETES MELLITUS, WITH FAT LAYER EXPOSED (HCC): Primary | ICD-10-CM

## 2022-05-24 DIAGNOSIS — L97.511 DIABETIC ULCER OF OTHER PART OF RIGHT FOOT ASSOCIATED WITH DIABETES MELLITUS DUE TO UNDERLYING CONDITION, LIMITED TO BREAKDOWN OF SKIN (HCC): ICD-10-CM

## 2022-05-24 DIAGNOSIS — E08.621 DIABETIC ULCER OF OTHER PART OF RIGHT FOOT ASSOCIATED WITH DIABETES MELLITUS DUE TO UNDERLYING CONDITION, LIMITED TO BREAKDOWN OF SKIN (HCC): ICD-10-CM

## 2022-05-24 DIAGNOSIS — L97.522 DIABETIC ULCER OF TOE OF LEFT FOOT ASSOCIATED WITH TYPE 2 DIABETES MELLITUS, WITH FAT LAYER EXPOSED (HCC): Primary | ICD-10-CM

## 2022-05-24 PROCEDURE — 11042 DBRDMT SUBQ TIS 1ST 20SQCM/<: CPT | Performed by: PODIATRIST

## 2022-05-24 RX ORDER — LIDOCAINE 40 MG/G
CREAM TOPICAL ONCE
Status: COMPLETED | OUTPATIENT
Start: 2022-05-24 | End: 2022-05-24

## 2022-05-24 RX ADMIN — LIDOCAINE: 40 CREAM TOPICAL at 11:37

## 2022-05-24 NOTE — PROGRESS NOTES
Patient ID: Farzad John is a 56 y o  female Date of Birth 1975     Diagnosis:  1  Diabetic ulcer of toe of left foot associated with type 2 diabetes mellitus, with fat layer exposed ([address] )  -     lidocaine (LMX) 4 % cream  -     Wound cleansing and dressings; Future  -     Debridement    2  Diabetic ulcer of other part of right foot associated with diabetes mellitus due to underlying condition, limited to breakdown of skin ([address] )  -     Wound cleansing and dressings; Future       Diagnosis ICD-10-CM Associated Orders   1  Diabetic ulcer of toe of left foot associated with type 2 diabetes mellitus, with fat layer exposed ([address] )  E11 621 lidocaine (LMX) 4 % cream    L97 522 Wound cleansing and dressings     Debridement   2  Diabetic ulcer of other part of right foot associated with diabetes mellitus due to underlying condition, limited to breakdown of skin ([address] )  O78 776 Wound cleansing and dressings    L97 511         Assessment & Plan:  Right foot is healed    Left foot shows mild improvement  See debridement note below  Continue wedge shoe  Add collagen  Chief Complaint   Patient presents with    Follow Up Wound Care Visit     Diabetic ulcer of toe of left foot associated with type 2 diabetes mellitus, with fat layer exposed ([address] )           Subjective:   Patient arrives for left great toe wound  Her right foot blister is healed        The following portions of the patient's history were reviewed and updated as appropriate:   Patient Active Problem List   Diagnosis    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, with bone involvement without evidence of necrosis ([address] )    H/O bariatric surgery    Anxiety    Obesity    Preoperative clearance    Encounter for smoking cessation counseling    Hypothyroidism    Manic bipolar I disorder ([address] )    Type 2 diabetes mellitus with neurologic complication, without long-term current use of insulin ([address] )    Tobacco abuse    Type 2 diabetes mellitus (HCC)    Acquired absence of other left toe(s) (HCC)    Essential hypertension    Arthritis    Chronic pain    OCD (obsessive compulsive disorder)    PTSD (post-traumatic stress disorder)    Smoking    Right foot ulcer, with fat layer exposed ([place] [address] )    Pain of foot    GERD (gastroesophageal reflux disease)    Migraine without status migrainosus, not intractable    Left foot diabetic ulcer    Obstructive sleep apnea     Past Medical History:   Diagnosis Date    Anxiety     Arthritis     Asthma     Chronic pain     Diabetes mellitus ([place] [address] )     Disease of thyroid gland     Lymphedema     left leg    Manic bipolar I disorder ([place] [address] )     Morbid obesity ([place] [address] )     OCD (obsessive compulsive disorder)     PTSD (post-traumatic stress disorder)      Past Surgical History:   Procedure Laterality Date    CHOLECYSTECTOMY      CHOLECYSTECTOMY      VA AMPUTATION FOOT,TRANSMETATARSAL Right 9/4/2021    Procedure: AMPUTATION TRANSMETATARSAL (TMA);  Surgeon: Bishop Gio DPM;  [facility];  Service: Podiatry    VA AMPUTATION METATARSAL+TOE,SINGLE Left 9/8/2019    Procedure: PARTIAL 3RD RAY RESECTION, PSB TOE FILLET FLAP;  Surgeon: Phyllis Patterson DPM;  [facility];  Service: Podiatry    TOE AMPUTATION Right 1/13/2021    Procedure: AMPUTATION 2ND TOE;  Surgeon: Bishop Gio DPM;  [facility];  Service: Podiatry    WOUND DEBRIDEMENT Left 2/17/2022    Procedure: DEBRIDEMENT GREAT TOE WOUND;  Surgeon: Bishop Gio DPM;  [facility];  Service: Podiatry     Social History     Socioeconomic History    Marital status: Single     Spouse name: Not on file    Number of children: Not on file    Years of education: Not on file    Highest education level: Not on file   Occupational History    Not on file   Tobacco Use    Smoking status: Former Smoker     Packs/day: 0 50     Years: 15 00     Pack years: 7 50    Smokeless tobacco: Never Used   Vaping Use    Vaping Use: Some days   Substance and Sexual Activity    Alcohol use: Never    Drug use: Never    Sexual activity: Not Currently   Other Topics Concern    Not on file   Social History Narrative    Not on file     Social Determinants of Health     Financial Resource Strain: Not on file   Food Insecurity: No Food Insecurity    Worried About Running Out of Food in the Last Year: Never true    Saud of Food in the Last Year: Never true   Transportation Needs: No Transportation Needs    Lack of Transportation (Medical): No    Lack of Transportation (Non-Medical):  No   Physical Activity: Not on file   Stress: Not on file   Social Connections: Not on file   Intimate Partner Violence: Not on file   Housing Stability: Low Risk     Unable to Pay for Housing in the Last Year: No    Number of Places Lived in the Last Year: 1    Unstable Housing in the Last Year: No        Current Outpatient Medications:     acetaminophen (TYLENOL) 650 mg CR tablet, Take 1 tablet (650 mg total) by mouth every 8 (eight) hours as needed for mild pain, Disp: 30 tablet, Rfl: 0    albuterol (PROVENTIL HFA,VENTOLIN HFA) 90 mcg/act inhaler, Inhale 2 puffs every 6 (six) hours as needed for wheezing (Patient not taking: Reported on 2/16/2022 ), Disp: , Rfl:     cholecalciferol (VITAMIN D3) 1,000 units tablet, Take by mouth once a week  (Patient not taking: Reported on 2/16/2022 ), Disp: , Rfl:     cloNIDine (CATAPRES) 0 2 mg tablet, Take 0 2 mg by mouth 2 (two) times a day, Disp: , Rfl:     docusate sodium (COLACE) 100 mg capsule, Take 1 capsule (100 mg total) by mouth 2 (two) times a day, Disp: 10 capsule, Rfl: 0    EASY COMFORT PEN NEEDLES 32G X 4 MM MISC, , Disp: , Rfl:     EASY TOUCH INSULIN SYRINGE 31G X 5/16" 0 3 ML MISC, , Disp: , Rfl:     ergocalciferol (ERGOCALCIFEROL) 1 25 MG (08877 UT) capsule, Take 50,000 Units by mouth, Disp: , Rfl:     fenofibrate (TRICOR) 54 MG tablet, TAKE 1 TABLET (54 MG TOTAL) BY MOUTH DAILY  , Disp: , Rfl:     folic acid (FOLVITE) 1 mg tablet, Take 1,000 mcg by mouth daily, Disp: , Rfl:     glucose blood test strip, USE TO MONITOR BG 3 TIMES A DAY   E11 9, Disp: , Rfl:     HYDROcodone-acetaminophen (NORCO) 5-325 mg per tablet, Take 1 tablet by mouth every 6 (six) hours as needed for painMax Daily Amount: 4 tablets, Disp: 7 tablet, Rfl: 0    hydrOXYzine HCL (ATARAX) 50 mg tablet, Take 50 mg by mouth daily at bedtime, Disp: , Rfl: 0    hydrOXYzine HCL (ATARAX) 50 mg tablet, Take 50 mg by mouth every morning, Disp: , Rfl:     lamoTRIgine (LaMICtal) 200 MG tablet, Take 200 mg by mouth every morning, Disp: , Rfl:     lamoTRIgine (LaMICtal) 25 mg tablet, Take 25 mg by mouth daily at bedtime, Disp: , Rfl:     levothyroxine 50 mcg tablet, Take 50 mcg by mouth daily, Disp: , Rfl:     melatonin 3 mg, Take 3 mg by mouth daily at bedtime as needed, Disp: , Rfl:     metFORMIN (GLUCOPHAGE) 500 mg tablet, Take 1 tablet (500 mg total) by mouth 2 (two) times a day with meals, Disp: 60 tablet, Rfl: 0    methocarbamol (ROBAXIN) 750 mg tablet, TAKE 1 TABLET (750 MG TOTAL) BY MOUTH 4 (FOUR) TIMES A DAY AS NEEDED FOR MUSCLE SPASMS , Disp: , Rfl:     multivitamin (THERAGRAN) TABS, Take 1 tablet by mouth daily  (Patient not taking: Reported on 2/16/2022 ), Disp: , Rfl:     nystatin (MYCOSTATIN) powder, DAILY AFTER SHOWER TO AFFECTED AREAS (Patient not taking: Reported on 2/16/2022), Disp: , Rfl:     ondansetron (ZOFRAN-ODT) 4 mg disintegrating tablet, , Disp: , Rfl:     pantoprazole (PROTONIX) 40 mg tablet, Take 40 mg by mouth daily, Disp: , Rfl:     senna (SENOKOT) 8 6 mg, Take 1 tablet (8 6 mg total) by mouth daily (Patient not taking: Reported on 2/16/2022 ), Disp: 5 tablet, Rfl: 0    SUMAtriptan Succinate (IMITREX STATDOSE REFILL) 6 MG/0 5ML SOCT, Inject 6 mg under the skin every 2 (two) hours as needed, Disp: , Rfl:     Ultra Thin Lancets 31G MISC, USE TO MONITOR BLOOD GLUCOSE 3 TIMES A DAY AS DIRECTED , Disp: , Rfl:     ziprasidone (GEODON) 80 mg capsule, Take 80 mg by mouth 2 (two) times a day with meals , Disp: , Rfl:   No current facility-administered medications for this visit  Family History   Problem Relation Age of Onset    Hyperlipidemia Mother     Diabetes Mother     Hypertension Mother     Heart attack Father       Review of Systems  Allergies:  Aspirin, Barium iodide, Codeine, Latex, Penicillins, and Vancomycin      Objective:  /70   Pulse 84   Temp (!) 95 4 °F (35 2 °C)   Resp 18     Physical Exam        Wound Diabetic Ulcer Foot Plantar;Left (Active)   Wound Image   05/24/22 1133   Wound Description Slough;Pink 05/24/22 1111   Yvonne-wound Assessment Dry; Intact;Callus 05/24/22 1111   Wound Length (cm) 0 3 cm 05/24/22 1111   Wound Width (cm) 0 5 cm 05/24/22 1111   Wound Depth (cm) 0 2 cm 05/24/22 1111   Wound Surface Area (cm^2) 0 15 cm^2 05/24/22 1111   Wound Volume (cm^3) 0 03 cm^3 05/24/22 1111   Calculated Wound Volume (cm^3) 0 03 cm^3 05/24/22 1111   Change in Wound Size % 81 25 05/24/22 1111   Undermining 0 1 05/24/22 1111   Undermining is depth extending from 1-3 05/24/22 1111   Drainage Amount Scant 05/24/22 1111   Drainage Description Serosanguineous 05/24/22 1111   Non-staged Wound Description Full thickness 05/17/22 1144   Treatments Irrigation with NSS 04/05/22 1135   Patient Tolerance Tolerated well 05/24/22 1111   Dressing Status Removed 05/24/22 1111                         Debridement   Universal Protocol:  Consent: Verbal consent obtained    Risks and benefits: risks, benefits and alternatives were discussed  Consent given by: patient  Timeout called at: 5/24/2022 11:57 AM   Patient understanding: patient states understanding of the procedure being performed  Patient identity confirmed: verbally with patient      Performed by: physician  Debridement type: surgical  Level of debridement: subcutaneous tissue  Pain control: lidocaine 4%  Post-debridement measurements  Length (cm): 0 6  Width (cm): 0 6  Depth (cm): 0 3  Percent debrided: 100%  Surface Area (cm^2): 0 36  Area debrided (cm^2): 0 36  Volume (cm^3): 0 11  Tissue and other material debrided: dermis, epidermis and subcutaneous tissue  Devitalized tissue debrided: biofilm, callus, fibrin and slough  Instrument(s) utilized: blade  Bleeding: small  Hemostasis obtained with: pressure  Procedural pain (0-10): 3  Post-procedural pain: 1   Response to treatment: procedure was tolerated well                   Results from last 6 Months   Lab Units 02/16/22  1631   WOUND CULTURE  2+ Growth of Beta Hemolytic Streptococcus Group G*  3+ Growth of          Wound Instructions:  Orders Placed This Encounter   Procedures    Wound cleansing and dressings     Wound cleansing and dressings                                            left great toe wound:  Wash your hands with soap and water  Remove old dressing, discard into plastic bag and place in trash  Cleanse the wound with normal saline prior to applying a clean dressing  Do not use tissue or cotton balls  Do not scrub the wound  Pat dry using gauze  Shower no  Do not get dressing wet  STOP Acticoat 3 and apply endoform (sent with patient) to the wound bed  Cover with dry gauze  Secure with rolled gauze and paper tape     Change dressing three times a week      Minimize walking but wear wedge shoe when walking on left foot  Wear flat shoe on right foot      Continue VNA for dressing changes 3 x per week, skipping day patient comes to wound center     Follow up at the wound center in one week         Treatment in the wound center today:   Cleansed with normal saline and dressed as above     Standing Status:   Future     Standing Expiration Date:   5/24/2023    Debridement     This order was created via procedure documentation         [place] DPM      Portions of the record may have been created with voice recognition software  Occasional wrong word or "sound a like" substitutions may have occurred due to the inherent limitations of voice recognition software  Read the chart carefully and recognize, using context, where substitutions have occurred

## 2022-05-24 NOTE — PATIENT INSTRUCTIONS
Orders Placed This Encounter   Procedures    Wound cleansing and dressings     Wound cleansing and dressings                                            left great toe wound:  Wash your hands with soap and water  Remove old dressing, discard into plastic bag and place in trash  Cleanse the wound with normal saline prior to applying a clean dressing  Do not use tissue or cotton balls  Do not scrub the wound  Pat dry using gauze  Shower no  Do not get dressing wet  STOP Acticoat 3 and apply endoform (sent with patient) to the wound bed  Cover with dry gauze  Secure with rolled gauze and paper tape  Change dressing three times a week  Minimize walking but wear wedge shoe when walking on left foot  Wear flat shoe on right foot  Continue VNA for dressing changes 3 x per week, skipping day patient comes to wound center     Follow up at the wound center in one week          Treatment in the wound center today:   Cleansed with normal saline and dressed as above     Standing Status:   Future     Standing Expiration Date:   5/24/2023

## 2022-05-31 ENCOUNTER — OFFICE VISIT (OUTPATIENT)
Dept: WOUND CARE | Facility: CLINIC | Age: 47
End: 2022-05-31
Payer: COMMERCIAL

## 2022-05-31 VITALS
TEMPERATURE: 94.8 F | DIASTOLIC BLOOD PRESSURE: 72 MMHG | RESPIRATION RATE: 18 BRPM | HEART RATE: 60 BPM | SYSTOLIC BLOOD PRESSURE: 124 MMHG

## 2022-05-31 DIAGNOSIS — L97.511 DIABETIC ULCER OF OTHER PART OF RIGHT FOOT ASSOCIATED WITH DIABETES MELLITUS DUE TO UNDERLYING CONDITION, LIMITED TO BREAKDOWN OF SKIN (HCC): ICD-10-CM

## 2022-05-31 DIAGNOSIS — E11.621 DIABETIC ULCER OF TOE OF LEFT FOOT ASSOCIATED WITH TYPE 2 DIABETES MELLITUS, WITH FAT LAYER EXPOSED (HCC): Primary | ICD-10-CM

## 2022-05-31 DIAGNOSIS — E08.621 DIABETIC ULCER OF OTHER PART OF RIGHT FOOT ASSOCIATED WITH DIABETES MELLITUS DUE TO UNDERLYING CONDITION, LIMITED TO BREAKDOWN OF SKIN (HCC): ICD-10-CM

## 2022-05-31 DIAGNOSIS — L97.522 DIABETIC ULCER OF TOE OF LEFT FOOT ASSOCIATED WITH TYPE 2 DIABETES MELLITUS, WITH FAT LAYER EXPOSED (HCC): Primary | ICD-10-CM

## 2022-05-31 PROCEDURE — 99213 OFFICE O/P EST LOW 20 MIN: CPT | Performed by: PODIATRIST

## 2022-05-31 PROCEDURE — 11042 DBRDMT SUBQ TIS 1ST 20SQCM/<: CPT | Performed by: PODIATRIST

## 2022-05-31 RX ORDER — LIDOCAINE 40 MG/G
CREAM TOPICAL ONCE
Status: COMPLETED | OUTPATIENT
Start: 2022-05-31 | End: 2022-05-31

## 2022-05-31 RX ADMIN — LIDOCAINE: 40 CREAM TOPICAL at 10:58

## 2022-05-31 NOTE — PATIENT INSTRUCTIONS
Orders Placed This Encounter   Procedures    Wound cleansing and dressings     Wound cleansing and dressings          Both wounds:  Wash your hands with soap and water  Remove old dressing, discard into plastic bag and place in trash  Cleanse the wound with normal saline prior to applying a clean dressing  Do not use tissue or cotton balls  Do not scrub the wound  Pat dry using gauze  Shower no  Do not get dressing wet  Left great toe wound:   STOP endoform and apply Acticoat 7 to the wound bed  Cover with dry gauze  Secure with bulky dressing  (3 rolls cast padding, 1 roll kerlix, 1 roll coban without tension)  Change dressing weekly in wound center    R plantar foot:  Dermagran to wound bed  Cover with gauze  Secure with fletcher and paper tape  Change dressing 3 x per week    Minimize walking but wear wedge shoe when walking on left foot  Wear flat shoe on right foot  USE WHEELCHAIR AS MUCH AS POSSIBLE TO LIMIT WALKING     Continue VNA for dressing changes 3 x per week, skipping day patient comes to wound center     Follow up at the wound center in one week          Treatment in the wound center today:   Cleansed with normal saline and dressed as above     Standing Status:   Future     Standing Expiration Date:   5/31/2023

## 2022-05-31 NOTE — PROGRESS NOTES
Patient ID: Lisset Lara is a 56 y o  female Date of Birth 1975     Diagnosis:  1  Diabetic ulcer of toe of left foot associated with type 2 diabetes mellitus, with fat layer exposed ([address] )  -     lidocaine (LMX) 4 % cream  -     Wound cleansing and dressings; Future  -     Debridement    2  Diabetic ulcer of other part of right foot associated with diabetes mellitus due to underlying condition, limited to breakdown of skin ([address] )  -     Wound cleansing and dressings; Future       Diagnosis ICD-10-CM Associated Orders   1  Diabetic ulcer of toe of left foot associated with type 2 diabetes mellitus, with fat layer exposed ([address] )  E11 621 lidocaine (LMX) 4 % cream    L97 522 Wound cleansing and dressings     Debridement   2  Diabetic ulcer of other part of right foot associated with diabetes mellitus due to underlying condition, limited to breakdown of skin ([address] )  F56 131 Wound cleansing and dressings    L97 511         Assessment & Plan:  See surgical excisional debridement note below  Continue wedge shoe  Offered TCC but patient declined  She did agree to a bulky dressing  Check in 1 weeks  Continue offering supportive advice/education on offloading and diabetic wound care    New wound right foot, minor 1  No SOI  Dermagram gauze  She has a wheelchair but doesn't use it, I advised her to start using it especially since she has wound on both feet  Chief Complaint   Patient presents with    Follow Up Wound Care Visit     L great toe           Subjective:   Patient is here for the ulcer on the plantar left great toe  She has been trying to stay off the foot a lot better the last week or 2  She states the blister on her right foot has remained healed but "theres a little blood on my socks sometimes " She denies n/v/f/c/d        The following portions of the patient's history were reviewed and updated as appropriate:   Patient Active Problem List   Diagnosis    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, with bone involvement without evidence of necrosis ([place] [address] )    H/O bariatric surgery    Anxiety    Obesity    Preoperative clearance    Encounter for smoking cessation counseling    Hypothyroidism    Manic bipolar I disorder ([place] [address] )    Type 2 diabetes mellitus with neurologic complication, without long-term current use of insulin ([place] [address] )    Tobacco abuse    Type 2 diabetes mellitus ([place] [address] )    Acquired absence of other left toe(s) ([place] [address] )    Essential hypertension    Arthritis    Chronic pain    OCD (obsessive compulsive disorder)    PTSD (post-traumatic stress disorder)    Smoking    Right foot ulcer, with fat layer exposed ([place] [address] )    Pain of foot    GERD (gastroesophageal reflux disease)    Migraine without status migrainosus, not intractable    Left foot diabetic ulcer    Obstructive sleep apnea     Past Medical History:   Diagnosis Date    Anxiety     Arthritis     Asthma     Chronic pain     Diabetes mellitus ([place] [address] )     Disease of thyroid gland     Lymphedema     left leg    Manic bipolar I disorder ([place] [address] )     Morbid obesity ([place] [address] )     OCD (obsessive compulsive disorder)     PTSD (post-traumatic stress disorder)      Past Surgical History:   Procedure Laterality Date    CHOLECYSTECTOMY      CHOLECYSTECTOMY      IL AMPUTATION FOOT,TRANSMETATARSAL Right 9/4/2021    Procedure: AMPUTATION TRANSMETATARSAL (TMA);  Surgeon: Paulina Hammond DPM;  [facility];  Service: Podiatry    IL AMPUTATION METATARSAL+TOE,SINGLE Left 9/8/2019    Procedure: PARTIAL 3RD RAY RESECTION, PSB TOE FILLET FLAP;  Surgeon: Ronnell Abad DPM;  [facility];  Service: Podiatry    TOE AMPUTATION Right 1/13/2021    Procedure: AMPUTATION 2ND TOE;  Surgeon: Paulina Hammond DPM;  [facility];  Service: Podiatry    WOUND DEBRIDEMENT Left 2/17/2022    Procedure: DEBRIDEMENT GREAT TOE WOUND;  Surgeon: Paulina Hammond DPM;  [facility];  Service: Podiatry     Social History     Socioeconomic History    Marital status: Single     Spouse name: None    Number of children: None    Years of education: None    Highest education level: None   Occupational History    None   Tobacco Use    Smoking status: Former Smoker     Packs/day: 0 50     Years: 15 00     Pack years: 7 50    Smokeless tobacco: Never Used   Vaping Use    Vaping Use: Some days   Substance and Sexual Activity    Alcohol use: Never    Drug use: Never    Sexual activity: Not Currently   Other Topics Concern    None   Social History Narrative    None     Social Determinants of Health     Financial Resource Strain: Not on file   Food Insecurity: No Food Insecurity    Worried About Running Out of Food in the Last Year: Never true    Saud of Food in the Last Year: Never true   Transportation Needs: No Transportation Needs    Lack of Transportation (Medical): No    Lack of Transportation (Non-Medical):  No   Physical Activity: Not on file   Stress: Not on file   Social Connections: Not on file   Intimate Partner Violence: Not on file   Housing Stability: Low Risk     Unable to Pay for Housing in the Last Year: No    Number of Places Lived in the Last Year: 1    Unstable Housing in the Last Year: No        Current Outpatient Medications:     acetaminophen (TYLENOL) 650 mg CR tablet, Take 1 tablet (650 mg total) by mouth every 8 (eight) hours as needed for mild pain, Disp: 30 tablet, Rfl: 0    albuterol (PROVENTIL HFA,VENTOLIN HFA) 90 mcg/act inhaler, Inhale 2 puffs every 6 (six) hours as needed for wheezing (Patient not taking: Reported on 2/16/2022 ), Disp: , Rfl:     cholecalciferol (VITAMIN D3) 1,000 units tablet, Take by mouth once a week  (Patient not taking: Reported on 2/16/2022 ), Disp: , Rfl:     cloNIDine (CATAPRES) 0 2 mg tablet, Take 0 2 mg by mouth 2 (two) times a day, Disp: , Rfl:     docusate sodium (COLACE) 100 mg capsule, Take 1 capsule (100 mg total) by mouth 2 (two) times a day, Disp: 10 capsule, Rfl: 0    EASY COMFORT PEN NEEDLES 32G X 4 MM MISC, , Disp: , Rfl:     EASY TOUCH INSULIN SYRINGE 31G X 5/16" 0 3 ML MISC, , Disp: , Rfl:     ergocalciferol (ERGOCALCIFEROL) 1 25 MG (28588 UT) capsule, Take 50,000 Units by mouth, Disp: , Rfl:     fenofibrate (TRICOR) 54 MG tablet, TAKE 1 TABLET (54 MG TOTAL) BY MOUTH DAILY  , Disp: , Rfl:     folic acid (FOLVITE) 1 mg tablet, Take 1,000 mcg by mouth daily, Disp: , Rfl:     glucose blood test strip, USE TO MONITOR BG 3 TIMES A DAY   E11 9, Disp: , Rfl:     HYDROcodone-acetaminophen (NORCO) 5-325 mg per tablet, Take 1 tablet by mouth every 6 (six) hours as needed for painMax Daily Amount: 4 tablets, Disp: 7 tablet, Rfl: 0    hydrOXYzine HCL (ATARAX) 50 mg tablet, Take 50 mg by mouth daily at bedtime, Disp: , Rfl: 0    hydrOXYzine HCL (ATARAX) 50 mg tablet, Take 50 mg by mouth every morning, Disp: , Rfl:     lamoTRIgine (LaMICtal) 200 MG tablet, Take 200 mg by mouth every morning, Disp: , Rfl:     lamoTRIgine (LaMICtal) 25 mg tablet, Take 25 mg by mouth daily at bedtime, Disp: , Rfl:     levothyroxine 50 mcg tablet, Take 50 mcg by mouth daily, Disp: , Rfl:     melatonin 3 mg, Take 3 mg by mouth daily at bedtime as needed, Disp: , Rfl:     metFORMIN (GLUCOPHAGE) 500 mg tablet, Take 1 tablet (500 mg total) by mouth 2 (two) times a day with meals, Disp: 60 tablet, Rfl: 0    methocarbamol (ROBAXIN) 750 mg tablet, TAKE 1 TABLET (750 MG TOTAL) BY MOUTH 4 (FOUR) TIMES A DAY AS NEEDED FOR MUSCLE SPASMS , Disp: , Rfl:     multivitamin (THERAGRAN) TABS, Take 1 tablet by mouth daily  (Patient not taking: Reported on 2/16/2022 ), Disp: , Rfl:     nystatin (MYCOSTATIN) powder, DAILY AFTER SHOWER TO AFFECTED AREAS (Patient not taking: Reported on 2/16/2022), Disp: , Rfl:     ondansetron (ZOFRAN-ODT) 4 mg disintegrating tablet, , Disp: , Rfl:     pantoprazole (PROTONIX) 40 mg tablet, Take 40 mg by mouth daily, Disp: , Rfl:     senna (SENOKOT) 8 6 mg, Take 1 tablet (8 6 mg total) by mouth daily (Patient not taking: Reported on 2/16/2022 ), Disp: 5 tablet, Rfl: 0    SUMAtriptan Succinate (IMITREX STATDOSE REFILL) 6 MG/0 5ML SOCT, Inject 6 mg under the skin every 2 (two) hours as needed, Disp: , Rfl:     Ultra Thin Lancets 31G MISC, USE TO MONITOR BLOOD GLUCOSE 3 TIMES A DAY AS DIRECTED , Disp: , Rfl:     ziprasidone (GEODON) 80 mg capsule, Take 80 mg by mouth 2 (two) times a day with meals , Disp: , Rfl:   No current facility-administered medications for this visit  Family History   Problem Relation Age of Onset    Hyperlipidemia Mother     Diabetes Mother     Hypertension Mother     Heart attack Father       Review of Systems   Constitutional: Negative  Gastrointestinal: Negative for diarrhea, nausea and vomiting  Musculoskeletal: Positive for joint swelling  Negative for arthralgias  Skin: Positive for color change and wound  Neurological: Positive for numbness  Allergies:  Aspirin, Barium iodide, Codeine, Latex, Penicillins, and Vancomycin      Objective:  /72   Pulse 60   Temp (!) 94 8 °F (34 9 °C)   Resp 18     Physical Exam  Vitals reviewed  Constitutional:       Appearance: She is obese  HENT:      Nose: No congestion or rhinorrhea  Cardiovascular:      Pulses: Normal pulses  Dorsalis pedis pulses are 2+ on the right side and 2+ on the left side  Pulmonary:      Effort: Pulmonary effort is normal  No respiratory distress  Musculoskeletal:      Right foot: Deformity (TMA stable) present  Left foot: Deformity (3rd ray is amputated) present  Feet:      Right foot:      Skin integrity: Ulcer and fissure present  No callus  Left foot:      Skin integrity: Ulcer and callus present  Skin:     Findings: No erythema  Comments: Ulcer plantar left great toe stable  There is marked improvement in size  Minor undermining  Fibrotic wound bed    No sign of infection   Neurological:      Mental Status: She is alert and oriented to person, place, and time  Sensory: Sensory deficit present  Wound Diabetic Ulcer Foot Plantar;Left (Active)   Wound Image   05/31/22 1022   Wound Description Slough;Pink 05/31/22 1028   Yvonne-wound Assessment Dry; Intact;Callus 05/31/22 1028   Wound Length (cm) 0 2 cm 05/31/22 1028   Wound Width (cm) 0 5 cm 05/31/22 1028   Wound Depth (cm) 0 2 cm 05/31/22 1028   Wound Surface Area (cm^2) 0 1 cm^2 05/31/22 1028   Wound Volume (cm^3) 0 02 cm^3 05/31/22 1028   Calculated Wound Volume (cm^3) 0 02 cm^3 05/31/22 1028   Change in Wound Size % 87 5 05/31/22 1028   Undermining 0 05/31/22 1028   Undermining is depth extending from 1-3 05/24/22 1111   Drainage Amount Scant 05/31/22 1028   Drainage Description Serous 05/31/22 1028   Non-staged Wound Description Full thickness 05/17/22 1144   Treatments Irrigation with NSS 04/05/22 1135   Patient Tolerance Tolerated well 05/24/22 1111   Dressing Status Removed 05/24/22 1111                         Debridement   Wound Diabetic Ulcer Foot Plantar;Left    Universal Protocol:  Consent: Verbal consent obtained    Risks and benefits: risks, benefits and alternatives were discussed  Consent given by: patient  Timeout called at: 5/31/2022 11:06 AM   Patient understanding: patient states understanding of the procedure being performed  Patient identity confirmed: verbally with patient      Performed by: physician  Debridement type: surgical  Level of debridement: subcutaneous tissue  Pain control: lidocaine 4%  Post-debridement measurements  Length (cm): 0 6  Width (cm): 0 9  Depth (cm): 0 4  Percent debrided: 100%  Surface Area (cm^2): 0 54  Area debrided (cm^2): 0 54  Volume (cm^3): 0 22  Tissue and other material debrided: dermis, epidermis and subcutaneous tissue  Devitalized tissue debrided: biofilm, callus, exudate, fibrin, necrotic debris and slough  Instrument(s) utilized: blade and curette  Bleeding: medium  Hemostasis obtained with: silver nitrate  Procedural pain (0-10): 2  Post-procedural pain: 0   Response to treatment: procedure was tolerated well                   Results from last 6 Months   Lab Units 02/16/22  1631   WOUND CULTURE  2+ Growth of Beta Hemolytic Streptococcus Group G*  3+ Growth of          Wound Instructions:  Orders Placed This Encounter   Procedures    Wound cleansing and dressings     Wound cleansing and dressings          Both wounds:  Wash your hands with soap and water  Remove old dressing, discard into plastic bag and place in trash  Cleanse the wound with normal saline prior to applying a clean dressing  Do not use tissue or cotton balls  Do not scrub the wound  Pat dry using gauze  Shower no  Do not get dressing wet  Left great toe wound:   STOP endoform and apply Acticoat 7 to the wound bed  Cover with dry gauze  Secure with bulky dressing  (3 rolls cast padding, 1 roll kerlix, 1 roll coban without tension)  Change dressing weekly in wound center    R plantar foot:  Dermagran to wound bed  Cover with gauze  Secure with fletcher and paper tape  Change dressing 3 x per week    Minimize walking but wear wedge shoe when walking on left foot  Wear flat shoe on right foot  USE WHEELCHAIR AS MUCH AS POSSIBLE TO LIMIT Leti Masters    Continue VNA for dressing changes 3 x per week, skipping day patient comes to wound center     Follow up at the wound center in one week         Treatment in the wound center today:   Cleansed with normal saline and dressed as above     Standing Status:   Future     Standing Expiration Date:   5/31/2023    Debridement     This order was created via procedure documentation         Timmy Nageotte, DPM      Portions of the record may have been created with voice recognition software  Occasional wrong word or "sound a like" substitutions may have occurred due to the inherent limitations of voice recognition software   Read the chart carefully and recognize, using context, where substitutions have occurred

## 2022-06-07 ENCOUNTER — OFFICE VISIT (OUTPATIENT)
Dept: WOUND CARE | Facility: CLINIC | Age: 47
End: 2022-06-07
Payer: COMMERCIAL

## 2022-06-07 VITALS
HEART RATE: 84 BPM | TEMPERATURE: 95.2 F | SYSTOLIC BLOOD PRESSURE: 112 MMHG | DIASTOLIC BLOOD PRESSURE: 78 MMHG | RESPIRATION RATE: 18 BRPM

## 2022-06-07 DIAGNOSIS — E08.621 DIABETIC ULCER OF OTHER PART OF RIGHT FOOT ASSOCIATED WITH DIABETES MELLITUS DUE TO UNDERLYING CONDITION, LIMITED TO BREAKDOWN OF SKIN (HCC): ICD-10-CM

## 2022-06-07 DIAGNOSIS — L03.115 CELLULITIS OF RIGHT FOOT: ICD-10-CM

## 2022-06-07 DIAGNOSIS — L97.522 DIABETIC ULCER OF TOE OF LEFT FOOT ASSOCIATED WITH TYPE 2 DIABETES MELLITUS, WITH FAT LAYER EXPOSED (HCC): ICD-10-CM

## 2022-06-07 DIAGNOSIS — S91.301A OPEN WOUND OF PLANTAR ASPECT OF RIGHT FOOT: Primary | ICD-10-CM

## 2022-06-07 DIAGNOSIS — E11.621 DIABETIC ULCER OF TOE OF LEFT FOOT ASSOCIATED WITH TYPE 2 DIABETES MELLITUS, WITH FAT LAYER EXPOSED (HCC): ICD-10-CM

## 2022-06-07 DIAGNOSIS — L97.511 DIABETIC ULCER OF OTHER PART OF RIGHT FOOT ASSOCIATED WITH DIABETES MELLITUS DUE TO UNDERLYING CONDITION, LIMITED TO BREAKDOWN OF SKIN (HCC): ICD-10-CM

## 2022-06-07 PROCEDURE — 11042 DBRDMT SUBQ TIS 1ST 20SQCM/<: CPT | Performed by: PODIATRIST

## 2022-06-07 PROCEDURE — 99213 OFFICE O/P EST LOW 20 MIN: CPT | Performed by: PODIATRIST

## 2022-06-07 PROCEDURE — 99214 OFFICE O/P EST MOD 30 MIN: CPT | Performed by: PODIATRIST

## 2022-06-07 RX ORDER — DOXYCYCLINE HYCLATE 100 MG/1
100 CAPSULE ORAL EVERY 12 HOURS SCHEDULED
Qty: 14 CAPSULE | Refills: 0 | Status: SHIPPED | OUTPATIENT
Start: 2022-06-07 | End: 2022-06-14

## 2022-06-07 RX ORDER — LIDOCAINE 40 MG/G
CREAM TOPICAL ONCE
Status: COMPLETED | OUTPATIENT
Start: 2022-06-07 | End: 2022-06-07

## 2022-06-07 RX ADMIN — LIDOCAINE: 40 CREAM TOPICAL at 10:36

## 2022-06-07 NOTE — PROGRESS NOTES
Patient ID: Lisset Lara is a 56 y o  female Date of Birth 1975     Diagnosis:  1  Open wound of plantar aspect of right foot  -     lidocaine (LMX) 4 % cream  -     doxycycline hyclate (VIBRAMYCIN) 100 mg capsule; Take 1 capsule (100 mg total) by mouth every 12 (twelve) hours for 7 days  -     Wound cleansing and dressings; Future  -     Post Op Shoe    2  Diabetic ulcer of toe of left foot associated with type 2 diabetes mellitus, with fat layer exposed ([address] )  -     lidocaine (LMX) 4 % cream  -     Wound cleansing and dressings; Future  -     Debridement    3  Diabetic ulcer of other part of right foot associated with diabetes mellitus due to underlying condition, limited to breakdown of skin (HCC)  -     lidocaine (LMX) 4 % cream  -     doxycycline hyclate (VIBRAMYCIN) 100 mg capsule; Take 1 capsule (100 mg total) by mouth every 12 (twelve) hours for 7 days  -     Wound cleansing and dressings; Future  -     Post Op Shoe    4  Cellulitis of right foot  -     doxycycline hyclate (VIBRAMYCIN) 100 mg capsule; Take 1 capsule (100 mg total) by mouth every 12 (twelve) hours for 7 days  -     Post Op Shoe       Diagnosis ICD-10-CM Associated Orders   1  Open wound of plantar aspect of right foot  S91 301A lidocaine (LMX) 4 % cream     doxycycline hyclate (VIBRAMYCIN) 100 mg capsule     Wound cleansing and dressings     Post Op Shoe   2  Diabetic ulcer of toe of left foot associated with type 2 diabetes mellitus, with fat layer exposed ([address] )  E11 621 lidocaine (LMX) 4 % cream    L97 522 Wound cleansing and dressings     Debridement   3  Diabetic ulcer of other part of right foot associated with diabetes mellitus due to underlying condition, limited to breakdown of skin (HCC)  E08 621 lidocaine (LMX) 4 % cream    L97 511 doxycycline hyclate (VIBRAMYCIN) 100 mg capsule     Wound cleansing and dressings     Post Op Shoe   4   Cellulitis of right foot  L03 115 doxycycline hyclate (VIBRAMYCIN) 100 mg capsule     Post Op Shoe        Assessment & Plan:  1  New wound on right foot, there are currently 2 plantar foot  Surgical shoe  H/O MRSA, doxycycline for 7 days  Rest   2  Left hallux ulcer stable, see debridement below  RTC 1 week    Chief Complaint   Patient presents with    Follow Up Wound Care Visit     Diabetic ulcer of toe of left foot associated with type 2 diabetes mellitus, with fat layer exposed ([facility] [address] )           Subjective:   Patient arrives for left great toe ulcer  She has developed a 2nd wound on her right foot  Last week she had developed her 1st wound  She states she stands a lot to make meals  She states there is more pain and redness on the right foot  Her left great toe does not hurt at all today  She denies nausea fever vomiting chills        The following portions of the patient's history were reviewed and updated as appropriate:   Patient Active Problem List   Diagnosis    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, with bone involvement without evidence of necrosis ([facility] [address] )    H/O bariatric surgery    Anxiety    Obesity    Preoperative clearance    Encounter for smoking cessation counseling    Hypothyroidism    Manic bipolar I disorder ([address] )    Type 2 diabetes mellitus with neurologic complication, without long-term current use of insulin ([address] )    Tobacco abuse    Type 2 diabetes mellitus ([address] )    Acquired absence of other left toe(s) ([address] )    Essential hypertension    Arthritis    Chronic pain    OCD (obsessive compulsive disorder)    PTSD (post-traumatic stress disorder)    Smoking    Right foot ulcer, with fat layer exposed ([address] )    Pain of foot    GERD (gastroesophageal reflux disease)    Migraine without status migrainosus, not intractable    Left foot diabetic ulcer    Obstructive sleep apnea     Past Medical History:   Diagnosis Date    Anxiety     Arthritis     Asthma     Chronic pain     Diabetes mellitus ([address] )     Disease of thyroid gland     Lymphedema     left leg    Manic bipolar I disorder (HCC)     Morbid obesity (HCC)     OCD (obsessive compulsive disorder)     PTSD (post-traumatic stress disorder)      Past Surgical History:   Procedure Laterality Date    CHOLECYSTECTOMY      CHOLECYSTECTOMY      WI AMPUTATION FOOT,TRANSMETATARSAL Right 9/4/2021    Procedure: AMPUTATION TRANSMETATARSAL (TMA);  Surgeon: Lalitha Prado DPM;  [facility];  Service: Podiatry    WI AMPUTATION METATARSAL+TOE,SINGLE Left 9/8/2019    Procedure: PARTIAL 3RD RAY RESECTION, PSB TOE FILLET FLAP;  Surgeon: Lui Kevin DPM;  [facility];  Service: Podiatry    TOE AMPUTATION Right 1/13/2021    Procedure: AMPUTATION 2ND TOE;  Surgeon: Lalitha Prado DPM;  [facility];  Service: Podiatry    WOUND DEBRIDEMENT Left 2/17/2022    Procedure: DEBRIDEMENT GREAT TOE WOUND;  Surgeon: Lalitha Prado DPM;  [facility];  Service: Podiatry     Social History     Socioeconomic History    Marital status: Single     Spouse name: Not on file    Number of children: Not on file    Years of education: Not on file    Highest education level: Not on file   Occupational History    Not on file   Tobacco Use    Smoking status: Former Smoker     Packs/day: 0 50     Years: 15 00     Pack years: 7 50    Smokeless tobacco: Never Used   Vaping Use    Vaping Use: Some days   Substance and Sexual Activity    Alcohol use: Never    Drug use: Never    Sexual activity: Not Currently   Other Topics Concern    Not on file   Social History Narrative    Not on file     Social Determinants of Health     Financial Resource Strain: Not on file   Food Insecurity: No Food Insecurity    Worried About Running Out of Food in the Last Year: Never true    [address] in the Last Year: Never true   Transportation Needs: No Transportation Needs    Lack of Transportation (Medical):  No    Lack of Transportation (Non-Medical): No   Physical Activity: Not on file   Stress: Not on file   Social Connections: Not on file   Intimate Partner Violence: Not on file   Housing Stability: Low Risk     Unable to Pay for Housing in the Last Year: No    Number of Places Lived in the Last Year: 1    Unstable Housing in the Last Year: No        Current Outpatient Medications:     doxycycline hyclate (VIBRAMYCIN) 100 mg capsule, Take 1 capsule (100 mg total) by mouth every 12 (twelve) hours for 7 days, Disp: 14 capsule, Rfl: 0    acetaminophen (TYLENOL) 650 mg CR tablet, Take 1 tablet (650 mg total) by mouth every 8 (eight) hours as needed for mild pain, Disp: 30 tablet, Rfl: 0    albuterol (PROVENTIL HFA,VENTOLIN HFA) 90 mcg/act inhaler, Inhale 2 puffs every 6 (six) hours as needed for wheezing (Patient not taking: Reported on 2/16/2022 ), Disp: , Rfl:     cholecalciferol (VITAMIN D3) 1,000 units tablet, Take by mouth once a week  (Patient not taking: Reported on 2/16/2022 ), Disp: , Rfl:     cloNIDine (CATAPRES) 0 2 mg tablet, Take 0 2 mg by mouth 2 (two) times a day, Disp: , Rfl:     docusate sodium (COLACE) 100 mg capsule, Take 1 capsule (100 mg total) by mouth 2 (two) times a day, Disp: 10 capsule, Rfl: 0    EASY COMFORT PEN NEEDLES 32G X 4 MM MISC, , Disp: , Rfl:     EASY TOUCH INSULIN SYRINGE 31G X 5/16" 0 3 ML MISC, , Disp: , Rfl:     ergocalciferol (ERGOCALCIFEROL) 1 25 MG (86401 UT) capsule, Take 50,000 Units by mouth, Disp: , Rfl:     fenofibrate (TRICOR) 54 MG tablet, TAKE 1 TABLET (54 MG TOTAL) BY MOUTH DAILY  , Disp: , Rfl:     folic acid (FOLVITE) 1 mg tablet, Take 1,000 mcg by mouth daily, Disp: , Rfl:     glucose blood test strip, USE TO MONITOR BG 3 TIMES A DAY   E11 9, Disp: , Rfl:     HYDROcodone-acetaminophen (NORCO) 5-325 mg per tablet, Take 1 tablet by mouth every 6 (six) hours as needed for painMax Daily Amount: 4 tablets, Disp: 7 tablet, Rfl: 0    hydrOXYzine HCL (ATARAX) 50 mg tablet, Take 50 mg by mouth daily at bedtime, Disp: , Rfl: 0    hydrOXYzine HCL (ATARAX) 50 mg tablet, Take 50 mg by mouth every morning, Disp: , Rfl:     lamoTRIgine (LaMICtal) 200 MG tablet, Take 200 mg by mouth every morning, Disp: , Rfl:     lamoTRIgine (LaMICtal) 25 mg tablet, Take 25 mg by mouth daily at bedtime, Disp: , Rfl:     levothyroxine 50 mcg tablet, Take 50 mcg by mouth daily, Disp: , Rfl:     melatonin 3 mg, Take 3 mg by mouth daily at bedtime as needed, Disp: , Rfl:     metFORMIN (GLUCOPHAGE) 500 mg tablet, Take 1 tablet (500 mg total) by mouth 2 (two) times a day with meals, Disp: 60 tablet, Rfl: 0    methocarbamol (ROBAXIN) 750 mg tablet, TAKE 1 TABLET (750 MG TOTAL) BY MOUTH 4 (FOUR) TIMES A DAY AS NEEDED FOR MUSCLE SPASMS , Disp: , Rfl:     multivitamin (THERAGRAN) TABS, Take 1 tablet by mouth daily  (Patient not taking: Reported on 2/16/2022 ), Disp: , Rfl:     nystatin (MYCOSTATIN) powder, DAILY AFTER SHOWER TO AFFECTED AREAS (Patient not taking: Reported on 2/16/2022), Disp: , Rfl:     ondansetron (ZOFRAN-ODT) 4 mg disintegrating tablet, , Disp: , Rfl:     pantoprazole (PROTONIX) 40 mg tablet, Take 40 mg by mouth daily, Disp: , Rfl:     senna (SENOKOT) 8 6 mg, Take 1 tablet (8 6 mg total) by mouth daily (Patient not taking: Reported on 2/16/2022 ), Disp: 5 tablet, Rfl: 0    SUMAtriptan Succinate (IMITREX STATDOSE REFILL) 6 MG/0 5ML SOCT, Inject 6 mg under the skin every 2 (two) hours as needed, Disp: , Rfl:     Ultra Thin Lancets 31G MISC, USE TO MONITOR BLOOD GLUCOSE 3 TIMES A DAY AS DIRECTED , Disp: , Rfl:     ziprasidone (GEODON) 80 mg capsule, Take 80 mg by mouth 2 (two) times a day with meals , Disp: , Rfl:   No current facility-administered medications for this visit  Family History   Problem Relation Age of Onset    Hyperlipidemia Mother     Diabetes Mother     Hypertension Mother     Heart attack Father       Review of Systems   Constitutional: Negative  HENT: Negative  Respiratory: Negative  Cardiovascular: Positive for leg swelling  Negative for chest pain and palpitations  Gastrointestinal: Negative for diarrhea, nausea and vomiting  Musculoskeletal: Positive for arthralgias and gait problem  Skin: Positive for color change and wound  Neurological: Positive for numbness  Negative for weakness  Psychiatric/Behavioral: Positive for self-injury (Patient gets anxious and scratches the inside of her arms  There are scabs but no sign of any deeper)  The patient is not nervous/anxious  Allergies:  Aspirin, Barium iodide, Codeine, Latex, Penicillins, and Vancomycin      Objective:  /78   Pulse 84   Temp (!) 95 2 °F (35 1 °C)   Resp 18     Physical Exam  Vitals reviewed  Constitutional:       Appearance: She is obese  She is not ill-appearing or diaphoretic  HENT:      Nose: No congestion or rhinorrhea  Cardiovascular:      Rate and Rhythm: Normal rate  Pulses: Normal pulses  Pulmonary:      Effort: Pulmonary effort is normal  No respiratory distress  Breath sounds: No wheezing  Musculoskeletal:         General: Deformity (left transmetatarsal amputation) present  Skin:     Capillary Refill: Capillary refill takes less than 2 seconds  Findings: Erythema (mild erythema right foot with tenderness) present  Neurological:      Mental Status: She is alert and oriented to person, place, and time  Sensory: Sensory deficit present  Psychiatric:         Mood and Affect: Mood normal              Wound Diabetic Ulcer Foot Plantar;Left (Active)   Wound Image   06/07/22 1034   Wound Description Pink;Slough 06/07/22 1008   Yvonne-wound Assessment Dry; Intact;Callus 06/07/22 1008   Wound Length (cm) 0 4 cm 06/07/22 1008   Wound Width (cm) 0 5 cm 06/07/22 1008   Wound Depth (cm) 0 2 cm 06/07/22 1008   Wound Surface Area (cm^2) 0 2 cm^2 06/07/22 1008   Wound Volume (cm^3) 0 04 cm^3 06/07/22 1008   Calculated Wound Volume (cm^3) 0 04 cm^3 06/07/22 1008   Change in Wound Size % 75 06/07/22 1008   Undermining 0 05/31/22 1028   Undermining is depth extending from 1-3 05/24/22 1111   Drainage Amount Small 06/07/22 1008   Drainage Description Serosanguineous 06/07/22 1008   Non-staged Wound Description Full thickness 05/17/22 1144   Treatments Irrigation with NSS 04/05/22 1135   Patient Tolerance Tolerated well 06/07/22 1008   Dressing Status Removed 06/07/22 1008       Wound 05/31/22 Diabetic Ulcer Foot Right;Plantar (Active)   Wound Image   06/07/22 1013   Wound Description Pink;Yellow 06/07/22 1010   Yvonne-wound Assessment Dry;Callus; Intact 06/07/22 1010   Wound Length (cm) 0 5 cm 06/07/22 1010   Wound Width (cm) 0 6 cm 06/07/22 1010   Wound Depth (cm) 0 1 cm 06/07/22 1010   Wound Surface Area (cm^2) 0 3 cm^2 06/07/22 1010   Wound Volume (cm^3) 0 03 cm^3 06/07/22 1010   Calculated Wound Volume (cm^3) 0 03 cm^3 06/07/22 1010   Change in Wound Size % 50 06/07/22 1010   Drainage Amount Small 06/07/22 1010   Drainage Description Serosanguineous 06/07/22 1010   Non-staged Wound Description Full thickness 05/31/22 1053   Patient Tolerance Tolerated well 06/07/22 1010   Dressing Status Removed 06/07/22 1010       Wound 06/07/22 Diabetic Ulcer Foot Right;Medial;Plantar (Active)   Wound Image   06/07/22 1012   Wound Description Pink;Slough 06/07/22 1013   Yvonne-wound Assessment Dry; Intact;Callus 06/07/22 1013   Wound Length (cm) 0 6 cm 06/07/22 1013   Wound Width (cm) 0 8 cm 06/07/22 1013   Wound Depth (cm) 0 1 cm 06/07/22 1013   Wound Surface Area (cm^2) 0 48 cm^2 06/07/22 1013   Wound Volume (cm^3) 0 048 cm^3 06/07/22 1013   Calculated Wound Volume (cm^3) 0 05 cm^3 06/07/22 1013   Drainage Amount Small 06/07/22 1013   Drainage Description Serosanguineous 06/07/22 1013   Patient Tolerance Tolerated well 06/07/22 1013   Dressing Status Removed 06/07/22 1013                         Debridement   Wound Diabetic Ulcer Foot Plantar;Left    Universal Protocol:  Consent: Verbal consent obtained  Risks and benefits: risks, benefits and alternatives were discussed  Consent given by: patient  Timeout called at: 6/7/2022 10:42 AM   Patient understanding: patient states understanding of the procedure being performed  Patient identity confirmed: verbally with patient      Performed by: physician  Debridement type: surgical  Level of debridement: subcutaneous tissue  Pain control: lidocaine 4%  Post-debridement measurements  Length (cm): 1  Width (cm): 1  Depth (cm): 0 3  Percent debrided: 100%  Surface Area (cm^2): 1  Area debrided (cm^2): 1  Volume (cm^3): 0 3  Tissue and other material debrided: dermis, epidermis and subcutaneous tissue  Devitalized tissue debrided: biofilm, callus, fibrin, necrotic debris and slough  Instrument(s) utilized: blade  Bleeding: small  Hemostasis obtained with: pressure  Procedural pain (0-10): 1  Post-procedural pain: 0   Response to treatment: procedure was tolerated well                   Results from last 6 Months   Lab Units 02/16/22  1631   WOUND CULTURE  2+ Growth of Beta Hemolytic Streptococcus Group G*  3+ Growth of          Wound Instructions:  Orders Placed This Encounter   Procedures    Wound cleansing and dressings     Wound cleansing and dressings          All wounds:  Wash your hands with soap and water  Remove old dressing, discard into plastic bag and place in trash  Cleanse the wound with normal saline prior to applying a clean dressing  Do not use tissue or cotton balls  Do not scrub the wound  Pat dry using gauze  Shower no  Do not get dressing wet       Left great toe wound:   Apply Acticoat 7 to the wound bed  Cover with dry gauze    Secure with bulky dressing  (3 rolls cast padding, 1 roll kerlix, 1 roll coban without tension)  Change dressing weekly in wound center     R plantar foot wounds:  Dermagran to wound bed  Cover with gauze  Secure with fletcher and paper tape  Change dressing 3 x per week     Minimize walking but wear wedge shoe when walking on left foot  Wear surgical shoe on right foot      USE WHEELCHAIR AS MUCH AS POSSIBLE TO LIMIT WALKING      Continue VNA for dressing changes 3 x per week, skipping day patient comes to wound center     Follow up at the wound center in one week  Antibiotics ordered for you today     Treatment in the wound center today:   Cleansed with normal saline and dressed as above     Standing Status:   Future     Standing Expiration Date:   6/7/2023    Debridement     This order was created via procedure documentation    Post Op Shoe         Bishop Gio DPM      Portions of the record may have been created with voice recognition software  Occasional wrong word or "sound a like" substitutions may have occurred due to the inherent limitations of voice recognition software  Read the chart carefully and recognize, using context, where substitutions have occurred  Yes

## 2022-06-07 NOTE — PATIENT INSTRUCTIONS
Orders Placed This Encounter   Procedures    Wound cleansing and dressings     Wound cleansing and dressings          All wounds:  Wash your hands with soap and water  Remove old dressing, discard into plastic bag and place in trash  Cleanse the wound with normal saline prior to applying a clean dressing  Do not use tissue or cotton balls  Do not scrub the wound  Pat dry using gauze  Shower no  Do not get dressing wet  Left great toe wound:   Apply Acticoat 7 to the wound bed  Cover with dry gauze  Secure with bulky dressing  (3 rolls cast padding, 1 roll kerlix, 1 roll coban without tension)  Change dressing weekly in wound center     R plantar foot wounds:  Dermagran to wound bed  Cover with gauze  Secure with fletcher and paper tape  Change dressing 3 x per week     Minimize walking but wear wedge shoe when walking on left foot  Wear surgical shoe on right foot  USE WHEELCHAIR AS MUCH AS POSSIBLE TO LIMIT WALKING      Continue VNA for dressing changes 3 x per week, skipping day patient comes to wound center     Follow up at the wound center in one week      Antibiotics ordered for you today     Treatment in the wound center today:   Cleansed with normal saline and dressed as above     Standing Status:   Future     Standing Expiration Date:   6/7/2023

## 2022-06-11 ENCOUNTER — HOME HEALTH ADMISSION (OUTPATIENT)
Dept: HOME HEALTH SERVICES | Facility: HOME HEALTHCARE | Age: 47
End: 2022-06-11

## 2022-06-14 ENCOUNTER — OFFICE VISIT (OUTPATIENT)
Dept: WOUND CARE | Facility: CLINIC | Age: 47
End: 2022-06-14
Payer: COMMERCIAL

## 2022-06-14 VITALS
SYSTOLIC BLOOD PRESSURE: 102 MMHG | RESPIRATION RATE: 18 BRPM | HEART RATE: 72 BPM | DIASTOLIC BLOOD PRESSURE: 70 MMHG | TEMPERATURE: 96 F

## 2022-06-14 DIAGNOSIS — L97.522 DIABETIC ULCER OF TOE OF LEFT FOOT ASSOCIATED WITH TYPE 2 DIABETES MELLITUS, WITH FAT LAYER EXPOSED (HCC): Primary | ICD-10-CM

## 2022-06-14 DIAGNOSIS — E08.621 DIABETIC ULCER OF OTHER PART OF RIGHT FOOT ASSOCIATED WITH DIABETES MELLITUS DUE TO UNDERLYING CONDITION, LIMITED TO BREAKDOWN OF SKIN (HCC): ICD-10-CM

## 2022-06-14 DIAGNOSIS — L97.511 DIABETIC ULCER OF OTHER PART OF RIGHT FOOT ASSOCIATED WITH DIABETES MELLITUS DUE TO UNDERLYING CONDITION, LIMITED TO BREAKDOWN OF SKIN (HCC): ICD-10-CM

## 2022-06-14 DIAGNOSIS — S91.301A OPEN WOUND OF PLANTAR ASPECT OF RIGHT FOOT: ICD-10-CM

## 2022-06-14 DIAGNOSIS — E11.621 DIABETIC ULCER OF TOE OF LEFT FOOT ASSOCIATED WITH TYPE 2 DIABETES MELLITUS, WITH FAT LAYER EXPOSED (HCC): Primary | ICD-10-CM

## 2022-06-14 PROCEDURE — 99213 OFFICE O/P EST LOW 20 MIN: CPT | Performed by: PODIATRIST

## 2022-06-14 PROCEDURE — 11042 DBRDMT SUBQ TIS 1ST 20SQCM/<: CPT | Performed by: PODIATRIST

## 2022-06-14 RX ORDER — LIDOCAINE 40 MG/G
CREAM TOPICAL ONCE
Status: COMPLETED | OUTPATIENT
Start: 2022-06-14 | End: 2022-06-14

## 2022-06-14 RX ADMIN — LIDOCAINE: 40 CREAM TOPICAL at 10:22

## 2022-06-14 NOTE — PATIENT INSTRUCTIONS
Orders Placed This Encounter   Procedures    Wound cleansing and dressings     Wound cleansing and dressings          All wounds:  Wash your hands with soap and water  Remove old dressing, discard into plastic bag and place in trash  Cleanse the wound with normal saline prior to applying a clean dressing  Do not use tissue or cotton balls  Do not scrub the wound  Pat dry using gauze  Shower no  Do not get dressing wet  Left great toe wound:   Apply Acticoat 7 to the wound bed  Cover with dry gauze  Secure with bulky dressing  (3 rolls cast padding, 1 roll kerlix, 1 roll coban without tension)  Change dressing weekly in wound center     R plantar foot wounds:  STOP dermagran   Start acticoat 7 to wound bed  Cover with gauze  Secure with bulky dressing (3 rolls cast padding, 1 roll kerlix, 1 roll coban without tension)  Change dressing weekly in wound center     Minimize walking but wear wedge shoe when walking on left foot  Wear surgical shoe on right foot  USE WHEELCHAIR AS MUCH AS POSSIBLE TO LIMIT WALKING      Hold VNA for 1 week until re-evaluated at the wound center next week     Follow up at the wound center in one week       Antibiotics ordered for you today     Treatment in the wound center today:   Cleansed with normal saline and dressed as above     Standing Status:   Future     Standing Expiration Date:   6/14/2023    Debridement     This order was created via procedure documentation

## 2022-06-14 NOTE — PROGRESS NOTES
Patient ID: Ramón Gallegos is a 56 y o  female Date of Birth 1975     Diagnosis:  1  Diabetic ulcer of toe of left foot associated with type 2 diabetes mellitus, with fat layer exposed ([facility] [address] )  -     lidocaine (LMX) 4 % cream  -     Wound cleansing and dressings; Future    2  Diabetic ulcer of other part of right foot associated with diabetes mellitus due to underlying condition, limited to breakdown of skin (HCC)  -     lidocaine (LMX) 4 % cream  -     Wound cleansing and dressings; Future    3  Open wound of plantar aspect of right foot  -     lidocaine (LMX) 4 % cream  -     Wound cleansing and dressings; Future       Diagnosis ICD-10-CM Associated Orders   1  Diabetic ulcer of toe of left foot associated with type 2 diabetes mellitus, with fat layer exposed ([facility] [address] )  E11 621 lidocaine (LMX) 4 % cream    L97 522 Wound cleansing and dressings   2  Diabetic ulcer of other part of right foot associated with diabetes mellitus due to underlying condition, limited to breakdown of skin (HCC)  E08 621 lidocaine (LMX) 4 % cream    L97 511 Wound cleansing and dressings   3  Open wound of plantar aspect of right foot  S91 301A lidocaine (LMX) 4 % cream     Wound cleansing and dressings        Assessment & Plan:  1  Left great toe ulcer significant improvement  No need for debridement today  Continue bulky dressing and Acticoat    2  Right foot ulcer shows some improved but still very tender  I do not feel it is getting offloaded well  Will apply bulky dressing to the right with the surgical shoe  The antibiotics seem to resolve any initial sign of infection from last week  See debridement note below  Only the right foot main ulcer was debrided  The left foot did not need debridement    Chief Complaint   Patient presents with    Follow Up Wound Care Visit     Open wound of plantar aspect of right foot           Subjective:   Patient arrives for bilateral diabetic foot care  No acute concerns    She f finishes her antibiotic tomorrow, she is tolerating it well  She still has pain in the right foot        The following portions of the patient's history were reviewed and updated as appropriate:   Patient Active Problem List   Diagnosis    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, with bone involvement without evidence of necrosis ([facility] )    H/O bariatric surgery    Anxiety    Obesity    Preoperative clearance    Encounter for smoking cessation counseling    Hypothyroidism    Manic bipolar I disorder ([place] [address] )    Type 2 diabetes mellitus with neurologic complication, without long-term current use of insulin ([place] [address] )    Tobacco abuse    Type 2 [facility] mellitus [facility]    Acquired absence of other left toe(s) ([facility] )    Essential hypertension    Arthritis    Chronic pain    OCD (obsessive compulsive disorder)    PTSD (post-traumatic stress disorder)    Smoking    Right foot ulcer, with fat layer exposed ([facility]  )    Pain of foot    GERD (gastroesophageal reflux disease)    Migraine without status migrainosus, not intractable    Left foot diabetic ulcer    Obstructive sleep apnea     Past Medical History:   Diagnosis Date    Anxiety     Arthritis     Asthma     Chronic pain     Diabetes mellitus [facility]  )     Disease of thyroid gland     Lymphedema     left leg    Manic bipolar I disorder ([facility] )     Morbid obesity ([facility] )     OCD (obsessive compulsive disorder)     PTSD (post-traumatic stress disorder)      Past Surgical History:   Procedure Laterality Date    CHOLECYSTECTOMY      CHOLECYSTECTOMY      AK AMPUTATION FOOT,TRANSMETATARSAL Right 9/4/2021    Procedure: AMPUTATION TRANSMETATARSAL (TMA);  Surgeon: Stef Alas DPM;  [facility]: [facility];  Service: Podiatry    AK AMPUTATION METATARSAL+TOE,SINGLE Left 9/8/2019    Procedure: PARTIAL 3RD RAY RESECTION, PSB TOE FILLET FLAP;  Surgeon: Rony Ahuja DPM;  [facility];  Service: Podiatry    TOE AMPUTATION Right 1/13/2021    Procedure: AMPUTATION 2ND TOE;  Surgeon: Philip Rangel DPM;  [facility];  Service: Podiatry    WOUND DEBRIDEMENT Left 2/17/2022    Procedure: DEBRIDEMENT GREAT TOE WOUND;  Surgeon: Philip Rangel DPM;  [facility];  Service: Podiatry     Social History     Socioeconomic History    Marital status: Single     Spouse name: Not on file    Number of children: Not on file    Years of education: Not on file    Highest education level: Not on file   Occupational History    Not on file   Tobacco Use    Smoking status: Former Smoker     Packs/day: 0 50     Years: 15 00     Pack years: 7 50    Smokeless tobacco: Never Used   Vaping Use    Vaping Use: Some days   Substance and Sexual Activity    Alcohol use: Never    Drug use: Never    Sexual activity: Not Currently   Other Topics Concern    Not on file   Social History Narrative    Not on file     Social Determinants of Health     Financial Resource Strain: Not on file   Food Insecurity: No Food Insecurity    Worried About Running Out of Food in the Last Year: Never true    [address] in the Last Year: Never true   Transportation Needs: No Transportation Needs    Lack of Transportation (Medical): No    Lack of Transportation (Non-Medical):  No   Physical Activity: Not on file   Stress: Not on file   Social Connections: Not on file   Intimate Partner Violence: Not on file   Housing Stability: Low Risk     Unable to Pay for Housing in the Last Year: No    Number of Places Lived in the Last Year: 1    Unstable Housing in the Last Year: No        Current Outpatient Medications:     acetaminophen (TYLENOL) 650 mg CR tablet, Take 1 tablet (650 mg total) by mouth every 8 (eight) hours as needed for mild pain, Disp: 30 tablet, Rfl: 0    albuterol (PROVENTIL HFA,VENTOLIN HFA) 90 mcg/act inhaler, Inhale 2 puffs every 6 (six) hours as needed for wheezing (Patient not taking: Reported on 2/16/2022 ), Disp: , Rfl:     cholecalciferol (VITAMIN D3) 1,000 units tablet, Take by mouth once a week  (Patient not taking: Reported on 2/16/2022 ), Disp: , Rfl:     cloNIDine (CATAPRES) 0 2 mg tablet, Take 0 2 mg by mouth 2 (two) times a day, Disp: , Rfl:     docusate sodium (COLACE) 100 mg capsule, Take 1 capsule (100 mg total) by mouth 2 (two) times a day, Disp: 10 capsule, Rfl: 0    doxycycline hyclate (VIBRAMYCIN) 100 mg capsule, Take 1 capsule (100 mg total) by mouth every 12 (twelve) hours for 7 days, Disp: 14 capsule, Rfl: 0    EASY COMFORT PEN NEEDLES 32G X 4 MM MISC, , Disp: , Rfl:     EASY TOUCH INSULIN SYRINGE 31G X 5/16" 0 3 ML MISC, , Disp: , Rfl:     ergocalciferol (ERGOCALCIFEROL) 1 25 MG (16955 UT) capsule, Take 50,000 Units by mouth, Disp: , Rfl:     fenofibrate (TRICOR) 54 MG tablet, TAKE 1 TABLET (54 MG TOTAL) BY MOUTH DAILY  , Disp: , Rfl:     folic acid (FOLVITE) 1 mg tablet, Take 1,000 mcg by mouth daily, Disp: , Rfl:     glucose blood test strip, USE TO MONITOR BG 3 TIMES A DAY   E11 9, Disp: , Rfl:     HYDROcodone-acetaminophen (NORCO) 5-325 mg per tablet, Take 1 tablet by mouth every 6 (six) hours as needed for painMax Daily Amount: 4 tablets, Disp: 7 tablet, Rfl: 0    hydrOXYzine HCL (ATARAX) 50 mg tablet, Take 50 mg by mouth daily at bedtime, Disp: , Rfl: 0    hydrOXYzine HCL (ATARAX) 50 mg tablet, Take 50 mg by mouth every morning, Disp: , Rfl:     lamoTRIgine (LaMICtal) 200 MG tablet, Take 200 mg by mouth every morning, Disp: , Rfl:     lamoTRIgine (LaMICtal) 25 mg tablet, Take 25 mg by mouth daily at bedtime, Disp: , Rfl:     levothyroxine 50 mcg tablet, Take 50 mcg by mouth daily, Disp: , Rfl:     melatonin 3 mg, Take 3 mg by mouth daily at bedtime as needed, Disp: , Rfl:     metFORMIN (GLUCOPHAGE) 500 mg tablet, Take 1 tablet (500 mg total) by mouth 2 (two) times a day with meals, Disp: 60 tablet, Rfl: 0    methocarbamol (ROBAXIN) 750 mg tablet, TAKE 1 TABLET (750 MG TOTAL) BY MOUTH 4 (FOUR) TIMES A DAY AS NEEDED FOR MUSCLE SPASMS , Disp: , Rfl:     multivitamin (THERAGRAN) TABS, Take 1 tablet by mouth daily  (Patient not taking: Reported on 2/16/2022 ), Disp: , Rfl:     nystatin (MYCOSTATIN) powder, DAILY AFTER SHOWER TO AFFECTED AREAS (Patient not taking: Reported on 2/16/2022), Disp: , Rfl:     ondansetron (ZOFRAN-ODT) 4 mg disintegrating tablet, , Disp: , Rfl:     pantoprazole (PROTONIX) 40 mg tablet, Take 40 mg by mouth daily, Disp: , Rfl:     senna (SENOKOT) 8 6 mg, Take 1 tablet (8 6 mg total) by mouth daily (Patient not taking: Reported on 2/16/2022 ), Disp: 5 tablet, Rfl: 0    SUMAtriptan Succinate (IMITREX STATDOSE REFILL) 6 MG/0 5ML SOCT, Inject 6 mg under the skin every 2 (two) hours as needed, Disp: , Rfl:     Ultra Thin Lancets 31G MISC, USE TO MONITOR BLOOD GLUCOSE 3 TIMES A DAY AS DIRECTED , Disp: , Rfl:     ziprasidone (GEODON) 80 mg capsule, Take 80 mg by mouth 2 (two) times a day with meals , Disp: , Rfl:   No current facility-administered medications for this visit  Family History   Problem Relation Age of Onset    Hyperlipidemia Mother     Diabetes Mother     Hypertension Mother     Heart attack Father       Review of Systems  Allergies:  Aspirin, Barium iodide, Codeine, Latex, Penicillins, and Vancomycin      Objective:  /70   Pulse 72   Temp (!) 96 °F (35 6 °C)   Resp 18     Physical Exam  Vitals reviewed  Constitutional:       Appearance: She is obese  She is not ill-appearing or diaphoretic  Cardiovascular:      Pulses: Normal pulses  Musculoskeletal:         General: Deformity (Right transmetatarsal amputation  Plantar ulcer shows no cellulitis  There is tender to palpation ) present  Skin:     Comments: Significant improvement in both wounds on the bottom of both feet                 Wound Diabetic Ulcer Foot Plantar;Left (Active)   Wound Image   06/14/22 8823   Wound Description Pink;Yellow 06/14/22 1000   Yvonne-wound Assessment Dry;Intact;Callus 06/14/22 1000   Wound Length (cm) 0 2 cm 06/14/22 1000   Wound Width (cm) 0 4 cm 06/14/22 1000   Wound Depth (cm) 0 2 cm 06/14/22 1000   Wound Surface Area (cm^2) 0 08 cm^2 06/14/22 1000   Wound Volume (cm^3) 0 016 cm^3 06/14/22 1000   Calculated Wound Volume (cm^3) 0 02 cm^3 06/14/22 1000   Change in Wound Size % 87 5 06/14/22 1000   Undermining 0 05/31/22 1028   Undermining is depth extending from 1-3 05/24/22 1111   Drainage Amount Small 06/14/22 1000   Drainage Description Serosanguineous 06/14/22 1000   Non-staged Wound Description Full thickness 05/17/22 1144   Treatments Irrigation with NSS 04/05/22 1135   Patient Tolerance Tolerated well 06/14/22 1000   Dressing Status Removed 06/14/22 1000       Wound 05/31/22 Diabetic Ulcer Foot Right;Plantar (Active)   Wound Image   06/14/22 1018   Wound Description Pink;Yellow 06/14/22 1002   Yvonne-wound Assessment Dry; Intact 06/14/22 1002   Wound Length (cm) 0 3 cm 06/14/22 1002   Wound Width (cm) 0 3 cm 06/14/22 1002   Wound Depth (cm) 0 1 cm 06/14/22 1002   Wound Surface Area (cm^2) 0 09 cm^2 06/14/22 1002   Wound Volume (cm^3) 0 009 cm^3 06/14/22 1002   Calculated Wound Volume (cm^3) 0 01 cm^3 06/14/22 1002   Change in Wound Size % 83 33 06/14/22 1002   Drainage Amount Small 06/14/22 1002   Drainage Description Serosanguineous 06/14/22 1002   Non-staged Wound Description Full thickness 05/31/22 1053   Patient Tolerance Tolerated well 06/14/22 1002   Dressing Status Removed 06/14/22 1002       Wound 06/07/22 Diabetic Ulcer Foot Right;Medial;Plantar (Active)   Wound Image   06/14/22 0959   Wound Description Pink;Slough 06/14/22 1003   Yvonne-wound Assessment Dry; Intact 06/14/22 1003   Wound Length (cm) 0 5 cm 06/14/22 1003   Wound Width (cm) 0 9 cm 06/14/22 1003   Wound Depth (cm) 0 1 cm 06/14/22 1003   Wound Surface Area (cm^2) 0 45 cm^2 06/14/22 1003   Wound Volume (cm^3) 0 045 cm^3 06/14/22 1003   Calculated Wound Volume (cm^3) 0 05 cm^3 06/14/22 1003 Change in Wound Size % 0 06/14/22 1003   Drainage Amount Small 06/14/22 1003   Drainage Description Serosanguineous 06/14/22 1003   Patient Tolerance Tolerated well 06/14/22 1003   Dressing Status Removed 06/14/22 1003                         Debridement   Wound 06/07/22 Diabetic Ulcer Foot Right;Medial;Plantar    Universal Protocol:  Consent: Verbal consent obtained  Risks and benefits: risks, benefits and alternatives were discussed  Consent given by: patient  Timeout called at: 6/14/2022 10:19 AM   Patient understanding: patient states understanding of the procedure being performed  Patient identity confirmed: verbally with patient      Performed by: physician  Debridement type: surgical  Level of debridement: subcutaneous tissue  Pain control: lidocaine 4%  Post-debridement measurements  Length (cm): 0 8  Width (cm): 1 2  Depth (cm): 0 2  Percent debrided: 100%  Surface Area (cm^2): 0 96  Area debrided (cm^2): 0 96  Volume (cm^3): 0 19  Tissue and other material debrided: dermis, epidermis and subcutaneous tissue  Devitalized tissue debrided: biofilm, callus, exudate, fibrin, necrotic debris and slough  Instrument(s) utilized: curette and blade  Bleeding: medium  Hemostasis obtained with: pressure  Procedural pain (0-10): 3  Post-procedural pain: 1   Response to treatment: procedure was tolerated well                   Results from last 6 Months   Lab Units 02/16/22  1631   WOUND CULTURE  2+ Growth of Beta Hemolytic Streptococcus Group G*  3+ Growth of          Wound Instructions:  Orders Placed This Encounter   Procedures    Wound cleansing and dressings     Wound cleansing and dressings          All wounds:  Wash your hands with soap and water  Remove old dressing, discard into plastic bag and place in trash  Cleanse the wound with normal saline prior to applying a clean dressing  Do not use tissue or cotton balls  Do not scrub the wound  Pat dry using gauze  Shower no  Do not get dressing wet     Left great toe wound:   Apply Acticoat 7 to the wound bed  Cover with dry gauze  Secure with bulky dressing  (3 rolls cast padding, 1 roll kerlix, 1 roll coban without tension)  Change dressing weekly in wound center     R plantar foot wounds:  STOP dermagran   Start acticoat 7 to wound bed  Cover with gauze  Secure with bulky dressing (3 rolls cast padding, 1 roll kerlix, 1 roll coban without tension)  Change dressing weekly in wound center     Minimize walking but wear wedge shoe when walking on left foot  Wear surgical shoe on right foot      USE WHEELCHAIR AS MUCH AS POSSIBLE TO LIMIT Efrainclarice Cordova     Hold VNA for 1 week until re-evaluated at the wound center next week     Follow up at the wound center in one week      Antibiotics ordered for you today     Treatment in the wound center today:   Cleansed with normal saline and dressed as above     Standing Status:   Future     Standing Expiration Date:   6/14/2023    Debridement     This order was created via procedure documentation         Shanti Amado DPM      Portions of the record may have been created with voice recognition software  Occasional wrong word or "sound a like" substitutions may have occurred due to the inherent limitations of voice recognition software  Read the chart carefully and recognize, using context, where substitutions have occurred

## 2022-06-16 ENCOUNTER — HOME CARE VISIT (OUTPATIENT)
Dept: HOME HEALTH SERVICES | Facility: HOME HEALTHCARE | Age: 47
End: 2022-06-16

## 2022-06-16 VITALS
HEART RATE: 77 BPM | SYSTOLIC BLOOD PRESSURE: 122 MMHG | RESPIRATION RATE: 16 BRPM | OXYGEN SATURATION: 97 % | DIASTOLIC BLOOD PRESSURE: 62 MMHG | TEMPERATURE: 97.2 F

## 2022-06-21 ENCOUNTER — OFFICE VISIT (OUTPATIENT)
Dept: WOUND CARE | Facility: CLINIC | Age: 47
End: 2022-06-21
Payer: COMMERCIAL

## 2022-06-21 VITALS
SYSTOLIC BLOOD PRESSURE: 126 MMHG | RESPIRATION RATE: 18 BRPM | TEMPERATURE: 96.1 F | HEART RATE: 68 BPM | DIASTOLIC BLOOD PRESSURE: 82 MMHG

## 2022-06-21 DIAGNOSIS — L97.522 DIABETIC ULCER OF TOE OF LEFT FOOT ASSOCIATED WITH TYPE 2 DIABETES MELLITUS, WITH FAT LAYER EXPOSED (HCC): Primary | ICD-10-CM

## 2022-06-21 DIAGNOSIS — E11.621 DIABETIC ULCER OF TOE OF LEFT FOOT ASSOCIATED WITH TYPE 2 DIABETES MELLITUS, WITH FAT LAYER EXPOSED (HCC): Primary | ICD-10-CM

## 2022-06-21 DIAGNOSIS — E08.621 DIABETIC ULCER OF OTHER PART OF RIGHT FOOT ASSOCIATED WITH DIABETES MELLITUS DUE TO UNDERLYING CONDITION, LIMITED TO BREAKDOWN OF SKIN (HCC): ICD-10-CM

## 2022-06-21 DIAGNOSIS — L97.511 DIABETIC ULCER OF OTHER PART OF RIGHT FOOT ASSOCIATED WITH DIABETES MELLITUS DUE TO UNDERLYING CONDITION, LIMITED TO BREAKDOWN OF SKIN (HCC): ICD-10-CM

## 2022-06-21 PROCEDURE — 99213 OFFICE O/P EST LOW 20 MIN: CPT | Performed by: PODIATRIST

## 2022-06-21 PROCEDURE — 400014 VN F/U

## 2022-06-21 PROCEDURE — 97597 DBRDMT OPN WND 1ST 20 CM/<: CPT | Performed by: PODIATRIST

## 2022-06-21 RX ORDER — LIDOCAINE 40 MG/G
CREAM TOPICAL ONCE
Status: COMPLETED | OUTPATIENT
Start: 2022-06-21 | End: 2022-06-21

## 2022-06-21 RX ADMIN — LIDOCAINE: 40 CREAM TOPICAL at 11:58

## 2022-06-21 NOTE — PATIENT INSTRUCTIONS
Orders Placed This Encounter   Procedures    Wound cleansing and dressings     Wound cleansing and dressings          All wounds:  Wash your hands with soap and water  Remove old dressing, discard into plastic bag and place in trash  Cleanse the wound with normal saline prior to applying a clean dressing  Do not use tissue or cotton balls  Do not scrub the wound  Pat dry using gauze  Shower no  Do not get dressing wet  Left great toe wound:   Apply Acticoat 7 to the wound bed  Cover with dry gauze  Secure with bulky dressing  (3 rolls cast padding, 1 roll kerlix, 1 roll coban without tension)  Change dressing weekly in wound center     Right plantar foot wound:   Start acticoat 7 to wound bed  Cover with gauze  Secure with bulky dressing (3 rolls cast padding, 1 roll kerlix, 1 roll coban without tension)  Change dressing weekly in wound center     Minimize walking but wear wedge shoe when walking on left foot  Wear surgical shoe on right foot  USE WHEELCHAIR AS MUCH AS POSSIBLE TO LIMIT WALKING      Hold VNA for 1 week until re-evaluated at the wound center next week     Follow up at the wound center in one week       Antibiotics ordered for you today     Treatment in the wound center today:   Cleansed with normal saline and dressed as above     Standing Status:   Future     Standing Expiration Date:   6/21/2023

## 2022-06-21 NOTE — PROGRESS NOTES
Patient ID: Stu Berg is a 56 y o  female Date of Birth 1975     Diagnosis:  1  Diabetic ulcer of toe of left foot associated with type 2 diabetes mellitus, with fat layer exposed ([address] )  -     lidocaine (LMX) 4 % cream  -     Wound cleansing and dressings; Future    2  Diabetic ulcer of other part of right foot associated with diabetes mellitus due to underlying condition, limited to breakdown of skin (HCC)  -     lidocaine (LMX) 4 % cream  -     Wound cleansing and dressings; Future       Diagnosis ICD-10-CM Associated Orders   1  Diabetic ulcer of toe of left foot associated with type 2 diabetes mellitus, with fat layer exposed ([address] )  E11 621 lidocaine (LMX) 4 % cream    L97 522 Wound cleansing and dressings   2  Diabetic ulcer of other part of right foot associated with diabetes mellitus due to underlying condition, limited to breakdown of skin (HCC)  E08 621 lidocaine (LMX) 4 % cream    L97 511 Wound cleansing and dressings        Assessment & Plan:  1  Left great toe wound is almost healed  No debridement today  Continue Acticoat and bulky dressing  2  Right foot wound is slowly improving with bulky dressing  Selective debridement today  Continue Acticoat and bulky dressing  Nurse check in 1 week    Chief Complaint   Patient presents with    Follow Up Wound Care Visit     Diabetic ulcer of toe of left foot associated with type 2 diabetes mellitus, with fat layer exposed ([address] )           Subjective:   Patient arrives from bilateral diabetic foot wounds  She has been in a bulky dressing to both feet  She feels well        The following portions of the patient's history were reviewed and updated as appropriate:   Patient Active Problem List   Diagnosis    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, with bone involvement without evidence of necrosis ([address] )    H/O bariatric surgery    Anxiety    Obesity    Preoperative clearance    Encounter for smoking cessation counseling    Hypothyroidism    Manic bipolar I disorder (HCC)    Type 2 diabetes mellitus with neurologic complication, without long-term current use of insulin (HCC)    Tobacco abuse    Type 2 diabetes mellitus ([facility] )    [facility] absence of other left toe(s) ([facility]    Essential hypertension    Arthritis    Chronic pain    OCD (obsessive compulsive disorder)    PTSD (post-traumatic stress disorder)    Smoking    Right foot ulcer, with fat layer exposed ([facility] )    Pain of foot    GERD (gastroesophageal reflux disease)    Migraine without status migrainosus, not intractable    Left foot diabetic ulcer    Obstructive sleep apnea     Past Medical History:   Diagnosis Date    Anxiety     Arthritis     Asthma     Chronic pain     Diabetes mellitus ([facility] )     Disease of thyroid gland     Lymphedema     left leg    Manic bipolar I disorder ([facility])     Morbid obesity ([facility])     OCD (obsessive compulsive disorder)     PTSD (post-traumatic stress disorder)      Past Surgical History:   Procedure Laterality Date    CHOLECYSTECTOMY      CHOLECYSTECTOMY      [place] AMPUTATION FOOT,TRANSMETATARSAL Right 9/4/2021    Procedure: AMPUTATION TRANSMETATARSAL (TMA);  Surgeon: Jackelyn Boast, DPM;  [facility];  Service: Podiatry    [place] AMPUTATION METATARSAL+TOE,SINGLE Left 9/8/2019    Procedure: PARTIAL 3RD RAY RESECTION, PSB TOE FILLET FLAP;  Surgeon: Wilmer Perez DPM;  [facility];  Service: Podiatry    TOE AMPUTATION Right 1/13/2021    Procedure: AMPUTATION 2ND TOE;  Surgeon: Jackelyn Boast, DPM;  [facility];  Service: Podiatry    WOUND DEBRIDEMENT Left 2/17/2022    Procedure: DEBRIDEMENT GREAT TOE WOUND;  Surgeon: Jackelyn Boast, DPM;  [facility];  Service: Podiatry     Social History     Socioeconomic History    Marital status: Single     Spouse name: Not on file    Number of children: Not on file    Years of education: Not on file  Highest education level: Not on file   Occupational History    Not on file   Tobacco Use    Smoking status: Former Smoker     Packs/day: 0 50     Years: 15 00     Pack years: 7 50    Smokeless tobacco: Never Used   Vaping Use    Vaping Use: Some days   Substance and Sexual Activity    Alcohol use: Never    Drug use: Never    Sexual activity: Not Currently   Other Topics Concern    Not on file   Social History Narrative    Not on file     Social Determinants of Health     Financial Resource Strain: Not on file   Food Insecurity: No Food Insecurity    Worried About Running Out of Food in the Last Year: Never true    Saud of Food in the Last Year: Never true   Transportation Needs: No Transportation Needs    Lack of Transportation (Medical): No    Lack of Transportation (Non-Medical):  No   Physical Activity: Not on file   Stress: Not on file   Social Connections: Not on file   Intimate Partner Violence: Not on file   Housing Stability: Low Risk     Unable to Pay for Housing in the Last Year: No    Number of Places Lived in the Last Year: 1    Unstable Housing in the Last Year: No        Current Outpatient Medications:     acetaminophen (TYLENOL) 650 mg CR tablet, Take 1 tablet (650 mg total) by mouth every 8 (eight) hours as needed for mild pain, Disp: 30 tablet, Rfl: 0    albuterol (PROVENTIL HFA,VENTOLIN HFA) 90 mcg/act inhaler, Inhale 2 puffs every 6 (six) hours as needed for wheezing (Patient not taking: Reported on 2/16/2022 ), Disp: , Rfl:     cholecalciferol (VITAMIN D3) 1,000 units tablet, Take by mouth once a week  (Patient not taking: Reported on 2/16/2022 ), Disp: , Rfl:     cloNIDine (CATAPRES) 0 2 mg tablet, Take 0 2 mg by mouth 2 (two) times a day, Disp: , Rfl:     docusate sodium (COLACE) 100 mg capsule, Take 1 capsule (100 mg total) by mouth 2 (two) times a day, Disp: 10 capsule, Rfl: 0    ergocalciferol (ERGOCALCIFEROL) 1 25 MG (36127 UT) capsule, Take 50,000 Units by mouth, Disp: , Rfl:     fenofibrate (TRICOR) 54 MG tablet, TAKE 1 TABLET (54 MG TOTAL) BY MOUTH DAILY  , Disp: , Rfl:     folic acid (FOLVITE) 1 mg tablet, Take 1,000 mcg by mouth daily, Disp: , Rfl:     HYDROcodone-acetaminophen (NORCO) 5-325 mg per tablet, Take 1 tablet by mouth every 6 (six) hours as needed for painMax Daily Amount: 4 tablets, Disp: 7 tablet, Rfl: 0    hydrOXYzine HCL (ATARAX) 50 mg tablet, Take 50 mg by mouth daily at bedtime, Disp: , Rfl: 0    hydrOXYzine HCL (ATARAX) 50 mg tablet, Take 50 mg by mouth every morning, Disp: , Rfl:     lamoTRIgine (LaMICtal) 200 MG tablet, Take 200 mg by mouth every morning, Disp: , Rfl:     lamoTRIgine (LaMICtal) 25 mg tablet, Take 25 mg by mouth daily at bedtime, Disp: , Rfl:     levothyroxine 50 mcg tablet, Take 50 mcg by mouth daily, Disp: , Rfl:     melatonin 3 mg, Take 3 mg by mouth daily at bedtime as needed, Disp: , Rfl:     metFORMIN (GLUCOPHAGE) 500 mg tablet, Take 1 tablet (500 mg total) by mouth 2 (two) times a day with meals, Disp: 60 tablet, Rfl: 0    methocarbamol (ROBAXIN) 750 mg tablet, TAKE 1 TABLET (750 MG TOTAL) BY MOUTH 4 (FOUR) TIMES A DAY AS NEEDED FOR MUSCLE SPASMS , Disp: , Rfl:     multivitamin (THERAGRAN) TABS, Take 1 tablet by mouth daily  (Patient not taking: Reported on 2/16/2022 ), Disp: , Rfl:     nystatin (MYCOSTATIN) powder, DAILY AFTER SHOWER TO AFFECTED AREAS (Patient not taking: Reported on 2/16/2022), Disp: , Rfl:     ondansetron (ZOFRAN-ODT) 4 mg disintegrating tablet, , Disp: , Rfl:     pantoprazole (PROTONIX) 40 mg tablet, Take 40 mg by mouth daily, Disp: , Rfl:     senna (SENOKOT) 8 6 mg, Take 1 tablet (8 6 mg total) by mouth daily (Patient not taking: Reported on 6/16/2022), Disp: 5 tablet, Rfl: 0    SUMAtriptan Succinate (IMITREX STATDOSE REFILL) 6 MG/0 5ML SOCT, Inject 6 mg under the skin every 2 (two) hours as needed, Disp: , Rfl:     ziprasidone (GEODON) 80 mg capsule, Take 80 mg by mouth 2 (two) times a day with meals , Disp: , Rfl:   No current facility-administered medications for this visit  Family History   Problem Relation Age of Onset    Hyperlipidemia Mother     Diabetes Mother     Hypertension Mother     Heart attack Father       Review of Systems  Allergies:  Aspirin, Barium iodide, Codeine, Latex, Penicillins, and Vancomycin      Objective:  /82   Pulse 68   Temp (!) 96 1 °F (35 6 °C)   Resp 18     Physical Exam        Wound Diabetic Ulcer Foot Plantar;Left (Active)   Wound Image   06/21/22 1114   Wound Description Pink;Yellow 06/21/22 1119   Yvonne-wound Assessment Callus; Intact;Dry 06/21/22 1119   Wound Length (cm) 0 1 cm 06/21/22 1119   Wound Width (cm) 0 3 cm 06/21/22 1119   Wound Depth (cm) 0 1 cm 06/21/22 1119   Wound Surface Area (cm^2) 0 03 cm^2 06/21/22 1119   Wound Volume (cm^3) 0 003 cm^3 06/21/22 1119   Calculated Wound Volume (cm^3) 0 cm^3 06/21/22 1119   Change in Wound Size % 100 06/21/22 1119   Undermining 0 05/31/22 1028   Undermining is depth extending from 1-3 05/24/22 1111   Drainage Amount Small 06/21/22 1119   Drainage Description Serosanguineous 06/21/22 1119   Non-staged Wound Description Full thickness 05/17/22 1144   Treatments Irrigation with NSS 04/05/22 1135   Patient Tolerance Tolerated well 06/21/22 1119   Dressing Status Removed 06/21/22 1119       Wound 06/07/22 Diabetic Ulcer Foot Right;Medial;Plantar (Active)   Wound Image   06/21/22 1114   Wound Description Slough;Pink 06/21/22 1120   Yvonne-wound Assessment Callus;Dry; Intact 06/21/22 1120   Wound Length (cm) 0 5 cm 06/21/22 1120   Wound Width (cm) 1 1 cm 06/21/22 1120   Wound Depth (cm) 0 1 cm 06/21/22 1120   Wound Surface Area (cm^2) 0 55 cm^2 06/21/22 1120   Wound Volume (cm^3) 0 055 cm^3 06/21/22 1120   Calculated Wound Volume (cm^3) 0 06 cm^3 06/21/22 1120   Change in Wound Size % -20 06/21/22 1120   Drainage Amount Moderate 06/21/22 1120   Drainage Description Serosanguineous 06/21/22 1120 Patient Tolerance Tolerated well 06/21/22 1120   Dressing Status Removed 06/21/22 1120                         Debridement   Wound 06/07/22 Diabetic Ulcer Foot Right;Medial;Plantar    Universal Protocol:  Consent: Verbal consent obtained  Risks and benefits: risks, benefits and alternatives were discussed  Consent given by: patient  Time out: Immediately prior to procedure a "time out" was called to verify the correct patient, procedure, equipment, support staff and site/side marked as required  Patient understanding: patient states understanding of the procedure being performed  Patient identity confirmed: verbally with patient      Performed by: physician  Debridement type: selective  Pain control: lidocaine 4%  Post-debridement measurements  Length (cm): 0 5  Width (cm): 1 1  Depth (cm): 0 1  Percent debrided: 100%  Surface Area (cm^2): 0 55  Area debrided (cm^2): 0 55  Volume (cm^3): 0 06  Devitalized tissue debrided: biofilm and slough  Instrument(s) utilized: curette  Bleeding: small  Hemostasis obtained with: not applicable  Procedural pain (0-10): 3  Post-procedural pain: 1   Response to treatment: procedure was tolerated well           Results from last 6 Months   Lab Units 02/16/22  1631   WOUND CULTURE  2+ Growth of Beta Hemolytic Streptococcus Group G*  3+ Growth of          Wound Instructions:  Orders Placed This Encounter   Procedures    Wound cleansing and dressings     Wound cleansing and dressings          All wounds:  Wash your hands with soap and water  Remove old dressing, discard into plastic bag and place in trash  Cleanse the wound with normal saline prior to applying a clean dressing  Do not use tissue or cotton balls  Do not scrub the wound  Pat dry using gauze  Shower no  Do not get dressing wet       Left great toe wound:   Apply Acticoat 7 to the wound bed  Cover with dry gauze    Secure with bulky dressing  (3 rolls cast padding, 1 roll kerlix, 1 roll coban without tension)  Change dressing weekly in wound center     Right plantar foot wound:   Start acticoat 7 to wound bed  Cover with gauze  Secure with bulky dressing (3 rolls cast padding, 1 roll kerlix, 1 roll coban without tension)  Change dressing weekly in wound center     Minimize walking but wear wedge shoe when walking on left foot  Wear surgical shoe on right foot      USE WHEELCHAIR AS MUCH AS POSSIBLE TO LIMIT Franny Claudia     Hold VNA for 1 week until re-evaluated at the wound center next week     Follow up at the wound center in one week      Antibiotics ordered for you today     Treatment in the wound center today:   Cleansed with normal saline and dressed as above     Standing Status:   Future     Standing Expiration Date:   6/21/2023    Debridement     This order was created via procedure documentation         Denny Connell DPM      Portions of the record may have been created with voice recognition software  Occasional wrong word or "sound a like" substitutions may have occurred due to the inherent limitations of voice recognition software  Read the chart carefully and recognize, using context, where substitutions have occurred

## 2022-06-29 ENCOUNTER — OFFICE VISIT (OUTPATIENT)
Dept: WOUND CARE | Facility: CLINIC | Age: 47
End: 2022-06-29
Payer: COMMERCIAL

## 2022-06-29 VITALS
HEART RATE: 56 BPM | SYSTOLIC BLOOD PRESSURE: 110 MMHG | RESPIRATION RATE: 18 BRPM | DIASTOLIC BLOOD PRESSURE: 80 MMHG | TEMPERATURE: 96.1 F

## 2022-06-29 DIAGNOSIS — L97.522 DIABETIC ULCER OF TOE OF LEFT FOOT ASSOCIATED WITH TYPE 2 DIABETES MELLITUS, WITH FAT LAYER EXPOSED (HCC): ICD-10-CM

## 2022-06-29 DIAGNOSIS — L97.511 DIABETIC ULCER OF OTHER PART OF RIGHT FOOT ASSOCIATED WITH DIABETES MELLITUS DUE TO UNDERLYING CONDITION, LIMITED TO BREAKDOWN OF SKIN (HCC): Primary | ICD-10-CM

## 2022-06-29 DIAGNOSIS — E11.621 DIABETIC ULCER OF TOE OF LEFT FOOT ASSOCIATED WITH TYPE 2 DIABETES MELLITUS, WITH FAT LAYER EXPOSED (HCC): ICD-10-CM

## 2022-06-29 DIAGNOSIS — E08.621 DIABETIC ULCER OF OTHER PART OF RIGHT FOOT ASSOCIATED WITH DIABETES MELLITUS DUE TO UNDERLYING CONDITION, LIMITED TO BREAKDOWN OF SKIN (HCC): Primary | ICD-10-CM

## 2022-06-29 PROCEDURE — 97597 DBRDMT OPN WND 1ST 20 CM/<: CPT | Performed by: FAMILY MEDICINE

## 2022-06-29 RX ORDER — LIDOCAINE 40 MG/G
CREAM TOPICAL ONCE
Status: COMPLETED | OUTPATIENT
Start: 2022-06-29 | End: 2022-07-13

## 2022-06-29 NOTE — PROGRESS NOTES
Patient ID: Debbi Haile is a 56 y o  female Date of Birth 1975     Chief Complaint  Chief Complaint   Patient presents with    Follow Up Wound Care Visit     Diabetic ulcer of toe of left foot associated with type 2 diabetes mellitus, with fat layer exposed ([facility] [address] )       Allergies  Aspirin, Barium iodide, Codeine, Latex, Penicillins, and Vancomycin    Assessment:    No problem-specific Assessment & Plan notes found for this encounter  Diagnoses and all orders for this visit:    Diabetic ulcer of other part of right foot associated with diabetes mellitus due to underlying condition, limited to breakdown of skin (HCC)  -     Wound cleansing and dressings; Future  -     lidocaine (LMX) 4 % cream  -     Debridement    Diabetic ulcer of toe of left foot associated with type 2 diabetes mellitus, with fat layer exposed ([facility] [address] )  -     Wound cleansing and dressings; Future  -     lidocaine (LMX) 4 % cream  -     Debridement              Debridement   Wound Diabetic Ulcer Foot Plantar;Left    Universal Protocol:  Consent: Verbal consent obtained  Consent given by: patient  Time out: Immediately prior to procedure a "time out" was called to verify the correct patient, procedure, equipment, support staff and site/side marked as required    Timeout called at: 6/29/2022 11:15 AM   Patient understanding: patient states understanding of the procedure being performed  Patient identity confirmed: verbally with patient      Performed by: physician  Debridement type: selective  Pain control: lidocaine 4%  Post-debridement measurements  Length (cm): 0 1  Width (cm): 0 2  Depth (cm): 0 1  Percent debrided: 100%  Surface Area (cm^2): 0 02  Area debrided (cm^2): 0 02  Volume (cm^3): 0  Devitalized tissue debrided: biofilm and callus  Instrument(s) utilized: curette  Bleeding: small  Hemostasis obtained with: pressure  Procedural pain (0-10): 0  Post-procedural pain: 0   Response to treatment: procedure was tolerated well    Debridement   Wound 06/07/22 Diabetic Ulcer Foot Right;Medial;Plantar    Universal Protocol:  Consent: Verbal consent obtained  Consent given by: patient  Time out: Immediately prior to procedure a "time out" was called to verify the correct patient, procedure, equipment, support staff and site/side marked as required  Timeout called at: 6/29/2022 11:15 AM   Patient understanding: patient states understanding of the procedure being performed  Patient identity confirmed: verbally with patient      Performed by: physician  Debridement type: selective  Pain control: lidocaine 4%  Post-debridement measurements  Length (cm): 0 5  Width (cm): 1 1  Depth (cm): 0 1  Percent debrided: 100%  Surface Area (cm^2): 0 55  Area debrided (cm^2): 0 55  Volume (cm^3): 0 06  Devitalized tissue debrided: biofilm  Instrument(s) utilized: curette  Bleeding: small  Hemostasis obtained with: pressure  Response to treatment: procedure was tolerated well          Plan:    Wounds are improved  Debrided as above  Continue wound management with Acticoat 7 and bulky dressing, see wound orders below  Proper offloading discussed  Control DM  Followup in 1 week with Dr Erica Locke, call sooner with questions or concerns    Wound Diabetic Ulcer Foot Plantar;Left (Active)   Wound Description [place] 06/29/22 1111   Yvonne-wound Assessment Callus;Dry; Intact 06/29/22 1111   Wound Length (cm) 0 1 cm 06/29/22 1111   Wound Width (cm) 0 2 cm 06/29/22 1111   Wound Depth (cm) 0 1 cm 06/29/22 1111   Wound Surface Area (cm^2) 0 02 cm^2 06/29/22 1111   Wound Volume (cm^3) 0 002 cm^3 06/29/22 1111   Calculated Wound Volume (cm^3) 0 cm^3 06/29/22 1111   Change in Wound Size % 100 06/29/22 1111   Drainage Amount Small 06/29/22 1111   Drainage Description Serosanguineous 06/29/22 1111   Patient Tolerance Tolerated well 06/29/22 1111   Dressing Status Removed 06/29/22 1111       Wound 06/07/22 Diabetic Ulcer Foot Right;Medial;Plantar (Active)   Wound Description Pink 06/29/22 1112   Yvonne-wound Assessment Callus;Dry; Intact 06/29/22 1112   Wound Length (cm) 0 5 cm 06/29/22 1112   Wound Width (cm) 1 1 cm 06/29/22 1112   Wound Depth (cm) 0 1 cm 06/29/22 1112   Wound Surface Area (cm^2) 0 55 cm^2 06/29/22 1112   Wound Volume (cm^3) 0 055 cm^3 06/29/22 1112   Calculated Wound Volume (cm^3) 0 06 cm^3 06/29/22 1112   Change in Wound Size % -20 06/29/22 1112   Undermining 0 2 06/29/22 1112   Undermining is depth extending from 10-4 06/29/22 1112   Drainage Amount Moderate 06/29/22 1112   Drainage Description Serosanguineous 06/29/22 1112   Patient Tolerance Tolerated well 06/29/22 1112   Dressing Status Removed 06/29/22 1112       Wound Diabetic Ulcer Foot Plantar;Left (Active)   No Date First Assessed or Time First Assessed found  Primary Wound Type: Diabetic Ulcer  Location: Foot  Wound Location Orientation: Plantar;Left  Wound Description (Comments): minor 2  Dressing Status: Removed       Wound 06/07/22 Diabetic Ulcer Foot Right;Medial;Plantar (Active)   Date First Assessed/Time First Assessed: 06/07/22 1011   Primary Wound Type: Diabetic Ulcer  Location: Foot  Wound Location Orientation: Right;Medial;Plantar       [REMOVED] Wound 09/01/21 Diabetic Ulcer Foot Plantar (Removed)   Resolved Date/Resolved Time: 03/01/22 1009  Date First Assessed/Time First Assessed: 09/01/21 2323   Pre-Existing Wound: Yes  Primary Wound Type: Diabetic Ulcer  Location: Foot  Wound Location Orientation: Plantar  Dressing Status: Clean;Dry; Intact       [REMOVED] Wound 09/04/21 Foot Right (Removed)   Resolved Date/Resolved Time: 03/01/22 1009  Date First Assessed/Time First Assessed: 09/04/21 0843   Location: Foot  Wound Location Orientation: Right  Incision's 1st Dressing: DRESSING XEROFORM 5 X 9 (x1), SPONGE GAUZE 4 X 4 16 PLY STRL PLASTIC TRAY            [REMOVED] Wound 02/17/22 Foot Left (Removed)   Resolved Date/Resolved Time: 03/01/22 1008  Date First Assessed/Time First Assessed: 02/17/22 1545   Location: Foot  Wound Location Orientation: Left  Incision's 1st Dressing: DRESSING XEROFORM 1 X 8, SPONGE GAUZE 4 X 4 16 PLY STRL PLASTIC TRAY LF, BA    [REMOVED] Wound 05/17/22 Diabetic Ulcer Foot Right;Plantar (Removed)   Resolved Date/Resolved Time: 05/24/22 1133  Date First Assessed: 05/17/22   Primary Wound Type: Diabetic Ulcer  Location: Foot  Wound Location Orientation: Right;Plantar  Wound Outcome: Healed       [REMOVED] Wound 05/31/22 Diabetic Ulcer Foot Right;Plantar (Removed)   Resolved Date: 06/16/22  Date First Assessed: 05/31/22   Primary Wound Type: Diabetic Ulcer  Location: Foot  Wound Location Orientation: Right;Plantar  Wound Description (Comments): minor grade 1       Subjective:        Patient is a patient of Dr Geovanna Macdonald who presents today for followup of Naren Cameron 1 diabetic Foot ulcers of bilateral feet  No increased pain or drainage  Has had Acticoat 7 on the wounds as well as a bulky dressing for the past week  The following portions of the patient's history were reviewed and updated as appropriate:   She  has a past medical history of Anxiety, Arthritis, Asthma, Chronic pain, Diabetes mellitus ([address] ), Disease of thyroid gland, Lymphedema, Manic bipolar I disorder ([address] ), Morbid obesity ([address] ), OCD (obsessive compulsive disorder), and PTSD (post-traumatic stress disorder)    She   Patient Active Problem List    Diagnosis Date Noted    Left foot diabetic ulcer 02/16/2022    Obstructive sleep apnea 02/16/2022    GERD (gastroesophageal reflux disease) 09/01/2021    Migraine without status migrainosus, not intractable 09/01/2021    Pain of foot 06/25/2021    Right foot ulcer, with fat layer exposed ([address] ) 04/16/2021    Arthritis 01/12/2021    Chronic pain 01/12/2021    OCD (obsessive compulsive disorder)     PTSD (post-traumatic stress disorder)     Smoking     Essential hypertension 01/11/2021    Acquired absence of other left toe(s) ([address] ) 12/09/2019  Type 2 diabetes mellitus ([facility] ) 09/09/2019    Type 2 diabetes mellitus with neurologic complication, without long-term current use of insulin ([place] [id number] ) 09/06/2019    Tobacco abuse 09/06/2019    Encounter for smoking cessation counseling 06/29/2019    Hypothyroidism 06/29/2019    Manic bipolar I disorder ([place] [address] ) 06/29/2019    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, with bone involvement without evidence of necrosis ([place] [address] ) 06/28/2019    H/O bariatric surgery 06/28/2019    Anxiety 06/28/2019    Obesity 06/28/2019    Preoperative clearance 06/28/2019     She  reports that she has quit smoking  She has a 7 50 pack-year smoking history  She has never used smokeless tobacco  She reports that she does not drink alcohol and does not use drugs  Current Outpatient Medications   Medication Sig Dispense Refill    acetaminophen (TYLENOL) 650 mg CR tablet Take 1 tablet (650 mg total) by mouth every 8 (eight) hours as needed for mild pain 30 tablet 0    albuterol (PROVENTIL HFA,VENTOLIN HFA) 90 mcg/act inhaler Inhale 2 puffs every 6 (six) hours as needed for wheezing (Patient not taking: Reported on 2/16/2022 )      cholecalciferol (VITAMIN D3) 1,000 units tablet Take by mouth once a week  (Patient not taking: Reported on 2/16/2022 )      cloNIDine (CATAPRES) 0 2 mg tablet Take 0 2 mg by mouth 2 (two) times a day      docusate sodium (COLACE) 100 mg capsule Take 1 capsule (100 mg total) by mouth 2 (two) times a day 10 capsule 0    ergocalciferol (ERGOCALCIFEROL) 1 25 MG (01422 UT) capsule Take 50,000 Units by mouth      fenofibrate (TRICOR) 54 MG tablet TAKE 1 TABLET (54 MG TOTAL) BY MOUTH DAILY        folic acid (FOLVITE) 1 mg tablet Take 1,000 mcg by mouth daily      HYDROcodone-acetaminophen (NORCO) 5-325 mg per tablet Take 1 tablet by mouth every 6 (six) hours as needed for painMax Daily Amount: 4 tablets 7 tablet 0    hydrOXYzine HCL (ATARAX) 50 mg tablet Take 50 mg by mouth daily at bedtime  0    hydrOXYzine HCL (ATARAX) 50 mg tablet Take 50 mg by mouth every morning      lamoTRIgine (LaMICtal) 200 MG tablet Take 200 mg by mouth every morning      lamoTRIgine (LaMICtal) 25 mg tablet Take 25 mg by mouth daily at bedtime      levothyroxine 50 mcg tablet Take 50 mcg by mouth daily      melatonin 3 mg Take 3 mg by mouth daily at bedtime as needed      metFORMIN (GLUCOPHAGE) 500 mg tablet Take 1 tablet (500 mg total) by mouth 2 (two) times a day with meals 60 tablet 0    methocarbamol (ROBAXIN) 750 mg tablet TAKE 1 TABLET (750 MG TOTAL) BY MOUTH 4 (FOUR) TIMES A DAY AS NEEDED FOR MUSCLE SPASMS   multivitamin (THERAGRAN) TABS Take 1 tablet by mouth daily  (Patient not taking: Reported on 2/16/2022 )      nystatin (MYCOSTATIN) powder DAILY AFTER SHOWER TO AFFECTED AREAS (Patient not taking: Reported on 2/16/2022)      ondansetron (ZOFRAN-ODT) 4 mg disintegrating tablet  (Patient not taking: Reported on 2/16/2022 )      pantoprazole (PROTONIX) 40 mg tablet Take 40 mg by mouth daily      senna (SENOKOT) 8 6 mg Take 1 tablet (8 6 mg total) by mouth daily (Patient not taking: Reported on 6/16/2022) 5 tablet 0    SUMAtriptan Succinate (IMITREX STATDOSE REFILL) 6 MG/0 5ML SOCT Inject 6 mg under the skin every 2 (two) hours as needed      ziprasidone (GEODON) 80 mg capsule Take 80 mg by mouth 2 (two) times a day with meals        Current Facility-Administered Medications   Medication Dose Route Frequency Provider Last Rate Last Admin    lidocaine (LMX) 4 % cream   Topical Once Lisa Farnsworth DO         She is allergic to aspirin, barium iodide, codeine, latex, penicillins, and vancomycin       Review of Systems   Constitutional: Negative for chills and fever  HENT: Negative for congestion and sneezing  Respiratory: Negative for cough  Musculoskeletal: Positive for gait problem  Skin: Positive for wound  Psychiatric/Behavioral: Negative for agitation  Objective:       Wound Diabetic Ulcer Foot Plantar;Left (Active)   Wound Description [place] 06/29/22 1111   Yvonne-wound Assessment Callus;Dry; Intact 06/29/22 1111   Wound Length (cm) 0 1 cm 06/29/22 1111   Wound Width (cm) 0 2 cm 06/29/22 1111   Wound Depth (cm) 0 1 cm 06/29/22 1111   Wound Surface Area (cm^2) 0 02 cm^2 06/29/22 1111   Wound Volume (cm^3) 0 002 cm^3 06/29/22 1111   Calculated Wound Volume (cm^3) 0 cm^3 06/29/22 1111   Change in Wound Size % 100 06/29/22 1111   Drainage Amount Small 06/29/22 1111   Drainage Description Serosanguineous 06/29/22 1111   Patient Tolerance Tolerated well 06/29/22 1111   Dressing Status Removed 06/29/22 1111       Wound 06/07/22 Diabetic Ulcer Foot Right;Medial;Plantar (Active)   Wound Description Pink 06/29/22 1112   Yvonne-wound Assessment Callus;Dry; Intact 06/29/22 1112   Wound Length (cm) 0 5 cm 06/29/22 1112   Wound Width (cm) 1 1 cm 06/29/22 1112   Wound Depth (cm) 0 1 cm 06/29/22 1112   Wound Surface Area (cm^2) 0 55 cm^2 06/29/22 1112   Wound Volume (cm^3) 0 055 cm^3 06/29/22 1112   Calculated Wound Volume (cm^3) 0 06 cm^3 06/29/22 1112   Change in Wound Size % -20 06/29/22 1112   Undermining 0 2 06/29/22 1112   Undermining is depth extending from 10-4 06/29/22 1112   Drainage Amount Moderate 06/29/22 1112   Drainage Description Serosanguineous 06/29/22 1112   Patient Tolerance Tolerated well 06/29/22 1112   Dressing Status Removed 06/29/22 1112       /80   Pulse 56   Temp (!) 96 1 °F (35 6 °C)   Resp 18     Physical Exam      Wound Instructions:  Orders Placed This Encounter   Procedures    Wound cleansing and dressings     Wound cleansing and dressings          All wounds:  Wash your hands with soap and water  Remove old dressing, discard into plastic bag and place in trash  Cleanse the wound with normal saline prior to applying a clean dressing  Do not use tissue or cotton balls  Do not scrub the wound  Pat dry using gauze  Shower no   Do not get dressing wet       Left great toe wound:   Apply Acticoat 7 to the wound bed  Cover with dry gauze  Secure with bulky dressing  (3 rolls cast padding, 1 roll kerlix, 1 roll coban without tension)  Change dressing weekly in wound center     Right plantar foot wound:   Start acticoat 7 to wound bed  Cover with gauze  Secure with bulky dressing (3 rolls cast padding, 1 roll kerlix, 1 roll coban without tension)  Change dressing weekly in wound center     Minimize walking but wear wedge shoe when walking on left foot  Wear surgical shoe on right foot      USE WHEELCHAIR AS MUCH AS POSSIBLE TO LIMIT Lan Stallion     Hold VNA for 1 week until re-evaluated at the wound center next week     Follow up at the wound center in one week         Treatment in the wound center today:   Cleansed with normal saline and dressed as above     Standing Status:   Future     Standing Expiration Date:   6/29/2023    Debridement     This order was created via procedure documentation    Debridement     This order was created via procedure documentation        Diagnosis ICD-10-CM Associated Orders   1  Diabetic ulcer of other part of right foot associated with diabetes mellitus due to underlying condition, limited to breakdown of skin ([facility])  N79 747 Wound cleansing and dressings    L97 511 lidocaine (LMX) 4 % cream     Debridement   2   Diabetic ulcer of toe of left foot associated with type 2 diabetes mellitus, with fat layer exposed ([facility] )  O13 120 Wound cleansing and dressings    L97 522 lidocaine (LMX) 4 % cream     Debridement

## 2022-06-29 NOTE — PATIENT INSTRUCTIONS
Orders Placed This Encounter   Procedures    Wound cleansing and dressings     Wound cleansing and dressings          All wounds:  Wash your hands with soap and water  Remove old dressing, discard into plastic bag and place in trash  Cleanse the wound with normal saline prior to applying a clean dressing  Do not use tissue or cotton balls  Do not scrub the wound  Pat dry using gauze  Shower no  Do not get dressing wet  Left great toe wound:   Apply Acticoat 7 to the wound bed  Cover with dry gauze  Secure with bulky dressing  (3 rolls cast padding, 1 roll kerlix, 1 roll coban without tension)  Change dressing weekly in wound center     Right plantar foot wound:   Start acticoat 7 to wound bed  Cover with gauze  Secure with bulky dressing (3 rolls cast padding, 1 roll kerlix, 1 roll coban without tension)  Change dressing weekly in wound center     Minimize walking but wear wedge shoe when walking on left foot  Wear surgical shoe on right foot  USE WHEELCHAIR AS MUCH AS POSSIBLE TO LIMIT WALKING      Hold VNA for 1 week until re-evaluated at the wound center next week     Follow up at the wound center in one week          Treatment in the wound center today:   Cleansed with normal saline and dressed as above     Standing Status:   Future     Standing Expiration Date:   6/29/2023

## 2022-06-30 ENCOUNTER — HOME CARE VISIT (OUTPATIENT)
Dept: HOME HEALTH SERVICES | Facility: HOME HEALTHCARE | Age: 47
End: 2022-06-30

## 2022-07-05 ENCOUNTER — OFFICE VISIT (OUTPATIENT)
Dept: WOUND CARE | Facility: CLINIC | Age: 47
End: 2022-07-05
Payer: COMMERCIAL

## 2022-07-05 VITALS
SYSTOLIC BLOOD PRESSURE: 118 MMHG | DIASTOLIC BLOOD PRESSURE: 70 MMHG | TEMPERATURE: 95 F | HEART RATE: 58 BPM | RESPIRATION RATE: 14 BRPM

## 2022-07-05 DIAGNOSIS — E08.621 DIABETIC ULCER OF OTHER PART OF RIGHT FOOT ASSOCIATED WITH DIABETES MELLITUS DUE TO UNDERLYING CONDITION, LIMITED TO BREAKDOWN OF SKIN (HCC): ICD-10-CM

## 2022-07-05 DIAGNOSIS — L97.511 DIABETIC ULCER OF OTHER PART OF RIGHT FOOT ASSOCIATED WITH DIABETES MELLITUS DUE TO UNDERLYING CONDITION, LIMITED TO BREAKDOWN OF SKIN (HCC): ICD-10-CM

## 2022-07-05 DIAGNOSIS — E11.621 DIABETIC ULCER OF TOE OF LEFT FOOT ASSOCIATED WITH TYPE 2 DIABETES MELLITUS, WITH FAT LAYER EXPOSED (HCC): Primary | ICD-10-CM

## 2022-07-05 DIAGNOSIS — L97.522 DIABETIC ULCER OF TOE OF LEFT FOOT ASSOCIATED WITH TYPE 2 DIABETES MELLITUS, WITH FAT LAYER EXPOSED (HCC): Primary | ICD-10-CM

## 2022-07-05 PROCEDURE — 11042 DBRDMT SUBQ TIS 1ST 20SQCM/<: CPT | Performed by: PODIATRIST

## 2022-07-05 PROCEDURE — 99212 OFFICE O/P EST SF 10 MIN: CPT | Performed by: PODIATRIST

## 2022-07-05 RX ORDER — LIDOCAINE 40 MG/G
CREAM TOPICAL ONCE
Status: COMPLETED | OUTPATIENT
Start: 2022-07-05 | End: 2022-07-05

## 2022-07-05 RX ADMIN — LIDOCAINE: 40 CREAM TOPICAL at 09:31

## 2022-07-05 NOTE — PATIENT INSTRUCTIONS
Orders Placed This Encounter   Procedures    Wound cleansing and dressings         Wound cleansing and dressings          All wounds:  Wash your hands with soap and water  Remove old dressing, discard into plastic bag and place in trash  Cleanse the wound with normal saline prior to applying a clean dressing  Do not use tissue or cotton balls  Do not scrub the wound  Pat dry using gauze  Shower no  Do not get dressing wet  Left great toe wound:   Apply Acticoat 7 to the wound bed  Cover with dry gauze  Secure with bulky dressing  (3 rolls cast padding, 1 roll kerlix, 1 roll coban without tension)  Change dressing weekly in wound center     Right plantar foot wound:   Start acticoat 7 to wound bed  Cover with gauze  Secure with bulky dressing (3 rolls cast padding, 1 roll kerlix, 1 roll coban without tension)  Change dressing weekly in wound center     Minimize walking but wear wedge shoe when walking on left foot  Wear surgical shoe on right foot  USE WHEELCHAIR AS MUCH AS POSSIBLE TO LIMIT WALKING      Hold VNA until re-evaluated at the wound center next week  Follow up at the wound center in one week          Treatment in the wound center today:   Cleansed with normal saline and dressed as above     Standing Status:   Future     Standing Expiration Date:   7/5/2023

## 2022-07-05 NOTE — PROGRESS NOTES
Cam boot  Patient ID: Luz Egan is a 56 y o  female Date of Birth 1975     Diagnosis:  1  Diabetic ulcer of toe of left foot associated with type 2 diabetes mellitus, with fat layer exposed ([facility] [address] )  -     lidocaine (LMX) 4 % cream  -     Wound cleansing and dressings; Future    2  Diabetic ulcer of other part of right foot associated with diabetes mellitus due to underlying condition, limited to breakdown of skin (HCC)  -     lidocaine (LMX) 4 % cream  -     Wound cleansing and dressings; Future  -     Debridement  -     Cam Boot       Diagnosis ICD-10-CM Associated Orders   1  Diabetic ulcer of toe of left foot associated with type 2 diabetes mellitus, with fat layer exposed ([facility] [address] )  E11 621 lidocaine (LMX) 4 % cream    L97 522 Wound cleansing and dressings   2  Diabetic ulcer of other part of right foot associated with diabetes mellitus due to underlying condition, limited to breakdown of skin (HCC)  E08 621 lidocaine (LMX) 4 % cream    L97 511 Wound cleansing and dressings     Debridement     Cam Boot        Assessment & Plan: Both wounds are healing slowly but progressively  Left foot did not need debridement today  Just an Acticoat and bulky dressing  On the right foot I did surgically debride, see below  Continue bulky dressing to both feet  Return in 1 week    Chief Complaint   Patient presents with    Follow Up Wound Care Visit     BLE wounds           Subjective:   Patient returns for bilateral Gimenez 1 diabetic foot care  She has been using bulky dressing and seen progress    She feels well      The following portions of the patient's history were reviewed and updated as appropriate:   Patient Active Problem List   Diagnosis    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, with bone involvement without evidence of necrosis ([facility] [address] )    H/O bariatric surgery    Anxiety    Obesity    Preoperative clearance    Encounter for smoking cessation counseling  Hypothyroidism    Manic bipolar I disorder (HCC)    Type 2 diabetes mellitus with neurologic complication, without long-term current use of insulin (HCC)    Tobacco abuse    Type 2 diabetes mellitus ([facility] )    Acquired absence of other left toe(s) ([facility])    Essential hypertension    Arthritis    Chronic pain    OCD (obsessive compulsive disorder)    PTSD (post-traumatic stress disorder)    Smoking    Right foot ulcer, with fat layer exposed ([facility] )    Pain of foot    GERD (gastroesophageal reflux disease)    Migraine without status migrainosus, not intractable    Left foot diabetic ulcer    Obstructive sleep apnea     Past Medical History:   Diagnosis Date    Anxiety     Arthritis     Asthma     Chronic pain     Diabetes mellitus ([facility] )     Disease of thyroid gland     Lymphedema     left leg    Manic bipolar I disorder ([facility])     Morbid obesity ([facility])     OCD (obsessive compulsive disorder)     PTSD (post-traumatic stress disorder)      Past Surgical History:   Procedure Laterality Date    CHOLECYSTECTOMY      CHOLECYSTECTOMY      MS AMPUTATION FOOT,TRANSMETATARSAL Right 9/4/2021    Procedure: AMPUTATION TRANSMETATARSAL (TMA);  Surgeon: Liliane Rodriugez DPM;  [facility];  Service: Podiatry    MS AMPUTATION METATARSAL+TOE,SINGLE Left 9/8/2019    Procedure: PARTIAL 3RD RAY RESECTION, PSB TOE FILLET FLAP;  Surgeon: Abhay Espnioza DPM;  [facility];  Service: Podiatry    TOE AMPUTATION Right 1/13/2021    Procedure: AMPUTATION 2ND TOE;  Surgeon: Liliane Rodriguez DPM;  [facility];  Service: Podiatry    WOUND DEBRIDEMENT Left 2/17/2022    Procedure: DEBRIDEMENT GREAT TOE WOUND;  Surgeon: Liliane Rodriguez DPM;  [facility];  Service: Podiatry     Social History     Socioeconomic History    Marital status: Single     Spouse name: None    Number of children: None    Years of education: None    Highest education level: None   Occupational History    None   Tobacco Use    Smoking status: Former Smoker     Packs/day: 0 50     Years: 15 00     Pack years: 7 50    Smokeless tobacco: Never Used   Vaping Use    Vaping Use: Some days   Substance and Sexual Activity    Alcohol use: Never    Drug use: Never    Sexual activity: Not Currently   Other Topics Concern    None   Social History Narrative    None     Social Determinants of Health     Financial Resource Strain: Not on file   Food Insecurity: No Food Insecurity    Worried About Running Out of Food in the Last Year: Never true    Saud of Food in the Last Year: Never true   Transportation Needs: No Transportation Needs    Lack of Transportation (Medical): No    Lack of Transportation (Non-Medical):  No   Physical Activity: Not on file   Stress: Not on file   Social Connections: Not on file   Intimate Partner Violence: Not on file   Housing Stability: Low Risk     Unable to Pay for Housing in the Last Year: No    Number of Places Lived in the Last Year: 1    Unstable Housing in the Last Year: No        Current Outpatient Medications:     acetaminophen (TYLENOL) 650 mg CR tablet, Take 1 tablet (650 mg total) by mouth every 8 (eight) hours as needed for mild pain, Disp: 30 tablet, Rfl: 0    albuterol (PROVENTIL HFA,VENTOLIN HFA) 90 mcg/act inhaler, Inhale 2 puffs every 6 (six) hours as needed for wheezing (Patient not taking: Reported on 2/16/2022 ), Disp: , Rfl:     cholecalciferol (VITAMIN D3) 1,000 units tablet, Take by mouth once a week  (Patient not taking: Reported on 2/16/2022 ), Disp: , Rfl:     cloNIDine (CATAPRES) 0 2 mg tablet, Take 0 2 mg by mouth 2 (two) times a day, Disp: , Rfl:     docusate sodium (COLACE) 100 mg capsule, Take 1 capsule (100 mg total) by mouth 2 (two) times a day, Disp: 10 capsule, Rfl: 0    ergocalciferol (ERGOCALCIFEROL) 1 25 MG (65040 UT) capsule, Take 50,000 Units by mouth, Disp: , Rfl:     fenofibrate (TRICOR) 54 MG tablet, TAKE 1 TABLET (54 MG TOTAL) BY MOUTH DAILY  , Disp: , Rfl:     folic acid (FOLVITE) 1 mg tablet, Take 1,000 mcg by mouth daily, Disp: , Rfl:     HYDROcodone-acetaminophen (NORCO) 5-325 mg per tablet, Take 1 tablet by mouth every 6 (six) hours as needed for painMax Daily Amount: 4 tablets, Disp: 7 tablet, Rfl: 0    hydrOXYzine HCL (ATARAX) 50 mg tablet, Take 50 mg by mouth daily at bedtime, Disp: , Rfl: 0    hydrOXYzine HCL (ATARAX) 50 mg tablet, Take 50 mg by mouth every morning, Disp: , Rfl:     lamoTRIgine (LaMICtal) 200 MG tablet, Take 200 mg by mouth every morning, Disp: , Rfl:     lamoTRIgine (LaMICtal) 25 mg tablet, Take 25 mg by mouth daily at bedtime, Disp: , Rfl:     levothyroxine 50 mcg tablet, Take 50 mcg by mouth daily, Disp: , Rfl:     melatonin 3 mg, Take 3 mg by mouth daily at bedtime as needed, Disp: , Rfl:     metFORMIN (GLUCOPHAGE) 500 mg tablet, Take 1 tablet (500 mg total) by mouth 2 (two) times a day with meals, Disp: 60 tablet, Rfl: 0    methocarbamol (ROBAXIN) 750 mg tablet, TAKE 1 TABLET (750 MG TOTAL) BY MOUTH 4 (FOUR) TIMES A DAY AS NEEDED FOR MUSCLE SPASMS , Disp: , Rfl:     multivitamin (THERAGRAN) TABS, Take 1 tablet by mouth daily  (Patient not taking: Reported on 2/16/2022 ), Disp: , Rfl:     nystatin (MYCOSTATIN) powder, DAILY AFTER SHOWER TO AFFECTED AREAS (Patient not taking: Reported on 2/16/2022), Disp: , Rfl:     ondansetron (ZOFRAN-ODT) 4 mg disintegrating tablet, , Disp: , Rfl:     pantoprazole (PROTONIX) 40 mg tablet, Take 40 mg by mouth daily, Disp: , Rfl:     senna (SENOKOT) 8 6 mg, Take 1 tablet (8 6 mg total) by mouth daily (Patient not taking: Reported on 6/16/2022), Disp: 5 tablet, Rfl: 0    SUMAtriptan Succinate (IMITREX STATDOSE REFILL) 6 MG/0 5ML SOCT, Inject 6 mg under the skin every 2 (two) hours as needed, Disp: , Rfl:     ziprasidone (GEODON) 80 mg capsule, Take 80 mg by mouth 2 (two) times a day with meals , Disp: , Rfl:     Current Facility-Administered Medications:   lidocaine (LMX) 4 % cream, , Topical, Once, Jovanny Brown,   Family History   Problem Relation Age of Onset    Hyperlipidemia Mother     Diabetes Mother     Hypertension Mother     Heart attack Father       Review of Systems  Allergies:  Aspirin, Barium iodide, Codeine, Latex, Penicillins, and Vancomycin      Objective:  /70   Pulse 58   Temp (!) 95 °F (35 °C)   Resp 14     Physical Exam        Wound Diabetic Ulcer Foot Plantar;Left (Active)   Wound Image   07/05/22 0915   Wound Description Pink 07/05/22 0916   Yvonne-wound Assessment Callus;Dry; Intact 07/05/22 0916   Wound Length (cm) 0 1 cm 07/05/22 0916   Wound Width (cm) 0 3 cm 07/05/22 0916   Wound Depth (cm) 0 1 cm 07/05/22 0916   Wound Surface Area (cm^2) 0 03 cm^2 07/05/22 0916   Wound Volume (cm^3) 0 003 cm^3 07/05/22 0916   Calculated Wound Volume (cm^3) 0 cm^3 07/05/22 0916   Change in Wound Size % 100 07/05/22 0916   Undermining 0 05/31/22 1028   Undermining is depth extending from 1-3 05/24/22 1111   Drainage Amount Scant 07/05/22 0916   Drainage Description Serous 07/05/22 0916   Non-staged Wound Description Full thickness 07/05/22 0916   Treatments Irrigation with NSS 07/05/22 0916   Patient Tolerance Tolerated well 06/29/22 1111   Dressing Status Removed 06/29/22 1111       Wound 06/07/22 Diabetic Ulcer Foot Right;Medial;Plantar (Active)   Wound Image   07/05/22 0929   Wound Description Pink;Pale 07/05/22 0917   Yvonne-wound Assessment Callus;Dry; Intact 07/05/22 0917   Wound Length (cm) 0 5 cm 07/05/22 0917   Wound Width (cm) 1 cm 07/05/22 0917   Wound Depth (cm) 0 2 cm 07/05/22 0917   Wound Surface Area (cm^2) 0 5 cm^2 07/05/22 0917   Wound Volume (cm^3) 0 1 cm^3 07/05/22 0917   Calculated Wound Volume (cm^3) 0 1 cm^3 07/05/22 0917   Change in Wound Size % -100 07/05/22 0917   Undermining 0 2 07/05/22 0917   Undermining is depth extending from 12-4 07/05/22 0917   Drainage Amount Small 07/05/22 0917   Drainage Description Yolis Alejandro 07/05/22 0917   Non-staged Wound Description Full thickness 07/05/22 0917   Treatments Irrigation with NSS 07/05/22 0917   Patient Tolerance Tolerated well 06/29/22 1112   Dressing Status Removed 06/29/22 1112                         Debridement   Wound 06/07/22 Diabetic Ulcer Foot Right;Medial;Plantar    Universal Protocol:  Consent: Verbal consent obtained  Risks and benefits: risks, benefits and alternatives were discussed  Consent given by: patient  Timeout called at: 7/5/2022 9:35 AM   Patient understanding: patient states understanding of the procedure being performed  Patient identity confirmed: verbally with patient      Performed by: physician  Debridement type: surgical  Level of debridement: subcutaneous tissue  Pain control: lidocaine 4%  Post-debridement measurements  Length (cm): 0 7  Width (cm): 1 2  Depth (cm): 0 4  Percent debrided: 100%  Surface Area (cm^2): 0 84  Area debrided (cm^2): 0 84  Volume (cm^3): 0 34  Tissue and other material debrided: dermis, epidermis and subcutaneous tissue  Devitalized tissue debrided: biofilm, callus, fibrin, necrotic debris and slough  Instrument(s) utilized: blade  Bleeding: small  Hemostasis obtained with: pressure  Procedural pain (0-10): 4  Post-procedural pain: 1   Response to treatment: procedure was tolerated well           Results from last 6 Months   Lab Units 02/16/22  1631   WOUND CULTURE  2+ Growth of Beta Hemolytic Streptococcus Group G*  3+ Growth of          Wound Instructions:  Orders Placed This Encounter   Procedures    Wound cleansing and dressings         Wound cleansing and dressings          All wounds:  Wash your hands with soap and water  Remove old dressing, discard into plastic bag and place in trash  Cleanse the wound with normal saline prior to applying a clean dressing  Do not use tissue or cotton balls  Do not scrub the wound  Pat dry using gauze  Shower no   Do not get dressing wet       Left great toe wound:   Apply Acticoat 7 to the wound bed  Cover with dry gauze  Secure with bulky dressing  (3 rolls cast padding, 1 roll kerlix, 1 roll coban without tension)  Change dressing weekly in wound center     Right plantar foot wound:   Start acticoat 7 to wound bed  Cover with gauze  Secure with bulky dressing (3 rolls cast padding, 1 roll kerlix, 1 roll coban without tension)  Change dressing weekly in wound center     Minimize walking but wear wedge shoe when walking on left foot  Wear surgical shoe on right foot      USE WHEELCHAIR AS MUCH AS POSSIBLE TO LIMIT Nena Deeds     Hold VNA until re-evaluated at the wound center next week      Follow up at the wound center in one week         Treatment in the wound center today:   Cleansed with normal saline and dressed as above     Standing Status:   Future     Standing Expiration Date:   7/5/2023    Debridement     This order was created via procedure documentation    Cam Boot     Order Specific Question:   Size     Answer:   Medium     Order Specific Question:   Type     Answer:   High Tide     Order Specific Question: This patient has an active home care or hospice episode  Should this order be COMPLETED by [facility]? Answer:   No         Margie Zavala DPM      Portions of the record may have been created with voice recognition software  Occasional wrong word or "sound a like" substitutions may have occurred due to the inherent limitations of voice recognition software  Read the chart carefully and recognize, using context, where substitutions have occurred

## 2022-07-13 ENCOUNTER — OFFICE VISIT (OUTPATIENT)
Dept: WOUND CARE | Facility: CLINIC | Age: 47
End: 2022-07-13
Payer: COMMERCIAL

## 2022-07-13 VITALS
DIASTOLIC BLOOD PRESSURE: 68 MMHG | HEART RATE: 64 BPM | RESPIRATION RATE: 18 BRPM | SYSTOLIC BLOOD PRESSURE: 126 MMHG | TEMPERATURE: 95.2 F

## 2022-07-13 DIAGNOSIS — L97.522 DIABETIC ULCER OF TOE OF LEFT FOOT ASSOCIATED WITH TYPE 2 DIABETES MELLITUS, WITH FAT LAYER EXPOSED (HCC): Primary | ICD-10-CM

## 2022-07-13 DIAGNOSIS — E08.621 DIABETIC ULCER OF OTHER PART OF RIGHT FOOT ASSOCIATED WITH DIABETES MELLITUS DUE TO UNDERLYING CONDITION, LIMITED TO BREAKDOWN OF SKIN (HCC): ICD-10-CM

## 2022-07-13 DIAGNOSIS — E11.621 DIABETIC ULCER OF TOE OF LEFT FOOT ASSOCIATED WITH TYPE 2 DIABETES MELLITUS, WITH FAT LAYER EXPOSED (HCC): Primary | ICD-10-CM

## 2022-07-13 DIAGNOSIS — L97.511 DIABETIC ULCER OF OTHER PART OF RIGHT FOOT ASSOCIATED WITH DIABETES MELLITUS DUE TO UNDERLYING CONDITION, LIMITED TO BREAKDOWN OF SKIN (HCC): ICD-10-CM

## 2022-07-13 PROCEDURE — 97597 DBRDMT OPN WND 1ST 20 CM/<: CPT | Performed by: FAMILY MEDICINE

## 2022-07-13 PROCEDURE — 11042 DBRDMT SUBQ TIS 1ST 20SQCM/<: CPT | Performed by: FAMILY MEDICINE

## 2022-07-13 RX ORDER — LIDOCAINE 40 MG/G
CREAM TOPICAL ONCE
Status: COMPLETED | OUTPATIENT
Start: 2022-07-13 | End: 2022-07-13

## 2022-07-13 RX ADMIN — LIDOCAINE: 40 CREAM TOPICAL at 12:03

## 2022-07-13 RX ADMIN — LIDOCAINE: 40 CREAM TOPICAL at 11:02

## 2022-07-13 NOTE — PATIENT INSTRUCTIONS
Orders Placed This Encounter   Procedures    Wound cleansing and dressings     Wound cleansing and dressings          All wounds:  Wash your hands with soap and water  Remove old dressing, discard into plastic bag and place in trash  Cleanse the wound with normal saline prior to applying a clean dressing  Do not use tissue or cotton balls  Do not scrub the wound  Pat dry using gauze  Shower no  Do not get dressing wet  Left great toe wound:   Apply Acticoat 7 to the wound bed  Cover with dry gauze  Secure with bulky dressing  (3 rolls cast padding, 1 roll kerlix, 1 roll coban without tension)  Change dressing weekly in wound center     Right plantar foot wound:   Start acticoat 7 to wound bed  Cover with gauze  Secure with bulky dressing (2 5 rolls cast padding, 1 roll kerlix, 1 roll coban without tension)  Change dressing weekly in wound center     Minimize walking but wear wedge shoe when walking on left foot  Wear surgical shoe on right foot  USE WHEELCHAIR AS MUCH AS POSSIBLE TO LIMIT WALKING      Hold VNA until re-evaluated at the wound center next week  Follow up at the wound center in one week          Treatment in the wound center today:   Cleansed with normal saline and dressed as above     Standing Status:   Future     Standing Expiration Date:   7/13/2023

## 2022-07-13 NOTE — PROGRESS NOTES
Debridement   Wound Diabetic Ulcer Foot Plantar;Left    Universal Protocol:  Consent: Verbal consent obtained  Consent given by: patient  Time out: Immediately prior to procedure a "time out" was called to verify the correct patient, procedure, equipment, support staff and site/side marked as required    Timeout called at: 7/13/2022 10:45 AM   Patient understanding: patient states understanding of the procedure being performed  Patient identity confirmed: verbally with patient      Performed by: physician  Debridement type: selective  Pain control: lidocaine 4%  Post-debridement measurements  Length (cm): 0 1  Width (cm): 0 1  Depth (cm): 0 1  Percent debrided: 100%  Surface Area (cm^2): 0 01  Area debrided (cm^2): 0 01  Volume (cm^3): 0  Devitalized tissue debrided: biofilm and callus  Instrument(s) utilized: curette  Bleeding: none  Hemostasis obtained with: pressure  Procedural pain (0-10): 0  Post-procedural pain: 0   Response to treatment: procedure was tolerated well

## 2022-07-13 NOTE — PROGRESS NOTES
Patient ID: Elisa Haro is a 56 y o  female Date of Birth 1975     Chief Complaint  Chief Complaint   Patient presents with    Follow Up Wound Care Visit     Diabetic ulcer of other part of right foot associated with diabetes mellitus due to underlying condition, limited to breakdown of skin (HCC)       Allergies  Aspirin, Barium iodide, Codeine, Latex, Penicillins, and Vancomycin    Assessment:    No problem-specific Assessment & Plan notes found for this encounter  Diagnoses and all orders for this visit:    Diabetic ulcer of toe of left foot associated with type 2 diabetes mellitus, with fat layer exposed ([facility] [address] )  -     lidocaine (LMX) 4 % cream  -     Cancel: Wound cleansing and dressings; Future  -     Wound cleansing and dressings; Future  -     Debridement    Diabetic ulcer of other part of right foot associated with diabetes mellitus due to underlying condition, limited to breakdown of skin (HCC)  -     lidocaine (LMX) 4 % cream  -     Cancel: Wound cleansing and dressings; Future  -     Wound cleansing and dressings; Future  -     Debridement              Debridement   Wound 06/07/22 Diabetic Ulcer Foot Right;Medial;Plantar    Universal Protocol:  Consent: Verbal consent obtained  Consent given by: patient  Time out: Immediately prior to procedure a "time out" was called to verify the correct patient, procedure, equipment, support staff and site/side marked as required    Timeout called at: 7/13/2022 10:45 AM   Patient understanding: patient states understanding of the procedure being performed  Patient identity confirmed: verbally with patient      Performed by: physician  Debridement type: surgical  Level of debridement: subcutaneous tissue  Pain control: lidocaine 4%  Post-debridement measurements  Length (cm): 0 5  Width (cm): 0 9  Depth (cm): 0 3  Percent debrided: 100%  Surface Area (cm^2): 0 45  Area debrided (cm^2): 0 45  Volume (cm^3): 0 14  Tissue and other material debrided: subcutaneous tissue  Devitalized tissue debrided: callus, exudate and slough  Instrument(s) utilized: curette  Bleeding: small  Hemostasis obtained with: pressure  Procedural pain (0-10): 0  Post-procedural pain: 0   Response to treatment: procedure was tolerated well          Plan:   Wounds are essentially unchanged  Debrided as above  Continue wound management with Acticoat 7 and bulky dressing, see wound orders below  Proper offloading  Control DM  Followup with Dr Allison Vee in 1 week or call sooner with questions or concerns    Wound Diabetic Ulcer Foot Plantar;Left (Active)   Wound Image Images linked 07/13/22 1011   Wound Description ERI 07/13/22 1016   Yvonne-wound Assessment Callus 07/13/22 1016   Wound Length (cm) 0 1 cm 07/13/22 1016   Wound Width (cm) 0 1 cm 07/13/22 1016   Wound Depth (cm) 0 1 cm 07/13/22 1016   Wound Surface Area (cm^2) 0 01 cm^2 07/13/22 1016   Wound Volume (cm^3) 0 001 cm^3 07/13/22 1016   Calculated Wound Volume (cm^3) 0 cm^3 07/13/22 1016   Change in Wound Size % 100 07/13/22 1016   Drainage Amount None 07/13/22 1016   Patient Tolerance Tolerated well 07/13/22 1016   Dressing Status Removed 07/13/22 1016       Wound 06/07/22 Diabetic Ulcer Foot Right;Medial;Plantar (Active)   Wound Image Images linked 07/13/22 1058   Wound Description Pink;Pale 07/13/22 1015   Yvonne-wound Assessment Callus; Intact;Dry 07/13/22 1015   Wound Length (cm) 0 5 cm 07/13/22 1015   Wound Width (cm) 0 9 cm 07/13/22 1015   Wound Depth (cm) 0 2 cm 07/13/22 1015   Wound Surface Area (cm^2) 0 45 cm^2 07/13/22 1015   Wound Volume (cm^3) 0 09 cm^3 07/13/22 1015   Calculated Wound Volume (cm^3) 0 09 cm^3 07/13/22 1015   Change in Wound Size % -80 07/13/22 1015   Undermining 0 2 07/13/22 1015   Undermining is depth extending from 12-12 07/13/22 1015   Drainage Amount Small 07/13/22 1015   Drainage Description Gwenevere Christina 07/13/22 1015   Patient Tolerance Tolerated well 07/13/22 1015   Dressing Status Removed 07/13/22 1015       Wound Diabetic Ulcer Foot Plantar;Left (Active)   No Date First Assessed or Time First Assessed found  Primary Wound Type: Diabetic Ulcer  Location: Foot  Wound Location Orientation: Plantar;Left  Wound Description (Comments): minor 2  Dressing Status: Removed  Wound Outcome: Healed       Wound 06/07/22 Diabetic Ulcer Foot Right;Medial;Plantar (Active)   Date First Assessed/Time First Assessed: 06/07/22 1011   Primary Wound Type: Diabetic Ulcer  Location: Foot  Wound Location Orientation: Right;Medial;Plantar       [REMOVED] Wound 09/01/21 Diabetic Ulcer Foot Plantar (Removed)   Resolved Date/Resolved Time: 03/01/22 1009  Date First Assessed/Time First Assessed: 09/01/21 2323   Pre-Existing Wound: Yes  Primary Wound Type: Diabetic Ulcer  Location: Foot  Wound Location Orientation: Plantar  Dressing Status: Clean;Dry; Intact       [REMOVED] Wound 09/04/21 Foot Right (Removed)   Resolved Date/Resolved Time: 03/01/22 1009  Date First Assessed/Time First Assessed: 09/04/21 0843   Location: Foot  Wound Location Orientation: Right  Incision's 1st Dressing: DRESSING XEROFORM 5 X 9 (x1), SPONGE GAUZE 4 X 4 16 PLY STRL PLASTIC TRAY     [REMOVED] Wound 02/17/22 Foot Left (Removed)   Resolved Date/Resolved Time: 03/01/22 1008  Date First Assessed/Time First Assessed: 02/17/22 1545   Location: Foot  Wound Location Orientation: Left  Incision's 1st Dressing: DRESSING XEROFORM 1 X 8, SPONGE GAUZE 4 X 4 16 PLY STRL PLASTIC TRAY LF, BA           [REMOVED] Wound 05/17/22 Diabetic Ulcer Foot Right;Plantar (Removed)   Resolved Date/Resolved Time: 05/24/22 1133  Date First Assessed: 05/17/22   Primary Wound Type: Diabetic Ulcer  Location: Foot  Wound Location Orientation: Right;Plantar  Wound Outcome: Healed       [REMOVED] Wound 05/31/22 Diabetic Ulcer Foot Right;Plantar (Removed)   Resolved Date: 06/16/22  Date First Assessed: 05/31/22   Primary Wound Type: Diabetic Ulcer  Location: Foot  Wound Location Orientation: Right;Plantar  Wound Description (Comments): gimenez grade 1       Subjective:        Patient is a patient of Dr Torri Gibbons who presents today for followup Gimenez 1 diabetic Foot ulcers of bilateral feet  No increased pain or drainage  Has had Acticoat 7 on the wounds as well as a bulky dressing this past week  The following portions of the patient's history were reviewed and updated as appropriate:   She  has a past medical history of Anxiety, Arthritis, Asthma, Chronic pain, Diabetes mellitus ([address] ), Disease of thyroid gland, Lymphedema, Manic bipolar I disorder ([address] ), Morbid obesity ([facility] [address] ), OCD (obsessive compulsive disorder), and PTSD (post-traumatic stress disorder)  She   Patient Active Problem List    Diagnosis Date Noted    Left foot diabetic ulcer 02/16/2022    Obstructive sleep apnea 02/16/2022    GERD (gastroesophageal reflux disease) 09/01/2021    Migraine without status migrainosus, not intractable 09/01/2021    Pain of foot 06/25/2021    Right foot ulcer, with fat layer exposed ([address] ) 04/16/2021    Arthritis 01/12/2021    Chronic pain 01/12/2021    OCD (obsessive compulsive disorder)     PTSD (post-traumatic stress disorder)     Smoking     Essential hypertension 01/11/2021    Acquired absence of other left toe(s) ([address] ) 12/09/2019    Type 2 diabetes mellitus ([facility] [address] ) 09/09/2019    Type 2 diabetes mellitus with neurologic complication, without long-term current use of insulin ([facility] [address] ) 09/06/2019    Tobacco abuse 09/06/2019    Encounter for smoking cessation counseling 06/29/2019    Hypothyroidism 06/29/2019    [facility] 06/29/2019    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, with bone involvement without evidence of necrosis ([facility] [address] ) 06/28/2019    H/O bariatric surgery 06/28/2019    Anxiety 06/28/2019    Obesity 06/28/2019    Preoperative clearance 06/28/2019     She  reports that she has quit smoking   She has a 7 50 pack-year smoking history  She has never used smokeless tobacco  She reports that she does not drink alcohol and does not use drugs  Current Outpatient Medications   Medication Sig Dispense Refill    acetaminophen (TYLENOL) 650 mg CR tablet Take 1 tablet (650 mg total) by mouth every 8 (eight) hours as needed for mild pain 30 tablet 0    albuterol (PROVENTIL HFA,VENTOLIN HFA) 90 mcg/act inhaler Inhale 2 puffs every 6 (six) hours as needed for wheezing (Patient not taking: Reported on 2/16/2022 )      cholecalciferol (VITAMIN D3) 1,000 units tablet Take by mouth once a week  (Patient not taking: Reported on 2/16/2022 )      cloNIDine (CATAPRES) 0 2 mg tablet Take 0 2 mg by mouth 2 (two) times a day      docusate sodium (COLACE) 100 mg capsule Take 1 capsule (100 mg total) by mouth 2 (two) times a day 10 capsule 0    ergocalciferol (ERGOCALCIFEROL) 1 25 MG (17111 UT) capsule Take 50,000 Units by mouth      fenofibrate (TRICOR) 54 MG tablet TAKE 1 TABLET (54 MG TOTAL) BY MOUTH DAILY   folic acid (FOLVITE) 1 mg tablet Take 1,000 mcg by mouth daily      HYDROcodone-acetaminophen (NORCO) 5-325 mg per tablet Take 1 tablet by mouth every 6 (six) hours as needed for painMax Daily Amount: 4 tablets 7 tablet 0    hydrOXYzine HCL (ATARAX) 50 mg tablet Take 50 mg by mouth daily at bedtime  0    hydrOXYzine HCL (ATARAX) 50 mg tablet Take 50 mg by mouth every morning      lamoTRIgine (LaMICtal) 200 MG tablet Take 200 mg by mouth every morning      lamoTRIgine (LaMICtal) 25 mg tablet Take 25 mg by mouth daily at bedtime      levothyroxine 50 mcg tablet Take 50 mcg by mouth daily      melatonin 3 mg Take 3 mg by mouth daily at bedtime as needed      metFORMIN (GLUCOPHAGE) 500 mg tablet Take 1 tablet (500 mg total) by mouth 2 (two) times a day with meals 60 tablet 0    methocarbamol (ROBAXIN) 750 mg tablet TAKE 1 TABLET (750 MG TOTAL) BY MOUTH 4 (FOUR) TIMES A DAY AS NEEDED FOR MUSCLE SPASMS        multivitamin (THERAGRAN) TABS Take 1 tablet by mouth daily  (Patient not taking: Reported on 2/16/2022 )      nystatin (MYCOSTATIN) powder DAILY AFTER SHOWER TO AFFECTED AREAS (Patient not taking: Reported on 2/16/2022)      ondansetron (ZOFRAN-ODT) 4 mg disintegrating tablet  (Patient not taking: Reported on 2/16/2022 )      pantoprazole (PROTONIX) 40 mg tablet Take 40 mg by mouth daily      senna (SENOKOT) 8 6 mg Take 1 tablet (8 6 mg total) by mouth daily (Patient not taking: Reported on 6/16/2022) 5 tablet 0    SUMAtriptan Succinate (IMITREX STATDOSE REFILL) 6 MG/0 5ML SOCT Inject 6 mg under the skin every 2 (two) hours as needed      ziprasidone (GEODON) 80 mg capsule Take 80 mg by mouth 2 (two) times a day with meals        No current facility-administered medications for this visit  She is allergic to aspirin, barium iodide, codeine, latex, penicillins, and vancomycin       Review of Systems   Constitutional: Negative for chills and fever  HENT: Negative for congestion and sneezing  Respiratory: Negative for cough  Musculoskeletal: Positive for gait problem  Skin: Positive for wound  Psychiatric/Behavioral: Negative for agitation           Objective:       Wound Diabetic Ulcer Foot Plantar;Left (Active)   Wound Image Images linked 07/13/22 1011   Wound Description ERI 07/13/22 1016   Yvonne-wound Assessment Callus 07/13/22 1016   Wound Length (cm) 0 1 cm 07/13/22 1016   Wound Width (cm) 0 1 cm 07/13/22 1016   Wound Depth (cm) 0 1 cm 07/13/22 1016   Wound Surface Area (cm^2) 0 01 cm^2 07/13/22 1016   Wound Volume (cm^3) 0 001 cm^3 07/13/22 1016   Calculated Wound Volume (cm^3) 0 cm^3 07/13/22 1016   Change in Wound Size % 100 07/13/22 1016   Drainage Amount None 07/13/22 1016   Patient Tolerance Tolerated well 07/13/22 1016   Dressing Status Removed 07/13/22 1016       Wound 06/07/22 Diabetic Ulcer Foot Right;Medial;Plantar (Active)   Wound Image Images linked 07/13/22 1058   Wound Description [place];Pale 07/13/22 1015   Yvonne-wound Assessment Callus; Intact;Dry 07/13/22 1015   Wound Length (cm) 0 5 cm 07/13/22 1015   Wound Width (cm) 0 9 cm 07/13/22 1015   Wound Depth (cm) 0 2 cm 07/13/22 1015   Wound Surface Area (cm^2) 0 45 cm^2 07/13/22 1015   Wound Volume (cm^3) 0 09 cm^3 07/13/22 1015   Calculated Wound Volume (cm^3) 0 09 cm^3 07/13/22 1015   Change in Wound Size % -80 07/13/22 1015   Undermining 0 2 07/13/22 1015   Undermining is depth extending from 12-12 07/13/22 1015   Drainage Amount Small 07/13/22 1015   Drainage Description Brown 07/13/22 1015   Patient Tolerance Tolerated well 07/13/22 1015   Dressing Status Removed 07/13/22 1015       /68   Pulse 64   Temp (!) 95 2 °F (35 1 °C)   Resp 18     Physical Exam      Wound Instructions:  Orders Placed This Encounter   Procedures    Wound cleansing and dressings     Wound cleansing and dressings          All wounds:  Wash your hands with soap and water  Remove old dressing, discard into plastic bag and place in trash  Cleanse the wound with normal saline prior to applying a clean dressing  Do not use tissue or cotton balls  Do not scrub the wound  Pat dry using gauze  Shower no  Do not get dressing wet       Left great toe wound:   Apply Acticoat 7 to the wound bed  Cover with dry gauze    Secure with bulky dressing  (3 rolls cast padding, 1 roll kerlix, 1 roll coban without tension)  Change dressing weekly in wound center     Right plantar foot wound:   Start acticoat 7 to wound bed  Cover with gauze  Secure with bulky dressing (2 5 rolls cast padding, 1 roll kerlix, 1 roll coban without tension)  Change dressing weekly in wound center     Minimize walking but wear wedge shoe when walking on left foot  Wear surgical shoe on right foot      USE WHEELCHAIR AS MUCH AS POSSIBLE TO LIMIT Chares Estrada     Hold VNA until re-evaluated at the wound center next week      Follow up at the wound center in one week         Treatment in the wound center today:   Cleansed with normal saline and dressed as above     Standing Status:   Future     Standing Expiration Date:   7/13/2023    Debridement     This order was created via procedure documentation    Debridement     This order was created via procedure documentation        Diagnosis ICD-10-CM Associated Orders   1  Diabetic ulcer of toe of left foot associated with type 2 diabetes mellitus, with fat layer exposed ([facility] [address] )  E11 621 lidocaine (LMX) 4 % cream    L97 522 Wound cleansing and dressings     Debridement   2   Diabetic ulcer of other part of right foot associated with diabetes mellitus due to underlying condition, limited to breakdown of skin ([facility])  E08 621 lidocaine (LMX) 4 % cream    L97 511 Wound cleansing and dressings     Debridement

## 2022-07-19 ENCOUNTER — OFFICE VISIT (OUTPATIENT)
Dept: WOUND CARE | Facility: CLINIC | Age: 47
End: 2022-07-19
Payer: COMMERCIAL

## 2022-07-19 VITALS
SYSTOLIC BLOOD PRESSURE: 136 MMHG | RESPIRATION RATE: 14 BRPM | TEMPERATURE: 95 F | HEART RATE: 72 BPM | DIASTOLIC BLOOD PRESSURE: 80 MMHG

## 2022-07-19 DIAGNOSIS — L97.522 DIABETIC ULCER OF TOE OF LEFT FOOT ASSOCIATED WITH TYPE 2 DIABETES MELLITUS, WITH FAT LAYER EXPOSED (HCC): ICD-10-CM

## 2022-07-19 DIAGNOSIS — L97.511 DIABETIC ULCER OF OTHER PART OF RIGHT FOOT ASSOCIATED WITH DIABETES MELLITUS DUE TO UNDERLYING CONDITION, LIMITED TO BREAKDOWN OF SKIN (HCC): Primary | ICD-10-CM

## 2022-07-19 DIAGNOSIS — E08.621 DIABETIC ULCER OF OTHER PART OF RIGHT FOOT ASSOCIATED WITH DIABETES MELLITUS DUE TO UNDERLYING CONDITION, LIMITED TO BREAKDOWN OF SKIN (HCC): Primary | ICD-10-CM

## 2022-07-19 DIAGNOSIS — E11.621 DIABETIC ULCER OF TOE OF LEFT FOOT ASSOCIATED WITH TYPE 2 DIABETES MELLITUS, WITH FAT LAYER EXPOSED (HCC): ICD-10-CM

## 2022-07-19 PROCEDURE — 11042 DBRDMT SUBQ TIS 1ST 20SQCM/<: CPT | Performed by: PODIATRIST

## 2022-07-19 RX ORDER — LIDOCAINE 40 MG/G
CREAM TOPICAL ONCE
Status: COMPLETED | OUTPATIENT
Start: 2022-07-19 | End: 2022-07-19

## 2022-07-19 RX ADMIN — LIDOCAINE: 40 CREAM TOPICAL at 08:53

## 2022-07-19 NOTE — LETTER
July 19, 2022     Patient: Racheal Charles  YOB: 1975  Date of Visit: 7/19/2022      To Whom it May Concern:    Yecenia Hurd is under my professional care  Mackenzie Martinez was seen in my office on 7/19/2022  Mackenzie Martinez has wounds on her feet and is not currently cleared for work  She cannot stand on her feet except to transfer  I forsee this last for at least the next 6-8 weeks  If you have any questions or concerns, please don't hesitate to call           Sincerely,          Omid Sunshine DPM        CC: No Recipients

## 2022-07-19 NOTE — PROGRESS NOTES
Patient ID: Cuco Olson is a 56 y o  female Date of Birth 1975     Diagnosis:  1  Diabetic ulcer of other part of right foot associated with diabetes mellitus due to underlying condition, limited to breakdown of skin ([facility] [address] )  Comments:  progress is slow but stable  Continue bulky  BG control  She was drinking a 1L bottle of Tim-Aid this morning, appropriate beverage consumption was addressed  Orders:  -     lidocaine (LMX) 4 % cream  -     Wound cleansing and dressings; Future  -     Debridement    2  Diabetic ulcer of toe of left foot associated with type 2 diabetes mellitus, with fat layer exposed ([facility] )  Comments:  healed  Surgical shoe 2 weeks then DM shoe  Orders:  -     lidocaine (LMX) 4 % cream  -     Wound cleansing and dressings; Future       Diagnosis ICD-10-CM Associated Orders   1  Diabetic ulcer of other part of right foot associated with diabetes mellitus due to underlying condition, limited to breakdown of skin ([facility])  E08 621 lidocaine (LMX) 4 % cream    L97 511 Wound cleansing and dressings     Debridement    progress is slow but stable  Continue bulky  BG control  She was drinking a 1L bottle of Tim-Aid this morning, appropriate beverage consumption was addressed   2  Diabetic ulcer of toe of left foot associated with type 2 diabetes mellitus, with fat layer exposed ([facility] )  E11 621 lidocaine (LMX) 4 % cream    L97 522 Wound cleansing and dressings    healed  Surgical shoe 2 weeks then DM shoe        Assessment & Plan:  1  Left foot wound healed  Right foot wound continue bulky dressing with Acticoat  See debridement below  Return to clinic 1 week    Chief Complaint   Patient presents with    Follow Up Wound Care Visit     Bilateral foot wounds           Subjective:   Patient has bilateral diabetic foot wounds, both Gimenez ones  She has not had any drainage on the left great toe    She is in a bulky on both feet      The following portions of the patient's history were reviewed and updated as appropriate:   Patient Active Problem List   Diagnosis    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, with bone involvement without evidence of necrosis ([place] [address] )    H/O bariatric surgery    Anxiety    Obesity    Preoperative clearance    Encounter for smoking cessation counseling    Hypothyroidism    Manic bipolar I disorder ([place] [address] )    Type 2 diabetes mellitus with neurologic complication, without long-term current use of insulin ([place] [address] )    Tobacco abuse    Type 2 diabetes mellitus ([place] [address] )    Acquired absence of other left toe(s) ([place] [address] )    Essential hypertension    Arthritis    Chronic pain    OCD (obsessive compulsive disorder)    PTSD (post-traumatic stress disorder)    Smoking    [facility]    Pain of foot    GERD (gastroesophageal reflux disease)    Migraine without status migrainosus, not intractable    Left foot diabetic ulcer    Obstructive sleep apnea     Past Medical History:   Diagnosis Date    Anxiety     Arthritis     Asthma     Chronic pain     [facility]     Disease of thyroid gland     Lymphedema     left leg    Manic bipolar I disorder ([place] [address] )     Morbid obesity ([place] [address] )     OCD (obsessive compulsive disorder)     PTSD (post-traumatic stress disorder)      Past Surgical History:   Procedure Laterality Date    CHOLECYSTECTOMY      CHOLECYSTECTOMY      OK AMPUTATION FOOT,TRANSMETATARSAL Right 9/4/2021    Procedure: AMPUTATION TRANSMETATARSAL (TMA);  Surgeon: Keshia Gamez DPM;  [facility];  Service: Podiatry    OK AMPUTATION METATARSAL+TOE,SINGLE Left 9/8/2019    Procedure: PARTIAL 3RD RAY RESECTION, PSB TOE FILLET FLAP;  Surgeon: Nando Gonzalez DPM;  [facility];  Service: Podiatry    TOE AMPUTATION Right 1/13/2021    Procedure: AMPUTATION 2ND TOE;  Surgeon: Keshia Gamez DPM;  [facility];  Service: Podiatry    WOUND DEBRIDEMENT Left 2/17/2022    Procedure: DEBRIDEMENT GREAT TOE WOUND;  Surgeon: Janet Rae DPM;  [facility];  Service: Podiatry     Social History     Socioeconomic History    Marital status: Single     Spouse name: None    Number of children: None    Years of education: None    Highest education level: None   Occupational History    None   Tobacco Use    Smoking status: Former Smoker     Packs/day: 0 50     Years: 15 00     Pack years: 7 50    Smokeless tobacco: Never Used   Vaping Use    Vaping Use: Some days   Substance and Sexual Activity    Alcohol use: Never    Drug use: Never    Sexual activity: Not Currently   Other Topics Concern    None   Social History Narrative    None     Social Determinants of Health     Financial Resource Strain: Not on file   Food Insecurity: No Food Insecurity    Worried About Running Out of Food in the Last Year: Never true    Saud of Food in the Last Year: Never true   Transportation Needs: No Transportation Needs    Lack of Transportation (Medical): No    Lack of Transportation (Non-Medical):  No   Physical Activity: Not on file   Stress: Not on file   Social Connections: Not on file   Intimate Partner Violence: Not on file   Housing Stability: Low Risk     Unable to Pay for Housing in the Last Year: No    Number of Places Lived in the Last Year: 1    Unstable Housing in the Last Year: No        Current Outpatient Medications:     acetaminophen (TYLENOL) 650 mg CR tablet, Take 1 tablet (650 mg total) by mouth every 8 (eight) hours as needed for mild pain, Disp: 30 tablet, Rfl: 0    albuterol (PROVENTIL HFA,VENTOLIN HFA) 90 mcg/act inhaler, Inhale 2 puffs every 6 (six) hours as needed for wheezing (Patient not taking: Reported on 2/16/2022 ), Disp: , Rfl:     cholecalciferol (VITAMIN D3) 1,000 units tablet, Take by mouth once a week  (Patient not taking: Reported on 2/16/2022 ), Disp: , Rfl:     cloNIDine (CATAPRES) 0 2 mg tablet, Take 0 2 mg by mouth 2 (two) times a day, Disp: , Rfl:     docusate sodium (COLACE) 100 mg capsule, Take 1 capsule (100 mg total) by mouth 2 (two) times a day, Disp: 10 capsule, Rfl: 0    ergocalciferol (ERGOCALCIFEROL) 1 25 MG (88346 UT) capsule, Take 50,000 Units by mouth, Disp: , Rfl:     fenofibrate (TRICOR) 54 MG tablet, TAKE 1 TABLET (54 MG TOTAL) BY MOUTH DAILY  , Disp: , Rfl:     folic acid (FOLVITE) 1 mg tablet, Take 1,000 mcg by mouth daily, Disp: , Rfl:     HYDROcodone-acetaminophen (NORCO) 5-325 mg per tablet, Take 1 tablet by mouth every 6 (six) hours as needed for painMax Daily Amount: 4 tablets, Disp: 7 tablet, Rfl: 0    hydrOXYzine HCL (ATARAX) 50 mg tablet, Take 50 mg by mouth daily at bedtime, Disp: , Rfl: 0    hydrOXYzine HCL (ATARAX) 50 mg tablet, Take 50 mg by mouth every morning, Disp: , Rfl:     lamoTRIgine (LaMICtal) 200 MG tablet, Take 200 mg by mouth every morning, Disp: , Rfl:     lamoTRIgine (LaMICtal) 25 mg tablet, Take 25 mg by mouth daily at bedtime, Disp: , Rfl:     levothyroxine 50 mcg tablet, Take 50 mcg by mouth daily, Disp: , Rfl:     melatonin 3 mg, Take 3 mg by mouth daily at bedtime as needed, Disp: , Rfl:     metFORMIN (GLUCOPHAGE) 500 mg tablet, Take 1 tablet (500 mg total) by mouth 2 (two) times a day with meals, Disp: 60 tablet, Rfl: 0    methocarbamol (ROBAXIN) 750 mg tablet, TAKE 1 TABLET (750 MG TOTAL) BY MOUTH 4 (FOUR) TIMES A DAY AS NEEDED FOR MUSCLE SPASMS , Disp: , Rfl:     multivitamin (THERAGRAN) TABS, Take 1 tablet by mouth daily  (Patient not taking: Reported on 2/16/2022 ), Disp: , Rfl:     nystatin (MYCOSTATIN) powder, DAILY AFTER SHOWER TO AFFECTED AREAS (Patient not taking: Reported on 2/16/2022), Disp: , Rfl:     ondansetron (ZOFRAN-ODT) 4 mg disintegrating tablet, , Disp: , Rfl:     pantoprazole (PROTONIX) 40 mg tablet, Take 40 mg by mouth daily, Disp: , Rfl:     senna (SENOKOT) 8 6 mg, Take 1 tablet (8 6 mg total) by mouth daily (Patient not taking: Reported on 6/16/2022), Disp: 5 tablet, Rfl: 0    SUMAtriptan Succinate (IMITREX STATDOSE REFILL) 6 MG/0 5ML SOCT, Inject 6 mg under the skin every 2 (two) hours as needed, Disp: , Rfl:     ziprasidone (GEODON) 80 mg capsule, Take 80 mg by mouth 2 (two) times a day with meals , Disp: , Rfl:   No current facility-administered medications for this visit  Family History   Problem Relation Age of Onset    Hyperlipidemia Mother     Diabetes Mother     Hypertension Mother     Heart attack Father       Review of Systems  Allergies:  Aspirin, Barium iodide, Codeine, Latex, Penicillins, and Vancomycin      Objective:  /80   Pulse 72   Temp (!) 95 °F (35 °C)   Resp 14     Physical Exam  Vitals reviewed  Constitutional:       Appearance: She is obese  Cardiovascular:      Pulses: Normal pulses  Skin:     Comments: Left great toe ulcer is healed  Right wound shows small but significant progress  No sign of infection   Neurological:      Mental Status: She is alert               Wound Diabetic Ulcer Foot Plantar;Left (Active)   Wound Image   07/19/22 0820   Wound Description Epithelialization 07/19/22 0823   Yvonne-wound Assessment Callus 07/19/22 0823   Wound Length (cm) 0 cm 07/19/22 0823   Wound Width (cm) 0 cm 07/19/22 0823   Wound Depth (cm) 0 cm 07/19/22 0823   Wound Surface Area (cm^2) 0 cm^2 07/19/22 0823   Wound Volume (cm^3) 0 cm^3 07/19/22 0823   Calculated Wound Volume (cm^3) 0 cm^3 07/19/22 0823   Change in Wound Size % 100 07/19/22 0823   Undermining 0 05/31/22 1028   Undermining is depth extending from 1-3 05/24/22 1111   Drainage Amount None 07/19/22 0823   Drainage Description Serous 07/05/22 0916   Non-staged Wound Description Full thickness 07/19/22 0823   Treatments Cleansed 07/19/22 0823   Patient Tolerance Tolerated well 07/13/22 1016   Dressing Status Removed 07/13/22 1016       Wound 06/07/22 Diabetic Ulcer Foot Right;Medial;Plantar (Active)   Wound Image   07/19/22 0850   Wound Description Pink;Granulation tissue 07/19/22 0822   Yvonne-wound Assessment Callus; Intact;Dry 07/19/22 0822   Wound Length (cm) 0 4 cm 07/19/22 0822   Wound Width (cm) 0 8 cm 07/19/22 0822   Wound Depth (cm) 0 2 cm 07/19/22 0822   Wound Surface Area (cm^2) 0 32 cm^2 07/19/22 0822   Wound Volume (cm^3) 0 064 cm^3 07/19/22 0822   Calculated Wound Volume (cm^3) 0 06 cm^3 07/19/22 0822   Change in Wound Size % -20 07/19/22 0822   Undermining 0 3 07/19/22 0822   Undermining is depth extending from 12-12 07/19/22 0822   Drainage Amount Small 07/19/22 0822   Drainage Description Julianna Newer 07/19/22 0822   Non-staged Wound Description Full thickness 07/19/22 0822   Treatments Cleansed 07/19/22 0822   Patient Tolerance Tolerated well 07/13/22 1015   Dressing Status Removed 07/13/22 1015                         Debridement   Wound 06/07/22 Diabetic Ulcer Foot Right;Medial;Plantar    Universal Protocol:  Consent: Verbal consent obtained  Risks and benefits: risks, benefits and alternatives were discussed  Consent given by: patient  Time out: Immediately prior to procedure a "time out" was called to verify the correct patient, procedure, equipment, support staff and site/side marked as required    Timeout called at: 7/19/2022 8:57 AM   Patient understanding: patient states understanding of the procedure being performed  Patient identity confirmed: verbally with patient      Performed by: physician  Debridement type: surgical  Level of debridement: subcutaneous tissue  Pain control: lidocaine 4%  Post-debridement measurements  Length (cm): 0 5  Width (cm): 1  Depth (cm): 0 3  Percent debrided: 100%  Surface Area (cm^2): 0 5  Area debrided (cm^2): 0 5  Volume (cm^3): 0 15  Tissue and other material debrided: dermis, epidermis and subcutaneous tissue  Devitalized tissue debrided: biofilm, callus, fibrin, necrotic debris and slough  Instrument(s) utilized: blade  Bleeding: medium  Hemostasis obtained with: silver nitrate  Procedural pain (0-10): 1  Post-procedural pain: 0   Response to treatment: procedure was tolerated well                   Results from last 6 Months   Lab Units 02/16/22  1631   WOUND CULTURE  2+ Growth of Beta Hemolytic Streptococcus Group G*  3+ Growth of          Wound Instructions:  Orders Placed This Encounter   Procedures    Wound cleansing and dressings     All wounds:  Wash your hands with soap and water  Remove old dressing, discard into plastic bag and place in trash  Cleanse the wound with normal saline prior to applying a clean dressing  Do not use tissue or cotton balls  Do not scrub the wound  Pat dry using gauze  Shower no  Do not get dressing wet       Left great toe wound is healed  Leave open to air  Wear surgical shoe at all times for transffering        Right plantar foot wound:   Start acticoat 7 to wound bed  Cover with gauze  Secure with bulky dressing (2 5 rolls cast padding, 1 roll kerlix, 1 roll coban without tension)  Change dressing weekly in wound center     Minimize walking but wear wedge shoe when walking on left foot  Wear surgical shoe on right foot      USE WHEELCHAIR AS MUCH AS POSSIBLE TO LIMIT Tamsen Zainab     Hold VNA until re-evaluated at the wound center next week      Follow up at the wound center in one week         Treatment in the wound center today:   Cleansed with soap and water,  and dressed as above     Standing Status:   Future     Standing Expiration Date:   7/19/2023    Debridement     This order was created via procedure documentation         Omid Sunshine DPM      Portions of the record may have been created with voice recognition software  Occasional wrong word or "sound a like" substitutions may have occurred due to the inherent limitations of voice recognition software  Read the chart carefully and recognize, using context, where substitutions have occurred

## 2022-07-19 NOTE — PATIENT INSTRUCTIONS
Orders Placed This Encounter   Procedures    Wound cleansing and dressings     All wounds:  Wash your hands with soap and water  Remove old dressing, discard into plastic bag and place in trash  Cleanse the wound with normal saline prior to applying a clean dressing  Do not use tissue or cotton balls  Do not scrub the wound  Pat dry using gauze  Shower no  Do not get dressing wet  Left great toe wound is healed  Leave open to air  Wear surgical shoe at all times for transffering  Right plantar foot wound:   Start acticoat 7 to wound bed  Cover with gauze  Secure with bulky dressing (2 5 rolls cast padding, 1 roll kerlix, 1 roll coban without tension)  Change dressing weekly in wound center     Minimize walking but wear wedge shoe when walking on left foot  Wear surgical shoe on right foot  USE WHEELCHAIR AS MUCH AS POSSIBLE TO LIMIT WALKING      Hold VNA until re-evaluated at the wound center next week  Follow up at the wound center in one week          Treatment in the wound center today:   Cleansed with soap and water,  and dressed as above     Standing Status:   Future     Standing Expiration Date:   7/19/2023

## 2022-07-26 ENCOUNTER — CLINICAL SUPPORT (OUTPATIENT)
Dept: WOUND CARE | Facility: CLINIC | Age: 47
End: 2022-07-26
Payer: COMMERCIAL

## 2022-07-26 VITALS
DIASTOLIC BLOOD PRESSURE: 86 MMHG | RESPIRATION RATE: 18 BRPM | TEMPERATURE: 95.4 F | HEART RATE: 68 BPM | SYSTOLIC BLOOD PRESSURE: 132 MMHG

## 2022-07-26 DIAGNOSIS — S91.301A OPEN WOUND OF PLANTAR ASPECT OF RIGHT FOOT: Primary | ICD-10-CM

## 2022-07-26 PROCEDURE — 99211 OFF/OP EST MAY X REQ PHY/QHP: CPT

## 2022-07-26 NOTE — PROGRESS NOTES
Right foot bulky dressing changed today  Foot cleansed with soap and water and rinsed and dried well  Acticoat 7 to wound bed, covered with 4x4 gauze, 1/2 ABD and secured with medfix tape  Bulky dressing applied (2 5 rolls cast padding, 1 roll kerlix, 1 roll coban with minimal tension)   Tubifast yellow over all

## 2022-08-02 ENCOUNTER — OFFICE VISIT (OUTPATIENT)
Dept: WOUND CARE | Facility: CLINIC | Age: 47
End: 2022-08-02
Payer: COMMERCIAL

## 2022-08-02 ENCOUNTER — HOSPITAL ENCOUNTER (OUTPATIENT)
Dept: RADIOLOGY | Facility: HOSPITAL | Age: 47
Discharge: HOME | End: 2022-08-02
Attending: PODIATRIST
Payer: COMMERCIAL

## 2022-08-02 VITALS
RESPIRATION RATE: 16 BRPM | TEMPERATURE: 96.3 F | SYSTOLIC BLOOD PRESSURE: 126 MMHG | DIASTOLIC BLOOD PRESSURE: 76 MMHG | HEART RATE: 66 BPM

## 2022-08-02 DIAGNOSIS — L97.511 DIABETIC ULCER OF OTHER PART OF RIGHT FOOT ASSOCIATED WITH DIABETES MELLITUS DUE TO UNDERLYING CONDITION, LIMITED TO BREAKDOWN OF SKIN (HCC): Primary | ICD-10-CM

## 2022-08-02 DIAGNOSIS — S91.301A OPEN WOUND OF PLANTAR ASPECT OF RIGHT FOOT: ICD-10-CM

## 2022-08-02 DIAGNOSIS — S90.122A CONTUSION OF SECOND TOE OF LEFT FOOT, INITIAL ENCOUNTER: ICD-10-CM

## 2022-08-02 DIAGNOSIS — E11.621 DIABETIC ULCER OF TOE OF LEFT FOOT ASSOCIATED WITH TYPE 2 DIABETES MELLITUS, WITH FAT LAYER EXPOSED (HCC): ICD-10-CM

## 2022-08-02 DIAGNOSIS — M79.672 ACUTE PAIN OF LEFT FOOT: ICD-10-CM

## 2022-08-02 DIAGNOSIS — L97.522 DIABETIC ULCER OF TOE OF LEFT FOOT ASSOCIATED WITH TYPE 2 DIABETES MELLITUS, WITH FAT LAYER EXPOSED (HCC): ICD-10-CM

## 2022-08-02 DIAGNOSIS — E08.621 DIABETIC ULCER OF OTHER PART OF RIGHT FOOT ASSOCIATED WITH DIABETES MELLITUS DUE TO UNDERLYING CONDITION, LIMITED TO BREAKDOWN OF SKIN (HCC): Primary | ICD-10-CM

## 2022-08-02 PROCEDURE — 11042 DBRDMT SUBQ TIS 1ST 20SQCM/<: CPT | Performed by: PODIATRIST

## 2022-08-02 PROCEDURE — 73630 X-RAY EXAM OF FOOT: CPT

## 2022-08-02 PROCEDURE — 99213 OFFICE O/P EST LOW 20 MIN: CPT | Performed by: PODIATRIST

## 2022-08-02 RX ORDER — LIDOCAINE 40 MG/G
CREAM TOPICAL ONCE
Status: COMPLETED | OUTPATIENT
Start: 2022-08-02 | End: 2022-08-02

## 2022-08-02 RX ADMIN — LIDOCAINE: 40 CREAM TOPICAL at 09:21

## 2022-08-02 NOTE — PROGRESS NOTES
Patient ID: Racheal Charles is a 56 y o  female Date of Birth 1975     Diagnosis:  1  Diabetic ulcer of other part of right foot associated with diabetes mellitus due to underlying condition, limited to breakdown of skin ([address] )  -     Debridement    2  Diabetic ulcer of toe of left foot associated with type 2 diabetes mellitus, with fat layer exposed ([address] )  -     lidocaine (LMX) 4 % cream  -     Wound cleansing and dressings; Future    3  Open wound of plantar aspect of right foot  -     lidocaine (LMX) 4 % cream  -     Wound cleansing and dressings; Future  -     Debridement    4  Acute pain of left foot  -     XR foot 3+ vw left; Future; Expected date: 08/02/2022       Diagnosis ICD-10-CM Associated Orders   1  Diabetic ulcer of other part of right foot associated with diabetes mellitus due to underlying condition, limited to breakdown of skin ([address] )  D64 426 Debridement    L97 511    2  Diabetic ulcer of toe of left foot associated with type 2 diabetes mellitus, with fat layer exposed ([address] )  E11 621 lidocaine (LMX) 4 % cream    L97 522 Wound cleansing and dressings   3  Open wound of plantar aspect of right foot  S91 301A lidocaine (LMX) 4 % cream     Wound cleansing and dressings     Debridement   4  Acute pain of left foot  M79 672 XR foot 3+ vw left        Assessment & Plan:  1  Regarding right foot ulcer see procedure below  The wound is slowly improving with offloading and local wound care  Reapplied bulky dressing  Return to clinic 1 week    2  Patient's left great toe ulcers reopened but is relatively superficial   No need for debridement  Will apply Dermagran gauze  I am much more concerned with the pain and swelling in the middle of her foot  I have ordered an x-ray and will follow-up with those images later today  Chief Complaint   Patient presents with    Follow Up Wound Care Visit     Right and left foot           Subjective:   Patient returns for diabetic foot ulcer care    Her left great toe ulcer was previously healed and appears to have reopened  She states about a week ago her foot got a lot more swollen and painful  She has sharp pain in the middle of her left foot  Is causing her to limp  The right foot dressing is intact  She is tolerating the bulky dressing on the right without issue  She denies nausea fever vomiting or chills        The following portions of the patient's history were reviewed and updated as appropriate:   Patient Active Problem List   Diagnosis    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, with bone involvement without evidence of necrosis ([address] )    H/O bariatric surgery    Anxiety    Obesity    Preoperative clearance    Encounter for smoking cessation counseling    Hypothyroidism    Manic bipolar I disorder ([address] )    Type 2 diabetes mellitus with neurologic complication, without long-term current use of insulin ([address] )    Tobacco abuse    Type 2 diabetes mellitus ([address] )    Acquired absence of other left toe(s) ([address] )    Essential hypertension    Arthritis    Chronic pain    OCD (obsessive compulsive disorder)    PTSD (post-traumatic stress disorder)    Smoking    Right foot ulcer, with fat layer exposed ([address] )    Pain of foot    GERD (gastroesophageal reflux disease)    Migraine without status migrainosus, not intractable    Left foot diabetic ulcer    Obstructive sleep apnea     Past Medical History:   Diagnosis Date    Anxiety     Arthritis     Asthma     Chronic pain     Diabetes mellitus ([address] )     Disease of thyroid gland     Lymphedema     left leg    Manic bipolar I disorder ([address] )     Morbid obesity ([address] )     OCD (obsessive compulsive disorder)     PTSD (post-traumatic stress disorder)      Past Surgical History:   Procedure Laterality Date    CHOLECYSTECTOMY      CHOLECYSTECTOMY      PA AMPUTATION FOOT,TRANSMETATARSAL Right 9/4/2021    Procedure: AMPUTATION TRANSMETATARSAL (TMA); Surgeon: Martha Patton DPM;  [facility];  Service: Podiatry    MO AMPUTATION METATARSAL+TOE,SINGLE Left 9/8/2019    Procedure: PARTIAL 3RD RAY RESECTION, PSB TOE FILLET FLAP;  Surgeon: Kathy Guajardo DPM;  [facility];  Service: Podiatry    TOE AMPUTATION Right 1/13/2021    Procedure: AMPUTATION 2ND TOE;  Surgeon: Martha Patton DPM;  [facility];  Service: Podiatry    WOUND DEBRIDEMENT Left 2/17/2022    Procedure: DEBRIDEMENT GREAT TOE WOUND;  Surgeon: Martha Patton DPM;  [facility];  Service: Podiatry     Social History     Socioeconomic History    Marital status: Single     Spouse name: None    Number of children: None    Years of education: None    Highest education level: None   Occupational History    None   Tobacco Use    Smoking status: Former Smoker     Packs/day: 0 50     Years: 15 00     Pack years: 7 50    Smokeless tobacco: Never Used   Vaping Use    Vaping Use: Some days   Substance and Sexual Activity    Alcohol use: Never    Drug use: Never    Sexual activity: Not Currently   Other Topics Concern    None   Social History Narrative    None     Social Determinants of Health     Financial Resource Strain: Not on file   Food Insecurity: No Food Insecurity    Worried About Running Out of Food in the Last Year: Never true    Saud of Food in the Last Year: Never true   Transportation Needs: No Transportation Needs    Lack of Transportation (Medical): No    Lack of Transportation (Non-Medical):  No   Physical Activity: Not on file   Stress: Not on file   Social Connections: Not on file   Intimate Partner Violence: Not on file   Housing Stability: Low Risk     Unable to Pay for Housing in the Last Year: No    Number of Places Lived in the Last Year: 1    Unstable Housing in the Last Year: No        Current Outpatient Medications:     acetaminophen (TYLENOL) 650 mg CR tablet, Take 1 tablet (650 mg total) by mouth every 8 (eight) hours as needed for mild pain, Disp: 30 tablet, Rfl: 0    albuterol (PROVENTIL HFA,VENTOLIN HFA) 90 mcg/act inhaler, Inhale 2 puffs every 6 (six) hours as needed for wheezing (Patient not taking: Reported on 2/16/2022 ), Disp: , Rfl:     cholecalciferol (VITAMIN D3) 1,000 units tablet, Take by mouth once a week  (Patient not taking: Reported on 2/16/2022 ), Disp: , Rfl:     cloNIDine (CATAPRES) 0 2 mg tablet, Take 0 2 mg by mouth 2 (two) times a day, Disp: , Rfl:     docusate sodium (COLACE) 100 mg capsule, Take 1 capsule (100 mg total) by mouth 2 (two) times a day, Disp: 10 capsule, Rfl: 0    ergocalciferol (ERGOCALCIFEROL) 1 25 MG (21974 UT) capsule, Take 50,000 Units by mouth, Disp: , Rfl:     fenofibrate (TRICOR) 54 MG tablet, TAKE 1 TABLET (54 MG TOTAL) BY MOUTH DAILY  , Disp: , Rfl:     folic acid (FOLVITE) 1 mg tablet, Take 1,000 mcg by mouth daily, Disp: , Rfl:     HYDROcodone-acetaminophen (NORCO) 5-325 mg per tablet, Take 1 tablet by mouth every 6 (six) hours as needed for painMax Daily Amount: 4 tablets, Disp: 7 tablet, Rfl: 0    hydrOXYzine HCL (ATARAX) 50 mg tablet, Take 50 mg by mouth daily at bedtime, Disp: , Rfl: 0    hydrOXYzine HCL (ATARAX) 50 mg tablet, Take 50 mg by mouth every morning, Disp: , Rfl:     lamoTRIgine (LaMICtal) 200 MG tablet, Take 200 mg by mouth every morning, Disp: , Rfl:     lamoTRIgine (LaMICtal) 25 mg tablet, Take 25 mg by mouth daily at bedtime, Disp: , Rfl:     levothyroxine 50 mcg tablet, Take 50 mcg by mouth daily, Disp: , Rfl:     melatonin 3 mg, Take 3 mg by mouth daily at bedtime as needed, Disp: , Rfl:     metFORMIN (GLUCOPHAGE) 500 mg tablet, Take 1 tablet (500 mg total) by mouth 2 (two) times a day with meals, Disp: 60 tablet, Rfl: 0    methocarbamol (ROBAXIN) 750 mg tablet, TAKE 1 TABLET (750 MG TOTAL) BY MOUTH 4 (FOUR) TIMES A DAY AS NEEDED FOR MUSCLE SPASMS , Disp: , Rfl:     multivitamin (THERAGRAN) TABS, Take 1 tablet by mouth daily (Patient not taking: Reported on 2/16/2022 ), Disp: , Rfl:     nystatin (MYCOSTATIN) powder, DAILY AFTER SHOWER TO AFFECTED AREAS (Patient not taking: Reported on 2/16/2022), Disp: , Rfl:     ondansetron (ZOFRAN-ODT) 4 mg disintegrating tablet, , Disp: , Rfl:     pantoprazole (PROTONIX) 40 mg tablet, Take 40 mg by mouth daily, Disp: , Rfl:     senna (SENOKOT) 8 6 mg, Take 1 tablet (8 6 mg total) by mouth daily (Patient not taking: Reported on 6/16/2022), Disp: 5 tablet, Rfl: 0    SUMAtriptan Succinate (IMITREX STATDOSE REFILL) 6 MG/0 5ML SOCT, Inject 6 mg under the skin every 2 (two) hours as needed, Disp: , Rfl:     ziprasidone (GEODON) 80 mg capsule, Take 80 mg by mouth 2 (two) times a day with meals , Disp: , Rfl:   No current facility-administered medications for this visit  Family History   Problem Relation Age of Onset    Hyperlipidemia Mother     Diabetes Mother     Hypertension Mother     Heart attack Father       Review of Systems   Constitutional: Negative  Respiratory: Negative for cough and shortness of breath  Cardiovascular: Positive for leg swelling  Gastrointestinal: Negative for diarrhea, nausea and vomiting  Musculoskeletal: Positive for arthralgias and joint swelling  Skin: Positive for color change and wound  Neurological: Positive for numbness  Negative for weakness  Allergies:  Aspirin, Barium iodide, Codeine, Latex, Penicillins, and Vancomycin      Objective:  /76   Pulse 66   Temp (!) 96 3 °F (35 7 °C)   Resp 16     Physical Exam  Cardiovascular:      Rate and Rhythm: Normal rate  Pulses: Normal pulses  Dorsalis pedis pulses are 2+ on the right side and 2+ on the left side  Posterior tibial pulses are 2+ on the right side and 2+ on the left side  Pulmonary:      Effort: Pulmonary effort is normal  No respiratory distress  Musculoskeletal:         General: Swelling (The left forefoot is severely swollen and tender    There is no cellulitis  No crepitus  Pain is over the metatarsal shafts), tenderness and deformity ( transmetatarsal amputation right foot) present  Right lower leg: Edema present  Left lower leg: Edema present  Right foot: Deformity present  Left foot: Decreased range of motion  Feet:      Right foot:      Skin integrity: Ulcer present  Left foot:      Skin integrity: Ulcer present  No erythema or warmth  Skin:     Capillary Refill: Capillary refill takes less than 2 seconds  Findings: No erythema  Neurological:      Mental Status: She is oriented to person, place, and time  Sensory: Sensory deficit present  Wound Diabetic Ulcer Foot Plantar;Left (Active)   Wound Image   08/02/22 0853   Wound Description Epithelialization;Pink 08/02/22 0850   Yvonne-wound Assessment Intact;Callus 08/02/22 0850   Wound Length (cm) 0 1 cm 08/02/22 0850   Wound Width (cm) 0 2 cm 08/02/22 0850   Wound Depth (cm) 0 1 cm 08/02/22 0850   Wound Surface Area (cm^2) 0 02 cm^2 08/02/22 0850   Wound Volume (cm^3) 0 002 cm^3 08/02/22 0850   Calculated Wound Volume (cm^3) 0 cm^3 08/02/22 0850   Change in Wound Size % 100 08/02/22 0850   Undermining 0 05/31/22 1028   Undermining is depth extending from 1-3 05/24/22 1111   Drainage Amount Scant 08/02/22 0850   Drainage Description Serous 08/02/22 0850   Non-staged Wound Description Full thickness 07/19/22 0823   Treatments Irrigation with NSS 08/02/22 0850   Patient Tolerance Tolerated well 07/13/22 1016   Dressing Status Removed 07/13/22 1016       Wound 06/07/22 Diabetic Ulcer Foot Right;Medial;Plantar (Active)   Wound Image   08/02/22 0913   Wound Description Pink;Pale 08/02/22 0851   Yvonne-wound Assessment Callus; Maceration;Scaly 08/02/22 0851   Wound Length (cm) 0 6 cm 08/02/22 0851   Wound Width (cm) 0 7 cm 08/02/22 0851   Wound Depth (cm) 0 2 cm 08/02/22 0851   Wound Surface Area (cm^2) 0 42 cm^2 08/02/22 0851   Wound Volume (cm^3) 0 084 cm^3 08/02/22 7326   Calculated Wound Volume (cm^3) 0 08 cm^3 08/02/22 0851   Change in Wound Size % -60 08/02/22 0851   Undermining 0 3 08/02/22 0851   Undermining is depth extending from 11-8 08/02/22 0851   Drainage Amount Small 08/02/22 0851   Drainage Description Joanna Seats 08/02/22 0851   Non-staged Wound Description Full thickness 08/02/22 0851   Treatments Cleansed 08/02/22 0851   Patient Tolerance Tolerated well 07/13/22 1015   Dressing Status Removed 07/13/22 1015                         Debridement   Wound 06/07/22 Diabetic Ulcer Foot Right;Medial;Plantar    Universal Protocol:  Consent: Verbal consent obtained  Risks and benefits: risks, benefits and alternatives were discussed  Consent given by: patient  Time out: Immediately prior to procedure a "time out" was called to verify the correct patient, procedure, equipment, support staff and site/side marked as required    Timeout called at: 8/2/2022 9:19 AM   Patient understanding: patient states understanding of the procedure being performed  Patient identity confirmed: verbally with patient      Performed by: physician  Debridement type: surgical  Level of debridement: subcutaneous tissue  Pain control: lidocaine 4%  Post-debridement measurements  Length (cm): 0 9  Width (cm): 0 9  Depth (cm): 0 3  Percent debrided: 100%  Surface Area (cm^2): 0 81  Area debrided (cm^2): 0 81  Volume (cm^3): 0 24  Tissue and other material debrided: dermis, epidermis and subcutaneous tissue  Devitalized tissue debrided: biofilm, callus, clots, exudate, fibrin, necrotic debris, slough and eschar  Instrument(s) utilized: blade  Bleeding: medium  Hemostasis obtained with: pressure  Procedural pain (0-10): 0  Post-procedural pain: 0   Response to treatment: procedure was tolerated well                   Results from last 6 Months   Lab Units 02/16/22  1631   WOUND CULTURE  2+ Growth of Beta Hemolytic Streptococcus Group G*  3+ Growth of          Wound Instructions:  Orders Placed This Encounter   Procedures    Wound cleansing and dressings     All wounds:  Wash your hands with soap and water  Remove old dressing, discard into plastic bag and place in trash  Cleanse the wound with normal saline prior to applying a clean dressing  Do not use tissue or cotton balls  Do not scrub the wound  Pat dry using gauze  Shower no  Do not get dressing wet          Right plantar foot wound:   Start acticoat 7 to wound bed  Cover with gauze  Secure with bulky dressing (2 5 rolls cast padding, 1 roll kerlix, 1 roll coban without tension)  Change dressing weekly in wound center  Surgical shoe at all times  Left great toe wound:   Apply dermagran to the wound bed  Cover with dry gauze  Secure with rolled gauze  Surgical shoes at all times         USE WHEELCHAIR AS MUCH AS POSSIBLE TO LIMIT [place] Presume     VNA to change dressing three times a week on left toe wound      Follow up at the wound center in one week         Treatment in the wound center today:   Cleansed with soap and water,  and dressed as above     Standing Status:   Future     Standing Expiration Date:   8/2/2023    Debridement     This order was created via procedure documentation    XR foot 3+ vw left     Severe midfoot pain and swelling     Standing Status:   Future     Standing Expiration Date:   8/2/2026     Scheduling Instructions:      Bring along any outside films relating to this procedure  Order Specific Question:   Is the patient pregnant? Answer:   No         Kolton Ovalles DPM      Portions of the record may have been created with voice recognition software  Occasional wrong word or "sound a like" substitutions may have occurred due to the inherent limitations of voice recognition software  Read the chart carefully and recognize, using context, where substitutions have occurred

## 2022-08-02 NOTE — PATIENT INSTRUCTIONS
Orders Placed This Encounter   Procedures    Wound cleansing and dressings     All wounds:  Wash your hands with soap and water  Remove old dressing, discard into plastic bag and place in trash  Cleanse the wound with normal saline prior to applying a clean dressing  Do not use tissue or cotton balls  Do not scrub the wound  Pat dry using gauze  Shower no  Do not get dressing wet  Right plantar foot wound:   Start acticoat 7 to wound bed  Cover with gauze  Secure with bulky dressing (2 5 rolls cast padding, 1 roll kerlix, 1 roll coban without tension)  Change dressing weekly in wound center  Surgical shoe at all times  Left great toe wound:   Apply dermagran to the wound bed  Cover with dry gauze  Secure with rolled gauze  Surgical shoes at all times  USE WHEELCHAIR AS MUCH AS POSSIBLE TO LIMIT WALKING      VNA to change dressing three times a week on left toe wound  Follow up at the wound center in one week          Treatment in the wound center today:   Cleansed with soap and water,  and dressed as above     Standing Status:   Future     Standing Expiration Date:   8/2/2023    Debridement     This order was created via procedure documentation

## 2022-08-09 ENCOUNTER — OFFICE VISIT (OUTPATIENT)
Dept: WOUND CARE | Facility: CLINIC | Age: 47
End: 2022-08-09
Payer: COMMERCIAL

## 2022-08-09 VITALS
SYSTOLIC BLOOD PRESSURE: 116 MMHG | DIASTOLIC BLOOD PRESSURE: 78 MMHG | TEMPERATURE: 95.4 F | HEART RATE: 72 BPM | RESPIRATION RATE: 18 BRPM

## 2022-08-09 DIAGNOSIS — L97.522 DIABETIC ULCER OF TOE OF LEFT FOOT ASSOCIATED WITH TYPE 2 DIABETES MELLITUS, WITH FAT LAYER EXPOSED (HCC): ICD-10-CM

## 2022-08-09 DIAGNOSIS — E08.621 DIABETIC ULCER OF OTHER PART OF RIGHT FOOT ASSOCIATED WITH DIABETES MELLITUS DUE TO UNDERLYING CONDITION, LIMITED TO BREAKDOWN OF SKIN (HCC): Primary | ICD-10-CM

## 2022-08-09 DIAGNOSIS — L97.511 DIABETIC ULCER OF OTHER PART OF RIGHT FOOT ASSOCIATED WITH DIABETES MELLITUS DUE TO UNDERLYING CONDITION, LIMITED TO BREAKDOWN OF SKIN (HCC): Primary | ICD-10-CM

## 2022-08-09 DIAGNOSIS — E11.621 DIABETIC ULCER OF TOE OF LEFT FOOT ASSOCIATED WITH TYPE 2 DIABETES MELLITUS, WITH FAT LAYER EXPOSED (HCC): ICD-10-CM

## 2022-08-09 PROCEDURE — 99214 OFFICE O/P EST MOD 30 MIN: CPT | Performed by: PODIATRIST

## 2022-08-09 PROCEDURE — 99213 OFFICE O/P EST LOW 20 MIN: CPT | Performed by: PODIATRIST

## 2022-08-09 RX ORDER — LIDOCAINE 40 MG/G
CREAM TOPICAL ONCE
Status: COMPLETED | OUTPATIENT
Start: 2022-08-09 | End: 2022-08-09

## 2022-08-09 RX ADMIN — LIDOCAINE: 40 CREAM TOPICAL at 08:34

## 2022-08-09 NOTE — PROGRESS NOTES
Patient ID: Casper Hazel is a 56 y o  female Date of Birth 1975     Diagnosis:  1  Diabetic ulcer of other part of right foot associated with diabetes mellitus due to underlying condition, limited to breakdown of skin (HCC)  -     lidocaine (LMX) 4 % cream  -     Wound cleansing and dressings; Future    2  Diabetic ulcer of toe of left foot associated with type 2 diabetes mellitus, with fat layer exposed ([address] )  -     Wound cleansing and dressings; Future       Diagnosis ICD-10-CM Associated Orders   1  Diabetic ulcer of other part of right foot associated with diabetes mellitus due to underlying condition, limited to breakdown of skin (HCC)  E08 621 lidocaine (LMX) 4 % cream    L97 511 Wound cleansing and dressings   2  Diabetic ulcer of toe of left foot associated with type 2 diabetes mellitus, with fat layer exposed ([address] )  E11 621 Wound cleansing and dressings    L97 522         Assessment & Plan:  1  Right foot slowly improving  I did not debride the wound today is it is relatively stable in smaller in size  I would like to see how the wound responds in 1 week with the bulky dressing without debridement  Return 1 week for bulky dressing change and wound inspection  2  Left great toe ulcer again has healed  We are going to keep covered with a dry gauze dressing for a few weeks just to allow the fragile skin to strengthen before advancing her as tolerated  Three  Her left foot x-ray ordered last week did not show any acute bony abnormality  Her swelling has improved on the left foot since last week  I personally reviewed the images  Her radiograph is stable from previous exams  The previous ray amputation is noted, no changes    Chief Complaint   Patient presents with    Follow Up Wound Care Visit     Diabetic ulcer of other part of right foot associated with diabetes mellitus due to underlying condition, limited to breakdown of skin ([address] )           Subjective:   Patient returns for wound care   She has a bulky on the right foot which is intact  Dressing on her left great toe  She states the swelling and pain in her left foot from last week has gotten better  She did get an x-ray which did not show any acute bony abnormality  She denies nausea fever vomiting chills        The following portions of the patient's history were reviewed and updated as appropriate:   Patient Active Problem List   Diagnosis    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, with bone involvement without evidence of necrosis ([facility] )    H/O bariatric surgery    Anxiety    Obesity    Preoperative clearance    Encounter for smoking cessation counseling    Hypothyroidism    Manic bipolar I disorder ([facility] )    Type 2 diabetes mellitus with neurologic complication, without long-term current use of insulin ([facility] )    Tobacco abuse    Type 2 diabetes mellitus ([facility] )    Acquired absence of other left toe(s) ([address] )    Essential hypertension    Arthritis    Chronic pain    OCD (obsessive compulsive disorder)    PTSD (post-traumatic stress disorder)    Smoking    Right foot ulcer, with fat layer exposed ([address] )    Pain of foot    GERD (gastroesophageal reflux disease)    Migraine without status migrainosus, not intractable    Left foot diabetic ulcer    Obstructive sleep apnea     Past Medical History:   Diagnosis Date    Anxiety     Arthritis     Asthma     Chronic pain     Diabetes mellitus ([address] )     Disease of thyroid gland     Lymphedema     left leg    Manic bipolar I disorder ([address] )     Morbid obesity ([address] )     OCD (obsessive compulsive disorder)     PTSD (post-traumatic stress disorder)      Past Surgical History:   Procedure Laterality Date    CHOLECYSTECTOMY      CHOLECYSTECTOMY      MI AMPUTATION FOOT,TRANSMETATARSAL Right 9/4/2021    Procedure: AMPUTATION TRANSMETATARSAL (TMA);  Surgeon: Demian Jimenez DPM;  [facility];  Service: Podiatry  AL AMPUTATION METATARSAL+TOE,SINGLE Left 9/8/2019    Procedure: PARTIAL 3RD RAY RESECTION, PSB TOE FILLET FLAP;  Surgeon: Conner Gamboa DPM;  [facility];  Service: Podiatry    TOE AMPUTATION Right 1/13/2021    Procedure: AMPUTATION 2ND TOE;  Surgeon: Abdi Gould DPM;  [facility];  Service: Podiatry    WOUND DEBRIDEMENT Left 2/17/2022    Procedure: DEBRIDEMENT GREAT TOE WOUND;  Surgeon: Abdi Gould DPM;  [facility];  Service: Podiatry     Social History     Socioeconomic History    Marital status: Single     Spouse name: Not on file    Number of children: Not on file    Years of education: Not on file    Highest education level: Not on file   Occupational History    Not on file   Tobacco Use    Smoking status: Former Smoker     Packs/day: 0 50     Years: 15 00     Pack years: 7 50    Smokeless tobacco: Never Used   Vaping Use    Vaping Use: Some days   Substance and Sexual Activity    Alcohol use: Never    Drug use: Never    Sexual activity: Not Currently   Other Topics Concern    Not on file   Social History Narrative    Not on file     Social Determinants of Health     Financial Resource Strain: Not on file   Food Insecurity: No Food Insecurity    Worried About Running Out of Food in the Last Year: Never true    [address] in the Last Year: Never true   Transportation Needs: No Transportation Needs    Lack of Transportation (Medical): No    Lack of Transportation (Non-Medical):  No   Physical Activity: Not on file   Stress: Not on file   Social Connections: Not on file   Intimate Partner Violence: Not on file   Housing Stability: Low Risk     Unable to Pay for Housing in the Last Year: No    Number of Places Lived in the Last Year: 1    Unstable Housing in the Last Year: No        Current Outpatient Medications:     acetaminophen (TYLENOL) 650 mg CR tablet, Take 1 tablet (650 mg total) by mouth every 8 (eight) hours as needed for mild pain, Disp: 30 tablet, Rfl: 0    albuterol (PROVENTIL HFA,VENTOLIN HFA) 90 mcg/act inhaler, Inhale 2 puffs every 6 (six) hours as needed for wheezing (Patient not taking: Reported on 2/16/2022 ), Disp: , Rfl:     cholecalciferol (VITAMIN D3) 1,000 units tablet, Take by mouth once a week  (Patient not taking: Reported on 2/16/2022 ), Disp: , Rfl:     cloNIDine (CATAPRES) 0 2 mg tablet, Take 0 2 mg by mouth 2 (two) times a day, Disp: , Rfl:     docusate sodium (COLACE) 100 mg capsule, Take 1 capsule (100 mg total) by mouth 2 (two) times a day, Disp: 10 capsule, Rfl: 0    ergocalciferol (ERGOCALCIFEROL) 1 25 MG (39410 UT) capsule, Take 50,000 Units by mouth, Disp: , Rfl:     fenofibrate (TRICOR) 54 MG tablet, TAKE 1 TABLET (54 MG TOTAL) BY MOUTH DAILY  , Disp: , Rfl:     folic acid (FOLVITE) 1 mg tablet, Take 1,000 mcg by mouth daily, Disp: , Rfl:     HYDROcodone-acetaminophen (NORCO) 5-325 mg per tablet, Take 1 tablet by mouth every 6 (six) hours as needed for painMax Daily Amount: 4 tablets, Disp: 7 tablet, Rfl: 0    hydrOXYzine HCL (ATARAX) 50 mg tablet, Take 50 mg by mouth daily at bedtime, Disp: , Rfl: 0    hydrOXYzine HCL (ATARAX) 50 mg tablet, Take 50 mg by mouth every morning, Disp: , Rfl:     lamoTRIgine (LaMICtal) 200 MG tablet, Take 200 mg by mouth every morning, Disp: , Rfl:     lamoTRIgine (LaMICtal) 25 mg tablet, Take 25 mg by mouth daily at bedtime, Disp: , Rfl:     levothyroxine 50 mcg tablet, Take 50 mcg by mouth daily, Disp: , Rfl:     melatonin 3 mg, Take 3 mg by mouth daily at bedtime as needed, Disp: , Rfl:     metFORMIN (GLUCOPHAGE) 500 mg tablet, Take 1 tablet (500 mg total) by mouth 2 (two) times a day with meals, Disp: 60 tablet, Rfl: 0    methocarbamol (ROBAXIN) 750 mg tablet, TAKE 1 TABLET (750 MG TOTAL) BY MOUTH 4 (FOUR) TIMES A DAY AS NEEDED FOR MUSCLE SPASMS , Disp: , Rfl:     multivitamin (THERAGRAN) TABS, Take 1 tablet by mouth daily  (Patient not taking: Reported on 2/16/2022 ), Disp: , Rfl:     nystatin (MYCOSTATIN) powder, DAILY AFTER SHOWER TO AFFECTED AREAS (Patient not taking: Reported on 2/16/2022), Disp: , Rfl:     ondansetron (ZOFRAN-ODT) 4 mg disintegrating tablet, , Disp: , Rfl:     pantoprazole (PROTONIX) 40 mg tablet, Take 40 mg by mouth daily, Disp: , Rfl:     senna (SENOKOT) 8 6 mg, Take 1 tablet (8 6 mg total) by mouth daily (Patient not taking: Reported on 6/16/2022), Disp: 5 tablet, Rfl: 0    SUMAtriptan Succinate (IMITREX STATDOSE REFILL) 6 MG/0 5ML SOCT, Inject 6 mg under the skin every 2 (two) hours as needed, Disp: , Rfl:     ziprasidone (GEODON) 80 mg capsule, Take 80 mg by mouth 2 (two) times a day with meals , Disp: , Rfl:   No current facility-administered medications for this visit  Family History   Problem Relation Age of Onset    Hyperlipidemia Mother     Diabetes Mother     Hypertension Mother     Heart attack Father       Review of Systems   Constitutional: Negative  HENT: Negative for sinus pressure and sinus pain  Respiratory: Negative for cough and shortness of breath  Cardiovascular: Negative for chest pain and leg swelling  Gastrointestinal: Negative for diarrhea, nausea and vomiting  Musculoskeletal:  Left foot swelling  Previous foot amputations  Skin:  Wounds on both feet  Neurological:  Neuropathy in feet  Psychiatric/Behavioral: The patient is not nervous/anxious  Allergies:  Aspirin, Barium iodide, Codeine, Latex, Penicillins, and Vancomycin      Objective:  /78   Pulse 72   Temp (!) 95 4 °F (35 2 °C)   Resp 18     Physical Exam  Vitals reviewed  Constitutional:       Appearance: She is obese  She is not ill-appearing or diaphoretic  Cardiovascular:      Rate and Rhythm: Normal rate  Pulses: Normal pulses  Pulmonary:      Effort: Pulmonary effort is normal  No respiratory distress  Musculoskeletal:         General: Swelling (Left foot edema present but improved from 1 week ago    Pain significantly improved) and deformity ( right transmetatarsal amputation and left 3rd ray amputation stable) present  Skin:     Capillary Refill: Capillary refill takes less than 2 seconds  Findings: No erythema or rash  Comments: Left great toe ulcer is healed  Right foot plantar ulcer is smaller than last week  No sign of acute infection  See wound description below   Neurological:      Mental Status: She is alert and oriented to person, place, and time  Sensory: Sensory deficit present               Wound Diabetic Ulcer Foot Plantar;Left (Active)   Wound Image   08/09/22 0818   Wound Description Intact 08/09/22 0831   Yvonne-wound Assessment Intact 08/09/22 0831   Wound Length (cm) 0 cm 08/09/22 0831   Wound Width (cm) 0 cm 08/09/22 0831   Wound Depth (cm) 0 cm 08/09/22 0831   Wound Surface Area (cm^2) 0 cm^2 08/09/22 0831   Wound Volume (cm^3) 0 cm^3 08/09/22 0831   Calculated Wound Volume (cm^3) 0 cm^3 08/09/22 0831   Change in Wound Size % 100 08/09/22 0831   Undermining 0 05/31/22 1028   Undermining is depth extending from 1-3 05/24/22 1111   Drainage Amount None 08/09/22 0831   Drainage Description Serous 08/02/22 0850   Non-staged Wound Description Full thickness 07/19/22 0823   Treatments Irrigation with NSS 08/02/22 0850   Patient Tolerance Tolerated well 07/13/22 1016   Dressing Status Removed 07/13/22 1016       Wound 06/07/22 Diabetic Ulcer Foot Right;Medial;Plantar (Active)   Wound Image   08/09/22 0817   Wound Description Pink;Slough 08/09/22 0818   Yvonne-wound Assessment Callus 08/09/22 0818   Wound Length (cm) 0 4 cm 08/09/22 0818   Wound Width (cm) 0 6 cm 08/09/22 0818   Wound Depth (cm) 0 2 cm 08/09/22 0818   Wound Surface Area (cm^2) 0 24 cm^2 08/09/22 0818   Wound Volume (cm^3) 0 048 cm^3 08/09/22 0818   Calculated Wound Volume (cm^3) 0 05 cm^3 08/09/22 0818   Change in Wound Size % 0 08/09/22 0818   Undermining 0 3 08/09/22 0818   Undermining is depth extending from 12-4 08/09/22 0818   Drainage Amount Small 08/09/22 0818   Drainage Description Brown 08/09/22 0818   Non-staged Wound Description Full thickness 08/02/22 0851   Treatments Cleansed 08/02/22 0851   Patient Tolerance Tolerated well 08/09/22 0818   Dressing Status Removed 08/09/22 0818                         Procedures none today    Results from last 6 Months   Lab Units 02/16/22  1631   WOUND CULTURE  2+ Growth of Beta Hemolytic Streptococcus Group G*  3+ Growth of          Wound Instructions:  Orders Placed This Encounter   Procedures    Wound cleansing and dressings     Wash your hands with soap and water  Remove old dressing, discard into plastic bag and place in trash  Cleanse the wound with normal saline prior to applying a clean dressing  Do not use tissue or cotton balls  Do not scrub the wound  Pat dry using gauze  Shower no  Do not get dressing wet          Right plantar foot wound:   Start acticoat 7 to wound bed  Cover with gauze  Secure with bulky dressing (2 5 rolls cast padding, 1 roll kerlix, 1 roll coban without tension)  Change dressing weekly in wound center  Surgical shoe at all times      Left great toe:   Cover with dry gauze  Secure with rolled gauze  Surgical shoes at all times         USE WHEELCHAIR AS MUCH AS POSSIBLE TO LIMIT [facility]        Follow up at the wound center in one week         Treatment in the wound center today:   Cleansed with soap and water,  and dressed as above     Standing Status:   Future     Standing Expiration Date:   8/9/2023         Kolton Ovalles DPM      Portions of the record may have been created with voice recognition software  Occasional wrong word or "sound a like" substitutions may have occurred due to the inherent limitations of voice recognition software  Read the chart carefully and recognize, using context, where substitutions have occurred

## 2022-08-09 NOTE — PATIENT INSTRUCTIONS
Orders Placed This Encounter   Procedures    Wound cleansing and dressings     Wash your hands with soap and water  Remove old dressing, discard into plastic bag and place in trash  Cleanse the wound with normal saline prior to applying a clean dressing  Do not use tissue or cotton balls  Do not scrub the wound  Pat dry using gauze  Shower no  Do not get dressing wet  Right plantar foot wound:   Start acticoat 7 to wound bed  Cover with gauze  Secure with bulky dressing (2 5 rolls cast padding, 1 roll kerlix, 1 roll coban without tension)  Change dressing weekly in wound center  Surgical shoe at all times  Left great toe:   Cover with dry gauze  Secure with rolled gauze  Surgical shoes at all times  USE WHEELCHAIR AS MUCH AS POSSIBLE TO LIMIT WALKING         Follow up at the wound center in one week          Treatment in the wound center today:   Cleansed with soap and water,  and dressed as above     Standing Status:   Future     Standing Expiration Date:   8/9/2023

## 2022-08-16 ENCOUNTER — OFFICE VISIT (OUTPATIENT)
Dept: WOUND CARE | Facility: CLINIC | Age: 47
End: 2022-08-16
Payer: COMMERCIAL

## 2022-08-16 VITALS
RESPIRATION RATE: 16 BRPM | TEMPERATURE: 96 F | HEART RATE: 64 BPM | DIASTOLIC BLOOD PRESSURE: 76 MMHG | SYSTOLIC BLOOD PRESSURE: 128 MMHG

## 2022-08-16 DIAGNOSIS — L97.522 DIABETIC ULCER OF TOE OF LEFT FOOT ASSOCIATED WITH TYPE 2 DIABETES MELLITUS, WITH FAT LAYER EXPOSED (HCC): ICD-10-CM

## 2022-08-16 DIAGNOSIS — E11.621 DIABETIC ULCER OF TOE OF LEFT FOOT ASSOCIATED WITH TYPE 2 DIABETES MELLITUS, WITH FAT LAYER EXPOSED (HCC): ICD-10-CM

## 2022-08-16 DIAGNOSIS — L97.512 DIABETIC ULCER OF OTHER PART OF RIGHT FOOT ASSOCIATED WITH DIABETES MELLITUS DUE TO UNDERLYING CONDITION, WITH FAT LAYER EXPOSED (HCC): Primary | ICD-10-CM

## 2022-08-16 DIAGNOSIS — E08.621 DIABETIC ULCER OF OTHER PART OF RIGHT FOOT ASSOCIATED WITH DIABETES MELLITUS DUE TO UNDERLYING CONDITION, WITH FAT LAYER EXPOSED (HCC): Primary | ICD-10-CM

## 2022-08-16 PROCEDURE — 11042 DBRDMT SUBQ TIS 1ST 20SQCM/<: CPT | Performed by: PODIATRIST

## 2022-08-16 PROCEDURE — 99213 OFFICE O/P EST LOW 20 MIN: CPT | Performed by: PODIATRIST

## 2022-08-16 RX ORDER — LIDOCAINE 40 MG/G
CREAM TOPICAL ONCE
Status: COMPLETED | OUTPATIENT
Start: 2022-08-16 | End: 2022-08-16

## 2022-08-16 RX ADMIN — LIDOCAINE: 40 CREAM TOPICAL at 09:05

## 2022-08-16 NOTE — PATIENT INSTRUCTIONS
Orders Placed This Encounter   Procedures    Wound cleansing and dressings     Wash your hands with soap and water  Remove old dressing, discard into plastic bag and place in trash  Cleanse the wound with normal saline prior to applying a clean dressing  Do not use tissue or cotton balls  Do not scrub the wound  Pat dry using gauze  Shower no  Do not get dressing wet  Right plantar foot wound:   Start acticoat 7 to wound bed  Cover with gauze  Secure with bulky dressing (2 5 rolls cast padding, 1 roll kerlix, 1 roll coban without tension)  Change dressing weekly in wound center  Surgical shoe at all times  Left great toe:   Acticoat 7  Cover with dry gauze  Secure with bulky dressing  Change weekly in the wound center  Surgical shoes at all times  USE WHEELCHAIR AS MUCH AS POSSIBLE TO LIMIT WALKING         Follow up at the wound center in one week          Treatment in the wound center today:   Cleansed with soap and water,  and dressed as above     Standing Status:   Future     Standing Expiration Date:   8/16/2023    Debridement     This order was created via procedure documentation    Debridement     This order was created via procedure documentation

## 2022-08-16 NOTE — PROGRESS NOTES
Patient ID: Matias Christiansen is a 56 y o  female Date of Birth 1975     Diagnosis:  1  Diabetic ulcer of other part of right foot associated with diabetes mellitus due to underlying condition, with fat layer exposed ([address] )  -     lidocaine (LMX) 4 % cream  -     Wound cleansing and dressings; Future  -     Debridement    2  Diabetic ulcer of toe of left foot associated with type 2 diabetes mellitus, with fat layer exposed ([address] )  -     lidocaine (LMX) 4 % cream  -     Wound cleansing and dressings; Future  -     Debridement       Diagnosis ICD-10-CM Associated Orders   1  Diabetic ulcer of other part of right foot associated with diabetes mellitus due to underlying condition, with fat layer exposed ([address] )  E08 621 lidocaine (LMX) 4 % cream    L97 512 Wound cleansing and dressings     Debridement   2  Diabetic ulcer of toe of left foot associated with type 2 diabetes mellitus, with fat layer exposed ([address] )  E11 621 lidocaine (LMX) 4 % cream    L97 522 Wound cleansing and dressings     Debridement        Assessment & Plan:  1  For the 3rd time within a week patient reopened her left great toe wound  It is unclear what happens between her having the wound healed and the following week but each time this is been fully healed she returns the next week and the wound is not only open but much larger than it had been  Wound was debrided today we will do a bulky dressing  Patient unable to provide any further information about her weight-bearing or activity levels, she just states I do not know what happened    2  Right foot continues to heal albeit slowly  Continue bulky dressing  See debridement below  Both wounds were excisionally debrided today    Chief Complaint   Patient presents with    Follow Up Wound Care Visit     Keegan foot wounds           Subjective:   Patient returns for right foot wound  Last week her left great toe wound was fully healed    She was keeping a light gauze dressing on the area but the wound has gotten much larger this week  She states she does not know what happened to it        The following portions of the patient's history were reviewed and updated as appropriate:   Patient Active Problem List   Diagnosis    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, with bone involvement without evidence of necrosis ([facility] )    H/O bariatric surgery    Anxiety    Obesity    Preoperative clearance    Encounter for smoking cessation counseling    Hypothyroidism    Manic bipolar I disorder ([facility] )    Type 2 diabetes mellitus with neurologic complication, without long-term current use of insulin ([facility] )    Tobacco abuse    Type 2 diabetes mellitus ([facility] )    Acquired absence of other left toe(s) ([facility] )    Essential hypertension    Arthritis    Chronic pain    OCD (obsessive compulsive disorder)    PTSD (post-traumatic stress disorder)    Smoking    Right foot ulcer, with fat layer exposed ([facility] )    Pain of foot    GERD (gastroesophageal reflux disease)    Migraine without status migrainosus, not intractable    Left foot diabetic ulcer    Obstructive sleep apnea     Past Medical History:   Diagnosis Date    Anxiety     Arthritis     Asthma     Chronic pain     Diabetes mellitus ([facility] )     Disease of thyroid gland     Lymphedema     left leg    Manic bipolar I disorder ([facility] )     Morbid obesity ([facility] )     OCD (obsessive compulsive disorder)     PTSD (post-traumatic stress disorder)      Past Surgical History:   Procedure Laterality Date    CHOLECYSTECTOMY      CHOLECYSTECTOMY      NC AMPUTATION FOOT,TRANSMETATARSAL Right 9/4/2021    Procedure: AMPUTATION TRANSMETATARSAL (TMA);  Surgeon: Sherlyn Nunn DPM;  [facility];  Service: Podiatry    NC AMPUTATION METATARSAL+TOE,SINGLE Left 9/8/2019    Procedure: PARTIAL 3RD RAY RESECTION, PSB TOE FILLET FLAP;  Surgeon: Koby Huerta DPM;  [facility];  Service: Podiatry   [facility] TOE AMPUTATION Right 1/13/2021    Procedure: AMPUTATION 2ND TOE;  Surgeon: Talya Mcmullen DPM;  [facility];  Service: Podiatry    WOUND DEBRIDEMENT Left 2/17/2022    Procedure: DEBRIDEMENT GREAT TOE WOUND;  Surgeon: Talya Mcmullen DPM;  [facility];  Service: Podiatry     Social History     Socioeconomic History    Marital status: Single     Spouse name: None    Number of children: None    Years of education: None    Highest education level: None   Occupational History    None   Tobacco Use    Smoking status: Former Smoker     Packs/day: 0 50     Years: 15 00     Pack years: 7 50    Smokeless tobacco: Never Used   Vaping Use    Vaping Use: Some days   Substance and Sexual Activity    Alcohol use: Never    Drug use: Never    Sexual activity: Not Currently   Other Topics Concern    None   Social History Narrative    None     Social Determinants of Health     Financial Resource Strain: Not on file   Food Insecurity: No Food Insecurity    Worried About Running Out of Food in the Last Year: Never true    Saud of Food in the Last Year: Never true   Transportation Needs: No Transportation Needs    Lack of Transportation (Medical): No    Lack of Transportation (Non-Medical):  No   Physical Activity: Not on file   Stress: Not on file   Social Connections: Not on file   Intimate Partner Violence: Not on file   Housing Stability: Low Risk     Unable to Pay for Housing in the Last Year: No    Number of Places Lived in the Last Year: 1    Unstable Housing in the Last Year: No        Current Outpatient Medications:     acetaminophen (TYLENOL) 650 mg CR tablet, Take 1 tablet (650 mg total) by mouth every 8 (eight) hours as needed for mild pain, Disp: 30 tablet, Rfl: 0    albuterol (PROVENTIL HFA,VENTOLIN HFA) 90 mcg/act inhaler, Inhale 2 puffs every 6 (six) hours as needed for wheezing (Patient not taking: Reported on 2/16/2022 ), Disp: , Rfl:     cholecalciferol (VITAMIN D3) 1,000 units tablet, Take by mouth once a week  (Patient not taking: Reported on 2/16/2022 ), Disp: , Rfl:     cloNIDine (CATAPRES) 0 2 mg tablet, Take 0 2 mg by mouth 2 (two) times a day, Disp: , Rfl:     docusate sodium (COLACE) 100 mg capsule, Take 1 capsule (100 mg total) by mouth 2 (two) times a day, Disp: 10 capsule, Rfl: 0    ergocalciferol (ERGOCALCIFEROL) 1 25 MG (49561 UT) capsule, Take 50,000 Units by mouth, Disp: , Rfl:     fenofibrate (TRICOR) 54 MG tablet, TAKE 1 TABLET (54 MG TOTAL) BY MOUTH DAILY  , Disp: , Rfl:     folic acid (FOLVITE) 1 mg tablet, Take 1,000 mcg by mouth daily, Disp: , Rfl:     HYDROcodone-acetaminophen (NORCO) 5-325 mg per tablet, Take 1 tablet by mouth every 6 (six) hours as needed for painMax Daily Amount: 4 tablets, Disp: 7 tablet, Rfl: 0    hydrOXYzine HCL (ATARAX) 50 mg tablet, Take 50 mg by mouth daily at bedtime, Disp: , Rfl: 0    hydrOXYzine HCL (ATARAX) 50 mg tablet, Take 50 mg by mouth every morning, Disp: , Rfl:     lamoTRIgine (LaMICtal) 200 MG tablet, Take 200 mg by mouth every morning, Disp: , Rfl:     lamoTRIgine (LaMICtal) 25 mg tablet, Take 25 mg by mouth daily at bedtime, Disp: , Rfl:     levothyroxine 50 mcg tablet, Take 50 mcg by mouth daily, Disp: , Rfl:     melatonin 3 mg, Take 3 mg by mouth daily at bedtime as needed, Disp: , Rfl:     metFORMIN (GLUCOPHAGE) 500 mg tablet, Take 1 tablet (500 mg total) by mouth 2 (two) times a day with meals, Disp: 60 tablet, Rfl: 0    methocarbamol (ROBAXIN) 750 mg tablet, TAKE 1 TABLET (750 MG TOTAL) BY MOUTH 4 (FOUR) TIMES A DAY AS NEEDED FOR MUSCLE SPASMS , Disp: , Rfl:     multivitamin (THERAGRAN) TABS, Take 1 tablet by mouth daily  (Patient not taking: Reported on 2/16/2022 ), Disp: , Rfl:     nystatin (MYCOSTATIN) powder, DAILY AFTER SHOWER TO AFFECTED AREAS (Patient not taking: Reported on 2/16/2022), Disp: , Rfl:     ondansetron (ZOFRAN-ODT) 4 mg disintegrating tablet, , Disp: , Rfl:    pantoprazole (PROTONIX) 40 mg tablet, Take 40 mg by mouth daily, Disp: , Rfl:     senna (SENOKOT) 8 6 mg, Take 1 tablet (8 6 mg total) by mouth daily (Patient not taking: Reported on 6/16/2022), Disp: 5 tablet, Rfl: 0    SUMAtriptan Succinate (IMITREX STATDOSE REFILL) 6 MG/0 5ML SOCT, Inject 6 mg under the skin every 2 (two) hours as needed, Disp: , Rfl:     ziprasidone (GEODON) 80 mg capsule, Take 80 mg by mouth 2 (two) times a day with meals , Disp: , Rfl:   No current facility-administered medications for this visit  Family History   Problem Relation Age of Onset    Hyperlipidemia Mother     Diabetes Mother     Hypertension Mother     Heart attack Father       Review of Systems   Constitutional: Negative  Respiratory: Negative for cough and shortness of breath  Cardiovascular: Positive for leg swelling  Gastrointestinal: Negative for diarrhea, nausea and vomiting  Musculoskeletal: Positive for arthralgias  Skin: Positive for wound  Negative for color change and rash  Neurological: Positive for numbness  Negative for weakness  Psychiatric/Behavioral: The patient is not nervous/anxious  Allergies:  Aspirin, Barium iodide, Codeine, Latex, Penicillins, and Vancomycin      Objective:  /76   Pulse 64   Temp (!) 96 °F (35 6 °C)   Resp 16     Physical Exam  Vitals reviewed  Constitutional:       Appearance: She is obese  Cardiovascular:      Rate and Rhythm: Normal rate  Pulses: Normal pulses  no weak pulses          Dorsalis pedis pulses are 2+ on the right side and 2+ on the left side  Posterior tibial pulses are 2+ on the right side and 2+ on the left side  Pulmonary:      Effort: Pulmonary effort is normal  No respiratory distress  Musculoskeletal:      Right foot: Decreased range of motion  Deformity (Transmetatarsal amputation) and bony tenderness present  Left foot: Decreased range of motion  Deformity ( 3rd ray is amputated) present     Feet: Right foot:      Protective Sensation: 5 sites tested  0 sites sensed  Skin integrity: Ulcer, callus and dry skin present  No erythema  Left foot:      Protective Sensation: 8 sites tested  0 sites sensed  Skin integrity: Ulcer, callus and dry skin present  No erythema  Toenail Condition: Left toenails are abnormally thick  Fungal disease present  Neurological:      Mental Status: She is alert  Diabetic Foot Exam    Patient's shoes and socks removed  Right Foot/Ankle   Right Foot Inspection  Skin Exam: dry skin, callus, ulcer and callus  Skin not intact and no erythema  Toe Exam: right toe deformity  Sensory   Vibration: absent  Monofilament testing: absent    Vascular  Capillary refills: < 3 seconds  The right DP pulse is 2+  The right PT pulse is 2+  Right Toe  - Comprehensive Exam  Tenderness: bony tenderness         Left Foot/Ankle  Left Foot Inspection  Skin Exam: dry skin, ulcer and callus  Skin not intact and no erythema  Toe Exam: swelling and left toe deformity  Sensory   Vibration: absent  Monofilament testing: absent    Vascular  Capillary refills: < 3 seconds  The left DP pulse is 2+  The left PT pulse is 2+       Left Toe  - Comprehensive Exam  Arch: pes planus  Swelling: dorsum, great toe interphalangeal joint and great toe metatarsophalangeal joint           Assign Risk Category  Deformity present  Loss of protective sensation  No weak pulses  Risk: 3      Wound Diabetic Ulcer Toe (Comment  which one) Plantar;Left (Active)   Wound Image   08/16/22 0840   Wound Description Pink 08/16/22 0829   Yvonne-wound Assessment Intact;Dry 08/16/22 0829   Wound Length (cm) 0 1 cm 08/16/22 0829   Wound Width (cm) 0 9 cm 08/16/22 0829   Wound Depth (cm) 0 2 cm 08/16/22 0829   Wound Surface Area (cm^2) 0 09 cm^2 08/16/22 0829   Wound Volume (cm^3) 0 018 cm^3 08/16/22 0829   Calculated Wound Volume (cm^3) 0 02 cm^3 08/16/22 0829   Change in Wound Size % 87 5 08/16/22 0829 Undermining 0 05/31/22 1028   Undermining is depth extending from 1-3 05/24/22 1111   Drainage Amount Small 08/16/22 0829   Drainage Description Serosanguineous 08/16/22 0829   Non-staged Wound Description Full thickness 08/16/22 0829   Treatments Cleansed 08/16/22 0829   Patient Tolerance Tolerated well 07/13/22 1016   Dressing Status Removed 07/13/22 1016       Wound 06/07/22 Diabetic Ulcer Foot Right;Medial;Plantar (Active)   Wound Image   08/16/22 0841   Wound Description Pink 08/16/22 0828   Yvonne-wound Assessment Callus;Dry 08/16/22 0828   Wound Length (cm) 0 4 cm 08/16/22 0828   Wound Width (cm) 0 5 cm 08/16/22 0828   Wound Depth (cm) 0 2 cm 08/16/22 0828   Wound Surface Area (cm^2) 0 2 cm^2 08/16/22 0828   Wound Volume (cm^3) 0 04 cm^3 08/16/22 0828   Calculated Wound Volume (cm^3) 0 04 cm^3 08/16/22 0828   Change in Wound Size % 20 08/16/22 0828   Undermining 0 2 08/16/22 0828   Undermining is depth extending from 8-4 08/16/22 0828   Drainage Amount Small 08/16/22 0828   Drainage Description Clif Flavors 08/16/22 0828   Non-staged Wound Description Full thickness 08/16/22 0828   Treatments Cleansed 08/16/22 0828   Patient Tolerance Tolerated well 08/09/22 0818   Dressing Status Removed 08/09/22 0818                         Debridement   Wound Diabetic Ulcer Toe (Comment  which one) Plantar;Left    Universal Protocol:  Consent: Verbal consent obtained  Risks and benefits: risks, benefits and alternatives were discussed  Consent given by: patient  Time out: Immediately prior to procedure a "time out" was called to verify the correct patient, procedure, equipment, support staff and site/side marked as required    Timeout called at: 8/16/2022 8:47 AM   Patient understanding: patient states understanding of the procedure being performed  Patient identity confirmed: verbally with patient      Performed by: physician  Debridement type: surgical  Level of debridement: subcutaneous tissue  Pain control: lidocaine 4%  Post-debridement measurements  Length (cm): 1 4  Width (cm): 0 5  Depth (cm): 0 3  Percent debrided: 100%  Surface Area (cm^2): 0 7  Area debrided (cm^2): 0 7  Volume (cm^3): 0 21  Tissue and other material debrided: dermis, epidermis and subcutaneous tissue  Devitalized tissue debrided: biofilm, callus, fibrin, necrotic debris and slough  Instrument(s) utilized: blade  Bleeding: medium  Hemostasis obtained with: pressure  Procedural pain (0-10): 1  Post-procedural pain: 0   Response to treatment: procedure was tolerated well    Debridement   Wound 06/07/22 Diabetic Ulcer Foot Right;Medial;Plantar    Universal Protocol:  Consent: Verbal consent obtained  Risks and benefits: risks, benefits and alternatives were discussed  Consent given by: patient  Time out: Immediately prior to procedure a "time out" was called to verify the correct patient, procedure, equipment, support staff and site/side marked as required  Timeout called at: 8/16/2022 8:48 AM   Patient understanding: patient states understanding of the procedure being performed  Patient identity confirmed: verbally with patient      Performed by: physician  Debridement type: surgical  Level of debridement: subcutaneous tissue  Pain control: lidocaine 4%  Post-debridement measurements  Length (cm): 1  Width (cm): 0 8  Depth (cm): 0 3  Percent debrided: 100%  Surface Area (cm^2): 0 8  Area debrided (cm^2): 0 8  Volume (cm^3): 0 24  Tissue and other material debrided: dermis, epidermis and subcutaneous tissue  Devitalized tissue debrided: biofilm, callus, exudate, fibrin and slough  Instrument(s) utilized: blade  Bleeding: medium  Hemostasis obtained with: pressure  Procedural pain (0-10): 3  Post-procedural pain: 0   Response to treatment: procedure was tolerated well                                   Wound Instructions:  Orders Placed This Encounter   Procedures    Wound cleansing and dressings     Wash your hands with soap and water    Remove old dressing, discard into plastic bag and place in trash  Cleanse the wound with normal saline prior to applying a clean dressing  Do not use tissue or cotton balls  Do not scrub the wound  Pat dry using gauze  Shower no  Do not get dressing wet          Right plantar foot wound:   Start acticoat 7 to wound bed  Cover with gauze  Secure with bulky dressing (2 5 rolls cast padding, 1 roll kerlix, 1 roll coban without tension)  Change dressing weekly in wound center  Surgical shoe at all times      Left great toe:   Acticoat 7  Cover with dry gauze  Secure with bulky dressing  Change weekly in the wound center  Surgical shoes at all times         USE WHEELCHAIR AS MUCH AS POSSIBLE TO LIMIT [facility]        Follow up at the wound center in one week         Treatment in the wound center today:   Cleansed with soap and water,  and dressed as above     Standing Status:   Future     Standing Expiration Date:   8/16/2023    Debridement     This order was created via procedure documentation    Debridement     This order was created via procedure documentation         [place], DP      Portions of the record may have been created with voice recognition software  Occasional wrong word or "sound a like" substitutions may have occurred due to the inherent limitations of voice recognition software  Read the chart carefully and recognize, using context, where substitutions have occurred

## 2022-08-23 ENCOUNTER — OFFICE VISIT (OUTPATIENT)
Dept: WOUND CARE | Facility: CLINIC | Age: 47
End: 2022-08-23
Payer: COMMERCIAL

## 2022-08-23 VITALS
SYSTOLIC BLOOD PRESSURE: 126 MMHG | RESPIRATION RATE: 14 BRPM | HEART RATE: 58 BPM | TEMPERATURE: 96 F | DIASTOLIC BLOOD PRESSURE: 64 MMHG

## 2022-08-23 DIAGNOSIS — L97.522 DIABETIC ULCER OF TOE OF LEFT FOOT ASSOCIATED WITH TYPE 2 DIABETES MELLITUS, WITH FAT LAYER EXPOSED (HCC): Primary | ICD-10-CM

## 2022-08-23 DIAGNOSIS — E08.621 DIABETIC ULCER OF OTHER PART OF RIGHT FOOT ASSOCIATED WITH DIABETES MELLITUS DUE TO UNDERLYING CONDITION, WITH FAT LAYER EXPOSED (HCC): ICD-10-CM

## 2022-08-23 DIAGNOSIS — L97.512 DIABETIC ULCER OF OTHER PART OF RIGHT FOOT ASSOCIATED WITH DIABETES MELLITUS DUE TO UNDERLYING CONDITION, WITH FAT LAYER EXPOSED (HCC): ICD-10-CM

## 2022-08-23 DIAGNOSIS — E11.621 DIABETIC ULCER OF TOE OF LEFT FOOT ASSOCIATED WITH TYPE 2 DIABETES MELLITUS, WITH FAT LAYER EXPOSED (HCC): Primary | ICD-10-CM

## 2022-08-23 PROCEDURE — 99213 OFFICE O/P EST LOW 20 MIN: CPT | Performed by: PODIATRIST

## 2022-08-23 PROCEDURE — 11042 DBRDMT SUBQ TIS 1ST 20SQCM/<: CPT | Performed by: PODIATRIST

## 2022-08-23 RX ORDER — LIDOCAINE 40 MG/G
CREAM TOPICAL ONCE
Status: COMPLETED | OUTPATIENT
Start: 2022-08-23 | End: 2022-08-23

## 2022-08-23 RX ADMIN — LIDOCAINE: 40 CREAM TOPICAL at 08:50

## 2022-08-23 NOTE — PATIENT INSTRUCTIONS
Orders Placed This Encounter   Procedures    Wound cleansing and dressings     Wash your hands with soap and water  Remove old dressing, discard into plastic bag and place in trash  Cleanse the wound with normal saline prior to applying a clean dressing  Do not use tissue or cotton balls  Do not scrub the wound  Pat dry using gauze  Shower no  Do not get dressing wet  Right plantar foot wound:   Start acticoat 7 to wound bed  Cover with gauze  Secure with bulky dressing (2 5 rolls cast padding, 1 roll kerlix, 1 roll coban without tension)  Change dressing weekly in wound center  Surgical shoe at all times  Left great toe:   Acticoat 7  Cover with dry gauze  Secure with bulky dressing  Change weekly in the wound center  Surgical shoes at all times  USE WHEELCHAIR AS MUCH AS POSSIBLE TO LIMIT WALKING         Follow up at the wound center in one week          Treatment in the wound center today:   Cleansed with soap and water,  and dressed as above     Standing Status:   Future     Standing Expiration Date:   8/23/2023 Spoke with patient regarding ICC visit. Patient stated feeling better. Instructed to take medication as ordered. Instructed to follow all discharge instructions. If symptoms persist or worsen to follow-up with PCP.

## 2022-08-23 NOTE — PROGRESS NOTES
Patient ID: Elisa Haro is a 56 y o  female Date of Birth 1975     Diagnosis:  1  Diabetic ulcer of toe of left foot associated with type 2 diabetes mellitus, with fat layer exposed ([address] )  -     lidocaine (LMX) 4 % cream  -     Wound cleansing and dressings; Future  -     Debridement    2  Diabetic ulcer of other part of right foot associated with diabetes mellitus due to underlying condition, with fat layer exposed ([address] )  -     lidocaine (LMX) 4 % cream  -     Wound cleansing and dressings; Future  -     Debridement       Diagnosis ICD-10-CM Associated Orders   1  Diabetic ulcer of toe of left foot associated with type 2 diabetes mellitus, with fat layer exposed ([address] )  E11 621 lidocaine (LMX) 4 % cream    L97 522 Wound cleansing and dressings     Debridement   2  Diabetic ulcer of other part of right foot associated with diabetes mellitus due to underlying condition, with fat layer exposed ([address] )  E08 621 lidocaine (LMX) 4 % cream    L97 512 Wound cleansing and dressings     Debridement        Assessment & Plan:  Left great toe significant improvement this week  There is slow improvement in the right but still measurable  See debridement note below  Continue current wound care plan  I did briefly discuss Achilles lengthening with flap closure of the right foot but this would involve hospitalization and rehab  At the moment she would like to continue with the more conservative plan with offloading and serial debridement  Chief Complaint   Patient presents with    Follow Up Wound Care Visit     Bilateral foot wounds           Subjective:   Patient returns to Wound Care with bilateral foot wounds  No acute concerns today  She feels well        The following portions of the patient's history were reviewed and updated as appropriate:   Patient Active Problem List   Diagnosis    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, with bone involvement without evidence of necrosis ([place] [address] )    H/O bariatric surgery    Anxiety    Obesity    Preoperative clearance    Encounter for smoking cessation counseling    Hypothyroidism    Manic bipolar I disorder ([place] [address] )    Type 2 diabetes mellitus with neurologic complication, without long-term current use of insulin ([place] [address] )    Tobacco abuse    Type 2 diabetes mellitus ([place] [address] )    Acquired absence of other left toe(s) ([place] [address] )    Essential hypertension    Arthritis    Chronic pain    OCD (obsessive compulsive disorder)    PTSD (post-traumatic stress disorder)    Smoking    Right foot ulcer, with fat layer exposed ([place] [address] )    Pain of foot    GERD (gastroesophageal reflux disease)    Migraine without status migrainosus, not intractable    Left foot diabetic ulcer    Obstructive sleep apnea     Past Medical History:   Diagnosis Date    Anxiety     Arthritis     Asthma     Chronic pain     Diabetes mellitus ([place] [address] )     Disease of thyroid gland     Lymphedema     left leg    Manic bipolar I disorder ([place] [address] )     Morbid obesity ([place] [address] )     OCD (obsessive compulsive disorder)     PTSD (post-traumatic stress disorder)      Past Surgical History:   Procedure Laterality Date    CHOLECYSTECTOMY      CHOLECYSTECTOMY      TX AMPUTATION FOOT,TRANSMETATARSAL Right 9/4/2021    Procedure: AMPUTATION TRANSMETATARSAL (TMA);  Surgeon: Kolton Ovalles DPM;  [facility];  Service: Podiatry    TX AMPUTATION METATARSAL+TOE,SINGLE Left 9/8/2019    Procedure: PARTIAL 3RD RAY RESECTION, PSB TOE FILLET FLAP;  Surgeon: Kvng Medellin DPM;  [facility];  Service: Podiatry    TOE AMPUTATION Right 1/13/2021    Procedure: AMPUTATION 2ND TOE;  Surgeon: Kolton Ovalles DPM;  [facility];  Service: Podiatry    WOUND DEBRIDEMENT Left 2/17/2022    Procedure: DEBRIDEMENT GREAT TOE WOUND;  Surgeon: Kolton Ovalles DPM;  [facility];  Service: Podiatry     Social History     Socioeconomic History    Marital status: Single     Spouse name: None    Number of children: None    Years of education: None    Highest education level: None   Occupational History    None   Tobacco Use    Smoking status: Former Smoker     Packs/day: 0 50     Years: 15 00     Pack years: 7 50    Smokeless tobacco: Never Used   Vaping Use    Vaping Use: Some days   Substance and Sexual Activity    Alcohol use: Never    Drug use: Never    Sexual activity: Not Currently   Other Topics Concern    None   Social History Narrative    None     Social Determinants of Health     Financial Resource Strain: Not on file   Food Insecurity: No Food Insecurity    Worried About Running Out of Food in the Last Year: Never true    Saud of Food in the Last Year: Never true   Transportation Needs: No Transportation Needs    Lack of Transportation (Medical): No    Lack of Transportation (Non-Medical):  No   Physical Activity: Not on file   Stress: Not on file   Social Connections: Not on file   Intimate Partner Violence: Not on file   Housing Stability: Low Risk     Unable to Pay for Housing in the Last Year: No    Number of Places Lived in the Last Year: 1    Unstable Housing in the Last Year: No        Current Outpatient Medications:     acetaminophen (TYLENOL) 650 mg CR tablet, Take 1 tablet (650 mg total) by mouth every 8 (eight) hours as needed for mild pain, Disp: 30 tablet, Rfl: 0    albuterol (PROVENTIL HFA,VENTOLIN HFA) 90 mcg/act inhaler, Inhale 2 puffs every 6 (six) hours as needed for wheezing (Patient not taking: Reported on 2/16/2022 ), Disp: , Rfl:     cholecalciferol (VITAMIN D3) 1,000 units tablet, Take by mouth once a week  (Patient not taking: Reported on 2/16/2022 ), Disp: , Rfl:     cloNIDine (CATAPRES) 0 2 mg tablet, Take 0 2 mg by mouth 2 (two) times a day, Disp: , Rfl:     docusate sodium (COLACE) 100 mg capsule, Take 1 capsule (100 mg total) by mouth 2 (two) times a day, Disp: 10 capsule, Rfl: 0   ergocalciferol (ERGOCALCIFEROL) 1 25 MG (10087 UT) capsule, Take 50,000 Units by mouth, Disp: , Rfl:     fenofibrate (TRICOR) 54 MG tablet, TAKE 1 TABLET (54 MG TOTAL) BY MOUTH DAILY  , Disp: , Rfl:     folic acid (FOLVITE) 1 mg tablet, Take 1,000 mcg by mouth daily, Disp: , Rfl:     HYDROcodone-acetaminophen (NORCO) 5-325 mg per tablet, Take 1 tablet by mouth every 6 (six) hours as needed for painMax Daily Amount: 4 tablets, Disp: 7 tablet, Rfl: 0    hydrOXYzine HCL (ATARAX) 50 mg tablet, Take 50 mg by mouth daily at bedtime, Disp: , Rfl: 0    hydrOXYzine HCL (ATARAX) 50 mg tablet, Take 50 mg by mouth every morning, Disp: , Rfl:     lamoTRIgine (LaMICtal) 200 MG tablet, Take 200 mg by mouth every morning, Disp: , Rfl:     lamoTRIgine (LaMICtal) 25 mg tablet, Take 25 mg by mouth daily at bedtime, Disp: , Rfl:     levothyroxine 50 mcg tablet, Take 50 mcg by mouth daily, Disp: , Rfl:     melatonin 3 mg, Take 3 mg by mouth daily at bedtime as needed, Disp: , Rfl:     metFORMIN (GLUCOPHAGE) 500 mg tablet, Take 1 tablet (500 mg total) by mouth 2 (two) times a day with meals, Disp: 60 tablet, Rfl: 0    methocarbamol (ROBAXIN) 750 mg tablet, TAKE 1 TABLET (750 MG TOTAL) BY MOUTH 4 (FOUR) TIMES A DAY AS NEEDED FOR MUSCLE SPASMS , Disp: , Rfl:     multivitamin (THERAGRAN) TABS, Take 1 tablet by mouth daily  (Patient not taking: Reported on 2/16/2022 ), Disp: , Rfl:     nystatin (MYCOSTATIN) powder, DAILY AFTER SHOWER TO AFFECTED AREAS (Patient not taking: Reported on 2/16/2022), Disp: , Rfl:     ondansetron (ZOFRAN-ODT) 4 mg disintegrating tablet, , Disp: , Rfl:     pantoprazole (PROTONIX) 40 mg tablet, Take 40 mg by mouth daily, Disp: , Rfl:     senna (SENOKOT) 8 6 mg, Take 1 tablet (8 6 mg total) by mouth daily (Patient not taking: Reported on 6/16/2022), Disp: 5 tablet, Rfl: 0    SUMAtriptan Succinate (IMITREX STATDOSE REFILL) 6 MG/0 5ML SOCT, Inject 6 mg under the skin every 2 (two) hours as needed, Disp: , Rfl:     ziprasidone (GEODON) 80 mg capsule, Take 80 mg by mouth 2 (two) times a day with meals , Disp: , Rfl:   No current facility-administered medications for this visit  Family History   Problem Relation Age of Onset    Hyperlipidemia Mother     Diabetes Mother     Hypertension Mother     Heart attack Father       Review of Systems  Allergies:  Aspirin, Barium iodide, Codeine, Latex, Penicillins, and Vancomycin      Objective:  /64   Pulse 58   Temp (!) 96 °F (35 6 °C)   Resp 14     Physical Exam  Vitals reviewed  Constitutional:       Appearance: She is obese  She is not ill-appearing or diaphoretic  Cardiovascular:      Pulses:           Dorsalis pedis pulses are 2+ on the right side and 2+ on the left side  Feet:      Right foot:      Skin integrity: Ulcer present  Left foot:      Skin integrity: Ulcer present  Neurological:      Mental Status: She is alert               Wound Diabetic Ulcer Toe (Comment  which one) Plantar;Left (Active)   Wound Image   08/23/22 0839   Wound Description Pink;Pale 08/23/22 0840   Yvonne-wound Assessment Intact;Dry 08/23/22 0840   Wound Length (cm) 0 2 cm 08/23/22 0840   Wound Width (cm) 0 4 cm 08/23/22 0840   Wound Depth (cm) 0 1 cm 08/23/22 0840   Wound Surface Area (cm^2) 0 08 cm^2 08/23/22 0840   Wound Volume (cm^3) 0 008 cm^3 08/23/22 0840   Calculated Wound Volume (cm^3) 0 01 cm^3 08/23/22 0840   Change in Wound Size % 93 75 08/23/22 0840   Undermining 0 05/31/22 1028   Undermining is depth extending from 1-3 05/24/22 1111   Drainage Amount Scant 08/23/22 0840   Drainage Description Brown 08/23/22 0840   Non-staged Wound Description Full thickness 08/23/22 0840   Treatments Cleansed 08/23/22 0840   Patient Tolerance Tolerated well 07/13/22 1016   Dressing Status Removed 07/13/22 1016       Wound 06/07/22 Diabetic Ulcer Foot Right;Medial;Plantar (Active)   Wound Image   08/23/22 0839   Wound Description Pink 08/16/22 0828   Yvonne-wound Assessment Callus;Dry 08/16/22 0828   Wound Length (cm) 0 4 cm 08/16/22 0828   Wound Width (cm) 0 5 cm 08/16/22 0828   Wound Depth (cm) 0 2 cm 08/16/22 0828   Wound Surface Area (cm^2) 0 2 cm^2 08/16/22 0828   Wound Volume (cm^3) 0 04 cm^3 08/16/22 0828   Calculated Wound Volume (cm^3) 0 04 cm^3 08/16/22 0828   Change in Wound Size % 20 08/16/22 0828   Undermining 0 2 08/16/22 0828   Undermining is depth extending from 8-4 08/16/22 0828   Drainage Amount Small 08/16/22 0828   Drainage Description [place] 08/16/22 0828   Non-staged Wound Description Full thickness 08/16/22 0828   Treatments Cleansed 08/16/22 0828   Patient Tolerance Tolerated well 08/09/22 0818   Dressing Status Removed 08/09/22 0818                         Debridement   Wound Diabetic Ulcer Toe (Comment  which one) Plantar;Left    Universal Protocol:  Consent: Verbal consent obtained  Risks and benefits: risks, benefits and alternatives were discussed  Consent given by: patient  Time out: Immediately prior to procedure a "time out" was called to verify the correct patient, procedure, equipment, support staff and site/side marked as required    Timeout called at: 8/23/2022 8:56 AM   Patient understanding: patient states understanding of the procedure being performed  Patient identity confirmed: verbally with patient      Performed by: physician  Debridement type: surgical  Level of debridement: subcutaneous tissue    Post-debridement measurements  Length (cm): 0 6  Width (cm): 0 8  Depth (cm): 0 3  Percent debrided: 100%  Surface Area (cm^2): 0 48  Area debrided (cm^2): 0 48  Volume (cm^3): 0 14  Tissue and other material debrided: adipose, dermis, epidermis and subcutaneous tissue  Devitalized tissue debrided: biofilm, callus, exudate, fibrin, necrotic debris and slough  Instrument(s) utilized: blade  Bleeding: medium  Hemostasis obtained with: silver nitrate  Procedural pain (0-10): 0  Post-procedural pain: 0   Response to treatment: procedure was tolerated well    Debridement   Wound 06/07/22 Diabetic Ulcer Foot Right;Medial;Plantar    Universal Protocol:  Consent: Verbal consent obtained  Risks and benefits: risks, benefits and alternatives were discussed  Consent given by: patient  Time out: Immediately prior to procedure a "time out" was called to verify the correct patient, procedure, equipment, support staff and site/side marked as required  Timeout called at: 8/23/2022 8:57 AM   Patient understanding: patient states understanding of the procedure being performed  Patient identity confirmed: verbally with patient      Performed by: physician  Debridement type: surgical  Level of debridement: subcutaneous tissue  Pain control: lidocaine 4%  Post-debridement measurements  Length (cm): 0 8  Width (cm): 0 8  Depth (cm): 0 4  Percent debrided: 100%  Surface Area (cm^2): 0 64  Area debrided (cm^2): 0 64  Volume (cm^3): 0 26  Tissue and other material debrided: dermis, epidermis and subcutaneous tissue  Devitalized tissue debrided: biofilm, callus, fibrin, necrotic debris and slough  Instrument(s) utilized: blade  Bleeding: medium  Hemostasis obtained with: silver nitrate  Procedural pain (0-10): 3  Post-procedural pain: 1   Response to treatment: procedure was tolerated well                                   Wound Instructions:  Orders Placed This Encounter   Procedures    Wound cleansing and dressings     Wash your hands with soap and water  Remove old dressing, discard into plastic bag and place in trash  Cleanse the wound with normal saline prior to applying a clean dressing  Do not use tissue or cotton balls  Do not scrub the wound  Pat dry using gauze  Shower no  Do not get dressing wet          Right plantar foot wound:   Start acticoat 7 to wound bed  Cover with gauze  Secure with bulky dressing (2 5 rolls cast padding, 1 roll kerlix, 1 roll coban without tension)  Change dressing weekly in wound center    Surgical shoe at all times      Left great toe:   Acticoat 7  Cover with dry gauze  Secure with bulky dressing  Change weekly in the wound center  Surgical shoes at all times         USE WHEELCHAIR AS MUCH AS POSSIBLE TO LIMIT [facility]        Follow up at the wound center in one week         Treatment in the wound center today:   Cleansed with soap and water,  and dressed as above     Standing Status:   Future     Standing Expiration Date:   8/23/2023    Debridement     This order was created via procedure documentation    Debridement     This order was created via procedure documentation         Marly Bone DPM      Portions of the record may have been created with voice recognition software  Occasional wrong word or "sound a like" substitutions may have occurred due to the inherent limitations of voice recognition software  Read the chart carefully and recognize, using context, where substitutions have occurred

## 2022-08-30 ENCOUNTER — OFFICE VISIT (OUTPATIENT)
Dept: WOUND CARE | Facility: CLINIC | Age: 47
End: 2022-08-30
Payer: COMMERCIAL

## 2022-08-30 VITALS
HEART RATE: 68 BPM | RESPIRATION RATE: 18 BRPM | DIASTOLIC BLOOD PRESSURE: 78 MMHG | TEMPERATURE: 95.3 F | SYSTOLIC BLOOD PRESSURE: 118 MMHG

## 2022-08-30 DIAGNOSIS — E11.621 DIABETIC ULCER OF TOE OF LEFT FOOT ASSOCIATED WITH TYPE 2 DIABETES MELLITUS, WITH FAT LAYER EXPOSED (HCC): Primary | ICD-10-CM

## 2022-08-30 DIAGNOSIS — E08.621 DIABETIC ULCER OF OTHER PART OF RIGHT FOOT ASSOCIATED WITH DIABETES MELLITUS DUE TO UNDERLYING CONDITION, WITH FAT LAYER EXPOSED (HCC): ICD-10-CM

## 2022-08-30 DIAGNOSIS — L97.512 DIABETIC ULCER OF OTHER PART OF RIGHT FOOT ASSOCIATED WITH DIABETES MELLITUS DUE TO UNDERLYING CONDITION, WITH FAT LAYER EXPOSED (HCC): ICD-10-CM

## 2022-08-30 DIAGNOSIS — L97.522 DIABETIC ULCER OF TOE OF LEFT FOOT ASSOCIATED WITH TYPE 2 DIABETES MELLITUS, WITH FAT LAYER EXPOSED (HCC): Primary | ICD-10-CM

## 2022-08-30 PROCEDURE — 97597 DBRDMT OPN WND 1ST 20 CM/<: CPT | Performed by: PODIATRIST

## 2022-08-30 PROCEDURE — 99212 OFFICE O/P EST SF 10 MIN: CPT | Performed by: PODIATRIST

## 2022-08-30 PROCEDURE — 11042 DBRDMT SUBQ TIS 1ST 20SQCM/<: CPT | Performed by: PODIATRIST

## 2022-08-30 RX ORDER — LIDOCAINE 40 MG/G
CREAM TOPICAL ONCE
Status: COMPLETED | OUTPATIENT
Start: 2022-08-30 | End: 2022-08-30

## 2022-08-30 RX ADMIN — LIDOCAINE: 40 CREAM TOPICAL at 09:16

## 2022-08-30 NOTE — PROGRESS NOTES
Patient ID: Luc Lizama is a 56 y o  female Date of Birth 1975     Diagnosis:  1  Diabetic ulcer of toe of left foot associated with type 2 diabetes mellitus, with fat layer exposed ([address] )  -     lidocaine (LMX) 4 % cream  -     Wound cleansing and dressings; Future  -     Debridement    2  Diabetic ulcer of other part of right foot associated with diabetes mellitus due to underlying condition, with fat layer exposed ([address] )  -     lidocaine (LMX) 4 % cream  -     Wound cleansing and dressings; Future  -     Debridement       Diagnosis ICD-10-CM Associated Orders   1  Diabetic ulcer of toe of left foot associated with type 2 diabetes mellitus, with fat layer exposed ([address] )  E11 621 lidocaine (LMX) 4 % cream    L97 522 Wound cleansing and dressings     Debridement   2  Diabetic ulcer of other part of right foot associated with diabetes mellitus due to underlying condition, with fat layer exposed ([address] )  E08 621 lidocaine (LMX) 4 % cream    L97 512 Wound cleansing and dressings     Debridement        Assessment & Plan:  1  Patient is moving the length is during 3 weeks  We will continue wound care until them  I advised her to call her local hospital to ask for any podiatrist or wound centers in that area to get established as soon as possible  In the meantime we will provide local wound care  Both feet were debrided today  See procedure below  Chief Complaint   Patient presents with    Follow Up Wound Care Visit     Diabetic ulcer of toe of left foot associated with type 2 diabetes mellitus, with fat layer exposed ([address] )           Subjective:   Patient returns with bilateral foot wounds  She states she is moving to [place] in 3 weeks  Bulky dressing intact RLE  Light dressing on left great toe         The following portions of the patient's history were reviewed and updated as appropriate:   Patient Active Problem List   Diagnosis    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, with bone involvement without evidence of necrosis ([place] [id number] )    H/O bariatric surgery    Anxiety    Obesity    Preoperative clearance    Encounter for smoking cessation counseling    Hypothyroidism    Manic bipolar I disorder ([place] [address] )    Type 2 diabetes mellitus with neurologic complication, without long-term current use of insulin ([place] [address] )    Tobacco abuse    Type 2 diabetes mellitus ([place] [address] )    Acquired absence of other left toe(s) ([place] [address] )    Essential hypertension    Arthritis    Chronic pain    OCD (obsessive compulsive disorder)    PTSD (post-traumatic stress disorder)    Smoking    Right foot ulcer, with fat layer exposed ([place] [address] )    Pain of foot    GERD (gastroesophageal reflux disease)    Migraine without status migrainosus, not intractable    Left foot diabetic ulcer    Obstructive sleep apnea     Past Medical History:   Diagnosis Date    Anxiety     Arthritis     Asthma     Chronic pain     Diabetes mellitus ([place] [address] )     Disease of thyroid gland     Lymphedema     left leg    Manic bipolar I disorder ([place] [address] )     Morbid obesity ([place] [address] )     OCD (obsessive compulsive disorder)     PTSD (post-traumatic stress disorder)      Past Surgical History:   Procedure Laterality Date    CHOLECYSTECTOMY      CHOLECYSTECTOMY      RI AMPUTATION FOOT,TRANSMETATARSAL Right 9/4/2021    Procedure: AMPUTATION TRANSMETATARSAL (TMA);  Surgeon: Demian Jimenez DPM;  [facility];  Service: Podiatry    RI AMPUTATION METATARSAL+TOE,SINGLE Left 9/8/2019    Procedure: PARTIAL 3RD RAY RESECTION, PSB TOE FILLET FLAP;  Surgeon: Gautam Villasenor DPM;  [facility];  Service: Podiatry    TOE AMPUTATION Right 1/13/2021    Procedure: AMPUTATION 2ND TOE;  Surgeon: Demian Jimenez DPM;  [facility];  Service: Podiatry    WOUND DEBRIDEMENT Left 2/17/2022    Procedure: DEBRIDEMENT GREAT TOE WOUND;  Surgeon: Demian Jimenez DPM;  [facility];  Service: Podiatry Social History     Socioeconomic History    Marital status: Single     Spouse name: Not on file    Number of children: Not on file    Years of education: Not on file    Highest education level: Not on file   Occupational History    Not on file   Tobacco Use    Smoking status: Former Smoker     Packs/day: 0 50     Years: 15 00     Pack years: 7 50    Smokeless tobacco: Never Used   Vaping Use    Vaping Use: Some days   Substance and Sexual Activity    Alcohol use: Never    Drug use: Never    Sexual activity: Not Currently   Other Topics Concern    Not on file   Social History Narrative    Not on file     Social Determinants of Health     Financial Resource Strain: Not on file   Food Insecurity: No Food Insecurity    Worried About [address] in the Last Year: Never true    [address] in the Last Year: Never true   Transportation Needs: No Transportation Needs    Lack of Transportation (Medical): No    Lack of Transportation (Non-Medical):  No   Physical Activity: Not on file   Stress: Not on file   Social Connections: Not on file   Intimate Partner Violence: Not on file   Housing Stability: Low Risk     Unable to Pay for Housing in the Last Year: No    Number of Places Lived in the Last Year: 1    Unstable Housing in the Last Year: No        Current Outpatient Medications:     acetaminophen (TYLENOL) 650 mg CR tablet, Take 1 tablet (650 mg total) by mouth every 8 (eight) hours as needed for mild pain, Disp: 30 tablet, Rfl: 0    albuterol (PROVENTIL HFA,VENTOLIN HFA) 90 mcg/act inhaler, Inhale 2 puffs every 6 (six) hours as needed for wheezing (Patient not taking: Reported on 2/16/2022 ), Disp: , Rfl:     cholecalciferol (VITAMIN D3) 1,000 units tablet, Take by mouth once a week  (Patient not taking: Reported on 2/16/2022 ), Disp: , Rfl:     cloNIDine (CATAPRES) 0 2 mg tablet, Take 0 2 mg by mouth 2 (two) times a day, Disp: , Rfl:     docusate sodium (COLACE) 100 mg capsule, Take 1 capsule (100 mg total) by mouth 2 (two) times a day, Disp: 10 capsule, Rfl: 0    ergocalciferol (ERGOCALCIFEROL) 1 25 MG (12323 UT) capsule, Take 50,000 Units by mouth, Disp: , Rfl:     fenofibrate (TRICOR) 54 MG tablet, TAKE 1 TABLET (54 MG TOTAL) BY MOUTH DAILY  , Disp: , Rfl:     folic acid (FOLVITE) 1 mg tablet, Take 1,000 mcg by mouth daily, Disp: , Rfl:     HYDROcodone-acetaminophen (NORCO) 5-325 mg per tablet, Take 1 tablet by mouth every 6 (six) hours as needed for painMax Daily Amount: 4 tablets, Disp: 7 tablet, Rfl: 0    hydrOXYzine HCL (ATARAX) 50 mg tablet, Take 50 mg by mouth daily at bedtime, Disp: , Rfl: 0    hydrOXYzine HCL (ATARAX) 50 mg tablet, Take 50 mg by mouth every 8 (eight) hours, Disp: , Rfl:     lamoTRIgine (LaMICtal) 200 MG tablet, Take 200 mg by mouth every morning, Disp: , Rfl:     lamoTRIgine (LaMICtal) 25 mg tablet, Take 50 mg by mouth daily at bedtime, Disp: , Rfl:     levothyroxine 50 mcg tablet, Take 50 mcg by mouth daily, Disp: , Rfl:     melatonin 3 mg, Take 3 mg by mouth daily at bedtime as needed, Disp: , Rfl:     metFORMIN (GLUCOPHAGE) 500 mg tablet, Take 1 tablet (500 mg total) by mouth 2 (two) times a day with meals, Disp: 60 tablet, Rfl: 0    methocarbamol (ROBAXIN) 750 mg tablet, TAKE 1 TABLET (750 MG TOTAL) BY MOUTH 4 (FOUR) TIMES A DAY AS NEEDED FOR MUSCLE SPASMS , Disp: , Rfl:     multivitamin (THERAGRAN) TABS, Take 1 tablet by mouth daily  (Patient not taking: Reported on 2/16/2022 ), Disp: , Rfl:     nystatin (MYCOSTATIN) powder, DAILY AFTER SHOWER TO AFFECTED AREAS (Patient not taking: Reported on 2/16/2022), Disp: , Rfl:     ondansetron (ZOFRAN-ODT) 4 mg disintegrating tablet, , Disp: , Rfl:     pantoprazole (PROTONIX) 40 mg tablet, Take 40 mg by mouth daily, Disp: , Rfl:     senna (SENOKOT) 8 6 mg, Take 1 tablet (8 6 mg total) by mouth daily (Patient not taking: Reported on 6/16/2022), Disp: 5 tablet, Rfl: 0    SUMAtriptan Succinate (IMITREX STATDOSE REFILL) 6 MG/0 5ML SOCT, Inject 6 mg under the skin every 2 (two) hours as needed, Disp: , Rfl:     ziprasidone (GEODON) 80 mg capsule, Take 80 mg by mouth 2 (two) times a day with meals , Disp: , Rfl:   No current facility-administered medications for this visit  Family History   Problem Relation Age of Onset    Hyperlipidemia Mother     Diabetes Mother     Hypertension Mother     Heart attack Father       Review of Systems  Allergies:  Aspirin, Barium iodide, Codeine, Latex, Penicillins, and Vancomycin      Objective:  /78   Pulse 68   Temp (!) 95 3 °F (35 2 °C)   Resp 18     Physical Exam  Vitals reviewed  Constitutional:       Appearance: She is obese  Cardiovascular:      Pulses: Normal pulses  Musculoskeletal:         General: Deformity present  Skin:     Comments: No sign of infection to either left hallux or right plantar foot   Neurological:      Mental Status: She is alert  Wound Diabetic Ulcer Toe (Comment  which one) Plantar;Left (Active)   Wound Image   08/30/22 0912   Wound Description Conde;Pale;Slough 08/30/22 0839   Yvonne-wound Assessment Callus; Intact 08/30/22 0839   Wound Length (cm) 0 2 cm 08/30/22 0839   Wound Width (cm) 0 6 cm 08/30/22 0839   Wound Depth (cm) 0 2 cm 08/30/22 0839   Wound Surface Area (cm^2) 0 12 cm^2 08/30/22 0839   Wound Volume (cm^3) 0 024 cm^3 08/30/22 0839   Calculated Wound Volume (cm^3) 0 02 cm^3 08/30/22 0839   Change in Wound Size % 87 5 08/30/22 0839   Undermining 0 05/31/22 1028   Undermining is depth extending from 1-3 05/24/22 1111   Drainage Amount Small 08/30/22 0839   Drainage Description Clif Flavors 08/30/22 0839   Non-staged Wound Description Full thickness 08/23/22 0840   Treatments Cleansed 08/23/22 0840   Patient Tolerance Tolerated well 08/30/22 0839   Dressing Status Removed 08/30/22 0839       Wound 06/07/22 Diabetic Ulcer Foot Right;Medial;Plantar (Active)   Wound Image   08/30/22 0913   Wound Description Slough;Pink 08/30/22 0842   Yvonne-wound Assessment Callus; Intact 08/30/22 0842   Wound Length (cm) 0 5 cm 08/30/22 0842   Wound Width (cm) 0 5 cm 08/30/22 0842   Wound Depth (cm) 0 2 cm 08/30/22 0842   Wound Surface Area (cm^2) 0 25 cm^2 08/30/22 0842   Wound Volume (cm^3) 0 05 cm^3 08/30/22 0842   Calculated Wound Volume (cm^3) 0 05 cm^3 08/30/22 0842   Change in Wound Size % 0 08/30/22 0842   Undermining 0 1 08/30/22 0842   Undermining is depth extending from 12-3 08/30/22 0842   Drainage Amount Small 08/30/22 0842   Drainage Description Brown 08/30/22 0842   Non-staged Wound Description Full thickness 08/23/22 0841   Treatments Cleansed 08/23/22 0841   Patient Tolerance Tolerated well 08/30/22 0842   Dressing Status Removed 08/30/22 0842                         Debridement   Wound Diabetic Ulcer Toe (Comment  which one) Plantar;Left    Universal Protocol:  Consent: Verbal consent obtained  Risks and benefits: risks, benefits and alternatives were discussed  Consent given by: patient  Time out: Immediately prior to procedure a "time out" was called to verify the correct patient, procedure, equipment, support staff and site/side marked as required    Timeout called at: 8/30/2022 9:00 AM   Patient understanding: patient states understanding of the procedure being performed  Patient identity confirmed: verbally with patient      Performed by: physician  Debridement type: surgical  Level of debridement: subcutaneous tissue  Pain control: lidocaine 4%  Post-debridement measurements  Length (cm): 0 8  Width (cm): 1  Depth (cm): 0 4  Percent debrided: 100%  Surface Area (cm^2): 0 8  Area debrided (cm^2): 0 8  Volume (cm^3): 0 32  Tissue and other material debrided: dermis, epidermis and subcutaneous tissue  Devitalized tissue debrided: biofilm, callus, fibrin, necrotic debris and slough  Instrument(s) utilized: curette  Bleeding: medium  Hemostasis obtained with: silver nitrate  Procedural pain (0-10): 1  Post-procedural pain: 0 Response to treatment: procedure was tolerated well    Debridement   Wound 06/07/22 Diabetic Ulcer Foot Right;Medial;Plantar    Universal Protocol:  Consent: Verbal consent obtained  Risks and benefits: risks, benefits and alternatives were discussed  Consent given by: patient  Time out: Immediately prior to procedure a "time out" was called to verify the correct patient, procedure, equipment, support staff and site/side marked as required  Timeout called at: 8/30/2022 9:48 AM   Patient understanding: patient states understanding of the procedure being performed  Patient identity confirmed: verbally with patient      Performed by: physician  Debridement type: surgical  Level of debridement: subcutaneous tissue  Pain control: lidocaine 4%  Post-debridement measurements  Length (cm): 0 8  Width (cm): 0 7  Depth (cm): 0 4  Percent debrided: 100%  Surface Area (cm^2): 0 56  Area debrided (cm^2): 0 56  Volume (cm^3): 0 22  Tissue and other material debrided: dermis and epidermis  Devitalized tissue debrided: biofilm, callus, exudate, fibrin, necrotic debris and slough  Instrument(s) utilized: curette  Bleeding: medium  Hemostasis obtained with: silver nitrate  Procedural pain (0-10): 1  Post-procedural pain: 0   Response to treatment: procedure was tolerated well                   Wound Instructions:  Orders Placed This Encounter   Procedures    Wound cleansing and dressings     Wash your hands with soap and water  Remove old dressing, discard into plastic bag and place in trash  Cleanse the wound with normal saline prior to applying a clean dressing  Do not use tissue or cotton balls  Do not scrub the wound  Pat dry using gauze  Shower no  Do not get dressing wet          Right plantar foot wound:   Start acticoat 7 to wound bed  Cover with gauze  Secure with kerlix and tape  Change dressing 3x/week  Surgical shoe at all times  Offloading felt pad applied today    Keep in place until next appointment      Left great toe:   Start acticoat 7 to wound bed  Cover with gauze  Secure with kerlix and tape  Change dressing 3x/week  Surgical shoe at all times    Offloading felt pad applied today  Keep in place until next appointment      USE WHEELCHAIR AS MUCH AS POSSIBLE TO LIMIT [facility]        Follow up at the wound center in 2 weeks         Treatment in the wound center today:   Cleansed with soap and water,  and dressed as above     Standing Status:   Future     Standing Expiration Date:   8/30/2023    Debridement     This order was created via procedure documentation    Debridement     This order was created via procedure documentation         Hoang Ceron DPM      Portions of the record may have been created with voice recognition software  Occasional wrong word or "sound a like" substitutions may have occurred due to the inherent limitations of voice recognition software  Read the chart carefully and recognize, using context, where substitutions have occurred

## 2022-08-30 NOTE — PATIENT INSTRUCTIONS
Orders Placed This Encounter   Procedures    Wound cleansing and dressings     Wash your hands with soap and water  Remove old dressing, discard into plastic bag and place in trash  Cleanse the wound with normal saline prior to applying a clean dressing  Do not use tissue or cotton balls  Do not scrub the wound  Pat dry using gauze  Shower no  Do not get dressing wet  Right plantar foot wound:   Start acticoat 7 to wound bed  Cover with gauze  Secure with kerlix and tape  Change dressing 3x/week  Surgical shoe at all times  Offloading felt pad applied today  Keep in place until next appointment  Left great toe:   Start acticoat 7 to wound bed  Cover with gauze  Secure with kerlix and tape  Change dressing 3x/week  Surgical shoe at all times  Offloading felt pad applied today  Keep in place until next appointment  USE WHEELCHAIR AS MUCH AS POSSIBLE TO LIMIT WALKING         Follow up at the wound center in 2 weeks          Treatment in the wound center today:   Cleansed with soap and water,  and dressed as above     Standing Status:   Future     Standing Expiration Date:   8/30/2023

## 2022-09-07 ENCOUNTER — TELEPHONE (OUTPATIENT)
Dept: WOUND CARE | Facility: CLINIC | Age: 47
End: 2022-09-07

## 2022-09-07 NOTE — TELEPHONE ENCOUNTER
Received call in regards to supply order  Supply order submitted and call was made to patient to update  No answer, message left

## 2022-09-13 ENCOUNTER — OFFICE VISIT (OUTPATIENT)
Dept: WOUND CARE | Facility: CLINIC | Age: 47
End: 2022-09-13
Payer: COMMERCIAL

## 2022-09-13 VITALS
DIASTOLIC BLOOD PRESSURE: 82 MMHG | SYSTOLIC BLOOD PRESSURE: 126 MMHG | TEMPERATURE: 94.9 F | RESPIRATION RATE: 16 BRPM | HEART RATE: 66 BPM

## 2022-09-13 DIAGNOSIS — L97.522 DIABETIC ULCER OF TOE OF LEFT FOOT ASSOCIATED WITH TYPE 2 DIABETES MELLITUS, WITH FAT LAYER EXPOSED (HCC): Primary | ICD-10-CM

## 2022-09-13 DIAGNOSIS — L97.511 DIABETIC ULCER OF OTHER PART OF RIGHT FOOT ASSOCIATED WITH DIABETES MELLITUS DUE TO UNDERLYING CONDITION, LIMITED TO BREAKDOWN OF SKIN (HCC): ICD-10-CM

## 2022-09-13 DIAGNOSIS — E11.621 DIABETIC ULCER OF TOE OF LEFT FOOT ASSOCIATED WITH TYPE 2 DIABETES MELLITUS, WITH FAT LAYER EXPOSED (HCC): Primary | ICD-10-CM

## 2022-09-13 DIAGNOSIS — E08.621 DIABETIC ULCER OF OTHER PART OF RIGHT FOOT ASSOCIATED WITH DIABETES MELLITUS DUE TO UNDERLYING CONDITION, LIMITED TO BREAKDOWN OF SKIN (HCC): ICD-10-CM

## 2022-09-13 PROCEDURE — 99213 OFFICE O/P EST LOW 20 MIN: CPT | Performed by: PODIATRIST

## 2022-09-13 RX ORDER — LIDOCAINE 40 MG/G
CREAM TOPICAL ONCE
Status: COMPLETED | OUTPATIENT
Start: 2022-09-13 | End: 2022-09-13

## 2022-09-13 RX ADMIN — LIDOCAINE: 40 CREAM TOPICAL at 09:00

## 2022-09-13 NOTE — PATIENT INSTRUCTIONS
Orders Placed This Encounter   Procedures    Wound cleansing and dressings     Patient discharged from the wound center due to relocation  Wash your hands with soap and water  Remove old dressing, discard into plastic bag and place in trash  Cleanse the wound with normal saline prior to applying a clean dressing  Do not use tissue or cotton balls  Do not scrub the wound  Pat dry using gauze  Shower no  Do not get dressing wet  Right plantar foot and left toe wounds:  Apply dermagran to wound beds  Cover with gauze  Secure with kerlix and tape  Change dressing 3x/week  Surgical shoes at all times            USE WHEELCHAIR AS MUCH AS POSSIBLE TO LIMIT WALKING         Treatment in the wound center today:   Cleansed with soap and water,  and dressed as above     Standing Status:   Future     Standing Expiration Date:   9/13/2023

## 2022-09-13 NOTE — PROGRESS NOTES
Patient ID: Ciarra Mcclain is a 56 y o  female Date of Birth 1975     Diagnosis:  1  Diabetic ulcer of toe of left foot associated with type 2 diabetes mellitus, with fat layer exposed ([facility] [address] )    2  Diabetic ulcer of other part of right foot associated with diabetes mellitus due to underlying condition, limited to breakdown of skin ([facility] [address] )       Diagnosis ICD-10-CM Associated Orders   1  Diabetic ulcer of toe of left foot associated with type 2 diabetes mellitus, with fat layer exposed ([facility] [address] )  B80 [id number]     L97 522    2  Diabetic ulcer of other part of right foot associated with diabetes mellitus due to underlying condition, limited to breakdown of skin [facility])  E44 942     L97 511         Assessment & Plan:  1  Patient is moving to [facility] on Monday  She will get situated with a wound center upon arrival   She is moving with her family for more help  She is discharged from our service  She has dressings  No acute concerns with infection the wounds are rather small today  Chief Complaint   Patient presents with    Follow Up Wound Care Visit     Bilateral foot wounds           Subjective:   Patient is here for diabetic foot ulcer care    She is actually moving in 5 days out of the area to move in with her family      The following portions of the patient's history were reviewed and updated as appropriate:   Patient Active Problem List   Diagnosis    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, with bone involvement without evidence of necrosis ([address] )    H/O bariatric surgery    Anxiety    Obesity    Preoperative clearance    Encounter for smoking cessation counseling    Hypothyroidism    Manic bipolar I disorder ([address] )    Type 2 diabetes mellitus with neurologic complication, without long-term current use of insulin ([address] )    Tobacco abuse    Type 2 diabetes mellitus ([address] )    Acquired absence of other left toe(s) ([address] )    Essential hypertension  Arthritis    Chronic pain    OCD (obsessive compulsive disorder)    PTSD (post-traumatic stress disorder)    Smoking    Right foot ulcer, with fat layer exposed (HCC)    Pain of foot    GERD (gastroesophageal reflux disease)    Migraine without status migrainosus, not intractable    Left foot diabetic ulcer    Obstructive sleep apnea     Past Medical History:   Diagnosis Date    Anxiety     Arthritis     Asthma     Chronic pain     Diabetes mellitus ([address] )     Disease of thyroid gland     Lymphedema     left leg    Manic bipolar I disorder (HCC)     Morbid obesity (HCC)     OCD (obsessive compulsive disorder)     PTSD (post-traumatic stress disorder)      Past Surgical History:   Procedure Laterality Date    CHOLECYSTECTOMY      CHOLECYSTECTOMY      AZ AMPUTATION FOOT,TRANSMETATARSAL Right 9/4/2021    Procedure: AMPUTATION TRANSMETATARSAL (TMA);  Surgeon: Alda Perez DPM;  [facility];  Service: Podiatry    AZ AMPUTATION METATARSAL+TOE,SINGLE Left 9/8/2019    Procedure: PARTIAL 3RD RAY RESECTION, PSB TOE FILLET FLAP;  Surgeon: Deandre Busby DPM;  [facility];  Service: Podiatry    TOE AMPUTATION Right 1/13/2021    Procedure: AMPUTATION 2ND TOE;  Surgeon: Alda Perez DPM;  [facility];  Service: Podiatry    WOUND DEBRIDEMENT Left 2/17/2022    Procedure: DEBRIDEMENT GREAT TOE WOUND;  Surgeon: Alda Perez DPM;  [facility];  Service: Podiatry     Social History     Socioeconomic History    Marital status: Single     Spouse name: None    Number of children: None    Years of education: None    Highest education level: None   Occupational History    None   Tobacco Use    Smoking status: Former Smoker     Packs/day: 0 50     Years: 15 00     Pack years: 7 50    Smokeless tobacco: Never Used   Vaping Use    Vaping Use: Some days   Substance and Sexual Activity    Alcohol use: Never    Drug use: Never    Sexual activity: Not Currently   Other Topics Concern    None   Social History Narrative    None     Social Determinants of Health     Financial Resource Strain: Not on file   Food Insecurity: No Food Insecurity    Worried About Running Out of Food in the Last Year: Never true    Saud of Food in the Last Year: Never true   Transportation Needs: No Transportation Needs    Lack of Transportation (Medical): No    Lack of Transportation (Non-Medical): No   Physical Activity: Not on file   Stress: Not on file   Social Connections: Not on file   Intimate Partner Violence: Not on file   Housing Stability: Low Risk     Unable to Pay for Housing in the Last Year: No    Number of Places Lived in the Last Year: 1    Unstable Housing in the Last Year: No        Current Outpatient Medications:     acetaminophen (TYLENOL) 650 mg CR tablet, Take 1 tablet (650 mg total) by mouth every 8 (eight) hours as needed for mild pain, Disp: 30 tablet, Rfl: 0    albuterol (PROVENTIL HFA,VENTOLIN HFA) 90 mcg/act inhaler, Inhale 2 puffs every 6 (six) hours as needed for wheezing (Patient not taking: Reported on 2/16/2022 ), Disp: , Rfl:     cholecalciferol (VITAMIN D3) 1,000 units tablet, Take by mouth once a week  (Patient not taking: Reported on 2/16/2022 ), Disp: , Rfl:     cloNIDine (CATAPRES) 0 2 mg tablet, Take 0 2 mg by mouth 2 (two) times a day, Disp: , Rfl:     docusate sodium (COLACE) 100 mg capsule, Take 1 capsule (100 mg total) by mouth 2 (two) times a day, Disp: 10 capsule, Rfl: 0    ergocalciferol (ERGOCALCIFEROL) 1 25 MG (99721 UT) capsule, Take 50,000 Units by mouth, Disp: , Rfl:     fenofibrate (TRICOR) 54 MG tablet, TAKE 1 TABLET (54 MG TOTAL) BY MOUTH DAILY  , Disp: , Rfl:     folic acid (FOLVITE) 1 mg tablet, Take 1,000 mcg by mouth daily, Disp: , Rfl:     HYDROcodone-acetaminophen (NORCO) 5-325 mg per tablet, Take 1 tablet by mouth every 6 (six) hours as needed for painMax Daily Amount: 4 tablets, Disp: 7 tablet, Rfl: 0    hydrOXYzine HCL (ATARAX) 50 mg tablet, Take 50 mg by mouth daily at bedtime, Disp: , Rfl: 0    hydrOXYzine HCL (ATARAX) 50 mg tablet, Take 50 mg by mouth every 8 (eight) hours, Disp: , Rfl:     lamoTRIgine (LaMICtal) 200 MG tablet, Take 200 mg by mouth every morning, Disp: , Rfl:     lamoTRIgine (LaMICtal) 25 mg tablet, Take 50 mg by mouth daily at bedtime, Disp: , Rfl:     levothyroxine 50 mcg tablet, Take 50 mcg by mouth daily, Disp: , Rfl:     melatonin 3 mg, Take 3 mg by mouth daily at bedtime as needed, Disp: , Rfl:     metFORMIN (GLUCOPHAGE) 500 mg tablet, Take 1 tablet (500 mg total) by mouth 2 (two) times a day with meals, Disp: 60 tablet, Rfl: 0    methocarbamol (ROBAXIN) 750 mg tablet, TAKE 1 TABLET (750 MG TOTAL) BY MOUTH 4 (FOUR) TIMES A DAY AS NEEDED FOR MUSCLE SPASMS , Disp: , Rfl:     multivitamin (THERAGRAN) TABS, Take 1 tablet by mouth daily  (Patient not taking: Reported on 2/16/2022 ), Disp: , Rfl:     nystatin (MYCOSTATIN) powder, DAILY AFTER SHOWER TO AFFECTED AREAS (Patient not taking: Reported on 2/16/2022), Disp: , Rfl:     ondansetron (ZOFRAN-ODT) 4 mg disintegrating tablet, , Disp: , Rfl:     pantoprazole (PROTONIX) 40 mg tablet, Take 40 mg by mouth daily, Disp: , Rfl:     senna (SENOKOT) 8 6 mg, Take 1 tablet (8 6 mg total) by mouth daily (Patient not taking: Reported on 6/16/2022), Disp: 5 tablet, Rfl: 0    SUMAtriptan Succinate (IMITREX STATDOSE REFILL) 6 MG/0 5ML SOCT, Inject 6 mg under the skin every 2 (two) hours as needed, Disp: , Rfl:     ziprasidone (GEODON) 80 mg capsule, Take 80 mg by mouth 2 (two) times a day with meals , Disp: , Rfl:   Family History   Problem Relation Age of Onset    Hyperlipidemia Mother     Diabetes Mother     Hypertension Mother     Heart attack Father       Review of Systems  Allergies:  Aspirin, Barium iodide, Codeine, Latex, Penicillins, and Vancomycin      Objective:  /82   Pulse 66   Temp (!) 94 9 °F (34 9 °C)   Resp 16     Physical Exam  Vitals reviewed  Constitutional:       Appearance: She is obese  Cardiovascular:      Pulses: Normal pulses  Musculoskeletal:         General: Deformity (Right transmetatarsal amputation) present  Skin:     Comments: Plantar ulcer left great toe and right midfoot stable  No sign of infection  Neurological:      Mental Status: She is alert  Wound Diabetic Ulcer Toe (Comment  which one) Plantar;Left (Active)   Wound Image   09/13/22 0826   Wound Description Pink 09/13/22 0827   Yvonne-wound Assessment Dry;Callus 09/13/22 0827   Wound Length (cm) 0 2 cm 09/13/22 0827   Wound Width (cm) 0 5 cm 09/13/22 0827   Wound Depth (cm) 0 2 cm 09/13/22 0827   Wound Surface Area (cm^2) 0 1 cm^2 09/13/22 0827   Wound Volume (cm^3) 0 02 cm^3 09/13/22 0827   Calculated Wound Volume (cm^3) 0 02 cm^3 09/13/22 0827   Change in Wound Size % 87 5 09/13/22 0827   Undermining 0 05/31/22 1028   Undermining is depth extending from 1-3 05/24/22 1111   Drainage Amount Scant 09/13/22 0827   Drainage Description Serosanguineous 09/13/22 0827   Non-staged Wound Description Full thickness 09/13/22 0827   Treatments Irrigation with NSS 09/13/22 0827   Patient Tolerance Tolerated well 08/30/22 0839   Dressing Status Removed 08/30/22 0839       Wound 06/07/22 Diabetic Ulcer Foot Right;Medial;Plantar (Active)   Wound Image   09/13/22 0826   Wound Description Pink;Granulation tissue 09/13/22 0828   Yvonne-wound Assessment Callus; Intact 08/30/22 0842   Wound Length (cm) 0 4 cm 09/13/22 0828   Wound Width (cm) 0 4 cm 09/13/22 0828   Wound Depth (cm) 0 2 cm 09/13/22 0828   Wound Surface Area (cm^2) 0 16 cm^2 09/13/22 0828   Wound Volume (cm^3) 0 032 cm^3 09/13/22 0828   Calculated Wound Volume (cm^3) 0 03 cm^3 09/13/22 0828   Change in Wound Size % 40 09/13/22 0828   Undermining 0 3 09/13/22 0828   Undermining is depth extending from 9-4 09/13/22 0828   Drainage Amount Small 09/13/22 0828   Drainage Description Chris 09/13/22 0828   Non-staged Wound Description Full thickness 09/13/22 0828   Treatments Irrigation with NSS 09/13/22 0828   Patient Tolerance Tolerated well 08/30/22 0842   Dressing Status Removed 08/30/22 0842                         Procedures             Wound Instructions:  No orders of the defined types were placed in this encounter  [place], DP      Portions of the record may have been created with voice recognition software  Occasional wrong word or "sound a like" substitutions may have occurred due to the inherent limitations of voice recognition software  Read the chart carefully and recognize, using context, where substitutions have occurred

## 2023-03-20 ENCOUNTER — HOSPITAL ENCOUNTER (INPATIENT)
Facility: HOSPITAL | Age: 48
LOS: 1 days | End: 2023-03-21
Attending: EMERGENCY MEDICINE | Admitting: INTERNAL MEDICINE

## 2023-03-20 ENCOUNTER — APPOINTMENT (EMERGENCY)
Dept: RADIOLOGY | Facility: HOSPITAL | Age: 48
End: 2023-03-20

## 2023-03-20 DIAGNOSIS — L97.509 CHRONIC FOOT ULCER (HCC): Primary | ICD-10-CM

## 2023-03-20 DIAGNOSIS — E11.9 DIABETES (HCC): ICD-10-CM

## 2023-03-20 DIAGNOSIS — L97.509 DIABETIC FOOT ULCER (HCC): ICD-10-CM

## 2023-03-20 DIAGNOSIS — E11.621 DIABETIC FOOT ULCER (HCC): ICD-10-CM

## 2023-03-20 PROBLEM — A41.9 SEPSIS (HCC): Status: ACTIVE | Noted: 2023-03-20

## 2023-03-20 PROBLEM — M79.673 PAIN OF FOOT: Status: RESOLVED | Noted: 2021-06-25 | Resolved: 2023-03-20

## 2023-03-20 PROBLEM — E66.01 MORBID OBESITY WITH BMI OF 40.0-44.9, ADULT (HCC): Status: ACTIVE | Noted: 2023-03-20

## 2023-03-20 PROBLEM — E66.9 OBESITY: Status: RESOLVED | Noted: 2019-06-28 | Resolved: 2023-03-20

## 2023-03-20 PROBLEM — E66.01 MORBID OBESITY WITH BMI OF 40.0-44.9, ADULT (HCC): Chronic | Status: ACTIVE | Noted: 2023-03-20

## 2023-03-20 PROBLEM — Z01.818 PREOPERATIVE CLEARANCE: Status: RESOLVED | Noted: 2019-06-28 | Resolved: 2023-03-20

## 2023-03-20 LAB
ALBUMIN SERPL BCP-MCNC: 3.2 G/DL (ref 3.5–5)
ALP SERPL-CCNC: 215 U/L (ref 34–104)
ALT SERPL W P-5'-P-CCNC: 15 U/L (ref 7–52)
ANION GAP SERPL CALCULATED.3IONS-SCNC: 11 MMOL/L (ref 4–13)
AST SERPL W P-5'-P-CCNC: 21 U/L (ref 13–39)
BASOPHILS # BLD AUTO: 0.06 THOUSANDS/ÂΜL (ref 0–0.1)
BASOPHILS NFR BLD AUTO: 0 % (ref 0–1)
BILIRUB SERPL-MCNC: 0.36 MG/DL (ref 0.2–1)
BILIRUB UR QL STRIP: NEGATIVE
BUN SERPL-MCNC: 10 MG/DL (ref 5–25)
CALCIUM ALBUM COR SERPL-MCNC: 9.9 MG/DL (ref 8.3–10.1)
CALCIUM SERPL-MCNC: 9.3 MG/DL (ref 8.4–10.2)
CHLORIDE SERPL-SCNC: 92 MMOL/L (ref 96–108)
CLARITY UR: CLEAR
CO2 SERPL-SCNC: 24 MMOL/L (ref 21–32)
COLOR UR: ABNORMAL
CREAT SERPL-MCNC: 0.87 MG/DL (ref 0.6–1.3)
EOSINOPHIL # BLD AUTO: 0.11 THOUSAND/ÂΜL (ref 0–0.61)
EOSINOPHIL NFR BLD AUTO: 1 % (ref 0–6)
ERYTHROCYTE [DISTWIDTH] IN BLOOD BY AUTOMATED COUNT: 18.5 % (ref 11.6–15.1)
GFR SERPL CREATININE-BSD FRML MDRD: 79 ML/MIN/1.73SQ M
GLUCOSE SERPL-MCNC: 249 MG/DL (ref 65–140)
GLUCOSE UR STRIP-MCNC: ABNORMAL MG/DL
HCT VFR BLD AUTO: 32.4 % (ref 34.8–46.1)
HGB BLD-MCNC: 9.5 G/DL (ref 11.5–15.4)
HGB UR QL STRIP.AUTO: 10
IMM GRANULOCYTES # BLD AUTO: 0.19 THOUSAND/UL (ref 0–0.2)
IMM GRANULOCYTES NFR BLD AUTO: 1 % (ref 0–2)
KETONES UR STRIP-MCNC: NEGATIVE MG/DL
LACTATE SERPL-SCNC: 2.8 MMOL/L (ref 0.5–2)
LEUKOCYTE ESTERASE UR QL STRIP: NEGATIVE
LYMPHOCYTES # BLD AUTO: 1.68 THOUSANDS/ÂΜL (ref 0.6–4.47)
LYMPHOCYTES NFR BLD AUTO: 10 % (ref 14–44)
MCH RBC QN AUTO: 19.3 PG (ref 26.8–34.3)
MCHC RBC AUTO-ENTMCNC: 29.3 G/DL (ref 31.4–37.4)
MCV RBC AUTO: 66 FL (ref 82–98)
MONOCYTES # BLD AUTO: 1.01 THOUSAND/ÂΜL (ref 0.17–1.22)
MONOCYTES NFR BLD AUTO: 6 % (ref 4–12)
NEUTROPHILS # BLD AUTO: 13.98 THOUSANDS/ÂΜL (ref 1.85–7.62)
NEUTS SEG NFR BLD AUTO: 82 % (ref 43–75)
NITRITE UR QL STRIP: NEGATIVE
NRBC BLD AUTO-RTO: 0 /100 WBCS
PH UR STRIP.AUTO: 5 [PH]
PLATELET # BLD AUTO: 732 THOUSANDS/UL (ref 149–390)
PMV BLD AUTO: 8.7 FL (ref 8.9–12.7)
POTASSIUM SERPL-SCNC: 3.8 MMOL/L (ref 3.5–5.3)
PROT SERPL-MCNC: 8.7 G/DL (ref 6.4–8.4)
PROT UR STRIP-MCNC: ABNORMAL MG/DL
RBC # BLD AUTO: 4.93 MILLION/UL (ref 3.81–5.12)
SODIUM SERPL-SCNC: 127 MMOL/L (ref 135–147)
SP GR UR STRIP.AUTO: 1.02 (ref 1–1.04)
UROBILINOGEN UA: 1 MG/DL
WBC # BLD AUTO: 17.03 THOUSAND/UL (ref 4.31–10.16)

## 2023-03-20 RX ORDER — ONDANSETRON 2 MG/ML
4 INJECTION INTRAMUSCULAR; INTRAVENOUS EVERY 6 HOURS PRN
Status: DISCONTINUED | OUTPATIENT
Start: 2023-03-20 | End: 2023-03-21 | Stop reason: HOSPADM

## 2023-03-20 RX ORDER — ONDANSETRON 2 MG/ML
4 INJECTION INTRAMUSCULAR; INTRAVENOUS ONCE
Status: COMPLETED | OUTPATIENT
Start: 2023-03-20 | End: 2023-03-20

## 2023-03-20 RX ORDER — INSULIN LISPRO 100 [IU]/ML
1-5 INJECTION, SOLUTION INTRAVENOUS; SUBCUTANEOUS
Status: DISCONTINUED | OUTPATIENT
Start: 2023-03-20 | End: 2023-03-21 | Stop reason: HOSPADM

## 2023-03-20 RX ORDER — HYDROCODONE BITARTRATE AND ACETAMINOPHEN 5; 325 MG/1; MG/1
1 TABLET ORAL ONCE
Status: COMPLETED | OUTPATIENT
Start: 2023-03-20 | End: 2023-03-20

## 2023-03-20 RX ORDER — ENOXAPARIN SODIUM 100 MG/ML
40 INJECTION SUBCUTANEOUS 2 TIMES DAILY
Status: DISCONTINUED | OUTPATIENT
Start: 2023-03-21 | End: 2023-03-21 | Stop reason: HOSPADM

## 2023-03-20 RX ORDER — ACETAMINOPHEN 325 MG/1
650 TABLET ORAL EVERY 4 HOURS PRN
Status: DISCONTINUED | OUTPATIENT
Start: 2023-03-20 | End: 2023-03-21 | Stop reason: HOSPADM

## 2023-03-20 RX ORDER — CEFAZOLIN SODIUM 1 G/50ML
1000 SOLUTION INTRAVENOUS ONCE
Status: COMPLETED | OUTPATIENT
Start: 2023-03-20 | End: 2023-03-21

## 2023-03-20 RX ORDER — CEFAZOLIN SODIUM 1 G/50ML
1000 SOLUTION INTRAVENOUS ONCE
Status: COMPLETED | OUTPATIENT
Start: 2023-03-20 | End: 2023-03-20

## 2023-03-20 RX ORDER — CEFTRIAXONE 2 G/50ML
2000 INJECTION, SOLUTION INTRAVENOUS EVERY 24 HOURS
Status: DISCONTINUED | OUTPATIENT
Start: 2023-03-21 | End: 2023-03-21 | Stop reason: HOSPADM

## 2023-03-20 RX ORDER — INSULIN LISPRO 100 [IU]/ML
2-12 INJECTION, SOLUTION INTRAVENOUS; SUBCUTANEOUS
Status: DISCONTINUED | OUTPATIENT
Start: 2023-03-21 | End: 2023-03-21 | Stop reason: HOSPADM

## 2023-03-20 RX ADMIN — CEFAZOLIN SODIUM 1000 MG: 1 SOLUTION INTRAVENOUS at 23:12

## 2023-03-20 RX ADMIN — HYDROCODONE BITARTRATE AND ACETAMINOPHEN 1 TABLET: 5; 325 TABLET ORAL at 22:24

## 2023-03-20 RX ADMIN — CEFAZOLIN SODIUM 1000 MG: 1 SOLUTION INTRAVENOUS at 23:56

## 2023-03-20 RX ADMIN — ONDANSETRON 4 MG: 2 INJECTION INTRAMUSCULAR; INTRAVENOUS at 22:28

## 2023-03-20 RX ADMIN — SODIUM CHLORIDE 1000 ML: 0.9 INJECTION, SOLUTION INTRAVENOUS at 22:26

## 2023-03-21 ENCOUNTER — APPOINTMENT (OUTPATIENT)
Dept: MRI IMAGING | Facility: HOSPITAL | Age: 48
End: 2023-03-21

## 2023-03-21 ENCOUNTER — HOSPITAL ENCOUNTER (INPATIENT)
Facility: HOSPITAL | Age: 48
LOS: 13 days | Discharge: NURSING, REHAB OR LONG-TERM CARE | End: 2023-04-03
Attending: PODIATRIST | Admitting: PODIATRIST

## 2023-03-21 VITALS
DIASTOLIC BLOOD PRESSURE: 70 MMHG | HEART RATE: 104 BPM | TEMPERATURE: 98.1 F | WEIGHT: 253.75 LBS | OXYGEN SATURATION: 96 % | HEIGHT: 60 IN | BODY MASS INDEX: 49.82 KG/M2 | RESPIRATION RATE: 18 BRPM | SYSTOLIC BLOOD PRESSURE: 116 MMHG

## 2023-03-21 DIAGNOSIS — E66.01 MORBID OBESITY WITH BMI OF 40.0-44.9, ADULT (HCC): Primary | ICD-10-CM

## 2023-03-21 DIAGNOSIS — E11.40 TYPE 2 DIABETES MELLITUS WITH DIABETIC NEUROPATHY, WITHOUT LONG-TERM CURRENT USE OF INSULIN (HCC): ICD-10-CM

## 2023-03-21 DIAGNOSIS — E11.621 DIABETIC FOOT ULCER (HCC): ICD-10-CM

## 2023-03-21 DIAGNOSIS — I10 ESSENTIAL HYPERTENSION: ICD-10-CM

## 2023-03-21 DIAGNOSIS — E11.621 TYPE 2 DIABETES MELLITUS WITH LEFT DIABETIC FOOT ULCER (HCC): ICD-10-CM

## 2023-03-21 DIAGNOSIS — L97.529 TYPE 2 DIABETES MELLITUS WITH LEFT DIABETIC FOOT ULCER (HCC): ICD-10-CM

## 2023-03-21 DIAGNOSIS — F39 MOOD DISORDER (HCC): ICD-10-CM

## 2023-03-21 DIAGNOSIS — K21.9 GASTROESOPHAGEAL REFLUX DISEASE, UNSPECIFIED WHETHER ESOPHAGITIS PRESENT: Chronic | ICD-10-CM

## 2023-03-21 DIAGNOSIS — L97.509 DIABETIC FOOT ULCER (HCC): ICD-10-CM

## 2023-03-21 DIAGNOSIS — E03.9 HYPOTHYROIDISM, UNSPECIFIED TYPE: Chronic | ICD-10-CM

## 2023-03-21 PROBLEM — Z72.0 TOBACCO ABUSE: Status: RESOLVED | Noted: 2019-09-06 | Resolved: 2023-03-21

## 2023-03-21 PROBLEM — Z71.6 ENCOUNTER FOR SMOKING CESSATION COUNSELING: Status: RESOLVED | Noted: 2019-06-29 | Resolved: 2023-03-21

## 2023-03-21 PROBLEM — F31.10 MANIC BIPOLAR I DISORDER (HCC): Chronic | Status: ACTIVE | Noted: 2019-06-29

## 2023-03-21 LAB
ANION GAP SERPL CALCULATED.3IONS-SCNC: 12 MMOL/L (ref 4–13)
BACTERIA UR QL AUTO: ABNORMAL /HPF
BUN SERPL-MCNC: 9 MG/DL (ref 5–25)
CALCIUM SERPL-MCNC: 8.8 MG/DL (ref 8.4–10.2)
CHLORIDE SERPL-SCNC: 95 MMOL/L (ref 96–108)
CO2 SERPL-SCNC: 22 MMOL/L (ref 21–32)
COARSE GRAN CASTS URNS QL MICRO: ABNORMAL /LPF
CREAT SERPL-MCNC: 0.81 MG/DL (ref 0.6–1.3)
ERYTHROCYTE [DISTWIDTH] IN BLOOD BY AUTOMATED COUNT: 18.4 % (ref 11.6–15.1)
GFR SERPL CREATININE-BSD FRML MDRD: 86 ML/MIN/1.73SQ M
GLUCOSE SERPL-MCNC: 141 MG/DL (ref 65–140)
GLUCOSE SERPL-MCNC: 157 MG/DL (ref 65–140)
GLUCOSE SERPL-MCNC: 188 MG/DL (ref 65–140)
GLUCOSE SERPL-MCNC: 209 MG/DL (ref 65–140)
GLUCOSE SERPL-MCNC: 230 MG/DL (ref 65–140)
GLUCOSE SERPL-MCNC: 242 MG/DL (ref 65–140)
GLUCOSE SERPL-MCNC: 271 MG/DL (ref 65–140)
HCT VFR BLD AUTO: 30.7 % (ref 34.8–46.1)
HGB BLD-MCNC: 9 G/DL (ref 11.5–15.4)
HYALINE CASTS #/AREA URNS LPF: ABNORMAL /LPF
LACTATE SERPL-SCNC: 1.6 MMOL/L (ref 0.5–2)
MCH RBC QN AUTO: 19.4 PG (ref 26.8–34.3)
MCHC RBC AUTO-ENTMCNC: 29.3 G/DL (ref 31.4–37.4)
MCV RBC AUTO: 66 FL (ref 82–98)
MUCOUS THREADS UR QL AUTO: ABNORMAL
NON-SQ EPI CELLS URNS QL MICRO: ABNORMAL /HPF
PLATELET # BLD AUTO: 662 THOUSANDS/UL (ref 149–390)
PMV BLD AUTO: 8.7 FL (ref 8.9–12.7)
POTASSIUM SERPL-SCNC: 3.7 MMOL/L (ref 3.5–5.3)
PROCALCITONIN SERPL-MCNC: 0.44 NG/ML
RBC # BLD AUTO: 4.65 MILLION/UL (ref 3.81–5.12)
RBC #/AREA URNS AUTO: ABNORMAL /HPF
SODIUM SERPL-SCNC: 129 MMOL/L (ref 135–147)
WBC # BLD AUTO: 14.64 THOUSAND/UL (ref 4.31–10.16)
WBC #/AREA URNS AUTO: ABNORMAL /HPF

## 2023-03-21 RX ORDER — METHOCARBAMOL 750 MG/1
750 TABLET, FILM COATED ORAL 3 TIMES DAILY PRN
Status: DISCONTINUED | OUTPATIENT
Start: 2023-03-21 | End: 2023-03-21 | Stop reason: HOSPADM

## 2023-03-21 RX ORDER — FENOFIBRATE 48 MG/1
48 TABLET, COATED ORAL DAILY
Status: DISCONTINUED | OUTPATIENT
Start: 2023-03-21 | End: 2023-03-21 | Stop reason: HOSPADM

## 2023-03-21 RX ORDER — ZIPRASIDONE HYDROCHLORIDE 40 MG/1
80 CAPSULE ORAL 2 TIMES DAILY WITH MEALS
Status: DISCONTINUED | OUTPATIENT
Start: 2023-03-22 | End: 2023-03-21

## 2023-03-21 RX ORDER — PANTOPRAZOLE SODIUM 40 MG/1
40 TABLET, DELAYED RELEASE ORAL
Status: DISCONTINUED | OUTPATIENT
Start: 2023-03-21 | End: 2023-03-21 | Stop reason: HOSPADM

## 2023-03-21 RX ORDER — FOLIC ACID 1 MG/1
1000 TABLET ORAL DAILY
Status: DISCONTINUED | OUTPATIENT
Start: 2023-03-21 | End: 2023-03-21 | Stop reason: HOSPADM

## 2023-03-21 RX ORDER — OXYCODONE HYDROCHLORIDE 5 MG/1
5 TABLET ORAL EVERY 4 HOURS PRN
Status: DISCONTINUED | OUTPATIENT
Start: 2023-03-21 | End: 2023-03-21 | Stop reason: HOSPADM

## 2023-03-21 RX ORDER — ONDANSETRON 2 MG/ML
4 INJECTION INTRAMUSCULAR; INTRAVENOUS EVERY 6 HOURS PRN
Status: DISCONTINUED | OUTPATIENT
Start: 2023-03-21 | End: 2023-03-23

## 2023-03-21 RX ORDER — PANTOPRAZOLE SODIUM 40 MG/1
40 TABLET, DELAYED RELEASE ORAL
Status: CANCELLED | OUTPATIENT
Start: 2023-03-21

## 2023-03-21 RX ORDER — LORAZEPAM 2 MG/ML
1 INJECTION INTRAMUSCULAR ONCE
Status: COMPLETED | OUTPATIENT
Start: 2023-03-21 | End: 2023-03-21

## 2023-03-21 RX ORDER — LEVOTHYROXINE SODIUM 0.05 MG/1
50 TABLET ORAL
Status: DISCONTINUED | OUTPATIENT
Start: 2023-03-21 | End: 2023-03-21 | Stop reason: HOSPADM

## 2023-03-21 RX ORDER — LANOLIN ALCOHOL/MO/W.PET/CERES
9 CREAM (GRAM) TOPICAL
Status: DISCONTINUED | OUTPATIENT
Start: 2023-03-21 | End: 2023-04-03 | Stop reason: HOSPADM

## 2023-03-21 RX ORDER — OXYCODONE HYDROCHLORIDE 10 MG/1
10 TABLET ORAL EVERY 4 HOURS PRN
Status: DISCONTINUED | OUTPATIENT
Start: 2023-03-21 | End: 2023-03-21 | Stop reason: HOSPADM

## 2023-03-21 RX ORDER — OXYCODONE HYDROCHLORIDE 10 MG/1
10 TABLET ORAL EVERY 4 HOURS PRN
Status: DISCONTINUED | OUTPATIENT
Start: 2023-03-21 | End: 2023-03-23

## 2023-03-21 RX ORDER — FENOFIBRATE 48 MG/1
48 TABLET, COATED ORAL DAILY
Status: DISCONTINUED | OUTPATIENT
Start: 2023-03-22 | End: 2023-04-03 | Stop reason: HOSPADM

## 2023-03-21 RX ORDER — LANOLIN ALCOHOL/MO/W.PET/CERES
9 CREAM (GRAM) TOPICAL
Status: CANCELLED | OUTPATIENT
Start: 2023-03-21

## 2023-03-21 RX ORDER — ONDANSETRON 2 MG/ML
4 INJECTION INTRAMUSCULAR; INTRAVENOUS EVERY 6 HOURS PRN
Status: CANCELLED | OUTPATIENT
Start: 2023-03-21

## 2023-03-21 RX ORDER — INSULIN LISPRO 100 [IU]/ML
2-12 INJECTION, SOLUTION INTRAVENOUS; SUBCUTANEOUS
Status: CANCELLED | OUTPATIENT
Start: 2023-03-21

## 2023-03-21 RX ORDER — DOCUSATE SODIUM 100 MG/1
100 CAPSULE, LIQUID FILLED ORAL 2 TIMES DAILY
Status: CANCELLED | OUTPATIENT
Start: 2023-03-21

## 2023-03-21 RX ORDER — HYDROXYZINE 50 MG/1
100 TABLET, FILM COATED ORAL
Status: CANCELLED | OUTPATIENT
Start: 2023-03-21

## 2023-03-21 RX ORDER — HYDROXYZINE HYDROCHLORIDE 25 MG/1
100 TABLET, FILM COATED ORAL
Status: DISCONTINUED | OUTPATIENT
Start: 2023-03-21 | End: 2023-04-03 | Stop reason: HOSPADM

## 2023-03-21 RX ORDER — CEFAZOLIN SODIUM 2 G/50ML
2000 SOLUTION INTRAVENOUS EVERY 8 HOURS
Status: DISCONTINUED | OUTPATIENT
Start: 2023-03-21 | End: 2023-03-24

## 2023-03-21 RX ORDER — ACETAMINOPHEN 325 MG/1
650 TABLET ORAL EVERY 4 HOURS PRN
Status: CANCELLED | OUTPATIENT
Start: 2023-03-21

## 2023-03-21 RX ORDER — GUAIFENESIN/DEXTROMETHORPHAN 100-10MG/5
10 SYRUP ORAL EVERY 4 HOURS PRN
Status: DISCONTINUED | OUTPATIENT
Start: 2023-03-21 | End: 2023-04-03 | Stop reason: HOSPADM

## 2023-03-21 RX ORDER — HYDROXYZINE 50 MG/1
50 TABLET, FILM COATED ORAL DAILY
Status: DISCONTINUED | OUTPATIENT
Start: 2023-03-21 | End: 2023-03-21 | Stop reason: HOSPADM

## 2023-03-21 RX ORDER — LANOLIN ALCOHOL/MO/W.PET/CERES
9 CREAM (GRAM) TOPICAL
Status: DISCONTINUED | OUTPATIENT
Start: 2023-03-21 | End: 2023-03-21 | Stop reason: HOSPADM

## 2023-03-21 RX ORDER — INSULIN LISPRO 100 [IU]/ML
2-12 INJECTION, SOLUTION INTRAVENOUS; SUBCUTANEOUS
Status: DISCONTINUED | OUTPATIENT
Start: 2023-03-22 | End: 2023-04-03 | Stop reason: HOSPADM

## 2023-03-21 RX ORDER — LAMOTRIGINE 100 MG/1
250 TABLET ORAL EVERY MORNING
Status: DISCONTINUED | OUTPATIENT
Start: 2023-03-22 | End: 2023-04-03 | Stop reason: HOSPADM

## 2023-03-21 RX ORDER — OXYCODONE HYDROCHLORIDE 10 MG/1
10 TABLET ORAL EVERY 4 HOURS PRN
Status: CANCELLED | OUTPATIENT
Start: 2023-03-21

## 2023-03-21 RX ORDER — LEVOTHYROXINE SODIUM 0.05 MG/1
50 TABLET ORAL
Status: CANCELLED | OUTPATIENT
Start: 2023-03-22

## 2023-03-21 RX ORDER — METRONIDAZOLE 500 MG/1
500 TABLET ORAL EVERY 8 HOURS SCHEDULED
Status: DISCONTINUED | OUTPATIENT
Start: 2023-03-21 | End: 2023-03-24

## 2023-03-21 RX ORDER — METHOCARBAMOL 500 MG/1
750 TABLET, FILM COATED ORAL 3 TIMES DAILY PRN
Status: DISCONTINUED | OUTPATIENT
Start: 2023-03-21 | End: 2023-03-23

## 2023-03-21 RX ORDER — CEFTRIAXONE 2 G/50ML
2000 INJECTION, SOLUTION INTRAVENOUS EVERY 24 HOURS
Status: CANCELLED | OUTPATIENT
Start: 2023-03-22

## 2023-03-21 RX ORDER — CLONIDINE HYDROCHLORIDE 0.1 MG/1
0.2 TABLET ORAL 2 TIMES DAILY
Status: DISCONTINUED | OUTPATIENT
Start: 2023-03-21 | End: 2023-03-26

## 2023-03-21 RX ORDER — METHOCARBAMOL 750 MG/1
750 TABLET, FILM COATED ORAL 3 TIMES DAILY PRN
Status: CANCELLED | OUTPATIENT
Start: 2023-03-21

## 2023-03-21 RX ORDER — ZIPRASIDONE HYDROCHLORIDE 40 MG/1
80 CAPSULE ORAL 2 TIMES DAILY WITH MEALS
Status: DISCONTINUED | OUTPATIENT
Start: 2023-03-21 | End: 2023-03-21 | Stop reason: HOSPADM

## 2023-03-21 RX ORDER — CEFTRIAXONE 2 G/50ML
2000 INJECTION, SOLUTION INTRAVENOUS EVERY 24 HOURS
Status: DISCONTINUED | OUTPATIENT
Start: 2023-03-22 | End: 2023-03-21

## 2023-03-21 RX ORDER — LEVOTHYROXINE SODIUM 0.05 MG/1
50 TABLET ORAL
Status: DISCONTINUED | OUTPATIENT
Start: 2023-03-22 | End: 2023-04-03 | Stop reason: HOSPADM

## 2023-03-21 RX ORDER — GUAIFENESIN/DEXTROMETHORPHAN 100-10MG/5
10 SYRUP ORAL EVERY 4 HOURS PRN
Status: DISCONTINUED | OUTPATIENT
Start: 2023-03-21 | End: 2023-03-21 | Stop reason: HOSPADM

## 2023-03-21 RX ORDER — HYDROXYZINE 50 MG/1
50 TABLET, FILM COATED ORAL DAILY
Status: CANCELLED | OUTPATIENT
Start: 2023-03-22

## 2023-03-21 RX ORDER — PANTOPRAZOLE SODIUM 40 MG/1
40 TABLET, DELAYED RELEASE ORAL
Status: DISCONTINUED | OUTPATIENT
Start: 2023-03-22 | End: 2023-04-03 | Stop reason: HOSPADM

## 2023-03-21 RX ORDER — OXYCODONE HYDROCHLORIDE 5 MG/1
5 TABLET ORAL EVERY 4 HOURS PRN
Status: CANCELLED | OUTPATIENT
Start: 2023-03-21

## 2023-03-21 RX ORDER — FOLIC ACID 1 MG/1
1000 TABLET ORAL DAILY
Status: CANCELLED | OUTPATIENT
Start: 2023-03-22

## 2023-03-21 RX ORDER — GUAIFENESIN/DEXTROMETHORPHAN 100-10MG/5
10 SYRUP ORAL EVERY 4 HOURS PRN
Status: CANCELLED | OUTPATIENT
Start: 2023-03-21

## 2023-03-21 RX ORDER — ACETAMINOPHEN 325 MG/1
650 TABLET ORAL EVERY 4 HOURS PRN
Status: DISCONTINUED | OUTPATIENT
Start: 2023-03-21 | End: 2023-03-23

## 2023-03-21 RX ORDER — DOCUSATE SODIUM 100 MG/1
100 CAPSULE, LIQUID FILLED ORAL 2 TIMES DAILY
Status: DISCONTINUED | OUTPATIENT
Start: 2023-03-21 | End: 2023-04-03 | Stop reason: HOSPADM

## 2023-03-21 RX ORDER — DOCUSATE SODIUM 100 MG/1
100 CAPSULE, LIQUID FILLED ORAL 2 TIMES DAILY
Status: DISCONTINUED | OUTPATIENT
Start: 2023-03-21 | End: 2023-03-21 | Stop reason: HOSPADM

## 2023-03-21 RX ORDER — FOLIC ACID 1 MG/1
1000 TABLET ORAL DAILY
Status: DISCONTINUED | OUTPATIENT
Start: 2023-03-22 | End: 2023-04-03 | Stop reason: HOSPADM

## 2023-03-21 RX ORDER — CLONIDINE HYDROCHLORIDE 0.1 MG/1
0.2 TABLET ORAL 2 TIMES DAILY
Status: DISCONTINUED | OUTPATIENT
Start: 2023-03-21 | End: 2023-03-21 | Stop reason: HOSPADM

## 2023-03-21 RX ORDER — INSULIN LISPRO 100 [IU]/ML
1-5 INJECTION, SOLUTION INTRAVENOUS; SUBCUTANEOUS
Status: CANCELLED | OUTPATIENT
Start: 2023-03-21

## 2023-03-21 RX ORDER — CLONIDINE HYDROCHLORIDE 0.1 MG/1
0.2 TABLET ORAL 2 TIMES DAILY
Status: CANCELLED | OUTPATIENT
Start: 2023-03-21

## 2023-03-21 RX ORDER — HYDROXYZINE 50 MG/1
100 TABLET, FILM COATED ORAL
Status: DISCONTINUED | OUTPATIENT
Start: 2023-03-21 | End: 2023-03-21 | Stop reason: HOSPADM

## 2023-03-21 RX ORDER — INSULIN LISPRO 100 [IU]/ML
1-5 INJECTION, SOLUTION INTRAVENOUS; SUBCUTANEOUS
Status: DISCONTINUED | OUTPATIENT
Start: 2023-03-21 | End: 2023-04-03 | Stop reason: HOSPADM

## 2023-03-21 RX ORDER — ENOXAPARIN SODIUM 100 MG/ML
40 INJECTION SUBCUTANEOUS 2 TIMES DAILY
Status: CANCELLED | OUTPATIENT
Start: 2023-03-21

## 2023-03-21 RX ORDER — HYDROXYZINE HYDROCHLORIDE 25 MG/1
50 TABLET, FILM COATED ORAL DAILY
Status: DISCONTINUED | OUTPATIENT
Start: 2023-03-22 | End: 2023-04-03 | Stop reason: HOSPADM

## 2023-03-21 RX ORDER — FENOFIBRATE 48 MG/1
48 TABLET, COATED ORAL DAILY
Status: CANCELLED | OUTPATIENT
Start: 2023-03-22

## 2023-03-21 RX ORDER — ZIPRASIDONE HYDROCHLORIDE 40 MG/1
80 CAPSULE ORAL 2 TIMES DAILY WITH MEALS
Status: DISCONTINUED | OUTPATIENT
Start: 2023-03-21 | End: 2023-04-03 | Stop reason: HOSPADM

## 2023-03-21 RX ORDER — ZIPRASIDONE HYDROCHLORIDE 40 MG/1
80 CAPSULE ORAL 2 TIMES DAILY WITH MEALS
Status: CANCELLED | OUTPATIENT
Start: 2023-03-21

## 2023-03-21 RX ORDER — ENOXAPARIN SODIUM 100 MG/ML
40 INJECTION SUBCUTANEOUS 2 TIMES DAILY
Status: DISCONTINUED | OUTPATIENT
Start: 2023-03-21 | End: 2023-04-03 | Stop reason: HOSPADM

## 2023-03-21 RX ORDER — OXYCODONE HYDROCHLORIDE 5 MG/1
5 TABLET ORAL EVERY 4 HOURS PRN
Status: DISCONTINUED | OUTPATIENT
Start: 2023-03-21 | End: 2023-03-23

## 2023-03-21 RX ADMIN — INSULIN LISPRO 1 UNITS: 100 INJECTION, SOLUTION INTRAVENOUS; SUBCUTANEOUS at 00:09

## 2023-03-21 RX ADMIN — ONDANSETRON 4 MG: 2 INJECTION INTRAMUSCULAR; INTRAVENOUS at 03:15

## 2023-03-21 RX ADMIN — LORAZEPAM 1 MG: 2 INJECTION INTRAMUSCULAR; INTRAVENOUS at 19:00

## 2023-03-21 RX ADMIN — GUAIFENESIN AND DEXTROMETHORPHAN 10 ML: 100; 10 SYRUP ORAL at 19:13

## 2023-03-21 RX ADMIN — GUAIFENESIN AND DEXTROMETHORPHAN 10 ML: 100; 10 SYRUP ORAL at 15:38

## 2023-03-21 RX ADMIN — PANTOPRAZOLE SODIUM 40 MG: 40 TABLET, DELAYED RELEASE ORAL at 06:06

## 2023-03-21 RX ADMIN — MELATONIN 9 MG: at 22:38

## 2023-03-21 RX ADMIN — GUAIFENESIN AND DEXTROMETHORPHAN 10 ML: 100; 10 SYRUP ORAL at 03:16

## 2023-03-21 RX ADMIN — ONDANSETRON 4 MG: 2 INJECTION INTRAMUSCULAR; INTRAVENOUS at 12:06

## 2023-03-21 RX ADMIN — MORPHINE SULFATE 2 MG: 2 INJECTION, SOLUTION INTRAMUSCULAR; INTRAVENOUS at 15:34

## 2023-03-21 RX ADMIN — INSULIN LISPRO 4 UNITS: 100 INJECTION, SOLUTION INTRAVENOUS; SUBCUTANEOUS at 11:53

## 2023-03-21 RX ADMIN — LAMOTRIGINE 250 MG: 100 TABLET ORAL at 08:01

## 2023-03-21 RX ADMIN — CEFAZOLIN SODIUM 2000 MG: 2 SOLUTION INTRAVENOUS at 17:39

## 2023-03-21 RX ADMIN — FENOFIBRATE 48 MG: 48 TABLET, FILM COATED ORAL at 08:00

## 2023-03-21 RX ADMIN — ENOXAPARIN SODIUM 40 MG: 100 INJECTION SUBCUTANEOUS at 17:42

## 2023-03-21 RX ADMIN — ZIPRASIDONE HYDROCHLORIDE 80 MG: 40 CAPSULE ORAL at 07:49

## 2023-03-21 RX ADMIN — HYDROXYZINE HYDROCHLORIDE 100 MG: 25 TABLET, FILM COATED ORAL at 22:37

## 2023-03-21 RX ADMIN — INSULIN LISPRO 6 UNITS: 100 INJECTION, SOLUTION INTRAVENOUS; SUBCUTANEOUS at 06:06

## 2023-03-21 RX ADMIN — CLONIDINE HYDROCHLORIDE 0.2 MG: 0.1 TABLET ORAL at 08:00

## 2023-03-21 RX ADMIN — ZIPRASIDONE HCL 80 MG: 40 CAPSULE ORAL at 22:51

## 2023-03-21 RX ADMIN — MORPHINE SULFATE 2 MG: 2 INJECTION, SOLUTION INTRAMUSCULAR; INTRAVENOUS at 06:07

## 2023-03-21 RX ADMIN — OXYCODONE HYDROCHLORIDE 10 MG: 10 TABLET ORAL at 03:16

## 2023-03-21 RX ADMIN — METRONIDAZOLE 500 MG: 500 TABLET ORAL at 17:42

## 2023-03-21 RX ADMIN — ENOXAPARIN SODIUM 40 MG: 40 INJECTION SUBCUTANEOUS at 08:00

## 2023-03-21 RX ADMIN — OXYCODONE HYDROCHLORIDE 10 MG: 10 TABLET ORAL at 11:56

## 2023-03-21 RX ADMIN — ACETAMINOPHEN 650 MG: 325 TABLET ORAL at 02:12

## 2023-03-21 RX ADMIN — INSULIN LISPRO 2 UNITS: 100 INJECTION, SOLUTION INTRAVENOUS; SUBCUTANEOUS at 22:46

## 2023-03-21 RX ADMIN — DOCUSATE SODIUM 100 MG: 100 CAPSULE, LIQUID FILLED ORAL at 17:42

## 2023-03-21 RX ADMIN — CLONIDINE HYDROCHLORIDE 0.2 MG: 0.1 TABLET ORAL at 17:42

## 2023-03-21 RX ADMIN — CEFTRIAXONE 2000 MG: 2 INJECTION, SOLUTION INTRAVENOUS at 07:51

## 2023-03-21 RX ADMIN — OXYCODONE HYDROCHLORIDE 10 MG: 10 TABLET ORAL at 22:38

## 2023-03-21 RX ADMIN — FOLIC ACID 1000 MCG: 1 TABLET ORAL at 08:01

## 2023-03-21 RX ADMIN — OXYCODONE HYDROCHLORIDE 10 MG: 10 TABLET ORAL at 18:08

## 2023-03-21 RX ADMIN — METRONIDAZOLE 500 MG: 500 TABLET ORAL at 22:38

## 2023-03-21 RX ADMIN — ONDANSETRON 4 MG: 2 INJECTION INTRAMUSCULAR; INTRAVENOUS at 22:42

## 2023-03-21 RX ADMIN — LEVOTHYROXINE SODIUM 50 MCG: 50 TABLET ORAL at 06:06

## 2023-03-21 RX ADMIN — HYDROXYZINE HYDROCHLORIDE 50 MG: 50 TABLET, FILM COATED ORAL at 08:01

## 2023-03-21 NOTE — ASSESSMENT & PLAN NOTE
Patient with hx of diabetic ulcers, moved out of area and was not able to see Podiatry, has returned back to area now  · Bilateral foot ulcers - see images in Media  · Podiatry consult  · Diabetes control - check A1C, hold metformin, insulin coverage while in hospital  · Patient will need to be transferred to higher level of care for CT angiogram and vascular surgery consultation prior to Podiatric intervention

## 2023-03-21 NOTE — DISCHARGE SUMMARY
[facility]  Discharge- Michael Mangle 1975, 53 y o  female MRN: [id number]  Unit/Bed#: 7T [facility] 710-01 Encounter: [id number]  Primary Care Provider: Poncho Vallecillo MD   Date and time admitted to hospital: 3/20/2023  9:46 PM    * Sepsis [facility])  Assessment & Plan  · Reviewed by leukocytosis, tachycardia and lactic acidosis present on admission secondary to diabetic foot ulcer  · IV antibiotics  · IV fluids  · Follow-up wound culture and blood cultures collected in the ED  · Check procalcitonin    Diabetic foot ulcer ([facility] [address] )  Assessment & Plan  Patient with hx of diabetic ulcers, moved out of area and was not able to see Podiatry, has returned back to area now  · Bilateral foot ulcers - see images in Media  · Podiatry consult  · Diabetes control - check A1C, hold metformin, insulin coverage while in hospital  · Patient will need to be transferred to higher level of care for CT angiogram and vascular surgery consultation prior to Podiatric intervention    Morbid obesity with BMI of 40 0-44 9, adult ([facility] [address] )  Assessment & Plan  · BMI >40  · Healthy diet recommended    GERD (gastroesophageal reflux disease)  Assessment & Plan  · Continue PPI    Essential hypertension  Assessment & Plan  · Continue clonidine 0 2 mg twice daily    Manic bipolar I disorder ([facility] [address] )  Assessment & Plan  · Continue home medications  · Follow-up specialist as an outpatient    Hypothyroidism  Assessment & Plan  · Continue levothyroxine    Anxiety  Assessment & Plan  · Continue hydroxyzine      Discharging Physician / Practitioner: Edmundo Swift DO  PCP: Poncho Vallecillo MD  Admission Date:   Admission Orders (From admission, onward)     Ordered        03/20/23 2327  INPATIENT ADMISSION  Once                      Discharge Date: 03/21/23    Medical Problems     Resolved Problems  Date Reviewed: 3/21/2023   None         Consultations During Hospital Stay: Podiatry    Procedures Performed: Not applicable    Significant Findings / Test Results:     XR foot 3+ views LEFT    Result Date: 3/21/2023  Impression: No acute fracture or dislocation  No definite osteomyelitis however given the extent of ulceration of the medial distal left foot and 1st toe, clinical correlation is necessary  Workstation performed: [id number]     XR foot 3+ views RIGHT    Result Date: 3/21/2023  Impression: Severe disorganization of the right foot  This appearance could either be related to severe Charcot neuropathy and/or superimposed osteomyelitis  Severe soft tissue swelling, ulceration, and potential foreign body without soft tissue air  The examination demonstrates a significant  finding and was documented as such in [facility] for liaison and referring practitioner notification  Workstation performed: [id number]       Incidental Findings: Not applicable    Test Results Pending at Discharge (will require follow up): Not applicable     Outpatient Tests Requested: Not applicable    Reason for Admission: Foot wounds    Hospital Course:     Pratima Oreilly is a 53 y o  female who has past medical history of morbid obesity, hypothyroidism, diabetes mellitus type 2 not on insulin with prior diabetic foot ulcers, hypertension, asthma, chronic pain, PTSD presents with foot wounds  Patient with hx of wounds reports they previously healed  She reports for about last 10 days they have opened up and she has been trying to do wound care for herself at home  She has increased pain in Right foot  Noted that her top of right foot is black  Left foot she reports she is not able to bend her great toe  Podiatry recommended vascular surgery consultation for possible CT angiogram thus the patient will need to be transferred to a higher level of care  [facility] podiatry service excepted the patient on 3/21/2023  She has remained hemodynamically stable and saturating well on room air throughout her hospital course      Condition at Discharge: stable Discharge Day Visit / Exam:     Subjective:  Patient seen and examined at bedside  No acute events overnight  Denies chest pain, SOB, diaphoresis, nausea/vomiting/diarrhea, fevers/chills  Vitals: Blood Pressure: 129/79 (03/21/23 0642)  Pulse: 96 (03/21/23 0642)  Temperature: 97 5 °F (36 4 °C) (03/21/23 0642)  Temp Source: Temporal (03/21/23 7637)  Respirations: 18 (03/21/23 6797)  Height: 5' (152 4 cm) (03/21/23 1029)  Weight - Scale: 115 kg (253 lb 12 oz) (03/21/23 1029)  SpO2: 98 % (03/21/23 1321)     Exam:   Physical Exam  Vitals and nursing note reviewed  Constitutional:       General: She is not in acute distress  Appearance: She is well-developed  HENT:      Head: Normocephalic and atraumatic  Eyes:      Conjunctiva/sclera: Conjunctivae normal    Cardiovascular:      Rate and Rhythm: Normal rate and regular rhythm  Heart sounds: No murmur heard  Pulmonary:      Effort: Pulmonary effort is normal  No respiratory distress  Breath sounds: Normal breath sounds  Abdominal:      Palpations: Abdomen is soft  Tenderness: There is no abdominal tenderness  Musculoskeletal:         General: No swelling  Cervical back: Neck supple  Skin:     General: Skin is warm and dry  Capillary Refill: Capillary refill takes less than 2 seconds  Neurological:      Mental Status: She is alert  Psychiatric:         Mood and Affect: Mood normal            Discharge instructions/Information to patient and family:   See after visit summary for information provided to patient and family  Provisions for Follow-Up Care:  See after visit summary for information related to follow-up care and any pertinent home health orders  Disposition:     [facility]     Discharge Statement:  I spent 60 minutes discharging the patient  This time was spent on the day of discharge  I had direct contact with the patient on the day of discharge   Greater than 50% of the total time was spent examining patient, answering all patient questions, arranging and discussing plan of care with patient as well as directly providing post-discharge instructions  Additional time then spent on discharge activities  Discharge Medications:  See after visit summary for reconciled discharge medications provided to patient and family        ** Please Note: This note has been constructed using a voice recognition system **

## 2023-03-21 NOTE — ASSESSMENT & PLAN NOTE
Patient with hx of diabetic ulcers, moved out of area and was not able to see Podiatry, has returned back to area now  · Bilateral foot ulcers - see images in Media  · Podiatry consult  · Diabetes control - check A1C, hold metformin, insulin coverage while in hospital

## 2023-03-21 NOTE — UTILIZATION REVIEW
Initial Clinical Review    Admission: Date/Time/Statement:   Admission Orders (From admission, onward)     Ordered        03/20/23 2327  INPATIENT ADMISSION  Once                      Orders Placed This Encounter   Procedures   • INPATIENT ADMISSION     Standing Status:   Standing     Number of Occurrences:   1     Order Specific Question:   Level of Care     Answer:   Med Surg [16]     Order Specific Question:   Estimated length of stay     Answer:   More than 2 Midnights     Order Specific Question:   Certification     Answer:   I certify that inpatient services are medically necessary for this patient for a duration of greater than two midnights  See H&P and MD Progress Notes for additional information about the patient's course of treatment  ED Arrival Information     Expected   -    Arrival   3/20/2023 20:53    Acuity   Urgent            Means of arrival   Walk-In    Escorted by   Self    Service   Hospitalist    Admission type   Emergency            Arrival complaint   open wounds both feet            Chief Complaint   Patient presents with   • Foot Injury     B/l open foot wounds       Initial Presentation: 53 y o  female with PMH of DM2, morbid obesity, GERD, HTN, asthma, chronic pain, hypothyroidism bipolar disorder, hypothyroidism and anxiety who presents to the ED from home for evaluation of bilateral foot wounds  Patient with history of diabetic foot ulcers, previously healed  Patient reports they have opened up over the last ten days and she has been trying to do wound care at home  Patient has increased pain in R foot, unable to bend great toe on L foot  3/20 Inpatient admission for evaluation and treatment of sepsis, secondary to diabetic foot ulcer:  IV antibiotics, IVF, follow wound and blood culters  Diabetes control, check A1c  Consult Podiatry       3/21 Internal Medicine: Case reviewed by Podiatry, recommend transfer to the [facility] for CT angiogram and Vascular Surgery consult prior to podiatric intervention  Continue antibiotics, follow cultures  Patient will  be transferred to [place] [address]  for higher level of care  3/21 1625 Transferred to the [facility]  R foot:             L foot:             ED Triage Vitals   Temperature Pulse Respirations Blood Pressure SpO2   03/20/23 2150 03/20/23 2150 03/20/23 2150 03/20/23 2150 03/20/23 2150   100 1 °F (37 8 °C) (!) 124 18 143/69 96 %      Temp Source Heart Rate Source Patient Position - Orthostatic VS BP Location FiO2 (%)   03/20/23 2150 03/20/23 2150 03/20/23 2150 03/20/23 2150 --   Tympanic Monitor Lying Left arm       Pain Score       03/20/23 2224       10 - Worst Possible Pain          Wt Readings from Last 1 Encounters:   03/21/23 115 kg (253 lb 12 oz)     Additional Vital Signs:     Date/Time Temp Pulse Resp BP MAP (mmHg) SpO2 O2 Device   03/21/23 0642 97 5 °F (36 4 °C) 96 18 129/79 -- 98 % None (Room air)   03/21/23 0135 97 3 °F (36 3 °C) Abnormal  101 18 154/89 100 98 % None (Room air)   03/21/23 0100 -- 111 Abnormal  20 138/72 -- 97 % None (Room air)       Pertinent Labs/Diagnostic Test Results:     3/20 XR R foot:  Severe disorganization of the right foot  This appearance could either be related to severe Charcot neuropathy and/or superimposed osteomyelitis  Severe soft tissue swelling, ulceration, and potential foreign body without soft tissue air      3/20 XR L foot: No acute fracture or dislocation    No definite osteomyelitis however given the extent of ulceration of the medial distal left foot and 1st toe, clinical correlation is necessary            Results from last 7 days   Lab Units 03/21/23  0434 03/20/23  2230   WBC Thousand/uL 14 64* 17 03*   HEMOGLOBIN g/dL 9 0* 9 5*   HEMATOCRIT % 30 7* 32 4*   PLATELETS Thousands/uL 662* 732*   NEUTROS ABS Thousands/µL  --  13 98*         Results from last 7 days   Lab Units 03/21/23  0434 03/20/23  2230   SODIUM mmol/L 129* 127*   POTASSIUM mmol/L 3 7 3 8   CHLORIDE mmol/L 95* 92*   CO2 mmol/L 22 24   ANION GAP mmol/L 12 11   BUN mg/dL 9 10   CREATININE mg/dL 0 81 0 87   EGFR ml/min/1 73sq m 86 79   CALCIUM mg/dL 8 8 9 3     Results from last 7 days   Lab Units 03/20/23  2230   AST U/L 21   ALT U/L 15   ALK PHOS U/L 215*   TOTAL PROTEIN g/dL 8 7*   ALBUMIN g/dL 3 2*   TOTAL BILIRUBIN mg/dL 0 36     Results from last 7 days   Lab Units 03/21/23  1103 03/21/23  0527 03/21/23  0145 03/21/23  0001   POC GLUCOSE mg/dl 209* 271* 141* 188*     Results from last 7 days   Lab Units 03/21/23  0434 03/20/23  2230   GLUCOSE RANDOM mg/dL 242* 249*             Results from last 7 days   Lab Units 03/21/23  0434   PROCALCITONIN ng/ml 0 44*     Results from last 7 days   Lab Units 03/21/23  0225 03/20/23  2250   LACTIC ACID mmol/L 1 6 2 8*             Results from last 7 days   Lab Units 03/20/23  2301   CLARITY UA  Clear   COLOR UA  Pau*   SPEC GRAV UA  1 025   PH UA  5 0   GLUCOSE UA mg/dl 100 (1/10%)*   KETONES UA mg/dl Negative   BLOOD UA  10 0*   PROTEIN UA mg/dl 30 (1+)*   NITRITE UA  Negative   BILIRUBIN UA  Negative   UROBILINOGEN UA mg/dL 1 0   LEUKOCYTES UA  Negative   WBC UA /hpf 0-1   RBC UA /hpf 0-1   BACTERIA UA /hpf Moderate*   EPITHELIAL CELLS WET PREP /hpf Moderate*   MUCUS THREADS  Moderate*             Results from last 7 days   Lab Units 03/20/23  2250 03/20/23  2230   BLOOD CULTURE  Received in Microbiology Lab  Culture in Progress  Received in Microbiology Lab  Culture in Progress                 ED Treatment:   Medication Administration from 03/20/2023 2052 to 03/21/2023 0132       Date/Time Order Dose Route Action     03/20/2023 2226 EDT sodium chloride 0 9 % bolus 1,000 mL 1,000 mL Intravenous New Bag     03/20/2023 2228 EDT ondansetron (ZOFRAN) injection 4 mg 4 mg Intravenous Given     03/20/2023 2224 EDT HYDROcodone-acetaminophen (NORCO) 5-325 mg per tablet 1 tablet 1 tablet Oral Given     03/20/2023 9625 EDT ceFAZolin (ANCEF) IVPB (premix in dextrose) 1,000 mg 50 mL 0 mg Intravenous Stopped     03/20/2023 2312 EDT ceFAZolin (ANCEF) IVPB (premix in dextrose) 1,000 mg 50 mL 1,000 mg Intravenous New Bag     03/21/2023 0036 EDT ceFAZolin (ANCEF) IVPB (premix in dextrose) 1,000 mg 50 mL 0 mg Intravenous Stopped     03/20/2023 2356 EDT ceFAZolin (ANCEF) IVPB (premix in dextrose) 1,000 mg 50 mL 1,000 mg Intravenous New Bag     03/21/2023 0009 EDT insulin lispro (HumaLOG) 100 units/mL subcutaneous injection 1-5 Units 1 Units Subcutaneous Given        Past Medical History:   Diagnosis Date   • Anxiety    • Arthritis    • Asthma    • Chronic pain    • Diabetes mellitus (HCC)    • Disease of thyroid gland    • Lymphedema     left leg   • Manic bipolar I disorder ([facility])    • Morbid obesity ([facility])    • OCD (obsessive compulsive disorder)    • PTSD (post-traumatic stress disorder)      Present on Admission:  • Diabetic foot ulcer ([place] [address] )  • Sepsis ([place] [address] )  • Anxiety  • GERD (gastroesophageal reflux disease)  • Manic bipolar I disorder ([facility])  • Hypothyroidism  • Essential hypertension      Admitting Diagnosis: Diabetes ([place] [address] ) [E11 9]  Foot injury [S99 929A]  Chronic foot ulcer ([place] [address] ) [L97 509]  Diabetic foot ulcer ([place] [address] ) [K84 145, L97 509]  Age/Sex: 53 y o  female       Admission Orders: SCD    Scheduled Medications:    cefTRIAXone, 2,000 mg, Intravenous, Q24H  cloNIDine, 0 2 mg, Oral, BID  docusate sodium, 100 mg, Oral, BID  enoxaparin, 40 mg, Subcutaneous, BID  fenofibrate, 48 mg, Oral, Daily  folic acid, 8,620 mcg, Oral, Daily  hydrOXYzine HCL, 100 mg, Oral, HS  hydrOXYzine HCL, 50 mg, Oral, Daily  insulin lispro, 1-5 Units, Subcutaneous, HS  insulin lispro, 2-12 Units, Subcutaneous, TID AC  lamoTRIgine, 250 mg, Oral, QAM  levothyroxine, 50 mcg, Oral, Early Morning  melatonin, 9 mg, Oral, HS  pantoprazole, 40 mg, Oral, BID AC  ziprasidone, 80 mg, Oral, BID With Meals      Continuous IV Infusions:  None currently      sodium chloride 0 9 % bolus 1,000 mL  Dose: 1,000 mL  Freq: Once Route: IV  Last Dose: Stopped (03/21/23 0225)  Start: 03/20/23 2215 End: 03/21/23 0225     PRN Meds:  acetaminophen, 650 mg, Oral, Q4H PRN  dextromethorphan-guaiFENesin, 10 mL, Oral, Q4H PRN  methocarbamol, 750 mg, Oral, TID PRN  morphine injection, 2 mg, Intravenous, Q4H PRN  ondansetron, 4 mg, Intravenous, Q6H PRN  oxyCODONE, 10 mg, Oral, Q4H PRN  oxyCODONE, 5 mg, Oral, Q4H PRN      Medications 03/20 03/21   acetaminophen (TYLENOL) tablet 650 mg  Dose: 650 mg  Freq: Every 4 hours PRN Route: PO  PRN Reasons: mild pain,headaches,fever  Start: 03/20/23 2338 0212        dextromethorphan-guaiFENesin (ROBITUSSIN DM) oral syrup 10 mL  Dose: 10 mL  Freq: Every 4 hours PRN Route: PO  PRN Reason: cough  Start: 03/21/23 0259 0316  1538      methocarbamol (ROBAXIN) tablet 750 mg  Dose: 750 mg  Freq: 3 times daily PRN Route: PO  PRN Reason: muscle spasms  Start: 03/21/23 0250        morphine injection 2 mg  Dose: 2 mg  Freq: Every 4 hours PRN Route: IV  PRN Reason: breakthrough pain  Start: 03/21/23 0259   Admin Instructions:   High alert medication  [employer]     0319  1534      ondansetron (ZOFRAN) injection 4 mg  Dose: 4 mg  Freq: Every 6 hours PRN Route: IV  PRN Reasons: nausea,vomiting  Start: 03/20/23 2336   Admin Instructions:   Push over 2 minutes  5815     6896        oxyCODONE (ROXICODONE) immediate release tablet 10 mg  Dose: 10 mg  Freq: Every 4 hours PRN Route: PO  PRN Reason: severe pain  Start: 03/21/23 0258   Admin Instructions:   [employer]     0316     1156        oxyCODONE (ROXICODONE) IR tablet 5 mg  Dose: 5 mg  Freq: Every 4 hours PRN Route: PO  PRN Reason: moderate pain  Start: 03/21/23 0258   Admin Instructions:   High alert medication   [employer] [facility]        Medications 03/20 03/21         IP CONSULT TO PODIATRY    Network Utilization Review Department  ATTENTION: Please call with any questions or concerns to [phone] and carefully listen to the prompts so that you are directed to the right person  All voicemails are confidential   [facility] Doing all requests for admission clinical reviews, approved or denied determinations and any other requests to dedicated fax number below belonging to the campus where the patient is receiving treatment   List of dedicated fax numbers for the Facilities:  [address] DENIALS (Administrative/Medical Necessity) [phone]   [address] (Maternity/NICU/Pediatrics) [phone]   [address] [place] [phone]   [facility] 77 [phone]   [address] [place] [address] [facility] 28 [phone]   1554 [facility] 134 815 [facility] [phone]

## 2023-03-21 NOTE — PLAN OF CARE
Problem: PAIN - ADULT  Goal: Verbalizes/displays adequate comfort level or baseline comfort level  Description: Interventions:  - Encourage patient to monitor pain and request assistance  - Assess pain using appropriate pain scale  - Administer analgesics based on type and severity of pain and evaluate response  - Implement non-pharmacological measures as appropriate and evaluate response  - Consider cultural and social influences on pain and pain management  - Notify physician/advanced practitioner if interventions unsuccessful or patient reports new pain  Outcome: Progressing     Problem: METABOLIC, FLUID AND ELECTROLYTES - ADULT  Goal: Glucose maintained within target range  Description: INTERVENTIONS:  - Monitor Blood Glucose as ordered  - Assess for signs and symptoms of hyperglycemia and hypoglycemia  - Administer ordered medications to maintain glucose within target range  - Assess nutritional intake and initiate nutrition service referral as needed  Outcome: Progressing

## 2023-03-21 NOTE — ED PROVIDER NOTES
History  Chief Complaint   Patient presents with   • Foot Injury     B/l open foot wounds       Leg Pain  Location:  Foot  Injury: no    Foot location:  L foot and R foot  Pain details:     Quality:  Aching, dull and throbbing    Severity:  Severe    Onset quality:  Gradual    Duration:  2 weeks    Timing:  Constant    Progression:  Worsening  Chronicity:  Recurrent  Relieved by:  Nothing  Worsened by:  Nothing  Ineffective treatments:  None tried  Associated symptoms: no back pain, no decreased ROM and no fever        Prior to Admission Medications   Prescriptions Last Dose Informant Patient Reported? Taking? HYDROcodone-acetaminophen (NORCO) 5-325 mg per tablet   No No   Sig: Take 1 tablet by mouth every 6 (six) hours as needed for painMax Daily Amount: 4 tablets   SUMAtriptan Succinate (IMITREX STATDOSE REFILL) 6 MG/0 5ML SOCT   Yes No   Sig: Inject 6 mg under the skin every 2 (two) hours as needed   acetaminophen (TYLENOL) 650 mg CR tablet   No No   Sig: Take 1 tablet (650 mg total) by mouth every 8 (eight) hours as needed for mild pain   albuterol (PROVENTIL HFA,VENTOLIN HFA) 90 mcg/act inhaler   Yes No   Sig: Inhale 2 puffs every 6 (six) hours as needed for wheezing   Patient not taking: Reported on 2/16/2022    cholecalciferol (VITAMIN D3) 1,000 units tablet   Yes No   Sig: Take by mouth once a week    Patient not taking: Reported on 2/16/2022    cloNIDine (CATAPRES) 0 2 mg tablet   Yes No   Sig: Take 0 2 mg by mouth 2 (two) times a day   docusate sodium (COLACE) 100 mg capsule   No No   Sig: Take 1 capsule (100 mg total) by mouth 2 (two) times a day   ergocalciferol (ERGOCALCIFEROL) 1 25 MG (88848 UT) capsule   Yes No   Sig: Take 50,000 Units by mouth   fenofibrate (TRICOR) 54 MG tablet   Yes No   Sig: TAKE 1 TABLET (54 MG TOTAL) BY MOUTH DAILY     folic acid (FOLVITE) 1 mg tablet   Yes No   Sig: Take 1,000 mcg by mouth daily   hydrOXYzine HCL (ATARAX) 50 mg tablet   Yes No   Sig: Take 50 mg by mouth daily at bedtime   hydrOXYzine HCL (ATARAX) 50 mg tablet   Yes No   Sig: Take 50 mg by mouth every 8 (eight) hours   lamoTRIgine (LaMICtal) 200 MG tablet   Yes No   Sig: Take 200 mg by mouth every morning   lamoTRIgine (LaMICtal) 25 mg tablet   Yes No   Sig: Take 50 mg by mouth daily at bedtime   levothyroxine 50 mcg tablet   Yes No   Sig: Take 50 mcg by mouth daily   melatonin 3 mg   Yes No   Sig: Take 3 mg by mouth daily at bedtime as needed   metFORMIN (GLUCOPHAGE) 500 mg tablet   No No   Sig: Take 1 tablet (500 mg total) by mouth 2 (two) times a day with meals   methocarbamol (ROBAXIN) 750 mg tablet   Yes No   Sig: TAKE 1 TABLET (750 MG TOTAL) BY MOUTH 4 (FOUR) TIMES A DAY AS NEEDED FOR MUSCLE SPASMS    multivitamin (THERAGRAN) TABS   Yes No   Sig: Take 1 tablet by mouth daily    Patient not taking: Reported on 2/16/2022    nystatin (MYCOSTATIN) powder   Yes No   Sig: DAILY AFTER SHOWER TO AFFECTED AREAS   Patient not taking: Reported on 2/16/2022   ondansetron (ZOFRAN-ODT) 4 mg disintegrating tablet   Yes No   Patient not taking: Reported on 2/16/2022    pantoprazole (PROTONIX) 40 mg tablet   Yes No   Sig: Take 40 mg by mouth daily   senna (SENOKOT) 8 6 mg   No No   Sig: Take 1 tablet (8 6 mg total) by mouth daily   Patient not taking: Reported on 6/16/2022   ziprasidone (GEODON) 80 mg capsule   Yes No   Sig: Take 80 mg by mouth 2 (two) times a day with meals       Facility-Administered Medications: None       Past Medical History:   Diagnosis Date   • Anxiety    • Arthritis    • Asthma    • Chronic pain    • Diabetes mellitus (HCC)    • Disease of thyroid gland    • Lymphedema     left leg   • Manic bipolar I disorder (HCC)    • Morbid obesity (HCC)    • OCD (obsessive compulsive disorder)    • PTSD (post-traumatic stress disorder)        Past Surgical History:   Procedure Laterality Date   • CHOLECYSTECTOMY     • CHOLECYSTECTOMY     • [place] AMPUTATION FOOT TRANSMETARSAL Right 9/4/2021    Procedure: AMPUTATION TRANSMETATARSAL (TMA);  Surgeon: Verl Soulier, DPM;  [facility];  Service: Podiatry   • [place] AMPUTATION METATARSAL W/TOE SINGLE Left 9/8/2019    Procedure: PARTIAL 3RD RAY RESECTION, PSB TOE FILLET FLAP;  Surgeon: Kristine Hollis DPM;  [facility];  Service: Podiatry   • TOE AMPUTATION Right 1/13/2021    Procedure: AMPUTATION 2ND TOE;  Surgeon: Verl Soulier, DPM;  [facility];  Service: Podiatry   • WOUND DEBRIDEMENT Left 2/17/2022    Procedure: DEBRIDEMENT GREAT TOE WOUND;  Surgeon: Verl Soulier, DPM;  [facility];  Service: Podiatry       Family History   Problem Relation Age of Onset   • Hyperlipidemia Mother    • Diabetes Mother    • Hypertension Mother    • Heart attack Father      I have reviewed and agree with the history as documented  E-Cigarette/Vaping   • E-Cigarette Use Current Some Day User      E-Cigarette/Vaping Substances   • Nicotine No    • THC No    • CBD No    • Flavoring No    • Other No    • Unknown No      Social History     Tobacco Use   • Smoking status: Former     Packs/day: 0 50     Years: 15 00     Pack years: 7 50     Types: Cigarettes   • Smokeless tobacco: Never   Vaping Use   • Vaping Use: Some days   Substance Use Topics   • Alcohol use: Never   • Drug use: Never       Review of Systems   Constitutional: Positive for appetite change  Negative for fever  Respiratory: Negative for shortness of breath  Gastrointestinal: Positive for nausea  Musculoskeletal: Negative for back pain  All other systems reviewed and are negative  Physical Exam  Physical Exam  Vitals and nursing note reviewed  Constitutional:       General: She is not in acute distress  Appearance: Normal appearance  She is obese  She is not toxic-appearing or diaphoretic  HENT:      Head: Normocephalic and atraumatic        Right Ear: External ear normal       Left Ear: External ear normal       Nose: Nose normal    Eyes:      Pupils: Pupils are equal, round, and reactive to light  Cardiovascular:      Rate and Rhythm: Normal rate  Pulses: Normal pulses  Pulmonary:      Effort: Pulmonary effort is normal  No respiratory distress  Breath sounds: Normal breath sounds  Abdominal:      General: Abdomen is flat  Tenderness: There is no abdominal tenderness  Musculoskeletal:         General: Swelling and deformity present  Right lower leg: Edema present  Comments: See photos  No crepitus   Skin:     General: Skin is warm  Neurological:      General: No focal deficit present  Mental Status: She is alert and oriented to person, place, and time     Psychiatric:         Mood and Affect: Mood normal          Behavior: Behavior normal                      Vital Signs  ED Triage Vitals   Temperature Pulse Respirations Blood Pressure SpO2   03/20/23 2150 03/20/23 2150 03/20/23 2150 03/20/23 2150 03/20/23 2150   100 1 °F (37 8 °C) (!) 124 18 143/69 96 %      Temp Source Heart Rate Source Patient Position - Orthostatic VS BP Location FiO2 (%)   03/20/23 2150 03/20/23 2150 03/20/23 2150 03/20/23 2150 --   Tympanic Monitor Lying Left arm       Pain Score       03/20/23 2224       10 - Worst Possible Pain           Vitals:    03/20/23 2150   BP: 143/69   Pulse: (!) 124   Patient Position - Orthostatic VS: Lying         Visual Acuity      ED Medications  Medications   ceFAZolin (ANCEF) IVPB (premix in dextrose) 1,000 mg 50 mL (1,000 mg Intravenous New Bag 3/20/23 2312)   ceFAZolin (ANCEF) IVPB (premix in dextrose) 1,000 mg 50 mL (has no administration in time range)   sodium chloride 0 9 % bolus 1,000 mL (1,000 mL Intravenous New Bag 3/20/23 2226)   ondansetron (ZOFRAN) injection 4 mg (4 mg Intravenous Given 3/20/23 2228)   HYDROcodone-acetaminophen (NORCO) 5-325 mg per tablet 1 tablet (1 tablet Oral Given 3/20/23 2224)       Diagnostic Studies  Results Reviewed     Procedure Component Value Units Date/Time    Lactic acid [[id number]] (Abnormal) Collected: 03/20/23 2250    Lab Status: Final result Specimen: Blood from Arm, Right Updated: 03/20/23 2330     LACTIC ACID 2 8 mmol/L     Narrative:      Result may be elevated if tourniquet was used during collection  Lactic acid 2 Hours [[id number]]     Lab Status: No result Specimen: Blood     Wound culture and Gram stain [[id number]] Collected: 03/20/23 2324    Lab Status: No result Specimen: Wound from Foot, Left     UA (URINE) with reflex to Scope [[id number]]  (Abnormal) Collected: 03/20/23 2301    Lab Status: Final result Specimen: Urine, Clean Catch Updated: 03/20/23 2318     Color, UA Pau     Clarity, UA Clear     Specific Gravity, UA 1 025     pH, UA 5 0     Leukocytes, UA Negative     Nitrite, UA Negative     Protein, UA 30 (1+) mg/dl      Glucose,  (1/10%) mg/dl      Ketones, UA Negative mg/dl      Bilirubin, UA Negative     Occult Blood, UA 10 0     UROBILINOGEN UA 1 0 mg/dL     Urine Microscopic [[id number]] Collected: 03/20/23 2301    Lab Status: In process Specimen: Urine, Clean Catch Updated: 03/20/23 2313    Blood culture #1 [[id number]] Collected: 03/20/23 2250    Lab Status:  In process Specimen: Blood from Arm, Right Updated: 03/20/23 2304    Comprehensive metabolic panel [[id number]]  (Abnormal) Collected: 03/20/23 2230    Lab Status: Final result Specimen: Blood from Arm, Left Updated: 03/20/23 2300     Sodium 127 mmol/L      Potassium 3 8 mmol/L      Chloride 92 mmol/L      CO2 24 mmol/L      ANION GAP 11 mmol/L      BUN 10 mg/dL      Creatinine 0 87 mg/dL      Glucose 249 mg/dL      Calcium 9 3 mg/dL      Corrected Calcium 9 9 mg/dL      AST 21 U/L      ALT 15 U/L      Alkaline Phosphatase 215 U/L      Total Protein 8 7 g/dL      Albumin 3 2 g/dL      Total Bilirubin 0 36 mg/dL      eGFR 79 ml/min/1 73sq m     Narrative:      Lalitha guidelines for Chronic Kidney Disease (CKD):   •  Stage 1 with normal or high GFR (GFR > 90 mL/min/1 73 square meters)  •  Stage 2 Mild CKD (GFR = 60-89 mL/min/1 73 square meters)  •  Stage 3A Moderate CKD (GFR = 45-59 mL/min/1 73 square meters)  •  Stage 3B Moderate CKD (GFR = 30-44 mL/min/1 73 square meters)  •  Stage 4 Severe CKD (GFR = 15-29 mL/min/1 73 square meters)  •  Stage 5 End Stage CKD (GFR <15 mL/min/1 73 square meters)  Note: GFR calculation is accurate only with a steady state creatinine    CBC and differential [[id number]]  (Abnormal) Collected: 03/20/23 2230    Lab Status: Final result Specimen: Blood from Arm, Left Updated: 03/20/23 2240     WBC 17 03 Thousand/uL      RBC 4 93 Million/uL      Hemoglobin 9 5 g/dL      Hematocrit 32 4 %      MCV 66 fL      MCH 19 3 pg      MCHC 29 3 g/dL      RDW 18 5 %      MPV 8 7 fL      Platelets 542 Thousands/uL      nRBC 0 /100 WBCs      Neutrophils Relative 82 %      Immat GRANS % 1 %      Lymphocytes Relative 10 %      Monocytes Relative 6 %      Eosinophils Relative 1 %      Basophils Relative 0 %      Neutrophils Absolute 13 98 Thousands/µL      Immature Grans Absolute 0 19 Thousand/uL      Lymphocytes Absolute 1 68 Thousands/µL      Monocytes Absolute 1 01 Thousand/µL      Eosinophils Absolute 0 11 Thousand/µL      Basophils Absolute 0 06 Thousands/µL     Blood culture #2 [[id number]] Collected: 03/20/23 2230    Lab Status: In process Specimen: Blood from Arm, Left Updated: 03/20/23 2237                 XR foot 3+ views LEFT    (Results Pending)   XR foot 3+ views RIGHT    (Results Pending)              Procedures  Procedures         ED Course                            Initial Sepsis Screening     Row Name 03/20/23 5622                Is the patient's history suggestive of a new or worsening infection?  Yes (Proceed)  -AK        Suspected source of infection soft tissue  -AK        Indicate SIRS criteria Tachycardia > 90 bpm;Leukocytosis (WBC > 46598 IJL) OR Leukopenia (WBC <4000 IJL) OR Bandemia (WBC >10% bands)  -AK        Are two or more of the above signs & symptoms of infection both present and new to the patient? Yes (Proceed)  -AK        Assess for evidence of organ dysfunction: Are any of the below criteria present within 6 hours of suspected infection and SIRS criteria that are NOT considered to be chronic conditions? Lactate > 2 0  -AK        Date of presentation of severe sepsis 03/20/23  -AK        Time of presentation of severe sepsis 2337  -AK        Sepsis Note: Click "NEXT" below (NOT "close") to generate sepsis note based on above information  --              User Key  (r) = Recorded By, (t) = Taken By, (c) = Cosigned By    [address] Name Provider Type    [facility], DO Physician                              Medical Decision Making  45-year-old female presents with complaint of worsening ulcerations on both of her feet -complicated by her history of diabetes and lack of access to appropriate healthcare  The patient has a history of diabetes and is unsure what her blood sugar has been stating that her doctor told her to stop checking because her A1c had improved  She has a history of chronic wounds with partial amputations in the past   Patient had moved out of the area and just returned stating the podiatry was refusing to see her due to her current insurance coverage  Patient reports that over the past 2 weeks she has had increasing pain along with drainage particularly in the right foot stump  She denies fevers but states that she has had diminished appetite  On exam the wounds are actively draining purulent material   X-rays are somewhat indeterminate with the possibility of osteo due to the chronic nature of her condition  The patient will require an MRI of both feet in the morning to determine the extent of the infection  There are no signs of necrotizing fasciitis  Discussed the case with the physician assistant covering for [facility] and have agreed upon admission for further assessment and treatment      Chronic foot ulcer ([facility] ): chronic illness or injury with exacerbation, progression, or side effects of treatment that poses a threat to life or bodily functions  Amount and/or Complexity of Data Reviewed  External Data Reviewed: notes  Labs: ordered  Radiology: ordered and independent interpretation performed  Risk  Prescription drug management  Decision regarding hospitalization  Diagnosis or treatment significantly limited by social determinants of health  Disposition  Final diagnoses:   Chronic foot ulcer ([facility] )   Diabetes ([facility] )     Time reflects when diagnosis was documented in both MDM as applicable and the Disposition within this note     Time User Action Codes Description Comment    3/20/2023 11:30 PM Radames Payne Add [A29 929] [facility] foot [facility] ([facility] )     3/20/2023 11:35 PM Radames Greer [E11 9] Diabetes [facility])       ED Disposition     ED Disposition   Admit    Condition   Stable    Date/Time   Mon Mar 20, 2023 11:28 PM    Comment   Case was discussed with Jason Cancer and the patient's admission status was agreed to be Admission Status: inpatient status to the service of Dr Vy Nicole  Follow-up Information    None         Patient's Medications   Discharge Prescriptions    No medications on file       No discharge procedures on file      PDMP Review       Value Time User    PDMP Reviewed  Yes 9/1/2021  3:04 PM Trey Mcgee PA-C          ED Provider  Electronically Signed by           Olaf Fernandez DO  03/20/23 4578

## 2023-03-21 NOTE — UTILIZATION REVIEW
Initial Clinical Review    Admission: Date/Time/Statement:   Admission Orders (From admission, onward)     Ordered        03/20/23 2327  INPATIENT ADMISSION  Once                      Orders Placed This Encounter   Procedures   • INPATIENT ADMISSION     Standing Status:   Standing     Number of Occurrences:   1     Order Specific Question:   Level of Care     Answer:   Med Surg [16]     Order Specific Question:   Estimated length of stay     Answer:   More than 2 Midnights     Order Specific Question:   Certification     Answer:   I certify that inpatient services are medically necessary for this patient for a duration of greater than two midnights  See H&P and MD Progress Notes for additional information about the patient's course of treatment       ED Arrival Information     Expected   -    Arrival   3/20/2023 20:53    Acuity   Urgent            Means of arrival   Walk-In    Escorted by   Self    Service   Hospitalist    Admission type   Emergency            Arrival complaint   open wounds both feet            Chief Complaint   Patient presents with   • Foot Injury     B/l open foot wounds       Initial Presentation: 53 y o  female ***    Date: ***   Day 2: ***    ED Triage Vitals   Temperature Pulse Respirations Blood Pressure SpO2   03/20/23 2150 03/20/23 2150 03/20/23 2150 03/20/23 2150 03/20/23 2150   100 1 °F (37 8 °C) (!) 124 18 143/69 96 %      Temp Source Heart Rate Source Patient Position - Orthostatic VS BP Location FiO2 (%)   03/20/23 2150 03/20/23 2150 03/20/23 2150 03/20/23 2150 --   Tympanic Monitor Lying Left arm       Pain Score       03/20/23 2224       10 - Worst Possible Pain          Wt Readings from Last 1 Encounters:   03/20/23 115 kg (253 lb 12 oz)     Additional Vital Signs: ***  Pertinent Labs/Diagnostic Test Results:   XR foot 3+ views LEFT    (Results Pending)   XR foot 3+ views RIGHT    (Results Pending)         Results from last 7 days   Lab Units 03/21/23  0434 03/20/23  2230   WBC Thousand/uL 14 64* 17 03*   HEMOGLOBIN g/dL 9 0* 9 5*   HEMATOCRIT % 30 7* 32 4*   PLATELETS Thousands/uL 662* 732*   NEUTROS ABS Thousands/µL  --  13 98*         Results from last 7 days   Lab Units 03/21/23  0434 03/20/23  2230   SODIUM mmol/L 129* 127*   POTASSIUM mmol/L 3 7 3 8   CHLORIDE mmol/L 95* 92*   CO2 mmol/L 22 24   ANION GAP mmol/L 12 11   BUN mg/dL 9 10   CREATININE mg/dL 0 81 0 87   EGFR ml/min/1 73sq m 86 79   CALCIUM mg/dL 8 8 9 3     Results from last 7 days   Lab Units 03/20/23  2230   AST U/L 21   ALT U/L 15   ALK PHOS U/L 215*   TOTAL PROTEIN g/dL 8 7*   ALBUMIN g/dL 3 2*   TOTAL BILIRUBIN mg/dL 0 36     Results from last 7 days   Lab Units 03/21/23  0527 03/21/23  0145 03/21/23  0001   POC GLUCOSE mg/dl 271* 141* 188*     Results from last 7 days   Lab Units 03/21/23  0434 03/20/23  2230   GLUCOSE RANDOM mg/dL 242* 249*             No results found for: BETA-HYDROXYBUTYRATE                                   Results from last 7 days   Lab Units 03/21/23  0434   PROCALCITONIN ng/ml 0 44*     Results from last 7 days   Lab Units 03/21/23  0225 03/20/23  2250   LACTIC ACID mmol/L 1 6 2 8*                                         Results from last 7 days   Lab Units 03/20/23  2301   CLARITY UA  Clear   COLOR UA  Pau*   SPEC GRAV UA  1 025   PH UA  5 0   GLUCOSE UA mg/dl 100 (1/10%)*   KETONES UA mg/dl Negative   BLOOD UA  10 0*   PROTEIN UA mg/dl 30 (1+)*   NITRITE UA  Negative   BILIRUBIN UA  Negative   UROBILINOGEN UA mg/dL 1 0   LEUKOCYTES UA  Negative   WBC UA /hpf 0-1   RBC UA /hpf 0-1   BACTERIA UA /hpf Moderate*   EPITHELIAL CELLS WET PREP /hpf Moderate*   MUCUS THREADS  Moderate*                                               ED Treatment:   Medication Administration from 03/20/2023 2052 to 03/21/2023 0132       Date/Time Order Dose Route Action Action by Comments     03/20/2023 2226 EDT sodium chloride 0 9 % bolus 1,000 mL 1,000 mL Intravenous New Bag Breezy Cowart RN --     03/20/2023 2228 EDT ondansetron (ZOFRAN) injection 4 mg 4 mg Intravenous Given Lyla Gitelman, RN --     03/20/2023 2224 EDT HYDROcodone-acetaminophen (NORCO) 5-325 mg per tablet 1 tablet 1 tablet Oral Given Lyla Gitelman, RN --     03/20/2023 2352 EDT ceFAZolin (ANCEF) IVPB (premix in dextrose) 1,000 mg 50 mL 0 mg Intravenous Stopped Yucaipa Pillion, RN --     03/20/2023 2312 EDT ceFAZolin (ANCEF) IVPB (premix in dextrose) 1,000 mg 50 mL 1,000 mg Intravenous New Bag [place]a Pillion, RN --     03/21/2023 0036 EDT ceFAZolin (ANCEF) IVPB (premix in dextrose) 1,000 mg 50 mL 0 mg Intravenous Stopped Antwon Pillion, RN --     03/20/2023 2356 EDT ceFAZolin (ANCEF) IVPB (premix in dextrose) 1,000 mg 50 mL 1,000 mg Intravenous New Bag [place]a Pillion, RN --     03/21/2023 0009 EDT insulin lispro (HumaLOG) 100 units/mL subcutaneous injection 1-5 Units 1 Units Subcutaneous Given [facility], RN --        Past Medical History:   Diagnosis Date   • Anxiety    • Arthritis    • Asthma    • Chronic pain    • Diabetes mellitus ([place] [address] )    • Disease of thyroid gland    • Lymphedema     left leg   • Manic bipolar I disorder (HCC)    • Morbid obesity ([facility])    • OCD (obsessive compulsive disorder)    • PTSD (post-traumatic stress disorder)      Present on Admission:  • Diabetic foot ulcer ([place] [address] )  • Sepsis ([place] [address] )  • Anxiety  • GERD (gastroesophageal reflux disease)  • Manic bipolar I disorder ([facility])  • Hypothyroidism  • Essential hypertension      Admitting Diagnosis: Diabetes ([place] [address] ) [E11 9]  Foot injury [S99 929A]  Chronic foot ulcer ([place] [address] ) [L97 509]  Diabetic foot ulcer ([place] [address] ) [W81 284, L97 509]  Age/Sex: 53 y o  female  Admission Orders:  Scheduled Medications:  cefTRIAXone, 2,000 mg, Intravenous, Q24H  cloNIDine, 0 2 mg, Oral, BID  docusate sodium, 100 mg, Oral, BID  enoxaparin, 40 mg, Subcutaneous, BID  fenofibrate, 48 mg, Oral, Daily  folic acid, 3,526 mcg, Oral, Daily  hydrOXYzine HCL, 100 mg, Oral, HS  hydrOXYzine HCL, 50 mg, Oral, Daily  insulin lispro, 1-5 Units, Subcutaneous, HS  insulin lispro, 2-12 Units, Subcutaneous, TID AC  lamoTRIgine, 250 mg, Oral, QAM  levothyroxine, 50 mcg, Oral, Early Morning  melatonin, 9 mg, Oral, HS  pantoprazole, 40 mg, Oral, BID AC  ziprasidone, 80 mg, Oral, BID With Meals      Continuous IV Infusions:     PRN Meds:  acetaminophen, 650 mg, Oral, Q4H PRN  dextromethorphan-guaiFENesin, 10 mL, Oral, Q4H PRN  methocarbamol, 750 mg, Oral, TID PRN  morphine injection, 2 mg, Intravenous, Q4H PRN  ondansetron, 4 mg, Intravenous, Q6H PRN  oxyCODONE, 10 mg, Oral, Q4H PRN  oxyCODONE, 5 mg, Oral, Q4H PRN        IP CONSULT TO PODIATRY    Network Utilization Review Department  ATTENTION: Please call with any questions or concerns to [phone] and carefully listen to the prompts so that you are directed to the right person  All voicemails are confidential   Meera Lynne all requests for admission clinical reviews, approved or denied determinations and any other requests to dedicated fax number below belonging to the campus where the patient is receiving treatment   List of dedicated fax numbers for the Facilities:  [address] DENIALS (Administrative/Medical Necessity) [phone]   [address] (Maternity/NICU/Pediatrics) [phone]   [address] [phone]   [facility]  [phone]   [facility] [address] [place] [id number] [place] [address] 28 [phone]   [address] [place] Louisaderek Barnett [facility] [address] [phone]

## 2023-03-21 NOTE — NURSING NOTE
Report given to [facility]  Transport team picked up patient  Patient wheelchair will be picked up tomorrow by leann and will be taken to [address]

## 2023-03-21 NOTE — DISCHARGE INSTR - OTHER ORDERS
Skin care plans:  1-Hydraguard to bilateral sacrum, buttock and heels BID and PRN  2-Elevate heels to offload pressure  3-Ehob cushion in chair when out of bed  4-Moisturize skin daily with skin nourishing cream   5-Turn/reposition q2h or when medically stable for pressure re-distribution on skin  6-Cleanse B/L foot wounds with normal saline, apply silver alginate to wound beds, cover with ABD/gauze, secure with fletcher and tape  Change every other day and PRN soilage/displacement

## 2023-03-21 NOTE — PLAN OF CARE
Problem: PAIN - ADULT  Goal: Verbalizes/displays adequate comfort level or baseline comfort level  Description: Interventions:  - Encourage patient to monitor pain and request assistance  - Assess pain using appropriate pain scale  - Administer analgesics based on type and severity of pain and evaluate response  - Implement non-pharmacological measures as appropriate and evaluate response  - Consider cultural and social influences on pain and pain management  - Notify physician/advanced practitioner if interventions unsuccessful or patient reports new pain  Outcome: Progressing     Problem: SAFETY ADULT  Goal: Patient will remain free of falls  Description: INTERVENTIONS:  - Educate patient/family on patient safety including physical limitations  - Instruct patient to call for assistance with activity   - Consult OT/PT to assist with strengthening/mobility   - Keep Call bell within reach  - Keep bed low and locked with side rails adjusted as appropriate  - Keep care items and personal belongings within reach  - Initiate and maintain comfort rounds  - Make Fall Risk Sign visible to staff  - Offer Toileting every  Hours, in advance of need  - Initiate/Maintain alarm  - Obtain necessary fall risk management equipment:   - Apply yellow socks and bracelet for high fall risk patients  - Consider moving patient to room near nurses station  Outcome: Progressing     Problem: SKIN/TISSUE INTEGRITY - ADULT  Goal: Incision(s), wounds(s) or drain site(s) healing without S/S of infection  Description: INTERVENTIONS  - Assess and document dressing, incision, wound bed, drain sites and surrounding tissue  - Provide patient and family education  - Perform skin care/dressing changes every  Outcome: Progressing     Problem: MUSCULOSKELETAL - ADULT  Goal: Maintain or return mobility to safest level of function  Description: INTERVENTIONS:  - Assess patient's ability to carry out ADLs; assess patient's baseline for ADL function and identify physical deficits which impact ability to perform ADLs (bathing, care of mouth/teeth, toileting, grooming, dressing, etc )  - Assess/evaluate cause of self-care deficits   - Assess range of motion  - Assess patient's mobility  - Assess patient's need for assistive devices and provide as appropriate  - Encourage maximum independence but intervene and supervise when necessary  - Involve family in performance of ADLs  - Assess for home care needs following discharge   - Consider OT consult to assist with ADL evaluation and planning for discharge  - Provide patient education as appropriate  Outcome: Progressing     Problem: METABOLIC, FLUID AND ELECTROLYTES - ADULT  Goal: Glucose maintained within target range  Description: INTERVENTIONS:  - Monitor Blood Glucose as ordered  - Assess for signs and symptoms of hyperglycemia and hypoglycemia  - Administer ordered medications to maintain glucose within target range  - Assess nutritional intake and initiate nutrition service referral as needed  Outcome: Progressing

## 2023-03-21 NOTE — H&P
[facility]  H&P- Matias Christiansen 1975, 53 y o  female MRN: [id number]  Unit/Bed#: 7[facility] 710-01 Encounter: [id number]  Primary Care Provider: Lars Gregory MD   Date and time admitted to hospital: 3/20/2023  9:46 PM    * Sepsis [facility])  Assessment & Plan  · Reviewed by leukocytosis, tachycardia and lactic acidosis present on admission secondary to diabetic foot ulcer  · IV antibiotics  · IV fluids  · Follow-up wound culture and blood cultures collected in the ED  · Check procalcitonin    Di[facility] ([facility] )  Assessment & Plan  Patient with hx of diabetic ulcers, moved out of area and was not able to see Podiatry, has returned back to area now  · Bilateral foot ulcers - see images in Media  · Podiatry consult  · Diabetes control - check A1C, hold metformin, insulin coverage while in hospital    Morbid obesity with BMI of 40 0-44 9, adult ([facility] )  Assessment & Plan  · BMI >40  · Healthy diet recommended    GERD (gastroesophageal reflux disease)  Assessment & Plan  · Continue PPI    Essential hypertension  Assessment & Plan  · Continue clonidine 0 2 mg twice daily    Manic bipolar I disorder ([facility] )  Assessment & Plan  · Continue home medications  · Follow-up specialist as an outpatient    Hypothyroidism  Assessment & Plan  · Continue levothyroxine    Anxiety  Assessment & Plan  · Continue hydroxyzine    TeleMedicine H&P - [facility] Internal Medicine    Patient Information: Matias Christiansen 53 y o  female MRN: [id number]  Unit/Bed#: 7[facility] 710-01 Encounter: [id number]  Admitting Physician: Dr Rayray Durant  PCP: Lars Gregory MD  Date of Admission:  03/21/23    REQUIRED DOCUMENTATION:     1  This service was provided via Telemedicine  2  Provider located at [place]  3  TeleMed provider: Asiya Hahn PA-C   4  Identify all parties in room with patient during tele consult:  Thony Ivey RN  5   After connecting through [employer], patient was identified by name and date of birth and assistant checked wristband  Patient was then informed that this was a Telemedicine visit and that the exam was being conducted confidentially over secure lines  My office door was closed  No one else was in the room  Patient acknowledged consent and understanding of privacy and security of the Telemedicine visit, and gave us permission to have the assistant stay in the room in order to assist with the history and to conduct the exam   I informed the patient that I have reviewed their record in Epic and presented the opportunity for them to ask any questions regarding the visit today  The patient agreed to participate  VTE Prophylaxis: Enoxaparin (Lovenox)  / sequential compression device   Code Status: Full Code  Discussion with patient    Anticipated Length of Stay:  Patient will be admitted on an Inpatient basis with an anticipated length of stay of  > 2 midnights  Justification for Hospital Stay: IV abx, specialist input    Total Time Spent on Date of Encounter in care of patient: 75 minutes This time was spent on one or more of the following: performing physical exam; counseling and coordination of care; obtaining or reviewing history; documenting in the medical record; reviewing/ordering tests, medications or procedures; communicating with other healthcare professionals and discussing with patient's family/caregivers  Chief Complaint:   Foot wounds    History of Present Illness:    Leighann Mckeon is a 53 y o  female who has past medical history of morbid obesity, hypothyroidism, diabetes mellitus type 2 not on insulin with prior diabetic foot ulcers, hypertension, asthma, chronic pain, PTSD presents with foot wounds  Patient with hx of wounds reports they previously healed  She reports for about last 10 days they have opened up and she has been trying to do wound care for herself at home  She has increased pain in Right foot  Noted that her top of right foot is black   Left foot she reports she is not able to bend her great toe  Review of Systems:    Review of Systems   Constitutional: Negative for chills and fever  HENT: Negative for rhinorrhea, sore throat and trouble swallowing  Respiratory: Positive for cough  Negative for shortness of breath  Cardiovascular: Positive for leg swelling  Negative for chest pain  Gastrointestinal: Negative for abdominal pain, diarrhea, nausea and vomiting  Genitourinary: Negative for dysuria and hematuria  Musculoskeletal: Positive for back pain  Negative for neck pain  Skin: Positive for color change and wound  Neurological: Negative for dizziness, weakness and headaches  Psychiatric/Behavioral: Negative for agitation and confusion         Past Medical and Surgical History:     Past Medical History:   Diagnosis Date   • Anxiety    • Arthritis    • Asthma    • Chronic pain    • Diabetes mellitus ([facility] )    • Disease of thyroid gland    • Lymphedema     left leg   • Manic bipolar I disorder ([facility] )    • Morbid obesity (HCC)    • OCD (obsessive compulsive disorder)    • PTSD (post-traumatic stress disorder)        Past Surgical History:   Procedure Laterality Date   • CHOLECYSTECTOMY     • CHOLECYSTECTOMY     • CA AMPUTATION FOOT TRANSMETARSAL Right 9/4/2021    Procedure: AMPUTATION TRANSMETATARSAL (TMA);  Surgeon: Fidel Griffiths DPM;  [facility]: [facility];  Service: Podiatry   • CA AMPUTATION METATARSAL W/TOE SINGLE Left 9/8/2019    Procedure: PARTIAL 3RD RAY RESECTION, PSB TOE FILLET FLAP;  Surgeon: Karlene Bruno DPM;  Location[facility];  Service: Podiatry   • TOE AMPUTATION Right 1/13/2021    Procedure: AMPUTATION 2ND TOE;  Surgeon: Fidel Griffiths DPM;  [facility];  Service: Podiatry   • WOUND DEBRIDEMENT Left 2/17/2022    Procedure: DEBRIDEMENT GREAT TOE WOUND;  Surgeon: Fidel Griffiths DPM;  [facility];  Service: Podiatry       Meds/Allergies:    Prior to Admission medications Medication Sig Start Date End Date Taking? Authorizing Provider   acetaminophen (TYLENOL) 650 mg CR tablet Take 1 tablet (650 mg total) by mouth every 8 (eight) hours as needed for mild pain 9/15/21   Ludmila Vera DPM   albuterol (PROVENTIL HFA,VENTOLIN HFA) 90 mcg/act inhaler Inhale 2 puffs every 6 (six) hours as needed for wheezing  Patient not taking: Reported on 2/16/2022     Historical Provider, MD   cholecalciferol (VITAMIN D3) 1,000 units tablet Take by mouth once a week   Patient not taking: Reported on 2/16/2022     Historical Provider, MD   cloNIDine (CATAPRES) 0 2 mg tablet Take 0 2 mg by mouth 2 (two) times a day 2/15/21   Historical Provider, MD   docusate sodium (COLACE) 100 mg capsule Take 1 capsule (100 mg total) by mouth 2 (two) times a day 9/8/21   Felecia Carrizales PA-C   ergocalciferol (ERGOCALCIFEROL) 1 25 MG (17465 UT) capsule Take 50,000 Units by mouth 3/5/21 5/28/21  Historical Provider, MD   fenofibrate (TRICOR) 54 MG tablet TAKE 1 TABLET (54 MG TOTAL) BY MOUTH DAILY   2/27/21   Historical Provider, MD   folic acid (FOLVITE) 1 mg tablet Take 1,000 mcg by mouth daily 2/22/21   Historical Provider, MD   HYDROcodone-acetaminophen (NORCO) 5-325 mg per tablet Take 1 tablet by mouth every 6 (six) hours as needed for painMax Daily Amount: 4 tablets 12/2/20   Aida Morris MD   hydrOXYzine HCL (ATARAX) 50 mg tablet Take 50 mg by mouth daily at bedtime 9/3/19   Historical Provider, MD   hydrOXYzine HCL (ATARAX) 50 mg tablet Take 50 mg by mouth every 8 (eight) hours    Historical Provider, MD   lamoTRIgine (LaMICtal) 200 MG tablet Take 200 mg by mouth every morning 6/16/21   Historical Provider, MD   lamoTRIgine (LaMICtal) 25 mg tablet Take 50 mg by mouth daily at bedtime    Historical Provider, MD   levothyroxine 50 mcg tablet Take 50 mcg by mouth daily 3/8/19   Historical Provider, MD   melatonin 3 mg Take 3 mg by mouth daily at bedtime as needed 12/22/21   Historical Provider, MD   metFORMIN (GLUCOPHAGE) 500 mg tablet Take 1 tablet (500 mg total) by mouth 2 (two) times a day with meals 9/10/19   Som Grande DO   methocarbamol (ROBAXIN) 750 mg tablet TAKE 1 TABLET (750 MG TOTAL) BY MOUTH 4 (FOUR) TIMES A DAY AS NEEDED FOR MUSCLE SPASMS  3/2/21   Historical Provider, MD   multivitamin (THERAGRAN) TABS Take 1 tablet by mouth daily   Patient not taking: Reported on 2/16/2022     Historical Provider, MD   nystatin (MYCOSTATIN) powder DAILY AFTER SHOWER TO AFFECTED AREAS  Patient not taking: Reported on 2/16/2022 12/30/21   Historical Provider, MD   ondansetron (ZOFRAN-ODT) 4 mg disintegrating tablet  3/19/19   Historical Provider, MD   pantoprazole (PROTONIX) 40 mg tablet Take 40 mg by mouth daily 1/4/22   Historical Provider, MD   senna (SENOKOT) 8 6 mg Take 1 tablet (8 6 mg total) by mouth daily  Patient not taking: Reported on 6/16/2022 9/9/21   Bambi Morgan PA-C   SUMAtriptan Succinate (IMITREX STATDOSE REFILL) 6 MG/0 5ML SOCT Inject 6 mg under the skin every 2 (two) hours as needed 11/29/16   Historical Provider, MD   ziprasidone (GEODON) 80 mg capsule Take 80 mg by mouth 2 (two) times a day with meals  5/15/18   Historical Provider, MD   EASY COMFORT PEN NEEDLES 32G X 4 MM MISC  3/20/19 6/16/22  Historical Provider, MD   [address] X 5/16" 0 3 ML MISC  2/2/19 6/16/22  Historical Provider, MD     all medications and allergies reviewed    Allergies:    Allergies   Allergen Reactions   • Aspirin Vomiting   • Barium Iodide Itching     Face became very red and itchy    • Codeine Vomiting   • Latex Hives   • Penicillins Rash and Other (See Comments)     Patient has tolerated cefepime without difficulty   • Vancomycin Rash       Social History:     Marital Status: Single   Occupation: Disabled  Patient Pre-hospital Living Situation: home  Patient Pre-hospital Level of Mobility: wheelchair  Patient Pre-hospital Diet Restrictions: diabetic  Substance Use History:   Social History Substance and Sexual Activity   Alcohol Use Never     Social History     Tobacco Use   Smoking Status Former   • Packs/day: 0 50   • Years: 15 00   • Pack years: 7 50   • Types: Cigarettes   Smokeless Tobacco Never     Social History     Substance and Sexual Activity   Drug Use Never       Family History:  I have reviewed the patients family history     Physical Exam:     Vitals:   Blood Pressure: 154/89 (03/21/23 0135)  Pulse: 101 (03/21/23 0135)  Temperature: (!) 97 3 °F (36 3 °C) (03/21/23 0135)  Temp Source: Temporal (03/21/23 0135)  Respirations: 18 (03/21/23 0135)  Weight - Scale: 115 kg (253 lb 12 oz) (03/20/23 2150)  SpO2: 98 % (03/21/23 0135)    Physical Exam  Vitals reviewed  Constitutional:       Appearance: Normal appearance  She is morbidly obese  Comments: [de-identified]  female   HENT:      Head: Normocephalic and atraumatic  Nose: Nose normal       Mouth/Throat:      Comments: No teeth  Eyes:      General:         Right eye: No discharge  Left eye: No discharge  Extraocular Movements: Extraocular movements intact  Conjunctiva/sclera: Conjunctivae normal       Comments: Glasses in place   Cardiovascular:      Comments: Rate 101  Pulmonary:      Effort: Pulmonary effort is normal       Comments: Respiratory rate 18, 98% room air, speaking full sentences without difficulty  Abdominal:      Comments: No reported abdominal pain   Musculoskeletal:      Cervical back: Normal range of motion  Feet:       Comments: TMA of her right foot, left third toe amputated   Feet:      Right foot:      Skin integrity: Ulcer, skin breakdown, erythema and dry skin present  Left foot:      Skin integrity: Ulcer, skin breakdown and dry skin present  Skin:     Comments: Bilateral wounds on her foot currently wrapped please see images   Neurological:      General: No focal deficit present  Mental Status: She is alert and oriented to person, place, and time   Mental status is at baseline  Psychiatric:         Mood and Affect: Mood normal          Behavior: Behavior normal          Thought Content: Thought content normal          Judgment: Judgment normal        Additional Data:     Lab Results: I have personally reviewed pertinent reports  Results from last 7 days   Lab Units 03/20/23  2230   WBC Thousand/uL 17 03*   HEMOGLOBIN g/dL 9 5*   HEMATOCRIT % 32 4*   PLATELETS Thousands/uL 732*   NEUTROS PCT % 82*   LYMPHS PCT % 10*   MONOS PCT % 6   EOS PCT % 1     Results from last 7 days   Lab Units 03/20/23  2230   SODIUM mmol/L 127*   POTASSIUM mmol/L 3 8   CHLORIDE mmol/L 92*   CO2 mmol/L 24   BUN mg/dL 10   CREATININE mg/dL 0 87   ANION GAP mmol/L 11   CALCIUM mg/dL 9 3   ALBUMIN g/dL 3 2*   TOTAL BILIRUBIN mg/dL 0 36   ALK PHOS U/L 215*   ALT U/L 15   AST U/L 21   GLUCOSE RANDOM mg/dL 249*         Results from last 7 days   Lab Units 03/21/23  0145 03/21/23  0001   POC GLUCOSE mg/dl 141* 188*         Results from last 7 days   Lab Units 03/21/23  0225 03/20/23  2250   LACTIC ACID mmol/L 1 6 2 8*       Imaging: Right foot TMA, left foot 3 toe amputation + wounds, my read  Formal read pending at this time    XR foot 3+ views LEFT    (Results Pending)   XR foot 3+ views RIGHT    (Results Pending)       Epic / Care Everywhere Records Reviewed: Yes    ** Please Note: This note has been constructed using a voice recognition system   **

## 2023-03-21 NOTE — ASSESSMENT & PLAN NOTE
· Reviewed by leukocytosis, tachycardia and lactic acidosis present on admission secondary to diabetic foot ulcer  · IV antibiotics  · IV fluids  · Follow-up wound culture and blood cultures collected in the ED  · Check procalcitonin

## 2023-03-21 NOTE — PLAN OF CARE
Problem: MOBILITY - ADULT  Goal: Maintain or return to baseline ADL function  Description: INTERVENTIONS:  -  Assess patient's ability to carry out ADLs; assess patient's baseline for ADL function and identify physical deficits which impact ability to perform ADLs (bathing, care of mouth/teeth, toileting, grooming, dressing, etc )  - Assess/evaluate cause of self-care deficits   - Assess range of motion  - Assess patient's mobility; develop plan if impaired  - Assess patient's need for assistive devices and provide as appropriate  - Encourage maximum independence but intervene and supervise when necessary  - Involve family in performance of ADLs  - Assess for home care needs following discharge   - Consider OT consult to assist with ADL evaluation and planning for discharge  - Provide patient education as appropriate  Outcome: Progressing  Goal: Maintains/Returns to pre admission functional level  Description: INTERVENTIONS:  - Perform BMAT or MOVE assessment daily    - Set and communicate daily mobility goal to care team and patient/family/caregiver  - Collaborate with rehabilitation services on mobility goals if consulted  - Perform Range of Motion  times a day  - Reposition patient every  hours    - Dangle patient  times a day  - Stand patient  times a day  - Ambulate patient  times a day  - Out of bed to chair  times a day   - Out of bed for meals  times a day  - Out of bed for toileting  - Record patient progress and toleration of activity level   Outcome: Progressing     Problem: Prexisting or High Potential for Compromised Skin Integrity  Goal: Skin integrity is maintained or improved  Description: INTERVENTIONS:  - Identify patients at risk for skin breakdown  - Assess and monitor skin integrity  - Assess and monitor nutrition and hydration status  - Monitor labs   - Assess for incontinence   - Turn and reposition patient  - Assist with mobility/ambulation  - Relieve pressure over bony prominences  - Avoid friction and shearing  - Provide appropriate hygiene as needed including keeping skin clean and dry  - Evaluate need for skin moisturizer/barrier cream  - Collaborate with interdisciplinary team   - Patient/family teaching  - Consider wound care consult   Outcome: Progressing     Problem: PAIN - ADULT  Goal: Verbalizes/displays adequate comfort level or baseline comfort level  Description: Interventions:  - Encourage patient to monitor pain and request assistance  - Assess pain using appropriate pain scale  - Administer analgesics based on type and severity of pain and evaluate response  - Implement non-pharmacological measures as appropriate and evaluate response  - Consider cultural and social influences on pain and pain management  - Notify physician/advanced practitioner if interventions unsuccessful or patient reports new pain  Outcome: Progressing     Problem: INFECTION - ADULT  Goal: Absence or prevention of progression during hospitalization  Description: INTERVENTIONS:  - Assess and monitor for signs and symptoms of infection  - Monitor lab/diagnostic results  - Monitor all insertion sites, i e  indwelling lines, tubes, and drains  - Monitor endotracheal if appropriate and nasal secretions for changes in amount and color  - [place] appropriate cooling/warming therapies per order  - Administer medications as ordered  - Instruct and encourage patient and family to use good hand hygiene technique  - Identify and instruct in appropriate isolation precautions for identified infection/condition  Outcome: Progressing  Goal: Absence of fever/infection during neutropenic period  Description: INTERVENTIONS:  - Monitor WBC    Outcome: Progressing     Problem: SAFETY ADULT  Goal: Maintain or return to baseline ADL function  Description: INTERVENTIONS:  -  Assess patient's ability to carry out ADLs; assess patient's baseline for ADL function and identify physical deficits which impact ability to perform ADLs (bathing, care of mouth/teeth, toileting, grooming, dressing, etc )  - Assess/evaluate cause of self-care deficits   - Assess range of motion  - Assess patient's mobility; develop plan if impaired  - Assess patient's need for assistive devices and provide as appropriate  - Encourage maximum independence but intervene and supervise when necessary  - Involve family in performance of ADLs  - Assess for home care needs following discharge   - Consider OT consult to assist with ADL evaluation and planning for discharge  - Provide patient education as appropriate  Outcome: Progressing  Goal: Maintains/Returns to pre admission functional level  Description: INTERVENTIONS:  - Perform BMAT or MOVE assessment daily    - Set and communicate daily mobility goal to care team and patient/family/caregiver  - Collaborate with rehabilitation services on mobility goals if consulted  - Perform Range of Motion  times a day  - Reposition patient every  hours    - Dangle patient  times a day  - Stand patient  times a day  - Ambulate patient  times a day  - Out of bed to chair  times a day   - Out of bed for meals  times a day  - Out of bed for toileting  - Record patient progress and toleration of activity level   Outcome: Progressing  Goal: Patient will remain free of falls  Description: INTERVENTIONS:  - Educate patient/family on patient safety including physical limitations  - Instruct patient to call for assistance with activity   - Consult OT/PT to assist with strengthening/mobility   - Keep Call bell within reach  - Keep bed low and locked with side rails adjusted as appropriate  - Keep care items and personal belongings within reach  - Initiate and maintain comfort rounds  - Make Fall Risk Sign visible to staff  - Offer Toileting every  Hours, in advance of need  - Initiate/Maintain alarm  - Obtain necessary fall risk management equipment:   - Apply yellow socks and bracelet for high fall risk patients  - Consider moving patient to room near nurses station  Outcome: Progressing     Problem: Knowledge Deficit  Goal: Patient/family/caregiver demonstrates understanding of disease process, treatment plan, medications, and discharge instructions  Description: Complete learning assessment and assess knowledge base    Interventions:  - Provide teaching at level of understanding  - Provide teaching via preferred learning methods  Outcome: Progressing     Problem: SKIN/TISSUE INTEGRITY - ADULT  Goal: Skin Integrity remains intact(Skin Breakdown Prevention)  Description: Assess:  -Perform Yogi assessment every   -Clean and moisturize skin every   -Inspect skin when repositioning, toileting, and assisting with ADLS  -Assess under medical devices such as  every   -Assess extremities for adequate circulation and sensation     Bed Management:  -Have minimal linens on bed & keep smooth, unwrinkled  -Change linens as needed when moist or perspiring  -Avoid sitting or lying in one position for more than  hours while in bed  -Keep HOB at degrees     Toileting:  -Offer bedside commode  -Assess for incontinence every   -Use incontinent care products after each incontinent episode such as     Activity:  -Mobilize patient times a day  -Encourage activity and walks on unit  -Encourage or provide ROM exercises   -Turn and reposition patient every Hours  -Use appropriate equipment to lift or move patient in bed  -Instruct/ Assist with weight shifting every when out of bed in chair  -Consider limitation of chair time  hour intervals    Skin Care:  -Avoid use of baby powder, tape, friction and shearing, hot water or constrictive clothing  -Relieve pressure over bony prominences using   -Do not massage red bony areas    Next Steps:  -Teach patient strategies to minimize risks such as   -Consider consults to  interdisciplinary teams such as   Outcome: Progressing  Goal: Incision(s), wounds(s) or drain site(s) healing without S/S of infection  Description: INTERVENTIONS  - Assess and document dressing, incision, wound bed, drain sites and surrounding tissue  - Provide patient and family education  - Perform skin care/dressing changes every   Outcome: Progressing  Goal: Pressure injury heals and does not worsen  Description: Interventions:  - Implement low air loss mattress or specialty surface (Criteria met)  - Apply silicone foam dressing  - Instruct/assist with weight shifting every  minutes when in chair   - Limit chair time to  hour intervals  - Use special pressure reducing interventions such as when in chair   - Apply fecal or urinary incontinence containment device   - Perform passive or active ROM every urn and reposition patient & offload bony prominences every  hours   - Utilize friction reducing deice or surface for transfers   - Consider consults to  interdisciplinary teams such as   - Use incontinent care products after each incontinent episode such as   - Consider nutrition services referral as needed  Outcome: Progressing

## 2023-03-21 NOTE — SEPSIS NOTE
Sepsis Note   Vani Holman 53 y o  female MRN: [id number]  Unit/Bed#: ED 09 Encounter: [id number]       Initial Sepsis Screening     [address] Name 03/20/23 2336                Is the patient's history suggestive of a new or worsening infection? Yes (Proceed)  -AK        Suspected source of infection soft tissue  -AK        Indicate SIRS criteria Tachycardia > 90 bpm;Leukocytosis (WBC > 02727 IJL) OR Leukopenia (WBC <4000 IJL) OR Bandemia (WBC >10% bands)  -AK        Are two or more of the above signs & symptoms of infection both present and new to the patient? Yes (Proceed)  -AK        Assess for evidence of organ dysfunction: Are any of the below criteria present within 6 hours of suspected infection and SIRS criteria that are NOT considered to be chronic conditions? Lactate > 2 0  -AK        Date of presentation of severe sepsis 03/20/23  -AK        Time of presentation of severe sepsis 2337  -AK        Sepsis Note: Click "NEXT" below (NOT "close") to generate sepsis note based on above information  --              User Key  (r) = Recorded By, (t) = Taken By, (c) = Cosigned By    [address] Name Provider Type    [address], DO Physician                    Body mass index is 41 58 kg/m²    Wt Readings from Last 1 Encounters:   03/20/23 115 kg (253 lb 12 oz)        Ideal body weight: 58 2 kg (128 lb 3 2 oz)  Adjusted ideal body weight: 80 9 kg (178 lb 6 7 oz)

## 2023-03-21 NOTE — WOUND OSTOMY CARE
Consult Note - Wound   Pratima End 53 y o  female MRN: [id number]  Unit/Bed#: 7T [facility] 710-01 Encounter: [id number]        History and Present Illness:  Patient is 52 yo female admitted to [facility] with sepsis  Patient has history of GERD, HTN, bipolar, and diabetes  Patient is continent of bowel and bladder  Patient is min/mod assist with turning from side to side for assessment  Patient is independent with meals  Assessment Findings:     1  Diabetic ulcer left great toe- small, partial thickness wound with beefy red tissue and surrounding callus, small amount of drainage  2  Diabetic ulcer left medial foot- full thickness wound with exposed tendon, with beefy red and slough tissue, moderate amount of drainage  3  Diabetic ulcer right plantar foot- full thickness wound with mostly slough tissue, moderate amount of drainage  Foot is edematous and tender  Patient is transferring to [facility] for vascular intervention  Sacral/buttocks and B/L heels intact and blanching     Orders listed below and wound care will sign off, call or tiger text with questions  Bedside nurse updated of findings and orders  Flowsheets below with pictures and measurements  Skin care plans:  1-Hydraguard to bilateral sacrum, buttock and heels BID and PRN  2-Elevate heels to offload pressure  3-Ehob cushion in chair when out of bed  4-Moisturize skin daily with skin nourishing cream   5-Turn/reposition q2h or when medically stable for pressure re-distribution on skin  6-Cleanse B/L foot wounds with normal saline, apply silver alginate to wound beds, cover with ABD/gauze, secure with fletcher and tape  Change every other day and PRN soilage/displacement         Wounds:  Wound Diabetic Ulcer Toe (Comment  which one) Plantar;Left (Active)   Wound Image   03/21/23 1408   Wound Description Beefy red;Fragile 03/21/23 1408   Yvonne-wound Assessment Callus 03/21/23 1408   Wound Length (cm) 0 6 cm 03/21/23 1408   Wound Width (cm) 0 6 cm 03/21/23 1408   Wound Depth (cm) 0 1 cm 03/21/23 1408   Wound Surface Area (cm^2) 0 36 cm^2 03/21/23 1408   Wound Volume (cm^3) 0 036 cm^3 03/21/23 1408   Calculated Wound Volume (cm^3) 0 04 cm^3 03/21/23 1408   Change in Wound Size % 75 03/21/23 1408   Tunneling 0 cm 03/21/23 1408   Tunneling in depth located at 0 03/21/23 1408   Undermining 0 03/21/23 1408   Undermining is depth extending from 0 03/21/23 1408   Wound Site Closure ERI 03/21/23 1408   Drainage Amount Small 03/21/23 1408   Drainage Description Serosanguineous 03/21/23 1408   Non-staged Wound Description Partial thickness 03/21/23 1408   Treatments Cleansed 03/21/23 1408   Dressing Calcium Alginate with Silver;Dry dressing 03/21/23 1408   Wound packed? No 03/21/23 1408   Packing- # removed 0 03/21/23 1408   Packing- # inserted 0 03/21/23 1408   Dressing Changed New 03/21/23 1408   Patient Tolerance Tolerated well 03/21/23 1408   Dressing Status Clean;Dry; Intact 03/21/23 1408   Wound 03/21/23 Foot Anterior;Right (Active)   Wound Image   03/21/23 1406   Wound Description Slough 03/21/23 1406   Yvonne-wound Assessment Callus;Scaly 03/21/23 1406   Wound Length (cm) 1 cm 03/21/23 1406   Wound Width (cm) 2 cm 03/21/23 1406   Wound Depth (cm) 0 4 cm 03/21/23 1406   Wound Surface Area (cm^2) 2 cm^2 03/21/23 1406   Wound Volume (cm^3) 0 8 cm^3 03/21/23 1406   Calculated Wound Volume (cm^3) 0 8 cm^3 03/21/23 1406   Tunneling 0 cm 03/21/23 1406   Tunneling in depth located at 0 03/21/23 1406   Undermining 0 03/21/23 1406   Undermining is depth extending from 0 03/21/23 1406   Wound Site Closure ERI 03/21/23 1406   Drainage Amount Moderate 03/21/23 1406   Drainage Description Serosanguineous 03/21/23 1406   Non-staged Wound Description Full thickness 03/21/23 1406   Treatments Cleansed 03/21/23 1406   Dressing Calcium Alginate with Silver;ABD;Dry dressing 03/21/23 1406   Wound packed?  No 03/21/23 1406   Packing- # removed 0 03/21/23 1406   Packing- # inserted 0 03/21/23 1406   Dressing Changed New 03/21/23 1406   Patient Tolerance Tolerated well 03/21/23 1406   Dressing Status Clean;Dry; Intact 03/21/23 1406       Wound 03/21/23 Foot Anterior; Left (Active)   Wound Image   03/21/23 1407   Wound Description Beefy red;Slough; Exposed tendon 03/21/23 1407   Yvonne-wound Assessment Scaly 03/21/23 1407   Wound Length (cm) 3 cm 03/21/23 1407   Wound Width (cm) 4 cm 03/21/23 1407   Wound Depth (cm) 0 5 cm 03/21/23 1407   Wound Surface Area (cm^2) 12 cm^2 03/21/23 1407   Wound Volume (cm^3) 6 cm^3 03/21/23 1407   Calculated Wound Volume (cm^3) 6 cm^3 03/21/23 1407   Tunneling 0 cm 03/21/23 1407   Tunneling in depth located at 0 03/21/23 1407   Undermining 0 03/21/23 1407   Undermining is depth extending from 0 03/21/23 1407   Wound Site Closure ERI 03/21/23 1407   Drainage Amount Moderate 03/21/23 1407   Drainage Description Serosanguineous 03/21/23 1407   Non-staged Wound Description Full thickness 03/21/23 1407   Treatments Cleansed 03/21/23 1407   Dressing Calcium Alginate with Silver;ABD;Dry dressing 03/21/23 1407   Wound packed? No 03/21/23 1407   Packing- # removed 0 03/21/23 1407   Packing- # inserted 0 03/21/23 1407   Dressing Changed New 03/21/23 1407   Patient Tolerance Tolerated well 03/21/23 1407   Dressing Status Clean;Dry; Intact 03/21/23 1407         Loretat Baumann BSN, RN, [facility]

## 2023-03-21 NOTE — CASE MANAGEMENT
Case Management Discharge Planning Note    Patient name Hardy Haney  Location 7T U 710/7T [facility] 710-01 MRN [id number]  : 1975 Date 3/21/2023       Current Admission Date: 3/20/2023  Current Admission Diagnosis:Sepsis [facility])   Patient Active Problem List    Diagnosis Date Noted   • Sepsis ([facility] 75 ) 2023   • Morbid obesity with BMI of 40 0-44 9, adult ([place] [address] ) 2023   • Left foot diabetic ulcer 2022   • Obstructive sleep apnea 2022   • GERD (gastroesophageal reflux disease) 2021   • Migraine without status migrainosus, not intractable 2021   • Right foot ulcer, with fat layer exposed ([place] [address] ) 2021   • Arthritis 2021   • Chronic pain 2021   • OCD (obsessive compulsive disorder)    • PTSD (post-traumatic stress disorder)    • Essential hypertension 2021   • Acquired absence of other left toe(s) ([place] [address] ) 2019   • Diabetic foot ulcer ([place] [address] ) 09/10/2019   • Type 2 diabetes mellitus with neurologic complication, without long-term current use of insulin ([place] [address] ) 2019   • Hypothyroidism 2019   • Manic bipolar I disorder ([place] [address] ) 2019   • 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, with bone involvement without evidence of necrosis ([place] [address] ) 2019   • H/O bariatric surgery 2019   • Anxiety 2019      LOS (days): 1  Geometric Mean LOS (GMLOS) (days):   Days to GMLOS:     OBJECTIVE:  Risk of Unplanned Readmission Score: 10 97         Current admission status: Inpatient   Preferred Pharmacy:   [facility], [address] [facility] [address] [address]  Phone: [phone] Fax: [phone]    Primary Care Provider: Marla Valderrama MD    Primary Insurance: [address] [place] [address]  Secondary Insurance:     DISCHARGE DETAILS:  CM was notified at morning rounds that pt is a transfer to [facility]  Pt will need higher level of care and vascular workup    CM completed [place] form along with facesheet and kept it pt's folder for Transfer    CM department will continue to follow through pt's D/C

## 2023-03-22 ENCOUNTER — APPOINTMENT (OUTPATIENT)
Dept: MRI IMAGING | Facility: HOSPITAL | Age: 48
End: 2023-03-22

## 2023-03-22 ENCOUNTER — ANESTHESIA EVENT (INPATIENT)
Dept: PERIOP | Facility: HOSPITAL | Age: 48
End: 2023-03-22

## 2023-03-22 PROBLEM — Z59.00 HOMELESS SINGLE PERSON: Status: ACTIVE | Noted: 2023-03-22

## 2023-03-22 PROBLEM — F39 MOOD DISORDER (HCC): Status: ACTIVE | Noted: 2019-06-29

## 2023-03-22 LAB
ABO GROUP BLD: NORMAL
ABO GROUP BLD: NORMAL
ANION GAP SERPL CALCULATED.3IONS-SCNC: 5 MMOL/L (ref 4–13)
BASOPHILS # BLD AUTO: 0.05 THOUSANDS/ÂΜL (ref 0–0.1)
BASOPHILS NFR BLD AUTO: 1 % (ref 0–1)
BLD GP AB SCN SERPL QL: NEGATIVE
BUN SERPL-MCNC: 11 MG/DL (ref 5–25)
CALCIUM SERPL-MCNC: 8.2 MG/DL (ref 8.4–10.2)
CHLORIDE SERPL-SCNC: 101 MMOL/L (ref 96–108)
CO2 SERPL-SCNC: 28 MMOL/L (ref 21–32)
CREAT SERPL-MCNC: 0.68 MG/DL (ref 0.6–1.3)
EOSINOPHIL # BLD AUTO: 0.49 THOUSAND/ÂΜL (ref 0–0.61)
EOSINOPHIL NFR BLD AUTO: 5 % (ref 0–6)
ERYTHROCYTE [DISTWIDTH] IN BLOOD BY AUTOMATED COUNT: 18.6 % (ref 11.6–15.1)
GFR SERPL CREATININE-BSD FRML MDRD: 104 ML/MIN/1.73SQ M
GLUCOSE SERPL-MCNC: 116 MG/DL (ref 65–140)
GLUCOSE SERPL-MCNC: 171 MG/DL (ref 65–140)
GLUCOSE SERPL-MCNC: 176 MG/DL (ref 65–140)
GLUCOSE SERPL-MCNC: 194 MG/DL (ref 65–140)
GLUCOSE SERPL-MCNC: 208 MG/DL (ref 65–140)
HCT VFR BLD AUTO: 26.2 % (ref 34.8–46.1)
HGB BLD-MCNC: 7.8 G/DL (ref 11.5–15.4)
IMM GRANULOCYTES # BLD AUTO: 0.07 THOUSAND/UL (ref 0–0.2)
IMM GRANULOCYTES NFR BLD AUTO: 1 % (ref 0–2)
LYMPHOCYTES # BLD AUTO: 1.98 THOUSANDS/ÂΜL (ref 0.6–4.47)
LYMPHOCYTES NFR BLD AUTO: 21 % (ref 14–44)
MAGNESIUM SERPL-MCNC: 1.8 MG/DL (ref 1.9–2.7)
MCH RBC QN AUTO: 19.7 PG (ref 26.8–34.3)
MCHC RBC AUTO-ENTMCNC: 29.8 G/DL (ref 31.4–37.4)
MCV RBC AUTO: 66 FL (ref 82–98)
MONOCYTES # BLD AUTO: 0.56 THOUSAND/ÂΜL (ref 0.17–1.22)
MONOCYTES NFR BLD AUTO: 6 % (ref 4–12)
NEUTROPHILS # BLD AUTO: 6.5 THOUSANDS/ÂΜL (ref 1.85–7.62)
NEUTS SEG NFR BLD AUTO: 66 % (ref 43–75)
NRBC BLD AUTO-RTO: 0 /100 WBCS
PHOSPHATE SERPL-MCNC: 3.3 MG/DL (ref 2.7–4.5)
PLATELET # BLD AUTO: 557 THOUSANDS/UL (ref 149–390)
PMV BLD AUTO: 8.5 FL (ref 8.9–12.7)
POTASSIUM SERPL-SCNC: 3.8 MMOL/L (ref 3.5–5.3)
RBC # BLD AUTO: 3.96 MILLION/UL (ref 3.81–5.12)
RH BLD: POSITIVE
RH BLD: POSITIVE
SODIUM SERPL-SCNC: 134 MMOL/L (ref 135–147)
SPECIMEN EXPIRATION DATE: NORMAL
WBC # BLD AUTO: 9.65 THOUSAND/UL (ref 4.31–10.16)

## 2023-03-22 RX ORDER — DEXTROSE, SODIUM CHLORIDE, AND POTASSIUM CHLORIDE 5; .45; .15 G/100ML; G/100ML; G/100ML
75 INJECTION INTRAVENOUS CONTINUOUS
Status: CANCELLED | OUTPATIENT
Start: 2023-03-23

## 2023-03-22 RX ORDER — SODIUM CHLORIDE 9 MG/ML
75 INJECTION, SOLUTION INTRAVENOUS CONTINUOUS
Status: DISCONTINUED | OUTPATIENT
Start: 2023-03-23 | End: 2023-03-23

## 2023-03-22 RX ORDER — LORAZEPAM 2 MG/ML
1 INJECTION INTRAMUSCULAR ONCE
Status: COMPLETED | OUTPATIENT
Start: 2023-03-22 | End: 2023-03-22

## 2023-03-22 RX ADMIN — METRONIDAZOLE 500 MG: 500 TABLET ORAL at 05:21

## 2023-03-22 RX ADMIN — MORPHINE SULFATE 2 MG: 2 INJECTION, SOLUTION INTRAMUSCULAR; INTRAVENOUS at 10:37

## 2023-03-22 RX ADMIN — ENOXAPARIN SODIUM 40 MG: 100 INJECTION SUBCUTANEOUS at 09:08

## 2023-03-22 RX ADMIN — GUAIFENESIN AND DEXTROMETHORPHAN 10 ML: 100; 10 SYRUP ORAL at 17:44

## 2023-03-22 RX ADMIN — METRONIDAZOLE 500 MG: 500 TABLET ORAL at 21:12

## 2023-03-22 RX ADMIN — LAMOTRIGINE 250 MG: 100 TABLET ORAL at 09:01

## 2023-03-22 RX ADMIN — HYDROXYZINE HYDROCHLORIDE 50 MG: 25 TABLET, FILM COATED ORAL at 09:15

## 2023-03-22 RX ADMIN — INSULIN LISPRO 1 UNITS: 100 INJECTION, SOLUTION INTRAVENOUS; SUBCUTANEOUS at 21:15

## 2023-03-22 RX ADMIN — ONDANSETRON 4 MG: 2 INJECTION INTRAMUSCULAR; INTRAVENOUS at 07:56

## 2023-03-22 RX ADMIN — METRONIDAZOLE 500 MG: 500 TABLET ORAL at 16:38

## 2023-03-22 RX ADMIN — MELATONIN 9 MG: at 21:11

## 2023-03-22 RX ADMIN — CEFAZOLIN SODIUM 2000 MG: 2 SOLUTION INTRAVENOUS at 11:05

## 2023-03-22 RX ADMIN — ZIPRASIDONE HCL 80 MG: 40 CAPSULE ORAL at 09:18

## 2023-03-22 RX ADMIN — PANTOPRAZOLE SODIUM 40 MG: 40 TABLET, DELAYED RELEASE ORAL at 16:39

## 2023-03-22 RX ADMIN — ONDANSETRON 4 MG: 2 INJECTION INTRAMUSCULAR; INTRAVENOUS at 19:37

## 2023-03-22 RX ADMIN — CEFAZOLIN SODIUM 2000 MG: 2 SOLUTION INTRAVENOUS at 17:36

## 2023-03-22 RX ADMIN — GUAIFENESIN AND DEXTROMETHORPHAN 10 ML: 100; 10 SYRUP ORAL at 05:21

## 2023-03-22 RX ADMIN — HYDROXYZINE HYDROCHLORIDE 100 MG: 25 TABLET, FILM COATED ORAL at 21:12

## 2023-03-22 RX ADMIN — ZIPRASIDONE HCL 80 MG: 40 CAPSULE ORAL at 21:15

## 2023-03-22 RX ADMIN — CLONIDINE HYDROCHLORIDE 0.2 MG: 0.1 TABLET ORAL at 21:14

## 2023-03-22 RX ADMIN — FOLIC ACID 1000 MCG: 1 TABLET ORAL at 09:06

## 2023-03-22 RX ADMIN — LORAZEPAM 1 MG: 2 INJECTION INTRAMUSCULAR; INTRAVENOUS at 09:20

## 2023-03-22 RX ADMIN — MORPHINE SULFATE 2 MG: 2 INJECTION, SOLUTION INTRAMUSCULAR; INTRAVENOUS at 19:47

## 2023-03-22 RX ADMIN — INSULIN LISPRO 4 UNITS: 100 INJECTION, SOLUTION INTRAVENOUS; SUBCUTANEOUS at 16:41

## 2023-03-22 RX ADMIN — OXYCODONE HYDROCHLORIDE 10 MG: 10 TABLET ORAL at 05:21

## 2023-03-22 RX ADMIN — CLONIDINE HYDROCHLORIDE 0.2 MG: 0.1 TABLET ORAL at 09:07

## 2023-03-22 RX ADMIN — FENOFIBRATE 48 MG: 48 TABLET, FILM COATED ORAL at 09:16

## 2023-03-22 RX ADMIN — CEFAZOLIN SODIUM 2000 MG: 2 SOLUTION INTRAVENOUS at 01:39

## 2023-03-22 RX ADMIN — PANTOPRAZOLE SODIUM 40 MG: 40 TABLET, DELAYED RELEASE ORAL at 07:28

## 2023-03-22 RX ADMIN — OXYCODONE HYDROCHLORIDE 10 MG: 10 TABLET ORAL at 16:47

## 2023-03-22 RX ADMIN — INSULIN LISPRO 2 UNITS: 100 INJECTION, SOLUTION INTRAVENOUS; SUBCUTANEOUS at 07:23

## 2023-03-22 RX ADMIN — ENOXAPARIN SODIUM 40 MG: 100 INJECTION SUBCUTANEOUS at 17:36

## 2023-03-22 RX ADMIN — DOCUSATE SODIUM 100 MG: 100 CAPSULE, LIQUID FILLED ORAL at 09:06

## 2023-03-22 RX ADMIN — LEVOTHYROXINE SODIUM 50 MCG: 50 TABLET ORAL at 05:21

## 2023-03-22 NOTE — ASSESSMENT & PLAN NOTE
Background: History of mood disorder diabetes on metformin hypothyroidism and recurrent foot disease has been homeless and lost to follow-up presented to [facility] initially for diabetic foot ulcer and has been transferred here for further consulting services  · Patient returns from MRI  Awaiting final interpretation  · She has wounds on both feet probing to bone  · Currently on cefazolin and metronidazole  · She denies any underlying history of heart or lung disease  Should she need surgical intervention she is of acceptable risk to proceed without further testing

## 2023-03-22 NOTE — H&P
[address] [facility] - H&P  Marilu Londono 53 y o  female MRN: [id number]  Unit/Bed#: E2 -01 Encounter: [id number]  Admission Date: 03/21/23    ASSESSMENT:    Marilu Londono is a 53 y o  female with:    1  L medial 1st MTPJ ulceration probing to bone   2  L plantar hallux ulceration to depth of subcutaneous tissue  3  R distal TMA stump ulceration to depth of subcutaneous tissue  4  R plantar TMA stump ulceration probing to bone  5  T2DM    PLAN:    · Patient will likely require bilateral podiatric surgical intervention pending advanced imaging  · R foot XR reviewed: Extensive bone/joint erosion noted throughout the entire forefoot to the level of Chopart's joint  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 with possible co-existing infection  · L foot XR reviewed: No evidence of osseous erosion to suggest osteomyelitis, no KORY noted  · F/u MRI  · F/u wound cultures  · Continue to trend labs/vitals  · Will discuss this plan with my attending and update as needed  Antibiotics started: Ancef and metronidazole  Pharmacologic VTE Prophylaxis: Enoxaparin (Lovenox)   Mechanical VTE Prophylaxis: sequential compression device   Weight Bearing Status: NWB RLE, WBAT LLE      Disposition:  Patient requires >2 midnight stay for advanced imaging and surgical intervention  Code Status: Level 1 - Full Code      SUBJECTIVE    History of Present Illness:    Marilu Londono is a 53 y o  female with pmh of multiple psychiatric issues, T2DM, HTN, and bilateral foot ulcerations who presents with ulcerations to bilateral feet  She states that these ulcerations have been present for a long time  She complains of recently intensifying pain to the right foot  She states that the foot has become increasingly red and swollen as well lately  Patient denies nausea, vomiting, chest pain, shortness of breath, chills, fever  Review of Systems:    Constitutional: Negative  HENT: Negative  Eyes: Negative  Respiratory: Negative  Cardiovascular: Negative  Gastrointestinal: Negative  Musculoskeletal: R foot pain, Hx of R foot TMA   Skin:b/l foot ulcers   Neurological: peripheral neuropathy  Psych: Negative       Past Medical and Surgical History:     Past Medical History:   Diagnosis Date   • Anxiety    • Arthritis    • Asthma    • Chronic pain    • Diabetes mellitus ([facility] [address] )    • Disease of thyroid gland    • Lymphedema     left leg   • Manic bipolar I disorder (HCC)    • Morbid obesity (HCC)    • OCD (obsessive compulsive disorder)    • PTSD (post-traumatic stress disorder)        Past Surgical History:   Procedure Laterality Date   • CHOLECYSTECTOMY     • CHOLECYSTECTOMY     • TX AMPUTATION FOOT TRANSMETARSAL Right 9/4/2021    Procedure: AMPUTATION TRANSMETATARSAL (TMA);  Surgeon: Tammy Braun DPM;  Location: [facility];  Service: Podiatry   • TX AMPUTATION METATARSAL W/TOE SINGLE Left 9/8/2019    Procedure: PARTIAL 3RD RAY RESECTION, PSB TOE FILLET FLAP;  Surgeon: Ajay Cruz DPM;  Location: [facility];  Service: Podiatry   • TOE AMPUTATION Right 1/13/2021    Procedure: AMPUTATION 2ND TOE;  Surgeon: Tammy Braun DPM;  [facility];  Service: Podiatry   • WOUND DEBRIDEMENT Left 2/17/2022    Procedure: DEBRIDEMENT GREAT TOE WOUND;  Surgeon: Tammy Braun DPM;  Location: [facility];  Service: Podiatry       Meds/Allergies:    Medications Prior to Admission   Medication   • cloNIDine (CATAPRES) 0 2 mg tablet   • ergocalciferol (ERGOCALCIFEROL) 1 25 MG (80629 UT) capsule   • fenofibrate (TRICOR) 54 MG tablet   • folic acid (FOLVITE) 1 mg tablet   • hydrOXYzine HCL (ATARAX) 50 mg tablet   • hydrOXYzine HCL (ATARAX) 50 mg tablet   • lamoTRIgine (LaMICtal) 200 MG tablet   • levothyroxine 50 mcg tablet   • melatonin 3 mg   • metFORMIN (GLUCOPHAGE) 500 mg tablet   • methocarbamol (ROBAXIN) 750 mg tablet   • pantoprazole (PROTONIX) 40 mg tablet   • ziprasidone (GEODON) 80 mg capsule       Allergies   Allergen Reactions   • Aspirin Vomiting   • Barium Iodide Itching     Face became very red and itchy    • Codeine Vomiting   • Latex Hives   • Penicillins Rash and Other (See Comments)     Patient has tolerated cefepime without difficulty   • Vancomycin Rash       Social History:    Social History     Marital Status: Single    Substance Use History:   Social History     Substance and Sexual Activity   Alcohol Use Never     Social History     Tobacco Use   Smoking Status Former   • Packs/day: 0 50   • Years: 15 00   • Pack years: 7 50   • Types: Cigarettes   Smokeless Tobacco Never     Social History     Substance and Sexual Activity   Drug Use Never       Family History:    Family History   Problem Relation Age of Onset   • Hyperlipidemia Mother    • Diabetes Mother    • Hypertension Mother    • Heart attack Father          OBJECTIVE:    First Vitals:        Current Vitals:          Physical Exam:    General Appearance: Alert, cooperative, no distress  HEENT: Head normocephalic, atraumatic, without obvious abnormality  Heart: Normal rate and rhythm  Lungs: Non-labored breathing  No respiratory distress  Abdomen: Without distension  Psychiatric: AAOx3  Lower Extremity:  Vascular:   DP/PT pedal pulses on the left are present  DP/PT pedal pulses on the right are present  CRT brisk at the digits  Pedal hair is present  2+ edema noted at bilateral lower extremities, R>L  Skin temperature is abnormal and much warmer to the R foot in comparison to the left  Musculoskeletal:  MMT is 5/5 in all muscle compartments bilaterally  Passive ROM at the 1st MPJ of the left and ankle joint bilaterally are Decreased  Active ROM at the lesser digits is intact to the left  Pain on palpation of the right foot globally   Hx of R TMA noted    Dermatological:  Wound #: 1  Location: L medial 1st MTPJ  Length 3 3cm: Width 3 1cm: Depth 2 0cm:   Deepest Tissue Noted in Base: bone  Probe to Bone: Yes  Peripheral Skin Description: Attached  Granulation: 20% Fibrotic Tissue: 80% Necrotic Tissue: 0%   Drainage Amount: minimal, serosanguinous  Signs of Infection: Yes    Wound #: 2  Location: L plantar hallux  Length 1 3cm: Width 1 2cm: Depth 0 3cm:   Deepest Tissue Noted in Base: subQ  Probe to Bone: No  Peripheral Skin Description: Attached  Granulation: 50% Fibrotic Tissue: 50% Necrotic Tissue: 0%   Drainage Amount: minimal, serosanguinous  Signs of Infection: No    Wound #: 3  Location: R distal TMA stump  Length 3 1cm: Width 2 1cm: Depth 0 3cm:   Deepest Tissue Noted in Base: subQ  Probe to Bone: No  Peripheral Skin Description: Attached  Granulation: 40% Fibrotic Tissue: 60% Necrotic Tissue: 0%   Drainage Amount: minimal, serosanguinous  Signs of Infection: No    Wound #: 4  Location: R plantar TMA  Length 0 4cm: Width 0 4cm: Depth 2 3cm:   Deepest Tissue Noted in Base: bone  Probe to Bone: Yes  Peripheral Skin Description: Attached  Granulation: 0% Fibrotic Tissue: 100% Necrotic Tissue: 0%   Drainage Amount: minimal, serosanguinous  Signs of Infection: Yes    Neurological:  Gross sensation is diminished  Protective sensation is diminished  Patient Reports numbness and/or paresthesias      Clinical Images 03/21/23:                      Lab Results:   Admission on 03/20/2023, Discharged on 03/21/2023   Component Date Value   • WBC 03/20/2023 17 03 (H)    • RBC 03/20/2023 4 93    • Hemoglobin 03/20/2023 9 5 (L)    • Hematocrit 03/20/2023 32 4 (L)    • MCV 03/20/2023 66 (L)    • MCH 03/20/2023 19 3 (L)    • MCHC 03/20/2023 29 3 (L)    • RDW 03/20/2023 18 5 (H)    • MPV 03/20/2023 8 7 (L)    • Platelets 06/72/6166 732 (H)    • nRBC 03/20/2023 0    • Neutrophils Relative 03/20/2023 82 (H)    • Immat GRANS % 03/20/2023 1    • Lymphocytes Relative 03/20/2023 10 (L)    • Monocytes Relative 03/20/2023 6    • Eosinophils Relative 03/20/2023 1    • Basophils Relative 03/20/2023 0    • Neutrophils Absolute 03/20/2023 13 98 (H)    • Immature Grans Absolute 03/20/2023 0 19    • Lymphocytes Absolute 03/20/2023 1 68    • Monocytes Absolute 03/20/2023 1 01    • Eosinophils Absolute 03/20/2023 0 11    • Basophils Absolute 03/20/2023 0 06    • Sodium 03/20/2023 127 (L)    • Potassium 03/20/2023 3 8    • Chloride 03/20/2023 92 (L)    • CO2 03/20/2023 24    • ANION GAP 03/20/2023 11    • BUN 03/20/2023 10    • Creatinine 03/20/2023 0 87    • Glucose 03/20/2023 249 (H)    • Calcium 03/20/2023 9 3    • Corrected Calcium 03/20/2023 9 9    • AST 03/20/2023 21    • ALT 03/20/2023 15    • Alkaline Phosphatase 03/20/2023 215 (H)    • Total Protein 03/20/2023 8 7 (H)    • Albumin 03/20/2023 3 2 (L)    • Total Bilirubin 03/20/2023 0 36    • eGFR 03/20/2023 79    • Color, UA 03/20/2023 Pau (A)    • Clarity, UA 03/20/2023 Clear    • Specific Gravity, UA 03/20/2023 1 025    • pH, UA 03/20/2023 5 0    • Leukocytes, UA 03/20/2023 Negative    • Nitrite, UA 03/20/2023 Negative    • Protein, UA 03/20/2023 30 (1+) (A)    • Glucose, UA 03/20/2023 100 (1/10%) (A)    • Ketones, UA 03/20/2023 Negative    • Bilirubin, UA 03/20/2023 Negative    • Occult Blood, UA 03/20/2023 10 0 (A)    • UROBILINOGEN UA 03/20/2023 1 0    • Blood Culture 03/20/2023 Received in Microbiology Lab  Culture in Progress  • Blood Culture 03/20/2023 Received in Microbiology Lab  Culture in Progress      • Gram Stain Result 03/20/2023 2+ Polys (A)    • Gram Stain Result 03/20/2023 2+ Gram positive cocci in pairs and chains (A)    • Gram Stain Result 03/20/2023 1+ Gram positive rods (A)    • Gram Stain Result 03/20/2023 1+ Gram negative rods (A)    • LACTIC ACID 03/20/2023 2 8 (HH)    • RBC, UA 03/20/2023 0-1    • WBC, UA 03/20/2023 0-1    • Epithelial Cells 03/20/2023 Moderate (A)    • Bacteria, UA 03/20/2023 Moderate (A)    • Hyaline Casts, UA 03/20/2023 0-1 (A)    • COARSE GRANULAR CASTS 03/20/2023 0-1    • MUCUS THREADS 03/20/2023 Moderate (A)    • LACTIC ACID 03/21/2023 1 6    • POC Glucose 03/21/2023 188 (H) • POC Glucose 03/21/2023 141 (H)    • Sodium 03/21/2023 129 (L)    • Potassium 03/21/2023 3 7    • Chloride 03/21/2023 95 (L)    • CO2 03/21/2023 22    • ANION GAP 03/21/2023 12    • BUN 03/21/2023 9    • Creatinine 03/21/2023 0 81    • Glucose 03/21/2023 242 (H)    • Calcium 03/21/2023 8 8    • eGFR 03/21/2023 86    • WBC 03/21/2023 14 64 (H)    • RBC 03/21/2023 4 65    • Hemoglobin 03/21/2023 9 0 (L)    • Hematocrit 03/21/2023 30 7 (L)    • MCV 03/21/2023 66 (L)    • MCH 03/21/2023 19 4 (L)    • MCHC 03/21/2023 29 3 (L)    • RDW 03/21/2023 18 4 (H)    • Platelets 27/33/3577 662 (H)    • MPV 03/21/2023 8 7 (L)    • Procalcitonin 03/21/2023 0 44 (H)    • POC Glucose 03/21/2023 271 (H)    • POC Glucose 03/21/2023 209 (H)    • POC Glucose 03/21/2023 157 (H)        Cultures:  Results from last 7 days   Lab Units 03/20/23  2324 03/20/23  2250 03/20/23  2230   BLOOD CULTURE   --  Received in Microbiology Lab  Culture in Progress  Received in Microbiology Lab  Culture in Progress  GRAM STAIN RESULT  2+ Polys*  2+ Gram positive cocci in pairs and chains*  1+ Gram positive rods*  1+ Gram negative rods*  --   --            Imaging: I have personally reviewed pertinent films in PACS  No results found  EKG, Pathology, and Other Studies: I have personally reviewed pertinent reports

## 2023-03-22 NOTE — PLAN OF CARE
Problem: MOBILITY - ADULT  Goal: Maintain or return to baseline ADL function  Description: INTERVENTIONS:  -  Assess patient's ability to carry out ADLs; assess patient's baseline for ADL function and identify physical deficits which impact ability to perform ADLs (bathing, care of mouth/teeth, toileting, grooming, dressing, etc )  - Assess/evaluate cause of self-care deficits   - Assess range of motion  - Assess patient's mobility; develop plan if impaired  - Assess patient's need for assistive devices and provide as appropriate  - Encourage maximum independence but intervene and supervise when necessary  - Involve family in performance of ADLs  - Assess for home care needs following discharge   - Consider OT consult to assist with ADL evaluation and planning for discharge  - Provide patient education as appropriate  Outcome: Progressing     Problem: PAIN - ADULT  Goal: Verbalizes/displays adequate comfort level or baseline comfort level  Description: Interventions:  - Encourage patient to monitor pain and request assistance  - Assess pain using appropriate pain scale  - Administer analgesics based on type and severity of pain and evaluate response  - Implement non-pharmacological measures as appropriate and evaluate response  - Consider cultural and social influences on pain and pain management  - Notify physician/advanced practitioner if interventions unsuccessful or patient reports new pain  Outcome: Progressing     Problem: SKIN/TISSUE INTEGRITY - ADULT  Goal: Incision(s), wounds(s) or drain site(s) healing without S/S of infection  Description: INTERVENTIONS  - Assess and document dressing, incision, wound bed, drain sites and surrounding tissue  - Provide patient and family education  Problem: Knowledge Deficit  Goal: Patient/family/caregiver demonstrates understanding of disease process, treatment plan, medications, and discharge instructions  Description: Complete learning assessment and assess knowledge base   Interventions:  - Provide teaching at level of understanding  - Provide teaching via preferred learning methods  Outcome: Progressing     Problem: DISCHARGE PLANNING  Goal: Discharge to home or other facility with appropriate resources  Description: INTERVENTIONS:  - Identify barriers to discharge w/patient and caregiver  - Arrange for needed discharge resources and transportation as appropriate  - Identify discharge learning needs (meds, wound care, etc )  - Arrange for interpretive services to assist at discharge as needed  - Refer to Case Management Department for coordinating discharge planning if the patient needs post-hospital services based on physician/advanced practitioner order or complex needs related to functional status, cognitive ability, or social support system  Outcome: Progressing     Outcome: Progressing

## 2023-03-22 NOTE — PROGRESS NOTES
[facility] Podiatry - Progress Note  Patient: Anjum Bridges 53 y o  female   MRN: [id number]  PCP: Trinity Irizarry MD  Unit/Bed#: E2 -01 Encounter: [id number]  Date Of Visit: 23  Hospital Stay Days: 1    ASSESSMENT:    Anjum Bridges is a 53 y o  female with:    1  L medial 1st MTPJ ulceration probing to bone   2  L plantar hallux ulceration to depth of subcutaneous tissue  3  R distal TMA stump ulceration to depth of subcutaneous tissue  4  R plantar TMA stump ulceration probing to bone  5  T2DM     PLAN:     • Patient will likely require Podiatric surgical intervention on the LLE  • Right MRI is concerning  For abscess and extensive osteomyelitis  Recommending proximal amputation  Consult general surgery  • MRI of the left lower: Some concern for Osteomyelitis  • wound cultures: positive cocci in pairs  • Continue to trend labs/vitals  • Will discuss this plan with my attending and update as needed         Antibiotics started: Ancef and metronidazole  Pharmacologic VTE Prophylaxis: Enoxaparin (Lovenox)   Mechanical VTE Prophylaxis: sequential compression device   Weight Bearing Status: NWB RLE, WBAT LLE        Disposition:  Patient requires >2 midnight stay for advanced imaging and surgical intervention  Code Status: Level 1 - Full Code    SUBJECTIVE:     The patient was seen, evaluated, and assessed at bedside today  The patient was awake, alert, and in no acute distress  No acute events overnight  The patient reports feeling well  Although anxious about possibility of losing leg    Patient denies N/V/F/chills/SOB/CP  OBJECTIVE:     Vitals:   /69 (BP Location: Right arm)   Pulse 94   Temp 97 9 °F (36 6 °C) (Temporal)   Resp 17   SpO2 92%     Temp (24hrs), Av 6 °F (36 4 °C), Min:97 °F (36 1 °C), Max:98 1 °F (36 7 °C)      Physical Exam :     General:  Alert, cooperative, and in no distress    Lower extremity exam:  Neurologic, Vascular, musculoskeletal status at baseline    Dermatologic: Clean dry and intact  Additional Data:     Labs:    Results from last 7 days   Lab Units 03/22/23  0511   WBC Thousand/uL 9 65   HEMOGLOBIN g/dL 7 8*   HEMATOCRIT % 26 2*   PLATELETS Thousands/uL 557*   NEUTROS PCT % 66   LYMPHS PCT % 21   MONOS PCT % 6   EOS PCT % 5     Results from last 7 days   Lab Units 03/22/23  0511 03/21/23  0434 03/20/23  2230   POTASSIUM mmol/L 3 8   < > 3 8   CHLORIDE mmol/L 101   < > 92*   CO2 mmol/L 28   < > 24   BUN mg/dL 11   < > 10   CREATININE mg/dL 0 68   < > 0 87   CALCIUM mg/dL 8 2*   < > 9 3   ALK PHOS U/L  --   --  215*   ALT U/L  --   --  15   AST U/L  --   --  21    < > = values in this interval not displayed  * I Have Reviewed All Lab Data Listed Above  Recent Cultures (last 7 days):     Results from last 7 days   Lab Units 03/21/23  1809 03/20/23  2324 03/20/23  2250 03/20/23  2230   BLOOD CULTURE   --   --  No Growth at 24 hrs  No Growth at 24 hrs  GRAM STAIN RESULT  2+ Gram positive cocci in pairs and chains* 2+ Polys*  2+ Gram positive cocci in pairs and chains*  1+ Gram positive rods*  1+ Gram negative rods*  --   --      Results from last 7 days   Lab Units 03/21/23  1809   ANAEROBIC CULTURE  Culture results to follow  Imaging: I have personally reviewed pertinent films in PACS  MRI foot/forefoot toes left wo contrast    Result Date: 3/22/2023  Impression: 1  Plantar and medial skin ulceration noted at the level of the 1st MTP with associated cellulitis but no evidence of drainable abscess collection  Minimal marrow changes in this region or low suspicion for osteomyelitis  2   Status post 3rd transmetatarsal amputation with plantar skin ulceration at the level of the residual stump with associated cellulitis  No marrow change involving the 3rd metatarsal to indicate osteomyelitis at this time  3   Complete tear of the flexor hallucis longus tendon as detailed above with 3 8 cm tendon retraction    The possibility of pathologic tendon rupture should be considered given likely infective cellulitis and plantar skin ulceration in this region  Additionally, there is sesamoid bone splaying suggesting a tear of the intersesamoid ligament, again possibly pathologic  The study was marked in [facility] for immediate notification  Workstation performed: [id number]     MRI ankle/heel right wo contrast    Result Date: 3/22/2023  Impression: Status post 1st through 5th transmetatarsal amputations with prominent skin ulceration at the distal aspect of the residual forefoot with associated cellulitis  Additionally, there is a dorsal midfoot fluid collection suspicious for abscess collection that appears to drain to the ulcerated skin surface as noted above  Changes of chronic Charcot arthropathy with extensive T1 replacement signal throughout the residual 1st through 5th metatarsals, cuboid, cuneiforms, navicular and distal aspect of the talus concerning for superimposed osteomyelitis  The study was marked in [facility] for immediate notification  Workstation performed: [id number]     EKG, Pathology, and Other Studies: I have personally reviewed pertinent reports      Active medications:  Current Facility-Administered Medications   Medication Dose Route Frequency   • acetaminophen (TYLENOL) tablet 650 mg  650 mg Oral Q4H PRN   • ceFAZolin (ANCEF) IVPB (premix in dextrose) 2,000 mg 50 mL  2,000 mg Intravenous Q8H   • cloNIDine (CATAPRES) tablet 0 2 mg  0 2 mg Oral BID   • dextromethorphan-guaiFENesin (ROBITUSSIN DM) oral syrup 10 mL  10 mL Oral Q4H PRN   • docusate sodium (COLACE) capsule 100 mg  100 mg Oral BID   • enoxaparin (LOVENOX) subcutaneous injection 40 mg  40 mg Subcutaneous BID   • fenofibrate (TRICOR) tablet 48 mg  48 mg Oral Daily   • folic acid (FOLVITE) tablet 1,000 mcg  1,000 mcg Oral Daily   • hydrOXYzine HCL (ATARAX) tablet 100 mg  100 mg Oral HS   • hydrOXYzine HCL (ATARAX) tablet 50 mg  50 mg Oral Daily   • "insulin lispro (HumaLOG) 100 units/mL subcutaneous injection 1-5 Units  1-5 Units Subcutaneous HS   • insulin lispro (HumaLOG) 100 units/mL subcutaneous injection 2-12 Units  2-12 Units Subcutaneous TID AC   • lamoTRIgine (LaMICtal) tablet 250 mg  250 mg Oral QAM   • levothyroxine tablet 50 mcg  50 mcg Oral Early Morning   • melatonin tablet 9 mg  9 mg Oral HS   • methocarbamol (ROBAXIN) tablet 750 mg  750 mg Oral TID PRN   • metroNIDAZOLE (FLAGYL) tablet 500 mg  500 mg Oral Q8H [address]   • morphine injection 2 mg  2 mg Intravenous Q4H PRN   • ondansetron (ZOFRAN) injection 4 mg  4 mg Intravenous Q6H PRN   • oxyCODONE (ROXICODONE) immediate release tablet 10 mg  10 mg Oral Q4H PRN   • oxyCODONE (ROXICODONE) IR tablet 5 mg  5 mg Oral Q4H PRN   • pantoprazole (PROTONIX) EC tablet 40 mg  40 mg Oral BID AC   • ziprasidone (GEODON) capsule 80 mg  80 mg Oral BID With Meals         ** Please Note: Portions of the record may have been created with voice recognition software  Occasional wrong word or \"sound a like\" substitutions may have occurred due to the inherent limitations of voice recognition software  Read the chart carefully and recognize, using context, where substitutions have occurred   **      "

## 2023-03-22 NOTE — CONSULTS
Consult: General surgery  Anjum Bridges 53 y o  female MRN: [id number]  Unit/Bed#: E2 -01 Encounter: [id number]        Assessment/Plan     Assessment:  Patient is a 53 y o  female with pmhx of GERD, HTN, DM 2, morbid obesity, hypothyroidism, mood disorder, URI, sleeve gastrectomy, and chronic diabetic foot ulcers s/p right TMA in 2019 who present with worsening ulceration of the bilateral feet  Afebrile, VSS  WBC: 9 5 from 17 on presentation; Hb 8 from 9  MRI right foot: Status post 1st through 5th transmetatarsal amputations with prominent skin ulceration at the distal aspect of the residual forefoot with associated cellulitis  Additionally, there is a dorsal midfoot fluid collection suspicious for abscess collection that appears to drain to the ulcerated skin surface as noted above  Changes of chronic Charcot arthropathy with extensive T1 replacement signal throughout the residual 1st through 5th metatarsals, cuboid, cuneiforms, navicular and distal aspect of the talus concerning for superimposed osteomyelitis    Plan:  -Plan for BKA on 3/23  - N p o  at midnight  - Active type and screen  - Transfuse 1 uPRBC now  - Continue IV antibiotics per primary team; Ancef/Flagyl  - Pain and nausea control as needed  - DVT prophylaxis    History of Present Illness     HPI:  Anjum Bridges is a 53 y o  female with pmhx of GERD, HTN, DM 2, morbid obesity, hypothyroidism, mood disorder, URI, sleeve gastrectomy, and chronic diabetic foot ulcers s/p right TMA in 2019 who present with worsening ulceration of the bilateral feet  She reports that she has a new ulcer followed by progressive pain and swelling of the right TMA stump about 2 to 3 weeks ago with associated fevers and chills  She denies nausea, vomiting, chest pain, shortness of breath, dysuria  She reports that she does walk at baseline  Her last vascular study was in 2019 which revealed no large vessel disease    She is agreeable to proceeding with BKA   Patient cleared by medicine for surgery without further cardiac work-up  Review of Systems   As above, otherwise negative  Inpatient consult to Acute Care Surgery  Consult performed by: Lata Pinto MD  Consult ordered by:  Felicitas Martin DPM          Historical Information   Past Medical History:   Diagnosis Date   • Anxiety    • Arthritis    • Asthma    • Chronic pain    • Diabetes mellitus ([facility] [address] )    • Hypothyroidism    • Lymphedema     left leg   • Manic bipolar I disorder (HCC)    • Morbid obesity (HCC)    • OCD (obsessive compulsive disorder)    • PTSD (post-traumatic stress disorder)      Past Surgical History:   Procedure Laterality Date   • CHOLECYSTECTOMY     • CHOLECYSTECTOMY     • VA AMPUTATION FOOT TRANSMETARSAL Right 9/4/2021    Procedure: AMPUTATION TRANSMETATARSAL (TMA);  Surgeon: Charmayne Estelle, DPM;  [facility];  Service: Podiatry   • VA AMPUTATION METATARSAL W/TOE SINGLE Left 9/8/2019    Procedure: PARTIAL 3RD RAY RESECTION, PSB TOE FILLET FLAP;  Surgeon: Ricardo Gould DPM;  [facility];  Service: Podiatry   • TOE AMPUTATION Right 1/13/2021    Procedure: AMPUTATION 2ND TOE;  Surgeon: Charmayne Estelle, DPM;  [facility];  Service: Podiatry   • WOUND DEBRIDEMENT Left 2/17/2022    Procedure: DEBRIDEMENT GREAT TOE WOUND;  Surgeon: Charmayne Estelle, DPM;  [facility];  Service: Podiatry     Social History   Social History     Substance and Sexual Activity   Alcohol Use Never     Social History     Substance and Sexual Activity   Drug Use Never     Social History     Tobacco Use   Smoking Status Former   • Packs/day: 0 50   • Years: 15 00   • Pack years: 7 50   • Types: Cigarettes   Smokeless Tobacco Never     Family History: non-contributory    Meds/Allergies   all medications and allergies reviewed  Allergies   Allergen Reactions   • Aspirin Vomiting   • Barium Iodide Itching     Face became very red and itchy    • Codeine Vomiting • Latex Hives   • Penicillins Rash and Other (See Comments)     Patient has tolerated cefepime without difficulty   • Vancomycin Rash       Objective   First Vitals:   Blood Pressure: 100/55 (03/21/23 2126)  Pulse: 88 (03/21/23 2126)  Temperature: (!) 97 °F (36 1 °C) (03/21/23 2126)  Temp Source: Temporal (03/21/23 2126)  Respirations: 17 (03/21/23 2126)  SpO2: 96 % (03/21/23 2126)    Current Vitals:   Blood Pressure: 116/69 (03/22/23 0858)  Pulse: 94 (03/22/23 0858)  Temperature: 97 9 °F (36 6 °C) (03/22/23 0654)  Temp Source: Temporal (03/22/23 0654)  Respirations: 17 (03/22/23 0654)  SpO2: 92 % (03/22/23 0613)    No intake or output data in the 24 hours ending 03/22/23 1418    Invasive Devices     Peripheral Intravenous Line  Duration           Peripheral IV 03/20/23 Left Antecubital 1 day                Physical Exam:  General: No acute distress, alert and oriented, obese  CV: Well perfused, regular rate and rhythm  Lungs: Normal work of breathing, no increased respiratory effort  Abdomen: Soft, non-tender, non-distended  Extremities: S/p right TMA, significant soft tissue edema extending from the TMA stump to the mid tibia, unable to palpate right-sided distal pulses due to edema but robust Doppler signal was present of the AT and PT, ulceration of the anterior aspect of the TMA stump with exudative base  Skin: Warm, dry            Lab Results: I have personally reviewed pertinent lab results  Imaging: I have personally reviewed pertinent reports  and I have personally reviewed pertinent films in PACS  EKG, Pathology, and Other Studies: I have personally reviewed pertinent reports        Code Status: Level 1 - Full Code  Advance Directive and Living Will:      Power of :    POLST:

## 2023-03-22 NOTE — ASSESSMENT & PLAN NOTE
Lab Results   Component Value Date    HGBA1C 6 4 (H) 02/16/2022     Recent Labs     03/21/23  1727 03/21/23  2152 03/22/23  0610 03/22/23  1100   POCGLU 157* 230* 194* 171*     · Prior to admission on metformin  · Currently on hold in favor of sliding-scale insulin  · Diabetic neuropathy: Ran out of pregabalin 150 mg several weeks ago  Will defer to outpatient PCP to restart

## 2023-03-22 NOTE — WOUND OSTOMY CARE
Consult Note - Wound   Murlean Roof 53 y o  female MRN: [id number]  Unit/Bed#: E2 -01 Encounter: [id number]      History and Present Illness:  53year old female presented to the hospital with bilateral foot ulcerations with increased pain, redness, and swelling under podiatry service  Patient assessed by wound care RN at [facility] yesterday--buttocks, sacrum, and heels intact at that time  Patient continent of bowel and bladder  Spoke with podiatry resident via tiger text--podiatry is managing foot wounds  No need for wound care team to see patient  Wound care team will sign-off at this time        Moira SILVERMAN, RN, [employer]

## 2023-03-22 NOTE — UTILIZATION REVIEW
NOTIFICATION OF INPATIENT ADMISSION   AUTHORIZATION REQUEST   SERVICING FACILITY:   [address] [place] [address] [facility]  Tax ID: [id number]  NPI: [id number] ATTENDING PROVIDER:  Attending Name and NPI#: Razia Amato [[id number]]  Address: [address] [place] [address] [facility]  Phone: [phone]     ADMISSION INFORMATION:  Place of Service: Inpatient [address] [place] [address]  [address]  Place of Service Code: 21  Inpatient Admission Date/Time: 3/21/23  4:47 PM  Discharge Date/Time: No discharge date for patient encounter  Admitting Diagnosis Code/Description:  Sepsis ([facility] [address] ) [A41 9]     UTILIZATION REVIEW CONTACT:  Ana Esparza Utilization   Network Utilization Review Department  Phone: [phone]  Fax: [phone]  Email: [email or web address]  Contact for approvals/pending authorizations, clinical reviews, and discharge  PHYSICIAN ADVISORY SERVICES:  Medical Necessity Denial & Rqui-ei-Vphl Review  Phone: [phone]  Fax: [phone]  Email: [email or web address]

## 2023-03-22 NOTE — ASSESSMENT & PLAN NOTE
· Patient reports she has nowhere to go after discharge and is essentially homeless  · We will discuss with case management

## 2023-03-22 NOTE — PLAN OF CARE
Problem: MOBILITY - ADULT  Goal: Maintain or return to baseline ADL function  Description: INTERVENTIONS:  -  Assess patient's ability to carry out ADLs; assess patient's baseline for ADL function and identify physical deficits which impact ability to perform ADLs (bathing, care of mouth/teeth, toileting, grooming, dressing, etc )  - Assess/evaluate cause of self-care deficits   - Assess range of motion  - Assess patient's mobility; develop plan if impaired  - Assess patient's need for assistive devices and provide as appropriate  - Encourage maximum independence but intervene and supervise when necessary  - Involve family in performance of ADLs  - Assess for home care needs following discharge   - Consider OT consult to assist with ADL evaluation and planning for discharge  - Provide patient education as appropriate  Outcome: Progressing  Goal: Maintains/Returns to pre admission functional level  Description: INTERVENTIONS:  - Perform BMAT or MOVE assessment daily    - Set and communicate daily mobility goal to care team and patient/family/caregiver  - Collaborate with rehabilitation services on mobility goals if consulted  - Perform Range of Motion 3 times a day  - Reposition patient every 2 hours    - Dangle patient 3 times a day  - Stand patient 3 times a day  - Ambulate patient 3 times a day  - Out of bed to chair 3 times a day   - Out of bed for meals 3 times a day  - Out of bed for toileting  - Record patient progress and toleration of activity level   Outcome: Progressing     Problem: Prexisting or High Potential for Compromised Skin Integrity  Goal: Skin integrity is maintained or improved  Description: INTERVENTIONS:  - Identify patients at risk for skin breakdown  - Assess and monitor skin integrity  - Assess and monitor nutrition and hydration status  - Monitor labs   - Assess for incontinence   - Turn and reposition patient  - Assist with mobility/ambulation  - Relieve pressure over bony prominences  - Avoid friction and shearing  - Provide appropriate hygiene as needed including keeping skin clean and dry  - Evaluate need for skin moisturizer/barrier cream  - Collaborate with interdisciplinary team   - Patient/family teaching  - Consider wound care consult   Outcome: Progressing     Problem: PAIN - ADULT  Goal: Verbalizes/displays adequate comfort level or baseline comfort level  Description: Interventions:  - Encourage patient to monitor pain and request assistance  - Assess pain using appropriate pain scale  - Administer analgesics based on type and severity of pain and evaluate response  - Implement non-pharmacological measures as appropriate and evaluate response  - Consider cultural and social influences on pain and pain management  - Notify physician/advanced practitioner if interventions unsuccessful or patient reports new pain  Outcome: Progressing     Problem: INFECTION - ADULT  Goal: Absence or prevention of progression during hospitalization  Description: INTERVENTIONS:  - Assess and monitor for signs and symptoms of infection  - Monitor lab/diagnostic results  - Monitor all insertion sites, i e  indwelling lines, tubes, and drains  - Monitor endotracheal if appropriate and nasal secretions for changes in amount and color  - A[place]ell appropriate cooling/warming therapies per order  - Administer medications as ordered  - Instruct and encourage patient and family to use good hand hygiene technique  - Identify and instruct in appropriate isolation precautions for identified infection/condition  Outcome: Progressing  Goal: Absence of fever/infection during neutropenic period  Description: INTERVENTIONS:  - Monitor WBC    Outcome: Progressing     Problem: SAFETY ADULT  Goal: Maintain or return to baseline ADL function  Description: INTERVENTIONS:  -  Assess patient's ability to carry out ADLs; assess patient's baseline for ADL function and identify physical deficits which impact ability to perform ADLs (bathing, care of mouth/teeth, toileting, grooming, dressing, etc )  - Assess/evaluate cause of self-care deficits   - Assess range of motion  - Assess patient's mobility; develop plan if impaired  - Assess patient's need for assistive devices and provide as appropriate  - Encourage maximum independence but intervene and supervise when necessary  - Involve family in performance of ADLs  - Assess for home care needs following discharge   - Consider OT consult to assist with ADL evaluation and planning for discharge  - Provide patient education as appropriate  Outcome: Progressing  Goal: Maintains/Returns to pre admission functional level  Description: INTERVENTIONS:  - Perform BMAT or MOVE assessment daily    - Set and communicate daily mobility goal to care team and patient/family/caregiver  - Collaborate with rehabilitation services on mobility goals if consulted  - Perform Range of Motion 3 times a day  - Reposition patient every 2 hours    - Dangle patient 3 times a day  - Stand patient 3 times a day  - Ambulate patient 3 times a day  - Out of bed to chair 3 times a day   - Out of bed for meals 3 times a day  - Out of bed for toileting  - Record patient progress and toleration of activity level   Outcome: Progressing  Goal: Patient will remain free of falls  Description: INTERVENTIONS:  - Educate patient/family on patient safety including physical limitations  - Instruct patient to call for assistance with activity   - Consult OT/PT to assist with strengthening/mobility   - Keep Call bell within reach  - Keep bed low and locked with side rails adjusted as appropriate  - Keep care items and personal belongings within reach  - Initiate and maintain comfort rounds  - Make Fall Risk Sign visible to staff  - Offer Toileting every 2 Hours, in advance of need  - Initiate/Maintain bed alarm  - Obtain necessary fall risk management equipment: fall risk bracelet  - Apply yellow socks and bracelet for high fall risk patients  - Consider moving patient to room near nurses station  Outcome: Progressing     Problem: DISCHARGE PLANNING  Goal: Discharge to home or other facility with appropriate resources  Description: INTERVENTIONS:  - Identify barriers to discharge w/patient and caregiver  - Arrange for needed discharge resources and transportation as appropriate  - Identify discharge learning needs (meds, wound care, etc )  - Arrange for interpretive services to assist at discharge as needed  - Refer to Case Management Department for coordinating discharge planning if the patient needs post-hospital services based on physician/advanced practitioner order or complex needs related to functional status, cognitive ability, or social support system  Outcome: Progressing     Problem: Knowledge Deficit  Goal: Patient/family/caregiver demonstrates understanding of disease process, treatment plan, medications, and discharge instructions  Description: Complete learning assessment and assess knowledge base    Interventions:  - Provide teaching at level of understanding  - Provide teaching via preferred learning methods  Outcome: Progressing     Problem: SKIN/TISSUE INTEGRITY - ADULT  Goal: Skin Integrity remains intact(Skin Breakdown Prevention)  Description: Assess:  -Perform Yogi assessment every shift  -Clean and moisturize skin as needed   -Inspect skin when repositioning, toileting, and assisting with ADLS  -Assess extremities for adequate circulation and sensation     Bed Management:  -Have minimal linens on bed & keep smooth, unwrinkled  -Change linens as needed when moist or perspiring  -Avoid sitting or lying in one position for more than 2 hours while in bed  -Keep HOB at 30 degrees     Toileting:  -Offer bedside commode    Activity:  -Mobilize patient 3 times a day  -Encourage activity and walks on unit  -Encourage or provide ROM exercises   -Turn and reposition patient every 2 Hours  -Use appropriate equipment to lift or move patient in bed  -Consider limitation of chair time 2 hour intervals    Skin Care:  -Avoid use of baby powder, tape, friction and shearing, hot water or constrictive clothing  -Relieve pressure over bony prominences using alleyvn foam  -Do not massage red bony areas    Next Steps:  -Teach patient strategies to minimize risks such as shifting weight  Outcome: Progressing  Goal: Incision(s), wounds(s) or drain site(s) healing without S/S of infection  Description: INTERVENTIONS  - Assess and document dressing, incision, wound bed, drain sites and surrounding tissue  - Provide patient and family education  Outcome: Progressing  Goal: Pressure injury heals and does not worsen  Description: Interventions:  - Implement low air loss mattress or specialty surface (Criteria met)  - Apply silicone foam dressing  - Instruct/assist with weight shifting every 2 minutes when in chair   - Limit chair time to 2 hour intervals  - Use special pressure reducing interventions such as waffle cushion when in chair   - Apply fecal or urinary incontinence containment device   - Perform passive or active ROM every 8 hours  - Turn and reposition patient & offload bony prominences every 2 hours   - Utilize friction reducing device or surface for transfers     - Consider nutrition services referral as needed  Outcome: Progressing

## 2023-03-22 NOTE — ASSESSMENT & PLAN NOTE
The Service to behavioral health order in workqueue [id number] requested on 12/23/2019 has been removed as, unable to contact. Ordering provider has been notified.    Please contact patient, if further communication is needed.    · Continue clonidine

## 2023-03-22 NOTE — ANESTHESIA PREPROCEDURE EVALUATION
Procedure:  RAY RESECTION FOOT (Left: Foot)    Relevant Problems   CARDIO   (+) Essential hypertension   (+) Migraine without status migrainosus, not intractable      ENDO   (+) Hypothyroidism   (+) Left foot diabetic ulcer   (+) Type 2 diabetes mellitus with neurologic complication, without long-term current use of insulin (HCC)      GI/HEPATIC   (+) GERD (gastroesophageal reflux disease)   (+) H/O bariatric surgery      MUSCULOSKELETAL   (+) Arthritis      NEURO/PSYCH   (+) Anxiety   (+) Chronic pain   (+) Migraine without status migrainosus, not intractable   (+) OCD (obsessive compulsive disorder)   (+) PTSD (post-traumatic stress disorder)      PULMONARY   (+) Obstructive sleep apnea        Physical Exam    Airway    Mallampati score: II         Dental       Cardiovascular  Rhythm: regular, Rate: normal,     Pulmonary  Pulmonary exam normal     Other Findings        Anesthesia Plan  ASA Score- 3     Anesthesia Type- general with ASA Monitors  Additional Monitors:   Airway Plan: ETT and LMA  Comment: Nerve block for post op pain   Plan Factors-Exercise tolerance (METS): >4 METS  Chart reviewed  Existing labs reviewed  Patient summary reviewed  Patient is not a current smoker  Obstructive sleep apnea risk education given perioperatively  Induction- intravenous  Postoperative Plan-     Informed Consent- Anesthetic plan and risks discussed with patient

## 2023-03-22 NOTE — CONSULTS
"[address] 1975, 53 y o  female MRN: [id number]  Unit/Bed#: E2 -01 Encounter: [id number]  Primary Care Provider: Deshaun Gill MD   Date and time admitted to hospital: 3/21/2023  4:47 PM    Inpatient consult to Internal Medicine  Consult performed by: Cem Morton DO  Consult ordered by: Eddie Thomas DPM        * Left foot diabetic ulcer  Assessment & Plan  Background: History of mood disorder diabetes on metformin hypothyroidism and recurrent foot disease has been homeless and lost to follow-up presented to [facility] initially for diabetic foot ulcer and has been transferred here for further consulting services  · Patient returns from MRI  Awaiting final interpretation  · She has wounds on both feet probing to bone  · Currently on cefazolin and metronidazole  · She denies any underlying history of heart or lung disease  Should she need surgical intervention she is of acceptable risk to proceed without further testing  Homeless single person  Assessment & Plan  · Patient reports she has nowhere to go after discharge and is essentially homeless  · We will discuss with case management    Morbid obesity with BMI of 40 0-44 9, adult [facility])  Assessment & Plan  Estimated body mass index is 42 19 kg/m² as calculated from the following:    Height as of an earlier encounter on 3/21/23: 5' 5\" (1 651 m)  Weight as of an earlier encounter on 3/21/23: 115 kg (253 lb 8 5 oz)      GERD (gastroesophageal reflux disease)  Assessment & Plan  · Continue pantoprazole    Essential hypertension  Assessment & Plan  · Continue clonidine    Type 2 diabetes mellitus with neurologic complication, without long-term current use of insulin [facility])  Assessment & Plan  Lab Results   Component Value Date    HGBA1C 6 4 (H) 02/16/2022     Recent Labs     03/21/23  1727 03/21/23  2152 03/22/23  0610 03/22/23  1100   POCGLU 157* 230* 194* 171*     · Prior to admission on " metformin  · Currently on hold in favor of sliding-scale insulin  · Diabetic neuropathy: Ran out of pregabalin 150 mg several weeks ago  Will defer to outpatient PCP to restart  Mood disorder (HCC)  Assessment & Plan  · Mood stable continue hydroxyzine lamictal ziprasidone    Hypothyroidism  Assessment & Plan  · Continue levothyroxine      VTE Prophylaxis: VTE Score: 4 Moderate Risk (Score 3-4) - Pharmacological DVT Prophylaxis Ordered: enoxaparin (Lovenox)  Total Time Spent on Date of Encounter in care of patient: This time was spent on one or more of the following: performing physical exam; counseling and coordination of care; obtaining or reviewing history; documenting in the medical record; reviewing/ordering tests, medications or procedures; communicating with other healthcare professionals and discussing with patient's family/caregivers  Collaboration of Care: Were Recommendations Directly Discussed with Primary Treatment Team? Yes    History of Present Illness:  Crystal Goldman is a 53 y o  female who initially presented to [facility] for diabetic foot wounds  She was seen by podiatry and transferred here for multidisciplinary evaluation  On exam she returns from [facility] and is sleepy  She reports she recently moved back to this area but is essentially homeless  She denies any chest pain or shortness of breath  Her wounds have been there for quite some time but she was lost to follow-up  Review of Systems:  Review of Systems   Constitutional: Negative for chills and fever  HENT: Negative for facial swelling, sore throat and trouble swallowing  Eyes: Negative for pain and visual disturbance  Respiratory: Negative for shortness of breath  Cardiovascular: Negative for chest pain and palpitations  Gastrointestinal: Negative for abdominal distention, abdominal pain, diarrhea, nausea and vomiting  Genitourinary: Negative for dysuria, hematuria and urgency  Musculoskeletal: Positive for myalgias  Negative for back pain  Skin: Positive for wound  Negative for rash  Neurological: Negative for seizures, speech difficulty and numbness  Psychiatric/Behavioral: The patient is not nervous/anxious  All other systems reviewed and are negative  Past Medical and Surgical History:   Past Medical History:   Diagnosis Date   • Anxiety    • Arthritis    • Asthma    • Chronic pain    • Diabetes mellitus ([facility] [address] )    • Hypothyroidism    • Lymphedema     left leg   • Man[facility] I disorder ([facility] )    • Morbid obesity (HCC)    • OCD (obsessive compulsive disorder)    • PTSD (post-traumatic stress disorder)        Past Surgical History:   Procedure Laterality Date   • CHOLECYSTECTOMY     • CHOLECYSTECTOMY     • W[place] AMPUTATION FOOT TRANSMETARSAL Right 9/4/2021    Procedure: AMPUTATION TRANSMETATARSAL (TMA);  Surgeon: Aleena Licona DPM;  [facility]: [facility];  Service: Podiatry   • WV AMPUTATION METATARSAL W/TOE SINGLE Left 9/8/2019    Procedure: PARTIAL 3RD RAY RESECTION, PSB TOE FILLET FLAP;  Surgeon: Jac Riggs DPM;  Location[facility];  Service: Podiatry   • TOE AMPUTATION Right 1/13/2021    Procedure: AMPUTATION 2ND TOE;  Surgeon: Aleena Licona DPM;  [facility];  Service: Podiatry   • WOUND DEBRIDEMENT Left 2/17/2022    Procedure: DEBRIDEMENT GREAT TOE WOUND;  Surgeon: Aleena Licona DPM;  Location: [facility];  Service: Podiatry       Meds/Allergies:  PTA meds:   Prior to Admission Medications   Prescriptions Last Dose Informant Patient Reported? Taking? cloNIDine (CATAPRES) 0 2 mg tablet   Yes No   Sig: Take 0 2 mg by mouth 2 (two) times a day   ergocalciferol (ERGOCALCIFEROL) 1 25 MG (29716 UT) capsule   Yes No   Sig: Take 50,000 Units by mouth   fenofibrate (TRICOR) 54 MG tablet   Yes No   Sig: TAKE 1 TABLET (54 MG TOTAL) BY MOUTH DAILY     folic acid (FOLVITE) 1 mg tablet   Yes No   Sig: Take 1,000 mcg by mouth daily   hydrOXYzine HCL (ATARAX) 50 mg tablet   Yes No   Sig: Take 50 mg by mouth daily at bedtime   hydrOXYzine HCL (ATARAX) 50 mg tablet   Yes No   Sig: Take 50 mg by mouth every 8 (eight) hours   lamoTRIgine (LaMICtal) 200 MG tablet   Yes No   Sig: Take 200 mg by mouth every morning   levothyroxine 50 mcg tablet   Yes No   Sig: Take 50 mcg by mouth daily   melatonin 3 mg   Yes No   Sig: Take 10 mg by mouth daily at bedtime   metFORMIN (GLUCOPHAGE) 500 mg tablet   No No   Sig: Take 1 tablet (500 mg total) by mouth 2 (two) times a day with meals   methocarbamol (ROBAXIN) 750 mg tablet   Yes No   Sig: TAKE 1 TABLET (750 MG TOTAL) BY MOUTH 4 (FOUR) TIMES A DAY AS NEEDED FOR MUSCLE SPASMS  pantoprazole (PROTONIX) 40 mg tablet   Yes No   Sig: Take 40 mg by mouth daily   ziprasidone (GEODON) 80 mg capsule   Yes No   Sig: Take 80 mg by mouth 2 (two) times a day with meals       Facility-Administered Medications: None       Allergies:    Allergies   Allergen Reactions   • Aspirin Vomiting   • Barium Iodide Itching     Face became very red and itchy    • Codeine Vomiting   • Latex Hives   • Penicillins Rash and Other (See Comments)     Patient has tolerated cefepime without difficulty   • Vancomycin Rash       Social History:  Marital Status: Single  Substance Use History:   Social History     Substance and Sexual Activity   Alcohol Use Never     Social History     Tobacco Use   Smoking Status Former   • Packs/day: 0 50   • Years: 15 00   • Pack years: 7 50   • Types: Cigarettes   Smokeless Tobacco Never     Social History     Substance and Sexual Activity   Drug Use Never       Family History:  Family History   Problem Relation Age of Onset   • Hyperlipidemia Mother    • Diabetes Mother    • Hypertension Mother    • Heart attack Father        Physical Exam:   Vitals:   Blood Pressure: 116/69 (03/22/23 0858)  Pulse: 94 (03/22/23 0858)  Temperature: 97 9 °F (36 6 °C) (03/22/23 0654)  Temp Source: Temporal (03/22/23 0654)  Respirations: 17 (03/22/23 0654)  SpO2: 92 % (03/22/23 1776)    Physical Exam  Vitals reviewed  Constitutional:       General: She is not in acute distress  Appearance: Normal appearance  HENT:      Head: Atraumatic  Eyes:      General: No scleral icterus  Cardiovascular:      Rate and Rhythm: Regular rhythm  Heart sounds: Normal heart sounds  Pulmonary:      Breath sounds: Decreased breath sounds present  No wheezing  Abdominal:      General: Bowel sounds are normal       Palpations: Abdomen is soft  Tenderness: There is no guarding or rebound  Musculoskeletal:         General: Deformity (Right TMA) present  No swelling  Cervical back: Normal range of motion  Skin:     General: Skin is warm  Neurological:      Mental Status: She is alert  Motor: No weakness  Psychiatric:         Mood and Affect: Affect is flat  Additional Data:   Lab Results:    Results from last 7 days   Lab Units 03/22/23  0511   WBC Thousand/uL 9 65   HEMOGLOBIN g/dL 7 8*   HEMATOCRIT % 26 2*   PLATELETS Thousands/uL 557*   NEUTROS PCT % 66   LYMPHS PCT % 21   MONOS PCT % 6   EOS PCT % 5     Results from last 7 days   Lab Units 03/22/23  0511 03/21/23  0434 03/20/23  2230   SODIUM mmol/L 134*   < > 127*   POTASSIUM mmol/L 3 8   < > 3 8   CHLORIDE mmol/L 101   < > 92*   CO2 mmol/L 28   < > 24   BUN mg/dL 11   < > 10   CREATININE mg/dL 0 68   < > 0 87   ANION GAP mmol/L 5   < > 11   CALCIUM mg/dL 8 2*   < > 9 3   ALBUMIN g/dL  --   --  3 2*   TOTAL BILIRUBIN mg/dL  --   --  0 36   ALK PHOS U/L  --   --  215*   ALT U/L  --   --  15   AST U/L  --   --  21   GLUCOSE RANDOM mg/dL 116   < > 249*    < > = values in this interval not displayed       Results from last 7 days   Lab Units 03/22/23  1100 03/22/23  0610 03/21/23  2152 03/21/23  1727 03/21/23  1103 03/21/23  0527 03/21/23  0145 03/21/23  0001   POC GLUCOSE mg/dl 171* 194* 230* 157* 209* 271* 141* 188*     Results from last 7 days   Lab Units 03/21/23  0434 03/21/23 0225 03/20/23  2250   LACTIC ACID mmol/L  --  1 6 2 8*   PROCALCITONIN ng/ml 0 44*  --   --        Imaging: Personally reviewed the following imaging: MRI  MRI foot/forefoot toes left wo contrast   Final Result by Yesica Davila MD (03/22 1032)   1  Plantar and medial skin ulceration noted at the level of the 1st MTP with associated cellulitis but no evidence of drainable abscess collection  Minimal marrow changes in this region or low suspicion for osteomyelitis  2   Status post 3rd transmetatarsal amputation with plantar skin ulceration at the level of the residual stump with associated cellulitis  No marrow change involving the 3rd metatarsal to indicate osteomyelitis at this time  3   Complete tear of the flexor hallucis longus tendon as detailed above with 3 8 cm tendon retraction  The possibility of pathologic tendon rupture should be considered given likely infective cellulitis and plantar skin ulceration in this region  Additionally, there is sesamoid bone splaying suggesting a tear of the intersesamoid ligament, again possibly pathologic  The study was marked in [facility] for immediate notification  Workstation performed: [id number]         MRI ankle/heel right wo contrast   Final Result by Yesica Davlia MD (03/22 1020)   Status post 1st through 5th transmetatarsal amputations with prominent skin ulceration at the distal aspect of the residual forefoot with associated cellulitis  Additionally, there is a dorsal midfoot fluid collection suspicious for abscess    collection that appears to drain to the ulcerated skin surface as noted above  Changes of chronic Charcot arthropathy with extensive T1 replacement signal throughout the residual 1st through 5th metatarsals, cuboid, cuneiforms, navicular and distal    aspect of the talus concerning for superimposed osteomyelitis  The study was marked in [facility] for immediate notification         Workstation performed: [id number] MRI ankle/heel left  wo contrast    (Results Pending)       EKG, Pathology, and Other Studies Reviewed on Admission:   · EKG:     ** Please Note: This note may have been constructed using a voice recognition system   **

## 2023-03-22 NOTE — ASSESSMENT & PLAN NOTE
"Estimated body mass index is 42 19 kg/m² as calculated from the following:    Height as of an earlier encounter on 3/21/23: 5' 5\" (1 651 m)  Weight as of an earlier encounter on 3/21/23: 115 kg (253 lb 8 5 oz)    "

## 2023-03-22 NOTE — UTILIZATION REVIEW
Initial Clinical Review    Admission: Date/Time/Statement:   Admission Orders (From admission, onward)     Ordered        03/21/23 1657  Inpatient Admission  Once                      Orders Placed This Encounter   Procedures   • Inpatient Admission     Standing Status:   Standing     Number of Occurrences:   1     Order Specific Question:   Level of Care     Answer:   Med Surg [16]     Order Specific Question:   Estimated length of stay     Answer:   More than 2 Midnights     Order Specific Question:   Certification     Answer:   I certify that inpatient services are medically necessary for this patient for a duration of greater than two midnights  See H&P and MD Progress Notes for additional information about the patient's course of treatment  Initial Presentation: 53 y o  female initially admitted to [facility]  On   3/20  With  Sepsis secondary to diabetic foot  Ulcer  States ulcers have been present for some time  Pain has  Intensified recently  Left foot has become increasingly red, swollen lately  Additional PMH  Is   DM2, pschy issues and HTN  Transferred to [facility] for further care  No  Evidence of osteo  On X ray  Admit  Ip with  Left foot  Diabetic ulcer, probes to bone, R  Plantar TMA stump ulceration probing to bone and plan is   MRI, monitor labs, wound cultures and  NWB  RLE,  WBAT  LLE  Date:    3/22  Day 2:   Likely  Needs  Surgical intervention  LLE  MRI Right foot concerning  For osteo and extensive osteo  Recommend  Amputation  Surgical consult  Placed  Wound cultures  Show  Positive cocci in pairs  Continue  SAMMIE             Wt Readings from Last 1 Encounters:   03/21/23 115 kg (253 lb 8 5 oz)     Additional Vital Signs:   97 9 °F (36 6 °C) 95 17 114/58 82 -- -- --    03/22/23 0613 97 4 °F (36 3 °C) Abnormal  97 18 112/77 84 92 % None (Room air) Lying   03/21/23 2126 97 °F (36 1 °C) Abnormal  88 17 100/55 67 96 % None (Room air) Lying Pertinent Labs/Diagnostic Test Results:   MRI foot/forefoot toes left wo contrast   Final Result by Jaiden Smith MD (03/22 1032)   1  Plantar and medial skin ulceration noted at the level of the 1st MTP with associated cellulitis but no evidence of drainable abscess collection  Minimal marrow changes in this region or low suspicion for osteomyelitis  2   Status post 3rd transmetatarsal amputation with plantar skin ulceration at the level of the residual stump with associated cellulitis  No marrow change involving the 3rd metatarsal to indicate osteomyelitis at this time  3   Complete tear of the flexor hallucis longus tendon as detailed above with 3 8 cm tendon retraction  The possibility of pathologic tendon rupture should be considered given likely infective cellulitis and plantar skin ulceration in this region  Additionally, there is sesamoid bone splaying suggesting a tear of the intersesamoid ligament, again possibly pathologic  The study was marked in [facility] for immediate notification  Workstation performed: [id number]         MRI ankle/heel right wo contrast   Final Result by Jaiden Smith MD (03/22 1020)   Status post 1st through 5th transmetatarsal amputations with prominent skin ulceration at the distal aspect of the residual forefoot with associated cellulitis  Additionally, there is a dorsal midfoot fluid collection suspicious for abscess    collection that appears to drain to the ulcerated skin surface as noted above  Changes of chronic Charcot arthropathy with extensive T1 replacement signal throughout the residual 1st through 5th metatarsals, cuboid, cuneiforms, navicular and distal    aspect of the talus concerning for superimposed osteomyelitis  The study was marked in [facility] for immediate notification         Workstation performed: [id number]         MRI ankle/heel left  wo contrast    (Results Pending)         Results from last 7 days   Lab Units 03/22/23  0566 03/21/23  0434 03/20/23  2230   WBC Thousand/uL 9 65 14 64* 17 03*   HEMOGLOBIN g/dL 7 8* 9 0* 9 5*   HEMATOCRIT % 26 2* 30 7* 32 4*   PLATELETS Thousands/uL 557* 662* 732*   NEUTROS ABS Thousands/µL 6 50  --  13 98*         Results from last 7 days   Lab Units 03/22/23  0511 03/21/23  0434 03/20/23  2230   SODIUM mmol/L 134* 129* 127*   POTASSIUM mmol/L 3 8 3 7 3 8   CHLORIDE mmol/L 101 95* 92*   CO2 mmol/L 28 22 24   ANION GAP mmol/L 5 12 11   BUN mg/dL 11 9 10   CREATININE mg/dL 0 68 0 81 0 87   EGFR ml/min/1 73sq m 104 86 79   CALCIUM mg/dL 8 2* 8 8 9 3   MAGNESIUM mg/dL 1 8*  --   --    PHOSPHORUS mg/dL 3 3  --   --      Results from last 7 days   Lab Units 03/20/23  2230   AST U/L 21   ALT U/L 15   ALK PHOS U/L 215*   TOTAL PROTEIN g/dL 8 7*   ALBUMIN g/dL 3 2*   TOTAL BILIRUBIN mg/dL 0 36     Results from last 7 days   Lab Units 03/22/23  0610 03/21/23  2152 03/21/23  1727 03/21/23  1103 03/21/23  0527 03/21/23  0145 03/21/23  0001   POC GLUCOSE mg/dl 194* 230* 157* 209* 271* 141* 188*     Results from last 7 days   Lab Units 03/22/23  0511 03/21/23  0434 03/20/23  2230   GLUCOSE RANDOM mg/dL 116 242* 249*           Results from last 7 days   Lab Units 03/21/23  0434   PROCALCITONIN ng/ml 0 44*     Results from last 7 days   Lab Units 03/21/23  0225 03/20/23  2250   LACTIC ACID mmol/L 1 6 2 8*               Results from last 7 days   Lab Units 03/20/23  2301   CLARITY UA  Clear   COLOR UA  Pau*   SPEC GRAV UA  1 025   PH UA  5 0   GLUCOSE UA mg/dl 100 (1/10%)*   KETONES UA mg/dl Negative   BLOOD UA  10 0*   PROTEIN UA mg/dl 30 (1+)*   NITRITE UA  Negative   BILIRUBIN UA  Negative   UROBILINOGEN UA mg/dL 1 0   LEUKOCYTES UA  Negative   WBC UA /hpf 0-1   RBC UA /hpf 0-1   BACTERIA UA /hpf Moderate*   EPITHELIAL CELLS WET PREP /hpf Moderate*   MUCUS THREADS  Moderate*           Results from last 7 days   Lab Units 03/21/23  1809 03/20/23  2324 03/20/23  2250 03/20/23  2230   BLOOD CULTURE   --   --  No Growth at 24 hrs  No Growth at 24 hrs  GRAM STAIN RESULT  2+ Gram positive cocci in pairs and chains* 2+ Polys*  2+ Gram positive cocci in pairs and chains*  1+ Gram positive rods*  1+ Gram negative rods*  --   --              Present on Admission:  • Left foot diabetic ulcer      Admitting Diagnosis: Sepsis ([facility] [address] ) [A41 9]  Age/Sex: 53 y o  female  Admission Orders:  Scheduled Medications:  cefazolin, 2,000 mg, Intravenous, Q8H  cloNIDine, 0 2 mg, Oral, BID  docusate sodium, 100 mg, Oral, BID  enoxaparin, 40 mg, Subcutaneous, BID  fenofibrate, 48 mg, Oral, Daily  folic acid, 5,158 mcg, Oral, Daily  hydrOXYzine HCL, 100 mg, Oral, HS  hydrOXYzine HCL, 50 mg, Oral, Daily  insulin lispro, 1-5 Units, Subcutaneous, HS  insulin lispro, 2-12 Units, Subcutaneous, TID AC  lamoTRIgine, 250 mg, Oral, QAM  levothyroxine, 50 mcg, Oral, Early Morning  melatonin, 9 mg, Oral, HS  metroNIDAZOLE, 500 mg, Oral, Q8H RADHA  pantoprazole, 40 mg, Oral, BID AC  ziprasidone, 80 mg, Oral, BID With Meals      Continuous IV Infusions:     PRN Meds:  acetaminophen, 650 mg, Oral, Q4H PRN  dextromethorphan-guaiFENesin, 10 mL, Oral, Q4H PRN  methocarbamol, 750 mg, Oral, TID PRN  morphine injection, 2 mg, Intravenous, Q4H PRN  ondansetron, 4 mg, Intravenous, Q6H PRN  oxyCODONE, 10 mg, Oral, Q4H PRN  oxyCODONE, 5 mg, Oral, Q4H PRN        IP CONSULT TO INTERNAL MEDICINE    Network Utilization Review Department  ATTENTION: Please call with any questions or concerns to [phone] and carefully listen to the prompts so that you are directed to the right person  All voicemails are confidential   Julio Jackson all requests for admission clinical reviews, approved or denied determinations and any other requests to dedicated fax number below belonging to the campus where the patient is receiving treatment   List of dedicated fax numbers for the Facilities:  FACILITY NAME UR FAX NUMBER   ADMISSION DENIALS (Administrative/Medical Necessity) [phone]   PARENT CHILD HEALTH (Maternity/NICU/Pediatrics) Mayra Moore [address] A[address]orensen  [phone]   [address] [place] [address] [facility] [address] [facility] [address] [facility] [address] [phone]

## 2023-03-23 ENCOUNTER — APPOINTMENT (OUTPATIENT)
Dept: RADIOLOGY | Facility: HOSPITAL | Age: 48
End: 2023-03-23

## 2023-03-23 ENCOUNTER — ANESTHESIA (INPATIENT)
Dept: PERIOP | Facility: HOSPITAL | Age: 48
End: 2023-03-23

## 2023-03-23 LAB
ABO GROUP BLD BPU: NORMAL
ANION GAP SERPL CALCULATED.3IONS-SCNC: 5 MMOL/L (ref 4–13)
BASOPHILS # BLD AUTO: 0.05 THOUSANDS/ÂΜL (ref 0–0.1)
BASOPHILS NFR BLD AUTO: 1 % (ref 0–1)
BPU ID: NORMAL
BUN SERPL-MCNC: 10 MG/DL (ref 5–25)
CALCIUM SERPL-MCNC: 8.6 MG/DL (ref 8.4–10.2)
CHLORIDE SERPL-SCNC: 100 MMOL/L (ref 96–108)
CO2 SERPL-SCNC: 29 MMOL/L (ref 21–32)
CREAT SERPL-MCNC: 0.65 MG/DL (ref 0.6–1.3)
CROSSMATCH: NORMAL
EOSINOPHIL # BLD AUTO: 0.47 THOUSAND/ÂΜL (ref 0–0.61)
EOSINOPHIL NFR BLD AUTO: 5 % (ref 0–6)
ERYTHROCYTE [DISTWIDTH] IN BLOOD BY AUTOMATED COUNT: 19.8 % (ref 11.6–15.1)
GFR SERPL CREATININE-BSD FRML MDRD: 106 ML/MIN/1.73SQ M
GLUCOSE SERPL-MCNC: 133 MG/DL (ref 65–140)
GLUCOSE SERPL-MCNC: 143 MG/DL (ref 65–140)
GLUCOSE SERPL-MCNC: 210 MG/DL (ref 65–140)
GLUCOSE SERPL-MCNC: 232 MG/DL (ref 65–140)
GLUCOSE SERPL-MCNC: 267 MG/DL (ref 65–140)
HCT VFR BLD AUTO: 31.2 % (ref 34.8–46.1)
HGB BLD-MCNC: 9.4 G/DL (ref 11.5–15.4)
IMM GRANULOCYTES # BLD AUTO: 0.08 THOUSAND/UL (ref 0–0.2)
IMM GRANULOCYTES NFR BLD AUTO: 1 % (ref 0–2)
LYMPHOCYTES # BLD AUTO: 1.82 THOUSANDS/ÂΜL (ref 0.6–4.47)
LYMPHOCYTES NFR BLD AUTO: 20 % (ref 14–44)
MCH RBC QN AUTO: 20.3 PG (ref 26.8–34.3)
MCHC RBC AUTO-ENTMCNC: 30.1 G/DL (ref 31.4–37.4)
MCV RBC AUTO: 67 FL (ref 82–98)
MONOCYTES # BLD AUTO: 0.56 THOUSAND/ÂΜL (ref 0.17–1.22)
MONOCYTES NFR BLD AUTO: 6 % (ref 4–12)
NEUTROPHILS # BLD AUTO: 6.14 THOUSANDS/ÂΜL (ref 1.85–7.62)
NEUTS SEG NFR BLD AUTO: 67 % (ref 43–75)
NRBC BLD AUTO-RTO: 0 /100 WBCS
PLATELET # BLD AUTO: 653 THOUSANDS/UL (ref 149–390)
PMV BLD AUTO: 8.3 FL (ref 8.9–12.7)
POTASSIUM SERPL-SCNC: 4.2 MMOL/L (ref 3.5–5.3)
RBC # BLD AUTO: 4.64 MILLION/UL (ref 3.81–5.12)
SODIUM SERPL-SCNC: 134 MMOL/L (ref 135–147)
UNIT DISPENSE STATUS: NORMAL
UNIT PRODUCT CODE: NORMAL
UNIT PRODUCT VOLUME: 350 ML
UNIT RH: NORMAL
WBC # BLD AUTO: 9.12 THOUSAND/UL (ref 4.31–10.16)

## 2023-03-23 PROCEDURE — 0Y6N0Z4 DETACHMENT AT LEFT FOOT, COMPLETE 1ST RAY, OPEN APPROACH: ICD-10-PCS | Performed by: PODIATRIST

## 2023-03-23 PROCEDURE — 0Y6H0Z2 DETACHMENT AT RIGHT LOWER LEG, MID, OPEN APPROACH: ICD-10-PCS | Performed by: PODIATRIST

## 2023-03-23 RX ORDER — ONDANSETRON 2 MG/ML
INJECTION INTRAMUSCULAR; INTRAVENOUS AS NEEDED
Status: DISCONTINUED | OUTPATIENT
Start: 2023-03-23 | End: 2023-03-23

## 2023-03-23 RX ORDER — FENTANYL CITRATE 50 UG/ML
INJECTION, SOLUTION INTRAMUSCULAR; INTRAVENOUS AS NEEDED
Status: DISCONTINUED | OUTPATIENT
Start: 2023-03-23 | End: 2023-03-23

## 2023-03-23 RX ORDER — FENTANYL CITRATE/PF 50 MCG/ML
50 SYRINGE (ML) INJECTION
Status: COMPLETED | OUTPATIENT
Start: 2023-03-23 | End: 2023-03-23

## 2023-03-23 RX ORDER — HYDROMORPHONE HCL/PF 1 MG/ML
SYRINGE (ML) INJECTION AS NEEDED
Status: DISCONTINUED | OUTPATIENT
Start: 2023-03-23 | End: 2023-03-23

## 2023-03-23 RX ORDER — ONDANSETRON 2 MG/ML
4 INJECTION INTRAMUSCULAR; INTRAVENOUS ONCE AS NEEDED
Status: DISCONTINUED | OUTPATIENT
Start: 2023-03-23 | End: 2023-03-23 | Stop reason: HOSPADM

## 2023-03-23 RX ORDER — ONDANSETRON 2 MG/ML
4 INJECTION INTRAMUSCULAR; INTRAVENOUS EVERY 4 HOURS PRN
Status: DISCONTINUED | OUTPATIENT
Start: 2023-03-23 | End: 2023-04-03 | Stop reason: HOSPADM

## 2023-03-23 RX ORDER — MAGNESIUM HYDROXIDE 1200 MG/15ML
LIQUID ORAL AS NEEDED
Status: DISCONTINUED | OUTPATIENT
Start: 2023-03-23 | End: 2023-03-23 | Stop reason: HOSPADM

## 2023-03-23 RX ORDER — METHOCARBAMOL 750 MG/1
750 TABLET, FILM COATED ORAL EVERY 6 HOURS SCHEDULED
Status: DISCONTINUED | OUTPATIENT
Start: 2023-03-23 | End: 2023-04-03 | Stop reason: HOSPADM

## 2023-03-23 RX ORDER — PROMETHAZINE HYDROCHLORIDE 25 MG/ML
6.25 INJECTION, SOLUTION INTRAMUSCULAR; INTRAVENOUS ONCE AS NEEDED
Status: DISCONTINUED | OUTPATIENT
Start: 2023-03-23 | End: 2023-03-23 | Stop reason: HOSPADM

## 2023-03-23 RX ORDER — ROCURONIUM BROMIDE 10 MG/ML
INJECTION, SOLUTION INTRAVENOUS AS NEEDED
Status: DISCONTINUED | OUTPATIENT
Start: 2023-03-23 | End: 2023-03-23

## 2023-03-23 RX ORDER — DEXAMETHASONE SODIUM PHOSPHATE 10 MG/ML
INJECTION, SOLUTION INTRAMUSCULAR; INTRAVENOUS AS NEEDED
Status: DISCONTINUED | OUTPATIENT
Start: 2023-03-23 | End: 2023-03-23

## 2023-03-23 RX ORDER — LIDOCAINE HYDROCHLORIDE 20 MG/ML
INJECTION, SOLUTION EPIDURAL; INFILTRATION; INTRACAUDAL; PERINEURAL AS NEEDED
Status: DISCONTINUED | OUTPATIENT
Start: 2023-03-23 | End: 2023-03-23

## 2023-03-23 RX ORDER — DEXMEDETOMIDINE HYDROCHLORIDE 100 UG/ML
INJECTION, SOLUTION INTRAVENOUS AS NEEDED
Status: DISCONTINUED | OUTPATIENT
Start: 2023-03-23 | End: 2023-03-23

## 2023-03-23 RX ORDER — GABAPENTIN 100 MG/1
100 CAPSULE ORAL 3 TIMES DAILY
Status: DISCONTINUED | OUTPATIENT
Start: 2023-03-23 | End: 2023-03-25

## 2023-03-23 RX ORDER — CEFAZOLIN SODIUM 1 G/50ML
SOLUTION INTRAVENOUS AS NEEDED
Status: DISCONTINUED | OUTPATIENT
Start: 2023-03-23 | End: 2023-03-23

## 2023-03-23 RX ORDER — HYDROMORPHONE HCL/PF 1 MG/ML
0.5 SYRINGE (ML) INJECTION
Status: DISCONTINUED | OUTPATIENT
Start: 2023-03-23 | End: 2023-03-23 | Stop reason: HOSPADM

## 2023-03-23 RX ORDER — MIDAZOLAM HYDROCHLORIDE 2 MG/2ML
INJECTION, SOLUTION INTRAMUSCULAR; INTRAVENOUS AS NEEDED
Status: DISCONTINUED | OUTPATIENT
Start: 2023-03-23 | End: 2023-03-23

## 2023-03-23 RX ORDER — ROPIVACAINE HYDROCHLORIDE 5 MG/ML
INJECTION, SOLUTION EPIDURAL; INFILTRATION; PERINEURAL AS NEEDED
Status: DISCONTINUED | OUTPATIENT
Start: 2023-03-23 | End: 2023-03-23

## 2023-03-23 RX ORDER — SODIUM CHLORIDE, SODIUM LACTATE, POTASSIUM CHLORIDE, CALCIUM CHLORIDE 600; 310; 30; 20 MG/100ML; MG/100ML; MG/100ML; MG/100ML
100 INJECTION, SOLUTION INTRAVENOUS CONTINUOUS
Status: DISCONTINUED | OUTPATIENT
Start: 2023-03-23 | End: 2023-03-25

## 2023-03-23 RX ORDER — ACETAMINOPHEN 325 MG/1
975 TABLET ORAL EVERY 8 HOURS SCHEDULED
Status: DISCONTINUED | OUTPATIENT
Start: 2023-03-23 | End: 2023-04-03 | Stop reason: HOSPADM

## 2023-03-23 RX ORDER — GLYCOPYRROLATE 0.2 MG/ML
INJECTION INTRAMUSCULAR; INTRAVENOUS AS NEEDED
Status: DISCONTINUED | OUTPATIENT
Start: 2023-03-23 | End: 2023-03-23

## 2023-03-23 RX ORDER — PROPOFOL 10 MG/ML
INJECTION, EMULSION INTRAVENOUS AS NEEDED
Status: DISCONTINUED | OUTPATIENT
Start: 2023-03-23 | End: 2023-03-23

## 2023-03-23 RX ORDER — HYDROMORPHONE HCL/PF 1 MG/ML
0.5 SYRINGE (ML) INJECTION
Status: DISCONTINUED | OUTPATIENT
Start: 2023-03-23 | End: 2023-04-03 | Stop reason: HOSPADM

## 2023-03-23 RX ORDER — NEOSTIGMINE METHYLSULFATE 1 MG/ML
INJECTION INTRAVENOUS AS NEEDED
Status: DISCONTINUED | OUTPATIENT
Start: 2023-03-23 | End: 2023-03-23

## 2023-03-23 RX ORDER — MEPERIDINE HYDROCHLORIDE 25 MG/ML
12.5 INJECTION INTRAMUSCULAR; INTRAVENOUS; SUBCUTANEOUS ONCE AS NEEDED
Status: DISCONTINUED | OUTPATIENT
Start: 2023-03-23 | End: 2023-03-23 | Stop reason: HOSPADM

## 2023-03-23 RX ADMIN — METHOCARBAMOL 750 MG: 750 TABLET ORAL at 16:29

## 2023-03-23 RX ADMIN — FENTANYL CITRATE 50 MCG: 50 INJECTION INTRAMUSCULAR; INTRAVENOUS at 11:20

## 2023-03-23 RX ADMIN — FENTANYL CITRATE 100 MCG: 50 INJECTION INTRAMUSCULAR; INTRAVENOUS at 09:11

## 2023-03-23 RX ADMIN — PROPOFOL 150 MG: 10 INJECTION, EMULSION INTRAVENOUS at 09:31

## 2023-03-23 RX ADMIN — GUAIFENESIN AND DEXTROMETHORPHAN 10 ML: 100; 10 SYRUP ORAL at 01:35

## 2023-03-23 RX ADMIN — ONDANSETRON 4 MG: 2 INJECTION INTRAMUSCULAR; INTRAVENOUS at 11:43

## 2023-03-23 RX ADMIN — SODIUM CHLORIDE, SODIUM LACTATE, POTASSIUM CHLORIDE, AND CALCIUM CHLORIDE 100 ML/HR: .6; .31; .03; .02 INJECTION, SOLUTION INTRAVENOUS at 14:52

## 2023-03-23 RX ADMIN — FENTANYL CITRATE 50 MCG: 50 INJECTION INTRAMUSCULAR; INTRAVENOUS at 13:56

## 2023-03-23 RX ADMIN — GABAPENTIN 100 MG: 100 CAPSULE ORAL at 22:06

## 2023-03-23 RX ADMIN — MIDAZOLAM 4 MG: 1 INJECTION INTRAMUSCULAR; INTRAVENOUS at 09:11

## 2023-03-23 RX ADMIN — HYDROXYZINE HYDROCHLORIDE 100 MG: 25 TABLET, FILM COATED ORAL at 22:14

## 2023-03-23 RX ADMIN — FENTANYL CITRATE 50 MCG: 50 INJECTION INTRAMUSCULAR; INTRAVENOUS at 11:13

## 2023-03-23 RX ADMIN — METRONIDAZOLE 500 MG: 500 TABLET ORAL at 16:29

## 2023-03-23 RX ADMIN — METRONIDAZOLE 500 MG: 500 TABLET ORAL at 22:06

## 2023-03-23 RX ADMIN — OXYCODONE HYDROCHLORIDE 10 MG: 10 TABLET ORAL at 16:28

## 2023-03-23 RX ADMIN — ROPIVACAINE HYDROCHLORIDE 15 ML: 5 INJECTION EPIDURAL; INFILTRATION; PERINEURAL at 09:15

## 2023-03-23 RX ADMIN — GLYCOPYRROLATE 0.4 MG: 0.2 INJECTION, SOLUTION INTRAMUSCULAR; INTRAVENOUS at 12:31

## 2023-03-23 RX ADMIN — FENTANYL CITRATE 50 MCG: 50 INJECTION INTRAMUSCULAR; INTRAVENOUS at 10:49

## 2023-03-23 RX ADMIN — SODIUM CHLORIDE: 0.9 INJECTION, SOLUTION INTRAVENOUS at 11:07

## 2023-03-23 RX ADMIN — DOCUSATE SODIUM 100 MG: 100 CAPSULE, LIQUID FILLED ORAL at 17:58

## 2023-03-23 RX ADMIN — NEOSTIGMINE 3 MG: 1 INJECTION INTRAVENOUS at 12:31

## 2023-03-23 RX ADMIN — DEXMEDETOMIDINE HCL 8 MCG: 100 INJECTION INTRAVENOUS at 11:32

## 2023-03-23 RX ADMIN — ENOXAPARIN SODIUM 40 MG: 100 INJECTION SUBCUTANEOUS at 17:58

## 2023-03-23 RX ADMIN — FENTANYL CITRATE 50 MCG: 50 INJECTION INTRAMUSCULAR; INTRAVENOUS at 13:42

## 2023-03-23 RX ADMIN — DEXAMETHASONE SODIUM PHOSPHATE 5 MG: 10 INJECTION INTRAMUSCULAR; INTRAVENOUS at 09:49

## 2023-03-23 RX ADMIN — CEFAZOLIN SODIUM 2000 MG: 2 SOLUTION INTRAVENOUS at 03:40

## 2023-03-23 RX ADMIN — ZIPRASIDONE HCL 80 MG: 40 CAPSULE ORAL at 22:14

## 2023-03-23 RX ADMIN — PANTOPRAZOLE SODIUM 40 MG: 40 TABLET, DELAYED RELEASE ORAL at 16:29

## 2023-03-23 RX ADMIN — METHOCARBAMOL 750 MG: 750 TABLET ORAL at 22:06

## 2023-03-23 RX ADMIN — ONDANSETRON 4 MG: 2 INJECTION INTRAMUSCULAR; INTRAVENOUS at 05:07

## 2023-03-23 RX ADMIN — DEXMEDETOMIDINE HCL 4 MCG: 100 INJECTION INTRAVENOUS at 11:56

## 2023-03-23 RX ADMIN — ROPIVACAINE HYDROCHLORIDE 25 ML: 5 INJECTION EPIDURAL; INFILTRATION; PERINEURAL at 09:18

## 2023-03-23 RX ADMIN — LEVOTHYROXINE SODIUM 50 MCG: 50 TABLET ORAL at 06:04

## 2023-03-23 RX ADMIN — CEFAZOLIN SODIUM 2000 MG: 1 SOLUTION INTRAVENOUS at 09:45

## 2023-03-23 RX ADMIN — GUAIFENESIN AND DEXTROMETHORPHAN 10 ML: 100; 10 SYRUP ORAL at 22:23

## 2023-03-23 RX ADMIN — METRONIDAZOLE 500 MG: 500 TABLET ORAL at 06:04

## 2023-03-23 RX ADMIN — FENTANYL CITRATE 50 MCG: 50 INJECTION INTRAMUSCULAR; INTRAVENOUS at 11:07

## 2023-03-23 RX ADMIN — HYDROMORPHONE HYDROCHLORIDE 0.5 MG: 1 INJECTION, SOLUTION INTRAMUSCULAR; INTRAVENOUS; SUBCUTANEOUS at 14:20

## 2023-03-23 RX ADMIN — Medication: at 19:56

## 2023-03-23 RX ADMIN — LIDOCAINE HYDROCHLORIDE 50 MG: 20 INJECTION, SOLUTION EPIDURAL; INFILTRATION; INTRACAUDAL; PERINEURAL at 09:31

## 2023-03-23 RX ADMIN — HYDROMORPHONE HYDROCHLORIDE 0.5 MG: 1 INJECTION, SOLUTION INTRAMUSCULAR; INTRAVENOUS; SUBCUTANEOUS at 18:00

## 2023-03-23 RX ADMIN — LAMOTRIGINE 250 MG: 100 TABLET ORAL at 08:01

## 2023-03-23 RX ADMIN — MORPHINE SULFATE 2 MG: 2 INJECTION, SOLUTION INTRAMUSCULAR; INTRAVENOUS at 07:55

## 2023-03-23 RX ADMIN — HYDROMORPHONE HYDROCHLORIDE 0.5 MG: 1 INJECTION, SOLUTION INTRAMUSCULAR; INTRAVENOUS; SUBCUTANEOUS at 11:58

## 2023-03-23 RX ADMIN — FOLIC ACID 1000 MCG: 1 TABLET ORAL at 08:03

## 2023-03-23 RX ADMIN — ROCURONIUM BROMIDE 40 MG: 10 INJECTION, SOLUTION INTRAVENOUS at 09:31

## 2023-03-23 RX ADMIN — GUAIFENESIN AND DEXTROMETHORPHAN 10 ML: 100; 10 SYRUP ORAL at 08:00

## 2023-03-23 RX ADMIN — INSULIN LISPRO 4 UNITS: 100 INJECTION, SOLUTION INTRAVENOUS; SUBCUTANEOUS at 16:48

## 2023-03-23 RX ADMIN — SODIUM CHLORIDE 75 ML/HR: 0.9 INJECTION, SOLUTION INTRAVENOUS at 03:39

## 2023-03-23 RX ADMIN — OXYCODONE HYDROCHLORIDE 10 MG: 10 TABLET ORAL at 00:36

## 2023-03-23 RX ADMIN — ROCURONIUM BROMIDE 10 MG: 10 INJECTION, SOLUTION INTRAVENOUS at 10:49

## 2023-03-23 RX ADMIN — DEXMEDETOMIDINE HCL 8 MCG: 100 INJECTION INTRAVENOUS at 11:26

## 2023-03-23 RX ADMIN — INSULIN LISPRO 2 UNITS: 100 INJECTION, SOLUTION INTRAVENOUS; SUBCUTANEOUS at 22:16

## 2023-03-23 RX ADMIN — FENTANYL CITRATE 50 MCG: 50 INJECTION INTRAMUSCULAR; INTRAVENOUS at 13:23

## 2023-03-23 RX ADMIN — GABAPENTIN 100 MG: 100 CAPSULE ORAL at 16:29

## 2023-03-23 RX ADMIN — FENTANYL CITRATE 50 MCG: 50 INJECTION INTRAMUSCULAR; INTRAVENOUS at 14:07

## 2023-03-23 RX ADMIN — MELATONIN 9 MG: at 22:15

## 2023-03-23 RX ADMIN — PANTOPRAZOLE SODIUM 40 MG: 40 TABLET, DELAYED RELEASE ORAL at 06:04

## 2023-03-23 RX ADMIN — ACETAMINOPHEN 325MG 975 MG: 325 TABLET ORAL at 22:14

## 2023-03-23 RX ADMIN — CEFAZOLIN SODIUM 2000 MG: 2 SOLUTION INTRAVENOUS at 17:58

## 2023-03-23 RX ADMIN — ONDANSETRON 4 MG: 2 INJECTION INTRAMUSCULAR; INTRAVENOUS at 16:54

## 2023-03-23 RX ADMIN — CLONIDINE HYDROCHLORIDE 0.2 MG: 0.1 TABLET ORAL at 17:58

## 2023-03-23 NOTE — ASSESSMENT & PLAN NOTE
Background: History of mood disorder diabetes on metformin hypothyroidism and recurrent foot disease has been homeless and lost to follow-up presented to [facility] initially for diabetic foot ulcer and has been transferred here for further consulting services  · Had wounds on both feet probing to bone  · Currently on cefazolin and metronidazole  · Status post right BKA and left ray resection

## 2023-03-23 NOTE — OP NOTE
OPERATIVE REPORT  PATIENT NAME: Titus Gallo    :  1975  MRN: [id number]  Pt Location: [facility] OR ROOM 06    SURGERY DATE: 3/23/2023    Surgeon(s) and Role:  Panel 1:     * Sharri Daniels DPM - Primary     * Breanna Morales DPM - Assisting  Panel 2:     * Rajiv Tierney MD - Primary     * Chapo Figueroa DO- Assisting     * Pedro Doyle DPM    Preop Diagnosis:  Diabetic foot ulcer ([address] ) [I29 624, V52 994]    Post-Op Diagnosis Codes:     * Diabetic foot ulcer ([address] ) [E11 621, A91 786]    Procedure(s):  Left - RAY RESECTION FOOT  Right - AMPUTATION BELOW KNEE (BKA)    Specimen(s):  ID Type Source Tests Collected by Time Destination   1 : clean margin Tissue Toe, Left TISSUE EXAM Cat Toro DPM 3/23/2023 1000    2 : right leg  Tissue Leg, Right TISSUE EXAM Rajiv Tierney MD 3/23/2023 1100        Estimated Blood Loss:   300 mL    Drains:  * No LDAs found *    Anesthesia Type:   Choice    Operative Indications:  Diabetic foot ulcer ([address] ) [M41 528, L97 509]      Operative Findings:  Right below knee amputation to healthy tissue  Complications:   None    Procedure and Technique:  The patient was met identified in the preoperative holding area  She was brought to the operating room and general endotracheal anesthesia was induced  The podiatry team performed a left ray resection first, please see their separately dictated operative note  After completion of the first procedure, the patient was reprepped and draped in sterile fashion  A tourniquet was placed on the right thigh  A timeout was called and all parties were in agreement  The tourniquet was set to 250 mmHg and started  Anterior posterior skin incisions were outlined with a marking pen    The anterior skin incision was made approximately 4 fingerbreadths below the tibial tuberosity and extended medially and laterally toward the edges of the gastrocnemius muscle the skin incision was extended distally on either side parallel to the tibia creating a posterior flap  The skin and subcutaneous tissues were incised down to the level of the fascia  It was noted that the patient had large venous tributaries in the subcutaneous tissue, these were ligated and divided  The greater and short saphenous veins on the medial and posterior aspects of the leg were also ligated and divided  The fascia and the muscles were then divided with electrocautery at the same level of the anterior skin incision  The muscles in the anterior and lateral compartment were divided exposing the anterior tibial vessels which were ligated and divided using an 0 silk suture ligature  The interosseous membrane was then incised  The tibial periosteum was incised circumferentially with electrocautery at the same level of the skin and muscle division  Using a Adler the tibial periosteum was stripped proximally for approximately 2 cm  The tibia was then transected with an electric saw  The fibula was then exposed, dissected circumferentially and transected with an electric saw approximately 2 cm proximal to the tibial division  The amputation was then completed with electrocautery, transecting the soleus and gastrocnemius muscles at the same level of the posterior flap  Bleeding soleal and posterior tibial and peroneal veins were clamped and suture ligated with 0 silk suture  Sharp bony edges were then filed with an electric saw, eliminating any bony prominence over the anterior aspect of the tibia  Additional gastrocnemius muscle was transected to allow closure of her amputation stump without tension  The gastrocnemius muscle was then flapped over the tibia secured with an 0 Vicryl suture  The area was copiously irrigated, hemostasis was excellent  The fascia of the anterior and posterior muscle flaps were then per approximated with 0 Vicryl figure-of-eight sutures  2-0 Vicryl dermal sutures were then utilized to approximate the skin    Lastly the skin was closed with skin staples  The amputation stump was dressed using Xeroform, 4 x 4 gauze, Kerlix, and an Ace bandage  Instrument, needle, and sponge counts were correct and confirmed by RF wanding  The patient was extubated and brought to PACU in stable condition  Dr Ignacia Cortes was present for then entire procedure           Patient Disposition:  PACU         SIGNATURE: Vanessa Benitez DO  DATE: March 23, 2023  TIME: 1:13 PM

## 2023-03-23 NOTE — ANESTHESIA POSTPROCEDURE EVALUATION
Post-Op Assessment Note    CV Status:  Stable  Pain Score: 1    Pain management: adequate     Mental Status:  Alert and awake   Hydration Status:  Euvolemic   PONV Controlled:  Controlled   Airway Patency:  Patent      Post Op Vitals Reviewed: Yes      Staff: Anesthesiologist         No notable events documented      BP      Temp      Pulse    Resp      SpO2

## 2023-03-23 NOTE — PROGRESS NOTES
[facility] Podiatry - Progress Note  Patient: Kelley Pan 53 y o  female   MRN: [id number]  PCP: Aiden Baker MD  Unit/Bed#: E2 -01 Encounter: [id number]  Date Of Visit: 23    ASSESSMENT:    Kelley Pan is a 53 y o  female with:    1  L medial 1st MTPJ ulceration  2  L plantar hallux ulceration  3  L hallux osteomyelitis  4  R TMA wounds with osteomyelitis  5  T2DM      PLAN:    · Patient to go to OR today with Dr John Dyer for L partial 1st ray amputation  General Surgery will be performing the R BKA  · Consent signed  Risks, benefits, alternatives, and potential complications of surgery were discussed  All questions answered  No guarantees of outcome given  · Continue antibiotics  · Confirmed NPO status  · Appreciate consulting services for recommendations and management  Antibiotics: Ancef & Flagyl  Pharmacologic VTE Prophylaxis: Enoxaparin (Lovenox)   Mechanical VTE Prophylaxis: sequential compression device   Weightbearing status: Weight bearing as tolerated to LLE    Disposition: Patient will require continued inpatient stay for the above    SUBJECTIVE:     The patient was seen, evaluated, and assessed at bedside today  The patient was awake, alert, and in no acute distress  No acute events overnight  The patient reports no pain currently  She is agreeable to the surgical plan  Patient denies N/V/F/chills/SOB/CP  OBJECTIVE:     Vitals:   /58 (BP Location: Right arm)   Pulse 84   Temp 98 2 °F (36 8 °C) (Temporal)   Resp 18   SpO2 92%     Temp (24hrs), Av 1 °F (36 2 °C), Min:96 5 °F (35 8 °C), Max:98 2 °F (36 8 °C)      Physical Exam:     General:  Alert, cooperative, and in no distress  Lungs: Non labored breathing  Abdomen: Soft, non-tender  Lower extremity exam:  Cardiovascular status at baseline  Neurological status at baseline  Musculoskeletal status at baseline  No calf tenderness noted  B/L dressings left intact to OR        Additional Data: "    Labs:    Results from last 7 days   Lab Units 03/23/23  0546   WBC Thousand/uL 9 12   HEMOGLOBIN g/dL 9 4*   HEMATOCRIT % 31 2*   PLATELETS Thousands/uL 653*   NEUTROS PCT % 67   LYMPHS PCT % 20   MONOS PCT % 6   EOS PCT % 5     Results from last 7 days   Lab Units 03/23/23  0546 03/21/23  0434 03/20/23  2230   POTASSIUM mmol/L 4 2   < > 3 8   CHLORIDE mmol/L 100   < > 92*   CO2 mmol/L 29   < > 24   BUN mg/dL 10   < > 10   CREATININE mg/dL 0 65   < > 0 87   CALCIUM mg/dL 8 6   < > 9 3   ALK PHOS U/L  --   --  215*   ALT U/L  --   --  15   AST U/L  --   --  21    < > = values in this interval not displayed  * I Have Reviewed All Lab Data Listed Above  Recent Cultures (last 7 days):     Results from last 7 days   Lab Units 03/21/23  1809 03/20/23  2324 03/20/23  2250 03/20/23  2230   BLOOD CULTURE   --   --  No Growth at 24 hrs  No Growth at 24 hrs  GRAM STAIN RESULT  2+ Gram positive cocci in pairs and chains* 2+ Polys*  2+ Gram positive cocci in pairs and chains*  1+ Gram positive rods*  1+ Gram negative rods*  --   --    WOUND CULTURE  Culture too young- will reincubate 4+ Growth of Beta Hemolytic Streptococcus Group F*  --   --      Results from last 7 days   Lab Units 03/21/23  1809   ANAEROBIC CULTURE  Culture results to follow  Imaging: I have personally reviewed pertinent films in PACS  EKG, Pathology, and Other Studies: I have personally reviewed pertinent reports  ** Please Note: Portions of the record may have been created with voice recognition software  Occasional wrong word or \"sound a like\" substitutions may have occurred due to the inherent limitations of voice recognition software  Read the chart carefully and recognize, using context, where substitutions have occurred   **    "

## 2023-03-23 NOTE — ASSESSMENT & PLAN NOTE
· Patient reports she has nowhere to go after discharge and is essentially homeless  · Discussed with case management

## 2023-03-23 NOTE — ANESTHESIA PROCEDURE NOTES
Peripheral Block    Patient location during procedure: holding area  Start time: 3/23/2023 9:11 AM  Reason for block: at surgeon's request and post-op pain management  Staffing  Performed: Anesthesiologist   Anesthesiologist: Melinda Marshall,   Preanesthetic Checklist  Completed: patient identified, IV checked, site marked, risks and benefits discussed, surgical consent, monitors and equipment checked, pre-op evaluation and timeout performed  Peripheral Block  Patient position: left lateral decubitus  Prep: ChloraPrep  Patient monitoring: heart rate  Block type: sciatic and femoral  Laterality: right  Injection technique: single-shot  Procedures: ultrasound guided, Ultrasound guidance required for the procedure to increase accuracy and safety of medication placement and decrease risk of complications    Ultrasound permanent image saved  Needle  Needle type: Stimuplex   Needle gauge: 21 G  Needle length: 4 in  Needle localization: anatomical landmarks, nerve stimulator and ultrasound guidance  Assessment  Injection assessment: incremental injection, negative aspiration for heme and no paresthesia on injection  Paresthesia pain: none  Heart rate change: no  Slow fractionated injection: yes  Post-procedure:  site cleaned  patient tolerated the procedure well with no immediate complications

## 2023-03-23 NOTE — CASE MANAGEMENT
Case Management Assessment & Discharge Planning Note    Patient name Jojo Joshua  Location [facility] /E2 -* MRN [id number]  : 1975 Date 3/23/2023       Current Admission Date: 3/21/2023  Current Admission Diagnosis:Left foot diabetic ulcer   Patient Active Problem List    Diagnosis Date Noted   • Homeless single person 2023   • Seps[facility] ) 2023   • Morbid obesity with BMI of 40 0-44 9, adult ([facility] ) 2023   • Left foot diabetic ulcer 2022   • Obstructive sleep apnea 2022   • GERD (gastroesophageal reflux disease) 2021   • Migraine without status migrainosus, not intractable 2021   • Right foot ulcer, with fat layer exposed ([place] [address] ) 2021   • Arthritis 2021   • Chronic pain 2021   • OCD (obsessive compulsive disorder)    • PTSD (post-traumatic stress disorder)    • Essential hypertension 2021   • Acquired absence of other left toe(s) ([facility] ) 2019   • Diabetic foot ulcer ([place] [address] ) 09/10/2019   • Type 2 diabetes mellitus with neurologic complication, without long-term current use of insulin ([place] [address] ) 2019   • Hypothyroidism 2019   • Mood disorder ([place] [address] ) 2019   • 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, with bone involvement without evidence of necrosis ([place] [address] ) 2019   • H/O bariatric surgery 2019   • Anxiety 2019      LOS (days): 2  Geometric Mean LOS (GMLOS) (days): 2 90  Days to GMLOS:0 9     OBJECTIVE:    Risk of Unplanned Readmission Score: 16 73         Current admission status: Inpatient       Preferred Pharmacy:   [facility], [address] [address]  Phone: [phone] Fax: [phone]    Primary Care Provider: Evelyne Hendrix MD    Primary Insurance: [address] [place] [address]  Secondary Insurance:     ASSESSMENT:  [address], [address] [facility] - [facility]   Primary Phone: [phone] (Mobile)                         Readmission Root Cause  30 Day Readmission: No    Patient Information  Admitted from[de-identified] Other (comment) (homeless)  Mental Status: Alert  During Assessment patient was accompanied by: Not accompanied during assessment  Assessment information provided by[de-identified] Patient  Primary Caregiver: Self  Support Systems: Self, [address] of Residence: [address] [facility] do you live in?: [address] [facility] entry access options  Select all that apply : Other access (Comment) (homeless)  Type of Current Residence: Homeless  In the last 12 months, was there a time when you were not able to pay the mortgage or rent on time?: Yes  In the last 12 months, how many places have you lived?: 2  In the last 12 months, was there a time when you did not have a steady place to sleep or slept in a shelter (including now)?: Yes  Homeless/housing insecurity resource given?: Yes (placed in discharge folder)  Living Arrangements: Other (Comment) (homeless)  Is patient a ?: No    Activities of Daily Living Prior to Admission  Functional Status: Independent  Completes ADLs independently?: Yes  Ambulates independently?: No  Level of ambulatory dependence: Assistance (uses an assistive device)  Does patient use assisted devices?: Yes  Assisted Devices (DME) used:  Wheelchair  Does patient currently own DME?: Yes  What DME does the patient currently own?: Wheelchair  Does patient have a history of Outpatient Therapy (PT/OT)?: No  Does the patient have a history of Short-Term Rehab?: Yes ([facility])  Does patient have a history of HHC?: Yes ([facility])  Does patient currently have [place] [address]?: No         Patient Information Continued  Income Source: SSI/SSD  Does patient have prescription coverage?: Yes  Within the past 12 months, you worried that your food would run out before you got the money to buy more : Sometimes true  Within the past 12 months, the food you bought just didn't last and you didn't have money to get more : Sometimes true  Food insecurity resource given?: Yes (placed in discharge folder)  Does patient receive dialysis treatments?: No  Does patient have a history of substance abuse?: No  Does patient have a history of Mental Health Diagnosis?: Yes (Mood disorder, anxiety, depression, PTSD, OCD)  Is patient receiving treatment for mental health?: Yes  Has patient received inpatient treatment related to mental health in the last 2 years?: No         Means of Transportation  Means of Transport to Appts[de-identified] Public Transportation - Bus  In the past 12 months, has lack of transportation kept you from medical appointments or from getting medications?: No  In the past 12 months, has lack of transportation kept you from meetings, work, or from getting things needed for daily living?: No  Was application for public transport provided?: N/A        DISCHARGE DETAILS:    Discharge planning discussed with[de-identified] Patient  Freedom of Choice: Yes  Comments - Freedom of Choice: Pt is agreeable to STR  Referrals to be placed after PT/OT eval is in  CM contacted family/caregiver?: No- see comments (declined need)                                                                                   Additional Comments: DEVON met with pt at the bedside  Pt reports she just came back to the area and was staying with her son prior to returning here  Pt reports she would like to be reconnected with her CPS and ICM as they will be able to assist her with completing the application for [facility]  Pt does have money coming in, so CM will provide a list of hotels/motels that provide weekly/monthly rates  Pt will likely need rehab which she is agreeable too  DEVON explained the optioning process in case pt would need to be optioned which pt reported understanding  CM will send referrals for STR once PT/OT see

## 2023-03-23 NOTE — PROGRESS NOTES
General Surgery  Progress Note   Avinash Mead 53 y o  female MRN: [id number]  Unit/Bed#: E2 -01 Encounter: [id number]    Assessment:  78-year-old patient with nonhealing right TMA stump; plan for BKA today    Afebrile, VSS  WBC: 9 1 from 9 6; Hgb 9 4 from 7 8    Plan:  - N p o  since midnight  - Plan for right BKA as well as left TMA today  - Active type and screen  - 2 units of blood on hold per hour  - Pain nausea control as needed  - Rest of care per primary team  Subjective/Objective     Subjective: Patient seen and examined at bedside, in no acute distress  No acute events overnight; did receive 1 unit of packed red blood cells prophylactically given her downtrending hemoglobin  Patient's pain is well controlled  Had 1 episode of emesis overnight  Does have bowel function  Objective:     Vitals:Blood pressure 108/58, pulse 84, temperature 98 2 °F (36 8 °C), temperature source Temporal, resp  rate 18, SpO2 92 %, not currently breastfeeding  ,There is no height or weight on file to calculate BMI       Temp (24hrs), Av 1 °F (36 2 °C), Min:96 5 °F (35 8 °C), Max:98 2 °F (36 8 °C)  Current: Temperature: 98 2 °F (36 8 °C)      Intake/Output Summary (Last 24 hours) at 3/23/2023 9609  Last data filed at 3/23/2023 5314  Gross per 24 hour   Intake 350 ml   Output --   Net 350 ml       Invasive Devices     Peripheral Intravenous Line  Duration           Peripheral IV 23 Right Antecubital <1 day                Physical Exam:  General: No acute distress, alert and oriented, obese  CV: Well perfused, regular rate and rhythm  Lungs: Normal work of breathing, no increased respiratory effort  Abdomen: Soft, non-tender, non-distended  Extremities: S/p right TMA, significant soft tissue edema extending from the TMA stump to the mid tibia, unable to palpate right-sided distal pulses due to edema but robust Doppler signal was present of the AT and PT, ulceration of the anterior aspect of the TMA stump with exudative base    Skin: Warm, dry       Lab Results: Results: I have personally reviewed all pertinent laboratory/tests results  VTE Prophylaxis: Sequential compression device (Venodyne)  and Enoxaparin (Lovenox)    Lb Orta MD  3/23/2023

## 2023-03-23 NOTE — PROGRESS NOTES
"[facility]  Progress Note  Name: Merced Resendiz I  MRN: [id number]  Unit/Bed#: E2 -01 I Date of Admission: 3/21/2023   Date of Service: 3/23/2023 I Hospital Day: 2    Assessment/Plan   * Left foot diabetic ulcer  Assessment & Plan  Background: History of mood disorder diabetes on metformin hypothyroidism and recurrent foot disease has been homeless and lost to follow-up presented to [facility] initially for diabetic foot ulcer and has been transferred here for further consulting services  · Had wounds on both feet probing to bone  · Currently on cefazolin and metronidazole  · Status post right BKA and left ray resection    Homeless single person  Assessment & Plan  · Patient reports she has nowhere to go after discharge and is essentially homeless  · Discussed with case management    Morbid obesity with BMI of 40 0-44 9, adult ([facility] [address] )  Assessment & Plan  Estimated body mass index is 42 19 kg/m² as calculated from the following:    Height as of an earlier encounter on 3/21/23: 5' 5\" (1 651 m)  Weight as of an earlier encounter on 3/21/23: 115 kg (253 lb 8 5 oz)  GERD (gastroesophageal reflux disease)  Assessment & Plan  · Continue pantoprazole    Essential hypertension  Assessment & Plan  · Continue clonidine    Type 2 diabetes mellitus with neurologic complication, without long-term current use of insulin [facility])  Assessment & Plan  Lab Results   Component Value Date    HGBA1C 6 4 (H) 02/16/2022     Recent Labs     03/22/23  2045 03/23/23  0620 03/23/23  1320 03/23/23  1541   POCGLU 176* 143* 210* 232*     · Prior to admission on metformin  · Currently on hold in favor of sliding-scale insulin  · Diabetic neuropathy: Ran out of pregabalin 150 mg several weeks ago  Will defer to outpatient PCP to restart      Mood disorder ([facility] [address] )  Assessment & Plan  · Mood stable continue hydroxyzine lamictal ziprasidone    Hypothyroidism  Assessment & Plan  · Continue levothyroxine     " VTE Pharmacologic Prophylaxis: VTE Score: 4 Moderate Risk (Score 3-4) - Pharmacological DVT Prophylaxis Ordered: enoxaparin (Lovenox)  Patient Centered Rounds: I have performed bedside rounds with nursing staff today  Discussions with Specialists or Other Care Team Provider:     Education and Discussions with Family / Patient:     Time Spent for Care: This time was spent on one or more of the following: performing physical exam; counseling and coordination of care; obtaining or reviewing history; documenting in the medical record; reviewing/ordering tests, medications or procedures; communicating with other healthcare professionals and discussing with patient's family/caregivers  Current Length of Stay: 2 day(s)  Current Patient Status: Inpatient   Certification Statement: The patient will continue to require additional inpatient hospital stay due to Postoperative care  Discharge Plan:     Code Status: Level 1 - Full Code      Subjective:   Patient seen and examined  Complains of postoperative pain being started on PCA by surgery    Objective:   Vitals: Blood pressure 132/65, pulse (!) 108, temperature 98 3 °F (36 8 °C), temperature source Temporal, resp  rate 18, SpO2 95 %, not currently breastfeeding  Intake/Output Summary (Last 24 hours) at 3/23/2023 1736  Last data filed at 3/23/2023 1729  Gross per 24 hour   Intake 2830 ml   Output 300 ml   Net 2530 ml       Physical Exam  Vitals reviewed  Constitutional:       General: She is not in acute distress  Appearance: Normal appearance  HENT:      Head: Atraumatic  Cardiovascular:      Rate and Rhythm: Regular rhythm  Tachycardia present  Heart sounds: Normal heart sounds  Pulmonary:      Breath sounds: Decreased breath sounds present  No wheezing  Abdominal:      General: Bowel sounds are normal       Palpations: Abdomen is soft  Tenderness: There is no guarding or rebound     Musculoskeletal:         General: Deformity (Right BKA) present  No swelling  Skin:     General: Skin is warm  Neurological:      Mental Status: She is alert  Mental status is at baseline  Psychiatric:         Mood and Affect: Mood normal        Additional Data:   Labs:  Results from last 7 days   Lab Units 03/23/23  0546 03/22/23  0511 03/21/23  0434   WBC Thousand/uL 9 12 9 65 14 64*   HEMOGLOBIN g/dL 9 4* 7 8* 9 0*   PLATELETS Thousands/uL 653* 557* 662*   MCV fL 67* 66* 66*     Results from last 7 days   Lab Units 03/23/23  0546 03/22/23  0511 03/21/23  0434 03/20/23  2230   SODIUM mmol/L 134* 134* 129* 127*   POTASSIUM mmol/L 4 2 3 8 3 7 3 8   CHLORIDE mmol/L 100 101 95* 92*   CO2 mmol/L 29 28 22 24   ANION GAP mmol/L 5 5 12 11   BUN mg/dL 10 11 9 10   CREATININE mg/dL 0 65 0 68 0 81 0 87   CALCIUM mg/dL 8 6 8 2* 8 8 9 3   ALBUMIN g/dL  --   --   --  3 2*   TOTAL BILIRUBIN mg/dL  --   --   --  0 36   ALK PHOS U/L  --   --   --  215*   ALT U/L  --   --   --  15   AST U/L  --   --   --  21   EGFR ml/min/1 73sq m 106 104 86 79   GLUCOSE RANDOM mg/dL 133 116 242* 249*     Results from last 7 days   Lab Units 03/22/23  0511   MAGNESIUM mg/dL 1 8*   PHOSPHORUS mg/dL 3 3                  Results from last 7 days   Lab Units 03/21/23  0434 03/21/23  0225 03/20/23  2250   LACTIC ACID mmol/L  --  1 6 2 8*   PROCALCITONIN ng/ml 0 44*  --   --      Results from last 7 days   Lab Units 03/23/23  1541 03/23/23  1320 03/23/23  0620 03/22/23  2045 03/22/23  1503 03/22/23  1100 03/22/23  0610 03/21/23  2152 03/21/23  1727 03/21/23  1103 03/21/23  0527 03/21/23  0145   POC GLUCOSE mg/dl 232* 210* 143* 176* 208* 171* 194* 230* 157* 209* 271* 141*             * I Have Reviewed All Lab Data Listed Above  Cultures:   Results from last 7 days   Lab Units 03/21/23  1809 03/20/23  2324 03/20/23  2250 03/20/23 2230   BLOOD CULTURE   --   --  No Growth at 48 hrs  No Growth at 48 hrs     GRAM STAIN RESULT  2+ Gram positive cocci in pairs and chains* 2+ Polys*  2+ Gram positive cocci in pairs and chains*  1+ Gram positive rods*  1+ Gram negative rods*  --   --    WOUND CULTURE  4+ Growth of Beta Hemolytic Streptococcus Group F*  2+ Growth of Beta Hemolytic Streptococcus Group B*  2+ Growth of Alcaligenes faecalis* 4+ Growth of Beta Hemolytic Streptococcus Group F*  2+ Growth of Staphylococcus aureus*  2+ Growth of Alcaligenes faecalis*  --   --                  Lines/Drains:  Invasive Devices     Peripheral Intravenous Line  Duration           Peripheral IV 03/23/23 Right Arm <1 day              Telemetry:      Imaging:  Imaging Reports Reviewed Today Include:   MRI foot/forefoot toes left wo contrast    Result Date: 3/22/2023  Impression: 1  Plantar and medial skin ulceration noted at the level of the 1st MTP with associated cellulitis but no evidence of drainable abscess collection  Minimal marrow changes in this region or low suspicion for osteomyelitis  2   Status post 3rd transmetatarsal amputation with plantar skin ulceration at the level of the residual stump with associated cellulitis  No marrow change involving the 3rd metatarsal to indicate osteomyelitis at this time  3   Complete tear of the flexor hallucis longus tendon as detailed above with 3 8 cm tendon retraction  The possibility of pathologic tendon rupture should be considered given likely infective cellulitis and plantar skin ulceration in this region  Additionally, there is sesamoid bone splaying suggesting a tear of the intersesamoid ligament, again possibly pathologic  The study was marked in [facility] for immediate notification  Workstation performed: [id number]     MRI ankle/heel right wo contrast    Result Date: 3/22/2023  Impression: Status post 1st through 5th transmetatarsal amputations with prominent skin ulceration at the distal aspect of the residual forefoot with associated cellulitis    Additionally, there is a dorsal midfoot fluid collection suspicious for abscess collection that appears to drain to the ulcerated skin surface as noted above  Changes of chronic Charcot arthropathy with extensive T1 replacement signal throughout the residual 1st through 5th metatarsals, cuboid, cuneiforms, navicular and distal aspect of the talus concerning for superimposed osteomyelitis  The study was marked in [facility] for immediate notification  Workstation performed: [id number]     MRI ankle/heel left  wo contrast    Result Date: 3/22/2023  Impression: No findings of osteomyelitis in the hindfoot, midfoot and in the visualized metatarsals The forefoot, toes not included in the images Flexor hallucis longus tenosynovitis  Incomplete assessment of the distal most aspect of the flexor hallucis longus    Tenosynovitis posterior tibialis Workstation performed: [id number]       Scheduled Meds:  Current Facility-Administered Medications   Medication Dose Route Frequency Provider Last Rate   • acetaminophen  975 mg Oral Q8H Chula Obando, DO     • cefazolin  2,000 mg Intravenous Q8H Jose Obando DO 2,000 mg (03/23/23 0340)   • cloNIDine  0 2 mg Oral BID Bonny Obando, DO     • dextromethorphan-guaiFENesin  10 mL Oral Q4H PRN Bonny Obando, DO     • docusate sodium  100 mg Oral BID Bonny Obando, DO     • enoxaparin  40 mg Subcutaneous BID Bonny Obando, DO     • fenofibrate  48 mg Oral Daily Bonny Obando, DO     • folic acid  9,854 mcg Oral Daily Bonny Obando, DO     • gabapentin  100 mg Oral TID Joel Orozco DO     • HYDROmorphone   Intravenous Continuous Bonny Obando, DO     • HYDROmorphone  0 5 mg Intravenous Q3H PRN Bonny Obando, DO     • hydrOXYzine HCL  100 mg Oral HS Jose Obando, DO     • hydrOXYzine HCL  50 mg Oral Daily Bonny Obando, DO     • insulin lispro  1-5 Units Subcutaneous HS Bonny Obando, DO     • insulin lispro  2-12 Units Subcutaneous TID AC Jose Obando, DO     • lactated ringers  100 mL/hr Intravenous Continuous [place] Pol[place],  mL/hr (03/23/23 1772)   • lamoTRIgine  250 mg Oral QAM Yasmeen Polo [place], DO     • levothyroxine  50 mcg Oral Early Morning Yasmeen Polo [place], DO     • melatonin  9 mg Oral HS [place], DO     • methocarbamol  750 mg Oral Q6H [address] Jose Obando, DO     • metroNIDAZOLE  500 mg Oral Q8H [address] Jose AGUILAR [place], DO     • naloxone  0 04 mg Intravenous Q1MIN PRN [place] Pol[place], DO     • ondansetron  4 mg Intravenous Q4H PRN Yasmeen Polo [place], DO     • pantoprazole  40 mg Oral BID AC Jose Larasdontae, DO     • ziprasidone  80 mg Oral BID With Meals Stacey Borrero DO         Today, Patient Was Seen By: Taylor Dixon DO    ** Please Note: Dictation voice to text software may have been used in the creation of this document   **

## 2023-03-23 NOTE — QUICK NOTE
Post Op Check:    S/p R BKA  Complaining of significant pain in R posterior leg  No nausea or vomiting  No chest pain or sob  General: NAD  HENT: NCAT MMM  Neck: supple, no JVD  CV: nl rate  Lungs: nl wob  No resp distress  ABD: Soft, nontender, nondistended  Extrem: R BKA dressing c/d/i  Neuro: AAOx3     Plan:  Diet Bernardo/CHO Controlled; Consistent Carbohydrate Diet Level 2 (5 carb servings/75 grams CHO/meal)   Will add pca for better pain control tonight, continue multimodal analgesia    Dressing down POD2  Continue to monitor      Andre Souza, DO  Surgery, PGY-3

## 2023-03-23 NOTE — PLAN OF CARE
Problem: PAIN - ADULT  Goal: Verbalizes/displays adequate comfort level or baseline comfort level  Description: Interventions:  - Encourage patient to monitor pain and request assistance  - Assess pain using appropriate pain scale  - Administer analgesics based on type and severity of pain and evaluate response  - Implement non-pharmacological measures as appropriate and evaluate response  - Consider cultural and social influences on pain and pain management  - Notify physician/advanced practitioner if interventions unsuccessful or patient reports new pain  3/23/2023 0025 by Garo Milan RN  Outcome: Progressing  3/23/2023 0025 by Garo Milan RN  Outcome: Progressing     Problem: INFECTION - ADULT  Goal: Absence or prevention of progression during hospitalization  Description: INTERVENTIONS:  - Assess and monitor for signs and symptoms of infection  - Monitor lab/diagnostic results  - Monitor all insertion sites, i e  indwelling lines, tubes, and drains  - Monitor endotracheal if appropriate and nasal secretions for changes in amount and color  - [place] appropriate cooling/warming therapies per order  - Administer medications as ordered  - Instruct and encourage patient and family to use good hand hygiene technique  - Identify and instruct in appropriate isolation precautions for identified infection/condition  3/23/2023 0025 by Garo Milan RN  Outcome: Progressing  3/23/2023 0025 by Garo Milan RN  Outcome: Progressing     Problem: INFECTION - ADULT  Goal: Absence of fever/infection during neutropenic period  Description: INTERVENTIONS:  - Monitor WBC    3/23/2023 0025 by Garo Milan RN  Outcome: Progressing  3/23/2023 0025 by Garo Milan RN  Outcome: Progressing     Problem: SAFETY ADULT  Goal: Maintain or return to baseline ADL function  Description: INTERVENTIONS:  -  Assess patient's ability to carry out ADLs; assess patient's baseline for ADL function and identify physical deficits which impact ability to perform ADLs (bathing, care of mouth/teeth, toileting, grooming, dressing, etc )  - Assess/evaluate cause of self-care deficits   - Assess range of motion  - Assess patient's mobility; develop plan if impaired  - Assess patient's need for assistive devices and provide as appropriate  - Encourage maximum independence but intervene and supervise when necessary  - Involve family in performance of ADLs  - Assess for home care needs following discharge   - Consider OT consult to assist with ADL evaluation and planning for discharge  - Provide patient education as appropriate  3/23/2023 0025 by Christiano Blood RN  Outcome: Progressing  3/23/2023 0025 by Christiano Blood RN  Outcome: Progressing     Problem: Knowledge Deficit  Goal: Patient/family/caregiver demonstrates understanding of disease process, treatment plan, medications, and discharge instructions  Description: Complete learning assessment and assess knowledge base    Interventions:  - Provide teaching at level of understanding  - Provide teaching via preferred learning methods  3/23/2023 0025 by Christiano Blood RN  Outcome: Progressing  3/23/2023 0025 by Christiano Blood RN  Outcome: Progressing     Problem: SKIN/TISSUE INTEGRITY - ADULT  Goal: Skin Integrity remains intact(Skin Breakdown Prevention)  Description: Assess:  -Perform Yogi assessment every shift   -Clean and moisturize skin every shift   -Inspect skin when repositioning, toileting, and assisting with ADLS  -Assess extremities for adequate circulation and sensation     Bed Management:  -Have minimal linens on bed & keep smooth, unwrinkled  -Change linens as needed when moist or perspiring  -Avoid sitting or lying in one position for more than 2 hours while in bed  -Keep HOB at 30 degrees     Toileting:  -Offer bedside commode    Activity:  -Mobilize patient 3 times a day  -Encourage activity and walks on unit  -Encourage or provide ROM exercises   -Turn and reposition patient every 2 Hours  -Use appropriate equipment to lift or move patient in bed  -Consider limitation of chair time 2 hour intervals    Skin Care:  -Avoid use of baby powder, tape, friction and shearing, hot water or constrictive clothing  -Do not massage red bony areas    Next Steps:  -Teach patient strategies to minimize risks such as shifting weight    3/23/2023 0025 by Chary Santamaria RN  Outcome: Progressing  3/23/2023 0025 by Chary Santamaria RN  Outcome: Progressing     Problem: SKIN/TISSUE INTEGRITY - ADULT  Goal: Incision(s), wounds(s) or drain site(s) healing without S/S of infection  Description: INTERVENTIONS  - Assess and document dressing, incision, wound bed, drain sites and surrounding tissue  - Provide patient and family education  3/23/2023 0025 by Chary Santamaria RN  Outcome: Progressing  3/23/2023 0025 by Chary Santamaria RN  Outcome: Progressing     Problem: Nutrition/Hydration-ADULT  Goal: Nutrient/Hydration intake appropriate for improving, restoring or maintaining nutritional needs  Description: Monitor and assess patient's nutrition/hydration status for malnutrition  Collaborate with interdisciplinary team and initiate plan and interventions as ordered  Monitor patient's weight and dietary intake as ordered or per policy  Utilize nutrition screening tool and intervene as necessary  Determine patient's food preferences and provide high-protein, high-caloric foods as appropriate       INTERVENTIONS:  - Monitor oral intake, urinary output, labs, and treatment plans  - Assess nutrition and hydration status and recommend course of action  - Evaluate amount of meals eaten  - Assist patient with eating if necessary   - Allow adequate time for meals  - Recommend/ encourage appropriate diets, oral nutritional supplements, and vitamin/mineral supplements  - Order, calculate, and assess calorie counts as needed  - Recommend, monitor, and adjust tube feedings and TPN/PPN based on assessed needs  - Assess need for intravenous fluids  - Provide specific nutrition/hydration education as appropriate  - Include patient/family/caregiver in decisions related to nutrition  3/23/2023 0025 by Carlito Spears RN  Outcome: Progressing  3/23/2023 0025 by Carlito Spears RN  Outcome: Progressing

## 2023-03-23 NOTE — OP NOTE
OPERATIVE REPORT - Podiatry  PATIENT NAME: Bossman Mcgee    :  1975  MRN: [id number]  Pt Location: [facility] OR ROOM 06    SURGERY DATE: 3/23/2023    Surgeon(s) and Role:  Panel 1:     * Marlene Lerma DPM - Primary     * Venus Pena DPM - Assisting  Panel 2:     * Erik Lassiter MD - Primary    Pre-op Diagnosis:  Diabetic foot ulcer ([facility] [address] ) [A82 894, N71 429]    Post-Op Diagnosis Codes:     * Diabetic foot ulcer ([facility] [address] ) [E11 621, L97 509]    Procedure(s) (LRB):  RAY RESECTION FOOT (Left)  AMPUTATION BELOW KNEE (BKA) (Right)    Specimen(s):  ID Type Source Tests Collected by Time Destination   1 : clean margin Tissue Toe, Left TISSUE EXAM [place] Kiersten Toro DPM 3/23/2023 1000        Estimated Blood Loss:   Minimal    Drains:  * No LDAs found *    Anesthesia Type:   Choice with 20 ml of 1% Lidocaine and 0 5% Bupivacaine in a 1:1 mixture    Hemostasis:  Ankle tourniquet 250mmHg    Materials:  2-0 and 3-0 Vicryl suture    Operative Findings:  Bone of the 1st metatarsal at the amputation margins was hard, white, and viable in appearance  No purulence was encountered at the level of amputation  This procedure is an expected surgical cure of infection  Primary soft tissue closure was obtained  Complications:   None    Procedure and Technique:     Panel 1 procedure with Dr Cinthia Bellamy described below  See General Surgery op note for Panel 2 procedure with Dr Jes Delong    Under mild sedation, the patient was brought into the operating room and placed on the operating room table in the supine position  IV sedation was achieved by anesthesia team and a universal timeout was performed where all parties are in agreement of correct patient, correct procedure and correct site  A pneumatic tourniquet was then placed over the patient's left ankle with ample padding  A tubbs block was performed consisting of 20 ml of 1% Lidocaine and 0 5% Bupivacaine in a 1:1 mixture   The foot was then prepped and draped in the "usual aseptic manner  The pneumatic tourniquet was then inflated to 250mmHg  Attention was then directed to the left foot  A skin marker was used to draw out an incision encircling the base of the hallux and encircling the wound overlying the medial 1st metatarsal head  This full thickness incision was made with a 15 blade down to bone  All capsular attachments were freed at the 1st MTPJ and the hallux was disarticulated along with the excised medial wound and passed off the field in one piece  The sagittal saw was then used to make a transverse osteotomy in the metatarsal shaft with a slight obliquity  The distal 1st metatarsal was then excised and passed off the field  A clean margin sample of bone was taken from the 1st metatarsal and passed off the field as a specimen  The sesamoid bones were sharply excised and passed off the field  No purulence was encountered at the level of amputation  Bone of the 1st metatarsal at the level of amputation was hard, white, and viable in appearance  The site was irrigated with normal saline using a pulse lavage  The plantar flap of soft tissue was rotated to obtain closure of the defect from the original medial 1st metatarsal head wound  Skin closure was obtained using a combination of 2-0 and 3-0 Nylon suture  The site was dressed with xeroform, 4x4 gauze, ABD, kerlix, and 4in ACE  The tourniquet was deflated at approximately 30 min and normal hyperemic response was noted to all digits  The patient tolerated the procedure and anesthesia well without immediate complications and transferred to PACU with vital signs stable  Dr Nnamdi Hernandez was present during the entire procedure and participated in all key aspects  SIGNATURE: Omi Bennett DPM  DATE: March 23, 2023  TIME: 10:36 AM      Portions of the record may have been created with voice recognition software   Occasional wrong word or \"sound a like\" substitutions may have occurred due to the inherent limitations of " voice recognition software  Read the chart carefully and recognize, using context, where substitutions have occurred

## 2023-03-23 NOTE — ASSESSMENT & PLAN NOTE
Lab Results   Component Value Date    HGBA1C 6 4 (H) 02/16/2022     Recent Labs     03/22/23  2045 03/23/23  0620 03/23/23  1320 03/23/23  1541   POCGLU 176* 143* 210* 232*     · Prior to admission on metformin  · Currently on hold in favor of sliding-scale insulin  · Diabetic neuropathy: Ran out of pregabalin 150 mg several weeks ago  Will defer to outpatient PCP to restart

## 2023-03-24 LAB
ANION GAP SERPL CALCULATED.3IONS-SCNC: 5 MMOL/L (ref 4–13)
BACTERIA SPEC ANAEROBE CULT: ABNORMAL
BACTERIA SPEC ANAEROBE CULT: ABNORMAL
BACTERIA WND AEROBE CULT: ABNORMAL
BASOPHILS # BLD AUTO: 0.05 THOUSANDS/ÂΜL (ref 0–0.1)
BASOPHILS NFR BLD AUTO: 0 % (ref 0–1)
BUN SERPL-MCNC: 9 MG/DL (ref 5–25)
CALCIUM SERPL-MCNC: 7.9 MG/DL (ref 8.4–10.2)
CHLORIDE SERPL-SCNC: 102 MMOL/L (ref 96–108)
CO2 SERPL-SCNC: 26 MMOL/L (ref 21–32)
CREAT SERPL-MCNC: 0.64 MG/DL (ref 0.6–1.3)
EOSINOPHIL # BLD AUTO: 0.06 THOUSAND/ÂΜL (ref 0–0.61)
EOSINOPHIL NFR BLD AUTO: 1 % (ref 0–6)
ERYTHROCYTE [DISTWIDTH] IN BLOOD BY AUTOMATED COUNT: 20.2 % (ref 11.6–15.1)
GFR SERPL CREATININE-BSD FRML MDRD: 106 ML/MIN/1.73SQ M
GLUCOSE SERPL-MCNC: 140 MG/DL (ref 65–140)
GLUCOSE SERPL-MCNC: 166 MG/DL (ref 65–140)
GLUCOSE SERPL-MCNC: 173 MG/DL (ref 65–140)
GLUCOSE SERPL-MCNC: 213 MG/DL (ref 65–140)
GLUCOSE SERPL-MCNC: 231 MG/DL (ref 65–140)
GRAM STN SPEC: ABNORMAL
HCT VFR BLD AUTO: 29.5 % (ref 34.8–46.1)
HGB BLD-MCNC: 8.8 G/DL (ref 11.5–15.4)
IMM GRANULOCYTES # BLD AUTO: 0.08 THOUSAND/UL (ref 0–0.2)
IMM GRANULOCYTES NFR BLD AUTO: 1 % (ref 0–2)
LYMPHOCYTES # BLD AUTO: 1.96 THOUSANDS/ÂΜL (ref 0.6–4.47)
LYMPHOCYTES NFR BLD AUTO: 17 % (ref 14–44)
MCH RBC QN AUTO: 20.5 PG (ref 26.8–34.3)
MCHC RBC AUTO-ENTMCNC: 29.8 G/DL (ref 31.4–37.4)
MCV RBC AUTO: 69 FL (ref 82–98)
MONOCYTES # BLD AUTO: 0.88 THOUSAND/ÂΜL (ref 0.17–1.22)
MONOCYTES NFR BLD AUTO: 8 % (ref 4–12)
NEUTROPHILS # BLD AUTO: 8.3 THOUSANDS/ÂΜL (ref 1.85–7.62)
NEUTS SEG NFR BLD AUTO: 73 % (ref 43–75)
NRBC BLD AUTO-RTO: 0 /100 WBCS
PLATELET # BLD AUTO: 681 THOUSANDS/UL (ref 149–390)
PMV BLD AUTO: 8.7 FL (ref 8.9–12.7)
POTASSIUM SERPL-SCNC: 4.5 MMOL/L (ref 3.5–5.3)
RBC # BLD AUTO: 4.3 MILLION/UL (ref 3.81–5.12)
SODIUM SERPL-SCNC: 133 MMOL/L (ref 135–147)
WBC # BLD AUTO: 11.33 THOUSAND/UL (ref 4.31–10.16)

## 2023-03-24 RX ORDER — CEFTRIAXONE 2 G/50ML
2000 INJECTION, SOLUTION INTRAVENOUS EVERY 24 HOURS
Status: DISCONTINUED | OUTPATIENT
Start: 2023-03-24 | End: 2023-03-26 | Stop reason: SDUPTHER

## 2023-03-24 RX ORDER — AMOXICILLIN AND CLAVULANATE POTASSIUM 875; 125 MG/1; MG/1
1 TABLET, FILM COATED ORAL EVERY 12 HOURS SCHEDULED
Status: DISCONTINUED | OUTPATIENT
Start: 2023-03-24 | End: 2023-03-24

## 2023-03-24 RX ORDER — INSULIN LISPRO 100 [IU]/ML
5 INJECTION, SOLUTION INTRAVENOUS; SUBCUTANEOUS
Status: DISCONTINUED | OUTPATIENT
Start: 2023-03-24 | End: 2023-04-03 | Stop reason: HOSPADM

## 2023-03-24 RX ADMIN — ACETAMINOPHEN 325MG 975 MG: 325 TABLET ORAL at 05:41

## 2023-03-24 RX ADMIN — HYDROXYZINE HYDROCHLORIDE 50 MG: 25 TABLET, FILM COATED ORAL at 09:48

## 2023-03-24 RX ADMIN — FENOFIBRATE 48 MG: 48 TABLET, FILM COATED ORAL at 09:49

## 2023-03-24 RX ADMIN — CEFAZOLIN SODIUM 2000 MG: 2 SOLUTION INTRAVENOUS at 09:46

## 2023-03-24 RX ADMIN — HYDROMORPHONE HYDROCHLORIDE 0.5 MG: 1 INJECTION, SOLUTION INTRAMUSCULAR; INTRAVENOUS; SUBCUTANEOUS at 01:50

## 2023-03-24 RX ADMIN — ACETAMINOPHEN 325MG 975 MG: 325 TABLET ORAL at 21:59

## 2023-03-24 RX ADMIN — INSULIN LISPRO 4 UNITS: 100 INJECTION, SOLUTION INTRAVENOUS; SUBCUTANEOUS at 11:59

## 2023-03-24 RX ADMIN — ONDANSETRON 4 MG: 2 INJECTION INTRAMUSCULAR; INTRAVENOUS at 11:53

## 2023-03-24 RX ADMIN — METHOCARBAMOL 750 MG: 750 TABLET ORAL at 11:59

## 2023-03-24 RX ADMIN — INSULIN LISPRO 4 UNITS: 100 INJECTION, SOLUTION INTRAVENOUS; SUBCUTANEOUS at 07:40

## 2023-03-24 RX ADMIN — GABAPENTIN 100 MG: 100 CAPSULE ORAL at 16:59

## 2023-03-24 RX ADMIN — METHOCARBAMOL 750 MG: 750 TABLET ORAL at 05:41

## 2023-03-24 RX ADMIN — ENOXAPARIN SODIUM 40 MG: 100 INJECTION SUBCUTANEOUS at 17:09

## 2023-03-24 RX ADMIN — PANTOPRAZOLE SODIUM 40 MG: 40 TABLET, DELAYED RELEASE ORAL at 06:37

## 2023-03-24 RX ADMIN — METHOCARBAMOL 750 MG: 750 TABLET ORAL at 17:09

## 2023-03-24 RX ADMIN — ONDANSETRON 4 MG: 2 INJECTION INTRAMUSCULAR; INTRAVENOUS at 07:34

## 2023-03-24 RX ADMIN — DOCUSATE SODIUM 100 MG: 100 CAPSULE, LIQUID FILLED ORAL at 09:48

## 2023-03-24 RX ADMIN — GUAIFENESIN AND DEXTROMETHORPHAN 10 ML: 100; 10 SYRUP ORAL at 09:46

## 2023-03-24 RX ADMIN — CEFAZOLIN SODIUM 2000 MG: 2 SOLUTION INTRAVENOUS at 01:54

## 2023-03-24 RX ADMIN — GABAPENTIN 100 MG: 100 CAPSULE ORAL at 09:48

## 2023-03-24 RX ADMIN — CEFTRIAXONE 2000 MG: 2 INJECTION, SOLUTION INTRAVENOUS at 17:58

## 2023-03-24 RX ADMIN — CLONIDINE HYDROCHLORIDE 0.2 MG: 0.1 TABLET ORAL at 17:06

## 2023-03-24 RX ADMIN — INSULIN LISPRO 1 UNITS: 100 INJECTION, SOLUTION INTRAVENOUS; SUBCUTANEOUS at 22:03

## 2023-03-24 RX ADMIN — GUAIFENESIN AND DEXTROMETHORPHAN 10 ML: 100; 10 SYRUP ORAL at 17:04

## 2023-03-24 RX ADMIN — MELATONIN 9 MG: at 22:01

## 2023-03-24 RX ADMIN — DOCUSATE SODIUM 100 MG: 100 CAPSULE, LIQUID FILLED ORAL at 17:03

## 2023-03-24 RX ADMIN — GUAIFENESIN AND DEXTROMETHORPHAN 10 ML: 100; 10 SYRUP ORAL at 17:08

## 2023-03-24 RX ADMIN — ENOXAPARIN SODIUM 40 MG: 100 INJECTION SUBCUTANEOUS at 09:47

## 2023-03-24 RX ADMIN — HYDROXYZINE HYDROCHLORIDE 100 MG: 25 TABLET, FILM COATED ORAL at 22:00

## 2023-03-24 RX ADMIN — LEVOTHYROXINE SODIUM 50 MCG: 50 TABLET ORAL at 05:41

## 2023-03-24 RX ADMIN — LAMOTRIGINE 250 MG: 100 TABLET ORAL at 09:47

## 2023-03-24 RX ADMIN — ACETAMINOPHEN 325MG 975 MG: 325 TABLET ORAL at 16:59

## 2023-03-24 RX ADMIN — ZIPRASIDONE HCL 80 MG: 40 CAPSULE ORAL at 22:01

## 2023-03-24 RX ADMIN — PANTOPRAZOLE SODIUM 40 MG: 40 TABLET, DELAYED RELEASE ORAL at 16:59

## 2023-03-24 RX ADMIN — GABAPENTIN 100 MG: 100 CAPSULE ORAL at 22:00

## 2023-03-24 RX ADMIN — METHOCARBAMOL 750 MG: 750 TABLET ORAL at 23:22

## 2023-03-24 RX ADMIN — ZIPRASIDONE HCL 80 MG: 40 CAPSULE ORAL at 09:49

## 2023-03-24 RX ADMIN — GUAIFENESIN AND DEXTROMETHORPHAN 10 ML: 100; 10 SYRUP ORAL at 22:03

## 2023-03-24 RX ADMIN — CLONIDINE HYDROCHLORIDE 0.2 MG: 0.1 TABLET ORAL at 09:47

## 2023-03-24 RX ADMIN — METRONIDAZOLE 500 MG: 500 TABLET ORAL at 05:41

## 2023-03-24 RX ADMIN — INSULIN LISPRO 2 UNITS: 100 INJECTION, SOLUTION INTRAVENOUS; SUBCUTANEOUS at 16:55

## 2023-03-24 RX ADMIN — INSULIN LISPRO 5 UNITS: 100 INJECTION, SOLUTION INTRAVENOUS; SUBCUTANEOUS at 12:00

## 2023-03-24 RX ADMIN — INSULIN LISPRO 5 UNITS: 100 INJECTION, SOLUTION INTRAVENOUS; SUBCUTANEOUS at 16:57

## 2023-03-24 RX ADMIN — SODIUM CHLORIDE, SODIUM LACTATE, POTASSIUM CHLORIDE, AND CALCIUM CHLORIDE 100 ML/HR: .6; .31; .03; .02 INJECTION, SOLUTION INTRAVENOUS at 05:38

## 2023-03-24 RX ADMIN — FOLIC ACID 1000 MCG: 1 TABLET ORAL at 09:48

## 2023-03-24 NOTE — PHYSICAL THERAPY NOTE
PT EVALUATION    Pt  Name: Scottie Person  Pt  Age: 53 y o  MRN: [id number]  LENGTH OF STAY: 3      Admitting Diagnoses:   Sepsis ([place] [address] ) [A41 9]    Past Medical History:   Diagnosis Date    Anxiety     Arthritis     Asthma     Chronic pain     Diabetes mellitus ([place] [address] )     Hypothyroidism     Lymphedema     left leg    Manic bipolar I disorder ([place] [address] )     Morbid obesity ([place] [address] )     OCD (obsessive compulsive disorder)     PTSD (post-traumatic stress disorder)        Past Surgical History:   Procedure Laterality Date    CHOLECYSTECTOMY      CHOLECYSTECTOMY      AR AMPUTATION FOOT TRANSMETARSAL Right 9/4/2021    Procedure: AMPUTATION TRANSMETATARSAL (TMA);  Surgeon: Jonathan Love DPM;  [facility];  Service: Podiatry    AR AMPUTATION METATARSAL W/TOE SINGLE Left 9/8/2019    Procedure: PARTIAL 3RD RAY RESECTION, PSB TOE FILLET FLAP;  Surgeon: Jada Lee DPM;  [facility];  Service: Podiatry    TOE AMPUTATION Right 1/13/2021    Procedure: AMPUTATION 2ND TOE;  Surgeon: Jonathan Love DPM;  [facility];  Service: Podiatry    WOUND DEBRIDEMENT Left 2/17/2022    Procedure: DEBRIDEMENT GREAT TOE WOUND;  Surgeon: Jonathan Love DPM;  [facility];  Service: Podiatry       Imaging Studies:  MRI ankle/heel left  wo contrast   Final Result by Tong Raza MD (03/22 1600)      No findings of osteomyelitis in the hindfoot, midfoot and in the visualized metatarsals   The forefoot, toes not included in the images   Flexor hallucis longus tenosynovitis  Incomplete assessment of the distal most aspect of the flexor hallucis longus  Tenosynovitis posterior tibialis      Workstation performed: [id number]         MRI foot/forefoot toes left wo contrast   Final Result by Berta Scruggs MD (03/22 1032)   1  Plantar and medial skin ulceration noted at the level of the 1st MTP with associated cellulitis but no evidence of drainable abscess collection    Minimal marrow changes in this region or low suspicion for osteomyelitis  2   Status post 3rd transmetatarsal amputation with plantar skin ulceration at the level of the residual stump with associated cellulitis  No marrow change involving the 3rd metatarsal to indicate osteomyelitis at this time  3   Complete tear of the flexor hallucis longus tendon as detailed above with 3 8 cm tendon retraction  The possibility of pathologic tendon rupture should be considered given likely infective cellulitis and plantar skin ulceration in this region  Additionally, there is sesamoid bone splaying suggesting a tear of the intersesamoid ligament, again possibly pathologic  The study was marked in [facility] for immediate notification  Workstation performed: [id number]         MRI ankle/heel right wo contrast   Final Result by Stephanie Trent MD (03/22 1020)   Status post 1st through 5th transmetatarsal amputations with prominent skin ulceration at the distal aspect of the residual forefoot with associated cellulitis  Additionally, there is a dorsal midfoot fluid collection suspicious for abscess    collection that appears to drain to the ulcerated skin surface as noted above  Changes of chronic Charcot arthropathy with extensive T1 replacement signal throughout the residual 1st through 5th metatarsals, cuboid, cuneiforms, navicular and distal    aspect of the talus concerning for superimposed osteomyelitis  The study was marked in [facility] for immediate notification  Workstation performed: [id number]         XR foot 3+ vw left    (Results Pending)          03/24/23 1340   PT Last Visit   PT Visit Date 04/03/23   Note Type   Note type Evaluation   Additional Comments PT supine in bed pre session   Pain Assessment   Pain Assessment Tool 0-10   Pain Score 7   Pain Location/Orientation Orientation: Right;Location: Leg   Hospital Pain Intervention(s) Repositioned; Ambulation/increased activity; Emotional support; Rest Multiple Pain Sites No   Restrictions/Precautions   Weight Bearing Precautions Per Order Yes   RLE Weight Bearing Per Order NWB  (BKA)   LLE Weight Bearing Per Order NWB  (1st ray resection)   Other Precautions Cognitive; Chair Alarm; Bed Alarm;WBS;Multiple lines; Fall Risk;Pain   [address]   Additional Comments unable to obtain; w/c at baseline; pt gets easily agitated   Prior Function   Level of [place] Modified independent with wheelchair   Falls in the last 6 months 0   Comments unable to obtain further information; pt gets easily agitated; pt has son but reports unable to live w/ him   General   Additional Pertinent History S/p L 1st ray resection and R BKA 3/23/23  Pt transferred from [address] 3/21/23  Family/Caregiver Present No   Cognition   Overall Cognitive Status Impaired   Arousal/Participation Alert   Orientation Level Oriented X4   Following Commands Follows one step commands with increased time or repetition   Comments pt easily agitated  pt prefers no physical assistance/touching  Subjective   Subjective pt agreed to therapy   RUE Assessment   RUE Assessment   (see OT eval)   LUE Assessment   LUE Assessment   (see OT eval)   RLE Assessment   RLE Assessment WFL  (observable functional AROM)   LLE Assessment   LLE Assessment WFL  (observable functional AROM)   Coordination   Movements are Fluid and Coordinated 1   Sensation   (unable to assess)   Bed Mobility   Rolling R 5  Supervision   Additional items Bedrails; Increased time required   Rolling L 5  Supervision   Additional items Bedrails; Increased time required   Supine to Sit 5  Supervision   Additional items HOB elevated; Bedrails; Increased time required;Verbal cues   Sit to Supine 5  Supervision   Additional items HOB elevated; Bedrails; Increased time required;Verbal cues   Additional Comments pt does not maintain NWB of LLE during bed mobility  V/c for hand placement and safety techniques   Pt demonstrated 3 lateral scoots towards [facility] while consistently maintaining B/L LE NWB  SpO2 86-91% throughout session w/ pt asymptomatic  Pt refused supplemental O2 via NC  /75 while sitting unsupported EOB  Transfers   Additional Comments B/L LE NWB  Not assessed at this time   Ambulation/Elevation   Gait pattern Not appropriate   Ambulation/Elevation Additional Comments B/L LE NWB   Balance   Static Sitting Good   Dynamic Sitting Fair +   Endurance Deficit   Endurance Deficit No   Activity Tolerance   Activity Tolerance Patient limited by pain; Other (Comment)  (pt easily agitated but cooperated during session)   Medical Staff Made Aware OT [address] [facility]   Nurse Made Aware ANDER Hauser   Assessment   Prognosis Good   Problem List Decreased cognition;Decreased safety awareness; Impaired judgement;Decreased skin integrity;Orthopedic restrictions;Pain;Obesity   Assessment Pt 53 y o  female w/ known DM2, HTN, DM2, Anxiety and bipolar disorder, presented to [id number] [place] [address] on 3/20/23 w/ L foot pain, admitted w/ L & R diabetic foot ulcer and transferred to [facility] 3/21/23  Pt s/p L 1st ray resection and R BKA 3/23/23  Pt currently B/L LE NWB per orders  PT eval and tx order w/ up and OOB as tolerated placed  PTA, pt was independent w/ all functional mobility w/ manual w/c  Mateus Bonilla Upon evaluation, pt exhibits pain, decreased activity tolerance, decreased safety awareness, impaired judgement, fall risk, orthopedic restrictions and decreased skin integrity as noted in flow sheet  Pt demonstrated rolling bed mobility w/ Supervision and supine<>sit w/ Supervision requiring verbal cuing for proper mechanics and safety  Pt did not maintain NWB L LLE while performing bed mobiltiy despite provided precaution education  Pt was able to perform lateral scoots towards [facility] w/ Supervision  Pt consistently maintained B/L LE NWB during lateral scoots  Pt denies complaints of dizziness, lightheadedness or SOB   SpO2 86-91% throughout session w/ pt asymptomatic  Pt refused supplemental O2 via NC  /75 while sitting unsupported EOB  The above mentioned impairments limit pt's ability for independent functional mobility hence will benefit from skilled PT during hospital stay to improve function  The patient's AM-PAC Basic Mobility Inpatient Short Form Raw Score is 11   A Raw score of less than or equal to 16 suggests the patient may benefit from discharge to post-acute rehabilitation services  Please also refer to the recommendation of the Physical Therapist for safe discharge planning  PT will recommend post acute rehabilitation services upon d/c from acute care once medically managed to improve functional mobility to baseline  Education provided to pt regarding importance of PT  Continue to encourage mobilization w/ nursing as tolerated to prevent further functional decline  Nursing notified  Pt tolerated session well  Pt at end of session, in stable condition, supine in bed with all needs within reach  Co-eval with OT necessary for pt's best interest and medical complexity  Barriers to Discharge Inaccessible home environment;Decreased caregiver support   Goals   Patient Goals to be in less pain   STG Expiration Date 04/03/23   Short Term Goal #1 Within 10 days, to progress towards baseline, improve safety, and decrease caregiver burden, pt to: 1  Improve strength by at least 1 grade in all ROM   2  Improve balance by at least 1 grade  3  Improve Rolling bed mobility to Mod I while maintaining WBS  4  Improve assistance level to Mod I supine<>sit  5  Increase independent self-propelled w/c distance using safe strategy to >200ft  6  Demonstrate slide board transfer to and from w/c while maintaining WBS w/ supervision  7  Pt/ family education  PT Treatment Day 0   Plan   Treatment/Interventions Functional transfer training;LE strengthening/ROM; Therapeutic exercise; Endurance training;Patient/family training;Bed mobility;Spoke to nursing;OT   PT Frequency 3-5x/wk   Recommendation   PT Discharge Recommendation Post acute rehabilitation services   Additional Comments pt has wheelchair   [address] Mobility Inpatient   Turning in Flat Bed Without Bedrails 3   Lying on Back to Sitting on Edge of Flat Bed Without Bedrails 3   Moving Bed to Chair 2   Standing Up From Chair Using Arms 1   Walk in Room 1   Climb 3-5 Stairs With Railing 1   Basic Mobility Inpatient Raw Score 11   Basic Mobility Standardized Score 30 25   Highest Level Of Mobility   -[facility] Goal 4: Move to chair/commode   -HL Achieved 3: Sit at edge of bed   End of Consult   Patient Position at End of Consult Supine;Bed/Chair alarm activated; All needs within reach   End of Consult Comments pt in stable condition, supine in bed as requested, alarm activated, and all needs within reach     Hx/personal factors: co-morbidities, inaccessible home, dec caregiver support, mutliple lines, use of AD, dec cognition, pain, WB restrictions, fall risk, and obesity  Examination: dec mobility, dec balance, dec endurance, dec amb, risk for falls, pain, WB restrictions  Clinical: unpredictable (ongoing medical status, abnormal lab values, risk for falls, and pain mgt)  Complexity: high    Baldev Esquivel PTS

## 2023-03-24 NOTE — PROGRESS NOTES
[facility] Podiatry - Progress Note  Patient: Umair Langford 53 y o  female   MRN: [id number]  PCP: Yelena Lynne MD  Unit/Bed#: E2 -01 Encounter: [id number]  Date Of Visit: 23  Hospital Stay Days: 3    ASSESSMENT:    Umair Langford is a 53 y o  female with:    1  L medial 1st MTPJ ulceration  2  L plantar hallux ulceration  3  L hallux osteomyelitis  - s/p L First ray Resection 3/23/23  4  R TMA wounds with osteomyelitis  - s/p R BKA 3/23/23  5  T2DM        PLAN:     • Patient has pain pump and current pain is controlled  Mostly complaining of right posterior knee pain  • First postop dressing changes will be tomorrow 3/25  • Discussing placement options with case management as patient is homeless  • hgb 8 8 this morning we will continue to follow  • Resulting wound culture sensitivities will consult infectious disease for antibiotic recommendations moving forward  • Appreciate consulting services         Antibiotics: Ancef, Zosyn & Flagyl  Pharmacologic VTE Prophylaxis: Enoxaparin (Lovenox)   Mechanical VTE Prophylaxis: sequential compression device   Weightbearing status: Weight bearing as tolerated to LLE     Disposition: Patient will require continued inpatient stay for the above      SUBJECTIVE:     The patient was seen, evaluated, and assessed at bedside today  The patient was awake, alert, and in no acute distress  No acute events overnight  The patient reports feeling fine although very depressed about the legs  Patient denies N/V/F/chills/SOB/CP  OBJECTIVE:     Vitals:   /71 (BP Location: Right arm)   Pulse 88   Temp (!) 97 1 °F (36 2 °C) (Temporal)   Resp 18   SpO2 96%     Temp (24hrs), Av °F (36 7 °C), Min:96 5 °F (35 8 °C), Max:99 1 °F (37 3 °C)      Physical Exam :     General:  Alert, cooperative, and in no distress    Lower extremity exam:  Neurovascular,status at baseline    Skill skeletal: Right below-knee amputation noted    Dermatologic: Dressings left clean dry and intact      Additional Data:     Labs:    Results from last 7 days   Lab Units 03/24/23  0437   WBC Thousand/uL 11 33*   HEMOGLOBIN g/dL 8 8*   HEMATOCRIT % 29 5*   PLATELETS Thousands/uL 681*   NEUTROS PCT % 73   LYMPHS PCT % 17   MONOS PCT % 8   EOS PCT % 1     Results from last 7 days   Lab Units 03/24/23  0437 03/21/23  0434 03/20/23  2230   POTASSIUM mmol/L 4 5   < > 3 8   CHLORIDE mmol/L 102   < > 92*   CO2 mmol/L 26   < > 24   BUN mg/dL 9   < > 10   CREATININE mg/dL 0 64   < > 0 87   CALCIUM mg/dL 7 9*   < > 9 3   ALK PHOS U/L  --   --  215*   ALT U/L  --   --  15   AST U/L  --   --  21    < > = values in this interval not displayed  * I Have Reviewed All Lab Data Listed Above  Recent Cultures (last 7 days):     Results from last 7 days   Lab Units 03/21/23  1809 03/20/23  2324 03/20/23  2250 03/20/23  2230   BLOOD CULTURE   --   --  No Growth at 72 hrs  No Growth at 72 hrs  GRAM STAIN RESULT  2+ Gram positive cocci in pairs and chains* 2+ Polys*  2+ Gram positive cocci in pairs and chains*  1+ Gram positive rods*  1+ Gram negative rods*  --   --    WOUND CULTURE  4+ Growth of Beta Hemolytic Streptococcus Group F*  2+ Growth of Beta Hemolytic Streptococcus Group B*  2+ Growth of Alcaligenes faecalis* 4+ Growth of Beta Hemolytic Streptococcus Group F*  2+ Growth of Staphylococcus aureus*  2+ Growth of Alcaligenes faecalis*  2+ Growth of  --   --      Results from last 7 days   Lab Units 03/21/23  1809   ANAEROBIC CULTURE  Culture results to follow  Imaging: I have personally reviewed pertinent films in PACS  MRI foot/forefoot toes left wo contrast    Result Date: 3/22/2023  Impression: 1  Plantar and medial skin ulceration noted at the level of the 1st MTP with associated cellulitis but no evidence of drainable abscess collection  Minimal marrow changes in this region or low suspicion for osteomyelitis   2   Status post 3rd transmetatarsal amputation with plantar skin ulceration at the level of the residual stump with associated cellulitis  No marrow change involving the 3rd metatarsal to indicate osteomyelitis at this time  3   Complete tear of the flexor hallucis longus tendon as detailed above with 3 8 cm tendon retraction  The possibility of pathologic tendon rupture should be considered given likely infective cellulitis and plantar skin ulceration in this region  Additionally, there is sesamoid bone splaying suggesting a tear of the intersesamoid ligament, again possibly pathologic  The study was marked in [facility] for immediate notification  Workstation performed: [id number]     MRI ankle/heel right wo contrast    Result Date: 3/22/2023  Impression: Status post 1st through 5th transmetatarsal amputations with prominent skin ulceration at the distal aspect of the residual forefoot with associated cellulitis  Additionally, there is a dorsal midfoot fluid collection suspicious for abscess collection that appears to drain to the ulcerated skin surface as noted above  Changes of chronic Charcot arthropathy with extensive T1 replacement signal throughout the residual 1st through 5th metatarsals, cuboid, cuneiforms, navicular and distal aspect of the talus concerning for superimposed osteomyelitis  The study was marked in [facility] for immediate notification  Workstation performed: [id number]     MRI ankle/heel left  wo contrast    Result Date: 3/22/2023  Impression: No findings of osteomyelitis in the hindfoot, midfoot and in the visualized metatarsals The forefoot, toes not included in the images Flexor hallucis longus tenosynovitis  Incomplete assessment of the distal most aspect of the flexor hallucis longus  Tenosynovitis posterior tibialis Workstation performed: [id number]     EKG, Pathology, and Other Studies: I have personally reviewed pertinent reports      Active medications:  Current Facility-Administered Medications   Medication Dose Route Frequency   • acetaminophen "(TYLENOL) tablet 975 mg  975 mg Oral Q8H [facility]   • ceFAZolin (ANCEF) IVPB (premix in dextrose) 2,000 mg 50 mL  2,000 mg Intravenous Q8H   • cloNIDine (CATAPRES) tablet 0 2 mg  0 2 mg Oral BID   • dextromethorphan-guaiFENesin (ROBITUSSIN DM) oral syrup 10 mL  10 mL Oral Q4H PRN   • docusate sodium (COLACE) capsule 100 mg  100 mg Oral BID   • enoxaparin (LOVENOX) subcutaneous injection 40 mg  40 mg Subcutaneous BID   • fenofibrate (TRICOR) tablet 48 mg  48 mg Oral Daily   • folic acid (FOLVITE) tablet 1,000 mcg  1,000 mcg Oral Daily   • gabapentin (NEURONTIN) capsule 100 mg  100 mg Oral TID   • HYDROmorphone (DILAUDID) 1 mg/mL 50 mL PCA   Intravenous Continuous   • HYDROmorphone (DILAUDID) injection 0 5 mg  0 5 mg Intravenous Q3H PRN   • hydrOXYzine HCL (ATARAX) tablet 100 mg  100 mg Oral HS   • hydrOXYzine HCL (ATARAX) tablet 50 mg  50 mg Oral Daily   • insulin lispro (HumaLOG) 100 units/mL subcutaneous injection 1-5 Units  1-5 Units Subcutaneous HS   • insulin lispro (HumaLOG) 100 units/mL subcutaneous injection 2-12 Units  2-12 Units Subcutaneous TID AC   • lactated ringers infusion  100 mL/hr Intravenous Continuous   • lamoTRIgine (LaMICtal) tablet 250 mg  250 mg Oral QAM   • levothyroxine tablet 50 mcg  50 mcg Oral Early Morning   • melatonin tablet 9 mg  9 mg Oral HS   • methocarbamol (ROBAXIN) tablet 750 mg  750 mg Oral Q6H RADHA   • metroNIDAZOLE (FLAGYL) tablet 500 mg  500 mg Oral Q8H RADHA   • naloxone (NARCAN) 0 04 mg/mL syringe 0 04 mg  0 04 mg Intravenous Q1MIN PRN   • ondansetron (ZOFRAN) injection 4 mg  4 mg Intravenous Q4H PRN   • pantoprazole (PROTONIX) EC tablet 40 mg  40 mg Oral BID AC   • ziprasidone (GEODON) capsule 80 mg  80 mg Oral BID With Meals         ** Please Note: Portions of the record may have been created with voice recognition software  Occasional wrong word or \"sound a like\" substitutions may have occurred due to the inherent limitations of voice recognition software   Read the chart " carefully and recognize, using context, where substitutions have occurred   **

## 2023-03-24 NOTE — PROGRESS NOTES
Progress Note - General Surgery   Mliss Mow 53 y o  female MRN: [id number]  Unit/Bed#: E2 MS Jyoti-01 Encounter: [id number]    Assessment:  53 F p/w b/l LE nonhealing wounds s/p LLE Ray amputation and concomitant RLE BKA on 3/23  AVSS, 1L nasal cannula   Multiple urine voids    WBC 11 33  Hb 8 8    Plan:  -diet as tolerated  -pain control   -BKA dressing down 3/25  -additional care as per primary     Subjective/Objective       Subjective: moderate pain, improved with PCA dosing, tolerating diet, voiding, no fevers     Objective:     Blood pressure 122/71, pulse 88, temperature (!) 97 1 °F (36 2 °C), temperature source Temporal, resp  rate 18, SpO2 96 %, not currently breastfeeding  ,There is no height or weight on file to calculate BMI  Intake/Output Summary (Last 24 hours) at 3/24/2023 0340  Last data filed at 3/23/2023 1739  Gross per 24 hour   Intake 2480 ml   Output 600 ml   Net 1880 ml       Invasive Devices     Peripheral Intravenous Line  Duration           Peripheral IV 03/23/23 Left Arm <1 day    Peripheral IV 03/23/23 Right;Ventral (anterior) Forearm <1 day                Physical Exam:     General: NAD, morbidly obese   HEENT: normocephalic, atraumatic   Cardiovascular: RRR  Respiratory: No distress, 1lpm nasal cannula   Abdomen: soft, nontender  Extremities: RLE BKA cleanly dressed, LLE with toe amputation, site cleanly dressed   Neuro: AAOx3  Skin: warm, dry       Lab, Imaging and other studies:  I have personally reviewed pertinent lab results    , CBC:   Lab Results   Component Value Date    WBC 11 33 (H) 03/24/2023    HGB 8 8 (L) 03/24/2023    HCT 29 5 (L) 03/24/2023    MCV 69 (L) 03/24/2023     (H) 03/24/2023    MCH 20 5 (L) 03/24/2023    MCHC 29 8 (L) 03/24/2023    RDW 20 2 (H) 03/24/2023    MPV 8 7 (L) 03/24/2023    NRBC 0 03/24/2023   , CMP:   Lab Results   Component Value Date    SODIUM 133 (L) 03/24/2023    K 4 5 03/24/2023     03/24/2023    CO2 26 03/24/2023    BUN 9 03/24/2023    CREATININE 0 64 03/24/2023    CALCIUM 7 9 (L) 03/24/2023    EGFR 106 03/24/2023     VTE Pharmacologic Prophylaxis: Heparin  VTE Mechanical Prophylaxis: sequential compression device

## 2023-03-24 NOTE — ASSESSMENT & PLAN NOTE
Background: History of mood disorder, diabetes on metformin, hypothyroidism and recurrent foot disease has been homeless and lost to follow-up presented to [facility] initially for diabetic foot ulcer and has been transferred here for further consulting services  · Had wounds on both feet probing to bone  · Currently on cefazolin and metronidazole  · Status post right BKA and left ray resection  · Pending therapy evaluations and rehab placement

## 2023-03-24 NOTE — PROGRESS NOTES
"[address]  Progress Note  Name: Anjum Bridges I  MRN: [id number]  Unit/Bed#: E2 -01 I Date of Admission: 3/21/2023   Date of Service: 3/24/2023 I Hospital Day: 3    Assessment/Plan   * Left foot diabetic ulcer  Assessment & Plan  Background: History of mood disorder, diabetes on metformin, hypothyroidism and recurrent foot disease has been homeless and lost to follow-up presented to [facility] initially for diabetic foot ulcer and has been transferred here for further consulting services  · Had wounds on both feet probing to bone  · Currently on cefazolin and metronidazole  · Status post right BKA and left ray resection  · Pending therapy evaluations and rehab placement    Homeless single person  Assessment & Plan  · Patient reports she has nowhere to go after discharge and is essentially homeless  · PT/OT consulted, will need rehab, will need optioned  · Case management aware    Morbid obesity with BMI of 40 0-44 9, adult ([facility] [address] )  Assessment & Plan  Estimated body mass index is 42 19 kg/m² as calculated from the following:    Height as of an earlier encounter on 3/21/23: 5' 5\" (1 651 m)  Weight as of an earlier encounter on 3/21/23: 115 kg (253 lb 8 5 oz)      GERD (gastroesophageal reflux disease)  Assessment & Plan  · Continue pantoprazole    Essential hypertension  Assessment & Plan  · BP well controlled during hospitalization  · Continue clonidine    Type 2 diabetes mellitus with neurologic complication, without long-term current use of insulin [facility])  Assessment & Plan  Lab Results   Component Value Date    HGBA1C 6 4 (H) 02/16/2022     Recent Labs     03/23/23  1320 03/23/23  1541 03/23/23  2104 03/24/23  0628   POCGLU 210* 232* 267* 231*     · Maintained outpatient on metformin, currently on hold during hospitalization  · Continue sliding scale insulin coverage with Accu-Cheks, will start scheduled Humalog with meals given blood glucose persistently in the 200s but " appropriate on morning BMP  · Diabetic neuropathy: Ran out of pregabalin 150 mg several weeks ago  Will defer to outpatient PCP to restart  Mood disorder (HCC)  Assessment & Plan  · Mood stable continue hydroxyzine lamictal ziprasidone    Hypothyroidism  Assessment & Plan  · Continue levothyroxine             VTE Pharmacologic Prophylaxis: VTE Score: 4 Moderate Risk (Score 3-4) - Pharmacological DVT Prophylaxis Ordered: enoxaparin (Lovenox)  Patient Centered Rounds: I performed bedside rounds with nursing staff today  Discussions with Specialists or Other Care Team Provider: Case management    Education and Discussions with Family / Patient: Updated  (son) at bedside  Total Time Spent on Date of Encounter in care of patient: 35 minutes This time was spent on one or more of the following: performing physical exam; counseling and coordination of care; obtaining or reviewing history; documenting in the medical record; reviewing/ordering tests, medications or procedures; communicating with other healthcare professionals and discussing with patient's family/caregivers  Current Length of Stay: 3 day(s)  Current Patient Status: Inpatient   Certification Statement: Per podiatry  Discharge Plan: [facility] is following this patient on consult  They are medically stable for discharge when deemed appropriate by primary service  Code Status: Level 1 - Full Code    Subjective:   Patient seen and examined at bedside  Notes she is feeling okay today  Pain is controlled with PCA pump  Denies any chest pain or shortness of breath  Objective:     Vitals:   Temp (24hrs), Av °F (36 7 °C), Min:96 5 °F (35 8 °C), Max:99 1 °F (37 3 °C)    Temp:  [96 5 °F (35 8 °C)-99 1 °F (37 3 °C)] 97 1 °F (36 2 °C)  HR:  [] 88  Resp:  [18-31] 18  BP: (105-148)/(64-80) 122/71  SpO2:  [90 %-98 %] 96 %  There is no height or weight on file to calculate BMI       Input and Output Summary (last 24 hours): Intake/Output Summary (Last 24 hours) at 3/24/2023 1114  Last data filed at 3/23/2023 1739  Gross per 24 hour   Intake 1480 ml   Output 600 ml   Net 880 ml       Physical Exam:   Physical Exam  Vitals reviewed  Constitutional:       General: She is not in acute distress  HENT:      Head: Normocephalic and atraumatic  Eyes:      General: No scleral icterus  Conjunctiva/sclera: Conjunctivae normal    Cardiovascular:      Rate and Rhythm: Normal rate and regular rhythm  Heart sounds: No murmur heard  Pulmonary:      Effort: Pulmonary effort is normal  No respiratory distress  Breath sounds: Normal breath sounds  Abdominal:      General: Bowel sounds are normal  There is no distension  Palpations: Abdomen is soft  Tenderness: There is no abdominal tenderness  Musculoskeletal:      Cervical back: Neck supple  Left lower leg: No edema  Comments: Status post right BKA, left foot wrapped in dressing- clean dry intact   Skin:     General: Skin is warm and dry  Neurological:      Mental Status: She is alert and oriented to person, place, and time     Psychiatric:         Mood and Affect: Mood normal          Behavior: Behavior normal           Additional Data:     Labs:  Results from last 7 days   Lab Units 03/24/23  0437   WBC Thousand/uL 11 33*   HEMOGLOBIN g/dL 8 8*   HEMATOCRIT % 29 5*   PLATELETS Thousands/uL 681*   NEUTROS PCT % 73   LYMPHS PCT % 17   MONOS PCT % 8   EOS PCT % 1     Results from last 7 days   Lab Units 03/24/23  0437 03/21/23  0434 03/20/23  2230   SODIUM mmol/L 133*   < > 127*   POTASSIUM mmol/L 4 5   < > 3 8   CHLORIDE mmol/L 102   < > 92*   CO2 mmol/L 26   < > 24   BUN mg/dL 9   < > 10   CREATININE mg/dL 0 64   < > 0 87   ANION GAP mmol/L 5   < > 11   CALCIUM mg/dL 7 9*   < > 9 3   ALBUMIN g/dL  --   --  3 2*   TOTAL BILIRUBIN mg/dL  --   --  0 36   ALK PHOS U/L  --   --  215*   ALT U/L  --   --  15   AST U/L  --   --  21   GLUCOSE RANDOM mg/dL 140   < > 249*    < > = values in this interval not displayed  Results from last 7 days   Lab Units 03/24/23  0628 03/23/23  2104 03/23/23  1541 03/23/23  1320 03/23/23  0620 03/22/23  2045 03/22/23  1503 03/22/23  1100 03/22/23  0610 03/21/23  2152 03/21/23  1727 03/21/23  1103   POC GLUCOSE mg/dl 231* 267* 232* 210* 143* 176* 208* 171* 194* 230* 157* 209*         Results from last 7 days   Lab Units 03/21/23  0434 03/21/23  0225 03/20/23  2250   LACTIC ACID mmol/L  --  1 6 2 8*   PROCALCITONIN ng/ml 0 44*  --   --        Lines/Drains:  Invasive Devices     Peripheral Intravenous Line  Duration           Peripheral IV 03/23/23 Left Arm <1 day    Peripheral IV 03/23/23 Right;Ventral (anterior) Forearm <1 day                      Imaging: No pertinent imaging reviewed  Recent Cultures (last 7 days):   Results from last 7 days   Lab Units 03/21/23  1809 03/20/23  2324 03/20/23  2250 03/20/23  2230   BLOOD CULTURE   --   --  No Growth at 72 hrs  No Growth at 72 hrs     GRAM STAIN RESULT  2+ Gram positive cocci in pairs and chains* 2+ Polys*  2+ Gram positive cocci in pairs and chains*  1+ Gram positive rods*  1+ Gram negative rods*  --   --    WOUND CULTURE  4+ Growth of Beta Hemolytic Streptococcus Group F*  2+ Growth of Beta Hemolytic Streptococcus Group B*  2+ Growth of Alcaligenes faecalis* 4+ Growth of Beta Hemolytic Streptococcus Group F*  2+ Growth of Staphylococcus aureus*  2+ Growth of Alcaligenes faecalis*  2+ Growth of  --   --        Last 24 Hours Medication List:   Current Facility-Administered Medications   Medication Dose Route Frequency Provider Last Rate   • acetaminophen  975 mg Oral Q8H [address] Jose P Allsbrook, DO     • cefazolin  2,000 mg Intravenous Q8H Jose P Allsbrook, DO 2,000 mg (03/24/23 0946)   • cloNIDine  0 2 mg Oral BID Danyel Obando, DO     • dextromethorphan-guaiFENesin  10 mL Oral Q4H PRN Danyel Obando DO     • docusate sodium  100 mg Oral BID Danyel Cameron Allsbrook, DO     • enoxaparin  40 mg Subcutaneous BID Bassam Locus Allsbrook, DO     • fenofibrate  48 mg Oral Daily Tomas Essex, DO     • folic acid  1,798 mcg Oral Daily Bassam Locus Allsbrook, DO     • gabapentin  100 mg Oral TID Tomas Essex, DO     • HYDROmorphone   Intravenous Continuous Bassam Locus Allsbrook, DO     • HYDROmorphone  0 5 mg Intravenous Q3H PRN Bassam Locus Allsbrook, DO     • hydrOXYzine HCL  100 mg Oral HS Jose P Allsbrook, DO     • hydrOXYzine HCL  50 mg Oral Daily Bassam Locus Allsbrook, DO     • insulin lispro  1-5 Units Subcutaneous HS Jose P Allsbrook, DO     • insulin lispro  2-12 Units Subcutaneous TID AC Jose P Allsbrook, DO     • insulin lispro  5 Units Subcutaneous TID With Meals Chu Savage PA-C     • lactated ringers  100 mL/hr Intravenous Continuous Bassam Locus Allsbrook,  mL/hr (03/24/23 0434)   • lamoTRIgine  250 mg Oral QAM Bassam Locus Allsbrook, DO     • levothyroxine  50 mcg Oral Early Morning Bassam Locus Allsbrook, DO     • melatonin  9 mg Oral HS Bassam Locus Allsbrook, DO     • methocarbamol  750 mg Oral Q6H [address] Jose P Allsbrook, DO     • metroNIDAZOLE  500 mg Oral Q8H [address] Jose P Allsbrook, DO     • naloxone  0 04 mg Intravenous Q1MIN PRN Bassam Locus Allsbrook, DO     • ondansetron  4 mg Intravenous Q4H PRN Bassam Locus Allsbrook, DO     • pantoprazole  40 mg Oral BID AC Jose P Allsbrook, DO     • ziprasidone  80 mg Oral BID With Meals Tomas Essex, DO          Today, Patient Was Seen By: Sanjay Eid PA-C    **Please Note: This note may have been constructed using a voice recognition system  **

## 2023-03-24 NOTE — OCCUPATIONAL THERAPY NOTE
Occupational Therapy Evaluation     Patient Name: Adeline Merlin  AZPBA'H Date: 3/24/2023  Problem List  Principal Problem:    Left foot diabetic ulcer  Active Problems:    Hypothyroidism    Mood disorder ([place] [address] )    Type 2 diabetes mellitus with neurologic complication, without long-term current use of insulin (HCC)    Diabetic foot ulcer ([place] [address] )    Essential hypertension    GERD (gastroesophageal reflux disease)    Morbid obesity with BMI of 40 0-44 9, adult ([place] [address] )    Homeless single person    Past Medical History  Past Medical History:   Diagnosis Date    Anxiety     Arthritis     Asthma     Chronic pain     Diabetes mellitus ([place] [address] )     Hypothyroidism     Lymphedema     left leg    Manic bipolar I disorder ([place] [address] )     Morbid obesity ([place] [address] )     OCD (obsessive compulsive disorder)     PTSD (post-traumatic stress disorder)      Past Surgical History  Past Surgical History:   Procedure Laterality Date    CHOLECYSTECTOMY      CHOLECYSTECTOMY      RI AMPUTATION FOOT TRANSMETARSAL Right 9/4/2021    Procedure: AMPUTATION TRANSMETATARSAL (TMA);  Surgeon: Cleve Friend DPM;  [facility];  Service: Podiatry    RI AMPUTATION METATARSAL W/TOE SINGLE Left 9/8/2019    Procedure: PARTIAL 3RD RAY RESECTION, PSB TOE FILLET FLAP;  Surgeon: Tenisha Daniels DPM;  [facility];  Service: Podiatry    TOE AMPUTATION Right 1/13/2021    Procedure: AMPUTATION 2ND TOE;  Surgeon: Cleve Friend DPM;  [facility];  Service: Podiatry    WOUND DEBRIDEMENT Left 2/17/2022    Procedure: DEBRIDEMENT GREAT TOE WOUND;  Surgeon: Cleve Friend DPM;  [facility];  Service: Podiatry         03/24/23 1338   OT Last Visit   OT Visit Date 03/24/23   Note Type   Note type Evaluation   Pain Assessment   Pain Assessment Tool FLACC   Pain Score 10 - Worst Possible Pain   Pain Location/Orientation Orientation: Right;Location: Leg   Hospital Pain Intervention(s) Repositioned; Ambulation/increased activity; Emotional "support; Rest   Pain Rating: FLACC (Rest) - Face 0   Pain Rating: FLACC (Rest) - Legs 0   Pain Rating: FLACC (Rest) - Activity 0   Pain Rating: FLACC (Rest) - Cry 0   Pain Rating: FLACC (Rest) - Consolability 0   Score: FLACC (Rest) 0   Pain Rating: FLACC (Activity) - Face 1   Pain Rating: FLACC (Activity) - Legs 0   Pain Rating: FLACC (Activity) - Activity 0   Pain Rating: FLACC (Activity) - Cry 1   Pain Rating: FLACC (Activity) - Consolability 0   Score: FLACC (Activity) 2   Restrictions/Precautions   Weight Bearing Precautions Per Order Yes   RLE Weight Bearing Per Order NWB   LLE Weight Bearing Per Order NWB   Other Precautions Cognitive; Chair Alarm; Bed Alarm;WBS;Multiple lines; Fall Risk;Pain   [address]   Additional Comments unable to obtain; w/c at baseline; pt easily agitated   Prior Function   Level of [place] Modified independent with wheelchair; Independent with ADLs   Falls in the last 6 months 0   Comments unable to obtain further information; pt gets easily agitated; pt has son but reports unable to live w/ him, states she does NOT have a daughter   Subjective   Subjective \"No, no, no!\"   ADL   Where Assessed Edge of bed   Eating Assistance 5  Supervision/Setup   Grooming Assistance 5  Supervision/Setup   UB Bathing Assistance 5  Supervision/Setup   LB Bathing Assistance 2  Maximal [address] 5  Supervision/Setup   LB Dressing Assistance 2  Maximal [address]  1  Total Assistance   Bed Mobility   Rolling R 5  Supervision   Additional items Bedrails; Increased time required   Rolling L 5  Supervision   Additional items Bedrails; Increased time required   Supine to Sit 5  Supervision   Additional items HOB elevated; Bedrails; Increased time required;Verbal cues   Sit to Supine 5  Supervision   Additional items HOB elevated; Bedrails; Increased time required;Verbal cues   Additional Comments noncompliant with NWB of LLE " during bed mobility, however able to maintain with lateral scoot  V/c for hand placement and safety techniques  Pt demonstrated 3 lateral scoots towards HOB while consistently maintaining B/L LE NWB  SpO2 86-91% throughout session w/ pt asymptomatic  Pt refused supplemental O2 via NC  /75 while sitting unsupported EOB  Transfers   Sit to Stand Unable to assess   Additional Comments B/L LE NWB, unable   Balance   Static Sitting Good   Dynamic Sitting Fair +   Activity Tolerance   Activity Tolerance Patient limited by pain; Patient limited by fatigue; Other (Comment)  (agitated but cooperative)   Medical Staff Made Aware PT Alfornia Scheuermann, Dorthy Evangelist   Nurse Made Aware ANDER Nicole   RUE Assessment   RUE Assessment WFL  (AROM)   LUE Assessment   LUE Assessment WFL  (AROM)   Hand Function   Gross Motor Coordination Functional   Fine Motor Coordination Functional   Sensation   Light Touch No apparent deficits   Proprioception   Proprioception No apparent deficits   Vision-Basic Assessment   Current Vision Wears glasses all the time   Vision - Complex Assessment   Ocular Range of Motion Intact   Perception   Inattention/Neglect Appears intact   Cognition   Overall Cognitive Status Impaired   Arousal/Participation Arousable;Responsive   Attention Attends with cues to redirect   Orientation Level Oriented X4   Memory Unable to assess   Following Commands Follows one step commands with increased time or repetition   Assessment   Limitation Decreased ADL status; Decreased UE strength;Decreased Safe judgement during ADL;Decreased cognition;Decreased endurance;Decreased self-care trans;Decreased high-level ADLs   Prognosis Fair   Assessment Patient is a 53y o  year old female seen for OT eval s/p admit to [facility] on 3/21/2023 s/p R BKA on 3/23/2023 and L hallux osteomyelitis s/p L first ray resection 3/23/2023; NWB b/l LEs    Comorbidities affecting pt’s functional performance include a significant PMH of: hypothyroidism, mood disorder, DM2, diabetic foot ulcer, HTN, GERD, homelessness, h/o bariatric surgery, TMA, PTSD, OCD, migraines, sepsis, URI  Patient with active OT orders and activity orders for Up and OOB as tolerated  Personal factors affecting pt at time of IE include: difficulty performing ADLs and IADLs, difficulty with functional mobility/transfers  Prior to admission, patient was (I) with ADLs per pt but difficult to obtain PLOF 2* agitation/mood limitations  Despite agitation, pt does cooperate with evaluation  Upon evaluation, patient’s functional status as follows: eating: SBA, grooming: SBA, UB bathing: SBA, LB bathing: MAX A, UB dressing: SBA, LB dressing: MAX A, toileting: dependent; lateral scoots EOB: SBA, bed mobility: SBA,  sitting/standing tolerance: EOB~3 min, unable to stand 2* WB restrictions- due to the following deficits impacting occupational performance: decreased B UE strength, decreased static/dynamic sitting/standing balance, decreased activity tolerance, decreased safety awareness, and increased pain  Patient would benefit from continued skilled OT therapy while in acute setting to address deficits as defined above and maximize (I) with ADLs and functional mobility  Occupational performance areas to address include: grooming, bathing, toileting, UB/LB dressing, functional mobility, household maintenance, and medication management  Based on the aforementioned OT evaluation, functional performance deficits, and assessments, pt has been identified as a high complexity evaluation    Co treatment with PT secondary to complex medical condition of pt, possible A of 2 required to achieve and maintain transitional movements, requiring the need of skilled therapeutic intervention of 2 therapists to achieve delivery of services  The patient's raw score on the AM-PAC Daily Activity Inpatient Short Form is 14   A raw score of less than 19 suggests the patient may benefit from discharge to post-acute rehabilitation services  Please refer to the recommendation of the Occupational Therapist for safe discharge planning  Goals   Patient Goals less pain   LTG Time Frame 10-14   Plan   Treatment Interventions ADL retraining;Functional transfer training;UE strengthening/ROM; Endurance training;Patient/family training;Equipment evaluation/education; Neuromuscular reeducation; Compensatory technique education;Continued evaluation; Energy conservation; Activityengagement   Goal Expiration Date 04/07/23   OT Treatment Day 0   OT Frequency 2-3x/wk   Recommendation   OT Discharge Recommendation Post acute rehabilitation services   AM-PAC Daily Activity Inpatient   Lower Body Dressing 2   Bathing 2   Toileting 1   Upper Body Dressing 3   Grooming 3   Eating 3   Daily Activity Raw Score 14   Daily Activity Standardized Score (Calc for Raw Score >=11) 33 39   AM-PAC Applied Cognition Inpatient   Following a Speech/Presentation 3   Understanding Ordinary Conversation 3   Taking Medications 2   Remembering Where Things Are Placed or Put Away 2   Remembering List of 4-5 Errands 2   Taking Care of Complicated Tasks 2   Applied Cognition Raw Score 14   Applied Cognition Standardized Score 32 02     Occupational Therapy goals: In 7-14 days:     1- Pt will perform LB dressing with S bed level while maintaining WB precautions  2- Pt will tolerate sitting EOB 10 minutes to complete grooming tasks with S  3- Pt will complete slideboard transfer with Best in order to facilitate (I) with toileting  4- Patient will increase sitting tolerance at edge of bed to 20 minutes to complete UB ADLs @ set up assist level     5- Pt will complete bed mobility at a Mod I level w/ G balance/safety demonstrated to decrease caregiver assistance required   6- Pt will be attentive 100% of the time during ongoing cognitive assessment w/ G participation to assist w/ safe d/c planning/recommendations   7- Pt will increase BUE strength by 1MM grade via AROM/AAROM/PROM exercises to increase independence in ADLs and transfers   8- Pt will demonstrate ability to perform pressure relief techniques (ie: weight shifts, frequent changes in position, placement of positioning devices- pillows, wedges, etc) at a Mod I level after education from therapist    9- Pt will follow 100% simple one step verbal commands and be A/Ox4 consistently t/o use of external environmental cues w/ mod I     Mariola Sherwood, OTR/L

## 2023-03-24 NOTE — ASSESSMENT & PLAN NOTE
Lab Results   Component Value Date    HGBA1C 6 4 (H) 02/16/2022     Recent Labs     03/23/23  1320 03/23/23  1541 03/23/23  2104 03/24/23  0628   POCGLU 210* 232* 267* 231*     · Maintained outpatient on metformin, currently on hold during hospitalization  · Continue sliding scale insulin coverage with Accu-Cheks, will start scheduled Humalog with meals given blood glucose persistently in the 200s but appropriate on morning BMP  · Diabetic neuropathy: Ran out of pregabalin 150 mg several weeks ago  Will defer to outpatient PCP to restart

## 2023-03-24 NOTE — PLAN OF CARE
Problem: MOBILITY - ADULT  Goal: Maintain or return to baseline ADL function  Description: INTERVENTIONS:  -  Assess patient's ability to carry out ADLs; assess patient's baseline for ADL function and identify physical deficits which impact ability to perform ADLs (bathing, care of mouth/teeth, toileting, grooming, dressing, etc )  - Assess/evaluate cause of self-care deficits   - Assess range of motion  - Assess patient's mobility; develop plan if impaired  - Assess patient's need for assistive devices and provide as appropriate  - Encourage maximum independence but intervene and supervise when necessary  - Involve family in performance of ADLs  - Assess for home care needs following discharge   - Consider OT consult to assist with ADL evaluation and planning for discharge  - Provide patient education as appropriate  Outcome: Progressing  Goal: Maintains/Returns to pre admission functional level  Description: INTERVENTIONS:  - Perform BMAT or MOVE assessment daily    - Set and communicate daily mobility goal to care team and patient/family/caregiver  - Collaborate with rehabilitation services on mobility goals if consulted  - Perform Range of Motion 3 times a day  - Reposition patient every 2 hours    - Dangle patient 3 times a day  - Stand patient 3 times a day  - Ambulate patient 3 times a day  - Out of bed to chair 3 times a day   - Out of bed for meals 3 times a day  - Out of bed for toileting  - Record patient progress and toleration of activity level   Outcome: Progressing     Problem: Prexisting or High Potential for Compromised Skin Integrity  Goal: Skin integrity is maintained or improved  Description: INTERVENTIONS:  - Identify patients at risk for skin breakdown  - Assess and monitor skin integrity  - Assess and monitor nutrition and hydration status  - Monitor labs   - Assess for incontinence   - Turn and reposition patient  - Assist with mobility/ambulation  - Relieve pressure over bony prominences  - Avoid friction and shearing  - Provide appropriate hygiene as needed including keeping skin clean and dry  - Evaluate need for skin moisturizer/barrier cream  - Collaborate with interdisciplinary team   - Patient/family teaching  - Consider wound care consult   Outcome: Progressing     Problem: PAIN - ADULT  Goal: Verbalizes/displays adequate comfort level or baseline comfort level  Description: Interventions:  - Encourage patient to monitor pain and request assistance  - Assess pain using appropriate pain scale  - Administer analgesics based on type and severity of pain and evaluate response  - Implement non-pharmacological measures as appropriate and evaluate response  - Consider cultural and social influences on pain and pain management  - Notify physician/advanced practitioner if interventions unsuccessful or patient reports new pain  Outcome: Progressing     Problem: INFECTION - ADULT  Goal: Absence or prevention of progression during hospitalization  Description: INTERVENTIONS:  - Assess and monitor for signs and symptoms of infection  - Monitor lab/diagnostic results  - Monitor all insertion sites, i e  indwelling lines, tubes, and drains  - Monitor endotracheal if appropriate and nasal secretions for changes in amount and color  - [place] appropriate cooling/warming therapies per order  - Administer medications as ordered  - Instruct and encourage patient and family to use good hand hygiene technique  - Identify and instruct in appropriate isolation precautions for identified infection/condition  Outcome: Progressing  Goal: Absence of fever/infection during neutropenic period  Description: INTERVENTIONS:  - Monitor WBC    Outcome: Progressing     Problem: SAFETY ADULT  Goal: Maintain or return to baseline ADL function  Description: INTERVENTIONS:  -  Assess patient's ability to carry out ADLs; assess patient's baseline for ADL function and identify physical deficits which impact ability to perform ADLs (bathing, care of mouth/teeth, toileting, grooming, dressing, etc )  - Assess/evaluate cause of self-care deficits   - Assess range of motion  - Assess patient's mobility; develop plan if impaired  - Assess patient's need for assistive devices and provide as appropriate  - Encourage maximum independence but intervene and supervise when necessary  - Involve family in performance of ADLs  - Assess for home care needs following discharge   - Consider OT consult to assist with ADL evaluation and planning for discharge  - Provide patient education as appropriate  Outcome: Progressing  Goal: Maintains/Returns to pre admission functional level  Description: INTERVENTIONS:  - Perform BMAT or MOVE assessment daily    - Set and communicate daily mobility goal to care team and patient/family/caregiver  - Collaborate with rehabilitation services on mobility goals if consulted  - Perform Range of Motion 3 times a day  - Reposition patient every 2 hours    - Dangle patient 3 times a day  - Stand patient 3 times a day  - Ambulate patient 3 times a day  - Out of bed to chair 3 times a day   - Out of bed for meals 3 times a day  - Out of bed for toileting  - Record patient progress and toleration of activity level   Outcome: Progressing  Goal: Patient will remain free of falls  Description: INTERVENTIONS:  - Educate patient/family on patient safety including physical limitations  - Instruct patient to call for assistance with activity   - Consult OT/PT to assist with strengthening/mobility   - Keep Call bell within reach  - Keep bed low and locked with side rails adjusted as appropriate  - Keep care items and personal belongings within reach  - Initiate and maintain comfort rounds  - Make Fall Risk Sign visible to staff  - Offer Toileting every 2 Hours, in advance of need  - Initiate/Maintain bed alarm  - Obtain necessary fall risk management equipment: bed alarm  - Apply yellow socks and bracelet for high fall risk patients  - Consider moving patient to room near nurses station  Outcome: Progressing     Problem: DISCHARGE PLANNING  Goal: Discharge to home or other facility with appropriate resources  Description: INTERVENTIONS:  - Identify barriers to discharge w/patient and caregiver  - Arrange for needed discharge resources and transportation as appropriate  - Identify discharge learning needs (meds, wound care, etc )  - Arrange for interpretive services to assist at discharge as needed  - Refer to Case Management Department for coordinating discharge planning if the patient needs post-hospital services based on physician/advanced practitioner order or complex needs related to functional status, cognitive ability, or social support system  Outcome: Progressing     Problem: Knowledge Deficit  Goal: Patient/family/caregiver demonstrates understanding of disease process, treatment plan, medications, and discharge instructions  Description: Complete learning assessment and assess knowledge base    Interventions:  - Provide teaching at level of understanding  - Provide teaching via preferred learning methods  Outcome: Progressing     Problem: SKIN/TISSUE INTEGRITY - ADULT  Goal: Skin Integrity remains intact(Skin Breakdown Prevention)  Description: Assess:  -Perform Yogi assessment every shift   -Inspect skin when repositioning, toileting, and assisting with ADLS  -Assess extremities for adequate circulation and sensation     Bed Management:  -Have minimal linens on bed & keep smooth, unwrinkled  -Change linens as needed when moist or perspiring  -Avoid sitting or lying in one position for more than 2 hours while in bed  -Keep HOB at 30 degrees     Toileting:  -Offer bedside commode    Activity:  -Mobilize patient 3 times a day  -Encourage activity and walks on unit  -Encourage or provide ROM exercises   -Turn and reposition patient every 2 Hours  -Use appropriate equipment to lift or move patient in bed  -Consider limitation of chair time 2 hour intervals    Skin Care:  -Avoid use of baby powder, tape, friction and shearing, hot water or constrictive clothing  -Do not massage red bony areas    Next Steps:  -Teach patient strategies to minimize risks such as weight shifting    Outcome: Progressing  Goal: Incision(s), wounds(s) or drain site(s) healing without S/S of infection  Description: INTERVENTIONS  - Assess and document dressing, incision, wound bed, drain sites and surrounding tissue  - Provide patient and family education  - Perform skin care/dressing changes every shift   Outcome: Progressing  Goal: Pressure injury heals and does not worsen  Description: Interventions:  - Implement low air loss mattress or specialty surface (Criteria met)  - Apply silicone foam dressing  - Limit chair time to 2 hour intervals  - Use special pressure reducing interventions such as waffle cushions when in chair   - Apply fecal or urinary incontinence containment device   - Perform passive or active ROM every 8 hours  - Turn and reposition patient & offload bony prominences every 2 hours   - Utilize friction reducing device or surface for transfers   - Consider nutrition services referral as needed  Outcome: Progressing     Problem: Nutrition/Hydration-ADULT  Goal: Nutrient/Hydration intake appropriate for improving, restoring or maintaining nutritional needs  Description: Monitor and assess patient's nutrition/hydration status for malnutrition  Collaborate with interdisciplinary team and initiate plan and interventions as ordered  Monitor patient's weight and dietary intake as ordered or per policy  Utilize nutrition screening tool and intervene as necessary  Determine patient's food preferences and provide high-protein, high-caloric foods as appropriate       INTERVENTIONS:  - Monitor oral intake, urinary output, labs, and treatment plans  - Assess nutrition and hydration status and recommend course of action  - Evaluate amount of meals eaten  - Assist patient with eating if necessary   - Allow adequate time for meals  - Recommend/ encourage appropriate diets, oral nutritional supplements, and vitamin/mineral supplements  - Order, calculate, and assess calorie counts as needed  - Recommend, monitor, and adjust tube feedings and TPN/PPN based on assessed needs  - Assess need for intravenous fluids  - Provide specific nutrition/hydration education as appropriate  - Include patient/family/caregiver in decisions related to nutrition  Outcome: Progressing     Problem: COPING  Goal: Pt/Family able to verbalize concerns and demonstrate effective coping strategies  Description: INTERVENTIONS:  - Assist patient/family to identify coping skills, available support systems and cultural and spiritual values  - Provide emotional support, including active listening and acknowledgement of concerns of patient and caregivers  - Reduce environmental stimuli, as able  - Provide patient education  - Assess for spiritual pain/suffering and initiate spiritual care, including notification of Pastoral Care or [place] based community as needed  - Assess effectiveness of coping strategies  Outcome: Progressing  Goal: Will report anxiety at manageable levels  Description: INTERVENTIONS:  - Administer medication as ordered  - Teach and encourage coping skills  - Provide emotional support  - Assess patient/family for anxiety and ability to cope  Outcome: Progressing     Problem: CHANGE IN BODY IMAGE  Goal: Pt/Family communicate acceptance of loss or change in body image and expresses psychological comfort and peace  Description: INTERVENTIONS:  - Assess patient/family anxiety and grief process related to change in body image, loss of functional status and loss of sense of self  - Assess patient/family's coping skills and provide emotional, spiritual and psychosocial support  - Provide information about the patient's health status with consideration of family and cultural values  - Communicate willingness to discuss loss and facilitate grief process with patient/family as appropriate  - Emphasize sustaining relationships within family system and community, or [place]/spiritual traditions  - Refer to community support groups as appropriate  - [phone], Pastoral care or other ancillary consults as needed  Outcome: Progressing

## 2023-03-24 NOTE — PLAN OF CARE
Problem: OCCUPATIONAL THERAPY ADULT  Goal: Performs self-care activities at highest level of function for planned discharge setting  See evaluation for individualized goals  Description: Treatment Interventions: ADL retraining, Functional transfer training, UE strengthening/ROM, Endurance training, Patient/family training, Equipment evaluation/education, Neuromuscular reeducation, Compensatory technique education, Continued evaluation, Energy conservation, Activityengagement          See flowsheet documentation for full assessment, interventions and recommendations  Note: Limitation: Decreased ADL status, Decreased UE strength, Decreased Safe judgement during ADL, Decreased cognition, Decreased endurance, Decreased self-care trans, Decreased high-level ADLs  Prognosis: Fair  Assessment: Patient is a 53y o  year old female seen for OT eval s/p admit to [facility] on 3/21/2023 s/p R BKA on 3/23/2023 and L hallux osteomyelitis s/p L first ray resection 3/23/2023; NWB b/l LEs  Comorbidities affecting pt’s functional performance include a significant PMH of: hypothyroidism, mood disorder, DM2, diabetic foot ulcer, HTN, GERD, homelessness, h/o bariatric surgery, TMA, PTSD, OCD, migraines, sepsis, URI  Patient with active OT orders and activity orders for Up and OOB as tolerated  Personal factors affecting pt at time of IE include: difficulty performing ADLs and IADLs, difficulty with functional mobility/transfers  Prior to admission, patient was (I) with ADLs per pt but difficult to obtain PLOF 2* agitation/mood limitations  Despite agitation, pt does cooperate with evaluation   Upon evaluation, patient’s functional status as follows: eating: SBA, grooming: SBA, UB bathing: SBA, LB bathing: MAX A, UB dressing: SBA, LB dressing: MAX A, toileting: dependent; lateral scoots EOB: SBA, bed mobility: SBA,  sitting/standing tolerance: EOB~3 min, unable to stand 2* WB restrictions- due to the following deficits impacting occupational performance: decreased B UE strength, decreased static/dynamic sitting/standing balance, decreased activity tolerance, decreased safety awareness, and increased pain  Patient would benefit from continued skilled OT therapy while in acute setting to address deficits as defined above and maximize (I) with ADLs and functional mobility  Occupational performance areas to address include: grooming, bathing, toileting, UB/LB dressing, functional mobility, household maintenance, and medication management  Based on the aforementioned OT evaluation, functional performance deficits, and assessments, pt has been identified as a high complexity evaluation    Co treatment with PT secondary to complex medical condition of pt, possible A of 2 required to achieve and maintain transitional movements, requiring the need of skilled therapeutic intervention of 2 therapists to achieve delivery of services  The patient's raw score on the AM-PAC Daily Activity Inpatient Short Form is 14  A raw score of less than 19 suggests the patient may benefit from discharge to post-acute rehabilitation services  Please refer to the recommendation of the Occupational Therapist for safe discharge planning       OT Discharge Recommendation: Post acute rehabilitation services

## 2023-03-24 NOTE — PLAN OF CARE
Problem: PHYSICAL THERAPY ADULT  Goal: Performs mobility at highest level of function for planned discharge setting  See evaluation for individualized goals  Description: Treatment/Interventions: Functional transfer training, LE strengthening/ROM, Therapeutic exercise, Endurance training, Patient/family training, Bed mobility, Spoke to nursing, OT          See flowsheet documentation for full assessment, interventions and recommendations  Note: Prognosis: Good  Problem List: Decreased cognition, Decreased safety awareness, Impaired judgement, Decreased skin integrity, Orthopedic restrictions, Pain, Obesity  Assessment: Pt 53 y o  female w/ known DM2, HTN, DM2, Anxiety and bipolar disorder, presented to [address] [place] [address] on 3/20/23 w/ L foot pain, admitted w/ L & R diabetic foot ulcer and transferred to [facility] 3/21/23  Pt s/p L 1st ray resection and R BKA 3/23/23  Pt currently B/L LE NWB per orders  PT eval and tx order w/ up and OOB as tolerated placed  PTA, pt was independent w/ all functional mobility w/ manual w/c  Arlette Carlson Upon evaluation, pt exhibits pain, decreased activity tolerance, decreased safety awareness, impaired judgement, fall risk, orthopedic restrictions and decreased skin integrity as noted in flow sheet  Pt demonstrated rolling bed mobility w/ Supervision and supine<>sit w/ Supervision requiring verbal cuing for proper mechanics and safety  Pt did not maintain NWB L LLE while performing bed mobiltiy despite provided precaution education  Pt was able to perform lateral scoots towards [facility] w/ Supervision  Pt consistently maintained B/L LE NWB during lateral scoots  Pt denies complaints of dizziness, lightheadedness or SOB  SpO2 86-91% throughout session w/ pt asymptomatic  Pt refused supplemental O2 via NC  /75 while sitting unsupported EOB   The above mentioned impairments limit pt's ability for independent functional mobility hence will benefit from skilled PT during hospital stay to improve function  The patient's AM-PAC Basic Mobility Inpatient Short Form Raw Score is 11   A Raw score of less than or equal to 16 suggests the patient may benefit from discharge to post-acute rehabilitation services  Please also refer to the recommendation of the Physical Therapist for safe discharge planning  PT will recommend post acute rehabilitation services upon d/c from acute care once medically managed to improve functional mobility to baseline  Education provided to pt regarding importance of PT  Continue to encourage mobilization w/ nursing as tolerated to prevent further functional decline  Nursing notified  Pt tolerated session well  Pt at end of session, in stable condition, supine in bed with all needs within reach  Co-eval with OT necessary for pt's best interest and medical complexity  Barriers to Discharge: Inaccessible home environment, Decreased caregiver support     PT Discharge Recommendation: Post acute rehabilitation services    See flowsheet documentation for full assessment

## 2023-03-24 NOTE — CONSULTS
Consultation - Infectious Disease   Titus Gallo 53 y o  female MRN: [id number]  Unit/Bed#: E2 -01 Encounter: [id number]      IMPRESSION & RECOMMENDATIONS:     1  Polymicrobial Left Foot Diabetic Ulcer Infection:  - Patient is now s/p left 1st hallux resection 3/23  No OR cultures sent, clean margins felt to be obtained  Pathology pending  Per discussion with podiatry, surgical source control is assumed however they would like to complete short course for possible remaining soft tissue infection  Wound cx from 3/21 grew Group F Strep, MSSA and Alcaligenes faecalis  Blood cultures negative  - based on cultures would recommend IV Ceftriaxone 2g daily  - if to be discharged sooner, can switch to PO Cefpodoxime 400 mg BID  - would complete 5 day course post OP, through 3/28/2023    2  Right Diabetic Ulcer of TMA Stump s/p BKA  - source control achieved, no need for antibiotics    3  Penicillin Allergy:  - Listed as rash  She is tolerating Cefazolin and has tolerated Cefepime in past     4  T2DM    I have discussed the above management plan in detail with the primary service    I have performed an extensive review of the medical records in Epic including review of the notes, radiographs, and laboratory results     HISTORY OF PRESENT ILLNESS:  Reason for Consult: foot infection    HPI: Titus Gallo is a 53y o  year old female T2DM, morbid obesity, diabetic foot ulcers, HTN, PTSD who presented  to [facility] 3/20/23 with worsening of her foot wounds  She had worsening pain in right foot with notable necrotic skin  Podiatry evaluated patient and reccommended vascular surgery evaluation and was transferred to [facility] for further care  She was noted to have a right plantar TMA stump ulceration probing to bone, right distal TMA stump ulcer, left medial 1st MTPJ ulcer probing to bone, and left plantar hallux ulceration   MRIs showed dorsal midfoot abscess and extensive osteomyelitis of right foot, left foot MRI with plantar and medial skin ulceration at level of 1st MTP with cellulitis and minimal marrow changes  Right foot deemed nonsalvagable  She went to OR 3/23 and had right BKA and left 1st ray resection  Bone of the 1st metatarsal amputation margins were hard/viable in appearence without purulence and surgical cure felt to be obtained of bone infection  Patient is afebrile  Wound cultures were obtained on 3/21 and that from left foot grew Group F strep, MSSA, and Alcaligenes faecalis  Right foot wound cx had also grown Group B strep and Prevotella bivia  ID consulted to assist with antibiotic management post op for possible residual soft tissue infection  Patient did not speak to me today but did nod head to participate in interview  She denied fever or chills, abd pain, nauseas  She endorsed pain at right BKA site  REVIEW OF SYSTEMS:  A complete review of systems is negative other than that noted in the HPI      PAST MEDICAL HISTORY:  Past Medical History:   Diagnosis Date   • Anxiety    • Arthritis    • Asthma    • Chronic pain    • Diabetes mellitus ([facility] )    • Hypothyroidism    • Lymphedema     left leg   • Manic bipolar I disorder ([facility] )    • Morbid obesity (HCC)    • OCD (obsessive compulsive disorder)    • PTSD (post-traumatic stress disorder)      Past Surgical History:   Procedure Laterality Date   • CHOLECYSTECTOMY     • CHOLECYSTECTOMY     • ME AMPUTATION FOOT TRANSMETARSAL Right 9/4/2021    Procedure: AMPUTATION TRANSMETATARSAL (TMA);  Surgeon: Dena Gonzalez DPM;  [facility];  Service: Podiatry   • ME AMPUTATION METATARSAL W/TOE SINGLE Left 9/8/2019    Procedure: PARTIAL 3RD RAY RESECTION, PSB TOE FILLET FLAP;  Surgeon: Terrance Swartz DPM;  [facility];  Service: Podiatry   • TOE AMPUTATION Right 1/13/2021    Procedure: AMPUTATION 2ND TOE;  Surgeon: Dena Gonzalez DPM;  [facility];  Service: Podiatry   • WOUND DEBRIDEMENT Left 2/17/2022    Procedure: DEBRIDEMENT GREAT TOE WOUND;  Surgeon: Sylvain Campos DPM;  Location[facility];  Service: Podiatry       FAMILY HISTORY:  Non-contributory    SOCIAL HISTORY:  Social History   Social History     Substance and Sexual Activity   Alcohol Use Never     Social History     Substance and Sexual Activity   Drug Use Never     Social History     Tobacco Use   Smoking Status Former   • Packs/day: 0 50   • Years: 15 00   • Pack years: 7 50   • Types: Cigarettes   Smokeless Tobacco Never       ALLERGIES:  Allergies   Allergen Reactions   • Aspirin Vomiting   • Barium Iodide Itching     Face became very red and itchy    • Codeine Vomiting   • Latex Hives   • Penicillins Rash and Other (See Comments)     Patient has tolerated cefepime without difficulty   • Vancomycin Rash       MEDICATIONS:  All current active medications have been reviewed  PHYSICAL EXAM:  Temp:  [96 5 °F (35 8 °C)-99 1 °F (37 3 °C)] 98 °F (36 7 °C)  HR:  [] 84  Resp:  [18-31] 18  BP: (105-148)/(64-80) 124/68  SpO2:  [90 %-98 %] 93 %  Temp (24hrs), Av °F (36 7 °C), Min:96 5 °F (35 8 °C), Max:99 1 °F (37 3 °C)  Current: Temperature: 98 °F (36 7 °C)    Intake/Output Summary (Last 24 hours) at 3/24/2023 1309  Last data filed at 3/23/2023 1739  Gross per 24 hour   Intake 1480 ml   Output 300 ml   Net 1180 ml       General Appearance:  Appearing tired, nontoxic, and in no distress   Head:  Normocephalic, without obvious abnormality, atraumatic   Eyes:  Conjunctiva pink and sclera anicteric, both eyes   Nose: Nares normal   Throat: Oropharynx moist    Neck: Supple   Back:   Symmetric   Lungs:   Clear to auscultation bilaterally, respirations unlabored   Chest Wall:  No tenderness or deformity   Heart:  RRR; no murmur, rub or gallop   Abdomen:   Soft, non-tender   Extremities: Left foot wrapped; proximal left leg/knee without wounds or erythema; Right BKA present   Skin: No rashes or lesions  No draining wounds noted     Lymph nodes: Cervical, supraclavicular nodes normal   Neurologic: Alert, following commands       LABS, IMAGING, & OTHER STUDIES:  Lab Results:  I have personally reviewed pertinent labs  Results from last 7 days   Lab Units 03/24/23  0437 03/23/23  0546 03/22/23  0511   WBC Thousand/uL 11 33* 9 12 9 65   HEMOGLOBIN g/dL 8 8* 9 4* 7 8*   PLATELETS Thousands/uL 681* 653* 557*     Results from last 7 days   Lab Units 03/24/23  0437 03/23/23  0546 03/22/23  0511 03/21/23  0434 03/20/23  2230   SODIUM mmol/L 133* 134* 134*   < > 127*   POTASSIUM mmol/L 4 5 4 2 3 8   < > 3 8   CHLORIDE mmol/L 102 100 101   < > 92*   CO2 mmol/L 26 29 28   < > 24   BUN mg/dL 9 10 11   < > 10   CREATININE mg/dL 0 64 0 65 0 68   < > 0 87   EGFR ml/min/1 73sq m 106 106 104   < > 79   CALCIUM mg/dL 7 9* 8 6 8 2*   < > 9 3   AST U/L  --   --   --   --  21   ALT U/L  --   --   --   --  15   ALK PHOS U/L  --   --   --   --  215*    < > = values in this interval not displayed  Results from last 7 days   Lab Units 03/21/23  1809 03/20/23  2324 03/20/23  2250 03/20/23  2230   BLOOD CULTURE   --   --  No Growth at 72 hrs  No Growth at 72 hrs  GRAM STAIN RESULT  2+ Gram positive cocci in pairs and chains* 2+ Polys*  2+ Gram positive cocci in pairs and chains*  1+ Gram positive rods*  1+ Gram negative rods*  --   --    WOUND CULTURE  4+ Growth of Beta Hemolytic Streptococcus Group F*  2+ Growth of Beta Hemolytic Streptococcus Group B*  2+ Growth of Alcaligenes faecalis* 4+ Growth of Beta Hemolytic Streptococcus Group F*  2+ Growth of Staphylococcus aureus*  2+ Growth of Alcaligenes faecalis*  2+ Growth of  --   --      Results from last 7 days   Lab Units 03/21/23  0434   PROCALCITONIN ng/ml 0 44*       Imaging Studies:   I have personally reviewed pertinent imaging study reports and images in PACS  Other Studies:   I have personally reviewed pertinent reports    Right Foot Wound cx 3/21: Group F strep, Group B strep, Alcaligenes faecalis, Prevotella bivia    Left Foot Wound Cx 3/21: Group F Strep, MSSA, Alcaligenes faecalis  Staphylococcus aureus (2)    Antibiotic Interpretation Microscan  Method Status    Ampicillin ($$) Resistant <=2 00 ug/ml TAD Final    Cefazolin ($) Susceptible <=4 00 ug/ml TAD Final    Clindamycin ($) Resistant 0 50 ug/ml TAD Final    Erythromycin ($$$$) Resistant >4 00 ug/ml TAD Final    Gentamicin ($$) Susceptible <=1 ug/ml TAD Final    Oxacillin Susceptible <=0 25 ug/ml TAD Final    Tetracycline Resistant >8 ug/ml TAD Final    Trimethoprim + Sulfamethoxazole ($$$) Susceptible <=0 5/9 5 ug/ml TAD Final    Vancomycin ($) Susceptible 1 00 ug/ml TAD Final      Alcaligenes faecalis (3)    Antibiotic Interpretation Microscan  Method Status    ZID Performed  Yes  TAD Final    Ampicillin + Sulbactam ($)  <=4/2 ug/ml TAD Final     No CLSI Interpretations available  Provider requested additional susceptibility  This is an appended report  Vessie Balling results have been appended to a previously final verified report         Aztreonam ($$$)  Intermediate 16 ug/ml TAD Final    Cefepime ($) Susceptible 4 00 ug/ml TAD Final    Ceftazidime ($$) Susceptible <=1 ug/ml TAD Final    Ceftriaxone ($$) Susceptible <=1 00 ug/ml TAD Final    Gentamicin ($$) Susceptible <=2 ug/ml TAD Final    Piperacillin + Tazobactam ($$$) Susceptible <=8 ug/ml TAD Final    Tobramycin ($) Susceptible <=2 ug/ml TAD Final    Trimethoprim + Sulfamethoxazole ($$$) Susceptible <=0 5/9 5 ug/ml TAD Final            Kaveh Walters MD  [facility]

## 2023-03-24 NOTE — ASSESSMENT & PLAN NOTE
· Patient reports she has nowhere to go after discharge and is essentially homeless  · PT/OT consulted, will need rehab, will need optioned  · Case management aware

## 2023-03-25 LAB
ANION GAP SERPL CALCULATED.3IONS-SCNC: 5 MMOL/L (ref 4–13)
BASOPHILS # BLD AUTO: 0.07 THOUSANDS/ÂΜL (ref 0–0.1)
BASOPHILS NFR BLD AUTO: 1 % (ref 0–1)
BUN SERPL-MCNC: 5 MG/DL (ref 5–25)
CALCIUM SERPL-MCNC: 8.3 MG/DL (ref 8.4–10.2)
CHLORIDE SERPL-SCNC: 101 MMOL/L (ref 96–108)
CO2 SERPL-SCNC: 29 MMOL/L (ref 21–32)
CREAT SERPL-MCNC: 0.56 MG/DL (ref 0.6–1.3)
EOSINOPHIL # BLD AUTO: 0.61 THOUSAND/ÂΜL (ref 0–0.61)
EOSINOPHIL NFR BLD AUTO: 6 % (ref 0–6)
ERYTHROCYTE [DISTWIDTH] IN BLOOD BY AUTOMATED COUNT: 21 % (ref 11.6–15.1)
GFR SERPL CREATININE-BSD FRML MDRD: 111 ML/MIN/1.73SQ M
GLUCOSE SERPL-MCNC: 149 MG/DL (ref 65–140)
GLUCOSE SERPL-MCNC: 152 MG/DL (ref 65–140)
GLUCOSE SERPL-MCNC: 182 MG/DL (ref 65–140)
GLUCOSE SERPL-MCNC: 197 MG/DL (ref 65–140)
GLUCOSE SERPL-MCNC: 205 MG/DL (ref 65–140)
HCT VFR BLD AUTO: 31.7 % (ref 34.8–46.1)
HGB BLD-MCNC: 9.2 G/DL (ref 11.5–15.4)
IMM GRANULOCYTES # BLD AUTO: 0.1 THOUSAND/UL (ref 0–0.2)
IMM GRANULOCYTES NFR BLD AUTO: 1 % (ref 0–2)
LYMPHOCYTES # BLD AUTO: 2.31 THOUSANDS/ÂΜL (ref 0.6–4.47)
LYMPHOCYTES NFR BLD AUTO: 23 % (ref 14–44)
MAGNESIUM SERPL-MCNC: 1.8 MG/DL (ref 1.9–2.7)
MCH RBC QN AUTO: 20.1 PG (ref 26.8–34.3)
MCHC RBC AUTO-ENTMCNC: 29 G/DL (ref 31.4–37.4)
MCV RBC AUTO: 69 FL (ref 82–98)
MONOCYTES # BLD AUTO: 0.81 THOUSAND/ÂΜL (ref 0.17–1.22)
MONOCYTES NFR BLD AUTO: 8 % (ref 4–12)
NEUTROPHILS # BLD AUTO: 6.36 THOUSANDS/ÂΜL (ref 1.85–7.62)
NEUTS SEG NFR BLD AUTO: 61 % (ref 43–75)
NRBC BLD AUTO-RTO: 0 /100 WBCS
PHOSPHATE SERPL-MCNC: 1.9 MG/DL (ref 2.7–4.5)
PLATELET # BLD AUTO: 683 THOUSANDS/UL (ref 149–390)
PMV BLD AUTO: 8.3 FL (ref 8.9–12.7)
POTASSIUM SERPL-SCNC: 4.3 MMOL/L (ref 3.5–5.3)
RBC # BLD AUTO: 4.57 MILLION/UL (ref 3.81–5.12)
SODIUM SERPL-SCNC: 135 MMOL/L (ref 135–147)
WBC # BLD AUTO: 10.26 THOUSAND/UL (ref 4.31–10.16)

## 2023-03-25 RX ORDER — GABAPENTIN 300 MG/1
300 CAPSULE ORAL 3 TIMES DAILY
Status: DISCONTINUED | OUTPATIENT
Start: 2023-03-25 | End: 2023-03-26

## 2023-03-25 RX ORDER — GABAPENTIN 300 MG/1
300 CAPSULE ORAL 3 TIMES DAILY
Status: DISCONTINUED | OUTPATIENT
Start: 2023-03-26 | End: 2023-03-25

## 2023-03-25 RX ORDER — OXYCODONE HYDROCHLORIDE 10 MG/1
10 TABLET ORAL EVERY 4 HOURS PRN
Status: DISCONTINUED | OUTPATIENT
Start: 2023-03-25 | End: 2023-04-03 | Stop reason: HOSPADM

## 2023-03-25 RX ORDER — POLYETHYLENE GLYCOL 3350 17 G/17G
17 POWDER, FOR SOLUTION ORAL DAILY PRN
Status: DISCONTINUED | OUTPATIENT
Start: 2023-03-25 | End: 2023-04-03 | Stop reason: HOSPADM

## 2023-03-25 RX ORDER — OXYCODONE HYDROCHLORIDE 5 MG/1
5 TABLET ORAL EVERY 4 HOURS PRN
Status: DISCONTINUED | OUTPATIENT
Start: 2023-03-25 | End: 2023-04-03 | Stop reason: HOSPADM

## 2023-03-25 RX ADMIN — ENOXAPARIN SODIUM 40 MG: 100 INJECTION SUBCUTANEOUS at 09:36

## 2023-03-25 RX ADMIN — SODIUM CHLORIDE, SODIUM LACTATE, POTASSIUM CHLORIDE, AND CALCIUM CHLORIDE 100 ML/HR: .6; .31; .03; .02 INJECTION, SOLUTION INTRAVENOUS at 01:59

## 2023-03-25 RX ADMIN — GABAPENTIN 100 MG: 100 CAPSULE ORAL at 16:05

## 2023-03-25 RX ADMIN — GABAPENTIN 100 MG: 100 CAPSULE ORAL at 09:37

## 2023-03-25 RX ADMIN — LAMOTRIGINE 250 MG: 100 TABLET ORAL at 09:37

## 2023-03-25 RX ADMIN — HYDROXYZINE HYDROCHLORIDE 100 MG: 25 TABLET, FILM COATED ORAL at 22:07

## 2023-03-25 RX ADMIN — INSULIN LISPRO 1 UNITS: 100 INJECTION, SOLUTION INTRAVENOUS; SUBCUTANEOUS at 22:07

## 2023-03-25 RX ADMIN — ENOXAPARIN SODIUM 40 MG: 100 INJECTION SUBCUTANEOUS at 22:07

## 2023-03-25 RX ADMIN — CEFTRIAXONE 2000 MG: 2 INJECTION, SOLUTION INTRAVENOUS at 16:05

## 2023-03-25 RX ADMIN — DOCUSATE SODIUM 100 MG: 100 CAPSULE, LIQUID FILLED ORAL at 09:37

## 2023-03-25 RX ADMIN — METHOCARBAMOL 750 MG: 750 TABLET ORAL at 06:10

## 2023-03-25 RX ADMIN — FENOFIBRATE 48 MG: 48 TABLET, FILM COATED ORAL at 09:38

## 2023-03-25 RX ADMIN — HYDROMORPHONE HYDROCHLORIDE 0.5 MG: 1 INJECTION, SOLUTION INTRAMUSCULAR; INTRAVENOUS; SUBCUTANEOUS at 19:21

## 2023-03-25 RX ADMIN — OXYCODONE HYDROCHLORIDE 10 MG: 10 TABLET ORAL at 18:15

## 2023-03-25 RX ADMIN — INSULIN LISPRO 5 UNITS: 100 INJECTION, SOLUTION INTRAVENOUS; SUBCUTANEOUS at 16:34

## 2023-03-25 RX ADMIN — CLONIDINE HYDROCHLORIDE 0.2 MG: 0.1 TABLET ORAL at 09:37

## 2023-03-25 RX ADMIN — ACETAMINOPHEN 325MG 975 MG: 325 TABLET ORAL at 22:05

## 2023-03-25 RX ADMIN — PANTOPRAZOLE SODIUM 40 MG: 40 TABLET, DELAYED RELEASE ORAL at 06:10

## 2023-03-25 RX ADMIN — ZIPRASIDONE HCL 80 MG: 40 CAPSULE ORAL at 22:06

## 2023-03-25 RX ADMIN — OXYCODONE HYDROCHLORIDE 10 MG: 10 TABLET ORAL at 13:50

## 2023-03-25 RX ADMIN — MELATONIN 9 MG: at 22:05

## 2023-03-25 RX ADMIN — METHOCARBAMOL 750 MG: 750 TABLET ORAL at 19:21

## 2023-03-25 RX ADMIN — GUAIFENESIN AND DEXTROMETHORPHAN 10 ML: 100; 10 SYRUP ORAL at 17:42

## 2023-03-25 RX ADMIN — DOCUSATE SODIUM 100 MG: 100 CAPSULE, LIQUID FILLED ORAL at 18:08

## 2023-03-25 RX ADMIN — CLONIDINE HYDROCHLORIDE 0.2 MG: 0.1 TABLET ORAL at 18:08

## 2023-03-25 RX ADMIN — INSULIN LISPRO 2 UNITS: 100 INJECTION, SOLUTION INTRAVENOUS; SUBCUTANEOUS at 16:34

## 2023-03-25 RX ADMIN — ACETAMINOPHEN 325MG 975 MG: 325 TABLET ORAL at 13:37

## 2023-03-25 RX ADMIN — PANTOPRAZOLE SODIUM 40 MG: 40 TABLET, DELAYED RELEASE ORAL at 16:05

## 2023-03-25 RX ADMIN — HYDROXYZINE HYDROCHLORIDE 50 MG: 25 TABLET, FILM COATED ORAL at 09:37

## 2023-03-25 RX ADMIN — GABAPENTIN 300 MG: 300 CAPSULE ORAL at 22:06

## 2023-03-25 RX ADMIN — ACETAMINOPHEN 325MG 975 MG: 325 TABLET ORAL at 06:09

## 2023-03-25 RX ADMIN — ZIPRASIDONE HCL 80 MG: 40 CAPSULE ORAL at 09:38

## 2023-03-25 RX ADMIN — GUAIFENESIN AND DEXTROMETHORPHAN 10 ML: 100; 10 SYRUP ORAL at 04:39

## 2023-03-25 RX ADMIN — METHOCARBAMOL 750 MG: 750 TABLET ORAL at 13:37

## 2023-03-25 RX ADMIN — INSULIN LISPRO 5 UNITS: 100 INJECTION, SOLUTION INTRAVENOUS; SUBCUTANEOUS at 07:27

## 2023-03-25 RX ADMIN — INSULIN LISPRO 4 UNITS: 100 INJECTION, SOLUTION INTRAVENOUS; SUBCUTANEOUS at 07:27

## 2023-03-25 RX ADMIN — FOLIC ACID 1000 MCG: 1 TABLET ORAL at 09:37

## 2023-03-25 RX ADMIN — LEVOTHYROXINE SODIUM 50 MCG: 50 TABLET ORAL at 06:09

## 2023-03-25 RX ADMIN — GUAIFENESIN AND DEXTROMETHORPHAN 10 ML: 100; 10 SYRUP ORAL at 09:22

## 2023-03-25 NOTE — ASSESSMENT & PLAN NOTE
· Patient reports she has nowhere to go after discharge and is essentially homeless  · PT/OT following  · Case management aware

## 2023-03-25 NOTE — ASSESSMENT & PLAN NOTE
Background: History of mood disorder, diabetes on metformin, hypothyroidism and recurrent foot disease has been homeless and lost to follow-up presented to [facility] initially for diabetic foot ulcer and has been transferred here for further consulting services  · Had wounds on both feet probing to bone  · Continue IV Rocephin  · Status post right BKA and left ray resection  · PT/OT consulted, pending safe discharge planning

## 2023-03-25 NOTE — CASE MANAGEMENT
Case Management Discharge Planning Note    Patient name Juancarlos Roman  Location [facility] /E2 -* MRN [id number]  : 1975 Date 3/25/2023       Current Admission Date: 3/21/2023  Current Admission Diagnosis:Left foot diabetic ulcer   Patient Active Problem List    Diagnosis Date Noted   • Homeless single person 2023   • Sepsis ([facility] ) 2023   • Morbid obesity with BMI of 40 0-44 9, adult ([facility] ) 2023   • Left foot diabetic ulcer 2022   • Obstructive sleep apnea 2022   • GERD (gastroesophageal reflux disease) 2021   • Migraine without status migrainosus, not intractable 2021   • Right foot ulcer, with fat layer exposed ([place] [address] ) 2021   • Arthritis 2021   • Chronic pain 2021   • OCD (obsessive compulsive disorder)    • PTSD (post-traumatic stress disorder)    • Essential hypertension 2021   • Acquired absence of other left toe(s) ([facility] ) 2019   • Diabetic foot ulcer ([place] [address] ) 09/10/2019   • Type 2 diabetes mellitus with neurologic complication, without long-term current use of insulin ([place] [address] ) 2019   • Hypothyroidism 2019   • Mood disorder ([place] [address] ) 2019   • 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, with bone involvement without evidence of necrosis ([place] [address] ) 2019   • H/O bariatric surgery 2019   • Anxiety 2019      LOS (days): 4  Geometric Mean LOS (GMLOS) (days): 2 90  Days to GMLOS:-0 8     OBJECTIVE:  Risk of Unplanned Readmission Score: 15 2         Current admission status: Inpatient   Preferred Pharmacy:   [facility], [address] [place] [address]  Phone: [phone] Fax: [phone]    Primary Care Provider: Galen Ghosh MD    Primary Insurance: [address] [place] [address]  Secondary Insurance:     DISCHARGE DETAILS:                                          Other Referral/Resources/Interventions Provided:  Interventions: Short Term Rehab  Referral Comments: Referrals placed via aidin

## 2023-03-25 NOTE — PROGRESS NOTES
[facility] Podiatry - Progress Note  Patient: Juancarlos Roman 53 y o  female   MRN: [id number]  PCP: Galen Ghosh MD  Unit/Bed#: E2 -01 Encounter: [id number]  Date Of Visit: 23  Hospital Stay Days: 4    ASSESSMENT:    Juancarlos Roman is a 53 y o  female with:    1  L medial 1st MTPJ ulceration  2  L plantar hallux ulceration  3  L hallux osteomyelitis  - s/p L First ray Resection 3/23/23  4  R TMA wounds with osteomyelitis  - s/p R BKA 3/23/23  5  T2DM        PLAN:     • Patient has pain pump and current pain is controlled  • Dressing change left foot perfusion appears adequate, some concerning drainage  We will follow-up tomorrow with dressing and evaluation  • Infectious disease on board, consult note pending  They changed to ceftriaxone for 5 days postop  • Case management follow-up for placement  • Appreciate consulting services        Antibiotics: Ceftriaxone per ID  Pharmacologic VTE Prophylaxis: Enoxaparin (Lovenox)   Mechanical VTE Prophylaxis: sequential compression device   Weightbearing status: Weight bearing as tolerated to LLE     Disposition: Patient will require continued inpatient stay for the above      SUBJECTIVE:     The patient was seen, evaluated, and assessed at bedside today  The patient was awake, alert, and in no acute distress  No acute events overnight  The patient reports pain to the left lower extremity    Patient denies N/V/F/chills/SOB/CP  OBJECTIVE:     Vitals:   /96 (BP Location: Right arm)   Pulse 89   Temp 98 7 °F (37 1 °C) (Temporal)   Resp 19   SpO2 94%     Temp (24hrs), Av 7 °F (36 5 °C), Min:96 6 °F (35 9 °C), Max:98 7 °F (37 1 °C)      Physical Exam :     General:  Alert, cooperative, and in no distress  Lower extremity exam:  Neurologic, Vascular, musculoskeletal status at baseline    Dermatologic: Capillary refill is normal to dorsal and plantar skin    Some seropurulent drainage from the plantar medial aspect of the incision  Clinical Images 03/25/23:              Additional Data:     Labs:    Results from last 7 days   Lab Units 03/25/23  0443   WBC Thousand/uL 10 26*   HEMOGLOBIN g/dL 9 2*   HEMATOCRIT % 31 7*   PLATELETS Thousands/uL 683*   NEUTROS PCT % 61   LYMPHS PCT % 23   MONOS PCT % 8   EOS PCT % 6     Results from last 7 days   Lab Units 03/25/23  0443 03/21/23  0434 03/20/23  2230   POTASSIUM mmol/L 4 3   < > 3 8   CHLORIDE mmol/L 101   < > 92*   CO2 mmol/L 29   < > 24   BUN mg/dL 5   < > 10   CREATININE mg/dL 0 56*   < > 0 87   CALCIUM mg/dL 8 3*   < > 9 3   ALK PHOS U/L  --   --  215*   ALT U/L  --   --  15   AST U/L  --   --  21    < > = values in this interval not displayed  * I Have Reviewed All Lab Data Listed Above  Recent Cultures (last 7 days):     Results from last 7 days   Lab Units 03/21/23  1809 03/20/23  2324 03/20/23  2250 03/20/23  2230   BLOOD CULTURE   --   --  No Growth After 4 Days  No Growth After 4 Days  GRAM STAIN RESULT  2+ Gram positive cocci in pairs and chains* 2+ Polys*  2+ Gram positive cocci in pairs and chains*  1+ Gram positive rods*  1+ Gram negative rods*  --   --    WOUND CULTURE  4+ Growth of Beta Hemolytic Streptococcus Group F*  2+ Growth of Beta Hemolytic Streptococcus Group B*  2+ Growth of Alcaligenes faecalis*  1+ Growth of Staphylococcus aureus* 4+ Growth of Beta Hemolytic Streptococcus Group F*  2+ Growth of Staphylococcus aureus*  2+ Growth of Alcaligenes faecalis*  2+ Growth of  --   --      Results from last 7 days   Lab Units 03/21/23  1809   ANAEROBIC CULTURE  4+ Growth of  4+ Growth of Prevotella bivia*       Imaging: I have personally reviewed pertinent films in PACS  MRI foot/forefoot toes left wo contrast    Result Date: 3/22/2023  Impression: 1  Plantar and medial skin ulceration noted at the level of the 1st MTP with associated cellulitis but no evidence of drainable abscess collection    Minimal marrow changes in this region or low suspicion for osteomyelitis  2   Status post 3rd transmetatarsal amputation with plantar skin ulceration at the level of the residual stump with associated cellulitis  No marrow change involving the 3rd metatarsal to indicate osteomyelitis at this time  3   Complete tear of the flexor hallucis longus tendon as detailed above with 3 8 cm tendon retraction  The possibility of pathologic tendon rupture should be considered given likely infective cellulitis and plantar skin ulceration in this region  Additionally, there is sesamoid bone splaying suggesting a tear of the intersesamoid ligament, again possibly pathologic  The study was marked in [facility] for immediate notification  Workstation performed: [id number]     MRI ankle/heel right wo contrast    Result Date: 3/22/2023  Impression: Status post 1st through 5th transmetatarsal amputations with prominent skin ulceration at the distal aspect of the residual forefoot with associated cellulitis  Additionally, there is a dorsal midfoot fluid collection suspicious for abscess collection that appears to drain to the ulcerated skin surface as noted above  Changes of chronic Charcot arthropathy with extensive T1 replacement signal throughout the residual 1st through 5th metatarsals, cuboid, cuneiforms, navicular and distal aspect of the talus concerning for superimposed osteomyelitis  The study was marked in [facility] for immediate notification  Workstation performed: [id number]     MRI ankle/heel left  wo contrast    Result Date: 3/22/2023  Impression: No findings of osteomyelitis in the hindfoot, midfoot and in the visualized metatarsals The forefoot, toes not included in the images Flexor hallucis longus tenosynovitis  Incomplete assessment of the distal most aspect of the flexor hallucis longus  Tenosynovitis posterior tibialis Workstation performed: [id number]     EKG, Pathology, and Other Studies: I have personally reviewed pertinent reports      Active medications:  Current Facility-Administered Medications   Medication Dose Route Frequency   • acetaminophen (TYLENOL) tablet 975 mg  975 mg Oral Q8H [address]   • cefTRIAXone (ROCEPHIN) IVPB (premix in dextrose) 2,000 mg 50 mL  2,000 mg Intravenous Q24H   • cloNIDine (CATAPRES) tablet 0 2 mg  0 2 mg Oral BID   • dextromethorphan-guaiFENesin (ROBITUSSIN DM) oral syrup 10 mL  10 mL Oral Q4H PRN   • docusate sodium (COLACE) capsule 100 mg  100 mg Oral BID   • enoxaparin (LOVENOX) subcutaneous injection 40 mg  40 mg Subcutaneous BID   • fenofibrate (TRICOR) tablet 48 mg  48 mg Oral Daily   • folic acid (FOLVITE) tablet 1,000 mcg  1,000 mcg Oral Daily   • gabapentin (NEURONTIN) capsule 100 mg  100 mg Oral TID   • HYDROmorphone (DILAUDID) injection 0 5 mg  0 5 mg Intravenous Q3H PRN   • hydrOXYzine HCL (ATARAX) tablet 100 mg  100 mg Oral HS   • hydrOXYzine HCL (ATARAX) tablet 50 mg  50 mg Oral Daily   • insulin lispro (HumaLOG) 100 units/mL subcutaneous injection 1-5 Units  1-5 Units Subcutaneous HS   • insulin lispro (HumaLOG) 100 units/mL subcutaneous injection 2-12 Units  2-12 Units Subcutaneous TID AC   • insulin lispro (HumaLOG) 100 units/mL subcutaneous injection 5 Units  5 Units Subcutaneous TID With Meals   • lactated ringers infusion  100 mL/hr Intravenous Continuous   • lamoTRIgine (LaMICtal) tablet 250 mg  250 mg Oral QAM   • levothyroxine tablet 50 mcg  50 mcg Oral Early Morning   • melatonin tablet 9 mg  9 mg Oral HS   • methocarbamol (ROBAXIN) tablet 750 mg  750 mg Oral Q6H RADHA   • naloxone (NARCAN) 0 04 mg/mL syringe 0 04 mg  0 04 mg Intravenous Q1MIN PRN   • ondansetron (ZOFRAN) injection 4 mg  4 mg Intravenous Q4H PRN   • oxyCODONE (ROXICODONE) immediate release tablet 10 mg  10 mg Oral Q4H PRN   • oxyCODONE (ROXICODONE) IR tablet 5 mg  5 mg Oral Q4H PRN   • pantoprazole (PROTONIX) EC tablet 40 mg  40 mg Oral BID AC   • polyethylene glycol (MIRALAX) packet 17 g  17 g Oral Daily PRN   • ziprasidone (GEODON) "capsule 80 mg  80 mg Oral BID With Meals         ** Please Note: Portions of the record may have been created with voice recognition software  Occasional wrong word or \"sound a like\" substitutions may have occurred due to the inherent limitations of voice recognition software  Read the chart carefully and recognize, using context, where substitutions have occurred   **      "

## 2023-03-25 NOTE — ASSESSMENT & PLAN NOTE
Lab Results   Component Value Date    HGBA1C 6 4 (H) 02/16/2022     Recent Labs     03/24/23  1132 03/24/23  1606 03/24/23  2144 03/25/23  0630   POCGLU 213* 166* 173* 205*     · Maintained outpatient on metformin, currently on hold during hospitalization  · Continue sliding scale insulin coverage with Accu-Cheks, started on scheduled Humalog with meals given blood glucose persistently in the 200s but appropriate on morning BMP  · Diabetic neuropathy: Ran out of pregabalin 150 mg several weeks ago  Will defer to outpatient PCP to restart

## 2023-03-25 NOTE — PROGRESS NOTES
Progress Note - General Surgery   Crystal Bias 53 y o  female MRN: [id number]  Unit/Bed#: E2 -01 Encounter: [id number]    Assessment:  56yoF w b/l LE nonhealing wounds s/p LLE Ray amputation and concomitant RLE BKA on 3/23    Vitals stable, afebrile  WBC 10 26 from 11 33  Hemoglobin 9 2 from 8 8  Creatinine 0 56 from 0 64        Plan:  -CCD2 diet  -Dressing down today 3/25, knee immobilizer  -On ceftriaxone  -Pain control  -Lovenox  -SSI, tight control of blood sugars  -PT/OT  -Incentive spirometry  -Care per primary team    Subjective/Objective   Subjective:   Reports some right BKA pain, also reports some nausea without any emesis, otherwise tolerating diet, voiding without issues  Objective:     Blood pressure 143/96, pulse 89, temperature 98 7 °F (37 1 °C), temperature source Temporal, resp  rate 19, SpO2 91 %, not currently breastfeeding  ,There is no height or weight on file to calculate BMI  Intake/Output Summary (Last 24 hours) at 3/25/2023 0854  Last data filed at 3/24/2023 1757  Gross per 24 hour   Intake --   Output 400 ml   Net -400 ml       Invasive Devices     Peripheral Intravenous Line  Duration           Peripheral IV 03/23/23 Left Arm 1 day    Peripheral IV 03/23/23 Right;Ventral (anterior) Forearm 1 day                Physical Exam:   General: NAD  Skin: Warm, dry, anicteric  HEENT: Normocephalic, atraumatic  CV: RRR, no m/r/g  Pulm: CTA b/l, no inc WOB  Abd: Soft, ND/NT  MSK: Symmetric, no edema, no tenderness, no deformity, right BKA with dressing present  Neuro: AOx3, GCS 15    Lab, Imaging and other studies:  I have personally reviewed pertinent lab results    , CBC:   Lab Results   Component Value Date    WBC 10 26 (H) 03/25/2023    HGB 9 2 (L) 03/25/2023    HCT 31 7 (L) 03/25/2023    MCV 69 (L) 03/25/2023     (H) 03/25/2023    MCH 20 1 (L) 03/25/2023    MCHC 29 0 (L) 03/25/2023    RDW 21 0 (H) 03/25/2023    MPV 8 3 (L) 03/25/2023    NRBC 0 03/25/2023   , CMP:   Lab Results   Component Value Date    SODIUM 135 03/25/2023    K 4 3 03/25/2023     03/25/2023    CO2 29 03/25/2023    BUN 5 03/25/2023    CREATININE 0 56 (L) 03/25/2023    CALCIUM 8 3 (L) 03/25/2023    EGFR 111 03/25/2023     VTE Pharmacologic Prophylaxis: Sequential compression device (Venodyne)  and Enoxaparin (Lovenox)  VTE Mechanical Prophylaxis: sequential compression device

## 2023-03-25 NOTE — PLAN OF CARE
Problem: MOBILITY - ADULT  Goal: Maintain or return to baseline ADL function  Description: INTERVENTIONS:  -  Assess patient's ability to carry out ADLs; assess patient's baseline for ADL function and identify physical deficits which impact ability to perform ADLs (bathing, care of mouth/teeth, toileting, grooming, dressing, etc )  - Assess/evaluate cause of self-care deficits   - Assess range of motion  - Assess patient's mobility; develop plan if impaired  - Assess patient's need for assistive devices and provide as appropriate  - Encourage maximum independence but intervene and supervise when necessary  - Involve family in performance of ADLs  - Assess for home care needs following discharge   - Consider OT consult to assist with ADL evaluation and planning for discharge  - Provide patient education as appropriate  Outcome: Progressing  Goal: Maintains/Returns to pre admission functional level  Description: INTERVENTIONS:  - Perform BMAT or MOVE assessment daily    - Set and communicate daily mobility goal to care team and patient/family/caregiver  - Collaborate with rehabilitation services on mobility goals if consulted  - Perform Range of Motion 3 times a day  - Reposition patient every 2 hours    - Dangle patient 3 times a day  - Stand patient 3 times a day  - Ambulate patient 3 times a day  - Out of bed to chair 3 times a day   - Out of bed for meals 3 times a day  - Out of bed for toileting  - Record patient progress and toleration of activity level   Outcome: Progressing     Problem: Prexisting or High Potential for Compromised Skin Integrity  Goal: Skin integrity is maintained or improved  Description: INTERVENTIONS:  - Identify patients at risk for skin breakdown  - Assess and monitor skin integrity  - Assess and monitor nutrition and hydration status  - Monitor labs   - Assess for incontinence   - Turn and reposition patient  - Assist with mobility/ambulation  - Relieve pressure over bony prominences  - Avoid friction and shearing  - Provide appropriate hygiene as needed including keeping skin clean and dry  - Evaluate need for skin moisturizer/barrier cream  - Collaborate with interdisciplinary team   - Patient/family teaching  - Consider wound care consult   Outcome: Progressing     Problem: PAIN - ADULT  Goal: Verbalizes/displays adequate comfort level or baseline comfort level  Description: Interventions:  - Encourage patient to monitor pain and request assistance  - Assess pain using appropriate pain scale  - Administer analgesics based on type and severity of pain and evaluate response  - Implement non-pharmacological measures as appropriate and evaluate response  - Consider cultural and social influences on pain and pain management  - Notify physician/advanced practitioner if interventions unsuccessful or patient reports new pain  Outcome: Progressing     Problem: INFECTION - ADULT  Goal: Absence or prevention of progression during hospitalization  Description: INTERVENTIONS:  - Assess and monitor for signs and symptoms of infection  - Monitor lab/diagnostic results  - Monitor all insertion sites, i e  indwelling lines, tubes, and drains  - Monitor endotracheal if appropriate and nasal secretions for changes in amount and color  - Landers appropriate cooling/warming therapies per order  - Administer medications as ordered  - Instruct and encourage patient and family to use good hand hygiene technique  - Identify and instruct in appropriate isolation precautions for identified infection/condition  Outcome: Progressing  Goal: Absence of fever/infection during neutropenic period  Description: INTERVENTIONS:  - Monitor WBC    Outcome: Progressing     Problem: SAFETY ADULT  Goal: Maintain or return to baseline ADL function  Description: INTERVENTIONS:  -  Assess patient's ability to carry out ADLs; assess patient's baseline for ADL function and identify physical deficits which impact ability to perform ADLs (bathing, care of mouth/teeth, toileting, grooming, dressing, etc )  - Assess/evaluate cause of self-care deficits   - Assess range of motion  - Assess patient's mobility; develop plan if impaired  - Assess patient's need for assistive devices and provide as appropriate  - Encourage maximum independence but intervene and supervise when necessary  - Involve family in performance of ADLs  - Assess for home care needs following discharge   - Consider OT consult to assist with ADL evaluation and planning for discharge  - Provide patient education as appropriate  Outcome: Progressing  Goal: Maintains/Returns to pre admission functional level  Description: INTERVENTIONS:  - Perform BMAT or MOVE assessment daily    - Set and communicate daily mobility goal to care team and patient/family/caregiver  - Collaborate with rehabilitation services on mobility goals if consulted  - Perform Range of Motion 3 times a day  - Reposition patient every 2 hours    - Dangle patient 3 times a day  - Stand patient 3 times a day  - Ambulate patient 3 times a day  - Out of bed to chair 3 times a day   - Out of bed for meals 3 times a day  - Out of bed for toileting  - Record patient progress and toleration of activity level   Outcome: Progressing  Goal: Patient will remain free of falls  Description: INTERVENTIONS:  - Educate patient/family on patient safety including physical limitations  - Instruct patient to call for assistance with activity   - Consult OT/PT to assist with strengthening/mobility   - Keep Call bell within reach  - Keep bed low and locked with side rails adjusted as appropriate  - Keep care items and personal belongings within reach  - Initiate and maintain comfort rounds  - Make Fall Risk Sign visible to staff  - Offer Toileting every 2 Hours, in advance of need  - Initiate/Maintain bed alarm  - Obtain necessary fall risk management equipment  - Apply yellow socks and bracelet for high fall risk patients  - Consider moving patient to room near nurses station  Outcome: Progressing     Problem: DISCHARGE PLANNING  Goal: Discharge to home or other facility with appropriate resources  Description: INTERVENTIONS:  - Identify barriers to discharge w/patient and caregiver  - Arrange for needed discharge resources and transportation as appropriate  - Identify discharge learning needs (meds, wound care, etc )  - Arrange for interpretive services to assist at discharge as needed  - Refer to Case Management Department for coordinating discharge planning if the patient needs post-hospital services based on physician/advanced practitioner order or complex needs related to functional status, cognitive ability, or social support system  Outcome: Progressing     Problem: Knowledge Deficit  Goal: Patient/family/caregiver demonstrates understanding of disease process, treatment plan, medications, and discharge instructions  Description: Complete learning assessment and assess knowledge base    Interventions:  - Provide teaching at level of understanding  - Provide teaching via preferred learning methods  Outcome: Progressing     Problem: SKIN/TISSUE INTEGRITY - ADULT  Goal: Skin Integrity remains intact(Skin Breakdown Prevention)  Description: Assess:  -Perform Yogi assessment every shift  -Clean and moisturize skin   -Inspect skin when repositioning, toileting, and assisting with ADLS  -Assess under medical devices every shift  -Assess extremities for adequate circulation and sensation     Bed Management:  -Have minimal linens on bed & keep smooth, unwrinkled  -Change linens as needed when moist or perspiring  -Avoid sitting or lying in one position for more than 2 hours while in bed  -Keep HOB at 30 degrees     Toileting:  -Offer bedside commode  -Assess for incontinence every hour  -Use incontinent care products after each incontinent episode     Activity:  -Mobilize patient 3 times a day  -Encourage activity and walks on unit  -Encourage or provide ROM exercises   -Turn and reposition patient every 2 Hours  -Use appropriate equipment to lift or move patient in bed  -Instruct/ Assist with weight shifting every hour when out of bed in chair  -Consider limitation of chair time 2 hour intervals    Skin Care:  -Avoid use of baby powder, tape, friction and shearing, hot water or constrictive clothing  -Relieve pressure over bony prominences   -Do not massage red bony areas    Next Steps:  -Teach patient strategies to minimize risks    -Consider consults to  interdisciplinary teams   Outcome: Progressing  Goal: Incision(s), wounds(s) or drain site(s) healing without S/S of infection  Description: INTERVENTIONS  - Assess and document dressing, incision, wound bed, drain sites and surrounding tissue  - Provide patient and family education  - Perform skin care/dressing changes as ordered  Outcome: Progressing  Goal: Pressure injury heals and does not worsen  Description: Interventions:  - Implement low air loss mattress or specialty surface (Criteria met)  - Apply silicone foam dressing  - Instruct/assist with weight shifting every 15 minutes when in chair   - Limit chair time to 2 hour intervals  - Use special pressure reducing interventions when in chair   - Apply fecal or urinary incontinence containment device   - Perform passive or active ROM every hour  - Turn and reposition patient & offload bony prominences every 2 hours   - Utilize friction reducing device or surface for transfers   - Consider consults to  interdisciplinary teams   - Use incontinent care products after each incontinent episode   - Consider nutrition services referral as needed  Outcome: Progressing     Problem: Nutrition/Hydration-ADULT  Goal: Nutrient/Hydration intake appropriate for improving, restoring or maintaining nutritional needs  Description: Monitor and assess patient's nutrition/hydration status for malnutrition   Collaborate with interdisciplinary team and initiate plan and interventions as ordered  Monitor patient's weight and dietary intake as ordered or per policy  Utilize nutrition screening tool and intervene as necessary  Determine patient's food preferences and provide high-protein, high-caloric foods as appropriate       INTERVENTIONS:  - Monitor oral intake, urinary output, labs, and treatment plans  - Assess nutrition and hydration status and recommend course of action  - Evaluate amount of meals eaten  - Assist patient with eating if necessary   - Allow adequate time for meals  - Recommend/ encourage appropriate diets, oral nutritional supplements, and vitamin/mineral supplements  - Order, calculate, and assess calorie counts as needed  - Recommend, monitor, and adjust tube feedings and TPN/PPN based on assessed needs  - Assess need for intravenous fluids  - Provide specific nutrition/hydration education as appropriate  - Include patient/family/caregiver in decisions related to nutrition  Outcome: Progressing     Problem: COPING  Goal: Pt/Family able to verbalize concerns and demonstrate effective coping strategies  Description: INTERVENTIONS:  - Assist patient/family to identify coping skills, available support systems and cultural and spiritual values  - Provide emotional support, including active listening and acknowledgement of concerns of patient and caregivers  - Reduce environmental stimuli, as able  - Provide patient education  - Assess for spiritual pain/suffering and initiate spiritual care, including notification of Pastoral Care or gisell based community as needed  - Assess effectiveness of coping strategies  Outcome: Progressing  Goal: Will report anxiety at manageable levels  Description: INTERVENTIONS:  - Administer medication as ordered  - Teach and encourage coping skills  - Provide emotional support  - Assess patient/family for anxiety and ability to cope  Outcome: Progressing     Problem: CHANGE IN BODY IMAGE  Goal: Pt/Family communicate acceptance of loss or change in body image and expresses psychological comfort and peace  Description: INTERVENTIONS:  - Assess patient/family anxiety and grief process related to change in body image, loss of functional status and loss of sense of self  - Assess patient/family's coping skills and provide emotional, spiritual and psychosocial support  - Provide information about the patient's health status with consideration of family and cultural values  - Communicate willingness to discuss loss and facilitate grief process with patient/family as appropriate  - Emphasize sustaining relationships within family system and community, or gisell/spiritual traditions  - Refer to community support groups as appropriate  - Initiate Spiritual Care, Pastoral care or other ancillary consults as needed  Outcome: Progressing

## 2023-03-25 NOTE — PROGRESS NOTES
"[facility]  Progress Note  Name: Heidi Prather I  MRN: [id number]  Unit/Bed#: E2 -01 I Date of Admission: 3/21/2023   Date of Service: 3/25/2023 I Hospital Day: 4    Assessment/Plan   * Left foot diabetic ulcer  Assessment & Plan  Background: History of mood disorder, diabetes on metformin, hypothyroidism and recurrent foot disease has been homeless and lost to follow-up presented to [facility] initially for diabetic foot ulcer and has been transferred here for further consulting services  · Had wounds on both feet probing to bone  · Continue IV Rocephin  · Status post right BKA and left ray resection  · PT/OT consulted, pending safe discharge planning    Homeless single person  Assessment & Plan  · Patient reports she has nowhere to go after discharge and is essentially homeless  · PT/OT following  · Case management aware    Morbid obesity with BMI of 40 0-44 9, adult ([facility] [address] )  Assessment & Plan  Estimated body mass index is 42 19 kg/m² as calculated from the following:    Height as of an earlier encounter on 3/21/23: 5' 5\" (1 651 m)  Weight as of an earlier encounter on 3/21/23: 115 kg (253 lb 8 5 oz)      GERD (gastroesophageal reflux disease)  Assessment & Plan  · Continue pantoprazole    Essential hypertension  Assessment & Plan  · BP well controlled during hospitalization  · Continue clonidine    Type 2 diabetes mellitus with neurologic complication, without long-term current use of insulin [facility])  Assessment & Plan  Lab Results   Component Value Date    HGBA1C 6 4 (H) 02/16/2022     Recent Labs     03/24/23  1132 03/24/23  1606 03/24/23  2144 03/25/23  0630   POCGLU 213* 166* 173* 205*     · Maintained outpatient on metformin, currently on hold during hospitalization  · Continue sliding scale insulin coverage with Accu-Cheks, started on scheduled Humalog with meals given blood glucose persistently in the 200s but appropriate on morning BMP  · Diabetic neuropathy: Ran out of " pregabalin 150 mg several weeks ago  Will defer to outpatient PCP to restart  Mood disorder (HCC)  Assessment & Plan  · Mood stable continue hydroxyzine lamictal ziprasidone    Hypothyroidism  Assessment & Plan  · Continue levothyroxine               VTE Pharmacologic Prophylaxis: VTE Score: 4 Moderate Risk (Score 3-4) - Pharmacological DVT Prophylaxis Ordered: enoxaparin (Lovenox)  Patient Centered Rounds: I performed bedside rounds with nursing staff today  Discussions with Specialists or Other Care Team Provider: case management     Education and Discussions with Family / Patient: Patient  Total Time Spent on Date of Encounter in care of patient: 35 minutes This time was spent on one or more of the following: performing physical exam; counseling and coordination of care; obtaining or reviewing history; documenting in the medical record; reviewing/ordering tests, medications or procedures; communicating with other healthcare professionals and discussing with patient's family/caregivers  Current Length of Stay: 4 day(s)  Current Patient Status: Inpatient   Certification Statement: The patient will continue to require additional inpatient hospital stay due to Safe discharge planning  Discharge Plan: [facility] is following this patient on consult  They are medically stable for discharge when deemed appropriate by primary service  Code Status: Level 1 - Full Code    Subjective:   Patient reports she is doing okay today  Still having some pain in her right stump  Eating and drinking appropriately  Does report some phlegm in her chest for which she has been taking Robitussin  Objective:     Vitals:   Temp (24hrs), Av 7 °F (36 5 °C), Min:96 6 °F (35 9 °C), Max:98 7 °F (37 1 °C)    Temp:  [96 6 °F (35 9 °C)-98 7 °F (37 1 °C)] 98 7 °F (37 1 °C)  HR:  [80-90] 89  Resp:  [18-19] 19  BP: ()/(65-96) 143/96  SpO2:  [91 %-96 %] 94 %  There is no height or weight on file to calculate BMI       Input and Output Summary (last 24 hours): Intake/Output Summary (Last 24 hours) at 3/25/2023 1023  Last data filed at 3/24/2023 1757  Gross per 24 hour   Intake --   Output 400 ml   Net -400 ml       Physical Exam:   Physical Exam  Vitals reviewed  Constitutional:       General: She is not in acute distress  HENT:      Head: Normocephalic and atraumatic  Eyes:      General: No scleral icterus  Conjunctiva/sclera: Conjunctivae normal    Cardiovascular:      Rate and Rhythm: Normal rate and regular rhythm  Heart sounds: No murmur heard  Pulmonary:      Effort: Pulmonary effort is normal  No respiratory distress  Breath sounds: Normal breath sounds  No wheezing  Abdominal:      General: Bowel sounds are normal  There is no distension  Palpations: Abdomen is soft  Tenderness: There is no abdominal tenderness  Musculoskeletal:      Cervical back: Neck supple  Left lower leg: No edema  Comments: Right BKA, left foot wrapped-clean dry intact   Skin:     General: Skin is warm and dry  Neurological:      Mental Status: She is alert and oriented to person, place, and time     Psychiatric:         Mood and Affect: Mood normal          Behavior: Behavior normal           Additional Data:     Labs:  Results from last 7 days   Lab Units 03/25/23  0443   WBC Thousand/uL 10 26*   HEMOGLOBIN g/dL 9 2*   HEMATOCRIT % 31 7*   PLATELETS Thousands/uL 683*   NEUTROS PCT % 61   LYMPHS PCT % 23   MONOS PCT % 8   EOS PCT % 6     Results from last 7 days   Lab Units 03/25/23  0443 03/21/23  0434 03/20/23  2230   SODIUM mmol/L 135   < > 127*   POTASSIUM mmol/L 4 3   < > 3 8   CHLORIDE mmol/L 101   < > 92*   CO2 mmol/L 29   < > 24   BUN mg/dL 5   < > 10   CREATININE mg/dL 0 56*   < > 0 87   ANION GAP mmol/L 5   < > 11   CALCIUM mg/dL 8 3*   < > 9 3   ALBUMIN g/dL  --   --  3 2*   TOTAL BILIRUBIN mg/dL  --   --  0 36   ALK PHOS U/L  --   --  215*   ALT U/L  --   --  15   AST U/L  --   --  21   GLUCOSE RANDOM mg/dL 152*   < > 249*    < > = values in this interval not displayed  Results from last 7 days   Lab Units 03/25/23  0630 03/24/23  2144 03/24/23  1606 03/24/23  1132 03/24/23  0628 03/23/23  2104 03/23/23  1541 03/23/23  1320 03/23/23  0620 03/22/23  2045 03/22/23  1503 03/22/23  1100   POC GLUCOSE mg/dl 205* 173* 166* 213* 231* 267* 232* 210* 143* 176* 208* 171*         Results from last 7 days   Lab Units 03/21/23  0434 03/21/23  0225 03/20/23  2250   LACTIC ACID mmol/L  --  1 6 2 8*   PROCALCITONIN ng/ml 0 44*  --   --        Lines/Drains:  Invasive Devices     Peripheral Intravenous Line  Duration           Peripheral IV 03/23/23 Left Arm 1 day    Peripheral IV 03/23/23 Right;Ventral (anterior) Forearm 1 day                      Imaging: No pertinent imaging reviewed  Recent Cultures (last 7 days):   Results from last 7 days   Lab Units 03/21/23  1809 03/20/23  2324 03/20/23  2250 03/20/23  2230   BLOOD CULTURE   --   --  No Growth After 4 Days  No Growth After 4 Days     GRAM STAIN RESULT  2+ Gram positive cocci in pairs and chains* 2+ Polys*  2+ Gram positive cocci in pairs and chains*  1+ Gram positive rods*  1+ Gram negative rods*  --   --    WOUND CULTURE  4+ Growth of Beta Hemolytic Streptococcus Group F*  2+ Growth of Beta Hemolytic Streptococcus Group B*  2+ Growth of Alcaligenes faecalis*  1+ Growth of Staphylococcus aureus* 4+ Growth of Beta Hemolytic Streptococcus Group F*  2+ Growth of Staphylococcus aureus*  2+ Growth of Alcaligenes faecalis*  2+ Growth of  --   --        Last 24 Hours Medication List:   Current Facility-Administered Medications   Medication Dose Route Frequency Provider Last Rate   • acetaminophen  975 mg Oral Q8H [facility] Jose Obando DO     • cefTRIAXone  2,000 mg Intravenous Q24H Kacie Lindo MD 2,000 mg (03/24/23 7183)   • cloNIDine  0 2 mg Oral BID Maribel Limber [place], DO     • dextromethorphan-guaiFENesin  10 mL Oral Q4H PRN Stanislaw Giron, DO     • docusate sodium  [facility], DO     • enoxaparin  40 mg Subcutaneous BID [facility], DO     • fenofibrate  48 mg Oral Daily [facility], DO     • folic acid  2,035 mcg Oral Daily [facility], DO     • gabapentin  100 mg Oral TID Durmoncho Vandana, DO     • HYDROmorphone  0 5 mg Intravenous Q3H PRN [facility], DO     • hydrOXYzine HCL  100 mg Oral HS Jose LAUREN [facility], DO     • hydrOXYzine HCL  50 mg Oral Daily [facility], DO     • insulin lispro  1-5 Units Subcutaneous HS [facility], DO     • insulin lispro  2-12 Units Subcutaneous TID AC [facility], DO     • insulin lispro  5 Units Subcutaneous TID With Meals lAice Oviedo PA-C     • lactated ringers  100 mL/hr Intravenous Continuous [facility],  mL/hr (03/25/23 0159)   • lamoTRIgine  250 mg Oral QAM [facility], DO     • levothyroxine  50 mcg Oral Early Morning [facility], DO     • melatonin  9 mg Oral HS [facility], DO     • methocarbamol  750 mg Oral Q6H [address] [facility], DO     • naloxone  0 04 mg Intravenous Q1MIN PRN [facility], DO     • ondansetron  4 mg Intravenous Q4H PRN Ripwin Vandana, DO     • oxyCODONE  10 mg Oral Q4H PRN Ripwin Vandana, DO     • oxyCODONE  5 mg Oral Q4H PRN [facility], DO     • pantoprazole  40 mg Oral BID AC [facility]     • polyethylene glycol  17 g Oral Daily PRN Graciela Shipley PA-C     • ziprasidone  80 mg Oral BID With Meals Fady Vandana, DO          Today, Patient Was Seen By: Danie Lockhart PA-C    **Please Note: This note may have been constructed using a voice recognition system  **

## 2023-03-25 NOTE — PLAN OF CARE
Problem: PAIN - ADULT  Goal: Verbalizes/displays adequate comfort level or baseline comfort level  Description: Interventions:  - Encourage patient to monitor pain and request assistance  - Assess pain using appropriate pain scale  - Administer analgesics based on type and severity of pain and evaluate response  - Implement non-pharmacological measures as appropriate and evaluate response  - Consider cultural and social influences on pain and pain management  - Notify physician/advanced practitioner if interventions unsuccessful or patient reports new pain  Outcome: Progressing     Problem: INFECTION - ADULT  Goal: Absence or prevention of progression during hospitalization  Description: INTERVENTIONS:  - Assess and monitor for signs and symptoms of infection  - Monitor lab/diagnostic results  - Monitor all insertion sites, i e  indwelling lines, tubes, and drains  - Monitor endotracheal if appropriate and nasal secretions for changes in amount and color  - [place] appropriate cooling/warming therapies per order  - Administer medications as ordered  - Instruct and encourage patient and family to use good hand hygiene technique  - Identify and instruct in appropriate isolation precautions for identified infection/condition  Outcome: Progressing  Goal: Absence of fever/infection during neutropenic period  Description: INTERVENTIONS:  - Monitor WBC    Outcome: Progressing     Problem: METABOLIC, FLUID AND ELECTROLYTES - ADULT  Goal: Glucose maintained within target range  Description: INTERVENTIONS:  - Monitor Blood Glucose as ordered  - Assess for signs and symptoms of hyperglycemia and hypoglycemia  - Administer ordered medications to maintain glucose within target range  - Assess nutritional intake and initiate nutrition service referral as needed  Outcome: Progressing     Problem: RESPIRATORY - ADULT  Goal: Achieves optimal ventilation and oxygenation  Description: INTERVENTIONS:  - Assess for changes in respiratory status  - Assess for changes in mentation and behavior  - Position to facilitate oxygenation and minimize respiratory effort  - Oxygen administered by appropriate delivery if ordered  - Initiate smoking cessation education as indicated  - Encourage broncho-pulmonary hygiene including cough, deep breathe, Incentive Spirometry  - Assess the need for suctioning and aspirate as needed  - Assess and instruct to report SOB or any respiratory difficulty  - Respiratory Therapy support as indicated  Outcome: Progressing     Problem: Nutrition/Hydration-ADULT  Goal: Nutrient/Hydration intake appropriate for improving, restoring or maintaining nutritional needs  Description: Monitor and assess patient's nutrition/hydration status for malnutrition  Collaborate with interdisciplinary team and initiate plan and interventions as ordered  Monitor patient's weight and dietary intake as ordered or per policy  Utilize nutrition screening tool and intervene as necessary  Determine patient's food preferences and provide high-protein, high-caloric foods as appropriate       INTERVENTIONS:  - Monitor oral intake, urinary output, labs, and treatment plans  - Assess nutrition and hydration status and recommend course of action  - Evaluate amount of meals eaten  - Assist patient with eating if necessary   - Allow adequate time for meals  - Recommend/ encourage appropriate diets, oral nutritional supplements, and vitamin/mineral supplements  - Order, calculate, and assess calorie counts as needed  - Recommend, monitor, and adjust tube feedings and TPN/PPN based on assessed needs  - Assess need for intravenous fluids  - Provide specific nutrition/hydration education as appropriate  - Include patient/family/caregiver in decisions related to nutrition  Outcome: Progressing     Problem: SAFETY ADULT  Goal: Patient will remain free of falls  Description: INTERVENTIONS:  - Educate patient/family on patient safety including physical limitations  - Instruct patient to call for assistance with activity   - Consult OT/PT to assist with strengthening/mobility   - Keep Call bell within reach  - Keep bed low and locked with side rails adjusted as appropriate  - Keep care items and personal belongings within reach  - Initiate and maintain comfort rounds  - Make Fall Risk Sign visible to staff  - Offer Toileting every 2 Hours, in advance of need  - Initiate/Maintain bed alarm  - Obtain necessary fall risk management equipment: walker  - Apply yellow socks and bracelet for high fall risk patients  - Consider moving patient to room near nurses station  Outcome: Progressing

## 2023-03-26 LAB
ABO GROUP BLD BPU: NORMAL
ABO GROUP BLD BPU: NORMAL
ANION GAP SERPL CALCULATED.3IONS-SCNC: 3 MMOL/L (ref 4–13)
BACTERIA BLD CULT: NORMAL
BACTERIA BLD CULT: NORMAL
BACTERIA WND AEROBE CULT: ABNORMAL
BASOPHILS # BLD AUTO: 0.06 THOUSANDS/ÂΜL (ref 0–0.1)
BASOPHILS NFR BLD AUTO: 1 % (ref 0–1)
BPU ID: NORMAL
BPU ID: NORMAL
BUN SERPL-MCNC: 9 MG/DL (ref 5–25)
CALCIUM SERPL-MCNC: 8.8 MG/DL (ref 8.4–10.2)
CHLORIDE SERPL-SCNC: 101 MMOL/L (ref 96–108)
CO2 SERPL-SCNC: 29 MMOL/L (ref 21–32)
CREAT SERPL-MCNC: 0.59 MG/DL (ref 0.6–1.3)
CROSSMATCH: NORMAL
CROSSMATCH: NORMAL
EOSINOPHIL # BLD AUTO: 0.52 THOUSAND/ÂΜL (ref 0–0.61)
EOSINOPHIL NFR BLD AUTO: 5 % (ref 0–6)
ERYTHROCYTE [DISTWIDTH] IN BLOOD BY AUTOMATED COUNT: 21.6 % (ref 11.6–15.1)
GFR SERPL CREATININE-BSD FRML MDRD: 109 ML/MIN/1.73SQ M
GLUCOSE SERPL-MCNC: 167 MG/DL (ref 65–140)
GLUCOSE SERPL-MCNC: 169 MG/DL (ref 65–140)
GLUCOSE SERPL-MCNC: 172 MG/DL (ref 65–140)
GLUCOSE SERPL-MCNC: 175 MG/DL (ref 65–140)
GLUCOSE SERPL-MCNC: 182 MG/DL (ref 65–140)
GRAM STN SPEC: ABNORMAL
HCT VFR BLD AUTO: 31.6 % (ref 34.8–46.1)
HGB BLD-MCNC: 9.3 G/DL (ref 11.5–15.4)
IMM GRANULOCYTES # BLD AUTO: 0.13 THOUSAND/UL (ref 0–0.2)
IMM GRANULOCYTES NFR BLD AUTO: 1 % (ref 0–2)
LYMPHOCYTES # BLD AUTO: 3.19 THOUSANDS/ÂΜL (ref 0.6–4.47)
LYMPHOCYTES NFR BLD AUTO: 30 % (ref 14–44)
MCH RBC QN AUTO: 20.3 PG (ref 26.8–34.3)
MCHC RBC AUTO-ENTMCNC: 29.4 G/DL (ref 31.4–37.4)
MCV RBC AUTO: 69 FL (ref 82–98)
MONOCYTES # BLD AUTO: 0.76 THOUSAND/ÂΜL (ref 0.17–1.22)
MONOCYTES NFR BLD AUTO: 7 % (ref 4–12)
NEUTROPHILS # BLD AUTO: 5.96 THOUSANDS/ÂΜL (ref 1.85–7.62)
NEUTS SEG NFR BLD AUTO: 56 % (ref 43–75)
NRBC BLD AUTO-RTO: 0 /100 WBCS
PLATELET # BLD AUTO: 638 THOUSANDS/UL (ref 149–390)
PMV BLD AUTO: 8.3 FL (ref 8.9–12.7)
POTASSIUM SERPL-SCNC: 4.6 MMOL/L (ref 3.5–5.3)
RBC # BLD AUTO: 4.58 MILLION/UL (ref 3.81–5.12)
SODIUM SERPL-SCNC: 133 MMOL/L (ref 135–147)
UNIT DISPENSE STATUS: NORMAL
UNIT DISPENSE STATUS: NORMAL
UNIT PRODUCT CODE: NORMAL
UNIT PRODUCT CODE: NORMAL
UNIT PRODUCT VOLUME: 350 ML
UNIT PRODUCT VOLUME: 350 ML
UNIT RH: NORMAL
UNIT RH: NORMAL
WBC # BLD AUTO: 10.62 THOUSAND/UL (ref 4.31–10.16)

## 2023-03-26 RX ORDER — PREGABALIN 50 MG/1
50 CAPSULE ORAL EVERY 12 HOURS SCHEDULED
Status: COMPLETED | OUTPATIENT
Start: 2023-03-26 | End: 2023-03-27

## 2023-03-26 RX ORDER — PREGABALIN 50 MG/1
100 CAPSULE ORAL EVERY 12 HOURS SCHEDULED
Status: DISCONTINUED | OUTPATIENT
Start: 2023-03-28 | End: 2023-04-03 | Stop reason: HOSPADM

## 2023-03-26 RX ORDER — ALBUTEROL SULFATE 90 UG/1
2 AEROSOL, METERED RESPIRATORY (INHALATION) EVERY 4 HOURS PRN
Status: DISCONTINUED | OUTPATIENT
Start: 2023-03-26 | End: 2023-04-03 | Stop reason: HOSPADM

## 2023-03-26 RX ORDER — CLONIDINE HYDROCHLORIDE 0.1 MG/1
0.2 TABLET ORAL 2 TIMES DAILY
Status: DISCONTINUED | OUTPATIENT
Start: 2023-03-26 | End: 2023-04-03 | Stop reason: HOSPADM

## 2023-03-26 RX ADMIN — METHOCARBAMOL 750 MG: 750 TABLET ORAL at 12:50

## 2023-03-26 RX ADMIN — MELATONIN 9 MG: at 21:48

## 2023-03-26 RX ADMIN — CLONIDINE HYDROCHLORIDE 0.2 MG: 0.1 TABLET ORAL at 08:17

## 2023-03-26 RX ADMIN — INSULIN LISPRO 1 UNITS: 100 INJECTION, SOLUTION INTRAVENOUS; SUBCUTANEOUS at 21:52

## 2023-03-26 RX ADMIN — OXYCODONE HYDROCHLORIDE 10 MG: 10 TABLET ORAL at 05:11

## 2023-03-26 RX ADMIN — GUAIFENESIN AND DEXTROMETHORPHAN 10 ML: 100; 10 SYRUP ORAL at 08:51

## 2023-03-26 RX ADMIN — INSULIN LISPRO 2 UNITS: 100 INJECTION, SOLUTION INTRAVENOUS; SUBCUTANEOUS at 16:22

## 2023-03-26 RX ADMIN — HYDROMORPHONE HYDROCHLORIDE 0.5 MG: 1 INJECTION, SOLUTION INTRAMUSCULAR; INTRAVENOUS; SUBCUTANEOUS at 15:25

## 2023-03-26 RX ADMIN — OXYCODONE HYDROCHLORIDE 10 MG: 10 TABLET ORAL at 12:49

## 2023-03-26 RX ADMIN — CLONIDINE HYDROCHLORIDE 0.2 MG: 0.1 TABLET ORAL at 21:48

## 2023-03-26 RX ADMIN — METHOCARBAMOL 750 MG: 750 TABLET ORAL at 23:28

## 2023-03-26 RX ADMIN — ACETAMINOPHEN 325MG 975 MG: 325 TABLET ORAL at 13:32

## 2023-03-26 RX ADMIN — LAMOTRIGINE 250 MG: 100 TABLET ORAL at 08:17

## 2023-03-26 RX ADMIN — ACETAMINOPHEN 325MG 975 MG: 325 TABLET ORAL at 21:48

## 2023-03-26 RX ADMIN — OXYCODONE HYDROCHLORIDE 10 MG: 10 TABLET ORAL at 00:16

## 2023-03-26 RX ADMIN — DOCUSATE SODIUM 100 MG: 100 CAPSULE, LIQUID FILLED ORAL at 17:22

## 2023-03-26 RX ADMIN — INSULIN LISPRO 2 UNITS: 100 INJECTION, SOLUTION INTRAVENOUS; SUBCUTANEOUS at 12:47

## 2023-03-26 RX ADMIN — METHOCARBAMOL 750 MG: 750 TABLET ORAL at 00:16

## 2023-03-26 RX ADMIN — ENOXAPARIN SODIUM 40 MG: 100 INJECTION SUBCUTANEOUS at 17:22

## 2023-03-26 RX ADMIN — METHOCARBAMOL 750 MG: 750 TABLET ORAL at 05:11

## 2023-03-26 RX ADMIN — ZIPRASIDONE HCL 80 MG: 40 CAPSULE ORAL at 08:16

## 2023-03-26 RX ADMIN — HYDROXYZINE HYDROCHLORIDE 50 MG: 25 TABLET, FILM COATED ORAL at 08:16

## 2023-03-26 RX ADMIN — PANTOPRAZOLE SODIUM 40 MG: 40 TABLET, DELAYED RELEASE ORAL at 05:11

## 2023-03-26 RX ADMIN — DOCUSATE SODIUM 100 MG: 100 CAPSULE, LIQUID FILLED ORAL at 08:18

## 2023-03-26 RX ADMIN — INSULIN LISPRO 5 UNITS: 100 INJECTION, SOLUTION INTRAVENOUS; SUBCUTANEOUS at 16:22

## 2023-03-26 RX ADMIN — ENOXAPARIN SODIUM 40 MG: 100 INJECTION SUBCUTANEOUS at 08:16

## 2023-03-26 RX ADMIN — FENOFIBRATE 48 MG: 48 TABLET, FILM COATED ORAL at 08:16

## 2023-03-26 RX ADMIN — PREGABALIN 50 MG: 50 CAPSULE ORAL at 21:48

## 2023-03-26 RX ADMIN — HYDROMORPHONE HYDROCHLORIDE 0.5 MG: 1 INJECTION, SOLUTION INTRAMUSCULAR; INTRAVENOUS; SUBCUTANEOUS at 21:55

## 2023-03-26 RX ADMIN — ACETAMINOPHEN 325MG 975 MG: 325 TABLET ORAL at 05:10

## 2023-03-26 RX ADMIN — PANTOPRAZOLE SODIUM 40 MG: 40 TABLET, DELAYED RELEASE ORAL at 15:37

## 2023-03-26 RX ADMIN — INSULIN LISPRO 2 UNITS: 100 INJECTION, SOLUTION INTRAVENOUS; SUBCUTANEOUS at 07:55

## 2023-03-26 RX ADMIN — HYDROMORPHONE HYDROCHLORIDE 0.5 MG: 1 INJECTION, SOLUTION INTRAMUSCULAR; INTRAVENOUS; SUBCUTANEOUS at 08:03

## 2023-03-26 RX ADMIN — GUAIFENESIN AND DEXTROMETHORPHAN 10 ML: 100; 10 SYRUP ORAL at 19:52

## 2023-03-26 RX ADMIN — GABAPENTIN 300 MG: 300 CAPSULE ORAL at 08:17

## 2023-03-26 RX ADMIN — PREGABALIN 50 MG: 50 CAPSULE ORAL at 15:20

## 2023-03-26 RX ADMIN — CEFTRIAXONE 2000 MG: 10 INJECTION, POWDER, FOR SOLUTION INTRAVENOUS at 17:21

## 2023-03-26 RX ADMIN — METHOCARBAMOL 750 MG: 750 TABLET ORAL at 17:22

## 2023-03-26 RX ADMIN — OXYCODONE HYDROCHLORIDE 10 MG: 10 TABLET ORAL at 19:48

## 2023-03-26 RX ADMIN — ZIPRASIDONE HCL 80 MG: 40 CAPSULE ORAL at 21:48

## 2023-03-26 RX ADMIN — LEVOTHYROXINE SODIUM 50 MCG: 50 TABLET ORAL at 05:11

## 2023-03-26 RX ADMIN — INSULIN LISPRO 5 UNITS: 100 INJECTION, SOLUTION INTRAVENOUS; SUBCUTANEOUS at 12:48

## 2023-03-26 RX ADMIN — INSULIN LISPRO 5 UNITS: 100 INJECTION, SOLUTION INTRAVENOUS; SUBCUTANEOUS at 07:55

## 2023-03-26 RX ADMIN — FOLIC ACID 1000 MCG: 1 TABLET ORAL at 08:17

## 2023-03-26 RX ADMIN — HYDROXYZINE HYDROCHLORIDE 100 MG: 25 TABLET, FILM COATED ORAL at 21:48

## 2023-03-26 RX ADMIN — GUAIFENESIN AND DEXTROMETHORPHAN 10 ML: 100; 10 SYRUP ORAL at 15:20

## 2023-03-26 NOTE — ASSESSMENT & PLAN NOTE
Background: History of mood disorder, diabetes on metformin, hypothyroidism and recurrent foot disease has been homeless and lost to follow-up presented to [facility] initially for diabetic foot ulcer and has been transferred here for further consulting services  · Had wounds on both feet probing to bone  · Status post right BKA and left ray resection  · Continue IV ceftriaxone per ID through 3/20/2023 for 5-day course postop  · Neuropathic pain: As outlined below, changing gabapentin to pregabalin

## 2023-03-26 NOTE — PROGRESS NOTES
"[facility]  Progress Note  Name: Mary Simmons I  MRN: [id number]  Unit/Bed#: E2 -01 I Date of Admission: 3/21/2023   Date of Service: 3/26/2023 I Hospital Day: 5    Assessment/Plan   * Left foot diabetic ulcer  Assessment & Plan  Background: History of mood disorder, diabetes on metformin, hypothyroidism and recurrent foot disease has been homeless and lost to follow-up presented to [facility] initially for diabetic foot ulcer and has been transferred here for further consulting services  · Had wounds on both feet probing to bone  · Status post right BKA and left ray resection  · Continue IV ceftriaxone per ID through 3/20/2023 for 5-day course postop  · Neuropathic pain: As outlined below, changing gabapentin to pregabalin    Homeless single person  Assessment & Plan  · Patient reports she has nowhere to go after discharge and is essentially homeless  · PT/OT following  · Case management aware    Morbid obesity with BMI of 40 0-44 9, [facility]  Assessment & Plan  Estimated body mass index is 42 19 kg/m² as calculated from the following:    Height as of an earlier encounter on 3/21/23: 5' 5\" (1 651 m)  Weight as of an earlier encounter on 3/21/23: 115 kg (253 lb 8 5 oz)  GERD (gastroesophageal reflux disease)  Assessment & Plan  · Continue pantoprazole    Essential hypertension  Assessment & Plan  · BP well controlled during hospitalization  · Continue clonidine    Type 2 diabetes mellitus with neurologic complication, without long-term current use of insulin [facility])  Assessment & Plan  Lab Results   Component Value Date    HGBA1C 6 4 (H) 02/16/2022     Recent Labs     03/25/23  1614 03/25/23  2120 03/26/23  0638 03/26/23  1105   POCGLU 197* 182* 182* 169*     · Maintained outpatient on metformin, currently on hold during hospitalization  · Continue lispro with sliding scale insulin  · Diabetic neuropathy: Ran out of pregabalin 150 mg several weeks ago    Currently on " gabapentin  Patient reports no relief  We will restart pregabalin    Mood disorder (HCC)  Assessment & Plan  · Mood stable continue hydroxyzine lamictal ziprasidone    Hypothyroidism  Assessment & Plan  · Continue levothyroxine       VTE Pharmacologic Prophylaxis: VTE Score: 4 Moderate Risk (Score 3-4) - Pharmacological DVT Prophylaxis Ordered: enoxaparin (Lovenox)  Patient Centered Rounds: I have performed bedside rounds with nursing staff today  Discussions with Specialists or Other Care Team Provider:     Education and Discussions with Family / Patient:     Time Spent for Care: This time was spent on one or more of the following: performing physical exam; counseling and coordination of care; obtaining or reviewing history; documenting in the medical record; reviewing/ordering tests, medications or procedures; communicating with other healthcare professionals and discussing with patient's family/caregivers  Current Length of Stay: 5 day(s)  Current Patient Status: Inpatient   Certification Statement:   Discharge Plan: Johnathan Win is following this patient on consult  They are medically stable for discharge when deemed appropriate by primary service  Code Status: Level 1 - Full Code      Subjective:   Patient seen and examined  Complains of right BKA pain  Left foot without pain  Normal bowel movements    Objective:   Vitals: Blood pressure 142/82, pulse 79, temperature 98 5 °F (36 9 °C), temperature source Temporal, resp  rate 18, SpO2 95 %, not currently breastfeeding  Intake/Output Summary (Last 24 hours) at 3/26/2023 1422  Last data filed at 3/26/2023 1257  Gross per 24 hour   Intake 960 ml   Output 2450 ml   Net -1490 ml       Physical Exam  Vitals reviewed  Constitutional:       General: She is not in acute distress  Appearance: Normal appearance  She is obese  HENT:      Head: Atraumatic  Cardiovascular:      Rate and Rhythm: Regular rhythm  Heart sounds: Normal heart sounds  Pulmonary:      Breath sounds: Normal breath sounds  No wheezing  Abdominal:      General: Bowel sounds are normal       Palpations: Abdomen is soft  Tenderness: There is no guarding or rebound  Musculoskeletal:         General: Deformity (Right BKA, left foot wrapped) present  No swelling  Skin:     General: Skin is warm  Neurological:      Mental Status: She is alert  Mental status is at baseline  Psychiatric:         Mood and Affect: Mood normal        Additional Data:   Labs:  Results from last 7 days   Lab Units 03/26/23 0528 03/25/23 0443 03/24/23 0437   WBC Thousand/uL 10 62* 10 26* 11 33*   HEMOGLOBIN g/dL 9 3* 9 2* 8 8*   PLATELETS Thousands/uL 638* 683* 681*   MCV fL 69* 69* 69*     Results from last 7 days   Lab Units 03/26/23 0528 03/25/23 0443 03/24/23 0437 03/21/23  0434 03/20/23  2230   SODIUM mmol/L 133* 135 133*   < > 127*   POTASSIUM mmol/L 4 6 4 3 4 5   < > 3 8   CHLORIDE mmol/L 101 101 102   < > 92*   CO2 mmol/L 29 29 26   < > 24   ANION GAP mmol/L 3* 5 5   < > 11   BUN mg/dL 9 5 9   < > 10   CREATININE mg/dL 0 59* 0 56* 0 64   < > 0 87   CALCIUM mg/dL 8 8 8 3* 7 9*   < > 9 3   ALBUMIN g/dL  --   --   --   --  3 2*   TOTAL BILIRUBIN mg/dL  --   --   --   --  0 36   ALK PHOS U/L  --   --   --   --  215*   ALT U/L  --   --   --   --  15   AST U/L  --   --   --   --  21   EGFR ml/min/1 73sq m 109 111 106   < > 79   GLUCOSE RANDOM mg/dL 167* 152* 140   < > 249*    < > = values in this interval not displayed       Results from last 7 days   Lab Units 03/25/23  1032 03/22/23  0511   MAGNESIUM mg/dL 1 8* 1 8*   PHOSPHORUS mg/dL 1 9* 3 3                  Results from last 7 days   Lab Units 03/21/23  0434 03/21/23  0225 03/20/23  2250   LACTIC ACID mmol/L  --  1 6 2 8*   PROCALCITONIN ng/ml 0 44*  --   --      Results from last 7 days   Lab Units 03/26/23  1105 03/26/23  0638 03/25/23  2120 03/25/23  1614 03/25/23  1123 03/25/23  0630 03/24/23  2144 03/24/23  1606 03/24/23  1132 03/24/23  0628 03/23/23  2104 03/23/23  1541   POC GLUCOSE mg/dl 169* 182* 182* 197* 149* 205* 173* 166* 213* 231* 267* 232*             * I Have Reviewed All Lab Data Listed Above  Cultures:   Results from last 7 days   Lab Units 03/21/23  1809 03/20/23  2324 03/20/23  2250 03/20/23  2230   BLOOD CULTURE   --   --  No Growth After 5 Days  No Growth After 5 Days  GRAM STAIN RESULT  2+ Gram positive cocci in pairs and chains* 2+ Polys*  2+ Gram positive cocci in pairs and chains*  1+ Gram positive rods*  1+ Gram negative rods*  --   --    WOUND CULTURE  4+ Growth of Beta Hemolytic Streptococcus Group F*  2+ Growth of Beta Hemolytic Streptococcus Group B*  2+ Growth of Alcaligenes faecalis*  1+ Growth of Staphylococcus aureus* 4+ Growth of Beta Hemolytic Streptococcus Group F*  2+ Growth of Staphylococcus aureus*  2+ Growth of Alcaligenes faecalis*  2+ Growth of  --   --                  Lines/Drains:  Invasive Devices     Peripheral Intravenous Line  Duration           Peripheral IV 03/23/23 Left Arm 2 days              Telemetry:      Imaging:  Imaging Reports Reviewed Today Include:       MRI ankle/heel right wo contrast    Result Date: 3/22/2023  Impression: Status post 1st through 5th transmetatarsal amputations with prominent skin ulceration at the distal aspect of the residual forefoot with associated cellulitis  Additionally, there is a dorsal midfoot fluid collection suspicious for abscess collection that appears to drain to the ulcerated skin surface as noted above  Changes of chronic Charcot arthropathy with extensive T1 replacement signal throughout the residual 1st through 5th metatarsals, cuboid, cuneiforms, navicular and distal aspect of the talus concerning for superimposed osteomyelitis  The study was marked in [facility] for immediate notification   Workstation performed: [id number]     MRI ankle/heel left  wo contrast    Result Date: 3/22/2023  Impression: No findings of osteomyelitis in the hindfoot, midfoot and in the visualized metatarsals The forefoot, toes not included in the images Flexor hallucis longus tenosynovitis  Incomplete assessment of the distal most aspect of the flexor hallucis longus    Tenosynovitis posterior tibialis Workstation performed: [id number]       Scheduled Meds:  Current Facility-Administered Medications   Medication Dose Route Frequency Provider Last Rate   • acetaminophen  975 mg Oral Q8H [facility] Jose Obando, DO     • cefTRIAXone  2,000 mg Intravenous Q24H Smiley Amado MD 2,000 mg (03/25/23 1605)   • cloNIDine  0 2 mg Oral BID Mahi Martinez PA-C     • dextromethorphan-guaiFENesin  10 mL Oral Q4H PRN [facility]     • docusate sodium  100 mg Oral BID [facility], DO     • enoxaparin  40 mg Subcutaneous BID [facility] Gisella, DO     • fenofibrate  48 mg Oral Daily [facility], DO     • folic acid  0,999 mcg Oral Daily [facility], DO     • gabapentin  300 mg Oral TID Robert Smith, DO     • HYDROmorphone  0 5 mg Intravenous Q3H PRN Leatha Mole Gisella DO     • hydrOXYzine HCL  100 mg Oral HS Jose Obando, DO     • hydrOXYzine HCL  50 mg Oral Daily Le[facility] [place], DO     • insulin lispro  1-5 Units Subcutaneous HS Jose Obando DO     • insulin lispro  2-12 Units Subcutaneous TID AC Jose Obando DO     • insulin lispro  5 Units Subcutaneous TID With Meals Jamshid Langley PA-C     • lamoTRIgine  250 mg Oral QAM Leatha [facility] Gisella DO     • levothyroxine  50 mcg Oral Early Morning Leatha [facility] Gisella DO     • melatonin  9 mg Oral HS [facility], DO     • methocarbamol  750 mg Oral Q6H [facility] Jose Obando DO     • naloxone  0 04 mg Intravenous Q1MIN PRN Leatha [facility] [place], DO     • ondansetron  4 mg Intravenous Q4H PRN Leatha Obando, DO     • oxyCODONE  10 mg Oral Q4H PRN Leatha Obando, DO     • oxyCODONE  5 mg Oral Q4H PRN Robert Smith, DO     • pantoprazole  40 mg Oral BID AC Jose Obando DO     • polyethylene glycol  17 g Oral Daily PRN Edmar Sanchez PA-C     • potassium-sodium phosphates  2 packet Oral Once Too Longoria PA-C     • ziprasidone  80 mg Oral BID With Meals Chantell Saenz DO         Today, Patient Was Seen By: John Hdez DO    ** Please Note: Dictation voice to text software may have been used in the creation of this document   **

## 2023-03-26 NOTE — PROGRESS NOTES
Podiatry - Progress Note  Patient: Mathew Severance 53 y o  female   MRN: [id number]  PCP: Aida Mcgee MD  Unit/Bed#: E2 -01 Encounter: [id number]  Date Of Visit: 23    ASSESSMENT:    Mathew Severance is a 53 y o  female with:    1  L plantar hallux ulceration  2  L hallux osteomyelitis  - s/p L First ray Resection 3/23/23  4  R TMA wounds with osteomyelitis  - s/p R BKA 3/23/23  5  T2DM        PLAN:    · Left partial 1st ray resection  · No purulence fluctuance or crepitus noted on exam today  · However, patient's white count continues to be elevated  We will reassess incision tomorrow with potential removal of some sutures at bedside  · Continue ceftriaxone per ID  Patient to complete ceftriaxone for 5 days postop as she is currently homeless  · Follow-up PT OT  · Case management consult, follow-up for placement  · Right BKA  · Per general surgery stable at this time  · Continue local wound care  · Type 2 diabetes mellitus  · Continue sliding scale per internal medicine  · Elevation and offloading on green foam wedges or pillows when non-ambulatory  · Appreciate consulting services for recommendations and management  Antibiotics started: ceftriaxone  Pharmacologic VTE Prophylaxis: Enoxaparin (Lovenox)   Mechanical VTE Prophylaxis: sequential compression device   Weightbearing status: Weightbearing as tolerated    Disposition:  Will require for the above     SUBJECTIVE:     The patient was seen, evaluated, and assessed at bedside today  The patient was awake, alert, and in no acute distress  No acute events overnight  The patient reports no pain to the left lower extremity  Patient denies N/V/F/chills/SOB/CP        OBJECTIVE:     Vitals:   /82 (BP Location: Right arm)   Pulse 79   Temp 98 5 °F (36 9 °C) (Temporal)   Resp 18   SpO2 95%     Temp (24hrs), Av 1 °F (36 7 °C), Min:97 3 °F (36 3 °C), Max:98 5 °F (36 9 °C)      Physical Exam:     Lungs: Non labored breathing  Abdomen: Soft, non-tender  Lower Extremity:  Cardiovascular status at baseline from admission  Neurological status at baseline from admission  Musculoskeletal status at baseline from admission  No calf tenderness noted  LLE: No fluctuance or crepitus noted on physical examination  Small amount of serosanguineous drainage noted, however no purulence  Continue with close evaluation            Additional Data:     Labs:    Results from last 7 days   Lab Units 03/26/23  0528   WBC Thousand/uL 10 62*   HEMOGLOBIN g/dL 9 3*   HEMATOCRIT % 31 6*   PLATELETS Thousands/uL 638*   NEUTROS PCT % 56   LYMPHS PCT % 30   MONOS PCT % 7   EOS PCT % 5     Results from last 7 days   Lab Units 03/26/23  0528 03/21/23  0434 03/20/23  2230   POTASSIUM mmol/L 4 6   < > 3 8   CHLORIDE mmol/L 101   < > 92*   CO2 mmol/L 29   < > 24   BUN mg/dL 9   < > 10   CREATININE mg/dL 0 59*   < > 0 87   CALCIUM mg/dL 8 8   < > 9 3   ALK PHOS U/L  --   --  215*   ALT U/L  --   --  15   AST U/L  --   --  21    < > = values in this interval not displayed  * I Have Reviewed All Lab Data Listed Above  Recent Cultures (last 7 days):     Results from last 7 days   Lab Units 03/21/23  1809 03/20/23  2324 03/20/23  2250 03/20/23  2230   BLOOD CULTURE   --   --  No Growth After 5 Days  No Growth After 5 Days     GRAM STAIN RESULT  2+ Gram positive cocci in pairs and chains* 2+ Polys*  2+ Gram positive cocci in pairs and chains*  1+ Gram positive rods*  1+ Gram negative rods*  --   --    WOUND CULTURE  4+ Growth of Beta Hemolytic Streptococcus Group F*  2+ Growth of Beta Hemolytic Streptococcus Group B*  2+ Growth of Alcaligenes faecalis*  1+ Growth of Staphylococcus aureus* 4+ Growth of Beta Hemolytic Streptococcus Group F*  2+ Growth of Staphylococcus aureus*  2+ Growth of Alcaligenes faecalis*  2+ Growth of  --   --      Results from last 7 days   Lab Units 03/21/23  1809   ANAEROBIC CULTURE  4+ Growth of  4+ Growth of Prevotella bivia* "      Imaging: I have personally reviewed pertinent films in PACS  EKG, Pathology, and Other Studies: I have personally reviewed pertinent reports  ** Please Note: Portions of the record may have been created with voice recognition software  Occasional wrong word or \"sound a like\" substitutions may have occurred due to the inherent limitations of voice recognition software  Read the chart carefully and recognize, using context, where substitutions have occurred   **      "

## 2023-03-26 NOTE — ASSESSMENT & PLAN NOTE
Lab Results   Component Value Date    HGBA1C 6 4 (H) 02/16/2022     Recent Labs     03/25/23  1614 03/25/23  2120 03/26/23  0638 03/26/23  1105   POCGLU 197* 182* 182* 169*     · Maintained outpatient on metformin, currently on hold during hospitalization  · Continue lispro with sliding scale insulin  · Diabetic neuropathy: Ran out of pregabalin 150 mg several weeks ago  Currently on gabapentin  Patient reports no relief    We will restart pregabalin

## 2023-03-26 NOTE — TREATMENT PLAN
Dressing changed at beside wound is well healing, incision c/d/i w/o any hematoma or bruising  Daily dressing changes per nursing  General surgery to sign off at this time w/ periodic wound checks while admitted  Please reach out with any questions          Cheryle Wright, DO  Surgery PGY-3

## 2023-03-26 NOTE — PLAN OF CARE
Problem: MOBILITY - ADULT  Goal: Maintain or return to baseline ADL function  Description: INTERVENTIONS:  -  Assess patient's ability to carry out ADLs; assess patient's baseline for ADL function and identify physical deficits which impact ability to perform ADLs (bathing, care of mouth/teeth, toileting, grooming, dressing, etc )  - Assess/evaluate cause of self-care deficits   - Assess range of motion  - Assess patient's mobility; develop plan if impaired  - Assess patient's need for assistive devices and provide as appropriate  - Encourage maximum independence but intervene and supervise when necessary  - Involve family in performance of ADLs  - Assess for home care needs following discharge   - Consider OT consult to assist with ADL evaluation and planning for discharge  - Provide patient education as appropriate  Outcome: Progressing  Goal: Maintains/Returns to pre admission functional level  Description: INTERVENTIONS:  - Perform BMAT or MOVE assessment daily    - Set and communicate daily mobility goal to care team and patient/family/caregiver  - Collaborate with rehabilitation services on mobility goals if consulted  - Perform Range of Motion 3 times a day  - Reposition patient every 2 hours    - Dangle patient 3 times a day  - Stand patient 3 times a day  - Ambulate patient 3 times a day  - Out of bed to chair 3 times a day   - Out of bed for meals 3 times a day  - Out of bed for toileting  - Record patient progress and toleration of activity level   Outcome: Progressing     Problem: Prexisting or High Potential for Compromised Skin Integrity  Goal: Skin integrity is maintained or improved  Description: INTERVENTIONS:  - Identify patients at risk for skin breakdown  - Assess and monitor skin integrity  - Assess and monitor nutrition and hydration status  - Monitor labs   - Assess for incontinence   - Turn and reposition patient  - Assist with mobility/ambulation  - Relieve pressure over bony prominences  - Avoid friction and shearing  - Provide appropriate hygiene as needed including keeping skin clean and dry  - Evaluate need for skin moisturizer/barrier cream  - Collaborate with interdisciplinary team   - Patient/family teaching  - Consider wound care consult   Outcome: Progressing     Problem: PAIN - ADULT  Goal: Verbalizes/displays adequate comfort level or baseline comfort level  Description: Interventions:  - Encourage patient to monitor pain and request assistance  - Assess pain using appropriate pain scale  - Administer analgesics based on type and severity of pain and evaluate response  - Implement non-pharmacological measures as appropriate and evaluate response  - Consider cultural and social influences on pain and pain management  - Notify physician/advanced practitioner if interventions unsuccessful or patient reports new pain  Outcome: Progressing     Problem: INFECTION - ADULT  Goal: Absence or prevention of progression during hospitalization  Description: INTERVENTIONS:  - Assess and monitor for signs and symptoms of infection  - Monitor lab/diagnostic results  - Monitor all insertion sites, i e  indwelling lines, tubes, and drains  - Monitor endotracheal if appropriate and nasal secretions for changes in amount and color  - [place] appropriate cooling/warming therapies per order  - Administer medications as ordered  - Instruct and encourage patient and family to use good hand hygiene technique  - Identify and instruct in appropriate isolation precautions for identified infection/condition  Outcome: Progressing  Goal: Absence of fever/infection during neutropenic period  Description: INTERVENTIONS:  - Monitor WBC    Outcome: Progressing     Problem: SAFETY ADULT  Goal: Maintain or return to baseline ADL function  Description: INTERVENTIONS:  -  Assess patient's ability to carry out ADLs; assess patient's baseline for ADL function and identify physical deficits which impact ability to perform ADLs (bathing, care of mouth/teeth, toileting, grooming, dressing, etc )  - Assess/evaluate cause of self-care deficits   - Assess range of motion  - Assess patient's mobility; develop plan if impaired  - Assess patient's need for assistive devices and provide as appropriate  - Encourage maximum independence but intervene and supervise when necessary  - Involve family in performance of ADLs  - Assess for home care needs following discharge   - Consider OT consult to assist with ADL evaluation and planning for discharge  - Provide patient education as appropriate  Outcome: Progressing  Goal: Maintains/Returns to pre admission functional level  Description: INTERVENTIONS:  - Perform BMAT or MOVE assessment daily    - Set and communicate daily mobility goal to care team and patient/family/caregiver  - Collaborate with rehabilitation services on mobility goals if consulted  - Perform Range of Motion 3 times a day  - Reposition patient every 2 hours    - Dangle patient 3 times a day  - Stand patient 3 times a day  - Ambulate patient 3 times a day  - Out of bed to chair 3 times a day   - Out of bed for meals 3 times a day  - Out of bed for toileting  - Record patient progress and toleration of activity level   Outcome: Progressing  Goal: Patient will remain free of falls  Description: INTERVENTIONS:  - Educate patient/family on patient safety including physical limitations  - Instruct patient to call for assistance with activity   - Consult OT/PT to assist with strengthening/mobility   - Keep Call bell within reach  - Keep bed low and locked with side rails adjusted as appropriate  - Keep care items and personal belongings within reach  - Initiate and maintain comfort rounds  - Make Fall Risk Sign visible to staff  - Offer Toileting every 2 Hours, in advance of need  - Initiate/Maintain bed alarm  - Obtain necessary fall risk management equipment  - Apply yellow socks and bracelet for high fall risk patients  - Consider moving patient to room near nurses station  Outcome: Progressing     Problem: DISCHARGE PLANNING  Goal: Discharge to home or other facility with appropriate resources  Description: INTERVENTIONS:  - Identify barriers to discharge w/patient and caregiver  - Arrange for needed discharge resources and transportation as appropriate  - Identify discharge learning needs (meds, wound care, etc )  - Arrange for interpretive services to assist at discharge as needed  - Refer to Case Management Department for coordinating discharge planning if the patient needs post-hospital services based on physician/advanced practitioner order or complex needs related to functional status, cognitive ability, or social support system  Outcome: Progressing     Problem: Knowledge Deficit  Goal: Patient/family/caregiver demonstrates understanding of disease process, treatment plan, medications, and discharge instructions  Description: Complete learning assessment and assess knowledge base    Interventions:  - Provide teaching at level of understanding  - Provide teaching via preferred learning methods  Outcome: Progressing     Problem: SKIN/TISSUE INTEGRITY - ADULT  Goal: Skin Integrity remains intact(Skin Breakdown Prevention)  Description: Assess:  -Perform Yogi assessment every shift  -Clean and moisturize skin   -Inspect skin when repositioning, toileting, and assisting with ADLS  -Assess under medical devices every shift  -Assess extremities for adequate circulation and sensation     Bed Management:  -Have minimal linens on bed & keep smooth, unwrinkled  -Change linens as needed when moist or perspiring  -Avoid sitting or lying in one position for more than 2 hours while in bed  -Keep HOB at 45 degrees     Toileting:  -Offer bedside commode  -Assess for incontinence every hour  -Use incontinent care products after each incontinent episode     Activity:  -Mobilize patient 3 times a day  -Encourage activity and walks on unit  -Encourage or provide ROM exercises   -Turn and reposition patient every 2 Hours  -Use appropriate equipment to lift or move patient in bed  -Instruct/ Assist with weight shifting every hour when out of bed in chair  -Consider limitation of chair time 2 hour intervals    Skin Care:  -Avoid use of baby powder, tape, friction and shearing, hot water or constrictive clothing  -Relieve pressure over bony prominences   -Do not massage red bony areas    Next Steps:  -Teach patient strategies to minimize risks    -Consider consults to  interdisciplinary teams   Outcome: Progressing  Goal: Incision(s), wounds(s) or drain site(s) healing without S/S of infection  Description: INTERVENTIONS  - Assess and document dressing, incision, wound bed, drain sites and surrounding tissue  - Provide patient and family education  - Perform skin care/dressing changes as ordered  Outcome: Progressing  Goal: Pressure injury heals and does not worsen  Description: Interventions:  - Implement low air loss mattress or specialty surface (Criteria met)  - Apply silicone foam dressing  - Instruct/assist with weight shifting every 15 minutes when in chair   - Limit chair time to 2 hour intervals  - Use special pressure reducing interventions when in chair   - Apply fecal or urinary incontinence containment device   - Perform passive or active ROM every hour  - Turn and reposition patient & offload bony prominences every 2 hours   - Utilize friction reducing device or surface for transfers   - Consider consults to  interdisciplinary teams   - Use incontinent care products after each incontinent episode   - Consider nutrition services referral as needed  Outcome: Progressing     Problem: Nutrition/Hydration-ADULT  Goal: Nutrient/Hydration intake appropriate for improving, restoring or maintaining nutritional needs  Description: Monitor and assess patient's nutrition/hydration status for malnutrition   Collaborate with interdisciplinary team and initiate plan and interventions as ordered  Monitor patient's weight and dietary intake as ordered or per policy  Utilize nutrition screening tool and intervene as necessary  Determine patient's food preferences and provide high-protein, high-caloric foods as appropriate       INTERVENTIONS:  - Monitor oral intake, urinary output, labs, and treatment plans  - Assess nutrition and hydration status and recommend course of action  - Evaluate amount of meals eaten  - Assist patient with eating if necessary   - Allow adequate time for meals  - Recommend/ encourage appropriate diets, oral nutritional supplements, and vitamin/mineral supplements  - Order, calculate, and assess calorie counts as needed  - Recommend, monitor, and adjust tube feedings and TPN/PPN based on assessed needs  - Assess need for intravenous fluids  - Provide specific nutrition/hydration education as appropriate  - Include patient/family/caregiver in decisions related to nutrition  Outcome: Progressing     Problem: COPING  Goal: Pt/Family able to verbalize concerns and demonstrate effective coping strategies  Description: INTERVENTIONS:  - Assist patient/family to identify coping skills, available support systems and cultural and spiritual values  - Provide emotional support, including active listening and acknowledgement of concerns of patient and caregivers  - Reduce environmental stimuli, as able  - Provide patient education  - Assess for spiritual pain/suffering and initiate spiritual care, including notification of Pastoral Care or [place] based community as needed  - Assess effectiveness of coping strategies  Outcome: Progressing  Goal: Will report anxiety at manageable levels  Description: INTERVENTIONS:  - Administer medication as ordered  - Teach and encourage coping skills  - Provide emotional support  - Assess patient/family for anxiety and ability to cope  Outcome: Progressing     Problem: CHANGE IN BODY IMAGE  Goal: Pt/Family communicate acceptance of loss or change in body image and expresses psychological comfort and peace  Description: INTERVENTIONS:  - Assess patient/family anxiety and grief process related to change in body image, loss of functional status and loss of sense of self  - Assess patient/family's coping skills and provide emotional, spiritual and psychosocial support  - Provide information about the patient's health status with consideration of family and cultural values  - Communicate willingness to discuss loss and facilitate grief process with patient/family as appropriate  - Emphasize sustaining relationships within family system and community, or [place]/spiritual traditions  - Refer to community support groups as appropriate  - Initiate Spiritual Care, Pastoral care or other ancillary consults as needed  Outcome: Progressing     Problem: RESPIRATORY - ADULT  Goal: Achieves optimal ventilation and oxygenation  Description: INTERVENTIONS:  - Assess for changes in respiratory status  - Assess for changes in mentation and behavior  - Position to facilitate oxygenation and minimize respiratory effort  - Oxygen administered by appropriate delivery if ordered  - Initiate smoking cessation education as indicated  - Encourage broncho-pulmonary hygiene including cough, deep breathe, Incentive Spirometry  - Assess the need for suctioning and aspirate as needed  - Assess and instruct to report SOB or any respiratory difficulty  - Respiratory Therapy support as indicated  Outcome: Progressing     Problem: METABOLIC, FLUID AND ELECTROLYTES - ADULT  Goal: Fluid balance maintained  Description: INTERVENTIONS:  - Monitor labs   - Monitor I/O and WT  - Instruct patient on fluid and nutrition as appropriate  - Assess for signs & symptoms of volume excess or deficit  Outcome: Progressing  Goal: Glucose maintained within target range  Description: INTERVENTIONS:  - Monitor Blood Glucose as ordered  - Assess for signs and symptoms of hyperglycemia and hypoglycemia  - Administer ordered medications to maintain glucose within target range  - Assess nutritional intake and initiate nutrition service referral as needed  Outcome: Progressing     Problem: HEMATOLOGIC - ADULT  Goal: Maintains hematologic stability  Description: INTERVENTIONS  - Assess for signs and symptoms of bleeding or hemorrhage  - Monitor labs  - Administer supportive blood products/factors as ordered and appropriate  Outcome: Progressing

## 2023-03-27 ENCOUNTER — APPOINTMENT (OUTPATIENT)
Dept: RADIOLOGY | Facility: HOSPITAL | Age: 48
End: 2023-03-27

## 2023-03-27 PROBLEM — R05.9 COUGH: Status: ACTIVE | Noted: 2023-03-27

## 2023-03-27 LAB
ANION GAP SERPL CALCULATED.3IONS-SCNC: 5 MMOL/L (ref 4–13)
BASOPHILS # BLD AUTO: 0.06 THOUSANDS/ÂΜL (ref 0–0.1)
BASOPHILS NFR BLD AUTO: 1 % (ref 0–1)
BUN SERPL-MCNC: 13 MG/DL (ref 5–25)
CALCIUM SERPL-MCNC: 9.2 MG/DL (ref 8.4–10.2)
CHLORIDE SERPL-SCNC: 101 MMOL/L (ref 96–108)
CO2 SERPL-SCNC: 29 MMOL/L (ref 21–32)
CREAT SERPL-MCNC: 0.67 MG/DL (ref 0.6–1.3)
EOSINOPHIL # BLD AUTO: 0.54 THOUSAND/ÂΜL (ref 0–0.61)
EOSINOPHIL NFR BLD AUTO: 6 % (ref 0–6)
ERYTHROCYTE [DISTWIDTH] IN BLOOD BY AUTOMATED COUNT: 22.4 % (ref 11.6–15.1)
GFR SERPL CREATININE-BSD FRML MDRD: 105 ML/MIN/1.73SQ M
GLUCOSE SERPL-MCNC: 161 MG/DL (ref 65–140)
GLUCOSE SERPL-MCNC: 165 MG/DL (ref 65–140)
GLUCOSE SERPL-MCNC: 186 MG/DL (ref 65–140)
GLUCOSE SERPL-MCNC: 197 MG/DL (ref 65–140)
HCT VFR BLD AUTO: 32.8 % (ref 34.8–46.1)
HGB BLD-MCNC: 9.7 G/DL (ref 11.5–15.4)
IMM GRANULOCYTES # BLD AUTO: 0.14 THOUSAND/UL (ref 0–0.2)
IMM GRANULOCYTES NFR BLD AUTO: 2 % (ref 0–2)
LYMPHOCYTES # BLD AUTO: 2.93 THOUSANDS/ÂΜL (ref 0.6–4.47)
LYMPHOCYTES NFR BLD AUTO: 31 % (ref 14–44)
MCH RBC QN AUTO: 20.4 PG (ref 26.8–34.3)
MCHC RBC AUTO-ENTMCNC: 29.6 G/DL (ref 31.4–37.4)
MCV RBC AUTO: 69 FL (ref 82–98)
MONOCYTES # BLD AUTO: 0.63 THOUSAND/ÂΜL (ref 0.17–1.22)
MONOCYTES NFR BLD AUTO: 7 % (ref 4–12)
NEUTROPHILS # BLD AUTO: 5.17 THOUSANDS/ÂΜL (ref 1.85–7.62)
NEUTS SEG NFR BLD AUTO: 53 % (ref 43–75)
NRBC BLD AUTO-RTO: 0 /100 WBCS
PLATELET # BLD AUTO: 696 THOUSANDS/UL (ref 149–390)
PMV BLD AUTO: 8.1 FL (ref 8.9–12.7)
POTASSIUM SERPL-SCNC: 4.4 MMOL/L (ref 3.5–5.3)
RBC # BLD AUTO: 4.76 MILLION/UL (ref 3.81–5.12)
SODIUM SERPL-SCNC: 135 MMOL/L (ref 135–147)
WBC # BLD AUTO: 9.47 THOUSAND/UL (ref 4.31–10.16)

## 2023-03-27 RX ADMIN — ACETAMINOPHEN 325MG 975 MG: 325 TABLET ORAL at 05:37

## 2023-03-27 RX ADMIN — HYDROMORPHONE HYDROCHLORIDE 0.5 MG: 1 INJECTION, SOLUTION INTRAMUSCULAR; INTRAVENOUS; SUBCUTANEOUS at 08:41

## 2023-03-27 RX ADMIN — ENOXAPARIN SODIUM 40 MG: 100 INJECTION SUBCUTANEOUS at 08:28

## 2023-03-27 RX ADMIN — OXYCODONE HYDROCHLORIDE 10 MG: 10 TABLET ORAL at 05:38

## 2023-03-27 RX ADMIN — ACETAMINOPHEN 325MG 975 MG: 325 TABLET ORAL at 21:09

## 2023-03-27 RX ADMIN — OXYCODONE HYDROCHLORIDE 10 MG: 10 TABLET ORAL at 18:04

## 2023-03-27 RX ADMIN — INSULIN LISPRO 5 UNITS: 100 INJECTION, SOLUTION INTRAVENOUS; SUBCUTANEOUS at 18:05

## 2023-03-27 RX ADMIN — HYDROMORPHONE HYDROCHLORIDE 0.5 MG: 1 INJECTION, SOLUTION INTRAMUSCULAR; INTRAVENOUS; SUBCUTANEOUS at 21:45

## 2023-03-27 RX ADMIN — ONDANSETRON 4 MG: 2 INJECTION INTRAMUSCULAR; INTRAVENOUS at 13:29

## 2023-03-27 RX ADMIN — CLONIDINE HYDROCHLORIDE 0.2 MG: 0.1 TABLET ORAL at 21:09

## 2023-03-27 RX ADMIN — METHOCARBAMOL 750 MG: 750 TABLET ORAL at 05:37

## 2023-03-27 RX ADMIN — INSULIN LISPRO 1 UNITS: 100 INJECTION, SOLUTION INTRAVENOUS; SUBCUTANEOUS at 21:09

## 2023-03-27 RX ADMIN — HYDROXYZINE HYDROCHLORIDE 100 MG: 25 TABLET, FILM COATED ORAL at 21:09

## 2023-03-27 RX ADMIN — LAMOTRIGINE 250 MG: 100 TABLET ORAL at 08:29

## 2023-03-27 RX ADMIN — MELATONIN 9 MG: at 21:09

## 2023-03-27 RX ADMIN — HYDROXYZINE HYDROCHLORIDE 50 MG: 25 TABLET, FILM COATED ORAL at 08:34

## 2023-03-27 RX ADMIN — PREGABALIN 50 MG: 50 CAPSULE ORAL at 21:09

## 2023-03-27 RX ADMIN — INSULIN LISPRO 5 UNITS: 100 INJECTION, SOLUTION INTRAVENOUS; SUBCUTANEOUS at 08:47

## 2023-03-27 RX ADMIN — LEVOTHYROXINE SODIUM 50 MCG: 50 TABLET ORAL at 05:37

## 2023-03-27 RX ADMIN — METHOCARBAMOL 750 MG: 750 TABLET ORAL at 23:11

## 2023-03-27 RX ADMIN — DOCUSATE SODIUM 100 MG: 100 CAPSULE, LIQUID FILLED ORAL at 08:28

## 2023-03-27 RX ADMIN — PANTOPRAZOLE SODIUM 40 MG: 40 TABLET, DELAYED RELEASE ORAL at 18:05

## 2023-03-27 RX ADMIN — ACETAMINOPHEN 325MG 975 MG: 325 TABLET ORAL at 15:02

## 2023-03-27 RX ADMIN — ZIPRASIDONE HCL 80 MG: 40 CAPSULE ORAL at 08:31

## 2023-03-27 RX ADMIN — PREGABALIN 50 MG: 50 CAPSULE ORAL at 08:33

## 2023-03-27 RX ADMIN — FENOFIBRATE 48 MG: 48 TABLET, FILM COATED ORAL at 08:33

## 2023-03-27 RX ADMIN — GUAIFENESIN AND DEXTROMETHORPHAN 10 ML: 100; 10 SYRUP ORAL at 08:41

## 2023-03-27 RX ADMIN — METHOCARBAMOL 750 MG: 750 TABLET ORAL at 18:05

## 2023-03-27 RX ADMIN — CEFTRIAXONE 2000 MG: 10 INJECTION, POWDER, FOR SOLUTION INTRAVENOUS at 18:04

## 2023-03-27 RX ADMIN — PANTOPRAZOLE SODIUM 40 MG: 40 TABLET, DELAYED RELEASE ORAL at 05:37

## 2023-03-27 RX ADMIN — METHOCARBAMOL 750 MG: 750 TABLET ORAL at 12:47

## 2023-03-27 RX ADMIN — GUAIFENESIN AND DEXTROMETHORPHAN 10 ML: 100; 10 SYRUP ORAL at 15:03

## 2023-03-27 RX ADMIN — ZIPRASIDONE HCL 80 MG: 40 CAPSULE ORAL at 21:09

## 2023-03-27 RX ADMIN — CLONIDINE HYDROCHLORIDE 0.2 MG: 0.1 TABLET ORAL at 08:28

## 2023-03-27 RX ADMIN — FOLIC ACID 1000 MCG: 1 TABLET ORAL at 08:32

## 2023-03-27 RX ADMIN — INSULIN LISPRO 2 UNITS: 100 INJECTION, SOLUTION INTRAVENOUS; SUBCUTANEOUS at 08:46

## 2023-03-27 RX ADMIN — GUAIFENESIN AND DEXTROMETHORPHAN 10 ML: 100; 10 SYRUP ORAL at 21:09

## 2023-03-27 RX ADMIN — ENOXAPARIN SODIUM 40 MG: 100 INJECTION SUBCUTANEOUS at 18:05

## 2023-03-27 RX ADMIN — OXYCODONE HYDROCHLORIDE 10 MG: 10 TABLET ORAL at 13:26

## 2023-03-27 RX ADMIN — INSULIN LISPRO 2 UNITS: 100 INJECTION, SOLUTION INTRAVENOUS; SUBCUTANEOUS at 18:06

## 2023-03-27 NOTE — PLAN OF CARE
Problem: MOBILITY - ADULT  Goal: Maintain or return to baseline ADL function  Description: INTERVENTIONS:  -  Assess patient's ability to carry out ADLs; assess patient's baseline for ADL function and identify physical deficits which impact ability to perform ADLs (bathing, care of mouth/teeth, toileting, grooming, dressing, etc )  - Assess/evaluate cause of self-care deficits   - Assess range of motion  - Assess patient's mobility; develop plan if impaired  - Assess patient's need for assistive devices and provide as appropriate  - Encourage maximum independence but intervene and supervise when necessary  - Involve family in performance of ADLs  - Assess for home care needs following discharge   - Consider OT consult to assist with ADL evaluation and planning for discharge  - Provide patient education as appropriate  Outcome: Progressing  Goal: Maintains/Returns to pre admission functional level  Description: INTERVENTIONS:  - Perform BMAT or MOVE assessment daily    - Set and communicate daily mobility goal to care team and patient/family/caregiver  - Collaborate with rehabilitation services on mobility goals if consulted  - Perform Range of Motion 3 times a day  - Reposition patient every 2 hours    - Dangle patient 3 times a day  - Stand patient 3 times a day  - Ambulate patient 3 times a day  - Out of bed to chair 3 times a day   - Out of bed for meals 3 times a day  - Out of bed for toileting  - Record patient progress and toleration of activity level   Outcome: Progressing     Problem: Prexisting or High Potential for Compromised Skin Integrity  Goal: Skin integrity is maintained or improved  Description: INTERVENTIONS:  - Identify patients at risk for skin breakdown  - Assess and monitor skin integrity  - Assess and monitor nutrition and hydration status  - Monitor labs   - Assess for incontinence   - Turn and reposition patient  - Assist with mobility/ambulation  - Relieve pressure over bony prominences  - Avoid friction and shearing  - Provide appropriate hygiene as needed including keeping skin clean and dry  - Evaluate need for skin moisturizer/barrier cream  - Collaborate with interdisciplinary team   - Patient/family teaching  - Consider wound care consult   Outcome: Progressing     Problem: PAIN - ADULT  Goal: Verbalizes/displays adequate comfort level or baseline comfort level  Description: Interventions:  - Encourage patient to monitor pain and request assistance  - Assess pain using appropriate pain scale  - Administer analgesics based on type and severity of pain and evaluate response  - Implement non-pharmacological measures as appropriate and evaluate response  - Consider cultural and social influences on pain and pain management  - Notify physician/advanced practitioner if interventions unsuccessful or patient reports new pain  Outcome: Progressing     Problem: INFECTION - ADULT  Goal: Absence or prevention of progression during hospitalization  Description: INTERVENTIONS:  - Assess and monitor for signs and symptoms of infection  - Monitor lab/diagnostic results  - Monitor all insertion sites, i e  indwelling lines, tubes, and drains  - Monitor endotracheal if appropriate and nasal secretions for changes in amount and color  - [place] appropriate cooling/warming therapies per order  - Administer medications as ordered  - Instruct and encourage patient and family to use good hand hygiene technique  - Identify and instruct in appropriate isolation precautions for identified infection/condition  Outcome: Progressing  Goal: Absence of fever/infection during neutropenic period  Description: INTERVENTIONS:  - Monitor WBC    Outcome: Progressing     Problem: SAFETY ADULT  Goal: Maintain or return to baseline ADL function  Description: INTERVENTIONS:  -  Assess patient's ability to carry out ADLs; assess patient's baseline for ADL function and identify physical deficits which impact ability to perform ADLs (bathing, care of mouth/teeth, toileting, grooming, dressing, etc )  - Assess/evaluate cause of self-care deficits   - Assess range of motion  - Assess patient's mobility; develop plan if impaired  - Assess patient's need for assistive devices and provide as appropriate  - Encourage maximum independence but intervene and supervise when necessary  - Involve family in performance of ADLs  - Assess for home care needs following discharge   - Consider OT consult to assist with ADL evaluation and planning for discharge  - Provide patient education as appropriate  Outcome: Progressing  Goal: Maintains/Returns to pre admission functional level  Description: INTERVENTIONS:  - Perform BMAT or MOVE assessment daily    - Set and communicate daily mobility goal to care team and patient/family/caregiver  - Collaborate with rehabilitation services on mobility goals if consulted  - Perform Range of Motion 3 times a day  - Reposition patient every 2 hours    - Dangle patient 3 times a day  - Stand patient 3 times a day  - Ambulate patient 3 times a day  - Out of bed to chair 3 times a day   - Out of bed for meals 3 times a day  - Out of bed for toileting  - Record patient progress and toleration of activity level   Outcome: Progressing  Goal: Patient will remain free of falls  Description: INTERVENTIONS:  - Educate patient/family on patient safety including physical limitations  - Instruct patient to call for assistance with activity   - Consult OT/PT to assist with strengthening/mobility   - Keep Call bell within reach  - Keep bed low and locked with side rails adjusted as appropriate  - Keep care items and personal belongings within reach  - Initiate and maintain comfort rounds  - Make Fall Risk Sign visible to staff  - Offer Toileting every 3 Hours, in advance of need  - Initiate/Maintain bed alarm  - Obtain necessary fall risk management equipment:   - Apply yellow socks and bracelet for high fall risk patients  - Consider moving patient to room near nurses station  Outcome: Progressing     Problem: DISCHARGE PLANNING  Goal: Discharge to home or other facility with appropriate resources  Description: INTERVENTIONS:  - Identify barriers to discharge w/patient and caregiver  - Arrange for needed discharge resources and transportation as appropriate  - Identify discharge learning needs (meds, wound care, etc )  - Arrange for interpretive services to assist at discharge as needed  - Refer to Case Management Department for coordinating discharge planning if the patient needs post-hospital services based on physician/advanced practitioner order or complex needs related to functional status, cognitive ability, or social support system  Outcome: Progressing     Problem: Knowledge Deficit  Goal: Patient/family/caregiver demonstrates understanding of disease process, treatment plan, medications, and discharge instructions  Description: Complete learning assessment and assess knowledge base    Interventions:  - Provide teaching at level of understanding  - Provide teaching via preferred learning methods  Outcome: Progressing     Problem: SKIN/TISSUE INTEGRITY - ADULT  Goal: Skin Integrity remains intact(Skin Breakdown Prevention)  Description: Assess:  -Perform Yogi assessment every shift  -Clean and moisturize skin every shift  -Inspect skin when repositioning, toileting, and assisting with ADLS  -Assess under medical devices s  -Assess extremities for adequate circulation and sensation     Bed Management:  -Have minimal linens on bed & keep smooth, unwrinkled  -Change linens as needed when moist or perspiring  -Avoid sitting or lying in one position for more than 2 hours while in bed  -Keep HOB at 30degrees     Toileting:  -Offer bedside commode  -Assess for incontinence every 3 hours  -Use incontinent care products after each incontinent episode    Activity:  -Mobilize patient 3 times a day  -Encourage activity and walks on unit  -Encourage or provide ROM exercises   -Turn and reposition patient every 2 Hours  -Use appropriate equipment to lift or move patient in bed  -Instruct/ Assist with weight shifting every 3 when out of bed in chair  -Consider limitation of chair time 3 hour intervals    Skin Care:  -Avoid use of baby powder, tape, friction and shearing, hot water or constrictive clothing  -Relieve pressure over bony prominences   -Do not massage red bony areas    Next Steps:  -Teach patient strategies to minimize risks    -Consider consults to  interdisciplinary teams such as   Outcome: Progressing  Goal: Incision(s), wounds(s) or drain site(s) healing without S/S of infection  Description: INTERVENTIONS  - Assess and document dressing, incision, wound bed, drain sites and surrounding tissue  - Provide patient and family education  - Perform skin care/dressing changes every 3 hours   Outcome: Progressing  Goal: Pressure injury heals and does not worsen  Description: Interventions:  - Implement low air loss mattress or specialty surface (Criteria met)  - Apply silicone foam dressing  - Instruct/assist with weight shifting every 120 minutes when in chair   - Limit chair time to  hour intervals  - Use special pressure reducing interventions such as  when in chair   - Apply fecal or urinary incontinence containment device   - Perform passive or active ROM every   - Turn and reposition patient & offload bony prominences every  hours   - Utilize friction reducing device or surface for transfers   - Consider consults to  interdisciplinary teams such as   - Use incontinent care products after each incontinent episode such as   - Consider nutrition services referral as needed  Outcome: Progressing     Problem: Nutrition/Hydration-ADULT  Goal: Nutrient/Hydration intake appropriate for improving, restoring or maintaining nutritional needs  Description: Monitor and assess patient's nutrition/hydration status for malnutrition   Collaborate with interdisciplinary team and initiate plan and interventions as ordered  Monitor patient's weight and dietary intake as ordered or per policy  Utilize nutrition screening tool and intervene as necessary  Determine patient's food preferences and provide high-protein, high-caloric foods as appropriate       INTERVENTIONS:  - Monitor oral intake, urinary output, labs, and treatment plans  - Assess nutrition and hydration status and recommend course of action  - Evaluate amount of meals eaten  - Assist patient with eating if necessary   - Allow adequate time for meals  - Recommend/ encourage appropriate diets, oral nutritional supplements, and vitamin/mineral supplements  - Order, calculate, and assess calorie counts as needed  - Recommend, monitor, and adjust tube feedings and TPN/PPN based on assessed needs  - Assess need for intravenous fluids  - Provide specific nutrition/hydration education as appropriate  - Include patient/family/caregiver in decisions related to nutrition  Outcome: Progressing     Problem: COPING  Goal: Pt/Family able to verbalize concerns and demonstrate effective coping strategies  Description: INTERVENTIONS:  - Assist patient/family to identify coping skills, available support systems and cultural and spiritual values  - Provide emotional support, including active listening and acknowledgement of concerns of patient and caregivers  - Reduce environmental stimuli, as able  - Provide patient education  - Assess for spiritual pain/suffering and initiate spiritual care, including notification of Pastoral Care or gisell based community as needed  - Assess effectiveness of coping strategies  Outcome: Progressing  Goal: Will report anxiety at manageable levels  Description: INTERVENTIONS:  - Administer medication as ordered  - Teach and encourage coping skills  - Provide emotional support  - Assess patient/family for anxiety and ability to cope  Outcome: Progressing     Problem: CHANGE IN BODY IMAGE  Goal: Pt/Family communicate acceptance of loss or change in body image and expresses psychological comfort and peace  Description: INTERVENTIONS:  - Assess patient/family anxiety and grief process related to change in body image, loss of functional status and loss of sense of self  - Assess patient/family's coping skills and provide emotional, spiritual and psychosocial support  - Provide information about the patient's health status with consideration of family and cultural values  - Communicate willingness to discuss loss and facilitate grief process with patient/family as appropriate  - Emphasize sustaining relationships within family system and community, or gisell/spiritual traditions  - Refer to community support groups as appropriate  - Initiate Spiritual Care, Pastoral care or other ancillary consults as needed  Outcome: Progressing     Problem: RESPIRATORY - ADULT  Goal: Achieves optimal ventilation and oxygenation  Description: INTERVENTIONS:  - Assess for changes in respiratory status  - Assess for changes in mentation and behavior  - Position to facilitate oxygenation and minimize respiratory effort  - Oxygen administered by appropriate delivery if ordered  - Initiate smoking cessation education as indicated  - Encourage broncho-pulmonary hygiene including cough, deep breathe, Incentive Spirometry  - Assess the need for suctioning and aspirate as needed  - Assess and instruct to report SOB or any respiratory difficulty  - Respiratory Therapy support as indicated  Outcome: Progressing     Problem: METABOLIC, FLUID AND ELECTROLYTES - ADULT  Goal: Fluid balance maintained  Description: INTERVENTIONS:  - Monitor labs   - Monitor I/O and WT  - Instruct patient on fluid and nutrition as appropriate  - Assess for signs & symptoms of volume excess or deficit  Outcome: Progressing  Goal: Glucose maintained within target range  Description: INTERVENTIONS:  - Monitor Blood Glucose as ordered  - Assess for signs and symptoms of hyperglycemia and hypoglycemia  - Administer ordered medications to maintain glucose within target range  - Assess nutritional intake and initiate nutrition service referral as needed  Outcome: Progressing     Problem: HEMATOLOGIC - ADULT  Goal: Maintains hematologic stability  Description: INTERVENTIONS  - Assess for signs and symptoms of bleeding or hemorrhage  - Monitor labs  - Administer supportive blood products/factors as ordered and appropriate  Outcome: Progressing

## 2023-03-27 NOTE — PROGRESS NOTES
"[facility]  Progress Note  Name: Kevin Vasquez I  MRN: [id number]  Unit/Bed#: E2 -01 I Date of Admission: 3/21/2023   Date of Service: 3/27/2023 I Hospital Day: 6    Assessment/Plan   * Left foot diabetic ulcer  Assessment & Plan  Background: History of mood disorder, diabetes on metformin, hypothyroidism and recurrent foot disease has been homeless and lost to follow-up presented to [facility] initially for diabetic foot ulcer and has been transferred here for further consulting services  · Had wounds on both feet probing to bone  · Status post right BKA and left ray resection  · Continue IV ceftriaxone per ID through 3/28/2023 for 5-day course postop  · Neuropathic pain: Restarted pregabalin yesterday  · Case management following for discharge planning    Cough  Assessment & Plan  · Patient complaining of persistent cough during hospitalization  · Note significant improvement with Robitussin-DM  · We will check chest x-ray for completeness  · Encourage incentive spirometry    Homeless single person  Assessment & Plan  · Patient reports she has nowhere to go after discharge and is essentially homeless  · PT/OT following  · Case management aware    Morbid obesity with BMI of 40 0-44 9, adult ([facility] [address] )  Assessment & Plan  Estimated body mass index is 42 19 kg/m² as calculated from the following:    Height as of an earlier encounter on 3/21/23: 5' 5\" (1 651 m)  Weight as of an earlier encounter on 3/21/23: 115 kg (253 lb 8 5 oz)      GERD (gastroesophageal reflux disease)  Assessment & Plan  · Continue pantoprazole    Essential hypertension  Assessment & Plan  · BP well controlled during hospitalization  · Continue clonidine    Type 2 diabetes mellitus with neurologic complication, without long-term current use of insulin [facility])  Assessment & Plan  Lab Results   Component Value Date    HGBA1C 6 4 (H) 02/16/2022     Recent Labs     03/26/23  1105 03/26/23  1556 03/26/23  2134 " 23  0824   POCGLU 169* 175* 172* 186*     · Maintained outpatient on metformin, currently on hold during hospitalization  · Continue lispro with sliding scale insulin  · Continue pregabalin for neuropathic pain    Mood disorder (HCC)  Assessment & Plan  · Mood stable continue hydroxyzine lamictal ziprasidone    Hypothyroidism  Assessment & Plan  · Continue levothyroxine             VTE Pharmacologic Prophylaxis: VTE Score: 4 Moderate Risk (Score 3-4) - Pharmacological DVT Prophylaxis Ordered: enoxaparin (Lovenox)  Patient Centered Rounds: I performed bedside rounds with nursing staff today  Discussions with Specialists or Other Care Team Provider: None    Education and Discussions with Family / Patient: patient  Total Time Spent on Date of Encounter in care of patient: 35 minutes This time was spent on one or more of the following: performing physical exam; counseling and coordination of care; obtaining or reviewing history; documenting in the medical record; reviewing/ordering tests, medications or procedures; communicating with other healthcare professionals and discussing with patient's family/caregivers  Current Length of Stay: 6 day(s)  Current Patient Status: Inpatient   Certification Statement: Per podiatry  Discharge Plan: Joanna Villela is following this patient on consult  They are medically stable for discharge when deemed appropriate by primary service  Code Status: Level 1 - Full Code    Subjective:   Patient reports she is doing okay today  Still having some pain in her right stump  Does report persistent cough but denies any shortness of breath  Objective:     Vitals:   Temp (24hrs), Av 8 °F (36 °C), Min:96 7 °F (35 9 °C), Max:97 °F (36 1 °C)    Temp:  [96 7 °F (35 9 °C)-97 °F (36 1 °C)] 96 7 °F (35 9 °C)  HR:  [68-73] 68  Resp:  [18] 18  BP: (106-145)/() 115/78  SpO2:  [94 %-99 %] 94 %  There is no height or weight on file to calculate BMI       Input and Output Summary (last 24 hours): Intake/Output Summary (Last 24 hours) at 3/27/2023 0839  Last data filed at 3/27/2023 0827  Gross per 24 hour   Intake 720 ml   Output 3300 ml   Net -2580 ml       Physical Exam:   Physical Exam  Vitals reviewed  Constitutional:       General: She is not in acute distress  HENT:      Head: Normocephalic and atraumatic  Eyes:      General: No scleral icterus  Conjunctiva/sclera: Conjunctivae normal    Cardiovascular:      Rate and Rhythm: Normal rate and regular rhythm  Heart sounds: No murmur heard  Pulmonary:      Effort: Pulmonary effort is normal  No respiratory distress  Breath sounds: Normal breath sounds  Abdominal:      General: Bowel sounds are normal  There is no distension  Palpations: Abdomen is soft  Tenderness: There is no abdominal tenderness  Musculoskeletal:      Cervical back: Neck supple  Comments: Right BKA, left foot wrapped in dressing   Skin:     General: Skin is warm and dry  Neurological:      Mental Status: She is alert and oriented to person, place, and time     Psychiatric:         Mood and Affect: Mood normal          Behavior: Behavior normal           Additional Data:     Labs:  Results from last 7 days   Lab Units 03/27/23  0833   WBC Thousand/uL 9 47   HEMOGLOBIN g/dL 9 7*   HEMATOCRIT % 32 8*   PLATELETS Thousands/uL 696*   NEUTROS PCT % 53   LYMPHS PCT % 31   MONOS PCT % 7   EOS PCT % 6     Results from last 7 days   Lab Units 03/26/23  0528 03/21/23  0434 03/20/23  2230   SODIUM mmol/L 133*   < > 127*   POTASSIUM mmol/L 4 6   < > 3 8   CHLORIDE mmol/L 101   < > 92*   CO2 mmol/L 29   < > 24   BUN mg/dL 9   < > 10   CREATININE mg/dL 0 59*   < > 0 87   ANION GAP mmol/L 3*   < > 11   CALCIUM mg/dL 8 8   < > 9 3   ALBUMIN g/dL  --   --  3 2*   TOTAL BILIRUBIN mg/dL  --   --  0 36   ALK PHOS U/L  --   --  215*   ALT U/L  --   --  15   AST U/L  --   --  21   GLUCOSE RANDOM mg/dL 167*   < > 249*    < > = values in this interval not displayed  Results from last 7 days   Lab Units 03/27/23  0824 03/26/23  2134 03/26/23  1556 03/26/23  1105 03/26/23  9864 03/25/23  2120 03/25/23  1614 03/25/23  1123 03/25/23  0630 03/24/23  2144 03/24/23  1606 03/24/23  1132   POC GLUCOSE mg/dl 186* 172* 175* 169* 182* 182* 197* 149* 205* 173* 166* 213*         Results from last 7 days   Lab Units 03/21/23  0434 03/21/23  0225 03/20/23  2250   LACTIC ACID mmol/L  --  1 6 2 8*   PROCALCITONIN ng/ml 0 44*  --   --        Lines/Drains:  Invasive Devices     Peripheral Intravenous Line  Duration           Peripheral IV 03/23/23 Left Arm 3 days                      Imaging: No pertinent imaging reviewed  Recent Cultures (last 7 days):   Results from last 7 days   Lab Units 03/21/23  1809 03/20/23  2324 03/20/23  2250 03/20/23  2230   BLOOD CULTURE   --   --  No Growth After 5 Days  No Growth After 5 Days     GRAM STAIN RESULT  2+ Gram positive cocci in pairs and chains* 2+ Polys*  2+ Gram positive cocci in pairs and chains*  1+ Gram positive rods*  1+ Gram negative rods*  --   --    WOUND CULTURE  4+ Growth of Beta Hemolytic Streptococcus Group F*  2+ Growth of Beta Hemolytic Streptococcus Group B*  2+ Growth of Alcaligenes faecalis*  1+ Growth of Staphylococcus aureus* 4+ Growth of Beta Hemolytic Streptococcus Group F*  2+ Growth of Staphylococcus aureus*  2+ Growth of Alcaligenes faecalis*  2+ Growth of  --   --        Last 24 Hours Medication List:   Current Facility-Administered Medications   Medication Dose Route Frequency Provider Last Rate   • acetaminophen  975 mg Oral Q8H [address] Jose P DO Gisella     • albuterol  2 puff Inhalation Q4H PRN Rupali Baywood, DO     • cefTRIAXone  2,000 mg Intravenous Q24H Mehul Rendon DO 2,000 mg (03/26/23 1721)   • cloNIDine  0 2 mg Oral BID Mahi Martinez PA-C     • dextromethorphan-guaiFENesin  10 mL Oral Q4H PRN Maribel Obando,      • docusate sodium  100 mg Oral BID Stanislaw Giron, DO • enoxaparin  40 mg Subcutaneous BID [facility] [place], DO     • fenofibrate  48 mg Oral Daily Pinkey Phil, DO     • folic acid  3,679 mcg Oral Daily [facility] [place], DO     • HYDROmorphone  0 5 mg Intravenous Q3H PRN [facility] [place], DO     • hydrOXYzine HCL  100 mg Oral HS Jasper LAUREN [place], DO     • hydrOXYzine HCL  50 mg Oral Daily [facility] [place], DO     • insulin lispro  1-5 Units Subcutaneous HS Jose LAUREN Kendallok, DO     • insulin lispro  2-12 Units Subcutaneous TID AC Jose LAUREN Obando, DO     • insulin lispro  5 Units Subcutaneous TID With Meals Martin Mercedes PA-C     • lamoTRIgine  250 mg Oral QAM [facility] [place], DO     • levothyroxine  50 mcg Oral Early Morning [facility] [place], DO     • melatonin  9 mg Oral HS [facility] [place], DO     • methocarbamol  750 mg Oral Q6H [address] [facility] [place], DO     • naloxone  0 04 mg Intravenous Q1MIN PRN [facility] [place], DO     • ondansetron  4 mg Intravenous Q4H PRN Pinkey Phil, DO     • oxyCODONE  10 mg Oral Q4H PRN Pinkey Phil, DO     • oxyCODONE  5 mg Oral Q4H PRN [facility] [place], DO     • pantoprazole  40 mg Oral BID AC Jose Obando, DO     • polyethylene glycol  17 g Oral Daily PRN Dianna Partida PA-C     • potassium-sodium phosphates  2 packet Oral Once Martin Mercedes PA-C     • pregabalin  50 mg Oral Q12H [address] Mehul Rendon DO      Followed by   • [START ON 3/28/2023] pregabalin  100 mg Oral Q12H [address] Mehul Rendon DO     • ziprasidone  80 mg Oral BID With Meals Pinkey Phil, DO          Today, Patient Was Seen By: Svetlana Nuno PA-C    **Please Note: This note may have been constructed using a voice recognition system  ** English

## 2023-03-27 NOTE — DISCHARGE INSTR - OTHER ORDERS
Skin Care Plan:   1-Apply Hydraguard lotion to bilateral buttocks and sacrum three times daily and as needed for skin protection/hydration  2-Elevate left heel off of bed/chair surface to offload pressure  3-Offloading air cushion in chair when out of bed  4-Moisturize skin daily with lotion twice daily  5-Turn/reposition every 2 hours while in bed using and weight shift frequently while in chair for pressure re-distribution on skin  6-Left foot per podiatry and right BKA per surgery teams  Discharge Instructions - Podiatry    Weight Bearing Status: Weight bearing as tolerated left foot surgical shoe                   Pain: Continue analgesics as directed    Follow-up appointment instructions: Please make an appointment within one week of discharge with Dr Marylee Derby  Contact sooner if any increase in pain, or signs of infection occur    Wound Care: Leave dressings clean, dry, and intact between professional dressing changes    Nursing Instructions: Please apply Betadine soaked adaptic  Then cover with Gauze and secure with Kerlix and tape and ACE wrap  Please change dressing every Monday, Wednesday, and Friday   General surgery:    Right BKA stump site staples can be removed no earlier than 4/10  Please apply Betadine soaked adaptic  Then cover with Gauze and secure with Kerlix and ACE wrap  Please change dressing every Monday, Wednesday, and Friday

## 2023-03-27 NOTE — WOUND OSTOMY CARE
Consult Note - Wound   Stacy Amor 53 y o  female MRN: [id number]  Unit/Bed#: E2 -01 Encounter: [id number]      History and Present Illness:  53year old female presented to the hospital with right foot pain, swelling, and redness  She is status post right BKA and left ray resection on 3/23/23  Patient's history significant for obesity, DM, HTN, former smoker, PTSD, OCD, bipolar  Assessment Findings:   Patient agreeable to assessment  Sitting up in chair, offloading chair cushion provided  Continent of bowel and bladder  Nutrition team following, dietary supplements ordered  Right BKA with dressing dry and intact per surgery team   Left foot with VAC dressing and ACE intact per podiatry team     [place] and sacrum intact with blanchable erythema, painful on palpation per patient, mild friction injury to distal sacrum/gluteal cleft area, no open areas at this time--preventative foam dressing applied to proximal sacrum  Barrier cream applied to distal buttocks not covered by foam dressing  See flowsheet for wound details  Wound Care Plan:   1-Apply Allevyn Life foam dressing to midline sacrum (small sacral) for prevention  Giorgi with P   Peel back at least daily for skin assessment and re-apply  Change dressing every 3 days and PRN  2-Elevate left heel off of bed/chair surface to offload pressure  3-Offloading air cushion in chair when out of bed  4-Moisturize skin daily with skin nourishing cream twice daily  5-Turn/reposition every 2 hours while in bed using and weight shift frequently while in chair for pressure re-distribution on skin  6-Apply hydraguard to distal buttocks not covered by foam dressing and nestor-rectal area twice daily and as needed for skin protection  7-Left foot per podiatry and right BKA per surgery teams  8-Accumax pump to bed mattress  Wound care team will sign-off at this time  Plan of care reviewed with primary ANDER Rodriguez BSN, RN, [employer]

## 2023-03-27 NOTE — ASSESSMENT & PLAN NOTE
· Patient complaining of persistent cough during hospitalization  · Note significant improvement with Robitussin-DM  · We will check chest x-ray for completeness  · Encourage incentive spirometry

## 2023-03-27 NOTE — PROGRESS NOTES
[facility] Podiatry - Progress Note  Patient: Ottoniel Curiel 53 y o  female   MRN: [id number]  PCP: Jonathan Verde MD  Unit/Bed#: E2 -01 Encounter: [id number]  Date Of Visit: 03/27/23  Hospital Stay Days: 6    ASSESSMENT:    Ottoniel Curiel is a 53 y o  female with:    1  L plantar hallux ulceration  2  L hallux osteomyelitis  - s/p L First ray Resection 3/23/23  4  R TMA wounds with osteomyelitis  - s/p R BKA 3/23/23  5  T2DM                   PLAN:     • Left partial 1st ray resection  ? Purulence noted on exam today on medial inferior boarder of wound  Bedside washout performed  ? White count is stabilizing  ? Continue ceftriaxone per ID  Patient to complete ceftriaxone for 5 days postop as she is currently homeless  ? Follow-up PT OT  ? Case management consult, follow-up for placement  • Right BKA  ? Per general surgery stable at this time  ? Continue local wound care  • Type 2 diabetes mellitus  ? Continue sliding scale per internal medicine  • Elevation and offloading on green foam wedges or pillows when non-ambulatory  • Appreciate consulting services for recommendations and management  Antibiotics started: ceftriaxone  Pharmacologic VTE Prophylaxis: Enoxaparin (Lovenox)   Mechanical VTE Prophylaxis: sequential compression device   Weightbearing status: Weightbearing as tolerated     Disposition:  Will require for the above     Procedure: With patient's consent foot was prepped in the normal sterile fashion with Betadine  The proximal medial 3 sutures were removed from the area  And with sterile scissors incision site was opened up  Saline with Betadine was used to flush the area with approximately 50 cc  No additional purulence was noted with subsequent exsanguination of the foot  Again site was flushed with additional 50 cc of sterile saline and Betadine  Site was then packed with 2 feet quarter inch iodoform packing    Foot was cleaned and dried and dressed with 4 x 4 gauze, ABD, Kerlix and an Ace wrap  Patient tolerated procedure well no anesthesia was used as patient is severely neuropathic  Resulting opening is approximately 2 4 cm x 0 3 cm x 1 5 cm deep  Subcutaneous at the base  Negative probe to bone  SUBJECTIVE:     The patient was seen, evaluated, and assessed at bedside today  The patient was awake, alert, and in no acute distress  No acute events overnight  The patient reports feeling some minor pain to the left foot  Patient denies N/V/F/chills/SOB/CP  OBJECTIVE:     Vitals:   /78 (BP Location: Right arm)   Pulse 68   Temp (!) 96 7 °F (35 9 °C) (Tympanic)   Resp 18   SpO2 94%     Temp (24hrs), Av 8 °F (36 °C), Min:96 7 °F (35 9 °C), Max:97 °F (36 1 °C)      Physical Exam :     General:  Alert, cooperative, and in no distress  Lower extremity exam:  Neurologic, Vascular, musculoskeletal status at baseline    Dermatologic: On initial evaluation pressure on the distal flap plantar medial aspect produced a small amount 1 cc or less of purulent material the incision site  Foot was prepped with Betadine and 3 sutures were removed  Site was flushed with 50 cc of saline mixed with Betadine  Foot was cleaned and dried site was packed with quarter inch iodoform packing approximately 2 feet  Resulting wound measures approximately 2 4 cm x 0 2 cm x 1 5 cm deep  Surrounding tissue appeared healthy, viable with good capillary refill no erythema, swelling, heat noted  Clinical Images 23:           Additional Data:     Labs:    Results from last 7 days   Lab Units 23  0833   WBC Thousand/uL 9 47   HEMOGLOBIN g/dL 9 7*   HEMATOCRIT % 32 8*   PLATELETS Thousands/uL 696*   NEUTROS PCT % 53   LYMPHS PCT % 31   MONOS PCT % 7   EOS PCT % 6     Results from last 7 days   Lab Units 23  0833 23  0434 23  2230   POTASSIUM mmol/L 4 4   < > 3 8   CHLORIDE mmol/L 101   < > 92*   CO2 mmol/L 29   < > 24   BUN mg/dL 13   < > 10   CREATININE mg/dL 0 67 < > 0 87   CALCIUM mg/dL 9 2   < > 9 3   ALK PHOS U/L  --   --  215*   ALT U/L  --   --  15   AST U/L  --   --  21    < > = values in this interval not displayed  * I Have Reviewed All Lab Data Listed Above  Recent Cultures (last 7 days):     Results from last 7 days   Lab Units 03/21/23  1809 03/20/23  2324 03/20/23  2250 03/20/23  2230   BLOOD CULTURE   --   --  No Growth After 5 Days  No Growth After 5 Days  GRAM STAIN RESULT  2+ Gram positive cocci in pairs and chains* 2+ Polys*  2+ Gram positive cocci in pairs and chains*  1+ Gram positive rods*  1+ Gram negative rods*  --   --    WOUND CULTURE  4+ Growth of Beta Hemolytic Streptococcus Group F*  2+ Growth of Beta Hemolytic Streptococcus Group B*  2+ Growth of Alcaligenes faecalis*  1+ Growth of Staphylococcus aureus* 4+ Growth of Beta Hemolytic Streptococcus Group F*  2+ Growth of Staphylococcus aureus*  2+ Growth of Alcaligenes faecalis*  2+ Growth of  --   --      Results from last 7 days   Lab Units 03/21/23  1809   ANAEROBIC CULTURE  4+ Growth of  4+ Growth of Prevotella bivia*       Imaging: I have personally reviewed pertinent films in PACS  MRI foot/forefoot toes left wo contrast    Result Date: 3/22/2023  Impression: 1  Plantar and medial skin ulceration noted at the level of the 1st MTP with associated cellulitis but no evidence of drainable abscess collection  Minimal marrow changes in this region or low suspicion for osteomyelitis  2   Status post 3rd transmetatarsal amputation with plantar skin ulceration at the level of the residual stump with associated cellulitis  No marrow change involving the 3rd metatarsal to indicate osteomyelitis at this time  3   Complete tear of the flexor hallucis longus tendon as detailed above with 3 8 cm tendon retraction  The possibility of pathologic tendon rupture should be considered given likely infective cellulitis and plantar skin ulceration in this region    Additionally, there is sesamoid bone splaying suggesting a tear of the intersesamoid ligament, again possibly pathologic  The study was marked in [facility] for immediate notification  Workstation performed: [id number]     MRI ankle/heel right wo contrast    Result Date: 3/22/2023  Impression: Status post 1st through 5th transmetatarsal amputations with prominent skin ulceration at the distal aspect of the residual forefoot with associated cellulitis  Additionally, there is a dorsal midfoot fluid collection suspicious for abscess collection that appears to drain to the ulcerated skin surface as noted above  Changes of chronic Charcot arthropathy with extensive T1 replacement signal throughout the residual 1st through 5th metatarsals, cuboid, cuneiforms, navicular and distal aspect of the talus concerning for superimposed osteomyelitis  The study was marked in [facility] for immediate notification  Workstation performed: [id number]     MRI ankle/heel left  wo contrast    Result Date: 3/22/2023  Impression: No findings of osteomyelitis in the hindfoot, midfoot and in the visualized metatarsals The forefoot, toes not included in the images Flexor hallucis longus tenosynovitis  Incomplete assessment of the distal most aspect of the flexor hallucis longus  Tenosynovitis posterior tibialis Workstation performed: [id number]     EKG, Pathology, and Other Studies: I have personally reviewed pertinent reports      Active medications:  Current Facility-Administered Medications   Medication Dose Route Frequency   • acetaminophen (TYLENOL) tablet 975 mg  975 mg Oral Q8H [address]   • albuterol (PROVENTIL HFA,VENTOLIN HFA) inhaler 2 puff  2 puff Inhalation Q4H PRN   • ceftriaxone (ROCEPHIN) 2 g/50 mL in dextrose IVPB  2,000 mg Intravenous Q24H   • cloNIDine (CATAPRES) tablet 0 2 mg  0 2 mg Oral BID   • dextromethorphan-guaiFENesin (ROBITUSSIN DM) oral syrup 10 mL  10 mL Oral Q4H PRN   • docusate sodium (COLACE) capsule 100 mg  100 mg Oral BID   • enoxaparin "(LOVENOX) subcutaneous injection 40 mg  40 mg Subcutaneous BID   • fenofibrate (TRICOR) tablet 48 mg  48 mg Oral Daily   • folic acid (FOLVITE) tablet 1,000 mcg  1,000 mcg Oral Daily   • HYDROmorphone (DILAUDID) injection 0 5 mg  0 5 mg Intravenous Q3H PRN   • hydrOXYzine HCL (ATARAX) tablet 100 mg  100 mg Oral HS   • hydrOXYzine HCL (ATARAX) tablet 50 mg  50 mg Oral Daily   • insulin lispro (HumaLOG) 100 units/mL subcutaneous injection 1-5 Units  1-5 Units Subcutaneous HS   • insulin lispro (HumaLOG) 100 units/mL subcutaneous injection 2-12 Units  2-12 Units Subcutaneous TID AC   • insulin lispro (HumaLOG) 100 units/mL subcutaneous injection 5 Units  5 Units Subcutaneous TID With Meals   • lamoTRIgine (LaMICtal) tablet 250 mg  250 mg Oral QAM   • levothyroxine tablet 50 mcg  50 mcg Oral Early Morning   • melatonin tablet 9 mg  9 mg Oral HS   • methocarbamol (ROBAXIN) tablet 750 mg  750 mg Oral Q6H RADHA   • naloxone (NARCAN) 0 04 mg/mL syringe 0 04 mg  0 04 mg Intravenous Q1MIN PRN   • ondansetron (ZOFRAN) injection 4 mg  4 mg Intravenous Q4H PRN   • oxyCODONE (ROXICODONE) immediate release tablet 10 mg  10 mg Oral Q4H PRN   • oxyCODONE (ROXICODONE) IR tablet 5 mg  5 mg Oral Q4H PRN   • pantoprazole (PROTONIX) EC tablet 40 mg  40 mg Oral BID AC   • polyethylene glycol (MIRALAX) packet 17 g  17 g Oral Daily PRN   • potassium-sodium phosphates (PHOS-NAK) packet 2 packet  2 packet Oral Once   • pregabalin (LYRICA) capsule 50 mg  50 mg Oral Q12H [facility]    Followed by   • [START ON 3/28/2023] pregabalin (LYRICA) capsule 100 mg  100 mg Oral Q12H [facility]   • ziprasidone (GEODON) capsule 80 mg  80 mg Oral BID With Meals         ** Please Note: Portions of the record may have been created with voice recognition software  Occasional wrong word or \"sound a like\" substitutions may have occurred due to the inherent limitations of voice recognition software   Read the chart carefully and recognize, using context, where substitutions have " occurred   **

## 2023-03-27 NOTE — NURSING NOTE
RN and PCA offered washing up and cleaning up multiple times and patient continues to refuse  Will offer again this afternoon

## 2023-03-27 NOTE — PLAN OF CARE
Problem: MOBILITY - ADULT  Goal: Maintain or return to baseline ADL function  Description: INTERVENTIONS:  -  Assess patient's ability to carry out ADLs; assess patient's baseline for ADL function and identify physical deficits which impact ability to perform ADLs (bathing, care of mouth/teeth, toileting, grooming, dressing, etc )  - Assess/evaluate cause of self-care deficits   - Assess range of motion  - Assess patient's mobility; develop plan if impaired  - Assess patient's need for assistive devices and provide as appropriate  - Encourage maximum independence but intervene and supervise when necessary  - Involve family in performance of ADLs  - Assess for home care needs following discharge   - Consider OT consult to assist with ADL evaluation and planning for discharge  - Provide patient education as appropriate  Outcome: Progressing     Problem: PAIN - ADULT  Goal: Verbalizes/displays adequate comfort level or baseline comfort level  Description: Interventions:  - Encourage patient to monitor pain and request assistance  - Assess pain using appropriate pain scale  - Administer analgesics based on type and severity of pain and evaluate response  - Implement non-pharmacological measures as appropriate and evaluate response  - Consider cultural and social influences on pain and pain management  - Notify physician/advanced practitioner if interventions unsuccessful or patient reports new pain  Outcome: Progressing     Problem: INFECTION - ADULT  Goal: Absence or prevention of progression during hospitalization  Description: INTERVENTIONS:  - Assess and monitor for signs and symptoms of infection  - Monitor lab/diagnostic results  - Monitor all insertion sites, i e  indwelling lines, tubes, and drains  - Monitor endotracheal if appropriate and nasal secretions for changes in amount and color  - [place] appropriate cooling/warming therapies per order  - Administer medications as ordered  - Instruct and encourage patient and family to use good hand hygiene technique  - Identify and instruct in appropriate isolation precautions for identified infection/condition  Outcome: Progressing     Problem: SAFETY ADULT  Goal: Maintain or return to baseline ADL function  Description: INTERVENTIONS:  -  Assess patient's ability to carry out ADLs; assess patient's baseline for ADL function and identify physical deficits which impact ability to perform ADLs (bathing, care of mouth/teeth, toileting, grooming, dressing, etc )  - Assess/evaluate cause of self-care deficits   - Assess range of motion  - Assess patient's mobility; develop plan if impaired  - Assess patient's need for assistive devices and provide as appropriate  - Encourage maximum independence but intervene and supervise when necessary  - Involve family in performance of ADLs  - Assess for home care needs following discharge   - Consider OT consult to assist with ADL evaluation and planning for discharge  - Provide patient education as appropriate  Outcome: Progressing  Goal: Patient will remain free of falls  Description: INTERVENTIONS:  - Educate patient/family on patient safety including physical limitations  - Instruct patient to call for assistance with activity   - Consult OT/PT to assist with strengthening/mobility   - Keep Call bell within reach  - Keep bed low and locked with side rails adjusted as appropriate  - Keep care items and personal belongings within reach  - Initiate and maintain comfort rounds  - Make Fall Risk Sign visible to staff  - Offer Toileting every 4 Hours, in advance of need  - Initiate/Maintain bed alarm  - Obtain necessary fall risk management equipment: gripper socks, call bell  - Apply yellow socks and bracelet for high fall risk patients  - Consider moving patient to room near nurses station  Outcome: Progressing     Problem: DISCHARGE PLANNING  Goal: Discharge to home or other facility with appropriate resources  Description: INTERVENTIONS:  - Identify barriers to discharge w/patient and caregiver  - Arrange for needed discharge resources and transportation as appropriate  - Identify discharge learning needs (meds, wound care, etc )  - Arrange for interpretive services to assist at discharge as needed  - Refer to Case Management Department for coordinating discharge planning if the patient needs post-hospital services based on physician/advanced practitioner order or complex needs related to functional status, cognitive ability, or social support system  Outcome: Progressing     Problem: Knowledge Deficit  Goal: Patient/family/caregiver demonstrates understanding of disease process, treatment plan, medications, and discharge instructions  Description: Complete learning assessment and assess knowledge base    Interventions:  - Provide teaching at level of understanding  - Provide teaching via preferred learning methods  Outcome: Progressing     Problem: RESPIRATORY - ADULT  Goal: Achieves optimal ventilation and oxygenation  Description: INTERVENTIONS:  - Assess for changes in respiratory status  - Assess for changes in mentation and behavior  - Position to facilitate oxygenation and minimize respiratory effort  - Oxygen administered by appropriate delivery if ordered  - Initiate smoking cessation education as indicated  - Encourage broncho-pulmonary hygiene including cough, deep breathe, Incentive Spirometry  - Assess the need for suctioning and aspirate as needed  - Assess and instruct to report SOB or any respiratory difficulty  - Respiratory Therapy support as indicated  Outcome: Progressing     Problem: METABOLIC, FLUID AND ELECTROLYTES - ADULT  Goal: Fluid balance maintained  Description: INTERVENTIONS:  - Monitor labs   - Monitor I/O and WT  - Instruct patient on fluid and nutrition as appropriate  - Assess for signs & symptoms of volume excess or deficit  Outcome: Progressing  Goal: Glucose maintained within target range  Description: INTERVENTIONS:  - Monitor Blood Glucose as ordered  - Assess for signs and symptoms of hyperglycemia and hypoglycemia  - Administer ordered medications to maintain glucose within target range  - Assess nutritional intake and initiate nutrition service referral as needed  Outcome: Progressing     Problem: HEMATOLOGIC - ADULT  Goal: Maintains hematologic stability  Description: INTERVENTIONS  - Assess for signs and symptoms of bleeding or hemorrhage  - Monitor labs  - Administer supportive blood products/factors as ordered and appropriate  Outcome: Progressing     Problem: Nutrition/Hydration-ADULT  Goal: Nutrient/Hydration intake appropriate for improving, restoring or maintaining nutritional needs  Description: Monitor and assess patient's nutrition/hydration status for malnutrition  Collaborate with interdisciplinary team and initiate plan and interventions as ordered  Monitor patient's weight and dietary intake as ordered or per policy  Utilize nutrition screening tool and intervene as necessary  Determine patient's food preferences and provide high-protein, high-caloric foods as appropriate       INTERVENTIONS:  - Monitor oral intake, urinary output, labs, and treatment plans  - Assess nutrition and hydration status and recommend course of action  - Evaluate amount of meals eaten  - Assist patient with eating if necessary   - Allow adequate time for meals  - Recommend/ encourage appropriate diets, oral nutritional supplements, and vitamin/mineral supplements  - Order, calculate, and assess calorie counts as needed  - Recommend, monitor, and adjust tube feedings and TPN/PPN based on assessed needs  - Assess need for intravenous fluids  - Provide specific nutrition/hydration education as appropriate  - Include patient/family/caregiver in decisions related to nutrition  Outcome: Progressing     Problem: COPING  Goal: Pt/Family able to verbalize concerns and demonstrate effective coping strategies  Description: INTERVENTIONS:  - Assist patient/family to identify coping skills, available support systems and cultural and spiritual values  - Provide emotional support, including active listening and acknowledgement of concerns of patient and caregivers  - Reduce environmental stimuli, as able  - Provide patient education  - Assess for spiritual pain/suffering and initiate spiritual care, including notification of Pastoral Care or [place] based community as needed  - Assess effectiveness of coping strategies  Outcome: Progressing     Problem: CHANGE IN BODY IMAGE  Goal: Pt/Family communicate acceptance of loss or change in body image and expresses psychological comfort and peace  Description: INTERVENTIONS:  - Assess patient/family anxiety and grief process related to change in body image, loss of functional status and loss of sense of self  - Assess patient/family's coping skills and provide emotional, spiritual and psychosocial support  - Provide information about the patient's health status with consideration of family and cultural values  - Communicate willingness to discuss loss and facilitate grief process with patient/family as appropriate  - Emphasize sustaining relationships within family system and community, or [place]/spiritual traditions  - Refer to community support groups as appropriate  - Initiate Spiritual Care, Pastoral care or other ancillary consults as needed  Outcome: Progressing

## 2023-03-27 NOTE — PROGRESS NOTES
Wound VAC application  1  Number of sponges: 1 black  2  Pressure settin continuous  3  Size of wound: 2 0 x 0 3 x 1 5 cm  4  Description of wound: 100% granular on medial aspect of left foot      -Wound VAC will be changed by podiatry  Be changed 3 times a week on a  schedule    -VAC paperwork turned to the case management for discharge

## 2023-03-27 NOTE — PLAN OF CARE
Problem: OCCUPATIONAL THERAPY ADULT  Goal: Performs self-care activities at highest level of function for planned discharge setting  See evaluation for individualized goals  Description: Treatment Interventions: ADL retraining, Functional transfer training, UE strengthening/ROM, Endurance training, Patient/family training, Equipment evaluation/education, Neuromuscular reeducation, Compensatory technique education, Continued evaluation, Energy conservation, Activityengagement          See flowsheet documentation for full assessment, interventions and recommendations  Outcome: Progressing  Note: Limitation: Decreased ADL status, Decreased UE strength, Decreased Safe judgement during ADL, Decreased cognition, Decreased endurance, Decreased self-care trans, Decreased high-level ADLs  Prognosis: Fair  Assessment: Patient participated in skilled OT session this date with interventions consisting of therapeutic activities and self-care/ADL training to increase B UE strength, functional endurance/activity tolerance, static/dynamic sitting/standing balance, and overall safety awareness to increase (I) with ADLs/IADLs to return to PLOF  Provided education on energy conservation techniques, deep breathing techniques, fall prevention strategies, and overall safety awareness  Patient agreeable to OT treatment session, upon arrival patient was found seated OOB to Recliner  Patient requiring verbal cues for safety and verbal cues for pacing through activity steps  Please refer to flowsheet for functional performance  Educated pt on components of w/c (swing back arm rests, antitippers, brakes) with good carryover  Pt does require Best for positioning w/c  Inconsistently chooses to utilize SB, but agreeable to education and trials  W/c mobility community distances with S for wound vac management  Pt does demo improvement with cognition and alertness   Patient continues to be functioning below baseline level, occupational performance remains limited secondary to factors listed above and increased risk for falls and injury  Pt left seated in chair, alarm armed and call bell and bedside table within reach; all needs met  Co treatment with PT secondary to complex medical condition of pt, possible A of 2 required to achieve and maintain transitional movements, requiring the need of skilled therapeutic intervention of 2 therapists to achieve delivery of services       OT Discharge Recommendation: Post acute rehabilitation services

## 2023-03-27 NOTE — ASSESSMENT & PLAN NOTE
Background: History of mood disorder, diabetes on metformin, hypothyroidism and recurrent foot disease has been homeless and lost to follow-up presented to [facility] initially for diabetic foot ulcer and has been transferred here for further consulting services  · Had wounds on both feet probing to bone  · Status post right BKA and left ray resection  · Continue IV ceftriaxone per ID through 3/28/2023 for 5-day course postop  · Neuropathic pain: Restarted pregabalin yesterday  · Case management following for discharge planning

## 2023-03-27 NOTE — PROGRESS NOTES
Progress Note - Infectious Disease   Brii Dupont 53 y o  female MRN: [id number]  Unit/Bed#: E2 -01 Encounter: [id number]      Impression/Plan:  1  Polymicrobial Left Foot Diabetic Ulcer Infection:  - Patient is now s/p left 1st hallux resection 3/23  No OR cultures sent, clean margins felt to be obtained  Pathology pending  Per discussion with podiatry, surgical source control is assumed from bone infection however they did notice some purulence on inspection of wound today which was washed out at bedside  Wound cx from 3/21 grew Group F Strep, MSSA and Alcaligenes faecalis  Blood cultures negative      - based on cultures would continue IV Ceftriaxone 2g daily; based on sensitivities this should cover the MSSA, Alcaligenes and Strep  - given purulence seen today, can extend course for additional 5 days, through 3/31/2023  - if to be discharged sooner, can switch to PO Cefpodoxime 400 mg BID to complete course     2  Right Diabetic Ulcer of TMA Stump s/p BKA  - source control achieved, no need for antibiotics     3  Penicillin Allergy:  - Listed as rash  She is tolerating Cefazolin and has tolerated Cefepime in past  She is tolerating Ceftriaxone now  Patient tells me she had a rash/hives as a child, does not think had any anaphylaxis and no trial of penicillin as an adult  - likely has outgrown Penicillin allergy  - can consider Amoxicillin challenge in future     4  T2DM     I have discussed the above management plan in detail with the primary service    Antibiotics:  Ceftriaxone    Subjective:  Patient has no fever, chills, sweats  She denies any major pain today  No GI complaints       Objective:  Vitals:  Temp:  [96 7 °F (35 9 °C)-97 °F (36 1 °C)] 96 7 °F (35 9 °C)  HR:  [68-73] 68  Resp:  [18] 18  BP: (106-145)/() 115/78  SpO2:  [94 %-99 %] 94 %  Temp (24hrs), Av 8 °F (36 °C), Min:96 7 °F (35 9 °C), Max:97 °F (36 1 °C)  Current: Temperature: (!) 96 7 °F (35 9 °C)    Physical Exam: General Appearance:  Alert, interactive, nontoxic, no acute distress  Throat: Oropharynx moist without lesions  Lungs:   Clear to auscultation bilaterally; no wheezes, rhonchi or rales; respirations unlabored   Heart:  RRR; no murmur, rub or gallop   Abdomen:   Soft, non-tender, non-distended, positive bowel sounds  Extremities: Left foot is wrapped; right BKA present   Skin: No new rashes or lesions  No draining wounds noted  Labs: All pertinent labs and imaging studies were personally reviewed  Results from last 7 days   Lab Units 03/27/23 0833 03/26/23 0528 03/25/23 0443   WBC Thousand/uL 9 47 10 62* 10 26*   HEMOGLOBIN g/dL 9 7* 9 3* 9 2*   PLATELETS Thousands/uL 696* 638* 683*     Results from last 7 days   Lab Units 03/27/23 0833 03/26/23 0528 03/25/23 0443 03/21/23  0434 03/20/23  2230   SODIUM mmol/L 135 133* 135   < > 127*   POTASSIUM mmol/L 4 4 4 6 4 3   < > 3 8   CHLORIDE mmol/L 101 101 101   < > 92*   CO2 mmol/L 29 29 29   < > 24   BUN mg/dL 13 9 5   < > 10   CREATININE mg/dL 0 67 0 59* 0 56*   < > 0 87   EGFR ml/min/1 73sq m 105 109 111   < > 79   CALCIUM mg/dL 9 2 8 8 8 3*   < > 9 3   AST U/L  --   --   --   --  21   ALT U/L  --   --   --   --  15   ALK PHOS U/L  --   --   --   --  215*    < > = values in this interval not displayed  Results from last 7 days   Lab Units 03/21/23  0434   PROCALCITONIN ng/ml 0 44*                   Micro:  Results from last 7 days   Lab Units 03/21/23  1809 03/20/23  2324 03/20/23  2250 03/20/23  2230   BLOOD CULTURE   --   --  No Growth After 5 Days  No Growth After 5 Days     GRAM STAIN RESULT  2+ Gram positive cocci in pairs and chains* 2+ Polys*  2+ Gram positive cocci in pairs and chains*  1+ Gram positive rods*  1+ Gram negative rods*  --   --    WOUND CULTURE  4+ Growth of Beta Hemolytic Streptococcus Group F*  2+ Growth of Beta Hemolytic Streptococcus Group B*  2+ Growth of Alcaligenes faecalis*  1+ Growth of Staphylococcus aureus* 4+ Growth of Beta Hemolytic Streptococcus Group F*  2+ Growth of Staphylococcus aureus*  2+ Growth of Alcaligenes faecalis*  2+ Growth of  --   --        Imaging:          Macario Urbina MD  [facility]

## 2023-03-27 NOTE — OCCUPATIONAL THERAPY NOTE
"  Occupational Therapy Progress Note     Patient Name: Toya Wood  [id number]'[id number] Date: 3/27/2023  Problem List  Principal Problem:    Left foot diabetic ulcer  Active Problems:    Hypothyroidism    Mood disorder ([facility] )    Type 2 diabetes mellitus with neurologic complication, without long-term current use of insulin ([facility])    Essential hypertension    GERD (gastroesophageal reflux disease)    Morbid obesity with BMI of 40 0-44 9, adult ([facility] )    Homeless single person    Cough       03/27/23 1406   OT Last Visit   OT Visit Date 03/27/23   Note Type   Note Type Treatment   Pain Assessment   Pain Assessment Tool 0-10   Pain Score 7   Pain Location/Orientation Orientation: Right;Location: Incision   Hospital Pain Intervention(s) Repositioned; Ambulation/increased activity; Emotional support; Rest   Restrictions/Precautions   RLE Weight Bearing Per Order NWB   LLE Weight Bearing Per Order NWB   Other Precautions Cognitive;WBS;Fall Risk;Pain;Multiple lines   ADL   Grooming Assistance 5  Supervision/Setup   Grooming Deficit Setup; Wash/dry hands; Wash/dry face   Toileting Assistance  1  Total Assistance   Toileting Deficit Setup;Supervison/safety; Increased time to complete;Use of bedpan/urinal setup   Toileting Comments bedpan in recliner   Bed Mobility   Additional Comments OOB upon OT entry   Transfers   Sliding Board transfer 3  Moderate assistance   Additional items Assist x 2; Increased time required;Verbal cues   Additional Comments SB from recliner to w/c,  No SB from w/c to recliner- pt performed lateral sitting scoot transfer from w/c to recliner with mod assist x2  Assist for placement and removal of SB  Functional Mobility   Functional Mobility 5  Supervision   Additional Comments w/c level - assistance for managing wound vac   Subjective   Subjective \"Thank you for working with me! \"   Cognition   Arousal/Participation Alert; Responsive; Cooperative   Attention Within functional limits   Orientation Level Oriented X4 " Memory Within functional limits   Following Commands Follows multistep commands without difficulty   Comments improved alertness/cogntion, very pleasant and cooperative   Activity Tolerance   Activity Tolerance Patient tolerated treatment well;Patient limited by fatigue;Patient limited by pain   Medical Staff Made Aware PTA, RN   Assessment   Assessment Patient participated in skilled OT session this date with interventions consisting of therapeutic activities and self-care/ADL training to increase B UE strength, functional endurance/activity tolerance, static/dynamic sitting/standing balance, and overall safety awareness to increase (I) with ADLs/IADLs to return to PLOF  Provided education on energy conservation techniques, deep breathing techniques, fall prevention strategies, and overall safety awareness  Patient agreeable to OT treatment session, upon arrival patient was found seated OOB to Recliner  Patient requiring verbal cues for safety and verbal cues for pacing through activity steps  Please refer to flowsheet for functional performance  Educated pt on components of w/c (swing back arm rests, antitippers, brakes) with good carryover  Pt does require Best for positioning w/c  Inconsistently chooses to utilize SB, but agreeable to education and trials  W/c mobility community distances with S for wound vac management  Pt does demo improvement with cognition and alertness  Patient continues to be functioning below baseline level, occupational performance remains limited secondary to factors listed above and increased risk for falls and injury  Pt left seated in chair, alarm armed and call bell and bedside table within reach; all needs met  Co treatment with PT secondary to complex medical condition of pt, possible A of 2 required to achieve and maintain transitional movements, requiring the need of skilled therapeutic intervention of 2 therapists to achieve delivery of services     Plan   Treatment Interventions ADL retraining;Functional transfer training;UE strengthening/ROM; Endurance training;Patient/family training;Equipment evaluation/education; Neuromuscular reeducation; Compensatory technique education;Continued evaluation; Energy conservation; Activityengagement   Goal Expiration Date 04/07/23   OT Treatment Day 1   OT Frequency 2-3x/wk   Recommendation   OT Discharge Recommendation Post acute rehabilitation services   AM-PAC Daily Activity Inpatient   Lower Body Dressing 2   Bathing 2   Toileting 1   Upper Body Dressing 3   Grooming 3   Eating 4   Daily Activity Raw Score 15   Daily Activity Standardized Score (Calc for Raw Score >=11) 34 69   AM-PAC Applied Cognition Inpatient   Following a Speech/Presentation 4   Understanding Ordinary Conversation 4   Taking Medications 2   Remembering Where Things Are Placed or Put Away 3   Remembering List of 4-5 Errands 3   Taking Care of Complicated Tasks 2   Applied Cognition Raw Score 18   Applied Cognition Standardized Score 38 07     Rhonda Driscoll

## 2023-03-27 NOTE — ASSESSMENT & PLAN NOTE
Lab Results   Component Value Date    HGBA1C 6 4 (H) 02/16/2022     Recent Labs     03/26/23  1105 03/26/23  1556 03/26/23  2134 03/27/23  0824   POCGLU 169* 175* 172* 186*     · Maintained outpatient on metformin, currently on hold during hospitalization  · Continue lispro with sliding scale insulin  · Continue pregabalin for neuropathic pain

## 2023-03-27 NOTE — PHYSICAL THERAPY NOTE
PHYSICAL THERAPY NOTE          Patient Name: Luda Dorantes  XBVYU'D Date: 3/27/2023    03/27/23 1447   Note Type   Note Type Treatment   Pain Assessment   Pain Assessment Tool 0-10   Pain Score 7   Pain Location/Orientation Orientation: Right;Location: Incision   Hospital Pain Intervention(s) Repositioned; Ambulation/increased activity; Emotional support; Rest   Restrictions/Precautions   RLE Weight Bearing Per Order NWB   LLE Weight Bearing Per Order NWB   Other Precautions Cognitive;WBS;Fall Risk;Pain;Multiple lines   General   Chart Reviewed Yes   Family/Caregiver Present No   Cognition   Overall Cognitive Status Impaired   Arousal/Participation Alert; Responsive; Cooperative   Attention Within functional limits   Orientation Level Oriented X4   Memory Within functional limits   Following Commands Follows all commands and directions without difficulty   Subjective   Subjective PT  reports R le incisional pain  pt agreeable to PT  Bed Mobility   Additional Comments pt  out of bed upon arrival and remained out of bed in recliner post PT session  Transfers   Sliding Board transfer 3  Moderate assistance   Additional items Assist x 2; Increased time required;Verbal cues  (use of SB from recliner to w/c,  No SB from wc to recliner pt performed lateral sitting scoot transfer from w/c to recliner with mod assist x2  Assist for placement and removal of SB )   Additional Comments NWB b/l le's  Wheelchair Activities   Wheelchair Parts Management Yes   Left Armrest Level of Assistance Close supervision;Maximum verbal cues   Left Leg Rest Level of Assistance Dependent   Right Leg Rest Level of Assistance Dependent   Left Brakes Level of Assistance Independent   Right Brakes Level of Assistance Independent   Propulsion Yes   Propulsion Type 1 Manual   Level 1 Level tile   Method 1 Right upper extremity; Left upper extremity   Level of Assistance 1 Close supervision   Description/ Details 1 pt  propels w/c in all directions with b/l ue's  making L and R turns and u- turns 360' total distance with 2 rest breaks due to ue fatigue  Activity Tolerance   Activity Tolerance Patient limited by fatigue   Medical Staff Made Aware DEVANTE thomas   Nurse Made Aware RN   Equipment Use   Comments pt ed on use of sliding board for transfers from recliner to w/c, management of leg rests and Arm rests and pt ed on use of amputee board when out of bed in w/c for support and knee extension  Assessment   Prognosis Good   Problem List Decreased strength;Decreased endurance; Impaired balance;Decreased mobility;Obesity; Decreased skin integrity;Pain  (nwb b/l due to amputations)   Assessment Pt seen for PT treatment session this date with interventions consisting of transfer training, w/c mobility and management training, , and education provided as needed for safety and direction to improve functional mobility, safety awareness, and activity tolerance  Pt agreeable to PT treatment session upon arrival, pt found seated out of bed in recliner pt reports 7 /10 R residual limb incisional pain no pain L le    At end of session, pt left out of bed in recliner with b/l le's elevated with all needs in reach  In comparison to previous session, pt with improvements in motivation and participation, activity tolerance, endurance, and mobility  PT  performs transfer training from recliner<>wc with and without use of slidingboard  pt  performs SB  transfer recliner to w/c with mod assist x2 and lateral scoot transfer from wc to recliner without sliding board with mod assist x2   pt  requires total assist for placement and removal of sb, min assist for positioning and alignment of w/c,  total assist for management of b/l leg rests and management of wound vac and amputee board for R le    PT  is able to propel w/c 360' with use of b/l ue's with supervision assist x1 and assist for management of wound vac  PT propels w/c in all directions making l and r turns and u turns with supervision  pt required  2 rest breaks due to ue fatigue    Continue to recommend IP rehab at time of d/c in order to maximize pt's functional independence and safety w/ mobility  Pt continues to be functioning below baseline level  PT will continue to see pt while here in order to address the deficits listed above and provide interventions consistent w/ POC in effort to achieve STGs  Goals   Patient Goals to go to rehab  STG Expiration Date 04/03/23   PT Treatment Day 1   Plan   Treatment/Interventions Functional transfer training; Therapeutic exercise; Endurance training;Patient/family training;Equipment eval/education;Spoke to nursing;OT  (w/c mobility training and management)   Progress Progressing toward goals   PT Frequency 3-5x/wk   Recommendation   PT Discharge Recommendation Post acute rehabilitation services   AM-PAC Basic Mobility Inpatient   Turning in Flat Bed Without Bedrails 4   Lying on Back to Sitting on Edge of Flat Bed Without Bedrails 3   Moving Bed to Chair 2   Standing Up From Chair Using Arms 1   Walk in Room 1   Climb 3-5 Stairs With Railing 1   Basic Mobility Inpatient Raw Score 12   Basic Mobility Standardized Score 32 23   Highest Level Of Mobility   JH-HLM Goal 4: Move to chair/commode   JH-HLM Achieved 4: Move to chair/commode   Education   Education Provided Mobility training;Assistive device  (SB, amputation board )   Patient Demonstrates acceptance/verbal understanding   End of Consult   Patient Position at End of Consult Bedside chair; All needs within reach   End of Consult Comments b/l le's eleavted  ice to R knee  03/27/23 8120   Note Type   Note Type Treatment   Pain Assessment   Pain Assessment Tool 0-10   Pain Score 7   Pain Location/Orientation Orientation: Right;Location: Incision   Hospital Pain Intervention(s) Repositioned; Ambulation/increased activity; Emotional support; Rest   Restrictions/Precautions   RLE Weight Bearing Per Order NWB   LLE Weight Bearing Per Order NWB   Other Precautions Cognitive;WBS;Fall Risk;Pain;Multiple lines   General   Chart Reviewed Yes   Family/Caregiver Present No   Cognition   Overall Cognitive Status Impaired   Arousal/Participation Alert; Responsive; Cooperative   Attention Within functional limits   Orientation Level Oriented X4   Memory Within functional limits   Following Commands Follows all commands and directions without difficulty   Subjective   Subjective PT  reports R le incisional pain  pt agreeable to PT  Bed Mobility   Additional Comments pt  out of bed upon arrival and remained out of bed in recliner post PT session  Transfers   Sliding Board transfer 3  Moderate assistance   Additional items Assist x 2; Increased time required;Verbal cues  (use of SB from recliner to w/c,  No SB from wc to recliner pt performed lateral sitting scoot transfer from w/c to recliner with mod assist x2  Assist for placement and removal of SB )   Additional Comments NWB b/l le's  Wheelchair Activities   Wheelchair Parts Management Yes   Left Armrest Level of Assistance Close supervision;Maximum verbal cues   Left Leg Rest Level of Assistance Dependent   Right Leg Rest Level of Assistance Dependent   Left Brakes Level of Assistance Independent   Right Brakes Level of Assistance Independent   Propulsion Yes   Propulsion Type 1 Manual   Level 1 Level tile   Method 1 Right upper extremity; Left upper extremity   Level of Assistance 1 Close supervision   Description/ Details 1 pt  propels w/c in all directions with b/l ue's  making L and R turns and u- turns 360' total distance with 2 rest breaks due to ue fatigue     Activity Tolerance   Activity Tolerance Patient limited by fatigue   Medical Staff Made Aware DEVANTE thomas   Nurse Made Aware RN   Equipment Use   Comments pt ed on use of sliding board for transfers from recliner to w/c, management of leg rests and Arm rests and pt ed on use of amputee board when out of bed in w/c for support and knee extension  Assessment   Prognosis Good   Problem List Decreased strength;Decreased endurance; Impaired balance;Decreased mobility;Obesity; Decreased skin integrity;Pain  (nwb b/l due to amputations)   Assessment Pt seen for PT treatment session this date with interventions consisting of transfer training, w/c mobility and management training, , and education provided as needed for safety and direction to improve functional mobility, safety awareness, and activity tolerance  Pt agreeable to PT treatment session upon arrival, pt found seated out of bed in recliner pt reports 7 /10 R residual limb incisional pain no pain L le    At end of session, pt left out of bed in recliner with b/l le's elevated with all needs in reach  In comparison to previous session, pt with improvements in motivation and participation, activity tolerance, endurance, and mobility  PT  performs transfer training from recliner<>wc with and without use of slidingboard  pt  performs SB  transfer recliner to w/c with mod assist x2 and lateral scoot transfer from wc to recliner without sliding board with mod assist x2   pt  requires total assist for placement and removal of sb, min assist for positioning and alignment of w/c,  total assist for management of b/l leg rests and management of wound vac and amputee board for R le  PT  is able to propel w/c 360' with use of b/l ue's with supervision assist x1 and assist for management of wound vac  PT propels w/c in all directions making l and r turns and u turns with supervision  pt required  2 rest breaks due to ue fatigue    Continue to recommend IP rehab at time of d/c in order to maximize pt's functional independence and safety w/ mobility  Pt continues to be functioning below baseline level   PT will continue to see pt while here in order to address the deficits listed above and provide interventions consistent w/ POC in effort to achieve STGs  Goals   Patient Goals to go to rehab  STG Expiration Date 04/03/23   PT Treatment Day 1   Plan   Treatment/Interventions Functional transfer training; Therapeutic exercise; Endurance training;Patient/family training;Equipment eval/education;Spoke to nursing;OT  (w/c mobility training and management)   Progress Progressing toward goals   PT Frequency 3-5x/wk   Recommendation   PT Discharge Recommendation Post acute rehabilitation services   AM-PAC Basic Mobility Inpatient   Turning in Flat Bed Without Bedrails 4   Lying on Back to Sitting on Edge of Flat Bed Without Bedrails 3   Moving Bed to Chair 2   Standing Up From Chair Using Arms 1   Walk in Room 1   Climb 3-5 Stairs With Railing 1   Basic Mobility Inpatient Raw Score 12   Basic Mobility Standardized Score 32 23   Highest Level Of Mobility   JH-HLM Goal 4: Move to chair/commode   JH-HLM Achieved 4: Move to chair/commode   Education   Education Provided Mobility training;Assistive device  (SB, amputation board )   Patient Demonstrates acceptance/verbal understanding   End of Consult   Patient Position at End of Consult Bedside chair; All needs within reach   End of Consult Comments b/l le's eleavted  ice to R knee       Adore Model, PTA

## 2023-03-27 NOTE — PLAN OF CARE
Problem: PHYSICAL THERAPY ADULT  Goal: Performs mobility at highest level of function for planned discharge setting  See evaluation for individualized goals  Description: Treatment/Interventions: Functional transfer training, LE strengthening/ROM, Therapeutic exercise, Endurance training, Patient/family training, Bed mobility, Spoke to nursing, OT          See flowsheet documentation for full assessment, interventions and recommendations  3/27/2023 1748 by Alonso Bills PTA  Outcome: Progressing  Note: Prognosis: Good  Problem List: Decreased strength, Decreased endurance, Impaired balance, Decreased mobility, Obesity, Decreased skin integrity, Pain (nwb b/l due to amputations)  Assessment: Pt seen for PT treatment session this date with interventions consisting of transfer training, w/c mobility and management training, , and education provided as needed for safety and direction to improve functional mobility, safety awareness, and activity tolerance  Pt agreeable to PT treatment session upon arrival, pt found seated out of bed in recliner pt reports 7 /10 R residual limb incisional pain no pain L le    At end of session, pt left out of bed in recliner with b/l le's elevated with all needs in reach  In comparison to previous session, pt with improvements in motivation and participation, activity tolerance, endurance, and mobility  PT  performs transfer training from recliner<>wc with and without use of slidingboard  pt  performs SB  transfer recliner to w/c with mod assist x2 and lateral scoot transfer from wc to recliner without sliding board with mod assist x2   pt  requires total assist for placement and removal of sb, min assist for positioning and alignment of w/c,  total assist for management of b/l leg rests and management of wound vac and amputee board for R le  PT  is able to propel w/c 360' with use of b/l ue's with supervision assist x1 and assist for management of wound vac    PT propels w/c in all directions making l and r turns and u turns with supervision  pt required  2 rest breaks due to ue fatigue    Continue to recommend IP rehab at time of d/c in order to maximize pt's functional independence and safety w/ mobility  Pt continues to be functioning below baseline level  PT will continue to see pt while here in order to address the deficits listed above and provide interventions consistent w/ POC in effort to achieve STGs  Barriers to Discharge: Inaccessible home environment, Decreased caregiver support     PT Discharge Recommendation: Post acute rehabilitation services    See flowsheet documentation for full assessment

## 2023-03-28 LAB
ANION GAP SERPL CALCULATED.3IONS-SCNC: 7 MMOL/L (ref 4–13)
BASOPHILS # BLD AUTO: 0.06 THOUSANDS/ÂΜL (ref 0–0.1)
BASOPHILS NFR BLD AUTO: 1 % (ref 0–1)
BUN SERPL-MCNC: 16 MG/DL (ref 5–25)
CALCIUM SERPL-MCNC: 8.9 MG/DL (ref 8.4–10.2)
CHLORIDE SERPL-SCNC: 101 MMOL/L (ref 96–108)
CO2 SERPL-SCNC: 28 MMOL/L (ref 21–32)
CREAT SERPL-MCNC: 0.63 MG/DL (ref 0.6–1.3)
EOSINOPHIL # BLD AUTO: 0.58 THOUSAND/ÂΜL (ref 0–0.61)
EOSINOPHIL NFR BLD AUTO: 7 % (ref 0–6)
ERYTHROCYTE [DISTWIDTH] IN BLOOD BY AUTOMATED COUNT: 22.8 % (ref 11.6–15.1)
GFR SERPL CREATININE-BSD FRML MDRD: 107 ML/MIN/1.73SQ M
GLUCOSE SERPL-MCNC: 122 MG/DL (ref 65–140)
GLUCOSE SERPL-MCNC: 127 MG/DL (ref 65–140)
GLUCOSE SERPL-MCNC: 162 MG/DL (ref 65–140)
GLUCOSE SERPL-MCNC: 166 MG/DL (ref 65–140)
GLUCOSE SERPL-MCNC: 167 MG/DL (ref 65–140)
HCT VFR BLD AUTO: 32.4 % (ref 34.8–46.1)
HGB BLD-MCNC: 9.4 G/DL (ref 11.5–15.4)
IMM GRANULOCYTES # BLD AUTO: 0.18 THOUSAND/UL (ref 0–0.2)
IMM GRANULOCYTES NFR BLD AUTO: 2 % (ref 0–2)
LYMPHOCYTES # BLD AUTO: 3.42 THOUSANDS/ÂΜL (ref 0.6–4.47)
LYMPHOCYTES NFR BLD AUTO: 40 % (ref 14–44)
MCH RBC QN AUTO: 20.3 PG (ref 26.8–34.3)
MCHC RBC AUTO-ENTMCNC: 29 G/DL (ref 31.4–37.4)
MCV RBC AUTO: 70 FL (ref 82–98)
MONOCYTES # BLD AUTO: 0.76 THOUSAND/ÂΜL (ref 0.17–1.22)
MONOCYTES NFR BLD AUTO: 9 % (ref 4–12)
NEUTROPHILS # BLD AUTO: 3.62 THOUSANDS/ÂΜL (ref 1.85–7.62)
NEUTS SEG NFR BLD AUTO: 41 % (ref 43–75)
NRBC BLD AUTO-RTO: 0 /100 WBCS
PLATELET # BLD AUTO: 691 THOUSANDS/UL (ref 149–390)
PMV BLD AUTO: 8.3 FL (ref 8.9–12.7)
POTASSIUM SERPL-SCNC: 4.5 MMOL/L (ref 3.5–5.3)
RBC # BLD AUTO: 4.63 MILLION/UL (ref 3.81–5.12)
SODIUM SERPL-SCNC: 136 MMOL/L (ref 135–147)
WBC # BLD AUTO: 8.62 THOUSAND/UL (ref 4.31–10.16)

## 2023-03-28 RX ADMIN — INSULIN LISPRO 5 UNITS: 100 INJECTION, SOLUTION INTRAVENOUS; SUBCUTANEOUS at 16:30

## 2023-03-28 RX ADMIN — PANTOPRAZOLE SODIUM 40 MG: 40 TABLET, DELAYED RELEASE ORAL at 16:29

## 2023-03-28 RX ADMIN — LAMOTRIGINE 250 MG: 100 TABLET ORAL at 08:40

## 2023-03-28 RX ADMIN — FOLIC ACID 1000 MCG: 1 TABLET ORAL at 08:42

## 2023-03-28 RX ADMIN — DOCUSATE SODIUM 100 MG: 100 CAPSULE, LIQUID FILLED ORAL at 17:25

## 2023-03-28 RX ADMIN — LEVOTHYROXINE SODIUM 50 MCG: 50 TABLET ORAL at 06:15

## 2023-03-28 RX ADMIN — POLYETHYLENE GLYCOL 3350 17 G: 17 POWDER, FOR SOLUTION ORAL at 17:30

## 2023-03-28 RX ADMIN — ACETAMINOPHEN 325MG 975 MG: 325 TABLET ORAL at 21:42

## 2023-03-28 RX ADMIN — ENOXAPARIN SODIUM 40 MG: 100 INJECTION SUBCUTANEOUS at 08:43

## 2023-03-28 RX ADMIN — PREGABALIN 100 MG: 50 CAPSULE ORAL at 08:40

## 2023-03-28 RX ADMIN — METHOCARBAMOL 750 MG: 750 TABLET ORAL at 17:30

## 2023-03-28 RX ADMIN — OXYCODONE HYDROCHLORIDE 10 MG: 10 TABLET ORAL at 08:43

## 2023-03-28 RX ADMIN — INSULIN LISPRO 2 UNITS: 100 INJECTION, SOLUTION INTRAVENOUS; SUBCUTANEOUS at 12:48

## 2023-03-28 RX ADMIN — INSULIN LISPRO 5 UNITS: 100 INJECTION, SOLUTION INTRAVENOUS; SUBCUTANEOUS at 07:22

## 2023-03-28 RX ADMIN — ENOXAPARIN SODIUM 40 MG: 100 INJECTION SUBCUTANEOUS at 17:26

## 2023-03-28 RX ADMIN — INSULIN LISPRO 1 UNITS: 100 INJECTION, SOLUTION INTRAVENOUS; SUBCUTANEOUS at 21:42

## 2023-03-28 RX ADMIN — METHOCARBAMOL 750 MG: 750 TABLET ORAL at 06:15

## 2023-03-28 RX ADMIN — HYDROXYZINE HYDROCHLORIDE 100 MG: 25 TABLET, FILM COATED ORAL at 21:41

## 2023-03-28 RX ADMIN — OXYCODONE HYDROCHLORIDE 10 MG: 10 TABLET ORAL at 21:42

## 2023-03-28 RX ADMIN — INSULIN LISPRO 5 UNITS: 100 INJECTION, SOLUTION INTRAVENOUS; SUBCUTANEOUS at 12:48

## 2023-03-28 RX ADMIN — MELATONIN 9 MG: at 21:41

## 2023-03-28 RX ADMIN — PREGABALIN 100 MG: 50 CAPSULE ORAL at 21:42

## 2023-03-28 RX ADMIN — OXYCODONE HYDROCHLORIDE 10 MG: 10 TABLET ORAL at 04:05

## 2023-03-28 RX ADMIN — FENOFIBRATE 48 MG: 48 TABLET, FILM COATED ORAL at 08:44

## 2023-03-28 RX ADMIN — HYDROMORPHONE HYDROCHLORIDE 0.5 MG: 1 INJECTION, SOLUTION INTRAMUSCULAR; INTRAVENOUS; SUBCUTANEOUS at 06:17

## 2023-03-28 RX ADMIN — METHOCARBAMOL 750 MG: 750 TABLET ORAL at 12:49

## 2023-03-28 RX ADMIN — ZIPRASIDONE HCL 80 MG: 40 CAPSULE ORAL at 08:44

## 2023-03-28 RX ADMIN — ACETAMINOPHEN 325MG 975 MG: 325 TABLET ORAL at 06:14

## 2023-03-28 RX ADMIN — OXYCODONE HYDROCHLORIDE 10 MG: 10 TABLET ORAL at 17:25

## 2023-03-28 RX ADMIN — PANTOPRAZOLE SODIUM 40 MG: 40 TABLET, DELAYED RELEASE ORAL at 06:15

## 2023-03-28 RX ADMIN — METHOCARBAMOL 750 MG: 750 TABLET ORAL at 23:03

## 2023-03-28 RX ADMIN — CEFTRIAXONE 2000 MG: 10 INJECTION, POWDER, FOR SOLUTION INTRAVENOUS at 16:29

## 2023-03-28 RX ADMIN — INSULIN LISPRO 2 UNITS: 100 INJECTION, SOLUTION INTRAVENOUS; SUBCUTANEOUS at 07:22

## 2023-03-28 RX ADMIN — GUAIFENESIN AND DEXTROMETHORPHAN 10 ML: 100; 10 SYRUP ORAL at 20:02

## 2023-03-28 RX ADMIN — OXYCODONE HYDROCHLORIDE 10 MG: 10 TABLET ORAL at 12:49

## 2023-03-28 RX ADMIN — ACETAMINOPHEN 325MG 975 MG: 325 TABLET ORAL at 15:00

## 2023-03-28 RX ADMIN — CLONIDINE HYDROCHLORIDE 0.2 MG: 0.1 TABLET ORAL at 21:42

## 2023-03-28 RX ADMIN — GUAIFENESIN AND DEXTROMETHORPHAN 10 ML: 100; 10 SYRUP ORAL at 12:58

## 2023-03-28 RX ADMIN — CLONIDINE HYDROCHLORIDE 0.2 MG: 0.1 TABLET ORAL at 08:41

## 2023-03-28 RX ADMIN — ZIPRASIDONE HCL 80 MG: 40 CAPSULE ORAL at 21:42

## 2023-03-28 RX ADMIN — HYDROXYZINE HYDROCHLORIDE 50 MG: 25 TABLET, FILM COATED ORAL at 08:41

## 2023-03-28 NOTE — PROGRESS NOTES
"[facility]  Progress Note  Name: Brii Dupont I  MRN: [id number]  Unit/Bed#: E2 -01 I Date of Admission: 3/21/2023   Date of Service: 3/28/2023 I Hospital Day: 7    Assessment/Plan   * Left foot diabetic ulcer  Assessment & Plan  Background: History of mood disorder, diabetes on metformin, hypothyroidism and recurrent foot disease has been homeless and lost to follow-up presented to [facility] initially for diabetic foot ulcer and has been transferred here for further consulting services  · Had wounds on both feet probing to bone  · Status post right BKA and left ray resection  · Continue IV ceftriaxone per ID through 3/28/2023 for 5-day course postop  · Neuropathic pain: Restarted pregabalin during admission   · Case management following for discharge planning  · Rest of care per primary team    Cough  Assessment & Plan  · Patient complaining of persistent cough during hospitalization  · Note significant improvement with Robitussin-DM, continue as needed  · Chest x-ray without evidence of infiltrate or abnormality, follow-up official read  · Encourage incentive spirometry    Homeless single person  Assessment & Plan  · Patient reports she has nowhere to go after discharge and is essentially homeless  · PT/OT following  · Case management aware    Morbid obesity with BMI of 40 0-44 9, adult ([facility] [address] )  Assessment & Plan  Estimated body mass index is 42 19 kg/m² as calculated from the following:    Height as of an earlier encounter on 3/21/23: 5' 5\" (1 651 m)  Weight as of an earlier encounter on 3/21/23: 115 kg (253 lb 8 5 oz)      GERD (gastroesophageal reflux disease)  Assessment & Plan  · Continue pantoprazole    Essential hypertension  Assessment & Plan  · BP well controlled during hospitalization  · Continue clonidine    Type 2 diabetes mellitus with neurologic complication, without long-term current use of insulin [facility])  Assessment & Plan  Lab Results   Component Value Date    " HGBA1C 6 4 (H) 2022     Recent Labs     23  1611 23  2057 23  0644 23  1122   POCGLU 197* 161* 166* 162*     · Maintained outpatient on metformin, currently on hold during hospitalization  · Continue lispro with sliding scale insulin  · Continue pregabalin for neuropathic pain  · Can resume metformin at time of discharge    Mood disorder (HCC)  Assessment & Plan  · Mood stable continue hydroxyzine lamictal ziprasidone    Hypothyroidism  Assessment & Plan  · Continue levothyroxine               VTE Pharmacologic Prophylaxis: VTE Score: 4 Moderate Risk (Score 3-4) - Pharmacological DVT Prophylaxis Ordered: enoxaparin (Lovenox)  Patient Centered Rounds: I performed bedside rounds with nursing staff today  Discussions with Specialists or Other Care Team Provider: Case management    Education and Discussions with Family / Patient: Patient  Total Time Spent on Date of Encounter in care of patient: 25 minutes This time was spent on one or more of the following: performing physical exam; counseling and coordination of care; obtaining or reviewing history; documenting in the medical record; reviewing/ordering tests, medications or procedures; communicating with other healthcare professionals and discussing with patient's family/caregivers  Current Length of Stay: 7 day(s)  Current Patient Status: Inpatient   Certification Statement: Per podiatry  Discharge Plan: Desiree Singh is following this patient on consult  They are medically stable for discharge when deemed appropriate by primary service  Code Status: Level 1 - Full Code    Patient is medically clear for discharge from internal medicine standpoint  Internal medicine will sign off, please call with questions  Subjective:   Patient reports she is doing okay today  Still having some pain in both of her stump at her foot  Eating and drinking better      Objective:     Vitals:   Temp (24hrs), Av 6 °F (36 4 °C), Min:97 °F (36 1 °C), Max:98 4 °F (36 9 °C)    Temp:  [97 °F (36 1 °C)-98 4 °F (36 9 °C)] 98 4 °F (36 9 °C)  HR:  [71-95] 71  Resp:  [19-20] 19  BP: (130-138)/(62-97) 130/81  SpO2:  [93 %-100 %] 100 %  There is no height or weight on file to calculate BMI  Input and Output Summary (last 24 hours): Intake/Output Summary (Last 24 hours) at 3/28/2023 1226  Last data filed at 3/28/2023 0757  Gross per 24 hour   Intake 240 ml   Output --   Net 240 ml       Physical Exam:   Physical Exam  Vitals reviewed  Constitutional:       General: She is not in acute distress  HENT:      Head: Normocephalic and atraumatic  Eyes:      General: No scleral icterus  Conjunctiva/sclera: Conjunctivae normal    Cardiovascular:      Rate and Rhythm: Normal rate and regular rhythm  Heart sounds: No murmur heard  Pulmonary:      Effort: Pulmonary effort is normal  No respiratory distress  Breath sounds: Normal breath sounds  Abdominal:      General: Bowel sounds are normal  There is no distension  Palpations: Abdomen is soft  Tenderness: There is no abdominal tenderness  Musculoskeletal:      Cervical back: Neck supple  Comments: Right BKA, left foot with wound VAC in place   Skin:     General: Skin is warm and dry  Neurological:      Mental Status: She is alert and oriented to person, place, and time     Psychiatric:         Mood and Affect: Mood normal          Behavior: Behavior normal           Additional Data:     Labs:  Results from last 7 days   Lab Units 03/28/23  0450   WBC Thousand/uL 8 62   HEMOGLOBIN g/dL 9 4*   HEMATOCRIT % 32 4*   PLATELETS Thousands/uL 691*   NEUTROS PCT % 41*   LYMPHS PCT % 40   MONOS PCT % 9   EOS PCT % 7*     Results from last 7 days   Lab Units 03/28/23  0450   SODIUM mmol/L 136   POTASSIUM mmol/L 4 5   CHLORIDE mmol/L 101   CO2 mmol/L 28   BUN mg/dL 16   CREATININE mg/dL 0 63   ANION GAP mmol/L 7   CALCIUM mg/dL 8 9   GLUCOSE RANDOM mg/dL 122         Results from last 7 days Lab Units 03/28/23  1122 03/28/23  0644 03/27/23  2057 03/27/23  1611 03/27/23  0824 03/26/23  2134 03/26/23  1556 03/26/23  1105 03/26/23  0638 03/25/23  2120 03/25/23  1614 03/25/23  1123   POC GLUCOSE mg/dl 162* 166* 161* 197* 186* 172* 175* 169* 182* 182* 197* 149*               Lines/Drains:  Invasive Devices     Peripheral Intravenous Line  Duration           Peripheral IV 03/28/23 Right Antecubital <1 day                      Imaging: No pertinent imaging reviewed      Recent Cultures (last 7 days):   Results from last 7 days   Lab Units 03/21/23  1809   GRAM STAIN RESULT  2+ Gram positive cocci in pairs and chains*   WOUND CULTURE  4+ Growth of Beta Hemolytic Streptococcus Group F*  2+ Growth of Beta Hemolytic Streptococcus Group B*  2+ Growth of Alcaligenes faecalis*  1+ Growth of Staphylococcus aureus*       Last 24 Hours Medication List:   Current Facility-Administered Medications   Medication Dose Route Frequency Provider Last Rate   • acetaminophen  975 mg Oral Q8H [address] Jose Obando DO     • albuterol  2 puff Inhalation Q4H PRN Mary Armijo DO     • cefTRIAXone  2,000 mg Intravenous Q24H Sarah Palacios MD 2,000 mg (03/27/23 1804)   • cloNIDine  0 2 mg Oral BID Mahi Martinez PA-C     • dextromethorphan-guaiFENesin  10 mL Oral Q4H PRN Dyann Shone Allsbrook, DO     • docusate sodium  100 mg Oral BID Dyann Shone [place]brook, DO     • enoxaparin  40 mg Subcutaneous BID Dyann Shone [place]brook, DO     • fenofibrate  48 mg Oral Daily Dyann Shone [place]brook, DO     • folic acid  7,217 mcg Oral Daily Dyann Shone [place]sbrook, DO     • HYDROmorphone  0 5 mg Intravenous Q3H PRN Dyann Shone [place]brook, DO     • hydrOXYzine HCL  100 mg Oral HS Jose Obando DO     • hydrOXYzine HCL  50 mg Oral Daily Dyann Shone Allsbrook, DO     • insulin lispro  1-5 Units Subcutaneous HS Jose Obando,      • insulin lispro  2-12 Units Subcutaneous TID AC Jose Obando DO     • insulin lispro  5 Units Subcutaneous TID With Meals Corazon Licea PA-C     • lamoTRIgine  250 mg Oral QAM Sven Pea, DO     • levothyroxine  50 mcg Oral Early Morning [facility]el [facility], DO     • melatonin  9 mg Oral HS Danyel [facility] Allsbrook, DO     • methocarbamol  750 mg Oral Q6H [address] [facility], [facility]     • naloxone  0 04 mg Intravenous Q1MIN PRN Sven Pea, DO     • ondansetron  4 mg Intravenous Q4H PRN Sven Pea, DO     • oxyCODONE  10 mg Oral Q4H PRN Sven Pea, DO     • oxyCODONE  5 mg Oral Q4H PRN Danyel Rakesh Larasdontae, DO     • pantoprazole  40 mg Oral BID AC Jose Obando, DO     • polyethylene glycol  17 g Oral Daily PRN Mercy Morales PA-C     • potassium-sodium phosphates  2 packet Oral Once Corazon Licea PA-C     • pregabalin  100 mg Oral Q12H [address] Mehul Rendon, DO     • ziprasidone  80 mg Oral BID With Meals Sven Jones, DO          Today, Patient Was Seen By: Anish Headley PA-C    **Please Note: This note may have been constructed using a voice recognition system  **

## 2023-03-28 NOTE — PROGRESS NOTES
[facility] - Progress Note  Patient: Kevin Vasquez 53 y o  female   MRN: [id number]  PCP: Hoang Tobin MD  Unit/Bed#: E2 -01 Encounter: [id number]  Date Of Visit: 23  Hospital Stay Days: 7    ASSESSMENT:    Kevin Vasquez is a 53 y o  female with:    1  L plantar hallux ulceration  2  L hallux osteomyelitis  - s/p L First ray Resection 3/23/23  4  R TMA wounds with osteomyelitis  - s/p R BKA 3/23/23  5  T2DM                   PLAN:     • Left partial 1st ray resection  ? Continue wound vacuum to the left foot in place seal check confirmed  ? Continue ceftriaxone per ID     ? Follow-up PT OT  ? Case management consult, follow-up for placement  • Right BKA  ? Per general surgery stable at this time  ? Continue local wound care  • Type 2 diabetes mellitus  ? Continue sliding scale per internal medicine  • Elevation and offloading on green foam wedges or pillows when non-ambulatory  • Appreciate consulting services for recommendations and management      Antibiotics started: ceftriaxone  Pharmacologic VTE Prophylaxis: Enoxaparin (Lovenox)   Mechanical VTE Prophylaxis: sequential compression device   Weightbearing status: Weightbearing as tolerated         SUBJECTIVE:     The patient was seen, evaluated, and assessed at bedside today  The patient was awake, alert, and in no acute distress  No acute events overnight  The patient reports feeling well    Patient denies N/V/F/chills/SOB/CP  OBJECTIVE:     Vitals:   /81 (BP Location: Right arm)   Pulse 71   Temp 98 4 °F (36 9 °C) (Temporal)   Resp 19   SpO2 100%     Temp (24hrs), Av 6 °F (36 4 °C), Min:97 °F (36 1 °C), Max:98 4 °F (36 9 °C)      Physical Exam :     General:  Alert, cooperative, and in no distress  Lower extremity exam:  Neurologic, Vascular, musculoskeletal status at baseline    Dermatologic: Dressings left clean dry and intact  Wound VAC machine running with seal check confirmed        Clinical Images 03/28/23: Additional Data:     Labs:    Results from last 7 days   Lab Units 03/28/23  0450   WBC Thousand/uL 8 62   HEMOGLOBIN g/dL 9 4*   HEMATOCRIT % 32 4*   PLATELETS Thousands/uL 691*   NEUTROS PCT % 41*   LYMPHS PCT % 40   MONOS PCT % 9   EOS PCT % 7*     Results from last 7 days   Lab Units 03/28/23  0450   POTASSIUM mmol/L 4 5   CHLORIDE mmol/L 101   CO2 mmol/L 28   BUN mg/dL 16   CREATININE mg/dL 0 63   CALCIUM mg/dL 8 9           * I Have Reviewed All Lab Data Listed Above  Recent Cultures (last 7 days):     Results from last 7 days   Lab Units 03/21/23  1809   GRAM STAIN RESULT  2+ Gram positive cocci in pairs and chains*   WOUND CULTURE  4+ Growth of Beta Hemolytic Streptococcus Group F*  2+ Growth of Beta Hemolytic Streptococcus Group B*  2+ Growth of Alcaligenes faecalis*  1+ Growth of Staphylococcus aureus*     Results from last 7 days   Lab Units 03/21/23  1809   ANAEROBIC CULTURE  4+ Growth of  4+ Growth of Prevotella bivia*       Imaging: I have personally reviewed pertinent films in PACS  XR foot 3+ vw left    Result Date: 3/28/2023  Impression: Expected postsurgical changes from the amputation of the 1st toe Workstation performed: [id number]     MRI foot/forefoot toes left wo contrast    Result Date: 3/22/2023  Impression: 1  Plantar and medial skin ulceration noted at the level of the 1st MTP with associated cellulitis but no evidence of drainable abscess collection  Minimal marrow changes in this region or low suspicion for osteomyelitis  2   Status post 3rd transmetatarsal amputation with plantar skin ulceration at the level of the residual stump with associated cellulitis  No marrow change involving the 3rd metatarsal to indicate osteomyelitis at this time  3   Complete tear of the flexor hallucis longus tendon as detailed above with 3 8 cm tendon retraction    The possibility of pathologic tendon rupture should be considered given likely infective cellulitis and plantar skin ulceration in this region  Additionally, there is sesamoid bone splaying suggesting a tear of the intersesamoid ligament, again possibly pathologic  The study was marked in [facility] for immediate notification  Workstation performed: [id number]     MRI ankle/heel right wo contrast    Result Date: 3/22/2023  Impression: Status post 1st through 5th transmetatarsal amputations with prominent skin ulceration at the distal aspect of the residual forefoot with associated cellulitis  Additionally, there is a dorsal midfoot fluid collection suspicious for abscess collection that appears to drain to the ulcerated skin surface as noted above  Changes of chronic Charcot arthropathy with extensive T1 replacement signal throughout the residual 1st through 5th metatarsals, cuboid, cuneiforms, navicular and distal aspect of the talus concerning for superimposed osteomyelitis  The study was marked in [facility] for immediate notification  Workstation performed: [id number]     MRI ankle/heel left  wo contrast    Result Date: 3/22/2023  Impression: No findings of osteomyelitis in the hindfoot, midfoot and in the visualized metatarsals The forefoot, toes not included in the images Flexor hallucis longus tenosynovitis  Incomplete assessment of the distal most aspect of the flexor hallucis longus  Tenosynovitis posterior tibialis Workstation performed: [id number]     EKG, Pathology, and Other Studies: I have personally reviewed pertinent reports      Active medications:  Current Facility-Administered Medications   Medication Dose Route Frequency   • acetaminophen (TYLENOL) tablet 975 mg  975 mg Oral Q8H [facility]   • albuterol (PROVENTIL HFA,VENTOLIN HFA) inhaler 2 puff  2 puff Inhalation Q4H PRN   • ceftriaxone (ROCEPHIN) 2 g/50 mL in dextrose IVPB  2,000 mg Intravenous Q24H   • cloNIDine (CATAPRES) tablet 0 2 mg  0 2 mg Oral BID   • dextromethorphan-guaiFENesin (ROBITUSSIN DM) oral syrup 10 mL  10 mL Oral Q4H PRN   • docusate sodium "(COLACE) capsule 100 mg  100 mg Oral BID   • enoxaparin (LOVENOX) subcutaneous injection 40 mg  40 mg Subcutaneous BID   • fenofibrate (TRICOR) tablet 48 mg  48 mg Oral Daily   • folic acid (FOLVITE) tablet 1,000 mcg  1,000 mcg Oral Daily   • HYDROmorphone (DILAUDID) injection 0 5 mg  0 5 mg Intravenous Q3H PRN   • hydrOXYzine HCL (ATARAX) tablet 100 mg  100 mg Oral HS   • hydrOXYzine HCL (ATARAX) tablet 50 mg  50 mg Oral Daily   • insulin lispro (HumaLOG) 100 units/mL subcutaneous injection 1-5 Units  1-5 Units Subcutaneous HS   • insulin lispro (HumaLOG) 100 units/mL subcutaneous injection 2-12 Units  2-12 Units Subcutaneous TID AC   • insulin lispro (HumaLOG) 100 units/mL subcutaneous injection 5 Units  5 Units Subcutaneous TID With Meals   • lamoTRIgine (LaMICtal) tablet 250 mg  250 mg Oral QAM   • levothyroxine tablet 50 mcg  50 mcg Oral Early Morning   • melatonin tablet 9 mg  9 mg Oral HS   • methocarbamol (ROBAXIN) tablet 750 mg  750 mg Oral Q6H RADHA   • naloxone (NARCAN) 0 04 mg/mL syringe 0 04 mg  0 04 mg Intravenous Q1MIN PRN   • ondansetron (ZOFRAN) injection 4 mg  4 mg Intravenous Q4H PRN   • oxyCODONE (ROXICODONE) immediate release tablet 10 mg  10 mg Oral Q4H PRN   • oxyCODONE (ROXICODONE) IR tablet 5 mg  5 mg Oral Q4H PRN   • pantoprazole (PROTONIX) EC tablet 40 mg  40 mg Oral BID AC   • polyethylene glycol (MIRALAX) packet 17 g  17 g Oral Daily PRN   • potassium-sodium phosphates (PHOS-NAK) packet 2 packet  2 packet Oral Once   • pregabalin (LYRICA) capsule 100 mg  100 mg Oral Q12H [address]   • ziprasidone (GEODON) capsule 80 mg  80 mg Oral BID With Meals         ** Please Note: Portions of the record may have been created with voice recognition software  Occasional wrong word or \"sound a like\" substitutions may have occurred due to the inherent limitations of voice recognition software  Read the chart carefully and recognize, using context, where substitutions have occurred   **      "

## 2023-03-28 NOTE — PROGRESS NOTES
Progress Note - Infectious Disease   Shayan Cummings 53 y o  female MRN: [id number]  Unit/Bed#: E2 -01 Encounter: [id number]      Impression/Plan:  1  Polymicrobial Left Foot Diabetic Ulcer Infection:  - Patient is now s/p left 1st hallux resection 3/23  No OR cultures sent, clean margins felt to be obtained  Pathology pending  Per discussion with podiatry, surgical source control is assumed from bone infection however they did notice some purulence on inspection of wound 3/27 which was washed out at bedside  Wound cx from 3/21 grew Group F Strep, MSSA and Alcaligenes faecalis  Blood cultures negative       - based on cultures 3/21 would continue IV Ceftriaxone 2g daily; based on sensitivities this should cover the MSSA, Alcaligenes and Strep  - given purulence seen in wound on 3/27, extend course for additional 5 days, through 3/31/2023  - if to be discharged sooner, can switch to PO Cefpodoxime 400 mg BID to complete course  - wound VAC changes per Podiatry     2  Right Diabetic Ulcer of TMA Stump s/p BKA  - source control achieved  - ongoing monitoring of surgical site per surgical teams     3  Penicillin Allergy:  - Listed as rash  She is tolerating Cefazolin and has tolerated Cefepime in past  She is tolerating Ceftriaxone now  Patient tells me she had a rash/hives as a child, does not think had any anaphylaxis and no trial of penicillin as an adult  - likely has outgrown Penicillin allergy  - can consider Amoxicillin challenge in future     4  T2DM     I have discussed the above management plan in detail with the primary service    Antibiotics:  Ceftriaxone    Subjective:  Patient has no fever, chills, sweats  She denies any pain at left foot, though has pain at right BKA      Objective:  Vitals:  Temp:  [97 °F (36 1 °C)-98 4 °F (36 9 °C)] 98 4 °F (36 9 °C)  HR:  [71-95] 71  Resp:  [19-20] 19  BP: (130-138)/(62-97) 130/81  SpO2:  [93 %-100 %] 100 %  Temp (24hrs), Av 6 °F (36 4 °C), Min:97 °F (36 1 °C), Max:98 4 °F (36 9 °C)  Current: Temperature: 98 4 °F (36 9 °C)    Physical Exam:   General Appearance:  Alert, interactive, nontoxic, no acute distress  Throat: Oropharynx moist without lesions  Lungs:   Clear to auscultation bilaterally; no wheezes, rhonchi or rales; respirations unlabored   Heart:  RRR; no murmur, rub or gallop   Abdomen:   Soft, non-tender, non-distended, positive bowel sounds  Extremities: Left foot is wrapped; right BKA present   Skin: No new rashes or lesions  No draining wounds noted  Labs:    All pertinent labs and imaging studies were personally reviewed  Results from last 7 days   Lab Units 03/28/23  0450 03/27/23  0833 03/26/23  0528   WBC Thousand/uL 8 62 9 47 10 62*   HEMOGLOBIN g/dL 9 4* 9 7* 9 3*   PLATELETS Thousands/uL 691* 696* 638*     Results from last 7 days   Lab Units 03/28/23  0450 03/27/23  0833 03/26/23  0528   SODIUM mmol/L 136 135 133*   POTASSIUM mmol/L 4 5 4 4 4 6   CHLORIDE mmol/L 101 101 101   CO2 mmol/L 28 29 29   BUN mg/dL 16 13 9   CREATININE mg/dL 0 63 0 67 0 59*   EGFR ml/min/1 73sq m 107 105 109   CALCIUM mg/dL 8 9 9 2 8 8                       Micro:  Results from last 7 days   Lab Units 03/21/23  1809   GRAM STAIN RESULT  2+ Gram positive cocci in pairs and chains*   WOUND CULTURE  4+ Growth of Beta Hemolytic Streptococcus Group F*  2+ Growth of Beta Hemolytic Streptococcus Group B*  2+ Growth of Alcaligenes faecalis*  1+ Growth of Staphylococcus aureus*       Imaging:          Mar Fox MD  [facility]

## 2023-03-28 NOTE — CASE MANAGEMENT
Case Management Discharge Planning Note    Patient name Mary [facility] 2 /E2 -* MRN [id number]  : 1975 Date 3/28/2023       Current Admission Date: 3/21/2023  Current Admission Diagnosis:Left foot diabetic ulcer   Patient Active Problem List    Diagnosis Date Noted   • Cough 2023   • Homeless single person 2023   • Sepsis ([facility] ) 2023   • Morbid obesity with BMI of 40 0-44 9, adult ([place] [address] ) 2023   • Left foot diabetic ulcer 2022   • Obstructive sleep apnea 2022   • GERD (gastroesophageal reflux disease) 2021   • Migraine without status migrainosus, not intractable 2021   • Right foot ulcer, with fat layer exposed ([place] [address] ) 2021   • Arthritis 2021   • Chronic pain 2021   • OCD (obsessive compulsive disorder)    • PTSD (post-traumatic stress disorder)    • Essential hypertension 2021   • Acquired absence of other left toe(s) ([facility] ) 2019   • Diabetic foot ulcer ([place] [address] ) 09/10/2019   • Type 2 diabetes mellitus with neurologic complication, without long-term current use of insulin ([place] [address] ) 2019   • Hypothyroidism 2019   • Mood disorder ([place] [address] ) 2019   • 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, with bone involvement without evidence of necrosis ([place] [address] ) 2019   • H/O bariatric surgery 2019   • Anxiety 2019      LOS (days): 7  Geometric Mean LOS (GMLOS) (days): 5 60  Days to GMLOS:-1 3     OBJECTIVE:  Risk of Unplanned Readmission Score: 13 97         Current admission status: Inpatient   Preferred Pharmacy:   [facility] [address]  Phone: [phone] Fax: [phone]    Primary Care Provider: Cadence Shaver MD    Primary Insurance: [address] [place] [address]  Secondary Insurance:     DISCHARGE DETAILS:    Discharge planning discussed with[de-identified] Patient           Were Treatment Team discharge recommendations reviewed with patient/caregiver?: Yes                         Other Referral/Resources/Interventions Provided:  Interventions: [facility]         Treatment Team Recommendation: Short Term Rehab                                            Additional Comments: CM provided pt with information on low cost hotels in the [facility]  Pt was also provided with housing resources from [place]  Pt was also provided with a homeless packet with all local agencies and reosurces available  CM was in and out of patient's room three different times and continued to encourage pt to call the list of hotels to find out the price for the week/month but pt continues to be found sleeping in the room  Application for [facility] was also provided to the patient  Optioning process started in case she ends up needing LTC  CM will continue to encourage pt to contact the hotels/motels in order to determine pricing

## 2023-03-28 NOTE — PLAN OF CARE
Problem: MOBILITY - ADULT  Goal: Maintain or return to baseline ADL function  Description: INTERVENTIONS:  -  Assess patient's ability to carry out ADLs; assess patient's baseline for ADL function and identify physical deficits which impact ability to perform ADLs (bathing, care of mouth/teeth, toileting, grooming, dressing, etc )  - Assess/evaluate cause of self-care deficits   - Assess range of motion  - Assess patient's mobility; develop plan if impaired  - Assess patient's need for assistive devices and provide as appropriate  - Encourage maximum independence but intervene and supervise when necessary  - Involve family in performance of ADLs  - Assess for home care needs following discharge   - Consider OT consult to assist with ADL evaluation and planning for discharge  - Provide patient education as appropriate  Outcome: Progressing     Problem: PAIN - ADULT  Goal: Verbalizes/displays adequate comfort level or baseline comfort level  Description: Interventions:  - Encourage patient to monitor pain and request assistance  - Assess pain using appropriate pain scale  - Administer analgesics based on type and severity of pain and evaluate response  - Implement non-pharmacological measures as appropriate and evaluate response  - Consider cultural and social influences on pain and pain management  - Notify physician/advanced practitioner if interventions unsuccessful or patient reports new pain  Outcome: Progressing     Problem: INFECTION - ADULT  Goal: Absence or prevention of progression during hospitalization  Description: INTERVENTIONS:  - Assess and monitor for signs and symptoms of infection  - Monitor lab/diagnostic results  - Monitor all insertion sites, i e  indwelling lines, tubes, and drains  - Monitor endotracheal if appropriate and nasal secretions for changes in amount and color  - [place] appropriate cooling/warming therapies per order  - Administer medications as ordered  - Instruct and encourage patient and family to use good hand hygiene technique  - Identify and instruct in appropriate isolation precautions for identified infection/condition  Outcome: Progressing     Problem: SAFETY ADULT  Goal: Maintain or return to baseline ADL function  Description: INTERVENTIONS:  -  Assess patient's ability to carry out ADLs; assess patient's baseline for ADL function and identify physical deficits which impact ability to perform ADLs (bathing, care of mouth/teeth, toileting, grooming, dressing, etc )  - Assess/evaluate cause of self-care deficits   - Assess range of motion  - Assess patient's mobility; develop plan if impaired  - Assess patient's need for assistive devices and provide as appropriate  - Encourage maximum independence but intervene and supervise when necessary  - Involve family in performance of ADLs  - Assess for home care needs following discharge   - Consider OT consult to assist with ADL evaluation and planning for discharge  - Provide patient education as appropriate  Outcome: Progressing  Goal: Patient will remain free of falls  Description: INTERVENTIONS:  - Educate patient/family on patient safety including physical limitations  - Instruct patient to call for assistance with activity   - Consult OT/PT to assist with strengthening/mobility   - Keep Call bell within reach  - Keep bed low and locked with side rails adjusted as appropriate  - Keep care items and personal belongings within reach  - Initiate and maintain comfort rounds  - Make Fall Risk Sign visible to staff  - Offer Toileting every 4 Hours, in advance of need  - Initiate/Maintain bed alarm  - Obtain necessary fall risk management equipment: gripper socks, call bell  - Apply yellow socks and bracelet for high fall risk patients  - Consider moving patient to room near nurses station  Outcome: Progressing     Problem: DISCHARGE PLANNING  Goal: Discharge to home or other facility with appropriate resources  Description: INTERVENTIONS:  - Identify barriers to discharge w/patient and caregiver  - Arrange for needed discharge resources and transportation as appropriate  - Identify discharge learning needs (meds, wound care, etc )  - Arrange for interpretive services to assist at discharge as needed  - Refer to Case Management Department for coordinating discharge planning if the patient needs post-hospital services based on physician/advanced practitioner order or complex needs related to functional status, cognitive ability, or social support system  Outcome: Progressing     Problem: Knowledge Deficit  Goal: Patient/family/caregiver demonstrates understanding of disease process, treatment plan, medications, and discharge instructions  Description: Complete learning assessment and assess knowledge base    Interventions:  - Provide teaching at level of understanding  - Provide teaching via preferred learning methods  Outcome: Progressing     Problem: SKIN/TISSUE INTEGRITY - ADULT  Goal: Incision(s), wounds(s) or drain site(s) healing without S/S of infection  Description: INTERVENTIONS  - Assess and document dressing, incision, wound bed, drain sites and surrounding tissue  - Provide patient and family education  - Perform skin care/dressing changes every day  Outcome: Progressing     Problem: RESPIRATORY - ADULT  Goal: Achieves optimal ventilation and oxygenation  Description: INTERVENTIONS:  - Assess for changes in respiratory status  - Assess for changes in mentation and behavior  - Position to facilitate oxygenation and minimize respiratory effort  - Oxygen administered by appropriate delivery if ordered  - Initiate smoking cessation education as indicated  - Encourage broncho-pulmonary hygiene including cough, deep breathe, Incentive Spirometry  - Assess the need for suctioning and aspirate as needed  - Assess and instruct to report SOB or any respiratory difficulty  - Respiratory Therapy support as indicated  Outcome: Progressing     Problem: METABOLIC, FLUID AND ELECTROLYTES - ADULT  Goal: Fluid balance maintained  Description: INTERVENTIONS:  - Monitor labs   - Monitor I/O and WT  - Instruct patient on fluid and nutrition as appropriate  - Assess for signs & symptoms of volume excess or deficit  Outcome: Progressing  Goal: Glucose maintained within target range  Description: INTERVENTIONS:  - Monitor Blood Glucose as ordered  - Assess for signs and symptoms of hyperglycemia and hypoglycemia  - Administer ordered medications to maintain glucose within target range  - Assess nutritional intake and initiate nutrition service referral as needed  Outcome: Progressing     Problem: COPING  Goal: Pt/Family able to verbalize concerns and demonstrate effective coping strategies  Description: INTERVENTIONS:  - Assist patient/family to identify coping skills, available support systems and cultural and spiritual values  - Provide emotional support, including active listening and acknowledgement of concerns of patient and caregivers  - Reduce environmental stimuli, as able  - Provide patient education  - Assess for spiritual pain/suffering and initiate spiritual care, including notification of Pastoral Care or gisell based community as needed  - Assess effectiveness of coping strategies  Outcome: Progressing  Goal: Will report anxiety at manageable levels  Description: INTERVENTIONS:  - Administer medication as ordered  - Teach and encourage coping skills  - Provide emotional support  - Assess patient/family for anxiety and ability to cope  Outcome: Progressing     Problem: CHANGE IN BODY IMAGE  Goal: Pt/Family communicate acceptance of loss or change in body image and expresses psychological comfort and peace  Description: INTERVENTIONS:  - Assess patient/family anxiety and grief process related to change in body image, loss of functional status and loss of sense of self  - Assess patient/family's coping skills and provide emotional, spiritual and psychosocial support  - Provide information about the patient's health status with consideration of family and cultural values  - Communicate willingness to discuss loss and facilitate grief process with patient/family as appropriate  - Emphasize sustaining relationships within family system and community, or gisell/spiritual traditions  - Refer to community support groups as appropriate  - [id number], Pastoral care or other ancillary consults as needed  Outcome: Progressing

## 2023-03-28 NOTE — ASSESSMENT & PLAN NOTE
· Patient complaining of persistent cough during hospitalization  · Note significant improvement with Robitussin-DM, continue as needed  · Chest x-ray without evidence of infiltrate or abnormality, follow-up official read  · Encourage incentive spirometry

## 2023-03-28 NOTE — CASE MANAGEMENT
Case Management Discharge Planning Note    Patient name Kelley Pan  Location [facility] /E2 -* MRN [id number]  : 1975 Date 3/28/2023       Current Admission Date: 3/21/2023  Current Admission Diagnosis:Left foot diabetic ulcer   Patient Active Problem List    Diagnosis Date Noted   • Cough 2023   • Homeless single person 2023   • [facility]epsis ([facility] ) 2023   • Morbid obesity with BMI of 40 0-44 9, adult ([facility] ) 2023   • Left foot diabetic ulcer 2022   • Obstructive sleep apnea 2022   • GERD (gastroesophageal reflux disease) 2021   • Migraine without status migrainosus, not intractable 2021   • Right foot ulcer, with fat layer exposed ([facility] ) 2021   • Arthritis 2021   • Chronic pain 2021   • OCD (obsessive compulsive disorder)    • PTSD (post-traumatic stress disorder)    • Essential hypertension 2021   • Acquired absence of other left toe(s) ([facility] ) 2019   • Diabetic foot ulcer ([place] [address] ) 09/10/2019   • Type 2 diabetes mellitus with neurologic complication, without long-term current use of insulin ([place] [address] ) 2019   • Hypothyroidism 2019   • Mood disorder ([place] [address] ) 2019   • 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, with bone involvement without evidence of necrosis ([place] [address] ) 2019   • H/O bariatric surgery 2019   • Anxiety 2019      LOS (days): 7  Geometric Mean LOS (GMLOS) (days): 5 60  Days to GMLOS:-1 3     OBJECTIVE:  Risk of Unplanned Readmission Score: 13 97         Current admission status: Inpatient   Preferred Pharmacy:   [facility], [address] [facility] [address]  Phone: [phone] Fax: [phone]    Primary Care Provider: Aiden Baker MD    Primary Insurance: [address] [place] [address]  Secondary Insurance:     DISCHARGE DETAILS:    Discharge planning discussed with[de-identified] Patient           Were Treatment Team discharge recommendations reviewed with patient/caregiver?: Yes                         Other Referral/Resources/Interventions Provided:  Interventions: Wound Vac  Referral Comments: [employer] wound vac application sent to [facility] Discharge Support  Treatment Team Recommendation: Short Term Rehab                                            Additional Comments: CM provided pt with information on low cost hotels in the [place] area  Pt was also provided with housing resources from [place]  Pt was also provided with a homeless packet with all local agencies and reosurces available  CM was in and out of patient's room three different times and continued to encourage pt to call the list of hotels to find out the price for the week/month but pt continues to be found sleeping in the room  Application for [facility] was also provided to the patient  Optioning process started in case she ends up needing LTC  CM will continue to encourage pt to contact the hotels/motels in order to determine pricing

## 2023-03-28 NOTE — CASE MANAGEMENT
Lauren Maya has received request for [employer] wound vac from Care Manager  Request submitted via [employer] website     Pending order #: [id number]

## 2023-03-29 LAB
ANION GAP SERPL CALCULATED.3IONS-SCNC: 7 MMOL/L (ref 4–13)
BASOPHILS # BLD AUTO: 0.07 THOUSANDS/ÂΜL (ref 0–0.1)
BASOPHILS NFR BLD AUTO: 1 % (ref 0–1)
BUN SERPL-MCNC: 14 MG/DL (ref 5–25)
CALCIUM SERPL-MCNC: 8.9 MG/DL (ref 8.4–10.2)
CHLORIDE SERPL-SCNC: 102 MMOL/L (ref 96–108)
CO2 SERPL-SCNC: 27 MMOL/L (ref 21–32)
CREAT SERPL-MCNC: 0.66 MG/DL (ref 0.6–1.3)
EOSINOPHIL # BLD AUTO: 0.69 THOUSAND/ÂΜL (ref 0–0.61)
EOSINOPHIL NFR BLD AUTO: 8 % (ref 0–6)
ERYTHROCYTE [DISTWIDTH] IN BLOOD BY AUTOMATED COUNT: 22.9 % (ref 11.6–15.1)
GFR SERPL CREATININE-BSD FRML MDRD: 105 ML/MIN/1.73SQ M
GLUCOSE SERPL-MCNC: 131 MG/DL (ref 65–140)
GLUCOSE SERPL-MCNC: 157 MG/DL (ref 65–140)
GLUCOSE SERPL-MCNC: 159 MG/DL (ref 65–140)
GLUCOSE SERPL-MCNC: 181 MG/DL (ref 65–140)
GLUCOSE SERPL-MCNC: 189 MG/DL (ref 65–140)
HCT VFR BLD AUTO: 31.7 % (ref 34.8–46.1)
HGB BLD-MCNC: 9.2 G/DL (ref 11.5–15.4)
IMM GRANULOCYTES # BLD AUTO: 0.14 THOUSAND/UL (ref 0–0.2)
IMM GRANULOCYTES NFR BLD AUTO: 2 % (ref 0–2)
LYMPHOCYTES # BLD AUTO: 3.59 THOUSANDS/ÂΜL (ref 0.6–4.47)
LYMPHOCYTES NFR BLD AUTO: 43 % (ref 14–44)
MCH RBC QN AUTO: 20.3 PG (ref 26.8–34.3)
MCHC RBC AUTO-ENTMCNC: 29 G/DL (ref 31.4–37.4)
MCV RBC AUTO: 70 FL (ref 82–98)
MONOCYTES # BLD AUTO: 0.69 THOUSAND/ÂΜL (ref 0.17–1.22)
MONOCYTES NFR BLD AUTO: 8 % (ref 4–12)
NEUTROPHILS # BLD AUTO: 3.19 THOUSANDS/ÂΜL (ref 1.85–7.62)
NEUTS SEG NFR BLD AUTO: 38 % (ref 43–75)
NRBC BLD AUTO-RTO: 0 /100 WBCS
PLATELET # BLD AUTO: 670 THOUSANDS/UL (ref 149–390)
PMV BLD AUTO: 8.6 FL (ref 8.9–12.7)
POTASSIUM SERPL-SCNC: 4.3 MMOL/L (ref 3.5–5.3)
RBC # BLD AUTO: 4.53 MILLION/UL (ref 3.81–5.12)
SODIUM SERPL-SCNC: 136 MMOL/L (ref 135–147)
WBC # BLD AUTO: 8.37 THOUSAND/UL (ref 4.31–10.16)

## 2023-03-29 RX ADMIN — CEFTRIAXONE 2000 MG: 10 INJECTION, POWDER, FOR SOLUTION INTRAVENOUS at 17:05

## 2023-03-29 RX ADMIN — HYDROXYZINE HYDROCHLORIDE 50 MG: 25 TABLET, FILM COATED ORAL at 09:07

## 2023-03-29 RX ADMIN — LEVOTHYROXINE SODIUM 50 MCG: 50 TABLET ORAL at 05:34

## 2023-03-29 RX ADMIN — GUAIFENESIN AND DEXTROMETHORPHAN 10 ML: 100; 10 SYRUP ORAL at 19:38

## 2023-03-29 RX ADMIN — PREGABALIN 100 MG: 50 CAPSULE ORAL at 21:20

## 2023-03-29 RX ADMIN — ACETAMINOPHEN 325MG 975 MG: 325 TABLET ORAL at 13:33

## 2023-03-29 RX ADMIN — POLYETHYLENE GLYCOL 3350 17 G: 17 POWDER, FOR SOLUTION ORAL at 07:52

## 2023-03-29 RX ADMIN — ACETAMINOPHEN 325MG 975 MG: 325 TABLET ORAL at 21:20

## 2023-03-29 RX ADMIN — INSULIN LISPRO 2 UNITS: 100 INJECTION, SOLUTION INTRAVENOUS; SUBCUTANEOUS at 07:39

## 2023-03-29 RX ADMIN — OXYCODONE HYDROCHLORIDE 10 MG: 10 TABLET ORAL at 13:33

## 2023-03-29 RX ADMIN — CLONIDINE HYDROCHLORIDE 0.2 MG: 0.1 TABLET ORAL at 21:20

## 2023-03-29 RX ADMIN — CLONIDINE HYDROCHLORIDE 0.2 MG: 0.1 TABLET ORAL at 09:06

## 2023-03-29 RX ADMIN — HYDROXYZINE HYDROCHLORIDE 100 MG: 25 TABLET, FILM COATED ORAL at 21:20

## 2023-03-29 RX ADMIN — OXYCODONE HYDROCHLORIDE 10 MG: 10 TABLET ORAL at 05:33

## 2023-03-29 RX ADMIN — PANTOPRAZOLE SODIUM 40 MG: 40 TABLET, DELAYED RELEASE ORAL at 16:43

## 2023-03-29 RX ADMIN — INSULIN LISPRO 2 UNITS: 100 INJECTION, SOLUTION INTRAVENOUS; SUBCUTANEOUS at 11:47

## 2023-03-29 RX ADMIN — METHOCARBAMOL 750 MG: 750 TABLET ORAL at 23:32

## 2023-03-29 RX ADMIN — MELATONIN 9 MG: at 21:21

## 2023-03-29 RX ADMIN — METHOCARBAMOL 750 MG: 750 TABLET ORAL at 17:09

## 2023-03-29 RX ADMIN — OXYCODONE HYDROCHLORIDE 10 MG: 10 TABLET ORAL at 09:31

## 2023-03-29 RX ADMIN — ENOXAPARIN SODIUM 40 MG: 100 INJECTION SUBCUTANEOUS at 17:02

## 2023-03-29 RX ADMIN — PANTOPRAZOLE SODIUM 40 MG: 40 TABLET, DELAYED RELEASE ORAL at 05:34

## 2023-03-29 RX ADMIN — GUAIFENESIN AND DEXTROMETHORPHAN 10 ML: 100; 10 SYRUP ORAL at 07:40

## 2023-03-29 RX ADMIN — DOCUSATE SODIUM 100 MG: 100 CAPSULE, LIQUID FILLED ORAL at 09:06

## 2023-03-29 RX ADMIN — ZIPRASIDONE HCL 80 MG: 40 CAPSULE ORAL at 09:07

## 2023-03-29 RX ADMIN — DOCUSATE SODIUM 100 MG: 100 CAPSULE, LIQUID FILLED ORAL at 17:02

## 2023-03-29 RX ADMIN — INSULIN LISPRO 5 UNITS: 100 INJECTION, SOLUTION INTRAVENOUS; SUBCUTANEOUS at 16:43

## 2023-03-29 RX ADMIN — FOLIC ACID 1000 MCG: 1 TABLET ORAL at 09:07

## 2023-03-29 RX ADMIN — LAMOTRIGINE 250 MG: 100 TABLET ORAL at 09:06

## 2023-03-29 RX ADMIN — INSULIN LISPRO 2 UNITS: 100 INJECTION, SOLUTION INTRAVENOUS; SUBCUTANEOUS at 16:43

## 2023-03-29 RX ADMIN — OXYCODONE HYDROCHLORIDE 10 MG: 10 TABLET ORAL at 21:31

## 2023-03-29 RX ADMIN — METHOCARBAMOL 750 MG: 750 TABLET ORAL at 13:33

## 2023-03-29 RX ADMIN — ZIPRASIDONE HCL 80 MG: 40 CAPSULE ORAL at 21:27

## 2023-03-29 RX ADMIN — INSULIN LISPRO 5 UNITS: 100 INJECTION, SOLUTION INTRAVENOUS; SUBCUTANEOUS at 07:40

## 2023-03-29 RX ADMIN — INSULIN LISPRO 1 UNITS: 100 INJECTION, SOLUTION INTRAVENOUS; SUBCUTANEOUS at 21:22

## 2023-03-29 RX ADMIN — METHOCARBAMOL 750 MG: 750 TABLET ORAL at 05:34

## 2023-03-29 RX ADMIN — INSULIN LISPRO 5 UNITS: 100 INJECTION, SOLUTION INTRAVENOUS; SUBCUTANEOUS at 11:47

## 2023-03-29 RX ADMIN — ENOXAPARIN SODIUM 40 MG: 100 INJECTION SUBCUTANEOUS at 09:07

## 2023-03-29 RX ADMIN — ACETAMINOPHEN 325MG 975 MG: 325 TABLET ORAL at 05:34

## 2023-03-29 RX ADMIN — OXYCODONE HYDROCHLORIDE 10 MG: 10 TABLET ORAL at 17:02

## 2023-03-29 RX ADMIN — FENOFIBRATE 48 MG: 48 TABLET, FILM COATED ORAL at 09:06

## 2023-03-29 RX ADMIN — PREGABALIN 100 MG: 50 CAPSULE ORAL at 09:06

## 2023-03-29 NOTE — PHYSICAL THERAPY NOTE
PHYSICAL THERAPY NOTE          Patient Name: Ottoniel Curiel  [id number] Date: 3/29/2023     03/29/23 1640   Note Type   Note Type Treatment   Pain Assessment   Pain Assessment Tool 0-10   Pain Score 10 - Worst Possible Pain   Pain Location/Orientation Orientation: Right;Location: Leg   Pain Onset/Description Descriptor: Stabbing; Descriptor: Burning   Hospital Pain Intervention(s) Repositioned; Ambulation/increased activity; Emotional support;Rest;Elevated;Cold applied   Restrictions/Precautions   RLE Weight Bearing Per Order NWB   LLE Weight Bearing Per Order PWB  (heel weightbearing, transfers only)   Other Precautions Chair Alarm; Bed Alarm;WBS;Fall Risk;Pain;Cognitive   General   Chart Reviewed Yes   Additional Pertinent History change in WBS to PWB L heel for transfers only  Family/Caregiver Present No   Subjective   Subjective pt  out of bed in recliner upon enterring  pt reports 10/10 buring and stabbing pain in the right residual limb no pain L le   pt  agreeable to PT  Bed Mobility   Additional Comments pt out of bed uon arrival and remained out of bed at conclusion of PT session  Transfers   Sit to Stand 4  Minimal assistance   Additional items Assist x 1; Armrests; Increased time required;Verbal cues   Stand to Sit 4  Minimal assistance   Additional items Assist x 1; Increased time required;Verbal cues;Armrests   Sit pivot 4  Minimal assistance   Additional items Assist x 1; Increased time required;Verbal cues;Armrests  (transfer from recliner to drop arm BSC and back to recliner )   Additional Comments cues for PWB to L le  Exercises   Quad Sets Supine;20 reps;Bilateral   Hip Flexion Supine;15 reps;AROM; Bilateral  (aarom L le )   Hip Abduction Supine;15 reps;AROM; Bilateral   Hip Adduction Supine;15 reps;AROM; Bilateral   Knee AROM Short Arc Quad Supine;20 reps;AROM; Left   Knee AROM Long Arc Quad Sitting;15 reps;AROM; Bilateral Ankle Pumps Supine;20 reps;AROM; Right   Assessment   Prognosis Good   Problem List Decreased strength;Decreased endurance; Impaired balance   Assessment Pt  Seen for PT treatment session  Pt reports 10/10 R le pain  Pt  Agreeable to PT  Pt  Performed transfer training from recliner to drop arm commode and back to recliner with min assist x1, cues for hand placement and pwb to L heel  Pt performs personal hygiene i'ly assist for set- up  Pt  Requires assistance for set- up of bsc  Pt  Performs seated and supine b/l le a-aarom x 15- 20 reps to tolerance verbal and tactile cues for correct exercise techniques and performance  Pt remains functioning below baseline level of mobility recommend inpt rehab to address deficits in order to maximize pt's functional independence and safety w/ mobility  The patient's AM-PAC Basic Mobility Inpatient Short Form Raw Score is 15  A Raw score of less than or equal to 16 suggests the patient may benefit from discharge to post-acute rehabilitation services  Please also refer to the recommendation of the Physical Therapist for safe discharge planning  Goals   Patient Goals to go to rehab   STG Expiration Date 04/03/23   PT Treatment Day 2   Plan   Treatment/Interventions Functional transfer training;LE strengthening/ROM; Therapeutic exercise; Endurance training;Patient/family training;Equipment eval/education;Spoke to nursing   Progress Progressing toward goals   PT Frequency 3-5x/wk   Recommendation   PT Discharge Recommendation Post acute rehabilitation services   AM-PAC Basic Mobility Inpatient   Turning in Flat Bed Without Bedrails 4   Lying on Back to Sitting on Edge of Flat Bed Without Bedrails 3   Moving Bed to Chair 3   Standing Up From Chair Using Arms 3   Walk in Room 1   Climb 3-5 Stairs With Railing 1   Basic Mobility Inpatient Raw Score 15   Basic Mobility Standardized Score 36 97   Highest Level Of Mobility   JH-HLM Goal 4: Move to chair/commode   JH-HLM Achieved 4: Move to [facility] Provided Mobility training;Home exercise program   Patient Demonstrates acceptance/verbal understanding   End of Consult   Patient Position at End of Consult Bed/Chair alarm activated; All needs within reach; Bedside chair   End of Consult Comments b/l le's elevated  ice to R knee  03/29/23 1640   Note Type   Note Type Treatment   Pain Assessment   Pain Assessment Tool 0-10   Pain Score 10 - Worst Possible Pain   Pain Location/Orientation Orientation: Right;Location: Leg   Pain Onset/Description Descriptor: Stabbing; Descriptor: Burning   Hospital Pain Intervention(s) Repositioned; Ambulation/increased activity; Emotional support;Rest;Elevated;Cold applied   Restrictions/Precautions   RLE Weight Bearing Per Order NWB   LLE Weight Bearing Per Order PWB  (heel weightbearing, transfers only)   Other Precautions Chair Alarm; Bed Alarm;WBS;Fall Risk;Pain;Cognitive   General   Chart Reviewed Yes   Additional Pertinent History change in WBS to PWB L heel for transfers only  Family/Caregiver Present No   Subjective   Subjective pt  out of bed in recliner upon enterring  pt reports 10/10 buring and stabbing pain in the right residual limb no pain L le   pt  agreeable to PT  Bed Mobility   Additional Comments pt out of bed uon arrival and remained out of bed at conclusion of PT session  Transfers   Sit to Stand 4  Minimal assistance   Additional items Assist x 1; Armrests; Increased time required;Verbal cues   Stand to Sit 4  Minimal assistance   Additional items Assist x 1; Increased time required;Verbal cues;Armrests   Sit pivot 4  Minimal assistance   Additional items Assist x 1; Increased time required;Verbal cues;Armrests  (transfer from recliner to drop arm [facility] and back to recliner )   Additional Comments cues for PWB to L le  Exercises   Quad Sets Supine;20 reps;Bilateral   Hip Flexion Supine;15 reps;AROM; Bilateral  (aarom L le )   Hip Abduction Supine;15 reps;AROM; Bilateral   Hip Adduction Supine;15 reps;AROM; Bilateral   Knee AROM Short Arc Quad Supine;20 reps;AROM; Left   Knee AROM Long Arc Quad Sitting;15 reps;AROM; Bilateral   Ankle Pumps Supine;20 reps;AROM; Right   Assessment   Prognosis Good   Problem List Decreased strength;Decreased endurance; Impaired balance   Assessment Pt  Seen for PT treatment session  Pt reports 10/10 R le pain  Pt  Agreeable to PT  Pt  Performed transfer training from recliner to drop arm commode and back to recliner with min assist x1, cues for hand placement and pwb to L heel  Pt performs personal hygiene i'ly assist for set- up  Pt  Requires assistance for set- up of bsc  Pt  Performs seated and supine b/l le a-aarom x 15- 20 reps to tolerance verbal and tactile cues for correct exercise techniques and performance  Pt remains functioning below baseline level of mobility recommend inpt rehab to address deficits in order to maximize pt's functional independence and safety w/ mobility  The patient's AM-PAC Basic Mobility Inpatient Short Form Raw Score is 15  A Raw score of less than or equal to 16 suggests the patient may benefit from discharge to post-acute rehabilitation services  Please also refer to the recommendation of the Physical Therapist for safe discharge planning  Goals   Patient Goals to go to rehab   STG Expiration Date 04/03/23   PT Treatment Day 2   Plan   Treatment/Interventions Functional transfer training;LE strengthening/ROM; Therapeutic exercise; Endurance training;Patient/family training;Equipment eval/education;Spoke to nursing   Progress Progressing toward goals   PT Frequency 3-5x/wk   Recommendation   PT Discharge Recommendation Post acute rehabilitation services   AM-PAC Basic Mobility Inpatient   Turning in Flat Bed Without Bedrails 4   Lying on Back to Sitting on Edge of Flat Bed Without Bedrails 3   Moving Bed to Chair 3   Standing Up From Chair Using Arms 3   Walk in Room 1   Climb 3-5 Stairs With [place] 1   Basic Mobility Inpatient Raw Score 15   Basic Mobility Standardized Score 36 97   Highest Level Of Mobility   -[facility] Goal 4: Move to chair/commode   -HLM Achieved 4: Move to chair/commode   Education   Education Provided Mobility training;Home exercise program   Patient Demonstrates acceptance/verbal understanding   End of Consult   Patient Position at End of Consult Bed/Chair alarm activated; All needs within reach; Bedside chair   End of Consult Comments b/l le's elevated  ice to R knee       Viji Walker, PTA

## 2023-03-29 NOTE — PLAN OF CARE
Problem: MOBILITY - ADULT  Goal: Maintain or return to baseline ADL function  Description: INTERVENTIONS:  -  Assess patient's ability to carry out ADLs; assess patient's baseline for ADL function and identify physical deficits which impact ability to perform ADLs (bathing, care of mouth/teeth, toileting, grooming, dressing, etc )  - Assess/evaluate cause of self-care deficits   - Assess range of motion  - Assess patient's mobility; develop plan if impaired  - Assess patient's need for assistive devices and provide as appropriate  - Encourage maximum independence but intervene and supervise when necessary  - Involve family in performance of ADLs  - Assess for home care needs following discharge   - Consider OT consult to assist with ADL evaluation and planning for discharge  - Provide patient education as appropriate  Outcome: Progressing     Problem: Prexisting or High Potential for Compromised Skin Integrity  Goal: Skin integrity is maintained or improved  Description: INTERVENTIONS:  - Identify patients at risk for skin breakdown  - Assess and monitor skin integrity  - Assess and monitor nutrition and hydration status  - Monitor labs   - Assess for incontinence   - Turn and reposition patient  - Assist with mobility/ambulation  - Relieve pressure over bony prominences  - Avoid friction and shearing  - Provide appropriate hygiene as needed including keeping skin clean and dry  - Evaluate need for skin moisturizer/barrier cream  - Collaborate with interdisciplinary team   - Patient/family teaching  - Consider wound care consult   Outcome: Progressing     Problem: PAIN - ADULT  Goal: Verbalizes/displays adequate comfort level or baseline comfort level  Description: Interventions:  - Encourage patient to monitor pain and request assistance  - Assess pain using appropriate pain scale  - Administer analgesics based on type and severity of pain and evaluate response  - Implement non-pharmacological measures as appropriate and evaluate response  - Consider cultural and social influences on pain and pain management  - Notify physician/advanced practitioner if interventions unsuccessful or patient reports new pain  Outcome: Progressing     Problem: INFECTION - ADULT  Goal: Absence or prevention of progression during hospitalization  Description: INTERVENTIONS:  - Assess and monitor for signs and symptoms of infection  - Monitor lab/diagnostic results  - Monitor all insertion sites, i e  indwelling lines, tubes, and drains  - Monitor endotracheal if appropriate and nasal secretions for changes in amount and color  - [place] appropriate cooling/warming therapies per order  - Administer medications as ordered  - Instruct and encourage patient and family to use good hand hygiene technique  - Identify and instruct in appropriate isolation precautions for identified infection/condition  Outcome: Progressing     Problem: SAFETY ADULT  Goal: Maintain or return to baseline ADL function  Description: INTERVENTIONS:  -  Assess patient's ability to carry out ADLs; assess patient's baseline for ADL function and identify physical deficits which impact ability to perform ADLs (bathing, care of mouth/teeth, toileting, grooming, dressing, etc )  - Assess/evaluate cause of self-care deficits   - Assess range of motion  - Assess patient's mobility; develop plan if impaired  - Assess patient's need for assistive devices and provide as appropriate  - Encourage maximum independence but intervene and supervise when necessary  - Involve family in performance of ADLs  - Assess for home care needs following discharge   - Consider OT consult to assist with ADL evaluation and planning for discharge  - Provide patient education as appropriate  Outcome: Progressing     Problem: DISCHARGE PLANNING  Goal: Discharge to home or other facility with appropriate resources  Description: INTERVENTIONS:  - Identify barriers to discharge w/patient and caregiver  - Arrange for needed discharge resources and transportation as appropriate  - Identify discharge learning needs (meds, wound care, etc )  - Arrange for interpretive services to assist at discharge as needed  - Refer to Case Management Department for coordinating discharge planning if the patient needs post-hospital services based on physician/advanced practitioner order or complex needs related to functional status, cognitive ability, or social support system  Outcome: Progressing     Problem: Knowledge Deficit  Goal: Patient/family/caregiver demonstrates understanding of disease process, treatment plan, medications, and discharge instructions  Description: Complete learning assessment and assess knowledge base    Interventions:  - Provide teaching at level of understanding  - Provide teaching via preferred learning methods  Outcome: Progressing

## 2023-03-29 NOTE — PLAN OF CARE
Problem: OCCUPATIONAL THERAPY ADULT  Goal: Performs self-care activities at highest level of function for planned discharge setting  See evaluation for individualized goals  Description: Treatment Interventions: ADL retraining, Functional transfer training, UE strengthening/ROM, Endurance training, Patient/family training, Equipment evaluation/education, Neuromuscular reeducation, Compensatory technique education, Continued evaluation, Energy conservation, Activityengagement          See flowsheet documentation for full assessment, interventions and recommendations  Outcome: Progressing  Note: Limitation: Decreased ADL status, Decreased UE strength, Decreased Safe judgement during ADL, Decreased cognition, Decreased endurance, Decreased self-care trans, Decreased high-level ADLs  Prognosis: Fair  Assessment: Pt seen for f/u tx today with focus on self-care/tasks, slideboard transfers, and functional mobility  Pt initially very lethargice; /54  States she hasn't been sleeping well  Encouraged pt to keep busy during the day and try to stay awake so that she is able to sleep better at night  Educated pt on alternative dressing method for LB of completing in near supine either in bed or recliner  Pt able to don/doff underwear with SBA and moderate VCs while reclined in recliner  Attempted education to roll L/R instead of bridging to maintain WB precautions  Pt educated on drop arm commode, completed SB transfer with Best to place the board and SBA for actual transfer  Pt completes toileting tasks on commode with SBA, VCs to lean L/R to complete hygiene and clothing management  Transfers back to chair with Best for board placement, SBA for transfer  Pt completes transfer from recliner>w/c with modA 2* increased height  With pt's b/l LE WB inability and body habitus, it is difficult for pt to be able to move to elevated surface  W/c mobility with S for wound vac management   Transfers w/c>chair with verbal cues to lock brakes, for w/c positioning, requires (A) for placing SB; able to transfer with Best  Pt is very fatigued post therapy session  Good carryover noted for swing back arm rest operation, able to generalize concept to drop arm commode with minimal teaching  Did contact Dana Santana re: WBS to possibly upgrade pt to WB to heel only for transfers for next session with podiatry to update orders; PT made aware  Pt progressing well with therapy however does continue to require skilled IP OT  Will continue to follow pt for POC       OT Discharge Recommendation: Post acute rehabilitation services (vs HH pending progress)

## 2023-03-29 NOTE — PLAN OF CARE
Problem: MOBILITY - ADULT  Goal: Maintain or return to baseline ADL function  Description: INTERVENTIONS:  -  Assess patient's ability to carry out ADLs; assess patient's baseline for ADL function and identify physical deficits which impact ability to perform ADLs (bathing, care of mouth/teeth, toileting, grooming, dressing, etc )  - Assess/evaluate cause of self-care deficits   - Assess range of motion  - Assess patient's mobility; develop plan if impaired  - Assess patient's need for assistive devices and provide as appropriate  - Encourage maximum independence but intervene and supervise when necessary  - Involve family in performance of ADLs  - Assess for home care needs following discharge   - Consider OT consult to assist with ADL evaluation and planning for discharge  - Provide patient education as appropriate  Outcome: Progressing     Problem: PAIN - ADULT  Goal: Verbalizes/displays adequate comfort level or baseline comfort level  Description: Interventions:  - Encourage patient to monitor pain and request assistance  - Assess pain using appropriate pain scale  - Administer analgesics based on type and severity of pain and evaluate response  - Implement non-pharmacological measures as appropriate and evaluate response  - Consider cultural and social influences on pain and pain management  - Notify physician/advanced practitioner if interventions unsuccessful or patient reports new pain  Outcome: Progressing     Problem: INFECTION - ADULT  Goal: Absence or prevention of progression during hospitalization  Description: INTERVENTIONS:  - Assess and monitor for signs and symptoms of infection  - Monitor lab/diagnostic results  - Monitor all insertion sites, i e  indwelling lines, tubes, and drains  - Monitor endotracheal if appropriate and nasal secretions for changes in amount and color  - [place] appropriate cooling/warming therapies per order  - Administer medications as ordered  - Instruct and encourage patient and family to use good hand hygiene technique  - Identify and instruct in appropriate isolation precautions for identified infection/condition  Outcome: Progressing     Problem: SAFETY ADULT  Goal: Maintain or return to baseline ADL function  Description: INTERVENTIONS:  -  Assess patient's ability to carry out ADLs; assess patient's baseline for ADL function and identify physical deficits which impact ability to perform ADLs (bathing, care of mouth/teeth, toileting, grooming, dressing, etc )  - Assess/evaluate cause of self-care deficits   - Assess range of motion  - Assess patient's mobility; develop plan if impaired  - Assess patient's need for assistive devices and provide as appropriate  - Encourage maximum independence but intervene and supervise when necessary  - Involve family in performance of ADLs  - Assess for home care needs following discharge   - Consider OT consult to assist with ADL evaluation and planning for discharge  - Provide patient education as appropriate  Outcome: Progressing  Goal: Patient will remain free of falls  Description: INTERVENTIONS:  - Educate patient/family on patient safety including physical limitations  - Instruct patient to call for assistance with activity   - Consult OT/PT to assist with strengthening/mobility   - Keep Call bell within reach  - Keep bed low and locked with side rails adjusted as appropriate  - Keep care items and personal belongings within reach  - Initiate and maintain comfort rounds  - Make Fall Risk Sign visible to staff  - Offer Toileting every 4 Hours, in advance of need  - Initiate/Maintain bed alarm  - Obtain necessary fall risk management equipment: gripper socks, call bell  - Apply yellow socks and bracelet for high fall risk patients  - Consider moving patient to room near nurses station  Outcome: Progressing     Problem: DISCHARGE PLANNING  Goal: Discharge to home or other facility with appropriate resources  Description: INTERVENTIONS:  - Identify barriers to discharge w/patient and caregiver  - Arrange for needed discharge resources and transportation as appropriate  - Identify discharge learning needs (meds, wound care, etc )  - Arrange for interpretive services to assist at discharge as needed  - Refer to Case Management Department for coordinating discharge planning if the patient needs post-hospital services based on physician/advanced practitioner order or complex needs related to functional status, cognitive ability, or social support system  Outcome: Progressing     Problem: Knowledge Deficit  Goal: Patient/family/caregiver demonstrates understanding of disease process, treatment plan, medications, and discharge instructions  Description: Complete learning assessment and assess knowledge base    Interventions:  - Provide teaching at level of understanding  - Provide teaching via preferred learning methods  Outcome: Progressing     Problem: RESPIRATORY - ADULT  Goal: Achieves optimal ventilation and oxygenation  Description: INTERVENTIONS:  - Assess for changes in respiratory status  - Assess for changes in mentation and behavior  - Position to facilitate oxygenation and minimize respiratory effort  - Oxygen administered by appropriate delivery if ordered  - Initiate smoking cessation education as indicated  - Encourage broncho-pulmonary hygiene including cough, deep breathe, Incentive Spirometry  - Assess the need for suctioning and aspirate as needed  - Assess and instruct to report SOB or any respiratory difficulty  - Respiratory Therapy support as indicated  Outcome: Progressing     Problem: METABOLIC, FLUID AND ELECTROLYTES - ADULT  Goal: Glucose maintained within target range  Description: INTERVENTIONS:  - Monitor Blood Glucose as ordered  - Assess for signs and symptoms of hyperglycemia and hypoglycemia  - Administer ordered medications to maintain glucose within target range  - Assess nutritional intake and initiate nutrition service referral as needed  Outcome: Progressing     Problem: Nutrition/Hydration-ADULT  Goal: Nutrient/Hydration intake appropriate for improving, restoring or maintaining nutritional needs  Description: Monitor and assess patient's nutrition/hydration status for malnutrition  Collaborate with interdisciplinary team and initiate plan and interventions as ordered  Monitor patient's weight and dietary intake as ordered or per policy  Utilize nutrition screening tool and intervene as necessary  Determine patient's food preferences and provide high-protein, high-caloric foods as appropriate       INTERVENTIONS:  - Monitor oral intake, urinary output, labs, and treatment plans  - Assess nutrition and hydration status and recommend course of action  - Evaluate amount of meals eaten  - Assist patient with eating if necessary   - Allow adequate time for meals  - Recommend/ encourage appropriate diets, oral nutritional supplements, and vitamin/mineral supplements  - Order, calculate, and assess calorie counts as needed  - Recommend, monitor, and adjust tube feedings and TPN/PPN based on assessed needs  - Assess need for intravenous fluids  - Provide specific nutrition/hydration education as appropriate  - Include patient/family/caregiver in decisions related to nutrition  Outcome: Progressing     Problem: COPING  Goal: Pt/Family able to verbalize concerns and demonstrate effective coping strategies  Description: INTERVENTIONS:  - Assist patient/family to identify coping skills, available support systems and cultural and spiritual values  - Provide emotional support, including active listening and acknowledgement of concerns of patient and caregivers  - Reduce environmental stimuli, as able  - Provide patient education  - Assess for spiritual pain/suffering and initiate spiritual care, including notification of Pastoral Care or [place] based community as needed  - Assess effectiveness of coping strategies  Outcome: Progressing  Goal: Will report anxiety at manageable levels  Description: INTERVENTIONS:  - Administer medication as ordered  - Teach and encourage coping skills  - Provide emotional support  - Assess patient/family for anxiety and ability to cope  Outcome: Progressing     Problem: CHANGE IN BODY IMAGE  Goal: Pt/Family communicate acceptance of loss or change in body image and expresses psychological comfort and peace  Description: INTERVENTIONS:  - Assess patient/family anxiety and grief process related to change in body image, loss of functional status and loss of sense of self  - Assess patient/family's coping skills and provide emotional, spiritual and psychosocial support  - Provide information about the patient's health status with consideration of family and cultural values  - Communicate willingness to discuss loss and facilitate grief process with patient/family as appropriate  - Emphasize sustaining relationships within family system and community, or [place]/spiritual traditions  - Refer to community support groups as appropriate  - Initiate Spiritual Care, Pastoral care or other ancillary consults as needed  Outcome: Progressing

## 2023-03-29 NOTE — PROGRESS NOTES
[facility] Podiatry - Progress Note  Patient: Umair Langford 53 y o  female   MRN: [id number]  PCP: Yelena Lynne MD  Unit/Bed#: E2 MS Jyoti-01 Encounter: [id number]  Date Of Visit: 23  Hospital Stay Days: 8    ASSESSMENT:    Umair Langford is a 53 y o  female with:    1  L plantar hallux ulceration  2  L hallux osteomyelitis  - s/p L First ray Resection 3/23/23  4  R TMA wounds with osteomyelitis  - s/p R BKA 3/23/23  5  T2DM                   PLAN:     • Left partial 1st ray resection  ? Wound VAC changed today  No signs of infection/purulence only bloody drainage  ? Continue wound vacuum to the left foot in place seal check confirmed  ? Continue ceftriaxone per ID     ? Case management working with patient to get housing situation established  • Right BKA  ? Per general surgery stable at this time  ? Continue local wound care  • Type 2 diabetes mellitus  ? Continue sliding scale per internal medicine  • Elevation and offloading on green foam wedges or pillows when non-ambulatory  • Appreciate consulting services for recommendations and management      Antibiotics started: ceftriaxone  Pharmacologic VTE Prophylaxis: Enoxaparin (Lovenox)   Mechanical VTE Prophylaxis: sequential compression device   Weightbearing status: NWB left    Wound VAC application  1  Number of sponges: 2 black  2  Pressure settin continuous  3  Size of wound: 2 0 x 0 3 x 1 4   4  Description of wound: 100% Granular      SUBJECTIVE:     The patient was seen, evaluated, and assessed at bedside today  The patient was awake, alert, and in no acute distress  No acute events overnight  The patient reports feeling well  Patient denies N/V/F/chills/SOB/CP        OBJECTIVE:     Vitals:   /77 (BP Location: Right arm)   Pulse (!) 54   Temp 98 °F (36 7 °C) (Temporal)   Resp 18   SpO2 95%     Temp (24hrs), Av 8 °F (36 6 °C), Min:97 7 °F (36 5 °C), Max:98 °F (36 7 °C)      Physical Exam :     General:  Alert, cooperative, and in no distress  Lower extremity exam:  Neurologic, Vascular, musculoskeletal status at baseline    Dermatologic: negative for erythema, swelling, heat, or malodor  Wound #: 1  Location: left medial foot  Length 2cm: Width 0 3cm: Depth 1 5cm:   Deepest Tissue Noted in Base: subcutaneous  Probe to Bone: neg  Peripheral Skin Description: attached  Granulation: 100% Fibrotic Tissue: 0% Necrotic Tissue: 0%   Drainage Amount: minimal bloody  Signs of Infection: no      Clinical Images 03/29/23:          Additional Data:     Labs:    Results from last 7 days   Lab Units 03/29/23  0447   WBC Thousand/uL 8 37   HEMOGLOBIN g/dL 9 2*   HEMATOCRIT % 31 7*   PLATELETS Thousands/uL 670*   NEUTROS PCT % 38*   LYMPHS PCT % 43   MONOS PCT % 8   EOS PCT % 8*     Results from last 7 days   Lab Units 03/29/23  0447   POTASSIUM mmol/L 4 3   CHLORIDE mmol/L 102   CO2 mmol/L 27   BUN mg/dL 14   CREATININE mg/dL 0 66   CALCIUM mg/dL 8 9           * I Have Reviewed All Lab Data Listed Above  Recent Cultures (last 7 days):               Imaging: I have personally reviewed pertinent films in PACS  XR foot 3+ vw left    Result Date: 3/28/2023  Impression: Expected postsurgical changes from the amputation of the 1st toe Workstation performed: [id number]     MRI foot/forefoot toes left wo contrast    Result Date: 3/22/2023  Impression: 1  Plantar and medial skin ulceration noted at the level of the 1st MTP with associated cellulitis but no evidence of drainable abscess collection  Minimal marrow changes in this region or low suspicion for osteomyelitis  2   Status post 3rd transmetatarsal amputation with plantar skin ulceration at the level of the residual stump with associated cellulitis  No marrow change involving the 3rd metatarsal to indicate osteomyelitis at this time  3   Complete tear of the flexor hallucis longus tendon as detailed above with 3 8 cm tendon retraction    The possibility of pathologic tendon rupture should be considered given likely infective cellulitis and plantar skin ulceration in this region  Additionally, there is sesamoid bone splaying suggesting a tear of the intersesamoid ligament, again possibly pathologic  The study was marked in [facility] for immediate notification  Workstation performed: [id number]     MRI ankle/heel right wo contrast    Result Date: 3/22/2023  Impression: Status post 1st through 5th transmetatarsal amputations with prominent skin ulceration at the distal aspect of the residual forefoot with associated cellulitis  Additionally, there is a dorsal midfoot fluid collection suspicious for abscess collection that appears to drain to the ulcerated skin surface as noted above  Changes of chronic Charcot arthropathy with extensive T1 replacement signal throughout the residual 1st through 5th metatarsals, cuboid, cuneiforms, navicular and distal aspect of the talus concerning for superimposed osteomyelitis  The study was marked in [facility] for immediate notification  Workstation performed: [id number]     MRI ankle/heel left  wo contrast    Result Date: 3/22/2023  Impression: No findings of osteomyelitis in the hindfoot, midfoot and in the visualized metatarsals The forefoot, toes not included in the images Flexor hallucis longus tenosynovitis  Incomplete assessment of the distal most aspect of the flexor hallucis longus  Tenosynovitis posterior tibialis Workstation performed: [id number]     EKG, Pathology, and Other Studies: I have personally reviewed pertinent reports      Active medications:  Current Facility-Administered Medications   Medication Dose Route Frequency   • acetaminophen (TYLENOL) tablet 975 mg  975 mg Oral Q8H [facility]   • albuterol (PROVENTIL HFA,VENTOLIN HFA) inhaler 2 puff  2 puff Inhalation Q4H PRN   • ceftriaxone (ROCEPHIN) 2 g/50 mL in dextrose IVPB  2,000 mg Intravenous Q24H   • cloNIDine (CATAPRES) tablet 0 2 mg  0 2 mg Oral BID   • dextromethorphan-guaiFENesin (ROBITUSSIN DM) "oral syrup 10 mL  10 mL Oral Q4H PRN   • docusate sodium (COLACE) capsule 100 mg  100 mg Oral BID   • enoxaparin (LOVENOX) subcutaneous injection 40 mg  40 mg Subcutaneous BID   • fenofibrate (TRICOR) tablet 48 mg  48 mg Oral Daily   • folic acid (FOLVITE) tablet 1,000 mcg  1,000 mcg Oral Daily   • HYDROmorphone (DILAUDID) injection 0 5 mg  0 5 mg Intravenous Q3H PRN   • hydrOXYzine HCL (ATARAX) tablet 100 mg  100 mg Oral HS   • hydrOXYzine HCL (ATARAX) tablet 50 mg  50 mg Oral Daily   • insulin lispro (HumaLOG) 100 units/mL subcutaneous injection 1-5 Units  1-5 Units Subcutaneous HS   • insulin lispro (HumaLOG) 100 units/mL subcutaneous injection 2-12 Units  2-12 Units Subcutaneous TID AC   • insulin lispro (HumaLOG) 100 units/mL subcutaneous injection 5 Units  5 Units Subcutaneous TID With Meals   • lamoTRIgine (LaMICtal) tablet 250 mg  250 mg Oral QAM   • levothyroxine tablet 50 mcg  50 mcg Oral Early Morning   • melatonin tablet 9 mg  9 mg Oral HS   • methocarbamol (ROBAXIN) tablet 750 mg  750 mg Oral Q6H [facility]HA   • naloxone (NARCAN) 0 04 mg/mL syringe 0 04 mg  0 04 mg Intravenous Q1MIN PRN   • ondansetron (ZOFRAN) injection 4 mg  4 mg Intravenous Q4H PRN   • oxyCODONE (ROXICODONE) immediate release tablet 10 mg  10 mg Oral Q4H PRN   • oxyCODONE (ROXICODONE) IR tablet 5 mg  5 mg Oral Q4H PRN   • pantoprazole (PROTONIX) EC tablet 40 mg  40 mg Oral BID AC   • polyethylene glycol (MIRALAX) packet 17 g  17 g Oral Daily PRN   • potassium-sodium phosphates (PHOS-NAK) packet 2 packet  2 packet Oral Once   • pregabalin (LYRICA) capsule 100 mg  100 mg Oral Q12H [address]   • ziprasidone (GEODON) capsule 80 mg  80 mg Oral BID With Meals         ** Please Note: Portions of the record may have been created with voice recognition software  Occasional wrong word or \"sound a like\" substitutions may have occurred due to the inherent limitations of voice recognition software   Read the chart carefully and recognize, using context, where " substitutions have occurred   **

## 2023-03-29 NOTE — OCCUPATIONAL THERAPY NOTE
Occupational Therapy Progress Note     Patient Name: Luda Dorantes  JGYJW'T Date: 3/29/2023  Problem List  Principal Problem:    Left foot diabetic ulcer  Active Problems:    Hypothyroidism    Mood disorder ([facility] )    Type 2 diabetes mellitus with neurologic complication, without long-term current use of insulin ([facility])    Essential hypertension    GERD (gastroesophageal reflux disease)    Morbid obesity with BMI of 40 0-44 9, adult ([facility] )    Homeless single person    Cough       03/29/23 1040   OT Last Visit   OT Visit Date 03/29/23   Note Type   Note Type Treatment   Pain Assessment   Pain Assessment Tool 0-10   Pain Score No Pain   Restrictions/Precautions   RLE Weight Bearing Per Order NWB   LLE Weight Bearing Per Order NWB   Other Precautions Cognitive; Chair Alarm; Bed Alarm;WBS;Multiple lines; Fall Risk;Pain   ADL   Where Assessed Chair   Eating Assistance 7  Independent   Grooming Assistance 6  Modified Independent   Grooming Deficit Wash/dry hands; Wash/dry face; Teeth care   Grooming Comments w/c level   LB Dressing Assistance 5  Supervision/Setup   LB Dressing Deficit Verbal cueing;Supervision/safety; Increased time to complete   LB Dressing Comments Educated pt on ADL completion in bed or reclined in chair  Educated on rolling L/R to don pants  Able to don hospital underwear on with S, VCs to avoid WBing through LLE and instead to roll   Toileting Assistance  5  Supervision/Setup   Toileting Deficit Setup;Verbal cueing;Supervison/safety; Increased time to complete; Bedside commode   Toileting Comments drop arm commode; educated on leaning L/R to don/doff underwear and to complete toileting hygiene; demo'd Fair+ dynamic sitting balance while leaning with unilateral UE support   Transfers   Sliding Board transfer 3  Moderate assistance  (Best with even surfaces, modA with elevated surfaces)   Additional items Assist x 1; Armrests; Increased time required;Verbal cues; Other  (slideboard)   Toilet transfer 4  Minimal "assistance   Additional items Assist x 1; Armrests; Increased time required;Verbal cues; Commode; Other  (drop arm commode)   Functional Mobility   Functional Mobility 5  Supervision   Additional Comments w/c level - assistance for managing wound vac   Subjective   Subjective \"That was a lot of work\"   Cognition   Arousal/Participation Alert; Responsive; Cooperative   Attention Within functional limits   Orientation Level Oriented X4   Memory Within functional limits   Following Commands Follows all commands and directions without difficulty   Activity Tolerance   Activity Tolerance Patient tolerated treatment well;Patient limited by fatigue   Medical Staff Made Aware Yes   Assessment   Assessment Pt seen for f/u tx today with focus on self-care/tasks, slideboard transfers, and functional mobility  Pt initially very lethargice; /54  States she hasn't been sleeping well  Encouraged pt to keep busy during the day and try to stay awake so that she is able to sleep better at night  Educated pt on alternative dressing method for LB of completing in near supine either in bed or recliner  Pt able to don/doff underwear with SBA and moderate VCs while reclined in recliner  Attempted education to roll L/R instead of bridging to maintain WB precautions  Pt educated on drop arm commode, completed SB transfer with Best to place the board and SBA for actual transfer  Pt completes toileting tasks on commode with SBA, VCs to lean L/R to complete hygiene and clothing management  Transfers back to chair with Best for board placement, SBA for transfer  Pt completes transfer from recliner>w/c with modA 2* increased height  With pt's b/l LE WB inability and body habitus, it is difficult for pt to be able to move to elevated surface  W/c mobility with S for wound vac management  Transfers w/c>chair with verbal cues to lock brakes, for w/c positioning, requires (A) for placing SB; able to transfer with Best   Pt is very fatigued post " therapy session  Good carryover noted for swing back arm rest operation, able to generalize concept to drop arm commode with minimal teaching  Did contact Elizabeth Gallardokin re: WBS to possibly upgrade pt to WB to heel only for transfers for next session with podiatry to update orders; PT made aware  Pt progressing well with therapy however does continue to require skilled IP OT  Will continue to follow pt for POC  Plan   Treatment Interventions ADL retraining;Functional transfer training;UE strengthening/ROM; Endurance training;Patient/family training;Equipment evaluation/education; Neuromuscular reeducation; Compensatory technique education;Continued evaluation; Energy conservation; Activityengagement   Goal Expiration Date 04/07/23   OT Treatment Day 2   OT Frequency 2-3x/wk   Recommendation   OT Discharge Recommendation Post acute rehabilitation services  (vs  pending progress)   AM-PAC Daily Activity Inpatient   Lower Body Dressing 3   Bathing 3   Toileting 3   Upper Body Dressing 3   Grooming 4   Eating 4   Daily Activity Raw Score 20   Daily Activity Standardized Score (Calc for Raw Score >=11) 42 03   AM-PAC Applied Cognition Inpatient   Following a Speech/Presentation 4   Understanding Ordinary Conversation 4   Taking Medications 2   Remembering Where Things Are Placed or Put Away 3   Remembering List of 4-5 Errands 3   Taking Care of Complicated Tasks 2   Applied Cognition Raw Score 18   Applied Cognition Standardized Score 38 07     Celeste Guzman

## 2023-03-29 NOTE — PLAN OF CARE
Problem: PHYSICAL THERAPY ADULT  Goal: Performs mobility at highest level of function for planned discharge setting  See evaluation for individualized goals  Description: Treatment/Interventions: Functional transfer training, LE strengthening/ROM, Therapeutic exercise, Endurance training, Patient/family training, Bed mobility, Spoke to nursing, OT          See flowsheet documentation for full assessment, interventions and recommendations  Outcome: Progressing  Note: Prognosis: Good  Problem List: Decreased strength, Decreased endurance, Impaired balance  Assessment: Pt  Seen for PT treatment session  Pt reports 10/10 R le pain  Pt  Agreeable to PT  Pt  Performed transfer training from recliner to drop arm commode and back to recliner with min assist x1, cues for hand placement and pwb to L heel  Pt performs personal hygiene i'ly assist for set- up  Pt  Requires assistance for set- up of bsc  Pt  Performs seated and supine b/l le a-aarom x 15- 20 reps to tolerance verbal and tactile cues for correct exercise techniques and performance  Pt remains functioning below baseline level of mobility recommend inpt rehab to address deficits in order to maximize pt's functional independence and safety w/ mobility  The patient's AM-PAC Basic Mobility Inpatient Short Form Raw Score is 15  A Raw score of less than or equal to 16 suggests the patient may benefit from discharge to post-acute rehabilitation services  Please also refer to the recommendation of the Physical Therapist for safe discharge planning  Barriers to Discharge: Inaccessible home environment, Decreased caregiver support     PT Discharge Recommendation: Post acute rehabilitation services    See flowsheet documentation for full assessment

## 2023-03-30 LAB
ANION GAP SERPL CALCULATED.3IONS-SCNC: 7 MMOL/L (ref 4–13)
BASOPHILS # BLD AUTO: 0.07 THOUSANDS/ÂΜL (ref 0–0.1)
BASOPHILS NFR BLD AUTO: 1 % (ref 0–1)
BUN SERPL-MCNC: 16 MG/DL (ref 5–25)
CALCIUM SERPL-MCNC: 9 MG/DL (ref 8.4–10.2)
CHLORIDE SERPL-SCNC: 103 MMOL/L (ref 96–108)
CO2 SERPL-SCNC: 26 MMOL/L (ref 21–32)
CREAT SERPL-MCNC: 0.79 MG/DL (ref 0.6–1.3)
EOSINOPHIL # BLD AUTO: 0.84 THOUSAND/ÂΜL (ref 0–0.61)
EOSINOPHIL NFR BLD AUTO: 11 % (ref 0–6)
ERYTHROCYTE [DISTWIDTH] IN BLOOD BY AUTOMATED COUNT: 23.1 % (ref 11.6–15.1)
GFR SERPL CREATININE-BSD FRML MDRD: 89 ML/MIN/1.73SQ M
GLUCOSE SERPL-MCNC: 135 MG/DL (ref 65–140)
GLUCOSE SERPL-MCNC: 146 MG/DL (ref 65–140)
GLUCOSE SERPL-MCNC: 164 MG/DL (ref 65–140)
GLUCOSE SERPL-MCNC: 179 MG/DL (ref 65–140)
GLUCOSE SERPL-MCNC: 190 MG/DL (ref 65–140)
HCT VFR BLD AUTO: 32.1 % (ref 34.8–46.1)
HGB BLD-MCNC: 9.2 G/DL (ref 11.5–15.4)
IMM GRANULOCYTES # BLD AUTO: 0.06 THOUSAND/UL (ref 0–0.2)
IMM GRANULOCYTES NFR BLD AUTO: 1 % (ref 0–2)
LYMPHOCYTES # BLD AUTO: 3.41 THOUSANDS/ÂΜL (ref 0.6–4.47)
LYMPHOCYTES NFR BLD AUTO: 43 % (ref 14–44)
MCH RBC QN AUTO: 20.3 PG (ref 26.8–34.3)
MCHC RBC AUTO-ENTMCNC: 28.7 G/DL (ref 31.4–37.4)
MCV RBC AUTO: 71 FL (ref 82–98)
MONOCYTES # BLD AUTO: 0.7 THOUSAND/ÂΜL (ref 0.17–1.22)
MONOCYTES NFR BLD AUTO: 9 % (ref 4–12)
NEUTROPHILS # BLD AUTO: 2.72 THOUSANDS/ÂΜL (ref 1.85–7.62)
NEUTS SEG NFR BLD AUTO: 35 % (ref 43–75)
NRBC BLD AUTO-RTO: 0 /100 WBCS
PLATELET # BLD AUTO: 622 THOUSANDS/UL (ref 149–390)
PMV BLD AUTO: 8.6 FL (ref 8.9–12.7)
POTASSIUM SERPL-SCNC: 4.3 MMOL/L (ref 3.5–5.3)
RBC # BLD AUTO: 4.53 MILLION/UL (ref 3.81–5.12)
SODIUM SERPL-SCNC: 136 MMOL/L (ref 135–147)
WBC # BLD AUTO: 7.8 THOUSAND/UL (ref 4.31–10.16)

## 2023-03-30 RX ADMIN — INSULIN LISPRO 2 UNITS: 100 INJECTION, SOLUTION INTRAVENOUS; SUBCUTANEOUS at 11:39

## 2023-03-30 RX ADMIN — GUAIFENESIN AND DEXTROMETHORPHAN 10 ML: 100; 10 SYRUP ORAL at 09:24

## 2023-03-30 RX ADMIN — PANTOPRAZOLE SODIUM 40 MG: 40 TABLET, DELAYED RELEASE ORAL at 15:32

## 2023-03-30 RX ADMIN — ACETAMINOPHEN 325MG 975 MG: 325 TABLET ORAL at 15:32

## 2023-03-30 RX ADMIN — PREGABALIN 100 MG: 50 CAPSULE ORAL at 08:39

## 2023-03-30 RX ADMIN — INSULIN LISPRO 5 UNITS: 100 INJECTION, SOLUTION INTRAVENOUS; SUBCUTANEOUS at 11:39

## 2023-03-30 RX ADMIN — FENOFIBRATE 48 MG: 48 TABLET, FILM COATED ORAL at 08:15

## 2023-03-30 RX ADMIN — INSULIN LISPRO 1 UNITS: 100 INJECTION, SOLUTION INTRAVENOUS; SUBCUTANEOUS at 21:36

## 2023-03-30 RX ADMIN — FOLIC ACID 1000 MCG: 1 TABLET ORAL at 08:39

## 2023-03-30 RX ADMIN — HYDROXYZINE HYDROCHLORIDE 100 MG: 25 TABLET, FILM COATED ORAL at 21:17

## 2023-03-30 RX ADMIN — INSULIN LISPRO 2 UNITS: 100 INJECTION, SOLUTION INTRAVENOUS; SUBCUTANEOUS at 16:25

## 2023-03-30 RX ADMIN — ZIPRASIDONE HCL 80 MG: 40 CAPSULE ORAL at 21:20

## 2023-03-30 RX ADMIN — HYDROXYZINE HYDROCHLORIDE 50 MG: 25 TABLET, FILM COATED ORAL at 08:39

## 2023-03-30 RX ADMIN — DOCUSATE SODIUM 100 MG: 100 CAPSULE, LIQUID FILLED ORAL at 17:02

## 2023-03-30 RX ADMIN — ENOXAPARIN SODIUM 40 MG: 100 INJECTION SUBCUTANEOUS at 08:40

## 2023-03-30 RX ADMIN — GUAIFENESIN AND DEXTROMETHORPHAN 10 ML: 100; 10 SYRUP ORAL at 18:11

## 2023-03-30 RX ADMIN — CEFTRIAXONE 2000 MG: 10 INJECTION, POWDER, FOR SOLUTION INTRAVENOUS at 16:35

## 2023-03-30 RX ADMIN — CLONIDINE HYDROCHLORIDE 0.2 MG: 0.1 TABLET ORAL at 08:39

## 2023-03-30 RX ADMIN — METHOCARBAMOL 750 MG: 750 TABLET ORAL at 12:05

## 2023-03-30 RX ADMIN — METHOCARBAMOL 750 MG: 750 TABLET ORAL at 06:01

## 2023-03-30 RX ADMIN — PANTOPRAZOLE SODIUM 40 MG: 40 TABLET, DELAYED RELEASE ORAL at 06:01

## 2023-03-30 RX ADMIN — ACETAMINOPHEN 325MG 975 MG: 325 TABLET ORAL at 06:00

## 2023-03-30 RX ADMIN — INSULIN LISPRO 5 UNITS: 100 INJECTION, SOLUTION INTRAVENOUS; SUBCUTANEOUS at 16:25

## 2023-03-30 RX ADMIN — LEVOTHYROXINE SODIUM 50 MCG: 50 TABLET ORAL at 06:00

## 2023-03-30 RX ADMIN — OXYCODONE HYDROCHLORIDE 10 MG: 10 TABLET ORAL at 16:27

## 2023-03-30 RX ADMIN — INSULIN LISPRO 5 UNITS: 100 INJECTION, SOLUTION INTRAVENOUS; SUBCUTANEOUS at 07:51

## 2023-03-30 RX ADMIN — CLONIDINE HYDROCHLORIDE 0.2 MG: 0.1 TABLET ORAL at 21:32

## 2023-03-30 RX ADMIN — ZIPRASIDONE HCL 80 MG: 40 CAPSULE ORAL at 08:45

## 2023-03-30 RX ADMIN — ACETAMINOPHEN 325MG 975 MG: 325 TABLET ORAL at 21:14

## 2023-03-30 RX ADMIN — ENOXAPARIN SODIUM 40 MG: 100 INJECTION SUBCUTANEOUS at 17:02

## 2023-03-30 RX ADMIN — INSULIN LISPRO 2 UNITS: 100 INJECTION, SOLUTION INTRAVENOUS; SUBCUTANEOUS at 07:52

## 2023-03-30 RX ADMIN — DOCUSATE SODIUM 100 MG: 100 CAPSULE, LIQUID FILLED ORAL at 08:39

## 2023-03-30 RX ADMIN — METHOCARBAMOL 750 MG: 750 TABLET ORAL at 17:02

## 2023-03-30 RX ADMIN — MELATONIN 9 MG: at 21:16

## 2023-03-30 RX ADMIN — METHOCARBAMOL 750 MG: 750 TABLET ORAL at 23:49

## 2023-03-30 RX ADMIN — OXYCODONE HYDROCHLORIDE 10 MG: 10 TABLET ORAL at 10:54

## 2023-03-30 RX ADMIN — LAMOTRIGINE 250 MG: 100 TABLET ORAL at 08:39

## 2023-03-30 RX ADMIN — OXYCODONE HYDROCHLORIDE 10 MG: 10 TABLET ORAL at 06:08

## 2023-03-30 RX ADMIN — PREGABALIN 100 MG: 50 CAPSULE ORAL at 20:28

## 2023-03-30 RX ADMIN — OXYCODONE HYDROCHLORIDE 10 MG: 10 TABLET ORAL at 20:27

## 2023-03-30 NOTE — QUICK NOTE
Right BKA stump checked at bedside, stump had dressing consisting of xeroform, kerlex x 1, ABD padx1, ACEx1  Dressing taken down to examine stump, which appeared clean dry and intact, staples intact, discharge, no induration or bogginess,  still somewhat tender to touch, full ROM; there is a stable area of erythema at the anterior aspect of the incision extending approximately which is stable  Re-dressed and patient tolerated this without issues        Front        Lateral           Medial            Plan:  - on regular diet, per primary  -Vascular surg will perform stump checks weekly while patient still admitted  - pain control  - DVT ppx  - dispo planning for post acute rehab

## 2023-03-30 NOTE — CASE MANAGEMENT
Case Management Discharge Planning Note    Patient name Larry Weir  Location [facility] /E2 -* MRN [id number]  : 1975 Date 3/30/2023       Current Admission Date: 3/21/2023  Current Admission Diagnosis:Left foot diabetic ulcer   Patient Active Problem List    Diagnosis Date Noted   • Cough 2023   • Homeless single person 2023   • Sepsis ([facility] ) 2023   • Morbid obesity with BMI of 40 0-44 9, adult ([facility] ) 2023   • Left foot diabetic ulcer 2022   • Obstructive sleep apnea 2022   • GERD (gastroesophageal reflux disease) 2021   • Migraine without status migrainosus, not intractable 2021   • Right foot ulcer, with fat layer exposed ([place] [address] ) 2021   • Arthritis 2021   • Chronic pain 2021   • OCD (obsessive compulsive disorder)    • PTSD (post-traumatic stress disorder)    • Essential hypertension 2021   • Acquired absence of other left toe(s) ([facility] ) 2019   • Diabetic foot ulcer ([place] [address] ) 09/10/2019   • Type 2 diabetes mellitus with neurologic complication, without long-term current use of insulin ([place] [address] ) 2019   • Hypothyroidism 2019   • Mood disorder ([place] [address] ) 2019   • 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, with bone involvement without evidence of necrosis ([place] [address] ) 2019   • H/O bariatric surgery 2019   • Anxiety 2019      LOS (days): 9  Geometric Mean LOS (GMLOS) (days): 5 60  Days to GMLOS:-3 1     OBJECTIVE:  Risk of Unplanned Readmission Score: 14 36         Current admission status: Inpatient   Preferred Pharmacy:   [facility] [address]  Phone: [phone] Fax: [phone]    Primary Care Provider: Silvio Douglas MD    Primary Insurance: Koki Fowler  Secondary Insurance:     DISCHARGE DETAILS:    Discharge planning discussed with[de-identified] Patient  Freedom of Choice: Yes  Comments - Freedom of Choice: Reviewed accepting facility[facility]  Pt was agreeable to rehab there  CM contacted family/caregiver?: No- see comments (declined need)  Were Treatment Team discharge recommendations reviewed with patient/caregiver?: Yes  Did patient/caregiver verbalize understanding of patient care needs?: N/A- going to facility  Were patient/caregiver advised of the risks associated with not following Treatment Team discharge recommendations?: Yes         Requested [address]         Is the patient interested in [place] [address] at discharge?: No    DME Referral Provided  Referral made for DME?: No    Other Referral/Resources/Interventions Provided:  Interventions: Short Term Rehab, [facility]  Referral Comments: Referrals placed for STR via aidin  Pt was provided with mental health services and housing via [employer]         Treatment Team Recommendation: Short Term Rehab  Discharge Destination Plan[de-identified] Short Term Rehab  Transport at Discharge : Wheelchair van                    Transfer Mode: Wheelchair  Accompanied by: EMS personnel              Additional Comments: CM met with pt at the bedside to review the accepting facility willing to provide STR, [facility]  Pt was agreeable to going there for rehab as it is the only accepting facility  CM submitted information to CM discharge support to submit auth for patient  Awaiting determination of insurance

## 2023-03-30 NOTE — PLAN OF CARE
Problem: MOBILITY - ADULT  Goal: Maintain or return to baseline ADL function  Description: INTERVENTIONS:  -  Assess patient's ability to carry out ADLs; assess patient's baseline for ADL function and identify physical deficits which impact ability to perform ADLs (bathing, care of mouth/teeth, toileting, grooming, dressing, etc )  - Assess/evaluate cause of self-care deficits   - Assess range of motion  - Assess patient's mobility; develop plan if impaired  - Assess patient's need for assistive devices and provide as appropriate  - Encourage maximum independence but intervene and supervise when necessary  - Involve family in performance of ADLs  - Assess for home care needs following discharge   - Consider OT consult to assist with ADL evaluation and planning for discharge  - Provide patient education as appropriate  3/30/2023 1151 by Chris Pulido RN  Outcome: Progressing  3/30/2023 1151 by Chris Pulido RN  Outcome: Progressing  3/30/2023 1150 by Chris Pulido RN  Outcome: Progressing  Goal: Maintains/Returns to pre admission functional level  Description: INTERVENTIONS:  - Perform BMAT or MOVE assessment daily    - Set and communicate daily mobility goal to care team and patient/family/caregiver  - Collaborate with rehabilitation services on mobility goals if consulted  - Perform Range of Motion 2 times a day  - Reposition patient every 2 hours    - Dangle patient 2 times a day  - Stand patient 2 times a day  - Ambulate patient 2 times a day  - Out of bed to chair 2 times a day   - Out of bed for meals 2 times a day  - Out of bed for toileting  - Record patient progress and toleration of activity level   3/30/2023 1151 by Chris Pulido RN  Outcome: Progressing  3/30/2023 1151 by Chris Pulido RN  Outcome: Progressing  3/30/2023 1150 by Chris Pulido RN  Outcome: Progressing     Problem: Prexisting or High Potential for Compromised Skin Integrity  Goal: Skin integrity is maintained or improved  Description: INTERVENTIONS:  - Identify patients at risk for skin breakdown  - Assess and monitor skin integrity  - Assess and monitor nutrition and hydration status  - Monitor labs   - Assess for incontinence   - Turn and reposition patient  - Assist with mobility/ambulation  - Relieve pressure over bony prominences  - Avoid friction and shearing  - Provide appropriate hygiene as needed including keeping skin clean and dry  - Evaluate need for skin moisturizer/barrier cream  - Collaborate with interdisciplinary team   - Patient/family teaching  - Consider wound care consult   3/30/2023 1151 by Dang Arellano RN  Outcome: Progressing  3/30/2023 1151 by Dang Arellano RN  Outcome: Progressing  3/30/2023 1150 by Dang Arellano RN  Outcome: Progressing     Problem: PAIN - ADULT  Goal: Verbalizes/displays adequate comfort level or baseline comfort level  Description: Interventions:  - Encourage patient to monitor pain and request assistance  - Assess pain using appropriate pain scale  - Administer analgesics based on type and severity of pain and evaluate response  - Implement non-pharmacological measures as appropriate and evaluate response  - Consider cultural and social influences on pain and pain management  - Notify physician/advanced practitioner if interventions unsuccessful or patient reports new pain  3/30/2023 1151 by Dang Arellano RN  Outcome: Progressing  3/30/2023 1151 by Dang Arellano RN  Outcome: Progressing  3/30/2023 1150 by Dang Arellano RN  Outcome: Progressing     Problem: INFECTION - ADULT  Goal: Absence or prevention of progression during hospitalization  Description: INTERVENTIONS:  - Assess and monitor for signs and symptoms of infection  - Monitor lab/diagnostic results  - Monitor all insertion sites, i e  indwelling lines, tubes, and drains  - Monitor endotracheal if appropriate and nasal secretions for changes in amount and color  - [place] appropriate cooling/warming therapies per order  - Administer medications as ordered  - Instruct and encourage patient and family to use good hand hygiene technique  - Identify and instruct in appropriate isolation precautions for identified infection/condition  3/30/2023 1151 by Fatmata Willingham RN  Outcome: Progressing  3/30/2023 1151 by Fatmata Willingham RN  Outcome: Progressing  3/30/2023 1150 by Fatmata Willingham RN  Outcome: Progressing  Goal: Absence of fever/infection during neutropenic period  Description: INTERVENTIONS:  - Monitor WBC    3/30/2023 1151 by Fatmata Willingham RN  Outcome: Progressing  3/30/2023 1151 by Fatmata Willingham RN  Outcome: Progressing  3/30/2023 1150 by Fatmata Willingham RN  Outcome: Progressing     Problem: SAFETY ADULT  Goal: Maintain or return to baseline ADL function  Description: INTERVENTIONS:  -  Assess patient's ability to carry out ADLs; assess patient's baseline for ADL function and identify physical deficits which impact ability to perform ADLs (bathing, care of mouth/teeth, toileting, grooming, dressing, etc )  - Assess/evaluate cause of self-care deficits   - Assess range of motion  - Assess patient's mobility; develop plan if impaired  - Assess patient's need for assistive devices and provide as appropriate  - Encourage maximum independence but intervene and supervise when necessary  - Involve family in performance of ADLs  - Assess for home care needs following discharge   - Consider OT consult to assist with ADL evaluation and planning for discharge  - Provide patient education as appropriate  3/30/2023 1151 by Fatmata Willingham RN  Outcome: Progressing  3/30/2023 1151 by Fatmata Willingham RN  Outcome: Progressing  3/30/2023 1150 by Fatmata Willingham RN  Outcome: Progressing  Goal: Maintains/Returns to pre admission functional level  Description: INTERVENTIONS:  - Perform BMAT or MOVE assessment daily    - Set and communicate daily mobility goal to care team and patient/family/caregiver  - Collaborate with rehabilitation services on mobility goals if consulted  - Perform Range of Motion 2 times a day  - Reposition patient every 2 hours    - Dangle patient 2 times a day  - Stand patient 2 times a day  - Ambulate patient 2 times a day  - Out of bed to chair 2 times a day   - Out of bed for meals 2 times a day  - Out of bed for toileting  - Record patient progress and toleration of activity level   3/30/2023 1151 by Filippo Joy RN  Outcome: Progressing  3/30/2023 1151 by Filippo Joy RN  Outcome: Progressing  3/30/2023 1150 by Filippo Joy RN  Outcome: Progressing  Goal: Patient will remain free of falls  Description: INTERVENTIONS:  - Educate patient/family on patient safety including physical limitations  - Instruct patient to call for assistance with activity   - Consult OT/PT to assist with strengthening/mobility   - Keep Call bell within reach  - Keep bed low and locked with side rails adjusted as appropriate  - Keep care items and personal belongings within reach  - Initiate and maintain comfort rounds  - Make Fall Risk Sign visible to staff  - Offer Toileting every 2 Hours, in advance of need  - Initiate/Maintain bedalarm  - Obtain necessary fall risk management equipment: at bedside  - Apply yellow socks and bracelet for high fall risk patients  - Consider moving patient to room near nurses station  3/30/2023 1151 by Filippo Joy RN  Outcome: Progressing  3/30/2023 1151 by Filippo Joy RN  Outcome: Progressing  3/30/2023 1150 by Filippo Joy RN  Outcome: Progressing     Problem: DISCHARGE PLANNING  Goal: Discharge to home or other facility with appropriate resources  Description: INTERVENTIONS:  - Identify barriers to discharge w/patient and caregiver  - Arrange for needed discharge resources and transportation as appropriate  - Identify discharge learning needs (meds, wound care, etc )  - Arrange for interpretive services to assist at discharge as needed  - Refer to Case Management Department for coordinating discharge planning if the patient needs post-hospital services based on physician/advanced practitioner order or complex needs related to functional status, cognitive ability, or social support system  3/30/2023 1151 by Roseann Liz RN  Outcome: Progressing  3/30/2023 1151 by Roseann Liz RN  Outcome: Progressing  3/30/2023 1150 by Roseann Liz RN  Outcome: Progressing     Problem: Knowledge Deficit  Goal: Patient/family/caregiver demonstrates understanding of disease process, treatment plan, medications, and discharge instructions  Description: Complete learning assessment and assess knowledge base    Interventions:  - Provide teaching at level of understanding  - Provide teaching via preferred learning methods  3/30/2023 1151 by Roseann Liz RN  Outcome: Progressing  3/30/2023 1151 by Roseann Liz RN  Outcome: Progressing  3/30/2023 1150 by Roseann Liz RN  Outcome: Progressing     Problem: SKIN/TISSUE INTEGRITY - ADULT  Goal: Skin Integrity remains intact(Skin Breakdown Prevention)  Description: Assess:  -Perform [place] assessment every 2  -Clean and moisturize skin every 2  -Inspect skin when repositioning, toileting, and assisting with ADLS  -Assess under medical devices such as dressingsevery 2  -Assess extremities for adequate circulation and sensation     Bed Management:  -Have minimal linens on bed & keep smooth, unwrinkled  -Change linens as needed when moist or perspiring  -Avoid sitting or lying in one position for more than 2 hours while in bed  -Keep HOB at 30degrees     Toileting:  -Offer bedside commode  -Assess for incontinence every 2  -Use incontinent care products after each incontinent episode such as 2    Activity:  -Mobilize patient 2 times a day  -Encourage activity and walks on unit  -Encourage or provide ROM exercises   -Turn and reposition patient every 2 Hours  -Use appropriate equipment to lift or move patient in bed  -Instruct/ Assist with weight shifting every 2 when out of bed in chair  -Consider limitation of chair time 2 hour intervals    Skin Care:  -Avoid use of baby powder, tape, friction and shearing, hot water or constrictive clothing  -Relieve pressure over bony prominences using foam  -Do not massage red bony areas    Next Steps:  -Teach patient strategies to minimize risks such as pressure wounds   -Consider consults to  interdisciplinary teams such as PT OT  3/30/2023 1151 by Chepe Rosenberg RN  Outcome: Progressing  3/30/2023 1151 by Chepe Rosenberg RN  Outcome: Progressing  3/30/2023 1150 by Chepe Rosenberg RN  Outcome: Progressing  Goal: Incision(s), wounds(s) or drain site(s) healing without S/S of infection  Description: INTERVENTIONS  - Assess and document dressing, incision, wound bed, drain sites and surrounding tissue  - Provide patient and family education  - Perform skin care/dressing changes every shift  3/30/2023 1151 by Chepe Rosenberg RN  Outcome: Progressing  3/30/2023 1151 by Chepe Rosenberg RN  Outcome: Progressing  3/30/2023 1150 by Chepe Rosenberg RN  Outcome: Progressing  Goal: Pressure injury heals and does not worsen  Description: Interventions:  - Implement low air loss mattress or specialty surface (Criteria met)  - Apply silicone foam dressing  - Instruct/assist with weight shifting every 120minutes when in chair   - Limit chair time to 2 hour intervals  - Use special pressure reducing interventions such as shifting weight when in chair   - Apply fecal or urinary incontinence containment device   - Perform passive or active ROM every shift  - Turn and reposition patient & offload bony prominences every 2 hours   - Utilize friction reducing device or surface for transfers   - Consider consults to  interdisciplinary teams such as pt ot  - Use incontinent care products after each incontinent episode such as paste  - Consider nutrition services referral as needed  3/30/2023 1151 by Fatmata Willingham RN  Outcome: Progressing  3/30/2023 1151 by Fatmata Willingham RN  Outcome: Progressing  3/30/2023 1150 by Fatmata Willingham RN  Outcome: Progressing     Problem: Nutrition/Hydration-ADULT  Goal: Nutrient/Hydration intake appropriate for improving, restoring or maintaining nutritional needs  Description: Monitor and assess patient's nutrition/hydration status for malnutrition  Collaborate with interdisciplinary team and initiate plan and interventions as ordered  Monitor patient's weight and dietary intake as ordered or per policy  Utilize nutrition screening tool and intervene as necessary  Determine patient's food preferences and provide high-protein, high-caloric foods as appropriate       INTERVENTIONS:  - Monitor oral intake, urinary output, labs, and treatment plans  - Assess nutrition and hydration status and recommend course of action  - Evaluate amount of meals eaten  - Assist patient with eating if necessary   - Allow adequate time for meals  - Recommend/ encourage appropriate diets, oral nutritional supplements, and vitamin/mineral supplements  - Order, calculate, and assess calorie counts as needed  - Recommend, monitor, and adjust tube feedings and TPN/PPN based on assessed needs  - Assess need for intravenous fluids  - Provide specific nutrition/hydration education as appropriate  - Include patient/family/caregiver in decisions related to nutrition  3/30/2023 1151 by Fatmata Willingham RN  Outcome: Progressing  3/30/2023 1151 by Fatmata Willingham RN  Outcome: Progressing  3/30/2023 1150 by Fatmata Willingham RN  Outcome: Progressing     Problem: COPING  Goal: Pt/Family able to verbalize concerns and demonstrate effective coping strategies  Description: INTERVENTIONS:  - Assist patient/family to identify coping skills, available support systems and cultural and spiritual values  - Provide emotional support, including active listening and acknowledgement of concerns of patient and caregivers  - Reduce environmental stimuli, as able  - Provide patient education  - Assess for spiritual pain/suffering and initiate spiritual care, including notification of Pastoral Care or [place] based community as needed  - Assess effectiveness of coping strategies  3/30/2023 1151 by Dai Stockton RN  Outcome: Progressing  3/30/2023 1151 by Dai Stockton RN  Outcome: Progressing  3/30/2023 1150 by Dai Stockton RN  Outcome: Progressing  Goal: Will report anxiety at manageable levels  Description: INTERVENTIONS:  - Administer medication as ordered  - Teach and encourage coping skills  - Provide emotional support  - Assess patient/family for anxiety and ability to cope  3/30/2023 1151 by Dai Stockton RN  Outcome: Progressing  3/30/2023 1151 by Dai Stockton RN  Outcome: Progressing  3/30/2023 1150 by Dai Stockton RN  Outcome: Progressing     Problem: CHANGE IN BODY IMAGE  Goal: Pt/Family communicate acceptance of loss or change in body image and expresses psychological comfort and peace  Description: INTERVENTIONS:  - Assess patient/family anxiety and grief process related to change in body image, loss of functional status and loss of sense of self  - Assess patient/family's coping skills and provide emotional, spiritual and psychosocial support  - Provide information about the patient's health status with consideration of family and cultural values  - Communicate willingness to discuss loss and facilitate grief process with patient/family as appropriate  - Emphasize sustaining relationships within family system and community, or [place]/spiritual traditions  - Refer to community support groups as appropriate  - Initiate Spiritual Care, Pastoral care or other ancillary consults as needed  3/30/2023 1151 by Dai Stockton RN  Outcome: Progressing  3/30/2023 1151 by Dai Stockton RN  Outcome: Progressing  3/30/2023 1150 by Dai Stockton RN  Outcome: Progressing     Problem: RESPIRATORY - ADULT  Goal: Achieves optimal ventilation and oxygenation  Description: INTERVENTIONS:  - Assess for changes in respiratory status  - Assess for changes in mentation and behavior  - Position to facilitate oxygenation and minimize respiratory effort  - Oxygen administered by appropriate delivery if ordered  - Initiate smoking cessation education as indicated  - Encourage broncho-pulmonary hygiene including cough, deep breathe, Incentive Spirometry  - Assess the need for suctioning and aspirate as needed  - Assess and instruct to report SOB or any respiratory difficulty  - Respiratory Therapy support as indicated  3/30/2023 1151 by Radames Estrada RN  Outcome: Progressing  3/30/2023 1151 by Radames Estrada RN  Outcome: Progressing  3/30/2023 1150 by Radames Estrada RN  Outcome: Progressing     Problem: METABOLIC, FLUID AND ELECTROLYTES - ADULT  Goal: Fluid balance maintained  Description: INTERVENTIONS:  - Monitor labs   - Monitor I/O and WT  - Instruct patient on fluid and nutrition as appropriate  - Assess for signs & symptoms of volume excess or deficit  3/30/2023 1151 by Radames Estrada RN  Outcome: Progressing  3/30/2023 1151 by Radames Estrada RN  Outcome: Progressing  3/30/2023 1150 by Radames Estrada RN  Outcome: Progressing  Goal: Glucose maintained within target range  Description: INTERVENTIONS:  - Monitor Blood Glucose as ordered  - Assess for signs and symptoms of hyperglycemia and hypoglycemia  - Administer ordered medications to maintain glucose within target range  - Assess nutritional intake and initiate nutrition service referral as needed  3/30/2023 1151 by Radames Estrada RN  Outcome: Progressing  3/30/2023 1151 by Radames Estrada RN  Outcome: Progressing  3/30/2023 1150 by Radames Estrada RN  Outcome: Progressing     Problem: HEMATOLOGIC - ADULT  Goal: Maintains hematologic stability  Description: INTERVENTIONS  - Assess for signs and symptoms of bleeding or hemorrhage  - Monitor labs  - Administer supportive blood products/factors as ordered and appropriate  3/30/2023 1151 by Loyd Babcock RN  Outcome: Progressing  3/30/2023 1151 by Loyd Babcock RN  Outcome: Progressing  3/30/2023 1150 by Loyd Babcock RN  Outcome: Progressing

## 2023-03-30 NOTE — CASE MANAGEMENT
Lauren Manuel 51 received request for authorization from Care Manager    Authorization request for: SNF  Facility Name: [facility]  GST:[id number]  Facility MD: Dr Desiree Singleton    NPI: [id number]  Authorization initiated by faxing request with clinicals to [phone]  Pending Reference #:[id number]/A

## 2023-03-30 NOTE — PLAN OF CARE
Problem: MOBILITY - ADULT  Goal: Maintain or return to baseline ADL function  Description: INTERVENTIONS:  -  Assess patient's ability to carry out ADLs; assess patient's baseline for ADL function and identify physical deficits which impact ability to perform ADLs (bathing, care of mouth/teeth, toileting, grooming, dressing, etc )  - Assess/evaluate cause of self-care deficits   - Assess range of motion  - Assess patient's mobility; develop plan if impaired  - Assess patient's need for assistive devices and provide as appropriate  - Encourage maximum independence but intervene and supervise when necessary  - Involve family in performance of ADLs  - Assess for home care needs following discharge   - Consider OT consult to assist with ADL evaluation and planning for discharge  - Provide patient education as appropriate  Outcome: Progressing  Goal: Maintains/Returns to pre admission functional level  Description: INTERVENTIONS:  - Perform BMAT or MOVE assessment daily    - Set and communicate daily mobility goal to care team and patient/family/caregiver  - Collaborate with rehabilitation services on mobility goals if consulted  - Perform Range of Motion 3 times a day  - Reposition patient every 2 hours    - Dangle patient 3 times a day  - Stand patient 3 times a day  - Ambulate patient 3 times a day  - Out of bed to chair 3 times a day   - Out of bed for meals 3 times a day  - Out of bed for toileting  - Record patient progress and toleration of activity level   Outcome: Progressing     Problem: Prexisting or High Potential for Compromised Skin Integrity  Goal: Skin integrity is maintained or improved  Description: INTERVENTIONS:  - Identify patients at risk for skin breakdown  - Assess and monitor skin integrity  - Assess and monitor nutrition and hydration status  - Monitor labs   - Assess for incontinence   - Turn and reposition patient  - Assist with mobility/ambulation  - Relieve pressure over bony prominences  - Avoid friction and shearing  - Provide appropriate hygiene as needed including keeping skin clean and dry  - Evaluate need for skin moisturizer/barrier cream  - Collaborate with interdisciplinary team   - Patient/family teaching  - Consider wound care consult   Outcome: Progressing     Problem: PAIN - ADULT  Goal: Verbalizes/displays adequate comfort level or baseline comfort level  Description: Interventions:  - Encourage patient to monitor pain and request assistance  - Assess pain using appropriate pain scale  - Administer analgesics based on type and severity of pain and evaluate response  - Implement non-pharmacological measures as appropriate and evaluate response  - Consider cultural and social influences on pain and pain management  - Notify physician/advanced practitioner if interventions unsuccessful or patient reports new pain  Outcome: Progressing     Problem: INFECTION - ADULT  Goal: Absence or prevention of progression during hospitalization  Description: INTERVENTIONS:  - Assess and monitor for signs and symptoms of infection  - Monitor lab/diagnostic results  - Monitor all insertion sites, i e  indwelling lines, tubes, and drains  - Monitor endotracheal if appropriate and nasal secretions for changes in amount and color  - [place] appropriate cooling/warming therapies per order  - Administer medications as ordered  - Instruct and encourage patient and family to use good hand hygiene technique  - Identify and instruct in appropriate isolation precautions for identified infection/condition  Outcome: Progressing  Goal: Absence of fever/infection during neutropenic period  Description: INTERVENTIONS:  - Monitor WBC    Outcome: Progressing     Problem: SAFETY ADULT  Goal: Maintain or return to baseline ADL function  Description: INTERVENTIONS:  -  Assess patient's ability to carry out ADLs; assess patient's baseline for ADL function and identify physical deficits which impact ability to perform ADLs (bathing, care of mouth/teeth, toileting, grooming, dressing, etc )  - Assess/evaluate cause of self-care deficits   - Assess range of motion  - Assess patient's mobility; develop plan if impaired  - Assess patient's need for assistive devices and provide as appropriate  - Encourage maximum independence but intervene and supervise when necessary  - Involve family in performance of ADLs  - Assess for home care needs following discharge   - Consider OT consult to assist with ADL evaluation and planning for discharge  - Provide patient education as appropriate  Outcome: Progressing  Goal: Maintains/Returns to pre admission functional level  Description: INTERVENTIONS:  - Perform BMAT or MOVE assessment daily    - Set and communicate daily mobility goal to care team and patient/family/caregiver     - Collaborate with rehabilitation services on mobility goals if consulted  - Out of bed for toileting  - Record patient progress and toleration of activity level   Outcome: Progressing  Goal: Patient will remain free of falls  Description: INTERVENTIONS:  - Educate patient/family on patient safety including physical limitations  - Instruct patient to call for assistance with activity   - Consult OT/PT to assist with strengthening/mobility   - Keep Call bell within reach  - Keep bed low and locked with side rails adjusted as appropriate  - Keep care items and personal belongings within reach  - Initiate and maintain comfort rounds  - Make Fall Risk Sign visible to staff  - Offer Toileting every 2 Hours, in advance of need  - Initiate/Maintain bed alarm  - Obtain necessary fall risk management equipment:   - Apply yellow socks and bracelet for high fall risk patients  - Consider moving patient to room near nurses station  Outcome: Progressing     Problem: DISCHARGE PLANNING  Goal: Discharge to home or other facility with appropriate resources  Description: INTERVENTIONS:  - Identify barriers to discharge w/patient and caregiver  - Arrange for needed discharge resources and transportation as appropriate  - Identify discharge learning needs (meds, wound care, etc )  - Arrange for interpretive services to assist at discharge as needed  - Refer to Case Management Department for coordinating discharge planning if the patient needs post-hospital services based on physician/advanced practitioner order or complex needs related to functional status, cognitive ability, or social support system  Outcome: Progressing     Problem: Knowledge Deficit  Goal: Patient/family/caregiver demonstrates understanding of disease process, treatment plan, medications, and discharge instructions  Description: Complete learning assessment and assess knowledge base    Interventions:  - Provide teaching at level of understanding  - Provide teaching via preferred learning methods  Outcome: Progressing     Problem: SKIN/TISSUE INTEGRITY - ADULT  Goal: Skin Integrity remains intact(Skin Breakdown Prevention)  Description: Assess:  -Perform Yogi assessment every shift  -Clean and moisturize skin every shift   -Inspect skin when repositioning, toileting, and assisting with ADLS  -Assess extremities for adequate circulation and sensation     Bed Management:  -Have minimal linens on bed & keep smooth, unwrinkled  -Change linens as needed when moist or perspiring  -Avoid sitting or lying in one position for more than 2 hours while in bed  -Keep HOB at 30 degrees     Toileting:  -Offer bedside commode      Skin Care:  -Avoid use of baby powder, tape, friction and shearing, hot water or constrictive clothing  -Relieve pressure over bony prominences using pillows   -Do not massage red bony areas    Outcome: Progressing  Goal: Incision(s), wounds(s) or drain site(s) healing without S/S of infection  Description: INTERVENTIONS  - Assess and document dressing, incision, wound bed, drain sites and surrounding tissue  - Provide patient and family education  - Perform skin care/dressing changes every shift  Outcome: Progressing  Goal: Pressure injury heals and does not worsen  Description: Interventions:  - Implement low air loss mattress or specialty surface (Criteria met)  - Apply silicone foam dressing  - Limit chair time to 2 hour intervals  - Use special pressure reducing interventions such as cushion  when in chair   - Apply fecal or urinary incontinence containment device   - Perform passive or active ROM every 3 hours  - Turn and reposition patient & offload bony prominences every 2 hours   - Utilize friction reducing device or surface for transfers   - Consider nutrition services referral as needed  Outcome: Progressing     Problem: Nutrition/Hydration-ADULT  Goal: Nutrient/Hydration intake appropriate for improving, restoring or maintaining nutritional needs  Description: Monitor and assess patient's nutrition/hydration status for malnutrition  Collaborate with interdisciplinary team and initiate plan and interventions as ordered  Monitor patient's weight and dietary intake as ordered or per policy  Utilize nutrition screening tool and intervene as necessary  Determine patient's food preferences and provide high-protein, high-caloric foods as appropriate       INTERVENTIONS:  - Monitor oral intake, urinary output, labs, and treatment plans  - Assess nutrition and hydration status and recommend course of action  - Evaluate amount of meals eaten  - Assist patient with eating if necessary   - Allow adequate time for meals  - Recommend/ encourage appropriate diets, oral nutritional supplements, and vitamin/mineral supplements  - Order, calculate, and assess calorie counts as needed  - Recommend, monitor, and adjust tube feedings and TPN/PPN based on assessed needs  - Assess need for intravenous fluids  - Provide specific nutrition/hydration education as appropriate  - Include patient/family/caregiver in decisions related to nutrition  Outcome: Progressing     Problem: COPING  Goal: Pt/Family able to verbalize concerns and demonstrate effective coping strategies  Description: INTERVENTIONS:  - Assist patient/family to identify coping skills, available support systems and cultural and spiritual values  - Provide emotional support, including active listening and acknowledgement of concerns of patient and caregivers  - Reduce environmental stimuli, as able  - Provide patient education  - Assess for spiritual pain/suffering and initiate spiritual care, including notification of Pastoral Care or [place] based community as needed  - Assess effectiveness of coping strategies  Outcome: Progressing  Goal: Will report anxiety at manageable levels  Description: INTERVENTIONS:  - Administer medication as ordered  - Teach and encourage coping skills  - Provide emotional support  - Assess patient/family for anxiety and ability to cope  Outcome: Progressing     Problem: CHANGE IN BODY IMAGE  Goal: Pt/Family communicate acceptance of loss or change in body image and expresses psychological comfort and peace  Description: INTERVENTIONS:  - Assess patient/family anxiety and grief process related to change in body image, loss of functional status and loss of sense of self  - Assess patient/family's coping skills and provide emotional, spiritual and psychosocial support  - Provide information about the patient's health status with consideration of family and cultural values  - Communicate willingness to discuss loss and facilitate grief process with patient/family as appropriate  - Emphasize sustaining relationships within family system and community, or [place]/spiritual traditions  - Refer to community support groups as appropriate  - Initiate Spiritual Care, Pastoral care or other ancillary consults as needed  Outcome: Progressing     Problem: RESPIRATORY - ADULT  Goal: Achieves optimal ventilation and oxygenation  Description: INTERVENTIONS:  - Assess for changes in respiratory status  - Assess for changes in mentation and behavior  - Position to facilitate oxygenation and minimize respiratory effort  - Oxygen administered by appropriate delivery if ordered  - Initiate smoking cessation education as indicated  - Encourage broncho-pulmonary hygiene including cough, deep breathe, Incentive Spirometry  - Assess the need for suctioning and aspirate as needed  - Assess and instruct to report SOB or any respiratory difficulty  - Respiratory Therapy support as indicated  Outcome: Progressing     Problem: METABOLIC, FLUID AND ELECTROLYTES - ADULT  Goal: Fluid balance maintained  Description: INTERVENTIONS:  - Monitor labs   - Monitor I/O and WT  - Instruct patient on fluid and nutrition as appropriate  - Assess for signs & symptoms of volume excess or deficit  Outcome: Progressing  Goal: Glucose maintained within target range  Description: INTERVENTIONS:  - Monitor Blood Glucose as ordered  - Assess for signs and symptoms of hyperglycemia and hypoglycemia  - Administer ordered medications to maintain glucose within target range  - Assess nutritional intake and initiate nutrition service referral as needed  Outcome: Progressing     Problem: HEMATOLOGIC - ADULT  Goal: Maintains hematologic stability  Description: INTERVENTIONS  - Assess for signs and symptoms of bleeding or hemorrhage  - Monitor labs  - Administer supportive blood products/factors as ordered and appropriate  Outcome: Progressing

## 2023-03-30 NOTE — PROGRESS NOTES
Progress Note - Infectious Disease   Heidi Prather 53 y o  female MRN: [id number]  Unit/Bed#: E2 -01 Encounter: [id number]      Impression/Plan:  1  Polymicrobial Left Foot Diabetic Ulcer Infection:  - Patient is now s/p left 1st hallux resection 3/23  No OR cultures sent, clean margins felt to be obtained  Pathology pending  Per discussion with podiatry, surgical source control is assumed from bone infection however they did notice some purulence on inspection of wound 3/27 which was washed out at bedside  Wound cx from 3/21 grew Group F Strep, MSSA and Alcaligenes faecalis  Blood cultures negative       - based on cultures 3/21 would continue IV Ceftriaxone 2g daily; based on sensitivities this should cover the MSSA, Alcaligenes and Strep  - given purulence seen in wound on 3/27, extend course for additional 5 days, through 3/31/2023  - wound VAC changes per Podiatry  - follow up pathology of clean margin from OR 3/23     2  Right BKA Site Erythema  - Patient had infection of diabetic ulcer of prior right TMA, superficial cx from 3/21 grew Group F Strep, MSSA, Alcaligenes faecalis and Prevotella  Now s/p source control with BKA 3/23  She does have some erythema at BKA stump site that is stable per Vascular team  No drainage from staple site  She is afebrile and without leukocytosis  - will discuss with Vascular if concerned for infection  - continue to monitor site closely  - trend fever curve, WBCs     3  Penicillin Allergy:  - Listed as rash  She is tolerating Cefazolin and has tolerated Cefepime in past  She is tolerating Ceftriaxone now  Patient tells me she had a rash/hives as a child, does not think had any anaphylaxis and no trial of penicillin as an adult  - tolerates Ceftriaxone and Cefazolin  - likely has outgrown Penicillin allergy  - can consider Amoxicillin challenge in future     4   T2DM   - risk factor for #1 and #2    I have discussed the above management plan in detail with the primary service    Antibiotics:  Ceftriaxone: 7  Total abx days: 11    Subjective:  Patient has no fever, chills, sweats  She denies any pain at left foot, though has ongoing pain at right BKA site  Objective:  Vitals:  Temp:  [97 °F (36 1 °C)] 97 °F (36 1 °C)  HR:  [78-86] 78  Resp:  [18-19] 18  BP: (110-123)/(57-71) 123/71  SpO2:  [92 %-99 %] 99 %  Temp (24hrs), Av °F (36 1 °C), Min:97 °F (36 1 °C), Max:97 °F (36 1 °C)  Current: Temperature: (!) 97 °F (36 1 °C)    Physical Exam:   General Appearance:  Alert, interactive, nontoxic, no acute distress  Throat: Oropharynx moist without lesions  Lungs:   Clear to auscultation bilaterally; no wheezes, rhonchi or rales; respirations unlabored   Heart:  RRR; no murmur, rub or gallop   Abdomen:   Soft, non-tender, non-distended, positive bowel sounds  Extremities: Left foot is wrapped; right BKA present   Skin: No new rashes or lesions  No draining wounds noted  Labs:    All pertinent labs and imaging studies were personally reviewed  Results from last 7 days   Lab Units 23  0450   WBC Thousand/uL 7 80 8 37 8 62   HEMOGLOBIN g/dL 9 2* 9 2* 9 4*   PLATELETS Thousands/uL 622* 670* 691*     Results from last 7 days   Lab Units 237 23  0450   SODIUM mmol/L 136 136 136   POTASSIUM mmol/L 4 3 4 3 4 5   CHLORIDE mmol/L 103 102 101   CO2 mmol/L 26 27 28   BUN mg/dL 16 14 16   CREATININE mg/dL 0 79 0 66 0 63   EGFR ml/min/1 73sq m 89 105 107   CALCIUM mg/dL 9 0 8 9 8 9                       Micro:        Imaging:          Miguel Angel Mckeon MD  [facility]

## 2023-03-30 NOTE — CASE MANAGEMENT
Received email from [facility] regarding the wound vac order  The prescription (order form) was mariajose out and was missing information so they would not approve  A new script was to be filled out and sent by the deadline of 530 pm 03/29/23   Email sent to the incorrect CM   I did forward to the correct cm this am and will follow up  Will need to cancel and reorder the order once a new form filled out

## 2023-03-30 NOTE — PROGRESS NOTES
[facility] Podiatry - Progress Note  Patient: Scottie Person 53 y o  female   MRN: [id number]  PCP: Tonya Olivares MD  Unit/Bed#: E2 -01 Encounter: [id number]  Date Of Visit: 23    ASSESSMENT:    Scottie Person is a 53 y o  female with:    1  L plantar hallux ulceration  2  L hallux osteomyelitis  - s/p L First ray Resection 3/23/23  4  R TMA wounds with osteomyelitis  - s/p R BKA 3/23/23  5  T2DM      PLAN:    · Wound vac dressing left intact today, will plan for dressing change with podiatry tomorrow 3/31  · At this point wound will be thoroughly examined flushed debrided and possibly closed as a delayed primary closure bedside  · This was discussed with patient and attending physician, and all agree to this plan of care  · Elevation on green foam wedges or pillows when non-ambulatory  · ID following for IV abx  · Vascular following Right stump  · Appreciate consulting services for recommendations and management  Antibiotics started: ceftriaxone  Pharmacologic VTE Prophylaxis: Enoxaparin (Lovenox)   Mechanical VTE Prophylaxis: sequential compression device   Weightbearing status: NWB left    Disposition: Patient will require continued inpatient stay for the above    SUBJECTIVE:     The patient was seen, evaluated, and assessed at bedside today  The patient was awake, alert, and in no acute distress  No acute events overnight  The patient reports feeling well  Patient denies N/V/F/chills/SOB/CP  OBJECTIVE:     Vitals:   /71 (BP Location: Right arm)   Pulse 78   Temp (!) 97 °F (36 1 °C) (Temporal)   Resp 18   SpO2 99%     Temp (24hrs), Av 3 °F (36 3 °C), Min:97 °F (36 1 °C), Max:98 °F (36 7 °C)      Physical Exam:     General:  Alert, cooperative, and in no distress  Lungs: Non labored breathing  Abdomen: Soft, non-tender  Lower extremity exam:  Cardiovascular status at baseline  Neurological status at baseline  Musculoskeletal status at baseline  No calf tenderness noted  "    Wound vac dressing    Clinical Images 03/30/23: Additional Data:     Labs:    Results from last 7 days   Lab Units 03/30/23  0515   WBC Thousand/uL 7 80   HEMOGLOBIN g/dL 9 2*   HEMATOCRIT % 32 1*   PLATELETS Thousands/uL 622*   NEUTROS PCT % 35*   LYMPHS PCT % 43   MONOS PCT % 9   EOS PCT % 11*     Results from last 7 days   Lab Units 03/30/23  0515   POTASSIUM mmol/L 4 3   CHLORIDE mmol/L 103   CO2 mmol/L 26   BUN mg/dL 16   CREATININE mg/dL 0 79   CALCIUM mg/dL 9 0           * I Have Reviewed All Lab Data Listed Above  Recent Cultures (last 7 days):               Imaging: I have personally reviewed pertinent films in PACS  EKG, Pathology, and Other Studies: I have personally reviewed pertinent reports  ** Please Note: Portions of the record may have been created with voice recognition software  Occasional wrong word or \"sound a like\" substitutions may have occurred due to the inherent limitations of voice recognition software  Read the chart carefully and recognize, using context, where substitutions have occurred   **    "

## 2023-03-31 LAB
ANION GAP SERPL CALCULATED.3IONS-SCNC: 7 MMOL/L (ref 4–13)
BASOPHILS # BLD AUTO: 0.05 THOUSANDS/ÂΜL (ref 0–0.1)
BASOPHILS NFR BLD AUTO: 1 % (ref 0–1)
BUN SERPL-MCNC: 17 MG/DL (ref 5–25)
CALCIUM SERPL-MCNC: 8.8 MG/DL (ref 8.4–10.2)
CHLORIDE SERPL-SCNC: 103 MMOL/L (ref 96–108)
CO2 SERPL-SCNC: 27 MMOL/L (ref 21–32)
CREAT SERPL-MCNC: 0.75 MG/DL (ref 0.6–1.3)
EOSINOPHIL # BLD AUTO: 0.81 THOUSAND/ÂΜL (ref 0–0.61)
EOSINOPHIL NFR BLD AUTO: 11 % (ref 0–6)
ERYTHROCYTE [DISTWIDTH] IN BLOOD BY AUTOMATED COUNT: 23.7 % (ref 11.6–15.1)
GFR SERPL CREATININE-BSD FRML MDRD: 95 ML/MIN/1.73SQ M
GLUCOSE SERPL-MCNC: 146 MG/DL (ref 65–140)
GLUCOSE SERPL-MCNC: 152 MG/DL (ref 65–140)
GLUCOSE SERPL-MCNC: 169 MG/DL (ref 65–140)
GLUCOSE SERPL-MCNC: 176 MG/DL (ref 65–140)
GLUCOSE SERPL-MCNC: 178 MG/DL (ref 65–140)
HCT VFR BLD AUTO: 34.4 % (ref 34.8–46.1)
HGB BLD-MCNC: 9.7 G/DL (ref 11.5–15.4)
IMM GRANULOCYTES # BLD AUTO: 0.05 THOUSAND/UL (ref 0–0.2)
IMM GRANULOCYTES NFR BLD AUTO: 1 % (ref 0–2)
LYMPHOCYTES # BLD AUTO: 3.33 THOUSANDS/ÂΜL (ref 0.6–4.47)
LYMPHOCYTES NFR BLD AUTO: 41 % (ref 14–44)
MCH RBC QN AUTO: 20.5 PG (ref 26.8–34.3)
MCHC RBC AUTO-ENTMCNC: 28.2 G/DL (ref 31.4–37.4)
MCV RBC AUTO: 73 FL (ref 82–98)
MONOCYTES # BLD AUTO: 0.66 THOUSAND/ÂΜL (ref 0.17–1.22)
MONOCYTES NFR BLD AUTO: 9 % (ref 4–12)
NEUTROPHILS # BLD AUTO: 2.85 THOUSANDS/ÂΜL (ref 1.85–7.62)
NEUTS SEG NFR BLD AUTO: 37 % (ref 43–75)
NRBC BLD AUTO-RTO: 0 /100 WBCS
PLATELET # BLD AUTO: 637 THOUSANDS/UL (ref 149–390)
PMV BLD AUTO: 8.9 FL (ref 8.9–12.7)
POTASSIUM SERPL-SCNC: 4.4 MMOL/L (ref 3.5–5.3)
RBC # BLD AUTO: 4.73 MILLION/UL (ref 3.81–5.12)
SODIUM SERPL-SCNC: 137 MMOL/L (ref 135–147)
WBC # BLD AUTO: 7.75 THOUSAND/UL (ref 4.31–10.16)

## 2023-03-31 RX ORDER — LIDOCAINE HYDROCHLORIDE 10 MG/ML
20 INJECTION, SOLUTION EPIDURAL; INFILTRATION; INTRACAUDAL; PERINEURAL ONCE
Status: COMPLETED | OUTPATIENT
Start: 2023-03-31 | End: 2023-03-31

## 2023-03-31 RX ADMIN — INSULIN LISPRO 5 UNITS: 100 INJECTION, SOLUTION INTRAVENOUS; SUBCUTANEOUS at 12:42

## 2023-03-31 RX ADMIN — OXYCODONE HYDROCHLORIDE 10 MG: 10 TABLET ORAL at 23:39

## 2023-03-31 RX ADMIN — POLYETHYLENE GLYCOL 3350 17 G: 17 POWDER, FOR SOLUTION ORAL at 08:54

## 2023-03-31 RX ADMIN — FOLIC ACID 1000 MCG: 1 TABLET ORAL at 08:41

## 2023-03-31 RX ADMIN — INSULIN LISPRO 2 UNITS: 100 INJECTION, SOLUTION INTRAVENOUS; SUBCUTANEOUS at 12:42

## 2023-03-31 RX ADMIN — PREGABALIN 100 MG: 50 CAPSULE ORAL at 08:41

## 2023-03-31 RX ADMIN — ENOXAPARIN SODIUM 40 MG: 100 INJECTION SUBCUTANEOUS at 17:18

## 2023-03-31 RX ADMIN — CLONIDINE HYDROCHLORIDE 0.2 MG: 0.1 TABLET ORAL at 22:00

## 2023-03-31 RX ADMIN — METHOCARBAMOL 750 MG: 750 TABLET ORAL at 12:52

## 2023-03-31 RX ADMIN — GUAIFENESIN AND DEXTROMETHORPHAN 10 ML: 100; 10 SYRUP ORAL at 19:20

## 2023-03-31 RX ADMIN — PANTOPRAZOLE SODIUM 40 MG: 40 TABLET, DELAYED RELEASE ORAL at 06:45

## 2023-03-31 RX ADMIN — GUAIFENESIN AND DEXTROMETHORPHAN 10 ML: 100; 10 SYRUP ORAL at 08:50

## 2023-03-31 RX ADMIN — FENOFIBRATE 48 MG: 48 TABLET, FILM COATED ORAL at 08:42

## 2023-03-31 RX ADMIN — OXYCODONE HYDROCHLORIDE 10 MG: 10 TABLET ORAL at 14:46

## 2023-03-31 RX ADMIN — ACETAMINOPHEN 325MG 975 MG: 325 TABLET ORAL at 06:44

## 2023-03-31 RX ADMIN — OXYCODONE HYDROCHLORIDE 10 MG: 10 TABLET ORAL at 03:11

## 2023-03-31 RX ADMIN — METHOCARBAMOL 750 MG: 750 TABLET ORAL at 17:18

## 2023-03-31 RX ADMIN — GUAIFENESIN AND DEXTROMETHORPHAN 10 ML: 100; 10 SYRUP ORAL at 23:33

## 2023-03-31 RX ADMIN — INSULIN LISPRO 5 UNITS: 100 INJECTION, SOLUTION INTRAVENOUS; SUBCUTANEOUS at 17:19

## 2023-03-31 RX ADMIN — METHOCARBAMOL 750 MG: 750 TABLET ORAL at 23:33

## 2023-03-31 RX ADMIN — INSULIN LISPRO 1 UNITS: 100 INJECTION, SOLUTION INTRAVENOUS; SUBCUTANEOUS at 21:53

## 2023-03-31 RX ADMIN — GUAIFENESIN AND DEXTROMETHORPHAN 10 ML: 100; 10 SYRUP ORAL at 12:52

## 2023-03-31 RX ADMIN — DOCUSATE SODIUM 100 MG: 100 CAPSULE, LIQUID FILLED ORAL at 17:18

## 2023-03-31 RX ADMIN — HYDROXYZINE HYDROCHLORIDE 50 MG: 25 TABLET, FILM COATED ORAL at 08:40

## 2023-03-31 RX ADMIN — ALBUTEROL SULFATE 2 PUFF: 90 AEROSOL, METERED RESPIRATORY (INHALATION) at 10:41

## 2023-03-31 RX ADMIN — ZIPRASIDONE HCL 80 MG: 40 CAPSULE ORAL at 08:42

## 2023-03-31 RX ADMIN — ACETAMINOPHEN 325MG 975 MG: 325 TABLET ORAL at 14:45

## 2023-03-31 RX ADMIN — CEFTRIAXONE 2000 MG: 10 INJECTION, POWDER, FOR SOLUTION INTRAVENOUS at 17:18

## 2023-03-31 RX ADMIN — PANTOPRAZOLE SODIUM 40 MG: 40 TABLET, DELAYED RELEASE ORAL at 17:18

## 2023-03-31 RX ADMIN — OXYCODONE HYDROCHLORIDE 10 MG: 10 TABLET ORAL at 08:40

## 2023-03-31 RX ADMIN — LEVOTHYROXINE SODIUM 50 MCG: 50 TABLET ORAL at 06:45

## 2023-03-31 RX ADMIN — INSULIN LISPRO 5 UNITS: 100 INJECTION, SOLUTION INTRAVENOUS; SUBCUTANEOUS at 08:42

## 2023-03-31 RX ADMIN — PREGABALIN 100 MG: 50 CAPSULE ORAL at 21:51

## 2023-03-31 RX ADMIN — HYDROXYZINE HYDROCHLORIDE 100 MG: 25 TABLET, FILM COATED ORAL at 21:52

## 2023-03-31 RX ADMIN — LAMOTRIGINE 250 MG: 100 TABLET ORAL at 08:41

## 2023-03-31 RX ADMIN — CLONIDINE HYDROCHLORIDE 0.2 MG: 0.1 TABLET ORAL at 08:41

## 2023-03-31 RX ADMIN — MELATONIN 9 MG: at 21:51

## 2023-03-31 RX ADMIN — METHOCARBAMOL 750 MG: 750 TABLET ORAL at 06:44

## 2023-03-31 RX ADMIN — LIDOCAINE HYDROCHLORIDE 20 ML: 10 INJECTION, SOLUTION EPIDURAL; INFILTRATION; INTRACAUDAL; PERINEURAL at 12:10

## 2023-03-31 RX ADMIN — INSULIN LISPRO 2 UNITS: 100 INJECTION, SOLUTION INTRAVENOUS; SUBCUTANEOUS at 17:19

## 2023-03-31 RX ADMIN — DOCUSATE SODIUM 100 MG: 100 CAPSULE, LIQUID FILLED ORAL at 08:41

## 2023-03-31 RX ADMIN — ACETAMINOPHEN 325MG 975 MG: 325 TABLET ORAL at 21:52

## 2023-03-31 RX ADMIN — OXYCODONE HYDROCHLORIDE 10 MG: 10 TABLET ORAL at 19:24

## 2023-03-31 RX ADMIN — ENOXAPARIN SODIUM 40 MG: 100 INJECTION SUBCUTANEOUS at 08:42

## 2023-03-31 RX ADMIN — ZIPRASIDONE HCL 80 MG: 40 CAPSULE ORAL at 21:50

## 2023-03-31 NOTE — ASSESSMENT & PLAN NOTE
Looked at his MRI and x-rays for his right knee. The MRI shows bony edema both on the medial femoral condyle and tibial plateau with missing cartilage. He has medial lateral meniscal tears. His cruciate and collateral ligaments are intact. We can try cortisone shots if those have not been done. An arthroscopy might give temporary relief, but with the bony edema and missing cartilage, the joint space probable will collapse within a few months after arthroscopy. It still may be worth a try since he is relatively young. · 2/2 diabetic foot ulcers  · See above

## 2023-03-31 NOTE — PLAN OF CARE
Problem: MOBILITY - ADULT  Goal: Maintain or return to baseline ADL function  Description: INTERVENTIONS:  -  Assess patient's ability to carry out ADLs; assess patient's baseline for ADL function and identify physical deficits which impact ability to perform ADLs (bathing, care of mouth/teeth, toileting, grooming, dressing, etc )  - Assess/evaluate cause of self-care deficits   - Assess range of motion  - Assess patient's mobility; develop plan if impaired  - Assess patient's need for assistive devices and provide as appropriate  - Encourage maximum independence but intervene and supervise when necessary  - Involve family in performance of ADLs  - Assess for home care needs following discharge   - Consider OT consult to assist with ADL evaluation and planning for discharge  - Provide patient education as appropriate  Outcome: Progressing  Goal: Maintains/Returns to pre admission functional level  Description: INTERVENTIONS:  - Perform BMAT or MOVE assessment daily    - Set and communicate daily mobility goal to care team and patient/family/caregiver  - Collaborate with rehabilitation services on mobility goals if consulted  - Perform Range of Motion 3 times a day  - Reposition patient every 2 hours    - Dangle patient 3 times a day  - Stand patient 3 times a day  - Ambulate patient 3 times a day  - Out of bed to chair 3 times a day   - Out of bed for meals 3 times a day  - Out of bed for toileting  - Record patient progress and toleration of activity level   Outcome: Progressing     Problem: Prexisting or High Potential for Compromised Skin Integrity  Goal: Skin integrity is maintained or improved  Description: INTERVENTIONS:  - Identify patients at risk for skin breakdown  - Assess and monitor skin integrity  - Assess and monitor nutrition and hydration status  - Monitor labs   - Assess for incontinence   - Turn and reposition patient  - Assist with mobility/ambulation  - Relieve pressure over bony prominences  - Avoid friction and shearing  - Provide appropriate hygiene as needed including keeping skin clean and dry  - Evaluate need for skin moisturizer/barrier cream  - Collaborate with interdisciplinary team   - Patient/family teaching  - Consider wound care consult   Outcome: Progressing     Problem: PAIN - ADULT  Goal: Verbalizes/displays adequate comfort level or baseline comfort level  Description: Interventions:  - Encourage patient to monitor pain and request assistance  - Assess pain using appropriate pain scale  - Administer analgesics based on type and severity of pain and evaluate response  - Implement non-pharmacological measures as appropriate and evaluate response  - Consider cultural and social influences on pain and pain management  - Notify physician/advanced practitioner if interventions unsuccessful or patient reports new pain  Outcome: Progressing     Problem: INFECTION - ADULT  Goal: Absence or prevention of progression during hospitalization  Description: INTERVENTIONS:  - Assess and monitor for signs and symptoms of infection  - Monitor lab/diagnostic results  - Monitor all insertion sites, i e  indwelling lines, tubes, and drains  - Monitor endotracheal if appropriate and nasal secretions for changes in amount and color  - [place] appropriate cooling/warming therapies per order  - Administer medications as ordered  - Instruct and encourage patient and family to use good hand hygiene technique  - Identify and instruct in appropriate isolation precautions for identified infection/condition  Outcome: Progressing  Goal: Absence of fever/infection during neutropenic period  Description: INTERVENTIONS:  - Monitor WBC    Outcome: Progressing     Problem: SAFETY ADULT  Goal: Maintain or return to baseline ADL function  Description: INTERVENTIONS:  -  Assess patient's ability to carry out ADLs; assess patient's baseline for ADL function and identify physical deficits which impact ability to perform ADLs (bathing, care of mouth/teeth, toileting, grooming, dressing, etc )  - Assess/evaluate cause of self-care deficits   - Assess range of motion  - Assess patient's mobility; develop plan if impaired  - Assess patient's need for assistive devices and provide as appropriate  - Encourage maximum independence but intervene and supervise when necessary  - Involve family in performance of ADLs  - Assess for home care needs following discharge   - Consider OT consult to assist with ADL evaluation and planning for discharge  - Provide patient education as appropriate  Outcome: Progressing  Goal: Maintains/Returns to pre admission functional level  Description: INTERVENTIONS:  - Perform BMAT or MOVE assessment daily    - Set and communicate daily mobility goal to care team and patient/family/caregiver     - Collaborate with rehabilitation services on mobility goals if consulted  - Out of bed for toileting  - Record patient progress and toleration of activity level   Outcome: Progressing  Goal: Patient will remain free of falls  Description: INTERVENTIONS:  - Educate patient/family on patient safety including physical limitations  - Instruct patient to call for assistance with activity   - Consult OT/PT to assist with strengthening/mobility   - Keep Call bell within reach  - Keep bed low and locked with side rails adjusted as appropriate  - Keep care items and personal belongings within reach  - Initiate and maintain comfort rounds  - Make Fall Risk Sign visible to staff  - Offer Toileting every 2 Hours, in advance of need  - Initiate/Maintain bed alarm  - Obtain necessary fall risk management equipment:   - Apply yellow socks and bracelet for high fall risk patients  - Consider moving patient to room near nurses station  Outcome: Progressing     Problem: DISCHARGE PLANNING  Goal: Discharge to home or other facility with appropriate resources  Description: INTERVENTIONS:  - Identify barriers to discharge w/patient and caregiver  - Arrange for needed discharge resources and transportation as appropriate  - Identify discharge learning needs (meds, wound care, etc )  - Arrange for interpretive services to assist at discharge as needed  - Refer to Case Management Department for coordinating discharge planning if the patient needs post-hospital services based on physician/advanced practitioner order or complex needs related to functional status, cognitive ability, or social support system  Outcome: Progressing     Problem: Knowledge Deficit  Goal: Patient/family/caregiver demonstrates understanding of disease process, treatment plan, medications, and discharge instructions  Description: Complete learning assessment and assess knowledge base    Interventions:  - Provide teaching at level of understanding  - Provide teaching via preferred learning methods  Outcome: Progressing     Problem: SKIN/TISSUE INTEGRITY - ADULT  Goal: Skin Integrity remains intact(Skin Breakdown Prevention)  Description: Assess:  -Perform Yogi assessment every shift  -Clean and moisturize skin   -Inspect skin when repositioning, toileting, and assisting with ADLS  -Assess extremities for adequate circulation and sensation     Bed Management:  -Have minimal linens on bed & keep smooth, unwrinkled  -Change linens as needed when moist or perspiring  -Avoid sitting or lying in one position for more than 2 hours while in bed  -Keep HOB at 30 degrees     Skin Care:  -Avoid use of baby powder, tape, friction and shearing, hot water or constrictive clothing  -Relieve pressure over bony prominences using pillows  -Do not massage red bony areas    Outcome: Progressing  Goal: Incision(s), wounds(s) or drain site(s) healing without S/S of infection  Description: INTERVENTIONS  - Assess and document dressing, incision, wound bed, drain sites and surrounding tissue  - Provide patient and family education  - Perform skin care/dressing changes every shift  Outcome: Progressing  Goal: Pressure injury heals and does not worsen  Description: Interventions:  - Implement low air loss mattress or specialty surface (Criteria met)  - Apply silicone foam dressing  - Use special pressure reducing interventions such as cushion  when in chair   - Apply fecal or urinary incontinence containment device   - Perform passive or active ROM every 3 hours  - Turn and reposition patient & offload bony prominences every 2 hours   - Utilize friction reducing device or surface for transfers   - Consider nutrition services referral as needed  Outcome: Progressing     Problem: Nutrition/Hydration-ADULT  Goal: Nutrient/Hydration intake appropriate for improving, restoring or maintaining nutritional needs  Description: Monitor and assess patient's nutrition/hydration status for malnutrition  Collaborate with interdisciplinary team and initiate plan and interventions as ordered  Monitor patient's weight and dietary intake as ordered or per policy  Utilize nutrition screening tool and intervene as necessary  Determine patient's food preferences and provide high-protein, high-caloric foods as appropriate       INTERVENTIONS:  - Monitor oral intake, urinary output, labs, and treatment plans  - Assess nutrition and hydration status and recommend course of action  - Evaluate amount of meals eaten  - Assist patient with eating if necessary   - Allow adequate time for meals  - Recommend/ encourage appropriate diets, oral nutritional supplements, and vitamin/mineral supplements  - Order, calculate, and assess calorie counts as needed  - Recommend, monitor, and adjust tube feedings and TPN/PPN based on assessed needs  - Assess need for intravenous fluids  - Provide specific nutrition/hydration education as appropriate  - Include patient/family/caregiver in decisions related to nutrition  Outcome: Progressing     Problem: COPING  Goal: Pt/Family able to verbalize concerns and demonstrate effective coping strategies  Description: INTERVENTIONS:  - Assist patient/family to identify coping skills, available support systems and cultural and spiritual values  - Provide emotional support, including active listening and acknowledgement of concerns of patient and caregivers  - Reduce environmental stimuli, as able  - Provide patient education  - Assess for spiritual pain/suffering and initiate spiritual care, including notification of Pastoral Care or [place] based community as needed  - Assess effectiveness of coping strategies  Outcome: Progressing  Goal: Will report anxiety at manageable levels  Description: INTERVENTIONS:  - Administer medication as ordered  - Teach and encourage coping skills  - Provide emotional support  - Assess patient/family for anxiety and ability to cope  Outcome: Progressing     Problem: CHANGE IN BODY IMAGE  Goal: Pt/Family communicate acceptance of loss or change in body image and expresses psychological comfort and peace  Description: INTERVENTIONS:  - Assess patient/family anxiety and grief process related to change in body image, loss of functional status and loss of sense of self  - Assess patient/family's coping skills and provide emotional, spiritual and psychosocial support  - Provide information about the patient's health status with consideration of family and cultural values  - Communicate willingness to discuss loss and facilitate grief process with patient/family as appropriate  - Emphasize sustaining relationships within family system and community, or [place]/spiritual traditions  - Refer to community support groups as appropriate  - Initiate Spiritual Care, Pastoral care or other ancillary consults as needed  Outcome: Progressing     Problem: RESPIRATORY - ADULT  Goal: Achieves optimal ventilation and oxygenation  Description: INTERVENTIONS:  - Assess for changes in respiratory status  - Assess for changes in mentation and behavior  - Position to facilitate oxygenation and minimize respiratory effort  - Oxygen administered by appropriate delivery if ordered  - Initiate smoking cessation education as indicated  - Encourage broncho-pulmonary hygiene including cough, deep breathe, Incentive Spirometry  - Assess the need for suctioning and aspirate as needed  - Assess and instruct to report SOB or any respiratory difficulty  - Respiratory Therapy support as indicated  Outcome: Progressing     Problem: METABOLIC, FLUID AND ELECTROLYTES - ADULT  Goal: Fluid balance maintained  Description: INTERVENTIONS:  - Monitor labs   - Monitor I/O and WT  - Instruct patient on fluid and nutrition as appropriate  - Assess for signs & symptoms of volume excess or deficit  Outcome: Progressing  Goal: Glucose maintained within target range  Description: INTERVENTIONS:  - Monitor Blood Glucose as ordered  - Assess for signs and symptoms of hyperglycemia and hypoglycemia  - Administer ordered medications to maintain glucose within target range  - Assess nutritional intake and initiate nutrition service referral as needed  Outcome: Progressing     Problem: HEMATOLOGIC - ADULT  Goal: Maintains hematologic stability  Description: INTERVENTIONS  - Assess for signs and symptoms of bleeding or hemorrhage  - Monitor labs  - Administer supportive blood products/factors as ordered and appropriate  Outcome: Progressing

## 2023-03-31 NOTE — PROGRESS NOTES
Progress Note - Infectious Disease   Jojo Joshua 53 y o  female MRN: [id number]  Unit/Bed#: E2 -01 Encounter: [id number]      Impression/Plan:  1  Polymicrobial Left Foot Diabetic Ulcer Infection:  - Patient is now s/p left 1st hallux resection 3/23  No OR cultures sent, clean margins felt to be obtained  Pathology pending  Per discussion with podiatry, surgical source control is assumed from bone infection however they did notice some purulence on inspection of wound 3/27 which was washed out at bedside  Wound cx from 3/21 grew Group F Strep, MSSA and Alcaligenes faecalis  Blood cultures negative       - based on cultures 3/21, Ceftriaxone will cover the MSSA, Alcaligenes and Strep  - given purulence seen in wound on 3/27, finish 5 day course with last dose of Ceftriaxone 2g today, then stop and monitor off antibiotics  - ongoing wound care and monitoring of foot per Podiatry; please alert ID if concerned about ongoing infection  - follow up pathology of clean margin from OR 3/23     2  Right BKA Site Erythema  - Patient had infection of diabetic ulcer of prior right TMA, superficial cx from 3/21 grew Group F Strep, MSSA, Alcaligenes faecalis and Prevotella  Now s/p source control with BKA 3/23  She does have some erythema at BKA stump site that is stable per Vascular team  No drainage from staple site  She is afebrile and without leukocytosis  - continue to monitor site closely  - if worsening erythema or pain, consider CT scan to assess for hematoma vs  collection as BKA site  - appreciate Vascular surgery input; please alert ID if concerned for infection  - trend fever curve, WBCs     3  Penicillin Allergy:  - Listed as rash  She is tolerating Cefazolin and has tolerated Cefepime in past  She is tolerating Ceftriaxone now  Patient tells me she had a rash/hives as a child, does not think had any anaphylaxis and no trial of penicillin as an adult      - tolerates Ceftriaxone and Cefazolin  - likely has outgrown Penicillin allergy  - can consider Amoxicillin challenge in future     4  T2DM   - risk factor for #1 and #2    I have discussed the above management plan in detail with the primary service  ID will see again /3 if remains in house, call over weekend with questions    Antibiotics:  Ceftriaxone: 8  Total abx days: 12    Subjective:  Afebrile, WBC remains normal      Patient denies any pain at left foot  She has ongoing pain at right BKA site, not worse but not any better  Denies fever or chills, no cough or diarrhea  She reports constipation  Objective:  Vitals:  Temp:  [97 °F (36 1 °C)-98 1 °F (36 7 °C)] 98 1 °F (36 7 °C)  HR:  [67-74] 73  Resp:  [18-20] 18  BP: (139-156)/(77-93) 139/83  SpO2:  [95 %-97 %] 96 %  Temp (24hrs), Av 4 °F (36 3 °C), Min:97 °F (36 1 °C), Max:98 1 °F (36 7 °C)  Current: Temperature: 98 1 °F (36 7 °C)    Physical Exam:   General Appearance:  Alert, interactive, nontoxic, no acute distress  Throat: Oropharynx moist without lesions  Lungs:   Clear to auscultation bilaterally; no wheezes, rhonchi or rales; respirations unlabored   Heart:  RRR; no murmur, rub or gallop   Abdomen:   Soft, non-tender, non-distended, positive bowel sounds  Extremities: Left foot is wrapped; right BKA site wrapped   Skin: No new rashes or lesions  No draining wounds noted  Labs:    All pertinent labs and imaging studies were personally reviewed  Results from last 7 days   Lab Units 23   WBC Thousand/uL 7 75 7 80 8 37   HEMOGLOBIN g/dL 9 7* 9 2* 9 2*   PLATELETS Thousands/uL 637* 622* 670*     Results from last 7 days   Lab Units 23   SODIUM mmol/L 137 136 136   POTASSIUM mmol/L 4 4 4 3 4 3   CHLORIDE mmol/L 103 103 102   CO2 mmol/L 27 26 27   BUN mg/dL 17 16 14   CREATININE mg/dL 0 75 0 79 0 66   EGFR ml/min/1 73sq m 95 89 105   CALCIUM mg/dL 8 8 9 0 8 9                       Micro: Imaging:          Hernandez Matt MD  [facility]

## 2023-03-31 NOTE — PROGRESS NOTES
Podiatry - Progress Note  Patient: Brii Dupont 53 y o  female   MRN: [id number]  PCP: Priscilla Waite MD  Unit/Bed#: E2 -01 Encounter: [id number]  Date Of Visit: 03/31/23    ASSESSMENT:    Brii Dupont is a 53 y o  female with:    1  L plantar hallux ulceration  2  L hallux osteomyelitis  - s/p L First ray Resection 3/23/23  -s/p bedside delayed primary closure 3/31/23  4  R TMA wounds with osteomyelitis  - s/p R BKA 3/23/23  5  T2DM    PLAN:    · Left lower extremity delayed primary closure performed at bedside as described below:  · Wound VAC discontinued  · Last dose of ceftriaxone today per infectious disease, will monitor patient off antibiotics after this  · Patient stable for discharge from podiatry standpoint, appreciate case management for placement needs  · Elevation and offloading on green foam wedges or pillows when non-ambulatory  · Appreciate consulting services for recommendations and management  Procedure: Delayed Primary Closure  After verbal consent was obtained for delayed primary closure, the patient's left foot was then prepped with alcohol, and was anesthetized utilizing 10 mL of 1% lidocaine plain in a local infiltration fashion utilizing a 10 cc syringe and a 25-gauge needle  With sterile technique and sterile instrumentation, the patient's left foot was then prepped with Betadine scrub  The open area to the patient's left medial foot was examined and selective debridement was performed with a scalpel to remove minimal amount of slough/fibrous tissue in the wound bed and to ensure that the wound edges were healthy and bleeding  Measurements of the wound were 2 0 cm x 0 4 cm x 1 0 the wound was then reapproximated utilizing 3-0 nylon in horizontal mattress, simple interrupted, and cruciate suture technique  Total wound length reapproximated was 2 0 cm    The foot was then cleansed and dried and the incision site was dressed with Xeroform, 4 x 4 gauze, ABD, Kerlix, and a 4 inch Ace bandage  The patient tolerated the procedure well with no immediate complications  Antibiotics started: ceftriaxone  Pharmacologic VTE Prophylaxis: Enoxaparin (Lovenox)   Mechanical VTE Prophylaxis: sequential compression device   Weightbearing status: Non-weightbearing left  foot    Disposition: Pending placement    SUBJECTIVE:     The patient was seen, evaluated, and assessed at bedside today  The patient was awake, alert, and in no acute distress  No acute events overnight  The patient reports minimal pain to her left foot  Patient denies N/V/F/chills/SOB/CP  OBJECTIVE:     Vitals:   /83 (BP Location: Right arm)   Pulse 73   Temp 98 1 °F (36 7 °C) (Temporal)   Resp 18   SpO2 96%     Temp (24hrs), Av 6 °F (36 4 °C), Min:97 °F (36 1 °C), Max:98 1 °F (36 7 °C)      Physical Exam:     General: Alert, cooperative and no distress  Lungs: Non labored breathing  Abdomen: Soft, non-tender  Lower extremity exam: Cardiovascular status at baseline, neurological status at baseline, musculoskeletal status at baseline, no calf tenderness noted  Left foot incision site is well coapted with skin edges well approximated  Minimal bloody drainage noted to the medial portion which was reapproximated at bedside, no drainage noted to the remainder of the incision site  Additional Data:     Labs:    Results from last 7 days   Lab Units 23  0446   WBC Thousand/uL 7 75   HEMOGLOBIN g/dL 9 7*   HEMATOCRIT % 34 4*   PLATELETS Thousands/uL 637*   NEUTROS PCT % 37*   LYMPHS PCT % 41   MONOS PCT % 9   EOS PCT % 11*     Results from last 7 days   Lab Units 23  0446   POTASSIUM mmol/L 4 4   CHLORIDE mmol/L 103   CO2 mmol/L 27   BUN mg/dL 17   CREATININE mg/dL 0 75   CALCIUM mg/dL 8 8           * I Have Reviewed All Lab Data Listed Above      Recent Cultures (last 7 days):               Imaging: I have personally reviewed pertinent films in PACS  EKG, Pathology, and Other Studies: I have personally "reviewed pertinent reports  ** Please Note: Portions of the record may have been created with voice recognition software  Occasional wrong word or \"sound a like\" substitutions may have occurred due to the inherent limitations of voice recognition software  Read the chart carefully and recognize, using context, where substitutions have occurred   **      "

## 2023-04-01 LAB
ANION GAP SERPL CALCULATED.3IONS-SCNC: 7 MMOL/L (ref 4–13)
BASOPHILS # BLD AUTO: 0.08 THOUSANDS/ÂΜL (ref 0–0.1)
BASOPHILS NFR BLD AUTO: 1 % (ref 0–1)
BUN SERPL-MCNC: 15 MG/DL (ref 5–25)
CALCIUM SERPL-MCNC: 8.8 MG/DL (ref 8.4–10.2)
CHLORIDE SERPL-SCNC: 103 MMOL/L (ref 96–108)
CO2 SERPL-SCNC: 27 MMOL/L (ref 21–32)
CREAT SERPL-MCNC: 0.74 MG/DL (ref 0.6–1.3)
EOSINOPHIL # BLD AUTO: 0.85 THOUSAND/ÂΜL (ref 0–0.61)
EOSINOPHIL NFR BLD AUTO: 11 % (ref 0–6)
ERYTHROCYTE [DISTWIDTH] IN BLOOD BY AUTOMATED COUNT: 23.7 % (ref 11.6–15.1)
GFR SERPL CREATININE-BSD FRML MDRD: 96 ML/MIN/1.73SQ M
GLUCOSE SERPL-MCNC: 143 MG/DL (ref 65–140)
GLUCOSE SERPL-MCNC: 151 MG/DL (ref 65–140)
GLUCOSE SERPL-MCNC: 161 MG/DL (ref 65–140)
GLUCOSE SERPL-MCNC: 173 MG/DL (ref 65–140)
GLUCOSE SERPL-MCNC: 241 MG/DL (ref 65–140)
HCT VFR BLD AUTO: 33.6 % (ref 34.8–46.1)
HGB BLD-MCNC: 9.7 G/DL (ref 11.5–15.4)
IMM GRANULOCYTES # BLD AUTO: 0.04 THOUSAND/UL (ref 0–0.2)
IMM GRANULOCYTES NFR BLD AUTO: 1 % (ref 0–2)
LYMPHOCYTES # BLD AUTO: 3.16 THOUSANDS/ÂΜL (ref 0.6–4.47)
LYMPHOCYTES NFR BLD AUTO: 42 % (ref 14–44)
MCH RBC QN AUTO: 20.7 PG (ref 26.8–34.3)
MCHC RBC AUTO-ENTMCNC: 28.9 G/DL (ref 31.4–37.4)
MCV RBC AUTO: 72 FL (ref 82–98)
MONOCYTES # BLD AUTO: 0.65 THOUSAND/ÂΜL (ref 0.17–1.22)
MONOCYTES NFR BLD AUTO: 9 % (ref 4–12)
NEUTROPHILS # BLD AUTO: 2.72 THOUSANDS/ÂΜL (ref 1.85–7.62)
NEUTS SEG NFR BLD AUTO: 36 % (ref 43–75)
NRBC BLD AUTO-RTO: 0 /100 WBCS
PLATELET # BLD AUTO: 606 THOUSANDS/UL (ref 149–390)
PMV BLD AUTO: 8.6 FL (ref 8.9–12.7)
POTASSIUM SERPL-SCNC: 4.2 MMOL/L (ref 3.5–5.3)
PROCALCITONIN SERPL-MCNC: 0.09 NG/ML
RBC # BLD AUTO: 4.68 MILLION/UL (ref 3.81–5.12)
SODIUM SERPL-SCNC: 137 MMOL/L (ref 135–147)
WBC # BLD AUTO: 7.5 THOUSAND/UL (ref 4.31–10.16)

## 2023-04-01 RX ADMIN — INSULIN LISPRO 5 UNITS: 100 INJECTION, SOLUTION INTRAVENOUS; SUBCUTANEOUS at 08:29

## 2023-04-01 RX ADMIN — METHOCARBAMOL 750 MG: 750 TABLET ORAL at 23:28

## 2023-04-01 RX ADMIN — HYDROXYZINE HYDROCHLORIDE 50 MG: 25 TABLET, FILM COATED ORAL at 08:30

## 2023-04-01 RX ADMIN — HYDROXYZINE HYDROCHLORIDE 100 MG: 25 TABLET, FILM COATED ORAL at 21:53

## 2023-04-01 RX ADMIN — LAMOTRIGINE 250 MG: 100 TABLET ORAL at 08:29

## 2023-04-01 RX ADMIN — ENOXAPARIN SODIUM 40 MG: 100 INJECTION SUBCUTANEOUS at 08:30

## 2023-04-01 RX ADMIN — DOCUSATE SODIUM 100 MG: 100 CAPSULE, LIQUID FILLED ORAL at 08:29

## 2023-04-01 RX ADMIN — FOLIC ACID 1000 MCG: 1 TABLET ORAL at 08:29

## 2023-04-01 RX ADMIN — METHOCARBAMOL 750 MG: 750 TABLET ORAL at 17:15

## 2023-04-01 RX ADMIN — GUAIFENESIN AND DEXTROMETHORPHAN 10 ML: 100; 10 SYRUP ORAL at 19:43

## 2023-04-01 RX ADMIN — OXYCODONE HYDROCHLORIDE 10 MG: 10 TABLET ORAL at 04:30

## 2023-04-01 RX ADMIN — PANTOPRAZOLE SODIUM 40 MG: 40 TABLET, DELAYED RELEASE ORAL at 15:47

## 2023-04-01 RX ADMIN — INSULIN LISPRO 2 UNITS: 100 INJECTION, SOLUTION INTRAVENOUS; SUBCUTANEOUS at 22:03

## 2023-04-01 RX ADMIN — METHOCARBAMOL 750 MG: 750 TABLET ORAL at 05:12

## 2023-04-01 RX ADMIN — OXYCODONE HYDROCHLORIDE 10 MG: 10 TABLET ORAL at 15:47

## 2023-04-01 RX ADMIN — OXYCODONE HYDROCHLORIDE 10 MG: 10 TABLET ORAL at 23:28

## 2023-04-01 RX ADMIN — DOCUSATE SODIUM 100 MG: 100 CAPSULE, LIQUID FILLED ORAL at 17:15

## 2023-04-01 RX ADMIN — CLONIDINE HYDROCHLORIDE 0.2 MG: 0.1 TABLET ORAL at 08:29

## 2023-04-01 RX ADMIN — PANTOPRAZOLE SODIUM 40 MG: 40 TABLET, DELAYED RELEASE ORAL at 06:14

## 2023-04-01 RX ADMIN — FENOFIBRATE 48 MG: 48 TABLET, FILM COATED ORAL at 08:30

## 2023-04-01 RX ADMIN — GUAIFENESIN AND DEXTROMETHORPHAN 10 ML: 100; 10 SYRUP ORAL at 13:18

## 2023-04-01 RX ADMIN — INSULIN LISPRO 2 UNITS: 100 INJECTION, SOLUTION INTRAVENOUS; SUBCUTANEOUS at 11:24

## 2023-04-01 RX ADMIN — ACETAMINOPHEN 325MG 975 MG: 325 TABLET ORAL at 21:53

## 2023-04-01 RX ADMIN — MELATONIN 9 MG: at 21:54

## 2023-04-01 RX ADMIN — PREGABALIN 100 MG: 50 CAPSULE ORAL at 21:54

## 2023-04-01 RX ADMIN — ZIPRASIDONE HCL 80 MG: 40 CAPSULE ORAL at 21:54

## 2023-04-01 RX ADMIN — LEVOTHYROXINE SODIUM 50 MCG: 50 TABLET ORAL at 05:12

## 2023-04-01 RX ADMIN — ZIPRASIDONE HCL 80 MG: 40 CAPSULE ORAL at 08:30

## 2023-04-01 RX ADMIN — GUAIFENESIN AND DEXTROMETHORPHAN 10 ML: 100; 10 SYRUP ORAL at 08:39

## 2023-04-01 RX ADMIN — PREGABALIN 100 MG: 50 CAPSULE ORAL at 08:29

## 2023-04-01 RX ADMIN — ACETAMINOPHEN 325MG 975 MG: 325 TABLET ORAL at 13:18

## 2023-04-01 RX ADMIN — ALBUTEROL SULFATE 2 PUFF: 90 AEROSOL, METERED RESPIRATORY (INHALATION) at 15:49

## 2023-04-01 RX ADMIN — ACETAMINOPHEN 325MG 975 MG: 325 TABLET ORAL at 05:11

## 2023-04-01 RX ADMIN — INSULIN LISPRO 5 UNITS: 100 INJECTION, SOLUTION INTRAVENOUS; SUBCUTANEOUS at 11:24

## 2023-04-01 RX ADMIN — METHOCARBAMOL 750 MG: 750 TABLET ORAL at 11:23

## 2023-04-01 RX ADMIN — INSULIN LISPRO 5 UNITS: 100 INJECTION, SOLUTION INTRAVENOUS; SUBCUTANEOUS at 16:26

## 2023-04-01 RX ADMIN — CLONIDINE HYDROCHLORIDE 0.2 MG: 0.1 TABLET ORAL at 21:58

## 2023-04-01 RX ADMIN — GUAIFENESIN AND DEXTROMETHORPHAN 10 ML: 100; 10 SYRUP ORAL at 23:30

## 2023-04-01 RX ADMIN — INSULIN LISPRO 2 UNITS: 100 INJECTION, SOLUTION INTRAVENOUS; SUBCUTANEOUS at 16:26

## 2023-04-01 RX ADMIN — OXYCODONE HYDROCHLORIDE 10 MG: 10 TABLET ORAL at 11:23

## 2023-04-01 RX ADMIN — ENOXAPARIN SODIUM 40 MG: 100 INJECTION SUBCUTANEOUS at 17:15

## 2023-04-01 RX ADMIN — OXYCODONE HYDROCHLORIDE 10 MG: 10 TABLET ORAL at 19:43

## 2023-04-01 RX ADMIN — INSULIN LISPRO 2 UNITS: 100 INJECTION, SOLUTION INTRAVENOUS; SUBCUTANEOUS at 08:28

## 2023-04-01 RX ADMIN — POLYETHYLENE GLYCOL 3350 17 G: 17 POWDER, FOR SOLUTION ORAL at 08:45

## 2023-04-01 NOTE — PROGRESS NOTES
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/23  5  T2DM      PLAN:    · Left foot Wound   · Plan for continue local wound care to left lower extremity consisting of Betadine Adaptic DSD  · No clinical signs of infection noted today  · Patient stable from podiatry standpoint, follow-up case management for placement  · Right BKA  · Patient has completed 5-day course of ceftriaxone per infectious disease  Continue to monitor patient off antibiotics  · White count WNL  · Pro-Bernardo WNL  · Patient stable for discharge  · Elevation and offloading on green foam wedges or pillows when non-ambulatory  · Appreciate consulting services for recommendations and management  Antibiotics started: none  Pharmacologic VTE Prophylaxis: Enoxaparin (Lovenox)   Mechanical VTE Prophylaxis: sequential compression device   Weightbearing status: non weight bearing left foot    Disposition: Pending placement    SUBJECTIVE:     The patient was seen, evaluated, and assessed at bedside today  The patient was awake, alert, and in no acute distress  No acute events overnight  The patient reports that she would like to go home  Patient denies N/V/F/chills/SOB/CP  OBJECTIVE:     Vitals:   /85 (BP Location: Right arm)   Pulse 69   Temp (!) 97 4 °F (36 3 °C) (Temporal)   Resp 18   SpO2 96%     Temp (24hrs), Av 6 °F (36 4 °C), Min:97 4 °F (36 3 °C), Max:97 8 °F (36 6 °C)      Physical Exam:     Lungs: Non labored breathing  Abdomen: Soft, non-tender  Lower Extremity:  Cardiovascular status at baseline from admission    Neurological status at "baseline from admission  Musculoskeletal status at baseline from admission  No calf tenderness noted  Dressings left intact  No strikethrough noted        Additional Data:     Labs:    Results from last 7 days   Lab Units 04/01/23  0422   WBC Thousand/uL 7 50   HEMOGLOBIN g/dL 9 7*   HEMATOCRIT % 33 6*   PLATELETS Thousands/uL 606*   NEUTROS PCT % 36*   LYMPHS PCT % 42   MONOS PCT % 9   EOS PCT % 11*     Results from last 7 days   Lab Units 04/01/23  0422   POTASSIUM mmol/L 4 2   CHLORIDE mmol/L 103   CO2 mmol/L 27   BUN mg/dL 15   CREATININE mg/dL 0 74   CALCIUM mg/dL 8 8           * I Have Reviewed All Lab Data Listed Above  Recent Cultures (last 7 days):               Imaging: I have personally reviewed pertinent films in PACS  EKG, Pathology, and Other Studies: I have personally reviewed pertinent reports  ** Please Note: Portions of the record may have been created with voice recognition software  Occasional wrong word or \"sound a like\" substitutions may have occurred due to the inherent limitations of voice recognition software  Read the chart carefully and recognize, using context, where substitutions have occurred   **      "

## 2023-04-01 NOTE — PLAN OF CARE
Problem: MOBILITY - ADULT  Goal: Maintain or return to baseline ADL function  Description: INTERVENTIONS:  -  Assess patient's ability to carry out ADLs; assess patient's baseline for ADL function and identify physical deficits which impact ability to perform ADLs (bathing, care of mouth/teeth, toileting, grooming, dressing, etc )  - Assess/evaluate cause of self-care deficits   - Assess range of motion  - Assess patient's mobility; develop plan if impaired  - Assess patient's need for assistive devices and provide as appropriate  - Encourage maximum independence but intervene and supervise when necessary  - Involve family in performance of ADLs  - Assess for home care needs following discharge   - Consider OT consult to assist with ADL evaluation and planning for discharge  - Provide patient education as appropriate  Outcome: Progressing  Problem: Knowledge Deficit  Goal: Patient/family/caregiver demonstrates understanding of disease process, treatment plan, medications, and discharge instructions  Description: Complete learning assessment and assess knowledge base    Interventions:  - Provide teaching at level of understanding  - Provide teaching via preferred learning methods  Outcome: Progressing     Problem: DISCHARGE PLANNING  Goal: Discharge to home or other facility with appropriate resources  Description: INTERVENTIONS:  - Identify barriers to discharge w/patient and caregiver  - Arrange for needed discharge resources and transportation as appropriate  - Identify discharge learning needs (meds, wound care, etc )  - Arrange for interpretive services to assist at discharge as needed  - Refer to Case Management Department for coordinating discharge planning if the patient needs post-hospital services based on physician/advanced practitioner order or complex needs related to functional status, cognitive ability, or social support system  Outcome: Progressing

## 2023-04-01 NOTE — PLAN OF CARE
Problem: MOBILITY - ADULT  Goal: Maintain or return to baseline ADL function  Description: INTERVENTIONS:  -  Assess patient's ability to carry out ADLs; assess patient's baseline for ADL function and identify physical deficits which impact ability to perform ADLs (bathing, care of mouth/teeth, toileting, grooming, dressing, etc )  - Assess/evaluate cause of self-care deficits   - Assess range of motion  - Assess patient's mobility; develop plan if impaired  - Assess patient's need for assistive devices and provide as appropriate  - Encourage maximum independence but intervene and supervise when necessary  - Involve family in performance of ADLs  - Assess for home care needs following discharge   - Consider OT consult to assist with ADL evaluation and planning for discharge  - Provide patient education as appropriate  Outcome: Progressing  Goal: Maintains/Returns to pre admission functional level  Description: INTERVENTIONS:  - Perform BMAT or MOVE assessment daily    - Set and communicate daily mobility goal to care team and patient/family/caregiver  - Collaborate with rehabilitation services on mobility goals if consulted  - Perform Range of Motion 3 times a day  - Reposition patient every 2 hours    - Dangle patient 3 times a day  - Stand patient 3 times a day  - Ambulate patient 3 times a day  - Out of bed to chair 3 times a day   - Out of bed for meals 3 times a day  - Out of bed for toileting  - Record patient progress and toleration of activity level   Outcome: Progressing     Problem: Prexisting or High Potential for Compromised Skin Integrity  Goal: Skin integrity is maintained or improved  Description: INTERVENTIONS:  - Identify patients at risk for skin breakdown  - Assess and monitor skin integrity  - Assess and monitor nutrition and hydration status  - Monitor labs   - Assess for incontinence   - Turn and reposition patient  - Assist with mobility/ambulation  - Relieve pressure over bony prominences  - Avoid friction and shearing  - Provide appropriate hygiene as needed including keeping skin clean and dry  - Evaluate need for skin moisturizer/barrier cream  - Collaborate with interdisciplinary team   - Patient/family teaching  - Consider wound care consult   Outcome: Progressing     Problem: PAIN - ADULT  Goal: Verbalizes/displays adequate comfort level or baseline comfort level  Description: Interventions:  - Encourage patient to monitor pain and request assistance  - Assess pain using appropriate pain scale  - Administer analgesics based on type and severity of pain and evaluate response  - Implement non-pharmacological measures as appropriate and evaluate response  - Consider cultural and social influences on pain and pain management  - Notify physician/advanced practitioner if interventions unsuccessful or patient reports new pain  Outcome: Progressing     Problem: INFECTION - ADULT  Goal: Absence or prevention of progression during hospitalization  Description: INTERVENTIONS:  - Assess and monitor for signs and symptoms of infection  - Monitor lab/diagnostic results  - Monitor all insertion sites, i e  indwelling lines, tubes, and drains  - Monitor endotracheal if appropriate and nasal secretions for changes in amount and color  - [place] appropriate cooling/warming therapies per order  - Administer medications as ordered  - Instruct and encourage patient and family to use good hand hygiene technique  - Identify and instruct in appropriate isolation precautions for identified infection/condition  Outcome: Progressing  Goal: Absence of fever/infection during neutropenic period  Description: INTERVENTIONS:  - Monitor WBC    Outcome: Progressing     Problem: SAFETY ADULT  Goal: Maintain or return to baseline ADL function  Description: INTERVENTIONS:  -  Assess patient's ability to carry out ADLs; assess patient's baseline for ADL function and identify physical deficits which impact ability to perform ADLs (bathing, care of mouth/teeth, toileting, grooming, dressing, etc )  - Assess/evaluate cause of self-care deficits   - Assess range of motion  - Assess patient's mobility; develop plan if impaired  - Assess patient's need for assistive devices and provide as appropriate  - Encourage maximum independence but intervene and supervise when necessary  - Involve family in performance of ADLs  - Assess for home care needs following discharge   - Consider OT consult to assist with ADL evaluation and planning for discharge  - Provide patient education as appropriate  Outcome: Progressing  Goal: Maintains/Returns to pre admission functional level  Description: INTERVENTIONS:  - Perform BMAT or MOVE assessment daily    - Set and communicate daily mobility goal to care team and patient/family/caregiver     - Collaborate with rehabilitation services on mobility goals if consulted  - Out of bed for toileting  - Record patient progress and toleration of activity level   Outcome: Progressing  Goal: Patient will remain free of falls  Description: INTERVENTIONS:  - Educate patient/family on patient safety including physical limitations  - Instruct patient to call for assistance with activity   - Consult OT/PT to assist with strengthening/mobility   - Keep Call bell within reach  - Keep bed low and locked with side rails adjusted as appropriate  - Keep care items and personal belongings within reach  - Initiate and maintain comfort rounds  - Make Fall Risk Sign visible to staff  - Offer Toileting every 2 Hours, in advance of need  - Initiate/Maintain bed alarm  - Obtain necessary fall risk management equipment:   - Apply yellow socks and bracelet for high fall risk patients  - Consider moving patient to room near nurses station  Outcome: Progressing     Problem: DISCHARGE PLANNING  Goal: Discharge to home or other facility with appropriate resources  Description: INTERVENTIONS:  - Identify barriers to discharge w/patient and caregiver  - Arrange for needed discharge resources and transportation as appropriate  - Identify discharge learning needs (meds, wound care, etc )  - Arrange for interpretive services to assist at discharge as needed  - Refer to Case Management Department for coordinating discharge planning if the patient needs post-hospital services based on physician/advanced practitioner order or complex needs related to functional status, cognitive ability, or social support system  Outcome: Progressing     Problem: Knowledge Deficit  Goal: Patient/family/caregiver demonstrates understanding of disease process, treatment plan, medications, and discharge instructions  Description: Complete learning assessment and assess knowledge base    Interventions:  - Provide teaching at level of understanding  - Provide teaching via preferred learning methods  Outcome: Progressing     Problem: SKIN/TISSUE INTEGRITY - ADULT  Goal: Skin Integrity remains intact(Skin Breakdown Prevention)  Description: Assess:  -Inspect skin when repositioning, toileting, and assisting with ADLS  -Assess extremities for adequate circulation and sensation     Bed Management:  -Have minimal linens on bed & keep smooth, unwrinkled  -Change linens as needed when moist or perspiring  -Avoid sitting or lying in one position for more than 2 hours while in bed  -Keep HOB at 30 degrees     Skin Care:  -Avoid use of baby powder, tape, friction and shearing, hot water or constrictive clothing  -Relieve pressure over bony prominences using pillows  -Do not massage red bony areas    Outcome: Progressing  Goal: Incision(s), wounds(s) or drain site(s) healing without S/S of infection  Description: INTERVENTIONS  - Assess and document dressing, incision, wound bed, drain sites and surrounding tissue  - Provide patient and family education  Outcome: Progressing     Problem: Nutrition/Hydration-ADULT  Goal: Nutrient/Hydration intake appropriate for improving, restoring or maintaining nutritional needs  Description: Monitor and assess patient's nutrition/hydration status for malnutrition  Collaborate with interdisciplinary team and initiate plan and interventions as ordered  Monitor patient's weight and dietary intake as ordered or per policy  Utilize nutrition screening tool and intervene as necessary  Determine patient's food preferences and provide high-protein, high-caloric foods as appropriate       INTERVENTIONS:  - Monitor oral intake, urinary output, labs, and treatment plans  - Assess nutrition and hydration status and recommend course of action  - Evaluate amount of meals eaten  - Assist patient with eating if necessary   - Allow adequate time for meals  - Recommend/ encourage appropriate diets, oral nutritional supplements, and vitamin/mineral supplements  - Order, calculate, and assess calorie counts as needed  - Recommend, monitor, and adjust tube feedings and TPN/PPN based on assessed needs  - Assess need for intravenous fluids  - Provide specific nutrition/hydration education as appropriate  - Include patient/family/caregiver in decisions related to nutrition  Outcome: Progressing     Problem: COPING  Goal: Pt/Family able to verbalize concerns and demonstrate effective coping strategies  Description: INTERVENTIONS:  - Assist patient/family to identify coping skills, available support systems and cultural and spiritual values  - Provide emotional support, including active listening and acknowledgement of concerns of patient and caregivers  - Reduce environmental stimuli, as able  - Provide patient education  - Assess for spiritual pain/suffering and initiate spiritual care, including notification of Pastoral Care or [place] based community as needed  - Assess effectiveness of coping strategies  Outcome: Progressing  Goal: Will report anxiety at manageable levels  Description: INTERVENTIONS:  - Administer medication as ordered  - Teach and encourage coping skills  - Provide emotional support  - Assess patient/family for anxiety and ability to cope  Outcome: Progressing     Problem: CHANGE IN BODY IMAGE  Goal: Pt/Family communicate acceptance of loss or change in body image and expresses psychological comfort and peace  Description: INTERVENTIONS:  - Assess patient/family anxiety and grief process related to change in body image, loss of functional status and loss of sense of self  - Assess patient/family's coping skills and provide emotional, spiritual and psychosocial support  - Provide information about the patient's health status with consideration of family and cultural values  - Communicate willingness to discuss loss and facilitate grief process with patient/family as appropriate  - Emphasize sustaining relationships within family system and community, or [place]/spiritual traditions  - Refer to community support groups as appropriate  - Initiate Spiritual Care, Pastoral care or other ancillary consults as needed  Outcome: Progressing     Problem: RESPIRATORY - ADULT  Goal: Achieves optimal ventilation and oxygenation  Description: INTERVENTIONS:  - Assess for changes in respiratory status  - Assess for changes in mentation and behavior  - Position to facilitate oxygenation and minimize respiratory effort  - Oxygen administered by appropriate delivery if ordered  - Initiate smoking cessation education as indicated  - Encourage broncho-pulmonary hygiene including cough, deep breathe, Incentive Spirometry  - Assess the need for suctioning and aspirate as needed  - Assess and instruct to report SOB or any respiratory difficulty  - Respiratory Therapy support as indicated  Outcome: Progressing     Problem: METABOLIC, FLUID AND ELECTROLYTES - ADULT  Goal: Fluid balance maintained  Description: INTERVENTIONS:  - Monitor labs   - Monitor I/O and WT  - Instruct patient on fluid and nutrition as appropriate  - Assess for signs & symptoms of volume excess or deficit  Outcome: Progressing  Goal: Glucose maintained within target range  Description: INTERVENTIONS:  - Monitor Blood Glucose as ordered  - Assess for signs and symptoms of hyperglycemia and hypoglycemia  - Administer ordered medications to maintain glucose within target range  - Assess nutritional intake and initiate nutrition service referral as needed  Outcome: Progressing     Problem: HEMATOLOGIC - ADULT  Goal: Maintains hematologic stability  Description: INTERVENTIONS  - Assess for signs and symptoms of bleeding or hemorrhage  - Monitor labs  - Administer supportive blood products/factors as ordered and appropriate  Outcome: Progressing

## 2023-04-02 LAB
ANION GAP SERPL CALCULATED.3IONS-SCNC: 5 MMOL/L (ref 4–13)
BASOPHILS # BLD AUTO: 0.07 THOUSANDS/ÂΜL (ref 0–0.1)
BASOPHILS NFR BLD AUTO: 1 % (ref 0–1)
BUN SERPL-MCNC: 15 MG/DL (ref 5–25)
CALCIUM SERPL-MCNC: 9.3 MG/DL (ref 8.4–10.2)
CHLORIDE SERPL-SCNC: 104 MMOL/L (ref 96–108)
CO2 SERPL-SCNC: 28 MMOL/L (ref 21–32)
CREAT SERPL-MCNC: 0.73 MG/DL (ref 0.6–1.3)
EOSINOPHIL # BLD AUTO: 0.85 THOUSAND/ÂΜL (ref 0–0.61)
EOSINOPHIL NFR BLD AUTO: 13 % (ref 0–6)
ERYTHROCYTE [DISTWIDTH] IN BLOOD BY AUTOMATED COUNT: 23.9 % (ref 11.6–15.1)
GFR SERPL CREATININE-BSD FRML MDRD: 98 ML/MIN/1.73SQ M
GLUCOSE SERPL-MCNC: 125 MG/DL (ref 65–140)
GLUCOSE SERPL-MCNC: 143 MG/DL (ref 65–140)
GLUCOSE SERPL-MCNC: 176 MG/DL (ref 65–140)
GLUCOSE SERPL-MCNC: 212 MG/DL (ref 65–140)
GLUCOSE SERPL-MCNC: 255 MG/DL (ref 65–140)
HCT VFR BLD AUTO: 33 % (ref 34.8–46.1)
HGB BLD-MCNC: 9.4 G/DL (ref 11.5–15.4)
IMM GRANULOCYTES # BLD AUTO: 0.02 THOUSAND/UL (ref 0–0.2)
IMM GRANULOCYTES NFR BLD AUTO: 0 % (ref 0–2)
LYMPHOCYTES # BLD AUTO: 3.08 THOUSANDS/ÂΜL (ref 0.6–4.47)
LYMPHOCYTES NFR BLD AUTO: 47 % (ref 14–44)
MCH RBC QN AUTO: 20.2 PG (ref 26.8–34.3)
MCHC RBC AUTO-ENTMCNC: 28.5 G/DL (ref 31.4–37.4)
MCV RBC AUTO: 71 FL (ref 82–98)
MONOCYTES # BLD AUTO: 0.58 THOUSAND/ÂΜL (ref 0.17–1.22)
MONOCYTES NFR BLD AUTO: 9 % (ref 4–12)
NEUTROPHILS # BLD AUTO: 2.01 THOUSANDS/ÂΜL (ref 1.85–7.62)
NEUTS SEG NFR BLD AUTO: 30 % (ref 43–75)
NRBC BLD AUTO-RTO: 0 /100 WBCS
PLATELET # BLD AUTO: 567 THOUSANDS/UL (ref 149–390)
PMV BLD AUTO: 8.6 FL (ref 8.9–12.7)
POTASSIUM SERPL-SCNC: 4.1 MMOL/L (ref 3.5–5.3)
RBC # BLD AUTO: 4.66 MILLION/UL (ref 3.81–5.12)
SODIUM SERPL-SCNC: 137 MMOL/L (ref 135–147)
WBC # BLD AUTO: 6.61 THOUSAND/UL (ref 4.31–10.16)

## 2023-04-02 RX ADMIN — ACETAMINOPHEN 325MG 975 MG: 325 TABLET ORAL at 21:37

## 2023-04-02 RX ADMIN — PANTOPRAZOLE SODIUM 40 MG: 40 TABLET, DELAYED RELEASE ORAL at 06:33

## 2023-04-02 RX ADMIN — INSULIN LISPRO 2 UNITS: 100 INJECTION, SOLUTION INTRAVENOUS; SUBCUTANEOUS at 11:46

## 2023-04-02 RX ADMIN — FOLIC ACID 1000 MCG: 1 TABLET ORAL at 09:35

## 2023-04-02 RX ADMIN — ONDANSETRON 4 MG: 2 INJECTION INTRAMUSCULAR; INTRAVENOUS at 21:02

## 2023-04-02 RX ADMIN — INSULIN LISPRO 5 UNITS: 100 INJECTION, SOLUTION INTRAVENOUS; SUBCUTANEOUS at 07:30

## 2023-04-02 RX ADMIN — ACETAMINOPHEN 325MG 975 MG: 325 TABLET ORAL at 13:47

## 2023-04-02 RX ADMIN — DOCUSATE SODIUM 100 MG: 100 CAPSULE, LIQUID FILLED ORAL at 09:36

## 2023-04-02 RX ADMIN — ZIPRASIDONE HCL 80 MG: 40 CAPSULE ORAL at 09:36

## 2023-04-02 RX ADMIN — MELATONIN 9 MG: at 21:37

## 2023-04-02 RX ADMIN — HYDROXYZINE HYDROCHLORIDE 100 MG: 25 TABLET, FILM COATED ORAL at 21:37

## 2023-04-02 RX ADMIN — METHOCARBAMOL 750 MG: 750 TABLET ORAL at 11:50

## 2023-04-02 RX ADMIN — INSULIN LISPRO 4 UNITS: 100 INJECTION, SOLUTION INTRAVENOUS; SUBCUTANEOUS at 16:14

## 2023-04-02 RX ADMIN — OXYCODONE HYDROCHLORIDE 10 MG: 10 TABLET ORAL at 11:51

## 2023-04-02 RX ADMIN — ZIPRASIDONE HCL 80 MG: 40 CAPSULE ORAL at 21:03

## 2023-04-02 RX ADMIN — ACETAMINOPHEN 325MG 975 MG: 325 TABLET ORAL at 05:39

## 2023-04-02 RX ADMIN — METHOCARBAMOL 750 MG: 750 TABLET ORAL at 05:40

## 2023-04-02 RX ADMIN — ENOXAPARIN SODIUM 40 MG: 100 INJECTION SUBCUTANEOUS at 18:11

## 2023-04-02 RX ADMIN — PREGABALIN 100 MG: 50 CAPSULE ORAL at 21:02

## 2023-04-02 RX ADMIN — METHOCARBAMOL 750 MG: 750 TABLET ORAL at 18:11

## 2023-04-02 RX ADMIN — LAMOTRIGINE 250 MG: 100 TABLET ORAL at 09:35

## 2023-04-02 RX ADMIN — LEVOTHYROXINE SODIUM 50 MCG: 50 TABLET ORAL at 05:39

## 2023-04-02 RX ADMIN — INSULIN LISPRO 2 UNITS: 100 INJECTION, SOLUTION INTRAVENOUS; SUBCUTANEOUS at 21:03

## 2023-04-02 RX ADMIN — HYDROXYZINE HYDROCHLORIDE 50 MG: 25 TABLET, FILM COATED ORAL at 09:36

## 2023-04-02 RX ADMIN — ONDANSETRON 4 MG: 2 INJECTION INTRAMUSCULAR; INTRAVENOUS at 09:42

## 2023-04-02 RX ADMIN — POLYETHYLENE GLYCOL 3350 17 G: 17 POWDER, FOR SOLUTION ORAL at 20:31

## 2023-04-02 RX ADMIN — ALBUTEROL SULFATE 2 PUFF: 90 AEROSOL, METERED RESPIRATORY (INHALATION) at 21:43

## 2023-04-02 RX ADMIN — ALBUTEROL SULFATE 2 PUFF: 90 AEROSOL, METERED RESPIRATORY (INHALATION) at 09:42

## 2023-04-02 RX ADMIN — FENOFIBRATE 48 MG: 48 TABLET, FILM COATED ORAL at 09:36

## 2023-04-02 RX ADMIN — DOCUSATE SODIUM 100 MG: 100 CAPSULE, LIQUID FILLED ORAL at 18:11

## 2023-04-02 RX ADMIN — PREGABALIN 100 MG: 50 CAPSULE ORAL at 09:34

## 2023-04-02 RX ADMIN — OXYCODONE HYDROCHLORIDE 10 MG: 10 TABLET ORAL at 18:12

## 2023-04-02 RX ADMIN — ENOXAPARIN SODIUM 40 MG: 100 INJECTION SUBCUTANEOUS at 09:34

## 2023-04-02 RX ADMIN — GUAIFENESIN AND DEXTROMETHORPHAN 10 ML: 100; 10 SYRUP ORAL at 18:11

## 2023-04-02 RX ADMIN — CLONIDINE HYDROCHLORIDE 0.2 MG: 0.1 TABLET ORAL at 09:34

## 2023-04-02 RX ADMIN — INSULIN LISPRO 5 UNITS: 100 INJECTION, SOLUTION INTRAVENOUS; SUBCUTANEOUS at 11:47

## 2023-04-02 RX ADMIN — PANTOPRAZOLE SODIUM 40 MG: 40 TABLET, DELAYED RELEASE ORAL at 16:15

## 2023-04-02 RX ADMIN — OXYCODONE HYDROCHLORIDE 10 MG: 10 TABLET ORAL at 05:40

## 2023-04-02 RX ADMIN — INSULIN LISPRO 5 UNITS: 100 INJECTION, SOLUTION INTRAVENOUS; SUBCUTANEOUS at 16:13

## 2023-04-02 RX ADMIN — GUAIFENESIN AND DEXTROMETHORPHAN 10 ML: 100; 10 SYRUP ORAL at 05:40

## 2023-04-02 RX ADMIN — CLONIDINE HYDROCHLORIDE 0.2 MG: 0.1 TABLET ORAL at 21:37

## 2023-04-02 NOTE — PROGRESS NOTES
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 Plan for continue local wound care to left lower extremity consisting of Betadine Adaptic DSD  ? No clinical signs of infection noted today  ? Patient stable from podiatry standpoint, follow-up case management for placement  • Right BKA  ? Patient has completed 5-day course of ceftriaxone per infectious disease  Continue to monitor patient off antibiotics  ? White count WNL  ? Pro-Bernardo WNL  ? Patient stable for discharge  • Elevation and offloading on green foam wedges or pillows when non-ambulatory  • Appreciate consulting services for recommendations and management  Antibiotics started: none  Pharmacologic VTE Prophylaxis: Enoxaparin (Lovenox)   Mechanical VTE Prophylaxis: sequential compression device   Weightbearing status: Non-weightbearing left  foot    Disposition: Pending placement    SUBJECTIVE:     The patient was seen, evaluated, and assessed at bedside today  The patient was awake, alert, and in no acute distress  No acute events overnight  The patient reports minor pain to the left lower extremity  Patient denies N/V/F/chills/SOB/CP  OBJECTIVE:     Vitals:   /92 (BP Location: Left arm)   Pulse 66   Temp (!) 96 9 °F (36 1 °C) (Temporal)   Resp 16   SpO2 97%     Temp (24hrs), Av 2 °F (36 2 °C), Min:96 9 °F (36 1 °C), Max:97 5 °F (36 4 °C)      Physical Exam:     Lungs: Non labored breathing  Abdomen: Soft, non-tender    Lower "Extremity:  Cardiovascular status at baseline from admission  Neurological status at baseline from admission  Musculoskeletal status at baseline from admission  No calf tenderness noted  LLE: Sutures intact at bedside  No fluctuance, crepitus or erythema noted surrounding wound  Additional Data:     Labs:    Results from last 7 days   Lab Units 04/02/23  0525   WBC Thousand/uL 6 61   HEMOGLOBIN g/dL 9 4*   HEMATOCRIT % 33 0*   PLATELETS Thousands/uL 567*   NEUTROS PCT % 30*   LYMPHS PCT % 47*   MONOS PCT % 9   EOS PCT % 13*     Results from last 7 days   Lab Units 04/02/23  0525   POTASSIUM mmol/L 4 1   CHLORIDE mmol/L 104   CO2 mmol/L 28   BUN mg/dL 15   CREATININE mg/dL 0 73   CALCIUM mg/dL 9 3           * I Have Reviewed All Lab Data Listed Above  Recent Cultures (last 7 days):               Imaging: I have personally reviewed pertinent films in PACS  EKG, Pathology, and Other Studies: I have personally reviewed pertinent reports  ** Please Note: Portions of the record may have been created with voice recognition software  Occasional wrong word or \"sound a like\" substitutions may have occurred due to the inherent limitations of voice recognition software  Read the chart carefully and recognize, using context, where substitutions have occurred   **      "

## 2023-04-02 NOTE — PLAN OF CARE
Problem: MOBILITY - ADULT  Goal: Maintain or return to baseline ADL function  Description: INTERVENTIONS:  -  Assess patient's ability to carry out ADLs; assess patient's baseline for ADL function and identify physical deficits which impact ability to perform ADLs (bathing, care of mouth/teeth, toileting, grooming, dressing, etc )  - Assess/evaluate cause of self-care deficits   - Assess range of motion  - Assess patient's mobility; develop plan if impaired  - Assess patient's need for assistive devices and provide as appropriate  - Encourage maximum independence but intervene and supervise when necessary  - Involve family in performance of ADLs  - Assess for home care needs following discharge   - Consider OT consult to assist with ADL evaluation and planning for discharge  - Provide patient education as appropriate  Outcome: Progressing     Problem: PAIN - ADULT  Goal: Verbalizes/displays adequate comfort level or baseline comfort level  Description: Interventions:  - Encourage patient to monitor pain and request assistance  - Assess pain using appropriate pain scale  - Administer analgesics based on type and severity of pain and evaluate response  - Implement non-pharmacological measures as appropriate and evaluate response  - Consider cultural and social influences on pain and pain management  - Notify physician/advanced practitioner if interventions unsuccessful or patient reports new pain  Outcome: Progressing     Problem: SAFETY ADULT  Goal: Maintain or return to baseline ADL function  Description: INTERVENTIONS:  -  Assess patient's ability to carry out ADLs; assess patient's baseline for ADL function and identify physical deficits which impact ability to perform ADLs (bathing, care of mouth/teeth, toileting, grooming, dressing, etc )  - Assess/evaluate cause of self-care deficits   - Assess range of motion  - Assess patient's mobility; develop plan if impaired  - Assess patient's need for assistive devices and provide as appropriate  - Encourage maximum independence but intervene and supervise when necessary  - Involve family in performance of ADLs  - Assess for home care needs following discharge   - Consider OT consult to assist with ADL evaluation and planning for discharge  - Provide patient education as appropriate  Outcome: Progressing     Problem: DISCHARGE PLANNING  Goal: Discharge to home or other facility with appropriate resources  Description: INTERVENTIONS:  - Identify barriers to discharge w/patient and caregiver  - Arrange for needed discharge resources and transportation as appropriate  - Identify discharge learning needs (meds, wound care, etc )  - Arrange for interpretive services to assist at discharge as needed  - Refer to Case Management Department for coordinating discharge planning if the patient needs post-hospital services based on physician/advanced practitioner order or complex needs related to functional status, cognitive ability, or social support system  Outcome: Progressing     Problem: Knowledge Deficit  Goal: Patient/family/caregiver demonstrates understanding of disease process, treatment plan, medications, and discharge instructions  Description: Complete learning assessment and assess knowledge base    Interventions:  - Provide teaching at level of understanding  - Provide teaching via preferred learning methods  Outcome: Progressing     Problem: COPING  Goal: Pt/Family able to verbalize concerns and demonstrate effective coping strategies  Description: INTERVENTIONS:  - Assist patient/family to identify coping skills, available support systems and cultural and spiritual values  - Provide emotional support, including active listening and acknowledgement of concerns of patient and caregivers  - Reduce environmental stimuli, as able  - Provide patient education  - Assess for spiritual pain/suffering and initiate spiritual care, including notification of Pastoral Care or gisell based community as needed  - Assess effectiveness of coping strategies  Outcome: Progressing     Problem: CHANGE IN BODY IMAGE  Goal: Pt/Family communicate acceptance of loss or change in body image and expresses psychological comfort and peace  Description: INTERVENTIONS:  - Assess patient/family anxiety and grief process related to change in body image, loss of functional status and loss of sense of self  - Assess patient/family's coping skills and provide emotional, spiritual and psychosocial support  - Provide information about the patient's health status with consideration of family and cultural values  - Communicate willingness to discuss loss and facilitate grief process with patient/family as appropriate  - Emphasize sustaining relationships within family system and community, or gisell/spiritual traditions  - Refer to community support groups as appropriate  - Initiate Spiritual Care, Pastoral care or other ancillary consults as needed  Outcome: Progressing

## 2023-04-03 VITALS
TEMPERATURE: 97 F | RESPIRATION RATE: 20 BRPM | DIASTOLIC BLOOD PRESSURE: 71 MMHG | HEART RATE: 75 BPM | OXYGEN SATURATION: 95 % | SYSTOLIC BLOOD PRESSURE: 114 MMHG

## 2023-04-03 PROBLEM — R05.9 COUGH: Status: RESOLVED | Noted: 2023-03-27 | Resolved: 2023-04-03

## 2023-04-03 PROBLEM — L97.529 TYPE 2 DIABETES MELLITUS WITH LEFT DIABETIC FOOT ULCER (HCC): Status: RESOLVED | Noted: 2022-02-16 | Resolved: 2023-04-03

## 2023-04-03 PROBLEM — E11.621 TYPE 2 DIABETES MELLITUS WITH LEFT DIABETIC FOOT ULCER (HCC): Status: RESOLVED | Noted: 2022-02-16 | Resolved: 2023-04-03

## 2023-04-03 LAB
ANION GAP SERPL CALCULATED.3IONS-SCNC: 6 MMOL/L (ref 4–13)
BASOPHILS # BLD AUTO: 0.08 THOUSANDS/ÂΜL (ref 0–0.1)
BASOPHILS NFR BLD AUTO: 1 % (ref 0–1)
BUN SERPL-MCNC: 17 MG/DL (ref 5–25)
CALCIUM SERPL-MCNC: 9.4 MG/DL (ref 8.4–10.2)
CHLORIDE SERPL-SCNC: 104 MMOL/L (ref 96–108)
CO2 SERPL-SCNC: 27 MMOL/L (ref 21–32)
CREAT SERPL-MCNC: 0.73 MG/DL (ref 0.6–1.3)
EOSINOPHIL # BLD AUTO: 0.69 THOUSAND/ÂΜL (ref 0–0.61)
EOSINOPHIL NFR BLD AUTO: 11 % (ref 0–6)
ERYTHROCYTE [DISTWIDTH] IN BLOOD BY AUTOMATED COUNT: 24.4 % (ref 11.6–15.1)
FLUAV RNA RESP QL NAA+PROBE: NEGATIVE
FLUBV RNA RESP QL NAA+PROBE: NEGATIVE
GFR SERPL CREATININE-BSD FRML MDRD: 98 ML/MIN/1.73SQ M
GLUCOSE SERPL-MCNC: 139 MG/DL (ref 65–140)
GLUCOSE SERPL-MCNC: 162 MG/DL (ref 65–140)
GLUCOSE SERPL-MCNC: 169 MG/DL (ref 65–140)
GLUCOSE SERPL-MCNC: 171 MG/DL (ref 65–140)
HCT VFR BLD AUTO: 33.4 % (ref 34.8–46.1)
HGB BLD-MCNC: 9.3 G/DL (ref 11.5–15.4)
IMM GRANULOCYTES # BLD AUTO: 0.02 THOUSAND/UL (ref 0–0.2)
IMM GRANULOCYTES NFR BLD AUTO: 0 % (ref 0–2)
LYMPHOCYTES # BLD AUTO: 2.78 THOUSANDS/ÂΜL (ref 0.6–4.47)
LYMPHOCYTES NFR BLD AUTO: 44 % (ref 14–44)
MCH RBC QN AUTO: 20.4 PG (ref 26.8–34.3)
MCHC RBC AUTO-ENTMCNC: 27.8 G/DL (ref 31.4–37.4)
MCV RBC AUTO: 73 FL (ref 82–98)
MONOCYTES # BLD AUTO: 0.54 THOUSAND/ÂΜL (ref 0.17–1.22)
MONOCYTES NFR BLD AUTO: 9 % (ref 4–12)
NEUTROPHILS # BLD AUTO: 2.21 THOUSANDS/ÂΜL (ref 1.85–7.62)
NEUTS SEG NFR BLD AUTO: 35 % (ref 43–75)
NRBC BLD AUTO-RTO: 0 /100 WBCS
PLATELET # BLD AUTO: 485 THOUSANDS/UL (ref 149–390)
PMV BLD AUTO: 9.7 FL (ref 8.9–12.7)
POTASSIUM SERPL-SCNC: 4.3 MMOL/L (ref 3.5–5.3)
RBC # BLD AUTO: 4.57 MILLION/UL (ref 3.81–5.12)
RSV RNA RESP QL NAA+PROBE: NEGATIVE
SARS-COV-2 RNA RESP QL NAA+PROBE: NEGATIVE
SODIUM SERPL-SCNC: 137 MMOL/L (ref 135–147)
WBC # BLD AUTO: 6.32 THOUSAND/UL (ref 4.31–10.16)

## 2023-04-03 RX ORDER — PANTOPRAZOLE SODIUM 40 MG/1
40 TABLET, DELAYED RELEASE ORAL
Qty: 30 TABLET | Refills: 0
Start: 2023-04-03

## 2023-04-03 RX ORDER — LEVOTHYROXINE SODIUM 0.05 MG/1
50 TABLET ORAL DAILY
Qty: 30 TABLET | Refills: 0
Start: 2023-04-03

## 2023-04-03 RX ORDER — LANOLIN ALCOHOL/MO/W.PET/CERES
9 CREAM (GRAM) TOPICAL
Qty: 30 TABLET | Refills: 0
Start: 2023-04-03

## 2023-04-03 RX ORDER — LAMOTRIGINE 200 MG/1
250 TABLET ORAL EVERY MORNING
Qty: 30 TABLET | Refills: 0
Start: 2023-04-03

## 2023-04-03 RX ORDER — HYDROXYZINE 50 MG/1
100 TABLET, FILM COATED ORAL
Qty: 30 TABLET | Refills: 0
Start: 2023-04-03

## 2023-04-03 RX ADMIN — HYDROXYZINE HYDROCHLORIDE 50 MG: 25 TABLET, FILM COATED ORAL at 09:01

## 2023-04-03 RX ADMIN — PANTOPRAZOLE SODIUM 40 MG: 40 TABLET, DELAYED RELEASE ORAL at 06:01

## 2023-04-03 RX ADMIN — OXYCODONE HYDROCHLORIDE 10 MG: 10 TABLET ORAL at 15:39

## 2023-04-03 RX ADMIN — INSULIN LISPRO 2 UNITS: 100 INJECTION, SOLUTION INTRAVENOUS; SUBCUTANEOUS at 11:33

## 2023-04-03 RX ADMIN — PANTOPRAZOLE SODIUM 40 MG: 40 TABLET, DELAYED RELEASE ORAL at 15:40

## 2023-04-03 RX ADMIN — INSULIN LISPRO 5 UNITS: 100 INJECTION, SOLUTION INTRAVENOUS; SUBCUTANEOUS at 07:21

## 2023-04-03 RX ADMIN — OXYCODONE HYDROCHLORIDE 10 MG: 10 TABLET ORAL at 11:35

## 2023-04-03 RX ADMIN — METHOCARBAMOL 750 MG: 750 TABLET ORAL at 11:35

## 2023-04-03 RX ADMIN — CLONIDINE HYDROCHLORIDE 0.2 MG: 0.1 TABLET ORAL at 09:01

## 2023-04-03 RX ADMIN — INSULIN LISPRO 2 UNITS: 100 INJECTION, SOLUTION INTRAVENOUS; SUBCUTANEOUS at 07:21

## 2023-04-03 RX ADMIN — FOLIC ACID 1000 MCG: 1 TABLET ORAL at 09:01

## 2023-04-03 RX ADMIN — ACETAMINOPHEN 325MG 975 MG: 325 TABLET ORAL at 14:25

## 2023-04-03 RX ADMIN — ENOXAPARIN SODIUM 40 MG: 100 INJECTION SUBCUTANEOUS at 09:01

## 2023-04-03 RX ADMIN — DOCUSATE SODIUM 100 MG: 100 CAPSULE, LIQUID FILLED ORAL at 09:01

## 2023-04-03 RX ADMIN — OXYCODONE HYDROCHLORIDE 10 MG: 10 TABLET ORAL at 06:04

## 2023-04-03 RX ADMIN — METHOCARBAMOL 750 MG: 750 TABLET ORAL at 00:00

## 2023-04-03 RX ADMIN — ALBUTEROL SULFATE 2 PUFF: 90 AEROSOL, METERED RESPIRATORY (INHALATION) at 09:07

## 2023-04-03 RX ADMIN — INSULIN LISPRO 2 UNITS: 100 INJECTION, SOLUTION INTRAVENOUS; SUBCUTANEOUS at 15:40

## 2023-04-03 RX ADMIN — LEVOTHYROXINE SODIUM 50 MCG: 50 TABLET ORAL at 06:01

## 2023-04-03 RX ADMIN — FENOFIBRATE 48 MG: 48 TABLET, FILM COATED ORAL at 09:03

## 2023-04-03 RX ADMIN — LAMOTRIGINE 250 MG: 100 TABLET ORAL at 09:01

## 2023-04-03 RX ADMIN — ACETAMINOPHEN 325MG 975 MG: 325 TABLET ORAL at 05:55

## 2023-04-03 RX ADMIN — OXYCODONE HYDROCHLORIDE 10 MG: 10 TABLET ORAL at 00:47

## 2023-04-03 RX ADMIN — PREGABALIN 100 MG: 50 CAPSULE ORAL at 09:01

## 2023-04-03 RX ADMIN — METHOCARBAMOL 750 MG: 750 TABLET ORAL at 06:01

## 2023-04-03 RX ADMIN — ZIPRASIDONE HCL 80 MG: 40 CAPSULE ORAL at 09:03

## 2023-04-03 RX ADMIN — GUAIFENESIN AND DEXTROMETHORPHAN 10 ML: 100; 10 SYRUP ORAL at 05:55

## 2023-04-03 RX ADMIN — INSULIN LISPRO 5 UNITS: 100 INJECTION, SOLUTION INTRAVENOUS; SUBCUTANEOUS at 11:32

## 2023-04-03 RX ADMIN — INSULIN LISPRO 5 UNITS: 100 INJECTION, SOLUTION INTRAVENOUS; SUBCUTANEOUS at 15:40

## 2023-04-03 NOTE — CASE MANAGEMENT
Case Management Discharge Planning Note    Patient name Toya Wood  [facility] /[facility] -* MRN [id number]  : 1975 Date 4/3/2023       Current Admission Date: 3/21/2023  Current Admission Diagnosis:Left foot diabetic ulcer   Patient Active Problem List    Diagnosis Date Noted   • Cough 2023   • Homeless single person 2023   • Sepsis ([facility] ) 2023   • Morbid obesity with BMI of 40 0-44 9, adult ([facility] ) 2023   • Left foot diabetic ulcer 2022   • Obstructive sleep apnea 2022   • GERD (gastroesophageal reflux disease) 2021   • Migraine without status migrainosus, not intractable 2021   • Right foot ulcer, with fat layer exposed ([facility] ) 2021   • Arthritis 2021   • Chronic pain 2021   • OCD (obsessive compulsive disorder)    • PTSD (post-traumatic stress disorder)    • Essential hypertension 2021   • Acquired absence of other left toe(s) ([facility] ) 2019   • Diabetic foot ulcer ([facility] ) 09/10/2019   • Type 2 diabetes mellitus with neurologic complication, without long-term current use of insulin ([facility] ) 2019   • Hypothyroidism 2019   • Mood disorder ([facility] ) 2019   • 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, with bone involvement without evidence of necrosis ([facility] ) 2019   • H/O bariatric surgery 2019   • Anxiety 2019      LOS (days): 13  Geometric Mean LOS (GMLOS) (days): 5 60  Days to GMLOS:-7 2     OBJECTIVE:  Risk of Unplanned Readmission Score: 15 12         Current admission status: Inpatient   Preferred Pharmacy:   [facility] [place] [address]  Phone: [phone] Fax: [phone]    Primary Care Provider: Donita Aly MD    Primary Insurance: [address] [place] [address]  Secondary Insurance:     DISCHARGE DETAILS: [address] [place] [address] Number: [id number]

## 2023-04-03 NOTE — OCCUPATIONAL THERAPY NOTE
Occupational Therapy Progress Note     Patient Name: Pilar Solomon  DDJEN'D Date: 4/3/2023  Problem List  Principal Problem:    Left foot diabetic ulcer  Active Problems:    Hypothyroidism    Mood disorder ([address] )    Type 2 diabetes mellitus with neurologic complication, without long-term current use of insulin (HCC)    Essential hypertension    GERD (gastroesophageal reflux disease)    Morbid obesity with BMI of 40 0-44 9, adult [facility])    Homeless single person    Cough            04/03/23 1209   OT Last Visit   OT Visit Date 04/03/23   Note Type   Note Type Treatment   Pain Assessment   Pain Assessment Tool 0-10   Pain Score 8   Pain Location/Orientation Orientation: Right;Location: Leg  (phantom pain)   Hospital Pain Intervention(s) Ambulation/increased activity;Repositioned; Emotional support   Restrictions/Precautions   Weight Bearing Precautions Per Order Yes   RLE Weight Bearing Per Order NWB   LLE Weight Bearing Per Order PWB  (to heel  for transfers only)   Other Precautions Cognitive; Chair Alarm; Bed Alarm;WBS;Pain; Fall Risk;Multiple lines   ADL   Where Assessed Chair   Grooming Assistance 6  Modified Independent   Grooming Deficit Setup; Wash/dry face; Wash/dry hands  (while sitting at sink)   Grooming Comments w/c level   UB Dressing Assistance 5  Supervision/Setup   LB Dressing Assistance 4  Minimal Assistance   LB Dressing Deficit Setup;Supervision/safety;Verbal cueing; Increased time to complete   LB Dressing Comments educated on weightshifting side to side for LB Dressing and pt w/ min assist if standing w/ heel WB only on L LE   Toileting Assistance  5  Supervision/Setup   Toileting Deficit Setup; Increased time to complete; Bedside commode;Clothing management up;Clothing management down;Supervison/safety   Toileting Comments while seated and weight shifting   Transfers   Sit to Stand 4  Minimal assistance   Additional items Assist x 1; Increased time required;Verbal cues;Armrests   Stand to Sit 4  Minimal "assistance   Additional items Assist x 1; Increased time required;Verbal cues;Armrests   Toilet transfer 4  Minimal assistance   Additional items Assist x 1; Increased time required;Verbal cues; Commode;Armrests   Additional Comments cues for maintaining PWB to heel only during transfers, educated on safer transfer technique strategies and pt states she has her own technique that works well   Functional Mobility   Functional Mobility 5  Supervision   Additional Comments w/c level assist w/ managing Leg rests, able to avoid obstacles in homelike environment   Therapeutic Exercise - ROM   UE-ROM Yes   ROM- Right Upper Extremities   R Shoulder AROM; Flexion; Extension;Horizontal ABduction   R Elbow AROM;Elbow flexion;Elbow extension   R Position Seated  (resistance provided)   R Weight/Reps/Sets 15 reps   RUE ROM Comment 10 reps of wheelchair pushups w/ UE only while maintaining NWB L LE   ROM - Left Upper Extremities    L Shoulder AROM; Flexion; Extension;Horizontal ABduction   L Elbow AROM;Elbow flexion;Elbow extension   L Position Seated  (w/ resistance)   L Weight/Reps/Sets 15 reps   LUE ROM Comment 10 reps of w/c pushups while maintaining NWB L LE   Subjective   Subjective \"yeah I want to get out of the room\"   Cognition   Arousal/Participation Responsive; Cooperative; Alert   Attention Within functional limits   Orientation Level Oriented X4   Memory Within functional limits   Following Commands Follows one step commands without difficulty   Comments pleasant and cooperative, engages in appropriate conversations, motivated to get better   Additional Activities   Additional Activities   (education on positioning and safety)   Additional Activities Comments pt receptive   Activity Tolerance   Activity Tolerance Patient limited by fatigue;Patient limited by pain;Treatment limited secondary to medical complications (Comment)   Medical Staff Made Aware appropriate to see per [facility]   Assessment   Assessment Pt seen for " skilled OT session focused on ADLs, functional transfers and mobility and pt education  Pt seated upright in chair upon arrival requesting to use bathroom  Pt is now allowed PWB to L heel only for transfers  Pt w/ MIN assist x1 SPT to bedside commode w/ cues for safety and WB to heel only  Pt w/ supervision toileting hygiene while seated  Pt w/ MIN assist SPT to w/c from commode w/ cues to maintain PwB to heel  Pt w/ MIN assist LB ADLs 2* impaired balance and educated on weightshifitng side to side  Pt w/ transfer to wheel w/ min assist  Pt w/ cues for safety and positioning  Pt w/ supervision w/c mobility in homelike environment w/ cues for safety w/ brakes and leg rests  Pt MOD I grooming at sink in w/c level  Pt completed b/l UE exercises seen above to increase strength and endurance for ADLs, functional transfers and mobility  Pt seated upright in chair w/ all needs met end of session  Pt continues to be limited due to increased pain, impaired balance, WB to L heel only for transfers, phantom limb pain R LE, impaired functional reach, decreased strength and endurance  Recommend STR when medically stable  Will continue to follow  The patient's raw score on the AM-PAC Daily Activity Inpatient Short Form is 20  A raw score of less than 19 suggests the patient may benefit from discharge to post-acute rehabilitation services  Please refer to the recommendation of the Occupational Therapist for safe discharge planning  Plan   Treatment Interventions ADL retraining;UE strengthening/ROM; Functional transfer training; Endurance training;Cognitive reorientation;Equipment evaluation/education;Patient/family training; Compensatory technique education; Activityengagement; Energy conservation   Goal Expiration Date 04/07/23   OT Treatment Day 3   OT Frequency 2-3x/wk   Recommendation   OT Discharge Recommendation Post acute rehabilitation services   [facility] Daily Activity Inpatient   Lower Body Dressing 3   Bathing 3   Toileting 3   Upper Body Dressing 3   Grooming 4   Eating 4   Daily Activity Raw Score 20   Daily Activity Standardized Score (Calc for Raw Score >=11) 42 03   AM-PAC Applied Cognition Inpatient   Following a Speech/Presentation 4   Understanding Ordinary Conversation 4   Taking Medications 3   Remembering Where Things Are Placed or Put Away 3   Remembering List of 4-5 Errands 3   Taking Care of Complicated Tasks 2   Applied Cognition Raw Score 19   Applied Cognition Standardized Score 39 77   Modified Bi Scale   Modified Bi Scale 4     Documentation completed by: Frank Barlow MS, OTR/L

## 2023-04-03 NOTE — UTILIZATION REVIEW
Continued Stay Review    Date: 2023                         Current Patient Class: Inpatient  Current Level of Care: Med/Surg    HPI:48 y o  female initially admitted on  2023  Procedure:  RAY RESECTION FOOT [64866 (CPT®)]  AMPUTATION BELOW KNEE (BKA)  Assessment/Plan:  L plantar hallux ulceration  L hallux osteomyelitis:  - s/p L First ray Resection 3/23/23, -s/p bedside delayed primary closure 3/31/23  R TMA wounds with osteomyelitis:  - s/p R BKA 3/23/23  T2DM  Continue local wound care to left lower extremity consisting of Betadine Adaptic DSD  Continue to monitor for S/S of infection  Continue to monitor off Abx  Elevation and off loading on wedges and pillows when non-ambulatory      Vital Signs:   /92 (BP Location: Left arm)   Pulse 66   Temp (!) 96 9 °F (36 1 °C) (Temporal)   Resp 16   SpO2 97%      Temp (24hrs), Av 2 °F (36 2 °C), Min:96 9 °F (36 1 °C), Max:97 5 °F (36 4 °C)    Pertinent Labs/Diagnostic Results:   Results from last 7 days   Lab Units 23  1400   SARS-COV-2  Negative     Results from last 7 days   Lab Units 2325 23  0515   WBC Thousand/uL 6 32 6 61 7 50 7 75 7 80   HEMOGLOBIN g/dL 9 3* 9 4* 9 7* 9 7* 9 2*   HEMATOCRIT % 33 4* 33 0* 33 6* 34 4* 32 1*   PLATELETS Thousands/uL 485* 567* 606* 637* 622*   NEUTROS ABS Thousands/µL 2 21 2 01 2 72 2 85 2 72     Results from last 7 days   Lab Units 23  0525 23  0515   SODIUM mmol/L 137 137 137 137 136   POTASSIUM mmol/L 4 3 4 1 4 2 4 4 4 3   CHLORIDE mmol/L 104 104 103 103 103   CO2 mmol/L 27 28 27 27 26   ANION GAP mmol/L 6 5 7 7 7   BUN mg/dL 17 15 15 17 16   CREATININE mg/dL 0 73 0 73 0 74 0 75 0 79   EGFR ml/min/1 73sq m 98 98 96 95 89   CALCIUM mg/dL 9 4 9 3 8 8 8 8 9 0     Results from last 7 days   Lab Units 23  1511 23  1047 23  0634 23  2192 04/02/23  1519 04/02/23  1118 04/02/23  0645 04/01/23  2049 04/01/23  1548 04/01/23  1120 04/01/23  0631 03/31/23  2109   POC GLUCOSE mg/dl 162* 171* 169* 255* 212* 176* 143* 241* 173* 151* 161* 176*     Results from last 7 days   Lab Units 04/03/23  0447 04/02/23  0525 04/01/23  0422 03/31/23  0446 03/30/23  0515 03/29/23  0447 03/28/23  0450   GLUCOSE RANDOM mg/dL 139 125 143* 152* 135 131 122       Results from last 7 days   Lab Units 04/01/23  0422   PROCALCITONIN ng/ml 0 09       Results from last 7 days   Lab Units 04/03/23  1400   INFLUENZA A PCR  Negative   INFLUENZA B PCR  Negative   RSV PCR  Negative     Medications:   Scheduled Medications:  acetaminophen, 975 mg, Oral, Q8H [address]  cloNIDine, 0 2 mg, Oral, BID  docusate sodium, 100 mg, Oral, BID  enoxaparin, 40 mg, Subcutaneous, BID  fenofibrate, 48 mg, Oral, Daily  folic acid, 3,867 mcg, Oral, Daily  hydrOXYzine HCL, 100 mg, Oral, HS  hydrOXYzine HCL, 50 mg, Oral, Daily  insulin lispro, 1-5 Units, Subcutaneous, HS  insulin lispro, 2-12 Units, Subcutaneous, TID AC  insulin lispro, 5 Units, Subcutaneous, TID With Meals  lamoTRIgine, 250 mg, Oral, QAM  levothyroxine, 50 mcg, Oral, Early Morning  melatonin, 9 mg, Oral, HS  methocarbamol, 750 mg, Oral, Q6H RADHA  pantoprazole, 40 mg, Oral, BID AC  potassium-sodium phosphates, 2 packet, Oral, Once  pregabalin, 100 mg, Oral, Q12H RADHA  ziprasidone, 80 mg, Oral, BID With Meals      Continuous IV Infusions:     PRN Meds:  albuterol, 2 puff, Inhalation, Q4H PRN  dextromethorphan-guaiFENesin, 10 mL, Oral, Q4H PRN  HYDROmorphone, 0 5 mg, Intravenous, Q3H PRN  naloxone, 0 04 mg, Intravenous, Q1MIN PRN  ondansetron, 4 mg, Intravenous, Q4H PRN  oxyCODONE, 10 mg, Oral, Q4H PRN  oxyCODONE, 5 mg, Oral, Q4H PRN  polyethylene glycol, 17 g, Oral, Daily PRN        Discharge Plan: D - Rehab    Network Utilization Review Department  ATTENTION: Please call with any questions or concerns to [phone] and carefully listen to the prompts so that you are directed to the right person  All voicemails are confidential   Rell Rivera all requests for admission clinical reviews, approved or denied determinations and any other requests to dedicated fax number below belonging to the campus where the patient is receiving treatment   List of dedicated fax numbers for the Facilities:  [address] DENIALS (Administrative/Medical Necessity) [phone]   [address] (Maternity/NICU/Pediatrics) [phone]   [address] [phone]   [facility] [address] [phone]   [address] [place] [address] [address] [facility] [phone]   [facility] [phone]

## 2023-04-03 NOTE — PLAN OF CARE
Problem: MOBILITY - ADULT  Goal: Maintain or return to baseline ADL function  Description: INTERVENTIONS:  -  Assess patient's ability to carry out ADLs; assess patient's baseline for ADL function and identify physical deficits which impact ability to perform ADLs (bathing, care of mouth/teeth, toileting, grooming, dressing, etc )  - Assess/evaluate cause of self-care deficits   - Assess range of motion  - Assess patient's mobility; develop plan if impaired  - Assess patient's need for assistive devices and provide as appropriate  - Encourage maximum independence but intervene and supervise when necessary  - Involve family in performance of ADLs  - Assess for home care needs following discharge   - Consider OT consult to assist with ADL evaluation and planning for discharge  - Provide patient education as appropriate  Outcome: Completed  Goal: Maintains/Returns to pre admission functional level  Description: INTERVENTIONS:  - Perform BMAT or MOVE assessment daily    - Set and communicate daily mobility goal to care team and patient/family/caregiver  - Collaborate with rehabilitation services on mobility goals if consulted  - Perform Range of Motion  times a day  - Reposition patient every 3 hours    - Dangle patient 3times a day  - Stand patient 3times a day  - Ambulate patient 3 times a day  - Out of bed to chair 3times a day   - Out of bed for meals 3 times a day  - Out of bed for toileting  - Record patient progress and toleration of activity level   Outcome: Completed

## 2023-04-03 NOTE — PHYSICAL THERAPY NOTE
Physical Therapy Cancellation Note    PT session cancelled after chart review as per RN patient will be DC later today

## 2023-04-03 NOTE — PROGRESS NOTES
Progress Note - [facility] Infectious Disease   Mliss Mow 53 y o  female MRN: [id number]  Unit/Bed#: E2 -01 Encounter: [id number]      IMPRESSION & RECOMMENDATIONS:   1  Polymicrobial Left Foot Diabetic Ulcer Infection  Patient is now s/p left 1st hallux resection 3/23  No OR cultures sent, clean margins felt to be obtained  Pathology without evidence of OM  Per discussion with podiatry, surgical source control is assumed from bone infection however they did notice some purulence on inspection of wound 3/27 which was washed out at bedside  Wound cx from 3/21 grew Group F Strep, MSSA and Alcaligenes faecalis  Blood cultures negative  Pt completed 5 day course of post op IV ceftriaxone  She remains stable off abx    -continue to monitor off abx   -ongoing wound care and monitoring of foot per Podiatry      2  Right TMA diabetic wounds with OM  Superficial cx from 3/21 grew Group F Strep, MSSA, Alcaligenes faecalis and Prevotella  Now s/p source control with BKA 3/23/23  She had some erythema at BKA stump site that is stable per Vascular team  No drainage from staple site     -continue to monitor site closely  -if worsening erythema or pain, consider CT scan to assess for hematoma vs  collection as BKA site  -appreciate Vascular surgery input; please alert ID if concerned for infection     3  Penicillin Allergy  Listed as rash  Tolerates cephalosporins  Patient tells me she had a rash/hives as a child, does not think had any anaphylaxis and no trial of penicillin as an adult   -likely has outgrown Penicillin allergy  -can consider Amoxicillin challenge in future     4  T2DM   - risk factor for #1 and #2    Antibiotics:  D3 off systemic abx     I have discussed the above management plan in detail with patient  I have discussed the above management plan in detail with patient's RN, and the primary service, podiatry resident  24 Hour events:  Yesterday and overnight notes reviewed  Pt stable for discharge  Subjective:  Patient has no fever, chills, sweats; no nausea, vomiting, diarrhea; no cough, shortness of breath; no pain  No new symptoms  Continues to have some pain in L foot and mild cramping in RLE  Objective:  Vitals:  Temp:  [97 °F (36 1 °C)-97 4 °F (36 3 °C)] 97 4 °F (36 3 °C)  HR:  [60-80] 60  Resp:  [17-20] 20  BP: (111-154)/(61-86) 124/61  SpO2:  [97 %-98 %] 98 %  Temp (24hrs), Av 1 °F (36 2 °C), Min:97 °F (36 1 °C), Max:97 4 °F (36 3 °C)  Current: Temperature: (!) 97 4 °F (36 3 °C)    PHYSICAL EXAM:  General Appearance:  Appearing well, nontoxic, and in no distress   HEENT: Normocephalic, without obvious abnormality, atraumatic  Conjunctiva pink and sclera anicteric  Oropharynx moist without lesions  Lungs:   Clear to auscultation bilaterally, respirations unlabored   Heart:  RRR; no murmur, rub or gallop   Abdomen:   Soft, non-tender, non-distended, positive bowel sounds    Extremities: R BKA  Dressing intact without streaking drainage  Musculoskeletal: Back symmetric without curvature, ROM normal     Skin: No rashes or lesions  No draining wounds noted  Left foot surgical site with sutures intact, no erythema or drainage noted during dressing change  Peripheral IV intact without evidence of erythema, warmth, or exudate  LABS, IMAGING, & OTHER STUDIES:  Extensive review of the medical records in epic including review of the notes, radiographs, and laboratory results were reviewed personally as below  Lab Results:  I have personally reviewed pertinent labs    Results from last 7 days   Lab Units 23  0525 23  0422   WBC Thousand/uL 6 32 6 61 7 50   HEMOGLOBIN g/dL 9 3* 9 4* 9 7*   PLATELETS Thousands/uL 485* 567* 606*     Results from last 7 days   Lab Units 23  0525 23  0422   SODIUM mmol/L 137 137 137   POTASSIUM mmol/L 4 3 4 1 4 2   CHLORIDE mmol/L 104 104 103   CO2 mmol/L 27 28 27   BUN mg/dL 17 15 15   CREATININE mg/dL 0  73 0 73 0 74   EGFR ml/min/1 73sq m 98 98 96   CALCIUM mg/dL 9 4 9 3 8 8         Results from last 7 days   Lab Units 04/01/23  0422   PROCALCITONIN ng/ml 0 09                   Imaging Studies:   I have personally reviewed pertinent imaging study reports and images in PACS  Other Studies:   I have personally reviewed pertinent report including MRI, wound cultures  I have spent a total time of 50 minutes on 04/03/23 in caring for this patient including Impressions, Counseling / Coordination of care, Documenting in the medical record, Obtaining or reviewing history   and Communicating with other healthcare professionals

## 2023-04-03 NOTE — DISCHARGE SUMMARY
PODIATRY DISCHARGE SUMMARY     Patient Name: Ottoniel Curiel   Age & Sex: 53 y o  female   MRN: [id number]  Unit/Bed#: E2 -01   Encounter: [id number]  Length of Stay: 13 days    Active Problems:    Hypothyroidism    Mood disorder ([place] [address] )    Type 2 diabetes mellitus with neurologic complication, without long-term current use of insulin ([place] [address] )    Essential hypertension    GERD (gastroesophageal reflux disease)    Morbid obesity with BMI of 40 0-44 9, adult ([place] [address] )    Homeless single person      HPI from Admission:  Ottoniel Curiel is a 53 y o  female with pmh of multiple psychiatric issues, T2DM, HTN, and bilateral foot ulcerations who presents with ulcerations to bilateral feet  She states that these ulcerations have been present for a long time  She complains of recently intensifying pain to the right foot  She states that the foot has become increasingly red and swollen as well lately  Patient denies nausea, vomiting, chest pain, shortness of breath, chills, fever  [address] is a 53 y o  female who was admitted on 3/21/2023 for bilateral foot ulcerations worsening  Patient was admitted, bilateral foot x-rays taken  Right foot x-ray with extensive bone/joint erosion noted throughout entire forefoot to the level of the Chopart's joint, right foot MRI concerning for abscess and extensive osteomyelitis  General surgery was consulted for right proximal amputation as foot was deemed nonsalvageable  Left foot MRI with low concern for osteomyelitis, complete tear of flexor hallucis longus tendon  Patient underwent left partial first ray amputation on 3/23 with Dr Abhi Liu  Patient underwent right below-knee amputation on 3/23 with Dr Donato Gaspar  Infectious disease team was consulted for antibiotic recommendations, patient completed full course of IV antibiotics postop    Patient was recommended to short term rehab, and is medically stable for discharge at this time     DISCHARGE INFORMATION     PCP at Discharge: Evelyne Hendrix MD    Admitting Provider: Dr Nikki Villalta  Admission Date: 3/21/2023     Discharge Provider: Dr Nikki Villalta  Discharge Date: 04/03/23    Discharge Disposition: Acute Rehab at [facility] rehab  Discharge Condition: Stable  Discharge with Lines: No  Discharge Diet: Diabetic  Activity Restrictions: WBAT left foot in surgical shoe  Test Results Pending at Discharge: None  Medications at Discharge: See after visit summary for reconciled discharge medications provided to patient and family  Discharge Diagnoses: Active Problems:    Hypothyroidism    Mood disorder ([address] )    Type 2 diabetes mellitus with neurologic complication, without long-term current use of insulin ([facility])    Essential hypertension    GERD (gastroesophageal reflux disease)    Morbid obesity with BMI of 40 0-44 9, adult [facility])    Homeless single person      Consulting Providers:  General surgery, Infectious disease and Internal medicine    Diagnostic Imaging Performed:  XR foot 3+ vw left    Result Date: 3/28/2023  Impression: Expected postsurgical changes from the amputation of the 1st toe Workstation performed: [id number]     MRI foot/forefoot toes left wo contrast    Result Date: 3/22/2023  Impression: 1  Plantar and medial skin ulceration noted at the level of the 1st MTP with associated cellulitis but no evidence of drainable abscess collection  Minimal marrow changes in this region or low suspicion for osteomyelitis  2   Status post 3rd transmetatarsal amputation with plantar skin ulceration at the level of the residual stump with associated cellulitis  No marrow change involving the 3rd metatarsal to indicate osteomyelitis at this time  3   Complete tear of the flexor hallucis longus tendon as detailed above with 3 8 cm tendon retraction  The possibility of pathologic tendon rupture should be considered given likely infective cellulitis and plantar skin ulceration in this region  Additionally, there is sesamoid bone splaying suggesting a tear of the intersesamoid ligament, again possibly pathologic  The study was marked in [facility] for immediate notification  Workstation performed: [id number]     MRI ankle/heel right wo contrast    Result Date: 3/22/2023  Impression: Status post 1st through 5th transmetatarsal amputations with prominent skin ulceration at the distal aspect of the residual forefoot with associated cellulitis  Additionally, there is a dorsal midfoot fluid collection suspicious for abscess collection that appears to drain to the ulcerated skin surface as noted above  Changes of chronic Charcot arthropathy with extensive T1 replacement signal throughout the residual 1st through 5th metatarsals, cuboid, cuneiforms, navicular and distal aspect of the talus concerning for superimposed osteomyelitis  The study was marked in [facility] for immediate notification  Workstation performed: [id number]     MRI ankle/heel left  wo contrast    Result Date: 3/22/2023  Impression: No findings of osteomyelitis in the hindfoot, midfoot and in the visualized metatarsals The forefoot, toes not included in the images Flexor hallucis longus tenosynovitis  Incomplete assessment of the distal most aspect of the flexor hallucis longus  Tenosynovitis posterior tibialis Workstation performed: [id number]       Procedures Performed:  Procedure(s):  RAY RESECTION FOOT  AMPUTATION BELOW KNEE (BKA)      Code Status: Level 1 - Full Code  Advance Directive and Living Will:      Power of :    POLST:      FOLLOW-UP     PCP Outpatient Follow-up:  Yes    DPM Providers Follow-up:  Dr Monique Bush Requiring Follow-Up:  S/p right BKA and s/p left first ray resection    Discharge Statement:  I spent 25 minutes discharging the patient  This time was spent on the day of discharge  I had direct contact with the patient on the day of discharge   Additional documentation is required if more than 30 "minutes were spent on discharge  Portions of the record may have been created with voice recognition software  Occasional wrong word or \"sound a like\" substitutions may have occurred due to the inherent limitations of voice recognition software    Read the chart carefully and recognize, using context, where substitutions have occurred   ==  Graciela Alatorre, [facility]  Podiatric Medicine & Surgery         "

## 2023-04-03 NOTE — CASE MANAGEMENT
Case Management Discharge Planning Note    Patient name Abdullahi Castro  Location [facility] 2 /E2 -* MRN [id number]  : 1975 Date 4/3/2023       Current Admission Date: 3/21/2023  Current Admission Diagnosis:Left foot diabetic ulcer   Patient Active Problem List    Diagnosis Date Noted   • Cough 2023   • Homeless single person 2023   • Sepsis ([facility] ) 2023   • Morbid obesity with BMI of 40 0-44 9, adult ([place] [address] ) 2023   • Left foot diabetic ulcer 2022   • Obstructive sleep apnea 2022   • GERD (gastroesophageal reflux disease) 2021   • Migraine without status migrainosus, not intractable 2021   • Right foot ulcer, with fat layer exposed ([place] [address] ) 2021   • Arthritis 2021   • Chronic pain 2021   • OCD (obsessive compulsive disorder)    • PTSD (post-traumatic stress disorder)    • Essential hypertension 2021   • Acquired absence of other left toe(s) ([place] [address] ) 2019   • Diabetic foot ulcer ([place] [address] ) 09/10/2019   • Type 2 diabetes mellitus with neurologic complication, without long-term current use of insulin ([place] [address] ) 2019   • Hypothyroidism 2019   • Mood disorder ([place] [address] ) 2019   • 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, with bone involvement without evidence of necrosis ([place] [address] ) 2019   • H/O bariatric surgery 2019   • Anxiety 2019      LOS (days): 13  Geometric Mean LOS (GMLOS) (days): 5 60  Days to GMLOS:-7 3     OBJECTIVE:  Risk of Unplanned Readmission Score: 15 12         Current admission status: Inpatient   Preferred Pharmacy:   [address], [address] [facility] [address] [address]  Phone: [phone] Fax: [phone]    Primary Care Provider: Grace Negrete MD    Primary Insurance: Ed Gonzalez  Secondary Insurance:     DISCHARGE DETAILS:    Treatment Team Recommendation: Short Term Rehab  Discharge Destination Plan[de-identified] Short Term Rehab  Transport at Discharge : [address]: Yes  Number/Name of Dispatcher: Emory Massey [phone]  Transported by [employer] and Unit #): Dominique  ETA of Transport (Date): 04/03/23  ETA of Transport (Time): [address]  [address], [address] : [facility]  Receiving Facility/Agency Phone Number: [phone]  Facility/Agency Fax Number: [phone]     Additional Comments:   Pt is medically cleared for discharge  CM Discharge Support forwarded authorization for Critical access hospital to this   CM called Orion Rodarte with Critical access hospital ([phone]) they can accept this pt today pending a negative COVID test       CM scheduled a WC Van via Jovana Fox accepted for 4PM   The following were notified: pt, RN, MD and Critical access hospital

## 2023-04-03 NOTE — CASE MANAGEMENT
Received email from [facility] inquiring on the status of the would vac since a new script needed to be filled out and order completed  Responded stating I will follow up as patient was also pending for SNF   And the wound vac was ordered just in case she went home       As of today patient is still in the hospital

## 2023-04-03 NOTE — PLAN OF CARE
Problem: MOBILITY - ADULT  Goal: Maintain or return to baseline ADL function  Description: INTERVENTIONS:  -  Assess patient's ability to carry out ADLs; assess patient's baseline for ADL function and identify physical deficits which impact ability to perform ADLs (bathing, care of mouth/teeth, toileting, grooming, dressing, etc )  - Assess/evaluate cause of self-care deficits   - Assess range of motion  - Assess patient's mobility; develop plan if impaired  - Assess patient's need for assistive devices and provide as appropriate  - Encourage maximum independence but intervene and supervise when necessary  - Involve family in performance of ADLs  - Assess for home care needs following discharge   - Consider OT consult to assist with ADL evaluation and planning for discharge  - Provide patient education as appropriate  Outcome: Progressing  Goal: Maintains/Returns to pre admission functional level  Description: INTERVENTIONS:  - Perform BMAT or MOVE assessment daily    - Set and communicate daily mobility goal to care team and patient/family/caregiver  - Collaborate with rehabilitation services on mobility goals if consulted  - Perform Range of Motion 3 times a day  - Reposition patient every 2 hours    - Dangle patient 3 times a day  - Stand patient 3 times a day  - Ambulate patient 3 times a day  - Out of bed to chair 3 times a day   - Out of bed for meals 3 times a day  - Out of bed for toileting  - Record patient progress and toleration of activity level   Outcome: Progressing     Problem: Prexisting or High Potential for Compromised Skin Integrity  Goal: Skin integrity is maintained or improved  Description: INTERVENTIONS:  - Identify patients at risk for skin breakdown  - Assess and monitor skin integrity  - Assess and monitor nutrition and hydration status  - Monitor labs   - Assess for incontinence   - Turn and reposition patient  - Assist with mobility/ambulation  - Relieve pressure over bony prominences  - Avoid friction and shearing  - Provide appropriate hygiene as needed including keeping skin clean and dry  - Evaluate need for skin moisturizer/barrier cream  - Collaborate with interdisciplinary team   - Patient/family teaching  - Consider wound care consult   Outcome: Progressing     Problem: PAIN - ADULT  Goal: Verbalizes/displays adequate comfort level or baseline comfort level  Description: Interventions:  - Encourage patient to monitor pain and request assistance  - Assess pain using appropriate pain scale  - Administer analgesics based on type and severity of pain and evaluate response  - Implement non-pharmacological measures as appropriate and evaluate response  - Consider cultural and social influences on pain and pain management  - Notify physician/advanced practitioner if interventions unsuccessful or patient reports new pain  Outcome: Progressing     Problem: INFECTION - ADULT  Goal: Absence or prevention of progression during hospitalization  Description: INTERVENTIONS:  - Assess and monitor for signs and symptoms of infection  - Monitor lab/diagnostic results  - Monitor all insertion sites, i e  indwelling lines, tubes, and drains  - Monitor endotracheal if appropriate and nasal secretions for changes in amount and color  - [place] appropriate cooling/warming therapies per order  - Administer medications as ordered  - Instruct and encourage patient and family to use good hand hygiene technique  - Identify and instruct in appropriate isolation precautions for identified infection/condition  Outcome: Progressing  Goal: Absence of fever/infection during neutropenic period  Description: INTERVENTIONS:  - Monitor WBC    Outcome: Progressing     Problem: SAFETY ADULT  Goal: Maintain or return to baseline ADL function  Description: INTERVENTIONS:  -  Assess patient's ability to carry out ADLs; assess patient's baseline for ADL function and identify physical deficits which impact ability to perform ADLs (bathing, care of mouth/teeth, toileting, grooming, dressing, etc )  - Assess/evaluate cause of self-care deficits   - Assess range of motion  - Assess patient's mobility; develop plan if impaired  - Assess patient's need for assistive devices and provide as appropriate  - Encourage maximum independence but intervene and supervise when necessary  - Involve family in performance of ADLs  - Assess for home care needs following discharge   - Consider OT consult to assist with ADL evaluation and planning for discharge  - Provide patient education as appropriate  Outcome: Progressing  Goal: Maintains/Returns to pre admission functional level  Description: INTERVENTIONS:  - Perform BMAT or MOVE assessment daily    - Set and communicate daily mobility goal to care team and patient/family/caregiver     - Collaborate with rehabilitation services on mobility goals if consulted  - Out of bed for toileting  - Record patient progress and toleration of activity level   Outcome: Progressing  Goal: Patient will remain free of falls  Description: INTERVENTIONS:  - Educate patient/family on patient safety including physical limitations  - Instruct patient to call for assistance with activity   - Consult OT/PT to assist with strengthening/mobility   - Keep Call bell within reach  - Keep bed low and locked with side rails adjusted as appropriate  - Keep care items and personal belongings within reach  - Initiate and maintain comfort rounds  - Make Fall Risk Sign visible to staff  - Offer Toileting every 2 Hours, in advance of need  - Initiate/Maintain bed alarm  - Obtain necessary fall risk management equipment:   - Apply yellow socks and bracelet for high fall risk patients  - Consider moving patient to room near nurses station  Outcome: Progressing     Problem: DISCHARGE PLANNING  Goal: Discharge to home or other facility with appropriate resources  Description: INTERVENTIONS:  - Identify barriers to discharge w/patient and caregiver  - Arrange for needed discharge resources and transportation as appropriate  - Identify discharge learning needs (meds, wound care, etc )  - Arrange for interpretive services to assist at discharge as needed  - Refer to Case Management Department for coordinating discharge planning if the patient needs post-hospital services based on physician/advanced practitioner order or complex needs related to functional status, cognitive ability, or social support system  Outcome: Progressing     Problem: Knowledge Deficit  Goal: Patient/family/caregiver demonstrates understanding of disease process, treatment plan, medications, and discharge instructions  Description: Complete learning assessment and assess knowledge base    Interventions:  - Provide teaching at level of understanding  - Provide teaching via preferred learning methods  Outcome: Progressing     Problem: SKIN/TISSUE INTEGRITY - ADULT  Goal: Skin Integrity remains intact(Skin Breakdown Prevention)  Description: Assess:  -Perform Yogi assessment every shift   -Clean and moisturize skin every shift  -Inspect skin when repositioning, toileting, and assisting with ADLS  -Assess extremities for adequate circulation and sensation     Bed Management:  -Have minimal linens on bed & keep smooth, unwrinkled  -Change linens as needed when moist or perspiring  -Avoid sitting or lying in one position for more than 2 hours while in bed  -Keep HOB at 30 degrees     Toileting:  -Offer bedside commode  -Assess for incontinence every shift       Skin Care:  -Avoid use of baby powder, tape, friction and shearing, hot water or constrictive clothing  -Relieve pressure over bony prominences using pillows   -Do not massage red bony areas    Outcome: Progressing  Goal: Incision(s), wounds(s) or drain site(s) healing without S/S of infection  Description: INTERVENTIONS  - Assess and document dressing, incision, wound bed, drain sites and surrounding tissue  - Provide patient and family education  - Perform skin care/dressing changes every day  Outcome: Progressing     Problem: Nutrition/Hydration-ADULT  Goal: Nutrient/Hydration intake appropriate for improving, restoring or maintaining nutritional needs  Description: Monitor and assess patient's nutrition/hydration status for malnutrition  Collaborate with interdisciplinary team and initiate plan and interventions as ordered  Monitor patient's weight and dietary intake as ordered or per policy  Utilize nutrition screening tool and intervene as necessary  Determine patient's food preferences and provide high-protein, high-caloric foods as appropriate       INTERVENTIONS:  - Monitor oral intake, urinary output, labs, and treatment plans  - Assess nutrition and hydration status and recommend course of action  - Evaluate amount of meals eaten  - Assist patient with eating if necessary   - Allow adequate time for meals  - Recommend/ encourage appropriate diets, oral nutritional supplements, and vitamin/mineral supplements  - Order, calculate, and assess calorie counts as needed  - Recommend, monitor, and adjust tube feedings and TPN/PPN based on assessed needs  - Assess need for intravenous fluids  - Provide specific nutrition/hydration education as appropriate  - Include patient/family/caregiver in decisions related to nutrition  Outcome: Progressing     Problem: COPING  Goal: Pt/Family able to verbalize concerns and demonstrate effective coping strategies  Description: INTERVENTIONS:  - Assist patient/family to identify coping skills, available support systems and cultural and spiritual values  - Provide emotional support, including active listening and acknowledgement of concerns of patient and caregivers  - Reduce environmental stimuli, as able  - Provide patient education  - Assess for spiritual pain/suffering and initiate spiritual care, including notification of Pastoral Care or gisell based community as needed  - Assess effectiveness of coping strategies  Outcome: Progressing  Goal: Will report anxiety at manageable levels  Description: INTERVENTIONS:  - Administer medication as ordered  - Teach and encourage coping skills  - Provide emotional support  - Assess patient/family for anxiety and ability to cope  Outcome: Progressing     Problem: CHANGE IN BODY IMAGE  Goal: Pt/Family communicate acceptance of loss or change in body image and expresses psychological comfort and peace  Description: INTERVENTIONS:  - Assess patient/family anxiety and grief process related to change in body image, loss of functional status and loss of sense of self  - Assess patient/family's coping skills and provide emotional, spiritual and psychosocial support  - Provide information about the patient's health status with consideration of family and cultural values  - Communicate willingness to discuss loss and facilitate grief process with patient/family as appropriate  - Emphasize sustaining relationships within family system and community, or gisell/spiritual traditions  - Refer to community support groups as appropriate  - Initiate Spiritual Care, Pastoral care or other ancillary consults as needed  Outcome: Progressing     Problem: RESPIRATORY - ADULT  Goal: Achieves optimal ventilation and oxygenation  Description: INTERVENTIONS:  - Assess for changes in respiratory status  - Assess for changes in mentation and behavior  - Position to facilitate oxygenation and minimize respiratory effort  - Oxygen administered by appropriate delivery if ordered  - Initiate smoking cessation education as indicated  - Encourage broncho-pulmonary hygiene including cough, deep breathe, Incentive Spirometry  - Assess the need for suctioning and aspirate as needed  - Assess and instruct to report SOB or any respiratory difficulty  - Respiratory Therapy support as indicated  Outcome: Progressing     Problem: METABOLIC, FLUID AND ELECTROLYTES - ADULT  Goal: Fluid balance maintained  Description: INTERVENTIONS:  - Monitor labs   - Monitor I/O and WT  - Instruct patient on fluid and nutrition as appropriate  - Assess for signs & symptoms of volume excess or deficit  Outcome: Progressing  Goal: Glucose maintained within target range  Description: INTERVENTIONS:  - Monitor Blood Glucose as ordered  - Assess for signs and symptoms of hyperglycemia and hypoglycemia  - Administer ordered medications to maintain glucose within target range  - Assess nutritional intake and initiate nutrition service referral as needed  Outcome: Progressing     Problem: HEMATOLOGIC - ADULT  Goal: Maintains hematologic stability  Description: INTERVENTIONS  - Assess for signs and symptoms of bleeding or hemorrhage  - Monitor labs  - Administer supportive blood products/factors as ordered and appropriate  Outcome: Progressing

## 2023-04-03 NOTE — PROGRESS NOTES
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  5  T2DM         PLAN:    · Left foot dressings changed today, incision site stable with all sutures intact without any acute clinical signs of infection  · Patient stable for discharge to short-term rehab per podiatry standpoint  · Continue local wound care, Betadine/Adaptic/DSD, appreciate nursing assistance with dressing changes  · Elevation on green foam wedges or pillows when non-ambulatory  · Appreciate consulting services for recommendations and management  Antibiotics started: None  Pharmacologic VTE Prophylaxis: Enoxaparin (Lovenox)   Mechanical VTE Prophylaxis: sequential compression device   Weightbearing status: Nonweightbearing left foot    Disposition: Short-term rehab placement pending    SUBJECTIVE:     The patient was seen, evaluated, and assessed at bedside today  The patient was awake, alert, and in no acute distress  No acute events overnight  The patient reports no pain to left foot  Patient denies N/V/F/chills/SOB/CP  OBJECTIVE:     Vitals:   /61   Pulse 60   Temp (!) 97 4 °F (36 3 °C) (Temporal)   Resp 20   SpO2 98%     Temp (24hrs), Av 1 °F (36 2 °C), Min:97 °F (36 1 °C), Max:97 4 °F (36 3 °C)      Physical Exam:     General:  Alert, cooperative, and in no distress  Lungs: Non labored breathing  Abdomen: Soft, non-tender  Lower extremity exam:  Cardiovascular status at baseline  Neurological status at baseline  Musculoskeletal status at baseline  No calf tenderness noted   No    Left foot incision site stable with all "sutures intact and skin well aligned  Capillary refill along skin edges less than 3 seconds  Skin temperature within normal limits  Without signs of active infection: no purulence, no malodor, no ascending erythema, no crepitus, no fluctuance  Clinical Images 04/03/23: Additional Data:     Labs:    Results from last 7 days   Lab Units 04/03/23  0447   WBC Thousand/uL 6 32   HEMOGLOBIN g/dL 9 3*   HEMATOCRIT % 33 4*   PLATELETS Thousands/uL 485*   NEUTROS PCT % 35*   LYMPHS PCT % 44   MONOS PCT % 9   EOS PCT % 11*     Results from last 7 days   Lab Units 04/03/23  0447   POTASSIUM mmol/L 4 3   CHLORIDE mmol/L 104   CO2 mmol/L 27   BUN mg/dL 17   CREATININE mg/dL 0 73   CALCIUM mg/dL 9 4           * I Have Reviewed All Lab Data Listed Above  You have anything to              Imaging: I have personally reviewed pertinent films in PACS  EKG, Pathology, and Other Studies: I have personally reviewed pertinent reports  ** Please Note: Portions of the record may have been created with voice recognition software  Occasional wrong word or \"sound a like\" substitutions may have occurred due to the inherent limitations of voice recognition software  Read the chart carefully and recognize, using context, where substitutions have occurred   **    "

## 2023-04-03 NOTE — CASE MANAGEMENT
Lauren Manuel 51 has received approved authorization from insurance:   [address] received for: SNF  Facility: [address] [place] [address] #: [id number]  Start of Care: 3/30  Next Review Date: 4/6  P#: [phone]  Submit next review to: [phone]   Care Manager notified: Ayush Eldridge pending wound vac order due to pt going to SNF*

## 2023-04-03 NOTE — PLAN OF CARE
Problem: OCCUPATIONAL THERAPY ADULT  Goal: Performs self-care activities at highest level of function for planned discharge setting  See evaluation for individualized goals  Description: Treatment Interventions: ADL retraining, UE strengthening/ROM, Functional transfer training, Endurance training, Cognitive reorientation, Equipment evaluation/education, Patient/family training, Compensatory technique education, Activityengagement, Energy conservation          See flowsheet documentation for full assessment, interventions and recommendations  4/3/2023 1227 by Dona Ly OT  Outcome: Progressing  Note: Limitation: Decreased ADL status, Decreased UE strength, Decreased Safe judgement during ADL, Decreased cognition, Decreased endurance, Decreased self-care trans, Decreased high-level ADLs  Prognosis: Fair  Assessment: Pt seen for skilled OT session focused on ADLs, functional transfers and mobility and pt education  Pt seated upright in chair upon arrival requesting to use bathroom  Pt is now allowed PWB to L heel only for transfers  Pt w/ MIN assist x1 SPT to bedside commode w/ cues for safety and WB to heel only  Pt w/ supervision toileting hygiene while seated  Pt w/ MIN assist SPT to w/c from commode w/ cues to maintain PwB to heel  Pt w/ MIN assist LB ADLs 2* impaired balance and educated on weightshifitng side to side  Pt w/ transfer to wheel w/ min assist  Pt w/ cues for safety and positioning  Pt w/ supervision w/c mobility in homelike environment w/ cues for safety w/ brakes and leg rests  Pt MOD I grooming at sink in w/c level  Pt completed b/l UE exercises seen above to increase strength and endurance for ADLs, functional transfers and mobility  Pt seated upright in chair w/ all needs met end of session  Pt continues to be limited due to increased pain, impaired balance, WB to L heel only for transfers, phantom limb pain R LE, impaired functional reach, decreased strength and endurance  Recommend STR when medically stable  Will continue to follow  The patient's raw score on the AM-PAC Daily Activity Inpatient Short Form is 20  A raw score of less than 19 suggests the patient may benefit from discharge to post-acute rehabilitation services  Please refer to the recommendation of the Occupational Therapist for safe discharge planning  OT Discharge Recommendation: Post acute rehabilitation services       4/3/2023 1227 by Sandee Dubin, OT  Outcome: Progressing  Note: Limitation: Decreased ADL status, Decreased UE strength, Decreased Safe judgement during ADL, Decreased cognition, Decreased endurance, Decreased self-care trans, Decreased high-level ADLs  Prognosis: Fair  Assessment: Pt seen for skilled OT session focused on ADLs, functional transfers and mobility and pt education  Pt seated upright in chair upon arrival requesting to use bathroom  Pt is now allowed PWB to L heel only for transfers  Pt w/ MIN assist x1 SPT to bedside commode w/ cues for safety and WB to heel only  Pt w/ supervision toileting hygiene while seated  Pt w/ MIN assist SPT to w/c from commode w/ cues to maintain PwB to heel  Pt w/ MIN assist LB ADLs 2* impaired balance and educated on weightshifitng side to side  Pt w/ transfer to wheel w/ min assist  Pt w/ cues for safety and positioning  Pt w/ supervision w/c mobility in homelike environment w/ cues for safety w/ brakes and leg rests  Pt MOD I grooming at sink in w/c level  Pt completed b/l UE exercises seen above to increase strength and endurance for ADLs, functional transfers and mobility  Pt seated upright in chair w/ all needs met end of session  Pt continues to be limited due to increased pain, impaired balance, WB to L heel only for transfers, phantom limb pain R LE, impaired functional reach, decreased strength and endurance  Recommend STR when medically stable  Will continue to follow   The patient's raw score on the AM-PAC Daily Activity Inpatient Short Form is 20  A raw score of less than 19 suggests the patient may benefit from discharge to post-acute rehabilitation services  Please refer to the recommendation of the Occupational Therapist for safe discharge planning       OT Discharge Recommendation: Post acute rehabilitation services

## 2023-04-04 NOTE — UTILIZATION REVIEW
NOTIFICATION OF ADMISSION DISCHARGE   This is a Notification of Discharge from [facility]  Please be advised that this patient has been discharge from our facility  Below you will find the admission and discharge date and time including the patient’s disposition  UTILIZATION REVIEW CONTACT:  Saniya Car  Utilization   Network Utilization Review Department  Phone: [phone] x carefully listen to the prompts  All voicemails are confidential   Email: [email or web address]     ADMISSION INFORMATION  PRESENTATION DATE: 3/21/2023  4:47 PM  OBERVATION ADMISSION DATE:   INPATIENT ADMISSION DATE: 3/21/23  4:47 PM   DISCHARGE DATE: 4/3/2023  5:15 PM   DISPOSITION:Non [facility] SNF/TCU/SNU    IMPORTANT INFORMATION:  Send all requests for admission clinical reviews, approved or denied determinations and any other requests to dedicated fax number below belonging to the campus where the patient is receiving treatment   List of dedicated fax numbers:  [address] DENIALS (Administrative/Medical Necessity) [phone]   [address] (Maternity/NICU/Pediatrics) [phone]   [facility] [phone]   [place] [address] [phone]   [address] [phone]   220 [facility] [phone]   90 [facility] [phone]   [address] [place] [address] 119 [phone]   [facility]  [phone] 405[address] [facility] [phone]   [address] [facility] [address] [facility] [phone]

## 2023-04-07 NOTE — ASSESSMENT & PLAN NOTE
Lab Results   Component Value Date    HGBA1C 6 4 (H) 02/16/2022     Recent Labs     03/27/23  1611 03/27/23  2057 03/28/23  0644 03/28/23  1122   POCGLU 197* 161* 166* 162*     · Maintained outpatient on metformin, currently on hold during hospitalization  · Continue lispro with sliding scale insulin  · Continue pregabalin for neuropathic pain  · Can resume metformin at time of discharge Tip Override Procedure: Peel Solution Override: Antioxidant + Consent: Written consent obtained, risks reviewed including but not limited to crusting, scabbing, blistering, scarring, darker or lighter pigmentary change, bruising, and/or incomplete response. Vacuum Pressure Low Setting (Will Not Render If Set To 0): 14 Number Of Passes: 2 Vacuum Pressure High Setting (Will Not Render If Set To 0): 16 Solution Override Post-Care Instructions: I reviewed with the patient in detail post-care instructions. Patient should stay away from the sun and wear sun protection until treated areas are fully healed. Price (Use Numbers Only, No Special Characters Or $): 199 Procedure: Extend and Protect Solution: Beta-HD Tip: Hydropeel Tip, Clear Number Of Passes: 0 Treatment Number: 1 Solution: GlySal 15% Comments: Signature HF. Milia extracted on the Left cheek. 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 Indication: prominent pores Procedure: Extraction Vacuum Pressure Low Setting (Will Not Render If Set To 0): 20 Tip: Hydropeel Tip, Blue Procedure: Fusion Procedure: Exfoliation Vacuum Pressure High Setting (Will Not Render If Set To 0): 22 Number Of Passes: 3 Location: face Vacuum Pressure High Setting (Will Not Render If Set To 0): 18 Tip: Hydropeel Tip, Teal Solution: Activ-4

## 2023-04-19 PROBLEM — Z89.511 HX OF BKA, RIGHT (HCC): Status: ACTIVE | Noted: 2023-04-19

## 2023-05-03 ENCOUNTER — APPOINTMENT (EMERGENCY)
Dept: RADIOLOGY | Facility: HOSPITAL | Age: 48
End: 2023-05-03

## 2023-05-03 ENCOUNTER — HOSPITAL ENCOUNTER (EMERGENCY)
Facility: HOSPITAL | Age: 48
End: 2023-05-04
Attending: EMERGENCY MEDICINE

## 2023-05-03 DIAGNOSIS — R45.851 SUICIDAL IDEATION: Primary | ICD-10-CM

## 2023-05-03 DIAGNOSIS — L97.416 DIABETIC ULCER OF RIGHT MIDFOOT ASSOCIATED WITH DIABETES MELLITUS DUE TO UNDERLYING CONDITION, WITH BONE INVOLVEMENT WITHOUT EVIDENCE OF NECROSIS (HCC): ICD-10-CM

## 2023-05-03 DIAGNOSIS — T81.49XA WOUND CELLULITIS AFTER SURGERY: ICD-10-CM

## 2023-05-03 DIAGNOSIS — E08.621 DIABETIC ULCER OF RIGHT MIDFOOT ASSOCIATED WITH DIABETES MELLITUS DUE TO UNDERLYING CONDITION, WITH BONE INVOLVEMENT WITHOUT EVIDENCE OF NECROSIS (HCC): ICD-10-CM

## 2023-05-03 DIAGNOSIS — L97.512 RIGHT FOOT ULCER, WITH FAT LAYER EXPOSED (HCC): ICD-10-CM

## 2023-05-03 DIAGNOSIS — E11.40 TYPE 2 DIABETES MELLITUS WITH DIABETIC NEUROPATHY, WITHOUT LONG-TERM CURRENT USE OF INSULIN (HCC): ICD-10-CM

## 2023-05-03 LAB
ALBUMIN SERPL BCP-MCNC: 3.8 G/DL (ref 3.5–5)
ALP SERPL-CCNC: 86 U/L (ref 34–104)
ALT SERPL W P-5'-P-CCNC: 15 U/L (ref 7–52)
AMPHETAMINES SERPL QL SCN: NEGATIVE
ANION GAP SERPL CALCULATED.3IONS-SCNC: 7 MMOL/L (ref 4–13)
AST SERPL W P-5'-P-CCNC: 16 U/L (ref 13–39)
BARBITURATES UR QL: NEGATIVE
BASOPHILS # BLD AUTO: 0.04 THOUSANDS/ÂΜL (ref 0–0.1)
BASOPHILS NFR BLD AUTO: 0 % (ref 0–1)
BENZODIAZ UR QL: NEGATIVE
BILIRUB SERPL-MCNC: 0.21 MG/DL (ref 0.2–1)
BUN SERPL-MCNC: 13 MG/DL (ref 5–25)
CALCIUM SERPL-MCNC: 9.4 MG/DL (ref 8.4–10.2)
CHLORIDE SERPL-SCNC: 102 MMOL/L (ref 96–108)
CO2 SERPL-SCNC: 27 MMOL/L (ref 21–32)
COCAINE UR QL: NEGATIVE
CREAT SERPL-MCNC: 0.84 MG/DL (ref 0.6–1.3)
EOSINOPHIL # BLD AUTO: 0.55 THOUSAND/ÂΜL (ref 0–0.61)
EOSINOPHIL NFR BLD AUTO: 5 % (ref 0–6)
ERYTHROCYTE [DISTWIDTH] IN BLOOD BY AUTOMATED COUNT: 22.4 % (ref 11.6–15.1)
ETHANOL EXG-MCNC: 0 MG/DL
GFR SERPL CREATININE-BSD FRML MDRD: 83 ML/MIN/1.73SQ M
GLUCOSE SERPL-MCNC: 132 MG/DL (ref 65–140)
GLUCOSE SERPL-MCNC: 209 MG/DL (ref 65–140)
HCT VFR BLD AUTO: 34.1 % (ref 34.8–46.1)
HGB BLD-MCNC: 9.8 G/DL (ref 11.5–15.4)
IMM GRANULOCYTES # BLD AUTO: 0.04 THOUSAND/UL (ref 0–0.2)
IMM GRANULOCYTES NFR BLD AUTO: 0 % (ref 0–2)
LYMPHOCYTES # BLD AUTO: 3.23 THOUSANDS/ÂΜL (ref 0.6–4.47)
LYMPHOCYTES NFR BLD AUTO: 27 % (ref 14–44)
MAGNESIUM SERPL-MCNC: 2 MG/DL (ref 1.9–2.7)
MCH RBC QN AUTO: 21 PG (ref 26.8–34.3)
MCHC RBC AUTO-ENTMCNC: 28.7 G/DL (ref 31.4–37.4)
MCV RBC AUTO: 73 FL (ref 82–98)
METHADONE UR QL: NEGATIVE
MONOCYTES # BLD AUTO: 0.9 THOUSAND/ÂΜL (ref 0.17–1.22)
MONOCYTES NFR BLD AUTO: 8 % (ref 4–12)
NEUTROPHILS # BLD AUTO: 7.19 THOUSANDS/ÂΜL (ref 1.85–7.62)
NEUTS SEG NFR BLD AUTO: 60 % (ref 43–75)
NRBC BLD AUTO-RTO: 0 /100 WBCS
OPIATES UR QL SCN: NEGATIVE
OXYCODONE+OXYMORPHONE UR QL SCN: POSITIVE
PCP UR QL: NEGATIVE
PLATELET # BLD AUTO: 434 THOUSANDS/UL (ref 149–390)
PMV BLD AUTO: 9.1 FL (ref 8.9–12.7)
POTASSIUM SERPL-SCNC: 4 MMOL/L (ref 3.5–5.3)
PROT SERPL-MCNC: 7 G/DL (ref 6.4–8.4)
RBC # BLD AUTO: 4.66 MILLION/UL (ref 3.81–5.12)
SODIUM SERPL-SCNC: 136 MMOL/L (ref 135–147)
THC UR QL: NEGATIVE
WBC # BLD AUTO: 11.95 THOUSAND/UL (ref 4.31–10.16)

## 2023-05-03 RX ORDER — LAMOTRIGINE 100 MG/1
200 TABLET ORAL DAILY
Status: DISCONTINUED | OUTPATIENT
Start: 2023-05-04 | End: 2023-05-04 | Stop reason: HOSPADM

## 2023-05-03 RX ORDER — ACETAMINOPHEN 325 MG/1
650 TABLET ORAL ONCE
Status: COMPLETED | OUTPATIENT
Start: 2023-05-03 | End: 2023-05-03

## 2023-05-03 RX ORDER — HYDROXYZINE 50 MG/1
50 TABLET, FILM COATED ORAL EVERY 6 HOURS PRN
Status: DISCONTINUED | OUTPATIENT
Start: 2023-05-03 | End: 2023-05-04

## 2023-05-03 RX ORDER — PANTOPRAZOLE SODIUM 40 MG/1
40 TABLET, DELAYED RELEASE ORAL
Status: DISCONTINUED | OUTPATIENT
Start: 2023-05-04 | End: 2023-05-04 | Stop reason: HOSPADM

## 2023-05-03 RX ORDER — ZIPRASIDONE HYDROCHLORIDE 40 MG/1
80 CAPSULE ORAL 2 TIMES DAILY WITH MEALS
Status: DISCONTINUED | OUTPATIENT
Start: 2023-05-04 | End: 2023-05-04 | Stop reason: HOSPADM

## 2023-05-03 RX ORDER — LEVOTHYROXINE SODIUM 0.05 MG/1
50 TABLET ORAL
Status: DISCONTINUED | OUTPATIENT
Start: 2023-05-04 | End: 2023-05-04 | Stop reason: HOSPADM

## 2023-05-03 RX ORDER — LANOLIN ALCOHOL/MO/W.PET/CERES
3 CREAM (GRAM) TOPICAL
Status: DISCONTINUED | OUTPATIENT
Start: 2023-05-03 | End: 2023-05-04 | Stop reason: HOSPADM

## 2023-05-03 RX ORDER — PREGABALIN 75 MG/1
150 CAPSULE ORAL DAILY
Status: DISCONTINUED | OUTPATIENT
Start: 2023-05-04 | End: 2023-05-04 | Stop reason: HOSPADM

## 2023-05-03 RX ORDER — SULFAMETHOXAZOLE AND TRIMETHOPRIM 800; 160 MG/1; MG/1
1 TABLET ORAL EVERY 12 HOURS SCHEDULED
Status: DISCONTINUED | OUTPATIENT
Start: 2023-05-03 | End: 2023-05-04 | Stop reason: HOSPADM

## 2023-05-03 RX ADMIN — HYDROXYZINE HYDROCHLORIDE 50 MG: 50 TABLET, FILM COATED ORAL at 19:47

## 2023-05-03 RX ADMIN — ACETAMINOPHEN 650 MG: 325 TABLET ORAL at 19:47

## 2023-05-03 RX ADMIN — SULFAMETHOXAZOLE AND TRIMETHOPRIM 1 TABLET: 800; 160 TABLET ORAL at 20:17

## 2023-05-04 ENCOUNTER — HOSPITAL ENCOUNTER (INPATIENT)
Facility: HOSPITAL | Age: 48
LOS: 4 days | End: 2023-05-08
Attending: PSYCHIATRY & NEUROLOGY | Admitting: PSYCHIATRY & NEUROLOGY

## 2023-05-04 VITALS
DIASTOLIC BLOOD PRESSURE: 68 MMHG | OXYGEN SATURATION: 98 % | RESPIRATION RATE: 18 BRPM | HEART RATE: 99 BPM | TEMPERATURE: 97.8 F | SYSTOLIC BLOOD PRESSURE: 126 MMHG

## 2023-05-04 DIAGNOSIS — E11.621 DIABETIC FOOT ULCER (HCC): ICD-10-CM

## 2023-05-04 DIAGNOSIS — L97.416 DIABETIC ULCER OF RIGHT MIDFOOT ASSOCIATED WITH DIABETES MELLITUS DUE TO UNDERLYING CONDITION, WITH BONE INVOLVEMENT WITHOUT EVIDENCE OF NECROSIS (HCC): ICD-10-CM

## 2023-05-04 DIAGNOSIS — Z89.511 HX OF BKA, RIGHT (HCC): ICD-10-CM

## 2023-05-04 DIAGNOSIS — E08.621 DIABETIC ULCER OF RIGHT MIDFOOT ASSOCIATED WITH DIABETES MELLITUS DUE TO UNDERLYING CONDITION, WITH BONE INVOLVEMENT WITHOUT EVIDENCE OF NECROSIS (HCC): ICD-10-CM

## 2023-05-04 DIAGNOSIS — Z59.00 HOMELESS SINGLE PERSON: Primary | ICD-10-CM

## 2023-05-04 DIAGNOSIS — E11.40 TYPE 2 DIABETES MELLITUS WITH DIABETIC NEUROPATHY, WITHOUT LONG-TERM CURRENT USE OF INSULIN (HCC): ICD-10-CM

## 2023-05-04 DIAGNOSIS — L97.509 DIABETIC FOOT ULCER (HCC): ICD-10-CM

## 2023-05-04 DIAGNOSIS — L97.512 RIGHT FOOT ULCER, WITH FAT LAYER EXPOSED (HCC): ICD-10-CM

## 2023-05-04 LAB — GLUCOSE SERPL-MCNC: 168 MG/DL (ref 65–140)

## 2023-05-04 PROCEDURE — GZHZZZZ GROUP PSYCHOTHERAPY: ICD-10-PCS | Performed by: PSYCHIATRY & NEUROLOGY

## 2023-05-04 PROCEDURE — GZ59ZZZ INDIVIDUAL PSYCHOTHERAPY, PSYCHOPHYSIOLOGICAL: ICD-10-PCS | Performed by: PSYCHIATRY & NEUROLOGY

## 2023-05-04 RX ORDER — LAMOTRIGINE 100 MG/1
200 TABLET ORAL DAILY
Status: CANCELLED | OUTPATIENT
Start: 2023-05-05

## 2023-05-04 RX ORDER — LANOLIN ALCOHOL/MO/W.PET/CERES
3 CREAM (GRAM) TOPICAL
Status: DISCONTINUED | OUTPATIENT
Start: 2023-05-04 | End: 2023-05-04 | Stop reason: SDUPTHER

## 2023-05-04 RX ORDER — OLANZAPINE 10 MG/1
5 INJECTION, POWDER, LYOPHILIZED, FOR SOLUTION INTRAMUSCULAR
Status: DISCONTINUED | OUTPATIENT
Start: 2023-05-04 | End: 2023-05-08 | Stop reason: HOSPADM

## 2023-05-04 RX ORDER — CLONIDINE HYDROCHLORIDE 0.1 MG/1
0.2 TABLET ORAL EVERY 12 HOURS SCHEDULED
Status: CANCELLED | OUTPATIENT
Start: 2023-05-04

## 2023-05-04 RX ORDER — ACETAMINOPHEN 325 MG/1
975 TABLET ORAL EVERY 6 HOURS PRN
Status: DISCONTINUED | OUTPATIENT
Start: 2023-05-04 | End: 2023-05-05

## 2023-05-04 RX ORDER — OLANZAPINE 5 MG/1
2.5 TABLET ORAL
Status: CANCELLED | OUTPATIENT
Start: 2023-05-04

## 2023-05-04 RX ORDER — OXYCODONE HYDROCHLORIDE 10 MG/1
10 TABLET ORAL EVERY 12 HOURS
Status: DISCONTINUED | OUTPATIENT
Start: 2023-05-05 | End: 2023-05-05

## 2023-05-04 RX ORDER — CLONIDINE HYDROCHLORIDE 0.1 MG/1
0.2 TABLET ORAL EVERY 12 HOURS SCHEDULED
Status: DISCONTINUED | OUTPATIENT
Start: 2023-05-04 | End: 2023-05-04 | Stop reason: HOSPADM

## 2023-05-04 RX ORDER — OXYCODONE HYDROCHLORIDE 5 MG/1
10 TABLET ORAL EVERY 12 HOURS
Status: DISCONTINUED | OUTPATIENT
Start: 2023-05-04 | End: 2023-05-04 | Stop reason: HOSPADM

## 2023-05-04 RX ORDER — PANTOPRAZOLE SODIUM 40 MG/1
40 TABLET, DELAYED RELEASE ORAL
Status: DISCONTINUED | OUTPATIENT
Start: 2023-05-05 | End: 2023-05-05

## 2023-05-04 RX ORDER — HYDROXYZINE 50 MG/1
100 TABLET, FILM COATED ORAL
Status: DISCONTINUED | OUTPATIENT
Start: 2023-05-04 | End: 2023-05-04 | Stop reason: HOSPADM

## 2023-05-04 RX ORDER — HYDROXYZINE HYDROCHLORIDE 25 MG/1
25 TABLET, FILM COATED ORAL
Status: DISCONTINUED | OUTPATIENT
Start: 2023-05-04 | End: 2023-05-08 | Stop reason: HOSPADM

## 2023-05-04 RX ORDER — OLANZAPINE 10 MG/1
5 INJECTION, POWDER, LYOPHILIZED, FOR SOLUTION INTRAMUSCULAR
Status: CANCELLED | OUTPATIENT
Start: 2023-05-04

## 2023-05-04 RX ORDER — FENOFIBRATE 48 MG/1
48 TABLET, COATED ORAL DAILY
Status: DISCONTINUED | OUTPATIENT
Start: 2023-05-05 | End: 2023-05-08 | Stop reason: HOSPADM

## 2023-05-04 RX ORDER — LEVOTHYROXINE SODIUM 0.05 MG/1
50 TABLET ORAL
Status: DISCONTINUED | OUTPATIENT
Start: 2023-05-05 | End: 2023-05-08 | Stop reason: HOSPADM

## 2023-05-04 RX ORDER — LORAZEPAM 1 MG/1
1 TABLET ORAL
Status: CANCELLED | OUTPATIENT
Start: 2023-05-04

## 2023-05-04 RX ORDER — LORAZEPAM 1 MG/1
1 TABLET ORAL
Status: DISCONTINUED | OUTPATIENT
Start: 2023-05-04 | End: 2023-05-08 | Stop reason: HOSPADM

## 2023-05-04 RX ORDER — HYDROXYZINE 50 MG/1
50 TABLET, FILM COATED ORAL ONCE
Status: COMPLETED | OUTPATIENT
Start: 2023-05-04 | End: 2023-05-04

## 2023-05-04 RX ORDER — PREGABALIN 75 MG/1
150 CAPSULE ORAL DAILY
Status: DISCONTINUED | OUTPATIENT
Start: 2023-05-05 | End: 2023-05-05

## 2023-05-04 RX ORDER — HYDROXYZINE 50 MG/1
100 TABLET, FILM COATED ORAL
Status: DISCONTINUED | OUTPATIENT
Start: 2023-05-04 | End: 2023-05-08 | Stop reason: HOSPADM

## 2023-05-04 RX ORDER — OLANZAPINE 5 MG/1
5 TABLET ORAL
Status: CANCELLED | OUTPATIENT
Start: 2023-05-04

## 2023-05-04 RX ORDER — METHOCARBAMOL 500 MG/1
500 TABLET, FILM COATED ORAL EVERY 6 HOURS PRN
Status: DISCONTINUED | OUTPATIENT
Start: 2023-05-04 | End: 2023-05-05

## 2023-05-04 RX ORDER — LAMOTRIGINE 100 MG/1
200 TABLET ORAL DAILY
Status: DISCONTINUED | OUTPATIENT
Start: 2023-05-05 | End: 2023-05-05

## 2023-05-04 RX ORDER — LORAZEPAM 0.5 MG/1
0.5 TABLET ORAL
Status: DISCONTINUED | OUTPATIENT
Start: 2023-05-04 | End: 2023-05-08 | Stop reason: HOSPADM

## 2023-05-04 RX ORDER — ZIPRASIDONE HYDROCHLORIDE 80 MG/1
80 CAPSULE ORAL ONCE
Status: COMPLETED | OUTPATIENT
Start: 2023-05-04 | End: 2023-05-04

## 2023-05-04 RX ORDER — LEVOTHYROXINE SODIUM 0.05 MG/1
50 TABLET ORAL
Status: CANCELLED | OUTPATIENT
Start: 2023-05-05

## 2023-05-04 RX ORDER — PREGABALIN 75 MG/1
150 CAPSULE ORAL DAILY
Status: CANCELLED | OUTPATIENT
Start: 2023-05-05

## 2023-05-04 RX ORDER — SULFAMETHOXAZOLE AND TRIMETHOPRIM 800; 160 MG/1; MG/1
1 TABLET ORAL EVERY 12 HOURS SCHEDULED
Status: CANCELLED | OUTPATIENT
Start: 2023-05-04

## 2023-05-04 RX ORDER — HYDROXYZINE HYDROCHLORIDE 25 MG/1
25 TABLET, FILM COATED ORAL
Status: CANCELLED | OUTPATIENT
Start: 2023-05-04

## 2023-05-04 RX ORDER — HYDROXYZINE 50 MG/1
50 TABLET, FILM COATED ORAL EVERY MORNING
Status: DISCONTINUED | OUTPATIENT
Start: 2023-05-04 | End: 2023-05-04 | Stop reason: HOSPADM

## 2023-05-04 RX ORDER — OLANZAPINE 5 MG/1
5 TABLET ORAL
Status: DISCONTINUED | OUTPATIENT
Start: 2023-05-04 | End: 2023-05-08 | Stop reason: HOSPADM

## 2023-05-04 RX ORDER — LORAZEPAM 2 MG/ML
1 INJECTION INTRAMUSCULAR
Status: CANCELLED | OUTPATIENT
Start: 2023-05-04

## 2023-05-04 RX ORDER — ZIPRASIDONE HYDROCHLORIDE 40 MG/1
80 CAPSULE ORAL 2 TIMES DAILY WITH MEALS
Status: DISCONTINUED | OUTPATIENT
Start: 2023-05-05 | End: 2023-05-08 | Stop reason: HOSPADM

## 2023-05-04 RX ORDER — ACETAMINOPHEN 325 MG/1
650 TABLET ORAL EVERY 4 HOURS PRN
Status: CANCELLED | OUTPATIENT
Start: 2023-05-04

## 2023-05-04 RX ORDER — HYDROXYZINE 50 MG/1
50 TABLET, FILM COATED ORAL EVERY MORNING
Status: CANCELLED | OUTPATIENT
Start: 2023-05-05

## 2023-05-04 RX ORDER — LORAZEPAM 0.5 MG/1
0.5 TABLET ORAL
Status: CANCELLED | OUTPATIENT
Start: 2023-05-04

## 2023-05-04 RX ORDER — HYDROXYZINE 50 MG/1
50 TABLET, FILM COATED ORAL EVERY MORNING
Status: DISCONTINUED | OUTPATIENT
Start: 2023-05-05 | End: 2023-05-08 | Stop reason: HOSPADM

## 2023-05-04 RX ORDER — LANOLIN ALCOHOL/MO/W.PET/CERES
3 CREAM (GRAM) TOPICAL
Status: CANCELLED | OUTPATIENT
Start: 2023-05-04

## 2023-05-04 RX ORDER — HYDROXYZINE 50 MG/1
100 TABLET, FILM COATED ORAL
Status: CANCELLED | OUTPATIENT
Start: 2023-05-04

## 2023-05-04 RX ORDER — ACETAMINOPHEN 325 MG/1
650 TABLET ORAL EVERY 4 HOURS PRN
Status: DISCONTINUED | OUTPATIENT
Start: 2023-05-04 | End: 2023-05-08 | Stop reason: HOSPADM

## 2023-05-04 RX ORDER — BENZTROPINE MESYLATE 0.5 MG/1
0.5 TABLET ORAL
Status: DISCONTINUED | OUTPATIENT
Start: 2023-05-04 | End: 2023-05-08 | Stop reason: HOSPADM

## 2023-05-04 RX ORDER — PANTOPRAZOLE SODIUM 40 MG/1
40 TABLET, DELAYED RELEASE ORAL
Status: CANCELLED | OUTPATIENT
Start: 2023-05-05

## 2023-05-04 RX ORDER — ZIPRASIDONE HYDROCHLORIDE 80 MG/1
80 CAPSULE ORAL 2 TIMES DAILY WITH MEALS
Status: CANCELLED | OUTPATIENT
Start: 2023-05-04

## 2023-05-04 RX ORDER — ACETAMINOPHEN 325 MG/1
975 TABLET ORAL EVERY 6 HOURS PRN
Status: CANCELLED | OUTPATIENT
Start: 2023-05-04

## 2023-05-04 RX ORDER — ZIPRASIDONE HYDROCHLORIDE 40 MG/1
CAPSULE ORAL
Status: DISPENSED
Start: 2023-05-04 | End: 2023-05-04

## 2023-05-04 RX ORDER — CLONIDINE HYDROCHLORIDE 0.1 MG/1
0.2 TABLET ORAL EVERY 12 HOURS SCHEDULED
Status: DISCONTINUED | OUTPATIENT
Start: 2023-05-04 | End: 2023-05-08 | Stop reason: HOSPADM

## 2023-05-04 RX ORDER — BENZTROPINE MESYLATE 0.5 MG/1
0.5 TABLET ORAL
Status: CANCELLED | OUTPATIENT
Start: 2023-05-04

## 2023-05-04 RX ORDER — LORAZEPAM 2 MG/ML
1 INJECTION INTRAMUSCULAR
Status: DISCONTINUED | OUTPATIENT
Start: 2023-05-04 | End: 2023-05-08 | Stop reason: HOSPADM

## 2023-05-04 RX ORDER — OXYCODONE HYDROCHLORIDE 5 MG/1
10 TABLET ORAL EVERY 12 HOURS
Status: CANCELLED | OUTPATIENT
Start: 2023-05-05

## 2023-05-04 RX ORDER — LANOLIN ALCOHOL/MO/W.PET/CERES
3 CREAM (GRAM) TOPICAL
Status: DISCONTINUED | OUTPATIENT
Start: 2023-05-04 | End: 2023-05-07

## 2023-05-04 RX ORDER — OLANZAPINE 2.5 MG/1
2.5 TABLET ORAL
Status: DISCONTINUED | OUTPATIENT
Start: 2023-05-04 | End: 2023-05-08 | Stop reason: HOSPADM

## 2023-05-04 RX ORDER — SULFAMETHOXAZOLE AND TRIMETHOPRIM 800; 160 MG/1; MG/1
1 TABLET ORAL EVERY 12 HOURS SCHEDULED
Status: DISCONTINUED | OUTPATIENT
Start: 2023-05-04 | End: 2023-05-08 | Stop reason: HOSPADM

## 2023-05-04 RX ADMIN — PANTOPRAZOLE SODIUM 40 MG: 40 TABLET, DELAYED RELEASE ORAL at 08:08

## 2023-05-04 RX ADMIN — HYDROXYZINE HYDROCHLORIDE 50 MG: 50 TABLET, FILM COATED ORAL at 08:08

## 2023-05-04 RX ADMIN — OXYCODONE HYDROCHLORIDE 10 MG: 5 TABLET ORAL at 01:56

## 2023-05-04 RX ADMIN — CLONIDINE HYDROCHLORIDE 0.2 MG: 0.1 TABLET ORAL at 13:09

## 2023-05-04 RX ADMIN — ZIPRASIDONE HYDROCHLORIDE 80 MG: 40 CAPSULE ORAL at 02:27

## 2023-05-04 RX ADMIN — CLONIDINE HYDROCHLORIDE 0.2 MG: 0.1 TABLET ORAL at 21:47

## 2023-05-04 RX ADMIN — CLONIDINE HYDROCHLORIDE 0.2 MG: 0.1 TABLET ORAL at 01:57

## 2023-05-04 RX ADMIN — ZIPRASIDONE HYDROCHLORIDE 80 MG: 40 CAPSULE ORAL at 08:43

## 2023-05-04 RX ADMIN — OXYCODONE HYDROCHLORIDE 10 MG: 5 TABLET ORAL at 13:09

## 2023-05-04 RX ADMIN — LEVOTHYROXINE SODIUM 50 MCG: 50 TABLET ORAL at 08:08

## 2023-05-04 RX ADMIN — PREGABALIN 150 MG: 75 CAPSULE ORAL at 08:43

## 2023-05-04 RX ADMIN — Medication 3 MG: at 21:48

## 2023-05-04 RX ADMIN — METFORMIN HYDROCHLORIDE 500 MG: 500 TABLET ORAL at 08:07

## 2023-05-04 RX ADMIN — LAMOTRIGINE 200 MG: 100 TABLET ORAL at 08:08

## 2023-05-04 RX ADMIN — HYDROXYZINE HYDROCHLORIDE 50 MG: 50 TABLET, FILM COATED ORAL at 02:00

## 2023-05-04 RX ADMIN — SULFAMETHOXAZOLE AND TRIMETHOPRIM 1 TABLET: 800; 160 TABLET ORAL at 08:08

## 2023-05-04 RX ADMIN — SULFAMETHOXAZOLE AND TRIMETHOPRIM 1 TABLET: 800; 160 TABLET ORAL at 21:48

## 2023-05-04 RX ADMIN — Medication 3 MG: at 02:00

## 2023-05-04 RX ADMIN — HYDROXYZINE HYDROCHLORIDE 100 MG: 50 TABLET, FILM COATED ORAL at 21:48

## 2023-05-04 SDOH — ECONOMIC STABILITY - HOUSING INSECURITY: HOMELESSNESS UNSPECIFIED: Z59.00

## 2023-05-04 NOTE — ED NOTES
Belongings moved from overflow locker to ED 03 lockers (25&45)        Lety Charles RN  05/03/23 8166

## 2023-05-04 NOTE — ED NOTES
PC to [facility] for Pre-cert  Spoke to [employer]  Per Lisa pended 4 days for inpatient  CIS to call [facility] for auth when admitted

## 2023-05-04 NOTE — ED NOTES
Pt given sandwich, okayed by provider   Provider aware of pt's blood sugar     Arleen Rose, [facility]  05/03/23 2051

## 2023-05-04 NOTE — ED NOTES
201 sent to Intake for review when a network bed is available  Due to Pt prosthetic leg and requiring wheelchair for ambulation, no appropriate medical-based facilities were found overnight  AM crisis to follow up

## 2023-05-04 NOTE — ED PROVIDER NOTES
"History  Chief Complaint   Patient presents with    Foot Pain     Patient reports she just got kicked out of [facility] because she wouldn't pay for her treatment after her insurance ran out  Complains of some pain to the left foot, did not contact surgeon  \"I never got papers with the surgeons name  I also want to be admitted for my mental health, if I don't get admitted I will kill myself\"  Patient states she feels like this because she is now homeless  Taking metformin for diabetes, not following a diabetic diet admits to eating chocolate and drinking soda   Psychiatric Evaluation     Patient is a 45-year-old female with a past medical history significant for diabetic ulcers, as well as right leg BKA, as well as left foot metatarsal amputations, coming in for evaluation of wound dehiscence from previous metatarsal amputation, as well as color discharge  Patient also would like to be psychiatrically evaluated, for concerns of suicidal ideations  Patient recently was discharged from rehab, as she was unable to pay  Patient reports that because she is homeless, she will kill herself if she was to be discharged      Psychiatric Evaluation  Presenting symptoms: suicidal thoughts    Associated symptoms: no abdominal pain and no fatigue        Prior to Admission Medications   Prescriptions Last Dose Informant Patient Reported? Taking? cloNIDine (CATAPRES) 0 2 mg tablet 5/2/2023  Yes Yes   Sig: Take 0 2 mg by mouth 2 (two) times a day   ergocalciferol (ERGOCALCIFEROL) 1 25 MG (72425 UT) capsule   Yes No   Sig: Take 50,000 Units by mouth   fenofibrate (TRICOR) 54 MG tablet 5/2/2023  Yes Yes   Sig: TAKE 1 TABLET (54 MG TOTAL) BY MOUTH DAILY     folic acid (FOLVITE) 1 mg tablet 5/2/2023  Yes Yes   Sig: Take 1,000 mcg by mouth daily   hydrOXYzine HCL (ATARAX) 50 mg tablet 5/2/2023  Yes Yes   Sig: Take 50 mg by mouth in the morning   hydrOXYzine HCL (ATARAX) 50 mg tablet 5/2/2023  No Yes   Sig: Take 2 tablets " (100 mg total) by mouth daily at bedtime   lamoTRIgine (LaMICtal) 200 MG tablet   No No   Sig: Take 1 5 tablets (300 mg total) by mouth every morning   levothyroxine 50 mcg tablet 5/2/2023  No Yes   Sig: Take 1 tablet (50 mcg total) by mouth daily   melatonin 3 mg   No No   Sig: Take 3 tablets (9 mg total) by mouth daily at bedtime   metFORMIN (GLUCOPHAGE) 500 mg tablet 5/2/2023  No Yes   Sig: Take 1 tablet (500 mg total) by mouth 2 (two) times a day with meals   methocarbamol (ROBAXIN) 750 mg tablet 5/2/2023  Yes Yes   Sig: TAKE 1 TABLET (750 MG TOTAL) BY MOUTH 4 (FOUR) TIMES A DAY AS NEEDED FOR MUSCLE SPASMS  oxyCODONE (OxyCONTIN) 10 mg 12 hr tablet 5/2/2023  Yes Yes   Sig: Take 10 mg by mouth every 12 (twelve) hours   pantoprazole (PROTONIX) 40 mg tablet 5/2/2023  No Yes   Sig: Take 1 tablet (40 mg total) by mouth 2 (two) times a day before breakfast and lunch   pregabalin (LYRICA) 150 mg capsule   Yes No   Sig: Take 150 mg by mouth 2 (two) times a day   ziprasidone (GEODON) 80 mg capsule 5/2/2023 at 1700  Yes Yes   Sig: Take 80 mg by mouth 2 (two) times a day with meals       Facility-Administered Medications: None       Past Medical History:   Diagnosis Date    Anxiety     Arthritis     Asthma     Chronic pain     Diabetes mellitus ([address] )     Hypothyroidism     Lymphedema     left leg    Manic bipolar I disorder (HCC)     Morbid obesity (HCC)     OCD (obsessive compulsive disorder)     PTSD (post-traumatic stress disorder)        Past Surgical History:   Procedure Laterality Date    CHOLECYSTECTOMY      CHOLECYSTECTOMY      LEG AMPUTATION THROUGH LOWER TIBIA AND FIBULA Right 3/23/2023    Procedure: AMPUTATION BELOW KNEE (BKA);   Surgeon: Julia Thomas MD;  Location: [facility];  Service: General    [place] AMPUTATION FOOT TRANSMETARSAL Right 9/4/2021    Procedure: AMPUTATION TRANSMETATARSAL (TMA);  Surgeon: Naheed Kent DPM;  Location:  MAIN OR;  Service: [address] [place] [address] METATARSAL W/TOE SINGLE Left 9/8/2019    Procedure: PARTIAL 3RD RAY RESECTION, PSB TOE FILLET FLAP;  Surgeon: Cindy Israel DPM;  Location: [facility];  Service: Podiatry    [address] W/TOE SINGLE Left 3/23/2023    Procedure: RAY RESECTION FOOT;  Surgeon: Joseph Toro DPM;  Location: [facility];  Service: Podiatry    TOE AMPUTATION Right 1/13/2021    Procedure: AMPUTATION 2ND TOE;  Surgeon: Aaron Henriquez DPM;  [facility];  Service: Podiatry    WOUND DEBRIDEMENT Left 2/17/2022    Procedure: DEBRIDEMENT GREAT TOE WOUND;  Surgeon: Aaron Henriquez DPM;  [facility];  Service: Podiatry       Family History   Problem Relation Age of Onset    Hyperlipidemia Mother     Diabetes Mother     Hypertension Mother     Heart attack Father      I have reviewed and agree with the history as documented  E-Cigarette/Vaping    E-Cigarette Use Former User      E-Cigarette/Vaping Substances    Nicotine No     THC No     CBD No     Flavoring No     Other No     Unknown No      Social History     Tobacco Use    Smoking status: Former     Packs/day: 0 50     Years: 15 00     Pack years: 7 50     Types: Cigarettes    Smokeless tobacco: Never   Vaping Use    Vaping Use: Former   Substance Use Topics    Alcohol use: Never    Drug use: Never       Review of Systems   Constitutional: Negative for chills, fatigue and fever  Gastrointestinal: Negative for abdominal pain, nausea and vomiting  Skin: Positive for wound  Psychiatric/Behavioral: Positive for suicidal ideas  Physical Exam  Physical Exam  Vitals reviewed  Constitutional:       Appearance: Normal appearance  She is normal weight  HENT:      Head: Normocephalic and atraumatic  Right Ear: External ear normal       Left Ear: External ear normal       Nose: Nose normal    Eyes:      Conjunctiva/sclera: Conjunctivae normal    Cardiovascular:      Rate and Rhythm: Normal rate     Pulmonary: Effort: Pulmonary effort is normal    Musculoskeletal:         General: Normal range of motion  Cervical back: Normal range of motion  Feet:    Feet:      Comments: Wound dehiscence and above indicated area, with slight yellowish discharge  Erythema and swelling around the site the wound dehiscence  Erythema does not extend up the leg  Normal sensation  Warmth around wound  Skin:     General: Skin is warm and dry  Neurological:      Mental Status: She is alert  Psychiatric:         Attention and Perception: Attention normal          Mood and Affect: Mood normal          Thought Content: Thought content includes suicidal ideation           Vital Signs  ED Triage Vitals [05/03/23 1827]   Temperature Pulse Respirations Blood Pressure SpO2   97 6 °F (36 4 °C) 85 20 130/79 97 %      Temp Source Heart Rate Source Patient Position - Orthostatic VS BP Location FiO2 (%)   Tympanic Monitor Sitting Left arm --      Pain Score       9           Vitals:    05/03/23 1827   BP: 130/79   Pulse: 85   Patient Position - Orthostatic VS: Sitting         Visual Acuity      ED Medications  Medications   hydrOXYzine HCL (ATARAX) tablet 50 mg (50 mg Oral Given 5/3/23 1947)   lamoTRIgine (LaMICtal) tablet 200 mg (has no administration in time range)   levothyroxine tablet 50 mcg (has no administration in time range)   melatonin tablet 3 mg (3 mg Oral Not Given 5/3/23 2153)   metFORMIN (GLUCOPHAGE) tablet 500 mg (has no administration in time range)   pantoprazole (PROTONIX) EC tablet 40 mg (has no administration in time range)   pregabalin (LYRICA) capsule 150 mg (has no administration in time range)   ziprasidone (GEODON) capsule 80 mg (has no administration in time range)   sulfamethoxazole-trimethoprim (BACTRIM DS) 800-160 mg per tablet 1 tablet (1 tablet Oral Given 5/3/23 2017)   acetaminophen (TYLENOL) tablet 650 mg (650 mg Oral Given 5/3/23 1947)       Diagnostic Studies  Results Reviewed     Procedure Component Value Units Date/Time    Comprehensive metabolic panel [[id number]] Collected: 05/03/23 2048    Lab Status: Final result Specimen: Blood from Arm, Right Updated: 05/03/23 2112     Sodium 136 mmol/L      Potassium 4 0 mmol/L      Chloride 102 mmol/L      CO2 27 mmol/L      ANION GAP 7 mmol/L      BUN 13 mg/dL      Creatinine 0 84 mg/dL      Glucose 132 mg/dL      Calcium 9 4 mg/dL      AST 16 U/L      ALT 15 U/L      Alkaline Phosphatase 86 U/L      Total Protein 7 0 g/dL      Albumin 3 8 g/dL      Total Bilirubin 0 21 mg/dL      eGFR 83 ml/min/1 73sq m     Narrative:      [place] guidelines for Chronic Kidney Disease (CKD):     Stage 1 with normal or high GFR (GFR > 90 mL/min/1 73 square meters)    Stage 2 Mild CKD (GFR = 60-89 mL/min/1 73 square meters)    Stage 3A Moderate CKD (GFR = 45-59 mL/min/1 73 square meters)    Stage 3B Moderate CKD (GFR = 30-44 mL/min/1 73 square meters)    Stage 4 Severe CKD (GFR = 15-29 mL/min/1 73 square meters)    Stage 5 End Stage CKD (GFR <15 mL/min/1 73 square meters)  Note: GFR calculation is accurate only with a steady state creatinine    Magnesium [[id number]]  (Normal) Collected: 05/03/23 2048    Lab Status: Final result Specimen: Blood from Arm, Right Updated: 05/03/23 2112     Magnesium 2 0 mg/dL     Rapid drug screen, urine [[id number]]  (Abnormal) Collected: 05/03/23 2032    Lab Status: Final result Specimen: Urine, Clean Catch Updated: 05/03/23 2059     Amph/Meth UR Negative     Barbiturate Ur Negative     Benzodiazepine Urine Negative     Cocaine Urine Negative     Methadone Urine Negative     Opiate Urine Negative     PCP Ur Negative     THC Urine Negative     Oxycodone Urine Positive    Narrative:      Presumptive report  If requested, specimen will be sent to reference lab for confirmation  FOR MEDICAL PURPOSES ONLY  IF CONFIRMATION NEEDED PLEASE CONTACT THE LAB WITHIN 5 DAYS      Drug Screen Cutoff Levels:  AMPHETAMINE/METHAMPHETAMINES  1000 ng/mL  BARBITURATES     200 ng/mL  BENZODIAZEPINES     200 ng/mL  COCAINE      300 ng/mL  METHADONE      300 ng/mL  OPIATES      300 ng/mL  PHENCYCLIDINE     25 ng/mL  THC       50 ng/mL  OXYCODONE      100 ng/mL    CBC and differential [[id number]]  (Abnormal) Collected: 05/03/23 2048    Lab Status: Final result Specimen: Blood from Arm, Right Updated: 05/03/23 2055     WBC 11 95 Thousand/uL      RBC 4 66 Million/uL      Hemoglobin 9 8 g/dL      Hematocrit 34 1 %      MCV 73 fL      MCH 21 0 pg      MCHC 28 7 g/dL      RDW 22 4 %      MPV 9 1 fL      Platelets 142 Thousands/uL      nRBC 0 /100 WBCs      Neutrophils Relative 60 %      Immat GRANS % 0 %      Lymphocytes Relative 27 %      Monocytes Relative 8 %      Eosinophils Relative 5 %      Basophils Relative 0 %      Neutrophils Absolute 7 19 Thousands/µL      Immature Grans Absolute 0 04 Thousand/uL      Lymphocytes Absolute 3 23 Thousands/µL      Monocytes Absolute 0 90 Thousand/µL      Eosinophils Absolute 0 55 Thousand/µL      Basophils Absolute 0 04 Thousands/µL     POCT alcohol breath test [[id number]]  (Normal) Resulted: 05/03/23 2020    Lab Status: Final result Updated: 05/03/23 2020     EXTBreath Alcohol 0 000    Fingerstick Glucose (POCT) [[id number]]  (Abnormal) Collected: 05/03/23 1950    Lab Status: Final result Updated: 05/03/23 1951     POC Glucose 209 mg/dl                  XR foot 3+ views LEFT   ED Interpretation by Annie Lucas PA-C (05/03 1939)   No obvious acute osseus abnormality                 Procedures  Procedures         ED Course  ED Course as of 05/03/23 2207   Wed May 03, 2023   2055 WBC(!): 11 95   2055 Hemoglobin(!): 9 8  Baseline   2113 Medically clear for psychiatric evaluation                                             Medical Decision Making  Patient is a 20-year-old female who comes in for psychiatric evaluation    Patient does have a wound on her foot, which does appear to be cellulitic at this time  Will start on Bactrim, will need podiatry and wound care follow-up  X-ray as read by me, shows no sign of osteomyelitis at this time  Patient will be seen by crisis for evaluation  Patient signed off to colleague for disposition    Amount and/or Complexity of Data Reviewed  Labs: ordered  Decision-making details documented in ED Course  Radiology: ordered and independent interpretation performed  Risk  OTC drugs  Prescription drug management  Decision regarding hospitalization  Disposition  Final diagnoses:   Suicidal ideation   Wound cellulitis after surgery     Time reflects when diagnosis was documented in both MDM as applicable and the Disposition within this note     Time User Action Codes Description Comment    5/3/2023  9:13 PM Odalis Ferguson Add [N31 526] Suicidal ideation     5/3/2023  9:14 PM Odalis Ferguson Add [T81 49XA] Wound cellulitis after surgery       ED Disposition     ED Disposition   Transfer to [address] [place] [address]   --    Date/Time   Wed May 3, 2023  9:13 PM    Comment   Renaldo Orozco is medically clear for psychiatric evaluation           Follow-up Information    None         Patient's Medications   Discharge Prescriptions    No medications on file       No discharge procedures on file      PDMP Review       Value Time User    PDMP Reviewed  Yes 3/22/2023  8:44 AM Yosi Garcia DO          ED Provider  Electronically Signed by           Josh Redman PA-C  05/03/23 5471

## 2023-05-04 NOTE — ED NOTES
Pt presented to the ED from the community due to worsening depression and suicidal ideation  Pt was discharged from a rehab center in [facility] earlier yesterday after inability to pay due to her insurance  Pt reported during triage that she is suicidal due to her homelessness  During crisis evaluation, Pt is calm and cooperative  She reports that she has felt suicidal for the past month on/off  She has reported at least 20 prior suicide attempts with various methods, most recently 3-4 years ago via pill overdose  Pt denies substance abuse or legal involvement  Pt has been using a wheel chair for ambulation due to not being able to walk on her prothetic as of yet  SHe has decreased sleep and decreased appetite  She has not had any outpatient mental health support in at least 6 months when her outpatient providers office closed  She was last inpatient >5 years ago  Pt does not report a plan for suicide at this time, but was not feeling safe upon her return to [facility]  Pt denies HI/AH/VH  Pt is in agreement with 201 and ED attending is in agreement with treatment plan

## 2023-05-04 NOTE — ED CARE HANDOFF
Emergency Department Sign Out Note        Sign out and transfer of care from Dr Quan Brandon  See Separate Emergency Department note  The patient, Luzmaria Chaparro, was evaluated by the previous provider for [address]      Workup Completed:  Medical clearance    ED Course / Workup Pending (followup):  Pending placement                                     Procedures  MDM        Disposition  Final diagnoses:   Suicidal ideation   Wound cellulitis after surgery     Time reflects when diagnosis was documented in both MDM as applicable and the Disposition within this note     Time User Action Codes Description Comment    5/3/2023  9:13 PM Odean Ever Add [R45 851] Suicidal ideation     5/3/2023  9:14 PM Odean Ever Add [T81 49XA] Wound cellulitis after surgery     5/4/2023  2:31 PM Comfort Mariola Add [X72 249] Right foot ulcer, with fat layer exposed ([facility] [address] )     5/4/2023  2:31 PM Comfort Mariola Add [V32 906,  K27 143] 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, with bone involvement without evidence of necrosis [facility] )     5/4/2023  2:31 PM Jailene López Che Add [E11 40] Type 2 diabetes mellitus with diabetic neuropathy, without long-term current use of insulin [facility])       ED Disposition     ED Disposition   Transfer to [address] [place] [address]   --    Date/Time   Wed May 3, 2023  9:13 PM    Comment   Luzmaria Chaparro is medically clear for psychiatric evaluation           MD Documentation    [address] [facility] Most Recent Value   Patient Condition The patient has been stabilized such that within reasonable medical probability, no material deterioration of the patient condition or the condition of the unborn child(ayanna) is likely to result from the transfer   Reason for Transfer No bed available at level of patient's needs   Benefits of Transfer Continuity of care   Risks of Transfer Potential for delay in receiving treatment   Accepting Physician JIM Jones, for Camila Torre MD   Accepting Facility Name, [address] [facility] [address]    (Name & Tel number) Patel Major [phone]   Sending MD Dr Sheryl Landeros   Provider Certification General risk, such as traffic hazards, adverse weather conditions, rough terrain or turbulence, possible failure of equipment (including vehicle or aircraft), or consequences of actions of persons outside the control of the transport personnel      RN Documentation    [address] Mayra Harris Name, [address] [facility] [address]    (Name & Tel number) Patel Major [phone]   Patient Belongings Disposition Sent with patient   Transfer Date 05/04/23      Follow-up Information    None       Patient's Medications   Discharge Prescriptions    No medications on file     No discharge procedures on file         ED Provider  Electronically Signed by     Bina Quispe MD  05/04/23 7009

## 2023-05-04 NOTE — ED CARE HANDOFF
Emergency Department Sign Out Note        Sign out and transfer of care from Dr Katrina Mtz  See Separate Emergency Department note  The patient, Arina Fontaine, was evaluated by the previous provider for foot pain and psychiatric evaluation  Workup Completed:  Labs, XRs, see prior not    ED Course / Workup Pending (followup):                                  ED Course as of 05/05/23 0714   Thu May 04, 2023   0700 S/o pending placement, 201 signed, SI wo plan in setting of homeless  No acute events during my shift signed out to afternoon doctor    Procedures  MDM        Disposition  Final diagnoses:   Suicidal ideation   Wound cellulitis after surgery     Time reflects when diagnosis was documented in both MDM as applicable and the Disposition within this note     Time User Action Codes Description Comment    5/3/2023  9:13 PM Vilma Yvonne Add [R45 851] Suicidal ideation     5/3/2023  9:14 PM Vilma Yvonne Add [T81 49XA] Wound cellulitis after surgery     5/4/2023  2:31 PM Red Jakes Add [F19 617] Right foot ulcer, with fat layer exposed ([facility] [address] )     5/4/2023  2:31 PM Red Jakes Add [J13 185,  W73 493] 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, with bone involvement without evidence of necrosis ([facility] [address] )     5/4/2023  2:31 PM Jose López Add [E11 40] Type 2 diabetes mellitus with diabetic neuropathy, without long-term current use of insulin [facility])       ED Disposition     ED Disposition   Transfer to [address] [place] [address]   --    Date/Time   Wed May 3, 2023  9:13 PM    Comment   Arina Fontaine is medically clear for psychiatric evaluation           MD Documentation    Flowsheet Row Most Recent Value   Patient Condition The patient has been stabilized such that within reasonable medical probability, no material deterioration of the patient condition or the condition of the unborn child(ayanna) is likely to result from the transfer Reason for Transfer No bed available at level of patient's needs   Benefits of Transfer Continuity of care   Risks of Transfer Potential for delay in receiving treatment   Accepting Physician JIM Perales, for Luis Carlos Orozco MD   Accepting Facility Name, [place] [address], [place] [address]    (Name & Tel number) Esther Nair/ [phone]   Transported by [employer] and Unit #) [facility]/ [phone]   Sending MD Matti Childers MD   Provider Certification General risk, such as traffic hazards, adverse weather conditions, rough terrain or turbulence, possible failure of equipment (including vehicle or aircraft), or consequences of actions of persons outside the control of the transport personnel      RN Documentation    Flowsheet Row Most [address] [facility] Name, [place] [address], [place] [address]    (Name & Tel number) Esther Nair/ [phone] Other (Comment)   Transported by [employer] and Unit #) [facility]/ [phone]   Level of Care Other (Comment)   Patient Belongings Disposition Sent with patient   Transfer Date 05/04/23   Transfer Time 1610      Follow-up Information    None       Discharge Medication List as of 5/4/2023  5:25 PM      CONTINUE these medications which have NOT CHANGED    Details   cloNIDine (CATAPRES) 0 2 mg tablet Take 0 2 mg by mouth 2 (two) times a day, Starting Mon 2/15/2021, Historical Med      ergocalciferol (ERGOCALCIFEROL) 1 25 MG (47622 UT) capsule Take 50,000 Units by mouth, Starting Fri 3/5/2021, Until Fri 5/28/2021 at 2359, Historical Med      fenofibrate (TRICOR) 54 MG tablet TAKE 1 TABLET (54 MG TOTAL) BY MOUTH DAILY  , Historical Med      folic acid (FOLVITE) 1 mg tablet Take 1,000 mcg by mouth daily, Starting Mon 2/22/2021, Historical Med      !! hydrOXYzine HCL (ATARAX) 50 mg tablet Take 50 mg by mouth in the morning, Historical Med !! hydrOXYzine HCL (ATARAX) 50 mg tablet Take 2 tablets (100 mg total) by mouth daily at bedtime, Starting Mon 4/3/2023, No Print      lamoTRIgine (LaMICtal) 200 MG tablet Take 1 5 tablets (300 mg total) by mouth every morning, Starting Mon 4/3/2023, No Print      levothyroxine 50 mcg tablet Take 1 tablet (50 mcg total) by mouth daily, Starting Mon 4/3/2023, No Print      melatonin 3 mg Take 3 tablets (9 mg total) by mouth daily at bedtime, Starting Mon 4/3/2023, No Print      metFORMIN (GLUCOPHAGE) 500 mg tablet Take 1 tablet (500 mg total) by mouth 2 (two) times a day with meals, Starting Tue 9/10/2019, Print      methocarbamol (ROBAXIN) 750 mg tablet TAKE 1 TABLET (750 MG TOTAL) BY MOUTH 4 (FOUR) TIMES A DAY AS NEEDED FOR MUSCLE SPASMS , Historical Med      oxyCODONE (OxyCONTIN) 10 mg 12 hr tablet Take 10 mg by mouth every 12 (twelve) hours, Historical Med      pantoprazole (PROTONIX) 40 mg tablet Take 1 tablet (40 mg total) by mouth 2 (two) times a day before breakfast and lunch, Starting Mon 4/3/2023, No Print      pregabalin (LYRICA) 150 mg capsule Take 150 mg by mouth 2 (two) times a day, Starting Mon 3/6/2023, Historical Med      ziprasidone (GEODON) 80 mg capsule Take 80 mg by mouth 2 (two) times a day with meals , Starting Tue 5/15/2018, Historical Med       !! - Potential duplicate medications found  Please discuss with provider  No discharge procedures on file         ED Provider  Electronically Signed by     Dayanna Sainz MD  05/05/23 9842

## 2023-05-04 NOTE — ED NOTES
Patient is accepted at [facility]  Patient is accepted by JIM Mason,  for Valentino Axe, MD     Patient may go to the floor upon arrival     Transportation is arranged with [facility]  Transportation is scheduled for 1610  Nurse report is to be called to [phone] prior to patient transfer

## 2023-05-04 NOTE — EMTALA/ACUTE CARE TRANSFER
[facility] EMERGENCY DEPARTMENT  [address] [facility] [address]  [phone]  Dept: [address] CONSENT    NAME Renaldo HERNANDEZ 1975                              MRN [id number]    I have been informed of my rights regarding examination, treatment, and transfer   by Dr Marbin Self MD    Benefits: Continuity of care    Risks: Potential for delay in receiving treatment      Consent for Transfer:  I acknowledge that my medical condition has been evaluated and explained to me by the emergency department physician or other qualified medical person and/or my attending physician, who has recommended that I be transferred to the service of  Accepting Physician: JIM Mcgraw, for Charlotte Hoffman MD at [address] [place] [address] Rd Name, [facility], [facility] [place] : [facility], [address] [address]  The above potential benefits of such transfer, the potential risks associated with such transfer, and the probable risks of not being transferred have been explained to me, and I fully understand them  The doctor has explained that, in my case, the benefits of transfer outweigh the risks  I agree to be transferred  I authorize the performance of emergency medical procedures and treatments upon me in both transit and upon arrival at the receiving facility  Additionally, I authorize the release of any and all medical records to the receiving facility and request they be transported with me, if possible  I understand that the safest mode of transportation during a medical emergency is an ambulance and that the Hospital advocates the use of this mode of transport  Risks of traveling to the receiving facility by car, including absence of medical control, life sustaining equipment, such as oxygen, and medical personnel has been explained to me and I fully understand them      (NEETU CORRECT BOX BELOW)  [  ]  I consent to the stated transfer and to be transported by ambulance/helicopter  [  ]  I consent to the stated transfer, but refuse transportation by ambulance and accept full responsibility for my transportation by car  I understand the risks of non-ambulance transfers and I exonerate the Hospital and its staff from any deterioration in my condition that results from this refusal     X___________________________________________    DATE  23  TIME________  Signature of patient or legally responsible individual signing on patient behalf           RELATIONSHIP TO PATIENT_________________________          Provider Certification    NAME Saturnino Rose DOB 1975                              MRN [id number]    A medical screening exam was performed on the above named patient  Based on the examination:    Condition Necessitating Transfer The primary encounter diagnosis was Suicidal ideation  Diagnoses of Wound cellulitis after surgery, Right foot ulcer, with fat layer exposed ([address] ), 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, with bone involvement without evidence of necrosis ([address] ), and Type 2 diabetes mellitus with diabetic neuropathy, without long-term current use of insulin ([facility] [address] ) were also pertinent to this visit      Patient Condition: The patient has been stabilized such that within reasonable medical probability, no material deterioration of the patient condition or the condition of the unborn child(ayanna) is likely to result from the transfer    Reason for Transfer: No bed available at level of patient's needs    Transfer Requirements: [address], [facility], [address]   · Space available and qualified personnel available for treatment as acknowledged by Abby Morris [phone]  · Agreed to accept transfer and to provide appropriate medical treatment as acknowledged by       JIM Thomas, for [facility] long term Ursula Connolly MD  · Appropriate medical records of the examination and treatment of the patient are provided at the time of transfer   [address] [facility], [address] _______  · Transfer will be performed by qualified personnel from [address] [place] [address]  [phone]  and appropriate transfer equipment as required, including the use of necessary and appropriate life support measures  Provider Certification: I have examined the patient and explained the following risks and benefits of being transferred/refusing transfer to the patient/family:  General risk, such as traffic hazards, adverse weather conditions, rough terrain or turbulence, possible failure of equipment (including vehicle or aircraft), or consequences of actions of persons outside the control of the transport personnel      Based on these reasonable risks and benefits to the patient and/or the unborn child(ayanna), and based upon the information available at the time of the patients examination, I certify that the medical benefits reasonably to be expected from the provision of appropriate medical treatments at another medical facility outweigh the increasing risks, if any, to the individuals medical condition, and in the case of labor to the unborn child, from effecting the transfer      X____________________________________________ DATE 05/04/23        TIME_______      ORIGINAL - SEND TO MEDICAL RECORDS   COPY - SEND WITH PATIENT DURING TRANSFER

## 2023-05-05 ENCOUNTER — APPOINTMENT (OUTPATIENT)
Dept: MRI IMAGING | Facility: HOSPITAL | Age: 48
End: 2023-05-05

## 2023-05-05 PROBLEM — F31.9 BIPOLAR 1 DISORDER, DEPRESSED (HCC): Status: ACTIVE | Noted: 2023-05-05

## 2023-05-05 LAB
25(OH)D3 SERPL-MCNC: 35.4 NG/ML (ref 30–100)
BASOPHILS # BLD AUTO: 0.03 THOUSANDS/ÂΜL (ref 0–0.1)
BASOPHILS NFR BLD AUTO: 1 % (ref 0–1)
CHOLEST SERPL-MCNC: 179 MG/DL
CRP SERPL QL: 10.8 MG/L
EOSINOPHIL # BLD AUTO: 0.45 THOUSAND/ÂΜL (ref 0–0.61)
EOSINOPHIL NFR BLD AUTO: 7 % (ref 0–6)
ERYTHROCYTE [DISTWIDTH] IN BLOOD BY AUTOMATED COUNT: 21.5 % (ref 11.6–15.1)
ERYTHROCYTE [SEDIMENTATION RATE] IN BLOOD: 26 MM/HOUR (ref 0–19)
FERRITIN SERPL-MCNC: 15 NG/ML (ref 8–388)
FOLATE SERPL-MCNC: >20 NG/ML (ref 3.1–17.5)
GLUCOSE SERPL-MCNC: 144 MG/DL (ref 65–140)
GLUCOSE SERPL-MCNC: 150 MG/DL (ref 65–140)
HCT VFR BLD AUTO: 31.5 % (ref 34.8–46.1)
HDLC SERPL-MCNC: 30 MG/DL
HGB BLD-MCNC: 9.2 G/DL (ref 11.5–15.4)
IMM GRANULOCYTES # BLD AUTO: 0.01 THOUSAND/UL (ref 0–0.2)
IMM GRANULOCYTES NFR BLD AUTO: 0 % (ref 0–2)
IRON SATN MFR SERPL: 6 % (ref 15–50)
IRON SERPL-MCNC: 29 UG/DL (ref 50–170)
LDLC SERPL CALC-MCNC: 113 MG/DL (ref 0–100)
LYMPHOCYTES # BLD AUTO: 2.99 THOUSANDS/ÂΜL (ref 0.6–4.47)
LYMPHOCYTES NFR BLD AUTO: 47 % (ref 14–44)
MCH RBC QN AUTO: 21.7 PG (ref 26.8–34.3)
MCHC RBC AUTO-ENTMCNC: 29.2 G/DL (ref 31.4–37.4)
MCV RBC AUTO: 74 FL (ref 82–98)
MONOCYTES # BLD AUTO: 0.59 THOUSAND/ÂΜL (ref 0.17–1.22)
MONOCYTES NFR BLD AUTO: 9 % (ref 4–12)
NEUTROPHILS # BLD AUTO: 2.28 THOUSANDS/ÂΜL (ref 1.85–7.62)
NEUTS SEG NFR BLD AUTO: 36 % (ref 43–75)
NRBC BLD AUTO-RTO: 0 /100 WBCS
PLATELET # BLD AUTO: 403 THOUSANDS/UL (ref 149–390)
PMV BLD AUTO: 9.5 FL (ref 8.9–12.7)
PREALB SERPL-MCNC: 19.2 MG/DL (ref 18–40)
RBC # BLD AUTO: 4.24 MILLION/UL (ref 3.81–5.12)
TIBC SERPL-MCNC: 459 UG/DL (ref 250–450)
TRIGL SERPL-MCNC: 178 MG/DL
TSH SERPL DL<=0.05 MIU/L-ACNC: 2.66 UIU/ML (ref 0.45–4.5)
VIT B12 SERPL-MCNC: 231 PG/ML (ref 100–900)
WBC # BLD AUTO: 6.35 THOUSAND/UL (ref 4.31–10.16)

## 2023-05-05 RX ORDER — PANTOPRAZOLE SODIUM 40 MG/1
40 TABLET, DELAYED RELEASE ORAL
Status: DISCONTINUED | OUTPATIENT
Start: 2023-05-05 | End: 2023-05-08 | Stop reason: HOSPADM

## 2023-05-05 RX ORDER — ACETAMINOPHEN 325 MG/1
975 TABLET ORAL EVERY 6 HOURS PRN
Status: DISCONTINUED | OUTPATIENT
Start: 2023-05-05 | End: 2023-05-08 | Stop reason: HOSPADM

## 2023-05-05 RX ORDER — METHOCARBAMOL 500 MG/1
750 TABLET, FILM COATED ORAL EVERY 6 HOURS PRN
Status: DISCONTINUED | OUTPATIENT
Start: 2023-05-05 | End: 2023-05-08 | Stop reason: HOSPADM

## 2023-05-05 RX ORDER — PREGABALIN 75 MG/1
150 CAPSULE ORAL 2 TIMES DAILY
Status: DISCONTINUED | OUTPATIENT
Start: 2023-05-05 | End: 2023-05-08 | Stop reason: HOSPADM

## 2023-05-05 RX ORDER — METHOCARBAMOL 500 MG/1
500 TABLET, FILM COATED ORAL ONCE
Status: COMPLETED | OUTPATIENT
Start: 2023-05-05 | End: 2023-05-05

## 2023-05-05 RX ORDER — GUAIFENESIN 600 MG/1
600 TABLET, EXTENDED RELEASE ORAL 2 TIMES DAILY PRN
Status: DISCONTINUED | OUTPATIENT
Start: 2023-05-05 | End: 2023-05-08 | Stop reason: HOSPADM

## 2023-05-05 RX ORDER — OXYCODONE HYDROCHLORIDE 10 MG/1
10 TABLET ORAL EVERY 6 HOURS PRN
Status: DISCONTINUED | OUTPATIENT
Start: 2023-05-05 | End: 2023-05-08 | Stop reason: HOSPADM

## 2023-05-05 RX ORDER — LAMOTRIGINE 100 MG/1
300 TABLET ORAL DAILY
Status: DISCONTINUED | OUTPATIENT
Start: 2023-05-06 | End: 2023-05-08 | Stop reason: HOSPADM

## 2023-05-05 RX ADMIN — LEVOTHYROXINE SODIUM 50 MCG: 50 TABLET ORAL at 05:49

## 2023-05-05 RX ADMIN — PANTOPRAZOLE SODIUM 40 MG: 40 TABLET, DELAYED RELEASE ORAL at 15:33

## 2023-05-05 RX ADMIN — SULFAMETHOXAZOLE AND TRIMETHOPRIM 1 TABLET: 800; 160 TABLET ORAL at 21:52

## 2023-05-05 RX ADMIN — HYDROXYZINE HYDROCHLORIDE 100 MG: 50 TABLET, FILM COATED ORAL at 21:52

## 2023-05-05 RX ADMIN — FENOFIBRATE 48 MG: 48 TABLET, FILM COATED ORAL at 08:15

## 2023-05-05 RX ADMIN — PREGABALIN 150 MG: 75 CAPSULE ORAL at 17:03

## 2023-05-05 RX ADMIN — GUAIFENESIN 600 MG: 600 TABLET ORAL at 22:56

## 2023-05-05 RX ADMIN — HYDROXYZINE HYDROCHLORIDE 50 MG: 50 TABLET, FILM COATED ORAL at 08:15

## 2023-05-05 RX ADMIN — LAMOTRIGINE 200 MG: 100 TABLET ORAL at 08:14

## 2023-05-05 RX ADMIN — METHOCARBAMOL 500 MG: 500 TABLET ORAL at 02:12

## 2023-05-05 RX ADMIN — CLONIDINE HYDROCHLORIDE 0.2 MG: 0.1 TABLET ORAL at 08:14

## 2023-05-05 RX ADMIN — OXYCODONE HYDROCHLORIDE 10 MG: 10 TABLET ORAL at 21:57

## 2023-05-05 RX ADMIN — ZIPRASIDONE HYDROCHLORIDE 80 MG: 40 CAPSULE ORAL at 15:32

## 2023-05-05 RX ADMIN — METHOCARBAMOL 500 MG: 500 TABLET ORAL at 09:27

## 2023-05-05 RX ADMIN — Medication 3 MG: at 21:52

## 2023-05-05 RX ADMIN — LORAZEPAM 0.5 MG: 0.5 TABLET ORAL at 17:03

## 2023-05-05 RX ADMIN — OXYCODONE HYDROCHLORIDE 10 MG: 10 TABLET ORAL at 09:27

## 2023-05-05 RX ADMIN — ZIPRASIDONE HYDROCHLORIDE 80 MG: 40 CAPSULE ORAL at 08:14

## 2023-05-05 RX ADMIN — METHOCARBAMOL 500 MG: 500 TABLET ORAL at 10:44

## 2023-05-05 RX ADMIN — OXYCODONE HYDROCHLORIDE 10 MG: 10 TABLET ORAL at 15:33

## 2023-05-05 RX ADMIN — METFORMIN HYDROCHLORIDE 500 MG: 500 TABLET, FILM COATED ORAL at 08:15

## 2023-05-05 RX ADMIN — CLONIDINE HYDROCHLORIDE 0.2 MG: 0.1 TABLET ORAL at 21:51

## 2023-05-05 RX ADMIN — PANTOPRAZOLE SODIUM 40 MG: 40 TABLET, DELAYED RELEASE ORAL at 05:49

## 2023-05-05 RX ADMIN — SULFAMETHOXAZOLE AND TRIMETHOPRIM 1 TABLET: 800; 160 TABLET ORAL at 08:14

## 2023-05-05 RX ADMIN — OXYCODONE HYDROCHLORIDE 10 MG: 10 TABLET ORAL at 01:42

## 2023-05-05 RX ADMIN — PREGABALIN 150 MG: 75 CAPSULE ORAL at 08:15

## 2023-05-05 RX ADMIN — METFORMIN HYDROCHLORIDE 500 MG: 500 TABLET, FILM COATED ORAL at 15:32

## 2023-05-05 NOTE — NURSING NOTE
Assumed care of this patient from prior shift nurse at this time  Patient is currently in her room, resting quietly  Monitoring ongoing  Continuous safety rounding in progress

## 2023-05-05 NOTE — PLAN OF CARE
Problem: Alteration in Thoughts and Perception  Goal: Treatment Goal: Gain control of psychotic behaviors/thinking, reduce/eliminate presenting symptoms and demonstrate improved reality functioning upon discharge  Outcome: Progressing  Goal: Verbalize thoughts and feelings  Description: Interventions:  - Promote a nonjudgmental and trusting relationship with the patient through active listening and therapeutic communication  - Assess patient's level of functioning, behavior and potential for risk  - Engage patient in 1 on 1 interactions  - Encourage patient to express fears, feelings, frustrations, and discuss symptoms    - Admire patient to reality, help patient recognize reality-based thinking   - Administer medications as ordered and assess for potential side effects  - Provide the patient education related to the signs and symptoms of the illness and desired effects of prescribed medications  Outcome: Progressing  Goal: Refrain from acting on delusional thinking/internal stimuli  Description: Interventions:  - Monitor patient closely, per order   - Utilize least restrictive measures   - Set reasonable limits, give positive feedback for acceptable   - Administer medications as ordered and monitor of potential side effects  Outcome: Progressing  Goal: Agree to be compliant with medication regime, as prescribed and report medication side effects  Description: Interventions:  - Offer appropriate PRN medication and supervise ingestion; conduct AIMS, as needed   Outcome: Progressing  Goal: Attend and participate in unit activities, including therapeutic, recreational, and educational groups  Description: Interventions:  -Encourage Visitation and family involvement in care  Outcome: Progressing  Goal: Recognize dysfunctional thoughts, communicate reality-based thoughts at the time of discharge  Description: Interventions:  - Provide medication and psycho-education to assist patient in compliance and developing insight into his/her illness   Outcome: Progressing  Goal: Complete daily ADLs, including personal hygiene independently, as able  Description: Interventions:  - Observe, teach, and assist patient with ADLS  - Monitor and promote a balance of rest/activity, with adequate nutrition and elimination   Outcome: Progressing     Problem: Ineffective Coping  Goal: Cooperates with admission process  Description: Interventions:   - Complete admission process  Outcome: Progressing  Goal: Identifies ineffective coping skills  Outcome: Progressing  Goal: Identifies healthy coping skills  Outcome: Progressing  Goal: Demonstrates healthy coping skills  Outcome: Progressing     Problem: Risk for Self Injury/Neglect  Goal: Treatment Goal: Remain safe during length of stay, learn and adopt new coping skills, and be free of self-injurious ideation, impulses and acts at the time of discharge  Outcome: Progressing  Goal: Recognize maladaptive responses and adopt new coping mechanisms  Outcome: Progressing     Problem: Depression  Goal: Treatment Goal: Demonstrate behavioral control of depressive symptoms, verbalize feelings of improved mood/affect, and adopt new coping skills prior to discharge  Outcome: Progressing  Goal: Refrain from isolation  Description: Interventions:  - Develop a trusting relationship   - Encourage socialization   Outcome: Progressing  Goal: Refrain from self-neglect  Outcome: Progressing     Problem: Anxiety  Goal: Anxiety is at manageable level  Description: Interventions:  - Assess and monitor patient's anxiety level  - Monitor for signs and symptoms (heart palpitations, chest pain, shortness of breath, headaches, nausea, feeling jumpy, restlessness, irritable, apprehensive)  - Collaborate with interdisciplinary team and initiate plan and interventions as ordered    - [place] patient to unit/surroundings  - Explain treatment plan  - Encourage participation in care  - Encourage verbalization of concerns/fears  - Identify coping mechanisms  - Assist in developing anxiety-reducing skills  - Administer/offer alternative therapies  - Limit or eliminate stimulants  Outcome: Progressing     Problem: DISCHARGE PLANNING - CARE MANAGEMENT  Goal: Discharge to post-acute care or home with appropriate resources  Description: INTERVENTIONS:  - Conduct assessment to determine patient/family and health care team treatment goals, and need for post-acute services based on payer coverage, community resources, and patient preferences, and barriers to discharge  - Address psychosocial, clinical, and financial barriers to discharge as identified in assessment in conjunction with the patient/family and health care team  - Arrange appropriate level of post-acute services according to patient’s   needs and preference and payer coverage in collaboration with the physician and health care team  - Communicate with and update the patient/family, physician, and health care team regarding progress on the discharge plan  - Arrange appropriate transportation to post-acute venues  Outcome: Progressing     Problem: Prexisting or High Potential for Compromised Skin Integrity  Goal: Skin integrity is maintained or improved  Description: INTERVENTIONS:  - Identify patients at risk for skin breakdown  - Assess and monitor skin integrity  - Assess and monitor nutrition and hydration status  - Monitor labs   - Assess for incontinence   - Turn and reposition patient  - Assist with mobility/ambulation  - Relieve pressure over bony prominences  - Avoid friction and shearing  - Provide appropriate hygiene as needed including keeping skin clean and dry  - Evaluate need for skin moisturizer/barrier cream  - Collaborate with interdisciplinary team   - Patient/family teaching  - Consider wound care consult   Outcome: Progressing     Problem: Nutrition/Hydration-ADULT  Goal: Nutrient/Hydration intake appropriate for improving, restoring or maintaining nutritional needs  Description: Monitor and assess patient's nutrition/hydration status for malnutrition  Collaborate with interdisciplinary team and initiate plan and interventions as ordered  Monitor patient's weight and dietary intake as ordered or per policy  Utilize nutrition screening tool and intervene as necessary  Determine patient's food preferences and provide high-protein, high-caloric foods as appropriate       INTERVENTIONS:  - Monitor oral intake, urinary output, labs, and treatment plans  - Assess nutrition and hydration status and recommend course of action  - Evaluate amount of meals eaten  - Assist patient with eating if necessary   - Allow adequate time for meals  - Recommend/ encourage appropriate diets, oral nutritional supplements, and vitamin/mineral supplements  - Order, calculate, and assess calorie counts as needed  - Recommend, monitor, and adjust tube feedings and TPN/PPN based on assessed needs  - Assess need for intravenous fluids  - Provide specific nutrition/hydration education as appropriate  - Include patient/family/caregiver in decisions related to nutrition  Outcome: Progressing

## 2023-05-05 NOTE — NURSING NOTE
Patient seen  using wheelchair in hallway  Patient dragging left foot with uncovered wound on floor  Pt educated on the importance of keeping the wound covered and infection prevention  Pt stated she took the dressing off and was leaving it off   Offered to clean wound and replace dressing, patient refused

## 2023-05-05 NOTE — NURSING NOTE
Skin assessment performed by two RNs with the following observations made:    Right forearm with two small round scab areas approximately 0 25cm x 0 25cm    Scarring under b/l breasts, no open areas observed    Left forearm abrasion approximately 4 cm long, no drainage observed    Small area of ecchymosis observed on the back (right, lower flank area) approximately 2cm    Bottom of left foot including heel area with dry skin    Right stump with scar from recent amputation, pinkened areas along healing incision and flaking, dry skin  Scant clear, yellow drainage observed in one small area where suture scar is located (photographs taken for chart)    Left inner foot wound where great toe was amputated, approximately 2 5cm x 1cm with scan clear, yellow drainage  It appears that the third toe was amputated as well    Left foot edematous, remaining toes deformed  (photographs taken for chart)

## 2023-05-05 NOTE — NURSING NOTE
Pt medicated for c/o increased anxiety due to impending MRI @ 1703 with Ativan 0 5 mg po with moderate relief obtained  Ples Sellar - 19 @ that time  Wound care performed to lt foot wound as ordered by MD  Wound cleansed with NSS, adaptic applied, 2- folded 4x4 sponges, fletcher wrap & ace bandage  Pt tolerated wound care well & no drainage noted @ this time  Pt transported to [facility] via [place] [facility] for MRI of Lt foot

## 2023-05-05 NOTE — PROGRESS NOTES
05/05/23 0830   Team Meeting   Meeting Type Tx Team Meeting   Team Members Present   Team Members Present Physician;Nurse;   Physician Team Member Dr Contreras Colvin DO   Nursing Team Member Sepideh Murray, RN   Care Management Team Member Luther Valente MS, [facility], [facility]   Patient/Family Present   Patient Present Yes   Patient's Family Present No     Reviewed TX plan and goals all in agreement and signed

## 2023-05-05 NOTE — NURSING NOTE
PRN Robaxin administered as per patient request for c/o muscle spasms in the legs and back  Will monitor for medication effectiveness
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
## 2023-05-05 NOTE — NURSING NOTE
Administered scheduled Oxycodone as per order for 9/10 left leg and lower back pain  Will monitor for medication effectiveness  Cleansed left foot wound with NSS, placed folded 4x4 gauze and covered with fletcher  Safety sock placed to the left foot  Patient tolerated well  She also requested PRN Robaxin which will be administered momentarily

## 2023-05-05 NOTE — CONSULTS
[address] Podiatry - Consultation    Patient Information:   Elodia Juarez 53 y o  female MRN: [id number]  Unit/Bed#: [facility] 210-01 Encounter: [id number]  PCP: Astrid Badillo MD  Date of Admission:  5/4/2023  Date of Consultation: 05/05/23  Requesting Physician: Sakshi Gannon DO      ASSESSMENT:    Elodia Juarez is a 53 y o  female with:    1  OM of the left foot  2  DM with neuropathy  3  Ulcer of the left foot with fat layer exposed      PLAN:    -X-rays from 5/3 were reviewed by myself and compared to the previous imaging from 3/23 which is directly post procedure  Although the report was read as negative, and without periosteal reaction, there is periosteal reaction and cortical irregularity along the lateral and medial aspect the remnant portion of the first metatarsal which is highly suggestive of osteomyelitis and direct proximity to the wound  -This combined with the fact that her white blood cell count was elevated when compared to previous white blood cell count from 4/3 from 6 2-11 95 would likely indicate an acute infection in her left foot  It is again dropped today to 6 35    These x-ray findings in conjunction with the variable white blood cell count I am highly concerned for underlying infection  -We will obtain an ESR, CRP, prealbumin to check her inflammatory markers and her nutritional status  -This wound needs to be covered at all times, she would benefit from simply Adaptic, DSD application with daily changes  - She is weightbearing as tolerated to the left foot  -If the MRI does come back with changes indicative of osteomyelitis she will need to be transferred to [facility] for surgical intervention and repeat resection of her first metatarsal        SUBJECTIVE    History of Present Illness:    Elodia Juarez is a 53 y o  female with past medical history of diabetes who presents with right low below-knee amputation and the left nonhealing ulceration on the medial aspect of her left foot   This been present since her surgery in March with Dr Anibal Kimbrough with resection of the partial first ray and also of the left third ray  Review of Systems:    Constitutional: Negative  HENT: Negative  Eyes: Negative  Respiratory: Negative  Cardiovascular: Negative  Gastrointestinal: Negative  Musculoskeletal: neg   Skin:neg   Neurological: neg   Psych: Negative  Past Medical and Surgical History:     Past Medical History:   Diagnosis Date   • Anxiety    • Arthritis    • Asthma    • Chronic pain    • Diabetes mellitus ([address] )    • Hypothyroidism    • Lymphedema     left leg   • Manic bipolar I disorder (HCC)    • Morbid obesity (HCC)    • OCD (obsessive compulsive disorder)    • PTSD (post-traumatic stress disorder)        Past Surgical History:   Procedure Laterality Date   • CHOLECYSTECTOMY     • CHOLECYSTECTOMY     • LEG AMPUTATION THROUGH LOWER TIBIA AND FIBULA Right 3/23/2023    Procedure: AMPUTATION BELOW KNEE (BKA);   Surgeon: Carrington Sanchez MD;  Location: [facility];  Service: General   • NH AMPUTATION FOOT TRANSMETARSAL Right 9/4/2021    Procedure: AMPUTATION TRANSMETATARSAL (TMA);  Surgeon: Justin Clark DPM;  Location: [facility];  Service: Podiatry   • NH AMPUTATION METATARSAL W/TOE SINGLE Left 9/8/2019    Procedure: PARTIAL 3RD RAY RESECTION, PSB TOE FILLET FLAP;  Surgeon: Chloe Escobar DPM;  Location: [facility];  Service: Podiatry   • NH AMPUTATION METATARSAL W/TOE SINGLE Left 3/23/2023    Procedure: RAY RESECTION FOOT;  Surgeon: mAira Toro DPM;  Location: [facility];  Service: Podiatry   • TOE AMPUTATION Right 1/13/2021    Procedure: AMPUTATION 2ND TOE;  Surgeon: Justin Clark DPM;  [facility];  Service: Podiatry   • WOUND DEBRIDEMENT Left 2/17/2022    Procedure: DEBRIDEMENT GREAT TOE WOUND;  Surgeon: Justin Clark DPM;  [facility]: [facility];  Service: Podiatry       Meds/Allergies:    Medications Prior to Admission "  Medication   • cloNIDine (CATAPRES) 0 2 mg tablet   • fenofibrate (TRICOR) 54 MG tablet   • folic acid (FOLVITE) 1 mg tablet   • hydrOXYzine HCL (ATARAX) 50 mg tablet   • hydrOXYzine HCL (ATARAX) 50 mg tablet   • lamoTRIgine (LaMICtal) 200 MG tablet   • levothyroxine 50 mcg tablet   • melatonin 3 mg   • metFORMIN (GLUCOPHAGE) 500 mg tablet   • methocarbamol (ROBAXIN) 750 mg tablet   • oxyCODONE (OxyCONTIN) 10 mg 12 hr tablet   • pantoprazole (PROTONIX) 40 mg tablet   • pregabalin (LYRICA) 150 mg capsule   • ziprasidone (GEODON) 80 mg capsule   • ergocalciferol (ERGOCALCIFEROL) 1 25 MG (01506 UT) capsule       Allergies   Allergen Reactions   • Latex Hives and Shortness Of Breath   • Vancomycin Rash and Itching     Other reaction(s): Hives / Urticaria   • Aspirin Vomiting and Rash     Other reaction(s): Nausea and Vomiting   • Barium Iodide Itching and Rash     Face became very red and itchy    • Codeine Vomiting and Rash     Other reaction(s): Nausea and Vomiting   • Penicillins Rash and Other (See Comments)     Patient has tolerated cefepime without difficulty       Social History:     Marital Status: Single    Substance Use History:   Social History     Substance and Sexual Activity   Alcohol Use Never     Social History     Tobacco Use   Smoking Status Every Day   • Packs/day: 1 00   • Years: 15 00   • Pack years: 15 00   • Types: Cigarettes   Smokeless Tobacco Never     Social History     Substance and Sexual Activity   Drug Use Yes   • Types: Marijuana       Family History:    Family History   Problem Relation Age of Onset   • Hyperlipidemia Mother    • Diabetes Mother    • Hypertension Mother    • Heart attack Father          OBJECTIVE:    Vitals:   Blood Pressure: 134/61 (05/05/23 0810)  Pulse: 68 (05/05/23 0810)  Temperature: 97 6 °F (36 4 °C) (05/05/23 0810)  Temp Source: Temporal (05/05/23 0810)  Respirations: 18 (05/05/23 0810)  Height: 5' 5\" (165 1 cm) (05/04/23 1850)  Weight - Scale: 97 1 kg (214 lb) " "(05/04/23 1850)  SpO2: 97 % (05/05/23 0810)    Physical Exam:     General Appearance: Alert, cooperative, no distress  HEENT: Head normocephalic, atraumatic, without obvious abnormality  Heart: Normal rate and rhythm  Lungs: Non-labored breathing  No respiratory distress  Abdomen: Without distension  Psychiatric: AAOx3  Lower Extremity:  Vascular:   DP and PT pulses are palpable of the left foot  There is open wound on the medial aspect of the left foot which does have a granular base which is in direct proximity to the remaining partial first ray    MMT is 5 out of 5 to left lower extremity, gross sensation intact, protective sensation is absent, the left third ray site appears to be within normal limits there is minimal cellulitis to the periwound normal drainage        additional Data:     Lab Results: I have personally reviewed pertinent labs including:    Results from last 7 days   Lab Units 05/05/23  0539   WBC Thousand/uL 6 35   HEMOGLOBIN g/dL 9 2*   HEMATOCRIT % 31 5*   PLATELETS Thousands/uL 403*   NEUTROS PCT % 36*   LYMPHS PCT % 47*   MONOS PCT % 9   EOS PCT % 7*     Results from last 7 days   Lab Units 05/03/23  2048   POTASSIUM mmol/L 4 0   CHLORIDE mmol/L 102   CO2 mmol/L 27   BUN mg/dL 13   CREATININE mg/dL 0 84   CALCIUM mg/dL 9 4   ALK PHOS U/L 86   ALT U/L 15   AST U/L 16           Cultures: I have personally reviewed pertinent cultures including:              Imaging: I have personally reviewed pertinent films in PACS  EKG, Pathology, and Other Studies: I have personally reviewed pertinent reports  ** Please Note: Portions of the record may have been created with voice recognition software  Occasional wrong word or \"sound a like\" substitutions may have occurred due to the inherent limitations of voice recognition software  Read the chart carefully and recognize, using context, where substitutions have occurred   **    "

## 2023-05-05 NOTE — PROGRESS NOTES
05/05/23 8184   Activity/Group Checklist   Group Community meeting   Attendance Attended   Attendance Duration (min) 16-30  (pt joined late)   Interactions Interacted appropriately   Affect/Mood Appropriate  (irritable)   Goals Achieved Identified feelings; Able to listen to others; Able to engage in interactions; Able to recieve feedback; Able to self-disclose

## 2023-05-05 NOTE — NURSING NOTE
"Patient admitted to this unit from [facility] ED as a 201 for increased anxiety, depression, and SI with no plan or intent  Patient upon arrival wheeled herself onto the unit using her own wheelchair  Patient signed paperwork and answered admission questions without issue  She was unhappy with the fact that she was unable to order takeout, theres no TVs in the rooms, she cannot use her phone, eat her snacks that she brought, and drink non-diet pepsi  Patient was educated on the rules of the unit and her diabetes diagnosis but patient was unhappy and stated if she can't drink regular Pepsi than she won't drink anything  Patient was given diet Pepsi and a snack  Patient complained the her sandwich bread was soggy  Patient stated that she came into the ED due to her being discharged from a SNF due to her insurance not paying anymore and her not having money to pay the bill  Patient was in the SNF due to her having a right BKA in March  Patient was then homeless and went to the ED  Patient told the ED that she was homeless and if they discharged her then she would kill herself  This patient told this nurse that she still has high anxiety, depression, and SI with no plan or intent  She stated that \"I want to die but I don't want to kill myself, however that's going to work\"  Patient demanding, irritable, loud, labile, but cooperative  Med compliant  Patient stated that her only diet concern is that she cannot have pork due to her previous gastric bypass surgery  Currently patient in bed resting with no issues or signs of distress  No complaints or concerns voiced at this time  Q7min checks are in progress  Care is ongoing     "

## 2023-05-05 NOTE — PROGRESS NOTES
05/05/23 1315   Activity/Group Checklist   Group Wellness   Attendance Did not attend  (pt offered grp; pt remained in her room)

## 2023-05-05 NOTE — PROGRESS NOTES
Patient came with the following items    1 pr  of grey PJ's pants  1 purple shirt  1 blue sock  1 black t-shirt  2 pr black pants  1 pr black shorts  2 books   (1 search a word 1 color book)  1 praie fingerless gloves for wheel chair      Security bag # [id number]    $2 dollars/[place] lic, 1 debit card and other cards    Locked in cabinet    1 hard plastic sleeve  2 rubber sleeves  4 white stump socks  1 black procare shoe  1 black sneaker      She has 1 prostheic leg    Behind the nurse station    1 motorola blue cell phone  W/charging cord  1 & 1/2 packs of josi strike cig packs  1 tan lighter    Laundry room    1 large white back of extra clothes & box of color pens  1 blue green bag with extra clothes  1 blue book bag with books (metal bindings) color pencils  1 bag of candy and creamers/ sugars    Patient signed for all the above
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
## 2023-05-05 NOTE — H&P
José Luis Alatorre#  :1975 F  VLS:[id number]    XXP:[id number]  Adm Date: 2023  6:22 PM   ATT PHY: [address]         Chief Complaint: depression, anxiety, suicidal ideations  History of Presenting Illness: Tayla Levin is a(n) 53y o  year old female who is admitted to [place] [address] on voluntary 201 committment basis  Patient originally presented to [facility]  on 5/3/2023 for left foot pain, anxiety and depression with suicidal ideations  Patient examined at bedside  Patient is a limited historian, irritable and demanding  She complains of left foot pain and leg spasms and wants her oxy to be increased  Has nonhealing ulcer to left foot which was dressed earlier by staff but she took the dressing off herself afterwards stating it was uncomfortable  Complains about her diet as she wants double portions and ability to drink soda  Denies chest pain, shortness of breath, abdominal pain, nausea, vomiting, headache      Allergies   Allergen Reactions   • Latex Hives and Shortness Of Breath   • Vancomycin Rash and Itching     Other reaction(s): Hives / Urticaria   • Aspirin Vomiting and Rash     Other reaction(s): Nausea and Vomiting   • Barium Iodide Itching and Rash     Face became very red and itchy    • Codeine Vomiting and Rash     Other reaction(s): Nausea and Vomiting   • Penicillins Rash and Other (See Comments)     Patient has tolerated cefepime without difficulty     Current Facility-Administered Medications on File Prior to Encounter   Medication Dose Route Frequency Provider Last Rate Last Admin   • [DISCONTINUED] cloNIDine (CATAPRES) tablet 0 2 mg  0 2 mg Oral Q12H [facility] Sudarshan Brennan PA-C   0 2 mg at 23 1309   • [DISCONTINUED] hydrOXYzine HCL (ATARAX) tablet 100 mg  100 mg Oral HS Sudarshan Brennan PA-C       • [DISCONTINUED] hydrOXYzine HCL (ATARAX) tablet 50 mg  50 mg Oral QAM Rico Aguilar PA-C   50 mg at 05/04/23 9058   • [DISCONTINUED] lamoTRIgine (LaMICtal) tablet 200 mg  200 mg Oral Daily EMIL PaniaguaC   200 mg at 05/04/23 8496   • [DISCONTINUED] levothyroxine tablet 50 mcg  50 mcg Oral Early Morning SUSAN Paniagua-C   50 mcg at 05/04/23 4600   • [DISCONTINUED] melatonin tablet 3 mg  3 mg Oral HS EMIL PaniaguaC   3 mg at 05/04/23 0200   • [DISCONTINUED] metFORMIN (GLUCOPHAGE) tablet 500 mg  500 mg Oral BID With Meals EMIL PaniaguaC   500 mg at 05/04/23 1305   • [DISCONTINUED] oxyCODONE (ROXICODONE) IR tablet 10 mg  10 mg Oral Q12H Rico Aguilar PA-C   10 mg at 05/04/23 1309   • [DISCONTINUED] pantoprazole (PROTONIX) EC tablet 40 mg  40 mg Oral Early Morning EMIL PaniaguaC   40 mg at 05/04/23 3447   • [DISCONTINUED] pregabalin (LYRICA) capsule 150 mg  150 mg Oral Daily EMIL PaniaguaC   150 mg at 05/04/23 1462   • [DISCONTINUED] sulfamethoxazole-trimethoprim (BACTRIM DS) 800-160 mg per tablet 1 tablet  1 tablet Oral Q12H [address] [place] ZORAIDA Steward   1 tablet at 05/04/23 9856   • [DISCONTINUED] ziprasidone (GEODON) capsule 80 mg  80 mg Oral BID With Meals EMIL PaniaguaC   80 mg at 05/04/23 8972     Current Outpatient Medications on File Prior to Encounter   Medication Sig Dispense Refill   • cloNIDine (CATAPRES) 0 2 mg tablet Take 0 2 mg by mouth 2 (two) times a day     • fenofibrate (TRICOR) 54 MG tablet TAKE 1 TABLET (54 MG TOTAL) BY MOUTH DAILY       • folic acid (FOLVITE) 1 mg tablet Take 1,000 mcg by mouth daily     • hydrOXYzine HCL (ATARAX) 50 mg tablet Take 50 mg by mouth in the morning     • hydrOXYzine HCL (ATARAX) 50 mg tablet Take 2 tablets (100 mg total) by mouth daily at bedtime 30 tablet 0   • lamoTRIgine (LaMICtal) 200 MG tablet Take 1 5 tablets (300 mg total) by mouth every morning 30 tablet 0   • levothyroxine 50 mcg tablet Take 1 tablet (50 mcg total) by mouth daily "30 tablet 0   • melatonin 3 mg Take 3 tablets (9 mg total) by mouth daily at bedtime 30 tablet 0   • metFORMIN (GLUCOPHAGE) 500 mg tablet Take 1 tablet (500 mg total) by mouth 2 (two) times a day with meals 60 tablet 0   • methocarbamol (ROBAXIN) 750 mg tablet TAKE 1 TABLET (750 MG TOTAL) BY MOUTH 4 (FOUR) TIMES A DAY AS NEEDED FOR MUSCLE SPASMS       • oxyCODONE (OxyCONTIN) 10 mg 12 hr tablet Take 10 mg by mouth every 12 (twelve) hours     • pantoprazole (PROTONIX) 40 mg tablet Take 1 tablet (40 mg total) by mouth 2 (two) times a day before breakfast and lunch 30 tablet 0   • pregabalin (LYRICA) 150 mg capsule Take 150 mg by mouth 2 (two) times a day     • ziprasidone (GEODON) 80 mg capsule Take 80 mg by mouth 2 (two) times a day with meals      • ergocalciferol (ERGOCALCIFEROL) 1 25 MG (92907 UT) capsule Take 50,000 Units by mouth     • [DISCONTINUED] EASY COMFORT PEN NEEDLES 32G X 4 MM MISC      • [DISCONTINUED] EASY TOUCH INSULIN SYRINGE 31G X 5/16\" 0 3 ML MISC        Active Ambulatory Problems     Diagnosis Date Noted   • 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, with bone involvement without evidence of necrosis ([place] [address] ) 06/28/2019   • H/O bariatric surgery 06/28/2019   • Anxiety 06/28/2019   • Hypothyroidism 06/29/2019   • Mood disorder ([place] [address] ) 06/29/2019   • Type 2 diabetes mellitus with neurologic complication, without long-term current use of insulin ([place] [address] ) 09/06/2019   • Diabetic foot ulcer ([place] [address] ) 09/10/2019   • Acquired absence of other left toe(s) ([place] [address] ) 12/09/2019   • Essential hypertension 01/11/2021   • Arthritis 01/12/2021   • Chronic pain 01/12/2021   • OCD (obsessive compulsive disorder)    • PTSD (post-traumatic stress disorder)    • Right foot ulcer, with fat layer exposed ([facility] ) 04/16/2021   • GERD (gastroesophageal reflux disease) 09/01/2021   • Migraine without status migrainosus, not intractable 09/01/2021   • Obstructive sleep apnea 02/16/2022   • Sepsis " ([place] [address] ) 03/20/2023   • Morbid obesity with BMI of 40 0-44 9, adult ([place] [address] ) 03/20/2023   • Homeless single person 03/22/2023   • Hx of BKA, right ([place] [address] ) 04/19/2023     Resolved Ambulatory Problems     Diagnosis Date Noted   • Obesity 06/28/2019   • Preoperative clearance 06/28/2019   • Cellulitis of left foot 06/28/2019   • Encounter for smoking cessation counseling 06/29/2019   • Gram-positive bacteremia 06/29/2019   • Pre-operative examination 09/06/2019   • Tobacco abuse 09/06/2019   • Pyogenic inflammation of bone ([place] [address] ) 09/06/2019   • Type 2 diabetes mellitus ([place] [address] ) 09/09/2019   • Diabetic ulcer of toe of right foot associated with type 2 diabetes mellitus, with necrosis of bone ([place] [address] ) 01/11/2021   • Cellulitis of right foot 01/11/2021   • Smoking    • Pain of foot 06/25/2021   • Constipation 09/06/2021   • Left foot diabetic ulcer 02/16/2022   • Cough 03/27/2023     Past Medical History:   Diagnosis Date   • Asthma    • Diabetes mellitus ([place] [address] )    • Lymphedema    • Manic bipolar I disorder ([place] [address] )    • Morbid obesity ([place] [address] )      Past Surgical History:   Procedure Laterality Date   • CHOLECYSTECTOMY     • CHOLECYSTECTOMY     • LEG AMPUTATION THROUGH LOWER TIBIA AND FIBULA Right 3/23/2023    Procedure: AMPUTATION BELOW KNEE (BKA);   Surgeon: Priti Bailon MD;  Location: [facility];  Service: General   • CA AMPUTATION FOOT TRANSMETARSAL Right 9/4/2021    Procedure: AMPUTATION TRANSMETATARSAL (TMA);  Surgeon: Jacqueline Spence DPM;  Location: [facility];  Service: Podiatry   • CA AMPUTATION METATARSAL W/TOE SINGLE Left 9/8/2019    Procedure: PARTIAL 3RD RAY RESECTION, PSB TOE FILLET FLAP;  Surgeon: Mario Art DPM;  Location: [facility];  Service: Podiatry   • CA AMPUTATION METATARSAL W/TOE SINGLE Left 3/23/2023    Procedure: RAY RESECTION FOOT;  Surgeon: Niels Toro DPM;  Location: [facility];  Service: Podiatry   • TOE AMPUTATION Right 1/13/2021    Procedure: AMPUTATION 2ND TOE;  Surgeon: Kamla Neil Sarihta Lara DPM;  Location: [facility];  Service: Podiatry   • WOUND DEBRIDEMENT Left 2/17/2022    Procedure: DEBRIDEMENT GREAT TOE WOUND;  Surgeon: Anju Sims DPM;  [facility]: [facility];  Service: Podiatry     Social History:   Social History     Socioeconomic History   • Marital status: Single     Spouse name: None   • Number of children: None   • Years of education: None   • Highest education level: None   Occupational History   • None   Tobacco Use   • Smoking status: Every Day     Packs/day: 1 00     Years: 15 00     Pack years: 15 00     Types: Cigarettes   • Smokeless tobacco: Never   Vaping Use   • Vaping Use: Former   Substance and Sexual Activity   • Alcohol use: Never   • Drug use: Yes     Types: Marijuana   • Sexual activity: Not Currently   Other Topics Concern   • None   Social History Narrative   • None     Social Determinants of Health     Financial Resource Strain: Not on file   Food Insecurity: Food Insecurity Present   • Worried About [employer] in the Last Year: Sometimes true   • Ran Out of Food in the Last Year: Sometimes true   Transportation Needs: No Transportation Needs   • Lack of Transportation (Medical): No   • Lack of Transportation (Non-Medical): No   Physical Activity: Not on file   Stress: Not on file   Social Connections: Not on file   Intimate Partner Violence: Not on file   Housing Stability: High Risk   • Unable to Pay for Housing in the Last Year: Yes   • Number of Places Lived in the Last Year: 2   • Unstable Housing in the Last Year: Yes     Family History:   Family History   Problem Relation Age of Onset   • Hyperlipidemia Mother    • Diabetes Mother    • Hypertension Mother    • Heart attack Father      Review of Systems   Constitutional: Negative  Negative for chills and fever  HENT: Negative  Negative for ear pain and sore throat  Eyes: Negative  Negative for pain and visual disturbance  Respiratory: Negative    Negative for cough and "shortness of breath  Cardiovascular: Negative  Negative for chest pain and palpitations  Gastrointestinal: Negative  Negative for abdominal pain, constipation, diarrhea, nausea and vomiting  Genitourinary: Negative  Negative for difficulty urinating, dysuria, flank pain, hematuria and urgency  Musculoskeletal: Positive for arthralgias, back pain and myalgias  Skin: Positive for wound  Negative for color change and rash  Neurological: Negative  Negative for dizziness and headaches  Psychiatric/Behavioral: Positive for dysphoric mood and suicidal ideas  All other systems reviewed and are negative  Physical Exam   Vitals: Blood pressure 134/61, pulse 68, temperature 97 6 °F (36 4 °C), temperature source Temporal, resp  rate 18, height 5' 5\" (1 651 m), weight 97 1 kg (214 lb), SpO2 97 %, not currently breastfeeding  ,Body mass index is 35 61 kg/m²  Constitutional: Awake, alert, in no acute distress  Head: Normocephalic and atraumatic  Mouth/Throat: Oropharynx is clear and moist     Eyes: Conjunctivae and EOM are normal    Neck: Neck supple  No thyromegaly present  Cardiovascular: Normal rate, regular rhythm  Pulmonary/Chest: Effort normal and breath sounds normal    Abdominal: Soft  Bowel sounds are normal  She exhibits no distension  There is no tenderness  Neurological: Awake, alert, oriented to person, place, and time, using wheelchair  Musculoskeletal:  Nontender spine  Right BKA  Skin: Skin is warm and dry   +3 edema left foot  Open wound to medial left foot  Assessment     Kera Nunez is a(n) 53 y o  female with bipolar disorder  1  Type 2 diabetes mellitus  Patient is on metformin 500 mg twice daily  Hgb a1c 6 4% on 2/16/2023  Repeat hgb a1c  Accu-Cheks twice daily  Carb controlled diet  2  Diabetic neuropathy  Continue Lyrica 150 mg twice daily  3  Hyperlipidemia  Continue fenofibrate 48 mg daily  4  GERD  Patient is on Protonix 40 mg twice daily    5  " Hypothyroidism  Continue levothyroxine 50 mcg daily  TSH on 5/5/23 WNL  6  Gait abnormality/right BKA  PT OT consulted  7  Left open foot wound/cellulitis  Patient started on Bactrim DS twice daily in ED on 5/3/23, will continue on unit  Podiatry consulted (see note)  Scheduled for MRI later today  Wound care as ordered  Oxycodone 10 mg q6h prn for severe pain  8  Psych with bipolar disorder  This is being managed by the psych team       Prognosis: Fair  Discharge Plan: In progress  Advanced Directives: I have discussed in detail with the patient the advanced directives  The patient states she does not have a POA and does not have a living will  Patient's emergency contact is her son, Lennox Filbert, whose number is [phone]  When discussing cardiac and pulmonary resuscitation efforts with the patient, the patient wishes to be full code  I have spent more than 50 minutes gathering data, doing physical examination, and discussing the advanced directives, which was witnessed by caring staff  The patient was discussed with Dr Eagle Valdovinos and he is in agreement with the above note

## 2023-05-05 NOTE — PROGRESS NOTES
05/05/23    Team Meeting   Meeting Type Daily Rounds   Team Members Present   Team Members Present Physician;Nurse;;Occupational Therapist   Physician Team Member Dr Jhonathan Limon DO; Elizabeth Landaverde [place]   Nursing Team Member Katherine Eastman, ANDER; Sheila Mcclain, RN   Care Management Team Member Alexandria Garcia MS, [facility]   OT Team Member [place], [place]   Patient/Family Present   Patient Present No   Patient's Family Present No   61 51 81, new admission, si no plan, homeless, released from snf because she declined to pay for long term care, reviewed medical, reviewed psych, reviewed that pt/ot ordered, wound and podiatry ordered to review wounds  Pain regimen in place, has prosthetic, medication adjustment

## 2023-05-05 NOTE — NURSING NOTE
"Pt is irritable, labile & demanding  Pt c/o high anxiety & high depression  Pt denies any hallucinations, suicidal or homicidal ideations  Pt keeps to herself & does not socialize with other patients  Pt is cooperative & compliant with medications  +3 edema noted LIN Amin in & examined pt's lt foot wound  Pt refused DSD @ this time & no drainage noted @ this time  Pt instructed to wear surgical shoe to Lt foot & pt stated \"I'm not going to wear it all the time\"  Pt instructed on wound infection & importance of keeping wound covered  Q 7 min checks maintained to monitor pt's behavior & safety  Pt independently wheels herself around in her wheelchair    "

## 2023-05-05 NOTE — SOCIAL WORK
CM placed call to PT son Fay Rock for family check in  [phone], left message requesting return call  CM placed call to PT PCP Dr Doroteo Barraza  [phone] to notify of PT admission  Left message requesting return call

## 2023-05-05 NOTE — NURSING NOTE
Patient appears to have slept through the majority of the overnight hours, waking at approximately 0140 to request pain medication and muscle relaxer  She returned to bed where she has been observed to be sleeping for the remainder of the overnight hours  Monitoring ongoing  Continuous safety rounding in progress

## 2023-05-05 NOTE — NUTRITION
23 1442   Biochemical Data,Medical Tests, and Procedures   Biochemical Data/Medical Tests/Procedures Lab values reviewed; Meds reviewed   Labs (Comment) 5/3 CMP WNL   TRI, HDL:30, LDL:113, H&H:9  5   A1c:6 4%   Meds (Comment) cogentin, catapres, levothyroxine, ativan, melatonin, metformin, zyprexa, protonix, lyrica   Nutrition-Focused Physical Exam   Nutrition-Focused Physical Exam Findings RN skin assessment reviewed  (Wound left foot, wound right leg, diabetic ulcer right foot per documentation )   Nutrition-Focused Physical Exam Findings +3 LLE edema  Medical-Related Concerns anxiety, DM, hypothyroidism, lymphedema, OCD, morbid obesity, PTSD   Adequacy of Intake   Nutrition Modality PO   Feeding Route   PO Independent   Current PO Intake   Current Diet Order CCD 2, double protein thin liquids   Current Meal Intake %   Estimated calorie intake compared to estimated need Nutrient needs met  PES Statement   Problem Behavioral-Environmental   Knowledge and Beliefs (1) Limited adherence to nutrition-related recommendations NB-1 6   Related to Other (Comment)  (refusal to adhere to CHO controlled diet)   As evidenced by: Per patient/family interview   Recommendations/Interventions   Malnutrition/BMI Present No  (does not meet criteria)   Summary Consult: DM counseling; weight change  CCD 2, double protein thin liquid diet  Meal completions %  Patient states she was at a SNF for the past month  She states she was provided 3 meals per day  She states she does not believe she was on any type of diet plan  She reports they checked her BG  She states she needs regular pepsi for the caffiene or she gets a headache  Discussed having coffee instead  She states she cannot drink the coffee here because she cannot use her own [place] vanilla creamer  Discussed sugar content in [address] and impact on BG  She continues to request regular Pepsi   # (stated); 3/21/#, 39#(15%) loss however patient is s/p right BKA in March  Wound left foot, wound right leg, diabetic ulcer right foot per documentation  +3 LLE edema  Patient requesting throughout discussion to be changed to a Regular diet  Discussed option of providing 1-8oz regular pepsi once daily instead of diet change; patient was agreeable  Discussed with nursing  Interventions/Recommendations Continue current diet order; Other (Specify)   Intervention Comments Allow 1-8oz regular pepsi once daily   Education Assessment   Education Patient declined nutrition education   Patient Nutrition Goals   Goal Avoid weight gain; Improve to healthful diet   Goal Status Initiated   Timeframe to complete goal by next f/u   Nutrition Complexity Risk   Nutrition complexity level Low risk   Follow up date 05/19/23

## 2023-05-05 NOTE — NURSING NOTE
Labs ordered for Sed rate, C Reactive Protein & prealbumin by Dr Singh will be done on blood drawn earlier this AM per M D [facility]  [employer]  Pt scheduled for MRI Lt foot today @ 01 [phone] 83 & will be transported via [employer]

## 2023-05-05 NOTE — PROGRESS NOTES
05/05/23 1020   Activity/Group Checklist   Group Admission/Discharge   Attendance Attended   Attendance Duration (min) 16-30   Interactions Interacted appropriately   Affect/Mood Appropriate  (entitled/demanding behaviors)   Goals Achieved Identified triggers; Identified feelings; Identified relapse prevention strategies; Discussed coping strategies; Identified resources and support systems; Able to listen to others; Able to engage in interactions; Able to reflect/comment on own behavior;Able to self-disclose; Able to recieve feedback     Patient agreeable to meet and complete self assessment and relapse prevention plan  Patient shared she is here in the hospital due to being homeless and she feels like dying but would never kill herself; she never had a plan or intent  Patient is in need of a safe place to live; her son is currently trying to find an apartment for them  She will feel ready to discharge when she has a safe place to discharge too  She shared likes of music, art, games on her phone and watching TV

## 2023-05-05 NOTE — PHYSICAL THERAPY NOTE
PT cancel note       05/05/23 1514   PT Last Visit   PT Visit Date 05/05/23   Note Type   Note type Cancelled Session   Cancel Reasons   (awaiting MRI results)     [place]

## 2023-05-05 NOTE — TREATMENT PLAN
TREATMENT PLAN REVIEW - [address] [facility] [place] 53 y o  1975 female MRN: [id number]    [address]  Room / Bed: Aamir Persaud/-83 Encounter: [id number]          Admit Date/Time:  5/4/2023  6:22 PM    Treatment Team: Attending Provider: Mame Scruggs DO; Consulting Physician: Kamla Shah MD; Patient Care Technician: Megan Santos; Patient Care Assistant: Milady Durham; : Linda Leung MS; Patient Care Assistant: Sergio Shaw; Care Manager: Corwin Del Rosario, RN; Recreational Therapist: Isauro Walker; Patient Care Assistant: Lillian Pearson; Registered Nurse: Ezequiel Guzman RN    Diagnosis: Principal Problem:    Bipolar 1 disorder, depressed ([facility] )      Patient Strengths/Assets: negotiates basic needs, patient is on a voluntary commitment, Mostly cooperative    Patient Barriers/Limitations: financial instability, homeless, limited support system, noncompliant with medication    Short Term Goals: decrease in depressive symptoms, decrease in anxiety symptoms, decrease in suicidal thoughts, mood stabilization    Long Term Goals: improvement in depression, improvement in anxiety, resolution of depressive symptoms, stabilization of mood, free of suicidal thoughts    Progress Towards Goals: starting psychiatric medications as prescribed    Recommended Treatment: medication management, patient medication education, group therapy, milieu therapy, supportive therapy, continued Behavioral Health psychiatric evaluation/assessment process    Treatment Frequency: daily medication monitoring, group and milieu therapy daily, monitoring through interdisciplinary rounds, monitoring through weekly patient care conferences    Expected Discharge Date:  5-7 midnights    Discharge Plan: referrals as indicated    Treatment Plan Created/Updated By: Mame Scruggs DO

## 2023-05-05 NOTE — WOUND OSTOMY CARE
Consult Note - Wound   Theola Cowing 53 y o  female MRN: [id number]  Unit/Bed#: [facility] 210-01 Encounter: [id number]      History and Present Illness:  53year old female, seen by Podiatry for non healing ulcer of medial aspect of left foot  Ulcer has been present since surgery in march with Dr Ligia Olivera  Pt seated out of bed in w/c   Updated RN and patient, Podiatry consulted and ordered wound care and recommendations    No other skin issues,     Wound Care will sign off  Call or Tigertext with any questions    Zachery DENTONN RN

## 2023-05-06 LAB
EST. AVERAGE GLUCOSE BLD GHB EST-MCNC: 160 MG/DL
GLUCOSE SERPL-MCNC: 116 MG/DL (ref 65–140)
GLUCOSE SERPL-MCNC: 122 MG/DL (ref 65–140)
HBA1C MFR BLD: 7.2 %

## 2023-05-06 RX ORDER — MELATONIN
1000 DAILY
Status: DISCONTINUED | OUTPATIENT
Start: 2023-05-06 | End: 2023-05-08 | Stop reason: HOSPADM

## 2023-05-06 RX ORDER — CYANOCOBALAMIN 1000 UG/ML
1000 INJECTION, SOLUTION INTRAMUSCULAR; SUBCUTANEOUS
Status: DISCONTINUED | OUTPATIENT
Start: 2023-05-06 | End: 2023-05-08 | Stop reason: HOSPADM

## 2023-05-06 RX ADMIN — HYDROXYZINE HYDROCHLORIDE 100 MG: 50 TABLET, FILM COATED ORAL at 21:12

## 2023-05-06 RX ADMIN — OXYCODONE HYDROCHLORIDE 10 MG: 10 TABLET ORAL at 16:41

## 2023-05-06 RX ADMIN — SULFAMETHOXAZOLE AND TRIMETHOPRIM 1 TABLET: 800; 160 TABLET ORAL at 21:12

## 2023-05-06 RX ADMIN — OXYCODONE HYDROCHLORIDE 10 MG: 10 TABLET ORAL at 04:37

## 2023-05-06 RX ADMIN — PREGABALIN 150 MG: 75 CAPSULE ORAL at 08:36

## 2023-05-06 RX ADMIN — LAMOTRIGINE 300 MG: 100 TABLET ORAL at 08:36

## 2023-05-06 RX ADMIN — METFORMIN HYDROCHLORIDE 500 MG: 500 TABLET, FILM COATED ORAL at 08:37

## 2023-05-06 RX ADMIN — METHOCARBAMOL 750 MG: 500 TABLET ORAL at 08:35

## 2023-05-06 RX ADMIN — ACETAMINOPHEN 975 MG: 325 TABLET ORAL at 11:59

## 2023-05-06 RX ADMIN — Medication 1000 UNITS: at 16:43

## 2023-05-06 RX ADMIN — METHOCARBAMOL 750 MG: 500 TABLET ORAL at 20:27

## 2023-05-06 RX ADMIN — LEVOTHYROXINE SODIUM 50 MCG: 50 TABLET ORAL at 04:37

## 2023-05-06 RX ADMIN — PREGABALIN 150 MG: 75 CAPSULE ORAL at 17:15

## 2023-05-06 RX ADMIN — ZIPRASIDONE HYDROCHLORIDE 80 MG: 40 CAPSULE ORAL at 16:42

## 2023-05-06 RX ADMIN — OXYCODONE HYDROCHLORIDE 10 MG: 10 TABLET ORAL at 10:48

## 2023-05-06 RX ADMIN — Medication 3 MG: at 21:12

## 2023-05-06 RX ADMIN — PANTOPRAZOLE SODIUM 40 MG: 40 TABLET, DELAYED RELEASE ORAL at 16:43

## 2023-05-06 RX ADMIN — FENOFIBRATE 48 MG: 48 TABLET, FILM COATED ORAL at 08:36

## 2023-05-06 RX ADMIN — OXYCODONE HYDROCHLORIDE 10 MG: 10 TABLET ORAL at 23:12

## 2023-05-06 RX ADMIN — METHOCARBAMOL 750 MG: 500 TABLET ORAL at 14:15

## 2023-05-06 RX ADMIN — PANTOPRAZOLE SODIUM 40 MG: 40 TABLET, DELAYED RELEASE ORAL at 08:36

## 2023-05-06 RX ADMIN — ZIPRASIDONE HYDROCHLORIDE 80 MG: 40 CAPSULE ORAL at 08:39

## 2023-05-06 RX ADMIN — LORAZEPAM 0.5 MG: 0.5 TABLET ORAL at 09:43

## 2023-05-06 RX ADMIN — CLONIDINE HYDROCHLORIDE 0.2 MG: 0.1 TABLET ORAL at 21:12

## 2023-05-06 RX ADMIN — SULFAMETHOXAZOLE AND TRIMETHOPRIM 1 TABLET: 800; 160 TABLET ORAL at 08:37

## 2023-05-06 RX ADMIN — METFORMIN HYDROCHLORIDE 500 MG: 500 TABLET, FILM COATED ORAL at 16:41

## 2023-05-06 RX ADMIN — HYDROXYZINE HYDROCHLORIDE 50 MG: 50 TABLET, FILM COATED ORAL at 08:36

## 2023-05-06 RX ADMIN — CYANOCOBALAMIN 1000 MCG: 1000 INJECTION, SOLUTION INTRAMUSCULAR; SUBCUTANEOUS at 16:43

## 2023-05-06 NOTE — NURSING NOTE
Pt receives Oxy-IR po Q 6 hrs prn for generalized pain with moderate relief obtained  Pt is pleasant & social this PM  Q 7 min checks maintained to monitor pt's behavior & safety

## 2023-05-06 NOTE — NURSING NOTE
Pt had 9/10 generalized pain, admin 10mg oxycodone @ 0437  Will CTM  Continuous patient safety checks ongoing

## 2023-05-06 NOTE — NURSING NOTE
Pt c/o high anxiety & high depression  Pt denies any hallucinations, suicidal or homicidal ideations  Pt is cooperative & compliant with medications  Pt independently wheels herself around in her wheelchair  Dressing dry & intact to lt foot wound  +3 edema noted LLE  Pt medicated for c/o increased anxiety @ 0943 with Ativan 0 5 mg po with moderate relief obtained  Latricia Fowler - 19 @ that time  Q 7 min checks maintained to monitor pt's behavior & safety  Pt keeps to herself with minimal socialization with other patients  Pt is irritable @ times  Pt medicated for muscle spasms of rt stump & lt hand with Robaxin po @ 9237 with relief obtained   Pt did attend Group this AM

## 2023-05-06 NOTE — PROGRESS NOTES
"Progress Note - Sean Meyers 53 y o  female MRN: [id number]    Unit/Bed#: [facility] 210-01 Encounter: [id number]        Subjective:   Patient seen and examined at bedside after reviewing the chart and discussing the case with the caring staff  Patient examined at bedside  Patient has no acute issues  Patient was seen by Dr Lydia Malloy podiatrist and have ordered an MRI of his left foot  On review of patient's labs it was found that patient's vitamin D level was low 35 4 and B12 level is also low 391  Patient's MRI of the foot was done on 5/5/2023 has not been read officially  Physical Exam   Vitals: Blood pressure 97/54, pulse 63, temperature 97 7 °F (36 5 °C), temperature source Temporal, resp  rate 16, height 5' 5\" (1 651 m), weight 97 1 kg (214 lb), SpO2 98 %, not currently breastfeeding  ,Body mass index is 35 61 kg/m²  Constitutional: He appears well-developed  HEENT: PERR, EOMI, MMM  Cardiovascular: Normal rate and regular rhythm  Pulmonary/Chest: Effort normal and breath sounds normal    Abdomen: Soft, + BS, NT    Assessment/Plan:  Sean Meyers is a(n) 53 y o  female with bipolar disorder      1  Type 2 diabetes mellitus  Patient is on metformin 500 mg twice daily  Hgb a1c 6 4% on 2/16/2023  Repeat hgb a1c  Accu-Cheks twice daily  Carb controlled diet  2  Diabetic neuropathy  Continue Lyrica 150 mg twice daily  3  Hyperlipidemia  Continue fenofibrate 48 mg daily  4  GERD  Patient is on Protonix 40 mg twice daily  5  Hypothyroidism  Continue levothyroxine 50 mcg daily  TSH on 5/5/23 WNL  6  Gait abnormality/right BKA  PT OT consulted  7  Left open foot wound/cellulitis  Patient started on Bactrim DS twice daily in ED on 5/3/23, will continue on unit  Podiatry consulted (see note)  Scheduled for MRI later today  Wound care as ordered  Oxycodone 10 mg q6h prn for severe pain  8   New vitamin D deficiency  I will put the patient on vitamin D supplements    9   New vitamin B12 " deficiency  I will put the patient on monthly B12 injections

## 2023-05-06 NOTE — NURSING NOTE
Patient had broken sleep  No acute behaviors observed  Will CTM  Continuous patient safety checks in progress

## 2023-05-06 NOTE — PLAN OF CARE
Problem: Alteration in Thoughts and Perception  Goal: Verbalize thoughts and feelings  Description: Interventions:  - Promote a nonjudgmental and trusting relationship with the patient through active listening and therapeutic communication  - Assess patient's level of functioning, behavior and potential for risk  - Engage patient in 1 on 1 interactions  - Encourage patient to express fears, feelings, frustrations, and discuss symptoms    - [place] patient to reality, help patient recognize reality-based thinking   - Administer medications as ordered and assess for potential side effects  - Provide the patient education related to the signs and symptoms of the illness and desired effects of prescribed medications  Outcome: Progressing     Problem: Anxiety  Goal: Anxiety is at manageable level  Description: Interventions:  - Assess and monitor patient's anxiety level  - Monitor for signs and symptoms (heart palpitations, chest pain, shortness of breath, headaches, nausea, feeling jumpy, restlessness, irritable, apprehensive)  - Collaborate with interdisciplinary team and initiate plan and interventions as ordered    - [place] patient to unit/surroundings  - Explain treatment plan  - Encourage participation in care  - Encourage verbalization of concerns/fears  - Identify coping mechanisms  - Assist in developing anxiety-reducing skills  - Administer/offer alternative therapies  - Limit or eliminate stimulants  Outcome: Not Progressing

## 2023-05-06 NOTE — NURSING NOTE
Pt was withdrawn to her room this evening after returning from L foot MRI  Patient states high anxiety/depression, pt states she has thoughts of hurting herself but wouldn't act on them  Stated she would go to staff if she would think about acting on them  Pt has labile mood  Pt denies HI/VH/AH  Pt c/o 9/10 pain in back and bilateral legs, admin oxycodone 10mg @ 2157  Pt also had non-productive cough, contacted Dr Preethi Cota @ [phone], received orders for mucinex 600mg PRN Q12 hours, administered @ 2256  Will CTM  Continuous patient safety checks ongoing

## 2023-05-06 NOTE — PROGRESS NOTES
Progress Note - [address] [facility] 53 y o  female MRN: [id number]   Unit/Bed#: [facility] 210-01 Encounter: [id number]  This note was not shared with the patient due to reasonable likelihood of causing patient harm    Behavior over the last 24 hours: unchanged  Miriam Singh seen in office  C/o irritability and not wanting to be around people  Reports anxiety increasing past couple of months accompanied by cheat tightness and shortness of breath  Sleep: slept off and on  Appetite: normal  Medication side effects: denies  ROS: denies dizziness, rash, oral lesions    Mental Status Evaluation:     Appearance:  looks older than stated age   Behavior:  cooperative   Speech:  normal rate and volume   Mood:  irritable   Affect:  constricted   Thought Process:  goal directed   Associations: intact associations   Thought Content:  no overt delusions, negative thinking   Perceptual Disturbances: none   Risk Potential: Suicidal ideation - None at present  Homicidal ideation - None at present   Sensorium:  oriented to person, place and time/date   Memory:  recent and remote memory grossly intact   Consciousness:  alert and awake   Attention: attention span and concentration are age appropriate   Insight:  limited   Judgment: fair   Gait/Station: in wheelchair   Motor Activity: no abnormal movements     Vital signs in last 24 hours:    Temp:  [97 5 °F (36 4 °C)-98 5 °F (36 9 °C)] 97 7 °F (36 5 °C)  HR:  [63-76] 63  Resp:  [16-18] 16  BP: ()/(52-60) 97/54    Laboratory results: I have personally reviewed all pertinent laboratory/tests results  Assessment/Plan   Principal Problem:    Bipolar 1 disorder, depressed ([facility]    Recommended Treatment: Cont present treatment    Planned medication and treatment changes:   No changes  Lamictal increased yesterday  Cont clonidine 0 2 mg bid, atarax 50/100 mg, geodon 80 mg bid       All current active medications have been reviewed  Encourage group therapy, milieu therapy and occupational therapy  [address] checks every 7 minutes  Current Facility-Administered Medications   Medication Dose Route Frequency Provider Last Rate   • acetaminophen  650 mg Oral Q4H PRN Mitcheal Iba, CRNP     • acetaminophen  650 mg Oral Q4H PRN Mitcheal Iba, CRNP     • acetaminophen  975 mg Oral Q6H PRN Kirsten Graves PA-C     • benztropine  0 5 mg Oral Q4H PRN Max 6/day Mitcheal Iba, CRNP     • cloNIDine  0 2 mg Oral Q12H [address] Mitcheal Iba, CRNP     • fenofibrate  48 mg Oral Daily Kirsten Graves PA-C     • guaiFENesin  600 mg Oral BID PRN Tana Shaw MD     • hydrOXYzine HCL  100 mg Oral HS Mitcheal Iba, CRNP     • hydrOXYzine HCL  25 mg Oral Q6H PRN Max 4/day Mitcheal Iba, CRNP     • hydrOXYzine HCL  50 mg Oral QAM Mitcheal Iba, CRNP     • lamoTRIgine  300 mg Oral Daily Yasmeen Peñaloza DO     • levothyroxine  50 mcg Oral Early Morning Mitcheal Iba, CRNP     • LORazepam  1 mg Intramuscular Q6H PRN Max 3/day Mitcheal Iba, CRNP     • LORazepam  0 5 mg Oral Q6H PRN Max 4/day Mitcheal Iba, CRNP     • LORazepam  1 mg Oral Q6H PRN Max 3/day Mitcheal Iba, CRNP     • melatonin  3 mg Oral HS Mitcheal Iba, CRNP     • metFORMIN  500 mg Oral BID With Meals Mitcheal Iba, CRNP     • methocarbamol  750 mg Oral Q6H PRN Kirsten Graves PA-C     • OLANZapine  5 mg Intramuscular Q3H PRN Max 3/day Mitcheal Iba, CRNP     • OLANZapine  2 5 mg Oral Q4H PRN Max 6/day Mitcheal Iba, CRNP     • OLANZapine  5 mg Oral Q4H PRN Max 3/day Mitcheal Iba, CRNP     • OLANZapine  5 mg Oral Q3H PRN Max 3/day Mitcheal Iba, CRNP     • oxyCODONE  10 mg Oral Q6H PRN Kirsten Graves PA-C     • pantoprazole  40 mg Oral BID AC Kirsten Graves PA-C     • pregabalin  150 mg Oral BID Kirsten Graves PA-C     • sulfamethoxazole-trimethoprim  1 tablet Oral Q12H [address] Mitcheal JOSEE Aaron     • ziprasidone 80 mg Oral BID With Meals JOSEE Barraza         Risks / Benefits of Treatment:    Risks, benefits, and possible side effects of medications explained to patient and patient verbalizes understanding and agreement for treatment  Counseling / Coordination of Care: Total floor / unit time spent today 20 minutes  Greater than 50% of total time was spent with the patient and / or family counseling and / or coordination of care  A description of counseling / coordination of care:  Patient's progress discussed with staff in treatment team meeting  Medications, treatment progress and treatment plan reviewed with patient      Denny Acuña PA-C 05/06/23

## 2023-05-06 NOTE — NURSING NOTE
Wound care performed to Lt foot wound as ordered by MD & pt tolerated well  Wound bed 50% pink, 25% yellow & 25% red  Yellow drainage noted on 5% of 1 adaptic & 1 folded 4x4 sponge

## 2023-05-06 NOTE — PROGRESS NOTES
Security bag [id number] was opened by security so patient  Could get card to pay a bill      New security [facility]#[id number]     Patient signed for new bag

## 2023-05-07 LAB
GLUCOSE SERPL-MCNC: 114 MG/DL (ref 65–140)
GLUCOSE SERPL-MCNC: 129 MG/DL (ref 65–140)

## 2023-05-07 RX ORDER — LANOLIN ALCOHOL/MO/W.PET/CERES
6 CREAM (GRAM) TOPICAL
Status: DISCONTINUED | OUTPATIENT
Start: 2023-05-07 | End: 2023-05-08 | Stop reason: HOSPADM

## 2023-05-07 RX ADMIN — PANTOPRAZOLE SODIUM 40 MG: 40 TABLET, DELAYED RELEASE ORAL at 05:15

## 2023-05-07 RX ADMIN — CLONIDINE HYDROCHLORIDE 0.2 MG: 0.1 TABLET ORAL at 08:00

## 2023-05-07 RX ADMIN — METHOCARBAMOL 750 MG: 500 TABLET ORAL at 17:15

## 2023-05-07 RX ADMIN — HYDROXYZINE HYDROCHLORIDE 50 MG: 50 TABLET, FILM COATED ORAL at 08:01

## 2023-05-07 RX ADMIN — LEVOTHYROXINE SODIUM 50 MCG: 50 TABLET ORAL at 05:14

## 2023-05-07 RX ADMIN — OXYCODONE HYDROCHLORIDE 10 MG: 10 TABLET ORAL at 05:18

## 2023-05-07 RX ADMIN — LORAZEPAM 1 MG: 1 TABLET ORAL at 00:38

## 2023-05-07 RX ADMIN — LAMOTRIGINE 300 MG: 100 TABLET ORAL at 08:01

## 2023-05-07 RX ADMIN — FENOFIBRATE 48 MG: 48 TABLET, FILM COATED ORAL at 08:00

## 2023-05-07 RX ADMIN — OXYCODONE HYDROCHLORIDE 10 MG: 10 TABLET ORAL at 17:16

## 2023-05-07 RX ADMIN — SULFAMETHOXAZOLE AND TRIMETHOPRIM 1 TABLET: 800; 160 TABLET ORAL at 08:01

## 2023-05-07 RX ADMIN — CLONIDINE HYDROCHLORIDE 0.2 MG: 0.1 TABLET ORAL at 21:19

## 2023-05-07 RX ADMIN — METHOCARBAMOL 750 MG: 500 TABLET ORAL at 05:14

## 2023-05-07 RX ADMIN — METFORMIN HYDROCHLORIDE 500 MG: 500 TABLET, FILM COATED ORAL at 15:33

## 2023-05-07 RX ADMIN — PANTOPRAZOLE SODIUM 40 MG: 40 TABLET, DELAYED RELEASE ORAL at 15:33

## 2023-05-07 RX ADMIN — HYDROXYZINE HYDROCHLORIDE 100 MG: 50 TABLET, FILM COATED ORAL at 21:19

## 2023-05-07 RX ADMIN — LORAZEPAM 1 MG: 1 TABLET ORAL at 19:45

## 2023-05-07 RX ADMIN — ZIPRASIDONE HYDROCHLORIDE 80 MG: 40 CAPSULE ORAL at 15:32

## 2023-05-07 RX ADMIN — OXYCODONE HYDROCHLORIDE 10 MG: 10 TABLET ORAL at 11:16

## 2023-05-07 RX ADMIN — METHOCARBAMOL 750 MG: 500 TABLET ORAL at 11:16

## 2023-05-07 RX ADMIN — SULFAMETHOXAZOLE AND TRIMETHOPRIM 1 TABLET: 800; 160 TABLET ORAL at 21:19

## 2023-05-07 RX ADMIN — Medication 1000 UNITS: at 08:01

## 2023-05-07 RX ADMIN — OXYCODONE HYDROCHLORIDE 10 MG: 10 TABLET ORAL at 23:22

## 2023-05-07 RX ADMIN — Medication 6 MG: at 21:19

## 2023-05-07 RX ADMIN — PREGABALIN 150 MG: 75 CAPSULE ORAL at 08:00

## 2023-05-07 RX ADMIN — METFORMIN HYDROCHLORIDE 500 MG: 500 TABLET, FILM COATED ORAL at 08:01

## 2023-05-07 RX ADMIN — PREGABALIN 150 MG: 75 CAPSULE ORAL at 17:15

## 2023-05-07 RX ADMIN — ZIPRASIDONE HYDROCHLORIDE 80 MG: 40 CAPSULE ORAL at 08:01

## 2023-05-07 NOTE — NURSING NOTE
"Patient expressing severe anxiety as she stated she was sleeping, had a bad dream that startled he awake causing severe anxiety  Patient requested PRN Ativan 1 mg, same given as ordered and patient returned to bed  Remains on 7\" checks for safety and behaviors    "

## 2023-05-07 NOTE — NURSING NOTE
Wound care performed to Lt foot wound as ordered by MD & pt tolerated well  No drainage noted & wound bed 60% pink, 20% yellow & 20% red  Q 7 min checks maintained to monitor pt's behavior & safety

## 2023-05-07 NOTE — NURSING NOTE
"Patient with poor sleep due to \"bad dreams\"  0320 up wheeling self on throughout unit, support provided  Remains on 7\" checks for safety and behaviors    "

## 2023-05-07 NOTE — PROGRESS NOTES
Progress Note - Behavioral Health   Yara Cueva 53 y o  female MRN: [id number]  Unit/Bed#: [facility] 210-01 Encounter: [id number]    Assessment/Plan   Principal Problem:    Bipolar 1 disorder, depressed ([address] )      Subjective:Patient was seen today for continuation of care, records reviewed and  patient was discussed with the morning case review team  Complains of difficulty initiating sleep, melatonin titrated to 6 mg daily and pt appreciative  Received prn ativan last night for nightmare-effective  Moderate anxiety and depression from being on the unit  Patient denies endorsing any suicidal or homicidal ideation  Remains medication compliance  Denies any side effects from medications  Psychiatric Review of Systems:    Sleep: slept off and on  Appetite: normal  Medication side effects: No   ROS: all other systems are negative    Vitals:  Vitals:    05/07/23 0748   BP: 146/72   Pulse: 68   Resp: 18   Temp: 97 8 °F (36 6 °C)   SpO2: 97%       Mental Status Evaluation:    Appearance:  casually dressed, dressed appropriately   Behavior:  cooperative   Speech:  normal rate and volume   Mood:  irritable   Affect:  constricted   Thought Process:  goal directed   Associations: intact associations   Thought Content:  no overt delusions   Perceptual Disturbances: none   Risk Potential: Suicidal ideation - None at present  Homicidal ideation - None at present  Potential for aggression - No   Sensorium:  oriented to person, place and time/date   Memory:  recent and remote memory grossly intact   Consciousness:  alert and awake   Attention: attention span and concentration are age appropriate   Insight:  limited   Judgment: limited   Gait/Station: in wheelchair   Motor Activity: no abnormal movements     Laboratory results:    I have personally reviewed all pertinent laboratory/tests results    Most Recent Labs:   Lab Results   Component Value Date    WBC 6 35 05/05/2023    RBC 4 24 05/05/2023    HGB 9 2 (L) 05/05/2023    HCT 31 5 (L) 05/05/2023     (H) 05/05/2023    RDW 21 5 (H) 05/05/2023    NEUTROABS 2 28 05/05/2023    SODIUM 136 05/03/2023    K 4 0 05/03/2023     05/03/2023    CO2 27 05/03/2023    BUN 13 05/03/2023    CREATININE 0 84 05/03/2023    GLUC 132 05/03/2023    CALCIUM 9 4 05/03/2023    AST 16 05/03/2023    ALT 15 05/03/2023    ALKPHOS 86 05/03/2023    TP 7 0 05/03/2023    ALB 3 8 05/03/2023    TBILI 0 21 05/03/2023    CHOLESTEROL 179 05/05/2023    HDL 30 (L) 05/05/2023    TRIG 178 (H) 05/05/2023    LDLCALC 113 (H) 05/05/2023    NXV1VNNSTQOC 2 656 05/05/2023    FREET4 1 10 06/29/2019    PREGUR negative 08/19/2020    PREGSERUM Negative 01/12/2021    HCGQUANT <2 06/28/2019    HGBA1C 7 2 (H) 05/05/2023     05/05/2023         Recommended Treatment:     -Continue Lamictal 300mg daily, Clonidine 0 2 mg bid, atarax 50mg daily, geodon 80 mg bid        All current active medications have been reviewed  Encourage group therapy, milieu therapy and occupational therapy  Continue treatment with group therapy, milieu therapy and occupational therapy  Behavioral Health checks every 7 minutes  Continue current medications:  Current Facility-Administered Medications   Medication Dose Route Frequency Provider Last Rate   • acetaminophen  650 mg Oral Q4H PRN Valerie Epp, CRNP     • acetaminophen  650 mg Oral Q4H PRN Valerie Epp, CRNP     • acetaminophen  975 mg Oral Q6H PRN Kirsten Graves PA-C     • benztropine  0 5 mg Oral Q4H PRN Max 6/day Valerie Epp, CRNP     • cholecalciferol  1,000 Units Oral Daily Corwin Cobos MD     • cloNIDine  0 2 mg Oral Q12H [facility] Valerie Epp, CRNP     • cyanocobalamin  1,000 mcg Intramuscular Q30 Days Corwin Cobos MD     • fenofibrate  48 mg Oral Daily Kirsten Graves PA-C     • guaiFENesin  600 mg Oral BID PRN Corwin Cobos MD     • hydrOXYzine HCL  100 mg Oral HS JOSEE Zhou     • hydrOXYzine HCL  25 mg Oral Q6H PRN Max 4/day Valerie Mary Lou, CRNP     • hydrOXYzine HCL  50 mg Oral QAM Diana Haste, CRNP     • lamoTRIgine  300 mg Oral Daily Niraj Peters, DO     • levothyroxine  50 mcg Oral Early Morning Diana Haste, CRNP     • LORazepam  1 mg Intramuscular Q6H PRN Max 3/day Diana Haste, CRNP     • LORazepam  0 5 mg Oral Q6H PRN Max 4/day Diana Haste, CRNP     • LORazepam  1 mg Oral Q6H PRN Max 3/day Diana Haste, CRNP     • melatonin  3 mg Oral HS Diana Haste, CRNP     • metFORMIN  500 mg Oral BID With Meals Diana Haste, CRNP     • methocarbamol  750 mg Oral Q6H PRN Kirsten Graves PA-C     • OLANZapine  5 mg Intramuscular Q3H PRN Max 3/day Diana Haste, CRNP     • OLANZapine  2 5 mg Oral Q4H PRN Max 6/day Diana Haste, CRNP     • OLANZapine  5 mg Oral Q4H PRN Max 3/day Diana Haste, CRNP     • OLANZapine  5 mg Oral Q3H PRN Max 3/day Diana Haste, CRNP     • oxyCODONE  10 mg Oral Q6H PRN Kirsten Graves PA-C     • pantoprazole  40 mg Oral BID AC Kirsten Graves PA-C     • pregabalin  150 mg Oral BID Kirsten Graves PA-C     • sulfamethoxazole-trimethoprim  1 tablet Oral Q12H [address] JOSEE Arcos     • ziprasidone  80 mg Oral BID With Meals Diana Hasbenjy, JOSEE         Risks / Benefits of Treatment:     Risks, benefits, and possible side effects of medications explained to patient  Patient has limited understanding of risks and benefits of treatment at this time, but agrees to take medications as prescribed  Counseling / Coordination of Care:     Patient's progress reviewed with nursing staff  Medications, treatment progress and treatment plan reviewed with patient  Supportive counseling provided to the patient            JOSEE Cavanaugh

## 2023-05-07 NOTE — PLAN OF CARE
Problem: Alteration in Thoughts and Perception  Goal: Treatment Goal: Gain control of psychotic behaviors/thinking, reduce/eliminate presenting symptoms and demonstrate improved reality functioning upon discharge  Outcome: Progressing  Goal: Verbalize thoughts and feelings  Description: Interventions:  - Promote a nonjudgmental and trusting relationship with the patient through active listening and therapeutic communication  - Assess patient's level of functioning, behavior and potential for risk  - Engage patient in 1 on 1 interactions  - Encourage patient to express fears, feelings, frustrations, and discuss symptoms    - [place] patient to reality, help patient recognize reality-based thinking   - Administer medications as ordered and assess for potential side effects  - Provide the patient education related to the signs and symptoms of the illness and desired effects of prescribed medications  Outcome: Progressing  Goal: Refrain from acting on delusional thinking/internal stimuli  Description: Interventions:  - Monitor patient closely, per order   - Utilize least restrictive measures   - Set reasonable limits, give positive feedback for acceptable   - Administer medications as ordered and monitor of potential side effects  Outcome: Progressing  Goal: Agree to be compliant with medication regime, as prescribed and report medication side effects  Description: Interventions:  - Offer appropriate PRN medication and supervise ingestion; conduct AIMS, as needed   Outcome: Progressing  Goal: Attend and participate in unit activities, including therapeutic, recreational, and educational groups  Description: Interventions:  -Encourage Visitation and family involvement in care  Outcome: Progressing  Goal: Recognize dysfunctional thoughts, communicate reality-based thoughts at the time of discharge  Description: Interventions:  - Provide medication and psycho-education to assist patient in compliance and developing insight into his/her illness   Outcome: Progressing  Goal: Complete daily ADLs, including personal hygiene independently, as able  Description: Interventions:  - Observe, teach, and assist patient with ADLS  - Monitor and promote a balance of rest/activity, with adequate nutrition and elimination   Outcome: Progressing     Problem: Ineffective Coping  Goal: Cooperates with admission process  Description: Interventions:   - Complete admission process  Outcome: Progressing  Goal: Identifies ineffective coping skills  Outcome: Progressing  Goal: Identifies healthy coping skills  Outcome: Progressing  Goal: Demonstrates healthy coping skills  Outcome: Progressing     Problem: Risk for Self Injury/Neglect  Goal: Treatment Goal: Remain safe during length of stay, learn and adopt new coping skills, and be free of self-injurious ideation, impulses and acts at the time of discharge  Outcome: Progressing  Goal: Recognize maladaptive responses and adopt new coping mechanisms  Outcome: Progressing     Problem: Depression  Goal: Treatment Goal: Demonstrate behavioral control of depressive symptoms, verbalize feelings of improved mood/affect, and adopt new coping skills prior to discharge  Outcome: Progressing  Goal: Refrain from isolation  Description: Interventions:  - Develop a trusting relationship   - Encourage socialization   Outcome: Progressing  Goal: Refrain from self-neglect  Outcome: Progressing     Problem: Anxiety  Goal: Anxiety is at manageable level  Description: Interventions:  - Assess and monitor patient's anxiety level  - Monitor for signs and symptoms (heart palpitations, chest pain, shortness of breath, headaches, nausea, feeling jumpy, restlessness, irritable, apprehensive)  - Collaborate with interdisciplinary team and initiate plan and interventions as ordered    - [place] patient to unit/surroundings  - Explain treatment plan  - Encourage participation in care  - Encourage verbalization of concerns/fears  - Identify coping mechanisms  - Assist in developing anxiety-reducing skills  - Administer/offer alternative therapies  - Limit or eliminate stimulants  Outcome: Progressing     Problem: Ineffective Coping  Goal: Participates in unit activities  Description: Interventions:  - Provide therapeutic environment   - Provide required programming   - Redirect inappropriate behaviors   Outcome: Progressing

## 2023-05-07 NOTE — NURSING NOTE
"Pt medicated for c/o generalized pain @ 1116 with Oxy-IR po with moderate relief obtained  Pt c/o high anxiety & high depression  Pt denies any hallucinations, suicidal or homicidal ideations  Q 7 min checks maintained to monitor pt's behavior & safety  Pt is pleasant & socializes with others  Pt is cooperative & compliant with medications  Pt receives double portions of protein with her meals & vomited small amount after breakfast  Pt instructed to eat slower & not as much @ one time due to Hx Gastric Bypass surgery 4 yrs ago  Pt states \"I know that I over ate & had double eggs & sausage\"  Pt immediately feels better & denies any nausea  Pt independently wheels herself around in her wheelchair  +3 edema noted LLE  Dressing dry & intact to Lt foot wound  Pt requests wound care be performed after shower this evening  Pt medicated for muscle spasms Rt stump & bilateral hands with Robaxin po with relief obtained     "

## 2023-05-07 NOTE — NURSING NOTE
Patient in hallway during HS med pass  Patient calm, cooperative, but demanding  Med compliant  Denies SI/HI and  AH/VH  Patient endorses high anxiety, depression, and pain  Patient received scheduled Atarax at HS  Currently patient is in room  No other complaints or concerns voiced at this time  Q7min checks are in progress  Care is ongoing

## 2023-05-07 NOTE — NURSING NOTE
"Patient appear to be sleeping at this time, no further anxiety noted  No noted distress  S/P PRN Ativan for Torres score of 28  Remains on 7\" checks for safety and behaviors     "

## 2023-05-07 NOTE — PROGRESS NOTES
"Progress Note - Ran Cordova 53 y o  female MRN: [id number]    Unit/Bed#: [facility] 210-01 Encounter: [id number]        Subjective:   Patient seen and examined at bedside after reviewing the chart and discussing the case with the caring staff  Patient examined at bedside  Patient has no acute issues  Patient was seen by Dr Wesley Low podiatrist and have ordered an MRI of his left foot  On review of patient's labs it was found that patient's vitamin D level was low 35 4 and B12 level is also low 391  Patient's MRI of the foot was done on 5/5/2023 and shows limited study due to motion degradation  First metatarsal concerning osteomyelitis but third metatarsal showed no evidence of osteomyelitis  Physical Exam   Vitals: Blood pressure 146/72, pulse 68, temperature 97 8 °F (36 6 °C), temperature source Temporal, resp  rate 18, height 5' 5\" (1 651 m), weight 97 1 kg (214 lb), SpO2 97 %, not currently breastfeeding  ,Body mass index is 35 61 kg/m²  Constitutional: He appears well-developed  HEENT: PERR, EOMI, MMM  Cardiovascular: Normal rate and regular rhythm  Pulmonary/Chest: Effort normal and breath sounds normal    Abdomen: Soft, + BS, NT    Assessment/Plan:  Ran Cordova is a(n) 53 y o  female with bipolar disorder      1  Type 2 diabetes mellitus  Patient is on metformin 500 mg twice daily  Hgb a1c 6 4% on 2/16/2023  Repeat hgb a1c  Accu-Cheks twice daily  Carb controlled diet  2  Diabetic neuropathy  Continue Lyrica 150 mg twice daily  3  Hyperlipidemia  Continue fenofibrate 48 mg daily  4  GERD  Patient is on Protonix 40 mg twice daily  5  Hypothyroidism  Continue levothyroxine 50 mcg daily  TSH on 5/5/23 WNL  6  Gait abnormality/right BKA  PT OT consulted  7  Left open foot wound/cellulitis  Patient started on Bactrim DS twice daily in ED on 5/3/23, will continue on unit  Podiatry consulted (see note)  MRI of the foot on 5/5/2023 showed first metatarsal osteomyelitis    Wound " care as ordered  Oxycodone 10 mg q6h prn for severe pain  8   New vitamin D deficiency  I will put the patient on vitamin D supplements  9   New vitamin B12 deficiency  I will put the patient on monthly B12 injections

## 2023-05-08 ENCOUNTER — ANESTHESIA (INPATIENT)
Dept: PERIOP | Facility: HOSPITAL | Age: 48
End: 2023-05-08

## 2023-05-08 ENCOUNTER — ANESTHESIA EVENT (INPATIENT)
Dept: PERIOP | Facility: HOSPITAL | Age: 48
End: 2023-05-08

## 2023-05-08 ENCOUNTER — HOSPITAL ENCOUNTER (INPATIENT)
Facility: HOSPITAL | Age: 48
LOS: 15 days | Discharge: NURSING, REHAB OR LONG-TERM CARE | End: 2023-05-23
Attending: INTERNAL MEDICINE | Admitting: INTERNAL MEDICINE

## 2023-05-08 VITALS
SYSTOLIC BLOOD PRESSURE: 105 MMHG | HEIGHT: 65 IN | WEIGHT: 214 LBS | HEART RATE: 55 BPM | RESPIRATION RATE: 16 BRPM | BODY MASS INDEX: 35.65 KG/M2 | DIASTOLIC BLOOD PRESSURE: 58 MMHG | OXYGEN SATURATION: 99 % | TEMPERATURE: 97.7 F

## 2023-05-08 DIAGNOSIS — L02.612 ABSCESS OF LEFT FOOT: ICD-10-CM

## 2023-05-08 DIAGNOSIS — F39 MOOD DISORDER (HCC): ICD-10-CM

## 2023-05-08 DIAGNOSIS — L97.422 DIABETIC ULCER OF LEFT MIDFOOT ASSOCIATED WITH TYPE 2 DIABETES MELLITUS, WITH FAT LAYER EXPOSED (HCC): Primary | ICD-10-CM

## 2023-05-08 DIAGNOSIS — Z59.00 HOMELESS SINGLE PERSON: ICD-10-CM

## 2023-05-08 DIAGNOSIS — G89.29 OTHER CHRONIC PAIN: ICD-10-CM

## 2023-05-08 DIAGNOSIS — Z89.511 HX OF RIGHT BKA (HCC): ICD-10-CM

## 2023-05-08 DIAGNOSIS — F31.9 BIPOLAR 1 DISORDER, DEPRESSED (HCC): ICD-10-CM

## 2023-05-08 DIAGNOSIS — L97.405 DIABETIC ULCER OF MIDFOOT ASSOCIATED WITH TYPE 2 DIABETES MELLITUS, WITH MUSCLE INVOLVEMENT WITHOUT EVIDENCE OF NECROSIS, UNSPECIFIED LATERALITY (HCC): ICD-10-CM

## 2023-05-08 DIAGNOSIS — M86.072 ACUTE HEMATOGENOUS OSTEOMYELITIS OF LEFT FOOT (HCC): ICD-10-CM

## 2023-05-08 DIAGNOSIS — K91.2 INTESTINAL MALABSORPTION FOLLOWING GASTRECTOMY: ICD-10-CM

## 2023-05-08 DIAGNOSIS — E11.621 DIABETIC ULCER OF MIDFOOT ASSOCIATED WITH TYPE 2 DIABETES MELLITUS, WITH MUSCLE INVOLVEMENT WITHOUT EVIDENCE OF NECROSIS, UNSPECIFIED LATERALITY (HCC): ICD-10-CM

## 2023-05-08 DIAGNOSIS — Z90.3 INTESTINAL MALABSORPTION FOLLOWING GASTRECTOMY: ICD-10-CM

## 2023-05-08 DIAGNOSIS — E11.621 DIABETIC ULCER OF LEFT MIDFOOT ASSOCIATED WITH TYPE 2 DIABETES MELLITUS, WITH FAT LAYER EXPOSED (HCC): Primary | ICD-10-CM

## 2023-05-08 DIAGNOSIS — E11.40 TYPE 2 DIABETES MELLITUS WITH DIABETIC NEUROPATHY, WITHOUT LONG-TERM CURRENT USE OF INSULIN (HCC): ICD-10-CM

## 2023-05-08 PROBLEM — F99 PSYCHIATRIC ILLNESS: Status: ACTIVE | Noted: 2019-06-29

## 2023-05-08 LAB
ANION GAP SERPL CALCULATED.3IONS-SCNC: 9 MMOL/L (ref 4–13)
BASOPHILS # BLD AUTO: 0.05 THOUSANDS/ÂΜL (ref 0–0.1)
BASOPHILS NFR BLD AUTO: 1 % (ref 0–1)
BUN SERPL-MCNC: 14 MG/DL (ref 5–25)
CALCIUM SERPL-MCNC: 9.8 MG/DL (ref 8.4–10.2)
CHLORIDE SERPL-SCNC: 103 MMOL/L (ref 96–108)
CO2 SERPL-SCNC: 22 MMOL/L (ref 21–32)
CREAT SERPL-MCNC: 0.93 MG/DL (ref 0.6–1.3)
EOSINOPHIL # BLD AUTO: 0.41 THOUSAND/ÂΜL (ref 0–0.61)
EOSINOPHIL NFR BLD AUTO: 6 % (ref 0–6)
ERYTHROCYTE [DISTWIDTH] IN BLOOD BY AUTOMATED COUNT: 21.2 % (ref 11.6–15.1)
GFR SERPL CREATININE-BSD FRML MDRD: 73 ML/MIN/1.73SQ M
GLUCOSE SERPL-MCNC: 108 MG/DL (ref 65–140)
GLUCOSE SERPL-MCNC: 117 MG/DL (ref 65–140)
GLUCOSE SERPL-MCNC: 197 MG/DL (ref 65–140)
GLUCOSE SERPL-MCNC: 96 MG/DL (ref 65–140)
GLUCOSE SERPL-MCNC: 98 MG/DL (ref 65–140)
HCT VFR BLD AUTO: 34.4 % (ref 34.8–46.1)
HGB BLD-MCNC: 10.2 G/DL (ref 11.5–15.4)
IMM GRANULOCYTES # BLD AUTO: 0.01 THOUSAND/UL (ref 0–0.2)
IMM GRANULOCYTES NFR BLD AUTO: 0 % (ref 0–2)
LYMPHOCYTES # BLD AUTO: 3.08 THOUSANDS/ÂΜL (ref 0.6–4.47)
LYMPHOCYTES NFR BLD AUTO: 46 % (ref 14–44)
MCH RBC QN AUTO: 21.5 PG (ref 26.8–34.3)
MCHC RBC AUTO-ENTMCNC: 29.7 G/DL (ref 31.4–37.4)
MCV RBC AUTO: 73 FL (ref 82–98)
MONOCYTES # BLD AUTO: 0.56 THOUSAND/ÂΜL (ref 0.17–1.22)
MONOCYTES NFR BLD AUTO: 8 % (ref 4–12)
NEUTROPHILS # BLD AUTO: 2.56 THOUSANDS/ÂΜL (ref 1.85–7.62)
NEUTS SEG NFR BLD AUTO: 39 % (ref 43–75)
NRBC BLD AUTO-RTO: 0 /100 WBCS
PLATELET # BLD AUTO: 338 THOUSANDS/UL (ref 149–390)
PMV BLD AUTO: 9.2 FL (ref 8.9–12.7)
POTASSIUM SERPL-SCNC: 4.2 MMOL/L (ref 3.5–5.3)
RBC # BLD AUTO: 4.74 MILLION/UL (ref 3.81–5.12)
SODIUM SERPL-SCNC: 134 MMOL/L (ref 135–147)
WBC # BLD AUTO: 6.67 THOUSAND/UL (ref 4.31–10.16)

## 2023-05-08 PROCEDURE — 0Y6N0Z4 DETACHMENT AT LEFT FOOT, COMPLETE 1ST RAY, OPEN APPROACH: ICD-10-PCS | Performed by: PODIATRIST

## 2023-05-08 RX ORDER — FENOFIBRATE 54 MG/1
54 TABLET ORAL DAILY
Status: DISCONTINUED | OUTPATIENT
Start: 2023-05-09 | End: 2023-05-08

## 2023-05-08 RX ORDER — DIPHENHYDRAMINE HYDROCHLORIDE 50 MG/ML
12.5 INJECTION INTRAMUSCULAR; INTRAVENOUS ONCE AS NEEDED
Status: DISCONTINUED | OUTPATIENT
Start: 2023-05-08 | End: 2023-05-08 | Stop reason: HOSPADM

## 2023-05-08 RX ORDER — ONDANSETRON 2 MG/ML
4 INJECTION INTRAMUSCULAR; INTRAVENOUS ONCE AS NEEDED
Status: DISCONTINUED | OUTPATIENT
Start: 2023-05-08 | End: 2023-05-08 | Stop reason: HOSPADM

## 2023-05-08 RX ORDER — LIDOCAINE HYDROCHLORIDE 10 MG/ML
INJECTION, SOLUTION EPIDURAL; INFILTRATION; INTRACAUDAL; PERINEURAL AS NEEDED
Status: DISCONTINUED | OUTPATIENT
Start: 2023-05-08 | End: 2023-05-08 | Stop reason: HOSPADM

## 2023-05-08 RX ORDER — HYDROMORPHONE HCL/PF 1 MG/ML
SYRINGE (ML) INJECTION
Status: COMPLETED
Start: 2023-05-08 | End: 2023-05-08

## 2023-05-08 RX ORDER — HYDROXYZINE HYDROCHLORIDE 25 MG/1
100 TABLET, FILM COATED ORAL
Status: DISCONTINUED | OUTPATIENT
Start: 2023-05-08 | End: 2023-05-23 | Stop reason: HOSPADM

## 2023-05-08 RX ORDER — PREGABALIN 75 MG/1
150 CAPSULE ORAL 2 TIMES DAILY
Status: DISCONTINUED | OUTPATIENT
Start: 2023-05-08 | End: 2023-05-23 | Stop reason: HOSPADM

## 2023-05-08 RX ORDER — FENTANYL CITRATE/PF 50 MCG/ML
25 SYRINGE (ML) INJECTION
Status: COMPLETED | OUTPATIENT
Start: 2023-05-08 | End: 2023-05-08

## 2023-05-08 RX ORDER — SODIUM CHLORIDE, SODIUM LACTATE, POTASSIUM CHLORIDE, CALCIUM CHLORIDE 600; 310; 30; 20 MG/100ML; MG/100ML; MG/100ML; MG/100ML
INJECTION, SOLUTION INTRAVENOUS CONTINUOUS PRN
Status: DISCONTINUED | OUTPATIENT
Start: 2023-05-08 | End: 2023-05-08

## 2023-05-08 RX ORDER — FENTANYL CITRATE 50 UG/ML
INJECTION, SOLUTION INTRAMUSCULAR; INTRAVENOUS
Status: COMPLETED
Start: 2023-05-08 | End: 2023-05-08

## 2023-05-08 RX ORDER — PROPOFOL 10 MG/ML
INJECTION, EMULSION INTRAVENOUS CONTINUOUS PRN
Status: DISCONTINUED | OUTPATIENT
Start: 2023-05-08 | End: 2023-05-08

## 2023-05-08 RX ORDER — ZIPRASIDONE HYDROCHLORIDE 40 MG/1
80 CAPSULE ORAL 2 TIMES DAILY WITH MEALS
Status: DISCONTINUED | OUTPATIENT
Start: 2023-05-08 | End: 2023-05-23 | Stop reason: HOSPADM

## 2023-05-08 RX ORDER — PROPOFOL 10 MG/ML
INJECTION, EMULSION INTRAVENOUS AS NEEDED
Status: DISCONTINUED | OUTPATIENT
Start: 2023-05-08 | End: 2023-05-08

## 2023-05-08 RX ORDER — MIDAZOLAM HYDROCHLORIDE 2 MG/2ML
INJECTION, SOLUTION INTRAMUSCULAR; INTRAVENOUS AS NEEDED
Status: DISCONTINUED | OUTPATIENT
Start: 2023-05-08 | End: 2023-05-08

## 2023-05-08 RX ORDER — FOLIC ACID 1 MG/1
1000 TABLET ORAL DAILY
Status: DISCONTINUED | OUTPATIENT
Start: 2023-05-08 | End: 2023-05-23 | Stop reason: HOSPADM

## 2023-05-08 RX ORDER — PANTOPRAZOLE SODIUM 40 MG/1
40 TABLET, DELAYED RELEASE ORAL
Status: DISCONTINUED | OUTPATIENT
Start: 2023-05-08 | End: 2023-05-11

## 2023-05-08 RX ORDER — HYDROMORPHONE HCL/PF 1 MG/ML
0.5 SYRINGE (ML) INJECTION
Status: DISCONTINUED | OUTPATIENT
Start: 2023-05-08 | End: 2023-05-08 | Stop reason: HOSPADM

## 2023-05-08 RX ORDER — FENOFIBRATE 48 MG/1
48 TABLET, COATED ORAL DAILY
Status: DISCONTINUED | OUTPATIENT
Start: 2023-05-09 | End: 2023-05-23 | Stop reason: HOSPADM

## 2023-05-08 RX ORDER — HEPARIN SODIUM 5000 [USP'U]/ML
5000 INJECTION, SOLUTION INTRAVENOUS; SUBCUTANEOUS EVERY 8 HOURS SCHEDULED
Status: DISCONTINUED | OUTPATIENT
Start: 2023-05-08 | End: 2023-05-23 | Stop reason: HOSPADM

## 2023-05-08 RX ORDER — ACETAMINOPHEN 325 MG/1
650 TABLET ORAL EVERY 6 HOURS PRN
Status: DISCONTINUED | OUTPATIENT
Start: 2023-05-08 | End: 2023-05-23 | Stop reason: HOSPADM

## 2023-05-08 RX ORDER — INSULIN LISPRO 100 [IU]/ML
1-6 INJECTION, SOLUTION INTRAVENOUS; SUBCUTANEOUS
Status: DISCONTINUED | OUTPATIENT
Start: 2023-05-08 | End: 2023-05-23 | Stop reason: HOSPADM

## 2023-05-08 RX ORDER — FENTANYL CITRATE 50 UG/ML
INJECTION, SOLUTION INTRAMUSCULAR; INTRAVENOUS AS NEEDED
Status: DISCONTINUED | OUTPATIENT
Start: 2023-05-08 | End: 2023-05-08

## 2023-05-08 RX ORDER — OXYCODONE HCL 10 MG/1
10 TABLET, FILM COATED, EXTENDED RELEASE ORAL EVERY 12 HOURS SCHEDULED
Status: DISCONTINUED | OUTPATIENT
Start: 2023-05-08 | End: 2023-05-08

## 2023-05-08 RX ORDER — LEVOTHYROXINE SODIUM 0.05 MG/1
50 TABLET ORAL
Status: DISCONTINUED | OUTPATIENT
Start: 2023-05-09 | End: 2023-05-23 | Stop reason: HOSPADM

## 2023-05-08 RX ORDER — HYDROXYZINE HYDROCHLORIDE 25 MG/1
50 TABLET, FILM COATED ORAL DAILY
Status: DISCONTINUED | OUTPATIENT
Start: 2023-05-09 | End: 2023-05-23 | Stop reason: HOSPADM

## 2023-05-08 RX ORDER — OXYCODONE HYDROCHLORIDE 10 MG/1
10 TABLET ORAL EVERY 6 HOURS PRN
Status: DISCONTINUED | OUTPATIENT
Start: 2023-05-08 | End: 2023-05-23 | Stop reason: HOSPADM

## 2023-05-08 RX ORDER — LIDOCAINE HYDROCHLORIDE 20 MG/ML
INJECTION, SOLUTION EPIDURAL; INFILTRATION; INTRACAUDAL; PERINEURAL AS NEEDED
Status: DISCONTINUED | OUTPATIENT
Start: 2023-05-08 | End: 2023-05-08

## 2023-05-08 RX ORDER — CLONIDINE HYDROCHLORIDE 0.1 MG/1
0.2 TABLET ORAL 2 TIMES DAILY
Status: DISCONTINUED | OUTPATIENT
Start: 2023-05-08 | End: 2023-05-23 | Stop reason: HOSPADM

## 2023-05-08 RX ORDER — HYDROMORPHONE HCL/PF 1 MG/ML
0.5 SYRINGE (ML) INJECTION ONCE
Status: COMPLETED | OUTPATIENT
Start: 2023-05-08 | End: 2023-05-08

## 2023-05-08 RX ORDER — SODIUM CHLORIDE, SODIUM LACTATE, POTASSIUM CHLORIDE, CALCIUM CHLORIDE 600; 310; 30; 20 MG/100ML; MG/100ML; MG/100ML; MG/100ML
75 INJECTION, SOLUTION INTRAVENOUS CONTINUOUS
Status: DISCONTINUED | OUTPATIENT
Start: 2023-05-08 | End: 2023-05-09

## 2023-05-08 RX ORDER — LAMOTRIGINE 100 MG/1
300 TABLET ORAL EVERY MORNING
Status: DISCONTINUED | OUTPATIENT
Start: 2023-05-09 | End: 2023-05-23 | Stop reason: HOSPADM

## 2023-05-08 RX ADMIN — FOLIC ACID 1000 MCG: 1 TABLET ORAL at 14:35

## 2023-05-08 RX ADMIN — FENTANYL CITRATE 25 MCG: 50 INJECTION, SOLUTION INTRAMUSCULAR; INTRAVENOUS at 16:45

## 2023-05-08 RX ADMIN — FENTANYL CITRATE 25 MCG: 50 INJECTION, SOLUTION INTRAMUSCULAR; INTRAVENOUS at 16:41

## 2023-05-08 RX ADMIN — FENTANYL CITRATE 25 MCG: 50 INJECTION, SOLUTION INTRAMUSCULAR; INTRAVENOUS at 16:33

## 2023-05-08 RX ADMIN — CLONIDINE HYDROCHLORIDE 0.2 MG: 0.1 TABLET ORAL at 17:30

## 2023-05-08 RX ADMIN — METHOCARBAMOL 750 MG: 500 TABLET ORAL at 01:19

## 2023-05-08 RX ADMIN — HYDROMORPHONE HYDROCHLORIDE 0.5 MG: 1 INJECTION, SOLUTION INTRAMUSCULAR; INTRAVENOUS; SUBCUTANEOUS at 16:59

## 2023-05-08 RX ADMIN — OXYCODONE HYDROCHLORIDE 10 MG: 10 TABLET ORAL at 14:35

## 2023-05-08 RX ADMIN — Medication 0.5 MG: at 16:59

## 2023-05-08 RX ADMIN — INSULIN LISPRO 2 UNITS: 100 INJECTION, SOLUTION INTRAVENOUS; SUBCUTANEOUS at 21:06

## 2023-05-08 RX ADMIN — PROPOFOL 60 MG: 10 INJECTION, EMULSION INTRAVENOUS at 15:46

## 2023-05-08 RX ADMIN — LIDOCAINE HYDROCHLORIDE 100 MG: 20 INJECTION, SOLUTION EPIDURAL; INFILTRATION; INTRACAUDAL; PERINEURAL at 15:46

## 2023-05-08 RX ADMIN — SODIUM CHLORIDE, SODIUM LACTATE, POTASSIUM CHLORIDE, AND CALCIUM CHLORIDE: .6; .31; .03; .02 INJECTION, SOLUTION INTRAVENOUS at 15:30

## 2023-05-08 RX ADMIN — FENTANYL CITRATE 100 MCG: 50 INJECTION, SOLUTION INTRAMUSCULAR; INTRAVENOUS at 15:44

## 2023-05-08 RX ADMIN — PREGABALIN 150 MG: 75 CAPSULE ORAL at 17:30

## 2023-05-08 RX ADMIN — MIDAZOLAM 2 MG: 1 INJECTION INTRAMUSCULAR; INTRAVENOUS at 15:44

## 2023-05-08 RX ADMIN — ZIPRASIDONE HYDROCHLORIDE 80 MG: 40 CAPSULE ORAL at 17:30

## 2023-05-08 RX ADMIN — HEPARIN SODIUM 5000 UNITS: 5000 INJECTION INTRAVENOUS; SUBCUTANEOUS at 21:07

## 2023-05-08 RX ADMIN — LEVOTHYROXINE SODIUM 50 MCG: 50 TABLET ORAL at 06:05

## 2023-05-08 RX ADMIN — PANTOPRAZOLE SODIUM 40 MG: 40 TABLET, DELAYED RELEASE ORAL at 06:05

## 2023-05-08 RX ADMIN — OXYCODONE HYDROCHLORIDE 10 MG: 10 TABLET ORAL at 21:04

## 2023-05-08 RX ADMIN — HYDROXYZINE HYDROCHLORIDE 100 MG: 25 TABLET ORAL at 21:04

## 2023-05-08 RX ADMIN — PROPOFOL 100 MCG/KG/MIN: 10 INJECTION, EMULSION INTRAVENOUS at 15:46

## 2023-05-08 RX ADMIN — PANTOPRAZOLE SODIUM 40 MG: 40 TABLET, DELAYED RELEASE ORAL at 17:30

## 2023-05-08 RX ADMIN — FENTANYL CITRATE 25 MCG: 50 INJECTION, SOLUTION INTRAMUSCULAR; INTRAVENOUS at 16:48

## 2023-05-08 RX ADMIN — OXYCODONE HYDROCHLORIDE 10 MG: 10 TABLET ORAL at 06:05

## 2023-05-08 RX ADMIN — HYDROMORPHONE HYDROCHLORIDE 0.5 MG: 1 INJECTION, SOLUTION INTRAMUSCULAR; INTRAVENOUS; SUBCUTANEOUS at 18:32

## 2023-05-08 SDOH — ECONOMIC STABILITY - HOUSING INSECURITY: HOMELESSNESS UNSPECIFIED: Z59.00

## 2023-05-08 NOTE — INTERVAL H&P NOTE
H&P reviewed  After examining the patient I find no changes in the patients condition since the H&P had been written      Vitals:    05/08/23 1522   BP: 130/61   Pulse: 67   Resp: 18   Temp: 98 2 °F (36 8 °C)   SpO2: 95%

## 2023-05-08 NOTE — DISCHARGE SUMMARY
Discharge Summary - [address] [facility] 53 y o  female MRN: [id number]  Unit/Bed#: [facility] 084-86 Encounter: [id number]     Admission Date: 5/4/2023         Discharge Date: 5/8/2023  Attending Psychiatrist: Darrell Fung DO    Reason for Admission/HPI: Major depressive disorder, single episode, unspecified [F32 9]  Depression [F32  A]    53year old patient who presented to the ED from the community due to worsening depression and suicidal ideation  Pt was discharged from a rehab center in [facility] earlier yesterday after inability to pay due to her insurance  Pt reported during triage that she is suicidal due to her homelessness  During crisis evaluation, Pt is calm and cooperative  She reports that she has felt suicidal for the past month on/off  She has reported at least 20 prior suicide attempts with various methods, most recently 3-4 years ago via pill overdose  Pt denies substance abuse or legal involvement  Pt has been using a wheel chair for ambulation due to not being able to walk on her prothetic as of yet  SHe has decreased sleep and decreased appetite  She has not had any outpatient mental health support in at least 6 months when her outpatient providers office closed  She was last inpatient >5 years ago  Pt does not report a plan for suicide at this time, but was not feeling safe upon her return to [facility]  Pt denies HI/AH/VH  Pt is in agreement with 201 and ED attending is in agreement with treatment plan         On evaluation, patient is seen in room  Patient is a poor historian as she is irritable and impatient, frustrated, entitled and rather dismissive  Does say that she is here because she is homeless with financial problems  She reports being at a rehab where she was discharged to a shelter because the patient did not want to use her check to go into a long-term care facility    Patient reports that she had known for a couple of weeks that they were going to discharge her and reported being increased depression  Poor sleep or appetite and poor motivation  Patient reports that was not wanting to live but did not want to kill herself  Patient currently denies any thoughts to harm herself  Patient reports a history of bipolar disorder  Reports lj is mostly mood swings gives little detail  Denies alcohol or substance use at this time  Patient reports that she has been on her Geodon as well as Lamictal at 250 mg daily  Patient is agreeable to increase Lamictal to 300 mg daily  Hospital Course: The patient was admitted to the inpatient psychiatric unit and started on every 7 minutes precautions  During the hospitalization the patient was attending individual therapy, group therapy, milieu therapy and occupational therapy  Psychiatric medications were titrated over the hospital stay  To address mood instability, psychotic symptoms, anxiety symptoms and insomnia the patient was started on mood stabilizer Lamictal, antipsychotic medication Geodon, anxiolytic medication Atarax and hypnotic medication Atarax and Melatonin  Medication doses were titrated during the hospital course  Prior to beginning of treatment medications risks and benefits and possible side effects including risk of rash related to treatment with Lamictal, risk of parkinsonian symptoms, Tardive Dyskinesia and metabolic syndrome related to treatment with antipsychotic medications, risk of cardiovascular events in elderly related to treatment with antipsychotic medications and risk of impaired next-day mental alertness, complex sleep-related behavior and dependence related to treatment with hypnotic medications were reviewed with the patient  The patient verbalized understanding and agreement for treatment  Patient's symptoms improved gradually over the hospital course  At the end of treatment the patient was doing well  Mood was stable at the time of discharge   The patient denied suicidal ideation, intent or plan at the time of discharge and denied homicidal ideation, intent or plan at the time of discharge  There was no overt psychosis at the time of discharge  Sleep and appetite were improved  The patient was tolerating medications and was not reporting any significant side effects at the time of discharge  Since the patient was doing well at the end of the hospitalization, treatment team felt that the patient could be safely discharged to outpatient care  Mental Status at time of Discharge:     Appearance:  age appropriate and casually dressed   Behavior:  cooperative   Speech:  normal pitch and normal volume   Mood:  irritable   Affect:  mood-congruent   Thought Process:  logical   Thought Content:  no overt delusions   Perceptual Disturbances: denied AVH, does not appear internally preoccupied   Risk Potential: none   Sensorium:  person, place and time/date   Cognition:  recent and remote memory grossly intact   Consciousness:  alert and awake    Attention: attention span and concentration were age appropriate   Insight:  limited   Judgment: limited   Gait/Station: in a wheelchair   Motor Activity: no abnormal movements     Admission Diagnosis:Major depressive disorder, single episode, unspecified [F32 9]  Depression [F32  A]    Discharge Diagnosis:   Principal Problem:    Bipolar 1 disorder, depressed ([facility] )  Active Problems:    Osteomyelitis of left foot ([facility] )  Resolved Problems:    * No resolved hospital problems  *    Discharge Statement     · Patient transferred to medical floor for treatment of Osteomyelitis  Patient is psychiatrically cleared for discharge to medical floor  According to nursing staff although she appeared to have an irritable edge, her mood continues to be controlled on the unit with no recent aggression or agitation towards staff or peers  She continues to be visible in the milieu, social with peers; and she is medication complaint   She denied any sleep disturbance SI/HI/AVH, she does not appear to be responding to internal stimuli

## 2023-05-08 NOTE — NURSING NOTE
Patient has been visible in the milieu- ambulates in her wheelchair  She has been calm and cooperative throughout this morning  She reports feeling anxious r/t the upcoming hospital transfer and possible surgery  She has been compliant with her NPO status  She is not currently c/o pain  She denies Si/Hi and hallucinations  She offers no current complaints/ concerns  Plan of care continues  Q7 minute safety checks in progress

## 2023-05-08 NOTE — PROGRESS NOTES
05/08/23 0730   Activity/Group Checklist   Group Community meeting   Attendance Attended   Attendance Duration (min) 16-30  (Pt in and out of grp)   Interactions Interacted appropriately   Affect/Mood Appropriate;Blunted/flat   Goals Achieved Identified feelings; Able to listen to others; Able to engage in interactions; Able to self-disclose; Able to recieve feedback

## 2023-05-08 NOTE — NURSING NOTE
Patient had some difficulty falling asleep and staying asleep  No signs or symptoms of distress  Q 7 minute safety checks maintained

## 2023-05-08 NOTE — PROGRESS NOTES
"Progress Note - Gerson Lara 53 y o  female MRN: [id number]    Unit/Bed#: [facility] 210-01 Encounter: [id number]        Subjective:   Patient seen and examined at bedside after reviewing the chart and discussing the case with the caring staff  Patient examined at bedside  Patient has no acute issues  Patient's MRI of the foot was done on 5/5/2023 and shows limited study due to motion degradation  First metatarsal concerning osteomyelitis but third metatarsal showed no evidence of osteomyelitis  Dr Tejinder Jarrell has recommending transferring the patient to [facility] for further evaluation and treatment by keeping the patient n p o  I called patient's son Fatmata Gentile and informed him about patient's transfer  Physical Exam   Vitals: Blood pressure 105/58, pulse 55, temperature 97 7 °F (36 5 °C), temperature source Temporal, resp  rate 16, height 5' 5\" (1 651 m), weight 97 1 kg (214 lb), SpO2 99 %, not currently breastfeeding  ,Body mass index is 35 61 kg/m²  Constitutional: He appears well-developed  HEENT: PERR, EOMI, MMM  Cardiovascular: Normal rate and regular rhythm  Pulmonary/Chest: Effort normal and breath sounds normal    Abdomen: Soft, + BS, NT    Assessment/Plan:  Gerson Lara is a(n) 53 y o  female with bipolar disorder      1  Type 2 diabetes mellitus  Patient is on metformin 500 mg twice daily  Hgb a1c 6 4% on 2/16/2023  Repeat hgb a1c  Accu-Cheks twice daily  Carb controlled diet  2  Diabetic neuropathy  Continue Lyrica 150 mg twice daily  3  Hyperlipidemia  Continue fenofibrate 48 mg daily  4  GERD  Patient is on Protonix 40 mg twice daily  5  Hypothyroidism  Continue levothyroxine 50 mcg daily  TSH on 5/5/23 WNL  6  Gait abnormality/right BKA  PT OT consulted  7  Left open foot wound/cellulitis  Patient started on Bactrim DS twice daily in ED on 5/3/23, will continue on unit  Podiatry consulted (see note)      MRI of the foot on 5/5/2023 showed first metatarsal " osteomyelitis  Wound care as ordered  Oxycodone 10 mg q6h prn for severe pain  Patient will be transferred to [facility] for further evaluation and treatment  Patient's accepting physician is Dr Gurpreet Judd  8   New vitamin D deficiency  I will put the patient on vitamin D supplements  9   New vitamin B12 deficiency  I will put the patient on monthly B12 injections

## 2023-05-08 NOTE — ASSESSMENT & PLAN NOTE
· Previously on OxyContin for chronic back pain, transitioned to oxycodone 10 mg every 6 hours while in inpatient psychiatric unit  · Monitor for adequate pain control and or signs of oversedation or withdrawal

## 2023-05-08 NOTE — ANESTHESIA PREPROCEDURE EVALUATION
Procedure:  Left first RAY RESECTION FOOT (Left: Foot)    Relevant Problems   CARDIO   (+) Essential hypertension   (+) Migraine without status migrainosus, not intractable      ENDO   (+) Hypothyroidism   (+) Type 2 diabetes mellitus with neurologic complication, without long-term current use of insulin (HCC)      GI/HEPATIC   (+) GERD (gastroesophageal reflux disease)   (+) H/O bariatric surgery      MUSCULOSKELETAL   (+) Arthritis      NEURO/PSYCH   (+) Anxiety   (+) Chronic pain   (+) Migraine without status migrainosus, not intractable   (+) OCD (obsessive compulsive disorder)   (+) PTSD (post-traumatic stress disorder)      PULMONARY   (+) Obstructive sleep apnea      Other   (+) Osteomyelitis of left foot ([address] )      Patient admitted for depression & SI  Homelessness  URI, does not use CPAP, cannot keep on her face   +Smokes cigarettes, occasional marijuana use     MRI of L foot 5/5/2023  IMPRESSION:  1  Markedly limited study due to motion degradation  2  Status post first transmetatarsal amputation with adjacent plantar/medial skin ulceration and some marrow change at the residual distal aspect of the first metatarsal concerning for osteomyelitis  3  Status post third transmetatarsal amputation without suspected osteomyelitis in this region  Echo 2019:   LEFT VENTRICLE:  Systolic function was normal  Ejection fraction was estimated to be 60 %  There were no regional wall motion abnormalities  Wall thickness was mildly increased      LEFT ATRIUM:  The atrium was mildly dilated      TRICUSPID VALVE:  There was trace regurgitation  Pulmonary artery systolic pressure was within the normal range      PULMONIC VALVE:  There was trace regurgitation        Lab Results   Component Value Date    WBC 6 67 05/08/2023    HGB 10 2 (L) 05/08/2023    HCT 34 4 (L) 05/08/2023    MCV 73 (L) 05/08/2023     05/08/2023     Lab Results   Component Value Date    SODIUM 136 05/03/2023    K 4 0 05/03/2023     05/03/2023    CO2 27 05/03/2023    BUN 13 05/03/2023    CREATININE 0 84 05/03/2023    GLUC 132 05/03/2023    CALCIUM 9 4 05/03/2023     No results found for: INR, PROTIME  Lab Results   Component Value Date    HGBA1C 7 2 (H) 05/05/2023      Physical Exam    Airway    Mallampati score: II  TM Distance: >3 FB  Neck ROM: full     Dental   Comment: No teeth,     Cardiovascular      Pulmonary      Other Findings        Anesthesia Plan  ASA Score- 3     Anesthesia Type- IV sedation with anesthesia with ASA Monitors  Additional Monitors:   Airway Plan:           Plan Factors-Exercise tolerance (METS): <4 METS  Chart reviewed  Existing labs reviewed  Patient summary reviewed  Patient is a current smoker  Patient did not smoke on day of surgery  Obstructive sleep apnea risk education given perioperatively  Induction- intravenous  Postoperative Plan-     Informed Consent- Anesthetic plan and risks discussed with patient  I personally reviewed this patient with the CRNA  Discussed and agreed on the Anesthesia Plan with the CRNA  Reji Ferguson

## 2023-05-08 NOTE — NURSING NOTE
Pt discharged from unit [facility] 12 ambulance for direct admit at [employer]  Pt understands necessity for transfer initiation  All belongings sent w/ pt

## 2023-05-08 NOTE — ANESTHESIA POSTPROCEDURE EVALUATION
Post-Op Assessment Note    CV Status:  Stable  Pain Score: 0    Pain management: adequate     Mental Status:  Alert and awake   Hydration Status:  Euvolemic   PONV Controlled:  Controlled   Airway Patency:  Patent      Post Op Vitals Reviewed: Yes      Staff: CRNA, Anesthesiologist         No notable events documented      BP   117/66   Temp   98 1   Pulse  75   Resp   16   SpO2   98

## 2023-05-08 NOTE — NURSING NOTE
Patient visible on unit  Frequently dozes off in chair  Endorses high anxiety, Ativan 1 mg given with good results  Participated in evening snack  Compliant with evening medications  Request Oxycodone  for 9/10 left leg pain  Moderate relief obtained  Requested Robaxin for muscle spasms   Currently resting comfortably  Will continue to monitor and access

## 2023-05-08 NOTE — BH TRANSITION RECORD
Contact Information: If you have any questions, concerns, pended studies, tests and/or procedures, or emergencies regarding your inpatient behavioral health visit  Please contact [facility] [phone] and ask to speak to a , nurse or physician  A contact is available 24 hours/ 7 days a week at this number  Summary of Procedures Performed During your Stay:  Below is a list of major procedures performed during your hospital stay and a summary of results:  - No major procedures performed  Pending Studies (From admission, onward)    None        Please follow up on the above pending studies with your PCP and/or referring provider

## 2023-05-08 NOTE — PLAN OF CARE
Problem: Alteration in Thoughts and Perception  Goal: Verbalize thoughts and feelings  Description: Interventions:  - Promote a nonjudgmental and trusting relationship with the patient through active listening and therapeutic communication  - Assess patient's level of functioning, behavior and potential for risk  - Engage patient in 1 on 1 interactions  - Encourage patient to express fears, feelings, frustrations, and discuss symptoms    - [place] patient to reality, help patient recognize reality-based thinking   - Administer medications as ordered and assess for potential side effects  - Provide the patient education related to the signs and symptoms of the illness and desired effects of prescribed medications  Outcome: Progressing     Problem: Depression  Goal: Refrain from isolation  Description: Interventions:  - Develop a trusting relationship   - Encourage socialization   Outcome: Progressing     Problem: Alteration in Thoughts and Perception  Goal: Refrain from acting on delusional thinking/internal stimuli  Description: Interventions:  - Monitor patient closely, per order   - Utilize least restrictive measures   - Set reasonable limits, give positive feedback for acceptable   - Administer medications as ordered and monitor of potential side effects  Outcome: Not Progressing     Problem: Ineffective Coping  Goal: Identifies ineffective coping skills  Outcome: Not Progressing     Problem: Anxiety  Goal: Anxiety is at manageable level  Description: Interventions:  - Assess and monitor patient's anxiety level  - Monitor for signs and symptoms (heart palpitations, chest pain, shortness of breath, headaches, nausea, feeling jumpy, restlessness, irritable, apprehensive)  - Collaborate with interdisciplinary team and initiate plan and interventions as ordered    - [place] patient to unit/surroundings  - Explain treatment plan  - Encourage participation in care  - Encourage verbalization of concerns/fears  - Identify coping mechanisms  - Assist in developing anxiety-reducing skills  - Administer/offer alternative therapies  - Limit or eliminate stimulants  Outcome: Not Progressing

## 2023-05-08 NOTE — ASSESSMENT & PLAN NOTE
· Received from [address] [place] [address] psychiatric patient hartman with MRI results suggesting osteomyelitis of the left first metatarsal  · Podiatry attempting to get her on the OR schedule for today  · Patient has been kept NPO

## 2023-05-08 NOTE — OP NOTE
OPERATIVE REPORT  PATIENT NAME: Jose Elias Jones    :  1975  MRN: [id number]  Pt Location: [facility] OR ROOM 03    SURGERY DATE: 2023    Surgeon(s) and Role:     * Art Douglas DPM - Primary    Preop Diagnosis:  * No pre-op diagnosis entered *    * No Diagnosis Codes entered *    Procedure(s):  Left - Left first RAY RESECTION FOOT    Specimen(s):  ID Type Source Tests Collected by Time Destination   1 : BONE  Tissue Foot, Left TISSUE EXAM Art Douglas, SORAYA 2023 1601    A :  Tissue Foot, Left ANAEROBIC CULTURE AND GRAM STAIN, CULTURE, TISSUE AND GRAM STAIN Art Douglas DPM 2023 1601        Estimated Blood Loss:   Minimal    Drains:  * No LDAs found *    Anesthesia Type:   Choice    Operative Indications:  * No pre-op diagnosis entered *      Operative Findings:  Consistent with diagnosis, the distal portion of the remnant was very soft with irregularity  Proximal portion was very hard    Complications:   None    Procedure and Technique:  Patient was brought the operating room placed on the patient's bed and prepared in the patient bed for the procedure  A full timeout was then performed identify the patient location and procedure there is a small ulceration on the inner aspect the left foot  An incision was made overlying the medial aspect of the left foot, this incision was carried down to bone, soft tissue reflected off the dorsal and plantar aspect of the foot  The remnant part of the first TMT J was then identified the joint capsule was incised  And the remaining portion of the first metatarsal was removed and then placed on the back table to be sent to pathology  No purulence was identified at the TMT J  This is likely surgical care  The foot was then washed and dried aerobic and anaerobic cultures were then taken  Closure was obtained utilizing 2-0 nylon  The patient procedure and anesthesia well intact    Postoperative I will transfer to PACU vital signs stable vascular status perioperative baseline       I was present for the entire procedure      Patient Disposition:  PACU  and extubated and stable        SIGNATURE: Mimi Black  DATE: May 8, 2023  TIME: 4:45 PM

## 2023-05-08 NOTE — H&P (VIEW-ONLY)
"Progress Note - Kera Lombardi 53 y o  female MRN: [id number]    Unit/Bed#: [facility] 210-01 Encounter: [id number]        Subjective:   Patient seen and examined at bedside after reviewing the chart and discussing the case with the caring staff  Patient examined at bedside  Patient has no acute issues  Patient's MRI of the foot was done on 5/5/2023 and shows limited study due to motion degradation  First metatarsal concerning osteomyelitis but third metatarsal showed no evidence of osteomyelitis  Dr Nayely Burch has recommending transferring the patient to [facility] for further evaluation and treatment by keeping the patient n p o  I called patient's son Marta Morris and informed him about patient's transfer  Physical Exam   Vitals: Blood pressure 105/58, pulse 55, temperature 97 7 °F (36 5 °C), temperature source Temporal, resp  rate 16, height 5' 5\" (1 651 m), weight 97 1 kg (214 lb), SpO2 99 %, not currently breastfeeding  ,Body mass index is 35 61 kg/m²  Constitutional: He appears well-developed  HEENT: PERR, EOMI, MMM  Cardiovascular: Normal rate and regular rhythm  Pulmonary/Chest: Effort normal and breath sounds normal    Abdomen: Soft, + BS, NT    Assessment/Plan:  Kera Lombardi is a(n) 53 y o  female with bipolar disorder      1  Type 2 diabetes mellitus  Patient is on metformin 500 mg twice daily  Hgb a1c 6 4% on 2/16/2023  Repeat hgb a1c  Accu-Cheks twice daily  Carb controlled diet  2  Diabetic neuropathy  Continue Lyrica 150 mg twice daily  3  Hyperlipidemia  Continue fenofibrate 48 mg daily  4  GERD  Patient is on Protonix 40 mg twice daily  5  Hypothyroidism  Continue levothyroxine 50 mcg daily  TSH on 5/5/23 WNL  6  Gait abnormality/right BKA  PT OT consulted  7  Left open foot wound/cellulitis  Patient started on Bactrim DS twice daily in ED on 5/3/23, will continue on unit  Podiatry consulted (see note)      MRI of the foot on 5/5/2023 showed first metatarsal " osteomyelitis  Wound care as ordered  Oxycodone 10 mg q6h prn for severe pain  Patient will be transferred to [facility] for further evaluation and treatment  Patient's accepting physician is Dr Alessandro Andrea  8   New vitamin D deficiency  I will put the patient on vitamin D supplements  9   New vitamin B12 deficiency  I will put the patient on monthly B12 injections

## 2023-05-08 NOTE — ASSESSMENT & PLAN NOTE
· History of bipolar disorder, OCD, PTSD  · Admitted medically from [place] [address] psychiatric unit    Per note, psychiatrically cleared for medical admission  · Also, currently homeless  · Continue medications including Lamictal, hydroxyzine, Geodon  · Supportive care  · Denies current suicidal ideation

## 2023-05-08 NOTE — CONSULTS
[address] [facility] - Consultation    Patient Information:   Madi Pop 53 y o  female MRN: [id number]  Unit/Bed#: OR [place] Encounter: [id number]  PCP: Dominic Cheney MD  Date of Admission:  5/8/2023  Date of Consultation: 05/08/23  Requesting Physician: Rayshawn Mcdaniel, *      ASSESSMENT:    Madi Pop is a 53 y o  female with:    1  Osteomyelitis of the left foot  2  Diabetes  3  Ulcer of the left foot with fat layer exposed  4  Cellulitis left foot    PLAN:    · This case was directly communicated with the admitting physician in the behavioral health unit today  Per my previous consult note we recommended transfer to [facility] for intervention if the MRI did return positive  This with MRI was resulted on 5/6 and I discussed with the behavioral health doctor the need for transfer today  · She was placed n p o , MRI does indicate significant residual osteomyelitis of the remnant tip of the first metatarsal we will plan for full resection of the remaining remnant part of the first metatarsal  · Following resection hopeful closure, and she will need to be discharged on oral antibiotics for 2 weeks, doxycycline  · Aerobic anaerobic cultures will be taken in the OR but they can be follow-up on an outpatient basis  · Rest of care per primary team     SUBJECTIVE    History of Present Illness:    Madi Pop is a 53 y o  female with past medical history of previous amputation, diabetes who presents with osteomyelitis of the left foot and she was admitted at behavioral health  Review of Systems:    Constitutional: Negative  HENT: Negative  Eyes: Negative  Respiratory: Negative  Cardiovascular: Negative  Gastrointestinal: Negative  Musculoskeletal: neg   Skin:as above   Neurological: neg   Psych: Negative       Past Medical and Surgical History:     Past Medical History:   Diagnosis Date   • Anxiety    • Arthritis    • Asthma    • Chronic pain    • Diabetes mellitus ([facility] [address] )    • Hypothyroidism    • Lymphedema     left leg   • Manic bipolar I disorder (HCC)    • Morbid obesity (HCC)    • OCD (obsessive compulsive disorder)    • PTSD (post-traumatic stress disorder)        Past Surgical History:   Procedure Laterality Date   • CHOLECYSTECTOMY     • CHOLECYSTECTOMY     • LEG AMPUTATION THROUGH LOWER TIBIA AND FIBULA Right 3/23/2023    Procedure: AMPUTATION BELOW KNEE (BKA);   Surgeon: Eunice Lopez MD;  [facility];  Service: General   • WA AMPUTATION FOOT TRANSMETARSAL Right 9/4/2021    Procedure: AMPUTATION TRANSMETATARSAL (TMA);  Surgeon: Paty Pineda DPM;  Location: [facility];  Service: Podiatry   • WA AMPUTATION METATARSAL W/TOE SINGLE Left 9/8/2019    Procedure: PARTIAL 3RD RAY RESECTION, PSB TOE FILLET FLAP;  Surgeon: Renu Shook DPM;  Location[facility];  Service: Podiatry   • WA AMPUTATION METATARSAL W/TOE SINGLE Left 3/23/2023    Procedure: RAY RESECTION FOOT;  Surgeon: Cristal Toro DPM;  [facility];  Service: Podiatry   • TOE AMPUTATION Right 1/13/2021    Procedure: AMPUTATION 2ND TOE;  Surgeon: Paty Pineda DPM;  [facility];  Service: Podiatry   • WOUND DEBRIDEMENT Left 2/17/2022    Procedure: DEBRIDEMENT GREAT TOE WOUND;  Surgeon: Paty Pineda DPM;  [facility];  Service: Podiatry       Meds/Allergies:    Medications Prior to Admission   Medication   • cloNIDine (CATAPRES) 0 2 mg tablet   • fenofibrate (TRICOR) 54 MG tablet   • folic acid (FOLVITE) 1 mg tablet   • hydrOXYzine HCL (ATARAX) 50 mg tablet   • hydrOXYzine HCL (ATARAX) 50 mg tablet   • lamoTRIgine (LaMICtal) 200 MG tablet   • levothyroxine 50 mcg tablet   • melatonin 3 mg   • metFORMIN (GLUCOPHAGE) 500 mg tablet   • methocarbamol (ROBAXIN) 750 mg tablet   • oxyCODONE (OxyCONTIN) 10 mg 12 hr tablet   • pantoprazole (PROTONIX) 40 mg tablet   • pregabalin (LYRICA) 150 mg capsule   • ziprasidone (GEODON) 80 mg capsule   • ergocalciferol "(ERGOCALCIFEROL) 1 25 MG (10235 UT) capsule       Allergies   Allergen Reactions   • Latex Hives and Shortness Of Breath   • Vancomycin Rash and Itching     Other reaction(s): Hives / Urticaria   • Aspirin Vomiting and Rash     Other reaction(s): Nausea and Vomiting   • Barium Iodide Itching and Rash     Face became very red and itchy    • Codeine Vomiting and Rash     Other reaction(s): Nausea and Vomiting   • Penicillins Rash and Other (See Comments)     Patient has tolerated cefepime without difficulty       Social History:     Marital Status: Single    Substance Use History:   Social History     Substance and Sexual Activity   Alcohol Use Never     Social History     Tobacco Use   Smoking Status Every Day   • Packs/day: 1 00   • Years: 15 00   • Pack years: 15 00   • Types: Cigarettes   • Passive exposure: Current   Smokeless Tobacco Never     Social History     Substance and Sexual Activity   Drug Use Yes   • Types: Marijuana       Family History:    Family History   Problem Relation Age of Onset   • Hyperlipidemia Mother    • Diabetes Mother    • Hypertension Mother    • Heart attack Father          OBJECTIVE:    Vitals:   Blood Pressure: 117/66 (05/08/23 1625)  Pulse: 76 (05/08/23 1625)  Temperature: 98 1 °F (36 7 °C) (05/08/23 1625)  Temp Source: Temporal (05/08/23 1522)  Respirations: 20 (05/08/23 1625)  Height: 5' 5\" (165 1 cm) (05/08/23 1405)  Weight - Scale: 103 kg (227 lb 1 2 oz) (05/08/23 1405)  SpO2: 98 % (05/08/23 1625)    Physical Exam:     General Appearance: Alert, cooperative, no distress  HEENT: Head normocephalic, atraumatic, without obvious abnormality  Heart: Normal rate and rhythm  Lungs: Non-labored breathing  No respiratory distress  Abdomen: Without distension  Psychiatric: AAOx3  Lower Extremity:  Vascular:   DP and PT pulses are left palpable    There is ulceration in her aspect of the left foot no direct probe to bone but this is overlying the area where bone was previously " "resected  She has had multiple amputations 1, 3 and also right below-knee amputation on the right  There is significant cellulitis and worsening since soft tissue swelling to the left foot and also the left leg  Additional Data:     Lab Results: I have personally reviewed pertinent labs including:    Results from last 7 days   Lab Units 05/08/23  1502   WBC Thousand/uL 6 67   HEMOGLOBIN g/dL 10 2*   HEMATOCRIT % 34 4*   PLATELETS Thousands/uL 338   NEUTROS PCT % 39*   LYMPHS PCT % 46*   MONOS PCT % 8   EOS PCT % 6     Results from last 7 days   Lab Units 05/08/23  1502 05/03/23  2048   POTASSIUM mmol/L 4 2 4 0   CHLORIDE mmol/L 103 102   CO2 mmol/L 22 27   BUN mg/dL 14 13   CREATININE mg/dL 0 93 0 84   CALCIUM mg/dL 9 8 9 4   ALK PHOS U/L  --  86   ALT U/L  --  15   AST U/L  --  16           Cultures: I have personally reviewed pertinent cultures including:              Imaging: I have personally reviewed pertinent films in PACS  EKG, Pathology, and Other Studies: I have personally reviewed pertinent reports  ** Please Note: Portions of the record may have been created with voice recognition software  Occasional wrong word or \"sound a like\" substitutions may have occurred due to the inherent limitations of voice recognition software  Read the chart carefully and recognize, using context, where substitutions have occurred   **    "

## 2023-05-08 NOTE — NURSING NOTE
Patient notified that she should be NPO at this time per Dr request  [facility] - [facility] transfer will be in progress this morning for MRI results reading positive for osteomyelitis  Pt agreeable, aware of current situation

## 2023-05-08 NOTE — PROGRESS NOTES
Patient belongings and security bag transferred to [facility] with patient        Patient refused to sign form

## 2023-05-08 NOTE — H&P
[facility]  H&P  Name: Molina Lewis 53 y o  female I MRN: [id number]  Unit/Bed#: -01 I Date of Admission: 5/8/2023   Date of Service: 5/8/2023 I Hospital Day: 0      Assessment/Plan      * Osteomyelitis of left foot ([facility] [address] )  Assessment & Plan  · Received from [address] [place] [address] psychiatric patient hartman with MRI results suggesting osteomyelitis of the left first metatarsal  · Podiatry attempting to get her on the OR schedule for today  · Patient has been kept NPO    Hx of right BKA ([facility] [address] )  Assessment & Plan  · History of right BKA 3/20/2023  · Serial assessments and supportive care  · Fall precautions    GERD (gastroesophageal reflux disease)  Assessment & Plan  · Continue PPI    Chronic pain  Assessment & Plan  · Previously on OxyContin for chronic back pain, transitioned to oxycodone 10 mg every 6 hours while in inpatient psychiatric unit  · Monitor for adequate pain control and or signs of oversedation or withdrawal    Essential hypertension  Assessment & Plan  · BP reviewed    Psychiatric illness  Assessment & Plan  · History of bipolar disorder, OCD, PTSD  · Admitted medically from [place] [address] psychiatric unit  Per note, psychiatrically cleared for medical admission  · Also, currently homeless  · Continue medications including Lamictal, hydroxyzine, Geodon  · Supportive care  · Denies current suicidal ideation    Hypothyroidism  Assessment & Plan  · Continue levothyroxine       VTE Pharmacologic Prophylaxis: VTE Score: 3 Moderate Risk (Score 3-4) - Pharmacological DVT Prophylaxis Ordered: heparin  Code Status: Level 1 - Full Code   Discussion with family: Patient declined call to   Anticipated Length of Stay: Patient will be admitted on an inpatient basis with an anticipated length of stay of greater than 2 midnights secondary to left foot osteomyelitis       Total Time Spent on Date of Encounter in care of patient: 55 minutes This time was spent on one or more of the following: performing physical exam; counseling and coordination of care; obtaining or reviewing history; documenting in the medical record; reviewing/ordering tests, medications or procedures; communicating with other healthcare professionals and discussing with patient's family/caregivers  Chief Complaint: Left foot osteomyelitis    History of Present Illness:  Kera Lombardi is a 53 y o  female with a PMH of diabetes, right BKA, PTSD, OCD, homelessness, hypothyroidism, bipolar disease, anxiety who presents with imaging suggesting left foot osteomyelitis  She was transferred from the inpatient psychiatric unit, where she was receiving treatment due to worsening depression  Prior to that she was in a rehab after her right BKA, where she was discharged to a shelter because she did not want to go to a long-term care unit (please see discharge summary from behavioral health )  She is admitted to medicine with plan for podiatry to take her to the OR possibly this afternoon  She is being kept n p o  She denies fever, cough or shortness of breath, chest pain, abdominal pain, dysuria, lightheadedness, anxiety or suicidal ideation  Review of Systems:  Review of Systems   Constitutional: Negative for chills and fever  HENT: Negative for congestion  Respiratory: Negative for cough and shortness of breath  Cardiovascular: Negative for chest pain and leg swelling  Gastrointestinal: Negative for abdominal pain, diarrhea, nausea and vomiting  Genitourinary: Negative for dysuria  Musculoskeletal: Positive for gait problem  Negative for arthralgias and myalgias  Skin: Positive for wound  Neurological: Negative for weakness and light-headedness  Psychiatric/Behavioral: Negative for agitation, self-injury and suicidal ideas  All other systems reviewed and are negative        Past Medical and Surgical History:   Past Medical History:   Diagnosis Date   • Anxiety    • Arthritis    • Asthma • Chronic pain    • Diabetes mellitus ([facility] [address] )    • Hypothyroidism    • Lymphedema     left leg   • Manic bipolar I disorder (HCC)    • Morbid obesity (HCC)    • OCD (obsessive compulsive disorder)    • PTSD (post-traumatic stress disorder)        Past Surgical History:   Procedure Laterality Date   • CHOLECYSTECTOMY     • CHOLECYSTECTOMY     • LEG AMPUTATION THROUGH LOWER TIBIA AND FIBULA Right 3/23/2023    Procedure: AMPUTATION BELOW KNEE (BKA); Surgeon: Jesica Galvez MD;  [facility];  Service: General   • MS AMPUTATION FOOT TRANSMETARSAL Right 9/4/2021    Procedure: AMPUTATION TRANSMETATARSAL (TMA);  Surgeon: Enio Lou DPM;  [facility];  Service: Podiatry   • MS AMPUTATION METATARSAL W/TOE SINGLE Left 9/8/2019    Procedure: PARTIAL 3RD RAY RESECTION, PSB TOE FILLET FLAP;  Surgeon: Shobha Osman DPM;  [facility];  Service: Podiatry   • MS AMPUTATION METATARSAL W/TOE SINGLE Left 3/23/2023    Procedure: RAY RESECTION FOOT;  Surgeon: Sadia Toro DPM;  [facility];  Service: Podiatry   • TOE AMPUTATION Right 1/13/2021    Procedure: AMPUTATION 2ND TOE;  Surgeon: Enio Lou DPM;  [facility];  Service: Podiatry   • WOUND DEBRIDEMENT Left 2/17/2022    Procedure: DEBRIDEMENT GREAT TOE WOUND;  Surgeon: Enio Lou DPM;  [facility];  Service: Podiatry       Meds/Allergies:  Prior to Admission medications    Medication Sig Start Date End Date Taking? Authorizing Provider   cloNIDine (CATAPRES) 0 2 mg tablet Take 0 2 mg by mouth 2 (two) times a day 2/15/21   Historical Provider, MD   ergocalciferol (ERGOCALCIFEROL) 1 25 MG (61989 UT) capsule Take 50,000 Units by mouth 3/5/21 5/28/21  Historical Provider, MD   fenofibrate (TRICOR) 54 MG tablet TAKE 1 TABLET (54 MG TOTAL) BY MOUTH DAILY   2/27/21   Historical Provider, MD   folic acid (FOLVITE) 1 mg tablet Take 1,000 mcg by mouth daily 2/22/21   Historical Provider, MD "  hydrOXYzine HCL (ATARAX) 50 mg tablet Take 50 mg by mouth in the morning    Historical Provider, MD   hydrOXYzine HCL (ATARAX) 50 mg tablet Take 2 tablets (100 mg total) by mouth daily at bedtime 4/3/23   June Mcnulty DPM   lamoTRIgine (LaMICtal) 200 MG tablet Take 1 5 tablets (300 mg total) by mouth every morning 4/3/23   June Mcnulty DPM   levothyroxine 50 mcg tablet Take 1 tablet (50 mcg total) by mouth daily 4/3/23   June Mcnulty DPM   melatonin 3 mg Take 3 tablets (9 mg total) by mouth daily at bedtime 4/3/23   June Mcnulty DPM   metFORMIN (GLUCOPHAGE) 500 mg tablet Take 1 tablet (500 mg total) by mouth 2 (two) times a day with meals 9/10/19   Bella Parnell DO   methocarbamol (ROBAXIN) 750 mg tablet TAKE 1 TABLET (750 MG TOTAL) BY MOUTH 4 (FOUR) TIMES A DAY AS NEEDED FOR MUSCLE SPASMS  3/2/21   Historical Provider, MD   oxyCODONE (OxyCONTIN) 10 mg 12 hr tablet Take 10 mg by mouth every 12 (twelve) hours    Historical Provider, MD   pantoprazole (PROTONIX) 40 mg tablet Take 1 tablet (40 mg total) by mouth 2 (two) times a day before breakfast and lunch 4/3/23   June Mcnulty DPM   pregabalin (LYRICA) 150 mg capsule Take 150 mg by mouth 2 (two) times a day 3/6/23   Historical Provider, MD   ziprasidone (GEODON) 80 mg capsule Take 80 mg by mouth 2 (two) times a day with meals  5/15/18   Historical Provider, MD   EASY COMFORT PEN NEEDLES 32G X 4 MM [facility]  3/20/19 6/16/22  Historical Provider, MD   [address] [facility] X 5/16\" 0 3 ML [facility]  2/2/19 6/16/22  Historical Provider, MD     I have reviewed home medications with patient personally  Allergies:    Allergies   Allergen Reactions   • Latex Hives and Shortness Of Breath   • Vancomycin Rash and Itching     Other reaction(s): Hives / Urticaria   • Aspirin Vomiting and Rash     Other reaction(s): Nausea and Vomiting   • Barium Iodide Itching and Rash     Face became very red and itchy    • Codeine Vomiting and Rash     Other reaction(s): Nausea and " "Vomiting   • Penicillins Rash and Other (See Comments)     Patient has tolerated cefepime without difficulty       Social History:  Marital Status: Single   Occupation: Not employed  Patient Pre-hospital Living Situation: She had been staying with a niece but she is now homeless  Patient Pre-hospital Level of Mobility: manual wheelchair  Patient Pre-hospital Diet Restrictions:   Substance Use History:   Social History     Substance and Sexual Activity   Alcohol Use Never     Social History     Tobacco Use   Smoking Status Every Day   • Packs/day: 1 00   • Years: 15 00   • Pack years: 15 00   • Types: Cigarettes   • Passive exposure: Current   Smokeless Tobacco Never     Social History     Substance and Sexual Activity   Drug Use Yes   • Types: Marijuana       Family History:  Family History   Problem Relation Age of Onset   • Hyperlipidemia Mother    • Diabetes Mother    • Hypertension Mother    • Heart attack Father        Physical Exam:     Vitals:   Blood Pressure: 157/81 (05/08/23 1405)  Pulse: 79 (05/08/23 1405)  Temperature: 98 1 °F (36 7 °C) (05/08/23 1405)  Respirations: 18 (05/08/23 1405)  Height: 5' 5\" (165 1 cm) (05/08/23 1405)  Weight - Scale: 103 kg (227 lb 1 2 oz) (05/08/23 1405)  SpO2: 97 % (05/08/23 1405)    Physical Exam  Vitals and nursing note reviewed  Constitutional:       General: She is not in acute distress  Appearance: She is not ill-appearing  HENT:      Head: Normocephalic and atraumatic  Mouth/Throat:      Mouth: Mucous membranes are moist       Pharynx: Oropharynx is clear  Eyes:      Pupils: Pupils are equal, round, and reactive to light  Cardiovascular:      Rate and Rhythm: Normal rate and regular rhythm  Pulses: Normal pulses  Pulmonary:      Effort: Pulmonary effort is normal  No respiratory distress  Breath sounds: Normal breath sounds  Abdominal:      General: Bowel sounds are normal       Palpations: Abdomen is soft  Tenderness:  There is no " abdominal tenderness  Musculoskeletal:      Cervical back: Neck supple  Comments: Right BKA with dressing clean dry and intact  Left foot amputation site without redness, drainage, tenderness, or fluctuance  Skin:     General: Skin is warm and dry  Capillary Refill: Capillary refill takes less than 2 seconds  Neurological:      General: No focal deficit present  Mental Status: She is alert  Additional Data:     Lab Results:  Results from last 7 days   Lab Units 05/05/23  0539   WBC Thousand/uL 6 35   HEMOGLOBIN g/dL 9 2*   HEMATOCRIT % 31 5*   PLATELETS Thousands/uL 403*   NEUTROS PCT % 36*   LYMPHS PCT % 47*   MONOS PCT % 9   EOS PCT % 7*     Results from last 7 days   Lab Units 05/03/23  2048   SODIUM mmol/L 136   POTASSIUM mmol/L 4 0   CHLORIDE mmol/L 102   CO2 mmol/L 27   BUN mg/dL 13   CREATININE mg/dL 0 84   ANION GAP mmol/L 7   CALCIUM mg/dL 9 4   ALBUMIN g/dL 3 8   TOTAL BILIRUBIN mg/dL 0 21   ALK PHOS U/L 86   ALT U/L 15   AST U/L 16   GLUCOSE RANDOM mg/dL 132         Results from last 7 days   Lab Units 05/08/23  1411 05/08/23  0737 05/07/23  1604 05/07/23  0745 05/06/23  1556 05/06/23  0754 05/05/23  1639 05/05/23  0743 05/04/23  1925 05/03/23  1950   POC GLUCOSE mg/dl 117 96 129 114 122 116 150* 144* 168* 209*     Results from last 7 days   Lab Units 05/05/23  0539   HEMOGLOBIN A1C % 7 2*           Lines/Drains:  Invasive Devices     Peripheral Intravenous Line  Duration           Peripheral IV 05/08/23 Right;Ventral (anterior) Forearm <1 day                    Imaging: Reviewed radiology reports from this admission including: chest xray  No orders to display     ** Please Note: This note has been constructed using a voice recognition system   **

## 2023-05-08 NOTE — PLAN OF CARE
Problem: Alteration in Thoughts and Perception  Goal: Refrain from acting on delusional thinking/internal stimuli  Description: Interventions:  - Monitor patient closely, per order   - Utilize least restrictive measures   - Set reasonable limits, give positive feedback for acceptable   - Administer medications as ordered and monitor of potential side effects  Outcome: Progressing     Problem: Ineffective Coping  Goal: Identifies ineffective coping skills  Outcome: Progressing     Problem: Depression  Goal: Refrain from isolation  Description: Interventions:  - Develop a trusting relationship   - Encourage socialization   Outcome: Progressing

## 2023-05-08 NOTE — PLAN OF CARE
Problem: MOBILITY - ADULT  Goal: Maintain or return to baseline ADL function  Description: INTERVENTIONS:  -  Assess patient's ability to carry out ADLs; assess patient's baseline for ADL function and identify physical deficits which impact ability to perform ADLs (bathing, care of mouth/teeth, toileting, grooming, dressing, etc )  - Assess/evaluate cause of self-care deficits   - Assess range of motion  - Assess patient's mobility; develop plan if impaired  - Assess patient's need for assistive devices and provide as appropriate  - Encourage maximum independence but intervene and supervise when necessary  - Involve family in performance of ADLs  - Assess for home care needs following discharge   - Consider OT consult to assist with ADL evaluation and planning for discharge  - Provide patient education as appropriate  Outcome: Progressing  Goal: Maintains/Returns to pre admission functional level  Description: INTERVENTIONS:  - Perform BMAT or MOVE assessment daily    - Set and communicate daily mobility goal to care team and patient/family/caregiver  - Collaborate with rehabilitation services on mobility goals if consulted  - Perform Range of Motion 2 times a day  - Reposition patient every 2 hours    - Dangle patient 2 times a day  - Stand patient 2 times a day  - Ambulate patient 2 times a day  - Out of bed to chair 3 times a day   - Out of bed for meals 3 times a day  - Out of bed for toileting  - Record patient progress and toleration of activity level   Outcome: Progressing     Problem: PAIN - ADULT  Goal: Verbalizes/displays adequate comfort level or baseline comfort level  Description: Interventions:  - Encourage patient to monitor pain and request assistance  - Assess pain using appropriate pain scale  - Administer analgesics based on type and severity of pain and evaluate response  - Implement non-pharmacological measures as appropriate and evaluate response  - Consider cultural and social influences on pain and pain management  - Notify physician/advanced practitioner if interventions unsuccessful or patient reports new pain  Outcome: Progressing     Problem: INFECTION - ADULT  Goal: Absence or prevention of progression during hospitalization  Description: INTERVENTIONS:  - Assess and monitor for signs and symptoms of infection  - Monitor lab/diagnostic results  - Monitor all insertion sites, i e  indwelling lines, tubes, and drains  - Monitor endotracheal if appropriate and nasal secretions for changes in amount and color  - [place] appropriate cooling/warming therapies per order  - Administer medications as ordered  - Instruct and encourage patient and family to use good hand hygiene technique  - Identify and instruct in appropriate isolation precautions for identified infection/condition  Outcome: Progressing  Goal: Absence of fever/infection during neutropenic period  Description: INTERVENTIONS:  - Monitor WBC    Outcome: Progressing     Problem: SAFETY ADULT  Goal: Maintain or return to baseline ADL function  Description: INTERVENTIONS:  -  Assess patient's ability to carry out ADLs; assess patient's baseline for ADL function and identify physical deficits which impact ability to perform ADLs (bathing, care of mouth/teeth, toileting, grooming, dressing, etc )  - Assess/evaluate cause of self-care deficits   - Assess range of motion  - Assess patient's mobility; develop plan if impaired  - Assess patient's need for assistive devices and provide as appropriate  - Encourage maximum independence but intervene and supervise when necessary  - Involve family in performance of ADLs  - Assess for home care needs following discharge   - Consider OT consult to assist with ADL evaluation and planning for discharge  - Provide patient education as appropriate  Outcome: Progressing  Goal: Maintains/Returns to pre admission functional level  Description: INTERVENTIONS:  - Perform BMAT or MOVE assessment daily    - Set and communicate daily mobility goal to care team and patient/family/caregiver  - Collaborate with rehabilitation services on mobility goals if consulted  - Perform Range of Motion 2 times a day  - Reposition patient every 2 hours    - Dangle patient 2 times a day  - Stand patient 2 times a day  - Ambulate patient 2 times a day  - Out of bed to chair 3 times a day   - Out of bed for meals 3 times a day  - Out of bed for toileting  - Record patient progress and toleration of activity level   Outcome: Progressing  Goal: Patient will remain free of falls  Description: INTERVENTIONS:  - Educate patient/family on patient safety including physical limitations  - Instruct patient to call for assistance with activity   - Consult OT/PT to assist with strengthening/mobility   - Keep Call bell within reach  - Keep bed low and locked with side rails adjusted as appropriate  - Keep care items and personal belongings within reach  - Initiate and maintain comfort rounds  - Make Fall Risk Sign visible to staff  - Offer Toileting every 2 Hours, in advance of need  - Initiate/Maintain bed alarm  - Obtain necessary fall risk management equipment  - Apply yellow socks and bracelet for high fall risk patients  - Consider moving patient to room near nurses station  Outcome: Progressing     Problem: DISCHARGE PLANNING  Goal: Discharge to home or other facility with appropriate resources  Description: INTERVENTIONS:  - Identify barriers to discharge w/patient and caregiver  - Arrange for needed discharge resources and transportation as appropriate  - Identify discharge learning needs (meds, wound care, etc )  - Refer to Case Management Department for coordinating discharge planning if the patient needs post-hospital services based on physician/advanced practitioner order or complex needs related to functional status, cognitive ability, or social support system  Outcome: Progressing     Problem: Knowledge Deficit  Goal: Patient/family/caregiver demonstrates understanding of disease process, treatment plan, medications, and discharge instructions  Description: Complete learning assessment and assess knowledge base    Interventions:  - Provide teaching at level of understanding  - Provide teaching via preferred learning methods  Outcome: Progressing     Problem: SKIN/TISSUE INTEGRITY - ADULT  Goal: Incision(s), wounds(s) or drain site(s) healing without S/S of infection  Description: INTERVENTIONS  - Assess and document dressing, incision, wound bed, drain sites and surrounding tissue  - Provide patient and family education  - Perform skin care/dressing changes every shift  Outcome: Progressing  Goal: Pressure injury heals and does not worsen  Description: Interventions:  - Implement low air loss mattress or specialty surface (Criteria met)  - Apply silicone foam dressing  - Instruct/assist with weight shifting every 60 minutes when in chair   - Limit chair time to 3 hour intervals  - Use special pressure reducing interventions such as waffle cushion when in chair   - Apply fecal or urinary incontinence containment device   - Perform passive or active ROM every 4 hrs  - Turn and reposition patient & offload bony prominences every 2 hours   - Utilize friction reducing device or surface for transfers   - Consider consults to  interdisciplinary teams such as wound and podiatry   - Use incontinent care products after each incontinent episode such as foam cleanser  - Consider nutrition services referral as needed  Outcome: Progressing

## 2023-05-08 NOTE — QUICK NOTE
Patient is weightbearing as tolerated to the left foot in surgical shoe, stable for discharge  - She did have ray resection of the left foot today  She will need Adaptic, 4 x 4's, ABD, Adolfo changes q  OD when she is transmitted back to the behavioral health unit   -She also will need 2 weeks doxycycline upon discharge and we will direct this at the cultures following discharge

## 2023-05-09 PROBLEM — R93.7 ABNORMAL X-RAY OF CERVICAL SPINE: Status: ACTIVE | Noted: 2017-08-14

## 2023-05-09 PROBLEM — Z98.84 S/P LAPAROSCOPIC SLEEVE GASTRECTOMY: Status: ACTIVE | Noted: 2019-09-24

## 2023-05-09 PROBLEM — G54.6 PHANTOM PAIN FOLLOWING AMPUTATION OF LOWER LIMB (HCC): Status: ACTIVE | Noted: 2021-12-21

## 2023-05-09 PROBLEM — K91.2 INTESTINAL MALABSORPTION FOLLOWING GASTRECTOMY: Status: ACTIVE | Noted: 2021-05-03

## 2023-05-09 PROBLEM — M62.08 DIASTASIS OF RECTUS ABDOMINIS: Status: ACTIVE | Noted: 2021-12-21

## 2023-05-09 PROBLEM — F31.70 BIPOLAR AFFECTIVE DISORDER IN REMISSION (HCC): Status: ACTIVE | Noted: 2017-04-15

## 2023-05-09 PROBLEM — H35.369: Status: ACTIVE | Noted: 2019-01-25

## 2023-05-09 PROBLEM — M54.12 RIGHT CERVICAL RADICULOPATHY: Status: ACTIVE | Noted: 2017-08-11

## 2023-05-09 PROBLEM — M54.16 RADICULOPATHY, LUMBAR REGION: Status: ACTIVE | Noted: 2018-09-12

## 2023-05-09 PROBLEM — M54.42 CHRONIC RIGHT-SIDED LOW BACK PAIN WITH LEFT-SIDED SCIATICA: Status: ACTIVE | Noted: 2018-09-04

## 2023-05-09 PROBLEM — G89.29 CHRONIC RIGHT-SIDED LOW BACK PAIN WITH LEFT-SIDED SCIATICA: Status: ACTIVE | Noted: 2018-09-04

## 2023-05-09 PROBLEM — L30.4 INTERTRIGINOUS DERMATITIS ASSOCIATED WITH MOISTURE: Status: ACTIVE | Noted: 2021-03-30

## 2023-05-09 PROBLEM — Z90.3 INTESTINAL MALABSORPTION FOLLOWING GASTRECTOMY: Status: ACTIVE | Noted: 2021-05-03

## 2023-05-09 PROBLEM — E78.5 HYPERLIPIDEMIA ASSOCIATED WITH TYPE 2 DIABETES MELLITUS (HCC): Status: ACTIVE | Noted: 2017-09-25

## 2023-05-09 PROBLEM — E11.69 HYPERLIPIDEMIA ASSOCIATED WITH TYPE 2 DIABETES MELLITUS (HCC): Status: ACTIVE | Noted: 2017-09-25

## 2023-05-09 PROBLEM — E53.8 FOLATE DEFICIENCY: Status: ACTIVE | Noted: 2019-09-24

## 2023-05-09 PROBLEM — M86.171 ACUTE OSTEOMYELITIS OF METATARSAL BONE OF RIGHT FOOT (HCC): Status: ACTIVE | Noted: 2023-05-09

## 2023-05-09 PROBLEM — J45.909 ASTHMA, INTRINSIC: Status: ACTIVE | Noted: 2023-05-09

## 2023-05-09 PROBLEM — K90.9 IMPAIRED INTESTINAL ABSORPTION: Status: ACTIVE | Noted: 2021-05-03

## 2023-05-09 LAB
ANION GAP SERPL CALCULATED.3IONS-SCNC: 9 MMOL/L (ref 4–13)
ATRIAL RATE: 65 BPM
BASOPHILS # BLD AUTO: 0.02 THOUSANDS/ÂΜL (ref 0–0.1)
BASOPHILS NFR BLD AUTO: 0 % (ref 0–1)
BUN SERPL-MCNC: 18 MG/DL (ref 5–25)
CALCIUM SERPL-MCNC: 9.4 MG/DL (ref 8.4–10.2)
CHLORIDE SERPL-SCNC: 103 MMOL/L (ref 96–108)
CO2 SERPL-SCNC: 24 MMOL/L (ref 21–32)
CREAT SERPL-MCNC: 0.9 MG/DL (ref 0.6–1.3)
EOSINOPHIL # BLD AUTO: 0.41 THOUSAND/ÂΜL (ref 0–0.61)
EOSINOPHIL NFR BLD AUTO: 5 % (ref 0–6)
ERYTHROCYTE [DISTWIDTH] IN BLOOD BY AUTOMATED COUNT: 21.2 % (ref 11.6–15.1)
GFR SERPL CREATININE-BSD FRML MDRD: 76 ML/MIN/1.73SQ M
GLUCOSE SERPL-MCNC: 129 MG/DL (ref 65–140)
GLUCOSE SERPL-MCNC: 157 MG/DL (ref 65–140)
GLUCOSE SERPL-MCNC: 157 MG/DL (ref 65–140)
GLUCOSE SERPL-MCNC: 159 MG/DL (ref 65–140)
GLUCOSE SERPL-MCNC: 228 MG/DL (ref 65–140)
HCT VFR BLD AUTO: 33.2 % (ref 34.8–46.1)
HGB BLD-MCNC: 9.6 G/DL (ref 11.5–15.4)
IMM GRANULOCYTES # BLD AUTO: 0.01 THOUSAND/UL (ref 0–0.2)
IMM GRANULOCYTES NFR BLD AUTO: 0 % (ref 0–2)
LYMPHOCYTES # BLD AUTO: 3.39 THOUSANDS/ÂΜL (ref 0.6–4.47)
LYMPHOCYTES NFR BLD AUTO: 46 % (ref 14–44)
MCH RBC QN AUTO: 21.5 PG (ref 26.8–34.3)
MCHC RBC AUTO-ENTMCNC: 28.9 G/DL (ref 31.4–37.4)
MCV RBC AUTO: 74 FL (ref 82–98)
MONOCYTES # BLD AUTO: 0.82 THOUSAND/ÂΜL (ref 0.17–1.22)
MONOCYTES NFR BLD AUTO: 11 % (ref 4–12)
NEUTROPHILS # BLD AUTO: 2.89 THOUSANDS/ÂΜL (ref 1.85–7.62)
NEUTS SEG NFR BLD AUTO: 38 % (ref 43–75)
NRBC BLD AUTO-RTO: 0 /100 WBCS
P AXIS: 42 DEGREES
PLATELET # BLD AUTO: 425 THOUSANDS/UL (ref 149–390)
PMV BLD AUTO: 9.3 FL (ref 8.9–12.7)
POTASSIUM SERPL-SCNC: 4.3 MMOL/L (ref 3.5–5.3)
PR INTERVAL: 178 MS
QRS AXIS: 51 DEGREES
QRSD INTERVAL: 98 MS
QT INTERVAL: 430 MS
QTC INTERVAL: 447 MS
RBC # BLD AUTO: 4.46 MILLION/UL (ref 3.81–5.12)
SODIUM SERPL-SCNC: 136 MMOL/L (ref 135–147)
T WAVE AXIS: 49 DEGREES
VENTRICULAR RATE: 65 BPM
WBC # BLD AUTO: 7.54 THOUSAND/UL (ref 4.31–10.16)

## 2023-05-09 RX ORDER — DOXYCYCLINE HYCLATE 100 MG/1
100 CAPSULE ORAL EVERY 12 HOURS SCHEDULED
Status: DISCONTINUED | OUTPATIENT
Start: 2023-05-09 | End: 2023-05-15

## 2023-05-09 RX ORDER — METHOCARBAMOL 500 MG/1
750 TABLET, FILM COATED ORAL EVERY 6 HOURS PRN
Status: DISCONTINUED | OUTPATIENT
Start: 2023-05-09 | End: 2023-05-23 | Stop reason: HOSPADM

## 2023-05-09 RX ORDER — ONDANSETRON 2 MG/ML
4 INJECTION INTRAMUSCULAR; INTRAVENOUS EVERY 6 HOURS PRN
Status: DISCONTINUED | OUTPATIENT
Start: 2023-05-09 | End: 2023-05-23 | Stop reason: HOSPADM

## 2023-05-09 RX ADMIN — INSULIN LISPRO 1 UNITS: 100 INJECTION, SOLUTION INTRAVENOUS; SUBCUTANEOUS at 17:06

## 2023-05-09 RX ADMIN — METHOCARBAMOL TABLETS 750 MG: 500 TABLET, COATED ORAL at 23:20

## 2023-05-09 RX ADMIN — LEVOTHYROXINE SODIUM 50 MCG: 50 TABLET ORAL at 05:41

## 2023-05-09 RX ADMIN — FOLIC ACID 1000 MCG: 1 TABLET ORAL at 08:07

## 2023-05-09 RX ADMIN — OXYCODONE HYDROCHLORIDE 10 MG: 10 TABLET ORAL at 10:10

## 2023-05-09 RX ADMIN — HYDROXYZINE HYDROCHLORIDE 100 MG: 25 TABLET ORAL at 22:24

## 2023-05-09 RX ADMIN — DOXYCYCLINE 100 MG: 100 CAPSULE ORAL at 20:33

## 2023-05-09 RX ADMIN — INSULIN LISPRO 2 UNITS: 100 INJECTION, SOLUTION INTRAVENOUS; SUBCUTANEOUS at 22:24

## 2023-05-09 RX ADMIN — ONDANSETRON 4 MG: 2 INJECTION INTRAMUSCULAR; INTRAVENOUS at 01:23

## 2023-05-09 RX ADMIN — FENOFIBRATE 48 MG: 48 TABLET, FILM COATED ORAL at 08:07

## 2023-05-09 RX ADMIN — DOXYCYCLINE 100 MG: 100 CAPSULE ORAL at 10:10

## 2023-05-09 RX ADMIN — ZIPRASIDONE HYDROCHLORIDE 80 MG: 40 CAPSULE ORAL at 16:16

## 2023-05-09 RX ADMIN — HEPARIN SODIUM 5000 UNITS: 5000 INJECTION INTRAVENOUS; SUBCUTANEOUS at 22:25

## 2023-05-09 RX ADMIN — CLONIDINE HYDROCHLORIDE 0.2 MG: 0.1 TABLET ORAL at 17:05

## 2023-05-09 RX ADMIN — PREGABALIN 150 MG: 75 CAPSULE ORAL at 08:07

## 2023-05-09 RX ADMIN — OXYCODONE HYDROCHLORIDE 10 MG: 10 TABLET ORAL at 16:16

## 2023-05-09 RX ADMIN — CLONIDINE HYDROCHLORIDE 0.2 MG: 0.1 TABLET ORAL at 08:07

## 2023-05-09 RX ADMIN — OXYCODONE HYDROCHLORIDE 10 MG: 10 TABLET ORAL at 22:24

## 2023-05-09 RX ADMIN — HEPARIN SODIUM 5000 UNITS: 5000 INJECTION INTRAVENOUS; SUBCUTANEOUS at 13:57

## 2023-05-09 RX ADMIN — HEPARIN SODIUM 5000 UNITS: 5000 INJECTION INTRAVENOUS; SUBCUTANEOUS at 05:43

## 2023-05-09 RX ADMIN — MORPHINE SULFATE 2 MG: 2 INJECTION, SOLUTION INTRAMUSCULAR; INTRAVENOUS at 00:16

## 2023-05-09 RX ADMIN — OXYCODONE HYDROCHLORIDE 10 MG: 10 TABLET ORAL at 04:05

## 2023-05-09 RX ADMIN — ZIPRASIDONE HYDROCHLORIDE 80 MG: 40 CAPSULE ORAL at 08:07

## 2023-05-09 RX ADMIN — PREGABALIN 150 MG: 75 CAPSULE ORAL at 17:06

## 2023-05-09 RX ADMIN — PANTOPRAZOLE SODIUM 40 MG: 40 TABLET, DELAYED RELEASE ORAL at 06:18

## 2023-05-09 RX ADMIN — HYDROXYZINE HYDROCHLORIDE 50 MG: 25 TABLET ORAL at 08:07

## 2023-05-09 RX ADMIN — LAMOTRIGINE 300 MG: 100 TABLET ORAL at 08:08

## 2023-05-09 RX ADMIN — PANTOPRAZOLE SODIUM 40 MG: 40 TABLET, DELAYED RELEASE ORAL at 16:16

## 2023-05-09 NOTE — UTILIZATION REVIEW
Initial Clinical Review    Admission: Date/Time/Statement:   Admission Orders (From admission, onward)     Ordered        05/08/23 1321  Inpatient Admission  Once                      Orders Placed This Encounter   Procedures   • Inpatient Admission     Standing Status:   Standing     Number of Occurrences:   1     Order Specific Question:   Level of Care     Answer:   Med Surg [16]     Order Specific Question:   Estimated length of stay     Answer:   More than 2 Midnights     Order Specific Question:   Certification     Answer:   I certify that inpatient services are medically necessary for this patient for a duration of greater than two midnights  See H&P and MD Progress Notes for additional information about the patient's course of treatment  Initial Presentation: 53 y o  female directly admitted from [facility] psychiatric unit for evaluation of osteomyelitis left foot  H/O diabetes, right BKA, PTSD, OCD, homelessness, hypothyroidism, bipolar disease, anxiety   MRi shows Status post first transmetatarsal amputation with adjacent plantar/medial skin ulceration and some marrow change at the residual distal aspect of the first metatarsal concerning for osteomyelitis     Podiatry consult  Keep NPO  Continue Lamictal, hydroxyzine, Geodon    OPERATINVE NOTE:  SURGERY DATE: 5/8/2023  Procedure(s):  Left - Left first RAY RESECTION FOOT  Operative Findings:  Consistent with diagnosis, the distal portion of the remnant was very soft with irregularity  Proximal portion was very hard  Anesthesia Type- IV sedation with anesthesia with ASA Monitors  Post op:  Weightbearing as tolerated in surgical show  Contineu with abx, dressing changes  Operative cultures pending  Date: 5/9   Day 2:  Abdomen soft, nontender  LLE wound with dressing in place  Continue with IV abx  Follow up cultures  PT recommends post acute rehab  Behavioral health consult: CHECK EKG as she is on high dose of GEodon    Continue current psychotropic medications  GCs 15  ED Triage Vitals   Temperature Pulse Respirations Blood Pressure SpO2   05/08/23 1252 05/08/23 1252 05/08/23 1252 05/08/23 1252 05/08/23 1252   98 2 °F (36 8 °C) 65 18 150/81 96 %      Temp Source Heart Rate Source Patient Position - Orthostatic VS BP Location FiO2 (%)   05/08/23 1522 05/08/23 1522 05/09/23 1010 05/09/23 1010 --   Temporal Monitor Sitting Left arm       Pain Score       05/08/23 1255       No Pain          Wt Readings from Last 1 Encounters:   05/08/23 103 kg (227 lb 1 2 oz)     Additional Vital Signs:   Time Temp Pulse Resp BP MAP (mmHg) SpO2 O2 Device Cardiac (WDL) Patient Position - Orthostatic VS   05/09/23 1010 97 7 °F (36 5 °C) 65 20 137/119 Abnormal  -- 93 % None (Room air) -- Sitting   05/09/23 06:59:12 98 4 °F (36 9 °C) 72 19 117/70 86 92 % -- -- --   05/08/23 1830 98 1 °F (36 7 °C) 80 20 129/75 -- 94 % -- -- --   05/08/23 17:29:58 97 9 °F (36 6 °C) 67 20 138/69 92 94 % -- -- --   05/08/23 1706 -- 61 23 Abnormal  -- 93 97 % None (Room air) WDL --   05/08/23 1700 -- 63 21 149/65 -- 98 % None (Room air) -- --   05/08/23 1659 -- 67 21 -- -- 96 % None (Room air) -- --   05/08/23 1648 -- 69 26 Abnormal  -- 82 97 % None (Room air) -- --   05/08/23 1645 -- 72 22 117/57 -- 96 % None (Room air) -- --   05/08/23 1641 -- 73 26 Abnormal  116/69 87 96 % None (Room air) -- --   05/08/23 1630 -- 74 14 116/69 87 97 % None (Room air) -- --   05/08/23 1625 98 1 °F (36 7 °C) 76 20 117/66 -- 98 % None (Room air) X --   05/08/23 1522 98 2 °F (36 8 °C) 67 18 130/61 -- 95 % None (Room air) -- --   05/08/23 14:05:48 98 1 °F (36 7 °C) 79 18 157/81 106 97 % -- -- --   05/08/23 12:52:40 98 2 °F (36 8 °C) 65 18 150/81 104 96 % -- -- --       Pertinent Labs/Diagnostic Test Results:   5/5 MRI:left foot:  Markedly limited study due to motion degradation    Status post first transmetatarsal amputation with adjacent plantar/medial skin ulceration and some marrow change at the residual distal aspect of the first metatarsal concerning for osteomyelitis    Status post third transmetatarsal amputation without suspected osteomyelitis in this region          Results from last 7 days   Lab Units 05/09/23  0424 05/08/23  1502 05/05/23  0539 05/03/23  2048   WBC Thousand/uL 7 54 6 67 6 35 11 95*   HEMOGLOBIN g/dL 9 6* 10 2* 9 2* 9 8*   HEMATOCRIT % 33 2* 34 4* 31 5* 34 1*   PLATELETS Thousands/uL 425* 338 403* 434*   NEUTROS ABS Thousands/µL 2 89 2 56 2 28 7 19         Results from last 7 days   Lab Units 05/09/23  0424 05/08/23  1502 05/03/23  2048   SODIUM mmol/L 136 134* 136   POTASSIUM mmol/L 4 3 4 2 4 0   CHLORIDE mmol/L 103 103 102   CO2 mmol/L 24 22 27   ANION GAP mmol/L 9 9 7   BUN mg/dL 18 14 13   CREATININE mg/dL 0 90 0 93 0 84   EGFR ml/min/1 73sq m 76 73 83   CALCIUM mg/dL 9 4 9 8 9 4   MAGNESIUM mg/dL  --   --  2 0     Results from last 7 days   Lab Units 05/03/23  2048   AST U/L 16   ALT U/L 15   ALK PHOS U/L 86   TOTAL PROTEIN g/dL 7 0   ALBUMIN g/dL 3 8   TOTAL BILIRUBIN mg/dL 0 21     Results from last 7 days   Lab Units 05/09/23  0644 05/08/23  2059 05/08/23  1741 05/08/23  1411 05/08/23  0737 05/07/23  1604 05/07/23  0745 05/06/23  1556 05/06/23  0754 05/05/23  1639 05/05/23  0743 05/04/23  1925   POC GLUCOSE mg/dl 129 197* 98 117 96 129 114 122 116 150* 144* 168*     Results from last 7 days   Lab Units 05/09/23  0424 05/08/23  1502 05/03/23  2048   GLUCOSE RANDOM mg/dL 159* 108 132         Results from last 7 days   Lab Units 05/05/23  0539   HEMOGLOBIN A1C % 7 2*   EAG mg/dl 160     Results from last 7 days   Lab Units 05/05/23  0539   TSH 3RD GENERATON uIU/mL 2 656       Results from last 7 days   Lab Units 05/05/23  0539   FERRITIN ng/mL 15   IRON SATURATION % 6*   IRON ug/dL 29*   TIBC ug/dL 459*       Results from last 7 days   Lab Units 05/05/23  0539   CRP mg/L 10 8*   SED RATE mm/hour 26*       Results from last 7 days   Lab Units 05/03/23 2032   AMPH/METH  Negative BARBITURATE UR  Negative   BENZODIAZEPINE UR  Negative   COCAINE UR  Negative   METHADONE URINE  Negative   OPIATE UR  Negative   PCP UR  Negative   THC UR  Negative     Results from last 7 days   Lab Units 05/08/23  1601   GRAM STAIN RESULT  No Polys or Bacteria seen         Past Medical History:   Diagnosis Date   • Anxiety    • Arthritis    • Asthma    • Chronic pain    • Diabetes mellitus ([facility] )    • Hypothyroidism    • Lymphedema     left leg   • Manic bipolar I disorder ([facility] )    • Morbid obesity (HCC)    • OCD (obsessive compulsive disorder)    • PTSD (post-traumatic stress disorder)        Admitting Diagnosis: Osteomyelitis of foot (HCC) [M86 9]  Age/Sex: 53 y o  female  Admission Orders:  Up and OOB  BMP  Scheduled Medications:  cloNIDine, 0 2 mg, Oral, BID  doxycycline hyclate, 100 mg, Oral, Q12H RADHA  fenofibrate, 48 mg, Oral, Daily  folic acid, 9,423 mcg, Oral, Daily  heparin (porcine), 5,000 Units, Subcutaneous, Q8H RADHA  hydrOXYzine HCL, 100 mg, Oral, HS  hydrOXYzine HCL, 50 mg, Oral, Daily  insulin lispro, 1-6 Units, Subcutaneous, TID AC  insulin lispro, 1-6 Units, Subcutaneous, HS  lamoTRIgine, 300 mg, Oral, QAM  levothyroxine, 50 mcg, Oral, Early Morning  pantoprazole, 40 mg, Oral, BID AC  pregabalin, 150 mg, Oral, BID  ziprasidone, 80 mg, Oral, BID With Meals      Continuous IV Infusions:  lactated ringers, 75 mL/hr, Intravenous, Continuous      PRN Meds:  acetaminophen, 650 mg, Oral, Q6H PRN  ondansetron, 4 mg, Intravenous, Q6H PRN  oxyCODONE, 10 mg, Oral, Q6H PRN        IP CONSULT TO PODIATRY  IP CONSULT TO CASE MANAGEMENT  IP CONSULT TO PSYCHIATRY    Network Utilization Review Department  ATTENTION: Please call with any questions or concerns to [phone] and carefully listen to the prompts so that you are directed to the right person   All voicemails are confidential   [place] all requests for admission clinical reviews, approved or denied determinations and any other requests to dedicated fax number below belonging to the campus where the patient is receiving treatment   List of dedicated fax numbers for the Facilities:  [address] [place] [address] DENIALS (Administrative/Medical Necessity) [phone]   [address] (Maternity/NICU/Pediatrics) [phone]   [address] [phone]   [address] [phone]   [address] [address] [facility] [place] [address] [phone]   [facility] 5 [facility] [phone]

## 2023-05-09 NOTE — ASSESSMENT & PLAN NOTE
· History of bipolar disorder, OCD, PTSD  · Admitted medically from [place] [address] psychiatric unit  Per discharge summary, psychiatrically cleared for medical admission  · Also, currently homeless  · Continue medications including Lamictal, hydroxyzine, Geodon  · Supportive care  · Denies current suicidal ideation  · Psychiatry reevaluation completed    Continue current plan of care

## 2023-05-09 NOTE — PHYSICAL THERAPY NOTE
PHYSICAL THERAPY EVALUATION  NAME:  Beverly Borja  DATE: 05/09/23    AGE:   53 y o  Mrn:   [id number]  ADMIT DX:  Osteomyelitis of foot ([place] [address] ) [M86 [id number]]  Problem List:   Patient Active Problem List   Diagnosis    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, with bone involvement without evidence of necrosis ([place] [address] )    H/O bariatric surgery    Anxiety    Hypothyroidism    Psychiatric illness    Type 2 diabetes mellitus with neurologic complication, without long-term current use of insulin ([place] [address] )    Osteomyelitis of left foot (HCC)    Diabetic foot ulcer ([place] [address] )    Acquired absence of other left toe(s) ([place] [address] )    Essential hypertension    Arthritis    Chronic pain    OCD (obsessive compulsive disorder)    PTSD (post-traumatic stress disorder)    Right foot ulcer, with fat layer exposed ([place] [address] )    GERD (gastroesophageal reflux disease)    Migraine without status migrainosus, not intractable    Obstructive sleep apnea    Sepsis ([place] [address] )    Morbid obesity with BMI of 40 0-44 9, adult ([place] [address] )    Homeless single person    Hx of right BKA ([place] [address] )    Bipolar 1 disorder, depressed ([place] [address] )       Past Medical History  Past Medical History:   Diagnosis Date    Anxiety     Arthritis     Asthma     Chronic pain     Diabetes mellitus ([place] [address] )     Hypothyroidism     Lymphedema     left leg    Manic bipolar I disorder ([place] [address] )     Morbid obesity (HCC)     OCD (obsessive compulsive disorder)     PTSD (post-traumatic stress disorder)        Past Surgical History  Past Surgical History:   Procedure Laterality Date    CHOLECYSTECTOMY      CHOLECYSTECTOMY      LEG AMPUTATION THROUGH LOWER TIBIA AND FIBULA Right 3/23/2023    Procedure: AMPUTATION BELOW KNEE (BKA);   Surgeon: Barry Horan MD;  [facility];  Service: General    CA AMPUTATION FOOT TRANSMETARSAL Right 9/4/2021    Procedure: AMPUTATION TRANSMETATARSAL (TMA);  Surgeon: Demetris Packer DPM;  [facility];  Service: Podiatry    CA AMPUTATION METATARSAL W/TOE SINGLE Left 9/8/2019    Procedure: PARTIAL 3RD RAY RESECTION, PSB TOE FILLET FLAP;  Surgeon: Ebony Paz DPM;  [facility];  Service: Podiatry    DE AMPUTATION METATARSAL W/TOE SINGLE Left 3/23/2023    Procedure: RAY RESECTION FOOT;  Surgeon: Ki Hernandez DPM;  [facility];  Service: Podiatry    TOE AMPUTATION Right 1/13/2021    Procedure: AMPUTATION 2ND TOE;  Surgeon: Nadia Grace DPM;  [facility];  Service: Podiatry    WOUND DEBRIDEMENT Left 2/17/2022    Procedure: DEBRIDEMENT GREAT TOE WOUND;  Surgeon: Nadia Grace DPM;  [facility];  Service: Podiatry       Length Of Stay: 1  Performed at least 2 patient identifiers during session: Name and Birthday       05/09/23 1238   PT Last Visit   PT Visit Date 05/09/23   Note Type   Note type Evaluation   Pain Assessment   Pain Assessment Tool 0-10   Pain Score 10 - Worst Possible Pain   Pain Location/Orientation Orientation: Left; Location: Foot   Pain Onset/Description Onset: Ongoing;Frequency: Constant/Continuous   Restrictions/Precautions   Weight Bearing Precautions Per Order Yes   LLE Weight Bearing Per Order WBAT  (with surgical shoe per podiatry)   Braces or Orthoses Other (Comment)  (RLE prosthetic for about 2 weeks per pt)   Other Precautions Fall Risk;Pain; Chair Alarm; Bed Alarm   Home Living   Home Equipment [facility]   Additional Comments inconsistent home set up, pt reports she was recently at rehab facility post RLE BKA  Per pt, her discharge location is undetermined at this time as she could be living with her son vs returning to a shelter  Prior Function   Level of [place] Independent with ADLs; Needs assistance with IADLS;Needs assistance with functional mobility   IADLs Family/Friend/Other provides transportation   Falls in the last 6 months 0  (pt denies recent falls)   Comments pt reports completing pivot transfers to/from manual wc at [facility] level and states about 2 weeks ago she received her prosthetic for her RLE and had started gait training with PT at rehab   General   Family/Caregiver Present No   Cognition   Overall Cognitive Status WFL   Arousal/Participation Alert   Memory Decreased recall of precautions   Following Commands Follows all commands and directions without difficulty   Comments Pt agreeable to PT evaluation   LLE Assessment   LLE Assessment   (L knee/hip strength grossly assessed during functional mobility 4-/5)   Vision-Basic Assessment   Current Vision Wears glasses all the time   Coordination   Sensation X  (pt reports phantom limb pain to R residual limb)   Bed Mobility   Additional Comments bed mobility not tested as pt seated in bedside chair at start/end of session   Transfers   Sit to Stand   (CGA)   Additional items Assist x 1; Increased time required;Verbal cues;Armrests   Stand to Sit   (CGA)   Additional items Assist x 1; Increased time required;Verbal cues;Armrests   Stand pivot   (CGA)   Additional items Assist x 1; Increased time required;Verbal cues;Armrests; Impulsive  (recliner <> manual wc)   Toilet transfer   (CGA)   Additional items Assist x 1; Increased time required;Verbal cues;Standard toilet; Impulsive  (grab bars)   Additional Comments performed pivot transfers with BUE support at wc/recliner arm rests and with use of grab bars around commode  STS x2 trials from manual wc to RW with BUE support and CGA for balance  Ambulation/Elevation   Gait pattern Not tested   Balance   Static Sitting Good   Dynamic Sitting Fair +   Static Standing Fair -   Activity Tolerance   Activity Tolerance Patient tolerated treatment well   Medical Staff Made Aware Pt did have prosthetic available in room; however, upon skin inspection by PT, PT observed redness to distal and anterior residual limb at incision site   PT educated patient on completing frequent skin checks and notified Scott Higgins MD    Nurse Made Aware [facility]   Assessment   Prognosis Fair   Problem List Decreased strength;Decreased endurance; Impaired balance;Decreased mobility; Decreased safety awareness;Pain;Decreased skin integrity   Assessment Pt is 53 y o  female seen for high-complexity PT evaluation on 5/9/2023 s/p admit to [facility] on 5/8/2023 w/ Osteomyelitis of left foot ([address] )  PT was consulted to assess pt's functional mobility and d/c needs  Pt POD#1 s/p left first ray resection; per podiatry, pt WBAT LLE with surgical shoe  Order placed for PT eval and tx, w/ up and OOB as tolerated order  PTA, pt reports completing pivot transfers to/from manual wc at [facility] level and states about 2 weeks ago she received her prosthetic for her RLE and had started gait training with PT at rehab  At time of eval, patient greeted seated in recliner bedside and agreeable to PT session reporting 10/10 pain to L foot  Patient required CGA for pivot transfer recliner <> manual wc and STS to RW  Pt able to self propel manual wc in room [facility] with BUEs  Pt did have prosthetic available in room; however, upon skin inspection by PT, PT observed redness to distal and anterior residual limb at incision site  PT educated patient on completing frequent skin checks and notified referring physician  Donning of prosthetic deferred until further assessment of residual limb; educated patient on same  Upon evaluation, pt presenting with impaired functional mobility d/t decreased strength, decreased endurance, impaired balance, decreased mobility, decreased safety awareness, decreased skin integrity, pain and activity intolerance  Pertinent PMHx and current co-morbidities affecting pt's physical performance at time of assessment include: osteomyeltis of left foot, hx R BKA 3/20/2023, PTSD, bipolar disorder  Personal factors affecting pt at time of eval include: compliance, unable to perform physical activity, inability to live alone and inconsistent home enviornemnt   The following objective measures performed on IE also reveal limitations: AM-PAC 6-Clicks: 67/14  Pt's clinical presentation is currently unstable/unpredictable seen in pt's presentation of LLE  pain impacting overall mobility status, ongoing medical assessment and recent R BKA,   Overall, pt's rehab potential and prognosis to return to PLOF is fair as impacted by objective findings, warranting pt to receive further skilled PT interventions to address identified impairments, activity limitation(s), and participation restriction(s)  Goal for patient is to increase mobility  Pt to benefit from continued PT tx to address deficits as defined above and maximize level of functional independent mobility  (and consistency in order for pt to increase safety and independnece  From PT/mobility standpoint, recommendation at time of d/c would be post acute rehabilitation services pending progress in order to facilitate return to PLOF )   Barriers to Discharge Decreased caregiver support; Inaccessible home environment   Goals   Patient Goals to be able to walk again   STG Expiration Date 05/19/23   Short Term Goal #1 In 10 days: Perform all bed mobility tasks modified independent to decrease caregiver burden, Perform all transfers modified independent to improve independence, Increase all balance 1/2 grade to decrease risk for falls and PT to see and establish goals for ambulation  when appropriate   PT Treatment Day 0   Plan   Treatment/Interventions Functional transfer training;Patient/family training; Endurance training; Therapeutic exercise; Bed mobility;Spoke to nursing;Spoke to MD;Continued evaluation   PT Frequency 3-5x/wk   Recommendation   PT Discharge Recommendation Post acute rehabilitation services   Equipment Recommended Walker; Wheelchair   AM-[facility] Basic Mobility Inpatient   Turning in Flat Bed Without Bedrails 4   Lying on Back to Sitting on Edge of Flat Bed Without Bedrails 4   Moving Bed to Chair 3   Standing Up From Chair Using Arms 3   Walk in Room 1   Climb 3-5 Stairs With Railing 1   Basic Mobility Inpatient Raw Score 16   Basic Mobility Standardized Score 38 32   Highest Level Of Mobility   JH-HLM Goal 5: Stand one or more mins   JH-HLM Achieved 4: Move to chair/commode   End of Consult   Patient Position at End of Consult All needs within reach; Bedside chair       Time In: 1234  Time Out: 1257  Total Evaluation Minutes: [address], [place]

## 2023-05-09 NOTE — PLAN OF CARE
Problem: PHYSICAL THERAPY ADULT  Goal: Performs mobility at highest level of function for planned discharge setting  See evaluation for individualized goals  Description: Treatment/Interventions: Functional transfer training, Patient/family training, Endurance training, Therapeutic exercise, Bed mobility, Spoke to nursing, Spoke to MD, Continued evaluation  Equipment Recommended: Angy Fox, Wheelchair       See flowsheet documentation for full assessment, interventions and recommendations  Note: Prognosis: Fair  Problem List: Decreased strength, Decreased endurance, Impaired balance, Decreased mobility, Decreased safety awareness, Pain, Decreased skin integrity  Assessment: Pt is 53 y o  female seen for high-complexity PT evaluation on 5/9/2023 s/p admit [facility] on 5/8/2023 w/ Osteomyelitis of left foot ([address] )  PT was consulted to assess pt's functional mobility and d/c needs  Pt POD#1 s/p left first ray resection; per podiatry, pt WBAT LLE with surgical shoe  Order placed for PT eval and tx, w/ up and OOB as tolerated order  PTA, pt reports completing pivot transfers to/from manual wc at [facility] and states about 2 weeks ago she received her prosthetic for her RLE and had started gait training with PT at rehab  At time of eval, patient greeted seated in recliner bedside and agreeable to PT session reporting 10/10 pain to L foot  Patient required CGA for pivot transfer recliner <> manual wc and STS to RW  Pt able to self propel manual wc in room [facility] with BUEs  Pt did have prosthetic available in room; however, upon skin inspection by PT, PT observed redness to distal and anterior residual limb at incision site  PT educated patient on completing frequent skin checks and notified referring physician  Donning of prosthetic deferred until further assessment of residual limb; educated patient on same   Upon evaluation, pt presenting with impaired functional mobility d/t decreased strength, decreased endurance, impaired balance, decreased mobility, decreased safety awareness, decreased skin integrity, pain and activity intolerance  Pertinent PMHx and current co-morbidities affecting pt's physical performance at time of assessment include: osteomyeltis of left foot, hx R BKA 3/20/2023, PTSD, bipolar disorder  Personal factors affecting pt at time of eval include: compliance, unable to perform physical activity, inability to live alone and inconsistent home enviornemnt  The following objective measures performed on IE also reveal limitations: AM-PAC 6-Clicks: 16/03  Pt's clinical presentation is currently unstable/unpredictable seen in pt's presentation of LLE  pain impacting overall mobility status, ongoing medical assessment and recent R BKA,   Overall, pt's rehab potential and prognosis to return to PLOF is fair as impacted by objective findings, warranting pt to receive further skilled PT interventions to address identified impairments, activity limitation(s), and participation restriction(s)  Goal for patient is to increase mobility  Pt to benefit from continued PT tx to address deficits as defined above and maximize level of functional independent mobility (and consistency in order for pt to increase safety and independnece  From PT/mobility standpoint, recommendation at time of d/c would be post acute rehabilitation services pending progress in order to facilitate return to PLOF )  Barriers to Discharge: Decreased caregiver support, Inaccessible home environment     PT Discharge Recommendation: Post acute rehabilitation services    See flowsheet documentation for full assessment     Flora Caraballo; PT, DPT

## 2023-05-09 NOTE — ASSESSMENT & PLAN NOTE
· Received from [address] [place] [address] patient hartman with MRI results suggesting osteomyelitis of the left first metatarsal  · S/p left first ray resection podiatry 5/8/2023  · Start oral doxycycline for 14 days per podiatry  · Wound care, surgical shoe  · Intraoperative cultures pending  · PT recommendations appreciated: Postacute rehab

## 2023-05-09 NOTE — PLAN OF CARE
Problem: MOBILITY - ADULT  Goal: Maintain or return to baseline ADL function  Description: INTERVENTIONS:  -  Assess patient's ability to carry out ADLs; assess patient's baseline for ADL function and identify physical deficits which impact ability to perform ADLs (bathing, care of mouth/teeth, toileting, grooming, dressing, etc )  - Assess/evaluate cause of self-care deficits   - Assess range of motion  - Assess patient's mobility; develop plan if impaired  - Assess patient's need for assistive devices and provide as appropriate  - Encourage maximum independence but intervene and supervise when necessary  - Involve family in performance of ADLs  - Assess for home care needs following discharge   - Consider OT consult to assist with ADL evaluation and planning for discharge  - Provide patient education as appropriate  Outcome: Progressing  Goal: Maintains/Returns to pre admission functional level  Description: INTERVENTIONS:  - Perform BMAT or MOVE assessment daily    - Set and communicate daily mobility goal to care team and patient/family/caregiver  - Collaborate with rehabilitation services on mobility goals if consulted  - Perform Range of Motion 2 times a day  - Reposition patient every 2 hours    - Dangle patient 2 times a day  - Stand patient 2 times a day  - Ambulate patient 2 times a day  - Out of bed to chair 3 times a day   - Out of bed for meals 3 times a day  - Out of bed for toileting  - Record patient progress and toleration of activity level   Outcome: Progressing     Problem: PAIN - ADULT  Goal: Verbalizes/displays adequate comfort level or baseline comfort level  Description: Interventions:  - Encourage patient to monitor pain and request assistance  - Assess pain using appropriate pain scale  - Administer analgesics based on type and severity of pain and evaluate response  - Implement non-pharmacological measures as appropriate and evaluate response  - Consider cultural and social influences on pain and pain management  - Notify physician/advanced practitioner if interventions unsuccessful or patient reports new pain  Outcome: Not Progressing     Problem: INFECTION - ADULT  Goal: Absence or prevention of progression during hospitalization  Description: INTERVENTIONS:  - Assess and monitor for signs and symptoms of infection  - Monitor lab/diagnostic results  - Monitor all insertion sites, i e  indwelling lines, tubes, and drains  - Monitor endotracheal if appropriate and nasal secretions for changes in amount and color  - Castor appropriate cooling/warming therapies per order  - Administer medications as ordered  - Instruct and encourage patient and family to use good hand hygiene technique  - Identify and instruct in appropriate isolation precautions for identified infection/condition  Outcome: Progressing  Goal: Absence of fever/infection during neutropenic period  Description: INTERVENTIONS:  - Monitor WBC    Outcome: Progressing     Problem: SAFETY ADULT  Goal: Maintain or return to baseline ADL function  Description: INTERVENTIONS:  -  Assess patient's ability to carry out ADLs; assess patient's baseline for ADL function and identify physical deficits which impact ability to perform ADLs (bathing, care of mouth/teeth, toileting, grooming, dressing, etc )  - Assess/evaluate cause of self-care deficits   - Assess range of motion  - Assess patient's mobility; develop plan if impaired  - Assess patient's need for assistive devices and provide as appropriate  - Encourage maximum independence but intervene and supervise when necessary  - Involve family in performance of ADLs  - Assess for home care needs following discharge   - Consider OT consult to assist with ADL evaluation and planning for discharge  - Provide patient education as appropriate  Outcome: Progressing  Goal: Maintains/Returns to pre admission functional level  Description: INTERVENTIONS:  - Perform BMAT or MOVE assessment daily     - Set and communicate daily mobility goal to care team and patient/family/caregiver  - Collaborate with rehabilitation services on mobility goals if consulted  - Perform Range of Motion 2 times a day  - Reposition patient every 2 hours    - Dangle patient 2 times a day  - Stand patient 2 times a day  - Ambulate patient 2 times a day  - Out of bed to chair 3 times a day   - Out of bed for meals 3 times a day  - Out of bed for toileting  - Record patient progress and toleration of activity level   Outcome: Progressing  Goal: Patient will remain free of falls  Description: INTERVENTIONS:  - Educate patient/family on patient safety including physical limitations  - Instruct patient to call for assistance with activity   - Consult OT/PT to assist with strengthening/mobility   - Keep Call bell within reach  - Keep bed low and locked with side rails adjusted as appropriate  - Keep care items and personal belongings within reach  - Initiate and maintain comfort rounds  - Make Fall Risk Sign visible to staff  - Offer Toileting every 2 Hours, in advance of need  - Initiate/Maintain bed alarm  - Obtain necessary fall risk management equipment  - Apply yellow socks and bracelet for high fall risk patients  - Consider moving patient to room near nurses station  Outcome: Progressing     Problem: DISCHARGE PLANNING  Goal: Discharge to home or other facility with appropriate resources  Description: INTERVENTIONS:  - Identify barriers to discharge w/patient and caregiver  - Arrange for needed discharge resources and transportation as appropriate  - Identify discharge learning needs (meds, wound care, etc )  - Refer to Case Management Department for coordinating discharge planning if the patient needs post-hospital services based on physician/advanced practitioner order or complex needs related to functional status, cognitive ability, or social support system  Outcome: Progressing     Problem: SKIN/TISSUE INTEGRITY - ADULT  Goal: Incision(s), wounds(s) or drain site(s) healing without S/S of infection  Description: INTERVENTIONS  - Assess and document dressing, incision, wound bed, drain sites and surrounding tissue  - Provide patient and family education  - Perform skin care/dressing changes every shift  Outcome: Progressing  Goal: Pressure injury heals and does not worsen  Description: Interventions:  - Implement low air loss mattress or specialty surface (Criteria met)  - Apply silicone foam dressing  - Instruct/assist with weight shifting every 60 minutes when in chair   - Limit chair time to 3 hour intervals  - Use special pressure reducing interventions such as waffle cushion when in chair   - Apply fecal or urinary incontinence containment device   - Perform passive or active ROM every 4 hrs  - Turn and reposition patient & offload bony prominences every 2 hours   - Utilize friction reducing device or surface for transfers   - Consider consults to  interdisciplinary teams such as wound and podiatry   - Use incontinent care products after each incontinent episode such as foam cleanser  - Consider nutrition services referral as needed  Outcome: Progressing     Problem: Prexisting or High Potential for Compromised Skin Integrity  Goal: Skin integrity is maintained or improved  Description: INTERVENTIONS:  - Identify patients at risk for skin breakdown  - Assess and monitor skin integrity  - Assess and monitor nutrition and hydration status  - Monitor labs   - Assess for incontinence   - Turn and reposition patient  - Assist with mobility/ambulation  - Relieve pressure over bony prominences  - Avoid friction and shearing  - Provide appropriate hygiene as needed including keeping skin clean and dry  - Evaluate need for skin moisturizer/barrier cream  - Collaborate with interdisciplinary team   - Patient/family teaching  - Consider wound care consult   Outcome: Progressing     Problem: Potential for Falls  Goal: Patient will remain free of falls  Description: INTERVENTIONS:  - Educate patient/family on patient safety including physical limitations  - Instruct patient to call for assistance with activity   - Consult OT/PT to assist with strengthening/mobility   - Keep Call bell within reach  - Keep bed low and locked with side rails adjusted as appropriate  - Keep care items and personal belongings within reach  - Initiate and maintain comfort rounds  - Make Fall Risk Sign visible to staff  - Offer Toileting every 2 Hours, in advance of need  - Initiate/Maintain bed alarm  - Obtain necessary fall risk management equipment  - Apply yellow socks and bracelet for high fall risk patients  - Consider moving patient to room near nurses station  Outcome: Progressing

## 2023-05-09 NOTE — PLAN OF CARE
Problem: PAIN - ADULT  Goal: Verbalizes/displays adequate comfort level or baseline comfort level  Description: Interventions:  - Encourage patient to monitor pain and request assistance  - Assess pain using appropriate pain scale  - Administer analgesics based on type and severity of pain and evaluate response  - Implement non-pharmacological measures as appropriate and evaluate response  - Consider cultural and social influences on pain and pain management  - Notify physician/advanced practitioner if interventions unsuccessful or patient reports new pain  Outcome: Progressing     Problem: INFECTION - ADULT  Goal: Absence or prevention of progression during hospitalization  Description: INTERVENTIONS:  - Assess and monitor for signs and symptoms of infection  - Monitor lab/diagnostic results  - Monitor all insertion sites, i e  indwelling lines, tubes, and drains  - Monitor endotracheal if appropriate and nasal secretions for changes in amount and color  - [place] appropriate cooling/warming therapies per order  - Administer medications as ordered  - Instruct and encourage patient and family to use good hand hygiene technique  - Identify and instruct in appropriate isolation precautions for identified infection/condition  Outcome: Progressing     Problem: SAFETY ADULT  Goal: Maintains/Returns to pre admission functional level  Description: INTERVENTIONS:  - Perform BMAT or MOVE assessment daily    - Set and communicate daily mobility goal to care team and patient/family/caregiver  - Collaborate with rehabilitation services on mobility goals if consulted  - Perform Range of Motion 2 times a day  - Reposition patient every 2 hours    - Dangle patient 2 times a day  - Stand patient 2 times a day  - Ambulate patient 2 times a day  - Out of bed to chair 3 times a day   - Out of bed for meals 3 times a day  - Out of bed for toileting  - Record patient progress and toleration of activity level   Outcome: Progressing Problem: Knowledge Deficit  Goal: Patient/family/caregiver demonstrates understanding of disease process, treatment plan, medications, and discharge instructions  Description: Complete learning assessment and assess knowledge base    Interventions:  - Provide teaching at level of understanding  - Provide teaching via preferred learning methods  Outcome: Progressing     Problem: DISCHARGE PLANNING  Goal: Discharge to home or other facility with appropriate resources  Description: INTERVENTIONS:  - Identify barriers to discharge w/patient and caregiver  - Arrange for needed discharge resources and transportation as appropriate  - Identify discharge learning needs (meds, wound care, etc )  - Refer to Case Management Department for coordinating discharge planning if the patient needs post-hospital services based on physician/advanced practitioner order or complex needs related to functional status, cognitive ability, or social support system  Outcome: Progressing

## 2023-05-09 NOTE — CONSULTS
TeleConsultation - [address] [facility] 53 y o  female MRN: [id number]  Unit/Bed#: -01 Encounter: [id number]        REQUIRED DOCUMENTATION:     1  This service was provided via Telemedicine  2  Provider located at [place]  3  TeleMed provider: James Polo MD   4  Identify all parties in room with patient during tele consult:  Nurse  5  Patient was then informed that this was a Telemedicine visit and that the exam was being conducted confidentially over secure lines  My office door was closed  No one else was in the room  Patient acknowledged consent and understanding of privacy and security of the Telemedicine visit, and gave us permission to have the assistant stay in the room in order to assist with the history and to conduct the exam   I informed the patient that I have reviewed their record in Epic and presented the opportunity for them to ask any questions regarding the visit today  The patient agreed to participate  Assessment/Plan     Principal Problem:    Osteomyelitis of left foot (HCC)  Active Problems:    Hypothyroidism    Psychiatric illness    Essential hypertension    Chronic pain    GERD (gastroesophageal reflux disease)    Hx of right BKA (HCC)    Assessment:    Biopolar Disorder, type I    Treatment Plan:    Recommend to do EKG - as patient is on high dose of Geodon   (high chance of QTC prolongation with some other meds like opioids pain meds and antiobiotics)    Continue with current psychotropic medications -these were recently adjusted in the inpatient psych unit      Reconsult if needed    Planned Medication Changes:    None    Current Medications:     Current Facility-Administered Medications   Medication Dose Route Frequency Provider Last Rate   • acetaminophen  650 mg Oral Q6H PRN JOSEE Rosado     • cloNIDine  0 2 mg Oral BID JOSEE Rosado     • doxycycline hyclate  100 mg Oral Q12H [facility] JOSEE Rosado     • fenofibrate  48 mg Oral Daily JOSEE Ross     • folic acid  5,136 mcg Oral Daily JOSEE Ross     • heparin (porcine)  5,000 Units Subcutaneous [facility] JOSEE Ross     • hydrOXYzine HCL  100 mg Oral HS JOSEE Ross     • hydrOXYzine HCL  50 mg Oral Daily JOSEE Ross     • insulin lispro  1-6 Units Subcutaneous TID AC JOSEE Ross     • insulin lispro  1-6 Units Subcutaneous HS JOSEE Ross     • lactated ringers  75 mL/hr Intravenous Continuous Barbara Tornado, CRNA     • lamoTRIgine  300 mg Oral QAM JOSEE Ross     • levothyroxine  50 mcg Oral Early Morning JOSEE Ross     • ondansetron  4 mg Intravenous Q6H PRN Donal Garcia PA-C     • oxyCODONE  10 mg Oral Q6H PRN JOSEE Ross     • pantoprazole  40 mg Oral BID AC JOSEE Ross     • pregabalin  150 mg Oral BID JOSEE Ross     • ziprasidone  80 mg Oral BID With Meals JOSEE Ross         Risks / Benefits of Treatment:    Patient does not verbalize understanding at this time and will require further explanation  Other treatment modalities ordered as indicated:    · Psychotherapy      Consults  Physician Requesting Consult: Colleen Whitlock, *  Principal Problem:Osteomyelitis of left foot [facility])    Reason for Consult: h/o Bipolar disorder -- Depression      History of Present Illness      As per H&P:  Angella Mondragon is a 53 y o  female with a PMH of diabetes, right BKA, PTSD, OCD, homelessness, hypothyroidism, bipolar disease, anxiety who presents with imaging suggesting left foot osteomyelitis  She was transferred from the inpatient psychiatric unit, where she was receiving treatment due to worsening depression    Prior to that she was in a rehab after her right BKA, where she was discharged to a shelter because she did not want to go to a long-term care unit (please see discharge summary from behavioral health )  She is admitted to medicine with plan for podiatry to take her to the OR possibly this afternoon  She is being kept n p o  She denies fever, cough or shortness of breath, chest pain, abdominal pain, dysuria, lightheadedness, anxiety or suicidal ideation  Patient is a 53 y o  female with a history of Bipolar Disorder, type I who was admitted to the medical service on 5/8/2023 due to possible osteomyelitis of left foot  Patient was recently discharged from [place] [address] , she was admitted there for 4 days and discharge on 5/8/2023  Patient reported that she was admitted there for increased depression with suicidal ideation  She continued to report having increased depressive symptoms, with depressed mood, feeling of hopelessness and passive suicidal ideations  She reported that she has passive suicidal thoughts for a long time  She has a history of suicidal attempt in the past the last time was about 2 years ago  She denied any psychotic or manic symptoms  She reported that she was not able to sleep last night and right now feeling more tired and sleepy  No recent substance abuse problem was reported         Psychiatric Review Of Systems:     Sleep changes: yes  appetite changes: no  weight changes: no  anxiety/panic: no  lj: no  guilty/hopeless: yes  self injurious behavior/risky behavior: no    Historical Information     Past Psychiatric History:     Psychiatric Hospitalizations: Multiple past inpatient psychiatric admissions Outpatient Treatment History: current treatment with psychiatrist/Advanced Practitioner  Suicide Attempts:  Yes, multiple attempt reported History of self-harm: No History of self injurious behavior was reported Violence History: No History of physically aggressive  behavior was reported    Substance Abuse History:    E-Cigarette/Vaping   • E-Cigarette Use Former User           Social History     Tobacco History     Smoking Status  Every Day Smoking Frequency  1 pack/day for 15 00 years (15 00 pk-yrs) Smoking Tobacco Type  Cigarettes    Passive Exposure  Current    Smokeless Tobacco Use  Never          Alcohol History     Alcohol Use Status  Never          Drug Use     Drug Use Status  Yes Types  Marijuana          Sexual Activity     Sexually Active  Not Currently          Activities of Daily Living    Not Asked               Additional Substance Use Detail     Questions Responses    Problems Due to Past Use of Alcohol? No    Problems Due to Past Use of Substances?  No    Substance Use Assessment Denies substance use within the past 12 months    Alcohol Use Frequency Denies use in past 12 months    Cannabis frequency Never used    Comment:  Never used on 5/4/2023     Heroin Frequency Denies use in past 12 months    Cocaine frequency Never used    Comment:  Never used on 5/4/2023     Crack Cocaine Frequency Denies use in past 12 months    Methamphetamine Frequency Denies use in past 12 months    Narcotic Frequency Denies use in past 12 months    Benzodiazepine Frequency Denies use in past 12 months    Amphetamine frequency Denies use in past 12 months    Barbituate Frequency Denies use use in past 12 months    Inhalant frequency Never used    Comment:  Never used on 5/4/2023     Hallucinogen frequency Never used    Comment:  Never used on 5/4/2023     Ecstasy frequency Never used    Comment:  Never used on 5/4/2023     Other drug frequency Never used    Comment:  Never used on 5/4/2023     Opiate frequency Denies use in past 12 months    Last reviewed by Cecilia Trevion RN on 5/8/2023            History of illicit drug abuse:   Use of Alcohol:   History of IP/OP rehabilitation program:     Family Psychiatric History:     Psychiatric Illness:  unknown      Social History:    Social History       Social History     Socioeconomic History   • Marital status: Single     Spouse name: Not on file   • Number of children: Not on file   • Years of education: Not on file   • Highest education level: Not on file Occupational History   • Not on file   Tobacco Use   • Smoking status: Every Day     Packs/day: 1 00     Years: 15 00     Pack years: 15 00     Types: Cigarettes     Passive exposure: Current   • Smokeless tobacco: Never   Vaping Use   • Vaping Use: Former   Substance and Sexual Activity   • Alcohol use: Never   • Drug use: Yes     Types: Marijuana   • Sexual activity: Not Currently   Other Topics Concern   • Not on file   Social History Narrative   • Not on file     Social Determinants of Health     Financial Resource Strain: Not on file   Food Insecurity: Food Insecurity Present   • Worried About [address] in the Last Year: Sometimes true   • Ran Out of Food in the Last Year: Sometimes true   Transportation Needs: No Transportation Needs   • Lack of Transportation (Medical): No   • Lack of Transportation (Non-Medical):  No   Physical Activity: Not on file   Stress: Not on file   Social Connections: Not on file   Intimate Partner Violence: Not on file   Housing Stability: High Risk   • Unable to Pay for Housing in the Last Year: Yes   • Number of Places Lived in the Last Year: 2   • Unstable Housing in the Last Year: Yes         Children: 1 son  Living arrangement, social support: homeless         Traumatic History:     Abuse: Unknown        Past Medical History:    Past Medical History:   Diagnosis Date   • Anxiety    • Arthritis    • Asthma    • Chronic pain    • Diabetes mellitus ([address] )    • Hypothyroidism    • Lymphedema     left leg   • Manic bipolar I disorder ([address] )    • Morbid obesity (HCC)    • OCD (obsessive compulsive disorder)    • PTSD (post-traumatic stress disorder)           Meds/Allergies     Allergies   Allergen Reactions   • Latex Hives and Shortness Of Breath   • Vancomycin Rash and Itching     Other reaction(s): Hives / Urticaria   • Aspirin Vomiting and Rash     Other reaction(s): Nausea and Vomiting   • Barium Iodide Itching and Rash     Face became very red and itchy    • Codeine Vomiting and Rash     Other reaction(s): Nausea and Vomiting   • Penicillins Rash and Other (See Comments)     Patient has tolerated cefepime without difficulty     all current active meds have been reviewed    Medical Review Of Systems:    Review of Systems      Objective     Vital signs in last 24 hours:  Temp:  [97 9 °F (36 6 °C)-98 4 °F (36 9 °C)] 98 4 °F (36 9 °C)  HR:  [61-91] 72  Resp:  [14-26] 19  BP: (102-157)/(57-81) 117/70      Intake/Output Summary (Last 24 hours) at 5/9/2023 1021  Last data filed at 5/9/2023 0700  Gross per 24 hour   Intake 990 ml   Output 1050 ml   Net -60 ml       Mental Status Evaluation[de-identified]    Appearance disheveled   Behavior cooperative, calm   Speech normal rate, normal volume, normal pitch   Mood depressed   Affect constricted   Thought Processes organized, goal directed   Associations intact associations   Thought Content no overt delusions   Perceptual Disturbances: no auditory hallucinations, no visual hallucinations   Abnormal Thoughts  Risk Potential Suicidal ideation - None  Homicidal ideation - None  Potential for aggression - No   Orientation oriented to person, place and time/date   Memory recent and remote memory grossly intact   Consciousness sedated   Attention Span Concentration Span attention span and concentration are age appropriate   Intellect appears to be of average intelligence   Insight intact   Judgement intact   Muscle Strength and  Gait No assessed   Motor Activity no abnormal movements   Language no difficulty naming common objects, no difficulty repeating a phrase, no difficulty writing a sentence   Fund of Knowledge adequate knowledge of current events  adequate fund of knowledge regarding past history  adequate fund of knowledge regarding vocabulary                Lab Results: I have personally reviewed all pertinent laboratory/tests results       Most Recent Labs:   Lab Results   Component Value Date    WBC 7 54 05/09/2023    RBC 4 46 05/09/2023    HGB 9 6 (L) 05/09/2023    HCT 33 2 (L) 05/09/2023     (H) 05/09/2023    RDW 21 2 (H) 05/09/2023    NEUTROABS 2 89 05/09/2023     09/09/2015    SODIUM 136 05/09/2023    K 4 3 05/09/2023     05/09/2023    CO2 24 05/09/2023    BUN 18 05/09/2023    CREATININE 0 90 05/09/2023    GLUCOSE 99 09/09/2015    CALCIUM 9 4 05/09/2023    AST 16 05/03/2023    ALT 15 05/03/2023    ALKPHOS 86 05/03/2023    PROT 7 2 09/09/2015    TP 7 0 05/03/2023    BILITOT 0 20 09/09/2015    TBILI 0 21 05/03/2023    CHOLESTEROL 179 05/05/2023    TRIG 178 (H) 05/05/2023    HDL 30 (L) 05/05/2023    LDLCALC 113 (H) 05/05/2023    XFB7CQEUFCYK 2 656 05/05/2023    FREET4 1 10 06/29/2019    PREGSERUM Negative 01/12/2021    HCGQUANT <2 06/28/2019       Imaging Studies: XR foot 3+ views LEFT    Result Date: 5/4/2023  Narrative: LEFT FOOT INDICATION:   history of diabetic ulcers, erythema around previous incision  COMPARISON: 3/23/2023 VIEWS:  XR FOOT 3+ VW LEFT Images: 3 FINDINGS: Status post transmetatarsal amputation of the first and third digits  Calcaneal spur(s) noted  No periosteal reaction or cortical destruction to suggest osteomyelitis  Soft tissue swelling along the dorsum of the forefoot  Impression: No radiographic evidence of osteomyelitis  Workstation performed: [id number]     MRI foot/forefoot toes left wo contrast    Result Date: 5/6/2023  Narrative: MRI LEFT FOOT INDICATION:   Left forefoot, r/o OM left 1st met  COMPARISON: Prior MR 3/22/2023 and plain film 5/3/2023  TECHNIQUE:  Multiplanar/multisequence MR of the left foot was performed   FINDINGS: Image quality degraded by patient motion SUBCUTANEOUS TISSUES: Plantar and medial ulceration noted at the residual first metatarsal  There is increased T2 signal distally with some T1 marrow replacement at the distal tip of the residual first metatarsal is concerning for osteomyelitis in this motion degraded exam  T2 signal changes measure 1 8 cm from the distal cortex with T1 replacement measuring 0 7 mm from the distal cortex  BONES: The patient is status post first and third transmetatarsal amputation FIRST MTP JOINT:  Intact  SESAMOID BONES:  Intact  OTHER ARTICULAR SURFACE:  Normal  PLANTAR FASCIA:  Intact  LISFRANC LIGAMENT:  Intact  FOREFOOT TENDONS: Intact  INTERMETATARSAL REGIONS: No bursitis or Emery's neuroma  MUSCULATURE: Fatty atrophic consistent with diabetic history        Impression: 1  Markedly limited study due to motion degradation  2  Status post first transmetatarsal amputation with adjacent plantar/medial skin ulceration and some marrow change at the residual distal aspect of the first metatarsal concerning for osteomyelitis  3  Status post third transmetatarsal amputation without suspected osteomyelitis in this region  The study was marked in [facility] for immediate notification  Workstation performed: [id number]     EKG/Pathology/Other Studies:   Lab Results   Component Value Date    VENTRATE 52 02/17/2022    ATRIALRATE 52 02/17/2022    PRINT 152 02/17/2022    QRSDINT 90 02/17/2022    QTINT 458 02/17/2022    QTCINT 425 02/17/2022    PAXIS 180 02/17/2022    QRSAXIS 33 02/17/2022    TWAVEAXIS 121 02/17/2022        Code Status: Level 1 - Full Code  Advance Directive and Living Will:      Power of :    POLST:      Screenings:    1  Nutrition Screening  Nutrition Assessment (completed by Staff):      2  Pain Screening  Pain Screening: Pain Assessment  Pain Assessment Tool: 0-10  Pain Score: 10  Pain Location/Orientation: Orientation: Left, Location: Foot  Pain Onset/Description: Onset: Ongoing  Patient's Stated Pain Goal: No pain  Hospital Pain Intervention(s): Declines    3  Suicide Screening  ED Crisis Suicide Risk Assessment:    Not available    Counseling / Coordination of Care: Total floor / unit time spent today 45 minutes  Greater than 50% of total time was spent with the patient and / or family counseling and / or coordination of care   A description of the counseling / coordination of care: Direct Patient Care, Chart Review, and Documentation

## 2023-05-09 NOTE — CASE MANAGEMENT
Case Management Assessment & Discharge Planning Note    Patient name Jose Elias Jones  Location /-21 MRN [id number]  : 1975 Date 2023       Current Admission Date: 2023  Current Admission Diagnosis:Osteomyelitis of left foot [facility])   Patient Active Problem List    Diagnosis Date Noted   • Bipolar 1 disorder, depressed ([facility] ) 2023   • Hx of right BKA ([facility] ) 2023   • Homeless single person 2023   • Sepsis ([facility] ) 2023   • Morbid obesity with BMI of 40 0-44 9, adult ([facility]  ) 2023   • Obstructive sleep apnea 2022   • GERD (gastroesophageal reflux disease) 2021   • Migraine without status migrainosus, not intractable 2021   • Right foot ulcer, with fat layer exposed ([facility]  ) 2021   • Arthritis 2021   • Chronic pain 2021   • OCD (obsessive compulsive disorder)    • PTSD (post-traumatic stress disorder)    • Essential hypertension 2021   • Acquired absence of other left toe(s) ([facility] ) 2019   • Diabetic foot ulcer ([facility] ) 09/10/2019   • Type 2 diabetes mellitus with neurologic complication, without long-term current use of insulin ([facility] ) 2019   • Osteomyelitis of left foot ([facility] ) 2019   • Hypothyroidism 2019   • Psychiatric illness 2019   • 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, with bone involvement without evidence of necrosis ([facility] ) 2019   • H/O bariatric surgery 2019   • Anxiety 2019      LOS (days): 1  Geometric Mean LOS (GMLOS) (days): 5 60  Days to GMLOS:4 4     OBJECTIVE:    Risk of Unplanned Readmission Score: 19 09         Current admission status: Inpatient  Referral Reason: Other (d/c planning)    Preferred Pharmacy:   [address] [place] [address] [address] [place] [address] [phone]  Phone: [phone] Fax: [phone]    Primary Care Provider: Ophelia Cui MD    Primary Insurance: [id number]  Secondary Insurance:     ASSESSMENT:  [address], [address] [facility] Representative - Son   Primary Phone: [phone] (Mobile)               Advance Directives  Does patient have a [address]?: No  Was patient offered paperwork?: Yes (declined paperwork)  Does patient have Advance Directives?: No  Was patient offered paperwork?: Yes (declined paperwork)         Readmission Root Cause  30 Day Readmission: No    Patient Information  Admitted from[de-identified] Facility ([facility])  Mental Status: Alert  During Assessment patient was accompanied by: Not accompanied during assessment  Assessment information provided by[de-identified] Patient  Primary Caregiver: Self  Support Systems: Son  What city do you live in?: pt is homeless   she was at [facility] for rehab  In the last 12 months, was there a time when you were not able to pay the mortgage or rent on time?: Yes  In the last 12 months, how many places have you lived?: 2  In the last 12 months, was there a time when you did not have a steady place to sleep or slept in a shelter (including now)?: Yes  Homeless/housing insecurity resource given?: Yes  Living Arrangements: Other (Comment) (homeless admitted from [facility])  Is patient a ?: No    Activities of Daily Living Prior to Admission  Functional Status: Independent  Completes ADLs independently?: Yes  Ambulates independently?: No  Level of ambulatory dependence: Assistance  Does patient use assisted devices?: Yes  Assisted Devices (DME) used:  Wheelchair, Other (Comment) (prosthesis)  Does patient currently own DME?: Yes  What DME does the patient currently own?: Wheelchair, Other (Comment) (prosthesis)  Does patient have a history of Outpatient Therapy (PT/OT)?: No  Does the patient have a history of Short-Term Rehab?: Yes ([facility])  Does patient have a history of HHC?: Yes ([facility])  Does patient currently have [address]?: No         Patient Information Continued  Income Source: SSI/SSD (disabled)  Within the past 12 months, you worried that your food would run out before you got the money to buy more : Sometimes true  Within the past 12 months, the food you bought just didn't last and you didn't have money to get more : Sometimes true  Food insecurity resource given?: Yes (pt is more worried about lossing her food stamps)  Does patient receive dialysis treatments?: No  Does patient have a history of substance abuse?: No  Does patient have a history of Mental Health Diagnosis?: Yes (bipolar, ptsd, anxiety,)  Is patient receiving treatment for mental health?: Yes (pt on medication)  Has patient received inpatient treatment related to mental health in the last 2 years?: Yes (5/4-5/8/2023  Earnestine Joe)         Means of [employer] of Transport to [facility][de-identified] Other (Comment)  In the past 12 months, has lack of transportation kept you from medical appointments or from getting medications?: Yes  In the past 12 months, has lack of transportation kept you from meetings, work, or from getting things needed for daily living?: Yes  Was application for public transport provided?: No (pt is not sure we she is going to live or possibly go for rehab)        DISCHARGE DETAILS:    Discharge planning discussed with[de-identified] patient and son was called at 10:16 am LM & he called cm back at 13:36pm  Freedom of Choice: Yes  Comments - Freedom of Choice: pt/ot angeli lira  CM contacted family/caregiver?: Yes             Contacts  Patient Contacts: Vidhicordell Jeremi Peace/Son  Relationship to Patient[de-identified] Family (son -)  Contact Method: Phone  Phone Number: [phone]  Reason/Outcome: Discharge [address]         Is the patient interested in [facility] at discharge?: No    DME Referral Provided  Referral made for DME?: No    Other Referral/Resources/Interventions Provided:  Referral Comments: pt needs pt/ot fredal-  cm did check to see if there was an open bed at [facility] that accepts ssi and they do not have any open beds- pt stated she would not go even if I could get her a bed    Would you like to participate in our [address] service program?  : No - Declined    Treatment Team Recommendation:  (d/c plan tbd-tbd)                                            Additional Comments: pt stated she is planning to live wiht her son and he is getting an apartment- cm spoke with her son and he is homeless- he is saving for an appartment and now he is just staying with a friend- I made him aware she left [facility] because she did not want toloseher medical assistance & food stamps- she stated it is hard to get back- she wants to be independent- she has paperworkfor low rent housing but it has not been completed-  pt was evaluated by therapy - recommendation is for rehab- I explained that if she does go to rehab her medical insurance would need to give the authrorization and her stay would be covered by her medical insurance- she stated she will think about it and she is making some calls- she is aware that she may need to go to a shelter if she refuses rehab- she will give cm her decision tomorrow

## 2023-05-09 NOTE — PROGRESS NOTES
[facility]  Progress Note  Name: Izzy Stephenson I  MRN: [id number]  Unit/Bed#: -01 I Date of Admission: 5/8/2023   Date of Service: 5/9/2023 I Hospital Day: 1    Assessment/Plan      * Osteomyelitis of left foot [facility])  Assessment & Plan  · Received from [address] [place] [address] patient hartman with MRI results suggesting osteomyelitis of the left first metatarsal  · S/p left first ray resection podiatry 5/8/2023  · Start oral doxycycline for 14 days per podiatry  · Wound care, surgical shoe  · Intraoperative cultures pending  · PT recommendations appreciated: Postacute rehab      Hx of right BKA ([address] )  Assessment & Plan  · History of right BKA 3/20/2023  · Serial assessments and supportive care  · Fall precautions    GERD (gastroesophageal reflux disease)  Assessment & Plan  · Continue PPI    Chronic pain  Assessment & Plan  · Previously on OxyContin for chronic back pain, transitioned to oxycodone 10 mg every 6 hours while in inpatient psychiatric unit  · Monitor for adequate pain control and or signs of oversedation or withdrawal    Psychiatric illness  Assessment & Plan  · History of bipolar disorder, OCD, PTSD  · Admitted medically from [place] [address] psychiatric unit  Per discharge summary, psychiatrically cleared for medical admission  · Also, currently homeless  · Continue medications including Lamictal, hydroxyzine, Geodon  · Supportive care  · Denies current suicidal ideation  · Psychiatry reevaluation completed  Continue current plan of care      Hypothyroidism  Assessment & Plan  · Continue levothyroxine         VTE Pharmacologic Prophylaxis: VTE Score: 3 Moderate Risk (Score 3-4) - Pharmacological DVT Prophylaxis Ordered: heparin  Patient Centered Rounds: I performed bedside rounds with nursing staff today  Discussions with Specialists or Other Care Team Provider: CM    Education and Discussions with Family / Patient: Patient declined call to   Total Time Spent on Date of Encounter in care of patient: 55 minutes This time was spent on one or more of the following: performing physical exam; counseling and coordination of care; obtaining or reviewing history; documenting in the medical record; reviewing/ordering tests, medications or procedures; communicating with other healthcare professionals and discussing with patient's family/caregivers  Current Length of Stay: 1 day(s)  Current Patient Status: Inpatient   Certification Statement: The patient will continue to require additional inpatient hospital stay due to care coordination  Discharge Plan: Anticipate discharge tomorrow to [facility]  Code Status: Level 1 - Full Code    Subjective:   Patient seen and examined  Her only complaint is left foot pain  Objective:     Vitals:   Temp (24hrs), Av 1 °F (36 7 °C), Min:97 7 °F (36 5 °C), Max:98 4 °F (36 9 °C)    Temp:  [97 7 °F (36 5 °C)-98 4 °F (36 9 °C)] 98 4 °F (36 9 °C)  HR:  [61-91] 84  Resp:  [14-26] 16  BP: (102-152)/() 152/72  SpO2:  [92 %-98 %] 94 %  Body mass index is 37 79 kg/m²  Input and Output Summary (last 24 hours): Intake/Output Summary (Last 24 hours) at 2023 1606  Last data filed at 2023 1507  Gross per 24 hour   Intake 1220 ml   Output 650 ml   Net 570 ml       Physical Exam:     Physical Exam  Vitals and nursing note reviewed  Constitutional:       General: She is not in acute distress  HENT:      Head: Normocephalic and atraumatic  Nose: Nose normal       Mouth/Throat:      Mouth: Mucous membranes are moist       Pharynx: Oropharynx is clear  Eyes:      Pupils: Pupils are equal, round, and reactive to light  Cardiovascular:      Rate and Rhythm: Normal rate and regular rhythm  Pulses: Normal pulses  Pulmonary:      Effort: Pulmonary effort is normal  No respiratory distress  Breath sounds: Normal breath sounds     Abdominal:      General: Bowel sounds are normal       Palpations: Abdomen is soft  Tenderness: There is no abdominal tenderness  Musculoskeletal:      Cervical back: Neck supple  Comments: Right BKA dressing in place  Left foot dressing in place clean dry and intact  Skin:     General: Skin is warm and dry  Capillary Refill: Capillary refill takes less than 2 seconds  Comments: Left foot dressing clean dry and intact  Neurological:      General: No focal deficit present  Mental Status: She is alert  Additional Data:     Labs:  Results from last 7 days   Lab Units 05/09/23  0424   WBC Thousand/uL 7 54   HEMOGLOBIN g/dL 9 6*   HEMATOCRIT % 33 2*   PLATELETS Thousands/uL 425*   NEUTROS PCT % 38*   LYMPHS PCT % 46*   MONOS PCT % 11   EOS PCT % 5     Results from last 7 days   Lab Units 05/09/23  0424 05/08/23  1502 05/03/23  2048   SODIUM mmol/L 136   < > 136   POTASSIUM mmol/L 4 3   < > 4 0   CHLORIDE mmol/L 103   < > 102   CO2 mmol/L 24   < > 27   BUN mg/dL 18   < > 13   CREATININE mg/dL 0 90   < > 0 84   ANION GAP mmol/L 9   < > 7   CALCIUM mg/dL 9 4   < > 9 4   ALBUMIN g/dL  --   --  3 8   TOTAL BILIRUBIN mg/dL  --   --  0 21   ALK PHOS U/L  --   --  86   ALT U/L  --   --  15   AST U/L  --   --  16   GLUCOSE RANDOM mg/dL 159*   < > 132    < > = values in this interval not displayed           Results from last 7 days   Lab Units 05/09/23  1125 05/09/23  0644 05/08/23  2059 05/08/23  1741 05/08/23  1411 05/08/23  0737 05/07/23  1604 05/07/23  0745 05/06/23  1556 05/06/23  0754 05/05/23  1639 05/05/23  0743   POC GLUCOSE mg/dl 157* 129 197* 98 117 96 129 114 122 116 150* 144*     Results from last 7 days   Lab Units 05/05/23  0539   HEMOGLOBIN A1C % 7 2*           Lines/Drains:  Invasive Devices     Peripheral Intravenous Line  Duration           Peripheral IV 05/08/23 Right;Ventral (anterior) Forearm 1 day              Recent Cultures (last 7 days):   Results from last 7 days   Lab Units 05/08/23  1601   GRAM STAIN RESULT  No Polys or Bacteria seen       Last 24 Hours Medication List:   Current Facility-Administered Medications   Medication Dose Route Frequency Provider Last Rate   • acetaminophen  650 mg Oral Q6H PRN JOSEE Roman     • cloNIDine  0 2 mg Oral BID JOSEE Roman     • doxycycline hyclate  100 mg Oral Q12H [address] Nubia Joi, BETINP     • fenofibrate  48 mg Oral Daily Nubia Joi, BETINP     • folic acid  7,584 mcg Oral Daily BETI RomanNP     • heparin (porcine)  5,000 Units Subcutaneous [facility] Nubiakraig Tamez, BETINP     • hydrOXYzine HCL  100 mg Oral HS BETI RomanNP     • hydrOXYzine HCL  50 mg Oral Daily JOSEE Roman     • insulin lispro  1-6 Units Subcutaneous TID AC JOSEE Roman     • insulin lispro  1-6 Units Subcutaneous HS BETI RomanNP     • lamoTRIgine  300 mg Oral QAM BETI RomanNP     • levothyroxine  50 mcg Oral Early Morning JOSEE Roman     • ondansetron  4 mg Intravenous Q6H PRN Douglass Habermann, PA-C     • oxyCODONE  10 mg Oral Q6H PRN JOSEE Roman     • pantoprazole  40 mg Oral BID AC JOSEE Roman     • pregabalin  150 mg Oral BID JOSEE Roman     • ziprasidone  80 mg Oral BID With Meals JOSEE Roman          Today, Patient Was Seen By: JOSEE Roman    **Please Note: This note may have been constructed using a voice recognition system  **

## 2023-05-10 LAB
ANION GAP SERPL CALCULATED.3IONS-SCNC: 8 MMOL/L (ref 4–13)
BACTERIA SPEC ANAEROBE CULT: NORMAL
BASOPHILS # BLD AUTO: 0.03 THOUSANDS/ÂΜL (ref 0–0.1)
BASOPHILS NFR BLD AUTO: 0 % (ref 0–1)
BUN SERPL-MCNC: 19 MG/DL (ref 5–25)
CALCIUM SERPL-MCNC: 9.7 MG/DL (ref 8.4–10.2)
CHLORIDE SERPL-SCNC: 103 MMOL/L (ref 96–108)
CO2 SERPL-SCNC: 26 MMOL/L (ref 21–32)
CREAT SERPL-MCNC: 0.87 MG/DL (ref 0.6–1.3)
EOSINOPHIL # BLD AUTO: 0.45 THOUSAND/ÂΜL (ref 0–0.61)
EOSINOPHIL NFR BLD AUTO: 7 % (ref 0–6)
ERYTHROCYTE [DISTWIDTH] IN BLOOD BY AUTOMATED COUNT: 21.4 % (ref 11.6–15.1)
GFR SERPL CREATININE-BSD FRML MDRD: 79 ML/MIN/1.73SQ M
GLUCOSE SERPL-MCNC: 140 MG/DL (ref 65–140)
GLUCOSE SERPL-MCNC: 148 MG/DL (ref 65–140)
GLUCOSE SERPL-MCNC: 184 MG/DL (ref 65–140)
GLUCOSE SERPL-MCNC: 207 MG/DL (ref 65–140)
GLUCOSE SERPL-MCNC: 266 MG/DL (ref 65–140)
HCT VFR BLD AUTO: 34.4 % (ref 34.8–46.1)
HGB BLD-MCNC: 10 G/DL (ref 11.5–15.4)
IMM GRANULOCYTES # BLD AUTO: 0.01 THOUSAND/UL (ref 0–0.2)
IMM GRANULOCYTES NFR BLD AUTO: 0 % (ref 0–2)
LYMPHOCYTES # BLD AUTO: 3.43 THOUSANDS/ÂΜL (ref 0.6–4.47)
LYMPHOCYTES NFR BLD AUTO: 51 % (ref 14–44)
MCH RBC QN AUTO: 21.9 PG (ref 26.8–34.3)
MCHC RBC AUTO-ENTMCNC: 29.1 G/DL (ref 31.4–37.4)
MCV RBC AUTO: 75 FL (ref 82–98)
MONOCYTES # BLD AUTO: 0.71 THOUSAND/ÂΜL (ref 0.17–1.22)
MONOCYTES NFR BLD AUTO: 11 % (ref 4–12)
NEUTROPHILS # BLD AUTO: 2.04 THOUSANDS/ÂΜL (ref 1.85–7.62)
NEUTS SEG NFR BLD AUTO: 31 % (ref 43–75)
NRBC BLD AUTO-RTO: 0 /100 WBCS
PLATELET # BLD AUTO: 432 THOUSANDS/UL (ref 149–390)
PMV BLD AUTO: 9.4 FL (ref 8.9–12.7)
POTASSIUM SERPL-SCNC: 4.2 MMOL/L (ref 3.5–5.3)
RBC # BLD AUTO: 4.56 MILLION/UL (ref 3.81–5.12)
SODIUM SERPL-SCNC: 137 MMOL/L (ref 135–147)
WBC # BLD AUTO: 6.67 THOUSAND/UL (ref 4.31–10.16)

## 2023-05-10 RX ADMIN — DOXYCYCLINE 100 MG: 100 CAPSULE ORAL at 22:51

## 2023-05-10 RX ADMIN — PANTOPRAZOLE SODIUM 40 MG: 40 TABLET, DELAYED RELEASE ORAL at 06:09

## 2023-05-10 RX ADMIN — OXYCODONE HYDROCHLORIDE 10 MG: 10 TABLET ORAL at 04:48

## 2023-05-10 RX ADMIN — HYDROXYZINE HYDROCHLORIDE 50 MG: 25 TABLET ORAL at 08:00

## 2023-05-10 RX ADMIN — INSULIN LISPRO 3 UNITS: 100 INJECTION, SOLUTION INTRAVENOUS; SUBCUTANEOUS at 17:03

## 2023-05-10 RX ADMIN — LEVOTHYROXINE SODIUM 50 MCG: 50 TABLET ORAL at 05:42

## 2023-05-10 RX ADMIN — FENOFIBRATE 48 MG: 48 TABLET, FILM COATED ORAL at 08:00

## 2023-05-10 RX ADMIN — PREGABALIN 150 MG: 75 CAPSULE ORAL at 17:03

## 2023-05-10 RX ADMIN — HEPARIN SODIUM 5000 UNITS: 5000 INJECTION INTRAVENOUS; SUBCUTANEOUS at 14:07

## 2023-05-10 RX ADMIN — HEPARIN SODIUM 5000 UNITS: 5000 INJECTION INTRAVENOUS; SUBCUTANEOUS at 05:42

## 2023-05-10 RX ADMIN — HEPARIN SODIUM 5000 UNITS: 5000 INJECTION INTRAVENOUS; SUBCUTANEOUS at 22:52

## 2023-05-10 RX ADMIN — OXYCODONE HYDROCHLORIDE 10 MG: 10 TABLET ORAL at 17:03

## 2023-05-10 RX ADMIN — METHOCARBAMOL TABLETS 750 MG: 500 TABLET, COATED ORAL at 21:19

## 2023-05-10 RX ADMIN — METHOCARBAMOL TABLETS 750 MG: 500 TABLET, COATED ORAL at 14:06

## 2023-05-10 RX ADMIN — HYDROXYZINE HYDROCHLORIDE 100 MG: 25 TABLET ORAL at 22:52

## 2023-05-10 RX ADMIN — ZIPRASIDONE HYDROCHLORIDE 80 MG: 40 CAPSULE ORAL at 22:50

## 2023-05-10 RX ADMIN — DOXYCYCLINE 100 MG: 100 CAPSULE ORAL at 08:00

## 2023-05-10 RX ADMIN — PANTOPRAZOLE SODIUM 40 MG: 40 TABLET, DELAYED RELEASE ORAL at 22:52

## 2023-05-10 RX ADMIN — PREGABALIN 150 MG: 75 CAPSULE ORAL at 08:00

## 2023-05-10 RX ADMIN — OXYCODONE HYDROCHLORIDE 10 MG: 10 TABLET ORAL at 10:58

## 2023-05-10 RX ADMIN — INSULIN LISPRO 2 UNITS: 100 INJECTION, SOLUTION INTRAVENOUS; SUBCUTANEOUS at 21:19

## 2023-05-10 RX ADMIN — LAMOTRIGINE 300 MG: 100 TABLET ORAL at 08:00

## 2023-05-10 RX ADMIN — INSULIN LISPRO 1 UNITS: 100 INJECTION, SOLUTION INTRAVENOUS; SUBCUTANEOUS at 11:44

## 2023-05-10 RX ADMIN — FOLIC ACID 1000 MCG: 1 TABLET ORAL at 08:00

## 2023-05-10 RX ADMIN — METHOCARBAMOL TABLETS 750 MG: 500 TABLET, COATED ORAL at 05:41

## 2023-05-10 RX ADMIN — OXYCODONE HYDROCHLORIDE 10 MG: 10 TABLET ORAL at 22:51

## 2023-05-10 RX ADMIN — CLONIDINE HYDROCHLORIDE 0.2 MG: 0.1 TABLET ORAL at 22:51

## 2023-05-10 RX ADMIN — ZIPRASIDONE HYDROCHLORIDE 80 MG: 40 CAPSULE ORAL at 08:00

## 2023-05-10 NOTE — PHYSICAL THERAPY NOTE
Physical Therapy Evaluation   Time in: 0854  Time out: 0933  Total evaluation time: 39 minutes    Patient's Name: Beverly Borja    Admitting Diagnosis  Osteomyelitis of foot ([facility] ) [M86 9]    Problem List  Patient Active Problem List   Diagnosis    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, with bone involvement without evidence of necrosis ([facility] )    H/O bariatric surgery    Anxiety    Hypothyroidism    Psychiatric illness    Type 2 diabetes mellitus with neurologic complication, without long-term current use of insulin ([place] [address] )    Osteomyelitis of left foot ([facility] )    Diabetic foot ulcer ([facility] )    Acquired absence of other left toe(s) ([place] [address] )    Essential hypertension    Arthritis    Chronic pain    OCD (obsessive compulsive disorder)    PTSD (post-traumatic stress disorder)    Right foot ulcer, with fat layer exposed ([place] [address] )    GERD (gastroesophageal reflux disease)    Migraine without status migrainosus, not intractable    Obstructive sleep apnea    Sepsis ([facility] )    Morbid obesity with BMI of 40 0-44 9, adult ([place] [address] )    Homeless single person    Hx of right BKA ([facility] )    Bipolar 1 disorder, depressed ([facility] )    Abnormal x-ray of cervical spine    Acquired equinovarus deformity of left foot    Acute osteomyelitis of metatarsal bone of right foot ([facility] )    Allergy to bee sting    Amenorrhea    Asthma, intrinsic    Atypical squamous cell changes of undetermined significance (ASCUS) on cervical cytology with positive high risk human papilloma virus (HPV)    Bipolar affective disorder in remission (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)    Radiculopathy, lumbar region    Right cervical radiculopathy    S/P laparoscopic sleeve gastrectomy    Chronic right-sided low back pain with left-sided sciatica       Past Medical History  Past Medical History:   Diagnosis Date    Anxiety     Arthritis     Asthma     Chronic pain     Diabetes mellitus (HCC)     Hypothyroidism     Lymphedema     left leg    Manic bipolar I disorder (HCC)     Morbid obesity (HCC)     OCD (obsessive compulsive disorder)     PTSD (post-traumatic stress disorder)        Past Surgical History  Past Surgical History:   Procedure Laterality Date    CHOLECYSTECTOMY      CHOLECYSTECTOMY      LEG AMPUTATION THROUGH LOWER TIBIA AND FIBULA Right 3/23/2023    Procedure: AMPUTATION BELOW KNEE (BKA); Surgeon: Nico Le MD;  Location: [facility];  Service: General    IA AMPUTATION FOOT TRANSMETARSAL Right 9/4/2021    Procedure: AMPUTATION TRANSMETATARSAL (TMA);  Surgeon: Bishop Maritza DPM;  [facility];  Service: Podiatry    IA AMPUTATION METATARSAL W/TOE SINGLE Left 9/8/2019    Procedure: PARTIAL 3RD RAY RESECTION, PSB TOE FILLET FLAP;  Surgeon: Ce Allen DPM;  [facility];  Service: Podiatry    IA AMPUTATION METATARSAL W/TOE SINGLE Left 3/23/2023    Procedure: RAY RESECTION FOOT;  Surgeon: Lola Toro DPM;  Location[facility];  Service: Podiatry    TOE AMPUTATION Right 1/13/2021    Procedure: AMPUTATION 2ND TOE;  Surgeon: Bishop Maritza DPM;  [facility];  Service: Podiatry    WOUND DEBRIDEMENT Left 2/17/2022    Procedure: DEBRIDEMENT GREAT TOE WOUND;  Surgeon: Bishop Maritza DPM;  [facility];  Service: Podiatry       PT performed at least 2 patient identifiers during session: Name and wristband         05/10/23 0906   PT Last Visit   PT Visit Date 05/10/23   Note Type   Note Type Treatment   Pain Assessment   Pain Assessment Tool 0-10   Pain Score 6   Pain "Location/Orientation Orientation: Left;Orientation: Lower; Location: Leg;Location: Foot   Pain Onset/Description Onset: Ongoing;Frequency: Constant/Continuous   Patient's Stated Pain Goal No pain   Hospital Pain Intervention(s) Repositioned; Emotional support; Rest   Restrictions/Precautions   Weight Bearing Precautions Per Order Yes   LLE Weight Bearing Per Order WBAT  (with surgical shoe per podiatry)   Braces or Orthoses Other (Comment)  (surgical shoe to L LE; RLE prosthetic - PT did not david d/t small open area on anterior distal residual limb on line of healing incision -MD is aware)   Other Precautions Fall Risk;Pain;WBS   General   Chart Reviewed Yes   Response to Previous Treatment Patient with no complaints from previous session  Family/Caregiver Present No   Cognition   Arousal/Participation Alert; Responsive; Cooperative   Attention Attends with cues to redirect   Orientation Level Oriented X4   Memory Decreased recall of precautions   Following Commands Follows all commands and directions without difficulty   Comments pt agreeable to PT session   Subjective   Subjective \"I can sit in the recliner\"   Bed Mobility   Supine to Sit 6  Modified independent   Additional items HOB elevated; Bedrails   Transfers   Sit to Stand   (CGA for 1st trial, 2nd & 3rd STS/SPT pt required SUP)   Additional items Assist x 1; Increased time required;Verbal cues   Additional items Assist x 1; Increased time required;Verbal cues   Additional items Assist x 1;Commode;Verbal cues; Increased time required  (SPT)   Ambulation/Elevation   Gait pattern Improper Weight shift;Decreased foot clearance; Excessively slow  (hop to with LLE)   Gait Assistance   (CGA)   Additional items Assist x 1   Assistive Device Rolling walker   Distance 4 ft   Stair Management Assistance Not tested   Wheelchair Activities   Wheelchair Cushion Pressure relieving  (pt's own cushion on own standard w/c; cushion with sacral area open)   Pressure Relief Type Self " adjusts   Level of Assistance for Pressure Relief Activities Independent   Wheelchair Parts Management Yes   Left Leg Rest Level of Assistance Dependent   Left Brakes Level of Assistance Independent   Right Brakes Level of Assistance Independent   Propulsion Yes   Propulsion Type 1 Manual   Level 1 Level tile   Method 1 Right upper extremity; Left upper extremity   Level of Assistance 1 Independent  (no verbal cues)   Description/ Details 1 250 ft with multiple turns   Balance   Static Sitting Good   Dynamic Sitting Fair +   Static Standing Fair -   Dynamic Standing Fair -   Ambulatory Fair -   Endurance Deficit   Endurance Deficit Yes   Activity Tolerance   Activity Tolerance Patient limited by fatigue;Patient limited by pain   Medical Staff Made Aware coordination of care provided with OT 10 Bernadette Dougherty [employer]   Nurse Made Aware Yes, RN Akilah   Assessment   Prognosis Fair   Problem List Decreased strength;Decreased endurance; Impaired balance;Decreased mobility; Decreased safety awareness;Pain;Decreased skin integrity   Assessment Pt seen for PT treatment session this date, consisting of ther act focused on bed mobility & functional transfer training from various surfaces, gt training on level surfaces to improve pt safety in household environment and w/c management including locking/unlocking brakes & propulsion x 250 ft  Since previous session, pt has made good progress in terms of requiring overall less physical assistance with functional mobility  Pertinent barriers during this session include pain  Current goals and POC remain appropriate, pt continues to have rehab potential and is making good progress towards STGs  Pt prognosis for achieving goals is fair, pending pt progress with hospitalization/medical status improvements, and indicated by learning potential, eye contact, awareness and initiation level  Pt limited d/t the presence of intractable pain and fear of pain provocation   PT recommends post acute rehabilitation "services upon discharge  Pt continues to be functioning below baseline level, and remains limited 2* factors listed above  PT will continue to see pt during current hospitalization in order to address the deficits listed above and provide interventions consistent w/ POC in effort to achieve STGs  Barriers to Discharge Decreased caregiver support; Inaccessible home environment   Goals   Patient Goals \"to get walking\"   STG Expiration Date 05/19/23   Short Term Goal #1 goals remain appropriate   PT Treatment Day 1   Plan   Treatment/Interventions Functional transfer training;LE strengthening/ROM; Therapeutic exercise; Endurance training;Patient/family training;Equipment eval/education;Gait training;Spoke to nursing;Spoke to MD  (w/c management)   Progress Progressing toward goals   PT Frequency 3-5x/wk   Recommendation   PT Discharge Recommendation Post acute rehabilitation services   Equipment Recommended   (pt has w/c and RW)   Additional Comments Pt's raw score on the [facility] Basic Mobility inpatient short form is 17, standardized score is 39 67  Patients at this level are likely to benefit from DC to [facility], however, please refer to therapist recommendation for safe DC planning  [facility] Basic Mobility Inpatient   Turning in Flat Bed Without Bedrails 4   Lying on Back to Sitting on Edge of Flat Bed Without Bedrails 4   Moving Bed to Chair 3   Standing Up From Chair Using Arms 3   Walk in Room 2   Climb 3-5 Stairs With Railing 1   Basic Mobility Inpatient Raw Score 17   Basic Mobility Standardized Score 39 67   Highest Level Of Mobility   JH-HLM Goal 5: Stand one or more mins   JH-HLM Achieved 5: Stand (1 or more minutes)   Education   Education Provided Mobility training;Assistive device   Patient Demonstrates verbal understanding;Reinforcement needed   End of Consult   Patient Position at End of Consult Bedside chair; All needs within reach       Gildardo Koch, PT, DPT      "

## 2023-05-10 NOTE — UTILIZATION REVIEW
NOTIFICATION OF INPATIENT ADMISSION   AUTHORIZATION REQUEST   SERVICING FACILITY:   [address] [place], [address]  Tax ID: [id number]  NPI: [id number]   ATTENDING PROVIDER:  Attending Name and NPI#: Juan Carlos Faria [[id number]]  Address: [address] [place] [address] [place], [address]  Phone: [phone]     ADMISSION INFORMATION:  Place of Service: [place] [address]  Place of Service Code: 21  Inpatient Admission Date/Time: 5/8/23  1:21 PM  Discharge Date/Time: No discharge date for patient encounter  Admitting Diagnosis Code/Description:  O[facility]omyelitis of foot ([facility] ) [M86 9]     UTILIZATION REVIEW CONTACT:  Inocente Linton Utilization   Network Utilization Review Department  Phone: [phone]  Fax [phone]  Email: [email or web address]  Contact for approvals/pending authorizations, clinical reviews, and discharge  PHYSICIAN ADVISORY SERVICES:  Medical Necessity Denial & [id number] Review  Phone: [phone]  Fax: [phone]  Email: [email or web address]

## 2023-05-10 NOTE — PLAN OF CARE
Problem: MOBILITY - ADULT  Goal: Maintain or return to baseline ADL function  Description: INTERVENTIONS:  -  Assess patient's ability to carry out ADLs; assess patient's baseline for ADL function and identify physical deficits which impact ability to perform ADLs (bathing, care of mouth/teeth, toileting, grooming, dressing, etc )  - Assess/evaluate cause of self-care deficits   - Assess range of motion  - Assess patient's mobility; develop plan if impaired  - Assess patient's need for assistive devices and provide as appropriate  - Encourage maximum independence but intervene and supervise when necessary  - Involve family in performance of ADLs  - Assess for home care needs following discharge   - Consider OT consult to assist with ADL evaluation and planning for discharge  - Provide patient education as appropriate  Outcome: Progressing  Goal: Maintains/Returns to pre admission functional level  Description: INTERVENTIONS:  - Perform BMAT or MOVE assessment daily    - Set and communicate daily mobility goal to care team and patient/family/caregiver  - Collaborate with rehabilitation services on mobility goals if consulted  - Perform Range of Motion 2 times a day  - Reposition patient every 2 hours    - Dangle patient 2 times a day  - Stand patient 2 times a day  - Ambulate patient 2 times a day  - Out of bed to chair 3 times a day   - Out of bed for meals 3 times a day  - Out of bed for toileting  - Record patient progress and toleration of activity level   Outcome: Progressing     Problem: PAIN - ADULT  Goal: Verbalizes/displays adequate comfort level or baseline comfort level  Description: Interventions:  - Encourage patient to monitor pain and request assistance  - Assess pain using appropriate pain scale  - Administer analgesics based on type and severity of pain and evaluate response  - Implement non-pharmacological measures as appropriate and evaluate response  - Consider cultural and social influences on pain and pain management  - Notify physician/advanced practitioner if interventions unsuccessful or patient reports new pain  Outcome: Progressing     Problem: INFECTION - ADULT  Goal: Absence or prevention of progression during hospitalization  Description: INTERVENTIONS:  - Assess and monitor for signs and symptoms of infection  - Monitor lab/diagnostic results  - Monitor all insertion sites, i e  indwelling lines, tubes, and drains  - Monitor endotracheal if appropriate and nasal secretions for changes in amount and color  - [place] appropriate cooling/warming therapies per order  - Administer medications as ordered  - Instruct and encourage patient and family to use good hand hygiene technique  - Identify and instruct in appropriate isolation precautions for identified infection/condition  Outcome: Progressing  Goal: Absence of fever/infection during neutropenic period  Description: INTERVENTIONS:  - Monitor WBC    Outcome: Progressing     Problem: SAFETY ADULT  Goal: Maintain or return to baseline ADL function  Description: INTERVENTIONS:  -  Assess patient's ability to carry out ADLs; assess patient's baseline for ADL function and identify physical deficits which impact ability to perform ADLs (bathing, care of mouth/teeth, toileting, grooming, dressing, etc )  - Assess/evaluate cause of self-care deficits   - Assess range of motion  - Assess patient's mobility; develop plan if impaired  - Assess patient's need for assistive devices and provide as appropriate  - Encourage maximum independence but intervene and supervise when necessary  - Involve family in performance of ADLs  - Assess for home care needs following discharge   - Consider OT consult to assist with ADL evaluation and planning for discharge  - Provide patient education as appropriate  Outcome: Progressing  Goal: Maintains/Returns to pre admission functional level  Description: INTERVENTIONS:  - Perform BMAT or MOVE assessment daily    - Set and communicate daily mobility goal to care team and patient/family/caregiver  - Collaborate with rehabilitation services on mobility goals if consulted  - Perform Range of Motion 2 times a day  - Reposition patient every 2 hours    - Dangle patient 2 times a day  - Stand patient 2 times a day  - Ambulate patient 2 times a day  - Out of bed to chair 3 times a day   - Out of bed for meals 3 times a day  - Out of bed for toileting  - Record patient progress and toleration of activity level   Outcome: Progressing  Goal: Patient will remain free of falls  Description: INTERVENTIONS:  - Educate patient/family on patient safety including physical limitations  - Instruct patient to call for assistance with activity   - Consult OT/PT to assist with strengthening/mobility   - Keep Call bell within reach  - Keep bed low and locked with side rails adjusted as appropriate  - Keep care items and personal belongings within reach  - Initiate and maintain comfort rounds  - Make Fall Risk Sign visible to staff  - Offer Toileting every 2 Hours, in advance of need  - Initiate/Maintain bed alarm  - Obtain necessary fall risk management equipment  - Apply yellow socks and bracelet for high fall risk patients  - Consider moving patient to room near nurses station  Outcome: Progressing     Problem: DISCHARGE PLANNING  Goal: Discharge to home or other facility with appropriate resources  Description: INTERVENTIONS:  - Identify barriers to discharge w/patient and caregiver  - Arrange for needed discharge resources and transportation as appropriate  - Identify discharge learning needs (meds, wound care, etc )  - Refer to Case Management Department for coordinating discharge planning if the patient needs post-hospital services based on physician/advanced practitioner order or complex needs related to functional status, cognitive ability, or social support system  Outcome: Progressing     Problem: Knowledge Deficit  Goal: Patient/family/caregiver demonstrates understanding of disease process, treatment plan, medications, and discharge instructions  Description: Complete learning assessment and assess knowledge base    Interventions:  - Provide teaching at level of understanding  - Provide teaching via preferred learning methods  Outcome: Progressing     Problem: SKIN/TISSUE INTEGRITY - ADULT  Goal: Incision(s), wounds(s) or drain site(s) healing without S/S of infection  Description: INTERVENTIONS  - Assess and document dressing, incision, wound bed, drain sites and surrounding tissue  - Provide patient and family education  - Perform skin care/dressing changes every shift  Outcome: Progressing  Goal: Pressure injury heals and does not worsen  Description: Interventions:  - Implement low air loss mattress or specialty surface (Criteria met)  - Apply silicone foam dressing  - Instruct/assist with weight shifting every 60 minutes when in chair   - Limit chair time to 3 hour intervals  - Use special pressure reducing interventions such as waffle cushion when in chair   - Apply fecal or urinary incontinence containment device   - Perform passive or active ROM every 4 hrs  - Turn and reposition patient & offload bony prominences every 2 hours   - Utilize friction reducing device or surface for transfers   - Consider consults to  interdisciplinary teams such as wound and podiatry   - Use incontinent care products after each incontinent episode such as foam cleanser  - Consider nutrition services referral as needed  Outcome: Progressing     Problem: Prexisting or High Potential for Compromised Skin Integrity  Goal: Skin integrity is maintained or improved  Description: INTERVENTIONS:  - Identify patients at risk for skin breakdown  - Assess and monitor skin integrity  - Assess and monitor nutrition and hydration status  - Monitor labs   - Assess for incontinence   - Turn and reposition patient  - Assist with mobility/ambulation  - Relieve pressure over bony prominences  - Avoid friction and shearing  - Provide appropriate hygiene as needed including keeping skin clean and dry  - Evaluate need for skin moisturizer/barrier cream  - Collaborate with interdisciplinary team   - Patient/family teaching  - Consider wound care consult   Outcome: Progressing     Problem: Potential for Falls  Goal: Patient will remain free of falls  Description: INTERVENTIONS:  - Educate patient/family on patient safety including physical limitations  - Instruct patient to call for assistance with activity   - Consult OT/PT to assist with strengthening/mobility   - Keep Call bell within reach  - Keep bed low and locked with side rails adjusted as appropriate  - Keep care items and personal belongings within reach  - Initiate and maintain comfort rounds  - Make Fall Risk Sign visible to staff  - Offer Toileting every 2 Hours, in advance of need  - Initiate/Maintain bed alarm  - Obtain necessary fall risk management equipment  - Apply yellow socks and bracelet for high fall risk patients  - Consider moving patient to room near nurses station  Outcome: Progressing

## 2023-05-10 NOTE — PROGRESS NOTES
[facility]  Progress Note  Name: Molina Lewis I  MRN: [id number]  Unit/Bed#: -01 I Date of Admission: 5/8/2023   Date of Service: 5/10/2023 I Hospital Day: 2    Assessment/Plan   * Osteomyelitis of left foot [facility])  Assessment & Plan  · Received from [address] [place] [address] psychiatric patient hartman with MRI results suggesting osteomyelitis of the left first metatarsal  · S/p left first ray resection podiatry 5/8/2023  · Continue doxycycline for 14 days per podiatry  · Wound care, surgical shoe  · Intraoperative cultures pending  · PT recommendations appreciated: Postacute rehab    Psychiatric illness  Assessment & Plan  · History of bipolar disorder, OCD, PTSD  · Admitted medically from [place] [address] psychiatric unit  Per discharge summary, psychiatrically cleared for medical admission  · Also, currently homeless  · Continue medications including Lamictal, hydroxyzine, Geodon  · Supportive care  · Denies current suicidal ideation  · Psychiatry reevaluation completed  Continue current plan of care      Hx of right BKA ([address] )  Assessment & Plan  · History of right BKA 3/20/2023  · Serial assessments and supportive care  · Fall precautions    GERD (gastroesophageal reflux disease)  Assessment & Plan  · Continue PPI    Chronic pain  Assessment & Plan  · Previously on OxyContin for chronic back pain, transitioned to oxycodone 10 mg every 6 hours while in inpatient psychiatric unit  · Monitor for adequate pain control and or signs of oversedation or withdrawal    Hypothyroidism  Assessment & Plan  · Continue levothyroxine           VTE Prophylaxis:  Heparin    Patient Centered Rounds: I have performed bedside rounds with nursing staff today      Discussions with Specialists or Other Care Team Provider: yes  Education and Discussions with Family / Patient: updated patient regarding plan of care    Current Length of Stay: 2 day(s)    Current Patient Status: Inpatient   Certification Statement: The patient will continue to require additional inpatient hospital stay due to pending placement    Discharge Plan: Per case management patient is a target and will require area of aging discharge planning  Patient medically stable for discharge,  working on placement    Code Status: Level 1 - Full Code    Subjective:   No overnight events noted    Objective:     Vitals:   Temp (24hrs), Av 2 °F (36 8 °C), Min:98 °F (36 7 °C), Max:98 3 °F (36 8 °C)    Temp:  [98 °F (36 7 °C)-98 3 °F (36 8 °C)] 98 3 °F (36 8 °C)  HR:  [67-80] 80  Resp:  [18-20] 18  BP: ()/(64-88) 145/88  SpO2:  [92 %-97 %] 92 %  Body mass index is 37 79 kg/m²  Input and Output Summary (last 24 hours): Intake/Output Summary (Last 24 hours) at 5/10/2023 1518  Last data filed at 5/10/2023 1300  Gross per 24 hour   Intake 720 ml   Output 550 ml   Net 170 ml       Physical Exam:   Physical Exam  Constitutional:       General: She is not in acute distress  Appearance: She is obese  HENT:      Head: Normocephalic and atraumatic  Nose: Nose normal       Mouth/Throat:      Mouth: Mucous membranes are moist    Eyes:      Extraocular Movements: Extraocular movements intact  Conjunctiva/sclera: Conjunctivae normal    Cardiovascular:      Rate and Rhythm: Normal rate and regular rhythm  Pulmonary:      Effort: Pulmonary effort is normal    Abdominal:      Palpations: Abdomen is soft  Tenderness: There is no abdominal tenderness  Musculoskeletal:         General: Normal range of motion  Cervical back: Normal range of motion and neck supple  Comments: Right BKA noted   Skin:     General: Skin is warm and dry  Comments: Left foot wound with dressing in place   Neurological:      General: No focal deficit present  Mental Status: She is alert  Mental status is at baseline  Cranial Nerves: No cranial nerve deficit     Psychiatric:         Mood and Affect: Mood normal          Behavior: Behavior normal          Additional Data:     Labs:    Results from last 7 days   Lab Units 05/10/23  0537   WBC Thousand/uL 6 67   HEMOGLOBIN g/dL 10 0*   HEMATOCRIT % 34 4*   PLATELETS Thousands/uL 432*   NEUTROS PCT % 31*   LYMPHS PCT % 51*   MONOS PCT % 11   EOS PCT % 7*     Results from last 7 days   Lab Units 05/10/23  0537 05/08/23  1502 05/03/23  2048   SODIUM mmol/L 137   < > 136   POTASSIUM mmol/L 4 2   < > 4 0   CHLORIDE mmol/L 103   < > 102   CO2 mmol/L 26   < > 27   BUN mg/dL 19   < > 13   CREATININE mg/dL 0 87   < > 0 84   CALCIUM mg/dL 9 7   < > 9 4   ALK PHOS U/L  --   --  86   ALT U/L  --   --  15   AST U/L  --   --  16    < > = values in this interval not displayed  Results from last 7 days   Lab Units 05/10/23  1055 05/10/23  0724 05/09/23  2113 05/09/23  1616 05/09/23  1125 05/09/23  0644 05/08/23  2059 05/08/23  1741 05/08/23  1411 05/08/23  0737 05/07/23  1604 05/07/23  0745   POC GLUCOSE mg/dl 184* 140 228* 157* 157* 129 197* 98 117 96 129 114     Results from last 7 days   Lab Units 05/05/23  0539   HEMOGLOBIN A1C % 7 2*       * I Have Reviewed All Lab Data Listed Above  * Additional Pertinent Lab Tests Reviewed:  [facility] Admission  Reviewed    Imaging:  Imaging Reports Reviewed Today Include: No new imaging    Recent Cultures (last 7 days):     Results from last 7 days   Lab Units 05/08/23  1601   GRAM STAIN RESULT  No Polys or Bacteria seen       Last 24 Hours Medication List:   Current Facility-Administered Medications   Medication Dose Route Frequency Provider Last Rate   • acetaminophen  650 mg Oral Q6H PRN [facility] [facility], CRNP     • cloNIDine  0 2 mg Oral BID [facility], CRNP     • doxycycline hyclate  100 mg Oral Q12H [address] [place] [address], CRNP     • fenofibrate  48 mg Oral Daily [facility], CRNP     • folic acid  0,336 mcg Oral Daily [facility], CRNP     • heparin (porcine)  5,000 Units Subcutaneous [facility], CRNP     • hydrOXYzine HCL  100 mg Oral HS Leisa Fairbanks CRNP     • hydrOXYzine HCL  50 mg Oral Daily Leisa Fairbanks CRNP     • insulin lispro  1-6 Units Subcutaneous TID AC BETI FreedNP     • insulin lispro  1-6 Units Subcutaneous HS Leisa Fairbanks, CRNP     • lamoTRIgine  300 mg Oral QAM Leisa Fairbanks CRNP     • levothyroxine  50 mcg Oral Early Morning Leisa Fairbanks CRNP     • methocarbamol  750 mg Oral Q6H PRN Mayo Fritz PA-C     • ondansetron  4 mg Intravenous Q6H PRN Mayo Fritz PA-C     • oxyCODONE  10 mg Oral Q6H PRN BETI FreedNP     • pantoprazole  40 mg Oral BID AC JOSEE Freed     • pregabalin  150 mg Oral BID JOSEE Freed     • ziprasidone  80 mg Oral BID With Meals JOSEE Freed          Today, Patient Was Seen By: Isi Tariq MD    ** Please Note: Dictation voice to text software may have been used in the creation of this document   **

## 2023-05-10 NOTE — CASE MANAGEMENT
Case Management Discharge Planning Note    Patient name Win Lopez  Location /-53 MRN [id number]  : 1975 Date 5/10/2023       Current Admission Date: 2023  Current Admission Diagnosis:Osteomyelitis of left foot [facility])   Patient Active Problem List    Diagnosis Date Noted   • Acute osteomyelitis of metatarsal bone of right foot ([facility] ) 2023   • Asthma, intrinsic 2023   • Bipolar 1 disorder, depressed ([place] [address] ) 2023   • Hx of right BKA ([place] [address] ) 2023   • Homeless single person 2023   • Sepsis ([place] [address] ) 2023   • Morbid obesity with BMI of 40 0-44 9, adult ([place] [address] ) 2023   • Obstructive sleep apnea 2022   • Diastasis of rectus abdominis 2021   • 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 ([place] [address] ) 2021   • GERD (gastroesophageal reflux disease) 2021   • Migraine without status migrainosus, not intractable 2021   • Impaired intestinal absorption 2021   • Intestinal malabsorption following gastrectomy 2021   • Right foot ulcer, with fat layer exposed ([place] [address] ) 2021   • Intertriginous dermatitis associated with moisture 2021   • Arthritis 2021   • Chronic pain 2021   • OCD (obsessive compulsive disorder)    • PTSD (post-traumatic stress disorder)    • Essential hypertension 2021   • Acquired absence of other left toe(s) ([place] [address] ) 2019   • Folate deficiency 2019   • S/P laparoscopic sleeve gastrectomy 2019   • Diabetic foot ulcer ([place] [address] ) 09/10/2019   • Type 2 diabetes mellitus with neurologic complication, without long-term current use of insulin ([place] [address] ) 2019   • Osteomyelitis of left foot ([place] [address] ) 2019   • Hypothyroidism 2019   • Psychiatric illness 2019   • 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, with bone involvement without evidence of necrosis ([facility] ) 2019   • H/O bariatric surgery 2019   • Anxiety 2019 • Drusen body, unspecified laterality 01/25/2019   • Radiculopathy, lumbar region 09/12/2018   • Chronic right-sided low back pain with left-sided sciatica 09/04/2018   • Hyperlipidemia associated with type 2 diabetes mellitus ([facility] ) 09/25/2017   • Abnormal x-ray of cervical spine 08/14/2017   • Right cervical radiculopathy 08/11/2017   • Bipolar affective disorder in remission ([facility] ) 04/15/2017   • Amenorrhea 11/29/2016   • Instability of left knee joint 05/25/2016   • Allergy to bee sting 04/25/2016   • Acquired equinovarus deformity of left foot 05/19/2015   • Atypical squamous cell changes of undetermined significance (ASCUS) on cervical cytology with positive high risk human papilloma virus (HPV) 02/23/2015   • Chronic paroxysmal hemicrania, not intractable 02/23/2015      LOS (days): 2  Geometric Mean LOS (GMLOS) (days): 5 60  Days to GMLOS:3 5     OBJECTIVE:  Risk of Unplanned Readmission Score: 20 18         Current admission status: Inpatient   Preferred Pharmacy:   [facility] [address] [place] [address] [id number] [id number]  Phone: [phone] Fax: [phone]    Primary Care Provider: Roshni Borrero MD    Primary Insurance: [address] [place] [address]  Secondary Insurance:     DISCHARGE DETAILS:    Discharge planning discussed with[de-identified] patient  Freedom of Choice: Yes  Comments - Freedom of Choice: rehab was recommended- pt gave permission to send to snf - blanket referrals sent via shay- pt is a target and needs th option process completed- Elisa from th area of aging came today to assess the pt                               Other Referral/Resources/Interventions Provided:  Interventions: Short Term Rehab  Referral Comments: referrals sent Uzma Morales- determination letter and authorization is needed - pt will need transport    Would you like to participate in our [address] service program?  : No - Declined    Treatment Team Recommendation: Short Term Rehab (snf rehab Elias Conde is needed & determination letter- w/c [place])

## 2023-05-10 NOTE — PLAN OF CARE
Problem: Prexisting or High Potential for Compromised Skin Integrity  Goal: Skin integrity is maintained or improved  Description: INTERVENTIONS:  - Identify patients at risk for skin breakdown  - Assess and monitor skin integrity  - Assess and monitor nutrition and hydration status  - Monitor labs   - Assess for incontinence   - Turn and reposition patient  - Assist with mobility/ambulation  - Relieve pressure over bony prominences  - Avoid friction and shearing  - Provide appropriate hygiene as needed including keeping skin clean and dry  - Evaluate need for skin moisturizer/barrier cream  - Collaborate with interdisciplinary team   - Patient/family teaching  - Consider wound care consult   Outcome: Progressing     Problem: SKIN/TISSUE INTEGRITY - ADULT  Goal: Incision healing without S/S of infection  Description: INTERVENTIONS  - Assess and document dressing, incision, wound bed, drain sites and surrounding tissue  - Provide patient and family education  - Perform skin care/dressing changes every shift  Outcome: Progressing     Problem: MOBILITY - ADULT  Goal: Maintains/Returns to pre admission functional level  Description: INTERVENTIONS:  - Perform BMAT or MOVE assessment daily    - Set and communicate daily mobility goal to care team and patient/family/caregiver  - Collaborate with rehabilitation services on mobility goals if consulted  - Perform Range of Motion 2 times a day  - Reposition patient every 2 hours    - Dangle patient 2 times a day  - Stand patient 2 times a day  - Ambulate patient 2 times a day  - Out of bed to chair 3 times a day   - Out of bed for meals 3 times a day  - Out of bed for toileting  - Record patient progress and toleration of activity level   Outcome: Progressing     Problem: Knowledge Deficit  Goal: Patient/family/caregiver demonstrates understanding of disease process, treatment plan, medications, and discharge instructions  Description: Complete learning assessment and assess knowledge base    Interventions:  - Provide teaching at level of understanding  - Provide teaching via preferred learning methods  Outcome: Progressing

## 2023-05-10 NOTE — PLAN OF CARE
Problem: PHYSICAL THERAPY ADULT  Goal: Performs mobility at highest level of function for planned discharge setting  See evaluation for individualized goals  Description: Treatment/Interventions: Functional transfer training, LE strengthening/ROM, Therapeutic exercise, Endurance training, Patient/family training, Equipment eval/education, Gait training, Spoke to nursing, Spoke to MD (w/c management)  Equipment Recommended:  (pt has w/c and RW)       See flowsheet documentation for full assessment, interventions and recommendations  Outcome: Progressing  Note: Prognosis: Fair  Problem List: Decreased strength, Decreased endurance, Impaired balance, Decreased mobility, Decreased safety awareness, Pain, Decreased skin integrity  Assessment: Pt seen for PT treatment session this date, consisting of ther act focused on bed mobility & functional transfer training from various surfaces, gt training on level surfaces to improve pt safety in household environment and w/c management including locking/unlocking brakes & propulsion x 250 ft  Since previous session, pt has made good progress in terms of requiring overall less physical assistance with functional mobility  Pertinent barriers during this session include pain  Current goals and POC remain appropriate, pt continues to have rehab potential and is making good progress towards STGs  Pt prognosis for achieving goals is fair, pending pt progress with hospitalization/medical status improvements, and indicated by learning potential, eye contact, awareness and initiation level  Pt limited d/t the presence of intractable pain and fear of pain provocation  PT recommends post acute rehabilitation services upon discharge  Pt continues to be functioning below baseline level, and remains limited 2* factors listed above   PT will continue to see pt during current hospitalization in order to address the deficits listed above and provide interventions consistent w/ POC in effort to achieve STGs  Barriers to Discharge: Decreased caregiver support, Inaccessible home environment     PT Discharge Recommendation: Post acute rehabilitation services    See flowsheet documentation for full assessment

## 2023-05-10 NOTE — OCCUPATIONAL THERAPY NOTE
Occupational Therapy Evaluation     Patient Name: Chao Chow  [id number] Date: 5/10/2023      Problem List  Principal Problem:    Osteomyelitis of left foot ([facility] )  Active Problems:    Hypothyroidism    Psychiatric illness    Chronic pain    GERD (gastroesophageal reflux disease)    Hx of right BKA ([facility] )    Past Medical History  Past Medical History:   Diagnosis Date    Anxiety     Arthritis     Asthma     Chronic pain     Diabetes mellitus ([place] [address] )     Hypothyroidism     Lymphedema     left leg    Manic bipolar I disorder ([place] [address] )     Morbid obesity (HCC)     OCD (obsessive compulsive disorder)     PTSD (post-traumatic stress disorder)      Past Surgical History  Past Surgical History:   Procedure Laterality Date    CHOLECYSTECTOMY      CHOLECYSTECTOMY      LEG AMPUTATION THROUGH LOWER TIBIA AND FIBULA Right 3/23/2023    Procedure: AMPUTATION BELOW KNEE (BKA);   Surgeon: Cristal Colon MD;  [facility]: [facility];  Service: General    WI AMPUTATION FOOT TRANSMETARSAL Right 9/4/2021    Procedure: AMPUTATION TRANSMETATARSAL (TMA);  Surgeon: Georgina Ortiz DPM;  [facility];  Service: Podiatry    WI AMPUTATION METATARSAL W/TOE SINGLE Left 9/8/2019    Procedure: PARTIAL 3RD RAY RESECTION, PSB TOE FILLET FLAP;  Surgeon: Lobo Pena DPM;  [facility];  Service: Podiatry    WI AMPUTATION METATARSAL W/TOE SINGLE Left 3/23/2023    Procedure: RAY RESECTION FOOT;  Surgeon: Renu Toro DPM;  [facility];  Service: Podiatry    WI AMPUTATION METATARSAL W/TOE SINGLE Left 5/8/2023    Procedure: Left first RAY RESECTION FOOT;  Surgeon: Guillermo Lomeli DPM;  [facility]: [facility];  Service: Podiatry    TOE AMPUTATION Right 1/13/2021    Procedure: AMPUTATION 2ND TOE;  Surgeon: Georgina Ortiz DPM;  [facility];  Service: Podiatry    WOUND DEBRIDEMENT Left 2/17/2022    Procedure: DEBRIDEMENT GREAT TOE WOUND;  Surgeon: Georgina Ortiz DPM;  [facility];  Service: Podiatry         05/10/23 0910   OT Last Visit   OT Visit Date 05/10/23   Note Type   Note type Evaluation   Pain Assessment   Pain Assessment Tool 0-10   Pain Score 6   Pain Location/Orientation Orientation: Left;Orientation: Lower; Location: Leg;Location: Foot   Pain Onset/Description Onset: Ongoing;Frequency: Constant/Continuous   Patient's Stated Pain Goal No pain   Hospital Pain Intervention(s) Repositioned; Ambulation/increased activity; Elevated   Restrictions/Precautions   Weight Bearing Precautions Per Order Yes   LLE Weight Bearing Per Order WBAT  (with surgical shoe)   Braces or Orthoses   (Pt received RLE prosthetic ~2 weeks ago  Small area of open skin noted on distal residual limb, so prosthesis not donned this date  Team aware)   Other Precautions Fall Risk;Pain;WBS   Home Living   Additional Comments inconsistent home set up, pt reports she was recently at rehab facility post RLE BKA  Per pt, her discharge location is undetermined at this time as she could be living with her son vs returning to a shelter  Prior Function   Level of [place] Needs assistance with ADLs; Needs assistance with functional mobility; Needs assistance with IADLS   Lives With Alone   Receives Help From Family   IADLs Family/Friend/Other provides transportation; Family/Friend/Other provides meals; Family/Friend/Other provides medication management   Falls in the last 6 months 0   General   Family/Caregiver Present No   Subjective   Subjective Pt agreeable to therapy evaluation   ADL   Where Assessed Other (Comment)  (Assist levels for some self care tasks are based on functional assessment of performance skills and deficits observed during session )   Eating Assistance 6  Modified independent   Eating Deficit Setup   Grooming Assistance 6  Modified Independent   Grooming Deficit Setup  (while seated)   UB Bathing Assistance 5  Supervision/Setup   UB Bathing Deficit Setup;Supervision/safety; Increased time to complete  (while seated)   LB Bathing Assistance 4  Minimal Assistance   LB Bathing Deficit Setup;Supervision/safety; Increased time to complete;Perineal area   UB Dressing Assistance 5  Supervision/Setup   UB Dressing Deficit Setup;Supervision/safety  (while seated, with set up)   LB Dressing Assistance 4  Minimal Assistance   LB Dressing Deficit Setup;Steadying;Supervision/safety; Increased time to complete   Toileting Assistance  3  Moderate Assistance   Toileting Deficit Setup;Supervison/safety; Increased time to complete  (pt required assist with posterior hygiene)   Bed Mobility   Supine to Sit 6  Modified independent   Additional items HOB elevated; Bedrails   Transfers   Sit to Stand 4  Minimal assistance   Additional items Assist x 1; Increased time required;Verbal cues   Stand to Sit 4  Minimal assistance   Additional items Assist x 1; Armrests; Increased time required   Stand pivot 4  Minimal assistance   Additional items Assist x 1; Armrests; Increased time required  (with RW and another trial for stand pivot without AD)   Toilet transfer 4  Minimal assistance   Additional items Assist x 1;Commode;Armrests  (stand pivot transfer)   Functional Mobility   Functional Mobility 4  Minimal assistance   Additional Comments assist x1   Additional items Rolling walker   Activity Tolerance   Activity Tolerance Patient limited by fatigue   Medical Staff Made Aware Kristine PT  Pt seen for co-evaluation/treatment with Physical Therapist due to pt's medical complexity, functional limitations and limited activity tolerance  Nurse Made Aware Akilah WORTHINGTON   RUE Assessment   RUE Assessment   [facility], pt reports hx of RCT that has been present for some time   Able to achieve 90* flexion and abd)   LUE Assessment   LUE Assessment WFL   Hand Function   Gross Motor Coordination Functional   Fine Motor Coordination Functional   Sensation   Light Touch No apparent deficits  (in B hands)   Vision-Basic Assessment   Current Vision No visual deficits   Psychosocial   Psychosocial (WDL) WDL   Cognition   Overall Cognitive Status WFL   Arousal/Participation Alert; Cooperative   Attention Within functional limits   Orientation Level Oriented X4   Memory Decreased recall of precautions   Following Commands Follows all commands and directions without difficulty   Assessment   Limitation Decreased ADL status; Decreased endurance;Decreased high-level ADLs   Assessment Pt is a 53 y o  female seen for OT evaluation s/p admit to [facility] on 5/8/2023 w/ Osteomyelitis of left foot ([address] )  Comorbidities affecting pt's functional performance at time of assessment include: DM, HTN, obesity, previous surgery, neuropathy and hx of R BKA  Personal factors affecting pt at time of IE include:limited home support, difficulty performing ADLS, difficulty performing IADLS , financial barriers, health management  and homeless  Prior to admission, pt was needing assistance with some ADLs, able to perform mobility with stand pivot transfers and using w/c  Pt reports recently receiving RLE prosthesis, and was able to perform steps  Upon evaluation: Pt requires CGA x1 with RW for stand pivot transfers without RLE prosthesis, and Moderate Assistance for some basic ADLs 2* the following deficits impacting occupational performance: decreased strength, decreased balance, decreased tolerance, increased pain and orthotics, and impaired skin integrity  Pt to benefit from continued skilled OT tx while in the hospital to address deficits as defined above and maximize level of functional independence w ADL's and functional mobility  Occupational Performance areas to address include: grooming, bathing/shower, toilet hygiene, dressing and functional mobility  From OT standpoint, recommendation at time of d/c would be inpatient rehab  Plan   Treatment Interventions ADL retraining;Functional transfer training; Endurance training;Patient/family training   Goal Expiration Date 05/24/23   OT Treatment Day 0   OT Frequency 3-5x/wk   Recommendation   OT Discharge Recommendation Post acute rehabilitation services   Additional Comments  The patient's raw score on the AM-PAC Daily Activity Inpatient Short Form is 18  A raw score of less than 19 suggests the patient may benefit from discharge to post-acute rehabilitation services  Please refer to the recommendation of the Occupational Therapist for safe discharge planning     AM-PAC Daily Activity Inpatient   Lower Body Dressing 3   Bathing 3   Toileting 2   Upper Body Dressing 3   Grooming 3   Eating 4   Daily Activity Raw Score 18   Daily Activity Standardized Score (Calc for Raw Score >=11) 38 66   AM-[facility] Applied Cognition Inpatient   Following a Speech/Presentation 4   Understanding Ordinary Conversation 4   Taking Medications 3   Remembering Where Things Are Placed or Put Away 4   Remembering List of 4-5 Errands 3   Taking Care of Complicated Tasks 3   Applied Cognition Raw Score 21   Applied Cognition Standardized Score 44 3       Goals: to be met by 5/24/23    Patient will perform functional bed mobility with independence, with HOB flat, no rails  Patient will perform functional transfers with [facility] in preparation for ADL tasks, with good safety awareness  Patient will perform UB dressing task with modified independence while seated  Patient will perform LB dressing task with modified independence  Patient will perform toilet transfer with modified independence  Patient will perform toileting with [address], including hygiene and clothing management   Patient will identify 2 BUE exercises from HEP to increase strength for participation in ADLs  Patient will improve activity tolerance by participating in 20 minutes of session at a time in preparation for participation in ADL tasks  Patient will identify 3 potential fall hazards and identify compensatory techniques to decrease fall risk in the home environment         Mayela Tapia, OTR/L

## 2023-05-10 NOTE — DISCHARGE INSTR - OTHER ORDERS
Skin and Wound Care Plan:   1  Ehob offloading cushion to chair when OOB  2  Elevate left heel off of bed with pillow to offload  3  Apply skin nourishing cream to the skin daily  4  Turn patient Q2 hours  5  Cleanse incision scar to right BKA stump with NSS, pat dry  Apply small amount of Silvasorb gel to the small partial thickness area to the mid incision line, cover with 2X2, paper tape the proximal and distal edges    Apply Hydraguard to scaly skin on the incision line

## 2023-05-10 NOTE — ASSESSMENT & PLAN NOTE
· History of bipolar disorder, OCD, PTSD  · Admitted medically from [place] [address] psychiatric unit  Per discharge summary, psychiatrically cleared for medical admission  · Also, currently homeless  · Continue medications including Lamictal, hydroxyzine, Geodon  · Supportive care  · Denies current suicidal ideation  · Psychiatry reevaluation completed    Continue current plan of care

## 2023-05-10 NOTE — NUTRITION
"   05/10/23 1043   Biochemical Data,Medical Tests, and Procedures   Biochemical Data/Medical Tests/Procedures Lab values reviewed; Meds reviewed   Labs (Comment) 5/10/23 glucose 148, H&H 10 0/34 4   Meds (Comment) folic acid, insulin, levothyroxine, protonix   Nutrition-Focused Physical Exam   Nutrition-Focused Physical Exam Findings RN skin assessment reviewed;Edema; Wound   Nutrition-Focused Physical Exam Findings left LE non-pitting edema, wound at left foot, right BKA   Medical-Related Concerns type 2 diabetes (on Metformin), hypothyroidism, GERD, right BKA, history of bariatric surgery, HTN, obesity   Current PO Intake   Current Diet Order CCD2, thin liquids   Current Meal Intake Adequate   Estimated calorie intake compared to estimated need Nutrient needs are met  PES Statement   Knowledge and Beliefs (1) Limited adherence to nutrition-related recommendations NB-1 6   Related to Other (Comment)  (carbohydrate controlled diet)   As evidenced by: Per patient/family interview   Recommendations/Interventions   Malnutrition/BMI Present No   Summary Wound Care RN consulted  Presents with left foot pain and for psychiatric evaluation  Past medical history significant for type 2 diabetes (on Metformin), hypothyroidism, GERD, right BKA, history of bariatric surgery, HTN, obesity  Weight history reviewed - data very limited  Wound at left foot per flow sheet documentation  Ordered for CCD2  Meal intakes documented at 100%  Pt reports her appetite is \"ehh so-so\" at present  Pt reports usually having 2 meals daily  Pt is experiencing homelessness  History of gastric bypass 5 years ago  Pt with poor dentition and no dentures  States she has no difficulty chewing or swallowing  NKFA  Pt states \"when I am upset I eat a little more than normal  Reports her weight is usually around 210# and weight fluctuates  Observed 100% lunch intake  Also observed [employer] peanut butter cups, salt shaker, and non-dairy creamer on pt's tray   " Verbally discussed low sodium diet in setting of weight fluctuation and carbohydrate controlled diet nutrition education  Pt indicates she is not interested in behavior change at this time  RD to follow  Interventions/Recommendations Continue current diet order   Education Assessment   Education Education initiated/ completed   Patient Nutrition Goals   Goal Comprehend education; Adequate hydration; Adequate intake

## 2023-05-10 NOTE — WOUND OSTOMY CARE
Consult Note - Wound   Luis Atkins 53 y o  female MRN: [id number]  Unit/Bed#: -01 Encounter: [id number]      History and Present Illness:  53year old female patient admitted with osteomyelitis of the left foot  Wound care consulted for wound to the right BKA stump  Patient history significant for psychiatric illness, chronic pain, right BKA, bariatric surgery, anxiety, DDM2, HTN, OCD, PTSD, sepsis, morbid obesity with a BMI of 37 79, homeless, bipolar 1 disorder, and arthritis  Assessment Findings:   Patient OOB in the recliner  She is awake, alert and oriented  She is cooperative with assessment  She has a prosthetic lower leg for the right leg  She is not incontinent  She is able to feed herself, she is a min assist with the walker and surgical shoe in place, she is independent with hygiene care, needs set up  Discussed with patient not to use prothesis at this time until wounds on the incision line heal     1  Left foot dressing intact  2  Right BKA stump with healing incision line  Scaly dry areas to the lateral and medial sides of the incision line, mid incision line are two small partial thickness wounds  Scant yellow drainage from the larger wound, yellow base to both wound beds  Hydraguard applied to dried scaly skin as patient was attempting to scratch the area  Silvasorb gel applied to the two small wound beds, covered with 2X2 and attached at the proximal and distal edges with paper tape  Noted to the mid stump a crease at the center of the incision line extending distally  3  Buttocks, sacrum intact  4  Abdominal, groin and breast folds intact    Communicated via Tiger Text with Wolf Cordova regarding small area of dehiscence to the left foot incision as observed in media images    Skin and Wound Care Plan:   1  Ehob offloading cushion to chair when OOB  2  Elevate left heel off of bed with pillow to offload  3  Apply skin nourishing cream to the skin daily  4  Turn patient Q2 hours  5  Cleanse incision scar to right BKA stump with NSS, pat dry  Apply small amount of Silvasorb gel to the two small partial thickness areas to the mid incision line, cover with 2X2 and paper tape the proximal and distal edges  Apply Hydraguard to scaly skin on the incision line    Wounds:      Wound 03/23/23 Leg Right (Active)   Wound Image   05/10/23 1434   Wound Description Dry;Yellow 05/10/23 1600   Yvonne-wound Assessment Dry;Scaly 05/10/23 1600   Wound Length (cm) 0 5 cm 05/10/23 1600   Wound Width (cm) 1 cm 05/10/23 1600   Wound Depth (cm) 0 1 cm 05/10/23 1600   Wound Surface Area (cm^2) 0 5 cm^2 05/10/23 1600   Wound Volume (cm^3) 0 05 cm^3 05/10/23 1600   Calculated Wound Volume (cm^3) 0 05 cm^3 05/10/23 1600   Drainage Amount Scant 05/10/23 1600   Drainage Description Yellow 05/10/23 1600   Non-staged Wound Description Partial thickness 05/10/23 1600   Treatments Cleansed 05/10/23 1600   Dressing Dry dressing;Silvasorb gel 05/10/23 1600   Dressing Changed New 05/10/23 1600   Patient Tolerance Tolerated well 05/10/23 1600       Wound 05/08/23 Foot Left (Active)   Wound Image    05/09/23 2253   Wound Description Drainage; Non-blanchable erythema 05/09/23 2253   Yvonne-wound Assessment Intact 05/09/23 2253   Drainage Amount Moderate 05/09/23 2253   Drainage Description Bloody 05/09/23 2253   Treatments Cleansed;Site care;Elevated 05/09/23 2253   Dressing Dry dressing 05/08/23 1625   Wound packed?  No 05/09/23 2253   Dressing Changed Changed 05/09/23 2253   Patient Tolerance Tolerated well 05/09/23 2253   Dressing Status Intact 05/10/23 1435     Reviewed plan of care with primary RN Akilah  Recommendations written as orders  Wound care team to follow weekly while admitted  Questions or concerns [address] [facility] Nurse    Tatyana DENTONN, RN, [facility]

## 2023-05-10 NOTE — ASSESSMENT & PLAN NOTE
· Received from [address] [place] [address] patient hartman with MRI results suggesting osteomyelitis of the left first metatarsal  · S/p left first ray resection podiatry 5/8/2023  · Continue doxycycline for 14 days per podiatry  · Wound care, surgical shoe  · Intraoperative cultures pending  · PT recommendations appreciated: Postacute rehab

## 2023-05-10 NOTE — PLAN OF CARE
Problem: OCCUPATIONAL THERAPY ADULT  Goal: Performs self-care activities at highest level of function for planned discharge setting  See evaluation for individualized goals  Description: Treatment Interventions: ADL retraining, Functional transfer training, Endurance training, Patient/family training          See flowsheet documentation for full assessment, interventions and recommendations  Outcome: Progressing  Note: Limitation: Decreased ADL status, Decreased endurance, Decreased high-level ADLs     Assessment: Pt is a 53 y o  female seen for OT evaluation s/p admit to [facility] on 5/8/2023 w/ Osteomyelitis of left foot ([address] )  Comorbidities affecting pt's functional performance at time of assessment include: DM, HTN, obesity, previous surgery, neuropathy and hx of R BKA  Personal factors affecting pt at time of IE include:limited home support, difficulty performing ADLS, difficulty performing IADLS , financial barriers, health management  and homeless  Prior to admission, pt was needing assistance with some ADLs, able to perform mobility with stand pivot transfers and using w/c  Pt reports recently receiving RLE prosthesis, and was able to perform steps  Upon evaluation: Pt requires CGA x1 with RW for stand pivot transfers without RLE prosthesis, and Moderate Assistance for some basic ADLs 2* the following deficits impacting occupational performance: decreased strength, decreased balance, decreased tolerance, increased pain and orthotics, and impaired skin integrity  Pt to benefit from continued skilled OT tx while in the hospital to address deficits as defined above and maximize level of functional independence w ADL's and functional mobility  Occupational Performance areas to address include: grooming, bathing/shower, toilet hygiene, dressing and functional mobility  From OT standpoint, recommendation at time of d/c would be inpatient rehab       OT Discharge Recommendation: Post acute rehabilitation services        Johann Sherman, OTR/L

## 2023-05-11 LAB
BACTERIA TISS AEROBE CULT: ABNORMAL
BACTERIA TISS AEROBE CULT: ABNORMAL
GLUCOSE SERPL-MCNC: 182 MG/DL (ref 65–140)
GLUCOSE SERPL-MCNC: 198 MG/DL (ref 65–140)
GLUCOSE SERPL-MCNC: 257 MG/DL (ref 65–140)
GLUCOSE SERPL-MCNC: 322 MG/DL (ref 65–140)
GRAM STN SPEC: ABNORMAL

## 2023-05-11 RX ORDER — PANTOPRAZOLE SODIUM 40 MG/1
40 TABLET, DELAYED RELEASE ORAL 2 TIMES DAILY
Status: DISCONTINUED | OUTPATIENT
Start: 2023-05-11 | End: 2023-05-23 | Stop reason: HOSPADM

## 2023-05-11 RX ORDER — LIDOCAINE 50 MG/G
1 PATCH TOPICAL DAILY
Status: DISCONTINUED | OUTPATIENT
Start: 2023-05-11 | End: 2023-05-23 | Stop reason: HOSPADM

## 2023-05-11 RX ORDER — LANOLIN ALCOHOL/MO/W.PET/CERES
6 CREAM (GRAM) TOPICAL
Status: DISCONTINUED | OUTPATIENT
Start: 2023-05-11 | End: 2023-05-23 | Stop reason: HOSPADM

## 2023-05-11 RX ADMIN — LEVOTHYROXINE SODIUM 50 MCG: 50 TABLET ORAL at 06:11

## 2023-05-11 RX ADMIN — Medication 6 MG: at 22:23

## 2023-05-11 RX ADMIN — INSULIN LISPRO 2 UNITS: 100 INJECTION, SOLUTION INTRAVENOUS; SUBCUTANEOUS at 16:31

## 2023-05-11 RX ADMIN — LAMOTRIGINE 300 MG: 100 TABLET ORAL at 10:00

## 2023-05-11 RX ADMIN — PANTOPRAZOLE SODIUM 40 MG: 40 TABLET, DELAYED RELEASE ORAL at 21:56

## 2023-05-11 RX ADMIN — LIDOCAINE 1 PATCH: 50 PATCH CUTANEOUS at 09:58

## 2023-05-11 RX ADMIN — FENOFIBRATE 48 MG: 48 TABLET, FILM COATED ORAL at 09:59

## 2023-05-11 RX ADMIN — ZIPRASIDONE HYDROCHLORIDE 80 MG: 40 CAPSULE ORAL at 16:29

## 2023-05-11 RX ADMIN — METHOCARBAMOL TABLETS 750 MG: 500 TABLET, COATED ORAL at 11:40

## 2023-05-11 RX ADMIN — DOXYCYCLINE 100 MG: 100 CAPSULE ORAL at 09:59

## 2023-05-11 RX ADMIN — DOXYCYCLINE 100 MG: 100 CAPSULE ORAL at 21:56

## 2023-05-11 RX ADMIN — HYDROXYZINE HYDROCHLORIDE 100 MG: 25 TABLET ORAL at 21:56

## 2023-05-11 RX ADMIN — ZIPRASIDONE HYDROCHLORIDE 80 MG: 40 CAPSULE ORAL at 07:55

## 2023-05-11 RX ADMIN — HEPARIN SODIUM 5000 UNITS: 5000 INJECTION INTRAVENOUS; SUBCUTANEOUS at 14:24

## 2023-05-11 RX ADMIN — CLONIDINE HYDROCHLORIDE 0.2 MG: 0.1 TABLET ORAL at 09:59

## 2023-05-11 RX ADMIN — INSULIN LISPRO 1 UNITS: 100 INJECTION, SOLUTION INTRAVENOUS; SUBCUTANEOUS at 07:55

## 2023-05-11 RX ADMIN — PREGABALIN 150 MG: 75 CAPSULE ORAL at 17:59

## 2023-05-11 RX ADMIN — CLONIDINE HYDROCHLORIDE 0.2 MG: 0.1 TABLET ORAL at 21:56

## 2023-05-11 RX ADMIN — INSULIN LISPRO 3 UNITS: 100 INJECTION, SOLUTION INTRAVENOUS; SUBCUTANEOUS at 11:40

## 2023-05-11 RX ADMIN — INSULIN LISPRO 5 UNITS: 100 INJECTION, SOLUTION INTRAVENOUS; SUBCUTANEOUS at 21:57

## 2023-05-11 RX ADMIN — FOLIC ACID 1000 MCG: 1 TABLET ORAL at 09:59

## 2023-05-11 RX ADMIN — HYDROXYZINE HYDROCHLORIDE 50 MG: 25 TABLET ORAL at 09:59

## 2023-05-11 RX ADMIN — HEPARIN SODIUM 5000 UNITS: 5000 INJECTION INTRAVENOUS; SUBCUTANEOUS at 21:56

## 2023-05-11 RX ADMIN — OXYCODONE HYDROCHLORIDE 10 MG: 10 TABLET ORAL at 14:24

## 2023-05-11 RX ADMIN — HEPARIN SODIUM 5000 UNITS: 5000 INJECTION INTRAVENOUS; SUBCUTANEOUS at 06:11

## 2023-05-11 RX ADMIN — OXYCODONE HYDROCHLORIDE 10 MG: 10 TABLET ORAL at 20:12

## 2023-05-11 RX ADMIN — METHOCARBAMOL TABLETS 750 MG: 500 TABLET, COATED ORAL at 20:12

## 2023-05-11 RX ADMIN — OXYCODONE HYDROCHLORIDE 10 MG: 10 TABLET ORAL at 06:11

## 2023-05-11 RX ADMIN — PREGABALIN 150 MG: 75 CAPSULE ORAL at 09:59

## 2023-05-11 RX ADMIN — PANTOPRAZOLE SODIUM 40 MG: 40 TABLET, DELAYED RELEASE ORAL at 07:55

## 2023-05-11 NOTE — ASSESSMENT & PLAN NOTE
· History of bipolar disorder, OCD, PTSD  · Admitted medically from [place] [address] psychiatric unit  Per discharge summary, psychiatrically cleared for medical admission  · Also, currently homeless  · Continue medications including Lamictal, hydroxyzine, Geodon  · Supportive care  · Denies current suicidal ideation  · Psychiatry reevaluation completed    Continue current plan of care

## 2023-05-11 NOTE — PLAN OF CARE
Problem: MOBILITY - ADULT  Goal: Maintain or return to baseline ADL function  Description: INTERVENTIONS:  -  Assess patient's ability to carry out ADLs; assess patient's baseline for ADL function and identify physical deficits which impact ability to perform ADLs (bathing, care of mouth/teeth, toileting, grooming, dressing, etc )  - Assess/evaluate cause of self-care deficits   - Assess range of motion  - Assess patient's mobility; develop plan if impaired  - Assess patient's need for assistive devices and provide as appropriate  - Encourage maximum independence but intervene and supervise when necessary  - Involve family in performance of ADLs  - Assess for home care needs following discharge   - Consider OT consult to assist with ADL evaluation and planning for discharge  - Provide patient education as appropriate  Outcome: Progressing     Problem: PAIN - ADULT  Goal: Verbalizes/displays adequate comfort level or baseline comfort level  Description: Interventions:  - Encourage patient to monitor pain and request assistance  - Assess pain using appropriate pain scale  - Administer analgesics based on type and severity of pain and evaluate response  - Implement non-pharmacological measures as appropriate and evaluate response  - Consider cultural and social influences on pain and pain management  - Notify physician/advanced practitioner if interventions unsuccessful or patient reports new pain  Outcome: Progressing     Problem: INFECTION - ADULT  Goal: Absence or prevention of progression during hospitalization  Description: INTERVENTIONS:  - Assess and monitor for signs and symptoms of infection  - Monitor lab/diagnostic results  - Monitor all insertion sites, i e  indwelling lines, tubes, and drains  - Monitor endotracheal if appropriate and nasal secretions for changes in amount and color  - [place] appropriate cooling/warming therapies per order  - Administer medications as ordered  - Instruct and encourage patient and family to use good hand hygiene technique  - Identify and instruct in appropriate isolation precautions for identified infection/condition  Outcome: Progressing     Problem: SAFETY ADULT  Goal: Maintain or return to baseline ADL function  Description: INTERVENTIONS:  -  Assess patient's ability to carry out ADLs; assess patient's baseline for ADL function and identify physical deficits which impact ability to perform ADLs (bathing, care of mouth/teeth, toileting, grooming, dressing, etc )  - Assess/evaluate cause of self-care deficits   - Assess range of motion  - Assess patient's mobility; develop plan if impaired  - Assess patient's need for assistive devices and provide as appropriate  - Encourage maximum independence but intervene and supervise when necessary  - Involve family in performance of ADLs  - Assess for home care needs following discharge   - Consider OT consult to assist with ADL evaluation and planning for discharge  - Provide patient education as appropriate  Outcome: Progressing     Problem: Knowledge Deficit  Goal: Patient/family/caregiver demonstrates understanding of disease process, treatment plan, medications, and discharge instructions  Description: Complete learning assessment and assess knowledge base    Interventions:  - Provide teaching at level of understanding  - Provide teaching via preferred learning methods  Outcome: Progressing

## 2023-05-11 NOTE — NURSING NOTE
Patient awake and alert, OOB in chair  Complaint of pain to lower back and L foot, 8 out of 10    Call bell in reach

## 2023-05-11 NOTE — OCCUPATIONAL THERAPY NOTE
"   05/11/23 0925   OT Last Visit   OT Visit Date 05/11/23   Note Type   Note Type Cancelled Session   Cancel Reasons Refusal  (feeling too tired and \"they won't let me put my prosthesis on anyway\")       Mil JanuaryKRISSY    "

## 2023-05-11 NOTE — ASSESSMENT & PLAN NOTE
· Received from [address] [place] [address] patient hartman with MRI results suggesting osteomyelitis of the left first metatarsal  · S/p left first ray resection podiatry 5/8/2023  · Continue doxycycline for 14 days per podiatry  · Wound care, surgical shoe  · Intraoperative cultures pending  · PT recommendations appreciated: Postacute rehab  · Case management input appreciated    Currently optioning

## 2023-05-11 NOTE — PROGRESS NOTES
[facility]  Progress Note  Name: Namita Becerra  MRN: [id number]  Unit/Bed#: -01 I Date of Admission: 5/8/2023   Date of Service: 5/11/2023 I Hospital Day: 3    Assessment/Plan      * Osteomyelitis of left foot [facility])  Assessment & Plan  · Received from [address] [place] [address] psychiatric patient hartman with MRI results suggesting osteomyelitis of the left first metatarsal  · S/p left first ray resection podiatry 5/8/2023  · Continue doxycycline for 14 days per podiatry  · Wound care, surgical shoe  · Intraoperative cultures pending  · PT recommendations appreciated: Postacute rehab  · Case management input appreciated  Currently optioning    Hx of right BKA ([address] )  Assessment & Plan  · History of right BKA 3/20/2023  · Serial assessments and supportive care  · Fall precautions    GERD (gastroesophageal reflux disease)  Assessment & Plan  · Continue PPI    Chronic pain  Assessment & Plan  · Previously on OxyContin for chronic back pain, transitioned to oxycodone 10 mg every 6 hours while in inpatient psychiatric unit  · Monitor for adequate pain control and or signs of oversedation or withdrawal    Psychiatric illness  Assessment & Plan  · History of bipolar disorder, OCD, PTSD  · Admitted medically from [place] [address] psychiatric unit  Per discharge summary, psychiatrically cleared for medical admission  · Also, currently homeless  · Continue medications including Lamictal, hydroxyzine, Geodon  · Supportive care  · Denies current suicidal ideation  · Psychiatry reevaluation completed  Continue current plan of care      Hypothyroidism  Assessment & Plan  · Continue levothyroxine       VTE Pharmacologic Prophylaxis: VTE Score: 3 Moderate Risk (Score 3-4) - Pharmacological DVT Prophylaxis Ordered: heparin  Patient Centered Rounds: I performed bedside rounds with nursing staff today    Discussions with Specialists or Other Care Team Provider: CM    Education and Discussions with Family / Patient: Updated patient regarding plan of care  Total Time Spent on Date of Encounter in care of patient: 35 minutes This time was spent on one or more of the following: performing physical exam; counseling and coordination of care; obtaining or reviewing history; documenting in the medical record; reviewing/ordering tests, medications or procedures; communicating with other healthcare professionals and discussing with patient's family/caregivers  Current Length of Stay: 3 day(s)  Current Patient Status: Inpatient   Certification Statement: The patient will continue to require additional inpatient hospital stay due to care coordination  Discharge Plan: Anticipate discharge in 24-48 hrs to rehab facility  Code Status: Level 1 - Full Code    Subjective:   Patient seen and examined  She is reporting back pain from her sciatica  Also left foot pain  No shortness of breath, headache, fever  Objective:     Vitals:   Temp (24hrs), Av 2 °F (36 8 °C), Min:97 9 °F (36 6 °C), Max:98 3 °F (36 8 °C)    Temp:  [97 9 °F (36 6 °C)-98 3 °F (36 8 °C)] 98 3 °F (36 8 °C)  HR:  [66-80] 66  Resp:  [16-18] 16  BP: (119-161)/(67-88) 119/67  SpO2:  [92 %-96 %] 95 %  Body mass index is 37 79 kg/m²  Input and Output Summary (last 24 hours): Intake/Output Summary (Last 24 hours) at 2023 0931  Last data filed at 2023 2499  Gross per 24 hour   Intake 720 ml   Output 875 ml   Net -155 ml       Physical Exam:   Physical Exam  Vitals and nursing note reviewed  Constitutional:       General: She is not in acute distress  Appearance: She is not ill-appearing  HENT:      Head: Normocephalic and atraumatic  Mouth/Throat:      Mouth: Mucous membranes are moist       Pharynx: Oropharynx is clear  Eyes:      Pupils: Pupils are equal, round, and reactive to light  Cardiovascular:      Rate and Rhythm: Normal rate and regular rhythm     Pulmonary:      Effort: Pulmonary effort is normal  No respiratory distress  Breath sounds: Normal breath sounds  Abdominal:      General: Bowel sounds are normal       Palpations: Abdomen is soft  Tenderness: There is no abdominal tenderness  Musculoskeletal:      Cervical back: Neck supple  Left lower leg: No edema  Comments: Right BKA   Skin:     General: Skin is warm and dry  Capillary Refill: Capillary refill takes less than 2 seconds  Comments: Left foot surgical dressing dry and intact  Neurological:      General: No focal deficit present  Mental Status: She is alert            Additional Data:     Labs:  Results from last 7 days   Lab Units 05/10/23  0537   WBC Thousand/uL 6 67   HEMOGLOBIN g/dL 10 0*   HEMATOCRIT % 34 4*   PLATELETS Thousands/uL 432*   NEUTROS PCT % 31*   LYMPHS PCT % 51*   MONOS PCT % 11   EOS PCT % 7*     Results from last 7 days   Lab Units 05/10/23  0537   SODIUM mmol/L 137   POTASSIUM mmol/L 4 2   CHLORIDE mmol/L 103   CO2 mmol/L 26   BUN mg/dL 19   CREATININE mg/dL 0 87   ANION GAP mmol/L 8   CALCIUM mg/dL 9 7   GLUCOSE RANDOM mg/dL 148*         Results from last 7 days   Lab Units 05/11/23  0734 05/10/23  2100 05/10/23  1601 05/10/23  1055 05/10/23  0724 05/09/23  2113 05/09/23  1616 05/09/23  1125 05/09/23  0644 05/08/23  2059 05/08/23  1741 05/08/23  1411   POC GLUCOSE mg/dl 182* 207* 266* 184* 140 228* 157* 157* 129 197* 98 117     Results from last 7 days   Lab Units 05/05/23  0539   HEMOGLOBIN A1C % 7 2*           Lines/Drains:  Invasive Devices     Peripheral Intravenous Line  Duration           Peripheral IV 05/08/23 Right;Ventral (anterior) Forearm 2 days                Recent Cultures (last 7 days):   Results from last 7 days   Lab Units 05/08/23  1601   GRAM STAIN RESULT  No Polys or Bacteria seen       Last 24 Hours Medication List:   Current Facility-Administered Medications   Medication Dose Route Frequency Provider Last Rate   • acetaminophen  650 mg Oral Q6H PRN JOSEE Pulido     • cloNIDine  0 2 mg Oral BID Nubia Joi, CRNP     • doxycycline hyclate  100 mg Oral Q12H [address] Nubia Joi, CRNP     • fenofibrate  48 mg Oral Daily Nubia Joi, CRNP     • folic acid  2,088 mcg Oral Daily Nubia Dres, CRNP     • heparin (porcine)  5,000 Units Subcutaneous [facility] Nubia Dres, CRNP     • hydrOXYzine HCL  100 mg Oral HS Nubia Joi, CRNP     • hydrOXYzine HCL  50 mg Oral Daily Nubia Joi, CRNP     • insulin lispro  1-6 Units Subcutaneous TID AC Nubia Joi, CRNP     • insulin lispro  1-6 Units Subcutaneous HS Nubia Joi, CRNP     • lamoTRIgine  300 mg Oral QAM Nubia Tamez, CRNP     • levothyroxine  50 mcg Oral Early Morning Nubia Joi, CRNP     • lidocaine  1 patch Topical Daily Nubiakraig Tamez, CRNP     • methocarbamol  750 mg Oral Q6H PRN Douglass Habermann, PA-C     • ondansetron  4 mg Intravenous Q6H PRN Douglass Habermann, PA-C     • oxyCODONE  10 mg Oral Q6H PRN Nubia Tamez CRNP     • pantoprazole  40 mg Oral BID AC JOSEE Roman     • pregabalin  150 mg Oral BID JOSEE Roman     • ziprasidone  80 mg Oral BID With Meals JOSEE Roman          Today, Patient Was Seen By: JOSEE Roman    **Please Note: This note may have been constructed using a voice recognition system  **

## 2023-05-12 LAB
ANION GAP SERPL CALCULATED.3IONS-SCNC: 7 MMOL/L (ref 4–13)
BASOPHILS # BLD AUTO: 0.05 THOUSANDS/ÂΜL (ref 0–0.1)
BASOPHILS NFR BLD AUTO: 1 % (ref 0–1)
BUN SERPL-MCNC: 17 MG/DL (ref 5–25)
CALCIUM SERPL-MCNC: 10.1 MG/DL (ref 8.4–10.2)
CHLORIDE SERPL-SCNC: 101 MMOL/L (ref 96–108)
CO2 SERPL-SCNC: 27 MMOL/L (ref 21–32)
CREAT SERPL-MCNC: 0.92 MG/DL (ref 0.6–1.3)
EOSINOPHIL # BLD AUTO: 0.62 THOUSAND/ÂΜL (ref 0–0.61)
EOSINOPHIL NFR BLD AUTO: 8 % (ref 0–6)
ERYTHROCYTE [DISTWIDTH] IN BLOOD BY AUTOMATED COUNT: 21.3 % (ref 11.6–15.1)
GFR SERPL CREATININE-BSD FRML MDRD: 74 ML/MIN/1.73SQ M
GLUCOSE SERPL-MCNC: 163 MG/DL (ref 65–140)
GLUCOSE SERPL-MCNC: 216 MG/DL (ref 65–140)
GLUCOSE SERPL-MCNC: 263 MG/DL (ref 65–140)
GLUCOSE SERPL-MCNC: 266 MG/DL (ref 65–140)
GLUCOSE SERPL-MCNC: 274 MG/DL (ref 65–140)
HCT VFR BLD AUTO: 34.7 % (ref 34.8–46.1)
HGB BLD-MCNC: 10.3 G/DL (ref 11.5–15.4)
IMM GRANULOCYTES # BLD AUTO: 0.01 THOUSAND/UL (ref 0–0.2)
IMM GRANULOCYTES NFR BLD AUTO: 0 % (ref 0–2)
LYMPHOCYTES # BLD AUTO: 3.95 THOUSANDS/ÂΜL (ref 0.6–4.47)
LYMPHOCYTES NFR BLD AUTO: 47 % (ref 14–44)
MCH RBC QN AUTO: 22.1 PG (ref 26.8–34.3)
MCHC RBC AUTO-ENTMCNC: 29.7 G/DL (ref 31.4–37.4)
MCV RBC AUTO: 75 FL (ref 82–98)
MONOCYTES # BLD AUTO: 0.9 THOUSAND/ÂΜL (ref 0.17–1.22)
MONOCYTES NFR BLD AUTO: 11 % (ref 4–12)
NEUTROPHILS # BLD AUTO: 2.76 THOUSANDS/ÂΜL (ref 1.85–7.62)
NEUTS SEG NFR BLD AUTO: 33 % (ref 43–75)
NRBC BLD AUTO-RTO: 0 /100 WBCS
PLATELET # BLD AUTO: 455 THOUSANDS/UL (ref 149–390)
PMV BLD AUTO: 9.5 FL (ref 8.9–12.7)
POTASSIUM SERPL-SCNC: 4.5 MMOL/L (ref 3.5–5.3)
RBC # BLD AUTO: 4.66 MILLION/UL (ref 3.81–5.12)
SODIUM SERPL-SCNC: 135 MMOL/L (ref 135–147)
WBC # BLD AUTO: 8.29 THOUSAND/UL (ref 4.31–10.16)

## 2023-05-12 RX ADMIN — Medication 6 MG: at 21:59

## 2023-05-12 RX ADMIN — LEVOTHYROXINE SODIUM 50 MCG: 50 TABLET ORAL at 06:18

## 2023-05-12 RX ADMIN — ZIPRASIDONE HYDROCHLORIDE 80 MG: 40 CAPSULE ORAL at 07:54

## 2023-05-12 RX ADMIN — OXYCODONE HYDROCHLORIDE 10 MG: 10 TABLET ORAL at 08:30

## 2023-05-12 RX ADMIN — HEPARIN SODIUM 5000 UNITS: 5000 INJECTION INTRAVENOUS; SUBCUTANEOUS at 14:35

## 2023-05-12 RX ADMIN — INSULIN LISPRO 3 UNITS: 100 INJECTION, SOLUTION INTRAVENOUS; SUBCUTANEOUS at 16:17

## 2023-05-12 RX ADMIN — HYDROXYZINE HYDROCHLORIDE 100 MG: 25 TABLET ORAL at 21:58

## 2023-05-12 RX ADMIN — INSULIN LISPRO 4 UNITS: 100 INJECTION, SOLUTION INTRAVENOUS; SUBCUTANEOUS at 21:59

## 2023-05-12 RX ADMIN — HEPARIN SODIUM 5000 UNITS: 5000 INJECTION INTRAVENOUS; SUBCUTANEOUS at 06:18

## 2023-05-12 RX ADMIN — LAMOTRIGINE 300 MG: 100 TABLET ORAL at 08:30

## 2023-05-12 RX ADMIN — DOXYCYCLINE 100 MG: 100 CAPSULE ORAL at 08:30

## 2023-05-12 RX ADMIN — INSULIN LISPRO 3 UNITS: 100 INJECTION, SOLUTION INTRAVENOUS; SUBCUTANEOUS at 12:02

## 2023-05-12 RX ADMIN — METHOCARBAMOL TABLETS 750 MG: 500 TABLET, COATED ORAL at 14:34

## 2023-05-12 RX ADMIN — PREGABALIN 150 MG: 75 CAPSULE ORAL at 08:30

## 2023-05-12 RX ADMIN — INSULIN LISPRO 1 UNITS: 100 INJECTION, SOLUTION INTRAVENOUS; SUBCUTANEOUS at 07:54

## 2023-05-12 RX ADMIN — PREGABALIN 150 MG: 75 CAPSULE ORAL at 18:01

## 2023-05-12 RX ADMIN — CLONIDINE HYDROCHLORIDE 0.2 MG: 0.1 TABLET ORAL at 08:31

## 2023-05-12 RX ADMIN — ZIPRASIDONE HYDROCHLORIDE 80 MG: 40 CAPSULE ORAL at 16:17

## 2023-05-12 RX ADMIN — OXYCODONE HYDROCHLORIDE 10 MG: 10 TABLET ORAL at 14:34

## 2023-05-12 RX ADMIN — HYDROXYZINE HYDROCHLORIDE 50 MG: 25 TABLET ORAL at 08:30

## 2023-05-12 RX ADMIN — METHOCARBAMOL TABLETS 750 MG: 500 TABLET, COATED ORAL at 20:27

## 2023-05-12 RX ADMIN — PANTOPRAZOLE SODIUM 40 MG: 40 TABLET, DELAYED RELEASE ORAL at 21:59

## 2023-05-12 RX ADMIN — FENOFIBRATE 48 MG: 48 TABLET, FILM COATED ORAL at 08:30

## 2023-05-12 RX ADMIN — OXYCODONE HYDROCHLORIDE 10 MG: 10 TABLET ORAL at 02:13

## 2023-05-12 RX ADMIN — FOLIC ACID 1000 MCG: 1 TABLET ORAL at 08:31

## 2023-05-12 RX ADMIN — METHOCARBAMOL TABLETS 750 MG: 500 TABLET, COATED ORAL at 08:30

## 2023-05-12 RX ADMIN — CLONIDINE HYDROCHLORIDE 0.2 MG: 0.1 TABLET ORAL at 21:58

## 2023-05-12 RX ADMIN — PANTOPRAZOLE SODIUM 40 MG: 40 TABLET, DELAYED RELEASE ORAL at 08:30

## 2023-05-12 RX ADMIN — METHOCARBAMOL TABLETS 750 MG: 500 TABLET, COATED ORAL at 02:13

## 2023-05-12 RX ADMIN — DOXYCYCLINE 100 MG: 100 CAPSULE ORAL at 21:59

## 2023-05-12 RX ADMIN — HEPARIN SODIUM 5000 UNITS: 5000 INJECTION INTRAVENOUS; SUBCUTANEOUS at 21:59

## 2023-05-12 RX ADMIN — OXYCODONE HYDROCHLORIDE 10 MG: 10 TABLET ORAL at 20:27

## 2023-05-12 NOTE — ASSESSMENT & PLAN NOTE
· History of bipolar disorder, OCD, PTSD  · Admitted medically from [place] [address] psychiatric unit  Per discharge summary, psychiatrically cleared for medical admission  · Also, currently homeless  · Continue medications including Lamictal, hydroxyzine, Geodon  · Supportive care  · Denies current suicidal ideation  · Psychiatry reevaluation completed    Continue current plan of care

## 2023-05-12 NOTE — ASSESSMENT & PLAN NOTE
· Received from [address] [place] [address] psychiatric patient hartman with MRI results suggesting osteomyelitis of the left first metatarsal  · S/p left first ray resection podiatry 5/8/2023  · Intraoperative cultures: 1+growth of Staphylococcus aureus   · Continue doxycycline for 14 days per podiatry  · Wound care, surgical shoe  · PT recommendations appreciated: Postacute rehab  · Case management input appreciated    Currently optioning

## 2023-05-12 NOTE — OCCUPATIONAL THERAPY NOTE
"   05/12/23 0907   OT Last Visit   OT Visit Date 05/12/23   Note Type   Note Type Treatment  (completed [id number])   Pain Assessment   Pain Assessment Tool 0-10   Pain Score 9  (but reported that she got pain medicine And it was helping)   Restrictions/Precautions   Weight Bearing Precautions Per Order Yes   LLE Weight Bearing Per Order WBAT  (with surgical shoe)   Other Precautions Fall Risk;Pain;WBS   Lifestyle   Reciprocal Relationships none mentioned   Intrinsic Gratification cooking, baking, knitting, jigsaw puzzles   ADL   Where Assessed Edge of bed   Grooming Comments patient declined combing hair at this time ; declined use of mouthwash as well   UB Bathing Assistance 5  Supervision/Setup   UB Bathing Deficit Setup;Supervision/safety   LB Bathing Assistance 4  Minimal Assistance   LB Bathing Deficit Setup;Verbal cueing;Supervision/safety; Increased time to complete; Other (Comment)  (Left lower leg (foot is wrapped))   UB Dressing Assistance 4  Minimal Assistance   UB Dressing Comments *with unfamiliar aspects of hospital gown change   LB Dressing Comments not tested at this time   [place] Deficit Setup;Supervison/safety; Increased time to complete; Bedside commode   Toileting Comments *LB clothing management not tested this session   Toilet Transfers   Toilet Transfer From Bed   Toilet Transfer Type To and from   Toilet Transfer to Standard bedside commode   Toilet Transfer Technique Stand pivot   Toilet Transfers Contact guard   Toilet Transfers Comments *patient uses armrests of commode to complete stand pivot   Coordination   Gross Motor WFL   Subjective   Subjective patient seemed annoyed at times during session  (\"I don't have a place (to live) yet\")   Cognition   Overall Cognitive Status [facility]   Arousal/Participation Alert; Cooperative   Attention Attends with cues to redirect   Orientation Level Oriented X4   Following Commands Follows one step commands " without difficulty   Activity Tolerance   Activity Tolerance Patient limited by fatigue;Patient limited by pain   Assessment   Assessment Patient participated in Skilled OT session this date with interventions consisting of ADL re training with the use of correct body mechnaics, Energy Conservation techniques, safety awareness and fall prevention techniques,  therapeutic activities to: increase activity tolerance, increase dynamic sit/ stand balance during functional activity  and increase postural control, and increase EOB/sitting tolerance   Patient agreeable to OT treatment session, upon arrival patient was found seated in bed (back supported) - patient seemed tired  Patient requiring verbal cues for correct technique and ocassional safety reminders, occasional rest periods, and self-care assistance as noted in flow sheet  Patient continues to be functioning below baseline level, occupational performance remains limited secondary to factors listed above and increased risk for falls and injury  From OT standpoint, recommendation at time of d/c would be post-acute rehabilitation services  Patient to benefit from continued Occupational Therapy treatment while in the hospital to address deficits as defined above and maximize level of functional independence with ADLs and functional mobility  Plan   Treatment Interventions Functional transfer training;Patient/family training; Activityengagement; Energy conservation   Goal Expiration Date 05/24/23   OT Treatment Day 1   Recommendation   OT Discharge Recommendation Post acute rehabilitation services   Additional Comments 2 The patient's raw score on the AM-PAC Daily Activity Inpatient Short Form is 19  A raw score of greater than or equal to 19 suggests the patient may benefit from discharge to home  Please refer to the recommendation of the Occupational Therapist for safe discharge planning     AM-PAC Daily Activity Inpatient   Lower Body Dressing 3   Bathing 3 Toileting 3   Upper Body Dressing 3   Grooming 3   Eating 4   Daily Activity Raw Score 19   Daily Activity Standardized Score (Calc for Raw Score >=11) 40 22   AM-PAC Applied Cognition Inpatient   Following a Speech/Presentation 4   Understanding Ordinary Conversation 4   Taking Medications 3   Remembering Where Things Are Placed or Put Away 4   Remembering List of 4-5 Errands 3   Taking Care of Complicated Tasks 3   Applied Cognition Raw Score 21   Applied Cognition Standardized Score 44 3       ASHLEY Alonzo/L

## 2023-05-12 NOTE — CASE MANAGEMENT
Case Management Discharge Planning Note    Patient name Chao Chow  Location /-32 MRN [id number]  : 1975 Date 2023       Current Admission Date: 2023  Current Admission Diagnosis:Osteomyelitis of left foot [facility])   Patient Active Problem List    Diagnosis Date Noted   • Acute osteomyelitis of metatarsal bone of right foot ([place] [address] ) 2023   • Asthma, intrinsic 2023   • Bipolar 1 disorder, depressed ([place] [address] ) 2023   • Hx of right BKA ([place] [address] ) 2023   • Homeless single person 2023   • Sepsis ([place] [address] ) 2023   • Morbid obesity with BMI of 40 0-44 9, adult ([place] [address] ) 2023   • Obstructive sleep apnea 2022   • Diastasis of rectus abdominis 2021   • 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 ([place] [address] ) 2021   • GERD (gastroesophageal reflux disease) 2021   • Migraine without status migrainosus, not intractable 2021   • Impaired intestinal absorption 2021   • Intestinal malabsorption following gastrectomy 2021   • Right foot ulcer, with fat layer exposed ([place] [address] ) 2021   • Intertriginous dermatitis associated with moisture 2021   • Arthritis 2021   • Chronic pain 2021   • OCD (obsessive compulsive disorder)    • PTSD (post-traumatic stress disorder)    • Essential hypertension 2021   • Acquired absence of other left toe(s) ([place] [address] ) 2019   • Folate deficiency 2019   • S/P laparoscopic sleeve gastrectomy 2019   • Diabetic foot ulcer ([place] [address] ) 09/10/2019   • Type 2 diabetes mellitus with neurologic complication, without long-term current use of insulin ([place] [address] ) 2019   • Osteomyelitis of left foot ([place] [address] ) 2019   • Hypothyroidism 2019   • Psychiatric illness 2019   • 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, with bone involvement without evidence of necrosis ([facility] ) 2019   • H/O bariatric surgery 2019   • Anxiety 2019 • Drusen body, unspecified laterality 01/25/2019   • Radiculopathy, lumbar region 09/12/2018   • Chronic right-sided low back pain with left-sided sciatica 09/04/2018   • Hyperlipidemia associated with type 2 diabetes mellitus ([facility] ) 09/25/2017   • Abnormal x-ray of cervical spine 08/14/2017   • Right cervical radiculopathy 08/11/2017   • Bipolar affective disorder in remission ([facility] ) 04/15/2017   • Amenorrhea 11/29/2016   • Instability of left knee joint 05/25/2016   • Allergy to bee sting 04/25/2016   • Acquired equinovarus deformity of left foot 05/19/2015   • Atypical squamous cell changes of undetermined significance (ASCUS) on cervical cytology with positive high risk human papilloma virus (HPV) 02/23/2015   • Chronic paroxysmal hemicrania, not intractable 02/23/2015      LOS (days): 4  Geometric Mean LOS (GMLOS) (days): 5 60  Days to GMLOS:1 5     OBJECTIVE:  Risk of Unplanned Readmission Score: 21 17         Current admission status: Inpatient   Preferred Pharmacy:   [address] [place] [address] [address] [place] [address] [id number]  Phone: [phone] Fax: [phone]    Primary Care Provider: Elliott Thrasher MD    Primary Insurance: [address] [place] [address]  Secondary Insurance:     DISCHARGE DETAILS:    Discharge planning discussed with[de-identified] patient  Freedom of Choice: Yes  Comments - Freedom of Choice: rehab recommended- referrals have been sent- blanket referrals sent- option process has been started- waiting for the determination letter and authorization is needed         cm sent the pt's application to the housing authority as requested                        Other Referral/Resources/Interventions Provided:  Interventions: Short Term Rehab  Referral Comments: referrals sent - authorization is needed and determination letter    Would you like to participate in our [address] service program?  : No - Declined    Treatment Team Recommendation: Short Term Rehab (snf auth & determination letter is needed- w/c Israel Munoz)

## 2023-05-12 NOTE — PLAN OF CARE
Problem: OCCUPATIONAL THERAPY ADULT  Goal: Performs self-care activities at highest level of function for planned discharge setting  See evaluation for individualized goals  Description: Treatment Interventions: Functional transfer training, Patient/family training, Activityengagement, Energy conservation          See flowsheet documentation for full assessment, interventions and recommendations  Outcome: Progressing  Note: Limitation: Decreased ADL status, Decreased endurance, Decreased high-level ADLs     Assessment: Patient participated in Skilled OT session this date with interventions consisting of ADL re training with the use of correct body mechnaics, Energy Conservation techniques, safety awareness and fall prevention techniques,  therapeutic activities to: increase activity tolerance, increase dynamic sit/ stand balance during functional activity  and increase postural control, and increase EOB/sitting tolerance   Patient agreeable to OT treatment session, upon arrival patient was found seated in bed (back supported) - patient seemed tired  Patient requiring verbal cues for correct technique and ocassional safety reminders, occasional rest periods, and self-care assistance as noted in flow sheet  Patient continues to be functioning below baseline level, occupational performance remains limited secondary to factors listed above and increased risk for falls and injury  From OT standpoint, recommendation at time of d/c would be post-acute rehabilitation services  Patient to benefit from continued Occupational Therapy treatment while in the hospital to address deficits as defined above and maximize level of functional independence with ADLs and functional mobility  OT Discharge Recommendation: Post acute rehabilitation services, with focus on higher level ADLs          ASHLEY Nguyễn/JOSIE

## 2023-05-12 NOTE — PROGRESS NOTES
[facility]  Progress Note  Name: Luis Atkins I  MRN: [id number]  Unit/Bed#: -01 I Date of Admission: 5/8/2023   Date of Service: 5/12/2023 I Hospital Day: 4    Assessment/Plan      * Osteomyelitis of left foot [facility])  Assessment & Plan  · Received from [address] [place] [address] psychiatric patient hartman with MRI results suggesting osteomyelitis of the left first metatarsal  · S/p left first ray resection podiatry 5/8/2023  · Intraoperative cultures: 1+growth of Staphylococcus aureus   · Continue doxycycline for 14 days per podiatry  · Wound care, surgical shoe  · PT recommendations appreciated: Postacute rehab  · Case management input appreciated  Currently optioning    Hx of right BKA ([address] )  Assessment & Plan  · History of right BKA 3/20/2023  · Serial assessments and supportive care  · Fall precautions    GERD (gastroesophageal reflux disease)  Assessment & Plan  · Continue PPI    Chronic pain  Assessment & Plan  · Previously on OxyContin for chronic back pain, transitioned to oxycodone 10 mg every 6 hours while in inpatient psychiatric unit  · Monitor for adequate pain control and or signs of oversedation or withdrawal    Psychiatric illness  Assessment & Plan  · History of bipolar disorder, OCD, PTSD  · Admitted medically from [facility] psychiatric unit  Per discharge summary, psychiatrically cleared for medical admission  · Also, currently homeless  · Continue medications including Lamictal, hydroxyzine, Geodon  · Supportive care  · Denies current suicidal ideation  · Psychiatry reevaluation completed  Continue current plan of care      Hypothyroidism  Assessment & Plan  · Continue levothyroxine         VTE Pharmacologic Prophylaxis: VTE Score: 3 Moderate Risk (Score 3-4) - Pharmacological DVT Prophylaxis Ordered: heparin  Patient Centered Rounds: I performed bedside rounds with nursing staff today    Discussions with Specialists or Other Care Team Provider: DEVON    Education "and Discussions with Family / Patient: Patient declined call to   Total Time Spent on Date of Encounter in care of patient: 35 minutes This time was spent on one or more of the following: performing physical exam; counseling and coordination of care; obtaining or reviewing history; documenting in the medical record; reviewing/ordering tests, medications or procedures; communicating with other healthcare professionals and discussing with patient's family/caregivers  Current Length of Stay: 4 day(s)  Current Patient Status: Inpatient   Certification Statement: The patient will continue to require additional inpatient hospital stay due to placement  Discharge Plan: Anticipate discharge in 24-48 hrs to rehab facility  Code Status: Level 1 - Full Code    Subjective: Patient seen and examined  I reviewed her labs and vital signs with her and told her that we are still looking for rehab placement and in the option process  I patted her on the shoulder as a gesture of comfort, patient pulled away and stated \"I do not like to be touched  \"  I apologized and assured her that I would not do that again  Of note, patient said her sugar was over 300 last night and she said she had only one \"nutty ja\" bar  I told her that keeping her sugar controlled is an important part of wound healing  At her bedside is a bag of 3 boxes of [employer] nutty ja snack bars, and I observed her pouring at least 1/4 cup of powdered coffee creamer into her coffee  Objective:     Vitals:   Temp (24hrs), Av 3 °F (36 8 °C), Min:97 8 °F (36 6 °C), Max:98 8 °F (37 1 °C)    Temp:  [97 8 °F (36 6 °C)-98 8 °F (37 1 °C)] 98 2 °F (36 8 °C)  HR:  [56-73] 56  Resp:  [18-19] 18  BP: (112-114)/(63-72) 114/65  SpO2:  [96 %-98 %] 98 %  Body mass index is 37 79 kg/m²  Input and Output Summary (last 24 hours):      Intake/Output Summary (Last 24 hours) at 2023 0918  Last data filed at 2023 0705  Gross per 24 " hour   Intake 1080 ml   Output 900 ml   Net 180 ml       Physical Exam:   Physical Exam  Vitals and nursing note reviewed  Constitutional:       General: She is not in acute distress  Appearance: She is not ill-appearing  HENT:      Head: Normocephalic and atraumatic  Mouth/Throat:      Mouth: Mucous membranes are moist       Pharynx: Oropharynx is clear  Eyes:      Pupils: Pupils are equal, round, and reactive to light  Cardiovascular:      Rate and Rhythm: Normal rate and regular rhythm  Pulmonary:      Effort: Pulmonary effort is normal  No respiratory distress  Breath sounds: Normal breath sounds  Abdominal:      General: Bowel sounds are normal       Palpations: Abdomen is soft  Tenderness: There is no abdominal tenderness  Musculoskeletal:      Cervical back: Neck supple  Comments: Right BKA  Left foot dressing clean dry and intact  Skin:     General: Skin is warm and dry  Capillary Refill: Capillary refill takes less than 2 seconds  Neurological:      General: No focal deficit present  Mental Status: She is alert            Additional Data:     Labs:  Results from last 7 days   Lab Units 05/12/23  0429   WBC Thousand/uL 8 29   HEMOGLOBIN g/dL 10 3*   HEMATOCRIT % 34 7*   PLATELETS Thousands/uL 455*   NEUTROS PCT % 33*   LYMPHS PCT % 47*   MONOS PCT % 11   EOS PCT % 8*     Results from last 7 days   Lab Units 05/12/23  0429   SODIUM mmol/L 135   POTASSIUM mmol/L 4 5   CHLORIDE mmol/L 101   CO2 mmol/L 27   BUN mg/dL 17   CREATININE mg/dL 0 92   ANION GAP mmol/L 7   CALCIUM mg/dL 10 1   GLUCOSE RANDOM mg/dL 216*         Results from last 7 days   Lab Units 05/12/23  0639 05/11/23  2109 05/11/23  1611 05/11/23  1127 05/11/23  0734 05/10/23  2100 05/10/23  1601 05/10/23  1055 05/10/23  0724 05/09/23  2113 05/09/23  1616 05/09/23  1125   POC GLUCOSE mg/dl 163* 322* 198* 257* 182* 207* 266* 184* 140 228* 157* 157*               Lines/Drains:  Invasive Devices Peripheral Intravenous Line  Duration           Peripheral IV 05/08/23 Right;Ventral (anterior) Forearm 3 days                Recent Cultures (last 7 days):   Results from last 7 days   Lab Units 05/08/23  1601   GRAM STAIN RESULT  No Polys or Bacteria seen       Last 24 Hours Medication List:   Current Facility-Administered Medications   Medication Dose Route Frequency Provider Last Rate   • acetaminophen  650 mg Oral Q6H PRN BETI RomanNP     • cloNIDine  0 2 mg Oral BID NubiaBETI QiuñonezNP     • doxycycline hyclate  100 mg Oral Q12H 169 [facility], CRNP     • fenofibrate  48 mg Oral Daily Nubia Joi, CRNP     • folic acid  1,901 mcg Oral Daily Nubia Joi, CRNP     • heparin (porcine)  5,000 Units Subcutaneous [facility] Nubia BETI TamezNP     • hydrOXYzine HCL  100 mg Oral HS Nubia BETI TamezNP     • hydrOXYzine HCL  50 mg Oral Daily Nubia BETI TamezNP     • insulin lispro  1-6 Units Subcutaneous TID AC BETI RomanNP     • insulin lispro  1-6 Units Subcutaneous HS JOSEE Roman     • lamoTRIgine  300 mg Oral QAM BETI RomanNP     • levothyroxine  50 mcg Oral Early Morning NubiaBETI QuiñonezNP     • lidocaine  1 patch Topical Daily BETI RomanNP     • melatonin  6 mg Oral HS Douglass Habermann, PA-C     • methocarbamol  750 mg Oral Q6H PRN Douglass Habermann, PA-C     • ondansetron  4 mg Intravenous Q6H PRN Douglass Habermann, PA-C     • oxyCODONE  10 mg Oral Q6H PRN JOSEE Roman     • pantoprazole  40 mg Oral BID JOSEE Roman     • pregabalin  150 mg Oral BID NubiaJOSEE Quiñonez     • ziprasidone  80 mg Oral BID With Meals JOSEE Roman          Today, Patient Was Seen By: JOSEE Roman    **Please Note: This note may have been constructed using a voice recognition system  **

## 2023-05-12 NOTE — NURSING NOTE
Patient awake and alert  Dressing dry and intact to L foot  Toes warm and moveable, edema +1, discomfort with palpation  Pain level 8 out of 10    Call bell in reach

## 2023-05-13 LAB
ANION GAP SERPL CALCULATED.3IONS-SCNC: 6 MMOL/L (ref 4–13)
BASOPHILS # BLD AUTO: 0.04 THOUSANDS/ÂΜL (ref 0–0.1)
BASOPHILS NFR BLD AUTO: 1 % (ref 0–1)
BUN SERPL-MCNC: 18 MG/DL (ref 5–25)
CALCIUM SERPL-MCNC: 9.6 MG/DL (ref 8.4–10.2)
CHLORIDE SERPL-SCNC: 99 MMOL/L (ref 96–108)
CO2 SERPL-SCNC: 28 MMOL/L (ref 21–32)
CREAT SERPL-MCNC: 0.91 MG/DL (ref 0.6–1.3)
EOSINOPHIL # BLD AUTO: 0.51 THOUSAND/ÂΜL (ref 0–0.61)
EOSINOPHIL NFR BLD AUTO: 6 % (ref 0–6)
ERYTHROCYTE [DISTWIDTH] IN BLOOD BY AUTOMATED COUNT: 21.3 % (ref 11.6–15.1)
GFR SERPL CREATININE-BSD FRML MDRD: 75 ML/MIN/1.73SQ M
GLUCOSE SERPL-MCNC: 197 MG/DL (ref 65–140)
GLUCOSE SERPL-MCNC: 207 MG/DL (ref 65–140)
GLUCOSE SERPL-MCNC: 221 MG/DL (ref 65–140)
GLUCOSE SERPL-MCNC: 222 MG/DL (ref 65–140)
GLUCOSE SERPL-MCNC: 323 MG/DL (ref 65–140)
HCT VFR BLD AUTO: 33.2 % (ref 34.8–46.1)
HGB BLD-MCNC: 9.6 G/DL (ref 11.5–15.4)
IMM GRANULOCYTES # BLD AUTO: 0.01 THOUSAND/UL (ref 0–0.2)
IMM GRANULOCYTES NFR BLD AUTO: 0 % (ref 0–2)
LYMPHOCYTES # BLD AUTO: 3.97 THOUSANDS/ÂΜL (ref 0.6–4.47)
LYMPHOCYTES NFR BLD AUTO: 50 % (ref 14–44)
MCH RBC QN AUTO: 21.4 PG (ref 26.8–34.3)
MCHC RBC AUTO-ENTMCNC: 28.9 G/DL (ref 31.4–37.4)
MCV RBC AUTO: 74 FL (ref 82–98)
MONOCYTES # BLD AUTO: 0.82 THOUSAND/ÂΜL (ref 0.17–1.22)
MONOCYTES NFR BLD AUTO: 10 % (ref 4–12)
NEUTROPHILS # BLD AUTO: 2.61 THOUSANDS/ÂΜL (ref 1.85–7.62)
NEUTS SEG NFR BLD AUTO: 33 % (ref 43–75)
NRBC BLD AUTO-RTO: 0 /100 WBCS
PLATELET # BLD AUTO: 417 THOUSANDS/UL (ref 149–390)
PMV BLD AUTO: 9.2 FL (ref 8.9–12.7)
POTASSIUM SERPL-SCNC: 4.5 MMOL/L (ref 3.5–5.3)
RBC # BLD AUTO: 4.48 MILLION/UL (ref 3.81–5.12)
SODIUM SERPL-SCNC: 133 MMOL/L (ref 135–147)
WBC # BLD AUTO: 7.96 THOUSAND/UL (ref 4.31–10.16)

## 2023-05-13 RX ORDER — MUSCLE RUB CREAM 100; 150 MG/G; MG/G
CREAM TOPICAL 4 TIMES DAILY PRN
Status: DISCONTINUED | OUTPATIENT
Start: 2023-05-13 | End: 2023-05-23 | Stop reason: HOSPADM

## 2023-05-13 RX ADMIN — MENTHOL, METHYL SALICYLATE: 10; 15 CREAM TOPICAL at 16:45

## 2023-05-13 RX ADMIN — DOXYCYCLINE 100 MG: 100 CAPSULE ORAL at 23:15

## 2023-05-13 RX ADMIN — HEPARIN SODIUM 5000 UNITS: 5000 INJECTION INTRAVENOUS; SUBCUTANEOUS at 05:22

## 2023-05-13 RX ADMIN — HEPARIN SODIUM 5000 UNITS: 5000 INJECTION INTRAVENOUS; SUBCUTANEOUS at 13:44

## 2023-05-13 RX ADMIN — METHOCARBAMOL TABLETS 750 MG: 500 TABLET, COATED ORAL at 04:27

## 2023-05-13 RX ADMIN — LAMOTRIGINE 300 MG: 100 TABLET ORAL at 08:16

## 2023-05-13 RX ADMIN — OXYCODONE HYDROCHLORIDE 10 MG: 10 TABLET ORAL at 11:07

## 2023-05-13 RX ADMIN — METHOCARBAMOL TABLETS 750 MG: 500 TABLET, COATED ORAL at 23:14

## 2023-05-13 RX ADMIN — OXYCODONE HYDROCHLORIDE 10 MG: 10 TABLET ORAL at 17:17

## 2023-05-13 RX ADMIN — HYDROXYZINE HYDROCHLORIDE 50 MG: 25 TABLET ORAL at 08:16

## 2023-05-13 RX ADMIN — PREGABALIN 150 MG: 75 CAPSULE ORAL at 08:16

## 2023-05-13 RX ADMIN — OXYCODONE HYDROCHLORIDE 10 MG: 10 TABLET ORAL at 23:12

## 2023-05-13 RX ADMIN — PANTOPRAZOLE SODIUM 40 MG: 40 TABLET, DELAYED RELEASE ORAL at 23:15

## 2023-05-13 RX ADMIN — ZIPRASIDONE HYDROCHLORIDE 80 MG: 40 CAPSULE ORAL at 08:17

## 2023-05-13 RX ADMIN — INSULIN LISPRO 2 UNITS: 100 INJECTION, SOLUTION INTRAVENOUS; SUBCUTANEOUS at 16:43

## 2023-05-13 RX ADMIN — METHOCARBAMOL TABLETS 750 MG: 500 TABLET, COATED ORAL at 11:08

## 2023-05-13 RX ADMIN — ZIPRASIDONE HYDROCHLORIDE 80 MG: 40 CAPSULE ORAL at 16:41

## 2023-05-13 RX ADMIN — OXYCODONE HYDROCHLORIDE 10 MG: 10 TABLET ORAL at 04:27

## 2023-05-13 RX ADMIN — Medication 6 MG: at 23:13

## 2023-05-13 RX ADMIN — CLONIDINE HYDROCHLORIDE 0.2 MG: 0.1 TABLET ORAL at 23:12

## 2023-05-13 RX ADMIN — METHOCARBAMOL TABLETS 750 MG: 500 TABLET, COATED ORAL at 17:17

## 2023-05-13 RX ADMIN — HEPARIN SODIUM 5000 UNITS: 5000 INJECTION INTRAVENOUS; SUBCUTANEOUS at 23:13

## 2023-05-13 RX ADMIN — LEVOTHYROXINE SODIUM 50 MCG: 50 TABLET ORAL at 05:22

## 2023-05-13 RX ADMIN — DOXYCYCLINE 100 MG: 100 CAPSULE ORAL at 08:16

## 2023-05-13 RX ADMIN — FENOFIBRATE 48 MG: 48 TABLET, FILM COATED ORAL at 08:15

## 2023-05-13 RX ADMIN — PREGABALIN 150 MG: 75 CAPSULE ORAL at 17:17

## 2023-05-13 RX ADMIN — PANTOPRAZOLE SODIUM 40 MG: 40 TABLET, DELAYED RELEASE ORAL at 08:15

## 2023-05-13 RX ADMIN — FOLIC ACID 1000 MCG: 1 TABLET ORAL at 08:17

## 2023-05-13 RX ADMIN — INSULIN LISPRO 5 UNITS: 100 INJECTION, SOLUTION INTRAVENOUS; SUBCUTANEOUS at 23:13

## 2023-05-13 RX ADMIN — INSULIN LISPRO 2 UNITS: 100 INJECTION, SOLUTION INTRAVENOUS; SUBCUTANEOUS at 08:14

## 2023-05-13 RX ADMIN — HYDROXYZINE HYDROCHLORIDE 100 MG: 25 TABLET ORAL at 23:12

## 2023-05-13 RX ADMIN — INSULIN LISPRO 2 UNITS: 100 INJECTION, SOLUTION INTRAVENOUS; SUBCUTANEOUS at 11:52

## 2023-05-13 NOTE — PROGRESS NOTES
[facility]  Progress Note  Name: Juan Olivier I  MRN: [id number]  Unit/Bed#: -01 I Date of Admission: 5/8/2023   Date of Service: 5/13/2023 I Hospital Day: 5    Assessment/Plan   * Osteomyelitis of left foot [facility])  Assessment & Plan  · Received from [address] [place] [address] psychiatric patient hartman with MRI results suggesting osteomyelitis of the left first metatarsal  · S/p left first ray resection podiatry 5/8/2023  · Intraoperative cultures: 1+growth of Staphylococcus aureus   · Continue doxycycline for 14 days per podiatry  · Wound care, surgical shoe  · PT recommendations appreciated: Postacute rehab  · Case management input appreciated  Currently optioning    Psychiatric illness  Assessment & Plan  · History of bipolar disorder, OCD, PTSD  · Admitted medically from [place] [address] psychiatric unit  Per discharge summary, psychiatrically cleared for medical admission  · Also, currently homeless  · Continue medications including Lamictal, hydroxyzine, Geodon  · Supportive care  · Denies current suicidal ideation  · Psychiatry reevaluation completed    Continue current plan of care      Hx of right BKA ([address] )  Assessment & Plan  · History of right BKA 3/20/2023  · Serial assessments and supportive care  · Fall precautions    GERD (gastroesophageal reflux disease)  Assessment & Plan  · Continue PPI    Chronic pain  Assessment & Plan  · Previously on OxyContin for chronic back pain, transitioned to oxycodone 10 mg every 6 hours while in inpatient psychiatric unit  · Monitor for adequate pain control and or signs of oversedation or withdrawal    Type 2 diabetes mellitus with neurologic complication, without long-term current use of insulin [facility])  Assessment & Plan  Lab Results   Component Value Date    HGBA1C 7 2 (H) 05/05/2023       Recent Labs     05/12/23  1557 05/12/23  2127 05/13/23  0643 05/13/23  1107   POCGLU 266* 274* 221* 222*       Blood Sugar Average: Last 72 hrs:  (P) 071 [id number]   · Insulin sliding scale  Diabetic diet    Hypothyroidism  Assessment & Plan  · Continue levothyroxine         VTE Prophylaxis:  Heparin    Patient Centered Rounds: I have performed bedside rounds with nursing staff today  Discussions with Specialists or Other Care Team Provider: yes  Education and Discussions with Family / Patient: Updated patient regarding plan of care    Current Length of Stay: 5 day(s)    Current Patient Status: Inpatient   Certification Statement: The patient will continue to require additional inpatient hospital stay due to Pending placement    Discharge Plan: Case management working on placement    Code Status: Level 1 - Full Code    Subjective:   No overnight events noted    Objective:     Vitals:   Temp (24hrs), Av °F (36 7 °C), Min:97 9 °F (36 6 °C), Max:98 2 °F (36 8 °C)    Temp:  [97 9 °F (36 6 °C)-98 2 °F (36 8 °C)] 97 9 °F (36 6 °C)  HR:  [54-76] 72  Resp:  [18] 18  BP: (106-123)/(61-68) 117/67  SpO2:  [93 %-96 %] 96 %  Body mass index is 37 79 kg/m²  Input and Output Summary (last 24 hours): Intake/Output Summary (Last 24 hours) at 2023 1510  Last data filed at 2023 1341  Gross per 24 hour   Intake 600 ml   Output 850 ml   Net -250 ml       Physical Exam:   Physical Exam  Constitutional:       General: She is not in acute distress  HENT:      Head: Normocephalic and atraumatic  Nose: Nose normal       Mouth/Throat:      Mouth: Mucous membranes are moist    Eyes:      Extraocular Movements: Extraocular movements intact  Conjunctiva/sclera: Conjunctivae normal    Cardiovascular:      Rate and Rhythm: Normal rate and regular rhythm  Pulmonary:      Effort: Pulmonary effort is normal  No respiratory distress  Abdominal:      Palpations: Abdomen is soft  Tenderness: There is no abdominal tenderness  Musculoskeletal:         General: Normal range of motion  Cervical back: Normal range of motion and neck supple  Comments: Right BKA noted   Skin:     General: Skin is warm and dry  Comments: Left foot wound with dressing in place   Neurological:      General: No focal deficit present  Mental Status: She is alert  Mental status is at baseline  Cranial Nerves: No cranial nerve deficit  Psychiatric:         Mood and Affect: Mood normal          Behavior: Behavior normal          Additional Data:     Labs:    Results from last 7 days   Lab Units 05/13/23  0456   WBC Thousand/uL 7 96   HEMOGLOBIN g/dL 9 6*   HEMATOCRIT % 33 2*   PLATELETS Thousands/uL 417*   NEUTROS PCT % 33*   LYMPHS PCT % 50*   MONOS PCT % 10   EOS PCT % 6     Results from last 7 days   Lab Units 05/13/23  0456   SODIUM mmol/L 133*   POTASSIUM mmol/L 4 5   CHLORIDE mmol/L 99   CO2 mmol/L 28   BUN mg/dL 18   CREATININE mg/dL 0 91   CALCIUM mg/dL 9 6         Results from last 7 days   Lab Units 05/13/23  1107 05/13/23  0643 05/12/23  2127 05/12/23  1557 05/12/23  1113 05/12/23  0639 05/11/23  2109 05/11/23  1611 05/11/23  1127 05/11/23  0734 05/10/23  2100 05/10/23  1601   POC GLUCOSE mg/dl 222* 221* 274* 266* 263* 163* 322* 198* 257* 182* 207* 266*           * I Have Reviewed All Lab Data Listed Above  * Additional Pertinent Lab Tests Reviewed:  [address] Admission  Reviewed    Imaging:  Imaging Reports Reviewed Today Include: No new imaging    Recent Cultures (last 7 days):     Results from last 7 days   Lab Units 05/08/23  1601   GRAM STAIN RESULT  No Polys or Bacteria seen       Last 24 Hours Medication List:   Current Facility-Administered Medications   Medication Dose Route Frequency Provider Last Rate   • acetaminophen  650 mg Oral Q6H PRN JOSEE Mesa     • cloNIDine  0 2 mg Oral BID JOSEE Mesa     • doxycycline hyclate  100 mg Oral Q12H 169 JOSEE Ferrer     • fenofibrate  48 mg Oral Daily JOSEE Mesa     • folic acid  9,835 mcg Oral Daily JOSEE Mesa     • heparin (porcine)  5,000 Units Subcutaneous [facility] Fredrich Rising, CRNP     • hydrOXYzine HCL  100 mg Oral HS Fredrich Rising, CRNP     • hydrOXYzine HCL  50 mg Oral Daily Fredrich Rising, CRNP     • insulin lispro  1-6 Units Subcutaneous TID AC Fredrich Rising, CRNP     • insulin lispro  1-6 Units Subcutaneous HS Fredrich Rising, CRNP     • lamoTRIgine  300 mg Oral QAM Fredrich Rising, CRNP     • levothyroxine  50 mcg Oral Early Morning Fredrich Rising, CRNP     • lidocaine  1 patch Topical Daily Fredrich Rising, CRNP     • melatonin  6 mg Oral HS Sueanne Post, PA-C     • methocarbamol  750 mg Oral Q6H PRN Sueanne Post, PA-C     • ondansetron  4 mg Intravenous Q6H PRN Sueanne Post, PA-C     • oxyCODONE  10 mg Oral Q6H PRN Fredrich Rising, CRNP     • pantoprazole  40 mg Oral BID Fredrich Rising, CRNP     • pregabalin  150 mg Oral BID Fredrich Rising, CRNP     • ziprasidone  80 mg Oral BID With Meals Fredrich Rising, CRNP          Today, Patient Was Seen By: Pratima Izaguirre MD    ** Please Note: Dictation voice to text software may have been used in the creation of this document   **

## 2023-05-13 NOTE — ASSESSMENT & PLAN NOTE
· History of bipolar disorder, OCD, PTSD  · Admitted medically from [facility] psychiatric unit  Per discharge summary, psychiatrically cleared for medical admission  · Also, currently homeless  · Continue medications including Lamictal, hydroxyzine, Geodon  · Supportive care  · Denies current suicidal ideation  · Psychiatry reevaluation completed    Continue current plan of care

## 2023-05-13 NOTE — PLAN OF CARE
Problem: MOBILITY - ADULT  Goal: Maintain or return to baseline ADL function  Description: INTERVENTIONS:  -  Assess patient's ability to carry out ADLs; assess patient's baseline for ADL function and identify physical deficits which impact ability to perform ADLs (bathing, care of mouth/teeth, toileting, grooming, dressing, etc )  - Assess/evaluate cause of self-care deficits   - Assess range of motion  - Assess patient's mobility; develop plan if impaired  - Assess patient's need for assistive devices and provide as appropriate  - Encourage maximum independence but intervene and supervise when necessary  - Involve family in performance of ADLs  - Assess for home care needs following discharge   - Consider OT consult to assist with ADL evaluation and planning for discharge  - Provide patient education as appropriate  Outcome: Progressing  Goal: Maintains/Returns to pre admission functional level  Description: INTERVENTIONS:  - Perform BMAT or MOVE assessment daily    - Set and communicate daily mobility goal to care team and patient/family/caregiver  - Collaborate with rehabilitation services on mobility goals if consulted  - Perform Range of Motion 2 times a day  - Reposition patient every 2 hours    - Dangle patient 2 times a day  - Stand patient 2 times a day  - Ambulate patient 2 times a day  - Out of bed to chair 3 times a day   - Out of bed for meals 3 times a day  - Out of bed for toileting  - Record patient progress and toleration of activity level   Outcome: Progressing     Problem: PAIN - ADULT  Goal: Verbalizes/displays adequate comfort level or baseline comfort level  Description: Interventions:  - Encourage patient to monitor pain and request assistance  - Assess pain using appropriate pain scale  - Administer analgesics based on type and severity of pain and evaluate response  - Implement non-pharmacological measures as appropriate and evaluate response  - Consider cultural and social influences on pain and pain management  - Notify physician/advanced practitioner if interventions unsuccessful or patient reports new pain  Outcome: Progressing     Problem: INFECTION - ADULT  Goal: Absence or prevention of progression during hospitalization  Description: INTERVENTIONS:  - Assess and monitor for signs and symptoms of infection  - Monitor lab/diagnostic results  - Monitor all insertion sites, i e  indwelling lines, tubes, and drains  - Monitor endotracheal if appropriate and nasal secretions for changes in amount and color  - [place] appropriate cooling/warming therapies per order  - Administer medications as ordered  - Instruct and encourage patient and family to use good hand hygiene technique  - Identify and instruct in appropriate isolation precautions for identified infection/condition  Outcome: Progressing  Goal: Absence of fever/infection during neutropenic period  Description: INTERVENTIONS:  - Monitor WBC    Outcome: Progressing     Problem: SAFETY ADULT  Goal: Maintain or return to baseline ADL function  Description: INTERVENTIONS:  -  Assess patient's ability to carry out ADLs; assess patient's baseline for ADL function and identify physical deficits which impact ability to perform ADLs (bathing, care of mouth/teeth, toileting, grooming, dressing, etc )  - Assess/evaluate cause of self-care deficits   - Assess range of motion  - Assess patient's mobility; develop plan if impaired  - Assess patient's need for assistive devices and provide as appropriate  - Encourage maximum independence but intervene and supervise when necessary  - Involve family in performance of ADLs  - Assess for home care needs following discharge   - Consider OT consult to assist with ADL evaluation and planning for discharge  - Provide patient education as appropriate  Outcome: Progressing  Goal: Maintains/Returns to pre admission functional level  Description: INTERVENTIONS:  - Perform BMAT or MOVE assessment daily    - Set and communicate daily mobility goal to care team and patient/family/caregiver  - Collaborate with rehabilitation services on mobility goals if consulted  - Perform Range of Motion 2 times a day  - Reposition patient every 2 hours    - Dangle patient 2 times a day  - Stand patient 2 times a day  - Ambulate patient 2 times a day  - Out of bed to chair 3 times a day   - Out of bed for meals 3 times a day  - Out of bed for toileting  - Record patient progress and toleration of activity level   Outcome: Progressing  Goal: Patient will remain free of falls  Description: INTERVENTIONS:  - Educate patient/family on patient safety including physical limitations  - Instruct patient to call for assistance with activity   - Consult OT/PT to assist with strengthening/mobility   - Keep Call bell within reach  - Keep bed low and locked with side rails adjusted as appropriate  - Keep care items and personal belongings within reach  - Initiate and maintain comfort rounds  - Make Fall Risk Sign visible to staff  - Offer Toileting every 2 Hours, in advance of need  - Initiate/Maintain bed alarm  - Obtain necessary fall risk management equipment  - Apply yellow socks and bracelet for high fall risk patients  - Consider moving patient to room near nurses station  Outcome: Progressing     Problem: DISCHARGE PLANNING  Goal: Discharge to home or other facility with appropriate resources  Description: INTERVENTIONS:  - Identify barriers to discharge w/patient and caregiver  - Arrange for needed discharge resources and transportation as appropriate  - Identify discharge learning needs (meds, wound care, etc )  - Refer to Case Management Department for coordinating discharge planning if the patient needs post-hospital services based on physician/advanced practitioner order or complex needs related to functional status, cognitive ability, or social support system  Outcome: Progressing     Problem: Knowledge Deficit  Goal: Patient/family/caregiver demonstrates understanding of disease process, treatment plan, medications, and discharge instructions  Description: Complete learning assessment and assess knowledge base    Interventions:  - Provide teaching at level of understanding  - Provide teaching via preferred learning methods  Outcome: Progressing     Problem: SKIN/TISSUE INTEGRITY - ADULT  Goal: Incision(s), wounds(s) or drain site(s) healing without S/S of infection  Description: INTERVENTIONS  - Assess and document dressing, incision, wound bed, drain sites and surrounding tissue  - Provide patient and family education  - Perform skin care/dressing changes every shift  Outcome: Progressing  Goal: Pressure injury heals and does not worsen  Description: Interventions:  - Implement low air loss mattress or specialty surface (Criteria met)  - Apply silicone foam dressing  - Instruct/assist with weight shifting every 60 minutes when in chair   - Limit chair time to 3 hour intervals  - Use special pressure reducing interventions such as waffle cushion when in chair   - Apply fecal or urinary incontinence containment device   - Perform passive or active ROM every 4 hrs  - Turn and reposition patient & offload bony prominences every 2 hours   - Utilize friction reducing device or surface for transfers   - Consider consults to  interdisciplinary teams such as wound and podiatry   - Use incontinent care products after each incontinent episode such as foam cleanser  - Consider nutrition services referral as needed  Outcome: Progressing     Problem: Prexisting or High Potential for Compromised Skin Integrity  Goal: Skin integrity is maintained or improved  Description: INTERVENTIONS:  - Identify patients at risk for skin breakdown  - Assess and monitor skin integrity  - Assess and monitor nutrition and hydration status  - Monitor labs   - Assess for incontinence   - Turn and reposition patient  - Assist with mobility/ambulation  - Relieve pressure over bony prominences  - Avoid friction and shearing  - Provide appropriate hygiene as needed including keeping skin clean and dry  - Evaluate need for skin moisturizer/barrier cream  - Collaborate with interdisciplinary team   - Patient/family teaching  - Consider wound care consult   Outcome: Progressing     Problem: Potential for Falls  Goal: Patient will remain free of falls  Description: INTERVENTIONS:  - Educate patient/family on patient safety including physical limitations  - Instruct patient to call for assistance with activity   - Consult OT/PT to assist with strengthening/mobility   - Keep Call bell within reach  - Keep bed low and locked with side rails adjusted as appropriate  - Keep care items and personal belongings within reach  - Initiate and maintain comfort rounds  - Make Fall Risk Sign visible to staff  - Offer Toileting every 2 Hours, in advance of need  - Initiate/Maintain bed alarm  - Obtain necessary fall risk management equipment  - Apply yellow socks and bracelet for high fall risk patients  - Consider moving patient to room near nurses station  Outcome: Progressing

## 2023-05-13 NOTE — ASSESSMENT & PLAN NOTE
Lab Results   Component Value Date    HGBA1C 7 2 (H) 05/05/2023       Recent Labs     05/12/23  1557 05/12/23  2127 05/13/23  0643 05/13/23  1107   POCGLU 266* 274* 221* 222*       Blood Sugar Average: Last 72 hrs:  (P) 226 [id number]   · Insulin sliding scale    Diabetic diet

## 2023-05-13 NOTE — NURSING NOTE
Wound care performed as ordered on B/L LEs  Left foot incision noted to be dehisced in mid incision  Dr Jaimie Buchanan, podiatry, aware  No new orders placed  Advised by Dr Jaimie Buchanan to continue dressing changes as ordered  no

## 2023-05-13 NOTE — ASSESSMENT & PLAN NOTE
· Received from [address] [place] [address] psychiatric patient hartman with MRI results suggesting osteomyelitis of the left first metatarsal  · S/p left first ray resection podiatry 5/8/2023  · Intraoperative cultures: 1+growth of Staphylococcus aureus   · Continue doxycycline for 14 days per podiatry  · Wound care, surgical shoe  · PT recommendations appreciated: Postacute rehab  · Case management input appreciated    Currently optioning

## 2023-05-14 ENCOUNTER — APPOINTMENT (OUTPATIENT)
Dept: RADIOLOGY | Facility: HOSPITAL | Age: 48
End: 2023-05-14

## 2023-05-14 LAB
GLUCOSE SERPL-MCNC: 181 MG/DL (ref 65–140)
GLUCOSE SERPL-MCNC: 191 MG/DL (ref 65–140)
GLUCOSE SERPL-MCNC: 215 MG/DL (ref 65–140)
GLUCOSE SERPL-MCNC: 264 MG/DL (ref 65–140)

## 2023-05-14 RX ADMIN — METHOCARBAMOL TABLETS 750 MG: 500 TABLET, COATED ORAL at 06:12

## 2023-05-14 RX ADMIN — INSULIN LISPRO 2 UNITS: 100 INJECTION, SOLUTION INTRAVENOUS; SUBCUTANEOUS at 07:42

## 2023-05-14 RX ADMIN — DOXYCYCLINE 100 MG: 100 CAPSULE ORAL at 21:27

## 2023-05-14 RX ADMIN — FENOFIBRATE 48 MG: 48 TABLET, FILM COATED ORAL at 09:37

## 2023-05-14 RX ADMIN — INSULIN LISPRO 3 UNITS: 100 INJECTION, SOLUTION INTRAVENOUS; SUBCUTANEOUS at 21:27

## 2023-05-14 RX ADMIN — OXYCODONE HYDROCHLORIDE 10 MG: 10 TABLET ORAL at 06:12

## 2023-05-14 RX ADMIN — HEPARIN SODIUM 5000 UNITS: 5000 INJECTION INTRAVENOUS; SUBCUTANEOUS at 15:46

## 2023-05-14 RX ADMIN — CLONIDINE HYDROCHLORIDE 0.2 MG: 0.1 TABLET ORAL at 21:27

## 2023-05-14 RX ADMIN — PREGABALIN 150 MG: 75 CAPSULE ORAL at 17:33

## 2023-05-14 RX ADMIN — Medication 6 MG: at 21:27

## 2023-05-14 RX ADMIN — MENTHOL, METHYL SALICYLATE: 10; 15 CREAM TOPICAL at 21:31

## 2023-05-14 RX ADMIN — MENTHOL, METHYL SALICYLATE: 10; 15 CREAM TOPICAL at 10:00

## 2023-05-14 RX ADMIN — ZIPRASIDONE HYDROCHLORIDE 80 MG: 40 CAPSULE ORAL at 07:42

## 2023-05-14 RX ADMIN — LAMOTRIGINE 300 MG: 100 TABLET ORAL at 09:38

## 2023-05-14 RX ADMIN — HEPARIN SODIUM 5000 UNITS: 5000 INJECTION INTRAVENOUS; SUBCUTANEOUS at 06:12

## 2023-05-14 RX ADMIN — HEPARIN SODIUM 5000 UNITS: 5000 INJECTION INTRAVENOUS; SUBCUTANEOUS at 21:25

## 2023-05-14 RX ADMIN — OXYCODONE HYDROCHLORIDE 10 MG: 10 TABLET ORAL at 12:21

## 2023-05-14 RX ADMIN — METHOCARBAMOL TABLETS 750 MG: 500 TABLET, COATED ORAL at 18:39

## 2023-05-14 RX ADMIN — METHOCARBAMOL TABLETS 750 MG: 500 TABLET, COATED ORAL at 12:20

## 2023-05-14 RX ADMIN — INSULIN LISPRO 2 UNITS: 100 INJECTION, SOLUTION INTRAVENOUS; SUBCUTANEOUS at 16:32

## 2023-05-14 RX ADMIN — HYDROXYZINE HYDROCHLORIDE 100 MG: 25 TABLET ORAL at 21:25

## 2023-05-14 RX ADMIN — PREGABALIN 150 MG: 75 CAPSULE ORAL at 09:38

## 2023-05-14 RX ADMIN — ZIPRASIDONE HYDROCHLORIDE 80 MG: 40 CAPSULE ORAL at 15:46

## 2023-05-14 RX ADMIN — PANTOPRAZOLE SODIUM 40 MG: 40 TABLET, DELAYED RELEASE ORAL at 21:27

## 2023-05-14 RX ADMIN — LEVOTHYROXINE SODIUM 50 MCG: 50 TABLET ORAL at 06:12

## 2023-05-14 RX ADMIN — OXYCODONE HYDROCHLORIDE 10 MG: 10 TABLET ORAL at 18:39

## 2023-05-14 RX ADMIN — DOXYCYCLINE 100 MG: 100 CAPSULE ORAL at 09:38

## 2023-05-14 RX ADMIN — INSULIN LISPRO 1 UNITS: 100 INJECTION, SOLUTION INTRAVENOUS; SUBCUTANEOUS at 11:45

## 2023-05-14 RX ADMIN — MENTHOL, METHYL SALICYLATE: 10; 15 CREAM TOPICAL at 16:00

## 2023-05-14 RX ADMIN — FOLIC ACID 1000 MCG: 1 TABLET ORAL at 09:38

## 2023-05-14 RX ADMIN — PANTOPRAZOLE SODIUM 40 MG: 40 TABLET, DELAYED RELEASE ORAL at 09:38

## 2023-05-14 NOTE — ASSESSMENT & PLAN NOTE
Lab Results   Component Value Date    HGBA1C 7 2 (H) 05/05/2023       Recent Labs     05/13/23  1600 05/13/23  2105 05/14/23  0648 05/14/23  1114   POCGLU 207* 323* 191* 181*       Blood Sugar Average: Last 72 hrs:  (P) 233 [id number]   · Insulin sliding scale    Diabetic diet

## 2023-05-14 NOTE — ASSESSMENT & PLAN NOTE
· Received from [address] [place] [address] psychiatric patient hartman with MRI results suggesting osteomyelitis of the left first metatarsal  · S/p left first ray resection podiatry 5/8/2023  · Intraoperative cultures: 1+growth of Staphylococcus aureus   · Continue doxycycline for 14 days per podiatry  · Wound care, surgical shoe  · PT recommendations appreciated: Postacute rehab  · Case management input appreciated    Currently optioning

## 2023-05-14 NOTE — PLAN OF CARE
Problem: MOBILITY - ADULT  Goal: Maintain or return to baseline ADL function  Description: INTERVENTIONS:  -  Assess patient's ability to carry out ADLs; assess patient's baseline for ADL function and identify physical deficits which impact ability to perform ADLs (bathing, care of mouth/teeth, toileting, grooming, dressing, etc )  - Assess/evaluate cause of self-care deficits   - Assess range of motion  - Assess patient's mobility; develop plan if impaired  - Assess patient's need for assistive devices and provide as appropriate  - Encourage maximum independence but intervene and supervise when necessary  - Involve family in performance of ADLs  - Assess for home care needs following discharge   - Consider OT consult to assist with ADL evaluation and planning for discharge  - Provide patient education as appropriate  Outcome: Progressing  Goal: Maintains/Returns to pre admission functional level  Description: INTERVENTIONS:  - Perform BMAT or MOVE assessment daily    - Set and communicate daily mobility goal to care team and patient/family/caregiver  - Collaborate with rehabilitation services on mobility goals if consulted  - Perform Range of Motion 2 times a day  - Reposition patient every 2 hours    - Dangle patient 2 times a day  - Stand patient 2 times a day  - Ambulate patient 2 times a day  - Out of bed to chair 3 times a day   - Out of bed for meals 3 times a day  - Out of bed for toileting  - Record patient progress and toleration of activity level   Outcome: Progressing     Problem: PAIN - ADULT  Goal: Verbalizes/displays adequate comfort level or baseline comfort level  Description: Interventions:  - Encourage patient to monitor pain and request assistance  - Assess pain using appropriate pain scale  - Administer analgesics based on type and severity of pain and evaluate response  - Implement non-pharmacological measures as appropriate and evaluate response  - Consider cultural and social influences Spoke to patient about lab results. Cr back down to normal. Decreased her Lasix to 20 mg daily. Sent result to her PCP office and left message with PCP that her Cr is back down to normal. Continue PO KCL 20 meq daily.  Signed: Katia Barbosa MD         on pain and pain management  - Notify physician/advanced practitioner if interventions unsuccessful or patient reports new pain  Outcome: Progressing     Problem: INFECTION - ADULT  Goal: Absence or prevention of progression during hospitalization  Description: INTERVENTIONS:  - Assess and monitor for signs and symptoms of infection  - Monitor lab/diagnostic results  - Monitor all insertion sites, i e  indwelling lines, tubes, and drains  - Monitor endotracheal if appropriate and nasal secretions for changes in amount and color  - [place] appropriate cooling/warming therapies per order  - Administer medications as ordered  - Instruct and encourage patient and family to use good hand hygiene technique  - Identify and instruct in appropriate isolation precautions for identified infection/condition  Outcome: Progressing  Goal: Absence of fever/infection during neutropenic period  Description: INTERVENTIONS:  - Monitor WBC    Outcome: Progressing     Problem: SAFETY ADULT  Goal: Maintain or return to baseline ADL function  Description: INTERVENTIONS:  -  Assess patient's ability to carry out ADLs; assess patient's baseline for ADL function and identify physical deficits which impact ability to perform ADLs (bathing, care of mouth/teeth, toileting, grooming, dressing, etc )  - Assess/evaluate cause of self-care deficits   - Assess range of motion  - Assess patient's mobility; develop plan if impaired  - Assess patient's need for assistive devices and provide as appropriate  - Encourage maximum independence but intervene and supervise when necessary  - Involve family in performance of ADLs  - Assess for home care needs following discharge   - Consider OT consult to assist with ADL evaluation and planning for discharge  - Provide patient education as appropriate  Outcome: Progressing  Goal: Maintains/Returns to pre admission functional level  Description: INTERVENTIONS:  - Perform BMAT or MOVE assessment daily    - Set and communicate daily mobility goal to care team and patient/family/caregiver  - Collaborate with rehabilitation services on mobility goals if consulted  - Perform Range of Motion 2 times a day  - Reposition patient every 2 hours    - Dangle patient 2 times a day  - Stand patient 2 times a day  - Ambulate patient 2 times a day  - Out of bed to chair 3 times a day   - Out of bed for meals 3 times a day  - Out of bed for toileting  - Record patient progress and toleration of activity level   Outcome: Progressing  Goal: Patient will remain free of falls  Description: INTERVENTIONS:  - Educate patient/family on patient safety including physical limitations  - Instruct patient to call for assistance with activity   - Consult OT/PT to assist with strengthening/mobility   - Keep Call bell within reach  - Keep bed low and locked with side rails adjusted as appropriate  - Keep care items and personal belongings within reach  - Initiate and maintain comfort rounds  - Make Fall Risk Sign visible to staff  - Offer Toileting every 2 Hours, in advance of need  - Initiate/Maintain bed alarm  - Obtain necessary fall risk management equipment  - Apply yellow socks and bracelet for high fall risk patients  - Consider moving patient to room near nurses station  Outcome: Progressing     Problem: DISCHARGE PLANNING  Goal: Discharge to home or other facility with appropriate resources  Description: INTERVENTIONS:  - Identify barriers to discharge w/patient and caregiver  - Arrange for needed discharge resources and transportation as appropriate  - Identify discharge learning needs (meds, wound care, etc )  - Refer to Case Management Department for coordinating discharge planning if the patient needs post-hospital services based on physician/advanced practitioner order or complex needs related to functional status, cognitive ability, or social support system  Outcome: Progressing     Problem: Knowledge Deficit  Goal: Patient/family/caregiver demonstrates understanding of disease process, treatment plan, medications, and discharge instructions  Description: Complete learning assessment and assess knowledge base    Interventions:  - Provide teaching at level of understanding  - Provide teaching via preferred learning methods  Outcome: Progressing     Problem: SKIN/TISSUE INTEGRITY - ADULT  Goal: Incision(s), wounds(s) or drain site(s) healing without S/S of infection  Description: INTERVENTIONS  - Assess and document dressing, incision, wound bed, drain sites and surrounding tissue  - Provide patient and family education  - Perform skin care/dressing changes every shift  Outcome: Progressing  Goal: Pressure injury heals and does not worsen  Description: Interventions:  - Implement low air loss mattress or specialty surface (Criteria met)  - Apply silicone foam dressing  - Instruct/assist with weight shifting every 60 minutes when in chair   - Limit chair time to 3 hour intervals  - Use special pressure reducing interventions such as waffle cushion when in chair   - Apply fecal or urinary incontinence containment device   - Perform passive or active ROM every 4 hrs  - Turn and reposition patient & offload bony prominences every 2 hours   - Utilize friction reducing device or surface for transfers   - Consider consults to  interdisciplinary teams such as wound and podiatry   - Use incontinent care products after each incontinent episode such as foam cleanser  - Consider nutrition services referral as needed  Outcome: Progressing     Problem: Prexisting or High Potential for Compromised Skin Integrity  Goal: Skin integrity is maintained or improved  Description: INTERVENTIONS:  - Identify patients at risk for skin breakdown  - Assess and monitor skin integrity  - Assess and monitor nutrition and hydration status  - Monitor labs   - Assess for incontinence   - Turn and reposition patient  - Assist with mobility/ambulation  - Relieve pressure over bony prominences  - Avoid friction and shearing  - Provide appropriate hygiene as needed including keeping skin clean and dry  - Evaluate need for skin moisturizer/barrier cream  - Collaborate with interdisciplinary team   - Patient/family teaching  - Consider wound care consult   Outcome: Progressing     Problem: Potential for Falls  Goal: Patient will remain free of falls  Description: INTERVENTIONS:  - Educate patient/family on patient safety including physical limitations  - Instruct patient to call for assistance with activity   - Consult OT/PT to assist with strengthening/mobility   - Keep Call bell within reach  - Keep bed low and locked with side rails adjusted as appropriate  - Keep care items and personal belongings within reach  - Initiate and maintain comfort rounds  - Make Fall Risk Sign visible to staff  - Offer Toileting every 2 Hours, in advance of need  - Initiate/Maintain bed alarm  - Obtain necessary fall risk management equipment  - Apply yellow socks and bracelet for high fall risk patients  - Consider moving patient to room near nurses station  Outcome: Progressing

## 2023-05-14 NOTE — PROGRESS NOTES
[facility] 46  Progress Note  Name: Santi Torres  MRN: [id number]  Unit/Bed#: -01 I Date of Admission: 5/8/2023   Date of Service: 5/14/2023 I Hospital Day: 6    Assessment/Plan   * Osteomyelitis of left foot [facility])  Assessment & Plan  · Received from [address] [place] [address] psychiatric patient hartman with MRI results suggesting osteomyelitis of the left first metatarsal  · S/p left first ray resection podiatry 5/8/2023  · Intraoperative cultures: 1+growth of Staphylococcus aureus   · Continue doxycycline for 14 days per podiatry  · Wound care, surgical shoe  · PT recommendations appreciated: Postacute rehab  · Case management input appreciated  Currently optioning    Psychiatric illness  Assessment & Plan  · History of bipolar disorder, OCD, PTSD  · Admitted medically from [place] [address] psychiatric unit  Per discharge summary, psychiatrically cleared for medical admission  · Also, currently homeless  · Continue medications including Lamictal, hydroxyzine, Geodon  · Supportive care  · Denies current suicidal ideation  · Psychiatry reevaluation completed    Continue current plan of care      Hx of right BKA ([address] )  Assessment & Plan  · History of right BKA 3/20/2023  · Serial assessments and supportive care  · Fall precautions    GERD (gastroesophageal reflux disease)  Assessment & Plan  · Continue PPI    Chronic pain  Assessment & Plan  · Previously on OxyContin for chronic back pain, transitioned to oxycodone 10 mg every 6 hours while in inpatient psychiatric unit  · Monitor for adequate pain control and or signs of oversedation or withdrawal    Type 2 diabetes mellitus with neurologic complication, without long-term current use of insulin [facility])  Assessment & Plan  Lab Results   Component Value Date    HGBA1C 7 2 (H) 05/05/2023       Recent Labs     05/13/23  1600 05/13/23  2105 05/14/23  0648 05/14/23  1114   POCGLU 207* 323* 191* 181*       Blood Sugar Average: Last 72 hrs:  (P) 105 [id number]   · Insulin sliding scale  Diabetic diet    Hypothyroidism  Assessment & Plan  · Continue levothyroxine           VTE Prophylaxis:  Heparin    Patient Centered Rounds: I have performed bedside rounds with nursing staff today  Discussions with Specialists or Other Care Team Provider: yes  Education and Discussions with Family / Patient: Updated patient regarding plan of care    Current Length of Stay: 6 day(s)    Current Patient Status: Inpatient   Certification Statement: The patient will continue to require additional inpatient hospital stay due to Pending placement    Discharge Plan: Medically stable for discharge  Case management working on placement    Code Status: Level 1 - Full Code    Subjective:   No overnight events noted    Objective:     Vitals:   Temp (24hrs), Av 9 °F (36 6 °C), Min:97 8 °F (36 6 °C), Max:97 9 °F (36 6 °C)    Temp:  [97 8 °F (36 6 °C)-97 9 °F (36 6 °C)] 97 8 °F (36 6 °C)  HR:  [64-86] 64  Resp:  [18-20] 20  BP: (108-128)/(53-77) 108/53  SpO2:  [95 %-96 %] 95 %  Body mass index is 37 79 kg/m²  Input and Output Summary (last 24 hours): Intake/Output Summary (Last 24 hours) at 2023 1138  Last data filed at 2023 0719  Gross per 24 hour   Intake 620 ml   Output 850 ml   Net -230 ml       Physical Exam:   Physical Exam  Constitutional:       General: She is not in acute distress  HENT:      Head: Normocephalic and atraumatic  Nose: Nose normal       Mouth/Throat:      Mouth: Mucous membranes are moist    Eyes:      Extraocular Movements: Extraocular movements intact  Conjunctiva/sclera: Conjunctivae normal    Cardiovascular:      Rate and Rhythm: Normal rate and regular rhythm  Pulmonary:      Effort: Pulmonary effort is normal  No respiratory distress  Abdominal:      Palpations: Abdomen is soft  Tenderness: There is no abdominal tenderness  Musculoskeletal:         General: Normal range of motion        Cervical back: Normal range of motion and neck supple  Comments: Right BKA noted   Skin:     General: Skin is warm and dry  Comments: Left foot wound with dressing in place   Neurological:      General: No focal deficit present  Mental Status: She is alert  Mental status is at baseline  Cranial Nerves: No cranial nerve deficit  Psychiatric:         Mood and Affect: Mood normal          Behavior: Behavior normal          Additional Data:     Labs:    Results from last 7 days   Lab Units 05/13/23  0456   WBC Thousand/uL 7 96   HEMOGLOBIN g/dL 9 6*   HEMATOCRIT % 33 2*   PLATELETS Thousands/uL 417*   NEUTROS PCT % 33*   LYMPHS PCT % 50*   MONOS PCT % 10   EOS PCT % 6     Results from last 7 days   Lab Units 05/13/23  0456   SODIUM mmol/L 133*   POTASSIUM mmol/L 4 5   CHLORIDE mmol/L 99   CO2 mmol/L 28   BUN mg/dL 18   CREATININE mg/dL 0 91   CALCIUM mg/dL 9 6         Results from last 7 days   Lab Units 05/14/23  1114 05/14/23  0648 05/13/23  2105 05/13/23  1600 05/13/23  1107 05/13/23  0643 05/12/23  2127 05/12/23  1557 05/12/23  1113 05/12/23  0639 05/11/23  2109 05/11/23  1611   POC GLUCOSE mg/dl 181* 191* 323* 207* 222* 221* 274* 266* 263* 163* 322* 198*           * I Have Reviewed All Lab Data Listed Above  * Additional Pertinent Lab Tests Reviewed:  [address] [facility] Admission  Reviewed    Imaging:  Imaging Reports Reviewed Today Include: No new imaging    Recent Cultures (last 7 days):     Results from last 7 days   Lab Units 05/08/23  1601   GRAM STAIN RESULT  No Polys or Bacteria seen       Last 24 Hours Medication List:   Current Facility-Administered Medications   Medication Dose Route Frequency Provider Last Rate   • acetaminophen  650 mg Oral Q6H PRN [place] Care, CRNP     • cloNIDine  0 2 mg Oral BID [place] Care, CRNP     • doxycycline hyclate  100 mg Oral Q12H [address], CRNP     • fenofibrate  48 mg Oral Daily [place] Care, CRNP     • folic acid  9,576 mcg Oral Daily Elena Gonzalez, CRNP     • heparin (porcine)  5,000 Units Subcutaneous [facility] Elena Lisa, CRNP     • hydrOXYzine HCL  100 mg Oral HS Elena Lisa, CRNP     • hydrOXYzine HCL  50 mg Oral Daily Elena Lisa, CRNP     • insulin lispro  1-6 Units Subcutaneous TID AC Elena Gonzalez, CRNP     • insulin lispro  1-6 Units Subcutaneous HS Elena Lisa, CRNP     • lamoTRIgine  300 mg Oral QAM Elena Gonzalez, CRNP     • levothyroxine  50 mcg Oral Early Morning Elena Gonzalez, CRNP     • lidocaine  1 patch Topical Daily Elena Gonzalez, CRNP     • melatonin  6 mg Oral HS Makayla Wilson PA-C     • menthol-methyl salicylate   Apply externally 4x Daily PRN Jerome Gaytan MD     • methocarbamol  750 mg Oral Q6H PRN Makayla Wilson PA-C     • ondansetron  4 mg Intravenous Q6H PRN Makayla Wilson PA-C     • oxyCODONE  10 mg Oral Q6H PRN Elena Gonzalez CRNP     • pantoprazole  40 mg Oral BID Elena Gonzalez CRNP     • pregabalin  150 mg Oral BID Elena Gonzalez CRNP     • ziprasidone  80 mg Oral BID With Meals JOSEE Ely          Today, Patient Was Seen By: Jerome Gaytan MD    ** Please Note: Dictation voice to text software may have been used in the creation of this document   **

## 2023-05-14 NOTE — ASSESSMENT & PLAN NOTE
· History of bipolar disorder, OCD, PTSD  · Admitted medically from [place] [address] psychiatric unit  Per discharge summary, psychiatrically cleared for medical admission  · Also, currently homeless  · Continue medications including Lamictal, hydroxyzine, Geodon  · Supportive care  · Denies current suicidal ideation  · Psychiatry reevaluation completed    Continue current plan of care

## 2023-05-15 PROBLEM — E11.40 TYPE 2 DIABETES MELLITUS WITH DIABETIC NEUROPATHY, WITHOUT LONG-TERM CURRENT USE OF INSULIN (HCC): Status: ACTIVE | Noted: 2019-09-06

## 2023-05-15 LAB
ANION GAP SERPL CALCULATED.3IONS-SCNC: 5 MMOL/L (ref 4–13)
BASOPHILS # BLD AUTO: 0.04 THOUSANDS/ÂΜL (ref 0–0.1)
BASOPHILS NFR BLD AUTO: 1 % (ref 0–1)
BUN SERPL-MCNC: 19 MG/DL (ref 5–25)
CALCIUM SERPL-MCNC: 9.6 MG/DL (ref 8.4–10.2)
CHLORIDE SERPL-SCNC: 100 MMOL/L (ref 96–108)
CO2 SERPL-SCNC: 30 MMOL/L (ref 21–32)
CREAT SERPL-MCNC: 0.89 MG/DL (ref 0.6–1.3)
EOSINOPHIL # BLD AUTO: 0.5 THOUSAND/ÂΜL (ref 0–0.61)
EOSINOPHIL NFR BLD AUTO: 6 % (ref 0–6)
ERYTHROCYTE [DISTWIDTH] IN BLOOD BY AUTOMATED COUNT: 21.1 % (ref 11.6–15.1)
GFR SERPL CREATININE-BSD FRML MDRD: 77 ML/MIN/1.73SQ M
GLUCOSE SERPL-MCNC: 176 MG/DL (ref 65–140)
GLUCOSE SERPL-MCNC: 190 MG/DL (ref 65–140)
GLUCOSE SERPL-MCNC: 201 MG/DL (ref 65–140)
GLUCOSE SERPL-MCNC: 225 MG/DL (ref 65–140)
GLUCOSE SERPL-MCNC: 294 MG/DL (ref 65–140)
HCT VFR BLD AUTO: 33.5 % (ref 34.8–46.1)
HGB BLD-MCNC: 9.7 G/DL (ref 11.5–15.4)
IMM GRANULOCYTES # BLD AUTO: 0.03 THOUSAND/UL (ref 0–0.2)
IMM GRANULOCYTES NFR BLD AUTO: 0 % (ref 0–2)
LYMPHOCYTES # BLD AUTO: 3.63 THOUSANDS/ÂΜL (ref 0.6–4.47)
LYMPHOCYTES NFR BLD AUTO: 44 % (ref 14–44)
MCH RBC QN AUTO: 21.8 PG (ref 26.8–34.3)
MCHC RBC AUTO-ENTMCNC: 29 G/DL (ref 31.4–37.4)
MCV RBC AUTO: 76 FL (ref 82–98)
MONOCYTES # BLD AUTO: 0.83 THOUSAND/ÂΜL (ref 0.17–1.22)
MONOCYTES NFR BLD AUTO: 10 % (ref 4–12)
NEUTROPHILS # BLD AUTO: 3.22 THOUSANDS/ÂΜL (ref 1.85–7.62)
NEUTS SEG NFR BLD AUTO: 39 % (ref 43–75)
NRBC BLD AUTO-RTO: 0 /100 WBCS
PLATELET # BLD AUTO: 416 THOUSANDS/UL (ref 149–390)
PMV BLD AUTO: 9.1 FL (ref 8.9–12.7)
POTASSIUM SERPL-SCNC: 4.3 MMOL/L (ref 3.5–5.3)
RBC # BLD AUTO: 4.44 MILLION/UL (ref 3.81–5.12)
SODIUM SERPL-SCNC: 135 MMOL/L (ref 135–147)
WBC # BLD AUTO: 8.25 THOUSAND/UL (ref 4.31–10.16)

## 2023-05-15 RX ORDER — CEFAZOLIN SODIUM 2 G/50ML
2000 SOLUTION INTRAVENOUS EVERY 8 HOURS
Status: DISCONTINUED | OUTPATIENT
Start: 2023-05-15 | End: 2023-05-23 | Stop reason: HOSPADM

## 2023-05-15 RX ADMIN — MENTHOL, METHYL SALICYLATE: 10; 15 CREAM TOPICAL at 18:23

## 2023-05-15 RX ADMIN — METHOCARBAMOL TABLETS 750 MG: 500 TABLET, COATED ORAL at 07:18

## 2023-05-15 RX ADMIN — PREGABALIN 150 MG: 75 CAPSULE ORAL at 08:13

## 2023-05-15 RX ADMIN — FOLIC ACID 1000 MCG: 1 TABLET ORAL at 08:13

## 2023-05-15 RX ADMIN — HYDROXYZINE HYDROCHLORIDE 50 MG: 25 TABLET ORAL at 08:12

## 2023-05-15 RX ADMIN — CEFAZOLIN SODIUM 2000 MG: 2 SOLUTION INTRAVENOUS at 13:18

## 2023-05-15 RX ADMIN — OXYCODONE HYDROCHLORIDE 10 MG: 10 TABLET ORAL at 07:14

## 2023-05-15 RX ADMIN — HEPARIN SODIUM 5000 UNITS: 5000 INJECTION INTRAVENOUS; SUBCUTANEOUS at 05:25

## 2023-05-15 RX ADMIN — INSULIN LISPRO 2 UNITS: 100 INJECTION, SOLUTION INTRAVENOUS; SUBCUTANEOUS at 16:58

## 2023-05-15 RX ADMIN — Medication 6 MG: at 21:22

## 2023-05-15 RX ADMIN — HEPARIN SODIUM 5000 UNITS: 5000 INJECTION INTRAVENOUS; SUBCUTANEOUS at 13:18

## 2023-05-15 RX ADMIN — CLONIDINE HYDROCHLORIDE 0.2 MG: 0.1 TABLET ORAL at 08:12

## 2023-05-15 RX ADMIN — PREGABALIN 150 MG: 75 CAPSULE ORAL at 17:00

## 2023-05-15 RX ADMIN — INSULIN LISPRO 4 UNITS: 100 INJECTION, SOLUTION INTRAVENOUS; SUBCUTANEOUS at 21:22

## 2023-05-15 RX ADMIN — FENOFIBRATE 48 MG: 48 TABLET, FILM COATED ORAL at 08:12

## 2023-05-15 RX ADMIN — METHOCARBAMOL TABLETS 750 MG: 500 TABLET, COATED ORAL at 00:56

## 2023-05-15 RX ADMIN — PANTOPRAZOLE SODIUM 40 MG: 40 TABLET, DELAYED RELEASE ORAL at 08:13

## 2023-05-15 RX ADMIN — ZIPRASIDONE HYDROCHLORIDE 80 MG: 40 CAPSULE ORAL at 07:15

## 2023-05-15 RX ADMIN — ZIPRASIDONE HYDROCHLORIDE 80 MG: 40 CAPSULE ORAL at 17:00

## 2023-05-15 RX ADMIN — PANTOPRAZOLE SODIUM 40 MG: 40 TABLET, DELAYED RELEASE ORAL at 21:22

## 2023-05-15 RX ADMIN — CEFAZOLIN SODIUM 2000 MG: 2 SOLUTION INTRAVENOUS at 21:21

## 2023-05-15 RX ADMIN — OXYCODONE HYDROCHLORIDE 10 MG: 10 TABLET ORAL at 13:17

## 2023-05-15 RX ADMIN — INSULIN LISPRO 2 UNITS: 100 INJECTION, SOLUTION INTRAVENOUS; SUBCUTANEOUS at 08:11

## 2023-05-15 RX ADMIN — INSULIN LISPRO 2 UNITS: 100 INJECTION, SOLUTION INTRAVENOUS; SUBCUTANEOUS at 11:56

## 2023-05-15 RX ADMIN — HEPARIN SODIUM 5000 UNITS: 5000 INJECTION INTRAVENOUS; SUBCUTANEOUS at 21:22

## 2023-05-15 RX ADMIN — LAMOTRIGINE 300 MG: 100 TABLET ORAL at 08:12

## 2023-05-15 RX ADMIN — METHOCARBAMOL TABLETS 750 MG: 500 TABLET, COATED ORAL at 19:20

## 2023-05-15 RX ADMIN — MENTHOL, METHYL SALICYLATE: 10; 15 CREAM TOPICAL at 13:29

## 2023-05-15 RX ADMIN — HYDROXYZINE HYDROCHLORIDE 100 MG: 25 TABLET ORAL at 21:22

## 2023-05-15 RX ADMIN — DOXYCYCLINE 100 MG: 100 CAPSULE ORAL at 08:13

## 2023-05-15 RX ADMIN — OXYCODONE HYDROCHLORIDE 10 MG: 10 TABLET ORAL at 19:20

## 2023-05-15 RX ADMIN — CLONIDINE HYDROCHLORIDE 0.2 MG: 0.1 TABLET ORAL at 21:22

## 2023-05-15 RX ADMIN — OXYCODONE HYDROCHLORIDE 10 MG: 10 TABLET ORAL at 00:54

## 2023-05-15 RX ADMIN — METHOCARBAMOL TABLETS 750 MG: 500 TABLET, COATED ORAL at 13:18

## 2023-05-15 RX ADMIN — LEVOTHYROXINE SODIUM 50 MCG: 50 TABLET ORAL at 05:25

## 2023-05-15 NOTE — WOUND OSTOMY CARE
Progress Note - Wound   Espiridion Manda 53 y o  female MRN: [id number]  Unit/Bed#: -01 Encounter: [id number]    Assessment:   Wound care weekly follow up for patient admitted with osteomyelitis of the left foot  Patient seen with Podiatry for left foot wound  She is in bed for assessment  Dressing removed from left foot, sutures removed and incision opened by Dr Konstantin Long  Packing placed and dressing applied by Dr Konstantin Long  Patient is able to feed herself, snacks and soda noted in the room  She states she is able to get to the bedside commode without placing weight on her left foot, she is not incontinent  Patient did doze off and on during assessment, easily awakened  Findings  1  Left foot incision treatment per podiatry  2  Right BKA stump with healing incision line  Incision line with less scaly skin noted  Two small pink wound beds, scant yellow drainage when dressing removed, wound beds dry  Hydraguard applied to incision line  Silvasorb gel applied to the two small wound beds, covered with 2X2 and attached at the proximal and distal edges with paper tape  3  Abdominal, breast folds, groin folds, buttocks and sacrum intact and blanchable    Skin and Wound Care Plan:   1  Ehob offloading cushion to chair when OOB  2  Elevate left heel off of bed with pillow to offload  3  Apply skin nourishing cream to the skin daily  4  Turn patient Q2 hours  5  Cleanse incision scar to right BKA stump with NSS, pat dry  Apply small amount of Silvasorb gel to the two small partial thickness areas to the mid incision line, cover with 2X2 and paper tape the proximal and distal edges  Apply Hydraguard to scaly skin on the incision line    Wound:  Wound 03/23/23 Foot Left (Active)   Wound Image   05/08/23 1339   Dressing Dry dressing 05/09/23 2015   Dressing Status Clean;Dry; Intact 05/14/23 2006       Wound 03/23/23 Leg Right (Active)   Wound Image   05/15/23 1237   Wound Description Fragile;Pink 05/15/23 1237   Yvonne-wound Assessment Dry; Intact 05/15/23 1237   Wound Length (cm) 0 2 cm 05/15/23 1237   Wound Width (cm) 2 cm 05/15/23 1237   Wound Depth (cm) 0 1 cm 05/15/23 1237   Wound Surface Area (cm^2) 0 4 cm^2 05/15/23 1237   Wound Volume (cm^3) 0 04 cm^3 05/15/23 1237   Calculated Wound Volume (cm^3) 0 04 cm^3 05/15/23 1237   Change in Wound Size % 20 05/15/23 1237   Drainage Amount Scant 05/15/23 1237   Drainage Description Serous 05/15/23 1237   Non-staged Wound Description Partial thickness 05/15/23 1237   Treatments Cleansed 05/15/23 1237   Dressing Dry dressing;Silvasorb gel 05/15/23 1237   Wound packed? No 05/15/23 1237   Dressing Changed Changed 05/15/23 1237   Patient Tolerance Tolerated well 05/15/23 1237   Dressing Status Clean;Dry; Intact 05/14/23 2006       Wound 05/08/23 Foot Left (Active)   Wound Image   05/14/23 1304   Wound Description Beefy red 05/15/23 1237   Yvonne-wound Assessment Edema; Erythema 05/15/23 1237   Wound Site Closure Unapproximated 05/15/23 1237   Drainage Amount Moderate 05/14/23 1304   Drainage Description Serous 05/14/23 1304   Non-staged Wound Description Full thickness 05/15/23 1237   Treatments Other (Comment) 05/15/23 1237   Dressing ABD;Gauze 05/15/23 1237   Wound packed? Yes 05/15/23 1237   Packing- # removed 0 05/15/23 1237   Packing- # inserted 1 05/15/23 1237   Dressing Changed Changed by provider 05/15/23 1237   Patient Tolerance Tolerated poorly 05/15/23 1237   Dressing Status Clean;Dry; Intact 05/15/23 0737     Reviewed plan of care with primary RN Beena Brandon  Recommendations written as orders  Wound care team to follow weekly while admitted  Questions or concerns [address] [facility] Nurse    Nellie Marte BSN, RN, [facility]

## 2023-05-15 NOTE — PROGRESS NOTES
Bonnie U  67   Progress Note  Name: Tom Kaur I  MRN: [id number]  Unit/Bed#: -01 I Date of Admission: 5/8/2023   Date of Service: 5/15/2023 I Hospital Day: 7    Assessment/Plan   * Osteomyelitis of left foot ([address] )  Assessment & Plan  · Received from [address] [place] [address] psychiatric patient hartman with MRI results suggesting osteomyelitis of the left first metatarsal  · Podiatry input appreciated   · S/p left first ray resection podiatry 5/8/2023  · Intraoperative cultures: Staphylococcus aureus   · Continue doxycycline for 14 days per podiatry  · Wound care, surgical shoe  · Podiatry will re-evaluate surgical site today due to possible wound dehiscence  · PT/OT recommendations appreciated: Postacute rehab  · Case management input appreciated  Currently optioning    Hx of right BKA ([address] )  Assessment & Plan  · History of right BKA 3/20/2023  · Serial assessments and supportive care  · Fall precautions     Gastroesophageal reflux disease without esophagitis  Assessment & Plan  · Continue PPI     Chronic pain  Assessment & Plan  · Previously on OxyContin for chronic back pain, transitioned to oxycodone 10 mg every 6 hours while in inpatient psychiatric unit   · Monitor for adequate pain control and or signs of oversedation or withdrawal    Type 2 diabetes mellitus with neurologic complication, without long-term current use of insulin [facility])  Assessment & Plan  Lab Results   Component Value Date    HGBA1C 7 2 (H) 05/05/2023       Recent Labs     05/14/23  1114 05/14/23  1610 05/14/23  2052 05/15/23  0735   POCGLU 181* 215* 264* 201*       Blood Sugar Average: Last 72 hrs:  (P) 230 [id number]   · Insulin sliding scale  Diabetic diet     Psychiatric illness  Assessment & Plan  · History of bipolar disorder, OCD, PTSD  · Admitted medically from [facility] psychiatric unit    Per discharge summary, psychiatrically cleared for medical admission   · Also, currently homeless  · Continue medications including Lamictal, hydroxyzine, Geodon  · Supportive care  · Denies current suicidal ideation  · Psychiatry reevaluation completed  Continue current plan of care       Acquired hypothyroidism  Assessment & Plan  · Continue levothyroxine              VTE Prophylaxis:  Heparin    Patient Centered Rounds: I have performed bedside rounds with nursing staff today  Discussions with Specialists or Other Care Team Provider: podiatry   Education and Discussions with Family / Patient: patient     Current Length of Stay: 7 day(s)    Current Patient Status: Inpatient   Certification Statement: The patient will continue to require additional inpatient hospital stay due to Osteomyelitis of the left foot    Discharge Plan: Pending discharge disposition  The patient is to be evaluated by podiatry today  Code Status: Level 1 - Full Code    Subjective:   Feeling okay  C/o lower back pain which she had some pain medications for a few minutes ago which she takes it chronically  She is concerning about her left foot that the wound may be opening up  Objective:     Vitals:   Temp (24hrs), Av 1 °F (36 7 °C), Min:97 9 °F (36 6 °C), Max:98 3 °F (36 8 °C)    Temp:  [97 9 °F (36 6 °C)-98 3 °F (36 8 °C)] 98 3 °F (36 8 °C)  HR:  [67-84] 67  Resp:  [18-20] 18  BP: (122-134)/(73-78) 134/73  SpO2:  [96 %-98 %] 98 %  Body mass index is 37 79 kg/m²  Input and Output Summary (last 24 hours):        Intake/Output Summary (Last 24 hours) at 5/15/2023 0841  Last data filed at 2023 1926  Gross per 24 hour   Intake 960 ml   Output 150 ml   Net 810 ml       Physical Exam:   Physical Exam    Additional Data:     Labs:    Results from last 7 days   Lab Units 05/15/23  0530   WBC Thousand/uL 8 25   HEMOGLOBIN g/dL 9 7*   HEMATOCRIT % 33 5*   PLATELETS Thousands/uL 416*   NEUTROS PCT % 39*   LYMPHS PCT % 44   MONOS PCT % 10   EOS PCT % 6     Results from last 7 days   Lab Units 05/15/23  0530   SODIUM mmol/L 135 POTASSIUM mmol/L 4 3   CHLORIDE mmol/L 100   CO2 mmol/L 30   BUN mg/dL 19   CREATININE mg/dL 0 89   CALCIUM mg/dL 9 6         Results from last 7 days   Lab Units 05/15/23  0735 05/14/23  2052 05/14/23  1610 05/14/23  1114 05/14/23  0648 05/13/23  2105 05/13/23  1600 05/13/23  1107 05/13/23  0643 05/12/23  2127 05/12/23  1557 05/12/23  1113   POC GLUCOSE mg/dl 201* 264* 215* 181* 191* 323* 207* 222* 221* 274* 266* 263*           * I Have Reviewed All Lab Data Listed Above  * Additional Pertinent Lab Tests Reviewed:  [address] Admission  Reviewed    Imaging:  Imaging Reports Reviewed Today Include: n/a     Recent Cultures (last 7 days):     Results from last 7 days   Lab Units 05/08/23  1601   GRAM STAIN RESULT  No Polys or Bacteria seen       Last 24 Hours Medication List:   Current Facility-Administered Medications   Medication Dose Route Frequency Provider Last Rate   • acetaminophen  650 mg Oral Q6H PRN Vertie Nickels, CRNP     • cloNIDine  0 2 mg Oral BID Vertie Nickels, CRNP     • doxycycline hyclate  100 mg Oral Q12H [address] Vertie Nickels, CRNP     • fenofibrate  48 mg Oral Daily Vertie Nickels, CRNP     • folic acid  6,990 mcg Oral Daily Vertie Nickels, CRNP     • heparin (porcine)  5,000 Units Subcutaneous [facility] Vertie Nickels, CRNP     • hydrOXYzine HCL  100 mg Oral HS Vertie Nickels, CRNP     • hydrOXYzine HCL  50 mg Oral Daily Vertie Nickels, CRNP     • insulin lispro  1-6 Units Subcutaneous TID AC Vertie Nickels, CRNP     • insulin lispro  1-6 Units Subcutaneous HS Vertie Nickels, CRNP     • lamoTRIgine  300 mg Oral QAM Vertie Nickels, CRNP     • levothyroxine  50 mcg Oral Early Morning Vertie Nickels, CRNP     • lidocaine  1 patch Topical Daily Vertie Nickels, CRNP     • melatonin  6 mg Oral HS Raymond Palma PA-C     • menthol-methyl salicylate   Apply externally 4x Daily PRN Jhoan Singh MD     • methocarbamol  750 mg Oral Q6H PRN SUSAN Ott-C     • ondansetron  4 mg Intravenous Q6H PRN Cindy Leblanc PA-C     • oxyCODONE  10 mg Oral Q6H PRN JOSEE Dangelo     • pantoprazole  40 mg Oral BID JOSEE Dangelo     • pregabalin  150 mg Oral BID JOSEE Dangelo     • ziprasidone  80 mg Oral BID With Meals JOSEE Dangelo          Today, Patient Was Seen By: JOSEE Bob    ** Please Note: Dictation voice to text software may have been used in the creation of this document   **

## 2023-05-15 NOTE — ASSESSMENT & PLAN NOTE
· Received from [address] [place] [address] psychiatric patient hartman with MRI results suggesting osteomyelitis of the left first metatarsal  · Podiatry input appreciated   · S/p left first ray resection podiatry 5/8/2023  · Intraoperative cultures: Staphylococcus aureus   · Continue doxycycline for 14 days per podiatry  · Wound care, surgical shoe  · Podiatry will re-evaluate surgical site today due to possible wound dehiscence  · PT/OT recommendations appreciated: Postacute rehab  · Case management input appreciated    Currently optioning

## 2023-05-15 NOTE — PROGRESS NOTES
"Progress Note - Podiatry  Izzy Stephenson 53 y o  female MRN: [id number]  Unit/Bed#: -01 Encounter: [id number]    Assessment:  1  Left foot ulcer with underlying abscess   2  Left foot cellulitis   3  Type 2 DM with neuropathy     Plan:    - Plan for repeat wound washout 5/16 or 5/17  - recommend IV abs ancef   - close monitoring and local wound care for now  - NWB to the LLE   - Rest of care per primary service     Lab Results   Component Value Date    HGBA1C 7 2 (H) 05/05/2023       Subjective/Objective   Chief Complaint: No chief complaint on file  Subjective: 53 y o  y/o female was seen and evaluated at bedside  Pt complaints of pain with pressure to her foot  Blood pressure 134/73, pulse 67, temperature 98 3 °F (36 8 °C), resp  rate 18, height 5' 5\" (1 651 m), weight 103 kg (227 lb 1 2 oz), SpO2 98 %, not currently breastfeeding  ,Body mass index is 37 79 kg/m²  Invasive Devices     Peripheral Intravenous Line  Duration           Peripheral IV 05/13/23 Dorsal (posterior); Left Forearm 1 day                Physical Exam:   General: Alert, cooperative and no distress  Lungs: Non labored breathing  Heart: Positive S1, S2  Abdomen: Soft, non-tender  Extremity:   Sutures removed, incision site has completely dehisced with serosanguinous drainage, erythema noted midfoot  Pain with palpation  Wound probes deep to multiple tendon fascia level  There is fluctuance noted medial plantar ;likely abscess/fluid collection or hematoma  Palpable pedal pulses  Lab, Imaging and other studies:   I have personally reviewed pertinent lab results    , CBC:   Lab Results   Component Value Date    WBC 8 25 05/15/2023    HGB 9 7 (L) 05/15/2023    HCT 33 5 (L) 05/15/2023    MCV 76 (L) 05/15/2023     (H) 05/15/2023    MCH 21 8 (L) 05/15/2023    MCHC 29 0 (L) 05/15/2023    RDW 21 1 (H) 05/15/2023    MPV 9 1 05/15/2023    NRBC 0 05/15/2023   , CMP:   Lab Results   Component Value Date    SODIUM 135 " 05/15/2023    K 4 3 05/15/2023     05/15/2023    CO2 30 05/15/2023    BUN 19 05/15/2023    CREATININE 0 89 05/15/2023    CALCIUM 9 6 05/15/2023    EGFR 77 05/15/2023       Imaging: I have personally reviewed pertinent films in PACS  EKG, Pathology, and Other Studies: I have personally reviewed pertinent reports    VTE Pharmacologic Prophylaxis: Sequential compression device (Venodyne)

## 2023-05-15 NOTE — UTILIZATION REVIEW
Continued Stay Review    Date: 5/15                          Current Patient Class: INpatient  Current Level of Care: MEd/surg    HPI:48 y o  female initially admitted on 5/8    Assessment/Plan:   Continue with Doxycycline, wound care, surgical shoe  PT/OT recommending post acute rehab  Podiatry:  Plan for repeat washout 5/16 or 5/17  Continue with IV abx  NWB to LLE  Sutures removed, incision site has completely dehisced with serosanguinous drainage, erythema noted midfoot  Pain with palpation  Wound probes deep to multiple tendon fascia level  There is fluctuance noted medial plantar ;likely abscess/fluid collection or hematoma  Palpable pedal pulses      Vital Signs:   Time Temp Pulse Resp BP MAP (mmHg) SpO2 O2 Device   05/15/23 07:37:40 98 3 °F (36 8 °C) 67 18 134/73 93 98 % None (Room air)   05/14/23 21:28:07 -- 74 -- 133/78 96 96 % --   05/14/23 2006 -- -- -- -- -- -- None (Room air)   05/14/23 13:48:41 97 9 °F (36 6 °C) 84 20 122/74 90 96 % --   05/14/23 0900 -- -- -- -- -- -- None (Room air)   05/14/23 07:19:04 97 8 °F (36 6 °C) 64 20 108/53 71 95 % --       Pertinent Labs/Diagnostic Results:   5/14 Left foot Xray:IMPRESSION:  Soft tissue swelling on the dorsum of the forefoot IMPRESSION:     Soft tissue swelling on the dorsum of the forefoot  No soft tissue gas seen there is no periostitis      Results from last 7 days   Lab Units 05/15/23  0530 05/13/23  0456 05/12/23  0429 05/10/23  0537 05/09/23 0424   WBC Thousand/uL 8 25 7 96 8 29 6 67 7 54   HEMOGLOBIN g/dL 9 7* 9 6* 10 3* 10 0* 9 6*   HEMATOCRIT % 33 5* 33 2* 34 7* 34 4* 33 2*   PLATELETS Thousands/uL 416* 417* 455* 432* 425*   NEUTROS ABS Thousands/µL 3 22 2 61 2 76 2 04 2 89         Results from last 7 days   Lab Units 05/15/23  0530 05/13/23  0456 05/12/23  0429 05/10/23  0537 05/09/23  0424   SODIUM mmol/L 135 133* 135 137 136   POTASSIUM mmol/L 4 3 4 5 4 5 4 2 4 3   CHLORIDE mmol/L 100 99 101 103 103   CO2 mmol/L 30 28 27 26 24   ANION GAP mmol/L 5 6 7 8 9   BUN mg/dL 19 18 17 19 18   CREATININE mg/dL 0 89 0 91 0 92 0 87 0 90   EGFR ml/min/1 73sq m 77 75 74 79 76   CALCIUM mg/dL 9 6 9 6 10 1 9 7 9 4         Results from last 7 days   Lab Units 05/15/23  1112 05/15/23  0735 05/14/23  2052 05/14/23  1610 05/14/23  1114 05/14/23  0648 05/13/23  2105 05/13/23  1600 05/13/23  1107 05/13/23  0643 05/12/23  2127 05/12/23  1557   POC GLUCOSE mg/dl 190* 201* 264* 215* 181* 191* 323* 207* 222* 221* 274* 266*     Results from last 7 days   Lab Units 05/15/23  0530 05/13/23  0456 05/12/23  0429 05/10/23  0537 05/09/23  0424 05/08/23  1502   GLUCOSE RANDOM mg/dL 176* 197* 216* 148* 159* 108         Results from last 7 days   Lab Units 05/08/23  1601   GRAM STAIN RESULT  No Polys or Bacteria seen         Medications:   Scheduled Medications:  cefazolin, 2,000 mg, Intravenous, Q8H  cloNIDine, 0 2 mg, Oral, BID  fenofibrate, 48 mg, Oral, Daily  folic acid, 9,816 mcg, Oral, Daily  heparin (porcine), 5,000 Units, Subcutaneous, Q8H RADHA  hydrOXYzine HCL, 100 mg, Oral, HS  hydrOXYzine HCL, 50 mg, Oral, Daily  insulin lispro, 1-6 Units, Subcutaneous, TID AC  insulin lispro, 1-6 Units, Subcutaneous, HS  lamoTRIgine, 300 mg, Oral, QAM  levothyroxine, 50 mcg, Oral, Early Morning  lidocaine, 1 patch, Topical, Daily  melatonin, 6 mg, Oral, HS  pantoprazole, 40 mg, Oral, BID  pregabalin, 150 mg, Oral, BID  ziprasidone, 80 mg, Oral, BID With Meals      Continuous IV Infusions:     PRN Meds:  acetaminophen, 650 mg, Oral, Q6H PRN  menthol-methyl salicylate, , Apply externally, 4x Daily PRN  methocarbamol, 750 mg, Oral, Q6H PRN  ondansetron, 4 mg, Intravenous, Q6H PRN  oxyCODONE, 10 mg, Oral, Q6H PRN        Discharge Plan: TBD    Network Utilization Review Department  ATTENTION: Please call with any questions or concerns to [phone] and carefully listen to the prompts so that you are directed to the right person   All voicemails are confidential   Alexandru Forrest all requests for admission clinical reviews, approved or denied determinations and any other requests to dedicated fax number below belonging to the campus where the patient is receiving treatment   List of dedicated fax numbers for the Facilities:  [address] [place] [address] DENIALS (Administrative/Medical Necessity) [phone]   [address] [place] [address] (Maternity/NICU/Pediatrics) [phone]   [address] [phone]   [facility] [address] [phone]   [address] [place] [address] [place] [address] [phone]   [address] [facility] [address] 5 [facility] [phone]

## 2023-05-15 NOTE — ASSESSMENT & PLAN NOTE
· History of bipolar disorder, OCD, PTSD  · Admitted medically from [place] [address] psychiatric unit  Per discharge summary, psychiatrically cleared for medical admission   · Also, currently homeless  · Continue medications including Lamictal, hydroxyzine, Geodon  · Supportive care  · Denies current suicidal ideation  · Psychiatry reevaluation completed    Continue current plan of care

## 2023-05-15 NOTE — PLAN OF CARE
Problem: MOBILITY - ADULT  Goal: Maintain or return to baseline ADL function  Description: INTERVENTIONS:  -  Assess patient's ability to carry out ADLs; assess patient's baseline for ADL function and identify physical deficits which impact ability to perform ADLs (bathing, care of mouth/teeth, toileting, grooming, dressing, etc )  - Assess/evaluate cause of self-care deficits   - Assess range of motion  - Assess patient's mobility; develop plan if impaired  - Assess patient's need for assistive devices and provide as appropriate  - Encourage maximum independence but intervene and supervise when necessary  - Involve family in performance of ADLs  - Assess for home care needs following discharge   - Consider OT consult to assist with ADL evaluation and planning for discharge  - Provide patient education as appropriate  Outcome: Progressing  Goal: Maintains/Returns to pre admission functional level  Description: INTERVENTIONS:  - Perform BMAT or MOVE assessment daily    - Set and communicate daily mobility goal to care team and patient/family/caregiver  - Collaborate with rehabilitation services on mobility goals if consulted  - Perform Range of Motion 2 times a day  - Reposition patient every 2 hours    - Dangle patient 2 times a day  - Stand patient 2 times a day  - Ambulate patient 2 times a day  - Out of bed to chair 3 times a day   - Out of bed for meals 3 times a day  - Out of bed for toileting  - Record patient progress and toleration of activity level   Outcome: Progressing     Problem: PAIN - ADULT  Goal: Verbalizes/displays adequate comfort level or baseline comfort level  Description: Interventions:  - Encourage patient to monitor pain and request assistance  - Assess pain using appropriate pain scale  - Administer analgesics based on type and severity of pain and evaluate response  - Implement non-pharmacological measures as appropriate and evaluate response  - Consider cultural and social influences on pain and pain management  - Notify physician/advanced practitioner if interventions unsuccessful or patient reports new pain  Outcome: Progressing     Problem: SAFETY ADULT  Goal: Maintain or return to baseline ADL function  Description: INTERVENTIONS:  -  Assess patient's ability to carry out ADLs; assess patient's baseline for ADL function and identify physical deficits which impact ability to perform ADLs (bathing, care of mouth/teeth, toileting, grooming, dressing, etc )  - Assess/evaluate cause of self-care deficits   - Assess range of motion  - Assess patient's mobility; develop plan if impaired  - Assess patient's need for assistive devices and provide as appropriate  - Encourage maximum independence but intervene and supervise when necessary  - Involve family in performance of ADLs  - Assess for home care needs following discharge   - Consider OT consult to assist with ADL evaluation and planning for discharge  - Provide patient education as appropriate  Outcome: Progressing  Goal: Maintains/Returns to pre admission functional level  Description: INTERVENTIONS:  - Perform BMAT or MOVE assessment daily    - Set and communicate daily mobility goal to care team and patient/family/caregiver  - Collaborate with rehabilitation services on mobility goals if consulted  - Perform Range of Motion 2 times a day  - Reposition patient every 2 hours    - Dangle patient 2 times a day  - Stand patient 2 times a day  - Ambulate patient 2 times a day  - Out of bed to chair 3 times a day   - Out of bed for meals 3 times a day  - Out of bed for toileting  - Record patient progress and toleration of activity level   Outcome: Progressing  Goal: Patient will remain free of falls  Description: INTERVENTIONS:  - Educate patient/family on patient safety including physical limitations  - Instruct patient to call for assistance with activity   - Consult OT/PT to assist with strengthening/mobility   - Keep Call bell within reach  - Keep bed low and locked with side rails adjusted as appropriate  - Keep care items and personal belongings within reach  - Initiate and maintain comfort rounds  - Make Fall Risk Sign visible to staff  - Offer Toileting every 2 Hours, in advance of need  - Initiate/Maintain bed alarm  - Obtain necessary fall risk management equipment  - Apply yellow socks and bracelet for high fall risk patients  - Consider moving patient to room near nurses station  Outcome: Progressing

## 2023-05-15 NOTE — PLAN OF CARE
Problem: MOBILITY - ADULT  Goal: Maintain or return to baseline ADL function  Description: INTERVENTIONS:  -  Assess patient's ability to carry out ADLs; assess patient's baseline for ADL function and identify physical deficits which impact ability to perform ADLs (bathing, care of mouth/teeth, toileting, grooming, dressing, etc )  - Assess/evaluate cause of self-care deficits   - Assess range of motion  - Assess patient's mobility; develop plan if impaired  - Assess patient's need for assistive devices and provide as appropriate  - Encourage maximum independence but intervene and supervise when necessary  - Involve family in performance of ADLs  - Assess for home care needs following discharge   - Consider OT consult to assist with ADL evaluation and planning for discharge  - Provide patient education as appropriate  Outcome: Progressing  Goal: Maintains/Returns to pre admission functional level  Description: INTERVENTIONS:  - Perform BMAT or MOVE assessment daily    - Set and communicate daily mobility goal to care team and patient/family/caregiver  - Collaborate with rehabilitation services on mobility goals if consulted  - Perform Range of Motion 2 times a day  - Reposition patient every 2 hours    - Dangle patient 2 times a day  - Stand patient 2 times a day  - Ambulate patient 2 times a day  - Out of bed to chair 3 times a day   - Out of bed for meals 3 times a day  - Out of bed for toileting  - Record patient progress and toleration of activity level   Outcome: Progressing     Problem: PAIN - ADULT  Goal: Verbalizes/displays adequate comfort level or baseline comfort level  Description: Interventions:  - Encourage patient to monitor pain and request assistance  - Assess pain using appropriate pain scale  - Administer analgesics based on type and severity of pain and evaluate response  - Implement non-pharmacological measures as appropriate and evaluate response  - Consider cultural and social influences on pain and pain management  - Notify physician/advanced practitioner if interventions unsuccessful or patient reports new pain  Outcome: Progressing     Problem: INFECTION - ADULT  Goal: Absence or prevention of progression during hospitalization  Description: INTERVENTIONS:  - Assess and monitor for signs and symptoms of infection  - Monitor lab/diagnostic results  - Monitor all insertion sites, i e  indwelling lines, tubes, and drains  - Monitor endotracheal if appropriate and nasal secretions for changes in amount and color  - Halls appropriate cooling/warming therapies per order  - Administer medications as ordered  - Instruct and encourage patient and family to use good hand hygiene technique  - Identify and instruct in appropriate isolation precautions for identified infection/condition  Outcome: Progressing  Goal: Absence of fever/infection during neutropenic period  Description: INTERVENTIONS:  - Monitor WBC    Outcome: Progressing     Problem: SAFETY ADULT  Goal: Maintain or return to baseline ADL function  Description: INTERVENTIONS:  -  Assess patient's ability to carry out ADLs; assess patient's baseline for ADL function and identify physical deficits which impact ability to perform ADLs (bathing, care of mouth/teeth, toileting, grooming, dressing, etc )  - Assess/evaluate cause of self-care deficits   - Assess range of motion  - Assess patient's mobility; develop plan if impaired  - Assess patient's need for assistive devices and provide as appropriate  - Encourage maximum independence but intervene and supervise when necessary  - Involve family in performance of ADLs  - Assess for home care needs following discharge   - Consider OT consult to assist with ADL evaluation and planning for discharge  - Provide patient education as appropriate  Outcome: Progressing  Goal: Maintains/Returns to pre admission functional level  Description: INTERVENTIONS:  - Perform BMAT or MOVE assessment daily    - Set and communicate daily mobility goal to care team and patient/family/caregiver  - Collaborate with rehabilitation services on mobility goals if consulted  - Perform Range of Motion 2 times a day  - Reposition patient every 2 hours    - Dangle patient 2 times a day  - Stand patient 2 times a day  - Ambulate patient 2 times a day  - Out of bed to chair 3 times a day   - Out of bed for meals 3 times a day  - Out of bed for toileting  - Record patient progress and toleration of activity level   Outcome: Progressing  Goal: Patient will remain free of falls  Description: INTERVENTIONS:  - Educate patient/family on patient safety including physical limitations  - Instruct patient to call for assistance with activity   - Consult OT/PT to assist with strengthening/mobility   - Keep Call bell within reach  - Keep bed low and locked with side rails adjusted as appropriate  - Keep care items and personal belongings within reach  - Initiate and maintain comfort rounds  - Make Fall Risk Sign visible to staff  - Offer Toileting every 2 Hours, in advance of need  - Initiate/Maintain bed alarm  - Obtain necessary fall risk management equipment  - Apply yellow socks and bracelet for high fall risk patients  - Consider moving patient to room near nurses station  Outcome: Progressing     Problem: DISCHARGE PLANNING  Goal: Discharge to home or other facility with appropriate resources  Description: INTERVENTIONS:  - Identify barriers to discharge w/patient and caregiver  - Arrange for needed discharge resources and transportation as appropriate  - Identify discharge learning needs (meds, wound care, etc )  - Refer to Case Management Department for coordinating discharge planning if the patient needs post-hospital services based on physician/advanced practitioner order or complex needs related to functional status, cognitive ability, or social support system  Outcome: Progressing     Problem: Knowledge Deficit  Goal: Patient/family/caregiver demonstrates understanding of disease process, treatment plan, medications, and discharge instructions  Description: Complete learning assessment and assess knowledge base    Interventions:  - Provide teaching at level of understanding  - Provide teaching via preferred learning methods  Outcome: Progressing     Problem: SKIN/TISSUE INTEGRITY - ADULT  Goal: Incision(s), wounds(s) or drain site(s) healing without S/S of infection  Description: INTERVENTIONS  - Assess and document dressing, incision, wound bed, drain sites and surrounding tissue  - Provide patient and family education  - Perform skin care/dressing changes every shift  Outcome: Progressing  Goal: Pressure injury heals and does not worsen  Description: Interventions:  - Implement low air loss mattress or specialty surface (Criteria met)  - Apply silicone foam dressing  - Instruct/assist with weight shifting every 60 minutes when in chair   - Limit chair time to 3 hour intervals  - Use special pressure reducing interventions such as waffle cushion when in chair   - Apply fecal or urinary incontinence containment device   - Perform passive or active ROM every 4 hrs  - Turn and reposition patient & offload bony prominences every 2 hours   - Utilize friction reducing device or surface for transfers   - Consider consults to  interdisciplinary teams such as wound and podiatry   - Use incontinent care products after each incontinent episode such as foam cleanser  - Consider nutrition services referral as needed  Outcome: Progressing     Problem: Prexisting or High Potential for Compromised Skin Integrity  Goal: Skin integrity is maintained or improved  Description: INTERVENTIONS:  - Identify patients at risk for skin breakdown  - Assess and monitor skin integrity  - Assess and monitor nutrition and hydration status  - Monitor labs   - Assess for incontinence   - Turn and reposition patient  - Assist with mobility/ambulation  - Relieve pressure over bony prominences  - Avoid friction and shearing  - Provide appropriate hygiene as needed including keeping skin clean and dry  - Evaluate need for skin moisturizer/barrier cream  - Collaborate with interdisciplinary team   - Patient/family teaching  - Consider wound care consult   Outcome: Progressing     Problem: Potential for Falls  Goal: Patient will remain free of falls  Description: INTERVENTIONS:  - Educate patient/family on patient safety including physical limitations  - Instruct patient to call for assistance with activity   - Consult OT/PT to assist with strengthening/mobility   - Keep Call bell within reach  - Keep bed low and locked with side rails adjusted as appropriate  - Keep care items and personal belongings within reach  - Initiate and maintain comfort rounds  - Make Fall Risk Sign visible to staff  - Offer Toileting every 2 Hours, in advance of need  - Initiate/Maintain bed alarm  - Obtain necessary fall risk management equipment  - Apply yellow socks and bracelet for high fall risk patients  - Consider moving patient to room near nurses station  Outcome: Progressing

## 2023-05-15 NOTE — NURSING NOTE
Pt in bed at this time in no acute distress , podiatry at bedside changing post op dsg, wound care doing stump dsg

## 2023-05-16 ENCOUNTER — ANESTHESIA (INPATIENT)
Dept: PERIOP | Facility: HOSPITAL | Age: 48
End: 2023-05-16

## 2023-05-16 ENCOUNTER — ANESTHESIA EVENT (INPATIENT)
Dept: PERIOP | Facility: HOSPITAL | Age: 48
End: 2023-05-16

## 2023-05-16 LAB
GLUCOSE SERPL-MCNC: 173 MG/DL (ref 65–140)
GLUCOSE SERPL-MCNC: 195 MG/DL (ref 65–140)
GLUCOSE SERPL-MCNC: 198 MG/DL (ref 65–140)
GLUCOSE SERPL-MCNC: 286 MG/DL (ref 65–140)
GLUCOSE SERPL-MCNC: 291 MG/DL (ref 65–140)

## 2023-05-16 PROCEDURE — 0KBW0ZZ EXCISION OF LEFT FOOT MUSCLE, OPEN APPROACH: ICD-10-PCS | Performed by: STUDENT IN AN ORGANIZED HEALTH CARE EDUCATION/TRAINING PROGRAM

## 2023-05-16 RX ORDER — FENTANYL CITRATE 50 UG/ML
INJECTION, SOLUTION INTRAMUSCULAR; INTRAVENOUS AS NEEDED
Status: DISCONTINUED | OUTPATIENT
Start: 2023-05-16 | End: 2023-05-16

## 2023-05-16 RX ORDER — INSULIN GLARGINE 100 [IU]/ML
5 INJECTION, SOLUTION SUBCUTANEOUS
Status: DISCONTINUED | OUTPATIENT
Start: 2023-05-16 | End: 2023-05-17

## 2023-05-16 RX ORDER — PROPOFOL 10 MG/ML
INJECTION, EMULSION INTRAVENOUS CONTINUOUS PRN
Status: DISCONTINUED | OUTPATIENT
Start: 2023-05-16 | End: 2023-05-16

## 2023-05-16 RX ORDER — HYDROMORPHONE HCL/PF 1 MG/ML
0.5 SYRINGE (ML) INJECTION
Status: DISCONTINUED | OUTPATIENT
Start: 2023-05-16 | End: 2023-05-16 | Stop reason: HOSPADM

## 2023-05-16 RX ORDER — METOCLOPRAMIDE HYDROCHLORIDE 5 MG/ML
10 INJECTION INTRAMUSCULAR; INTRAVENOUS ONCE AS NEEDED
Status: DISCONTINUED | OUTPATIENT
Start: 2023-05-16 | End: 2023-05-16 | Stop reason: HOSPADM

## 2023-05-16 RX ORDER — MAGNESIUM HYDROXIDE 1200 MG/15ML
LIQUID ORAL AS NEEDED
Status: DISCONTINUED | OUTPATIENT
Start: 2023-05-16 | End: 2023-05-16 | Stop reason: HOSPADM

## 2023-05-16 RX ORDER — FENTANYL CITRATE/PF 50 MCG/ML
50 SYRINGE (ML) INJECTION
Status: DISCONTINUED | OUTPATIENT
Start: 2023-05-16 | End: 2023-05-16 | Stop reason: HOSPADM

## 2023-05-16 RX ORDER — MIDAZOLAM HYDROCHLORIDE 2 MG/2ML
INJECTION, SOLUTION INTRAMUSCULAR; INTRAVENOUS AS NEEDED
Status: DISCONTINUED | OUTPATIENT
Start: 2023-05-16 | End: 2023-05-16

## 2023-05-16 RX ORDER — LIDOCAINE HYDROCHLORIDE 10 MG/ML
INJECTION, SOLUTION EPIDURAL; INFILTRATION; INTRACAUDAL; PERINEURAL AS NEEDED
Status: DISCONTINUED | OUTPATIENT
Start: 2023-05-16 | End: 2023-05-16 | Stop reason: HOSPADM

## 2023-05-16 RX ORDER — BUPIVACAINE HYDROCHLORIDE 2.5 MG/ML
INJECTION, SOLUTION EPIDURAL; INFILTRATION; INTRACAUDAL AS NEEDED
Status: DISCONTINUED | OUTPATIENT
Start: 2023-05-16 | End: 2023-05-16 | Stop reason: HOSPADM

## 2023-05-16 RX ORDER — SODIUM CHLORIDE, SODIUM LACTATE, POTASSIUM CHLORIDE, CALCIUM CHLORIDE 600; 310; 30; 20 MG/100ML; MG/100ML; MG/100ML; MG/100ML
INJECTION, SOLUTION INTRAVENOUS CONTINUOUS PRN
Status: DISCONTINUED | OUTPATIENT
Start: 2023-05-16 | End: 2023-05-16

## 2023-05-16 RX ADMIN — SODIUM CHLORIDE, SODIUM LACTATE, POTASSIUM CHLORIDE, AND CALCIUM CHLORIDE: .6; .31; .03; .02 INJECTION, SOLUTION INTRAVENOUS at 07:08

## 2023-05-16 RX ADMIN — FENTANYL CITRATE 50 MCG: 50 INJECTION, SOLUTION INTRAMUSCULAR; INTRAVENOUS at 08:29

## 2023-05-16 RX ADMIN — INSULIN GLARGINE 5 UNITS: 100 INJECTION, SOLUTION SUBCUTANEOUS at 21:08

## 2023-05-16 RX ADMIN — Medication 6 MG: at 21:08

## 2023-05-16 RX ADMIN — INSULIN LISPRO 4 UNITS: 100 INJECTION, SOLUTION INTRAVENOUS; SUBCUTANEOUS at 12:21

## 2023-05-16 RX ADMIN — OXYCODONE HYDROCHLORIDE 10 MG: 10 TABLET ORAL at 05:45

## 2023-05-16 RX ADMIN — OXYCODONE HYDROCHLORIDE 10 MG: 10 TABLET ORAL at 16:23

## 2023-05-16 RX ADMIN — HEPARIN SODIUM 5000 UNITS: 5000 INJECTION INTRAVENOUS; SUBCUTANEOUS at 14:06

## 2023-05-16 RX ADMIN — MIDAZOLAM 2 MG: 1 INJECTION INTRAMUSCULAR; INTRAVENOUS at 07:38

## 2023-05-16 RX ADMIN — PREGABALIN 150 MG: 75 CAPSULE ORAL at 09:27

## 2023-05-16 RX ADMIN — FOLIC ACID 1000 MCG: 1 TABLET ORAL at 09:25

## 2023-05-16 RX ADMIN — OXYCODONE HYDROCHLORIDE 10 MG: 10 TABLET ORAL at 22:30

## 2023-05-16 RX ADMIN — INSULIN LISPRO 1 UNITS: 100 INJECTION, SOLUTION INTRAVENOUS; SUBCUTANEOUS at 16:28

## 2023-05-16 RX ADMIN — METHOCARBAMOL TABLETS 750 MG: 500 TABLET, COATED ORAL at 05:45

## 2023-05-16 RX ADMIN — INSULIN LISPRO 4 UNITS: 100 INJECTION, SOLUTION INTRAVENOUS; SUBCUTANEOUS at 21:08

## 2023-05-16 RX ADMIN — FENTANYL CITRATE 100 MCG: 50 INJECTION, SOLUTION INTRAMUSCULAR; INTRAVENOUS at 07:42

## 2023-05-16 RX ADMIN — CLONIDINE HYDROCHLORIDE 0.2 MG: 0.1 TABLET ORAL at 09:25

## 2023-05-16 RX ADMIN — ZIPRASIDONE HYDROCHLORIDE 80 MG: 40 CAPSULE ORAL at 16:23

## 2023-05-16 RX ADMIN — METHOCARBAMOL TABLETS 750 MG: 500 TABLET, COATED ORAL at 22:30

## 2023-05-16 RX ADMIN — PANTOPRAZOLE SODIUM 40 MG: 40 TABLET, DELAYED RELEASE ORAL at 09:24

## 2023-05-16 RX ADMIN — METHOCARBAMOL TABLETS 750 MG: 500 TABLET, COATED ORAL at 16:23

## 2023-05-16 RX ADMIN — ZIPRASIDONE HYDROCHLORIDE 80 MG: 40 CAPSULE ORAL at 09:26

## 2023-05-16 RX ADMIN — MENTHOL, METHYL SALICYLATE: 10; 15 CREAM TOPICAL at 21:09

## 2023-05-16 RX ADMIN — MENTHOL, METHYL SALICYLATE: 10; 15 CREAM TOPICAL at 09:35

## 2023-05-16 RX ADMIN — HEPARIN SODIUM 5000 UNITS: 5000 INJECTION INTRAVENOUS; SUBCUTANEOUS at 21:08

## 2023-05-16 RX ADMIN — PROPOFOL 100 MCG/KG/MIN: 10 INJECTION, EMULSION INTRAVENOUS at 07:42

## 2023-05-16 RX ADMIN — HYDROXYZINE HYDROCHLORIDE 100 MG: 25 TABLET ORAL at 21:08

## 2023-05-16 RX ADMIN — CEFAZOLIN SODIUM 2000 MG: 2 SOLUTION INTRAVENOUS at 21:08

## 2023-05-16 RX ADMIN — PANTOPRAZOLE SODIUM 40 MG: 40 TABLET, DELAYED RELEASE ORAL at 21:08

## 2023-05-16 RX ADMIN — CEFAZOLIN SODIUM 2000 MG: 2 SOLUTION INTRAVENOUS at 05:45

## 2023-05-16 RX ADMIN — LEVOTHYROXINE SODIUM 50 MCG: 50 TABLET ORAL at 05:45

## 2023-05-16 RX ADMIN — FENOFIBRATE 48 MG: 48 TABLET, FILM COATED ORAL at 09:26

## 2023-05-16 RX ADMIN — LAMOTRIGINE 300 MG: 100 TABLET ORAL at 09:23

## 2023-05-16 RX ADMIN — HYDROXYZINE HYDROCHLORIDE 50 MG: 25 TABLET ORAL at 09:24

## 2023-05-16 RX ADMIN — CEFAZOLIN SODIUM 2000 MG: 2 SOLUTION INTRAVENOUS at 12:21

## 2023-05-16 RX ADMIN — PREGABALIN 150 MG: 75 CAPSULE ORAL at 17:19

## 2023-05-16 NOTE — ASSESSMENT & PLAN NOTE
· History of bipolar disorder, OCD, PTSD  · Admitted medically from [place] [address] psychiatric unit  Per discharge summary, psychiatrically cleared for medical admission   · Also, currently homeless  · Continue medications including Lamictal, hydroxyzine, Geodon  · Supportive care  · Denies current suicidal ideation   · Psychiatry reevaluation completed    Continue current plan of care

## 2023-05-16 NOTE — QUICK NOTE
- S/p Left foot wound washout DOS: 5/16/2023  - NWB to LLE   - Plan for repeat washout vs  Delayed primary closure 5/18/2023  - Continue IV abx   - rest of care per primary service

## 2023-05-16 NOTE — ASSESSMENT & PLAN NOTE
Lab Results   Component Value Date    HGBA1C 7 2 (H) 05/05/2023       Recent Labs     05/15/23  2059 05/16/23  0646 05/16/23  0820 05/16/23  1058   POCGLU 294* 195* 198* 291*       Blood Sugar Average: Last 72 hrs:  (P) 793 [id number]   · Insulin sliding scale  Diabetic diet    · Adding long acting insulin for better blood glucose coverage

## 2023-05-16 NOTE — PROGRESS NOTES
[facility]  Progress Note  Name: Ankit Ferrara I  MRN: [id number]  Unit/Bed#: -01 I Date of Admission: 5/8/2023   Date of Service: 5/16/2023 I Hospital Day: 8    Assessment/Plan   * Osteomyelitis of left foot [facility])  Assessment & Plan  · Received from [address] [place] [address] psychiatric patient hartman with MRI results suggesting osteomyelitis of the left first metatarsal  · Podiatry input appreciated   · S/p left first ray resection 5/8; left foot wound wash out due to wound dehiscence 5/16  · Plan for repeat washout vs delayed primary closure 5/18  · Transitioned from doxycycline to cefazolin (5/15)   · Intraoperative cultures: Staphylococcus aureus   · Wound care, surgical shoe  NWB status on left foot   · PT/OT recommendations appreciated: Postacute rehab  · Case management input appreciated  Currently optioning    Hx of right BKA ([facility] [address] )  Assessment & Plan  · History of right BKA 3/20/2023  · Serial assessments and supportive care  · Fall precautions      Gastroesophageal reflux disease without esophagitis  Assessment & Plan  · Continue PPI      Other chronic pain  Assessment & Plan  · Previously on OxyContin for chronic back pain, transitioned to oxycodone 10 mg every 6 hours while in inpatient psychiatric unit   · Monitor for adequate pain control and or signs of oversedation or withdrawal     Type 2 diabetes mellitus with diabetic neuropathy, without long-term current use of insulin [facility])  Assessment & Plan  Lab Results   Component Value Date    HGBA1C 7 2 (H) 05/05/2023       Recent Labs     05/15/23  2059 05/16/23  0646 05/16/23  0820 05/16/23  1058   POCGLU 294* 195* 198* 291*       Blood Sugar Average: Last 72 hrs:  (P) 281 [id number]   · Insulin sliding scale  Diabetic diet    · Adding long acting insulin for better blood glucose coverage       Psychiatric illness  Assessment & Plan  · History of bipolar disorder, OCD, PTSD  · Admitted medically from [facility] psychiatric unit  Per discharge summary, psychiatrically cleared for medical admission   · Also, currently homeless  · Continue medications including Lamictal, hydroxyzine, Geodon  · Supportive care  · Denies current suicidal ideation   · Psychiatry reevaluation completed  Continue current plan of care       Acquired hypothyroidism  Assessment & Plan  · Continue levothyroxine               VTE Prophylaxis:  Heparin    Patient Centered Rounds: I have performed bedside rounds with nursing staff today  Discussions with Specialists or Other Care Team Provider: podiatry   Education and Discussions with Family / Patient: patient     Current Length of Stay: 8 day(s)    Current Patient Status: Inpatient   Certification Statement: The patient will continue to require additional inpatient hospital stay due to osteomyelitis of left foot     Discharge Plan: pending clinical course     Code Status: Level 1 - Full Code    Subjective:   Feeling ok  Denies any pain  Objective:     Vitals:   Temp (24hrs), Av 8 °F (36 6 °C), Min:97 1 °F (36 2 °C), Max:98 3 °F (36 8 °C)    Temp:  [97 1 °F (36 2 °C)-98 3 °F (36 8 °C)] 98 °F (36 7 °C)  HR:  [60-88] 80  Resp:  [12-22] 18  BP: (104-146)/() 118/64  SpO2:  [95 %-98 %] 97 %  Body mass index is 37 79 kg/m²  Input and Output Summary (last 24 hours): Intake/Output Summary (Last 24 hours) at 2023 1318  Last data filed at 2023 1150  Gross per 24 hour   Intake 1070 ml   Output 200 ml   Net 870 ml       Physical Exam:   Physical Exam  Vitals and nursing note reviewed  Constitutional:       General: She is not in acute distress  Appearance: Normal appearance  She is obese  HENT:      Head: Normocephalic and atraumatic  Right Ear: External ear normal       Left Ear: External ear normal       Nose: Nose normal  No rhinorrhea  Mouth/Throat:      Mouth: Mucous membranes are moist       Pharynx: Oropharynx is clear     Eyes:      General: Right eye: No discharge  Left eye: No discharge  Pupils: Pupils are equal, round, and reactive to light  Cardiovascular:      Rate and Rhythm: Normal rate and regular rhythm  Pulses: Normal pulses  Heart sounds: Normal heart sounds  No murmur heard  Pulmonary:      Effort: Pulmonary effort is normal  No respiratory distress  Breath sounds: Normal breath sounds  Abdominal:      General: Bowel sounds are normal  There is no distension  Palpations: Abdomen is soft  There is no mass  Tenderness: There is no abdominal tenderness  Musculoskeletal:         General: No swelling or tenderness  Cervical back: Normal range of motion and neck supple  No muscular tenderness  Skin:     General: Skin is warm and dry  Capillary Refill: Capillary refill takes less than 2 seconds  Findings: No erythema or rash  Comments: Left foot dressing intact    Neurological:      General: No focal deficit present  Mental Status: She is alert and oriented to person, place, and time  Mental status is at baseline  Psychiatric:         Mood and Affect: Mood normal          Behavior: Behavior normal          Thought Content:  Thought content normal          Additional Data:     Labs:    Results from last 7 days   Lab Units 05/15/23  0530   WBC Thousand/uL 8 25   HEMOGLOBIN g/dL 9 7*   HEMATOCRIT % 33 5*   PLATELETS Thousands/uL 416*   NEUTROS PCT % 39*   LYMPHS PCT % 44   MONOS PCT % 10   EOS PCT % 6     Results from last 7 days   Lab Units 05/15/23  0530   SODIUM mmol/L 135   POTASSIUM mmol/L 4 3   CHLORIDE mmol/L 100   CO2 mmol/L 30   BUN mg/dL 19   CREATININE mg/dL 0 89   CALCIUM mg/dL 9 6         Results from last 7 days   Lab Units 05/16/23  1058 05/16/23  0820 05/16/23  0646 05/15/23  2059 05/15/23  1614 05/15/23  1112 05/15/23  0735 05/14/23  2052 05/14/23  1610 05/14/23  1114 05/14/23  0648 05/13/23  2105   POC GLUCOSE mg/dl 291* 198* 195* 294* 225* 190* 201* 264* 215* 181* 191* 323*           * I Have Reviewed All Lab Data Listed Above  * Additional Pertinent Lab Tests Reviewed: [facility] Admission  Reviewed    Imaging:  Imaging Reports Reviewed Today Include: n/a     Recent Cultures (last 7 days):           Last 24 Hours Medication List:   Current Facility-Administered Medications   Medication Dose Route Frequency Provider Last Rate   • acetaminophen  650 mg Oral Q6H PRN Leopoldo Tellez DPM     • cefazolin  2,000 mg Intravenous Q8H Leopoldo Tellez DPM 2,000 mg (05/16/23 1221)   • cloNIDine  0 2 mg Oral BID Leopoldo Tellez DPM     • fenofibrate  48 mg Oral Daily Leopoldo Tellez DPM     • folic acid  9,060 mcg Oral Daily Leopoldo Tellez DPM     • heparin (porcine)  5,000 Units Subcutaneous Q8H [address] Leopoldo Tellez DPM     • hydrOXYzine HCL  100 mg Oral HS Leopoldo Tellez DPM     • hydrOXYzine HCL  50 mg Oral Daily Leopoldo Tellez DPM     • insulin glargine  5 Units Subcutaneous HS JOSEE Diallo     • insulin lispro  1-6 Units Subcutaneous TID AC Leopoldo Tellez DPM     • insulin lispro  1-6 Units Subcutaneous HS Leopoldo Tellez DPM     • lamoTRIgine  300 mg Oral QAM Leopoldo Tellez DPM     • levothyroxine  50 mcg Oral Early Morning Leopoldo Tellez DPM     • lidocaine  1 patch Topical Daily Leopoldo Tellez DPM     • melatonin  6 mg Oral HS Leopoldo Tellez DPM     • menthol-methyl salicylate   Apply externally 4x Daily PRN Leopoldo Tellez DPM     • methocarbamol  750 mg Oral Q6H PRN Leopoldo Tellez DPM     • ondansetron  4 mg Intravenous Q6H PRN Leopoldo Tellez DPM     • oxyCODONE  10 mg Oral Q6H PRN Leopoldo Tellez DPM     • pantoprazole  40 mg Oral BID Leopoldo Tellez DPM     • pregabalin  150 mg Oral BID Leopoldo Tellez DPM     • ziprasidone  80 mg Oral BID With Meals Leopoldo Tellez DPM          Today, Patient Was Seen By: JOSEE Diallo    ** Please Note: Dictation voice to text software may have been used in the creation of this document   **

## 2023-05-16 NOTE — ANESTHESIA PREPROCEDURE EVALUATION
Procedure:  DEBRIDEMENT WOUND [facility] OUT) (Left: Foot)    Relevant Problems   CARDIO   (+) Essential hypertension   (+) Hyperlipidemia associated with type 2 diabetes mellitus (HCC)   (+) Migraine without status migrainosus, not intractable      ENDO   (+) Acquired hypothyroidism   (+) Type 2 diabetes mellitus with diabetic neuropathy, without long-term current use of insulin (HCC)      GI/HEPATIC   (+) Gastroesophageal reflux disease without esophagitis   (+) H/O bariatric surgery      MUSCULOSKELETAL   (+) Arthritis   (+) Chronic right-sided low back pain with left-sided sciatica   (+) Diastasis of rectus abdominis      NEURO/PSYCH   (+) Anxiety   (+) Chronic paroxysmal hemicrania, not intractable   (+) Chronic right-sided low back pain with left-sided sciatica   (+) Migraine without status migrainosus, not intractable   (+) OCD (obsessive compulsive disorder)   (+) Other chronic pain   (+) PTSD (post-traumatic stress disorder)      PULMONARY   (+) Asthma, intrinsic   (+) Obstructive sleep apnea      Other   (+) Acute osteomyelitis of metatarsal bone of right foot (HCC)   (+) Osteomyelitis of left foot (HCC)        Physical Exam    Airway    Mallampati score: II  TM Distance: >3 FB  Neck ROM: full     Dental   No notable dental hx     Cardiovascular      Pulmonary      Other Findings        Anesthesia Plan  ASA Score- 3     Anesthesia Type- IV sedation with anesthesia with ASA Monitors  Additional Monitors:   Airway Plan:           Plan Factors-Exercise tolerance (METS): >4 METS  Chart reviewed  Existing labs reviewed  Patient summary reviewed  Patient is a current smoker  Patient instructed to abstain from smoking on day of procedure  Patient did not smoke on day of surgery  There is medical exclusion for perioperative obstructive sleep apnea risk education  Induction- intravenous  Postoperative Plan- Plan for postoperative opioid use       Informed Consent- Anesthetic plan and risks discussed with patient  I personally reviewed this patient with the CRNA  Discussed and agreed on the Anesthesia Plan with the CRNA  Nadia Perez

## 2023-05-16 NOTE — ASSESSMENT & PLAN NOTE
· Received from [address] [place] [address] psychiatric patient hartman with MRI results suggesting osteomyelitis of the left first metatarsal  · Podiatry input appreciated   · S/p left first ray resection 5/8; left foot wound wash out due to wound dehiscence 5/16  · Plan for repeat washout vs delayed primary closure 5/18  · Transitioned from doxycycline to cefazolin (5/15)   · Intraoperative cultures: Staphylococcus aureus   · Wound care, surgical shoe  NWB status on left foot   · PT/OT recommendations appreciated: Postacute rehab  · Case management input appreciated    Currently optioning

## 2023-05-16 NOTE — PLAN OF CARE
Problem: MOBILITY - ADULT  Goal: Maintain or return to baseline ADL function  Description: INTERVENTIONS:  -  Assess patient's ability to carry out ADLs; assess patient's baseline for ADL function and identify physical deficits which impact ability to perform ADLs (bathing, care of mouth/teeth, toileting, grooming, dressing, etc )  - Assess/evaluate cause of self-care deficits   - Assess range of motion  - Assess patient's mobility; develop plan if impaired  - Assess patient's need for assistive devices and provide as appropriate  - Encourage maximum independence but intervene and supervise when necessary  - Involve family in performance of ADLs  - Assess for home care needs following discharge   - Consider OT consult to assist with ADL evaluation and planning for discharge  - Provide patient education as appropriate  Outcome: Progressing  Goal: Maintains/Returns to pre admission functional level  Description: INTERVENTIONS:  - Perform BMAT or MOVE assessment daily    - Set and communicate daily mobility goal to care team and patient/family/caregiver  - Collaborate with rehabilitation services on mobility goals if consulted  - Perform Range of Motion 2 times a day  - Reposition patient every 2 hours    - Dangle patient 2 times a day  - Stand patient 2 times a day  - Ambulate patient 2 times a day  - Out of bed to chair 3 times a day   - Out of bed for meals 3 times a day  - Out of bed for toileting  - Record patient progress and toleration of activity level   Outcome: Progressing     Problem: PAIN - ADULT  Goal: Verbalizes/displays adequate comfort level or baseline comfort level  Description: Interventions:  - Encourage patient to monitor pain and request assistance  - Assess pain using appropriate pain scale  - Administer analgesics based on type and severity of pain and evaluate response  - Implement non-pharmacological measures as appropriate and evaluate response  - Consider cultural and social influences on pain and pain management  - Notify physician/advanced practitioner if interventions unsuccessful or patient reports new pain  Outcome: Progressing     Problem: INFECTION - ADULT  Goal: Absence or prevention of progression during hospitalization  Description: INTERVENTIONS:  - Assess and monitor for signs and symptoms of infection  - Monitor lab/diagnostic results  - Monitor all insertion sites, i e  indwelling lines, tubes, and drains  - Monitor endotracheal if appropriate and nasal secretions for changes in amount and color  - [place] appropriate cooling/warming therapies per order  - Administer medications as ordered  - Instruct and encourage patient and family to use good hand hygiene technique  - Identify and instruct in appropriate isolation precautions for identified infection/condition  Outcome: Progressing  Goal: Absence of fever/infection during neutropenic period  Description: INTERVENTIONS:  - Monitor WBC    Outcome: Progressing     Problem: SAFETY ADULT  Goal: Maintain or return to baseline ADL function  Description: INTERVENTIONS:  -  Assess patient's ability to carry out ADLs; assess patient's baseline for ADL function and identify physical deficits which impact ability to perform ADLs (bathing, care of mouth/teeth, toileting, grooming, dressing, etc )  - Assess/evaluate cause of self-care deficits   - Assess range of motion  - Assess patient's mobility; develop plan if impaired  - Assess patient's need for assistive devices and provide as appropriate  - Encourage maximum independence but intervene and supervise when necessary  - Involve family in performance of ADLs  - Assess for home care needs following discharge   - Consider OT consult to assist with ADL evaluation and planning for discharge  - Provide patient education as appropriate  Outcome: Progressing  Goal: Maintains/Returns to pre admission functional level  Description: INTERVENTIONS:  - Perform BMAT or MOVE assessment daily    - Set and communicate daily mobility goal to care team and patient/family/caregiver  - Collaborate with rehabilitation services on mobility goals if consulted  - Perform Range of Motion 2 times a day  - Reposition patient every 2 hours    - Dangle patient 2 times a day  - Stand patient 2 times a day  - Ambulate patient 2 times a day  - Out of bed to chair 3 times a day   - Out of bed for meals 3 times a day  - Out of bed for toileting  - Record patient progress and toleration of activity level   Outcome: Progressing  Goal: Patient will remain free of falls  Description: INTERVENTIONS:  - Educate patient/family on patient safety including physical limitations  - Instruct patient to call for assistance with activity   - Consult OT/PT to assist with strengthening/mobility   - Keep Call bell within reach  - Keep bed low and locked with side rails adjusted as appropriate  - Keep care items and personal belongings within reach  - Initiate and maintain comfort rounds  - Make Fall Risk Sign visible to staff  - Offer Toileting every 2 Hours, in advance of need  - Initiate/Maintain bed alarm  - Obtain necessary fall risk management equipment  - Apply yellow socks and bracelet for high fall risk patients  - Consider moving patient to room near nurses station  Outcome: Progressing     Problem: DISCHARGE PLANNING  Goal: Discharge to home or other facility with appropriate resources  Description: INTERVENTIONS:  - Identify barriers to discharge w/patient and caregiver  - Arrange for needed discharge resources and transportation as appropriate  - Identify discharge learning needs (meds, wound care, etc )  - Refer to Case Management Department for coordinating discharge planning if the patient needs post-hospital services based on physician/advanced practitioner order or complex needs related to functional status, cognitive ability, or social support system  Outcome: Progressing     Problem: Knowledge Deficit  Goal: Patient/family/caregiver demonstrates understanding of disease process, treatment plan, medications, and discharge instructions  Description: Complete learning assessment and assess knowledge base    Interventions:  - Provide teaching at level of understanding  - Provide teaching via preferred learning methods  Outcome: Progressing     Problem: SKIN/TISSUE INTEGRITY - ADULT  Goal: Incision(s), wounds(s) or drain site(s) healing without S/S of infection  Description: INTERVENTIONS  - Assess and document dressing, incision, wound bed, drain sites and surrounding tissue  - Provide patient and family education  - Perform skin care/dressing changes every shift  Outcome: Progressing  Goal: Pressure injury heals and does not worsen  Description: Interventions:  - Implement low air loss mattress or specialty surface (Criteria met)  - Apply silicone foam dressing  - Instruct/assist with weight shifting every 60 minutes when in chair   - Limit chair time to 3 hour intervals  - Use special pressure reducing interventions such as waffle cushion when in chair   - Apply fecal or urinary incontinence containment device   - Perform passive or active ROM every 4 hrs  - Turn and reposition patient & offload bony prominences every 2 hours   - Utilize friction reducing device or surface for transfers   - Consider consults to  interdisciplinary teams such as wound and podiatry   - Use incontinent care products after each incontinent episode such as foam cleanser  - Consider nutrition services referral as needed  Outcome: Progressing     Problem: Prexisting or High Potential for Compromised Skin Integrity  Goal: Skin integrity is maintained or improved  Description: INTERVENTIONS:  - Identify patients at risk for skin breakdown  - Assess and monitor skin integrity  - Assess and monitor nutrition and hydration status  - Monitor labs   - Assess for incontinence   - Turn and reposition patient  - Assist with mobility/ambulation  - Relieve pressure over bony prominences  - Avoid friction and shearing  - Provide appropriate hygiene as needed including keeping skin clean and dry  - Evaluate need for skin moisturizer/barrier cream  - Collaborate with interdisciplinary team   - Patient/family teaching  - Consider wound care consult   Outcome: Progressing     Problem: Potential for Falls  Goal: Patient will remain free of falls  Description: INTERVENTIONS:  - Educate patient/family on patient safety including physical limitations  - Instruct patient to call for assistance with activity   - Consult OT/PT to assist with strengthening/mobility   - Keep Call bell within reach  - Keep bed low and locked with side rails adjusted as appropriate  - Keep care items and personal belongings within reach  - Initiate and maintain comfort rounds  - Make Fall Risk Sign visible to staff  - Offer Toileting every 2 Hours, in advance of need  - Initiate/Maintain bed alarm  - Obtain necessary fall risk management equipment  - Apply yellow socks and bracelet for high fall risk patients  - Consider moving patient to room near nurses station  Outcome: Progressing

## 2023-05-16 NOTE — OP NOTE
OPERATIVE REPORT - Podiatry  PATIENT NAME: Gab Lee    :  1975  MRN: [id number]  Pt Location: [facility] OR ROOM 03    SURGERY DATE: 2023    Surgeon(s) and Role:     * Peg Mott DPM - Primary    Pre-op Diagnosis:  Diabetic ulcer of left midfoot associated with type 2 diabetes mellitus, with fat layer exposed ([address] ) [M20 258, L97 422]  Abscess of left foot [L02 612]    Post-Op Diagnosis Codes:     * Diabetic ulcer of left midfoot associated with type 2 diabetes mellitus, with fat layer exposed ([address] ) [L44 970, L97 422]     * Abscess of left foot [L02 612]    Procedure(s) (LRB):  DEBRIDEMENT WOUND ([address] OUT) (Left)    Specimen(s):  * No specimens in log *    Estimated Blood Loss:   Minimal    Drains:  * No LDAs found *    Anesthesia Type:   Choice with 15 ml of 1% Lidocaine and 0 25% Bupivacaine in a 1:1 mixture    Materials:  * No implants in log *  1/4 iodoform packing     Operative Findings:  Consistent with the diagnosis  Large dead space noted medial midfoot with serosanguinous fluid collection  Complications:   None    Procedure and Technique:     Under mild sedation, the patient was brought into the operating room and placed on the operating room table in the supine position  IV sedation was achieved by anesthesia team and a universal timeout was performed where all parties are in agreement of correct patient, correct procedure and correct site  A midfoot block was performed consisting of 15 ml of 1% Lidocaine and 0 25% Bupivacaine in a 1:1 mixture  The foot was then prepped and draped in the usual aseptic manner  Attention was then directed to the left medial midfoot where a wound was located  Utilizing a 15  Blade in excisional/surgical wound debridement was performed by removing all nonviable soft tissue including nonviable soft tissue muscle, tendon, fascia and subcutaneous tissue   There is large dead space with serosanguinous fluid collection noted into the medial foot where 1 ray was "resected  Wound was rinsed with copious amount of normal sterile saline  Post debridement the wound appeared to be viable bleeding and granular and was debrided to the level of muscle, tendon and fascial tissue  Wound measured 5 x 2 5 x 2 0 cm  Wound was packed with 1/4 iodoform packing and dressed with 4 x 4 gauze, Kerlix and Coban, ace wrap  The patient tolerated the procedure and anesthesia well without immediate complications and transferred to PACU with vital signs stable  As with many limb salvage procedures, we contemplate the possibility of performing further stages to this procedure  Procedures may include debridements, delayed closure, plastic surgery techniques, or more proximal amputations  This procedure may be considered part of a multi-staged limb salvage treatment plan  I was present for the entire procedure  Colton Bender DPM  DATE: May 16, 2023  TIME: 8:08 AM      Portions of the record may have been created with voice recognition software  Occasional wrong word or \"sound a like\" substitutions may have occurred due to the inherent limitations of voice recognition software  Read the chart carefully and recognize, using context, where substitutions have occurred            "

## 2023-05-16 NOTE — PLAN OF CARE
Problem: MOBILITY - ADULT  Goal: Maintain or return to baseline ADL function  Description: INTERVENTIONS:  -  Assess patient's ability to carry out ADLs; assess patient's baseline for ADL function and identify physical deficits which impact ability to perform ADLs (bathing, care of mouth/teeth, toileting, grooming, dressing, etc )  - Assess/evaluate cause of self-care deficits   - Assess range of motion  - Assess patient's mobility; develop plan if impaired  - Assess patient's need for assistive devices and provide as appropriate  - Encourage maximum independence but intervene and supervise when necessary  - Involve family in performance of ADLs  - Assess for home care needs following discharge   - Consider OT consult to assist with ADL evaluation and planning for discharge  - Provide patient education as appropriate  Outcome: Progressing  Goal: Maintains/Returns to pre admission functional level  Description: INTERVENTIONS:  - Perform BMAT or MOVE assessment daily    - Set and communicate daily mobility goal to care team and patient/family/caregiver  - Collaborate with rehabilitation services on mobility goals if consulted  - Perform Range of Motion 2 times a day  - Reposition patient every 2 hours    - Dangle patient 2 times a day  - Stand patient 2 times a day  - Ambulate patient 2 times a day  - Out of bed to chair 3 times a day   - Out of bed for meals 3 times a day  - Out of bed for toileting  - Record patient progress and toleration of activity level   Outcome: Progressing     Problem: PAIN - ADULT  Goal: Verbalizes/displays adequate comfort level or baseline comfort level  Description: Interventions:  - Encourage patient to monitor pain and request assistance  - Assess pain using appropriate pain scale  - Administer analgesics based on type and severity of pain and evaluate response  - Implement non-pharmacological measures as appropriate and evaluate response  - Consider cultural and social influences on pain and pain management  - Notify physician/advanced practitioner if interventions unsuccessful or patient reports new pain  Outcome: Progressing     Problem: INFECTION - ADULT  Goal: Absence or prevention of progression during hospitalization  Description: INTERVENTIONS:  - Assess and monitor for signs and symptoms of infection  - Monitor lab/diagnostic results  - Monitor all insertion sites, i e  indwelling lines, tubes, and drains  - Monitor endotracheal if appropriate and nasal secretions for changes in amount and color  - [place] appropriate cooling/warming therapies per order  - Administer medications as ordered  - Instruct and encourage patient and family to use good hand hygiene technique  - Identify and instruct in appropriate isolation precautions for identified infection/condition  Outcome: Progressing  Goal: Absence of fever/infection during neutropenic period  Description: INTERVENTIONS:  - Monitor WBC    Outcome: Progressing     Problem: SAFETY ADULT  Goal: Maintain or return to baseline ADL function  Description: INTERVENTIONS:  -  Assess patient's ability to carry out ADLs; assess patient's baseline for ADL function and identify physical deficits which impact ability to perform ADLs (bathing, care of mouth/teeth, toileting, grooming, dressing, etc )  - Assess/evaluate cause of self-care deficits   - Assess range of motion  - Assess patient's mobility; develop plan if impaired  - Assess patient's need for assistive devices and provide as appropriate  - Encourage maximum independence but intervene and supervise when necessary  - Involve family in performance of ADLs  - Assess for home care needs following discharge   - Consider OT consult to assist with ADL evaluation and planning for discharge  - Provide patient education as appropriate  Outcome: Progressing  Goal: Maintains/Returns to pre admission functional level  Description: INTERVENTIONS:  - Perform BMAT or MOVE assessment daily    - Set and communicate daily mobility goal to care team and patient/family/caregiver  - Collaborate with rehabilitation services on mobility goals if consulted  - Perform Range of Motion 2 times a day  - Reposition patient every 2 hours    - Dangle patient 2 times a day  - Stand patient 2 times a day  - Ambulate patient 2 times a day  - Out of bed to chair 3 times a day   - Out of bed for meals 3 times a day  - Out of bed for toileting  - Record patient progress and toleration of activity level   Outcome: Progressing  Goal: Patient will remain free of falls  Description: INTERVENTIONS:  - Educate patient/family on patient safety including physical limitations  - Instruct patient to call for assistance with activity   - Consult OT/PT to assist with strengthening/mobility   - Keep Call bell within reach  - Keep bed low and locked with side rails adjusted as appropriate  - Keep care items and personal belongings within reach  - Initiate and maintain comfort rounds  - Make Fall Risk Sign visible to staff  - Offer Toileting every 2 Hours, in advance of need  - Initiate/Maintain bed alarm  - Obtain necessary fall risk management equipment  - Apply yellow socks and bracelet for high fall risk patients  - Consider moving patient to room near nurses station  Outcome: Progressing     Problem: DISCHARGE PLANNING  Goal: Discharge to home or other facility with appropriate resources  Description: INTERVENTIONS:  - Identify barriers to discharge w/patient and caregiver  - Arrange for needed discharge resources and transportation as appropriate  - Identify discharge learning needs (meds, wound care, etc )  - Refer to Case Management Department for coordinating discharge planning if the patient needs post-hospital services based on physician/advanced practitioner order or complex needs related to functional status, cognitive ability, or social support system  Outcome: Progressing     Problem: Knowledge Deficit  Goal: Patient/family/caregiver demonstrates understanding of disease process, treatment plan, medications, and discharge instructions  Description: Complete learning assessment and assess knowledge base    Interventions:  - Provide teaching at level of understanding  - Provide teaching via preferred learning methods  Outcome: Progressing     Problem: SKIN/TISSUE INTEGRITY - ADULT  Goal: Incision(s), wounds(s) or drain site(s) healing without S/S of infection  Description: INTERVENTIONS  - Assess and document dressing, incision, wound bed, drain sites and surrounding tissue  - Provide patient and family education  - Perform skin care/dressing changes every shift  Outcome: Progressing  Goal: Pressure injury heals and does not worsen  Description: Interventions:  - Implement low air loss mattress or specialty surface (Criteria met)  - Apply silicone foam dressing  - Instruct/assist with weight shifting every 60 minutes when in chair   - Limit chair time to 3 hour intervals  - Use special pressure reducing interventions such as waffle cushion when in chair   - Apply fecal or urinary incontinence containment device   - Perform passive or active ROM every 4 hrs  - Turn and reposition patient & offload bony prominences every 2 hours   - Utilize friction reducing device or surface for transfers   - Consider consults to  interdisciplinary teams such as wound and podiatry   - Use incontinent care products after each incontinent episode such as foam cleanser  - Consider nutrition services referral as needed  Outcome: Progressing     Problem: Prexisting or High Potential for Compromised Skin Integrity  Goal: Skin integrity is maintained or improved  Description: INTERVENTIONS:  - Identify patients at risk for skin breakdown  - Assess and monitor skin integrity  - Assess and monitor nutrition and hydration status  - Monitor labs   - Assess for incontinence   - Turn and reposition patient  - Assist with mobility/ambulation  - Relieve pressure over bony prominences  - Avoid friction and shearing  - Provide appropriate hygiene as needed including keeping skin clean and dry  - Evaluate need for skin moisturizer/barrier cream  - Collaborate with interdisciplinary team   - Patient/family teaching  - Consider wound care consult   Outcome: Progressing     Problem: Potential for Falls  Goal: Patient will remain free of falls  Description: INTERVENTIONS:  - Educate patient/family on patient safety including physical limitations  - Instruct patient to call for assistance with activity   - Consult OT/PT to assist with strengthening/mobility   - Keep Call bell within reach  - Keep bed low and locked with side rails adjusted as appropriate  - Keep care items and personal belongings within reach  - Initiate and maintain comfort rounds  - Make Fall Risk Sign visible to staff  - Offer Toileting every 2 Hours, in advance of need  - Initiate/Maintain bed alarm  - Obtain necessary fall risk management equipment  - Apply yellow socks and bracelet for high fall risk patients  - Consider moving patient to room near nurses station  Outcome: Progressing

## 2023-05-16 NOTE — NURSING NOTE
Pt back from the or, right stump dsg change done per order, pt tolerated well, left foot post op dsg cdi, elevated on 2 pillows, pt refused 3

## 2023-05-16 NOTE — CASE MANAGEMENT
Case Management Discharge Planning Note    Patient name Mary [place]  Location /-08 MRN [id number]  : 1975 Date 5/15/2023       Current Admission Date: 2023  Current Admission Diagnosis:Osteomyelitis of left foot [facility])   Patient Active Problem List    Diagnosis Date Noted   • Acute osteomyelitis of metatarsal bone of right foot ([facility] ) 2023   • Asthma, intrinsic 2023   • Bipolar 1 disorder, depressed ([place] [address] ) 2023   • Hx of right BKA ([place] [address] ) 2023   • Homeless single person 2023   • Sepsis ([place] [address] ) 2023   • Morbid obesity with BMI of 40 0-44 9, adult ([place] [address] ) 2023   • Obstructive sleep apnea 2022   • Diastasis of rectus abdominis 2021   • 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 ([place] [address] ) 2021   • Gastroesophageal reflux disease without esophagitis 2021   • Migraine without status migrainosus, not intractable 2021   • Impaired intestinal absorption 2021   • Intestinal malabsorption following gastrectomy 2021   • Right foot ulcer, with fat layer exposed ([place] [address] ) 2021   • Intertriginous dermatitis associated with moisture 2021   • Arthritis 2021   • Other chronic pain 2021   • OCD (obsessive compulsive disorder)    • PTSD (post-traumatic stress disorder)    • Essential hypertension 2021   • Acquired absence of other left toe(s) ([place] [address] ) 2019   • Folate deficiency 2019   • S/P laparoscopic sleeve gastrectomy 2019   • Diabetic foot ulcer ([place] [address] ) 09/10/2019   • Type 2 diabetes mellitus with diabetic neuropathy, without long-term current use of insulin ([place] [address] ) 2019   • Osteomyelitis of left foot ([place] [address] ) 2019   • Acquired hypothyroidism 2019   • Psychiatric illness 2019   • 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, with bone involvement without evidence of necrosis ([facility] ) 2019   • H/O bariatric surgery 2019 • Anxiety 06/28/2019   • Drusen body, unspecified laterality 01/25/2019   • Radiculopathy, lumbar region 09/12/2018   • Chronic right-sided low back pain with left-sided sciatica 09/04/2018   • Hyperlipidemia associated with type 2 diabetes mellitus ([facility] ) 09/25/2017   • Abnormal x-ray of cervical spine 08/14/2017   • Right cervical radiculopathy 08/11/2017   • Bipolar affective disorder in remission ([facility] ) 04/15/2017   • Amenorrhea 11/29/2016   • Instability of left knee joint 05/25/2016   • Allergy to bee sting 04/25/2016   • Acquired equinovarus deformity of left foot 05/19/2015   • Atypical squamous cell changes of undetermined significance (ASCUS) on cervical cytology with positive high risk human papilloma virus (HPV) 02/23/2015   • Chronic paroxysmal hemicrania, not intractable 02/23/2015      LOS (days): 7  Geometric Mean LOS (GMLOS) (days): 5 60  Days to GMLOS:-1 7     OBJECTIVE:  Risk of Unplanned Readmission Score: 22 37         Current admission status: Inpatient   Preferred Pharmacy:   [address] [place] [address] [id number]  Phone: [phone] Fax: [phone]    Primary Care Provider: Rogeiro Stanton MD    Primary Insurance: [place]  Secondary Insurance:     DISCHARGE DETAILS:    Discharge planning discussed with[de-identified] patient and [facility] was called at 10:23 am LM &  12:45pm  Freedom of Choice: Yes  Comments - Freedom of Choice: recommendation is for rehab-  referrals were sent- list of accepting facility was given-  determination letter was received today                     Requested [address]         Is the patient interested in [address] at discharge?: No    DME Referral Provided  Referral made for DME?: No    Other Referral/Resources/Interventions Provided:  Interventions: Short Term Rehab  Referral Comments: pt needs authorization- pt was made aware she needs to call [phone] and he needs some admission information before they will give  a formal acceptance- pt stated she did try to call and she had to leave a message    Would you like to participate in our [address] service program?  : No - Declined    Treatment Team Recommendation: Short Term Rehab (snf rehab auth needed- w/c Justus Diaz)

## 2023-05-16 NOTE — OCCUPATIONAL THERAPY NOTE
05/16/23 1307   Note Type   Note Type Cancelled Session   Cancel Reasons Other  (patient unarousable)       Attempted OT treatment today at 1307  Patient unarousable despite tactile/verbal stimuli  Nursing made aware of same  Will continue with POC as able    [place]

## 2023-05-16 NOTE — ANESTHESIA POSTPROCEDURE EVALUATION
Post-Op Assessment Note    CV Status:  Stable  Pain Score: 9    Pain management: adequate     Mental Status:  Awake and sleepy   Hydration Status:  Euvolemic   PONV Controlled:  Controlled   Airway Patency:  Patent      Post Op Vitals Reviewed: Yes      Staff: CRNA         No notable events documented      BP   135/84   Temp     Pulse  76   Resp   12   SpO2   95

## 2023-05-17 LAB
ANION GAP SERPL CALCULATED.3IONS-SCNC: 8 MMOL/L (ref 4–13)
BUN SERPL-MCNC: 16 MG/DL (ref 5–25)
CALCIUM SERPL-MCNC: 9.2 MG/DL (ref 8.4–10.2)
CHLORIDE SERPL-SCNC: 100 MMOL/L (ref 96–108)
CO2 SERPL-SCNC: 27 MMOL/L (ref 21–32)
CREAT SERPL-MCNC: 0.89 MG/DL (ref 0.6–1.3)
ERYTHROCYTE [DISTWIDTH] IN BLOOD BY AUTOMATED COUNT: 20.4 % (ref 11.6–15.1)
GFR SERPL CREATININE-BSD FRML MDRD: 77 ML/MIN/1.73SQ M
GLUCOSE SERPL-MCNC: 182 MG/DL (ref 65–140)
GLUCOSE SERPL-MCNC: 212 MG/DL (ref 65–140)
GLUCOSE SERPL-MCNC: 243 MG/DL (ref 65–140)
GLUCOSE SERPL-MCNC: 258 MG/DL (ref 65–140)
GLUCOSE SERPL-MCNC: 305 MG/DL (ref 65–140)
HCT VFR BLD AUTO: 31.3 % (ref 34.8–46.1)
HGB BLD-MCNC: 9.1 G/DL (ref 11.5–15.4)
MCH RBC QN AUTO: 21.6 PG (ref 26.8–34.3)
MCHC RBC AUTO-ENTMCNC: 29.1 G/DL (ref 31.4–37.4)
MCV RBC AUTO: 74 FL (ref 82–98)
PLATELET # BLD AUTO: 395 THOUSANDS/UL (ref 149–390)
PMV BLD AUTO: 9.2 FL (ref 8.9–12.7)
POTASSIUM SERPL-SCNC: 4.2 MMOL/L (ref 3.5–5.3)
RBC # BLD AUTO: 4.21 MILLION/UL (ref 3.81–5.12)
SODIUM SERPL-SCNC: 135 MMOL/L (ref 135–147)
WBC # BLD AUTO: 9.2 THOUSAND/UL (ref 4.31–10.16)

## 2023-05-17 RX ORDER — INSULIN GLARGINE 100 [IU]/ML
5 INJECTION, SOLUTION SUBCUTANEOUS ONCE
Status: COMPLETED | OUTPATIENT
Start: 2023-05-17 | End: 2023-05-17

## 2023-05-17 RX ORDER — INSULIN GLARGINE 100 [IU]/ML
8 INJECTION, SOLUTION SUBCUTANEOUS
Status: DISCONTINUED | OUTPATIENT
Start: 2023-05-18 | End: 2023-05-20

## 2023-05-17 RX ORDER — INSULIN GLARGINE 100 [IU]/ML
8 INJECTION, SOLUTION SUBCUTANEOUS
Status: DISCONTINUED | OUTPATIENT
Start: 2023-05-17 | End: 2023-05-17

## 2023-05-17 RX ADMIN — PREGABALIN 150 MG: 75 CAPSULE ORAL at 09:46

## 2023-05-17 RX ADMIN — OXYCODONE HYDROCHLORIDE 10 MG: 10 TABLET ORAL at 04:44

## 2023-05-17 RX ADMIN — PANTOPRAZOLE SODIUM 40 MG: 40 TABLET, DELAYED RELEASE ORAL at 09:46

## 2023-05-17 RX ADMIN — CLONIDINE HYDROCHLORIDE 0.2 MG: 0.1 TABLET ORAL at 09:46

## 2023-05-17 RX ADMIN — ZIPRASIDONE HYDROCHLORIDE 80 MG: 40 CAPSULE ORAL at 07:32

## 2023-05-17 RX ADMIN — HEPARIN SODIUM 5000 UNITS: 5000 INJECTION INTRAVENOUS; SUBCUTANEOUS at 21:38

## 2023-05-17 RX ADMIN — METHOCARBAMOL TABLETS 750 MG: 500 TABLET, COATED ORAL at 16:59

## 2023-05-17 RX ADMIN — CEFAZOLIN SODIUM 2000 MG: 2 SOLUTION INTRAVENOUS at 21:42

## 2023-05-17 RX ADMIN — METHOCARBAMOL TABLETS 750 MG: 500 TABLET, COATED ORAL at 04:44

## 2023-05-17 RX ADMIN — MENTHOL, METHYL SALICYLATE 1 APPLICATION.: 10; 15 CREAM TOPICAL at 12:09

## 2023-05-17 RX ADMIN — OXYCODONE HYDROCHLORIDE 10 MG: 10 TABLET ORAL at 23:31

## 2023-05-17 RX ADMIN — HYDROXYZINE HYDROCHLORIDE 100 MG: 25 TABLET ORAL at 21:39

## 2023-05-17 RX ADMIN — LAMOTRIGINE 300 MG: 100 TABLET ORAL at 09:46

## 2023-05-17 RX ADMIN — INSULIN LISPRO 1 UNITS: 100 INJECTION, SOLUTION INTRAVENOUS; SUBCUTANEOUS at 07:37

## 2023-05-17 RX ADMIN — CLONIDINE HYDROCHLORIDE 0.2 MG: 0.1 TABLET ORAL at 21:39

## 2023-05-17 RX ADMIN — HYDROXYZINE HYDROCHLORIDE 50 MG: 25 TABLET ORAL at 09:46

## 2023-05-17 RX ADMIN — Medication 6 MG: at 21:39

## 2023-05-17 RX ADMIN — LEVOTHYROXINE SODIUM 50 MCG: 50 TABLET ORAL at 05:09

## 2023-05-17 RX ADMIN — CEFAZOLIN SODIUM 2000 MG: 2 SOLUTION INTRAVENOUS at 13:06

## 2023-05-17 RX ADMIN — FENOFIBRATE 48 MG: 48 TABLET, FILM COATED ORAL at 09:46

## 2023-05-17 RX ADMIN — OXYCODONE HYDROCHLORIDE 10 MG: 10 TABLET ORAL at 17:24

## 2023-05-17 RX ADMIN — FOLIC ACID 1000 MCG: 1 TABLET ORAL at 09:46

## 2023-05-17 RX ADMIN — PANTOPRAZOLE SODIUM 40 MG: 40 TABLET, DELAYED RELEASE ORAL at 21:39

## 2023-05-17 RX ADMIN — HEPARIN SODIUM 5000 UNITS: 5000 INJECTION INTRAVENOUS; SUBCUTANEOUS at 05:09

## 2023-05-17 RX ADMIN — MENTHOL, METHYL SALICYLATE: 10; 15 CREAM TOPICAL at 05:14

## 2023-05-17 RX ADMIN — INSULIN LISPRO 3 UNITS: 100 INJECTION, SOLUTION INTRAVENOUS; SUBCUTANEOUS at 16:58

## 2023-05-17 RX ADMIN — PREGABALIN 150 MG: 75 CAPSULE ORAL at 18:24

## 2023-05-17 RX ADMIN — INSULIN GLARGINE 5 UNITS: 100 INJECTION, SOLUTION SUBCUTANEOUS at 21:37

## 2023-05-17 RX ADMIN — MENTHOL, METHYL SALICYLATE: 10; 15 CREAM TOPICAL at 19:29

## 2023-05-17 RX ADMIN — ZIPRASIDONE HYDROCHLORIDE 80 MG: 40 CAPSULE ORAL at 16:59

## 2023-05-17 RX ADMIN — HEPARIN SODIUM 5000 UNITS: 5000 INJECTION INTRAVENOUS; SUBCUTANEOUS at 14:54

## 2023-05-17 RX ADMIN — CEFAZOLIN SODIUM 2000 MG: 2 SOLUTION INTRAVENOUS at 05:09

## 2023-05-17 RX ADMIN — INSULIN LISPRO 3 UNITS: 100 INJECTION, SOLUTION INTRAVENOUS; SUBCUTANEOUS at 12:07

## 2023-05-17 RX ADMIN — METHOCARBAMOL TABLETS 750 MG: 500 TABLET, COATED ORAL at 23:31

## 2023-05-17 RX ADMIN — OXYCODONE HYDROCHLORIDE 10 MG: 10 TABLET ORAL at 11:15

## 2023-05-17 NOTE — ASSESSMENT & PLAN NOTE
· History of bipolar disorder, OCD, PTSD  · Admitted medically from [place] [address] psychiatric unit  Per discharge summary, psychiatrically cleared for medical admission   · Also, currently homeless  · Continue medications including Lamictal, hydroxyzine, Geodon  · Supportive care  · Denies current suicidal ideation    · Psychiatry reevaluation completed    Continue current plan of care

## 2023-05-17 NOTE — OCCUPATIONAL THERAPY NOTE
Occupational Therapy Evaluation      [facility]    5/17/2023    Principal Problem:    Osteomyelitis of left foot (HCC)  Active Problems:    Acquired hypothyroidism    Psychiatric illness    Type 2 diabetes mellitus with diabetic neuropathy, without long-term current use of insulin (HCC)    Other chronic pain    Gastroesophageal reflux disease without esophagitis    Hx of right BKA (HCC)      Past Medical History:   Diagnosis Date    Anxiety     Arthritis     Asthma     Chronic pain     Diabetes mellitus ([address] )     Hypothyroidism     Lymphedema     left leg    Manic bipolar I disorder (HCC)     Morbid obesity (HCC)     OCD (obsessive compulsive disorder)     PTSD (post-traumatic stress disorder)        Past Surgical History:   Procedure Laterality Date    CHOLECYSTECTOMY      CHOLECYSTECTOMY      LEG AMPUTATION THROUGH LOWER TIBIA AND FIBULA Right 3/23/2023    Procedure: AMPUTATION BELOW KNEE (BKA);   Surgeon: Arnulfo White MD;  Location: [facility];  Service: General    AK AMPUTATION FOOT TRANSMETARSAL Right 9/4/2021    Procedure: AMPUTATION TRANSMETATARSAL (TMA);  Surgeon: Cory Alas DPM;  [facility];  Service: Podiatry    AK AMPUTATION METATARSAL W/TOE SINGLE Left 9/8/2019    Procedure: PARTIAL 3RD RAY RESECTION, PSB TOE FILLET FLAP;  Surgeon: Clara French DPM;  [facility];  Service: Podiatry    AK AMPUTATION METATARSAL W/TOE SINGLE Left 3/23/2023    Procedure: RAY RESECTION FOOT;  Surgeon: Linette Toro DPM;  [facility];  Service: Podiatry    AK AMPUTATION METATARSAL W/TOE SINGLE Left 5/8/2023    Procedure: Left first RAY RESECTION FOOT;  Surgeon: Ady Mcgowan DPM;  [facility];  Service: Podiatry    TOE AMPUTATION Right 1/13/2021    Procedure: AMPUTATION 2ND TOE;  Surgeon: Cory Alas DPM;  [facility];  Service: Podiatry    WOUND DEBRIDEMENT Left 2/17/2022    Procedure: DEBRIDEMENT GREAT TOE WOUND;  Surgeon: Damian Chris DPM;  [facility];  Service: Podiatry    WOUND DEBRIDEMENT Left 5/16/2023    Procedure: DEBRIDEMENT WOUND [facility] OUT); Surgeon: Rosita Carlin DPM;  [facility]: [facility];  Service: Podiatry        05/17/23 1050   OT Last Visit   OT Visit Date 05/17/23   Note Type   Note type Re-Evaluation   Pain Assessment   Pain Assessment Tool 0-10   Pain Score 9   Pain Location/Orientation Orientation: Left;Orientation: Lower; Location: Leg  (foot)   Pain Onset/Description Onset: Ongoing;Frequency: Constant/Continuous   Patient's Stated Pain Goal No pain   Hospital Pain Intervention(s) Repositioned;Elevated; Emotional support; Rest   Restrictions/Precautions   Weight Bearing Precautions Per Order Yes   RLE Weight Bearing Per Order NWB  (PT has limited WBing and is not donning prosthesis d/t 2 small wound beds to R BKA stump - wound care RN is aware and following)   LLE Weight Bearing Per Order (S)  NWB   Braces or Orthoses   (prosthesis for R LE)   Other Precautions Impulsive;Pain; Fall Risk;WBS   Home Living   Type of [address]   Prior Function   Level of Green Needs assistance with ADLs; Needs assistance with functional mobility; Needs assistance with IADLS   Lives With Alone   Receives Help From Family   IADLs Family/Friend/Other provides transportation; Family/Friend/Other provides meals; Family/Friend/Other provides medication management   Falls in the last 6 months 0   ADL   UB Bathing Assistance 5  Supervision/Setup   LB Bathing Assistance 4  Minimal Assistance   UB Dressing Assistance 5  Supervision/Setup   LB Dressing Assistance 4  Minimal Assistance   Bed Mobility   Supine to Sit 6  Modified independent   Additional items Assist x 1;HOB elevated; Increased time required   Transfers   Sit to Stand Unable to assess  (pt is NWB to B LEs)   Sliding Board transfer   (CGA for safety; transferred from bed>drop arm recliner)   Additional items Assist x 1;Verbal "cues   Balance   Static Sitting Normal   Dynamic Sitting Fair +  (during slide board)   Activity Tolerance   Activity Tolerance Patient limited by fatigue;Patient limited by pain   Medical Staff Made Aware DEVON Stoddard Calender   Nurse Made Aware RAUDEL Hinds Assessment   RUE Assessment WFL   LUE Assessment   LUE Assessment WFL   Vision-Basic Assessment   Current Vision Wears glasses all the time   Cognition   Arousal/Participation Alert; Cooperative   Attention Attends with cues to redirect   Orientation Level Oriented X4   Memory Decreased recall of precautions   Following Commands Follows one step commands without difficulty   Assessment   Limitation Decreased ADL status; Decreased Safe judgement during ADL;Decreased endurance;Decreased self-care trans;Decreased high-level ADLs   Prognosis Good   Assessment Pt is a 53 y o  female seen for OT evaluation s/p admit [facility] on 5/8/2023 w/ Osteomyelitis of left foot ([address] )  Comorbidities affecting pt's functional performance at time of assessment include: Anxiety, Arthritis, DM, Hypothyroidiam, Morbid obesity, OCD, PTSD  Personal factors affecting pt at time of IE include:limited home support, difficulty performing ADLS and homeless  Prior to admission, pt was Mod I with ADLs  Upon evaluation: the following deficits impact occupational performance: decreased balance, decreased tolerance, impaired sequencing, impaired problem solving and decreased coping skills  Pt to benefit from continued skilled OT tx while in the hospital to address deficits as defined above and maximize level of functional independence w ADL's and functional mobility  Occupational Performance areas to address include: bathing/shower, toilet hygiene, dressing, functional mobility and clothing management  From OT standpoint, recommendation at time of d/c would be STR  Goals   Patient Goals \"to have less pain\"   Plan   Treatment Interventions ADL retraining;Functional transfer training; Endurance " training;Equipment evaluation/education; Compensatory technique education; Energy conservation; Activityengagement   Goal Expiration Date 05/27/23   OT Treatment Day 0   OT Frequency 3-5x/wk   Recommendation   OT Discharge Recommendation Post acute rehabilitation services   Additional Comments  Pt seen as a co-eval with PT due to the patient's co-morbidities, clinically unstable presentation, and present impairments which are a regression from the patient's baseline  Additional Comments 2 The patient's raw score on the AM-PAC Daily Activity Inpatient Short Form is 18  A raw score of less than 19 suggests the patient may benefit from discharge to post-acute rehabilitation services  Please refer to the recommendation of the Occupational Therapist for safe discharge planning     AM-PAC Daily Activity Inpatient   Lower Body Dressing 2   Bathing 3   Toileting 2   Upper Body Dressing 3   Grooming 4   Eating 4   Daily Activity Raw Score 18   Daily Activity Standardized Score (Calc for Raw Score >=11) 38 66   [facility] Applied Cognition Inpatient   Following a Speech/Presentation 4   Understanding Ordinary Conversation 4   Taking Medications 3   Remembering Where Things Are Placed or Put Away 3   Remembering List of 4-5 Errands 3   Taking Care of Complicated Tasks 3   Applied Cognition Raw Score 20   Applied Cognition Standardized Score 41 76     GOALS:    Pt will achieve the following within specified time frame: STG  Pt will achieve the following goals within 5 days    *ADL transfers with (S) for inc'd independence with ADLs/purposeful tasks    *UB ADL with (I) for inc'd independence with self cares    *LB ADL with CGA using AE prn for inc'd independence with self cares    *Toileting with Min (A) for clothing management and hygiene for return to PLOF with personal care    *Increase static sitting balance to G- and dyn sitting balance to F+ for inc'd safety with sitting purposeful tasks    *Increase sitting tolerance x3 m for inc'd tolerance with sitting purposeful tasks    *Participate in 10m UE therex to increase overall stamina/activity tolerance for purposeful tasks    *Bed mobility- (I) for inc'd independence to manage own comfort and initiate EOB & OOB purposeful tasks    Pt will achieve the following within specified time frame: LTG  Pt will achieve the following goals within 10 days    *ADL transfers with (I) for inc'd independence with ADLs/purposeful tasks    *LB ADL with (S) using AE prn for inc'd independence with self cares    *Toileting with CGA for clothing management and hygiene for return to PLOF with personal care    *Increase static sitting balance to G and dyn sitting balance to G- for inc'd safety with sitting purposeful tasks    *Increase sitting tolerance x5 m for inc'd tolerance with sitting purposeful tasks      Haris Pennington MS, OTR/L

## 2023-05-17 NOTE — ASSESSMENT & PLAN NOTE
Lab Results   Component Value Date    HGBA1C 7 2 (H) 05/05/2023       Recent Labs     05/16/23  1618 05/16/23  2112 05/17/23  0640 05/17/23  1103   POCGLU 173* 286* 182* 243*       Blood Sugar Average: Last 72 hrs:  (P) 262 [id number]   · Insulin sliding scale  Diabetic diet    · Added long acting insulin for better blood glucose coverage

## 2023-05-17 NOTE — PLAN OF CARE
Problem: MOBILITY - ADULT  Goal: Maintain or return to baseline ADL function  Description: INTERVENTIONS:  -  Assess patient's ability to carry out ADLs; assess patient's baseline for ADL function and identify physical deficits which impact ability to perform ADLs (bathing, care of mouth/teeth, toileting, grooming, dressing, etc )  - Assess/evaluate cause of self-care deficits   - Assess range of motion  - Assess patient's mobility; develop plan if impaired  - Assess patient's need for assistive devices and provide as appropriate  - Encourage maximum independence but intervene and supervise when necessary  - Involve family in performance of ADLs  - Assess for home care needs following discharge   - Consider OT consult to assist with ADL evaluation and planning for discharge  - Provide patient education as appropriate  Outcome: Progressing  Goal: Maintains/Returns to pre admission functional level  Description: INTERVENTIONS:  - Perform BMAT or MOVE assessment daily    - Set and communicate daily mobility goal to care team and patient/family/caregiver  - Collaborate with rehabilitation services on mobility goals if consulted  - Perform Range of Motion 2 times a day  - Reposition patient every 2 hours    - Dangle patient 2 times a day  - Stand patient 2 times a day  - Ambulate patient 2 times a day  - Out of bed to chair 3 times a day   - Out of bed for meals 3 times a day  - Out of bed for toileting  - Record patient progress and toleration of activity level   Outcome: Progressing     Problem: PAIN - ADULT  Goal: Verbalizes/displays adequate comfort level or baseline comfort level  Description: Interventions:  - Encourage patient to monitor pain and request assistance  - Assess pain using appropriate pain scale  - Administer analgesics based on type and severity of pain and evaluate response  - Implement non-pharmacological measures as appropriate and evaluate response  - Consider cultural and social influences on pain and pain management  - Notify physician/advanced practitioner if interventions unsuccessful or patient reports new pain  Outcome: Progressing     Problem: INFECTION - ADULT  Goal: Absence or prevention of progression during hospitalization  Description: INTERVENTIONS:  - Assess and monitor for signs and symptoms of infection  - Monitor lab/diagnostic results  - Monitor all insertion sites, i e  indwelling lines, tubes, and drains  - Monitor endotracheal if appropriate and nasal secretions for changes in amount and color  - [place] appropriate cooling/warming therapies per order  - Administer medications as ordered  - Instruct and encourage patient and family to use good hand hygiene technique  - Identify and instruct in appropriate isolation precautions for identified infection/condition  Outcome: Progressing  Goal: Absence of fever/infection during neutropenic period  Description: INTERVENTIONS:  - Monitor WBC    Outcome: Progressing     Problem: SAFETY ADULT  Goal: Maintain or return to baseline ADL function  Description: INTERVENTIONS:  -  Assess patient's ability to carry out ADLs; assess patient's baseline for ADL function and identify physical deficits which impact ability to perform ADLs (bathing, care of mouth/teeth, toileting, grooming, dressing, etc )  - Assess/evaluate cause of self-care deficits   - Assess range of motion  - Assess patient's mobility; develop plan if impaired  - Assess patient's need for assistive devices and provide as appropriate  - Encourage maximum independence but intervene and supervise when necessary  - Involve family in performance of ADLs  - Assess for home care needs following discharge   - Consider OT consult to assist with ADL evaluation and planning for discharge  - Provide patient education as appropriate  Outcome: Progressing  Goal: Maintains/Returns to pre admission functional level  Description: INTERVENTIONS:  - Perform BMAT or MOVE assessment daily    - Set and communicate daily mobility goal to care team and patient/family/caregiver  - Collaborate with rehabilitation services on mobility goals if consulted  - Perform Range of Motion 2 times a day  - Reposition patient every 2 hours    - Dangle patient 2 times a day  - Stand patient 2 times a day  - Ambulate patient 2 times a day  - Out of bed to chair 3 times a day   - Out of bed for meals 3 times a day  - Out of bed for toileting  - Record patient progress and toleration of activity level   Outcome: Progressing  Goal: Patient will remain free of falls  Description: INTERVENTIONS:  - Educate patient/family on patient safety including physical limitations  - Instruct patient to call for assistance with activity   - Consult OT/PT to assist with strengthening/mobility   - Keep Call bell within reach  - Keep bed low and locked with side rails adjusted as appropriate  - Keep care items and personal belongings within reach  - Initiate and maintain comfort rounds  - Make Fall Risk Sign visible to staff  - Offer Toileting every 2 Hours, in advance of need  - Initiate/Maintain bed alarm  - Obtain necessary fall risk management equipment  - Apply yellow socks and bracelet for high fall risk patients  - Consider moving patient to room near nurses station  Outcome: Progressing     Problem: DISCHARGE PLANNING  Goal: Discharge to home or other facility with appropriate resources  Description: INTERVENTIONS:  - Identify barriers to discharge w/patient and caregiver  - Arrange for needed discharge resources and transportation as appropriate  - Identify discharge learning needs (meds, wound care, etc )  - Refer to Case Management Department for coordinating discharge planning if the patient needs post-hospital services based on physician/advanced practitioner order or complex needs related to functional status, cognitive ability, or social support system  Outcome: Progressing     Problem: Knowledge Deficit  Goal: Patient/family/caregiver demonstrates understanding of disease process, treatment plan, medications, and discharge instructions  Description: Complete learning assessment and assess knowledge base    Interventions:  - Provide teaching at level of understanding  - Provide teaching via preferred learning methods  Outcome: Progressing     Problem: SKIN/TISSUE INTEGRITY - ADULT  Goal: Incision(s), wounds(s) or drain site(s) healing without S/S of infection  Description: INTERVENTIONS  - Assess and document dressing, incision, wound bed, drain sites and surrounding tissue  - Provide patient and family education  - Perform skin care/dressing changes every shift  Outcome: Progressing  Goal: Pressure injury heals and does not worsen  Description: Interventions:  - Implement low air loss mattress or specialty surface (Criteria met)  - Apply silicone foam dressing  - Instruct/assist with weight shifting every 60 minutes when in chair   - Limit chair time to 3 hour intervals  - Use special pressure reducing interventions such as waffle cushion when in chair   - Apply fecal or urinary incontinence containment device   - Perform passive or active ROM every 4 hrs  - Turn and reposition patient & offload bony prominences every 2 hours   - Utilize friction reducing device or surface for transfers   - Consider consults to  interdisciplinary teams such as wound and podiatry   - Use incontinent care products after each incontinent episode such as foam cleanser  - Consider nutrition services referral as needed  Outcome: Progressing     Problem: Prexisting or High Potential for Compromised Skin Integrity  Goal: Skin integrity is maintained or improved  Description: INTERVENTIONS:  - Identify patients at risk for skin breakdown  - Assess and monitor skin integrity  - Assess and monitor nutrition and hydration status  - Monitor labs   - Assess for incontinence   - Turn and reposition patient  - Assist with mobility/ambulation  - Relieve pressure over bony prominences  - Avoid friction and shearing  - Provide appropriate hygiene as needed including keeping skin clean and dry  - Evaluate need for skin moisturizer/barrier cream  - Collaborate with interdisciplinary team   - Patient/family teaching  - Consider wound care consult   Outcome: Progressing     Problem: Potential for Falls  Goal: Patient will remain free of falls  Description: INTERVENTIONS:  - Educate patient/family on patient safety including physical limitations  - Instruct patient to call for assistance with activity   - Consult OT/PT to assist with strengthening/mobility   - Keep Call bell within reach  - Keep bed low and locked with side rails adjusted as appropriate  - Keep care items and personal belongings within reach  - Initiate and maintain comfort rounds  - Make Fall Risk Sign visible to staff  - Offer Toileting every 2 Hours, in advance of need  - Initiate/Maintain bed alarm  - Obtain necessary fall risk management equipment  - Apply yellow socks and bracelet for high fall risk patients  - Consider moving patient to room near nurses station  Outcome: Progressing

## 2023-05-17 NOTE — PROGRESS NOTES
[facility] [address]  Progress Note  Name: Tom Kaur I  MRN: [id number]  Unit/Bed#: -01 I Date of Admission: 5/8/2023   Date of Service: 5/17/2023 I Hospital Day: 9    Assessment/Plan   * Osteomyelitis of left foot [facility])  Assessment & Plan  · Received from [address] [place] [address] psychiatric patient hartman with MRI results suggesting osteomyelitis of the left first metatarsal  · Podiatry input appreciated   · S/p left first ray resection 5/8; left foot wound wash out due to wound dehiscence 5/16  · Plan for repeat washout vs delayed primary closure 5/18  · Transitioned from doxycycline to cefazolin (5/15)    · Intraoperative cultures: Staphylococcus aureus   · Wound care, surgical shoe  NWB status on left foot   · PT/OT recommendations appreciated: Postacute rehab  · Case management input appreciated  Currently optioning    Hx of right BKA ([address] )  Assessment & Plan  · History of right BKA 3/20/2023  · Serial assessments and supportive care  · Fall precautions       Gastroesophageal reflux disease without esophagitis  Assessment & Plan  · Continue PPI       Other chronic pain  Assessment & Plan  · Previously on OxyContin for chronic back pain, transitioned to oxycodone 10 mg every 6 hours while in inpatient psychiatric unit   · Monitor for adequate pain control and or signs of oversedation or withdrawal       Type 2 diabetes mellitus with diabetic neuropathy, without long-term current use of insulin [facility])  Assessment & Plan  Lab Results   Component Value Date    HGBA1C 7 2 (H) 05/05/2023       Recent Labs     05/16/23  1618 05/16/23  2112 05/17/23  0640 05/17/23  1103   POCGLU 173* 286* 182* 243*       Blood Sugar Average: Last 72 hrs:  (P) 146 [id number]   · Insulin sliding scale  Diabetic diet    · Added long acting insulin for better blood glucose coverage        Psychiatric illness  Assessment & Plan  · History of bipolar disorder, OCD, PTSD  · [facility]  Per discharge summary, psychiatrically cleared for medical admission   · Also, currently homeless  · Continue medications including Lamictal, hydroxyzine, Geodon  · Supportive care  · Denies current suicidal ideation    · Psychiatry reevaluation completed  Continue current plan of care       Acquired hypothyroidism  Assessment & Plan  · Continue levothyroxine                VTE Prophylaxis:  Heparin    Patient Centered Rounds: I have performed bedside rounds with nursing staff today  Discussions with Specialists or Other Care Team Provider: podiatry, CM   Education and Discussions with Family / Patient: patient     Current Length of Stay: 9 day(s)    Current Patient Status: Inpatient   Certification Statement: The patient will continue to require additional inpatient hospital stay due to osteomyelitis left foot     Discharge Plan: pending clinical course     Code Status: Level 1 - Full Code    Subjective:   Feeling okay  Denies any pain currently  Objective:     Vitals:   Temp (24hrs), Av 3 °F (36 8 °C), Min:97 8 °F (36 6 °C), Max:99 1 °F (37 3 °C)    Temp:  [97 8 °F (36 6 °C)-99 1 °F (37 3 °C)] 97 8 °F (36 6 °C)  HR:  [66-86] 67  Resp:  [12-20] 20  BP: (100-119)/(59-76) 118/68  SpO2:  [94 %-97 %] 97 %  Body mass index is 37 79 kg/m²  Input and Output Summary (last 24 hours): Intake/Output Summary (Last 24 hours) at 2023 1205  Last data filed at 2023 7845  Gross per 24 hour   Intake 890 ml   Output 525 ml   Net 365 ml       Physical Exam:   Physical Exam  Vitals and nursing note reviewed  Constitutional:       General: She is not in acute distress  Appearance: Normal appearance  She is obese  HENT:      Head: Normocephalic and atraumatic  Right Ear: External ear normal       Left Ear: External ear normal       Nose: Nose normal  No rhinorrhea  Mouth/Throat:      Mouth: Mucous membranes are moist       Pharynx: Oropharynx is clear     Eyes: General:         Right eye: No discharge  Left eye: No discharge  Pupils: Pupils are equal, round, and reactive to light  Cardiovascular:      Rate and Rhythm: Normal rate and regular rhythm  Pulses: Normal pulses  Heart sounds: Normal heart sounds  No murmur heard  Pulmonary:      Effort: Pulmonary effort is normal  No respiratory distress  Breath sounds: Normal breath sounds  Abdominal:      General: Bowel sounds are normal  There is no distension  Palpations: Abdomen is soft  There is no mass  Tenderness: There is no abdominal tenderness  Musculoskeletal:         General: No swelling or tenderness  Cervical back: Normal range of motion and neck supple  No muscular tenderness  Comments: Right BKA      Skin:     General: Skin is warm and dry  Capillary Refill: Capillary refill takes less than 2 seconds  Findings: No erythema or rash  Comments: Left foot wrapped    Neurological:      General: No focal deficit present  Mental Status: She is alert and oriented to person, place, and time  Mental status is at baseline  Psychiatric:         Mood and Affect: Mood normal          Behavior: Behavior normal          Thought Content:  Thought content normal          Additional Data:     Labs:    Results from last 7 days   Lab Units 05/17/23  0440 05/15/23  0530   WBC Thousand/uL 9 20 8 25   HEMOGLOBIN g/dL 9 1* 9 7*   HEMATOCRIT % 31 3* 33 5*   PLATELETS Thousands/uL 395* 416*   NEUTROS PCT %  --  39*   LYMPHS PCT %  --  44   MONOS PCT %  --  10   EOS PCT %  --  6     Results from last 7 days   Lab Units 05/17/23  0440   SODIUM mmol/L 135   POTASSIUM mmol/L 4 2   CHLORIDE mmol/L 100   CO2 mmol/L 27   BUN mg/dL 16   CREATININE mg/dL 0 89   CALCIUM mg/dL 9 2         Results from last 7 days   Lab Units 05/17/23  1103 05/17/23  0640 05/16/23  2112 05/16/23  1618 05/16/23  1058 05/16/23  0820 05/16/23  0646 05/15/23  2059 05/15/23  1614 05/15/23  1112 05/15/23  0735 05/14/23 2052   POC GLUCOSE mg/dl 243* 182* 286* 173* 291* 198* 195* 294* 225* 190* 201* 264*           * I Have Reviewed All Lab Data Listed Above  * Additional Pertinent Lab Tests Reviewed:  [facility] [facility] Admission  Reviewed    Imaging:  Imaging Reports Reviewed Today Include: n/a     Recent Cultures (last 7 days):           Last 24 Hours Medication List:   Current Facility-Administered Medications   Medication Dose Route Frequency Provider Last Rate   • acetaminophen  650 mg Oral Q6H PRN Juan Gallardo DPM     • cefazolin  2,000 mg Intravenous Q8H JORDY BalbuenaM 2,000 mg (05/17/23 0501)   • cloNIDine  0 2 mg Oral BID Juan Gallardo DPM     • fenofibrate  48 mg Oral Daily Juan Gallardo DPM     • folic acid  6,921 mcg Oral Daily Juan Gallardo DPM     • heparin (porcine)  5,000 Units Subcutaneous Q8H [facility] Juan Gallardo DPM     • hydrOXYzine HCL  100 mg Oral HS JORDY BalbuenaM     • hydrOXYzine HCL  50 mg Oral Daily JORDY BalbuenaM     • insulin glargine  8 Units Subcutaneous HS JOSEE Marrufo     • insulin lispro  1-6 Units Subcutaneous TID AC Juan Gallardo DPM     • insulin lispro  1-6 Units Subcutaneous HS JORDY BalbuenaM     • lamoTRIgine  300 mg Oral QAM JORDY BalbuenaM     • levothyroxine  50 mcg Oral Early Morning Juan Gallardo DPM     • lidocaine  1 patch Topical Daily Juan Gallardo DPM     • melatonin  6 mg Oral HS JORDY BalbuenaM     • menthol-methyl salicylate   Apply externally 4x Daily PRN JORDY BalbuenaM     • methocarbamol  750 mg Oral Q6H PRN JORDY BalbuenaM     • ondansetron  4 mg Intravenous Q6H PRN Juan Gallardo, JORDYM     • oxyCODONE  10 mg Oral Q6H PRN Juan Gallardo, JORDYM     • pantoprazole  40 mg Oral BID JORDY BalbuenaM     • pregabalin  150 mg Oral BID Juna Gallardo DPM     • ziprasidone  80 mg Oral BID With Meals Juan Gallardo DPM          Today, Patient Was Seen By: JOSEE Marrufo    ** Please Note: Dictation voice to text software may have been used in the creation of this document   **

## 2023-05-17 NOTE — PLAN OF CARE
Problem: OCCUPATIONAL THERAPY ADULT  Goal: Performs self-care activities at highest level of function for planned discharge setting  See evaluation for individualized goals  Description: Treatment Interventions: ADL retraining, Functional transfer training, Endurance training, Equipment evaluation/education, Compensatory technique education, Energy conservation, Activityengagement    See flowsheet documentation for full assessment, interventions and recommendations  Note: Limitation: Decreased ADL status, Decreased Safe judgement during ADL, Decreased endurance, Decreased self-care trans, Decreased high-level ADLs  Prognosis: Good  Assessment: Pt is a 53 y o  female seen for OT evaluation s/p admit to [facility] on 5/8/2023 w/ Osteomyelitis of left foot ([address] )  Comorbidities affecting pt's functional performance at time of assessment include: Anxiety, Arthritis, DM, Hypothyroidiam, Morbid obesity, OCD, PTSD  Personal factors affecting pt at time of IE include:limited home support, difficulty performing ADLS and homeless  Prior to admission, pt was Mod I with ADLs  Upon evaluation: the following deficits impact occupational performance: decreased balance, decreased tolerance, impaired sequencing, impaired problem solving and decreased coping skills  Pt to benefit from continued skilled OT tx while in the hospital to address deficits as defined above and maximize level of functional independence w ADL's and functional mobility  Occupational Performance areas to address include: bathing/shower, toilet hygiene, dressing, functional mobility and clothing management  From OT standpoint, recommendation at time of d/c would be STR       OT Discharge Recommendation: Post acute rehabilitation services     [address] [place] [address] Road, MS, OTR/L

## 2023-05-17 NOTE — PHYSICAL THERAPY NOTE
Physical Therapy Re-Evaluation   Time in: 2651  Time out: 1040  Total evaluation time: 12 minutes    Patient's Name: Olegario Juárez    Admitting Diagnosis  Osteomyelitis of foot ([facility] ) [M86 [id number]]    Problem List  Patient Active Problem List   Diagnosis    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, with bone involvement without evidence of necrosis ([place] [address] )    H/O bariatric surgery    Anxiety    Acquired hypothyroidism    Psychiatric illness    Type 2 diabetes mellitus with diabetic neuropathy, without long-term current use of insulin ([place] [address] )    Osteomyelitis of left foot ([facility] )    Diabetic foot ulcer ([facility] )    Acquired absence of other left toe(s) ([place] [address] )    Essential hypertension    Arthritis    Other chronic pain    OCD (obsessive compulsive disorder)    PTSD (post-traumatic stress disorder)    Right foot ulcer, with fat layer exposed ([place] [address] )    Gastroesophageal reflux disease without esophagitis    Migraine without status migrainosus, not intractable    Obstructive sleep apnea    Sepsis ([place] [address] )    Morbid obesity with BMI of 40 0-44 9, adult ([place] [address] )    Homeless single person    Hx of right BKA ([facility] )    Bipolar 1 disorder, depressed ([facility] )    Abnormal x-ray of cervical spine    Acquired equinovarus deformity of left foot    Acute osteomyelitis of metatarsal bone of right foot ([facility] )    Allergy to bee sting    Amenorrhea    Asthma, intrinsic    Atypical squamous cell changes of undetermined significance (ASCUS) on cervical cytology with positive high risk human papilloma virus (HPV)    Bipolar affective disorder in remission ([facility] )    Chronic paroxysmal hemicrania, not intractable    Diastasis of rectus abdominis    Drusen body, unspecified laterality    Folate deficiency    Hyperlipidemia associated with type 2 diabetes mellitus (HCC)    Impaired intestinal absorption    Instability of left knee joint    Intertriginous dermatitis associated with moisture    Intestinal malabsorption following gastrectomy    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)    Radiculopathy, lumbar region    Right cervical radiculopathy    S/P laparoscopic sleeve gastrectomy    Chronic right-sided low back pain with left-sided sciatica       Past Medical History  Past Medical History:   Diagnosis Date    Anxiety     Arthritis     Asthma     Chronic pain     Diabetes mellitus (HCC)     Hypothyroidism     Lymphedema     left leg    Manic bipolar I disorder (HCC)     Morbid obesity (HCC)     OCD (obsessive compulsive disorder)     PTSD (post-traumatic stress disorder)        Past Surgical History  Past Surgical History:   Procedure Laterality Date    CHOLECYSTECTOMY      CHOLECYSTECTOMY      LEG AMPUTATION THROUGH LOWER TIBIA AND FIBULA Right 3/23/2023    Procedure: AMPUTATION BELOW KNEE (BKA);   Surgeon: Erin Law MD;  Location[facility];  Service: General    MI AMPUTATION FOOT TRANSMETARSAL Right 9/4/2021    Procedure: AMPUTATION TRANSMETATARSAL (TMA);  Surgeon: Katharina Borrero DPM;  [facility]: [facility];  Service: Podiatry    MI AMPUTATION METATARSAL W/TOE SINGLE Left 9/8/2019    Procedure: PARTIAL 3RD RAY RESECTION, PSB TOE FILLET FLAP;  Surgeon: Caryl Mcallsiter DPM;  [facility];  Service: Podiatry    MI AMPUTATION METATARSAL W/TOE SINGLE Left 3/23/2023    Procedure: RAY RESECTION FOOT;  Surgeon: Jorge L Toro DPM;  [facility];  Service: Podiatry    MI AMPUTATION METATARSAL W/TOE SINGLE Left 5/8/2023    Procedure: Left first RAY RESECTION FOOT;  Surgeon: Sharlyne Denver, DPM;  [facility]: [facility];  Service: Podiatry    TOE AMPUTATION Right 1/13/2021    Procedure: AMPUTATION 2ND TOE;  Surgeon: Katharina Borrero DPM;  [facility];  Service: Podiatry    WOUND DEBRIDEMENT Left 2/17/2022    Procedure: DEBRIDEMENT GREAT TOE WOUND;  Surgeon: Katharina Borrero DPM;  [facility];  Service: Podiatry    WOUND DEBRIDEMENT Left 5/16/2023 "Procedure: DEBRIDEMENT WOUND [facility] OUT); Surgeon: Jorgito Almendarez DPM;  Location: [facility];  Service: Podiatry       PT performed at least 2 patient identifiers during session: Name and wristband  05/17/23 1040   PT Last Visit   PT Visit Date 05/17/23   Note Type   Note type Evaluation   Pain Assessment   Pain Assessment Tool 0-10   Pain Score 9   Pain Location/Orientation Orientation: Left;Orientation: Lower; Location: Leg  (foot)   Pain Onset/Description Onset: Ongoing;Frequency: Constant/Continuous   Patient's Stated Pain Goal No pain   Hospital Pain Intervention(s) Repositioned;Elevated; Emotional support; Rest   Restrictions/Precautions   Weight Bearing Precautions Per Order Yes   RLE Weight Bearing Per Order NWB  (PT has limited WBing and is not donning prosthesis d/t 2 small wound beds to R BKA stump - wound care RN is aware and following)   LLE Weight Bearing Per Order (S)  NWB   Braces or Orthoses   (prosthesis for R LE)   Other Precautions Impulsive;Pain; Fall Risk;WBS   Home Living   Type of [address]   Prior Function   Level of [place] Needs assistance with ADLs; Needs assistance with functional mobility; Needs assistance with IADLS   Lives With Alone   Receives Help From Family   IADLs Family/Friend/Other provides transportation; Family/Friend/Other provides meals; Family/Friend/Other provides medication management   Falls in the last 6 months 0   General   Family/Caregiver Present No   Cognition   Arousal/Participation Alert   Attention Attends with cues to redirect   Orientation Level Oriented X4   Memory Decreased recall of precautions   Following Commands Follows one step commands without difficulty   Comments Pt agreeable to PT re-evaluation   Subjective   Subjective \"I only did slide board transfer before when I was in the hospital\"   RLE Assessment   RLE Assessment X   Strength RLE   R Hip Flexion 4-/5   R Knee Extension 4-/5   LLE Assessment   LLE " Assessment   (L knee/hip strength grossly assessed during functional mobility 4-/5)   Vision-Basic Assessment   Current Vision Wears glasses all the time   Coordination   Movements are Fluid and Coordinated 0   Sensation X  (phantom limb pain to R residual limb)   Bed Mobility   Supine to Sit 6  Modified independent   Additional items Assist x 1;HOB elevated; Increased time required   Transfers   Sit to Stand Unable to assess  (pt is NWB to B LEs)   Sliding Board transfer   (CGA for safety; transferred from bed>drop arm recliner)   Additional items Assist x 1;Verbal cues  (VCs for hand placement)   Ambulation/Elevation   Gait pattern Not tested   Balance   Static Sitting Normal   Dynamic Sitting Fair +  (during slide board)   Static Standing   (n/a)   Endurance Deficit   Endurance Deficit Yes   Activity Tolerance   Activity Tolerance Patient limited by fatigue;Patient limited by pain   Medical Staff Made Aware coordination of care provided with DEVANTE [facility];  [address] [facility], [address]  Yes, RN Shubham Tripp made aware of outcomes/recs   Assessment   Prognosis Fair   Problem List Decreased strength;Decreased endurance; Impaired balance;Decreased mobility; Decreased safety awareness;Pain;Decreased skin integrity   Assessment Pt is 53 y o  female seen for PT re-evaluation on 5/17/2023 s/p admit to [facility] on 5/8/2023 w/ Osteomyelitis of left foot ([address] )  Re-eval warranted as pt with medical status change d/t post op debridement wound/wash out on 5/16/23, is now NWB to LLE & PT limiting prosthesis use to RLE d/t 2 small wound beds to R BKA stump - RN wound care following  PT originally was consulted to assess pt's functional mobility and d/c needs, and has been recommended for PAR  PTA, pt reports completing pivot transfers to/from manual wc at Jessica level and states recently she received her prosthesis for RLE and had started gait training with PT at rehab   At time of eval, pt performs supine>sit at MOD I level, CGAx1 "for slide board transfer from bed>drop arm recliner to maintain NWB BLEs  Upon re-evaluation, pt presenting with impaired functional mobility d/t decreased strength, decreased endurance, impaired balance, decreased mobility, decreased safety awareness, decreased skin integrity and pain  The following objective measures performed on re-eval also reveal limitations: AM-PAC 6-Clicks: 04/97  Overall, pt's rehab potential and prognosis to return to PLOF is fair as impacted by objective findings, warranting pt to receive further skilled PT interventions to address identified impairments, activity limitation(s), and participation restriction(s)  Pt to benefit from continued PT tx to address deficits as defined above and maximize level of functional independent mobility and consistency in order for pt to improve activity tolerance and improve independence  From PT/mobility standpoint, recommendation at time of d/c would be post acute rehabilitation services pending progress in order to facilitate return to PLOF  Barriers to Discharge Decreased caregiver support; Inaccessible home environment   Goals   Patient Goals \"to have less pain\"   [facility] Expiration Date 05/27/23   Short Term Goal #1 In 7-10 days: Increase bilateral LE strength 1/2 grade to facilitate independent mobility, Perform all bed mobility tasks independently to decrease caregiver burden, Increase static sitting and dynamic sitting balance 1/2 grade to decrease risk for falls, Complete exercise program independently, Tolerate 4 hr OOB to faciliate upright tolerance and perform all slide board transfers with MOD I level between various surfaces to improve independence, and pt will perform w/c mobility x 300 ft on level surfaces in order to improve endurance   PT Treatment Day 2  (see treatment note below)   Plan   Treatment/Interventions Functional transfer training;LE strengthening/ROM; Therapeutic exercise; Endurance training;Patient/family training;Equipment " eval/education; Bed mobility; Compensatory technique education;Spoke to nursing;Spoke to case management  (transfer board training, w/c mobility)   PT Frequency 3-5x/wk   Recommendation   PT Discharge Recommendation Post acute rehabilitation services   Equipment Recommended Other (Comment)  (slide board)   Wheelchair Package Recommended   (pt has w/c for home use)   Additional Comments Pt's raw score on the [address] inpatient short form is 14, standardized score is 35 55  Patients at this level are likely to benefit from DC to [facility], however, please refer to therapist recommendation for safe DC planning  AM-PAC Basic Mobility Inpatient   Turning in Flat Bed Without Bedrails 4   Lying on Back to Sitting on Edge of Flat Bed Without Bedrails 4   Moving Bed to Chair 3   Standing Up From Chair Using Arms 1   Walk in Room 1   Climb 3-5 Stairs With Railing 1   Basic Mobility Inpatient Raw Score 14   Basic Mobility Standardized Score 35 55   Highest Level Of Mobility   JH-HLM Goal 4: Move to chair/commode   JH-HLM Achieved 4: Move to chair/commode   Additional Treatment Session   Start Time 1040   End Time 1050  (total treatment time = 10 minutes)   Treatment Assessment Pt seen for PT treatment session this date with interventions consisting of therapeutic activity consisting of training: slide board training from recliner>manual w/c with CGAx1 and w/c mobility & management training including mod I level of brake management, MOD I level surface traversing x 100 ft  Post session: all needs in reach and RN notified of session findings/recommendations  Continue to recommend post acute rehabilitation services at time of d/c in order to maximize pt's functional independence and safety w/ mobility  Pt continues to be functioning below baseline level, and remains limited 2* factors listed above   PT will continue to see pt during current hospitalization in order to address the deficits listed above and provide interventions consistent w/ POC in effort to achieve STGs  Equipment Use slide board, manual w/c   Additional Treatment Day 2   End of Consult   Patient Position at End of Consult Bedside chair; All needs within reach  (pt seated OOB in personal w/c)       Aamnda Daniel, PT, DPT

## 2023-05-17 NOTE — ASSESSMENT & PLAN NOTE
· Received from [address] [place] [address] psychiatric patient hartman with MRI results suggesting osteomyelitis of the left first metatarsal  · Podiatry input appreciated   · S/p left first ray resection 5/8; left foot wound wash out due to wound dehiscence 5/16  · Plan for repeat washout vs delayed primary closure 5/18  · Transitioned from doxycycline to cefazolin (5/15)    · Intraoperative cultures: Staphylococcus aureus   · Wound care, surgical shoe  NWB status on left foot   · PT/OT recommendations appreciated: Postacute rehab  · Case management input appreciated    Currently optioning

## 2023-05-17 NOTE — CASE MANAGEMENT
Case Management Discharge Planning Note    Patient name Chao Chow  Location /-77 MRN [id number]  : 1975 Date 2023       Current Admission Date: 2023  Current Admission Diagnosis:Osteomyelitis of left foot [facility])   Patient Active Problem List    Diagnosis Date Noted   • Acute osteomyelitis of metatarsal bone of right foot ([facility] ) 2023   • Asthma, intrinsic 2023   • Bipolar 1 disorder, depressed ([place] [address] ) 2023   • Hx of right BKA ([place] [address] ) 2023   • Homeless single person 2023   • Sepsis ([place] [address] ) 2023   • Morbid obesity with BMI of 40 0-44 9, adult ([place] [address] ) 2023   • Obstructive sleep apnea 2022   • Diastasis of rectus abdominis 2021   • 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 ([place] [address] ) 2021   • Gastroesophageal reflux disease without esophagitis 2021   • Migraine without status migrainosus, not intractable 2021   • Impaired intestinal absorption 2021   • Intestinal malabsorption following gastrectomy 2021   • Right foot ulcer, with fat layer exposed ([place] [address] ) 2021   • Intertriginous dermatitis associated with moisture 2021   • Arthritis 2021   • Other chronic pain 2021   • OCD (obsessive compulsive disorder)    • PTSD (post-traumatic stress disorder)    • Essential hypertension 2021   • Acquired absence of other left toe(s) ([place] [address] ) 2019   • Folate deficiency 2019   • S/P laparoscopic sleeve gastrectomy 2019   • Diabetic foot ulcer ([place] [address] ) 09/10/2019   • Type 2 diabetes mellitus with diabetic neuropathy, without long-term current use of insulin ([place] [address] ) 2019   • Osteomyelitis of left foot ([place] [address] ) 2019   • Acquired hypothyroidism 2019   • Psychiatric illness 2019   • 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, with bone involvement without evidence of necrosis ([facility] ) 2019   • H/O bariatric surgery 2019 • Anxiety 06/28/2019   • Drusen body, unspecified laterality 01/25/2019   • Radiculopathy, lumbar region 09/12/2018   • Chronic right-sided low back pain with left-sided sciatica 09/04/2018   • Hyperlipidemia associated with type 2 diabetes mellitus ([facility] ) 09/25/2017   • Abnormal x-ray of cervical spine 08/14/2017   • Right cervical radiculopathy 08/11/2017   • Bipolar affective disorder in remission ([facility] ) 04/15/2017   • Amenorrhea 11/29/2016   • Instability of left knee joint 05/25/2016   • Allergy to bee sting 04/25/2016   • Acquired equinovarus deformity of left foot 05/19/2015   • Atypical squamous cell changes of undetermined significance (ASCUS) on cervical cytology with positive high risk human papilloma virus (HPV) 02/23/2015   • Chronic paroxysmal hemicrania, not intractable 02/23/2015      LOS (days): 9  Geometric Mean LOS (GMLOS) (days): 5 60  Days to GMLOS:-3 4     OBJECTIVE:  Risk of Unplanned Readmission Score: 23 11         Current admission status: Inpatient   Preferred Pharmacy:   [address] [place] [address] [address] [place] [address] [address] [id number]  Phone: [phone] Fax: [phone]    Primary Care Provider: Sunny Chan MD    Primary Insurance: [address] [place] [address]  Secondary Insurance:     DISCHARGE DETAILS:    Discharge planning discussed with[de-identified] patient  [facility] and [facility] # was given- they need to do an assessment over the phone before they are able to accept and Gerson Rand will be needed  Freedom of Choice: Yes  Comments - Freedom of Choice: recommendation is for reha - [facility] is willing to accept but they need to talk to the pt before they will give the final acceptance and authorization will be needed  CM contacted family/caregiver?: Yes             Contacts  Patient Contacts: Salma Riostiz/Son  Relationship to Patient[de-identified] Family  Contact Method: Phone  Phone Number: [phone]  Reason/Outcome: Discharge Aki Ling         Is the patient interested in [facility] at discharge?: No    DME Referral Provided  Referral made for DME?: No    Other Referral/Resources/Interventions Provided:  Interventions: Short Term Rehab  Referral Comments: pt needs to call Venus Monsalve at [facility] to complete an assessment before she can come and  authorization is needed- pt and son are aware of this information- pt not stable for d/c - pt's had lap ray resection left- pt had dehiscence of the wound,- pt is to go for surgery tomorrow-         Treatment Team Recommendation: Short Term Rehab (snf auth needed- w/c Romina Fragmin)

## 2023-05-17 NOTE — PLAN OF CARE
Problem: PHYSICAL THERAPY ADULT  Goal: Performs mobility at highest level of function for planned discharge setting  See evaluation for individualized goals  Description: Treatment/Interventions: Functional transfer training, LE strengthening/ROM, Therapeutic exercise, Endurance training, Patient/family training, Equipment eval/education, Bed mobility, Compensatory technique education, Spoke to nursing, Spoke to case management (transfer board training, w/c mobility)  Equipment Recommended: Other (Comment) (slide board)       See flowsheet documentation for full assessment, interventions and recommendations  Note: Prognosis: Fair  Problem List: Decreased strength, Decreased endurance, Impaired balance, Decreased mobility, Decreased safety awareness, Pain, Decreased skin integrity  Assessment: Pt is 53 y o  female seen for PT re-evaluation on 5/17/2023 s/p admit to [facility] on 5/8/2023 w/ Osteomyelitis of left foot ([facility] )  Re-eval warranted as pt with medical status change d/t post op debridement wound/wash out on 5/16/23, is now NWB to LLE & PT limiting prosthesis use to RLE d/t 2 small wound beds to R BKA stump - RN wound care following  PT originally was consulted to assess pt's functional mobility and d/c needs, and has been recommended for PAR  PTA, pt reports completing pivot transfers to/from manual wc at Jessica level and states recently she received her prosthesis for RLE and had started gait training with PT at rehab  At time of eval, pt performs supine>sit at MOD I level, CGAx1 for slide board transfer from bed>drop arm recliner to maintain NWB BLEs  Upon re-evaluation, pt presenting with impaired functional mobility d/t decreased strength, decreased endurance, impaired balance, decreased mobility, decreased safety awareness, decreased skin integrity and pain  The following objective measures performed on re-eval also reveal limitations: AM-PAC 6-Clicks: 07/27   Overall, pt's rehab potential and prognosis to return to PLOF is fair as impacted by objective findings, warranting pt to receive further skilled PT interventions to address identified impairments, activity limitation(s), and participation restriction(s)  Pt to benefit from continued PT tx to address deficits as defined above and maximize level of functional independent mobility and consistency in order for pt to improve activity tolerance and improve independence  From PT/mobility standpoint, recommendation at time of d/c would be post acute rehabilitation services pending progress in order to facilitate return to PLOF  Barriers to Discharge: Decreased caregiver support, Inaccessible home environment     PT Discharge Recommendation: Post acute rehabilitation services    See flowsheet documentation for full assessment

## 2023-05-18 ENCOUNTER — ANESTHESIA (INPATIENT)
Dept: PERIOP | Facility: HOSPITAL | Age: 48
End: 2023-05-18

## 2023-05-18 ENCOUNTER — ANESTHESIA EVENT (INPATIENT)
Dept: PERIOP | Facility: HOSPITAL | Age: 48
End: 2023-05-18

## 2023-05-18 LAB
GLUCOSE SERPL-MCNC: 149 MG/DL (ref 65–140)
GLUCOSE SERPL-MCNC: 173 MG/DL (ref 65–140)
GLUCOSE SERPL-MCNC: 188 MG/DL (ref 65–140)
GLUCOSE SERPL-MCNC: 213 MG/DL (ref 65–140)
GLUCOSE SERPL-MCNC: 223 MG/DL (ref 65–140)

## 2023-05-18 PROCEDURE — 0HQNXZZ REPAIR LEFT FOOT SKIN, EXTERNAL APPROACH: ICD-10-PCS | Performed by: STUDENT IN AN ORGANIZED HEALTH CARE EDUCATION/TRAINING PROGRAM

## 2023-05-18 RX ORDER — PROPOFOL 10 MG/ML
INJECTION, EMULSION INTRAVENOUS AS NEEDED
Status: DISCONTINUED | OUTPATIENT
Start: 2023-05-18 | End: 2023-05-18

## 2023-05-18 RX ORDER — ONDANSETRON 2 MG/ML
4 INJECTION INTRAMUSCULAR; INTRAVENOUS ONCE AS NEEDED
Status: DISCONTINUED | OUTPATIENT
Start: 2023-05-18 | End: 2023-05-18 | Stop reason: HOSPADM

## 2023-05-18 RX ORDER — FENTANYL CITRATE 50 UG/ML
INJECTION, SOLUTION INTRAMUSCULAR; INTRAVENOUS AS NEEDED
Status: DISCONTINUED | OUTPATIENT
Start: 2023-05-18 | End: 2023-05-18

## 2023-05-18 RX ORDER — SODIUM CHLORIDE, SODIUM LACTATE, POTASSIUM CHLORIDE, CALCIUM CHLORIDE 600; 310; 30; 20 MG/100ML; MG/100ML; MG/100ML; MG/100ML
INJECTION, SOLUTION INTRAVENOUS CONTINUOUS PRN
Status: DISCONTINUED | OUTPATIENT
Start: 2023-05-18 | End: 2023-05-18

## 2023-05-18 RX ORDER — FENTANYL CITRATE 50 UG/ML
INJECTION, SOLUTION INTRAMUSCULAR; INTRAVENOUS
Status: DISPENSED
Start: 2023-05-18 | End: 2023-05-19

## 2023-05-18 RX ORDER — MIDAZOLAM HYDROCHLORIDE 2 MG/2ML
INJECTION, SOLUTION INTRAMUSCULAR; INTRAVENOUS AS NEEDED
Status: DISCONTINUED | OUTPATIENT
Start: 2023-05-18 | End: 2023-05-18

## 2023-05-18 RX ORDER — FENTANYL CITRATE/PF 50 MCG/ML
25 SYRINGE (ML) INJECTION
Status: DISCONTINUED | OUTPATIENT
Start: 2023-05-18 | End: 2023-05-18 | Stop reason: HOSPADM

## 2023-05-18 RX ORDER — SODIUM CHLORIDE, SODIUM LACTATE, POTASSIUM CHLORIDE, CALCIUM CHLORIDE 600; 310; 30; 20 MG/100ML; MG/100ML; MG/100ML; MG/100ML
75 INJECTION, SOLUTION INTRAVENOUS CONTINUOUS
Status: DISCONTINUED | OUTPATIENT
Start: 2023-05-18 | End: 2023-05-19

## 2023-05-18 RX ORDER — LIDOCAINE HYDROCHLORIDE 20 MG/ML
INJECTION, SOLUTION EPIDURAL; INFILTRATION; INTRACAUDAL; PERINEURAL AS NEEDED
Status: DISCONTINUED | OUTPATIENT
Start: 2023-05-18 | End: 2023-05-18

## 2023-05-18 RX ORDER — MAGNESIUM HYDROXIDE 1200 MG/15ML
LIQUID ORAL AS NEEDED
Status: DISCONTINUED | OUTPATIENT
Start: 2023-05-18 | End: 2023-05-18 | Stop reason: HOSPADM

## 2023-05-18 RX ORDER — PROPOFOL 10 MG/ML
INJECTION, EMULSION INTRAVENOUS CONTINUOUS PRN
Status: DISCONTINUED | OUTPATIENT
Start: 2023-05-18 | End: 2023-05-18

## 2023-05-18 RX ADMIN — ZIPRASIDONE HYDROCHLORIDE 80 MG: 40 CAPSULE ORAL at 07:58

## 2023-05-18 RX ADMIN — INSULIN GLARGINE 8 UNITS: 100 INJECTION, SOLUTION SUBCUTANEOUS at 21:56

## 2023-05-18 RX ADMIN — ZIPRASIDONE HYDROCHLORIDE 80 MG: 40 CAPSULE ORAL at 16:55

## 2023-05-18 RX ADMIN — MIDAZOLAM 2 MG: 1 INJECTION INTRAMUSCULAR; INTRAVENOUS at 14:13

## 2023-05-18 RX ADMIN — PREGABALIN 150 MG: 75 CAPSULE ORAL at 09:21

## 2023-05-18 RX ADMIN — FENTANYL CITRATE 50 MCG: 50 INJECTION, SOLUTION INTRAMUSCULAR; INTRAVENOUS at 14:13

## 2023-05-18 RX ADMIN — CEFAZOLIN SODIUM 2000 MG: 2 SOLUTION INTRAVENOUS at 21:57

## 2023-05-18 RX ADMIN — FENTANYL CITRATE 50 MCG: 50 INJECTION, SOLUTION INTRAMUSCULAR; INTRAVENOUS at 14:23

## 2023-05-18 RX ADMIN — INSULIN LISPRO 1 UNITS: 100 INJECTION, SOLUTION INTRAVENOUS; SUBCUTANEOUS at 16:53

## 2023-05-18 RX ADMIN — Medication 6 MG: at 21:56

## 2023-05-18 RX ADMIN — FENTANYL CITRATE 25 MCG: 50 INJECTION, SOLUTION INTRAMUSCULAR; INTRAVENOUS at 15:41

## 2023-05-18 RX ADMIN — PROPOFOL 50 MG: 10 INJECTION, EMULSION INTRAVENOUS at 14:21

## 2023-05-18 RX ADMIN — FOLIC ACID 1000 MCG: 1 TABLET ORAL at 09:17

## 2023-05-18 RX ADMIN — LIDOCAINE HYDROCHLORIDE 100 MG: 20 INJECTION, SOLUTION EPIDURAL; INFILTRATION; INTRACAUDAL; PERINEURAL at 14:21

## 2023-05-18 RX ADMIN — FENTANYL CITRATE 25 MCG: 50 INJECTION, SOLUTION INTRAMUSCULAR; INTRAVENOUS at 15:46

## 2023-05-18 RX ADMIN — HYDROXYZINE HYDROCHLORIDE 50 MG: 25 TABLET ORAL at 09:18

## 2023-05-18 RX ADMIN — CLONIDINE HYDROCHLORIDE 0.2 MG: 0.1 TABLET ORAL at 09:17

## 2023-05-18 RX ADMIN — FENOFIBRATE 48 MG: 48 TABLET, FILM COATED ORAL at 09:21

## 2023-05-18 RX ADMIN — PREGABALIN 150 MG: 75 CAPSULE ORAL at 19:01

## 2023-05-18 RX ADMIN — LEVOTHYROXINE SODIUM 50 MCG: 50 TABLET ORAL at 05:48

## 2023-05-18 RX ADMIN — HYDROXYZINE HYDROCHLORIDE 100 MG: 25 TABLET ORAL at 21:56

## 2023-05-18 RX ADMIN — OXYCODONE HYDROCHLORIDE 10 MG: 10 TABLET ORAL at 19:29

## 2023-05-18 RX ADMIN — CEFAZOLIN SODIUM 2000 MG: 2 SOLUTION INTRAVENOUS at 14:17

## 2023-05-18 RX ADMIN — CEFAZOLIN SODIUM 2000 MG: 2 SOLUTION INTRAVENOUS at 05:47

## 2023-05-18 RX ADMIN — SODIUM CHLORIDE, SODIUM LACTATE, POTASSIUM CHLORIDE, AND CALCIUM CHLORIDE: .6; .31; .03; .02 INJECTION, SOLUTION INTRAVENOUS at 14:06

## 2023-05-18 RX ADMIN — CLONIDINE HYDROCHLORIDE 0.2 MG: 0.1 TABLET ORAL at 21:59

## 2023-05-18 RX ADMIN — MENTHOL, METHYL SALICYLATE: 10; 15 CREAM TOPICAL at 03:04

## 2023-05-18 RX ADMIN — MENTHOL, METHYL SALICYLATE: 10; 15 CREAM TOPICAL at 19:35

## 2023-05-18 RX ADMIN — METHOCARBAMOL TABLETS 750 MG: 500 TABLET, COATED ORAL at 19:29

## 2023-05-18 RX ADMIN — LAMOTRIGINE 300 MG: 100 TABLET ORAL at 09:17

## 2023-05-18 RX ADMIN — PROPOFOL 50 MCG/KG/MIN: 10 INJECTION, EMULSION INTRAVENOUS at 14:24

## 2023-05-18 RX ADMIN — PANTOPRAZOLE SODIUM 40 MG: 40 TABLET, DELAYED RELEASE ORAL at 09:17

## 2023-05-18 RX ADMIN — PANTOPRAZOLE SODIUM 40 MG: 40 TABLET, DELAYED RELEASE ORAL at 21:56

## 2023-05-18 NOTE — NUTRITION
05/18/23 1216   Biochemical Data,Medical Tests, and Procedures   Biochemical Data/Medical Tests/Procedures Lab values reviewed; Meds reviewed   Labs (Comment) 5/18/23 glucose    Meds (Comment) folic acid, heparin, insulin, levothyroxine, protonix   Nutrition-Focused Physical Exam   Nutrition-Focused Physical Exam Findings RN skin assessment reviewed;Edema; Wound   Nutrition-Focused Physical Exam Findings left LE non-pitting edema, wound at left foot, right BKA   Medical-Related Concerns PMH reviewed   Current PO Intake   Current Diet Order NPO   Current Meal Intake Other (Comment)  (NPO)   Estimated calorie intake compared to estimated need NPO for procedure  Was ordered for CCD2 prior  100% meal completion when ordered for PO diet  Nutrient needs are met  PES Statement   Problem Continue previous diagnosis   Recommendations/Interventions   Summary Nutrition follow up assessment  History of right BKA  S/p left first ray resection 5/8; left foot wound wash out due to wound dehiscence 5/16; plan for repeat washout vs delayed closure today  Ordered NPO for procedure  Was ordered for CCD2 prior  100% meal completion when ordered for PO diet  Nutrient needs are met  Pt reports her appetite as “okay”  States meal intake is on average %  Pt is agreeable to chocolate Glucerna once daily and double protein portions in setting of wound healing  RD briefly discussed protein’s role in wound healing  Pt verbalized understanding  RD continues to follow  Interventions/Recommendations Supplement initiate; Adjust diet order   Education Assessment   Education Education not indicated at this time   Patient Nutrition Goals   Goal Adequate hydration; Adequate intake; Comprehend education

## 2023-05-18 NOTE — ASSESSMENT & PLAN NOTE
· History of bipolar disorder, OCD, PTSD  · Admitted medically from [place] [address] psychiatric unit  Per discharge summary, psychiatrically cleared for medical admission   · Also, currently homeless  · Continue medications including Lamictal, hydroxyzine, Geodon  · Supportive care  · Denies current suicidal ideation     · Psychiatry reevaluation completed    Continue current plan of care

## 2023-05-18 NOTE — PHYSICAL THERAPY NOTE
Physical Therapy Cancellation Note       05/18/23 1302   PT Last Visit   PT Visit Date 05/18/23   Note Type   Note Type Cancelled Session   Cancel Reasons Patient off floor/test     Shawnee Jerez

## 2023-05-18 NOTE — CASE MANAGEMENT
Case Management Discharge Planning Note    Patient name Cleave Inch  Location /-99 MRN [id number]  : 1975 Date 2023       Current Admission Date: 2023  Current Admission Diagnosis:Osteomyelitis of left foot [facility])   Patient Active Problem List    Diagnosis Date Noted   • Acute osteomyelitis of metatarsal bone of right foot ([place] [address] ) 2023   • Asthma, intrinsic 2023   • Bipolar 1 disorder, depressed ([place] [address] ) 2023   • Hx of right BKA ([place] [address] ) 2023   • Homeless single person 2023   • Sepsis ([place] [address] ) 2023   • Morbid obesity with BMI of 40 0-44 9, adult ([place] [address] ) 2023   • Obstructive sleep apnea 2022   • Diastasis of rectus abdominis 2021   • 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 ([place] [address] ) 2021   • Gastroesophageal reflux disease without esophagitis 2021   • Migraine without status migrainosus, not intractable 2021   • Impaired intestinal absorption 2021   • Intestinal malabsorption following gastrectomy 2021   • Right foot ulcer, with fat layer exposed ([place] [address] ) 2021   • Intertriginous dermatitis associated with moisture 2021   • Arthritis 2021   • Other chronic pain 2021   • OCD (obsessive compulsive disorder)    • PTSD (post-traumatic stress disorder)    • Essential hypertension 2021   • Acquired absence of other left toe(s) ([place] [address] ) 2019   • Folate deficiency 2019   • S/P laparoscopic sleeve gastrectomy 2019   • Diabetic foot ulcer ([place] [address] ) 09/10/2019   • Type 2 diabetes mellitus with diabetic neuropathy, without long-term current use of insulin ([place] [address] ) 2019   • Osteomyelitis of left foot ([place] [address] ) 2019   • Acquired hypothyroidism 2019   • Psychiatric illness 2019   • 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, with bone involvement without evidence of necrosis [facility] ) 2019   • H/O bariatric surgery 2019 • Anxiety 06/28/2019   • Drusen body, unspecified laterality 01/25/2019   • Radiculopathy, lumbar region 09/12/2018   • Chronic right-sided low back pain with left-sided sciatica 09/04/2018   • Hyperlipidemia associated with type 2 diabetes mellitus ([facility] ) 09/25/2017   • Abnormal x-ray of cervical spine 08/14/2017   • Right cervical radiculopathy 08/11/2017   • Bipolar affective disorder in remission ([facility] ) 04/15/2017   • Amenorrhea 11/29/2016   • Instability of left knee joint 05/25/2016   • Allergy to bee sting 04/25/2016   • Acquired equinovarus deformity of left foot 05/19/2015   • Atypical squamous cell changes of undetermined significance (ASCUS) on cervical cytology with positive high risk human papilloma virus (HPV) 02/23/2015   • Chronic paroxysmal hemicrania, not intractable 02/23/2015      LOS (days): 10  Geometric Mean LOS (GMLOS) (days): 5 60  Days to GMLOS:-4 7     OBJECTIVE:  Risk of Unplanned Readmission Score: 24 38         Current admission status: Inpatient   Preferred Pharmacy:   [address] [facility] [address] [place] [address] [address] [id number]  Phone: [phone] Fax: [phone]    Primary Care Provider: Sharon Lu MD    Primary Insurance: [address] [place] [address]  Secondary Insurance:     DISCHARGE DETAILS:    Discharge planning discussed with[de-identified] jerry allison Quevedo was called at 13:00pm  I was told I would get a call back- son was called at 16:25pm  Freedom of Choice: Yes  Comments - Freedom of Choice: recommendation is for rehab-  [facility] is willing to accept but pt is not willing to complete the ma paperwork- she wants to keep her storage unit- pt will need authorization  CM contacted family/caregiver?: Yes             Contacts  Patient Contacts: Merna Martinezz/Son  Relationship to Patient[de-identified] Family (son)  Contact Method: Phone  Phone Number: [phone]  Reason/Outcome: Discharge [address]         Is the patient interested in [facility] at discharge?: No    DME Referral Provided  Referral made for DME?: No    Other Referral/Resources/Interventions Provided:  Interventions: Short Term Rehab  Referral Comments: santag a call back from [facility]- rupinder did explain to the pt's son she does not want to complete the needed ma paperwork and this may lose her chance of beign able to go to [facility] for rehab- he is aware that she does not wan tto give up money for her storage unit- he feels her going there is more important then a storage unit- he stated after work he will call his mother- pt went to the OR today for delayed closure    Would you like to participate in our [address] service program?  : No - Declined

## 2023-05-18 NOTE — PLAN OF CARE
Problem: MOBILITY - ADULT  Goal: Maintain or return to baseline ADL function  Description: INTERVENTIONS:  -  Assess patient's ability to carry out ADLs; assess patient's baseline for ADL function and identify physical deficits which impact ability to perform ADLs (bathing, care of mouth/teeth, toileting, grooming, dressing, etc )  - Assess/evaluate cause of self-care deficits   - Assess range of motion  - Assess patient's mobility; develop plan if impaired  - Assess patient's need for assistive devices and provide as appropriate  - Encourage maximum independence but intervene and supervise when necessary  - Involve family in performance of ADLs  - Assess for home care needs following discharge   - Consider OT consult to assist with ADL evaluation and planning for discharge  - Provide patient education as appropriate  Outcome: Progressing  Goal: Maintains/Returns to pre admission functional level  Description: INTERVENTIONS:  - Perform BMAT or MOVE assessment daily    - Set and communicate daily mobility goal to care team and patient/family/caregiver  - Collaborate with rehabilitation services on mobility goals if consulted  - Perform Range of Motion 2 times a day  - Reposition patient every 2 hours    - Dangle patient 2 times a day  - Stand patient 2 times a day  - Ambulate patient 2 times a day  - Out of bed to chair 3 times a day   - Out of bed for meals 3 times a day  - Out of bed for toileting  - Record patient progress and toleration of activity level   Outcome: Progressing     Problem: PAIN - ADULT  Goal: Verbalizes/displays adequate comfort level or baseline comfort level  Description: Interventions:  - Encourage patient to monitor pain and request assistance  - Assess pain using appropriate pain scale  - Administer analgesics based on type and severity of pain and evaluate response  - Implement non-pharmacological measures as appropriate and evaluate response  - Consider cultural and social influences on pain and pain management  - Notify physician/advanced practitioner if interventions unsuccessful or patient reports new pain  Outcome: Progressing     Problem: INFECTION - ADULT  Goal: Absence or prevention of progression during hospitalization  Description: INTERVENTIONS:  - Assess and monitor for signs and symptoms of infection  - Monitor lab/diagnostic results  - Monitor all insertion sites, i e  indwelling lines, tubes, and drains  - Monitor endotracheal if appropriate and nasal secretions for changes in amount and color  - [place] appropriate cooling/warming therapies per order  - Administer medications as ordered  - Instruct and encourage patient and family to use good hand hygiene technique  - Identify and instruct in appropriate isolation precautions for identified infection/condition  Outcome: Progressing  Goal: Absence of fever/infection during neutropenic period  Description: INTERVENTIONS:  - Monitor WBC    Outcome: Progressing     Problem: SAFETY ADULT  Goal: Maintain or return to baseline ADL function  Description: INTERVENTIONS:  -  Assess patient's ability to carry out ADLs; assess patient's baseline for ADL function and identify physical deficits which impact ability to perform ADLs (bathing, care of mouth/teeth, toileting, grooming, dressing, etc )  - Assess/evaluate cause of self-care deficits   - Assess range of motion  - Assess patient's mobility; develop plan if impaired  - Assess patient's need for assistive devices and provide as appropriate  - Encourage maximum independence but intervene and supervise when necessary  - Involve family in performance of ADLs  - Assess for home care needs following discharge   - Consider OT consult to assist with ADL evaluation and planning for discharge  - Provide patient education as appropriate  Outcome: Progressing  Goal: Maintains/Returns to pre admission functional level  Description: INTERVENTIONS:  - Perform BMAT or MOVE assessment daily    - Set and communicate daily mobility goal to care team and patient/family/caregiver  - Collaborate with rehabilitation services on mobility goals if consulted  - Perform Range of Motion 2 times a day  - Reposition patient every 2 hours    - Dangle patient 2 times a day  - Stand patient 2 times a day  - Ambulate patient 2 times a day  - Out of bed to chair 3 times a day   - Out of bed for meals 3 times a day  - Out of bed for toileting  - Record patient progress and toleration of activity level   Outcome: Progressing  Goal: Patient will remain free of falls  Description: INTERVENTIONS:  - Educate patient/family on patient safety including physical limitations  - Instruct patient to call for assistance with activity   - Consult OT/PT to assist with strengthening/mobility   - Keep Call bell within reach  - Keep bed low and locked with side rails adjusted as appropriate  - Keep care items and personal belongings within reach  - Initiate and maintain comfort rounds  - Make Fall Risk Sign visible to staff  - Offer Toileting every 2 Hours, in advance of need  - Initiate/Maintain bed alarm  - Obtain necessary fall risk management equipment  - Apply yellow socks and bracelet for high fall risk patients  - Consider moving patient to room near nurses station  Outcome: Progressing     Problem: DISCHARGE PLANNING  Goal: Discharge to home or other facility with appropriate resources  Description: INTERVENTIONS:  - Identify barriers to discharge w/patient and caregiver  - Arrange for needed discharge resources and transportation as appropriate  - Identify discharge learning needs (meds, wound care, etc )  - Refer to Case Management Department for coordinating discharge planning if the patient needs post-hospital services based on physician/advanced practitioner order or complex needs related to functional status, cognitive ability, or social support system  Outcome: Progressing     Problem: Knowledge Deficit  Goal: Patient/family/caregiver demonstrates understanding of disease process, treatment plan, medications, and discharge instructions  Description: Complete learning assessment and assess knowledge base    Interventions:  - Provide teaching at level of understanding  - Provide teaching via preferred learning methods  Outcome: Progressing     Problem: SKIN/TISSUE INTEGRITY - ADULT  Goal: Incision(s), wounds(s) or drain site(s) healing without S/S of infection  Description: INTERVENTIONS  - Assess and document dressing, incision, wound bed, drain sites and surrounding tissue  - Provide patient and family education  - Perform skin care/dressing changes every shift  Outcome: Progressing  Goal: Pressure injury heals and does not worsen  Description: Interventions:  - Implement low air loss mattress or specialty surface (Criteria met)  - Apply silicone foam dressing  - Instruct/assist with weight shifting every 60 minutes when in chair   - Limit chair time to 3 hour intervals  - Use special pressure reducing interventions such as waffle cushion when in chair   - Apply fecal or urinary incontinence containment device   - Perform passive or active ROM every 4 hrs  - Turn and reposition patient & offload bony prominences every 2 hours   - Utilize friction reducing device or surface for transfers   - Consider consults to  interdisciplinary teams such as wound and podiatry   - Use incontinent care products after each incontinent episode such as foam cleanser  - Consider nutrition services referral as needed  Outcome: Progressing     Problem: Prexisting or High Potential for Compromised Skin Integrity  Goal: Skin integrity is maintained or improved  Description: INTERVENTIONS:  - Identify patients at risk for skin breakdown  - Assess and monitor skin integrity  - Assess and monitor nutrition and hydration status  - Monitor labs   - Assess for incontinence   - Turn and reposition patient  - Assist with mobility/ambulation  - Relieve pressure over bony prominences  - Avoid friction and shearing  - Provide appropriate hygiene as needed including keeping skin clean and dry  - Evaluate need for skin moisturizer/barrier cream  - Collaborate with interdisciplinary team   - Patient/family teaching  - Consider wound care consult   Outcome: Progressing     Problem: Potential for Falls  Goal: Patient will remain free of falls  Description: INTERVENTIONS:  - Educate patient/family on patient safety including physical limitations  - Instruct patient to call for assistance with activity   - Consult OT/PT to assist with strengthening/mobility   - Keep Call bell within reach  - Keep bed low and locked with side rails adjusted as appropriate  - Keep care items and personal belongings within reach  - Initiate and maintain comfort rounds  - Make Fall Risk Sign visible to staff  - Offer Toileting every 2 Hours, in advance of need  - Initiate/Maintain bed alarm  - Obtain necessary fall risk management equipment  - Apply yellow socks and bracelet for high fall risk patients  - Consider moving patient to room near nurses station  Outcome: Progressing

## 2023-05-18 NOTE — ANESTHESIA POSTPROCEDURE EVALUATION
Post-Op Assessment Note    CV Status:  Stable    Pain management: satisfactory to patient     Mental Status:  Sleepy and arousable   Hydration Status:  Euvolemic   PONV Controlled:  Controlled   Airway Patency:  Patent      Post Op Vitals Reviewed: Yes      Staff: CRNA, Anesthesiologist         No notable events documented      BP   115/63   Temp      Pulse  65   Resp   16   SpO2   96

## 2023-05-18 NOTE — QUICK NOTE
- Status post left delayed primary closure date of surgery 5/18/2023  -DI drain intact left foot will be removed tomorrow 5/19/2023  -Nonweightbearing to left lower extremity  -Continue IV antibiotics  -Will be stable for discharge once DI drain is removed  -Care per primary service

## 2023-05-18 NOTE — PROGRESS NOTES
[facility]  Progress Note  Name: Kera Lombardi I  MRN: [id number]  Unit/Bed#: -01 I Date of Admission: 5/8/2023   Date of Service: 5/18/2023 I Hospital Day: 10    Assessment/Plan   * Osteomyelitis of left foot [facility])  Assessment & Plan  · Received from [address] [place] [address] psychiatric patient hartman with MRI results suggesting osteomyelitis of the left first metatarsal  · Podiatry input appreciated   · S/p left first ray resection 5/8; left foot wound wash out due to wound dehiscence 5/16  · Plan for repeat washout vs delayed primary closure 5/18  · Transitioned from doxycycline to cefazolin (5/15)    · Intraoperative cultures: Staphylococcus aureus    · Wound care, surgical shoe  NWB status on left foot   · PT/OT recommendations appreciated: Postacute rehab  · Case management input appreciated  Currently optioning    Hx of right BKA ([facility] [address] )  Assessment & Plan  · History of right BKA 3/20/2023  · Serial assessments and supportive care  · Fall precautions        Gastroesophageal reflux disease without esophagitis  Assessment & Plan  · Continue PPI        Other chronic pain  Assessment & Plan  · Previously on OxyContin for chronic back pain, transitioned to oxycodone 10 mg every 6 hours while in inpatient psychiatric unit   · Monitor for adequate pain control and or signs of oversedation or withdrawal        Type 2 diabetes mellitus with diabetic neuropathy, without long-term current use of insulin [facility])  Assessment & Plan  Lab Results   Component Value Date    HGBA1C 7 2 (H) 05/05/2023       Recent Labs     05/17/23  1103 05/17/23  1551 05/17/23  2108 05/18/23  0729   POCGLU 243* 258* 305* 213*       Blood Sugar Average: Last 72 hrs:  (P) 232 [id number]   · Insulin sliding scale  Diabetic diet     · Added long acting insulin for better blood glucose coverage  · Patient has been non-compliant with her diet  She has been ordering food to be delivered to her room   I discussed this at length with her that a well-controlled blood glucose is very importance part to promote wound healing  She verbalized understanding  Psychiatric illness  Assessment & Plan  · History of bipolar disorder, OCD, PTSD  · Admitted medically from [facility] psychiatric unit  Per discharge summary, psychiatrically cleared for medical admission   · Also, currently homeless  · Continue medications including Lamictal, hydroxyzine, Geodon  · Supportive care  · Denies current suicidal ideation     · Psychiatry reevaluation completed  Continue current plan of care       Acquired hypothyroidism  Assessment & Plan  · Continue levothyroxine               VTE Prophylaxis:  Heparin    Patient Centered Rounds: I have performed bedside rounds with nursing staff today  Discussions with Specialists or Other Care Team Provider: podiatry   Education and Discussions with Family / Patient: patient     Current Length of Stay: 10 day(s)    Current Patient Status: Inpatient   Certification Statement: The patient will continue to require additional inpatient hospital stay due to osteomyelitis of left foot     Discharge Plan: pending clinical course     Code Status: Level 1 - Full Code    Subjective:   Feeling okay  Denies any pain  Objective:     Vitals:   Temp (24hrs), Av 1 °F (36 7 °C), Min:97 6 °F (36 4 °C), Max:98 5 °F (36 9 °C)    Temp:  [97 6 °F (36 4 °C)-98 5 °F (36 9 °C)] 98 2 °F (36 8 °C)  HR:  [70-76] 70  Resp:  [18-20] 18  BP: ()/(58-74) 113/58  SpO2:  [97 %-98 %] 97 %  Body mass index is 37 79 kg/m²  Input and Output Summary (last 24 hours): Intake/Output Summary (Last 24 hours) at 2023 1101  Last data filed at 2023 0730  Gross per 24 hour   Intake 480 ml   Output --   Net 480 ml       Physical Exam:   Physical Exam  Vitals and nursing note reviewed  Constitutional:       General: She is not in acute distress  Appearance: Normal appearance  She is obese     HENT:      Head: Normocephalic and atraumatic  Right Ear: External ear normal       Left Ear: External ear normal       Nose: Nose normal  No rhinorrhea  Mouth/Throat:      Mouth: Mucous membranes are moist       Pharynx: Oropharynx is clear  Eyes:      General:         Right eye: No discharge  Left eye: No discharge  Pupils: Pupils are equal, round, and reactive to light  Cardiovascular:      Rate and Rhythm: Normal rate and regular rhythm  Pulses: Normal pulses  Heart sounds: Normal heart sounds  No murmur heard  Pulmonary:      Effort: Pulmonary effort is normal  No respiratory distress  Breath sounds: Normal breath sounds  Abdominal:      General: Bowel sounds are normal  There is no distension  Palpations: Abdomen is soft  There is no mass  Tenderness: There is no abdominal tenderness  Musculoskeletal:         General: No swelling or tenderness  Normal range of motion  Cervical back: Normal range of motion and neck supple  No muscular tenderness  Comments: Right BKA  Skin:     General: Skin is warm and dry  Capillary Refill: Capillary refill takes less than 2 seconds  Findings: No erythema or rash  Comments: Left foot wrapped   Neurological:      General: No focal deficit present  Mental Status: She is alert and oriented to person, place, and time  Mental status is at baseline  Psychiatric:         Mood and Affect: Mood normal          Behavior: Behavior normal          Thought Content:  Thought content normal          Additional Data:     Labs:    Results from last 7 days   Lab Units 05/17/23  0440 05/15/23  0530   WBC Thousand/uL 9 20 8 25   HEMOGLOBIN g/dL 9 1* 9 7*   HEMATOCRIT % 31 3* 33 5*   PLATELETS Thousands/uL 395* 416*   NEUTROS PCT %  --  39*   LYMPHS PCT %  --  44   MONOS PCT %  --  10   EOS PCT %  --  6     Results from last 7 days   Lab Units 05/17/23  0440   SODIUM mmol/L 135   POTASSIUM mmol/L 4 2   CHLORIDE mmol/L 100 CO2 mmol/L 27   BUN mg/dL 16   CREATININE mg/dL 0 89   CALCIUM mg/dL 9 2         Results from last 7 days   Lab Units 05/18/23  0729 05/17/23  2108 05/17/23  1551 05/17/23  1103 05/17/23  0640 05/16/23  2112 05/16/23  1618 05/16/23  1058 05/16/23  0820 05/16/23  0646 05/15/23  2059 05/15/23  1614   POC GLUCOSE mg/dl 213* 305* 258* 243* 182* 286* 173* 291* 198* 195* 294* 225*           * I Have Reviewed All Lab Data Listed Above  * Additional Pertinent Lab Tests Reviewed:  Stefano Merlos Admission  Reviewed    Imaging:  Imaging Reports Reviewed Today Include: n/a     Recent Cultures (last 7 days):           Last 24 Hours Medication List:   Current Facility-Administered Medications   Medication Dose Route Frequency Provider Last Rate   • acetaminophen  650 mg Oral Q6H PRN Leopoldo Tellez, DPM     • cefazolin  2,000 mg Intravenous Q8H Leopoldo Tellez, DPM 2,000 mg (05/18/23 0547)   • cloNIDine  0 2 mg Oral BID Leopoldo Tellez, DPM     • fenofibrate  48 mg Oral Daily Leopoldo Tellez, DPM     • folic acid  9,194 mcg Oral Daily Leopoldo Tellez, DPM     • heparin (porcine)  5,000 Units Subcutaneous Q8H [address] Elizabethheredana Tellez, DPM     • hydrOXYzine HCL  100 mg Oral HS Leopoldo Tellez DPM     • hydrOXYzine HCL  50 mg Oral Daily Leopoldo Tellez, DPM     • insulin glargine  8 Units Subcutaneous HS JOSEE Diallo     • insulin lispro  1-6 Units Subcutaneous TID AC Leopoldo Tellez, DPM     • insulin lispro  1-6 Units Subcutaneous HS Leopoldo Tellez, DPM     • lamoTRIgine  300 mg Oral QAM Leopoldo Tellez DPM     • levothyroxine  50 mcg Oral Early Morning Leopoldo Tellez, DPM     • lidocaine  1 patch Topical Daily Leopoldo Tellez, DPM     • melatonin  6 mg Oral HS Leopoldo Tellez DPM     • menthol-methyl salicylate   Apply externally 4x Daily PRN Leopoldo Tellez, DPM     • methocarbamol  750 mg Oral Q6H PRN Leopoldo Tellez DPM     • ondansetron  4 mg Intravenous Q6H PRN Leopoldo Tellez DPM     • oxyCODONE  10 mg Oral Q6H PRN Leopoldo Tellez DPM     • pantoprazole  40 mg Oral BID Adalgisa Almaguer DPM     • pregabalin  150 mg Oral BID Adalgisa Almaguer DPM     • ziprasidone  80 mg Oral BID With Meals Adalgisa Almaguer DPM          Today, Patient Was Seen By: JOSEE Rosenthal    ** Please Note: Dictation voice to text software may have been used in the creation of this document   **

## 2023-05-18 NOTE — ASSESSMENT & PLAN NOTE
· Received from [address] [place] [address] psychiatric patient hartman with MRI results suggesting osteomyelitis of the left first metatarsal  · Podiatry input appreciated   · S/p left first ray resection 5/8; left foot wound wash out due to wound dehiscence 5/16  · Plan for repeat washout vs delayed primary closure 5/18  · Transitioned from doxycycline to cefazolin (5/15)    · Intraoperative cultures: Staphylococcus aureus    · Wound care, surgical shoe  NWB status on left foot   · PT/OT recommendations appreciated: Postacute rehab  · Case management input appreciated    Currently optioning [facility]

## 2023-05-18 NOTE — ANESTHESIA PREPROCEDURE EVALUATION
Procedure:  CLOSURE DELAYED PRIMARY (Left: Foot)    Relevant Problems   CARDIO   (+) Essential hypertension   (+) Hyperlipidemia associated with type 2 diabetes mellitus (HCC)   (+) Migraine without status migrainosus, not intractable      ENDO   (+) Acquired hypothyroidism   (+) Type 2 diabetes mellitus with diabetic neuropathy, without long-term current use of insulin (HCC)      GI/HEPATIC   (+) Gastroesophageal reflux disease without esophagitis   (+) H/O bariatric surgery      MUSCULOSKELETAL   (+) Arthritis   (+) Chronic right-sided low back pain with left-sided sciatica   (+) Diastasis of rectus abdominis      NEURO/PSYCH   (+) Anxiety   (+) Chronic paroxysmal hemicrania, not intractable   (+) Chronic right-sided low back pain with left-sided sciatica   (+) Migraine without status migrainosus, not intractable   (+) OCD (obsessive compulsive disorder)   (+) Other chronic pain   (+) PTSD (post-traumatic stress disorder)      PULMONARY   (+) Asthma, intrinsic   (+) Obstructive sleep apnea      Other   (+) Acute osteomyelitis of metatarsal bone of right foot (HCC)   (+) Osteomyelitis of left foot (HCC)        Physical Exam    Airway    Mallampati score: III  TM Distance: >3 FB  Neck ROM: full     Dental   upper dentures and lower dentures,     Cardiovascular      Pulmonary      Other Findings        Anesthesia Plan  ASA Score- 3     Anesthesia Type- IV sedation with anesthesia with ASA Monitors  Additional Monitors:   Airway Plan:           Plan Factors-Exercise tolerance (METS): >4 METS  Chart reviewed  EKG reviewed  Existing labs reviewed  Patient summary reviewed  Patient is a current smoker  Patient instructed to abstain from smoking on day of procedure  Patient did not smoke on day of surgery  There is medical exclusion for perioperative obstructive sleep apnea risk education  Induction- intravenous      Postoperative Plan-     Informed Consent- Anesthetic plan and risks discussed with patient  I personally reviewed this patient with the CRNA  Discussed and agreed on the Anesthesia Plan with the CRNA  Luke Pedroza

## 2023-05-18 NOTE — OP NOTE
OPERATIVE REPORT - Podiatry  PATIENT NAME: Steve Baez    :  1975  MRN: [id number]  Pt Location: [facility] OR ROOM 03    SURGERY DATE: 2023    Surgeon(s) and Role:     * Rima Badillo DPM - Primary    Pre-op Diagnosis:  Abscess of left foot [L02 612]  Diabetic ulcer of midfoot associated with type 2 diabetes mellitus, with muscle involvement without evidence of necrosis, unspecified laterality ([facility] [address] ) [D15 981, L97 405]    Post-Op Diagnosis Codes:     * Abscess of left foot [L02 612]     * Diabetic ulcer of midfoot associated with type 2 diabetes mellitus, with muscle involvement without evidence of necrosis, unspecified laterality ([facility] [address] ) [D22 618, L97 405]    Procedure(s) (LRB):  CLOSURE DELAYED PRIMARY (Left)    Specimen(s):  * No specimens in log *    Estimated Blood Loss:   Minimal    Drains:  Closed/Suction Drain Anterior; Left Foot Bulb 10 Fr  (Active)   Number of days: 0       Anesthesia Type:   Choice with 10 ml of 1% Lidocaine and 0 25% Bupivacaine in a 1:1 mixture      Materials:  * No implants in log *  2-0 Vicryl  DI drain    Operative Findings:  Clean healthy bleeding wound with exposed medial cuneiform  Medial cuneiform is viable intact cartilage  No Further fluid collection noted  Complications:   None    Procedure and Technique:     Under mild sedation, the patient was brought into the operating room and placed on the operating room table in the supine position  IV sedation was achieved by anesthesia team and a universal timeout was performed where all parties are in agreement of correct patient, correct procedure and correct site  A foot block was performed consisting of 10 ml of 1% Lidocaine and 0 25% Bupivacaine in a 1:1 mixture  The foot was then prepped and draped in the usual aseptic manner  Attention was directed to the left medial midfoot where a open wound was located with exposed medial cuneiform  Overall the wound appeared clean and and granular    No further fluid collection "or abscess was noted  Utilizing a curette and a rongeur the exposed nonviable tendon fat muscle cutaneous tissue was excised  Bleeders were cauterized as necessary  The wound was then rinsed with copious amount of normal sterile saline  Skin edges were then freshened up with a #15 blade to healthy bleeding edges  The wound edges were then reapproximated utilizing a 2-0 nylon in a horizontal mattress and simple sugar fashion for minimal skin tension  Given patient has soft tissue dead space from recent amputation I inserted a DI drain prevent any further hematoma or seroma collection  Foot was then cleansed and dried  Pin site was dressed with Adaptic, 4 x 4 gauze, ABD Curlex and Ace wrap  The patient tolerated the procedure and anesthesia well without immediate complications and transferred to PACU with vital signs stable  As with many limb salvage procedures, we contemplate the possibility of performing further stages to this procedure  Procedures may include debridements, delayed closure, plastic surgery techniques, or more proximal amputations  This procedure may be considered part of a multi-staged limb salvage treatment plan  I was present for the entire procedure  Eunice Dominguez DPM  DATE: May 18, 2023  TIME: 2:58 PM      Portions of the record may have been created with voice recognition software  Occasional wrong word or \"sound a like\" substitutions may have occurred due to the inherent limitations of voice recognition software  Read the chart carefully and recognize, using context, where substitutions have occurred      "

## 2023-05-18 NOTE — ASSESSMENT & PLAN NOTE
Lab Results   Component Value Date    HGBA1C 7 2 (H) 05/05/2023       Recent Labs     05/17/23  1103 05/17/23  1551 05/17/23  2108 05/18/23  0729   POCGLU 243* 258* 305* 213*       Blood Sugar Average: Last 72 hrs:  (P) 232 [id number]   · Insulin sliding scale  Diabetic diet     · Added long acting insulin for better blood glucose coverage  · Patient has been non-compliant with her diet  She has been ordering food to be delivered to her room  I discussed this at length with her that a well-controlled blood glucose is very importance part to promote wound healing  She verbalized understanding

## 2023-05-19 LAB
ANION GAP SERPL CALCULATED.3IONS-SCNC: 5 MMOL/L (ref 4–13)
BUN SERPL-MCNC: 15 MG/DL (ref 5–25)
CALCIUM SERPL-MCNC: 9.6 MG/DL (ref 8.4–10.2)
CHLORIDE SERPL-SCNC: 101 MMOL/L (ref 96–108)
CO2 SERPL-SCNC: 28 MMOL/L (ref 21–32)
CREAT SERPL-MCNC: 0.84 MG/DL (ref 0.6–1.3)
ERYTHROCYTE [DISTWIDTH] IN BLOOD BY AUTOMATED COUNT: 20.2 % (ref 11.6–15.1)
GFR SERPL CREATININE-BSD FRML MDRD: 83 ML/MIN/1.73SQ M
GLUCOSE SERPL-MCNC: 204 MG/DL (ref 65–140)
GLUCOSE SERPL-MCNC: 209 MG/DL (ref 65–140)
GLUCOSE SERPL-MCNC: 224 MG/DL (ref 65–140)
GLUCOSE SERPL-MCNC: 256 MG/DL (ref 65–140)
GLUCOSE SERPL-MCNC: 308 MG/DL (ref 65–140)
HCT VFR BLD AUTO: 32.4 % (ref 34.8–46.1)
HGB BLD-MCNC: 9.4 G/DL (ref 11.5–15.4)
MCH RBC QN AUTO: 21.7 PG (ref 26.8–34.3)
MCHC RBC AUTO-ENTMCNC: 29 G/DL (ref 31.4–37.4)
MCV RBC AUTO: 75 FL (ref 82–98)
PLATELET # BLD AUTO: 430 THOUSANDS/UL (ref 149–390)
PMV BLD AUTO: 9.3 FL (ref 8.9–12.7)
POTASSIUM SERPL-SCNC: 4.3 MMOL/L (ref 3.5–5.3)
RBC # BLD AUTO: 4.34 MILLION/UL (ref 3.81–5.12)
SODIUM SERPL-SCNC: 134 MMOL/L (ref 135–147)
WBC # BLD AUTO: 7.64 THOUSAND/UL (ref 4.31–10.16)

## 2023-05-19 RX ORDER — POLYETHYLENE GLYCOL 3350 17 G/17G
17 POWDER, FOR SOLUTION ORAL DAILY
Status: DISCONTINUED | OUTPATIENT
Start: 2023-05-19 | End: 2023-05-23 | Stop reason: HOSPADM

## 2023-05-19 RX ORDER — SENNOSIDES 8.6 MG
2 TABLET ORAL
Status: DISCONTINUED | OUTPATIENT
Start: 2023-05-19 | End: 2023-05-23 | Stop reason: HOSPADM

## 2023-05-19 RX ORDER — DOCUSATE SODIUM 100 MG/1
100 CAPSULE, LIQUID FILLED ORAL 2 TIMES DAILY
Status: DISCONTINUED | OUTPATIENT
Start: 2023-05-19 | End: 2023-05-23 | Stop reason: HOSPADM

## 2023-05-19 RX ADMIN — OXYCODONE HYDROCHLORIDE 10 MG: 10 TABLET ORAL at 05:38

## 2023-05-19 RX ADMIN — HEPARIN SODIUM 5000 UNITS: 5000 INJECTION INTRAVENOUS; SUBCUTANEOUS at 13:06

## 2023-05-19 RX ADMIN — INSULIN LISPRO 2 UNITS: 100 INJECTION, SOLUTION INTRAVENOUS; SUBCUTANEOUS at 07:56

## 2023-05-19 RX ADMIN — CEFAZOLIN SODIUM 2000 MG: 2 SOLUTION INTRAVENOUS at 13:05

## 2023-05-19 RX ADMIN — OXYCODONE HYDROCHLORIDE 10 MG: 10 TABLET ORAL at 18:00

## 2023-05-19 RX ADMIN — HEPARIN SODIUM 5000 UNITS: 5000 INJECTION INTRAVENOUS; SUBCUTANEOUS at 05:16

## 2023-05-19 RX ADMIN — CEFAZOLIN SODIUM 2000 MG: 2 SOLUTION INTRAVENOUS at 05:16

## 2023-05-19 RX ADMIN — FOLIC ACID 1000 MCG: 1 TABLET ORAL at 08:00

## 2023-05-19 RX ADMIN — MENTHOL, METHYL SALICYLATE: 10; 15 CREAM TOPICAL at 23:42

## 2023-05-19 RX ADMIN — CEFAZOLIN SODIUM 2000 MG: 2 SOLUTION INTRAVENOUS at 21:25

## 2023-05-19 RX ADMIN — Medication 6 MG: at 21:22

## 2023-05-19 RX ADMIN — ZIPRASIDONE HYDROCHLORIDE 80 MG: 40 CAPSULE ORAL at 08:00

## 2023-05-19 RX ADMIN — PANTOPRAZOLE SODIUM 40 MG: 40 TABLET, DELAYED RELEASE ORAL at 21:22

## 2023-05-19 RX ADMIN — INSULIN LISPRO 3 UNITS: 100 INJECTION, SOLUTION INTRAVENOUS; SUBCUTANEOUS at 16:57

## 2023-05-19 RX ADMIN — HYDROXYZINE HYDROCHLORIDE 100 MG: 25 TABLET ORAL at 21:22

## 2023-05-19 RX ADMIN — LAMOTRIGINE 300 MG: 100 TABLET ORAL at 08:01

## 2023-05-19 RX ADMIN — INSULIN LISPRO 4 UNITS: 100 INJECTION, SOLUTION INTRAVENOUS; SUBCUTANEOUS at 11:40

## 2023-05-19 RX ADMIN — ZIPRASIDONE HYDROCHLORIDE 80 MG: 40 CAPSULE ORAL at 17:01

## 2023-05-19 RX ADMIN — PREGABALIN 150 MG: 75 CAPSULE ORAL at 17:01

## 2023-05-19 RX ADMIN — HEPARIN SODIUM 5000 UNITS: 5000 INJECTION INTRAVENOUS; SUBCUTANEOUS at 21:22

## 2023-05-19 RX ADMIN — DOCUSATE SODIUM 100 MG: 100 CAPSULE, LIQUID FILLED ORAL at 17:01

## 2023-05-19 RX ADMIN — METHOCARBAMOL TABLETS 750 MG: 500 TABLET, COATED ORAL at 18:00

## 2023-05-19 RX ADMIN — MENTHOL, METHYL SALICYLATE: 10; 15 CREAM TOPICAL at 07:59

## 2023-05-19 RX ADMIN — PREGABALIN 150 MG: 75 CAPSULE ORAL at 08:00

## 2023-05-19 RX ADMIN — SENNOSIDES 17.2 MG: 8.6 TABLET, FILM COATED ORAL at 21:22

## 2023-05-19 RX ADMIN — FENOFIBRATE 48 MG: 48 TABLET, FILM COATED ORAL at 08:01

## 2023-05-19 RX ADMIN — METHOCARBAMOL TABLETS 750 MG: 500 TABLET, COATED ORAL at 05:38

## 2023-05-19 RX ADMIN — HYDROXYZINE HYDROCHLORIDE 50 MG: 25 TABLET ORAL at 08:00

## 2023-05-19 RX ADMIN — LEVOTHYROXINE SODIUM 50 MCG: 50 TABLET ORAL at 05:16

## 2023-05-19 RX ADMIN — OXYCODONE HYDROCHLORIDE 10 MG: 10 TABLET ORAL at 23:41

## 2023-05-19 RX ADMIN — OXYCODONE HYDROCHLORIDE 10 MG: 10 TABLET ORAL at 11:40

## 2023-05-19 RX ADMIN — PANTOPRAZOLE SODIUM 40 MG: 40 TABLET, DELAYED RELEASE ORAL at 08:00

## 2023-05-19 RX ADMIN — METHOCARBAMOL TABLETS 750 MG: 500 TABLET, COATED ORAL at 11:39

## 2023-05-19 RX ADMIN — CLONIDINE HYDROCHLORIDE 0.2 MG: 0.1 TABLET ORAL at 21:22

## 2023-05-19 RX ADMIN — INSULIN GLARGINE 8 UNITS: 100 INJECTION, SOLUTION SUBCUTANEOUS at 21:23

## 2023-05-19 RX ADMIN — METHOCARBAMOL TABLETS 750 MG: 500 TABLET, COATED ORAL at 23:41

## 2023-05-19 NOTE — CASE MANAGEMENT
Case Management Discharge Planning Note    Patient name Sharif Earing  Location /-54 MRN [id number]  : 1975 Date 2023       Current Admission Date: 2023  Current Admission Diagnosis:Osteomyelitis of left foot [facility])   Patient Active Problem List    Diagnosis Date Noted   • Acute osteomyelitis of metatarsal bone of right foot ([facility] ) 2023   • Asthma, intrinsic 2023   • Bipolar 1 disorder, depressed ([place] [address] ) 2023   • Hx of right BKA ([place] [address] ) 2023   • Homeless single person 2023   • Sepsis ([place] [address] ) 2023   • Morbid obesity with BMI of 40 0-44 9, adult ([place] [address] ) 2023   • Obstructive sleep apnea 2022   • Diastasis of rectus abdominis 2021   • 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 ([place] [address] ) 2021   • Gastroesophageal reflux disease without esophagitis 2021   • Migraine without status migrainosus, not intractable 2021   • Impaired intestinal absorption 2021   • Intestinal malabsorption following gastrectomy 2021   • Right foot ulcer, with fat layer exposed ([place] [address] ) 2021   • Intertriginous dermatitis associated with moisture 2021   • Arthritis 2021   • Other chronic pain 2021   • OCD (obsessive compulsive disorder)    • PTSD (post-traumatic stress disorder)    • Essential hypertension 2021   • Acquired absence of other left toe(s) ([place] [address] ) 2019   • Folate deficiency 2019   • S/P laparoscopic sleeve gastrectomy 2019   • Diabetic foot ulcer ([place] [address] ) 09/10/2019   • Type 2 diabetes mellitus with diabetic neuropathy, without long-term current use of insulin ([place] [address] ) 2019   • Osteomyelitis of left foot ([place] [address] ) 2019   • Acquired hypothyroidism 2019   • Psychiatric illness 2019   • 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, with bone involvement without evidence of necrosis ([facility] [address] ) 2019   • H/O bariatric surgery 2019 • Anxiety 06/28/2019   • Drusen body, unspecified laterality 01/25/2019   • Radiculopathy, lumbar region 09/12/2018   • Chronic right-sided low back pain with left-sided sciatica 09/04/2018   • Hyperlipidemia associated with type 2 diabetes mellitus ([facility] ) 09/25/2017   • Abnormal x-ray of cervical spine 08/14/2017   • Right cervical radiculopathy 08/11/2017   • Bipolar affective disorder in remission ([facility] ) 04/15/2017   • Amenorrhea 11/29/2016   • Instability of left knee joint 05/25/2016   • Allergy to bee sting 04/25/2016   • Acquired equinovarus deformity of left foot 05/19/2015   • Atypical squamous cell changes of undetermined significance (ASCUS) on cervical cytology with positive high risk human papilloma virus (HPV) 02/23/2015   • Chronic paroxysmal hemicrania, not intractable 02/23/2015      LOS (days): 11  Geometric Mean LOS (GMLOS) (days): 5 60  Days to GMLOS:-5 6     OBJECTIVE:  Risk of Unplanned Readmission Score: 24 47         Current admission status: Inpatient   Preferred Pharmacy:   [address] [place] [address] [address] [id number]  Phone: [phone] Fax: [phone]    Primary Care Provider: Kati Morales MD    Primary Insurance: [address] [place] [address]  Secondary Insurance:     DISCHARGE DETAILS:    Discharge planning discussed with[de-identified] patient & [facility] & son a 17:08pm  Freedom of Choice: Yes  Comments - Freedom of Choice: recommendation is for [facility]- pt has been accepted and authorization is needed- authorization has been started  CM contacted family/caregiver?: Yes             Contacts  Patient Contacts: Gaye Clement Peace/Son  Relationship to Patient[de-identified] Family  Contact Method: Phone  Phone Number: [phone]  Reason/Outcome: Discharge [address]         Is the patient interested in [facility] at discharge?: No         Other Referral/Resources/Interventions Provided:  Interventions: Short Term Rehab  Referral Comments: authorization has been started         Treatment Team Recommendation: Short Term Rehab ([facility] auth needed- 13:30pm w/c Jeane Gilford needs to be cx if no Claire Doris)     Transport at Discharge : Wheelchair van  Dispatcher Contacted: Yes  Number/Name of Dispatcher: [phone]  Transported by Reema Connolly and Unit #): Merly  ETA of Transport (Date): 05/20/23  ETA of Transport (Time): 1230     Transfer Mode: Wheelchair              Family notified[de-identified] son was  called to review d/c plan  Additional Comments: authorization is needed- authorization has been started- if Claire Doris is not received w/c Jeane Gilford needs to be cx 1 hr inadvance of the transport time- covid test is needed    Accepting Facility Name, [address] [place] [address]     Facility/Agency Fax Number: [phone]     Cm received a call from [facility] gwendolyn transport time has been changed to 12:30pm- if Claire Doris is not received then transport needs to be cx- pt was made aware and message was sent via [employer]

## 2023-05-19 NOTE — ASSESSMENT & PLAN NOTE
· History of bipolar disorder, OCD, PTSD  · Admitted medically from [place] [address] psychiatric unit  Per discharge summary, psychiatrically cleared for medical admission   · Also, currently homeless  · Continue medications including Lamictal, hydroxyzine, Geodon  · Supportive care   · Denies current suicidal ideation     · Psychiatry reevaluation completed    Continue current plan of care

## 2023-05-19 NOTE — ASSESSMENT & PLAN NOTE
Lab Results   Component Value Date    HGBA1C 7 2 (H) 05/05/2023       Recent Labs     05/18/23  1631 05/18/23  2146 05/19/23  0704 05/19/23  1124   POCGLU 188* 223* 224* 308*       Blood Sugar Average: Last 72 hrs:  (P) 225 5698   · Insulin sliding scale  Diabetic diet     · Added long acting insulin for better blood glucose coverage  · Patient has been non-compliant with her diet  She has been ordering food to be delivered to her room  I discussed this at length with her that a well-controlled blood glucose is very importance part to promote wound healing  She verbalized understanding

## 2023-05-19 NOTE — CASE MANAGEMENT
Lauren Manuel [facility] received request for authorization from Care Manager  Authorization request for: SNF  Accepting Facility: [facility] NPI: [id number]  Accepting MD: Dr Jacquelyn Gong NPI: [id number]  Authorization initiated by contacting insurance: [employer] Via: Fax     Clinicals submitted via: Fax  [phone]

## 2023-05-19 NOTE — PLAN OF CARE
Problem: OCCUPATIONAL THERAPY ADULT  Goal: Performs self-care activities at highest level of function for planned discharge setting  See evaluation for individualized goals  Description: Treatment Interventions: ADL retraining, Functional transfer training, Endurance training, Equipment evaluation/education, Compensatory technique education, Energy conservation, Activityengagement    See flowsheet documentation for full assessment, interventions and recommendations  Outcome: Progressing  Note: Limitation: Decreased ADL status, Decreased Safe judgement during ADL, Decreased endurance, Decreased self-care trans, Decreased high-level ADLs  Prognosis: Good  Assessment: Patient participated in Skilled OT session this date with interventions consisting of bed m mobility training, Energy Conservation techniques, therapeutic exercise to: increase functional use of BUEs, increase BUE muscle strength  and increase dynamic sit/ stand balance during functional activity    Patient agreeable to OT treatment session, upon arrival patient was found supine in bed, alert, responsive  and in no apparent distress  Patient transfers supine <> sit EOB with  MI  Patient then sits unsupported EOB x 15+ min to complete UB TE using 2# weight as detailed in flow sheet  Following TE, patient declined further activity or to get OOB at this time  Session ended with patient supine in bed, all needs met, and call bell within reach  In comparison to previous session, patient with improvements in UB strength and endurance  Patient requiring verbal cues for correct technique, verbal cues for pacing thru activity steps and frequent rest periods  Patient performance  demonstrated good carryover of learned techniques and strategies to facilitate safety during functional tasks  Patient continues to be functioning below baseline level, occupational performance remains limited secondary to factors listed above and increased risk for falls and injury  From OT standpoint, recommendation at time of d/c would be Short Term Rehab  Patient to benefit from continued Occupational Therapy treatment while in the hospital to address deficits as defined above and maximize level of functional independence with ADLs and functional mobility in order to return to PLOF       OT Discharge Recommendation: Post acute rehabilitation services

## 2023-05-19 NOTE — PROGRESS NOTES
[facility]  Progress Note  Name: Luis Atkins I  MRN: [id number]  Unit/Bed#: -01 I Date of Admission: 5/8/2023   Date of Service: 5/19/2023 I Hospital Day: 11    Assessment/Plan   * Osteomyelitis of left foot [facility])  Assessment & Plan  · Received from [address] [place] [address] psychiatric patient hartman with MRI results suggesting osteomyelitis of the left first metatarsal  · Podiatry input appreciated   · S/p left first ray resection 5/8; left foot wound wash out due to wound dehiscence 5/16; delayed primary closure 5/18  · DI drain will likely be removed by podiatry later today   · Transitioned from doxycycline to cefazolin (5/15)    · Intraoperative cultures: Staphylococcus aureus    · Wound care, surgical shoe  NWB status on left foot   · PT/OT recommendations appreciated: Postacute rehab  · Case management input appreciated  Plan for discharge to [facility] tomorrow  Hx of right BKA ([address] )  Assessment & Plan  · History of right BKA 3/20/2023  · Serial assessments and supportive care  · Fall precautions         Gastroesophageal reflux disease without esophagitis  Assessment & Plan  · Continue PPI         Other chronic pain  Assessment & Plan  · Previously on OxyContin for chronic back pain, transitioned to oxycodone 10 mg every 6 hours while in inpatient psychiatric unit   · Monitor for adequate pain control and or signs of oversedation or withdrawal         Type 2 diabetes mellitus with diabetic neuropathy, without long-term current use of insulin [facility])  Assessment & Plan  Lab Results   Component Value Date    HGBA1C 7 2 (H) 05/05/2023       Recent Labs     05/18/23  1631 05/18/23  2146 05/19/23  0704 05/19/23  1124   POCGLU 188* 223* 224* 308*       Blood Sugar Average: Last 72 hrs:  (P) 225 5625   · Insulin sliding scale  Diabetic diet     · Added long acting insulin for better blood glucose coverage  · Patient has been non-compliant with her diet   She has been ordering food to be delivered to her room  I discussed this at length with her that a well-controlled blood glucose is very importance part to promote wound healing  She verbalized understanding  Psychiatric illness  Assessment & Plan  · History of bipolar disorder, OCD, PTSD  · Admitted medically from [place] [address] psychiatric unit  Per discharge summary, psychiatrically cleared for medical admission   · Also, currently homeless  · Continue medications including Lamictal, hydroxyzine, Geodon  · Supportive care   · Denies current suicidal ideation     · Psychiatry reevaluation completed  Continue current plan of care       Acquired hypothyroidism  Assessment & Plan  · Continue levothyroxine                  VTE Prophylaxis:  Heparin    Patient Centered Rounds: I have performed bedside rounds with nursing staff today  Discussions with Specialists or Other Care Team Provider: podiatry, CM   Education and Discussions with Family / Patient: patient     Current Length of Stay: 11 day(s)    Current Patient Status: Inpatient   Certification Statement: The patient will continue to require additional inpatient hospital stay due to osteomyelitis on left foot     Discharge Plan: pending clinical course  Anticipate d/c tomorrow to STR once clear by podiatry     Code Status: Level 1 - Full Code    Subjective:   Feeling okay  Denies any pain currently  Objective:     Vitals:   Temp (24hrs), Av 9 °F (36 6 °C), Min:97 2 °F (36 2 °C), Max:98 3 °F (36 8 °C)    Temp:  [97 2 °F (36 2 °C)-98 3 °F (36 8 °C)] 98 3 °F (36 8 °C)  HR:  [56-72] 71  Resp:  [16-28] 18  BP: (101-125)/(55-73) 106/61  SpO2:  [93 %-98 %] 96 %  Body mass index is 37 79 kg/m²  Input and Output Summary (last 24 hours): Intake/Output Summary (Last 24 hours) at 2023 1230  Last data filed at 2023 0733  Gross per 24 hour   Intake 824 42 ml   Output 0 ml   Net 824 42 ml       Physical Exam:   Physical Exam  Vitals and nursing note reviewed     Constitutional: General: She is not in acute distress  Appearance: Normal appearance  She is obese  HENT:      Head: Normocephalic and atraumatic  Right Ear: External ear normal       Left Ear: External ear normal       Nose: Nose normal  No rhinorrhea  Mouth/Throat:      Mouth: Mucous membranes are moist       Pharynx: Oropharynx is clear  Eyes:      General:         Right eye: No discharge  Left eye: No discharge  Pupils: Pupils are equal, round, and reactive to light  Cardiovascular:      Rate and Rhythm: Normal rate and regular rhythm  Pulses: Normal pulses  Heart sounds: Normal heart sounds  No murmur heard  Pulmonary:      Effort: Pulmonary effort is normal  No respiratory distress  Breath sounds: Normal breath sounds  Abdominal:      General: Bowel sounds are normal  There is no distension  Palpations: Abdomen is soft  There is no mass  Tenderness: There is no abdominal tenderness  Musculoskeletal:         General: No swelling or tenderness  Cervical back: Normal range of motion and neck supple  No muscular tenderness  Comments: Right BKA      Skin:     General: Skin is warm and dry  Capillary Refill: Capillary refill takes less than 2 seconds  Findings: No erythema or rash  Comments: Left foot dressing on      Neurological:      General: No focal deficit present  Mental Status: She is alert and oriented to person, place, and time  Mental status is at baseline  Psychiatric:         Mood and Affect: Affect is flat  Behavior: Behavior normal          Thought Content:  Thought content normal          Additional Data:     Labs:    Results from last 7 days   Lab Units 05/19/23  0518 05/17/23  0440 05/15/23  0530   WBC Thousand/uL 7 64   < > 8 25   HEMOGLOBIN g/dL 9 4*   < > 9 7*   HEMATOCRIT % 32 4*   < > 33 5*   PLATELETS Thousands/uL 430*   < > 416*   NEUTROS PCT %  --   --  39*   LYMPHS PCT %  --   --  44   MONOS PCT % --   --  10   EOS PCT %  --   --  6    < > = values in this interval not displayed  Results from last 7 days   Lab Units 05/19/23  0518   SODIUM mmol/L 134*   POTASSIUM mmol/L 4 3   CHLORIDE mmol/L 101   CO2 mmol/L 28   BUN mg/dL 15   CREATININE mg/dL 0 84   CALCIUM mg/dL 9 6         Results from last 7 days   Lab Units 05/19/23  1124 05/19/23  0704 05/18/23  2146 05/18/23  1631 05/18/23  1302 05/18/23  1119 05/18/23  0729 05/17/23  2108 05/17/23  1551 05/17/23  1103 05/17/23  0640 05/16/23  2112   POC GLUCOSE mg/dl 308* 224* 223* 188* 149* 173* 213* 305* 258* 243* 182* 286*           * I Have Reviewed All Lab Data Listed Above  * Additional Pertinent Lab Tests Reviewed:  [facility] Admission  Reviewed    Imaging:  Imaging Reports Reviewed Today Include: n/a     Recent Cultures (last 7 days):           Last 24 Hours Medication List:   Current Facility-Administered Medications   Medication Dose Route Frequency Provider Last Rate   • acetaminophen  650 mg Oral Q6H PRN Martinez Canary, DPM     • cefazolin  2,000 mg Intravenous Q8H Martinez Canpari, DPM 2,000 mg (05/19/23 0516)   • cloNIDine  0 2 mg Oral BID Martinez Canpari, DPM     • fenofibrate  48 mg Oral Daily Martinez Canpari, DPM     • folic acid  8,638 mcg Oral Daily Martinez Canary, DPM     • heparin (porcine)  5,000 Units Subcutaneous Q8H [facility] Martinez Canary, DPM     • hydrOXYzine HCL  100 mg Oral HS Martinez Canary, DPM     • hydrOXYzine HCL  50 mg Oral Daily Martinez Canary, DPM     • insulin glargine  8 Units Subcutaneous HS Martinez Canary, DPM     • insulin lispro  1-6 Units Subcutaneous TID AC Martinez Canpari, DPM     • insulin lispro  1-6 Units Subcutaneous HS Martinez Canary, DPM     • lactated ringers  75 mL/hr Intravenous Continuous Adaline NICHOLAS Cerda     • lamoTRIgine  300 mg Oral QAM Martinez Canpari, DPM     • levothyroxine  50 mcg Oral Early Morning Juan Gallardo DPM     • lidocaine  1 patch Topical Daily Juan Gallardo DPM     • melatonin  6 mg Oral HS Juan Gallardo DPM • menthol-methyl salicylate   Apply externally 4x Daily PRN Cynthia Bonilla DPM     • methocarbamol  750 mg Oral Q6H PRN Cynthia Bonilla DPM     • ondansetron  4 mg Intravenous Q6H PRN Cynthia Bonilla DPM     • oxyCODONE  10 mg Oral Q6H PRN Cynthia Bonilla DPM     • pantoprazole  40 mg Oral BID Cynthia Bonilla DPM     • pregabalin  150 mg Oral BID Cynthia Bonilla DPM     • ziprasidone  80 mg Oral BID With Meals Cynthia Bonilla DPM          Today, Patient Was Seen By: JOSEE Madrid    ** Please Note: Dictation voice to text software may have been used in the creation of this document   **

## 2023-05-19 NOTE — CASE MANAGEMENT
Case Management Discharge Planning Note    Patient name Gab Lee  Location /-64 MRN [id number]  : 1975 Date 2023       Current Admission Date: 2023  Current Admission Diagnosis:Osteomyelitis of left foot [facility])   Patient Active Problem List    Diagnosis Date Noted   • Acute osteomyelitis of metatarsal bone of right foot ([facility] ) 2023   • Asthma, intrinsic 2023   • Bipolar 1 disorder, depressed ([place] [address] ) 2023   • Hx of right BKA ([place] [address] ) 2023   • Homeless single person 2023   • Sepsis ([place] [address] ) 2023   • Morbid obesity with BMI of 40 0-44 9, adult ([place] [address] ) 2023   • Obstructive sleep apnea 2022   • Diastasis of rectus abdominis 2021   • 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 ([place] [address] ) 2021   • Gastroesophageal reflux disease without esophagitis 2021   • Migraine without status migrainosus, not intractable 2021   • Impaired intestinal absorption 2021   • Intestinal malabsorption following gastrectomy 2021   • Right foot ulcer, with fat layer exposed ([place] [address] ) 2021   • Intertriginous dermatitis associated with moisture 2021   • Arthritis 2021   • Other chronic pain 2021   • OCD (obsessive compulsive disorder)    • PTSD (post-traumatic stress disorder)    • Essential hypertension 2021   • Acquired absence of other left toe(s) ([place] [address] ) 2019   • Folate deficiency 2019   • S/P laparoscopic sleeve gastrectomy 2019   • Diabetic foot ulcer ([place] [address] ) 09/10/2019   • Type 2 diabetes mellitus with diabetic neuropathy, without long-term current use of insulin ([place] [address] ) 2019   • Osteomyelitis of left foot ([place] [address] ) 2019   • Acquired hypothyroidism 2019   • Psychiatric illness 2019   • 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, with bone involvement without evidence of necrosis ([facility] ) 2019   • H/O bariatric surgery 2019 • Anxiety 06/28/2019   • Drusen body, unspecified laterality 01/25/2019   • Radiculopathy, lumbar region 09/12/2018   • Chronic right-sided low back pain with left-sided sciatica 09/04/2018   • Hyperlipidemia associated with type 2 diabetes mellitus ([facility] ) 09/25/2017   • Abnormal x-ray of cervical spine 08/14/2017   • Right cervical radiculopathy 08/11/2017   • Bipolar affective disorder in remission ([facility] ) 04/15/2017   • Amenorrhea 11/29/2016   • Instability of left knee joint 05/25/2016   • Allergy to bee sting 04/25/2016   • Acquired equinovarus deformity of left foot 05/19/2015   • Atypical squamous cell changes of undetermined significance (ASCUS) on cervical cytology with positive high risk human papilloma virus (HPV) 02/23/2015   • Chronic paroxysmal hemicrania, not intractable 02/23/2015      LOS (days): 11  Geometric Mean LOS (GMLOS) (days): 5 60  Days to GMLOS:-5 6     OBJECTIVE:  Risk of Unplanned Readmission Score: 24 47         Current admission status: Inpatient   Preferred Pharmacy:   [address] [facility] [address] [place] [address] [address] [id number]  Phone: [phone] Fax: [phone]    Primary Care Provider: Maria Luz Topete MD    Primary Insurance: [address] [place] [address]  Secondary Insurance:     DISCHARGE DETAILS:    Discharge planning discussed with[de-identified] patient & [facility] & son a 17:08pm  Freedom of Choice: Yes  Comments - Freedom of Choice: recommendation is for [facility]- pt has been accepted and authorization is needed- authorization has been started  CM contacted family/caregiver?: Yes             Contacts  Patient Contacts: Ty Barnes Peace/Son  Relationship to Patient[de-identified] Family  Contact Method: Phone  Phone Number: [phone]  Reason/Outcome: Discharge [address]         Is the patient interested in [facility] [place] at discharge?: No         Other Referral/Resources/Interventions Provided:  Interventions: Short Term Rehab  Referral Comments: authorization has been started         Treatment Team Recommendation: Short Term Rehab ([facility] auth needed- 13:30pm w/c Fer Bodo needs to be cx if no [address])     Transport at Discharge : Wheelchair van  Dispatcher Contacted: Yes  Number/Name of Dispatcher: [phone]  Transported by [facility] and Unit #):  [place]  ETA of Transport (Date): 05/19/23  ETA of Transport (Time): 1330     Transfer Mode: Wheelchair              Family notified[de-identified] son was  called to review d/c plan  Additional Comments: authorization is needed- authorization has been started- if [address] is not received w/c Fer Argueta needs to be cx 1 hr inadvance of the transport time- covid test is needed

## 2023-05-19 NOTE — PLAN OF CARE
Problem: PHYSICAL THERAPY ADULT  Goal: Performs mobility at highest level of function for planned discharge setting  See evaluation for individualized goals  Description: Treatment/Interventions: Functional transfer training, LE strengthening/ROM, Therapeutic exercise, Endurance training, Patient/family training, Equipment eval/education, Bed mobility, Compensatory technique education, Spoke to nursing, Spoke to case management (transfer board training, w/c mobility)  Equipment Recommended: Other (Comment) (slide board)       See flowsheet documentation for full assessment, interventions and recommendations  Outcome: Progressing  Note: Prognosis: Fair  Problem List: Decreased strength, Decreased endurance, Impaired balance, Decreased mobility, Obesity, Decreased skin integrity, Pain  Assessment: Pt seen for PT treatment session this date with interventions consisting of Therapeutic exercise to improve endurance consisting of: AROM 20 reps B LE in sitting position and therapeutic activity to reduce fall risk consisting of training: supine<>sit transfers and vc and tactile cues for static sitting posture faciliation  Pt agreeable to PT treatment session upon arrival, pt found supine in bed w/ HOB elevated, A&O x 4  In comparison to previous session, pt with improvements in BLE ther ex tolerance  Post session: all needs in reach and RN notified of session findings/recommendations  Continue to recommend post acute rehabilitation services at time of d/c in order to maximize pt's functional independence and safety w/ mobility  Pt continues to be functioning below baseline level, and remains limited 2* factors listed above and including weakness,pain, weight bearing restrictions, decreased skin integrity  PT will continue to see pt during current hospitalization in order to address the deficits listed above and provide interventions consistent w/ POC in effort to achieve STGs    Barriers to Discharge: Decreased caregiver support     PT Discharge Recommendation: Post acute rehabilitation services (maintained recommendation)    See flowsheet documentation for full assessment

## 2023-05-19 NOTE — OCCUPATIONAL THERAPY NOTE
05/19/23 1058   OT Last Visit   OT Visit Date 05/19/23   Note Type   Note Type Treatment   Pain Assessment   Pain Assessment Tool 0-10   Pain Score 9   Pain Location/Orientation Orientation: Left; Location: Foot   Pain Radiating Towards medial aspect   Pain Onset/Description Onset: Ongoing;Frequency: Constant/Continuous; Descriptor: Kylah Magalys; Descriptor: Sore   Effect of Pain on Daily Activities yes   Patient's Stated Pain Goal No pain   Hospital Pain Intervention(s) Medication (See MAR); Repositioned; Ambulation/increased activity; Emotional support   Restrictions/Precautions   Weight Bearing Precautions Per Order Yes   RLE Weight Bearing Per Order NWB   LLE Weight Bearing Per Order NWB   Braces or Orthoses   (R LE presthetic, decreased skin integrity)   Other Precautions Chair Alarm; Bed Alarm; Fall Risk;Pain  (DI drain L foot, decreased skin integrity of L foot)   Bed Mobility   Supine to Sit 6  Modified independent   Additional items HOB elevated; Bedrails; Increased time required   Sit to Supine 6  Modified independent   Additional items HOB elevated; Bedrails; Increased time required   Additional Comments Sat unsupported EOB x 15+ min to complete TE  Therapeutic Excerise-Strength   UE Strength Yes   Right Upper Extremity- Strength   R Shoulder Flexion; Extension;Horizontal ABduction  (horiz  add, scapular pro/ret, tricep extensions)   R Elbow Elbow flexion;Elbow extension   R Wrist   (wrist rotation)   R Position Seated   Equipment Dumbbell   R Weight/Reps/Sets 2# weight x 30 reps   Left Upper Extremity-Strength   L Weights/Reps/Sets TE same as R UE above   Cognition   Overall Cognitive Status WFL   Arousal/Participation Alert; Responsive; Cooperative   Attention Attends with cues to redirect   Orientation Level Oriented X4   Memory Within functional limits   Following Commands Follows one step commands with increased time or repetition   Comments PT agreeable to OT treatment   Activity Tolerance   Activity Tolerance Patient limited by fatigue;Patient limited by pain   Assessment   Assessment Patient participated in Skilled OT session this date with interventions consisting of bed m mobility training, Energy Conservation techniques, therapeutic exercise to: increase functional use of BUEs, increase BUE muscle strength  and increase dynamic sit/ stand balance during functional activity    Patient agreeable to OT treatment session, upon arrival patient was found supine in bed, alert, responsive  and in no apparent distress  Patient transfers supine <> sit EOB with  MI  Patient then sits unsupported EOB x 15+ min to complete UB TE using 2# weight as detailed in flow sheet  Following TE, patient declined further activity or to get OOB at this time  Session ended with patient supine in bed, all needs met, and call bell within reach  In comparison to previous session, patient with improvements in UB strength and endurance  Patient requiring verbal cues for correct technique, verbal cues for pacing thru activity steps and frequent rest periods  Patient performance  demonstrated good carryover of learned techniques and strategies to facilitate safety during functional tasks  Patient continues to be functioning below baseline level, occupational performance remains limited secondary to factors listed above and increased risk for falls and injury  From OT standpoint, recommendation at time of d/c would be Short Term Rehab  Patient to benefit from continued Occupational Therapy treatment while in the hospital to address deficits as defined above and maximize level of functional independence with ADLs and functional mobility in order to return to OF     Plan   Treatment Interventions Functional transfer training;Energy conservation;UE strengthening/ROM   Goal Expiration Date 05/27/23   OT Treatment Day 1   OT Frequency 3-5x/wk   Recommendation   OT Discharge Recommendation Post acute rehabilitation services   Additional Comments 2 The patient's raw score on the AM-PAC Daily Activity Inpatient Short Form is 18  A raw score of less than 19 suggests the patient may benefit from discharge to post-acute rehabilitation services  Please refer to the recommendation of the Occupational Therapist for safe discharge planning     AM-PAC Daily Activity Inpatient   Lower Body Dressing 2   Bathing 3   Toileting 2   Upper Body Dressing 3   Grooming 4   Eating 4   Daily Activity Raw Score 18   Daily Activity Standardized Score (Calc for Raw Score >=11) 38 66   AM-[facility] Applied Cognition Inpatient   Following a Speech/Presentation 4   Understanding Ordinary Conversation 4   Taking Medications 3   Remembering Where Things Are Placed or Put Away 3   Remembering List of 4-5 Errands 3   Taking Care of Complicated Tasks 3   Applied Cognition Raw Score 20   Applied Cognition Standardized Score 41 76       Casimiro Antonio

## 2023-05-19 NOTE — WOUND OSTOMY CARE
Progress Note - Wound   Singh Goodrich 53 y o  female MRN: [id number]  Unit/Bed#: -01 Encounter: [id number]    Assessment:   Wound care weekly follow up for patient admitted with osteomyelitis of the left foot  She is in bed for assessment  She is able to transfer from the commode to the wheelchair then to bed independently  She states she is able to accomplish this without applying pressure to the left foot  Patient is able to feed herself, multiple sugared snacks and sodas noted in the room  Findings  1  Left foot incision care performed today by podiatry  2  Right BKA stump with healing incision  Mid incision line wound bed, pink, clean, scant yellow drainage on dressing when removed  One open area remains, the other open area has resolved  Patient wears her stump sock  3  Abdominal, breast folds, groin folds intact and blanchable  Patient did not agree to have sacrum and buttocks assessed at this time    Skin and Wound Care Plan:   1  Ehob offloading cushion to chair when OOB  2  Elevate left heel off of bed with pillow to offload  3  Apply skin nourishing cream to the skin daily  4  Turn patient Q2 hours  5  Cleanse incision scar to right BKA stump with NSS, pat dry  Apply small amount of Silvasorb gel to the small partial thickness area to the mid incision line, cover with 2X2, paper tape the proximal and distal edges    Apply Hydraguard to scaly skin on the incision line    Wound:      Wound 03/23/23 Leg Right (Active)   Wound Image   05/19/23 1533   Wound Description Clean;Fragile;Pink 05/19/23 1533   Yvonne-wound Assessment Intact 05/19/23 1533   Wound Length (cm) 0 3 cm 05/19/23 1533   Wound Width (cm) 0 3 cm 05/19/23 1533   Wound Depth (cm) 0 2 cm 05/19/23 1533   Wound Surface Area (cm^2) 0 09 cm^2 05/19/23 1533   Wound Volume (cm^3) 0 018 cm^3 05/19/23 1533   Calculated Wound Volume (cm^3) 0 02 cm^3 05/19/23 1533   Change in Wound Size % 60 05/19/23 1533   Tunneling 0 cm 05/19/23 1533 Undermining 0 05/19/23 1533   Drainage Amount Scant 05/19/23 1533   Drainage Description Yellow 05/19/23 1533   Non-staged Wound Description Partial thickness 05/19/23 1533   Treatments Cleansed 05/19/23 1533   Dressing Dry dressing;Silvasorb gel 05/19/23 1533   Wound packed? No 05/15/23 1237   Dressing Changed Changed 05/19/23 1533   Patient Tolerance Tolerated well 05/19/23 1533   Dressing Status Clean;Dry; Intact 05/19/23 1530       Wound 05/08/23 Foot Left (Active)   Wound Image   05/14/23 1304   Wound 05/18/23 Foot Left (Active)   Wound Description ERI 05/19/23 1530   Yvonne-wound Assessment ERI 05/19/23 1530   Dressing Dry dressing 05/19/23 1530   Dressing Changed Changed by provider 05/19/23 1530   Dressing Status Clean; Intact;Dry 05/19/23 1530     Reviewed plan of care with primary RN Teressa  Recommendations written as orders  Wound care team to follow weekly while admitted  Questions or concerns [facility] Nurse    Amanda Guerrero BSN, RN, [facility]

## 2023-05-19 NOTE — ASSESSMENT & PLAN NOTE
· Received from [address] [place] [address] psychiatric patient hartman with MRI results suggesting osteomyelitis of the left first metatarsal  · Podiatry input appreciated   · S/p left first ray resection 5/8; left foot wound wash out due to wound dehiscence 5/16; delayed primary closure 5/18  · DI drain will likely be removed by podiatry later today   · Transitioned from doxycycline to cefazolin (5/15)    · Intraoperative cultures: Staphylococcus aureus    · Wound care, surgical shoe  NWB status on left foot   · PT/OT recommendations appreciated: Postacute rehab  · Case management input appreciated  Plan for discharge to [facility] tomorrow

## 2023-05-19 NOTE — PLAN OF CARE
Problem: MOBILITY - ADULT  Goal: Maintain or return to baseline ADL function  Description: INTERVENTIONS:  -  Assess patient's ability to carry out ADLs; assess patient's baseline for ADL function and identify physical deficits which impact ability to perform ADLs (bathing, care of mouth/teeth, toileting, grooming, dressing, etc )  - Assess/evaluate cause of self-care deficits   - Assess range of motion  - Assess patient's mobility; develop plan if impaired  - Assess patient's need for assistive devices and provide as appropriate  - Encourage maximum independence but intervene and supervise when necessary  - Involve family in performance of ADLs  - Assess for home care needs following discharge   - Consider OT consult to assist with ADL evaluation and planning for discharge  - Provide patient education as appropriate  Outcome: Progressing  Goal: Maintains/Returns to pre admission functional level  Description: INTERVENTIONS:  - Perform BMAT or MOVE assessment daily    - Set and communicate daily mobility goal to care team and patient/family/caregiver  - Collaborate with rehabilitation services on mobility goals if consulted  - Perform Range of Motion 2 times a day  - Reposition patient every 2 hours    - Dangle patient 2 times a day  - Stand patient 2 times a day  - Ambulate patient 2 times a day  - Out of bed to chair 3 times a day   - Out of bed for meals 3 times a day  - Out of bed for toileting  - Record patient progress and toleration of activity level   Outcome: Progressing     Problem: PAIN - ADULT  Goal: Verbalizes/displays adequate comfort level or baseline comfort level  Description: Interventions:  - Encourage patient to monitor pain and request assistance  - Assess pain using appropriate pain scale  - Administer analgesics based on type and severity of pain and evaluate response  - Implement non-pharmacological measures as appropriate and evaluate response  - Consider cultural and social influences on pain and pain management  - Notify physician/advanced practitioner if interventions unsuccessful or patient reports new pain  Outcome: Progressing     Problem: INFECTION - ADULT  Goal: Absence or prevention of progression during hospitalization  Description: INTERVENTIONS:  - Assess and monitor for signs and symptoms of infection  - Monitor lab/diagnostic results  - Monitor all insertion sites, i e  indwelling lines, tubes, and drains  - Monitor endotracheal if appropriate and nasal secretions for changes in amount and color  - [place] appropriate cooling/warming therapies per order  - Administer medications as ordered  - Instruct and encourage patient and family to use good hand hygiene technique  - Identify and instruct in appropriate isolation precautions for identified infection/condition  Outcome: Progressing  Goal: Absence of fever/infection during neutropenic period  Description: INTERVENTIONS:  - Monitor WBC    Outcome: Progressing     Problem: SAFETY ADULT  Goal: Maintain or return to baseline ADL function  Description: INTERVENTIONS:  -  Assess patient's ability to carry out ADLs; assess patient's baseline for ADL function and identify physical deficits which impact ability to perform ADLs (bathing, care of mouth/teeth, toileting, grooming, dressing, etc )  - Assess/evaluate cause of self-care deficits   - Assess range of motion  - Assess patient's mobility; develop plan if impaired  - Assess patient's need for assistive devices and provide as appropriate  - Encourage maximum independence but intervene and supervise when necessary  - Involve family in performance of ADLs  - Assess for home care needs following discharge   - Consider OT consult to assist with ADL evaluation and planning for discharge  - Provide patient education as appropriate  Outcome: Progressing  Goal: Maintains/Returns to pre admission functional level  Description: INTERVENTIONS:  - Perform BMAT or MOVE assessment daily    - Set and communicate daily mobility goal to care team and patient/family/caregiver  - Collaborate with rehabilitation services on mobility goals if consulted  - Perform Range of Motion 2 times a day  - Reposition patient every 2 hours    - Dangle patient 2 times a day  - Stand patient 2 times a day  - Ambulate patient 2 times a day  - Out of bed to chair 3 times a day   - Out of bed for meals 3 times a day  - Out of bed for toileting  - Record patient progress and toleration of activity level   Outcome: Progressing  Goal: Patient will remain free of falls  Description: INTERVENTIONS:  - Educate patient/family on patient safety including physical limitations  - Instruct patient to call for assistance with activity   - Consult OT/PT to assist with strengthening/mobility   - Keep Call bell within reach  - Keep bed low and locked with side rails adjusted as appropriate  - Keep care items and personal belongings within reach  - Initiate and maintain comfort rounds  - Make Fall Risk Sign visible to staff  - Offer Toileting every 2 Hours, in advance of need  - Initiate/Maintain bed alarm  - Obtain necessary fall risk management equipment  - Apply yellow socks and bracelet for high fall risk patients  - Consider moving patient to room near nurses station  Outcome: Progressing     Problem: DISCHARGE PLANNING  Goal: Discharge to home or other facility with appropriate resources  Description: INTERVENTIONS:  - Identify barriers to discharge w/patient and caregiver  - Arrange for needed discharge resources and transportation as appropriate  - Identify discharge learning needs (meds, wound care, etc )  - Refer to Case Management Department for coordinating discharge planning if the patient needs post-hospital services based on physician/advanced practitioner order or complex needs related to functional status, cognitive ability, or social support system  Outcome: Progressing     Problem: Knowledge Deficit  Goal: Patient/family/caregiver demonstrates understanding of disease process, treatment plan, medications, and discharge instructions  Description: Complete learning assessment and assess knowledge base    Interventions:  - Provide teaching at level of understanding  - Provide teaching via preferred learning methods  Outcome: Progressing     Problem: SKIN/TISSUE INTEGRITY - ADULT  Goal: Incision(s), wounds(s) or drain site(s) healing without S/S of infection  Description: INTERVENTIONS  - Assess and document dressing, incision, wound bed, drain sites and surrounding tissue  - Provide patient and family education  - Perform skin care/dressing changes every shift  Outcome: Progressing  Goal: Pressure injury heals and does not worsen  Description: Interventions:  - Implement low air loss mattress or specialty surface (Criteria met)  - Apply silicone foam dressing  - Instruct/assist with weight shifting every 60 minutes when in chair   - Limit chair time to 3 hour intervals  - Use special pressure reducing interventions such as waffle cushion when in chair   - Apply fecal or urinary incontinence containment device   - Perform passive or active ROM every 4 hrs  - Turn and reposition patient & offload bony prominences every 2 hours   - Utilize friction reducing device or surface for transfers   - Consider consults to  interdisciplinary teams such as wound and podiatry   - Use incontinent care products after each incontinent episode such as foam cleanser  - Consider nutrition services referral as needed  Outcome: Progressing     Problem: Prexisting or High Potential for Compromised Skin Integrity  Goal: Skin integrity is maintained or improved  Description: INTERVENTIONS:  - Identify patients at risk for skin breakdown  - Assess and monitor skin integrity  - Assess and monitor nutrition and hydration status  - Monitor labs   - Assess for incontinence   - Turn and reposition patient  - Assist with mobility/ambulation  - Relieve pressure over bony prominences  - Avoid friction and shearing  - Provide appropriate hygiene as needed including keeping skin clean and dry  - Evaluate need for skin moisturizer/barrier cream  - Collaborate with interdisciplinary team   - Patient/family teaching  - Consider wound care consult   Outcome: Progressing     Problem: Potential for Falls  Goal: Patient will remain free of falls  Description: INTERVENTIONS:  - Educate patient/family on patient safety including physical limitations  - Instruct patient to call for assistance with activity   - Consult OT/PT to assist with strengthening/mobility   - Keep Call bell within reach  - Keep bed low and locked with side rails adjusted as appropriate  - Keep care items and personal belongings within reach  - Initiate and maintain comfort rounds  - Make Fall Risk Sign visible to staff  - Offer Toileting every 2 Hours, in advance of need  - Initiate/Maintain bed alarm  - Obtain necessary fall risk management equipment  - Apply yellow socks and bracelet for high fall risk patients  - Consider moving patient to room near nurses station  Outcome: Progressing

## 2023-05-19 NOTE — PROGRESS NOTES
"Progress Note - Steve Baez 53 y o  female MRN: [id number]    Unit/Bed#: -01 Encounter: [id number]      Assessment:  Osteomyelitis left foot  History of right BKA  Type 2 diabetes    Plan:  Patient is postoperative day 1 status post delayed primary closure and drain placement  - Drain removed at bedside today and suture hand tied  - Acticoat, DSD applied to the left foot  - She is able to be heel weightbearing on the left foot only for transfers   -Orders written for wound care and beginning tomorrow she will need Adaptic, DSD daily dressing changes   -When she is admitted back into [address] [place] [address], she will need to be transferred to our office for postoperative appointments  Subjective:   Patient presents for evaluation management left foot, complaining mild discomfort    Objective:     Vitals: Blood pressure 106/61, pulse 71, temperature 98 3 °F (36 8 °C), temperature source Oral, resp  rate 18, height 5' 5\" (1 651 m), weight 103 kg (227 lb 1 2 oz), SpO2 96 %, not currently breastfeeding  ,Body mass index is 37 79 kg/m²  Intake/Output Summary (Last 24 hours) at 5/19/2023 1306  Last data filed at 5/19/2023 0733  Gross per 24 hour   Intake 824 42 ml   Output 0 ml   Net 824 42 ml       Physical Exam:  No evidence of incisional dehiscence    Sutures are intact, no evidence of seroma normal postoperative parents  "

## 2023-05-20 PROBLEM — A41.9 SEPSIS (HCC): Status: RESOLVED | Noted: 2023-03-20 | Resolved: 2023-05-20

## 2023-05-20 LAB
GLUCOSE SERPL-MCNC: 219 MG/DL (ref 65–140)
GLUCOSE SERPL-MCNC: 277 MG/DL (ref 65–140)
GLUCOSE SERPL-MCNC: 299 MG/DL (ref 65–140)
GLUCOSE SERPL-MCNC: 348 MG/DL (ref 65–140)

## 2023-05-20 RX ORDER — INSULIN GLARGINE 100 [IU]/ML
12 INJECTION, SOLUTION SUBCUTANEOUS
Status: DISCONTINUED | OUTPATIENT
Start: 2023-05-20 | End: 2023-05-23 | Stop reason: HOSPADM

## 2023-05-20 RX ADMIN — INSULIN LISPRO 4 UNITS: 100 INJECTION, SOLUTION INTRAVENOUS; SUBCUTANEOUS at 21:58

## 2023-05-20 RX ADMIN — OXYCODONE HYDROCHLORIDE 10 MG: 10 TABLET ORAL at 16:24

## 2023-05-20 RX ADMIN — INSULIN GLARGINE 12 UNITS: 100 INJECTION, SOLUTION SUBCUTANEOUS at 21:57

## 2023-05-20 RX ADMIN — OXYCODONE HYDROCHLORIDE 10 MG: 10 TABLET ORAL at 22:38

## 2023-05-20 RX ADMIN — LEVOTHYROXINE SODIUM 50 MCG: 50 TABLET ORAL at 05:29

## 2023-05-20 RX ADMIN — ZIPRASIDONE HYDROCHLORIDE 80 MG: 40 CAPSULE ORAL at 16:24

## 2023-05-20 RX ADMIN — HEPARIN SODIUM 5000 UNITS: 5000 INJECTION INTRAVENOUS; SUBCUTANEOUS at 05:29

## 2023-05-20 RX ADMIN — METHOCARBAMOL TABLETS 750 MG: 500 TABLET, COATED ORAL at 16:24

## 2023-05-20 RX ADMIN — HEPARIN SODIUM 5000 UNITS: 5000 INJECTION INTRAVENOUS; SUBCUTANEOUS at 21:56

## 2023-05-20 RX ADMIN — SENNOSIDES 17.2 MG: 8.6 TABLET, FILM COATED ORAL at 22:38

## 2023-05-20 RX ADMIN — DOCUSATE SODIUM 100 MG: 100 CAPSULE, LIQUID FILLED ORAL at 17:31

## 2023-05-20 RX ADMIN — POLYETHYLENE GLYCOL 3350 17 G: 17 POWDER, FOR SOLUTION ORAL at 17:31

## 2023-05-20 RX ADMIN — METHOCARBAMOL TABLETS 750 MG: 500 TABLET, COATED ORAL at 22:37

## 2023-05-20 RX ADMIN — PREGABALIN 150 MG: 75 CAPSULE ORAL at 17:30

## 2023-05-20 RX ADMIN — FENOFIBRATE 48 MG: 48 TABLET, FILM COATED ORAL at 08:04

## 2023-05-20 RX ADMIN — MENTHOL, METHYL SALICYLATE 1 APPLICATION.: 10; 15 CREAM TOPICAL at 09:07

## 2023-05-20 RX ADMIN — CEFAZOLIN SODIUM 2000 MG: 2 SOLUTION INTRAVENOUS at 13:23

## 2023-05-20 RX ADMIN — INSULIN LISPRO 4 UNITS: 100 INJECTION, SOLUTION INTRAVENOUS; SUBCUTANEOUS at 16:28

## 2023-05-20 RX ADMIN — Medication 6 MG: at 21:55

## 2023-05-20 RX ADMIN — CEFAZOLIN SODIUM 2000 MG: 2 SOLUTION INTRAVENOUS at 05:29

## 2023-05-20 RX ADMIN — DOCUSATE SODIUM 100 MG: 100 CAPSULE, LIQUID FILLED ORAL at 08:02

## 2023-05-20 RX ADMIN — METHOCARBAMOL TABLETS 750 MG: 500 TABLET, COATED ORAL at 08:03

## 2023-05-20 RX ADMIN — MENTHOL, METHYL SALICYLATE: 10; 15 CREAM TOPICAL at 16:25

## 2023-05-20 RX ADMIN — LAMOTRIGINE 300 MG: 100 TABLET ORAL at 08:03

## 2023-05-20 RX ADMIN — CLONIDINE HYDROCHLORIDE 0.2 MG: 0.1 TABLET ORAL at 08:03

## 2023-05-20 RX ADMIN — HYDROXYZINE HYDROCHLORIDE 50 MG: 25 TABLET ORAL at 08:04

## 2023-05-20 RX ADMIN — ZIPRASIDONE HYDROCHLORIDE 80 MG: 40 CAPSULE ORAL at 08:03

## 2023-05-20 RX ADMIN — INSULIN LISPRO 5 UNITS: 100 INJECTION, SOLUTION INTRAVENOUS; SUBCUTANEOUS at 12:28

## 2023-05-20 RX ADMIN — HYDROXYZINE HYDROCHLORIDE 100 MG: 25 TABLET ORAL at 21:55

## 2023-05-20 RX ADMIN — FOLIC ACID 1000 MCG: 1 TABLET ORAL at 08:02

## 2023-05-20 RX ADMIN — PREGABALIN 150 MG: 75 CAPSULE ORAL at 08:02

## 2023-05-20 RX ADMIN — INSULIN LISPRO 2 UNITS: 100 INJECTION, SOLUTION INTRAVENOUS; SUBCUTANEOUS at 08:02

## 2023-05-20 RX ADMIN — PANTOPRAZOLE SODIUM 40 MG: 40 TABLET, DELAYED RELEASE ORAL at 08:03

## 2023-05-20 RX ADMIN — PANTOPRAZOLE SODIUM 40 MG: 40 TABLET, DELAYED RELEASE ORAL at 21:56

## 2023-05-20 RX ADMIN — HEPARIN SODIUM 5000 UNITS: 5000 INJECTION INTRAVENOUS; SUBCUTANEOUS at 13:23

## 2023-05-20 RX ADMIN — CLONIDINE HYDROCHLORIDE 0.2 MG: 0.1 TABLET ORAL at 21:55

## 2023-05-20 RX ADMIN — CEFAZOLIN SODIUM 2000 MG: 2 SOLUTION INTRAVENOUS at 21:55

## 2023-05-20 RX ADMIN — OXYCODONE HYDROCHLORIDE 10 MG: 10 TABLET ORAL at 08:03

## 2023-05-20 NOTE — ASSESSMENT & PLAN NOTE
· History of bipolar disorder, OCD, PTSD  · Admitted medically from [place] [address] psychiatric unit  Per discharge summary, psychiatrically cleared for medical admission   · Also, currently homeless  · Continue medications including Lamictal, hydroxyzine, Geodon  · Supportive care   · Denies current suicidal ideation     · Psychiatry reevaluation completed    Continue current plan of care

## 2023-05-20 NOTE — ASSESSMENT & PLAN NOTE
· Received from [address] [place] [address] psychiatric patient hartman with MRI results suggesting osteomyelitis of the left first metatarsal  · Podiatry input appreciated   · S/p left first ray resection 5/8; left foot wound wash out due to wound dehiscence 5/16; delayed primary closure 5/18  · DI drain will likely be removed by podiatry later today   · Transitioned from doxycycline to cefazolin (5/15)    · Intraoperative cultures: Staphylococcus aureus    · Wound care, surgical shoe  NWB status on left foot   · PT/OT recommendations appreciated: Postacute rehab  · Case management input appreciated  Awaiting for an authorization from the insurance  Plan for d/c to [facility] on Monday once this is obtained

## 2023-05-20 NOTE — PLAN OF CARE
Problem: OCCUPATIONAL THERAPY ADULT  Goal: Performs self-care activities at highest level of function for planned discharge setting  See evaluation for individualized goals  Description: Treatment Interventions: ADL retraining, Functional transfer training, Endurance training, Equipment evaluation/education, Compensatory technique education, Energy conservation, Activityengagement    See flowsheet documentation for full assessment, interventions and recommendations  5/20/2023 1109 by Porfirio Yao OT  Outcome: Progressing  Note: Limitation: Decreased ADL status, Decreased Safe judgement during ADL, Decreased endurance, Decreased self-care trans, Decreased high-level ADLs  Prognosis: Good  Assessment: Pt is a 53 y o  female seen for OT evaluation s/p admit to [facility] on 5/[facility] w/ Osteomyelitis of left foot ([address] )  Comorbidities affecting pt's functional performance at time of assessment include: Anxiety, Arthritis, DM, Hypothyroidiam, Morbid obesity, OCD, PTSD  Personal factors affecting pt at time of IE include:limited home support, difficulty performing ADLS and homeless  Prior to admission, pt was Mod I with ADLs  Upon evaluation: the following deficits impact occupational performance: decreased balance, decreased tolerance, impaired sequencing, impaired problem solving and decreased coping skills  Pt to benefit from continued skilled OT tx while in the hospital to address deficits as defined above and maximize level of functional independence w ADL's and functional mobility  Occupational Performance areas to address include: bathing/shower, toilet hygiene, dressing, functional mobility and clothing management  From OT standpoint, recommendation at time of d/c would be [facility]       OT Discharge Recommendation: Post acute rehabilitation services     Porfirio Yao MS, OTR/L

## 2023-05-20 NOTE — PROGRESS NOTES
[facility] [address]  Progress Note  Name: Juan Olivier I  MRN: [id number]  Unit/Bed#: [facility] I Date of Admission: 5/8/2023   Date of Service: 5/20/2023 I Hospital Day: 12    Assessment/Plan   * Osteomyelitis of left foot [facility])  Assessment & Plan  · Received from [address] [place] [address] psychiatric patient hartman with MRI results suggesting osteomyelitis of the left first metatarsal  · Podiatry input appreciated   · S/p left first ray resection 5/8; left foot wound wash out due to wound dehiscence 5/16; delayed primary closure 5/18  · DI drain will likely be removed by podiatry later today   · Transitioned from doxycycline to cefazolin (5/15)    · Intraoperative cultures: Staphylococcus aureus    · Wound care, surgical shoe  NWB status on left foot   · PT/OT recommendations appreciated: Postacute rehab  · Case management input appreciated  Awaiting for an authorization from the insurance  Plan for d/c to STR on Monday once this is obtained  Hx of right BKA ([facility] [address] )  Assessment & Plan  · History of right BKA 3/20/2023  · Serial assessments and supportive care  · Fall precautions          Gastroesophageal reflux disease without esophagitis  Assessment & Plan  · Continue PPI          Other chronic pain  Assessment & Plan  · Previously on OxyContin for chronic back pain, transitioned to oxycodone 10 mg every 6 hours while in inpatient psychiatric unit   · Monitor for adequate pain control and or signs of oversedation or withdrawal          Type 2 diabetes mellitus with diabetic neuropathy, without long-term current use of insulin [facility])  Assessment & Plan  Lab Results   Component Value Date    HGBA1C 7 2 (H) 05/05/2023       Recent Labs     05/19/23  1623 05/19/23  2116 05/20/23  0743 05/20/23  1115   POCGLU 256* 204* 219* 348*       Blood Sugar Average: Last 72 hrs:  (P) 080 [id number]   · Insulin sliding scale  Diabetic diet     · Added long acting insulin for better blood glucose coverage  · Patient has been non-compliant with her diet  She has been ordering food to be delivered to her room  I discussed this at length with her that a well-controlled blood glucose is very importance part to promote wound healing  She verbalized understanding  Psychiatric illness  Assessment & Plan  · History of bipolar disorder, OCD, PTSD  · Admitted medically from [place] [address] psychiatric unit  Per discharge summary, psychiatrically cleared for medical admission   · Also, currently homeless  · Continue medications including Lamictal, hydroxyzine, Geodon  · Supportive care   · Denies current suicidal ideation     · Psychiatry reevaluation completed  Continue current plan of care        Acquired hypothyroidism  Assessment & Plan  · Continue levothyroxine                   VTE Prophylaxis:  Heparin    Patient Centered Rounds: I have performed bedside rounds with nursing staff today  Discussions with Specialists or Other Care Team Provider: podiatry   Education and Discussions with Family / Patient: patient     Current Length of Stay: 12 day(s)    Current Patient Status: Inpatient   Certification Statement: The patient will continue to require additional inpatient hospital stay due to osteomyelitis of left foot     Discharge Plan: pending discharge disposition  The patient is medically stable for discharge  Awaiting for an auth from the insurance  Code Status: Level 1 - Full Code    Subjective:   Feeling okay  Denies any pain  No acute event overnight  Objective:     Vitals:   Temp (24hrs), Av 1 °F (36 7 °C), Min:97 9 °F (36 6 °C), Max:98 2 °F (36 8 °C)    Temp:  [97 9 °F (36 6 °C)-98 2 °F (36 8 °C)] 97 9 °F (36 6 °C)  HR:  [58-77] 58  Resp:  [18-20] 20  BP: (120-145)/(71-84) 120/71  SpO2:  [97 %] 97 %  Body mass index is 37 79 kg/m²  Input and Output Summary (last 24 hours):        Intake/Output Summary (Last 24 hours) at 2023 1154  Last data filed at 2023 0803  Gross per 24 hour Intake 890 ml   Output 750 ml   Net 140 ml       Physical Exam:   Physical Exam  Vitals and nursing note reviewed  Constitutional:       General: She is not in acute distress  Appearance: Normal appearance  She is obese  HENT:      Head: Normocephalic and atraumatic  Right Ear: External ear normal       Left Ear: External ear normal       Nose: Nose normal  No rhinorrhea  Mouth/Throat:      Mouth: Mucous membranes are moist       Pharynx: Oropharynx is clear  Eyes:      General:         Right eye: No discharge  Left eye: No discharge  Pupils: Pupils are equal, round, and reactive to light  Cardiovascular:      Rate and Rhythm: Normal rate and regular rhythm  Pulses: Normal pulses  Heart sounds: Normal heart sounds  No murmur heard  Pulmonary:      Effort: Pulmonary effort is normal  No respiratory distress  Breath sounds: Normal breath sounds  Abdominal:      General: Bowel sounds are normal  There is no distension  Palpations: Abdomen is soft  There is no mass  Tenderness: There is no abdominal tenderness  Musculoskeletal:         General: No swelling or tenderness  Cervical back: Normal range of motion and neck supple  No muscular tenderness  Comments: Right BKA     Skin:     General: Skin is warm and dry  Capillary Refill: Capillary refill takes less than 2 seconds  Findings: No erythema or rash  Comments: Left foot dressing on      Neurological:      General: No focal deficit present  Mental Status: She is alert and oriented to person, place, and time  Mental status is at baseline  Psychiatric:         Mood and Affect: Affect is labile  Behavior: Behavior normal          Thought Content:  Thought content normal          Additional Data:     Labs:    Results from last 7 days   Lab Units 05/19/23  0518 05/17/23  0440 05/15/23  0530   WBC Thousand/uL 7 64   < > 8 25   HEMOGLOBIN g/dL 9 4*   < > 9 7* HEMATOCRIT % 32 4*   < > 33 5*   PLATELETS Thousands/uL 430*   < > 416*   NEUTROS PCT %  --   --  39*   LYMPHS PCT %  --   --  44   MONOS PCT %  --   --  10   EOS PCT %  --   --  6    < > = values in this interval not displayed  Results from last 7 days   Lab Units 05/19/23  0518   SODIUM mmol/L 134*   POTASSIUM mmol/L 4 3   CHLORIDE mmol/L 101   CO2 mmol/L 28   BUN mg/dL 15   CREATININE mg/dL 0 84   CALCIUM mg/dL 9 6         Results from last 7 days   Lab Units 05/20/23  1115 05/20/23  0743 05/19/23  2116 05/19/23  1623 05/19/23  1124 05/19/23  0704 05/18/23  2146 05/18/23  1631 05/18/23  1302 05/18/23  1119 05/18/23  0729 05/17/23  2108   POC GLUCOSE mg/dl 348* 219* 204* 256* 308* 224* 223* 188* 149* 173* 213* 305*           * I Have Reviewed All Lab Data Listed Above  * Additional Pertinent Lab Tests Reviewed:  [facility] Admission  Reviewed    Imaging:  Imaging Reports Reviewed Today Include: n/a    Recent Cultures (last 7 days):           Last 24 Hours Medication List:   Current Facility-Administered Medications   Medication Dose Route Frequency Provider Last Rate   • acetaminophen  650 mg Oral Q6H PRN Cynthia Bonilla DPM     • cefazolin  2,000 mg Intravenous Q8H Cynthia Bonilla DPM 2,000 mg (05/20/23 0529)   • cloNIDine  0 2 mg Oral BID Cynthia Bonilla DPM     • docusate sodium  100 mg Oral BID Hillman Manuelito, CRNP     • fenofibrate  48 mg Oral Daily Cynthia Bonilla DPM     • folic acid  4,807 mcg Oral Daily Cynthia Bonilla DPM     • heparin (porcine)  5,000 Units Subcutaneous [facility] Cynthia Bonilla DPM     • hydrOXYzine HCL  100 mg Oral HS Cynthia Bonilla DPM     • hydrOXYzine HCL  50 mg Oral Daily Cynthia Bonilla DPM     • insulin glargine  8 Units Subcutaneous HS Cynthia Bonilla DPM     • insulin lispro  1-6 Units Subcutaneous TID AC Cynthia Bonilla DPM     • insulin lispro  1-6 Units Subcutaneous HS Cynthia Bonilla DPM     • lamoTRIgine  300 mg Oral QAM Cynthia Bonilla DPM     • levothyroxine  50 mcg Oral Early Morning Rhonda Pace DPM     • lidocaine  1 patch Topical Daily Rhonda Pace DPM     • melatonin  6 mg Oral HS Rhonda Pace DPM     • menthol-methyl salicylate   Apply externally 4x Daily PRN Rhonda Pace DPM     • methocarbamol  750 mg Oral Q6H PRN Rhonda Pace DPM     • ondansetron  4 mg Intravenous Q6H PRN Rhonda Pace DPM     • oxyCODONE  10 mg Oral Q6H PRN Rhonda Pace DPM     • pantoprazole  40 mg Oral BID Rhonda Pace DPM     • polyethylene glycol  17 g Oral Daily JOSEE Flowers     • pregabalin  150 mg Oral BID Rhonda Pace DPM     • senna  2 tablet Oral HS JOSEE Flowers     • ziprasidone  80 mg Oral BID With Meals Rhonda Pace DPM          Today, Patient Was Seen By: JOSEE Flowers    ** Please Note: Dictation voice to text software may have been used in the creation of this document   **

## 2023-05-20 NOTE — OCCUPATIONAL THERAPY NOTE
Occupational Therapy Re-Evaluation     Gab Lee    5/20/2023    Principal Problem:    Osteomyelitis of left foot ([address] )  Active Problems:    Acquired hypothyroidism    Psychiatric illness    Type 2 diabetes mellitus with diabetic neuropathy, without long-term current use of insulin (HCC)    Other chronic pain    Gastroesophageal reflux disease without esophagitis    Hx of right BKA (HCC)      Past Surgical History:   Procedure Laterality Date    CHOLECYSTECTOMY      CHOLECYSTECTOMY      CLOSURE DELAYED PRIMARY Left 5/18/2023    Procedure: CLOSURE DELAYED PRIMARY;  Surgeon: Peg Mott DPM;  Location: [facility];  Service: Podiatry    LEG AMPUTATION THROUGH LOWER TIBIA AND FIBULA Right 3/23/2023    Procedure: AMPUTATION BELOW KNEE (BKA); Surgeon: Jada Vargas MD;  Location: [facility];  Service: General    UT AMPUTATION FOOT TRANSMETARSAL Right 9/4/2021    Procedure: AMPUTATION TRANSMETATARSAL (TMA);  Surgeon: Kevin Miranda DPM;  [facility];  Service: Podiatry    UT AMPUTATION METATARSAL W/TOE SINGLE Left 9/8/2019    Procedure: PARTIAL 3RD RAY RESECTION, PSB TOE FILLET FLAP;  Surgeon: Yovanny Mccray DPM;  [facility];  Service: Podiatry    UT AMPUTATION METATARSAL W/TOE SINGLE Left 3/23/2023    Procedure: RAY RESECTION FOOT;  Surgeon: Jeri Toro DPM;  [facility];  Service: Podiatry    UT AMPUTATION METATARSAL W/TOE SINGLE Left 5/8/2023    Procedure: Left first RAY RESECTION FOOT;  Surgeon: Osbaldo Dietrich DPM;  [facility];  Service: Podiatry    TOE AMPUTATION Right 1/13/2021    Procedure: AMPUTATION 2ND TOE;  Surgeon: Kevin Miranda DPM;  [facility];  Service: Podiatry    WOUND DEBRIDEMENT Left 2/17/2022    Procedure: DEBRIDEMENT GREAT TOE WOUND;  Surgeon: Kevin Miranda DPM;  [facility];  Service: Podiatry    WOUND DEBRIDEMENT Left 5/16/2023    Procedure: DEBRIDEMENT WOUND [facility] OUT);   Surgeon: Ansley Duong "Esa Morales DPM;  Location: [facility];  Service: Podiatry        05/20/23 1036   OT Last Visit   OT Visit Date 05/20/23   Note Type   Note type Re-Evaluation   Pain Assessment   Pain Assessment Tool 0-10   Pain Score 5   Pain Location/Orientation Orientation: Left; Location: Foot   Pain Onset/Description Onset: Ongoing;Frequency: Constant/Continuous   Patient's Stated Pain Goal No pain   Hospital Pain Intervention(s) Repositioned; Ambulation/increased activity   Multiple Pain Sites No   Restrictions/Precautions   Weight Bearing Precautions Per Order Yes   RLE Weight Bearing Per Order NWB  (Pt has limited WBing and is not donning prosthesis d/t 2 small wound beds to R BKA stump - wound care RN is aware and following)   LLE Weight Bearing Per Order Other  (\"heel weightbearing on the left foot only for transfers\" per podiatry note 5/19/23)   Braces or Orthoses Other (Comment)  (prosthesis for R LE, L forefoot off loading Darco shoe)   Other Precautions Fall Risk;Pain   Home Living   [facility] [address]   Prior Function   Level of Gregory Needs assistance with ADLs; Needs assistance with functional mobility; Needs assistance with IADLS   Lives With Alone   Receives Help From Family   IADLs Family/Friend/Other provides transportation; Family/Friend/Other provides meals; Family/Friend/Other provides medication management   Falls in the last 6 months 0   ADL   UB Bathing Assistance 5  Supervision/Setup   LB Bathing Assistance 4  Minimal Assistance   UB Dressing Assistance 5  Supervision/Setup   LB Dressing Assistance 4  Minimal [address]  4  Minimal Assistance   Bed Mobility   Supine to Sit 6  Modified independent   Additional items HOB elevated; Bedrails; Increased time required   Sit to Supine 6  Modified independent   Additional items HOB elevated; Bedrails; Increased time required   Transfers   Sit to Stand   (CGA)   Additional items Assist x 1;Bedrails; Increased time " required;Verbal cues   Stand to Sit   (CGA)   Additional items Assist x 1; Armrests; Increased time required;Verbal cues   Stand pivot   (CGA)   Additional items Assist x 1; Armrests; Increased time required;Verbal cues   Toilet transfer   (CGA)   Additional items Assist x 1; Armrests; Increased time required;Verbal cues; Commode   Balance   Static Sitting Good   Dynamic Sitting Fair +   Static Standing Fair   Dynamic Standing Fair   Ambulatory Fair   Activity Tolerance   Activity Tolerance Patient limited by fatigue;Patient limited by pain   Nurse Made Aware ANDER Ji   RUE Assessment   RUE Assessment WFL   LUE Assessment   LUE Assessment WFL   Vision-Basic Assessment   Current Vision Wears glasses all the time   Cognition   Overall Cognitive Status WFL   Arousal/Participation Cooperative;Lethargic   Attention Attends with cues to redirect   Orientation Level Oriented X4   Memory Decreased recall of precautions   Following Commands Follows one step commands without difficulty   Assessment   Limitation Decreased ADL status; Decreased Safe judgement during ADL;Decreased endurance;Decreased self-care trans;Decreased high-level ADLs   Prognosis Good   Assessment Pt is a 53 y o  female seen for OT evaluation s/p admit to [facility] on 5/8/2023 w/ Osteomyelitis of left foot ([address] )  Comorbidities affecting pt's functional performance at time of assessment include: Anxiety, Arthritis, DM, Hypothyroidiam, Morbid obesity, OCD, PTSD  Personal factors affecting pt at time of IE include:limited home support, difficulty performing ADLS and homeless  Prior to admission, pt was Mod I with ADLs  Upon evaluation: the following deficits impact occupational performance: decreased balance, decreased tolerance, impaired sequencing, impaired problem solving and decreased coping skills   Pt to benefit from continued skilled OT tx while in the hospital to address deficits as defined above and maximize level of functional independence w ADL's and functional mobility  Occupational Performance areas to address include: bathing/shower, toilet hygiene, dressing, functional mobility and clothing management  From OT standpoint, recommendation at time of d/c would be STR  Goals   Patient Goals To rest  (Pt reports not sleeping well last night)   Plan   Treatment Interventions ADL retraining;Functional transfer training; Endurance training;Patient/family training;Equipment evaluation/education; Compensatory technique education; Energy conservation; Activityengagement   Goal Expiration Date 05/30/23   OT Treatment Day 0   OT Frequency 3-5x/wk   Recommendation   OT Discharge Recommendation Post acute rehabilitation services   Additional Comments  Pt seen as a co-ReEval with PT due to the patient's co-morbidities, clinically unstable presentation, and present impairments which are a regression from the patient's baseline  Additional Comments 2 The patient's raw score on the AM-PAC Daily Activity Inpatient Short Form is 18  A raw score of less than 19 suggests the patient may benefit from discharge to post-acute rehabilitation services  Please refer to the recommendation of the Occupational Therapist for safe discharge planning     AM-PAC Daily Activity Inpatient   Lower Body Dressing 2   Bathing 3   Toileting 2   Upper Body Dressing 3   Grooming 4   Eating 4   Daily Activity Raw Score 18   Daily Activity Standardized Score (Calc for Raw Score >=11) 38 66   AM-[facility] Applied Cognition Inpatient   Following a Speech/Presentation 4   Understanding Ordinary Conversation 4   Taking Medications 3   Remembering Where Things Are Placed or Put Away 3   Remembering List of 4-5 Errands 3   Taking Care of Complicated Tasks 3   Applied Cognition Raw Score 20   Applied Cognition Standardized Score 41 76     GOALS:    Pt will achieve the following within specified time frame: STG  Pt will achieve the following goals within 5 days    *ADL transfers with (S) for inc'd independence with ADLs/purposeful tasks    *UB ADL with (I) for inc'd independence with self cares    *LB ADL with (S) using AE prn for inc'd independence with self cares    *Toileting with (S) for clothing management and hygiene for return to PLOF with personal care    *Increase static stand balance and dyn stand balance to F+ for inc'd safety with standing purposeful tasks    *Increase stand tolerance x3 m for inc'd tolerance with standing purposeful tasks    *Participate in 10m UE therex to increase overall stamina/activity tolerance for purposeful tasks    *Bed mobility- (I) for inc'd independence to manage own comfort and initiate EOB & OOB purposeful tasks    Pt will achieve the following within specified time frame: LTG  Pt will achieve the following goals within 10 days    *ADL transfers with (I) for inc'd independence with ADLs/purposeful tasks    *LB ADL with (I) using AE prn for inc'd independence with self cares    *Toileting with (I) for clothing management and hygiene for return to PLOF with personal care    *Increase static stand balance and dyn stand balance to G- for inc'd safety with standing purposeful tasks    *Increase stand tolerance x5 m for inc'd tolerance with standing purposeful tasks      Haris Cordon, MS, OTR/L

## 2023-05-20 NOTE — PLAN OF CARE
Problem: PHYSICAL THERAPY ADULT  Goal: Performs mobility at highest level of function for planned discharge setting  See evaluation for individualized goals  Description: Treatment/Interventions: Functional transfer training, LE strengthening/ROM, Therapeutic exercise, Endurance training, Patient/family training, Equipment eval/education, Bed mobility, Compensatory technique education, Spoke to nursing, Spoke to case management (transfer board training, w/c mobility)  Equipment Recommended: Other (Comment) (slide board)       See flowsheet documentation for full assessment, interventions and recommendations  5/20/2023 1145 by Tete Fischer PT  Note: Prognosis: Fair  Problem List: Decreased strength, Decreased endurance, Impaired balance, Decreased mobility, Decreased safety awareness, Pain, Decreased skin integrity  Assessment: Pt is 53 y o  female seen for PT re-evaluation on 5/20/2023 s/p admit to [place] [address] on 5/8/2023 w/ Osteomyelitis of left foot ([facility] [address] )  PT was consulted to assess pt's functional mobility and d/c needs  Re-eval warranted 5/17/23 as pt with medical status change d/t post op debridement wound/wash out on 5/16/23, was NWB to LLE & PT limiting prosthesis use to RLE d/t 2 small wound beds to R BKA stump - RN wound care following  Re-eval performed today, 5/20/23 as patient POD 2 (DOS 5/18/23) s/p delayed primary closure and drain placement, drain removed 5/19/23, and pt is able to heel-WB only for transfers on LLE with Darco wedge/forefoot offload shoe donned  At time of re-eval, pt able to perform supine<>sit at MOD I level, transfers from bed<>BSC with CG s AD  Upon evaluation, pt presenting with impaired functional mobility d/t decreased strength, decreased endurance, impaired balance, decreased mobility, decreased safety awareness, obesity c BMI of 37 79 kg/m2, decreased skin integrity and pain, podiatry restrictions   The following objective measures performed on re-eval also reveal limitations: AM-PAC 6-Clicks: 13/62  Overall, pt's rehab potential and prognosis to return to PLOF is fair as impacted by objective findings, warranting pt to receive further skilled PT interventions to address identified impairments, activity limitation(s), and participation restriction(s)  Pt to benefit from continued PT tx to address deficits as defined above and maximize level of functional independent mobility and consistency in order for pt to improve safety with mobility task performance  From PT/mobility standpoint, recommendation at time of d/c would be post acute rehabilitation services pending progress in order to facilitate return to PLOF  Barriers to Discharge: Decreased caregiver support, Inaccessible home environment     PT Discharge Recommendation: Post acute rehabilitation services    See flowsheet documentation for full assessment

## 2023-05-20 NOTE — PLAN OF CARE
Problem: MOBILITY - ADULT  Goal: Maintain or return to baseline ADL function  Description: INTERVENTIONS:  -  Assess patient's ability to carry out ADLs; assess patient's baseline for ADL function and identify physical deficits which impact ability to perform ADLs (bathing, care of mouth/teeth, toileting, grooming, dressing, etc )  - Assess/evaluate cause of self-care deficits   - Assess range of motion  - Assess patient's mobility; develop plan if impaired  - Assess patient's need for assistive devices and provide as appropriate  - Encourage maximum independence but intervene and supervise when necessary  - Involve family in performance of ADLs  - Assess for home care needs following discharge   - Consider OT consult to assist with ADL evaluation and planning for discharge  - Provide patient education as appropriate  Outcome: Progressing     Problem: PAIN - ADULT  Goal: Verbalizes/displays adequate comfort level or baseline comfort level  Description: Interventions:  - Encourage patient to monitor pain and request assistance  - Assess pain using appropriate pain scale  - Administer analgesics based on type and severity of pain and evaluate response  - Implement non-pharmacological measures as appropriate and evaluate response  - Consider cultural and social influences on pain and pain management  - Notify physician/advanced practitioner if interventions unsuccessful or patient reports new pain  Outcome: Progressing     Problem: INFECTION - ADULT  Goal: Absence or prevention of progression during hospitalization  Description: INTERVENTIONS:  - Assess and monitor for signs and symptoms of infection  - Monitor lab/diagnostic results  - Monitor all insertion sites, i e  indwelling lines, tubes, and drains  - Monitor endotracheal if appropriate and nasal secretions for changes in amount and color  - [place] appropriate cooling/warming therapies per order  - Administer medications as ordered  - Instruct and encourage patient and family to use good hand hygiene technique  - Identify and instruct in appropriate isolation precautions for identified infection/condition  Outcome: Progressing

## 2023-05-20 NOTE — CASE MANAGEMENT
Case Management Discharge Planning Note    Patient name Reg Borrego  Location /-08 MRN [id number]  : 1975 Date 2023       Current Admission Date: 2023  Current Admission Diagnosis:Osteomyelitis of left foot [facility])   Patient Active Problem List    Diagnosis Date Noted   • Acute osteomyelitis of metatarsal bone of right foot ([facility] ) 2023   • Asthma, intrinsic 2023   • Bipolar 1 disorder, depressed ([place] [address] ) 2023   • Hx of right BKA ([place] [address] ) 2023   • Homeless single person 2023   • Sepsis ([place] [address] ) 2023   • Morbid obesity with BMI of 40 0-44 9, adult ([place] [address] ) 2023   • Obstructive sleep apnea 2022   • Diastasis of rectus abdominis 2021   • 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 ([place] [address] ) 2021   • Gastroesophageal reflux disease without esophagitis 2021   • Migraine without status migrainosus, not intractable 2021   • Impaired intestinal absorption 2021   • Intestinal malabsorption following gastrectomy 2021   • Right foot ulcer, with fat layer exposed ([place] [address] ) 2021   • Intertriginous dermatitis associated with moisture 2021   • Arthritis 2021   • Other chronic pain 2021   • OCD (obsessive compulsive disorder)    • PTSD (post-traumatic stress disorder)    • Essential hypertension 2021   • Acquired absence of other left toe(s) ([place] [address] ) 2019   • Folate deficiency 2019   • S/P laparoscopic sleeve gastrectomy 2019   • Diabetic foot ulcer ([place] [address] ) 09/10/2019   • Type 2 diabetes mellitus with diabetic neuropathy, without long-term current use of insulin ([place] [address] ) 2019   • Osteomyelitis of left foot ([place] [address] ) 2019   • Acquired hypothyroidism 2019   • Psychiatric illness 2019   • 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, with bone involvement without evidence of necrosis ([facility] ) 2019   • H/O bariatric surgery 2019 • Anxiety 06/28/2019   • Drusen body, unspecified laterality 01/25/2019   • Radiculopathy, lumbar region 09/12/2018   • Chronic right-sided low back pain with left-sided sciatica 09/04/2018   • Hyperlipidemia associated with type 2 diabetes mellitus ([facility] ) 09/25/2017   • Abnormal x-ray of cervical spine 08/14/2017   • Right cervical radiculopathy 08/11/2017   • Bipolar affective disorder in remission ([facility] ) 04/15/2017   • Amenorrhea 11/29/2016   • Instability of left knee joint 05/25/2016   • Allergy to bee sting 04/25/2016   • Acquired equinovarus deformity of left foot 05/19/2015   • Atypical squamous cell changes of undetermined significance (ASCUS) on cervical cytology with positive high risk human papilloma virus (HPV) 02/23/2015   • Chronic paroxysmal hemicrania, not intractable 02/23/2015      LOS (days): 12  Geometric Mean LOS (GMLOS) (days): 5 60  Days to GMLOS:-6 3     OBJECTIVE:  Risk of Unplanned Readmission Score: 24 69         Current admission status: Inpatient   Preferred Pharmacy:   [address] [place] [address] [address] [place] [address]  Phone: [phone] Fax: [phone]    Primary Care Provider: Sunny Chan MD    Primary Insurance: [address] [place] [address]  Secondary Insurance:     DISCHARGE DETAILS:    Discharge planning discussed with[de-identified] Patient  Andrew Doyle from [facility] admissions  SLIM AP  Nadia  Nurse Tamela Cheema  Freedom of Choice: Yes  Comments - Freedom of Choice: Message to CM discharge support staff no insurance auth yet  Transport cancelled and all care team updated  Updated patient at bedside call to andrew Lamas and updated    CM contacted family/caregiver?: Yes  Were Treatment Team discharge recommendations reviewed with patient/caregiver?: Yes  Did patient/caregiver verbalize understanding of patient care needs?: Yes  Were patient/caregiver advised of the risks associated with not following Treatment Team discharge recommendations?: Yes    Contacts  Patient Contacts: Mireya Peace/Son  Relationship to Patient[de-identified] Family  Contact Method: Phone  Phone Number: [phone]  Reason/Outcome: Discharge Planning     Dispatcher Contacted: Yes  Number/Name of Dispatcher: Via round trip  Transported by [employer] and Unit #): Merly WCV  ETA of Transport (Date): 05/22/23  ETA of Transport (Time): 1300     Additional Comments: Transport cancelled    Requested Monday 5/22 transport via RoundTrip

## 2023-05-20 NOTE — ASSESSMENT & PLAN NOTE
Lab Results   Component Value Date    HGBA1C 7 2 (H) 05/05/2023       Recent Labs     05/19/23  1623 05/19/23  2116 05/20/23  0743 05/20/23  1115   POCGLU 256* 204* 219* 348*       Blood Sugar Average: Last 72 hrs:  (P) 353 [id number]   · Insulin sliding scale  Diabetic diet     · Added long acting insulin for better blood glucose coverage  · Patient has been non-compliant with her diet  She has been ordering food to be delivered to her room  I discussed this at length with her that a well-controlled blood glucose is very importance part to promote wound healing  She verbalized understanding

## 2023-05-20 NOTE — PLAN OF CARE
Problem: MOBILITY - ADULT  Goal: Maintain or return to baseline ADL function  Description: INTERVENTIONS:  -  Assess patient's ability to carry out ADLs; assess patient's baseline for ADL function and identify physical deficits which impact ability to perform ADLs (bathing, care of mouth/teeth, toileting, grooming, dressing, etc )  - Assess/evaluate cause of self-care deficits   - Assess range of motion  - Assess patient's mobility; develop plan if impaired  - Assess patient's need for assistive devices and provide as appropriate  - Encourage maximum independence but intervene and supervise when necessary  - Involve family in performance of ADLs  - Assess for home care needs following discharge   - Consider OT consult to assist with ADL evaluation and planning for discharge  - Provide patient education as appropriate  Outcome: Progressing  Goal: Maintains/Returns to pre admission functional level  Description: INTERVENTIONS:  - Perform BMAT or MOVE assessment daily    - Set and communicate daily mobility goal to care team and patient/family/caregiver  - Collaborate with rehabilitation services on mobility goals if consulted  - Perform Range of Motion 2 times a day  - Reposition patient every 2 hours    - Dangle patient 2 times a day  - Stand patient 2 times a day  - Ambulate patient 2 times a day  - Out of bed to chair 3 times a day   - Out of bed for meals 3 times a day  - Out of bed for toileting  - Record patient progress and toleration of activity level   Outcome: Progressing     Problem: PAIN - ADULT  Goal: Verbalizes/displays adequate comfort level or baseline comfort level  Description: Interventions:  - Encourage patient to monitor pain and request assistance  - Assess pain using appropriate pain scale  - Administer analgesics based on type and severity of pain and evaluate response  - Implement non-pharmacological measures as appropriate and evaluate response  - Consider cultural and social influences on pain and pain management  - Notify physician/advanced practitioner if interventions unsuccessful or patient reports new pain  Outcome: Progressing     Problem: INFECTION - ADULT  Goal: Absence or prevention of progression during hospitalization  Description: INTERVENTIONS:  - Assess and monitor for signs and symptoms of infection  - Monitor lab/diagnostic results  - Monitor all insertion sites, i e  indwelling lines, tubes, and drains  - Monitor endotracheal if appropriate and nasal secretions for changes in amount and color  - [place] appropriate cooling/warming therapies per order  - Administer medications as ordered  - Instruct and encourage patient and family to use good hand hygiene technique  - Identify and instruct in appropriate isolation precautions for identified infection/condition  Outcome: Progressing  Goal: Absence of fever/infection during neutropenic period  Description: INTERVENTIONS:  - Monitor WBC    Outcome: Progressing     Problem: SAFETY ADULT  Goal: Maintain or return to baseline ADL function  Description: INTERVENTIONS:  -  Assess patient's ability to carry out ADLs; assess patient's baseline for ADL function and identify physical deficits which impact ability to perform ADLs (bathing, care of mouth/teeth, toileting, grooming, dressing, etc )  - Assess/evaluate cause of self-care deficits   - Assess range of motion  - Assess patient's mobility; develop plan if impaired  - Assess patient's need for assistive devices and provide as appropriate  - Encourage maximum independence but intervene and supervise when necessary  - Involve family in performance of ADLs  - Assess for home care needs following discharge   - Consider OT consult to assist with ADL evaluation and planning for discharge  - Provide patient education as appropriate  Outcome: Progressing  Goal: Maintains/Returns to pre admission functional level  Description: INTERVENTIONS:  - Perform BMAT or MOVE assessment daily    - Set and communicate daily mobility goal to care team and patient/family/caregiver  - Collaborate with rehabilitation services on mobility goals if consulted  - Perform Range of Motion 2 times a day  - Reposition patient every 2 hours    - Dangle patient 2 times a day  - Stand patient 2 times a day  - Ambulate patient 2 times a day  - Out of bed to chair 3 times a day   - Out of bed for meals 3 times a day  - Out of bed for toileting  - Record patient progress and toleration of activity level   Outcome: Progressing  Goal: Patient will remain free of falls  Description: INTERVENTIONS:  - Educate patient/family on patient safety including physical limitations  - Instruct patient to call for assistance with activity   - Consult OT/PT to assist with strengthening/mobility   - Keep Call bell within reach  - Keep bed low and locked with side rails adjusted as appropriate  - Keep care items and personal belongings within reach  - Initiate and maintain comfort rounds  - Make Fall Risk Sign visible to staff  - Offer Toileting every 2 Hours, in advance of need  - Initiate/Maintain bed alarm  - Obtain necessary fall risk management equipment  - Apply yellow socks and bracelet for high fall risk patients  - Consider moving patient to room near nurses station  Outcome: Progressing

## 2023-05-20 NOTE — PHYSICAL THERAPY NOTE
Physical Therapy Re-Evaluation   Time in: 1026  Time out: 1039  Total evaluation time: 13 minutes    Patient's Name: Steve Baez    Admitting Diagnosis  Osteomyelitis of foot ([facility] ) [M86 [id number]]    Problem List  Patient Active Problem List   Diagnosis    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, with bone involvement without evidence of necrosis ([facility] )    H/O bariatric surgery    Anxiety    Acquired hypothyroidism    Psychiatric illness    Type 2 diabetes mellitus with diabetic neuropathy, without long-term current use of insulin ([facility] )    Osteomyelitis of left foot ([facility] )    Diabetic foot ulcer ([facility] )    Acquired absence of other left toe(s) ([place] [address] )    Essential hypertension    Arthritis    Other chronic pain    OCD (obsessive compulsive disorder)    PTSD (post-traumatic stress disorder)    Right foot ulcer, with fat layer exposed ([place] [address] )    Gastroesophageal reflux disease without esophagitis    Migraine without status migrainosus, not intractable    Obstructive sleep apnea    Sepsis ([facility] )    Morbid obesity with BMI of 40 0-44 9, adult ([place] [address] )    Homeless single person    Hx of right BKA ([facility] )    Bipolar 1 disorder, depressed ([facility] )    Abnormal x-ray of cervical spine    Acquired equinovarus deformity of left foot    Acute osteomyelitis of metatarsal bone of right foot ([facility] )    Allergy to bee sting    Amenorrhea    Asthma, intrinsic    Atypical squamous cell changes of undetermined significance (ASCUS) on cervical cytology with positive high risk human papilloma virus (HPV)    Bipolar affective disorder in remission ([facility] )    Chronic paroxysmal hemicrania, not intractable    Diastasis of rectus abdominis    Drusen body, unspecified laterality    Folate deficiency    Hyperlipidemia associated with type 2 diabetes mellitus (HCC)    Impaired intestinal absorption    Instability of left knee joint    Intertriginous dermatitis associated with moisture    Intestinal malabsorption following gastrectomy    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)    Radiculopathy, lumbar region    Right cervical radiculopathy    S/P laparoscopic sleeve gastrectomy    Chronic right-sided low back pain with left-sided sciatica       Past Medical History  Past Medical History:   Diagnosis Date    Anxiety     Arthritis     Asthma     Chronic pain     Diabetes mellitus (HCC)     Hypothyroidism     Lymphedema     left leg    Manic bipolar I disorder (HCC)     Morbid obesity (HCC)     OCD (obsessive compulsive disorder)     PTSD (post-traumatic stress disorder)        Past Surgical History  Past Surgical History:   Procedure Laterality Date    CHOLECYSTECTOMY      CHOLECYSTECTOMY      CLOSURE DELAYED PRIMARY Left 5/18/2023    Procedure: CLOSURE DELAYED PRIMARY;  Surgeon: Delia Singh DPM;  Location: [facility];  Service: Podiatry    LEG AMPUTATION THROUGH LOWER TIBIA AND FIBULA Right 3/23/2023    Procedure: AMPUTATION BELOW KNEE (BKA);   Surgeon: Tim Matthews MD;  Location: [facility];  Service: General    IN AMPUTATION FOOT TRANSMETARSAL Right 9/4/2021    Procedure: AMPUTATION TRANSMETATARSAL (TMA);  Surgeon: Andrea Melgar DPM;  [facility]: [facility];  Service: Podiatry    IN AMPUTATION METATARSAL W/TOE SINGLE Left 9/8/2019    Procedure: PARTIAL 3RD RAY RESECTION, PSB TOE FILLET FLAP;  Surgeon: Trung Kingston DPM;  Location: [facility];  Service: Podiatry    IN AMPUTATION METATARSAL W/TOE SINGLE Left 3/23/2023    Procedure: RAY RESECTION FOOT;  Surgeon: Leonardo Toro DPM;  Location: [facility];  Service: Podiatry    IN AMPUTATION METATARSAL W/TOE SINGLE Left 5/8/2023    Procedure: Left first RAY RESECTION FOOT;  Surgeon: Taylor Jacobsen DPM;  [facility]: [facility];  Service: Podiatry    TOE AMPUTATION Right 1/13/2021    Procedure: AMPUTATION 2ND TOE;  Surgeon: Andrea Melgar DPM;  [facility];  Service: Podiatry    WOUND DEBRIDEMENT Left 2/17/2022    Procedure: DEBRIDEMENT GREAT TOE WOUND;  Surgeon: Enio Lou DPM;  [facility];  Service: Podiatry    WOUND DEBRIDEMENT Left 5/16/2023    Procedure: DEBRIDEMENT WOUND [facility] OUT); Surgeon: Collette Bonus, DPM;  Location: [facility];  Service: Podiatry       PT performed at least 2 patient identifiers during session: Name and wristband  05/20/23 1039   PT Last Visit   PT Visit Date 05/20/23   Note Type   Note type Re-Evaluation   Pain Assessment   Pain Assessment Tool 0-10   Pain Score 5   Pain Location/Orientation Orientation: Left; Location: Foot   Pain Onset/Description Onset: Ongoing;Frequency: Constant/Continuous   Patient's Stated Pain Goal No pain   Hospital Pain Intervention(s) Repositioned;Elevated   Restrictions/Precautions   Weight Bearing Precautions Per Order Yes   RLE Weight Bearing Per Order NWB  (PT has limited WBing and is not donning prosthesis d/t 2 small wound beds to R BKA stump - wound care RN is aware and following)   LLE Weight Bearing Per Order PWB  (heel weight bearing only with Darco wedge for transfers)   Braces or Orthoses Other (Comment)  (prosthesis for R LE, Darco wedge/forefoot offloading shoe to LLE)   Other Precautions Fall Risk;Pain;WBS; Impulsive   Home Living   Type of [address]   Prior Function   Level of [place] Needs assistance with ADLs; Needs assistance with functional mobility; Needs assistance with IADLS   Lives With Alone   Receives Help From Family   IADLs Family/Friend/Other provides transportation; Family/Friend/Other provides meals; Family/Friend/Other provides medication management   Falls in the last 6 months 0   General   Family/Caregiver Present No   Cognition   Arousal/Participation Alert   Attention Attends with cues to redirect   Orientation Level Oriented X4   Memory Decreased recall of precautions   Following Commands Follows one step commands without difficulty   Comments pt agreeable to PT re-evaluation d/t medical status and WBing status update   RLE Assessment   RLE Assessment X   Strength RLE   R Hip Flexion 4-/5   R Knee Extension 4-/5   LLE Assessment   LLE Assessment   (L knee/hip strength grossly assessed during functional mobility 4-/5)   Vision-Basic Assessment   Current Vision Wears glasses all the time   Coordination   Movements are Fluid and Coordinated 0   Sensation X  (pt reports phantom limb pain to R residual limb)   Bed Mobility   Supine to Sit 6  Modified independent   Additional items HOB elevated; Bedrails; Increased time required   Sit to Supine 6  Modified independent   Additional items HOB elevated; Bedrails; Increased time required   Transfers   Sit to Stand   (CGA for safety)   Additional items Assist x 1   Stand to Sit   (CGA)   Additional items Assist x 1; Increased time required   Stand pivot   (CGA)   Additional items Assist x 1; Increased time required;Verbal cues   Additional Comments performed pivot transfers with BUE support at EOB/BSC arm rests and with use of grab bars around commode  STS x2 trials from manual wc to RW with BUE support and CGA for balance   Ambulation/Elevation   Gait pattern Not tested   Balance   Static Sitting Good   Dynamic Sitting Fair +   Static Standing Fair   Dynamic Standing Fair -   Endurance Deficit   Endurance Deficit Yes   Activity Tolerance   Activity Tolerance Patient limited by fatigue;Patient limited by pain   Medical Staff Made Aware coordination of care provided with OT Haris   Nurse Made Aware Yes, RN Titus   Assessment   Prognosis Fair   Problem List Decreased strength;Decreased endurance; Impaired balance;Decreased mobility; Decreased safety awareness;Pain;Decreased skin integrity   Assessment Pt is 53 y o  female seen for PT re-evaluation on 5/20/2023 s/p admit to [facility] on 5/8/2023 w/ Osteomyelitis of left foot ([address] )  PT was consulted to assess pt's functional mobility and d/c needs    Re-eval warranted 5/17/23 as pt with medical "status change d/t post op debridement wound/wash out on 5/16/23, was NWB to LLE & PT limiting prosthesis use to RLE d/t 2 small wound beds to R BKA stump - RN wound care following  Re-eval performed today, 5/20/23 as patient POD 2 (DOS 5/18/23) s/p delayed primary closure and drain placement, drain removed 5/19/23, and pt is able to heel-WB only for transfers on LLE with Darco wedge/forefoot offload shoe donned  At time of re-eval, pt able to perform supine<>sit at MOD I level, transfers from bed<>BSC with CG s AD  Upon evaluation, pt presenting with impaired functional mobility d/t decreased strength, decreased endurance, impaired balance, decreased mobility, decreased safety awareness, obesity c BMI of 37 79 kg/m2, decreased skin integrity and pain, podiatry restrictions  The following objective measures performed on re-eval also reveal limitations: AM-PAC 6-Clicks: 23/56  Overall, pt's rehab potential and prognosis to return to PLOF is fair as impacted by objective findings, warranting pt to receive further skilled PT interventions to address identified impairments, activity limitation(s), and participation restriction(s)  Pt to benefit from continued PT tx to address deficits as defined above and maximize level of functional independent mobility and consistency in order for pt to improve safety with mobility task performance  From PT/mobility standpoint, recommendation at time of d/c would be post acute rehabilitation services pending progress in order to facilitate return to PLOF  Barriers to Discharge Decreased caregiver support; Inaccessible home environment   Goals   Patient Goals \"to get some sleep\"   STG Expiration Date 05/30/23   Short Term Goal #1 In 7-10 days: Increase bilateral LE strength 1/2 grade to facilitate independent mobility, Perform all bed mobility tasks modified independent to decrease caregiver burden, Perform all transfers modified independent to improve independence, Ambulate > 10 ft  " with RW and maintaining appropriate PWB to L heel only in order to improve activity tolerance with close S w/o % of the time, Increase all balance 1/2 grade to decrease risk for falls, Complete exercise program independently, Tolerate 4 hr OOB to faciliate upright tolerance, Tolerate seated at EOB 15 minutes to facilitate functional task performance and Tolerate standing 1-2 minutes to facilitate functional task performance   Plan   Treatment/Interventions Functional transfer training;Patient/family training; Endurance training; Therapeutic exercise; Bed mobility;Spoke to nursing;Spoke to MD;Continued evaluation   PT Frequency 3-5x/wk   Recommendation   PT Discharge Recommendation Post acute rehabilitation services   Equipment Recommended   (pt to benefit from RW trial next session)   Additional Comments Pt's raw score on the AM-[facility] Basic Mobility inpatient short form is 16, standardized score is 38 32  Patients at this level are likely to benefit from DC to [place], however, please refer to therapist recommendation for safe DC planning  [facility] Basic Mobility Inpatient   Turning in Flat Bed Without Bedrails 4   Lying on Back to Sitting on Edge of Flat Bed Without Bedrails 4   Moving Bed to Chair 3   Standing Up From Chair Using Arms 3   Walk in Room 1   Climb 3-5 Stairs With Railing 1   Basic Mobility Inpatient Raw Score 16   Basic Mobility Standardized Score 38 32   Highest Level Of Mobility   JH-HLM Goal 5: Stand one or more mins   JH-HLM Achieved 4: Move to chair/commode   End of Consult   Patient Position at End of Consult Supine; All needs within reach       Lani Score, PT, DPT

## 2023-05-21 ENCOUNTER — APPOINTMENT (OUTPATIENT)
Dept: RADIOLOGY | Facility: HOSPITAL | Age: 48
End: 2023-05-21

## 2023-05-21 LAB
GLUCOSE SERPL-MCNC: 192 MG/DL (ref 65–140)
GLUCOSE SERPL-MCNC: 202 MG/DL (ref 65–140)
GLUCOSE SERPL-MCNC: 238 MG/DL (ref 65–140)
GLUCOSE SERPL-MCNC: 270 MG/DL (ref 65–140)
GLUCOSE SERPL-MCNC: 273 MG/DL (ref 65–140)
GLUCOSE SERPL-MCNC: 322 MG/DL (ref 65–140)

## 2023-05-21 RX ADMIN — CEFAZOLIN SODIUM 2000 MG: 2 SOLUTION INTRAVENOUS at 12:35

## 2023-05-21 RX ADMIN — LEVOTHYROXINE SODIUM 50 MCG: 50 TABLET ORAL at 05:32

## 2023-05-21 RX ADMIN — METHOCARBAMOL TABLETS 750 MG: 500 TABLET, COATED ORAL at 17:11

## 2023-05-21 RX ADMIN — HYDROXYZINE HYDROCHLORIDE 50 MG: 25 TABLET ORAL at 09:38

## 2023-05-21 RX ADMIN — METHOCARBAMOL TABLETS 750 MG: 500 TABLET, COATED ORAL at 04:46

## 2023-05-21 RX ADMIN — METHOCARBAMOL TABLETS 750 MG: 500 TABLET, COATED ORAL at 23:14

## 2023-05-21 RX ADMIN — INSULIN LISPRO 4 UNITS: 100 INJECTION, SOLUTION INTRAVENOUS; SUBCUTANEOUS at 17:11

## 2023-05-21 RX ADMIN — PANTOPRAZOLE SODIUM 40 MG: 40 TABLET, DELAYED RELEASE ORAL at 21:31

## 2023-05-21 RX ADMIN — ZIPRASIDONE HYDROCHLORIDE 80 MG: 40 CAPSULE ORAL at 09:38

## 2023-05-21 RX ADMIN — CLONIDINE HYDROCHLORIDE 0.2 MG: 0.1 TABLET ORAL at 21:31

## 2023-05-21 RX ADMIN — Medication 6 MG: at 21:31

## 2023-05-21 RX ADMIN — HYDROXYZINE HYDROCHLORIDE 100 MG: 25 TABLET ORAL at 21:31

## 2023-05-21 RX ADMIN — CLONIDINE HYDROCHLORIDE 0.2 MG: 0.1 TABLET ORAL at 09:38

## 2023-05-21 RX ADMIN — POLYETHYLENE GLYCOL 3350 17 G: 17 POWDER, FOR SOLUTION ORAL at 15:27

## 2023-05-21 RX ADMIN — CEFAZOLIN SODIUM 2000 MG: 2 SOLUTION INTRAVENOUS at 21:32

## 2023-05-21 RX ADMIN — DOCUSATE SODIUM 100 MG: 100 CAPSULE, LIQUID FILLED ORAL at 09:39

## 2023-05-21 RX ADMIN — FOLIC ACID 1000 MCG: 1 TABLET ORAL at 09:38

## 2023-05-21 RX ADMIN — PREGABALIN 150 MG: 75 CAPSULE ORAL at 09:38

## 2023-05-21 RX ADMIN — HEPARIN SODIUM 5000 UNITS: 5000 INJECTION INTRAVENOUS; SUBCUTANEOUS at 21:31

## 2023-05-21 RX ADMIN — OXYCODONE HYDROCHLORIDE 10 MG: 10 TABLET ORAL at 17:11

## 2023-05-21 RX ADMIN — MENTHOL, METHYL SALICYLATE: 10; 15 CREAM TOPICAL at 18:44

## 2023-05-21 RX ADMIN — MENTHOL, METHYL SALICYLATE 1 APPLICATION.: 10; 15 CREAM TOPICAL at 05:40

## 2023-05-21 RX ADMIN — OXYCODONE HYDROCHLORIDE 10 MG: 10 TABLET ORAL at 10:54

## 2023-05-21 RX ADMIN — INSULIN LISPRO 2 UNITS: 100 INJECTION, SOLUTION INTRAVENOUS; SUBCUTANEOUS at 07:50

## 2023-05-21 RX ADMIN — ZIPRASIDONE HYDROCHLORIDE 80 MG: 40 CAPSULE ORAL at 17:12

## 2023-05-21 RX ADMIN — FENOFIBRATE 48 MG: 48 TABLET, FILM COATED ORAL at 09:38

## 2023-05-21 RX ADMIN — INSULIN LISPRO 3 UNITS: 100 INJECTION, SOLUTION INTRAVENOUS; SUBCUTANEOUS at 12:34

## 2023-05-21 RX ADMIN — OXYCODONE HYDROCHLORIDE 10 MG: 10 TABLET ORAL at 04:47

## 2023-05-21 RX ADMIN — PANTOPRAZOLE SODIUM 40 MG: 40 TABLET, DELAYED RELEASE ORAL at 09:38

## 2023-05-21 RX ADMIN — HEPARIN SODIUM 5000 UNITS: 5000 INJECTION INTRAVENOUS; SUBCUTANEOUS at 14:44

## 2023-05-21 RX ADMIN — DOCUSATE SODIUM 100 MG: 100 CAPSULE, LIQUID FILLED ORAL at 17:11

## 2023-05-21 RX ADMIN — PREGABALIN 150 MG: 75 CAPSULE ORAL at 17:12

## 2023-05-21 RX ADMIN — MENTHOL, METHYL SALICYLATE: 10; 15 CREAM TOPICAL at 23:16

## 2023-05-21 RX ADMIN — INSULIN LISPRO 5 UNITS: 100 INJECTION, SOLUTION INTRAVENOUS; SUBCUTANEOUS at 21:52

## 2023-05-21 RX ADMIN — METHOCARBAMOL TABLETS 750 MG: 500 TABLET, COATED ORAL at 10:54

## 2023-05-21 RX ADMIN — LAMOTRIGINE 300 MG: 100 TABLET ORAL at 09:38

## 2023-05-21 RX ADMIN — MENTHOL, METHYL SALICYLATE: 10; 15 CREAM TOPICAL at 14:44

## 2023-05-21 RX ADMIN — HEPARIN SODIUM 5000 UNITS: 5000 INJECTION INTRAVENOUS; SUBCUTANEOUS at 05:32

## 2023-05-21 RX ADMIN — SENNOSIDES 17.2 MG: 8.6 TABLET, FILM COATED ORAL at 21:35

## 2023-05-21 RX ADMIN — CEFAZOLIN SODIUM 2000 MG: 2 SOLUTION INTRAVENOUS at 04:48

## 2023-05-21 RX ADMIN — INSULIN GLARGINE 12 UNITS: 100 INJECTION, SOLUTION SUBCUTANEOUS at 21:32

## 2023-05-21 RX ADMIN — OXYCODONE HYDROCHLORIDE 10 MG: 10 TABLET ORAL at 23:15

## 2023-05-21 NOTE — ASSESSMENT & PLAN NOTE
· History of bipolar disorder, OCD, PTSD  · Admitted medically from [place] [address] psychiatric unit  Per discharge summary, psychiatrically cleared for medical admission  The patient is currently homeless  · Denies current suicidal ideation  · Continue medications including Lamictal, hydroxyzine, Geodon  · Supportive care   · Psychiatry reevaluation completed    Continue current plan of care

## 2023-05-21 NOTE — PLAN OF CARE
Problem: MOBILITY - ADULT  Goal: Maintain or return to baseline ADL function  Description: INTERVENTIONS:  -  Assess patient's ability to carry out ADLs; assess patient's baseline for ADL function and identify physical deficits which impact ability to perform ADLs (bathing, care of mouth/teeth, toileting, grooming, dressing, etc )  - Assess/evaluate cause of self-care deficits   - Assess range of motion  - Assess patient's mobility; develop plan if impaired  - Assess patient's need for assistive devices and provide as appropriate  - Encourage maximum independence but intervene and supervise when necessary  - Involve family in performance of ADLs  - Assess for home care needs following discharge   - Consider OT consult to assist with ADL evaluation and planning for discharge  - Provide patient education as appropriate  Outcome: Progressing  Goal: Maintains/Returns to pre admission functional level  Description: INTERVENTIONS:  - Perform BMAT or MOVE assessment daily    - Set and communicate daily mobility goal to care team and patient/family/caregiver  - Collaborate with rehabilitation services on mobility goals if consulted  - Perform Range of Motion 2 times a day  - Reposition patient every 2 hours    - Dangle patient 2 times a day  - Stand patient 2 times a day  - Ambulate patient 2 times a day  - Out of bed to chair 3 times a day   - Out of bed for meals 3 times a day  - Out of bed for toileting  - Record patient progress and toleration of activity level   Outcome: Progressing     Problem: PAIN - ADULT  Goal: Verbalizes/displays adequate comfort level or baseline comfort level  Description: Interventions:  - Encourage patient to monitor pain and request assistance  - Assess pain using appropriate pain scale  - Administer analgesics based on type and severity of pain and evaluate response  - Implement non-pharmacological measures as appropriate and evaluate response  - Consider cultural and social influences on pain and pain management  - Notify physician/advanced practitioner if interventions unsuccessful or patient reports new pain  Outcome: Progressing     Problem: INFECTION - ADULT  Goal: Absence or prevention of progression during hospitalization  Description: INTERVENTIONS:  - Assess and monitor for signs and symptoms of infection  - Monitor lab/diagnostic results  - Monitor all insertion sites, i e  indwelling lines, tubes, and drains  - Monitor endotracheal if appropriate and nasal secretions for changes in amount and color  - [place] appropriate cooling/warming therapies per order  - Administer medications as ordered  - Instruct and encourage patient and family to use good hand hygiene technique  - Identify and instruct in appropriate isolation precautions for identified infection/condition  Outcome: Progressing  Goal: Absence of fever/infection during neutropenic period  Description: INTERVENTIONS:  - Monitor WBC    Outcome: Progressing     Problem: SAFETY ADULT  Goal: Maintain or return to baseline ADL function  Description: INTERVENTIONS:  -  Assess patient's ability to carry out ADLs; assess patient's baseline for ADL function and identify physical deficits which impact ability to perform ADLs (bathing, care of mouth/teeth, toileting, grooming, dressing, etc )  - Assess/evaluate cause of self-care deficits   - Assess range of motion  - Assess patient's mobility; develop plan if impaired  - Assess patient's need for assistive devices and provide as appropriate  - Encourage maximum independence but intervene and supervise when necessary  - Involve family in performance of ADLs  - Assess for home care needs following discharge   - Consider OT consult to assist with ADL evaluation and planning for discharge  - Provide patient education as appropriate  Outcome: Progressing  Goal: Maintains/Returns to pre admission functional level  Description: INTERVENTIONS:  - Perform BMAT or MOVE assessment daily    - Set and communicate daily mobility goal to care team and patient/family/caregiver  - Collaborate with rehabilitation services on mobility goals if consulted  - Perform Range of Motion 2 times a day  - Reposition patient every 2 hours    - Dangle patient 2 times a day  - Stand patient 2 times a day  - Ambulate patient 2 times a day  - Out of bed to chair 3 times a day   - Out of bed for meals 3 times a day  - Out of bed for toileting  - Record patient progress and toleration of activity level   Outcome: Progressing  Goal: Patient will remain free of falls  Description: INTERVENTIONS:  - Educate patient/family on patient safety including physical limitations  - Instruct patient to call for assistance with activity   - Consult OT/PT to assist with strengthening/mobility   - Keep Call bell within reach  - Keep bed low and locked with side rails adjusted as appropriate  - Keep care items and personal belongings within reach  - Initiate and maintain comfort rounds  - Make Fall Risk Sign visible to staff  - Offer Toileting every 2 Hours, in advance of need  - Initiate/Maintain bed alarm  - Obtain necessary fall risk management equipment  - Apply yellow socks and bracelet for high fall risk patients  - Consider moving patient to room near nurses station  Outcome: Progressing     Problem: DISCHARGE PLANNING  Goal: Discharge to home or other facility with appropriate resources  Description: INTERVENTIONS:  - Identify barriers to discharge w/patient and caregiver  - Arrange for needed discharge resources and transportation as appropriate  - Identify discharge learning needs (meds, wound care, etc )  - Refer to Case Management Department for coordinating discharge planning if the patient needs post-hospital services based on physician/advanced practitioner order or complex needs related to functional status, cognitive ability, or social support system  Outcome: Progressing     Problem: Knowledge Deficit  Goal: Patient/family/caregiver demonstrates understanding of disease process, treatment plan, medications, and discharge instructions  Description: Complete learning assessment and assess knowledge base    Interventions:  - Provide teaching at level of understanding  - Provide teaching via preferred learning methods  Outcome: Progressing     Problem: SKIN/TISSUE INTEGRITY - ADULT  Goal: Incision(s), wounds(s) or drain site(s) healing without S/S of infection  Description: INTERVENTIONS  - Assess and document dressing, incision, wound bed, drain sites and surrounding tissue  - Provide patient and family education  - Perform skin care/dressing changes every shift  Outcome: Progressing  Goal: Pressure injury heals and does not worsen  Description: Interventions:  - Implement low air loss mattress or specialty surface (Criteria met)  - Apply silicone foam dressing  - Instruct/assist with weight shifting every 60 minutes when in chair   - Limit chair time to 3 hour intervals  - Use special pressure reducing interventions such as waffle cushion when in chair   - Apply fecal or urinary incontinence containment device   - Perform passive or active ROM every 4 hrs  - Turn and reposition patient & offload bony prominences every 2 hours   - Utilize friction reducing device or surface for transfers   - Consider consults to  interdisciplinary teams such as wound and podiatry   - Use incontinent care products after each incontinent episode such as foam cleanser  - Consider nutrition services referral as needed  Outcome: Progressing     Problem: Prexisting or High Potential for Compromised Skin Integrity  Goal: Skin integrity is maintained or improved  Description: INTERVENTIONS:  - Identify patients at risk for skin breakdown  - Assess and monitor skin integrity  - Assess and monitor nutrition and hydration status  - Monitor labs   - Assess for incontinence   - Turn and reposition patient  - Assist with mobility/ambulation  - Relieve pressure over bony prominences  - Avoid friction and shearing  - Provide appropriate hygiene as needed including keeping skin clean and dry  - Evaluate need for skin moisturizer/barrier cream  - Collaborate with interdisciplinary team   - Patient/family teaching  - Consider wound care consult   Outcome: Progressing

## 2023-05-21 NOTE — NURSING NOTE
Pt found kneeling on the floor in front of her recliner, pt refused to get back into the chair, stated that she heard the chair unlock while standing to readjust herself, assisted back to bed, pt assessed, vitals taken and charted, denies hitting her head, c/o left knee and ankle pain, left foot dsg removed to assess surgical site, dr Adin Qureshi made aware and was in to assess the pt, orders noted

## 2023-05-21 NOTE — NURSING NOTE
AWAKE AND ORIENTED ASSISTED TO COMMODE AND VOIDED   RESP EASY  PAIN LEVEL IS 8/10 TO FOOT AND BACK  NO DISTRESS  02 SAT 96% HR 80/MIN   36 [facility]

## 2023-05-21 NOTE — ASSESSMENT & PLAN NOTE
· Received from [address] [place] [address] psychiatric patient hartman with MRI results suggesting osteomyelitis of the left first metatarsal  · Podiatry input appreciated   · S/p left first ray resection 5/8; left foot wound wash out due to wound dehiscence 5/16; delayed primary closure 5/18  DI drain removed (5/19)  · Transitioned from doxycycline to cefazolin (5/15)  Can likely transition back to doxycycline upon discharge  · Intraoperative cultures: Staphylococcus aureus    · Wound care, surgical shoe  NWB status on left foot   · PT/OT recommendations appreciated: Postacute rehab  · Case management input appreciated  Awaiting for an authorization from the insurance  Plan for d/c to [facility] on Monday once this is obtained    Authorization still pending

## 2023-05-21 NOTE — NURSING NOTE
"BS FINGERSTICK 299  EATS REGULAR DIET FOODS AND REGULAR PEPSI AND COOKIE BARS  EDUCATED ON SAME  NON COMPLIANT  INSTRUCTED ON BS RESULT AND COVERAGE, AS PATIENT STATED I GET 4 UNITS  HS LANTUS AND COVERAGE INSULIN ADMINISTERED  PATIENT THAN STATES,\" OH I NEVER TAKE THE COVERAGE\"  WILL ASSESS ANY S/S HYPO/HYPER GLYCEMIA   CALL TORRES GIVEN  " Statement Selected

## 2023-05-21 NOTE — PROGRESS NOTES
[facility]  Progress Note  Name: Beverley Mc  MRN: [id number]  Unit/Bed#: -01 I Date of Admission: 5/8/2023   Date of Service: 5/21/2023 I Hospital Day: 13    Assessment/Plan   * Osteomyelitis of left foot [facility])  Assessment & Plan  · Received from [address] [place] [address] psychiatric patient hartman with MRI results suggesting osteomyelitis of the left first metatarsal  · Podiatry input appreciated   · S/p left first ray resection 5/8; left foot wound wash out due to wound dehiscence 5/16; delayed primary closure 5/18  DI drain removed (5/19)  · Transitioned from doxycycline to cefazolin (5/15)  Can likely transition back to doxycycline upon discharge  · Intraoperative cultures: Staphylococcus aureus    · Wound care, surgical shoe  NWB status on left foot   · PT/OT recommendations appreciated: Postacute rehab  · Case management input appreciated  Awaiting for an authorization from the insurance  Plan for d/c to [facility] on Monday once this is obtained  Hx of right BKA ([facility] [address] )  Assessment & Plan  · History of right BKA 3/20/2023  · Serial assessments and supportive care  · Fall precautions           Gastroesophageal reflux disease without esophagitis  Assessment & Plan  · Continue PPI           Other chronic pain  Assessment & Plan  · Previously on OxyContin for chronic back pain, transitioned to oxycodone 10 mg every 6 hours while in inpatient psychiatric unit   · Monitor for adequate pain control and or signs of oversedation or withdrawal           Type 2 diabetes mellitus with diabetic neuropathy, without long-term current use of insulin [facility])  Assessment & Plan  Lab Results   Component Value Date    HGBA1C 7 2 (H) 05/05/2023       Recent Labs     05/20/23  2117 05/21/23  0031 05/21/23  0452 05/21/23  0721   POCGLU 299* 273* 202* 192*       Blood Sugar Average: Last 72 hrs:  (P) 234 25   · Insulin sliding scale    Diabetic diet     · Added long acting insulin for better blood glucose coverage  · Patient has been non-compliant with her diet  She has been ordering food/soda/snack to be delivered to her room  I discussed this at length with her that a well-controlled blood glucose is a very importance part to promote wound healing  She verbalized understanding  Psychiatric illness  Assessment & Plan  · History of bipolar disorder, OCD, PTSD  · Admitted medically from [place] [address] psychiatric unit  Per discharge summary, psychiatrically cleared for medical admission  The patient is currently homeless  · Denies current suicidal ideation  · Continue medications including Lamictal, hydroxyzine, Geodon  · Supportive care   · Psychiatry reevaluation completed  Continue current plan of care  Acquired hypothyroidism  Assessment & Plan  · Continue levothyroxine                  VTE Prophylaxis:  Heparin    Patient Centered Rounds: I have performed bedside rounds with nursing staff today  Discussions with Specialists or Other Care Team Provider: podiatry   Education and Discussions with Family / Patient: patient     Current Length of Stay: 13 day(s)    Current Patient Status: Inpatient   Certification Statement: The patient will continue to require additional inpatient hospital stay due to osteomyelitis of left foot     Discharge Plan: pending discharge disposition  The patient is medically stable for discharge  Awaiting for an auth from the insurance  Code Status: Level 1 - Full Code    Subjective:   Feeling okay  Denies any pain  No acute event overnight  She is eager to go to STR  Objective:     Vitals:   Temp (24hrs), Av 1 °F (36 7 °C), Min:97 9 °F (36 6 °C), Max:98 2 °F (36 8 °C)    Temp:  [97 9 °F (36 6 °C)-98 2 °F (36 8 °C)] 98 2 °F (36 8 °C)  HR:  [64-78] 64  Resp:  [20] 20  BP: (119-139)/(73-83) 129/73  SpO2:  [94 %-99 %] 94 %  Body mass index is 37 79 kg/m²  Input and Output Summary (last 24 hours):        Intake/Output Summary (Last 24 hours) at 2023 Reynaldo [place] filed at 5/21/2023 0442  Gross per 24 hour   Intake 530 ml   Output 1400 ml   Net -870 ml       Physical Exam:   Physical Exam  Vitals and nursing note reviewed  Constitutional:       General: She is not in acute distress  Appearance: Normal appearance  She is obese  HENT:      Head: Normocephalic and atraumatic  Right Ear: External ear normal       Left Ear: External ear normal       Nose: Nose normal  No rhinorrhea  Mouth/Throat:      Mouth: Mucous membranes are moist       Pharynx: Oropharynx is clear  Eyes:      General:         Right eye: No discharge  Left eye: No discharge  Pupils: Pupils are equal, round, and reactive to light  Cardiovascular:      Rate and Rhythm: Normal rate and regular rhythm  Pulses: Normal pulses  Heart sounds: Normal heart sounds  No murmur heard  Pulmonary:      Effort: Pulmonary effort is normal  No respiratory distress  Breath sounds: Normal breath sounds  Abdominal:      General: Bowel sounds are normal  There is no distension  Palpations: Abdomen is soft  There is no mass  Tenderness: There is no abdominal tenderness  Musculoskeletal:         General: No swelling or tenderness  Cervical back: Normal range of motion and neck supple  No muscular tenderness  Comments: Right BKA     Skin:     General: Skin is warm and dry  Capillary Refill: Capillary refill takes less than 2 seconds  Findings: No erythema or rash  Comments: Left foot dressing on      Neurological:      General: No focal deficit present  Mental Status: She is alert and oriented to person, place, and time  Mental status is at baseline  Psychiatric:         Mood and Affect: Affect is labile  Behavior: Behavior normal          Thought Content:  Thought content normal          Additional Data:     Labs:    Results from last 7 days   Lab Units 05/19/23  0518 05/17/23  0440 05/15/23  0530   WBC Thousand/uL 7 64   < > 8 25   HEMOGLOBIN g/dL 9 4*   < > 9 7*   HEMATOCRIT % 32 4*   < > 33 5*   PLATELETS Thousands/uL 430*   < > 416*   NEUTROS PCT %  --   --  39*   LYMPHS PCT %  --   --  44   MONOS PCT %  --   --  10   EOS PCT %  --   --  6    < > = values in this interval not displayed  Results from last 7 days   Lab Units 05/19/23  0518   SODIUM mmol/L 134*   POTASSIUM mmol/L 4 3   CHLORIDE mmol/L 101   CO2 mmol/L 28   BUN mg/dL 15   CREATININE mg/dL 0 84   CALCIUM mg/dL 9 6         Results from last 7 days   Lab Units 05/21/23  0721 05/21/23  0452 05/21/23  0031 05/20/23  2117 05/20/23  1619 05/20/23  1115 05/20/23  0743 05/19/23  2116 05/19/23  1623 05/19/23  1124 05/19/23  0704 05/18/23  2146   POC GLUCOSE mg/dl 192* 202* 273* 299* 277* 348* 219* 204* 256* 308* 224* 223*           * I Have Reviewed All Lab Data Listed Above  * Additional Pertinent Lab Tests Reviewed:  [facility] Admission  Reviewed    Imaging:  Imaging Reports Reviewed Today Include: n/a    Recent Cultures (last 7 days):           Last 24 Hours Medication List:   Current Facility-Administered Medications   Medication Dose Route Frequency Provider Last Rate   • acetaminophen  650 mg Oral Q6H PRN Collette Bonus, DPM     • cefazolin  2,000 mg Intravenous Q8H Collette Bonus, DPM 2,000 mg (05/21/23 0448)   • cloNIDine  0 2 mg Oral BID Collette Bonus, DPM     • docusate sodium  100 mg Oral BID JOSEE Whitley     • fenofibrate  48 mg Oral Daily Collette Bonus, DPM     • folic acid  1,773 mcg Oral Daily Collette Bonus, DPM     • heparin (porcine)  5,000 Units Subcutaneous [facility] Collette Bonus, DPM     • hydrOXYzine HCL  100 mg Oral HS Collette Bonus, DPM     • hydrOXYzine HCL  50 mg Oral Daily Collette Bonus, DPM     • insulin glargine  12 Units Subcutaneous HS JOSEE Whitley     • insulin lispro  1-6 Units Subcutaneous TID AC Collette Bonus, DPM     • insulin lispro  1-6 Units Subcutaneous HS Collette Bonus, DPM     • lamoTRIgine  300 mg Oral QAM Rima Badillo, JORDYM     • levothyroxine  50 mcg Oral Early Morning Rima Justino, DPM     • lidocaine  1 patch Topical Daily Rima Badillo, JORDYM     • melatonin  6 mg Oral HS Rima Badillo DPM     • menthol-methyl salicylate   Apply externally 4x Daily PRN Angier Justino, DPM     • methocarbamol  750 mg Oral Q6H PRN Rima Justino, DPM     • ondansetron  4 mg Intravenous Q6H PRN Angier Justino, JORDYM     • oxyCODONE  10 mg Oral Q6H PRN Rima Justino, DPM     • pantoprazole  40 mg Oral BID Rima Badillo DPM     • polyethylene glycol  17 g Oral Daily JOSEE Mccauley     • pregabalin  150 mg Oral BID Rima Badillo DPM     • senna  2 tablet Oral HS JOSEE Mccauley     • ziprasidone  80 mg Oral BID With Meals Rima Badillo DPM          Today, Patient Was Seen By: JOSEE Mccauley    ** Please Note: Dictation voice to text software may have been used in the creation of this document   **

## 2023-05-21 NOTE — ASSESSMENT & PLAN NOTE
Lab Results   Component Value Date    HGBA1C 7 2 (H) 05/05/2023       Recent Labs     05/20/23  2117 05/21/23  0031 05/21/23  0452 05/21/23  0721   POCGLU 299* 273* 202* 192*       Blood Sugar Average: Last 72 hrs:  (P) 234 25   · Insulin sliding scale  Diabetic diet     · Added long acting insulin for better blood glucose coverage  · Patient has been non-compliant with her diet  She has been ordering food/soda/snack to be delivered to her room   This was discussed this at length with patient that a well-controlled blood glucose is a very important part of promoting wound healing

## 2023-05-22 LAB
ANION GAP SERPL CALCULATED.3IONS-SCNC: 7 MMOL/L (ref 4–13)
BUN SERPL-MCNC: 13 MG/DL (ref 5–25)
CALCIUM SERPL-MCNC: 9.3 MG/DL (ref 8.4–10.2)
CHLORIDE SERPL-SCNC: 101 MMOL/L (ref 96–108)
CO2 SERPL-SCNC: 28 MMOL/L (ref 21–32)
CREAT SERPL-MCNC: 0.77 MG/DL (ref 0.6–1.3)
ERYTHROCYTE [DISTWIDTH] IN BLOOD BY AUTOMATED COUNT: 19.9 % (ref 11.6–15.1)
GFR SERPL CREATININE-BSD FRML MDRD: 92 ML/MIN/1.73SQ M
GLUCOSE SERPL-MCNC: 188 MG/DL (ref 65–140)
GLUCOSE SERPL-MCNC: 214 MG/DL (ref 65–140)
GLUCOSE SERPL-MCNC: 216 MG/DL (ref 65–140)
GLUCOSE SERPL-MCNC: 295 MG/DL (ref 65–140)
GLUCOSE SERPL-MCNC: 308 MG/DL (ref 65–140)
HCT VFR BLD AUTO: 31.5 % (ref 34.8–46.1)
HGB BLD-MCNC: 9.2 G/DL (ref 11.5–15.4)
MCH RBC QN AUTO: 21.3 PG (ref 26.8–34.3)
MCHC RBC AUTO-ENTMCNC: 29.2 G/DL (ref 31.4–37.4)
MCV RBC AUTO: 73 FL (ref 82–98)
PLATELET # BLD AUTO: 479 THOUSANDS/UL (ref 149–390)
PMV BLD AUTO: 9.2 FL (ref 8.9–12.7)
POTASSIUM SERPL-SCNC: 4.1 MMOL/L (ref 3.5–5.3)
RBC # BLD AUTO: 4.31 MILLION/UL (ref 3.81–5.12)
SODIUM SERPL-SCNC: 136 MMOL/L (ref 135–147)
WBC # BLD AUTO: 7.36 THOUSAND/UL (ref 4.31–10.16)

## 2023-05-22 RX ADMIN — SENNOSIDES 17.2 MG: 8.6 TABLET, FILM COATED ORAL at 21:36

## 2023-05-22 RX ADMIN — INSULIN LISPRO 2 UNITS: 100 INJECTION, SOLUTION INTRAVENOUS; SUBCUTANEOUS at 11:59

## 2023-05-22 RX ADMIN — CEFAZOLIN SODIUM 2000 MG: 2 SOLUTION INTRAVENOUS at 21:41

## 2023-05-22 RX ADMIN — METHOCARBAMOL TABLETS 750 MG: 500 TABLET, COATED ORAL at 05:29

## 2023-05-22 RX ADMIN — CEFAZOLIN SODIUM 2000 MG: 2 SOLUTION INTRAVENOUS at 05:29

## 2023-05-22 RX ADMIN — HEPARIN SODIUM 5000 UNITS: 5000 INJECTION INTRAVENOUS; SUBCUTANEOUS at 13:18

## 2023-05-22 RX ADMIN — INSULIN GLARGINE 12 UNITS: 100 INJECTION, SOLUTION SUBCUTANEOUS at 21:38

## 2023-05-22 RX ADMIN — OXYCODONE HYDROCHLORIDE 10 MG: 10 TABLET ORAL at 05:28

## 2023-05-22 RX ADMIN — DOCUSATE SODIUM 100 MG: 100 CAPSULE, LIQUID FILLED ORAL at 17:03

## 2023-05-22 RX ADMIN — OXYCODONE HYDROCHLORIDE 10 MG: 10 TABLET ORAL at 17:57

## 2023-05-22 RX ADMIN — CLONIDINE HYDROCHLORIDE 0.2 MG: 0.1 TABLET ORAL at 21:36

## 2023-05-22 RX ADMIN — HEPARIN SODIUM 5000 UNITS: 5000 INJECTION INTRAVENOUS; SUBCUTANEOUS at 21:38

## 2023-05-22 RX ADMIN — Medication 6 MG: at 21:36

## 2023-05-22 RX ADMIN — INSULIN LISPRO 4 UNITS: 100 INJECTION, SOLUTION INTRAVENOUS; SUBCUTANEOUS at 17:03

## 2023-05-22 RX ADMIN — METHOCARBAMOL TABLETS 750 MG: 500 TABLET, COATED ORAL at 23:52

## 2023-05-22 RX ADMIN — OXYCODONE HYDROCHLORIDE 10 MG: 10 TABLET ORAL at 23:52

## 2023-05-22 RX ADMIN — MENTHOL, METHYL SALICYLATE: 10; 15 CREAM TOPICAL at 14:24

## 2023-05-22 RX ADMIN — PANTOPRAZOLE SODIUM 40 MG: 40 TABLET, DELAYED RELEASE ORAL at 21:36

## 2023-05-22 RX ADMIN — LAMOTRIGINE 300 MG: 100 TABLET ORAL at 08:05

## 2023-05-22 RX ADMIN — LEVOTHYROXINE SODIUM 50 MCG: 50 TABLET ORAL at 05:28

## 2023-05-22 RX ADMIN — HYDROXYZINE HYDROCHLORIDE 50 MG: 25 TABLET ORAL at 08:03

## 2023-05-22 RX ADMIN — METHOCARBAMOL TABLETS 750 MG: 500 TABLET, COATED ORAL at 17:56

## 2023-05-22 RX ADMIN — HYDROXYZINE HYDROCHLORIDE 100 MG: 25 TABLET ORAL at 21:36

## 2023-05-22 RX ADMIN — INSULIN LISPRO 4 UNITS: 100 INJECTION, SOLUTION INTRAVENOUS; SUBCUTANEOUS at 21:38

## 2023-05-22 RX ADMIN — FOLIC ACID 1000 MCG: 1 TABLET ORAL at 08:05

## 2023-05-22 RX ADMIN — ZIPRASIDONE HYDROCHLORIDE 80 MG: 40 CAPSULE ORAL at 08:04

## 2023-05-22 RX ADMIN — PREGABALIN 150 MG: 75 CAPSULE ORAL at 17:03

## 2023-05-22 RX ADMIN — MENTHOL, METHYL SALICYLATE: 10; 15 CREAM TOPICAL at 02:45

## 2023-05-22 RX ADMIN — POLYETHYLENE GLYCOL 3350 17 G: 17 POWDER, FOR SOLUTION ORAL at 08:03

## 2023-05-22 RX ADMIN — OXYCODONE HYDROCHLORIDE 10 MG: 10 TABLET ORAL at 11:57

## 2023-05-22 RX ADMIN — HEPARIN SODIUM 5000 UNITS: 5000 INJECTION INTRAVENOUS; SUBCUTANEOUS at 05:28

## 2023-05-22 RX ADMIN — METHOCARBAMOL TABLETS 750 MG: 500 TABLET, COATED ORAL at 11:57

## 2023-05-22 RX ADMIN — ZIPRASIDONE HYDROCHLORIDE 80 MG: 40 CAPSULE ORAL at 17:03

## 2023-05-22 RX ADMIN — PREGABALIN 150 MG: 75 CAPSULE ORAL at 08:04

## 2023-05-22 RX ADMIN — CLONIDINE HYDROCHLORIDE 0.2 MG: 0.1 TABLET ORAL at 08:04

## 2023-05-22 RX ADMIN — PANTOPRAZOLE SODIUM 40 MG: 40 TABLET, DELAYED RELEASE ORAL at 08:05

## 2023-05-22 RX ADMIN — MENTHOL, METHYL SALICYLATE: 10; 15 CREAM TOPICAL at 08:05

## 2023-05-22 RX ADMIN — FENOFIBRATE 48 MG: 48 TABLET, FILM COATED ORAL at 08:04

## 2023-05-22 RX ADMIN — CEFAZOLIN SODIUM 2000 MG: 2 SOLUTION INTRAVENOUS at 13:18

## 2023-05-22 RX ADMIN — DOCUSATE SODIUM 100 MG: 100 CAPSULE, LIQUID FILLED ORAL at 08:04

## 2023-05-22 RX ADMIN — INSULIN LISPRO 2 UNITS: 100 INJECTION, SOLUTION INTRAVENOUS; SUBCUTANEOUS at 08:03

## 2023-05-22 NOTE — NURSING NOTE
AWAKE AND COMPLAINS OF BACK/LEFT KNEE AND ANKLE PAIN SINCE FALL  SLEEPING IN BETWEEN RESP EASY AND CALL TORRES NEAR  02 SAT 97% HR 71/MIN  LEFT FOOT/WOUND DRESSING IS MAINTAINED AND DRY AND INTACT

## 2023-05-22 NOTE — PROGRESS NOTES
[facility] [address]  Progress Note  Name: Mike Barnes  MRN: [id number]  Unit/Bed#: -01 I Date of Admission: 5/8/2023   Date of Service: 5/22/2023 I Hospital Day: 14    Assessment/Plan      * Osteomyelitis of left foot [facility])  Assessment & Plan  · Received from [address] [place] [address] psychiatric patient hartman with MRI results suggesting osteomyelitis of the left first metatarsal  · Podiatry input appreciated   · S/p left first ray resection 5/8; left foot wound wash out due to wound dehiscence 5/16; delayed primary closure 5/18  DI drain removed (5/19)  · Transitioned from doxycycline to cefazolin (5/15)  Can likely transition back to doxycycline upon discharge  · Intraoperative cultures: Staphylococcus aureus    · Wound care, surgical shoe  NWB status on left foot   · PT/OT recommendations appreciated: Postacute rehab  · Case management input appreciated  Awaiting for an authorization from the insurance  Plan for d/c to STR on Monday once this is obtained  Authorization still pending    Hx of right BKA ([facility] [address] )  Assessment & Plan  · History of right BKA 3/20/2023  · Serial assessments and supportive care  · Fall precautions           Gastroesophageal reflux disease without esophagitis  Assessment & Plan  · Continue PPI           Other chronic pain  Assessment & Plan  · Previously on OxyContin for chronic back pain, transitioned to oxycodone 10 mg every 6 hours while in inpatient psychiatric unit   · Monitor for adequate pain control and or signs of oversedation or withdrawal           Type 2 diabetes mellitus with diabetic neuropathy, without long-term current use of insulin [facility])  Assessment & Plan  Lab Results   Component Value Date    HGBA1C 7 2 (H) 05/05/2023       Recent Labs     05/20/23  2117 05/21/23  0031 05/21/23  0452 05/21/23  0721   POCGLU 299* 273* 202* 192*       Blood Sugar Average: Last 72 hrs:  (P) 234 25   · Insulin sliding scale    Diabetic diet     · Added long acting insulin for better blood glucose coverage  · Patient has been non-compliant with her diet  She has been ordering food/soda/snack to be delivered to her room  This was discussed this at length with patient that a well-controlled blood glucose is a very important part of promoting wound healing    Psychiatric illness  Assessment & Plan  · History of bipolar disorder, OCD, PTSD  · Admitted medically from [facility] psychiatric unit  Per discharge summary, psychiatrically cleared for medical admission  The patient is currently homeless  · Denies current suicidal ideation  · Continue medications including Lamictal, hydroxyzine, Geodon  · Supportive care   · Psychiatry reevaluation completed  Continue current plan of care        Acquired hypothyroidism  Assessment & Plan  · Continue levothyroxine                VTE Pharmacologic Prophylaxis: VTE Score: 3 Moderate Risk (Score 3-4) - Pharmacological DVT Prophylaxis Ordered: heparin  Patient Centered Rounds: I performed bedside rounds with nursing staff today  Discussions with Specialists or Other Care Team Provider: DEVON    Education and Discussions with Family / Patient: Patient declined call to   Total Time Spent on Date of Encounter in care of patient: 35 minutes This time was spent on one or more of the following: performing physical exam; counseling and coordination of care; obtaining or reviewing history; documenting in the medical record; reviewing/ordering tests, medications or procedures; communicating with other healthcare professionals and discussing with patient's family/caregivers  Current Length of Stay: 14 day(s)  Current Patient Status: Inpatient   Certification Statement: The patient will continue to require additional inpatient hospital stay due to rehab placement  Discharge Plan: Anticipate discharge tomorrow to rehab facility      Code Status: Level 1 - Full Code    Subjective:   Patient seen and examined with no complaints offered  She is wheeling through the halls in a wheelchair saying that she is avoiding having her blood sugar checked  Objective:     Vitals:   Temp (24hrs), Av 1 °F (36 7 °C), Min:98 °F (36 7 °C), Max:98 2 °F (36 8 °C)    Temp:  [98 °F (36 7 °C)-98 2 °F (36 8 °C)] 98 2 °F (36 8 °C)  HR:  [59-85] 74  Resp:  [20-23] 20  BP: (106-126)/(62-80) 106/72  SpO2:  [95 %-96 %] 96 %  Body mass index is 37 79 kg/m²  Input and Output Summary (last 24 hours): Intake/Output Summary (Last 24 hours) at 2023 1626  Last data filed at 2023 3961  Gross per 24 hour   Intake 120 ml   Output --   Net 120 ml       Physical Exam:   Physical Exam  Vitals and nursing note reviewed  HENT:      Head: Normocephalic and atraumatic  Mouth/Throat:      Pharynx: Oropharynx is clear  Eyes:      Pupils: Pupils are equal, round, and reactive to light  Cardiovascular:      Rate and Rhythm: Normal rate  Pulmonary:      Effort: Pulmonary effort is normal  No respiratory distress  Breath sounds: Normal breath sounds  Abdominal:      General: Bowel sounds are normal       Palpations: Abdomen is soft  Tenderness: There is no abdominal tenderness  Musculoskeletal:      Cervical back: Neck supple  Comments: Right BKA  Left foot dressing clean dry and intact  Skin:     General: Skin is warm and dry  Capillary Refill: Capillary refill takes less than 2 seconds  Neurological:      General: No focal deficit present  Mental Status: She is alert  Psychiatric:         Mood and Affect: Affect is inappropriate           Additional Data:     Labs:  Results from last 7 days   Lab Units 23  0519   WBC Thousand/uL 7 36   HEMOGLOBIN g/dL 9 2*   HEMATOCRIT % 31 5*   PLATELETS Thousands/uL 479*     Results from last 7 days   Lab Units 23  0519   SODIUM mmol/L 136   POTASSIUM mmol/L 4 1   CHLORIDE mmol/L 101   CO2 mmol/L 28   BUN mg/dL 13   CREATININE mg/dL 0 77   ANION GAP mmol/L 7   CALCIUM mg/dL 9 3   GLUCOSE RANDOM mg/dL 188*         Results from last 7 days   Lab Units 05/22/23  1117 05/22/23  0718 05/21/23  2136 05/21/23  1609 05/21/23  1119 05/21/23  0721 05/21/23  0452 05/21/23  0031 05/20/23  2117 05/20/23  1619 05/20/23  1115 05/20/23  0743   POC GLUCOSE mg/dl 216* 214* 322* 270* 238* 192* 202* 273* 299* 277* 348* 219*               Lines/Drains:  Invasive Devices     Peripheral Intravenous Line  Duration           Peripheral IV 05/20/23 Dorsal (posterior); Left Forearm 2 days                      Imaging: Reviewed radiology reports from this admission including: xray(s)    Recent Cultures (last 7 days):         Last 24 Hours Medication List:   Current Facility-Administered Medications   Medication Dose Route Frequency Provider Last Rate   • acetaminophen  650 mg Oral Q6H PRN Rock Quintanilla DPM     • cefazolin  2,000 mg Intravenous Q8H Rock Quintanilla DPM 2,000 mg (05/22/23 1318)   • cloNIDine  0 2 mg Oral BID Rock Quintanilla DPM     • docusate sodium  100 mg Oral BID JOSEE John     • fenofibrate  48 mg Oral Daily Rock Quintanilla DPM     • folic acid  6,556 mcg Oral Daily Rock Quintanilla DPM     • heparin (porcine)  5,000 Units Subcutaneous Q8H [address] Rock Quintanilla DPM     • hydrOXYzine HCL  100 mg Oral HS Rock Quintanilla DPM     • hydrOXYzine HCL  50 mg Oral Daily Rock Quintanilla DPM     • insulin glargine  12 Units Subcutaneous HS JOSEE John     • insulin lispro  1-6 Units Subcutaneous TID AC Rock Quintanilla DPM     • insulin lispro  1-6 Units Subcutaneous HS Rock Quintanilla DPM     • lamoTRIgine  300 mg Oral QAM Rock Quintanilla DPM     • levothyroxine  50 mcg Oral Early Morning Rock Quintanilla DPM     • lidocaine  1 patch Topical Daily Rock Quintanilla DPM     • melatonin  6 mg Oral HS Rock Quintanilla DPM     • menthol-methyl salicylate   Apply externally 4x Daily PRN Rock Quintanilla DPM     • methocarbamol  750 mg Oral Q6H PRN Rock Quintanilla DPM     • ondansetron  4 mg Intravenous Q6H PRN Rock Quintanilla DPM     • oxyCODONE  10 mg Oral Q6H PRN Barbara Salmon DPM     • pantoprazole  40 mg Oral BID Barbara Salmon DPM     • polyethylene glycol  17 g Oral Daily JOSEE Orellana     • pregabalin  150 mg Oral BID Barbara Salmon DPM     • senna  2 tablet Oral HS JOSEE Orellana     • ziprasidone  80 mg Oral BID With Meals Barbara Salmon DPM          Today, Patient Was Seen By: JOSEE Ross    **Please Note: This note may have been constructed using a voice recognition system  **

## 2023-05-22 NOTE — UTILIZATION REVIEW
Continued Stay Review for 5/20/23    Date: 5/22/23                       Current Patient Class: IP  Current Level of Care: MS    HPI:48 y o  female initially admitted on 5/8/23 - DX:  Osteomyelitis of left foot  / Type 2 diabetes mellitus with diabetic neuropathy, without long-term current use of insulin     Assessment/Plan:   5/20/23: pain 3-9/10; Na 134; Gluc 209; H/H 9 4 / 32 4;  / 224 / 308/ 256  Plan: cont iv abx; pain control (see below); monitor labs;  Accuchecks with ssic; dc to rehab - awaiting insurance auth      Vital Signs:   05/20/23 22:41:21 98 2 °F (36 8 °C) 78 20 139/83 102 96 % --   05/20/23 2013 -- -- -- -- -- 95 % None (Room air)   05/20/23 1600 97 9 °F (36 6 °C) -- -- -- -- -- --   05/20/23 15:32:10 -- 69 20 119/76 90 99 % --   05/20/23 0744 97 9 °F (36 6 °C) 58 20 120/71 87 97 % --   05/20/23 0000 -- -- -- -- -- -- None (Room air)         Pertinent Labs/Diagnostic Results:       Results from last 7 days   Lab Units 05/22/23 0519 05/19/23 0518 05/17/23  0440   WBC Thousand/uL 7 36 7 64 9 20   HEMOGLOBIN g/dL 9 2* 9 4* 9 1*   HEMATOCRIT % 31 5* 32 4* 31 3*   PLATELETS Thousands/uL 479* 430* 395*         Results from last 7 days   Lab Units 05/22/23 0519 05/19/23 0518 05/17/23  0440   SODIUM mmol/L 136 134* 135   POTASSIUM mmol/L 4 1 4 3 4 2   CHLORIDE mmol/L 101 101 100   CO2 mmol/L 28 28 27   ANION GAP mmol/L 7 5 8   BUN mg/dL 13 15 16   CREATININE mg/dL 0 77 0 84 0 89   EGFR ml/min/1 73sq m 92 83 77   CALCIUM mg/dL 9 3 9 6 9 2         Results from last 7 days   Lab Units 05/22/23  1117 05/22/23 0718 05/21/23  2136 05/21/23  1609 05/21/23  1119 05/21/23  0721 05/21/23  0452 05/21/23  0031 05/20/23  2117 05/20/23  1619 05/20/23  1115 05/20/23  0743   POC GLUCOSE mg/dl 216* 214* 322* 270* 238* 192* 202* 273* 299* 277* 348* 219*     Results from last 7 days   Lab Units 05/22/23  0519 05/19/23  0518 05/17/23  0440   GLUCOSE RANDOM mg/dL 188* 209* 212*         Medications:   Scheduled Medications:  cefazolin, 2,000 mg, Intravenous, Q8H  cloNIDine, 0 2 mg, Oral, BID  docusate sodium, 100 mg, Oral, BID  fenofibrate, 48 mg, Oral, Daily  folic acid, 5,984 mcg, Oral, Daily  heparin (porcine), 5,000 Units, Subcutaneous, Q8H RADHA  hydrOXYzine HCL, 100 mg, Oral, HS  hydrOXYzine HCL, 50 mg, Oral, Daily  insulin glargine, 12 Units, Subcutaneous, HS  insulin lispro, 1-6 Units, Subcutaneous, TID AC  insulin lispro, 1-6 Units, Subcutaneous, HS  lamoTRIgine, 300 mg, Oral, QAM  levothyroxine, 50 mcg, Oral, Early Morning  lidocaine, 1 patch, Topical, Daily  melatonin, 6 mg, Oral, HS  pantoprazole, 40 mg, Oral, BID  polyethylene glycol, 17 g, Oral, Daily  pregabalin, 150 mg, Oral, BID  senna, 2 tablet, Oral, HS  ziprasidone, 80 mg, Oral, BID With Meals      Continuous IV Infusions: none     PRN Meds:  acetaminophen, 650 mg, Oral, Q6H PRN  menthol-methyl salicylate, , Apply externally, 4x Daily PRN  methocarbamol, 750 mg, Oral, Q6H PRN (5/20 rec'd x3)  ondansetron, 4 mg, Intravenous, Q6H PRN  oxyCODONE, 10 mg, Oral, Q6H PRN  (5/20 rec'd x3)        Discharge Plan: D    Network Utilization Review Department  ATTENTION: Please call with any questions or concerns to [phone] and carefully listen to the prompts so that you are directed to the right person  All voicemails are confidential   Brooke Bruce all requests for admission clinical reviews, approved or denied determinations and any other requests to dedicated fax number below belonging to the campus where the patient is receiving treatment   List of dedicated fax numbers for the Facilities:  [address] [place] [address] DENIALS (Administrative/Medical Necessity) [phone]   [address] [place] [address] (Maternity/NICU/Pediatrics) [phone]   [address] [facility] [address] [phone]   1306 [facility] [phone]     [address] [phone]   750 [facility] [address] [facility] [address] [facility] 310 [facility] 134 815 [facility] [phone]

## 2023-05-22 NOTE — CASE MANAGEMENT
Case Management Discharge Planning Note    Patient name Olegario Juárez  Location /-43 MRN [id number]  : 1975 Date 2023       Current Admission Date: 2023  Current Admission Diagnosis:Osteomyelitis of left foot [facility])   Patient Active Problem List    Diagnosis Date Noted   • Acute osteomyel[facility] metatarsal bone [facility] ) 2023   • Asthma, intrinsic 2023   • Bipolar 1 disorder, depressed ([place] [address] ) 2023   • Hx of right BKA ([place] [address] ) 2023   • Homeless single person 2023   • Morbid obesity with BMI of 40 0-44 9, adult ([place] [address] ) 2023   • Obstructive sleep apnea 2022   • Diastasis of rectus abdominis 2021   • 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 ([place] [address] ) 2021   • Gastroesophageal reflux disease without esophagitis 2021   • Migraine without status migrainosus, not intractable 2021   • Impaired intestinal absorption 2021   • Intestinal malabsorption following gastrectomy 2021   • Right foot ulcer, with fat layer exposed ([place] [address] ) 2021   • Intertriginous dermatitis associated with moisture 2021   • Arthritis 2021   • Other chronic pain 2021   • OCD (obsessive compulsive disorder)    • PTSD (post-traumatic stress disorder)    • Essential hypertension 2021   • Acquired absence of other left toe(s) ([place] [address] ) 2019   • Folate deficiency 2019   • S/P laparoscopic sleeve gastrectomy 2019   • Diabetic foot ulcer ([place] [address] ) 09/10/2019   • Type 2 diabetes mellitus with diabetic neuropathy, without long-term current use of insulin ([place] [address] ) 2019   • Osteomyelitis of left foot ([place] [address] ) 2019   • Acquired hypothyroidism 2019   • Psychiatric illness 2019   • 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, with bone involvement without evidence of necrosis ([place] [address] ) 2019   • H/O bariatric surgery 2019   • Anxiety 2019   • Drusen body, unspecified laterality 01/25/2019   • Radiculopathy, lumbar region 09/12/2018   • Chronic right-sided low back pain with left-sided sciatica 09/04/2018   • Hyperlipidemia associated with type 2 diabetes mellitus ([facility] ) 09/25/2017   • Abnormal x-ray of cervical spine 08/14/2017   • Right cervical radiculopathy 08/11/2017   • Bipolar affective disorder in remission ([facility] ) 04/15/2017   • Amenorrhea 11/29/2016   • Instability of left knee joint 05/25/2016   • Allergy to bee sting 04/25/2016   • Acquired equinovarus deformity of left foot 05/19/2015   • Atypical squamous cell changes of undetermined significance (ASCUS) on cervical cytology with positive high risk human papilloma virus (HPV) 02/23/2015   • Chronic paroxysmal hemicrania, not intractable 02/23/2015      LOS (days): 14  Geometric Mean LOS (GMLOS) (days): 5 60  Days to GMLOS:-8 6     OBJECTIVE:  Risk of Unplanned Readmission Score: 25 46         Current admission status: Inpatient   Preferred Pharmacy:   [address] [place] [address] [address] [place] [address]  Phone: [phone] Fax: [phone]    Primary Care Provider: Daisy Davis MD    Primary Insurance: [address] [place] [address]  Secondary Insurance:     DISCHARGE DETAILS:    Discharge planning discussed with[de-identified] patient  Freedom of Choice: Yes  Comments - Freedom of Choice: recommendation is for rehab- waiting on authorization for [facility]- covid test is needed- [id number] w/c Michelle Majors will need to be cx if no authorization                               Other Referral/Resources/Interventions Provided:  Interventions: Short Term Rehab  Referral Comments: authorization sent- 13:30pm w/c van set up - if no auth then transport needs to be cx- COVID test is needed    Would you like to participate in our [address] service program?  : No - Declined    Treatment Team Recommendation: Short Term Rehab (Emy Wei needed- w/c van 13:30pm) Transport at Discharge : [facility] [address]: Yes  Number/Name of Dispatcher: [phone]  Transported by [employer] and Unit #):  Merly  ETA of Transport (Date): 05/23/23  ETA of Transport (Time): 3747

## 2023-05-22 NOTE — NURSING NOTE
Pt in bed at this time in no acute distress watching tv, had a large hard bm, scant streak of blood noted, pt stated that it was from straining, hospitalist made aware, will cont to monitor

## 2023-05-22 NOTE — PLAN OF CARE
Problem: MOBILITY - ADULT  Goal: Maintain or return to baseline ADL function  Description: INTERVENTIONS:  -  Assess patient's ability to carry out ADLs; assess patient's baseline for ADL function and identify physical deficits which impact ability to perform ADLs (bathing, care of mouth/teeth, toileting, grooming, dressing, etc )  - Assess/evaluate cause of self-care deficits   - Assess range of motion  - Assess patient's mobility; develop plan if impaired  - Assess patient's need for assistive devices and provide as appropriate  - Encourage maximum independence but intervene and supervise when necessary  - Involve family in performance of ADLs  - Assess for home care needs following discharge   - Consider OT consult to assist with ADL evaluation and planning for discharge  - Provide patient education as appropriate  Outcome: Progressing  Goal: Maintains/Returns to pre admission functional level  Description: INTERVENTIONS:  - Perform BMAT or MOVE assessment daily    - Set and communicate daily mobility goal to care team and patient/family/caregiver  - Collaborate with rehabilitation services on mobility goals if consulted  - Perform Range of Motion 2 times a day  - Reposition patient every 2 hours    - Dangle patient 2 times a day  - Stand patient 2 times a day  - Ambulate patient 2 times a day  - Out of bed to chair 3 times a day   - Out of bed for meals 3 times a day  - Out of bed for toileting  - Record patient progress and toleration of activity level   Outcome: Progressing     Problem: PAIN - ADULT  Goal: Verbalizes/displays adequate comfort level or baseline comfort level  Description: Interventions:  - Encourage patient to monitor pain and request assistance  - Assess pain using appropriate pain scale  - Administer analgesics based on type and severity of pain and evaluate response  - Implement non-pharmacological measures as appropriate and evaluate response  - Consider cultural and social influences on pain and pain management  - Notify physician/advanced practitioner if interventions unsuccessful or patient reports new pain  Outcome: Progressing     Problem: INFECTION - ADULT  Goal: Absence or prevention of progression during hospitalization  Description: INTERVENTIONS:  - Assess and monitor for signs and symptoms of infection  - Monitor lab/diagnostic results  - Monitor all insertion sites, i e  indwelling lines, tubes, and drains  - Monitor endotracheal if appropriate and nasal secretions for changes in amount and color  - Chester appropriate cooling/warming therapies per order  - Administer medications as ordered  - Instruct and encourage patient and family to use good hand hygiene technique  - Identify and instruct in appropriate isolation precautions for identified infection/condition  Outcome: Progressing  Goal: Absence of fever/infection during neutropenic period  Description: INTERVENTIONS:  - Monitor WBC    Outcome: Progressing     Problem: SAFETY ADULT  Goal: Maintain or return to baseline ADL function  Description: INTERVENTIONS:  -  Assess patient's ability to carry out ADLs; assess patient's baseline for ADL function and identify physical deficits which impact ability to perform ADLs (bathing, care of mouth/teeth, toileting, grooming, dressing, etc )  - Assess/evaluate cause of self-care deficits   - Assess range of motion  - Assess patient's mobility; develop plan if impaired  - Assess patient's need for assistive devices and provide as appropriate  - Encourage maximum independence but intervene and supervise when necessary  - Involve family in performance of ADLs  - Assess for home care needs following discharge   - Consider OT consult to assist with ADL evaluation and planning for discharge  - Provide patient education as appropriate  Outcome: Progressing  Goal: Maintains/Returns to pre admission functional level  Description: INTERVENTIONS:  - Perform BMAT or MOVE assessment daily    - Set and communicate daily mobility goal to care team and patient/family/caregiver  - Collaborate with rehabilitation services on mobility goals if consulted  - Perform Range of Motion 2 times a day  - Reposition patient every 2 hours    - Dangle patient 2 times a day  - Stand patient 2 times a day  - Ambulate patient 2 times a day  - Out of bed to chair 3 times a day   - Out of bed for meals 3 times a day  - Out of bed for toileting  - Record patient progress and toleration of activity level   Outcome: Progressing  Goal: Patient will remain free of falls  Description: INTERVENTIONS:  - Educate patient/family on patient safety including physical limitations  - Instruct patient to call for assistance with activity   - Consult OT/PT to assist with strengthening/mobility   - Keep Call bell within reach  - Keep bed low and locked with side rails adjusted as appropriate  - Keep care items and personal belongings within reach  - Initiate and maintain comfort rounds  - Make Fall Risk Sign visible to staff  - Offer Toileting every 2 Hours, in advance of need  - Initiate/Maintain bed alarm  - Obtain necessary fall risk management equipment  - Apply yellow socks and bracelet for high fall risk patients  - Consider moving patient to room near nurses station  Outcome: Progressing     Problem: SKIN/TISSUE INTEGRITY - ADULT  Goal: Incision(s), wounds(s) or drain site(s) healing without S/S of infection  Description: INTERVENTIONS  - Assess and document dressing, incision, wound bed, drain sites and surrounding tissue  - Provide patient and family education  - Perform skin care/dressing changes every shift  Outcome: Progressing  Goal: Pressure injury heals and does not worsen  Description: Interventions:  - Implement low air loss mattress or specialty surface (Criteria met)  - Apply silicone foam dressing  - Instruct/assist with weight shifting every 60 minutes when in chair   - Limit chair time to 3 hour intervals  - Use special pressure reducing interventions such as waffle cushion when in chair   - Apply fecal or urinary incontinence containment device   - Perform passive or active ROM every 4 hrs  - Turn and reposition patient & offload bony prominences every 2 hours   - Utilize friction reducing device or surface for transfers   - Consider consults to  interdisciplinary teams such as wound and podiatry   - Use incontinent care products after each incontinent episode such as foam cleanser  - Consider nutrition services referral as needed  Outcome: Progressing

## 2023-05-22 NOTE — CASE MANAGEMENT
Called insurance to check status of submitted authorization request ([phone])  Spoke with Kristen who stated that the authorization case has been created and is still pending  Assigned pending ref#: [id number]  CM notified

## 2023-05-22 NOTE — PLAN OF CARE
Problem: MOBILITY - ADULT  Goal: Maintain or return to baseline ADL function  Description: INTERVENTIONS:  -  Assess patient's ability to carry out ADLs; assess patient's baseline for ADL function and identify physical deficits which impact ability to perform ADLs (bathing, care of mouth/teeth, toileting, grooming, dressing, etc )  - Assess/evaluate cause of self-care deficits   - Assess range of motion  - Assess patient's mobility; develop plan if impaired  - Assess patient's need for assistive devices and provide as appropriate  - Encourage maximum independence but intervene and supervise when necessary  - Involve family in performance of ADLs  - Assess for home care needs following discharge   - Consider OT consult to assist with ADL evaluation and planning for discharge  - Provide patient education as appropriate  Outcome: Progressing  Goal: Maintains/Returns to pre admission functional level  Description: INTERVENTIONS:  - Perform BMAT or MOVE assessment daily    - Set and communicate daily mobility goal to care team and patient/family/caregiver  - Collaborate with rehabilitation services on mobility goals if consulted  - Perform Range of Motion 2 times a day  - Reposition patient every 2 hours    - Dangle patient 2 times a day  - Stand patient 2 times a day  - Ambulate patient 2 times a day  - Out of bed to chair 3 times a day   - Out of bed for meals 3 times a day  - Out of bed for toileting  - Record patient progress and toleration of activity level   Outcome: Progressing     Problem: PAIN - ADULT  Goal: Verbalizes/displays adequate comfort level or baseline comfort level  Description: Interventions:  - Encourage patient to monitor pain and request assistance  - Assess pain using appropriate pain scale  - Administer analgesics based on type and severity of pain and evaluate response  - Implement non-pharmacological measures as appropriate and evaluate response  - Consider cultural and social influences on pain and pain management  - Notify physician/advanced practitioner if interventions unsuccessful or patient reports new pain  Outcome: Progressing     Problem: INFECTION - ADULT  Goal: Absence or prevention of progression during hospitalization  Description: INTERVENTIONS:  - Assess and monitor for signs and symptoms of infection  - Monitor lab/diagnostic results  - Monitor all insertion sites, i e  indwelling lines, tubes, and drains  - Monitor endotracheal if appropriate and nasal secretions for changes in amount and color  - [place] appropriate cooling/warming therapies per order  - Administer medications as ordered  - Instruct and encourage patient and family to use good hand hygiene technique  - Identify and instruct in appropriate isolation precautions for identified infection/condition  Outcome: Progressing  Goal: Absence of fever/infection during neutropenic period  Description: INTERVENTIONS:  - Monitor WBC    Outcome: Progressing     Problem: SAFETY ADULT  Goal: Maintain or return to baseline ADL function  Description: INTERVENTIONS:  -  Assess patient's ability to carry out ADLs; assess patient's baseline for ADL function and identify physical deficits which impact ability to perform ADLs (bathing, care of mouth/teeth, toileting, grooming, dressing, etc )  - Assess/evaluate cause of self-care deficits   - Assess range of motion  - Assess patient's mobility; develop plan if impaired  - Assess patient's need for assistive devices and provide as appropriate  - Encourage maximum independence but intervene and supervise when necessary  - Involve family in performance of ADLs  - Assess for home care needs following discharge   - Consider OT consult to assist with ADL evaluation and planning for discharge  - Provide patient education as appropriate  Outcome: Progressing  Goal: Maintains/Returns to pre admission functional level  Description: INTERVENTIONS:  - Perform BMAT or MOVE assessment daily    - Set and communicate daily mobility goal to care team and patient/family/caregiver  - Collaborate with rehabilitation services on mobility goals if consulted  - Perform Range of Motion 2 times a day  - Reposition patient every 2 hours    - Dangle patient 2 times a day  - Stand patient 2 times a day  - Ambulate patient 2 times a day  - Out of bed to chair 3 times a day   - Out of bed for meals 3 times a day  - Out of bed for toileting  - Record patient progress and toleration of activity level   Outcome: Progressing  Goal: Patient will remain free of falls  Description: INTERVENTIONS:  - Educate patient/family on patient safety including physical limitations  - Instruct patient to call for assistance with activity   - Consult OT/PT to assist with strengthening/mobility   - Keep Call bell within reach  - Keep bed low and locked with side rails adjusted as appropriate  - Keep care items and personal belongings within reach  - Initiate and maintain comfort rounds  - Make Fall Risk Sign visible to staff  - Offer Toileting every 2 Hours, in advance of need  - Initiate/Maintain bed alarm  - Obtain necessary fall risk management equipment  - Apply yellow socks and bracelet for high fall risk patients  - Consider moving patient to room near nurses station  Outcome: Progressing     Problem: DISCHARGE PLANNING  Goal: Discharge to home or other facility with appropriate resources  Description: INTERVENTIONS:  - Identify barriers to discharge w/patient and caregiver  - Arrange for needed discharge resources and transportation as appropriate  - Identify discharge learning needs (meds, wound care, etc )  - Refer to Case Management Department for coordinating discharge planning if the patient needs post-hospital services based on physician/advanced practitioner order or complex needs related to functional status, cognitive ability, or social support system  Outcome: Progressing     Problem: Knowledge Deficit  Goal: Patient/family/caregiver demonstrates understanding of disease process, treatment plan, medications, and discharge instructions  Description: Complete learning assessment and assess knowledge base    Interventions:  - Provide teaching at level of understanding  - Provide teaching via preferred learning methods  Outcome: Progressing     Problem: SKIN/TISSUE INTEGRITY - ADULT  Goal: Incision(s), wounds(s) or drain site(s) healing without S/S of infection  Description: INTERVENTIONS  - Assess and document dressing, incision, wound bed, drain sites and surrounding tissue  - Provide patient and family education  - Perform skin care/dressing changes every shift  Outcome: Progressing  Goal: Pressure injury heals and does not worsen  Description: Interventions:  - Implement low air loss mattress or specialty surface (Criteria met)  - Apply silicone foam dressing  - Instruct/assist with weight shifting every 60 minutes when in chair   - Limit chair time to 3 hour intervals  - Use special pressure reducing interventions such as waffle cushion when in chair   - Apply fecal or urinary incontinence containment device   - Perform passive or active ROM every 4 hrs  - Turn and reposition patient & offload bony prominences every 2 hours   - Utilize friction reducing device or surface for transfers   - Consider consults to  interdisciplinary teams such as wound and podiatry   - Use incontinent care products after each incontinent episode such as foam cleanser  - Consider nutrition services referral as needed  Outcome: Progressing     Problem: Prexisting or High Potential for Compromised Skin Integrity  Goal: Skin integrity is maintained or improved  Description: INTERVENTIONS:  - Identify patients at risk for skin breakdown  - Assess and monitor skin integrity  - Assess and monitor nutrition and hydration status  - Monitor labs   - Assess for incontinence   - Turn and reposition patient  - Assist with mobility/ambulation  - Relieve pressure over bony prominences  - Avoid friction and shearing  - Provide appropriate hygiene as needed including keeping skin clean and dry  - Evaluate need for skin moisturizer/barrier cream  - Collaborate with interdisciplinary team   - Patient/family teaching  - Consider wound care consult   Outcome: Progressing     Problem: Potential for Falls  Goal: Patient will remain free of falls  Description: INTERVENTIONS:  - Educate patient/family on patient safety including physical limitations  - Instruct patient to call for assistance with activity   - Consult OT/PT to assist with strengthening/mobility   - Keep Call bell within reach  - Keep bed low and locked with side rails adjusted as appropriate  - Keep care items and personal belongings within reach  - Initiate and maintain comfort rounds  - Make Fall Risk Sign visible to staff  - Offer Toileting every 2 Hours, in advance of need  - Initiate/Maintain bed alarm  - Obtain necessary fall risk management equipment  - Apply yellow socks and bracelet for high fall risk patients  - Consider moving patient to room near nurses station  Outcome: Progressing

## 2023-05-23 VITALS
TEMPERATURE: 98.4 F | OXYGEN SATURATION: 98 % | WEIGHT: 227.07 LBS | BODY MASS INDEX: 37.83 KG/M2 | SYSTOLIC BLOOD PRESSURE: 118 MMHG | DIASTOLIC BLOOD PRESSURE: 71 MMHG | RESPIRATION RATE: 18 BRPM | HEART RATE: 64 BPM | HEIGHT: 65 IN

## 2023-05-23 LAB
GLUCOSE SERPL-MCNC: 256 MG/DL (ref 65–140)
GLUCOSE SERPL-MCNC: 258 MG/DL (ref 65–140)

## 2023-05-23 RX ORDER — OXYCODONE HCL 10 MG/1
10 TABLET, FILM COATED, EXTENDED RELEASE ORAL EVERY 12 HOURS SCHEDULED
Qty: 20 TABLET | Refills: 0 | Status: SHIPPED | OUTPATIENT
Start: 2023-05-23 | End: 2023-06-02

## 2023-05-23 RX ORDER — DOCUSATE SODIUM 100 MG/1
100 CAPSULE, LIQUID FILLED ORAL 2 TIMES DAILY
Refills: 0
Start: 2023-05-23

## 2023-05-23 RX ORDER — LAMOTRIGINE 200 MG/1
250 TABLET ORAL EVERY MORNING
Qty: 30 TABLET | Refills: 0 | Status: SHIPPED | OUTPATIENT
Start: 2023-05-23 | End: 2023-06-22

## 2023-05-23 RX ORDER — SENNOSIDES 8.6 MG
17.2 TABLET ORAL
Refills: 0
Start: 2023-05-23

## 2023-05-23 RX ORDER — INSULIN GLARGINE 100 [IU]/ML
12 INJECTION, SOLUTION SUBCUTANEOUS
Qty: 10 ML | Refills: 0
Start: 2023-05-23

## 2023-05-23 RX ORDER — INSULIN LISPRO 100 [IU]/ML
1-6 INJECTION, SOLUTION INTRAVENOUS; SUBCUTANEOUS
Refills: 0
Start: 2023-05-23

## 2023-05-23 RX ORDER — POLYETHYLENE GLYCOL 3350 17 G/17G
17 POWDER, FOR SOLUTION ORAL DAILY
Refills: 0
Start: 2023-05-23

## 2023-05-23 RX ORDER — ACETAMINOPHEN 325 MG/1
650 TABLET ORAL EVERY 6 HOURS PRN
Refills: 0
Start: 2023-05-23

## 2023-05-23 RX ORDER — DOXYCYCLINE HYCLATE 100 MG/1
100 CAPSULE ORAL EVERY 12 HOURS SCHEDULED
Qty: 28 CAPSULE | Refills: 0
Start: 2023-05-23 | End: 2023-06-06

## 2023-05-23 RX ADMIN — HYDROXYZINE HYDROCHLORIDE 50 MG: 25 TABLET ORAL at 09:33

## 2023-05-23 RX ADMIN — PANTOPRAZOLE SODIUM 40 MG: 40 TABLET, DELAYED RELEASE ORAL at 09:35

## 2023-05-23 RX ADMIN — LEVOTHYROXINE SODIUM 50 MCG: 50 TABLET ORAL at 05:16

## 2023-05-23 RX ADMIN — CEFAZOLIN SODIUM 2000 MG: 2 SOLUTION INTRAVENOUS at 12:26

## 2023-05-23 RX ADMIN — DOCUSATE SODIUM 100 MG: 100 CAPSULE, LIQUID FILLED ORAL at 09:34

## 2023-05-23 RX ADMIN — OXYCODONE HYDROCHLORIDE 10 MG: 10 TABLET ORAL at 06:00

## 2023-05-23 RX ADMIN — METHOCARBAMOL TABLETS 750 MG: 500 TABLET, COATED ORAL at 13:44

## 2023-05-23 RX ADMIN — INSULIN LISPRO 3 UNITS: 100 INJECTION, SOLUTION INTRAVENOUS; SUBCUTANEOUS at 11:39

## 2023-05-23 RX ADMIN — INSULIN LISPRO 3 UNITS: 100 INJECTION, SOLUTION INTRAVENOUS; SUBCUTANEOUS at 07:39

## 2023-05-23 RX ADMIN — HEPARIN SODIUM 5000 UNITS: 5000 INJECTION INTRAVENOUS; SUBCUTANEOUS at 05:16

## 2023-05-23 RX ADMIN — FOLIC ACID 1000 MCG: 1 TABLET ORAL at 09:34

## 2023-05-23 RX ADMIN — CLONIDINE HYDROCHLORIDE 0.2 MG: 0.1 TABLET ORAL at 09:34

## 2023-05-23 RX ADMIN — CEFAZOLIN SODIUM 2000 MG: 2 SOLUTION INTRAVENOUS at 05:16

## 2023-05-23 RX ADMIN — OXYCODONE HYDROCHLORIDE 10 MG: 10 TABLET ORAL at 13:44

## 2023-05-23 RX ADMIN — METHOCARBAMOL TABLETS 750 MG: 500 TABLET, COATED ORAL at 06:00

## 2023-05-23 RX ADMIN — FENOFIBRATE 48 MG: 48 TABLET, FILM COATED ORAL at 09:32

## 2023-05-23 RX ADMIN — ZIPRASIDONE HYDROCHLORIDE 80 MG: 40 CAPSULE ORAL at 07:38

## 2023-05-23 RX ADMIN — LAMOTRIGINE 300 MG: 100 TABLET ORAL at 09:34

## 2023-05-23 RX ADMIN — PREGABALIN 150 MG: 75 CAPSULE ORAL at 09:34

## 2023-05-23 NOTE — DISCHARGE SUMMARY
[facility]  Discharge- Maureen Novak 1975, 53 y o  female MRN: [id number]  Unit/Bed#: -01 Encounter: [id number]  Primary Care Provider: Mike Fried MD   Date and time admitted to hospital: 5/8/2023 12:38 PM    * Osteomyelitis of left foot [facility])  Assessment & Plan  · Received from [address] [place] [address] psychiatric inpatient [facility] with MRI results suggesting osteomyelitis of the left first metatarsal   · S/p left first ray resection 5/8; left foot wound wash out due to wound dehiscence 5/16; delayed primary closure 5/18  DI drain removed (5/19)  · Transitioned from doxycycline to cefazolin (5/15)  Can likely transition back to doxycycline upon discharge  · Intraoperative cultures: Staphylococcus aureus    · Wound care, surgical shoe  NWB status on left foot   · PT/OT recommendations appreciated: Postacute rehab  · Case management input appreciated  Authorization received, patient to be transported to [facility] rehab in [place] at 1:30 PM    Hx of right BKA [facility])  Assessment & Plan  · History of right BKA 3/20/2023  · Serial assessments and supportive care  · Fall precautions           Gastroesophageal reflux disease without esophagitis  Assessment & Plan  · Continue PPI           Other chronic pain  Assessment & Plan  · Previously on OxyContin for chronic back pain, transitioned to oxycodone 10 mg every 6 hours while in inpatient psychiatric unit   · Monitor for adequate pain control and or signs of oversedation or withdrawal           Type 2 diabetes mellitus with diabetic neuropathy, without long-term current use of insulin [facility])  Assessment & Plan  Lab Results   Component Value Date    HGBA1C 7 2 (H) 05/05/2023       Recent Labs     05/22/23  1117 05/22/23  1631 05/22/23  2101 05/23/23  0724   POCGLU 216* 295* 308* 256*       Blood Sugar Average: Last 72 hrs:  (P) 023 [id number]   · Insulin sliding scale    Diabetic diet     · Added long acting insulin for better blood glucose coverage  · Patient has been non-compliant with her diet  She has been ordering food/soda/snack to be delivered to her room  This was discussed this at length with patient that a well-controlled blood glucose is a very important part of promoting wound healing    Psychiatric illness  Assessment & Plan  · History of bipolar disorder, OCD, PTSD  · Admitted medically from [place] [address] psychiatric unit  Per discharge summary, psychiatrically cleared for medical admission  The patient is currently homeless  · Denies current suicidal ideation  · Continue medications including Lamictal, hydroxyzine, Geodon  · Supportive care   · Psychiatry reevaluation completed  Continue current plan of care        Acquired hypothyroidism  Assessment & Plan  · Continue levothyroxine           Medical Problems     Resolved Problems  Date Reviewed: 5/23/2023   None       Discharging Physician / Practitioner: JOSEE Fortune  PCP: Fallon Mckeon MD  Admission Date:   Admission Orders (From admission, onward)     Ordered        05/08/23 1321  Inpatient Admission  Once                      Discharge Date: 05/23/23    Consultations During Hospital Stay:  · Podiatry and behavioral health  Procedures Performed:   · Left 1st metatarsal resection with delayed primary closure    Significant Findings / Test Results:   · MRI foot/forefoot toes left without contrast 5/6/2023: Status post first transmetatarsal amputation with adjacent plantar/medial skin ulceration and some marrow change at the residual distal aspect of the first metatarsal concerning for osteomyelitis  · Left foot x-ray 5/15/2023: Soft tissue swelling on the dorsum of the forefoot  No soft tissue gas seen, there is no periostitis  · Left foot x-ray 5/22/2023: No acute osseous abnormality    Complications: Patient was found kneeling in front of her recliner on 5/21/2023 in the evening  Imaging completed did not show any evidence of acute injury      Reason for "Admission: Left metatarsal osteomyelitis    Hospital Course:   Reg Borrego is a 53 y o  female patient who originally presented to the hospital from the acute inpatient psych [facility] on 5/8/2023 due to imaging suggesting left foot osteomyelitis  She had a previous amputation of the left metatarsal, and had a right BKA in March 2023  She was being treated in the inpatient psychiatric unit and was transferred from there  She was deemed psychiatrically stable at that time  She was taken to the OR the same day of admission and had the remaining portion of the left first removed by podiatry  This management began working toward rehab placement  On 5/15/2023, plan per podiatry for repeat wound washout  She had a delayed primary closure with DI drain  She continued on antibiotics  On 5/20/2023, attempts at transferring patient to a rehab resumed  Authorization process was started and patient was able to be transferred to [facility] rehab 5/23/2023  This is a brief discharge summary  Please see full patient chart for additional details  Condition at Discharge: stable    Discharge Day Visit / Exam:   Subjective: Patient seen and examined with no complaints offered  She said she wanted to rest    Vitals: Blood Pressure: 118/71 (05/23/23 0724)  Pulse: 64 (05/23/23 0724)  Temperature: 98 4 °F (36 9 °C) (05/22/23 2240)  Temp Source: Temporal (05/21/23 0700)  Respirations: 18 (05/23/23 0724)  Height: 5' 5\" (165 1 cm) (05/08/23 1405)  Weight - Scale: 103 kg (227 lb 1 2 oz) (05/08/23 1405)  SpO2: 97 % (05/23/23 0724)  Exam:   Physical Exam  Vitals and nursing note reviewed  Constitutional:       General: She is not in acute distress  HENT:      Head: Normocephalic and atraumatic  Mouth/Throat:      Pharynx: Oropharynx is clear  Eyes:      Pupils: Pupils are equal, round, and reactive to light  Cardiovascular:      Rate and Rhythm: Normal rate     Pulmonary:      Effort: Pulmonary effort is normal  No " respiratory distress  Breath sounds: Normal breath sounds  Abdominal:      General: Bowel sounds are normal       Palpations: Abdomen is soft  Tenderness: There is no abdominal tenderness  Musculoskeletal:      Cervical back: Neck supple  Comments: Right BKA, left foot dressing clean dry and intact   Skin:     General: Skin is warm and dry  Capillary Refill: Capillary refill takes less than 2 seconds  Neurological:      General: No focal deficit present  Mental Status: She is alert  Discussion with Family: Patient declined call to   Discharge instructions/Information to patient and family:   See after visit summary for information provided to patient and family  Provisions for Follow-Up Care:  See after visit summary for information related to follow-up care and any pertinent home health orders  Disposition:   Acute Rehab at [facility]  Planned Readmission: No     Discharge Statement:  I spent 55 minutes discharging the patient  This time was spent on the day of discharge  I had direct contact with the patient on the day of discharge  Greater than 50% of the total time was spent examining patient, answering all patient questions, arranging and discussing plan of care with patient as well as directly providing post-discharge instructions  Additional time then spent on discharge activities  Discharge Medications:  See after visit summary for reconciled discharge medications provided to patient and/or family        **Please Note: This note may have been constructed using a voice recognition system**

## 2023-05-23 NOTE — CASE MANAGEMENT
Case Management Discharge Planning Note    Patient name Jessica Contreras  Location /-53 MRN [id number]  : 1975 Date 2023       Current Admission Date: 2023  Current Admission Diagnosis:Osteomyelitis of left foot [facility])   Patient Active Problem List    Diagnosis Date Noted   • Acute osteomyelitis of metatarsal bone of right foot ([facility] ) 2023   • Asthma, intrinsic 2023   • Bipolar 1 disorder, depressed ([place] [address] ) 2023   • Hx of right BKA ([place] [address] ) 2023   • Homeless single person 2023   • Morbid obesity with BMI of 40 0-44 9, adult ([place] [address] ) 2023   • Obstructive sleep apnea 2022   • Diastasis of rectus abdominis 2021   • 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 ([place] [address] ) 2021   • Gastroesophageal reflux disease without esophagitis 2021   • Migraine without status migrainosus, not intractable 2021   • Impaired intestinal absorption 2021   • Intestinal malabsorption following gastrectomy 2021   • Right foot ulcer, with fat layer exposed ([place] [address] ) 2021   • Intertriginous dermatitis associated with moisture 2021   • Arthritis 2021   • Other chronic pain 2021   • OCD (obsessive compulsive disorder)    • PTSD (post-traumatic stress disorder)    • Essential hypertension 2021   • Acquired absence of other left toe(s) ([place] [address] ) 2019   • Folate deficiency 2019   • S/P laparoscopic sleeve gastrectomy 2019   • Diabetic foot ulcer ([place] [address] ) 09/10/2019   • Type 2 diabetes mellitus with diabetic neuropathy, without long-term current use of insulin ([place] [address] ) 2019   • Osteomyelitis of left foot ([place] [address] ) 2019   • Acquired hypothyroidism 2019   • Psychiatric illness 2019   • 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, with bone involvement without evidence of necrosis ([place] [address] ) 2019   • H/O bariatric surgery 2019   • Anxiety 2019   • Drusen body, unspecified laterality 01/25/2019   • Radiculopathy, lumbar region 09/12/2018   • Chronic right-sided low back pain with left-sided sciatica 09/04/2018   • Hyperlipidemia associated with type 2 diabetes mellitus ([facility] ) 09/25/2017   • Abnormal x-ray of cervical spine 08/14/2017   • Right cervical radiculopathy 08/11/2017   • Bipolar affective disorder in remission ([facility] ) 04/15/2017   • Amenorrhea 11/29/2016   • Instability of left knee joint 05/25/2016   • Allergy to bee sting 04/25/2016   • Acquired equinovarus deformity of left foot 05/19/2015   • Atypical squamous cell changes of undetermined significance (ASCUS) on cervical cytology with positive high risk human papilloma virus (HPV) 02/23/2015   • Chronic paroxysmal hemicrania, not intractable 02/23/2015      LOS (days): 15  Geometric Mean LOS (GMLOS) (days): 5 60  Days to GMLOS:-9 2     OBJECTIVE:  Risk of Unplanned Readmission Score: 25 68         Current admission status: Inpatient   Preferred Pharmacy:   [facility] [address] [address]  Phone: [phone] Fax: [phone]    Primary Care Provider: Dominic Cheney MD    Primary Insurance: [address] [place] [address]  Secondary Insurance:     DISCHARGE DETAILS:    Discharge planning discussed with[de-identified] Patient  Freedom of Choice: Yes  Comments - Freedom of Choice: Auth approved for [facility]  CM contacted family/caregiver?: No- see comments (Patient will update son)     Additional Comments: Call to Sherrill at [facility] updated on [address] [facility] [address]  Auth information sent to facility via [employer]    Updated Bebeto Gonzáles, Nurse Sheeba Irvin and patientt of discharge    Accepting Facility Name, [address] : [facility]  Receiving Facility/Agency Phone Number: [phone]  Facility/Agency Fax Number: [phone]

## 2023-05-23 NOTE — CASE MANAGEMENT
Case Management Discharge Planning Note    Patient name Osker [place]  Location /-59 MRN [id number]  : 1975 Date 2023       Current Admission Date: 2023  Current Admission Diagnosis:Osteomyelitis of left foot [facility])   Patient Active Problem List    Diagnosis Date Noted   • Acute osteomyel[facility] of metatarsal bone of right foot [facility] ) 2023   • Asthma, intrinsic 2023   • Bipolar 1 disorder, depressed ([place] [address] ) 2023   • Hx of right BKA ([place] [address] ) 2023   • Homeless single person 2023   • Morbid obesity with BMI of 40 0-44 9, adult ([place] [address] ) 2023   • Obstructive sleep apnea 2022   • Diastasis of rectus abdominis 2021   • 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 ([place] [address] ) 2021   • Gastroesophageal reflux disease without esophagitis 2021   • Migraine without status migrainosus, not intractable 2021   • Impaired intestinal absorption 2021   • Intestinal malabsorption following gastrectomy 2021   • Right foot ulcer, with fat layer exposed ([place] [address] ) 2021   • Intertriginous dermatitis associated with moisture 2021   • Arthritis 2021   • Other chronic pain 2021   • OCD (obsessive compulsive disorder)    • PTSD (post-traumatic stress disorder)    • Essential hypertension 2021   • Acquired absence of other left toe(s) ([place] [address] ) 2019   • Folate deficiency 2019   • S/P laparoscopic sleeve gastrectomy 2019   • Diabetic foot ulcer ([place] [address] ) 09/10/2019   • Type 2 diabetes mellitus with diabetic neuropathy, without long-term current use of insulin ([place] [address] ) 2019   • Osteomyelitis of left foot ([place] [address] ) 2019   • Acquired hypothyroidism 2019   • Psychiatric illness 2019   • 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, with bone involvement without evidence of necrosis ([place] [address] ) 2019   • H/O bariatric surgery 2019   • Anxiety 2019   • Drusen body, unspecified laterality 01/25/2019   • Radiculopathy, lumbar region 09/12/2018   • Chronic right-sided low back pain with left-sided sciatica 09/04/2018   • Hyperlipidemia associated with type 2 diabetes mellitus ([facility] ) 09/25/2017   • Abnormal x-ray of cervical spine 08/14/2017   • Right cervical radiculopathy 08/11/2017   • Bipolar affective disorder in remission ([facility] ) 04/15/2017   • Amenorrhea 11/29/2016   • Instability of left knee joint 05/25/2016   • Allergy to bee sting 04/25/2016   • Acquired equinovarus deformity of left foot 05/19/2015   • Atypical squamous cell changes of undetermined significance (ASCUS) on cervical cytology with positive high risk human papilloma virus (HPV) 02/23/2015   • Chronic paroxysmal hemicrania, not intractable 02/23/2015      LOS (days): 15  Geometric Mean LOS (GMLOS) (days): 5 60  Days to GMLOS:-9 2     OBJECTIVE:  Risk of Unplanned Readmission Score: 25 68         Current admission status: Inpatient   Preferred Pharmacy:   [address] [place] [address] [address] [place] [address] [address] [address]  Phone: [phone] Fax: [phone]    Primary Care Provider: Jamil Ahuja MD    Primary Insurance: [address]:     [address] [place] [address] Avenue Number: [id number]

## 2023-05-23 NOTE — ASSESSMENT & PLAN NOTE
· Received from [address] [place] [address] psychiatric inpatient [facility] with MRI results suggesting osteomyelitis of the left first metatarsal   · S/p left first ray resection 5/8; left foot wound wash out due to wound dehiscence 5/16; delayed primary closure 5/18  DI drain removed (5/19)  · Transitioned from doxycycline to cefazolin (5/15)  Can likely transition back to doxycycline upon discharge  · Intraoperative cultures: Staphylococcus aureus    · Wound care, surgical shoe  NWB status on left foot   · PT/OT recommendations appreciated: Postacute rehab  · Case management input appreciated    Authorization received, patient to be transported to [facility] at 1:30 PM

## 2023-05-23 NOTE — CASE MANAGEMENT
Lauren Manuel 51 has received approved authorization from insurance:   2005 [employer] Young received for: SNF  Facility: [facility] #: [id number]  Start of Care: 5/22  Next Review Date: 5/29  P#: [phone]  Submit next review to: [address] [place] [address] notified: Rashad De Oliveira

## 2023-05-23 NOTE — NURSING NOTE
IV site removed  Discharge transfer instructions sent with patient  Report called to [facility], given to [employer]

## 2023-05-23 NOTE — ASSESSMENT & PLAN NOTE
Lab Results   Component Value Date    HGBA1C 7 2 (H) 05/05/2023       Recent Labs     05/22/23  1117 05/22/23  1631 05/22/23  2101 05/23/23  0724   POCGLU 216* 295* 308* 256*       Blood Sugar Average: Last 72 hrs:  (P) 337 [id number]   · Insulin sliding scale  Diabetic diet     · Added long acting insulin for better blood glucose coverage  · Patient has been non-compliant with her diet  She has been ordering food/soda/snack to be delivered to her room   This was discussed this at length with patient that a well-controlled blood glucose is a very important part of promoting wound healing

## 2023-05-23 NOTE — ASSESSMENT & PLAN NOTE
· History of bipolar disorder, OCD, PTSD  · Admitted medically from [place] [address] psychiatric unit  Per discharge summary, psychiatrically cleared for medical admission  The patient is currently homeless  · Denies current suicidal ideation  · Continue medications including Lamictal, hydroxyzine, Geodon  · Supportive care   · Psychiatry reevaluation completed    Continue current plan of care

## 2023-05-23 NOTE — OCCUPATIONAL THERAPY NOTE
05/23/23 1042   OT Last Visit   OT Visit Date 05/23/23   Note Type   Note Type Cancelled Session   Cancel Reasons Other  (patient declined offer of OT intervention/s > is anticipating discharge and reports that her arm hurts from falling the other day)       ASHLEY Walker/JOSIE

## 2023-05-24 NOTE — UTILIZATION REVIEW
NOTIFICATION OF ADMISSION DISCHARGE   This is a Notification of Discharge from [address]  Please be advised that this patient has been discharge from our facility  Below you will find the admission and discharge date and time including the patient’s disposition  UTILIZATION REVIEW CONTACT:  Carol Davis  Utilization   Network Utilization Review Department  Phone: [phone] carefully listen to the prompts  All voicemails are confidential   Email: [email or web address]     ADMISSION INFORMATION  PRESENTATION DATE: 5/8/2023 12:38 PM  OBERVATION ADMISSION DATE:   INPATIENT ADMISSION DATE: 5/8/23  1:21 PM   DISCHARGE DATE: 5/23/2023  2:00 PM   DISPOSITION:Non [facility] Acute [facility]hab    IMPORTANT INFORMATION:  Send all requests for admission clinical reviews, approved or denied determinations and any other requests to dedicated fax number below belonging to the campus where the patient is receiving treatment   List of dedicated fax numbers:  [address] DENIALS (Administrative/Medical Necessity) [phone]   [address] (Maternity/NICU/Pediatrics) [phone]   [facility] [phone]   [facility] [phone]   [address] [phone]   220 [facility] [phone]   90 [facility] [phone]   [address] [place] [address] 119 [phone]   [facility]  [phone] 4058 [facility] [phone]   412 [facility] [address] [facility] [phone]

## 2023-06-13 NOTE — PROGRESS NOTES
Patient ID: Tomasz Dasilva is a 53 y o  female Date of Birth 1975       Chief Complaint   Patient presents with   • Diabetes Mellitus     Type II   • Follow-up      Hx of right BKA   Left foot             Diagnosis:  1  Type 2 diabetes mellitus with diabetic neuropathy, without long-term current use of insulin ([facility] )    2  History of amputation of foot through metatarsal bone ([facility] )    3  [facility] ([facility]    4  Acute hematogenous osteomyelitis of left foot ([facility] )  -     Ambulatory Referral to Podiatry      1  Annual  Diabetic Foot exam with socks and shoes removed Left (BKA right)  2  High risk  foot precautions reviewed with patient including the need to wear protective shoegear at all times when walking including in the home, the need to check feet all surfaces daily with a mirror and report and skin breaks to podiatry, the need to apply an emollient to skin of feet daily  3  We discussed proper glycemic control and the risk of pedal complications with hyperglycemia  4   I personally reviewed her medical records, x-ray images, MRIs, hospitalizations days and podiatric surgical records from January 2023 onward  5  Left foot all sutures removed and nonhealing surgical wound was debrided - see procedure note  6  Please irrigate wound with saline  7  Apply collagen (purachol, booker or comprable product) to wound base, cover with 4 x 4's and dry dressing  8  Ace wrap from toes to tibial tuberosity, 4 inch Ace wrap from toes to above ankle, 6 inch from above ankle to below the knee, this should be for moderate compression  9  Dressings are to be changed 3 times a week  10  Patient may shower with use of cast cover  11  Patient may weight-bear with use of surgical shoe left foot  12  Patient to elevate leg above level of heart 3 times a day for 30 minutes, she might need to get back into bed for this  13  Patient to follow-up in 3 weeks            Debridement   Universal Protocol:  Consent: Verbal consent "obtained  Risks and benefits: risks, benefits and alternatives were discussed  Consent given by: patient  Time out: Immediately prior to procedure a \"time out\" was called to verify the correct patient, procedure, equipment, support staff and site/side marked as required  Patient understanding: patient states understanding of the procedure being performed  Patient identity confirmed: verbally with patient      Performed by: physician  Debridement type: surgical  Level of debridement: subcutaneous tissue  Pain control: lidocaine 4%  Pre-debridement measurements  Length (cm): 0 8  Width (cm): 0 8  Depth (cm): 0 2  Surface Area (cm^2): 0 64  Volume (cm^3): 0 13    Post-debridement measurements  Length (cm): 1  Width (cm): 1  Depth (cm): 0 5  Percent debrided: 100%  Surface Area (cm^2): 1  Area debrided (cm^2): 1  Volume (cm^3): 0 5  Tissue and other material debrided: subcutaneous tissue  Devitalized tissue debrided: biofilm, callus, fibrin, necrotic debris, slough and eschar  Instrument(s) utilized: blade  Bleeding: small  Hemostasis obtained with: pressure  Procedural pain (0-10): insensate  Post-procedural pain: insensate   Response to treatment: procedure was tolerated well               Subjective:   Patient presents today for postoperative care, on 5/8/23 she underwent a left foot partial first ray resection, on 5/16/2023 she had debridement and washout, on 5/18/2023 a delayed primary closure with drain placement, on 5/19/2023 drain removal at bedside  The following portions of the patient's history were reviewed and updated as appropriate: allergies, current medications, past family history, past medical history, past social history, past surgical history and problem list         Objective:  Ht 5' 5\" (1 651 m) Comment: verbal  LMP  (LMP Unknown)   BMI 37 79 kg/m²     Review of Systems   Constitutional: Negative for chills and fever  HENT: Negative for ear pain and sore throat      Eyes: Negative for " pain and visual disturbance  Respiratory: Negative for cough and shortness of breath  Cardiovascular: Negative for chest pain and palpitations  Gastrointestinal: Negative for abdominal pain and vomiting  Genitourinary: Negative for dysuria and hematuria  Musculoskeletal: Negative for arthralgias and back pain  Skin: Negative for color change and rash  Left foot wound/amputation   Neurological: Negative for seizures and syncope  All other systems reviewed and are negative  Physical Exam  Constitutional:       Appearance: Normal appearance  She is well-developed  She is obese  HENT:      Head: Normocephalic and atraumatic  Nose: Nose normal       Mouth/Throat:      Mouth: Mucous membranes are moist       Pharynx: Oropharynx is clear  Eyes:      Conjunctiva/sclera: Conjunctivae normal    Cardiovascular:      Pulses: no weak pulses          Dorsalis pedis pulses are 2+ on the left side  Posterior tibial pulses are 2+ on the left side  Pulmonary:      Effort: Pulmonary effort is normal    Musculoskeletal:      Cervical back: Normal range of motion  Left lower leg: 3+ Pitting Edema present  Left foot: Decreased range of motion  Deformity and foot drop present  Comments: BKA right      Right Lower Extremity: Right leg is amputated below knee  Left Lower Extremity: (partial 1st ray and toes 3,5)  Feet:      Right foot: amputated     Left foot:      Protective Sensation: 10 sites tested  0 sites sensed  Skin integrity: Ulcer present  Comments: Surgical incision over partial first ray resection site, sutures intact, distally there is wound dehiscence, wound measures 0 8 x 0 8 x 0 2 with fibrous tissue covering granular tissue postdebridement, the wound does not probe to muscle, tendon, bone  There is serous drainage, no malodor, skin edges are attached  Skin:     General: Skin is warm and dry        Capillary Refill: Capillary refill takes less than 2 seconds  Neurological:      General: No focal deficit present  Mental Status: She is alert and oriented to person, place, and time  Mental status is at baseline  Sensory: Sensory deficit present  Coordination: Coordination abnormal       Gait: Gait abnormal       Deep Tendon Reflexes: Reflexes abnormal    Psychiatric:         Mood and Affect: Mood normal          Behavior: Behavior normal          Thought Content: Thought content normal          Judgment: Judgment normal          Diabetic Foot Exam    Patient's shoes and socks removed  Right Foot/Ankle   Right Foot Inspection  Amputation: amputation right foot     Left Foot/Ankle  Left Foot Inspection  Skin Exam: ulcer  Toe Exam: left toe deformity  Sensory   Vibration: absent  Proprioception: absent  Monofilament testing: absent    Vascular  Capillary refills: < 3 seconds  The left DP pulse is 2+  The left PT pulse is 2+  Left Toe  - Comprehensive Exam  Ecchymosis: none  Arch: pes planus  Swelling: dorsum   Tenderness: none           Assign Risk Category  Deformity present  Loss of protective sensation  No weak pulses  Risk: 2         XR foot 3+ vw left    Result Date: 5/22/2023  Narrative: LEFT FOOT INDICATION:   Fall on foot  Kwaku Wylie out of recliner chair in the hospital  Anterior knee pain  Diabetic ulcers and amputations to foot  COMPARISON: 5/14/2023 VIEWS:  XR FOOT 3+ VW LEFT FINDINGS: Similar first ray and third transmetatarsal amputations  No acute fracture or malalignment  No suspicious lytic or blastic bone lesion  No erosive or destructive changes  Similar degenerative changes, accessory navicular and Achilles enthesopathy  Similar foot soft tissue swelling  No new soft tissue gas or radiopaque foreign body  Impression: No acute osseous abnormality   Workstation performed: [id number]       Results from last 6 Months   Lab Units 03/21/23  8863   WOUND CULTURE  4+ Growth of Beta Hemolytic Streptococcus Group F*  2+ Growth of "Beta Hemolytic Streptococcus Group B*  2+ Growth of Alcaligenes faecalis*  1+ Growth of Staphylococcus aureus*             Katherine Henderson, DPM, DPM, FACFAS    Portions of the record may have been created with voice recognition software  Occasional wrong word or \"sound a like\" substitutions may have occurred due to the inherent limitations of voice recognition software  Read the chart carefully and recognize, using context, where substitutions have occurred    "

## 2023-06-15 ENCOUNTER — CONSULT (OUTPATIENT)
Dept: PODIATRY | Facility: CLINIC | Age: 48
End: 2023-06-15
Payer: COMMERCIAL

## 2023-06-15 VITALS
DIASTOLIC BLOOD PRESSURE: 62 MMHG | HEIGHT: 65 IN | HEART RATE: 59 BPM | BODY MASS INDEX: 37.79 KG/M2 | SYSTOLIC BLOOD PRESSURE: 93 MMHG

## 2023-06-15 DIAGNOSIS — M86.072 ACUTE HEMATOGENOUS OSTEOMYELITIS OF LEFT FOOT (HCC): ICD-10-CM

## 2023-06-15 DIAGNOSIS — Z89.439 HISTORY OF AMPUTATION OF FOOT THROUGH METATARSAL BONE (HCC): ICD-10-CM

## 2023-06-15 DIAGNOSIS — Z89.511 HX OF RIGHT BKA (HCC): ICD-10-CM

## 2023-06-15 DIAGNOSIS — E11.40 TYPE 2 DIABETES MELLITUS WITH DIABETIC NEUROPATHY, WITHOUT LONG-TERM CURRENT USE OF INSULIN (HCC): Primary | ICD-10-CM

## 2023-06-15 DIAGNOSIS — T81.31XA DISRUPTION OF EXTERNAL SURGICAL WOUND, INITIAL ENCOUNTER: ICD-10-CM

## 2023-06-15 PROCEDURE — 11042 DBRDMT SUBQ TIS 1ST 20SQCM/<: CPT | Performed by: PODIATRIST

## 2023-06-15 PROCEDURE — 11045 DBRDMT SUBQ TISS EACH ADDL: CPT | Performed by: PODIATRIST

## 2023-06-15 PROCEDURE — 99213 OFFICE O/P EST LOW 20 MIN: CPT | Performed by: PODIATRIST

## 2023-06-15 RX ORDER — IBUPROFEN 600 MG/1
TABLET ORAL
COMMUNITY
Start: 2023-05-24

## 2023-06-15 RX ORDER — EPINEPHRINE 0.3 MG/.3ML
INJECTION SUBCUTANEOUS
COMMUNITY
Start: 2023-06-01

## 2023-06-15 RX ORDER — DOXYCYCLINE HYCLATE 100 MG/1
CAPSULE ORAL
COMMUNITY
Start: 2023-06-09

## 2023-06-15 NOTE — PATIENT INSTRUCTIONS
Left foot all sutures removed  Please irrigate wound with saline  Apply collagen (purachol, booker or comprable product) to wound base, cover with 4 x 4's and dry dressing  Ace wrap from toes to tibial tuberosity, 4 inch Ace wrap from toes to above ankle, 6 inch from above ankle to below the knee, this should be for moderate compression  Dressings are to be changed 3 times a week  Patient may shower with use of cast cover  Patient may weight-bear with use of surgical shoe left foot  Patient to elevate leg above level of heart 3 times a day for 30 minutes, she might need to get back into bed for this  Patient to follow-up in 3 weeks

## 2023-07-07 ENCOUNTER — OFFICE VISIT (OUTPATIENT)
Dept: PODIATRY | Facility: CLINIC | Age: 48
End: 2023-07-07
Payer: COMMERCIAL

## 2023-07-07 VITALS
HEIGHT: 65 IN | SYSTOLIC BLOOD PRESSURE: 92 MMHG | HEART RATE: 65 BPM | DIASTOLIC BLOOD PRESSURE: 63 MMHG | BODY MASS INDEX: 37.79 KG/M2

## 2023-07-07 DIAGNOSIS — Z89.439 HISTORY OF AMPUTATION OF FOOT THROUGH METATARSAL BONE (HCC): ICD-10-CM

## 2023-07-07 DIAGNOSIS — T81.31XA DISRUPTION OF EXTERNAL SURGICAL WOUND, INITIAL ENCOUNTER: Primary | ICD-10-CM

## 2023-07-07 DIAGNOSIS — E11.40 TYPE 2 DIABETES MELLITUS WITH DIABETIC NEUROPATHY, WITHOUT LONG-TERM CURRENT USE OF INSULIN (HCC): ICD-10-CM

## 2023-07-07 PROCEDURE — 11042 DBRDMT SUBQ TIS 1ST 20SQCM/<: CPT | Performed by: PODIATRIST

## 2023-07-07 NOTE — PROGRESS NOTES
Patient ID: Sonia Arevalo is a 53 y.o. female Date of Birth 1975       Chief Complaint   Patient presents with   • Diabetes Type 2     Foot care     • Foot Pain     Left foot under the second toe             Diagnosis:  1. Disruption of external surgical wound, initial encounter    2. History of amputation of foot through metatarsal bone ([address])    3. Type 2 diabetes mellitus with diabetic neuropathy, without long-term current use of insulin ([address])      1. Diabetic Foot exam with socks and shoes removed Left (BKA right)  2. High risk  foot precautions reviewed with patient including the need to wear protective shoegear at all times when walking including in the home, the need to check feet all surfaces daily with a mirror and report and skin breaks to podiatry, the need to apply an emollient to skin of feet daily. 3. We discussed proper glycemic control and the risk of pedal complications with hyperglycemia. 4.  Left foot nonhealing amputation site, wound was debrided through subcuticular tissues and dressed with alginate dry dressing, I placed orders for facility to apply nickel thickness Santyl covered with Xeroform dry dressing, they are to do this daily. 5.  Tubigrip for compression from toes to tibial tuberosity  6. I reviewed frequent elevation, low-sodium diet, proper protein intake and proper glycemic control with patient. 7. Patient may shower with use of cast cover  8. Patient may weight-bear with use of surgical shoe left foot  9. Patient to elevate leg above level of heart 3 times a day for 30 minutes, she might need to get back into bed for this  10. Patient to follow-up in 3 weeks. Debridement   Universal Protocol:  Consent: Verbal consent obtained.   Risks and benefits: risks, benefits and alternatives were discussed  Consent given by: patient  Time out: Immediately prior to procedure a "time out" was called to verify the correct patient, procedure, equipment, support staff and site/side marked as required. Patient understanding: patient states understanding of the procedure being performed  Patient identity confirmed: verbally with patient      Performed by: physician  Debridement type: surgical  Level of debridement: subcutaneous tissue  Pain control: lidocaine 4%  Pre-debridement measurements  Length (cm): 0.3  Width (cm): 0.3  Depth (cm): 0.1  Surface Area (cm^2): 0.09  Volume (cm^3): 0.01    Post-debridement measurements  Length (cm): 0.6  Width (cm): 0.6  Depth (cm): 0.2  Percent debrided: 100%  Surface Area (cm^2): 0.36  Area debrided (cm^2): 0.36  Volume (cm^3): 0.07  Tissue and other material debrided: subcutaneous tissue  Devitalized tissue debrided: biofilm, fibrin and slough  Instrument(s) utilized: blade  Bleeding: small  Hemostasis obtained with: pressure  Procedural pain (0-10): insensate  Post-procedural pain: insensate   Response to treatment: procedure was tolerated well                   Subjective:   Patient presents today for postoperative care, on 5/8/23 she underwent a left foot partial first ray resection, on 5/16/2023 she had debridement and washout, on 5/18/2023 a delayed primary closure with drain placement, on 5/19/2023 drain removal at bedside. The following portions of the patient's history were reviewed and updated as appropriate: allergies, current medications, past family history, past medical history, past social history, past surgical history and problem list.        Objective:  BP 92/63 (BP Location: Right arm, Patient Position: Sitting, Cuff Size: Adult)   Pulse 65   Ht 5' 5" (1.651 m) Comment: verbal  LMP  (LMP Unknown)   BMI 37.79 kg/m²     Review of Systems   Constitutional: Negative for chills and fever. HENT: Negative for ear pain and sore throat. Eyes: Negative for pain and visual disturbance. Respiratory: Negative for cough and shortness of breath. Cardiovascular: Negative for chest pain and palpitations.    Gastrointestinal: Negative for abdominal pain and vomiting. Genitourinary: Negative for dysuria and hematuria. Musculoskeletal: Negative for arthralgias and back pain. Skin: Negative for color change and rash. Left foot wound/amputation   Neurological: Negative for seizures and syncope. All other systems reviewed and are negative. Physical Exam  Constitutional:       Appearance: Normal appearance. She is well-developed. She is obese. HENT:      Head: Normocephalic and atraumatic. Nose: Nose normal.      Mouth/Throat:      Mouth: Mucous membranes are moist.      Pharynx: Oropharynx is clear. Eyes:      Conjunctiva/sclera: Conjunctivae normal.   Cardiovascular:      Pulses: no weak pulses          Dorsalis pedis pulses are 2+ on the left side. Posterior tibial pulses are 2+ on the left side. Pulmonary:      Effort: Pulmonary effort is normal.   Musculoskeletal:      Cervical back: Normal range of motion. Left lower leg: 3+ Pitting Edema present. Left foot: Decreased range of motion. Deformity and foot drop present. Comments: BKA right      Right Lower Extremity: Right leg is amputated below knee. Left Lower Extremity: (partial 1st ray and toes 3,5)  Feet:      Right foot: amputated     Left foot:      Protective Sensation: 10 sites tested. 0 sites sensed. Skin integrity: Ulcer present. Comments: Nonhealing surgical incision over partial first ray resection site, still aspect -  wound measures 0.3 x 0.3 x 0.1 with fibrous tissue covering granular tissue postdebridement, the wound does not probe to muscle, tendon, bone. There is serous drainage, no malodor, skin edges are attached. Skin:     General: Skin is warm and dry. Capillary Refill: Capillary refill takes less than 2 seconds. Neurological:      General: No focal deficit present. Mental Status: She is alert and oriented to person, place, and time. Mental status is at baseline.       Sensory: Sensory deficit present. Coordination: Coordination abnormal.      Gait: Gait abnormal.      Deep Tendon Reflexes: Reflexes abnormal.   Psychiatric:         Mood and Affect: Mood normal.         Behavior: Behavior normal.         Thought Content: Thought content normal.         Judgment: Judgment normal.         Diabetic Foot Exam    Patient's shoes and socks removed. Right Foot/Ankle   Right Foot Inspection  Amputation: amputation right foot     Left Foot/Ankle  Left Foot Inspection  Skin Exam: ulcer. Toe Exam: left toe deformity. Sensory   Vibration: absent  Proprioception: absent  Monofilament testing: absent    Vascular  Capillary refills: < 3 seconds  The left DP pulse is 2+. The left PT pulse is 2+. Left Toe  - Comprehensive Exam  Ecchymosis: none  Arch: pes planus  Swelling: dorsum   Tenderness: none           Assign Risk Category  Deformity present  Loss of protective sensation  No weak pulses  Risk: 2         XR foot 3+ vw left    Result Date: 5/22/2023  Narrative: LEFT FOOT INDICATION:   Fall on foot. Mingo Wright out of recliner chair in the hospital. Anterior knee pain. Diabetic ulcers and amputations to foot. COMPARISON: 5/14/2023 VIEWS:  XR FOOT 3+ VW LEFT FINDINGS: Similar first ray and third transmetatarsal amputations. No acute fracture or malalignment. No suspicious lytic or blastic bone lesion. No erosive or destructive changes. Similar degenerative changes, accessory navicular and Achilles enthesopathy. Similar foot soft tissue swelling. No new soft tissue gas or radiopaque foreign body. Impression: No acute osseous abnormality.  Workstation performed: [id number]       Results from last 6 Months   Lab Units 03/21/23  2832   WOUND CULTURE  4+ Growth of Beta Hemolytic Streptococcus Group F*  2+ Growth of Beta Hemolytic Streptococcus Group B*  2+ Growth of Alcaligenes faecalis*  1+ Growth of Staphylococcus aureus*             Silvestre Stratton, SORAYA, DPM, FACFAS    Portions of the record may have been created with voice recognition software. Occasional wrong word or "sound a like" substitutions may have occurred due to the inherent limitations of voice recognition software. Read the chart carefully and recognize, using context, where substitutions have occurred. Additional Notes: Patient consent was obtained to proceed with the visit and recommended plan of care after discussion of all risks and benefits, including the risks of COVID-19 exposure. Detail Level: Simple

## 2023-07-26 NOTE — PROGRESS NOTES
Patient ID: Taniya Morgan is a 53 y.o. female Date of Birth 1975       Chief Complaint   Patient presents with   • Diabetes Type 2   • Follow-up     Left foot 3 weeks follow up             Diagnosis:  1. Disruption of external surgical wound, initial encounter    2. History of amputation of foot through metatarsal bone ([address])    3. Type 2 diabetes mellitus with diabetic neuropathy, without long-term current use of insulin ([address])    4. Lymphedema of left leg  -     Ambulatory Referral to PT/OT Lymphedema Therapy; Future      1. High risk  foot precautions reviewed with patient including the need to wear protective shoegear at all times when walking including in the home, the need to check feet all surfaces daily with a mirror and report and skin breaks to podiatry, the need to apply an emollient to skin of feet daily. We discussed proper glycemic control and the risk of pedal complications with hyperglycemia. 2. Left foot nonhealing amputation site, wound was debrided through subcuticular tissues and dressed with alginate dry dressing, I placed orders for facility to apply nickel thickness Santyl covered with Xeroform dry dressing, they are to do this daily. 3. Continue Tubigrip for compression from toes to tibial tuberosity. Order was placed for PT/OT for lymphedema therapy, I recommend using [facility]. 4.  I reviewed frequent elevation, low-sodium diet, proper protein intake and proper glycemic control with patient. 5. Patient may shower with use of cast cover  6. Patient may weight-bear with use of surgical shoe left foot  7. Patient to elevate leg above level of heart 3 times a day for 30 minutes, she might need to get back into bed for this  8. Patient to follow-up in 3 weeks. Most recent comprehensive diabetic foot examination was on 6/15/2023    Debridement   Universal Protocol:  Consent: Verbal consent obtained.   Risks and benefits: risks, benefits and alternatives were discussed  Consent given by: patient  Time out: Immediately prior to procedure a "time out" was called to verify the correct patient, procedure, equipment, support staff and site/side marked as required. Patient understanding: patient states understanding of the procedure being performed  Patient identity confirmed: verbally with patient      Performed by: physician  Debridement type: surgical  Level of debridement: subcutaneous tissue  Pain control: none  Pre-debridement measurements  Length (cm): 1.5  Width (cm): 1  Depth (cm): 0.1  Surface Area (cm^2): 1.5  Volume (cm^3): 0.15    Post-debridement measurements  Length (cm): 1.6  Width (cm): 1.1  Depth (cm): 0.2  Percent debrided: 100%  Surface Area (cm^2): 1.76  Area debrided (cm^2): 1.76  Volume (cm^3): 0.35  Tissue and other material debrided: subcutaneous tissue  Devitalized tissue debrided: biofilm, fibrin, necrotic debris and slough  Instrument(s) utilized: blade  Bleeding: small  Hemostasis obtained with: pressure  Procedural pain (0-10): insensate  Post-procedural pain: insensate   Response to treatment: procedure was tolerated well                   Subjective:   Patient presents today for postoperative care, on 5/8/23 she underwent a left foot partial first ray resection, on 5/16/2023 she had debridement and washout, on 5/18/2023 a delayed primary closure with drain placement, on 5/19/2023 drain removal at bedside. The following portions of the patient's history were reviewed and updated as appropriate: allergies, current medications, past family history, past medical history, past social history, past surgical history and problem list.        Objective:  Ht 5' 5" (1.651 m) Comment: verbal  LMP  (LMP Unknown)   BMI 37.79 kg/m²     Review of Systems   Constitutional: Negative for chills and fever. HENT: Negative for ear pain and sore throat. Eyes: Negative for pain and visual disturbance.    Respiratory: Negative for cough and shortness of breath. Cardiovascular: Negative for chest pain and palpitations. Gastrointestinal: Negative for abdominal pain and vomiting. Genitourinary: Negative for dysuria and hematuria. Musculoskeletal: Negative for arthralgias and back pain. Skin: Negative for color change and rash. Left foot wound/amputation   Neurological: Negative for seizures and syncope. All other systems reviewed and are negative. Physical Exam  Constitutional:       Appearance: Normal appearance. She is well-developed. She is obese. HENT:      Head: Normocephalic and atraumatic. Nose: Nose normal.      Mouth/Throat:      Mouth: Mucous membranes are moist.      Pharynx: Oropharynx is clear. Eyes:      Conjunctiva/sclera: Conjunctivae normal.   Cardiovascular:      Pulses: no weak pulses          Dorsalis pedis pulses are 2+ on the left side. Posterior tibial pulses are 2+ on the left side. Pulmonary:      Effort: Pulmonary effort is normal.   Musculoskeletal:      Cervical back: Normal range of motion. Left lower leg: 3+ Pitting Edema present. Left foot: Decreased range of motion. Deformity and foot drop present. Comments: BKA right      Right Lower Extremity: Right leg is amputated below knee. Left Lower Extremity: (partial 1st ray and toes 3,5)  Feet:      Right foot: amputated     Left foot:      Protective Sensation: 10 sites tested. 0 sites sensed. Skin integrity: Ulcer present. Comments: Nonhealing surgical incision over partial first ray resection site, still aspect -  wound measures 1. 5 X 1.0 X 0.1 with fibrous tissue covering granular tissue postdebridement, the wound does not probe to muscle, tendon, bone. There is serous drainage, no malodor, skin edges are attached. Lymphedema to left lower leg, extreme edema to dorsal foot which is slowing down healing secondary to drainage  Skin:     General: Skin is warm and dry.       Capillary Refill: Capillary refill takes less than 2 seconds. Neurological:      General: No focal deficit present. Mental Status: She is alert and oriented to person, place, and time. Mental status is at baseline. Sensory: Sensory deficit present. Coordination: Coordination abnormal.      Gait: Gait abnormal.      Deep Tendon Reflexes: Reflexes abnormal.   Psychiatric:         Mood and Affect: Mood normal.         Behavior: Behavior normal.         Thought Content: Thought content normal.         Judgment: Judgment normal.                XR foot 3+ vw left    Result Date: 5/22/2023  Narrative: LEFT FOOT INDICATION:   Fall on foot. Karthikeyan Alvarez out of recliner chair in the hospital. Anterior knee pain. Diabetic ulcers and amputations to foot. COMPARISON: 5/14/2023 VIEWS:  XR FOOT 3+ VW LEFT FINDINGS: Similar first ray and third transmetatarsal amputations. No acute fracture or malalignment. No suspicious lytic or blastic bone lesion. No erosive or destructive changes. Similar degenerative changes, accessory navicular and Achilles enthesopathy. Similar foot soft tissue swelling. No new soft tissue gas or radiopaque foreign body. Impression: No acute osseous abnormality. Workstation performed: [id number]       Results from last 6 Months   Lab Units 03/21/23  1809   WOUND CULTURE  4+ Growth of Beta Hemolytic Streptococcus Group F*  2+ Growth of Beta Hemolytic Streptococcus Group B*  2+ Growth of Alcaligenes faecalis*  1+ Growth of Staphylococcus aureus*             Yue Penny DPM, DPM, FACFAS    Portions of the record may have been created with voice recognition software. Occasional wrong word or "sound a like" substitutions may have occurred due to the inherent limitations of voice recognition software. Read the chart carefully and recognize, using context, where substitutions have occurred.

## 2023-07-28 ENCOUNTER — OFFICE VISIT (OUTPATIENT)
Dept: PODIATRY | Facility: CLINIC | Age: 48
End: 2023-07-28
Payer: COMMERCIAL

## 2023-07-28 VITALS
HEART RATE: 81 BPM | DIASTOLIC BLOOD PRESSURE: 71 MMHG | HEIGHT: 65 IN | BODY MASS INDEX: 37.79 KG/M2 | SYSTOLIC BLOOD PRESSURE: 112 MMHG

## 2023-07-28 DIAGNOSIS — Z89.439 HISTORY OF AMPUTATION OF FOOT THROUGH METATARSAL BONE (HCC): ICD-10-CM

## 2023-07-28 DIAGNOSIS — I89.0 LYMPHEDEMA OF LEFT LEG: ICD-10-CM

## 2023-07-28 DIAGNOSIS — T81.31XA DISRUPTION OF EXTERNAL SURGICAL WOUND, INITIAL ENCOUNTER: Primary | ICD-10-CM

## 2023-07-28 DIAGNOSIS — E11.40 TYPE 2 DIABETES MELLITUS WITH DIABETIC NEUROPATHY, WITHOUT LONG-TERM CURRENT USE OF INSULIN (HCC): ICD-10-CM

## 2023-07-28 PROCEDURE — 11042 DBRDMT SUBQ TIS 1ST 20SQCM/<: CPT | Performed by: PODIATRIST

## 2023-08-04 ENCOUNTER — EVALUATION (OUTPATIENT)
Dept: PHYSICAL THERAPY | Facility: CLINIC | Age: 48
End: 2023-08-04
Payer: COMMERCIAL

## 2023-08-04 DIAGNOSIS — I89.0 LYMPHEDEMA OF LEFT LEG: Primary | ICD-10-CM

## 2023-08-04 PROCEDURE — 97110 THERAPEUTIC EXERCISES: CPT | Performed by: PHYSICAL THERAPIST

## 2023-08-04 PROCEDURE — 97161 PT EVAL LOW COMPLEX 20 MIN: CPT | Performed by: PHYSICAL THERAPIST

## 2023-08-04 NOTE — PROGRESS NOTES
PT Evaluation     Today's date: 2023  Patient name: Sami Wise  : 1975  MRN: [id number]  Referring provider: Amina Valadez DPM  Dx:   Encounter Diagnosis     ICD-10-CM    1. Lymphedema of left leg  I89.0 Ambulatory Referral to PT/OT Lymphedema Therapy                     Assessment  Assessment details: Pt is a 53y.o. year old female coming to outpatient PT with a diagnosis of L LE lymphedema with non healing wound. Pt presents with increased pain and TTP, increased edema, decreased strength, and overall decreased functional mobility. Pt would benefit from skilled PT services in order to address these deficits and reach maximum level of function. Thank you kindly for the referral!    Pt was educated in elevation of the extremity and ankle pump exercise to reduce edema. Pt would benefit from Circaid compression system and a pneumatic compression pump to reduce LE edema. See trial form for measurements. Impairments: abnormal gait, activity intolerance, impaired physical strength, lacks appropriate home exercise program, pain with function and weight-bearing intolerance  Other impairment: increased edema  Barriers to therapy: Lives a [facility] [employer] of Dx/Px/POC: good   Prognosis: fair    Goals  STG's ( 3-4 weeks)  1. Pt will be independent in HEP  2. Pt or caregiver will be independent in compression wrapping     LTG's (4-6 weeks)  1. Reduce L LE edema to max ability  2. Pt will be independent in use of maintenance garment    Plan  Patient would benefit from: skilled physical therapy  Other planned modality interventions: pneumatic compression pump  Planned therapy interventions: home exercise program, strengthening and therapeutic exercise  Other planned therapy interventions: MLD/ CLT  Frequency: 2x week  Duration in weeks: 8  Plan of Care beginning date: 2023  Plan of Care expiration date: 2023  Treatment plan discussed with: PTA and patient        Subjective Evaluation    History of Present Illness  Mechanism of injury: Pt reports she has has lymphedema in her L LE for 20 years. She has times when her swelling is increased and then other times is under control. Pt has had her L foot great toe amputaion on 3/23/23. Pt has been elevating her leg and performing ankle pumps on a regular basis. Pt has been very sedentary in her wheel chair. Pt has a R LE BKA and increased swelling in her L LE which is limiting her walking activity. Pt is currently living in [facility] in [facility].      Work: not working  Hobbies: knitting, cooking, baking  Gait: non ambulatory in wheelchair; R BKA  Patient Goals  Patient goals for therapy: decreased edema and decreased pain  Patient goal: to heal my wound  Pain  At best pain ratin  At worst pain ratin  Location: L LE- feels a heavy machine is pressing on my leg    Social Support    Employment status: not working  Treatments  No previous or current treatments        Objective     Neurological Testing     Sensation     Hip   Left Hip   Intact: light touch    Strength/Myotome Testing     Left Hip   Planes of Motion   Flexion: 4    Left Knee   Flexion: 4+  Extension: 4+    Additional Strength Details  Ankle df MMT: 4/4  Open wound on L medial foot/ great toe region  (+) buffalo hump  (+) stemmer sign  (+) fibrosis with increased hyperplasia L LE       Dx: L LE lymphedema  EPOC:   CO-MORBIDITIES: R BKA, LATEX ALLERGY  PERSONAL FACTORS:   Precautions: fall risk; latex allergy      Manuals             L LE MLD/ CLT                          Pneumatic compression pump                          Neuro Re-Ed                                                                                                                     Ther Ex             Lymphedema dx & mgt educ 8'                                                                                                                                 Ther Activity Gait Training                                       Modalities

## 2023-08-07 ENCOUNTER — OFFICE VISIT (OUTPATIENT)
Dept: OBGYN CLINIC | Facility: CLINIC | Age: 48
End: 2023-08-07
Payer: COMMERCIAL

## 2023-08-07 ENCOUNTER — APPOINTMENT (OUTPATIENT)
Dept: RADIOLOGY | Facility: CLINIC | Age: 48
End: 2023-08-07
Payer: COMMERCIAL

## 2023-08-07 VITALS
BODY MASS INDEX: 37.82 KG/M2 | HEART RATE: 78 BPM | HEIGHT: 65 IN | WEIGHT: 227 LBS | SYSTOLIC BLOOD PRESSURE: 124 MMHG | DIASTOLIC BLOOD PRESSURE: 78 MMHG

## 2023-08-07 DIAGNOSIS — M25.511 RIGHT SHOULDER PAIN, UNSPECIFIED CHRONICITY: ICD-10-CM

## 2023-08-07 DIAGNOSIS — M75.01 ADHESIVE CAPSULITIS OF RIGHT SHOULDER: Primary | ICD-10-CM

## 2023-08-07 DIAGNOSIS — M25.819 SHOULDER IMPINGEMENT: ICD-10-CM

## 2023-08-07 PROCEDURE — 73030 X-RAY EXAM OF SHOULDER: CPT

## 2023-08-07 PROCEDURE — 99203 OFFICE O/P NEW LOW 30 MIN: CPT | Performed by: FAMILY MEDICINE

## 2023-08-07 NOTE — PROGRESS NOTES
1. Adhesive capsulitis of right shoulder  [facility]      2. Right shoulder pain, unspecified chronicity  XR shoulder 2+ vw right      3. Shoulder impingement  [facility]        Orders Placed This Encounter   Procedures   • XR shoulder 2+ vw right   • [facility]        IMAGING STUDIES: (I personally reviewed images in PACS and report):  Xray right shoulder 8/7/23: no acute osseous abnormality      PAST REPORTS:        ASSESSMENT/PLAN:  Right Shoulder Impingement  Right Frozen Shoulder  PMH DM A1C 9 per patient, Right Knee BKA    Repeat X-ray next visit: None    Return in about 6 weeks (around 9/18/2023). Patient instructions below verbally summarized in person during encounter:  Patient Instructions   I explained the patient she likely has impingement syndrome with developing frozen shoulder or adhesive capsulitis. Due to her severe diabetes I recommended against corticosteroid injection today. You have been diagnosed with frozen shoulder also noted as adhesive capsulitis. This is a condition of the shoulder that results in significant stiffness and is due to a thickening and inflammation of the capsule surrounding the ball and socket joint of the shoulder. The definitive treatment is home exercise program as well as physical therapy to help regain motion. Corticosteroid injections are helpful in alleviating pain associated with range of motion but are not curative. The curative conservative treatment is physical therapy in order to  stretch out the tightened capsule of the shoulder and regain motion. These home exercises must be performed daily. Unfortunately frozen shoulder does not see complete resolution for 6 months to 2 years. Certain groups of patient such as diabetics as well as those who have been diagnosed with thyroid disorders are at higher risk for developing frozen shoulder.   If patients are failing conservative treatment with physical therapy as well as corticosteroid injections after 6+ months then sometimes surgical intervention is considered. __________________________________________________________________________    HISTORY OF PRESENT ILLNESS:  Evaluation of right shoulder pain ongoing for approximately 1 week after patient gave someone a hug and heard and felt a pop in her shoulder. Constant pain stabbing that can be tolerable but at times severe. Worse when using wheelchair to roll the wheels. Traces anterior lateral to posterior as source of her pain. Review of Systems      Following history reviewed and update:    Past Medical History:   Diagnosis Date   • Anxiety    • Arthritis    • Asthma    • Chronic pain    • Diabetes mellitus ([address])    • Hypothyroidism    • Lymphedema     left leg   • Manic bipolar I disorder (HCC)    • Morbid obesity (HCC)    • OCD (obsessive compulsive disorder)    • PTSD (post-traumatic stress disorder)      Past Surgical History:   Procedure Laterality Date   • CHOLECYSTECTOMY     • CHOLECYSTECTOMY     • CLOSURE DELAYED PRIMARY Left 5/18/2023    Procedure: CLOSURE DELAYED PRIMARY;  Surgeon: Yelena Peña DPM;  [facility];  Service: Podiatry   • LEG AMPUTATION THROUGH LOWER TIBIA AND FIBULA Right 3/23/2023    Procedure: AMPUTATION BELOW KNEE (BKA);   Surgeon: Eleazar Singleton MD;  [facility];  Service: General   • DE AMPUTATION FOOT TRANSMETARSAL Right 9/4/2021    Procedure: AMPUTATION TRANSMETATARSAL (TMA);  Surgeon: Luly Briones DPM;  [facility];  Service: Podiatry   • DE AMPUTATION METATARSAL W/TOE SINGLE Left 9/8/2019    Procedure: PARTIAL 3RD RAY RESECTION, PSB TOE FILLET FLAP;  Surgeon: Юлия Hoskins DPM;  [facility];  Service: Podiatry   • DE AMPUTATION METATARSAL W/TOE SINGLE Left 3/23/2023    Procedure: RAY RESECTION FOOT;  Surgeon: Juanell Nyhan Rocchio, DPM;  [facility];  Service: Podiatry   • DE AMPUTATION METATARSAL W/TOE SINGLE Left 5/8/2023    Procedure: Left first RAY RESECTION FOOT;  Surgeon: Melisa Amaya DPM;  [facility];  Service: Podiatry   • TOE AMPUTATION Right 1/13/2021    Procedure: AMPUTATION 2ND TOE;  Surgeon: Jignesh Marrero DPM;  [facility];  Service: Podiatry   • WOUND DEBRIDEMENT Left 2/17/2022    Procedure: DEBRIDEMENT GREAT TOE WOUND;  Surgeon: Jignesh Marrero DPM;  [facility];  Service: Podiatry   • WOUND DEBRIDEMENT Left 5/16/2023    Procedure: DEBRIDEMENT WOUND [facility] OUT);   Surgeon: Ladi Heredia DPM;  [facility];  Service: Podiatry     Social History   Social History     Substance and Sexual Activity   Alcohol Use Never     Social History     Substance and Sexual Activity   Drug Use Yes   • Types: Marijuana     Social History     Tobacco Use   Smoking Status Every Day   • Packs/day: 1.00   • Years: 15.00   • Total pack years: 15.00   • Types: Cigarettes   • Passive exposure: Current   Smokeless Tobacco Never     Family History   Problem Relation Age of Onset   • Hyperlipidemia Mother    • Diabetes Mother    • Hypertension Mother    • Heart attack Father      Allergies   Allergen Reactions   • Latex Hives and Shortness Of Breath   • Vancomycin Rash and Itching     Other reaction(s): Hives / Urticaria   • Aspirin Vomiting and Rash     Other reaction(s): Nausea and Vomiting   • Barium Iodide Itching and Rash     Face became very red and itchy    • Codeine Vomiting and Rash     Other reaction(s): Nausea and Vomiting   • Penicillins Rash and Other (See Comments)     Patient has tolerated cefepime without difficulty          Physical Exam  /78   Pulse 78   Ht 5' 5" (1.651 m)   Wt 103 kg (227 lb)   LMP  (LMP Unknown)   BMI 37.77 kg/m²     Constitutional:  see vital signs  Gen: well-developed, normocephalic/atraumatic, well-groomed  Eyes: No inflammation or discharge of conjunctiva or lids; sclera clear   Pharynx: no inflammation, lesion, or mass of lips  Neck: supple, no masses, non-distended  MSK: no inflammation, lesion, mass, or clubbing of nails and digits except for other than mentioned below  SKIN: no visible rashes or skin lesions  Pulmonary/Chest: Effort normal. No respiratory distress.    NEURO: cranial nerves grossly intact  PSYCH:  Alert and oriented to person, place, and time; recent and remote memory intact; mood normal, no depression, anxiety, or agitation, judgment and insight good and intact     Ortho Exam  RIGHT SHOULDER:  Erythema: no  Swelling: no  Increased Warmth: no    Tenderness: + Diffuse nonspecific    ROM  Touchdown sign:  External Rotation:  80  Internal Rotation: Intact    Strength  Abduction: 5/5  ER: 5/5  IR: 5/5    Drop-Arm: negative  Emptycan: +  Belly Press:   Lift-off Test:    Hemphill: +  Neer+  Cross-Arm:   Speeds:     Internal Impingement:  (crank position pain)    Labral Crank Test :  (abducted 90, axial load, guided IR & Er)    Modified Labral Shift:  (seated, ER, abduction, axial load, guided abd/add)    Roscoe's Test:   (FF 90, abd 15, resist thumbs up-, resist thumbs down+)    Apprehension:  Shan's Relocation Maneuver:    LEFT SHOULDER:  Strength  Abduction: 5/5  ER: 5/5  IR: 5/5    ROM  Touchdown sign: intact    Empty can: negative     __________________________________________________________________________  Procedures

## 2023-08-07 NOTE — PATIENT INSTRUCTIONS
I explained the patient she likely has impingement syndrome with developing frozen shoulder or adhesive capsulitis. Due to her severe diabetes I recommended against corticosteroid injection today. You have been diagnosed with frozen shoulder also noted as adhesive capsulitis. This is a condition of the shoulder that results in significant stiffness and is due to a thickening and inflammation of the capsule surrounding the ball and socket joint of the shoulder. The definitive treatment is home exercise program as well as physical therapy to help regain motion. Corticosteroid injections are helpful in alleviating pain associated with range of motion but are not curative. The curative conservative treatment is physical therapy in order to  stretch out the tightened capsule of the shoulder and regain motion. These home exercises must be performed daily. Unfortunately frozen shoulder does not see complete resolution for 6 months to 2 years. Certain groups of patient such as diabetics as well as those who have been diagnosed with thyroid disorders are at higher risk for developing frozen shoulder. If patients are failing conservative treatment with physical therapy as well as corticosteroid injections after 6+ months then sometimes surgical intervention is considered.

## 2023-08-30 ENCOUNTER — OFFICE VISIT (OUTPATIENT)
Dept: WOUND CARE | Facility: HOSPITAL | Age: 48
End: 2023-08-30
Payer: COMMERCIAL

## 2023-08-30 VITALS
HEART RATE: 80 BPM | WEIGHT: 275 LBS | BODY MASS INDEX: 45.82 KG/M2 | SYSTOLIC BLOOD PRESSURE: 118 MMHG | DIASTOLIC BLOOD PRESSURE: 74 MMHG | TEMPERATURE: 97.1 F | HEIGHT: 65 IN | RESPIRATION RATE: 16 BRPM

## 2023-08-30 DIAGNOSIS — L97.522 DIABETIC ULCER OF TOE OF LEFT FOOT ASSOCIATED WITH TYPE 2 DIABETES MELLITUS, WITH FAT LAYER EXPOSED (HCC): Primary | ICD-10-CM

## 2023-08-30 DIAGNOSIS — E11.621 DIABETIC ULCER OF TOE OF LEFT FOOT ASSOCIATED WITH TYPE 2 DIABETES MELLITUS, WITH FAT LAYER EXPOSED (HCC): Primary | ICD-10-CM

## 2023-08-30 DIAGNOSIS — L97.511 DIABETIC ULCER OF OTHER PART OF RIGHT FOOT ASSOCIATED WITH DIABETES MELLITUS DUE TO UNDERLYING CONDITION, LIMITED TO BREAKDOWN OF SKIN (HCC): ICD-10-CM

## 2023-08-30 DIAGNOSIS — I89.0 LYMPHEDEMA OF BOTH LOWER EXTREMITIES: ICD-10-CM

## 2023-08-30 DIAGNOSIS — E11.42 DIABETIC POLYNEUROPATHY ASSOCIATED WITH TYPE 2 DIABETES MELLITUS (HCC): ICD-10-CM

## 2023-08-30 DIAGNOSIS — E08.621 DIABETIC ULCER OF OTHER PART OF RIGHT FOOT ASSOCIATED WITH DIABETES MELLITUS DUE TO UNDERLYING CONDITION, LIMITED TO BREAKDOWN OF SKIN (HCC): ICD-10-CM

## 2023-08-30 PROCEDURE — 99232 SBSQ HOSP IP/OBS MODERATE 35: CPT | Performed by: PODIATRIST

## 2023-08-30 PROCEDURE — 11042 DBRDMT SUBQ TIS 1ST 20SQCM/<: CPT | Performed by: PODIATRIST

## 2023-08-30 PROCEDURE — 99213 OFFICE O/P EST LOW 20 MIN: CPT | Performed by: PODIATRIST

## 2023-08-30 NOTE — PATIENT INSTRUCTIONS
Orders Placed This Encounter   Procedures    Wound cleansing and dressings     Left medial foot and left 2nd toe wounds:    Wash your hands with soap and water. Remove old dressing, discard into plastic bag and place in trash. Cleanse the wounds with normal saline prior to applying a clean dressing. Pat dry using gauze. Shower no - do not get dressings wet. Apply moisturizer  to skin surrounding wounds as needed,  Apply dermagran then gauze to the toe and foot wounds. If wounds become too wet, may resume use of calcium alginate. Secure with fletcher and tape  Change dressing every other day. Standing Status:   Future     Standing Expiration Date:   8/30/2024    Wound off loading     Off-loading Instructions:    Wear off-loading device as directed by your physician. Put on immediately when rising in the morning and remove when going to bed. Standing Status:   Future     Standing Expiration Date:   8/30/2024    Wound compression and edema control     Ace bandages    Apply ace compression wrap to your left leg. Apply 4 inch from toes to above ankle then 6 inch from above ankle to knee. Apply in the morning and re-wrap as needed during the day if wrap becomes loose. Remove at bedtime and elevate legs or lie down. Avoid prolonged standing in one place. Elevate leg(s) above the level of the heart when sitting or as much as possible. Use ace bandage until compression wrap is received. Discontinue use of spandagrip.      Standing Status:   Future     Standing Expiration Date:   8/30/2024

## 2023-08-30 NOTE — PROGRESS NOTES
Patient ID: Jante Campos is a 53 y.o. female Date of Birth 1975       Chief Complaint   Patient presents with   • New Patient Visit     Left foot wound       Allergies:  Latex, Vancomycin, Aspirin, Barium iodide, Codeine, and Penicillins    Diagnosis:  1. Diabetic ulcer of toe of left foot associated with type 2 diabetes mellitus, with fat layer exposed ([address])  -     Wound cleansing and dressings; Future  -     Wound off loading; Future  -     Wound compression and edema control; Future    2. Diabetic ulcer of other part of right foot associated with diabetes mellitus due to underlying condition, limited to breakdown of skin ([address])  -     Wound cleansing and dressings; Future  -     Wound off loading; Future  -     Wound compression and edema control; Future    3. Diabetic polyneuropathy associated with type 2 diabetes mellitus (HCC)  -     Wound cleansing and dressings; Future  -     Wound off loading; Future  -     Wound compression and edema control; Future    4. Lymphedema of both lower extremities  -     Wound cleansing and dressings; Future  -     Wound off loading; Future  -     Wound compression and edema control; Future       Diagnosis ICD-10-CM Associated Orders   1. Diabetic ulcer of toe of left foot associated with type 2 diabetes mellitus, with fat layer exposed ([address])  E11.621 Wound cleansing and dressings    L97.522 Wound off loading     Wound compression and edema control      2. Diabetic ulcer of other part of right foot associated with diabetes mellitus due to underlying condition, limited to breakdown of skin ([address])  B94.466 Wound cleansing and dressings    L97.511 Wound off loading     Wound compression and edema control      3. Diabetic polyneuropathy associated with type 2 diabetes mellitus (HCC)  E11.42 Wound cleansing and dressings     Wound off loading     Wound compression and edema control      4.  Lymphedema of both lower extremities  I89.0 Wound cleansing and dressings     Wound off loading     Wound compression and edema control           Assessment & Plan:  1. Diabetic Gimenez grade 2 ulceration medial left forefoot over partial first ray resection site, wound was debrided through subcuticular tissues and dressed with Dermagran gauze dry dressing, facility will do the same 3 times a week, if maceration is present they may use alginate as needed. 2.  Diabetic Gimenez grade 1 ulceration distal plantar tip second toe, wound was debrided through subcuticular tissues and dressed with Dermagran gauze dry dressing, facility will do the same 3 times a week, they may switch to alginate as needed for maceration. 3.  Lymphedema to left lower extremity, patient currently uses Tubigrip, we will use 4 inch Ace wrap from toes to above ankle, 6 inch Ace wrap from above ankle to below knee with moderate compression, this is to be changed at time of dressing change. Facility has ordered wraps for lymphedema therapy, once they arrive they may transition to that. 4.  Frequent elevation, low-sodium diet, proper protein intake, proper glycemic control was reviewed with patient. She understands and agrees with the plan and will follow-up in 2 weeks. Debridement   Wound 05/08/23 Diabetic Ulcer Foot Left;Medial    Universal Protocol:  Consent: Verbal consent obtained. Risks and benefits: risks, benefits and alternatives were discussed  Consent given by: patient  Time out: Immediately prior to procedure a "time out" was called to verify the correct patient, procedure, equipment, support staff and site/side marked as required.   Patient understanding: patient states understanding of the procedure being performed  Patient identity confirmed: verbally with patient      Performed by: physician  Debridement type: surgical  Level of debridement: subcutaneous tissue  Pain control: none  Pre-debridement measurements  Length (cm): 0.5  Width (cm): 0.9  Depth (cm): 0.1  Surface Area (cm^2): 0.45  Volume (cm^3): 0.05      Post-debridement measurements  Length (cm): 0.6  Width (cm): 1  Depth (cm): 0.4  Percent debrided: 100%  Surface Area (cm^2): 0.6  Area debrided (cm^2): 0.6  Volume (cm^3): 0.24    Tissue and other material debrided: subcutaneous tissue  Devitalized tissue debrided: biofilm, fibrin, necrotic debris and slough  Instrument(s) utilized: blade  Bleeding: small  Hemostasis obtained with: pressure  Procedural pain (0-10): insensate  Post-procedural pain: insensate   Response to treatment: procedure was tolerated well    Debridement   Wound 08/30/23 Diabetic Ulcer Toe (Comment  which one) Anterior; Left    Universal Protocol:  Consent: Verbal consent obtained. Risks and benefits: risks, benefits and alternatives were discussed  Consent given by: patient  Time out: Immediately prior to procedure a "time out" was called to verify the correct patient, procedure, equipment, support staff and site/side marked as required.   Patient understanding: patient states understanding of the procedure being performed  Patient identity confirmed: verbally with patient      Performed by: physician  Debridement type: surgical  Level of debridement: subcutaneous tissue    Pre-debridement measurements  Length (cm): 0.4  Width (cm): 0.4  Depth (cm): 0.1  Surface Area (cm^2): 0.16  Volume (cm^3): 0.02      Post-debridement measurements  Length (cm): 0.5  Width (cm): 0.6  Depth (cm): 0.2  Percent debrided: 100%  Surface Area (cm^2): 0.3  Area debrided (cm^2): 0.3  Volume (cm^3): 0.06    Tissue and other material debrided: subcutaneous tissue  Devitalized tissue debrided: biofilm, callus, fibrin, necrotic debris and slough  Instrument(s) utilized: blade  Bleeding: small  Hemostasis obtained with: pressure  Procedural pain (0-10): insensate  Post-procedural pain: insensate   Response to treatment: procedure was tolerated well                           Subjective:   Edelmirane Sam presents today for care of diabetic foot ulcerations left foot, facility changes of dressing sometimes but she has been changing the dressing daily with alginate and Band-Aids, she continues to use Tubigrip for compression. She did go to outpatient lymphedema therapy but lymphedema wraps were not covered by her insurance so she is transition to an house lymphedema therapy and they were able to order her wraps and they are waiting for them to come in to begin therapy. She states her blood sugars are okay, she states she does elevate her leg throughout the day, no new complaints.         The following portions of the patient's history were reviewed and updated as appropriate:   Patient Active Problem List   Diagnosis   • 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, with bone involvement without evidence of necrosis ([address])   • H/O bariatric surgery   • Anxiety   • Acquired hypothyroidism   • Psychiatric illness   • Type 2 diabetes mellitus with diabetic neuropathy, without long-term current use of insulin ([address])   • Osteomyelitis of left foot ([address])   • Diabetic foot ulcer ([address])   • Acquired absence of other left toe(s) ([address])   • Essential hypertension   • Arthritis   • Other chronic pain   • OCD (obsessive compulsive disorder)   • PTSD (post-traumatic stress disorder)   • Right foot ulcer, with fat layer exposed ([address])   • Gastroesophageal reflux disease without esophagitis   • Migraine without status migrainosus, not intractable   • Obstructive sleep apnea   • Morbid obesity with BMI of 40.0-44.9, adult ([address])   • Homeless single person   • Hx of right BKA ([address])   • Bipolar 1 disorder, depressed ([address])   • Abnormal x-ray of cervical spine   • Acquired equinovarus deformity of left foot   • Acute osteomyelitis of metatarsal bone of right foot ([address])   • Allergy to bee sting   • Amenorrhea   • Asthma, intrinsic   • Atypical squamous cell changes of undetermined significance (ASCUS) on cervical cytology with positive high risk human papilloma virus (HPV)   • Bipolar affective disorder in remission (HCC)   • Chronic paroxysmal hemicrania, not intractable   • Diastasis of rectus abdominis   • Drusen body, unspecified laterality   • Folate deficiency   • Hyperlipidemia associated with type 2 diabetes mellitus (HCC)   • Impaired intestinal absorption   • Instability of left knee joint   • Intertriginous dermatitis associated with moisture   • Intestinal malabsorption following gastrectomy   • 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)   • Radiculopathy, lumbar region   • Right cervical radiculopathy   • S/P laparoscopic sleeve gastrectomy   • Chronic right-sided low back pain with left-sided sciatica     Past Medical History:   Diagnosis Date   • Anxiety    • Arthritis    • Asthma    • Chronic pain    • Diabetes mellitus ([address])    • Hypothyroidism    • Lymphedema     left leg   • Manic bipolar I disorder (HCC)    • Morbid obesity (HCC)    • OCD (obsessive compulsive disorder)    • PTSD (post-traumatic stress disorder)      Past Surgical History:   Procedure Laterality Date   • CHOLECYSTECTOMY     • CHOLECYSTECTOMY     • CLOSURE DELAYED PRIMARY Left 5/18/2023    Procedure: CLOSURE DELAYED PRIMARY;  Surgeon: Aide Vu DPM;  Location: [facility];  Service: Podiatry   • LEG AMPUTATION THROUGH LOWER TIBIA AND FIBULA Right 3/23/2023    Procedure: AMPUTATION BELOW KNEE (BKA);   Surgeon: Jason Wren MD;  Location: [facility];  Service: General   • NH AMPUTATION FOOT TRANSMETARSAL Right 9/4/2021    Procedure: AMPUTATION TRANSMETATARSAL (TMA);  Surgeon: Vy Emerson DPM;  Location: [facility];  Service: Podiatry   • NH AMPUTATION METATARSAL W/TOE SINGLE Left 9/8/2019    Procedure: PARTIAL 3RD RAY RESECTION, PSB TOE FILLET FLAP;  Surgeon: Diana Christopher DPM;  Location[facility];  Service: Podiatry   • NH AMPUTATION METATARSAL W/TOE SINGLE Left 3/23/2023    Procedure: RAY RESECTION FOOT;  Surgeon: Annabell Blizzard, DPM;  Location[facility];  Service: Podiatry   • PA AMPUTATION METATARSAL W/TOE SINGLE Left 5/8/2023    Procedure: Left first RAY RESECTION FOOT;  Surgeon: Bernard Medina DPM;  Location: [facility];  Service: Podiatry   • TOE AMPUTATION Right 1/13/2021    Procedure: AMPUTATION 2ND TOE;  Surgeon: Eden Baum DPM;  [facility];  Service: Podiatry   • WOUND DEBRIDEMENT Left 2/17/2022    Procedure: DEBRIDEMENT GREAT TOE WOUND;  Surgeon: Eden Baum DPM;  [facility];  Service: Podiatry   • WOUND DEBRIDEMENT Left 5/16/2023    Procedure: DEBRIDEMENT WOUND [facility] OUT); Surgeon: Royal Scott DPM;  Location: [facility];  Service: Podiatry     Social History     Socioeconomic History   • Marital status: Single     Spouse name: None   • Number of children: 2   • Years of education: 6   • Highest education level: 11th grade   Occupational History   • None   Tobacco Use   • Smoking status: Every Day     Packs/day: 1.00     Years: 15.00     Total pack years: 15.00     Types: Cigarettes     Passive exposure: Current   • Smokeless tobacco: Never   Vaping Use   • Vaping Use: Former   Substance and Sexual Activity   • Alcohol use: Never   • Drug use: Yes     Types: Marijuana   • Sexual activity: Not Currently   Other Topics Concern   • None   Social History Narrative   • None     Social Determinants of Health     Financial Resource Strain: Not on file   Food Insecurity: Food Insecurity Present (5/9/2023)    Hunger Vital Sign    • Worried About Running Out of Food in the Last Year: Sometimes true    • Ran Out of Food in the Last Year: Sometimes true   Transportation Needs: Unmet Transportation Needs (5/9/2023)    PRAPARE - Transportation    • Lack of Transportation (Medical):  Yes    • Lack of Transportation (Non-Medical): Yes   Physical Activity: Not on file   Stress: Not on file   Social Connections: Not on file   Intimate Partner Violence: Not on file   Housing Stability: High Risk (5/9/2023) Housing Stability Vital Sign    • Unable to Pay for Housing in the Last Year: Yes    • Number of Places Lived in the Last Year: 2    • Unstable Housing in the Last Year: Yes        Current Outpatient Medications:   •  acetaminophen (TYLENOL) 325 mg tablet, Take 2 tablets (650 mg total) by mouth every 6 (six) hours as needed for mild pain, headaches or fever, Disp: , Rfl: 0  •  cloNIDine (CATAPRES) 0.2 mg tablet, Take 0.2 mg by mouth 2 (two) times a day, Disp: , Rfl:   •  docusate sodium (COLACE) 100 mg capsule, Take 1 capsule (100 mg total) by mouth 2 (two) times a day, Disp: , Rfl: 0  •  doxycycline hyclate (VIBRAMYCIN) 100 mg capsule, , Disp: , Rfl:   •  EPINEPHrine (EPIPEN) 0.3 mg/0.3 mL SOAJ, , Disp: , Rfl:   •  fenofibrate (TRICOR) 54 MG tablet, TAKE 1 TABLET (54 MG TOTAL) BY MOUTH DAILY. , Disp: , Rfl:   •  folic acid (FOLVITE) 1 mg tablet, Take 1,000 mcg by mouth daily, Disp: , Rfl:   •  Glucagon, rDNA, (Glucagon Emergency) 1 MG KIT, , Disp: , Rfl:   •  hydrOXYzine HCL (ATARAX) 50 mg tablet, Take 50 mg by mouth in the morning, Disp: , Rfl:   •  hydrOXYzine HCL (ATARAX) 50 mg tablet, Take 2 tablets (100 mg total) by mouth daily at bedtime, Disp: 30 tablet, Rfl: 0  •  insulin glargine (LANTUS) 100 units/mL subcutaneous injection, Inject 12 Units under the skin daily at bedtime, Disp: 10 mL, Rfl: 0  •  insulin lispro (HumaLOG) 100 units/mL injection, Inject 1-6 Units under the skin 3 (three) times a day before meals, Disp: , Rfl: 0  •  insulin lispro (HumaLOG) 100 units/mL injection, Inject 1-6 Units under the skin daily at bedtime, Disp: , Rfl: 0  •  lamoTRIgine (LaMICtal) 200 MG tablet, Take 1.5 tablets (300 mg total) by mouth every morning, Disp: 30 tablet, Rfl: 0  •  levothyroxine 50 mcg tablet, Take 1 tablet (50 mcg total) by mouth daily, Disp: 30 tablet, Rfl: 0  •  melatonin 3 mg, Take 3 tablets (9 mg total) by mouth daily at bedtime, Disp: 30 tablet, Rfl: 0  •  metFORMIN (GLUCOPHAGE) 500 mg tablet, Take 1 tablet (500 mg total) by mouth 2 (two) times a day with meals, Disp: 60 tablet, Rfl: 0  •  methocarbamol (ROBAXIN) 750 mg tablet, TAKE 1 TABLET (750 MG TOTAL) BY MOUTH 4 (FOUR) TIMES A DAY AS NEEDED FOR MUSCLE SPASMS., Disp: , Rfl:   •  pantoprazole (PROTONIX) 40 mg tablet, Take 1 tablet (40 mg total) by mouth 2 (two) times a day before breakfast and lunch, Disp: 30 tablet, Rfl: 0  •  polyethylene glycol (MIRALAX) 17 g packet, Take 17 g by mouth daily, Disp: , Rfl: 0  •  pregabalin (LYRICA) 150 mg capsule, Take 150 mg by mouth 2 (two) times a day, Disp: , Rfl:   •  senna (SENOKOT) 8.6 mg, Take 2 tablets (17.2 mg total) by mouth daily at bedtime, Disp: , Rfl: 0  •  ziprasidone (GEODON) 80 mg capsule, Take 80 mg by mouth 2 (two) times a day with meals , Disp: , Rfl:   Family History   Problem Relation Age of Onset   • Hyperlipidemia Mother    • Diabetes Mother    • Hypertension Mother    • Heart attack Father        Review of Systems   Constitutional: Negative for chills and fever. HENT: Negative for ear pain and sore throat. Eyes: Negative for pain and visual disturbance. Respiratory: Negative for cough and shortness of breath. Cardiovascular: Negative for chest pain and palpitations. Gastrointestinal: Negative for abdominal pain and vomiting. Genitourinary: Negative for dysuria and hematuria. Musculoskeletal: Positive for gait problem. Negative for arthralgias and back pain. Skin: Positive for color change and wound. Negative for rash. Neurological: Positive for numbness. Negative for seizures and syncope. Psychiatric/Behavioral: Negative. All other systems reviewed and are negative. Objective:  /74   Pulse 80   Temp (!) 97.1 °F (36.2 °C) (Temporal)   Resp 16   Ht 5' 5" (1.651 m)   Wt 125 kg (275 lb)   LMP  (LMP Unknown)   BMI 45.76 kg/m²     Physical Exam  Constitutional:       Appearance: Normal appearance. She is well-developed. She is obese.    HENT:      Head: Normocephalic and atraumatic. Nose: Nose normal.      Mouth/Throat:      Mouth: Mucous membranes are moist.      Pharynx: Oropharynx is clear. Eyes:      Conjunctiva/sclera: Conjunctivae normal.   Cardiovascular:      Pulses:           Dorsalis pedis pulses are 2+ on the left side. Posterior tibial pulses are 2+ on the left side. Pulmonary:      Effort: Pulmonary effort is normal.   Musculoskeletal:         General: Normal range of motion. Cervical back: Normal range of motion. Left lower le+ Edema present. Comments: BKA right   Skin:     General: Skin is warm and dry. Capillary Refill: Capillary refill takes less than 2 seconds. Neurological:      General: No focal deficit present. Mental Status: She is alert and oriented to person, place, and time. Mental status is at baseline. Sensory: Sensory deficit present. Coordination: Coordination abnormal.      Gait: Gait abnormal.      Deep Tendon Reflexes: Reflexes abnormal.   Psychiatric:         Mood and Affect: Mood normal.         Behavior: Behavior normal.         Thought Content: Thought content normal.         Judgment: Judgment normal.           Wound 23 Leg Right (Active)       Wound 23 Diabetic Ulcer Foot Left;Medial (Active)   Enter Ramona Dace score: Filbert Dace Grade 2: Deep ulcer extended to ligament, tendon, joint capsule, bone, or deep fascia without abscess or osteomyelitis (OM) 23 1412   Wound Image   23 141   Wound Description Yellow;Pink 23 141   Yvonne-wound Assessment Edema; Intact 23 141   Wound Length (cm) 0.5 cm 23 1412   Wound Width (cm) 0.9 cm 23 1412   Wound Depth (cm) 0.1 cm 23 1412   Wound Surface Area (cm^2) 0.45 cm^2 23 1412   Wound Volume (cm^3) 0.045 cm^3 23 1412   Calculated Wound Volume (cm^3) 0.05 cm^3 23 1412   Drainage Amount Moderate 23 141   Drainage Description Serosanguineous 23 1412   Non-staged Wound Description Full thickness 08/30/23 1412       Wound 08/30/23 Diabetic Ulcer Toe (Comment  which one) Anterior; Left (Active)   Enter Ivory Odilia score: Gimenez Grade 1: Partial or full-thickness ulcer (superficial) 08/30/23 1413   Wound Image   08/30/23 1448   Wound Description Eschar 08/30/23 1413   Yvonne-wound Assessment Callus 08/30/23 1413   Wound Length (cm) 0.4 cm 08/30/23 1413   Wound Width (cm) 0.4 cm 08/30/23 1413   Wound Depth (cm) 0.1 cm 08/30/23 1413   Wound Surface Area (cm^2) 0.16 cm^2 08/30/23 1413   Wound Volume (cm^3) 0.016 cm^3 08/30/23 1413   Calculated Wound Volume (cm^3) 0.02 cm^3 08/30/23 1413   Drainage Amount None 08/30/23 1413   Non-staged Wound Description Not applicable 86/54/14 0166          Results from last 6 Months   Lab Units 03/21/23  1809   WOUND CULTURE  4+ Growth of Beta Hemolytic Streptococcus Group F*  2+ Growth of Beta Hemolytic Streptococcus Group B*  2+ Growth of Alcaligenes faecalis*  1+ Growth of Staphylococcus aureus*           Wound Instructions:  Orders Placed This Encounter   Procedures   • Wound cleansing and dressings     Left medial foot and left 2nd toe wounds:    Wash your hands with soap and water. Remove old dressing, discard into plastic bag and place in trash. Cleanse the wounds with normal saline prior to applying a clean dressing. Pat dry using gauze. Shower no - do not get dressings wet. Apply moisturizer  to skin surrounding wounds as needed,  Apply dermagran then gauze to the toe and foot wounds. If wounds become too wet, may resume use of calcium alginate. Secure with fletcher and tape  Change dressing every other day. Standing Status:   Future     Standing Expiration Date:   8/30/2024   • Wound off loading     Off-loading Instructions:    Wear off-loading device as directed by your physician. Put on immediately when rising in the morning and remove when going to bed.      Standing Status:   Future     Standing Expiration Date:   8/30/2024   • Wound compression and edema control     Ace bandages    Apply ace compression wrap to your left leg. Apply 4 inch from toes to above ankle then 6 inch from above ankle to knee. Apply in the morning and re-wrap as needed during the day if wrap becomes loose. Remove at bedtime and elevate legs or lie down. Avoid prolonged standing in one place. Elevate leg(s) above the level of the heart when sitting or as much as possible. Use ace bandage until compression wrap is received. Discontinue use of spandagrip. Standing Status:   Future     Standing Expiration Date:   8/30/2024         Devon Sotelo, DPM, DPM, FACFAS    Portions of the record may have been created with voice recognition software. Occasional wrong word or "sound a like" substitutions may have occurred due to the inherent limitations of voice recognition software. Read the chart carefully and recognize, using context, where substitutions have occurred.

## 2023-09-12 ENCOUNTER — TELEPHONE (OUTPATIENT)
Dept: OBGYN CLINIC | Facility: CLINIC | Age: 48
End: 2023-09-12

## 2023-09-12 NOTE — TELEPHONE ENCOUNTER
Spoke to VIRGIE informed her that appointment with Dr. Chasidy Sunshine for 9/18 has to be rescheduled. She will have someone from facility call to reschedule.

## 2023-09-13 ENCOUNTER — OFFICE VISIT (OUTPATIENT)
Dept: WOUND CARE | Facility: HOSPITAL | Age: 48
End: 2023-09-13
Payer: COMMERCIAL

## 2023-09-13 VITALS
HEART RATE: 78 BPM | DIASTOLIC BLOOD PRESSURE: 78 MMHG | SYSTOLIC BLOOD PRESSURE: 126 MMHG | RESPIRATION RATE: 16 BRPM | TEMPERATURE: 98.5 F

## 2023-09-13 DIAGNOSIS — E11.42 DIABETIC POLYNEUROPATHY ASSOCIATED WITH TYPE 2 DIABETES MELLITUS (HCC): ICD-10-CM

## 2023-09-13 DIAGNOSIS — L97.421 DIABETIC ULCER OF LEFT MIDFOOT ASSOCIATED WITH TYPE 2 DIABETES MELLITUS, LIMITED TO BREAKDOWN OF SKIN (HCC): ICD-10-CM

## 2023-09-13 DIAGNOSIS — I89.0 LYMPHEDEMA OF BOTH LOWER EXTREMITIES: ICD-10-CM

## 2023-09-13 DIAGNOSIS — E11.621 DIABETIC ULCER OF TOE OF LEFT FOOT ASSOCIATED WITH TYPE 2 DIABETES MELLITUS, WITH FAT LAYER EXPOSED (HCC): Primary | ICD-10-CM

## 2023-09-13 DIAGNOSIS — E11.621 DIABETIC ULCER OF LEFT MIDFOOT ASSOCIATED WITH TYPE 2 DIABETES MELLITUS, LIMITED TO BREAKDOWN OF SKIN (HCC): ICD-10-CM

## 2023-09-13 DIAGNOSIS — L97.522 DIABETIC ULCER OF TOE OF LEFT FOOT ASSOCIATED WITH TYPE 2 DIABETES MELLITUS, WITH FAT LAYER EXPOSED (HCC): Primary | ICD-10-CM

## 2023-09-13 PROCEDURE — 11042 DBRDMT SUBQ TIS 1ST 20SQCM/<: CPT | Performed by: PODIATRIST

## 2023-09-13 RX ORDER — LAMOTRIGINE 150 MG/1
150 TABLET ORAL DAILY
COMMUNITY
Start: 2023-08-30

## 2023-09-13 NOTE — PROGRESS NOTES
Patient ID: Brittany Bridges is a 53 y.o. female Date of Birth 1975       Chief Complaint   Patient presents with   • Follow Up Wound Care Visit     Follow up visit for wounds to left foot. Allergies:  Latex, Vancomycin, Aspirin, Barium iodide, Codeine, and Penicillins    Diagnosis:  1. Diabetic ulcer of toe of left foot associated with type 2 diabetes mellitus, with fat layer exposed ([address])  -     Wound cleansing and dressings; Future    2. Diabetic polyneuropathy associated with type 2 diabetes mellitus (HCC)  -     Wound cleansing and dressings; Future    3. Lymphedema of both lower extremities  -     Wound cleansing and dressings; Future       Diagnosis ICD-10-CM Associated Orders   1. Diabetic ulcer of toe of left foot associated with type 2 diabetes mellitus, with fat layer exposed ([address])  L04.759 Wound cleansing and dressings    L97.522       2. Diabetic polyneuropathy associated with type 2 diabetes mellitus (HCC)  E11.42 Wound cleansing and dressings      3. Lymphedema of both lower extremities  I89.0 Wound cleansing and dressings           Assessment & Plan:  1. Diabetic Gimenez grade 2 ulceration medial left forefoot over partial first ray resection site, wound was debrided through subcuticular tissues and dressed with Dermagran gauze dry dressing, facility will do the same 3 times a week. 2.  Diabetic Gimenez grade 0 ulceration distal plantar tip second toe, the area is well epithelialized and healed, may discontinue all dressings,: Continue to monitor for recurrence, if wound recurs they may reinitiate dressings. 3.  Lymphedema to left lower extremity, today we used 4 inch Ace wrap from toes to above ankle, 6 inch Ace wrap from above ankle to below knee with moderate compression, facility is to continue to do this and change at time of dressing changes.   Patient is to follow-up with administration at facility as to why they have not received her lymphedema wraps, once they do receive them she is to transition to that. 4.  Frequent elevation, low-sodium diet, proper protein intake, proper glycemic control was reviewed with patient, she understands and agrees with the plan and will follow-up in 2 weeks. Debridement   Wound 05/08/23 Diabetic Ulcer Foot Left;Medial    Universal Protocol:  Consent: Verbal consent obtained. Risks and benefits: risks, benefits and alternatives were discussed  Consent given by: patient  Time out: Immediately prior to procedure a "time out" was called to verify the correct patient, procedure, equipment, support staff and site/side marked as required. Patient understanding: patient states understanding of the procedure being performed  Patient identity confirmed: verbally with patient      Performed by: physician  Debridement type: surgical  Level of debridement: subcutaneous tissue  Pain control: lidocaine 4%  Pre-debridement measurements  Length (cm): 0.5  Width (cm): 0.3  Depth (cm): 0.1  Surface Area (cm^2): 0.15  Volume (cm^3): 0.02    Post-debridement measurements  Length (cm): 0.5  Width (cm): 0.3  Depth (cm): 0.3  Percent debrided: 100%  Surface Area (cm^2): 0.15  Area debrided (cm^2): 0.15  Volume (cm^3): 0.05  Tissue and other material debrided: subcutaneous tissue  Devitalized tissue debrided: biofilm, fibrin, necrotic debris and slough  Instrument(s) utilized: blade  Bleeding: small  Hemostasis obtained with: pressure  Procedural pain (0-10): insensate  Post-procedural pain: insensate   Response to treatment: procedure was tolerated well           Subjective: For care of diabetic ulcerations left foot as well as lymphedema, facility has been changing dressings as directed, she states she has not received her lymphedema wraps yet but they do continue to use Ace bandages, no new complaints.         The following portions of the patient's history were reviewed and updated as appropriate:   Patient Active Problem List   Diagnosis   • 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, with bone involvement without evidence of necrosis ([address])   • H/O bariatric surgery   • Anxiety   • Acquired hypothyroidism   • Psychiatric illness   • Type 2 diabetes mellitus with diabetic neuropathy, without long-term current use of insulin ([address])   • Osteomyelitis of left foot ([facility])   • Diabetic foot ulcer ([address])   • Acquired absence of other left toe(s) ([facility])   • Essential hypertension   • Arthritis   • Other chronic pain   • OCD (obsessive compulsive disorder)   • PTSD (post-traumatic stress disorder)   • Right foot ulcer, with fat layer exposed ([address])   • Gastroesophageal reflux disease without esophagitis   • Migraine without status migrainosus, not intractable   • Obstructive sleep apnea   • Morbid obesity with BMI of 40.0-44.9, adult ([address])   • Homeless single person   • Hx of right BKA ([facility])   • Bipolar 1 disorder, depressed ([address])   • Abnormal x-ray of cervical spine   • Acquired equinovarus deformity of left foot   • Acute osteomyelitis of metatarsal bone of right foot ([facility])   • Allergy to bee sting   • Amenorrhea   • Asthma, intrinsic   • Atypical squamous cell changes of undetermined significance (ASCUS) on cervical cytology with positive high risk human papilloma virus (HPV)   • Bipolar affective disorder in remission ([facility])   • Chronic paroxysmal hemicrania, not intractable   • Diastasis of rectus abdominis   • Drusen body, unspecified laterality   • Folate deficiency   • Hyperlipidemia associated with type 2 diabetes mellitus ([facility])   • Impaired intestinal absorption   • Instability of left knee joint   • Intertriginous dermatitis associated with moisture   • Intestinal malabsorption following gastrectomy   • 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 ([facility])   • Radiculopathy, lumbar region   • Right cervical radiculopathy   • S/P laparoscopic sleeve gastrectomy   • Chronic right-sided low back pain with left-sided sciatica     Past Medical History:   Diagnosis Date   • Anxiety    • Arthritis    • Asthma    • Chronic pain    • Diabetes mellitus (HCC)    • Hypothyroidism    • Lymphedema     left leg   • Manic bipolar I disorder (HCC)    • Morbid obesity (HCC)    • OCD (obsessive compulsive disorder)    • PTSD (post-traumatic stress disorder)      Past Surgical History:   Procedure Laterality Date   • CHOLECYSTECTOMY     • CHOLECYSTECTOMY     • CLOSURE DELAYED PRIMARY Left 5/18/2023    Procedure: CLOSURE DELAYED PRIMARY;  Surgeon: Aide Vu DPM;  Location: [facility];  Service: Podiatry   • LEG AMPUTATION THROUGH LOWER TIBIA AND FIBULA Right 3/23/2023    Procedure: AMPUTATION BELOW KNEE (BKA); Surgeon: Jason Wren MD;  Location: [facility];  Service: General   • HI AMPUTATION FOOT TRANSMETARSAL Right 9/4/2021    Procedure: AMPUTATION TRANSMETATARSAL (TMA);  Surgeon: Vy Emerson DPM;  [facility]: [facility];  Service: Podiatry   • HI AMPUTATION METATARSAL W/TOE SINGLE Left 9/8/2019    Procedure: PARTIAL 3RD RAY RESECTION, PSB TOE FILLET FLAP;  Surgeon: Diana Christopher DPM;  [facility];  Service: Podiatry   • HI AMPUTATION METATARSAL W/TOE SINGLE Left 3/23/2023    Procedure: RAY RESECTION FOOT;  Surgeon: Halbert Nageotte Rocchio, DPM;  Location: [facility];  Service: Podiatry   • HI AMPUTATION METATARSAL W/TOE SINGLE Left 5/8/2023    Procedure: Left first RAY RESECTION FOOT;  Surgeon: Tavon Niño DPM;  Location: [facility];  Service: Podiatry   • TOE AMPUTATION Right 1/13/2021    Procedure: AMPUTATION 2ND TOE;  Surgeon: Vy Emerson DPM;  [facility];  Service: Podiatry   • WOUND DEBRIDEMENT Left 2/17/2022    Procedure: DEBRIDEMENT GREAT TOE WOUND;  Surgeon: Vy Emerson DPM;  [facility];  Service: Podiatry   • WOUND DEBRIDEMENT Left 5/16/2023    Procedure: DEBRIDEMENT WOUND [facility]);   Surgeon: Aide Vu DPM;  Location: [facility];  Service: Podiatry     Social History Socioeconomic History   • Marital status: Single     Spouse name: Not on file   • Number of children: 2   • Years of education: 6   • Highest education level: 11th grade   Occupational History   • Not on file   Tobacco Use   • Smoking status: Every Day     Packs/day: 1.00     Years: 15.00     Total pack years: 15.00     Types: Cigarettes     Passive exposure: Current   • Smokeless tobacco: Never   Vaping Use   • Vaping Use: Former   Substance and Sexual Activity   • Alcohol use: Never   • Drug use: Yes     Types: Marijuana   • Sexual activity: Not Currently   Other Topics Concern   • Not on file   Social History Narrative   • Not on file     Social Determinants of Health     Financial Resource Strain: Not on file   Food Insecurity: Food Insecurity Present (5/9/2023)    Hunger Vital Sign    • Worried About Running Out of Food in the Last Year: Sometimes true    • Ran Out of Food in the Last Year: Sometimes true   Transportation Needs: Unmet Transportation Needs (5/9/2023)    PRAPARE - Transportation    • Lack of Transportation (Medical):  Yes    • Lack of Transportation (Non-Medical): Yes   Physical Activity: Not on file   Stress: Not on file   Social Connections: Not on file   Intimate Partner Violence: Not on file   Housing Stability: High Risk (5/9/2023)    Housing Stability Vital Sign    • Unable to Pay for Housing in the Last Year: Yes    • Number of Places Lived in the Last Year: 2    • Unstable Housing in the Last Year: Yes        Current Outpatient Medications:   •  acetaminophen (TYLENOL) 325 mg tablet, Take 2 tablets (650 mg total) by mouth every 6 (six) hours as needed for mild pain, headaches or fever, Disp: , Rfl: 0  •  cloNIDine (CATAPRES) 0.2 mg tablet, Take 0.2 mg by mouth 2 (two) times a day, Disp: , Rfl:   •  docusate sodium (COLACE) 100 mg capsule, Take 1 capsule (100 mg total) by mouth 2 (two) times a day, Disp: , Rfl: 0  •  EPINEPHrine (EPIPEN) 0.3 mg/0.3 mL SOAJ, , Disp: , Rfl:   • fenofibrate (TRICOR) 54 MG tablet, TAKE 1 TABLET (54 MG TOTAL) BY MOUTH DAILY. , Disp: , Rfl:   •  folic acid (FOLVITE) 1 mg tablet, Take 1,000 mcg by mouth daily, Disp: , Rfl:   •  Glucagon, rDNA, (Glucagon Emergency) 1 MG KIT, , Disp: , Rfl:   •  hydrOXYzine HCL (ATARAX) 50 mg tablet, Take 50 mg by mouth in the morning, Disp: , Rfl:   •  hydrOXYzine HCL (ATARAX) 50 mg tablet, Take 2 tablets (100 mg total) by mouth daily at bedtime, Disp: 30 tablet, Rfl: 0  •  insulin glargine (LANTUS) 100 units/mL subcutaneous injection, Inject 12 Units under the skin daily at bedtime, Disp: 10 mL, Rfl: 0  •  insulin lispro (HumaLOG) 100 units/mL injection, Inject 1-6 Units under the skin 3 (three) times a day before meals, Disp: , Rfl: 0  •  lamoTRIgine (LaMICtal) 150 MG tablet, Take 150 mg by mouth in the morning, Disp: , Rfl:   •  levothyroxine 50 mcg tablet, Take 1 tablet (50 mcg total) by mouth daily, Disp: 30 tablet, Rfl: 0  •  melatonin 3 mg, Take 3 tablets (9 mg total) by mouth daily at bedtime, Disp: 30 tablet, Rfl: 0  •  metFORMIN (GLUCOPHAGE) 500 mg tablet, Take 1 tablet (500 mg total) by mouth 2 (two) times a day with meals, Disp: 60 tablet, Rfl: 0  •  methocarbamol (ROBAXIN) 750 mg tablet, TAKE 1 TABLET (750 MG TOTAL) BY MOUTH 4 (FOUR) TIMES A DAY AS NEEDED FOR MUSCLE SPASMS., Disp: , Rfl:   •  pantoprazole (PROTONIX) 40 mg tablet, Take 1 tablet (40 mg total) by mouth 2 (two) times a day before breakfast and lunch, Disp: 30 tablet, Rfl: 0  •  polyethylene glycol (MIRALAX) 17 g packet, Take 17 g by mouth daily, Disp: , Rfl: 0  •  pregabalin (LYRICA) 150 mg capsule, Take 150 mg by mouth 2 (two) times a day, Disp: , Rfl:   •  senna (SENOKOT) 8.6 mg, Take 2 tablets (17.2 mg total) by mouth daily at bedtime, Disp: , Rfl: 0  •  ziprasidone (GEODON) 80 mg capsule, Take 80 mg by mouth 2 (two) times a day with meals , Disp: , Rfl:   •  doxycycline hyclate (VIBRAMYCIN) 100 mg capsule, , Disp: , Rfl:   Family History   Problem Relation Age of Onset   • Hyperlipidemia Mother    • Diabetes Mother    • Hypertension Mother    • Heart attack Father        Review of Systems   Constitutional: Negative for chills and fever. HENT: Negative for ear pain and sore throat. Eyes: Negative for pain and visual disturbance. Respiratory: Negative for cough and shortness of breath. Cardiovascular: Negative for chest pain and palpitations. Gastrointestinal: Negative for abdominal pain and vomiting. Genitourinary: Negative for dysuria and hematuria. Musculoskeletal: Positive for gait problem. Negative for arthralgias and back pain. Skin: Positive for color change and wound. Negative for rash. Neurological: Positive for numbness. Negative for seizures and syncope. Psychiatric/Behavioral: Negative. All other systems reviewed and are negative. Objective:  /78   Pulse 78   Temp 98.5 °F (36.9 °C) (Temporal)   Resp 16   LMP  (LMP Unknown)     Physical Exam  Constitutional:       Appearance: Normal appearance. She is well-developed. She is obese. HENT:      Head: Normocephalic and atraumatic. Nose: Nose normal.      Mouth/Throat:      Mouth: Mucous membranes are moist.      Pharynx: Oropharynx is clear. Eyes:      Conjunctiva/sclera: Conjunctivae normal.   Cardiovascular:      Pulses:           Dorsalis pedis pulses are detected w/ Doppler on the left side. Posterior tibial pulses are detected w/ Doppler on the left side. Pulmonary:      Effort: Pulmonary effort is normal.   Musculoskeletal:         General: Normal range of motion. Cervical back: Normal range of motion. Left lower le+ Edema present. Comments: Right BKA   Skin:     General: Skin is warm and dry. Capillary Refill: Capillary refill takes less than 2 seconds. Neurological:      General: No focal deficit present. Mental Status: She is alert and oriented to person, place, and time. Mental status is at baseline. Sensory: Sensory deficit present. Coordination: Coordination abnormal.      Gait: Gait abnormal.      Deep Tendon Reflexes: Reflexes abnormal.   Psychiatric:         Mood and Affect: Mood normal.         Behavior: Behavior normal.         Thought Content: Thought content normal.         Judgment: Judgment normal.           Wound 03/23/23 Leg Right (Active)       Wound 05/08/23 Diabetic Ulcer Foot Left;Medial (Active)   Enter Phylicia Pick score: Phylicia Pick Grade 2: Deep ulcer extended to ligament, tendon, joint capsule, bone, or deep fascia without abscess or osteomyelitis (OM) 08/30/23 1412   Wound Image   09/13/23 1502   Wound Description Pink;Epithelialization 09/13/23 1448   Yvonne-wound Assessment Edema; Intact 09/13/23 1448   Wound Length (cm) 0.5 cm 09/13/23 1448   Wound Width (cm) 0.3 cm 09/13/23 1448   Wound Depth (cm) 0.1 cm 09/13/23 1448   Wound Surface Area (cm^2) 0.15 cm^2 09/13/23 1448   Wound Volume (cm^3) 0.015 cm^3 09/13/23 1448   Calculated Wound Volume (cm^3) 0.02 cm^3 09/13/23 1448   Change in Wound Size % 60 09/13/23 1448   Drainage Amount Moderate 09/13/23 1448   Drainage Description Serosanguineous 09/13/23 1448   Non-staged Wound Description Full thickness 09/13/23 1448   Treatments Cleansed 09/13/23 1448   Patient Tolerance Tolerated well 09/13/23 1448   Dressing Status Removed 09/13/23 1448       Wound 08/30/23 Diabetic Ulcer Toe (Comment  which one) Anterior; Left (Active)   Enter Gimenez score: Gimenez Grade 1: Partial or full-thickness ulcer (superficial) 08/30/23 1413   Wound Image   09/13/23 1447   Wound Description Epithelialization 09/13/23 1449   Yvonne-wound Assessment Callus 09/13/23 1449   Wound Length (cm) 0 cm 09/13/23 1449   Wound Width (cm) 0 cm 09/13/23 1449   Wound Depth (cm) 0 cm 09/13/23 1449   Wound Surface Area (cm^2) 0 cm^2 09/13/23 1449   Wound Volume (cm^3) 0 cm^3 09/13/23 1449   Calculated Wound Volume (cm^3) 0 cm^3 09/13/23 1449   Change in Wound Size % 100 09/13/23 144 Drainage Amount None 09/13/23 1449   Non-staged Wound Description Not applicable 86/24/73 0713   Treatments Cleansed 09/13/23 1449   Patient Tolerance Tolerated well 09/13/23 1449   Dressing Status Removed 09/13/23 1449          Results from last 6 Months   Lab Units 03/21/23  1809   WOUND CULTURE  4+ Growth of Beta Hemolytic Streptococcus Group F*  2+ Growth of Beta Hemolytic Streptococcus Group B*  2+ Growth of Alcaligenes faecalis*  1+ Growth of Staphylococcus aureus*       No results found. Wound Instructions:  Orders Placed This Encounter   Procedures   • Wound cleansing and dressings     Left medial foot and left 2nd toe wounds:     Wash your hands with soap and water. Remove old dressing, discard into plastic bag and place in trash. Cleanse the wounds with normal saline prior to applying a clean dressing. Pat dry using gauze. Shower no - do not get dressings wet. Apply moisturizer  to skin surrounding wounds as needed,  Apply dermagran then gauze to the toe and foot wounds. If wounds become too wet, may resume use of calcium alginate. Secure with fletcher and tape  Change dressing every other day.       Wound compression and edema control: Ace bandages  Apply ace compression wrap to your left leg. Apply 4 inch from toes to above ankle then 6 inch from above ankle to knee. Apply in the morning and re-wrap as needed during the day if wrap becomes loose. Remove at bedtime and elevate legs or lie down. Avoid prolonged standing in one place. Elevate leg(s) above the level of the heart when sitting or as much as possible.      Use ace bandage until compression wrap is received. Standing Status:   Future     Standing Expiration Date:   9/13/2024         Topher Busby DPM, DPM, FACFAS    Portions of the record may have been created with voice recognition software.  Occasional wrong word or "sound a like" substitutions may have occurred due to the inherent limitations of voice recognition software. Read the chart carefully and recognize, using context, where substitutions have occurred.

## 2023-09-13 NOTE — PATIENT INSTRUCTIONS
Orders Placed This Encounter   Procedures    Wound cleansing and dressings     Left medial foot and left 2nd toe wounds:     Wash your hands with soap and water. Remove old dressing, discard into plastic bag and place in trash. Cleanse the wounds with normal saline prior to applying a clean dressing. Pat dry using gauze. Shower no - do not get dressings wet. Apply moisturizer  to skin surrounding wounds as needed,  Apply dermagran then gauze to the toe and foot wounds. If wounds become too wet, may resume use of calcium alginate. Secure with fletcher and tape  Change dressing every other day. Wound compression and edema control: Ace bandages  Apply ace compression wrap to your left leg. Apply 4 inch from toes to above ankle then 6 inch from above ankle to knee. Apply in the morning and re-wrap as needed during the day if wrap becomes loose. Remove at bedtime and elevate legs or lie down. Avoid prolonged standing in one place. Elevate leg(s) above the level of the heart when sitting or as much as possible. Use ace bandage until compression wrap is received.      Standing Status:   Future     Standing Expiration Date:   9/13/2024

## 2023-10-04 ENCOUNTER — OFFICE VISIT (OUTPATIENT)
Dept: WOUND CARE | Facility: HOSPITAL | Age: 48
End: 2023-10-04
Payer: COMMERCIAL

## 2023-10-04 VITALS
HEART RATE: 84 BPM | RESPIRATION RATE: 18 BRPM | SYSTOLIC BLOOD PRESSURE: 128 MMHG | TEMPERATURE: 97.8 F | DIASTOLIC BLOOD PRESSURE: 80 MMHG

## 2023-10-04 DIAGNOSIS — I89.0 LYMPHEDEMA OF BOTH LOWER EXTREMITIES: ICD-10-CM

## 2023-10-04 DIAGNOSIS — L97.522 DIABETIC ULCER OF TOE OF LEFT FOOT ASSOCIATED WITH TYPE 2 DIABETES MELLITUS, WITH FAT LAYER EXPOSED (HCC): Primary | ICD-10-CM

## 2023-10-04 DIAGNOSIS — E11.42 DIABETIC POLYNEUROPATHY ASSOCIATED WITH TYPE 2 DIABETES MELLITUS (HCC): ICD-10-CM

## 2023-10-04 DIAGNOSIS — E11.621 DIABETIC ULCER OF TOE OF LEFT FOOT ASSOCIATED WITH TYPE 2 DIABETES MELLITUS, WITH FAT LAYER EXPOSED (HCC): Primary | ICD-10-CM

## 2023-10-04 PROCEDURE — 99213 OFFICE O/P EST LOW 20 MIN: CPT | Performed by: PODIATRIST

## 2023-10-04 PROCEDURE — 11042 DBRDMT SUBQ TIS 1ST 20SQCM/<: CPT | Performed by: PODIATRIST

## 2023-10-04 NOTE — PROGRESS NOTES
Patient ID: Luisa Pan is a 53 y.o. female Date of Birth 1975       Chief Complaint   Patient presents with   • Follow Up Wound Care Visit     Left medial foot wound. Arrived with dressing intact and wearing offloading shoe. Left 2nd toe is without drainage and is healed today. Patient currently on antibiotic the name of which she does not remember for inflammation a RI and infection of the left middle finger. Allergies:  Latex, Vancomycin, Aspirin, Barium iodide, Codeine, and Penicillins    Diagnosis:  1. Diabetic ulcer of toe of left foot associated with type 2 diabetes mellitus, with fat layer exposed ([address])  -     Wound cleansing and dressings; Future    2. Diabetic polyneuropathy associated with type 2 diabetes mellitus (HCC)  -     Wound cleansing and dressings; Future    3. Lymphedema of both lower extremities  -     Wound cleansing and dressings; Future       Diagnosis ICD-10-CM Associated Orders   1. Diabetic ulcer of toe of left foot associated with type 2 diabetes mellitus, with fat layer exposed ([address])  U55.934 Wound cleansing and dressings    L97.522       2. Diabetic polyneuropathy associated with type 2 diabetes mellitus (HCC)  E11.42 Wound cleansing and dressings      3. Lymphedema of both lower extremities  I89.0 Wound cleansing and dressings           Assessment & Plan:  1. Diabetic Gimenez grade 1 ulceration medial left forefoot over partial first ray resection site, wound was debrided through subcuticular tissues and dressed with Dermagran gauze dry dressing, facility and will do the same 3 times a week. 2.  Lymphedema to left lower extremity, continue with 4 inch Ace wrap from toes to above ankle, 6 inch Ace wrap from above ankle to below knee with moderate compression, facility is to do this at all times when changing the dressing to the left foot. Patient to follow-up with administration at facility regarding her lymphedema wraps.   3.  Patient to maintain frequent elevation, low-sodium diet, proper protein intake, proper glycemic control, smoking cessation was reviewed with patient, she may weight-bear as tolerated, she understands and agrees with the plan will follow-up in 2 weeks. Debridement   Wound 05/08/23 Diabetic Ulcer Foot Left;Medial    Universal Protocol:  Consent: Verbal consent obtained. Risks and benefits: risks, benefits and alternatives were discussed  Consent given by: patient  Time out: Immediately prior to procedure a "time out" was called to verify the correct patient, procedure, equipment, support staff and site/side marked as required. Patient understanding: patient states understanding of the procedure being performed  Patient identity confirmed: verbally with patient      Performed by: physician  Debridement type: surgical  Level of debridement: subcutaneous tissue  Pain control: lidocaine 4%  Pre-debridement measurements  Length (cm): 0.3  Width (cm): 0.3  Depth (cm): 0.1  Surface Area (cm^2): 0.09  Volume (cm^3): 0.01    Post-debridement measurements  Length (cm): 0.4  Width (cm): 0.4  Depth (cm): 0.2  Percent debrided: 100%  Surface Area (cm^2): 0.16  Area debrided (cm^2): 0.16  Volume (cm^3): 0.03  Tissue and other material debrided: subcutaneous tissue  Devitalized tissue debrided: biofilm, callus, fibrin, necrotic debris and slough  Instrument(s) utilized: blade  Bleeding: small  Hemostasis obtained with: pressure  Procedural pain (0-10): insensate  Post-procedural pain: insensate   Response to treatment: procedure was tolerated well                 Subjective:   Nelida Adams presents today for care of left foot diabetic ulceration, facility continues to change dressings as directed, she states her blood sugars are okay, she is still waiting for her lymphedema wraps but is tolerating Ace wraps very well. She continues to smoke, no new complaints.         The following portions of the patient's history were reviewed and updated as appropriate:   Patient Active Problem List   Diagnosis   • 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, with bone involvement without evidence of necrosis ([address])   • H/O bariatric surgery   • Anxiety   • Acquired hypothyroidism   • Psychiatric illness   • Type 2 diabetes mellitus with diabetic neuropathy, without long-term current use of insulin ([address])   • Osteomyelitis of left foot ([address])   • Diabetic foot ulcer ([address])   • Acquired absence of other left toe(s) ([address])   • Essential hypertension   • Arthritis   • Other chronic pain   • OCD (obsessive compulsive disorder)   • PTSD (post-traumatic stress disorder)   • Right foot ulcer, with fat layer exposed ([address])   • Gastroesophageal reflux disease without esophagitis   • Migraine without status migrainosus, not intractable   • Obstructive sleep apnea   • Morbid obesity with BMI of 40.0-44.9, adult ([address])   • Homeless single person   • Hx of right BKA ([facility])   • Bipolar 1 disorder, depressed ([address])   • Abnormal x-ray of cervical spine   • Acquired equinovarus deformity of left foot   • Acute osteomyelitis of metatarsal bone of right foot ([facility])   • Allergy to bee sting   • Amenorrhea   • Asthma, intrinsic   • Atypical squamous cell changes of undetermined significance (ASCUS) on cervical cytology with positive high risk human papilloma virus (HPV)   • Bipolar affective disorder in remission ([facility])   • Chronic paroxysmal hemicrania, not intractable   • Diastasis of rectus abdominis   • Drusen body, unspecified laterality   • Folate deficiency   • Hyperlipidemia associated with type 2 diabetes mellitus    • Impaired intestinal absorption   • Instability of left knee joint   • Intertriginous dermatitis associated with moisture   • Intestinal malabsorption following gastrectomy   • 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 ([facility])   • Radiculopathy, lumbar region   • Right cervical radiculopathy   • S/P laparoscopic sleeve gastrectomy   • Chronic right-sided low back pain with left-sided sciatica     Past Medical History:   Diagnosis Date   • Anxiety    • Arthritis    • Asthma    • Chronic pain    • Diabetes mellitus ([address])    • Hypothyroidism    • Lymphedema     left leg   • Manic bipolar I disorder (HCC)    • Morbid obesity (HCC)    • OCD (obsessive compulsive disorder)    • PTSD (post-traumatic stress disorder)      Past Surgical History:   Procedure Laterality Date   • CHOLECYSTECTOMY     • CHOLECYSTECTOMY     • CLOSURE DELAYED PRIMARY Left 5/18/2023    Procedure: CLOSURE DELAYED PRIMARY;  Surgeon: Jose Harman DPM;  Location: [facility];  Service: Podiatry   • LEG AMPUTATION THROUGH LOWER TIBIA AND FIBULA Right 3/23/2023    Procedure: AMPUTATION BELOW KNEE (BKA); Surgeon: Skylar Carmichael MD;  Location: [facility];  Service: General   • NJ AMPUTATION FOOT TRANSMETARSAL Right 9/4/2021    Procedure: AMPUTATION TRANSMETATARSAL (TMA);  Surgeon: Felice Asher DPM;  [facility];  Service: Podiatry   • NJ AMPUTATION METATARSAL W/TOE SINGLE Left 9/8/2019    Procedure: PARTIAL 3RD RAY RESECTION, PSB TOE FILLET FLAP;  Surgeon: Sebastián Morales DPM;  [facility];  Service: Podiatry   • NJ AMPUTATION METATARSAL W/TOE SINGLE Left 3/23/2023    Procedure: RAY RESECTION FOOT;  Surgeon: Segundo Toro DPM;  [facility];  Service: Podiatry   • NJ AMPUTATION METATARSAL W/TOE SINGLE Left 5/8/2023    Procedure: Left first RAY RESECTION FOOT;  Surgeon: Juliocesar Gonzalez DPM;  [facility];  Service: Podiatry   • TOE AMPUTATION Right 1/13/2021    Procedure: AMPUTATION 2ND TOE;  Surgeon: Felice Asher DPM;  [facility];  Service: Podiatry   • WOUND DEBRIDEMENT Left 2/17/2022    Procedure: DEBRIDEMENT GREAT TOE WOUND;  Surgeon: Felice Asher DPM;  [facility];  Service: Podiatry   • WOUND DEBRIDEMENT Left 5/16/2023    Procedure: DEBRIDEMENT WOUND [facility] OUT);   Surgeon: Jose Harman DPM;  [facility]: [facility];  Service: Podiatry     Social History     Socioeconomic History   • Marital status: Single     Spouse name: None   • Number of children: 2   • Years of education: 6   • Highest education level: 11th grade   Occupational History   • None   Tobacco Use   • Smoking status: Every Day     Packs/day: 0.50     Years: 15.00     Total pack years: 7.50     Types: Cigarettes     Passive exposure: Current   • Smokeless tobacco: Never   Vaping Use   • Vaping Use: Former   Substance and Sexual Activity   • Alcohol use: Never   • Drug use: Yes     Types: Marijuana   • Sexual activity: Not Currently   Other Topics Concern   • None   Social History Narrative   • None     Social Determinants of Health     Financial Resource Strain: Not on file   Food Insecurity: Food Insecurity Present (5/9/2023)    Hunger Vital Sign    • Worried About Running Out of Food in the Last Year: Sometimes true    • Ran Out of Food in the Last Year: Sometimes true   Transportation Needs: Unmet Transportation Needs (5/9/2023)    PRAPARE - Transportation    • Lack of Transportation (Medical):  Yes    • Lack of Transportation (Non-Medical): Yes   Physical Activity: Not on file   Stress: Not on file   Social Connections: Not on file   Intimate Partner Violence: Not on file   Housing Stability: High Risk (5/9/2023)    Housing Stability Vital Sign    • Unable to Pay for Housing in the Last Year: Yes    • Number of Places Lived in the Last Year: 2    • Unstable Housing in the Last Year: Yes        Current Outpatient Medications:   •  lamoTRIgine (LaMICtal) 150 MG tablet, Take 200 mg by mouth 2 (two) times a day, Disp: , Rfl:   •  acetaminophen (TYLENOL) 325 mg tablet, Take 2 tablets (650 mg total) by mouth every 6 (six) hours as needed for mild pain, headaches or fever, Disp: , Rfl: 0  •  cloNIDine (CATAPRES) 0.2 mg tablet, Take 0.2 mg by mouth 2 (two) times a day, Disp: , Rfl:   •  docusate sodium (COLACE) 100 mg capsule, Take 1 capsule (100 mg total) by mouth 2 (two) times a day, Disp: , Rfl: 0  •  doxycycline hyclate (VIBRAMYCIN) 100 mg capsule, , Disp: , Rfl:   •  EPINEPHrine (EPIPEN) 0.3 mg/0.3 mL SOAJ, , Disp: , Rfl:   •  fenofibrate (TRICOR) 54 MG tablet, TAKE 1 TABLET (54 MG TOTAL) BY MOUTH DAILY. , Disp: , Rfl:   •  folic acid (FOLVITE) 1 mg tablet, Take 1,000 mcg by mouth daily, Disp: , Rfl:   •  Glucagon, rDNA, (Glucagon Emergency) 1 MG KIT, , Disp: , Rfl:   •  hydrOXYzine HCL (ATARAX) 50 mg tablet, Take 50 mg by mouth in the morning, Disp: , Rfl:   •  hydrOXYzine HCL (ATARAX) 50 mg tablet, Take 2 tablets (100 mg total) by mouth daily at bedtime, Disp: 30 tablet, Rfl: 0  •  insulin glargine (LANTUS) 100 units/mL subcutaneous injection, Inject 12 Units under the skin daily at bedtime, Disp: 10 mL, Rfl: 0  •  insulin lispro (HumaLOG) 100 units/mL injection, Inject 1-6 Units under the skin 3 (three) times a day before meals, Disp: , Rfl: 0  •  levothyroxine 50 mcg tablet, Take 1 tablet (50 mcg total) by mouth daily, Disp: 30 tablet, Rfl: 0  •  melatonin 3 mg, Take 3 tablets (9 mg total) by mouth daily at bedtime, Disp: 30 tablet, Rfl: 0  •  metFORMIN (GLUCOPHAGE) 500 mg tablet, Take 1 tablet (500 mg total) by mouth 2 (two) times a day with meals, Disp: 60 tablet, Rfl: 0  •  methocarbamol (ROBAXIN) 750 mg tablet, TAKE 1 TABLET (750 MG TOTAL) BY MOUTH 4 (FOUR) TIMES A DAY AS NEEDED FOR MUSCLE SPASMS., Disp: , Rfl:   •  pantoprazole (PROTONIX) 40 mg tablet, Take 1 tablet (40 mg total) by mouth 2 (two) times a day before breakfast and lunch, Disp: 30 tablet, Rfl: 0  •  polyethylene glycol (MIRALAX) 17 g packet, Take 17 g by mouth daily, Disp: , Rfl: 0  •  pregabalin (LYRICA) 150 mg capsule, Take 150 mg by mouth 2 (two) times a day, Disp: , Rfl:   •  senna (SENOKOT) 8.6 mg, Take 2 tablets (17.2 mg total) by mouth daily at bedtime, Disp: , Rfl: 0  •  ziprasidone (GEODON) 80 mg capsule, Take 80 mg by mouth 2 (two) times a day with meals , Disp: , Rfl:   Family History   Problem Relation Age of Onset   • Hyperlipidemia Mother    • Diabetes Mother    • Hypertension Mother    • Heart attack Father        Review of Systems   Constitutional: Negative for chills and fever. HENT: Negative for ear pain and sore throat. Eyes: Negative for pain and visual disturbance. Respiratory: Negative for cough and shortness of breath. Cardiovascular: Negative for chest pain and palpitations. Gastrointestinal: Negative for abdominal pain and vomiting. Genitourinary: Negative for dysuria and hematuria. Musculoskeletal: Positive for gait problem. Negative for arthralgias and back pain. Skin: Positive for color change and wound. Negative for rash. Neurological: Positive for numbness. Negative for seizures and syncope. Psychiatric/Behavioral: Negative. All other systems reviewed and are negative. Objective:  /80   Pulse 84   Temp 97.8 °F (36.6 °C)   Resp 18   LMP  (LMP Unknown)     Physical Exam  Constitutional:       Appearance: Normal appearance. She is well-developed. She is obese. HENT:      Head: Normocephalic and atraumatic. Nose: Nose normal.      Mouth/Throat:      Mouth: Mucous membranes are moist.      Pharynx: Oropharynx is clear. Eyes:      Conjunctiva/sclera: Conjunctivae normal.   Cardiovascular:      Pulses:           Dorsalis pedis pulses are detected w/ Doppler on the left side. Posterior tibial pulses are detected w/ Doppler on the left side. Pulmonary:      Effort: Pulmonary effort is normal.   Musculoskeletal:         General: Normal range of motion. Cervical back: Normal range of motion. Left lower le+ Edema present. Comments: BKA right, prior partial first ray resection and third toe amputation left   Skin:     General: Skin is warm and dry. Capillary Refill: Capillary refill takes less than 2 seconds. Neurological:      General: No focal deficit present.       Mental Status: She is alert and oriented to person, place, and time. Mental status is at baseline. Sensory: Sensory deficit present. Coordination: Coordination abnormal.      Gait: Gait abnormal.      Deep Tendon Reflexes: Reflexes abnormal.   Psychiatric:         Mood and Affect: Mood normal.         Behavior: Behavior normal.         Thought Content: Thought content normal.         Judgment: Judgment normal.           Wound 05/08/23 Diabetic Ulcer Foot Left;Medial (Active)   Enter Earline [place] score: Gimenez Grade 1: Partial or full-thickness ulcer (superficial) 10/04/23 1421   Wound Image   10/04/23 1443   Wound Description Pink 10/04/23 1421   Yvonne-wound Assessment Intact 10/04/23 1421   Wound Length (cm) 0.3 cm 10/04/23 1421   Wound Width (cm) 0.3 cm 10/04/23 1421   Wound Depth (cm) 0.1 cm 10/04/23 1421   Wound Surface Area (cm^2) 0.09 cm^2 10/04/23 1421   Wound Volume (cm^3) 0.009 cm^3 10/04/23 1421   Calculated Wound Volume (cm^3) 0.01 cm^3 10/04/23 1421   Change in Wound Size % 80 10/04/23 1421   Drainage Amount Small 10/04/23 1421   Drainage Description Serosanguineous 10/04/23 1421   Non-staged Wound Description Full thickness 10/04/23 1421   Treatments Cleansed 09/13/23 1448   Patient Tolerance Tolerated well 09/13/23 1448   Dressing Status Removed 09/13/23 1448       Wound 08/30/23 Diabetic Ulcer Toe (Comment  which one) Anterior; Left (Active)   Enter Gimenez score: Gimenez Grade 1: Partial or full-thickness ulcer (superficial) 08/30/23 1413   Wound Image   10/04/23 1415   Wound Description Epithelialization 10/04/23 1415   Yvonne-wound Assessment Intact 10/04/23 1415   Wound Length (cm) 0 cm 10/04/23 1415   Wound Width (cm) 0 cm 10/04/23 1415   Wound Depth (cm) 0 cm 10/04/23 1415   Wound Surface Area (cm^2) 0 cm^2 10/04/23 1415   Wound Volume (cm^3) 0 cm^3 10/04/23 1415   Calculated Wound Volume (cm^3) 0 cm^3 10/04/23 1415   Change in Wound Size % 100 10/04/23 1415   Drainage Amount None 10/04/23 1415   Non-staged Wound Description Not applicable 71/20/11 7239   Treatments Cleansed 09/13/23 1449   Patient Tolerance Tolerated well 09/13/23 1449   Dressing Status Removed 09/13/23 1449                No results found. Wound Instructions:  Orders Placed This Encounter   Procedures   • Wound cleansing and dressings     Wash your hands with soap and water. Remove old dressing, discard into plastic bag and place in trash. Cleanse the wounds with normal saline prior to applying a clean dressing. Pat dry using gauze.     Shower yes, with protection only, do not get wound or dressings wet. Left 2nd toe is healed; no dressing required    Left medial foot:  Apply moisturizer  to skin surrounding wounds as needed,  Apply dermagran then gauze to the toe and foot wounds. If wounds become too wet, may resume use of calcium alginate. Secure with fletcher and tape  Change dressing every other day. Ace wrap from base of toes to behind knee with light tension                    Wound compression and edema control: Ace bandages  Apply ace compression wrap to your left leg. Apply from base of toes to behind knee. Apply in the morning and re-wrap as needed during the day if wrap becomes loose. Remove at bedtime and elevate legs or lie down. Avoid prolonged standing in one place. Elevate leg(s) above the level of the heart as much as possible as high as possible when sitting and during the night      Use ace bandage until lymphedema wraps are received. Follow up Dr. Anusha Blackman in 2 weeks    Today's wound treatment note:  Cleansed with normal saline  Redressed as ordered above     Standing Status:   Future     Standing Expiration Date:   10/4/2024         Omid Tamayo DPM, DPHOLLAND, FACFAS    Portions of the record may have been created with voice recognition software. Occasional wrong word or "sound a like" substitutions may have occurred due to the inherent limitations of voice recognition software.  Read the chart carefully and recognize, using context, where substitutions have occurred.

## 2023-10-04 NOTE — PATIENT INSTRUCTIONS
Orders Placed This Encounter   Procedures    Wound cleansing and dressings     Wash your hands with soap and water. Remove old dressing, discard into plastic bag and place in trash. Cleanse the wounds with normal saline prior to applying a clean dressing. Pat dry using gauze. Shower yes, with protection only, do not get wound or dressings wet. Left 2nd toe is healed; no dressing required    Left medial foot:  Apply moisturizer  to skin surrounding wounds as needed,  Apply dermagran then gauze to the toe and foot wounds. If wounds become too wet, may resume use of calcium alginate. Secure with fletcher and tape  Change dressing every other day. Ace wrap from base of toes to behind knee with light tension                    Wound compression and edema control: Ace bandages  Apply ace compression wrap to your left leg. Apply from base of toes to behind knee. Apply in the morning and re-wrap as needed during the day if wrap becomes loose. Remove at bedtime and elevate legs or lie down. Avoid prolonged standing in one place. Elevate leg(s) above the level of the heart as much as possible as high as possible when sitting and during the night      Use ace bandage until lymphedema wraps are received.     Follow up Dr. Nelly Faustin in 2 weeks    Today's wound treatment note:  Cleansed with normal saline  Redressed as ordered above     Standing Status:   Future     Standing Expiration Date:   10/4/2024

## 2023-10-18 ENCOUNTER — OFFICE VISIT (OUTPATIENT)
Dept: WOUND CARE | Facility: HOSPITAL | Age: 48
End: 2023-10-18
Payer: COMMERCIAL

## 2023-10-18 VITALS
SYSTOLIC BLOOD PRESSURE: 114 MMHG | TEMPERATURE: 97.5 F | HEART RATE: 64 BPM | DIASTOLIC BLOOD PRESSURE: 60 MMHG | RESPIRATION RATE: 18 BRPM

## 2023-10-18 DIAGNOSIS — L97.421 DIABETIC ULCER OF LEFT MIDFOOT ASSOCIATED WITH TYPE 2 DIABETES MELLITUS, LIMITED TO BREAKDOWN OF SKIN (HCC): Primary | ICD-10-CM

## 2023-10-18 DIAGNOSIS — E11.42 DIABETIC POLYNEUROPATHY ASSOCIATED WITH TYPE 2 DIABETES MELLITUS (HCC): ICD-10-CM

## 2023-10-18 DIAGNOSIS — M79.672 PAIN IN LEFT FOOT: ICD-10-CM

## 2023-10-18 DIAGNOSIS — I89.0 LYMPHEDEMA OF BOTH LOWER EXTREMITIES: ICD-10-CM

## 2023-10-18 DIAGNOSIS — E11.621 DIABETIC ULCER OF LEFT MIDFOOT ASSOCIATED WITH TYPE 2 DIABETES MELLITUS, LIMITED TO BREAKDOWN OF SKIN (HCC): Primary | ICD-10-CM

## 2023-10-18 PROCEDURE — 99212 OFFICE O/P EST SF 10 MIN: CPT | Performed by: PODIATRIST

## 2023-10-18 NOTE — PATIENT INSTRUCTIONS
Orders Placed This Encounter   Procedures    Wound cleansing and dressings     Wash your hands with soap and water. Remove old dressing, discard into plastic bag and place in trash. Cleanse the wounds with normal saline prior to applying a clean dressing. Pat dry using gauze. Shower yes, with protection only, do not get wound or dressings wet. Left medial foot:  Apply moisturizer  to skin surrounding wounds as needed  Discontinue using dermagran gauze  Apply single layer of xeroform   Secured with gauze and medfix or cosmopore  Change dressing every other day. Ace wrap from base of toes to behind knee with light tension                  Follow up Dr. Ramon Gandara in 2 weeks     Today's wound treatment note:  Cleansed with normal saline  Redressed as ordered above     Standing Status:   Future     Standing Expiration Date:   10/18/2024    Wound compression and edema control     Wound compression and edema control: Ace bandages  Apply ace compression wrap to your left leg. Apply from base of toes to behind knee. Apply in the morning and re-wrap as needed during the day if wrap becomes loose. Remove at bedtime and elevate legs or lie down. Avoid prolonged standing in one place. Elevate leg(s) above the level of the heart as much as possible as high as possible when sitting and during the night      Use ace bandage until lymphedema wraps are received. Standing Status:   Future     Standing Expiration Date:   10/18/2024    Wound off loading     Discontinue using darco wedge shoe. Patient may wear surgical post op shoe or slipper. Patient given surgical shoe at visit today.      Standing Status:   Future     Standing Expiration Date:   10/18/2024

## 2023-10-18 NOTE — PROGRESS NOTES
Patient ID: Samuel Nolan is a 53 y.o. female Date of Birth 1975       Chief Complaint   Patient presents with    Follow Up Wound Care Visit     Left foot wound       Allergies:  Latex, Vancomycin, Aspirin, Barium iodide, Codeine, and Penicillins    Diagnosis:  1. Diabetic ulcer of left midfoot associated with type 2 diabetes mellitus, limited to breakdown of skin (HCC)  -     Wound cleansing and dressings; Future  -     Wound compression and edema control; Future  -     Wound off loading; Future    2. Diabetic polyneuropathy associated with type 2 diabetes mellitus (HCC)  -     Wound cleansing and dressings; Future  -     Wound compression and edema control; Future  -     Wound off loading; Future    3. Lymphedema of both lower extremities  -     Wound cleansing and dressings; Future  -     Wound compression and edema control; Future  -     Wound off loading; Future    4. Pain in left foot       Diagnosis ICD-10-CM Associated Orders   1. Diabetic ulcer of left midfoot associated with type 2 diabetes mellitus, limited to breakdown of skin (HCC)  E11.621 Wound cleansing and dressings    L97.421 Wound compression and edema control     Wound off loading      2. Diabetic polyneuropathy associated with type 2 diabetes mellitus (HCC)  E11.42 Wound cleansing and dressings     Wound compression and edema control     Wound off loading      3. Lymphedema of both lower extremities  I89.0 Wound cleansing and dressings     Wound compression and edema control     Wound off loading      4. Pain in left foot  M79.672            Assessment & Plan:  Diabetic Gimenez grade 1 ulceration at site of first ray resection left foot, no debridement was performed, there is granulation tissue with fragile epithelium, scant serous drainage, discontinue Dermagran gauze, apply single-layer Xeroform covered with Medipore or bulky dressing, this is to be changed 3 times a week.   Patient with pain submetatarsal head 2 and 3 left foot, likely from use of Darco wedge shoe and propelling her wheelchair going from heel-to-toe, I discussed this as a possibility for her foot pain, she states that is exactly how she propels her wheelchair, we have discontinued Darco wedge shoe and transition to her to a flat surgical shoe which she states is much more comfortable and better. Patient with lymphedema left lower leg, continue Ace wraps from toes to popliteal fossa, this is to be changed 3 times a week when her dressing changes performed. She may get her foot and leg wet in shower. She is to follow up with  as to why they have not received her lymphedema wraps. Patient to maintain frequent elevation, low-sodium diet, proper protein intake, proper glycemic control, she understands and agrees with the plan and will follow-up in 2 weeks. Subjective:   Shey Chery presents today for care of left foot diabetic ulceration. She states there is a new pain in her forefoot, physical therapy at facility has given her a Darco wedge shoe which she uses to propel her wheelchair, she states her blood sugars are okay, she has not received her lymphedema wraps yet.           The following portions of the patient's history were reviewed and updated as appropriate:   Patient Active Problem List   Diagnosis    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, with bone involvement without evidence of necrosis ([address])    H/O bariatric surgery    Anxiety    Acquired hypothyroidism    Psychiatric illness    Type 2 diabetes mellitus with diabetic neuropathy, without long-term current use of insulin ([address])    Osteomyelitis of left foot ([address])    Diabetic foot ulcer ([address])    Acquired absence of other left toe(s) ([address])    Essential hypertension    Arthritis    Other chronic pain    OCD (obsessive compulsive disorder)    PTSD (post-traumatic stress disorder)    Right foot ulcer, with fat layer exposed ([address])    Gastroesophageal reflux disease without esophagitis    Migraine without status migrainosus, not intractable    Obstructive sleep apnea    Morbid obesity with BMI of 40.0-44.9, adult ([address])    Homeless single person    Hx of right BKA (HCC)    Bipolar 1 disorder, depressed ([address])    Abnormal x-ray of cervical spine    Acquired equinovarus deformity of left foot    Acute osteomyelitis of metatarsal bone of right foot (HCC)    Allergy to bee sting    Amenorrhea    Asthma, intrinsic    Atypical squamous cell changes of undetermined significance (ASCUS) on cervical cytology with positive high risk human papilloma virus (HPV)    Bipolar affective disorder in remission (HCC)    Chronic paroxysmal hemicrania, not intractable    Diastasis of rectus abdominis    Drusen body, unspecified laterality    Folate deficiency    Hyperlipidemia associated with type 2 diabetes mellitus     Impaired intestinal absorption    Instability of left knee joint    Intertriginous dermatitis associated with moisture    Intestinal malabsorption following gastrectomy    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)    Radiculopathy, lumbar region    Right cervical radiculopathy    S/P laparoscopic sleeve gastrectomy    Chronic right-sided low back pain with left-sided sciatica     Past Medical History:   Diagnosis Date    Anxiety     Arthritis     Asthma     Chronic pain     Diabetes mellitus ([address])     Hypothyroidism     Lymphedema     left leg    Manic bipolar I disorder (HCC)     Morbid obesity (HCC)     OCD (obsessive compulsive disorder)     PTSD (post-traumatic stress disorder)      Past Surgical History:   Procedure Laterality Date    CHOLECYSTECTOMY      CHOLECYSTECTOMY      CLOSURE DELAYED PRIMARY Left 5/18/2023    Procedure: CLOSURE DELAYED PRIMARY;  Surgeon: Izzy Mccullough DPM;  Location: [facility];  Service: Podiatry    LEG AMPUTATION THROUGH LOWER TIBIA AND FIBULA Right 3/23/2023    Procedure: AMPUTATION BELOW KNEE (BKA);   Surgeon: Celine Browne MD; Location: [facility];  Service: General    AK AMPUTATION FOOT TRANSMETARSAL Right 9/4/2021    Procedure: AMPUTATION TRANSMETATARSAL (TMA);  Surgeon: Ambreen Chaparro DPM;  Location: [facility];  Service: Podiatry    AK AMPUTATION METATARSAL W/TOE SINGLE Left 9/8/2019    Procedure: PARTIAL 3RD RAY RESECTION, PSB TOE FILLET FLAP;  Surgeon: Truman Chavez DPM;  Location: [facility];  Service: Podiatry    AK AMPUTATION METATARSAL W/TOE SINGLE Left 3/23/2023    Procedure: RAY RESECTION FOOT;  Surgeon: Anderson oTro DPM;  Location: [facility];  Service: Podiatry    AK AMPUTATION METATARSAL W/TOE SINGLE Left 5/8/2023    Procedure: Left first RAY RESECTION FOOT;  Surgeon: Agnes Liang DPM;  Location: [facility];  Service: Podiatry    TOE AMPUTATION Right 1/13/2021    Procedure: AMPUTATION 2ND TOE;  Surgeon: Ambreen Chaparro DPM;  [facility];  Service: Podiatry    WOUND DEBRIDEMENT Left 2/17/2022    Procedure: DEBRIDEMENT GREAT TOE WOUND;  Surgeon: Ambreen Chaparro DPM;  [facility];  Service: Podiatry    WOUND DEBRIDEMENT Left 5/16/2023    Procedure: DEBRIDEMENT WOUND [facility] OUT);   Surgeon: Chrissy Freeman DPM;  Location: [facility];  Service: Podiatry     Social History     Socioeconomic History    Marital status: Single     Spouse name: None    Number of children: 2    Years of education: 11    Highest education level: 11th grade   Occupational History    None   Tobacco Use    Smoking status: Every Day     Packs/day: 0.50     Years: 15.00     Total pack years: 7.50     Types: Cigarettes     Passive exposure: Current    Smokeless tobacco: Never   Vaping Use    Vaping Use: Former   Substance and Sexual Activity    Alcohol use: Never    Drug use: Yes     Types: Marijuana    Sexual activity: Not Currently   Other Topics Concern    None   Social History Narrative    None     Social Determinants of Health     Financial Resource Strain: Not on file   Food Insecurity: Food Insecurity Present (5/9/2023)    Hunger Vital Sign     Worried About Running Out of Food in the Last Year: Sometimes true     Ran Out of Food in the Last Year: Sometimes true   Transportation Needs: Unmet Transportation Needs (5/9/2023)    PRAPARE - Transportation     Lack of Transportation (Medical): Yes     Lack of Transportation (Non-Medical): Yes   Physical Activity: Not on file   Stress: Not on file   Social Connections: Not on file   Intimate Partner Violence: Not on file   Housing Stability: High Risk (5/9/2023)    Housing Stability Vital Sign     Unable to Pay for Housing in the Last Year: Yes     Number of [address] in the Last Year: 2     Unstable Housing in the Last Year: Yes        Current Outpatient Medications:     acetaminophen (TYLENOL) 325 mg tablet, Take 2 tablets (650 mg total) by mouth every 6 (six) hours as needed for mild pain, headaches or fever, Disp: , Rfl: 0    cloNIDine (CATAPRES) 0.2 mg tablet, Take 0.2 mg by mouth 2 (two) times a day, Disp: , Rfl:     docusate sodium (COLACE) 100 mg capsule, Take 1 capsule (100 mg total) by mouth 2 (two) times a day, Disp: , Rfl: 0    doxycycline hyclate (VIBRAMYCIN) 100 mg capsule, , Disp: , Rfl:     EPINEPHrine (EPIPEN) 0.3 mg/0.3 mL SOAJ, , Disp: , Rfl:     fenofibrate (TRICOR) 54 MG tablet, TAKE 1 TABLET (54 MG TOTAL) BY MOUTH DAILY. , Disp: , Rfl:     folic acid (FOLVITE) 1 mg tablet, Take 1,000 mcg by mouth daily, Disp: , Rfl:     Glucagon, rDNA, (Glucagon Emergency) 1 MG KIT, , Disp: , Rfl:     hydrOXYzine HCL (ATARAX) 50 mg tablet, Take 50 mg by mouth in the morning, Disp: , Rfl:     hydrOXYzine HCL (ATARAX) 50 mg tablet, Take 2 tablets (100 mg total) by mouth daily at bedtime, Disp: 30 tablet, Rfl: 0    insulin glargine (LANTUS) 100 units/mL subcutaneous injection, Inject 12 Units under the skin daily at bedtime, Disp: 10 mL, Rfl: 0    insulin lispro (HumaLOG) 100 units/mL injection, Inject 1-6 Units under the skin 3 (three) times a day before meals, Disp: , Rfl: 0    lamoTRIgine (LaMICtal) 150 MG tablet, Take 200 mg by mouth 2 (two) times a day, Disp: , Rfl:     levothyroxine 50 mcg tablet, Take 1 tablet (50 mcg total) by mouth daily, Disp: 30 tablet, Rfl: 0    melatonin 3 mg, Take 3 tablets (9 mg total) by mouth daily at bedtime, Disp: 30 tablet, Rfl: 0    metFORMIN (GLUCOPHAGE) 500 mg tablet, Take 1 tablet (500 mg total) by mouth 2 (two) times a day with meals, Disp: 60 tablet, Rfl: 0    methocarbamol (ROBAXIN) 750 mg tablet, TAKE 1 TABLET (750 MG TOTAL) BY MOUTH 4 (FOUR) TIMES A DAY AS NEEDED FOR MUSCLE SPASMS., Disp: , Rfl:     pantoprazole (PROTONIX) 40 mg tablet, Take 1 tablet (40 mg total) by mouth 2 (two) times a day before breakfast and lunch, Disp: 30 tablet, Rfl: 0    polyethylene glycol (MIRALAX) 17 g packet, Take 17 g by mouth daily, Disp: , Rfl: 0    pregabalin (LYRICA) 150 mg capsule, Take 150 mg by mouth 2 (two) times a day, Disp: , Rfl:     senna (SENOKOT) 8.6 mg, Take 2 tablets (17.2 mg total) by mouth daily at bedtime, Disp: , Rfl: 0    ziprasidone (GEODON) 80 mg capsule, Take 80 mg by mouth 2 (two) times a day with meals , Disp: , Rfl:   Family History   Problem Relation Age of Onset    Hyperlipidemia Mother     Diabetes Mother     Hypertension Mother     Heart attack Father        Review of Systems   Constitutional:  Negative for chills and fever. HENT:  Negative for ear pain and sore throat. Eyes:  Negative for pain and visual disturbance. Respiratory:  Negative for cough and shortness of breath. Cardiovascular:  Negative for chest pain and palpitations. Gastrointestinal:  Negative for abdominal pain and vomiting. Genitourinary:  Negative for dysuria and hematuria. Musculoskeletal:  Negative for arthralgias and back pain. Pain in left forefoot   Skin:  Positive for color change and wound. Negative for rash. Ulceration left foot   Neurological:  Positive for numbness. Negative for seizures and syncope. Psychiatric/Behavioral: Negative. All other systems reviewed and are negative. Objective:  /60   Pulse 64   Temp 97.5 °F (36.4 °C)   Resp 18   LMP  (LMP Unknown)     Physical Exam  Constitutional:       Appearance: Normal appearance. She is well-developed. She is obese. HENT:      Head: Normocephalic and atraumatic. Nose: Nose normal.      Mouth/Throat:      Mouth: Mucous membranes are moist.      Pharynx: Oropharynx is clear. Eyes:      Conjunctiva/sclera: Conjunctivae normal.   Cardiovascular:      Pulses:           Dorsalis pedis pulses are detected w/ Doppler on the left side. Posterior tibial pulses are detected w/ Doppler on the left side. Pulmonary:      Effort: Pulmonary effort is normal.   Musculoskeletal:      Cervical back: Normal range of motion. Left lower le+ Edema present. Left foot: Decreased range of motion. Deformity present. Right Lower Extremity: Right leg is amputated below knee. Left Lower Extremity: (Toes 1 and 3)  Feet:      Right foot:      Protective Sensation: 10 sites tested. 0 sites sensed. Left foot:      Protective Sensation: 10 sites tested. 0 sites sensed. Skin integrity: Ulcer present. Comments: Pain on palpation submetatarsal head 2, 3, no crepitus, edema, fluctuance, signs of infection noted, passive range of motion of second and fourth toes is within normal limits and pain-free. Skin:     General: Skin is warm and dry. Capillary Refill: Capillary refill takes less than 2 seconds. Neurological:      General: No focal deficit present. Mental Status: She is alert and oriented to person, place, and time. Mental status is at baseline. Sensory: Sensory deficit present.       Coordination: Coordination abnormal.      Gait: Gait abnormal.      Deep Tendon Reflexes: Reflexes abnormal.   Psychiatric:         Mood and Affect: Mood normal.         Behavior: Behavior normal.         Thought Content: Thought content normal.         Judgment: Judgment normal.           Wound 05/08/23 Diabetic Ulcer Foot Left;Medial (Active)   Enter Ivory Odilia score: Gimenez Grade 1: Partial or full-thickness ulcer (superficial) 10/18/23 1425   Wound Image   10/18/23 1425   Wound Description Pink 10/18/23 1425   Yvonne-wound Assessment Maceration 10/18/23 1425   Wound Length (cm) 0.1 cm 10/18/23 1425   Wound Width (cm) 0.1 cm 10/18/23 1425   Wound Depth (cm) 0.1 cm 10/18/23 1425   Wound Surface Area (cm^2) 0.01 cm^2 10/18/23 1425   Wound Volume (cm^3) 0.001 cm^3 10/18/23 1425   Calculated Wound Volume (cm^3) 0 cm^3 10/18/23 1425   Change in Wound Size % 100 10/18/23 1425   Drainage Amount Small 10/18/23 1425   Drainage Description Yellow 10/18/23 1425   Non-staged Wound Description Full thickness 10/18/23 1425   Treatments Cleansed 09/13/23 1448   Patient Tolerance Tolerated well 09/13/23 1448   Dressing Status Intact 10/18/23 1425                No results found. Wound Instructions:  Orders Placed This Encounter   Procedures    Wound cleansing and dressings     Wash your hands with soap and water. Remove old dressing, discard into plastic bag and place in trash. Cleanse the wounds with normal saline prior to applying a clean dressing. Pat dry using gauze. Shower yes, with protection only, do not get wound or dressings wet. Left medial foot:  Apply moisturizer  to skin surrounding wounds as needed  Discontinue using dermagran gauze  Apply single layer of xeroform   Secured with gauze and medfix or cosmopore  Change dressing every other day. Ace wrap from base of toes to behind knee with light tension                  Follow up Dr. Elenita Reyes in 2 weeks     Today's wound treatment note:  Cleansed with normal saline  Redressed as ordered above     Standing Status:   Future     Standing Expiration Date:   10/18/2024    Wound compression and edema control     Wound compression and edema control:  Ace bandages  Apply ace compression wrap to your left leg. Apply from base of toes to behind knee. Apply in the morning and re-wrap as needed during the day if wrap becomes loose. Remove at bedtime and elevate legs or lie down. Avoid prolonged standing in one place. Elevate leg(s) above the level of the heart as much as possible as high as possible when sitting and during the night      Use ace bandage until lymphedema wraps are received. Standing Status:   Future     Standing Expiration Date:   10/18/2024    Wound off loading     Discontinue using darco wedge shoe. Patient may wear surgical post op shoe or slipper. Patient given surgical shoe at visit today. Standing Status:   Future     Standing Expiration Date:   10/18/2024         Elizabeth Nicole DPM, SORAYA, FACFAS    Portions of the record may have been created with voice recognition software. Occasional wrong word or "sound a like" substitutions may have occurred due to the inherent limitations of voice recognition software. Read the chart carefully and recognize, using context, where substitutions have occurred.

## 2023-10-26 ENCOUNTER — OFFICE VISIT (OUTPATIENT)
Dept: GASTROENTEROLOGY | Facility: CLINIC | Age: 48
End: 2023-10-26
Payer: COMMERCIAL

## 2023-10-26 VITALS — SYSTOLIC BLOOD PRESSURE: 118 MMHG | TEMPERATURE: 98.2 F | DIASTOLIC BLOOD PRESSURE: 78 MMHG

## 2023-10-26 DIAGNOSIS — Z12.11 COLON CANCER SCREENING: ICD-10-CM

## 2023-10-26 DIAGNOSIS — Z12.11 COLON CANCER SCREENING: Primary | ICD-10-CM

## 2023-10-26 DIAGNOSIS — K76.0 NAFLD (NONALCOHOLIC FATTY LIVER DISEASE): ICD-10-CM

## 2023-10-26 DIAGNOSIS — R16.0 HEPATOMEGALY: Primary | ICD-10-CM

## 2023-10-26 PROCEDURE — 99203 OFFICE O/P NEW LOW 30 MIN: CPT | Performed by: INTERNAL MEDICINE

## 2023-10-26 RX ORDER — BISACODYL 5 MG/1
5 TABLET, DELAYED RELEASE ORAL DAILY PRN
Qty: 4 TABLET | Refills: 0 | Status: SHIPPED | OUTPATIENT
Start: 2023-10-26

## 2023-10-26 RX ORDER — POLYETHYLENE GLYCOL 3350 17 G/17G
238 POWDER, FOR SOLUTION ORAL ONCE
Qty: 238 G | Refills: 0 | Status: SHIPPED | OUTPATIENT
Start: 2023-10-26 | End: 2023-10-26

## 2023-10-26 NOTE — PROGRESS NOTES
[facility] - Outpatient Consultation  Luisa Pan 53 y.o. female MRN: [id number]  Encounter: [id number]    PCP:  Mark Baker MD, [phone]  Referring Provider:  Mark Baker MD, [phone]        ASSESSMENT AND PLAN:      #1 reported hepatomegaly and history of fatty liver disease:  Need to obtain report of the CAT scan done recently I dosed on hospital.  No reported history of elevated LFTs in the remote past however patient states she has had elevated LFTs prior to her gastric bypass history 5 years ago. She has no skin findings of cirrhosis or portal hypertension however unable to palpate internal organs due to body habitus. I discussed with Ms. Elizabeth Tamez the natural history of fatty liver disease, including risks for development, and risk for progression to cirrhosis and its complications. We reviewed that there are no current medications that are FDA approved for the specific management of fatty liver disease. I explained that there are many, many compounds (both new and medications currently used for other indications) that are currently being studied to treat several aspects of fatty liver disease, including decreasing hepatic fat, hepatic inflammation, hepatic fibrosis, as well reducing risk factors for the development of fatty liver (primarily through weight loss). We do not currently have active clinical trials through my office, but I will notify Ms. Elizabeth Tamez when we do if she meets criteria for inclusion. I explained that even if liver enzymes are normal, it does not rule out low levels of active fatty inflammation in the liver. I have requested blood work to rule out viral hepatitis and check hepatitis A and B immunity testing    NAFLD Fibrosis Score is a non-invasive calculation based on patient characteristics and basic routine labs (Age, BMI, AST, ALT, Platelet count, Albumin and presence/absence of insulin resistance).   Ms. Sera Bhat NAFLD Fibrosis score is -2. 184. Scores less than -1.455 correlate well with absence of advanced fibrosis, with a high negative predictive value. I have requested lab based SMITH Fibrosure and abdominal ultrasound elastography with fat quantification (UGAP) to further assess hepatic fibrosis and steatosis in a non-invasive manner. I counseled Ms. Esther Singh on the importance of weight loss through lifestyle modification, primarily diet, given her PVD and R BKA. She would benefit from following with a dietition. 2.  Upper Abdominal Pain:  Will obtain CT report from [facility], however she has been told her symptoms are related to a ventral hernia. She will seek input from a general surgeon. Ms. Esther Singh will have the testing done as outlined above and follow-up in 9-12 months. FOLLOW-UP:  Return in about 1 year (around 10/26/2024). VISIT DIAGNOSES AND ORDERS:      1. Hepatomegaly    2. NAFLD (nonalcoholic fatty liver disease)    3. Colon cancer screening      Orders Placed This Encounter   Procedures    US elastography    SMITH Fibrosure    Hepatitis panel, acute    Hepatitis B surface antibody    Hepatitis A antibody, total    Colonoscopy     ______________________________________________________________________    HPI:  Ms. Cecile Alejandro is a 53 y.o. female with medical history as outlined below, including obesity status post gastric bypass surgery 5 years ago, diabetes type 2, hypertension, hyperlipidemia, peripheral vascular disease, osteomyelitis status post right BKA and left toes amputation, GERD, who comes in today for initial consultation after recent imaging showed hepatomegaly. She was briefly admitted at the outside hospital after seeing primary care physician as an outpatient and being told to seek immediate evaluation for suspected pancreatitis, possibly gallstone related. Blood work showed normal competence of metabolic panel and relatively normal CBC.   She had an obstructive series that was normal and a CAT scan of the abdomen. Unfortunately the CAT scan report is not available in Care Everywhere and patient did not bring report with her. She was apparently told that her symptoms were related to a ventral hernia and she will require surgical repair. She was sent to see me due to hepatomegaly also noted on the CAT scan. Patient states she has a history of elevated LFTs and was told she had a fatty appearing liver prior to gastric bypass surgery 5 years ago. On review of all labs that I have available in epic going back as far as 2016, her LFTs has been normal.  She has had no evidence of thrombocytopenia I was able to find a CAT scan of the abdomen pelvis from March 2018 that did not note hepatomegaly with steatosis. She denies a history of significant alcohol use. Ms. Sheyla Graff denies recent or history of yellow eyes/skin, dark urine, GI bleeding, abdominal distention with fluid, easy bruising, excessive bleeding, pruritus or confusion. She does complain of significant lower extremity lymphedema. She complains of upper abdominal pain, and has well-controlled GERD. She denies bloating, dysphagia, diarrhea or constipation.       REVIEW OF SYSTEMS:    Review of Systems  Per [facility]    Historical Information   Patient Active Problem List   Diagnosis    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, with bone involvement without evidence of necrosis ([address])    H/O bariatric surgery    Anxiety    Acquired hypothyroidism    Psychiatric illness    Type 2 diabetes mellitus with diabetic neuropathy, without long-term current use of insulin ([address])    Osteomyelitis of left foot ([address])    Diabetic foot ulcer ([address])    Acquired absence of other left toe(s) ([address])    Essential hypertension    Arthritis    Other chronic pain    OCD (obsessive compulsive disorder)    PTSD (post-traumatic stress disorder)    Right foot ulcer, with fat layer exposed ([address]) Gastroesophageal reflux disease without esophagitis    Migraine without status migrainosus, not intractable    Obstructive sleep apnea    Morbid obesity with BMI of 40.0-44.9, adult ([address])    Homeless single person    Hx of right BKA (HCC)    Bipolar 1 disorder, depressed ([address])    Abnormal x-ray of cervical spine    Acquired equinovarus deformity of left foot    Acute osteomyelitis of metatarsal bone of right foot (HCC)    Allergy to bee sting    Amenorrhea    Asthma, intrinsic    Atypical squamous cell changes of undetermined significance (ASCUS) on cervical cytology with positive high risk human papilloma virus (HPV)    Bipolar affective disorder in remission (HCC)    Chronic paroxysmal hemicrania, not intractable    Diastasis of rectus abdominis    Drusen body, unspecified laterality    Folate deficiency    Hyperlipidemia associated with type 2 diabetes mellitus     Impaired intestinal absorption    Instability of left knee joint    Intertriginous dermatitis associated with moisture    Intestinal malabsorption following gastrectomy    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)    Radiculopathy, lumbar region    Right cervical radiculopathy    S/P laparoscopic sleeve gastrectomy    Chronic right-sided low back pain with left-sided sciatica     Past Medical History:   Diagnosis Date    Anxiety     Arthritis     Asthma     Chronic pain     Diabetes mellitus ([address])     Hypothyroidism     Lymphedema     left leg    Manic bipolar I disorder (HCC)     Morbid obesity (HCC)     OCD (obsessive compulsive disorder)     PTSD (post-traumatic stress disorder)      Past Surgical History:   Procedure Laterality Date    CHOLECYSTECTOMY      CHOLECYSTECTOMY      CLOSURE DELAYED PRIMARY Left 5/18/2023    Procedure: CLOSURE DELAYED PRIMARY;  Surgeon: Farzad Marsh DPM;  Location: [facility];  Service: Podiatry    LEG AMPUTATION THROUGH LOWER TIBIA AND FIBULA Right 3/23/2023    Procedure: AMPUTATION BELOW KNEE (BKA); Surgeon: Sybil Baird MD;  Location: [facility];  Service: General    OH AMPUTATION FOOT TRANSMETARSAL Right 9/4/2021    Procedure: AMPUTATION TRANSMETATARSAL (TMA);  Surgeon: Nicholas Starks DPM;  [facility]: [facility];  Service: Podiatry    OH AMPUTATION METATARSAL W/TOE SINGLE Left 9/8/2019    Procedure: PARTIAL 3RD RAY RESECTION, PSB TOE FILLET FLAP;  Surgeon: Festus Crespo DPM;  Location: [facility];  Service: Podiatry    OH AMPUTATION METATARSAL W/TOE SINGLE Left 3/23/2023    Procedure: RAY RESECTION FOOT;  Surgeon: Yumi Toro DPM;  Location[facility];  Service: Podiatry    OH AMPUTATION METATARSAL W/TOE SINGLE Left 5/8/2023    Procedure: Left first RAY RESECTION FOOT;  Surgeon: Jermain Schulz DPM;  Location: [facility];  Service: Podiatry    TOE AMPUTATION Right 1/13/2021    Procedure: AMPUTATION 2ND TOE;  Surgeon: Nicholas Starks DPM;  [facility];  Service: Podiatry    WOUND DEBRIDEMENT Left 2/17/2022    Procedure: DEBRIDEMENT GREAT TOE WOUND;  Surgeon: Nicholas Starks DPM;  [facility]: [facility];  Service: Podiatry    WOUND DEBRIDEMENT Left 5/16/2023    Procedure: DEBRIDEMENT WOUND [facility] OUT);   Surgeon: Edmar Siddiqui DPM;  Location: [facility];  Service: Podiatry     Social History   Social History     Substance and Sexual Activity   Alcohol Use Never     Social History     Substance and Sexual Activity   Drug Use Yes    Types: Marijuana     Social History     Tobacco Use   Smoking Status Every Day    Packs/day: 0.50    Years: 15.00    Total pack years: 7.50    Types: Cigarettes    Passive exposure: Current   Smokeless Tobacco Never     Family History   Problem Relation Age of Onset    Hyperlipidemia Mother     Diabetes Mother     Hypertension Mother     Heart attack Father        Meds/Allergies       Current Outpatient Medications:     acetaminophen (TYLENOL) 325 mg tablet    cloNIDine (CATAPRES) 0.2 mg tablet    docusate sodium (COLACE) 100 mg capsule    EPINEPHrine (EPIPEN) 0.3 mg/0.3 mL SOAJ    fenofibrate (TRICOR) 54 MG tablet    folic acid (FOLVITE) 1 mg tablet    Glucagon, rDNA, (Glucagon Emergency) 1 MG KIT    hydrOXYzine HCL (ATARAX) 50 mg tablet    hydrOXYzine HCL (ATARAX) 50 mg tablet    insulin glargine (LANTUS) 100 units/mL subcutaneous injection    insulin lispro (HumaLOG) 100 units/mL injection    lamoTRIgine (LaMICtal) 150 MG tablet    levothyroxine 50 mcg tablet    melatonin 3 mg    metFORMIN (GLUCOPHAGE) 500 mg tablet    methocarbamol (ROBAXIN) 750 mg tablet    pantoprazole (PROTONIX) 40 mg tablet    polyethylene glycol (MIRALAX) 17 g packet    polyethylene glycol (MiraLax) 17 GM/SCOOP powder    pregabalin (LYRICA) 150 mg capsule    senna (SENOKOT) 8.6 mg    ziprasidone (GEODON) 80 mg capsule    doxycycline hyclate (VIBRAMYCIN) 100 mg capsule    Allergies   Allergen Reactions    Latex Hives and Shortness Of Breath    Vancomycin Rash and Itching     Other reaction(s): Hives / Urticaria    Aspirin Vomiting and Rash     Other reaction(s): Nausea and Vomiting    Barium Iodide Itching and Rash     Face became very red and itchy     Codeine Vomiting and Rash     Other reaction(s): Nausea and Vomiting    Penicillins Rash and Other (See Comments)     Patient has tolerated cefepime without difficulty           Objective     Blood pressure 118/78, temperature 98.2 °F (36.8 °C), not currently breastfeeding. There is no height or weight on file to calculate BMI. PHYSICAL EXAM:           Physical Exam  Vitals reviewed. Constitutional:       General: She is not in acute distress. Appearance: She is obese. She is ill-appearing. HENT:      Head: Normocephalic and atraumatic. Nose: Nose normal.      Mouth/Throat:      Mouth: Mucous membranes are moist.      Pharynx: Oropharynx is clear. Eyes:      General: No scleral icterus. Extraocular Movements: Extraocular movements intact.    Cardiovascular:      Rate and Rhythm: Normal rate and regular rhythm. Heart sounds: No murmur heard. Pulmonary:      Effort: Pulmonary effort is normal. No respiratory distress. Breath sounds: Normal breath sounds. Abdominal:      General: Abdomen is flat. There is distension (Very large pannus with subtle upper abdominal hernia noted). Palpations: Abdomen is soft. There is no shifting dullness, fluid wave, hepatomegaly or splenomegaly. Tenderness: There is no abdominal tenderness. Hernia: No hernia is present. Genitourinary:     Comments: deferred  Musculoskeletal:         General: Swelling (Significant left lower extremity edema with legs wrapped in tensor bandage) present. Normal range of motion. Cervical back: Normal range of motion and neck supple. No tenderness. Comments: Right BKA   Skin:     General: Skin is warm. Coloration: Skin is not jaundiced. Findings: No bruising or rash. Neurological:      General: No focal deficit present. Mental Status: She is alert and oriented to person, place, and time.    Psychiatric:         Mood and Affect: Mood normal.              Lab Results:   Lab Results   Component Value Date    SODIUM 136 05/22/2023    K 4.1 05/22/2023    BUN 13 05/22/2023    CREATININE 0.77 05/22/2023    MG 2.0 05/03/2023    TBILI 0.21 05/03/2023    ALKPHOS 86 05/03/2023    ALB 3.8 05/03/2023    AST 16 05/03/2023    ALT 15 05/03/2023    WBC 7.36 05/22/2023     (H) 05/22/2023    HGB 9.2 (L) 05/22/2023     No results found for: "AFP"  Lab Results   Component Value Date    IRON 29 (L) 05/05/2023    FERRITIN 15 05/05/2023    CONCFE 6 (L) 05/05/2023     Lab Results   Component Value Date    HGBA1C 7.2 (H) 05/05/2023     NAFLD Fibrosis Score is: -2.184    NAFLD Score Correlated Fibrosis Severity   <-1.455 F0-F2   -1.455-0.676 Indeterminate Score   >0.676 F3-F4   **Fibrosis Severity Scale: F0 = no fibrosis; F1= mild fibrosis; F2 = moderate fibrosis; F3 = severe fibrosis; F4 = cirrhosis    NAFLD Score Component Values:  Component Value Date   Age: 53 y.o.     BMI: 45.76 kg/m²    IFG or DM: Yes    AST: 16 U/L 5/3/2023   ALT: 15 U/L 5/3/2023   Platelet: 415 Thousands/uL 5/22/2023   Albumin: 3.8 g/dL 5/3/2023           Radiology Results:   I have reviewed the results of any radiology studies performed within the last 90 days. This includes:      No results found.       Rona Ortiz MD  Hepatology/Gastroenterology

## 2023-10-26 NOTE — PATIENT INSTRUCTIONS
Blood work and ultrasound elastography  We will obtain CT from [facility] to review your liver. Work on weight loss. Follow-up in 9-12 months. Schedule colonoscopy for colon cancer screening.

## 2023-11-01 ENCOUNTER — OFFICE VISIT (OUTPATIENT)
Dept: WOUND CARE | Facility: HOSPITAL | Age: 48
End: 2023-11-01
Payer: COMMERCIAL

## 2023-11-01 VITALS
DIASTOLIC BLOOD PRESSURE: 84 MMHG | SYSTOLIC BLOOD PRESSURE: 130 MMHG | HEART RATE: 80 BPM | TEMPERATURE: 96.9 F | RESPIRATION RATE: 20 BRPM

## 2023-11-01 DIAGNOSIS — E11.621 DIABETIC ULCER OF LEFT MIDFOOT ASSOCIATED WITH TYPE 2 DIABETES MELLITUS, LIMITED TO BREAKDOWN OF SKIN (HCC): Primary | ICD-10-CM

## 2023-11-01 DIAGNOSIS — E11.42 DIABETIC POLYNEUROPATHY ASSOCIATED WITH TYPE 2 DIABETES MELLITUS (HCC): ICD-10-CM

## 2023-11-01 DIAGNOSIS — I89.0 LYMPHEDEMA OF BOTH LOWER EXTREMITIES: ICD-10-CM

## 2023-11-01 DIAGNOSIS — L97.421 DIABETIC ULCER OF LEFT MIDFOOT ASSOCIATED WITH TYPE 2 DIABETES MELLITUS, LIMITED TO BREAKDOWN OF SKIN (HCC): Primary | ICD-10-CM

## 2023-11-01 PROCEDURE — 99213 OFFICE O/P EST LOW 20 MIN: CPT | Performed by: PODIATRIST

## 2023-11-01 NOTE — PATIENT INSTRUCTIONS
Orders Placed This Encounter   Procedures    Wound cleansing and dressings     Left foot wound    Wash your hands with soap and water. Remove old dressing, discard into plastic bag and place in trash. Cleanse the wound with soap and water prior to applying a clean dressing. Do not use tissue or cotton balls. Do not scrub the wound. Pat dry using gauze. Shower yes     Apply single layer of xeroform  to the open wound. Cover with gauze and tape, no bandaid. Secure with spandagrip G daily until lymphedema wraps are obtained. Please order lymphedema wraps ASAP. Change dressing every other day  Do not apply ace wraps to left lower leg. Done today  Return in three weeks.      Standing Status:   Future     Standing Expiration Date:   11/1/2024

## 2023-11-01 NOTE — PROGRESS NOTES
Patient ID: Akiko Garza is a 53 y.o. female Date of Birth 1975       Chief Complaint   Patient presents with    Follow Up Wound Care Visit     Bandaid and ace wrap intact on left foot and lower leg. Complains of severe pain in knee. Allergies:  Latex, Vancomycin, Aspirin, Barium iodide, Codeine, and Penicillins    Diagnosis:  1. Diabetic ulcer of left midfoot associated with type 2 diabetes mellitus, limited to breakdown of skin (HCC)  -     Wound cleansing and dressings; Future    2. Diabetic polyneuropathy associated with type 2 diabetes mellitus (HCC)  -     Wound cleansing and dressings; Future    3. Lymphedema of both lower extremities  -     Wound cleansing and dressings; Future       Diagnosis ICD-10-CM Associated Orders   1. Diabetic ulcer of left midfoot associated with type 2 diabetes mellitus, limited to breakdown of skin (HCC)  E11.621 Wound cleansing and dressings    L97.421       2. Diabetic polyneuropathy associated with type 2 diabetes mellitus (HCC)  E11.42 Wound cleansing and dressings      3. Lymphedema of both lower extremities  I89.0 Wound cleansing and dressings           Assessment & Plan:  Diabetic Gimenez grade 1 ulceration at site of partial first ray resection left foot, no debridement was performed, small pinhole continues to be open with scant serous drainage, single-layer Xeroform covered with dry dressing was applied, this was to be changed 3 times a week. Facility is advised do not use Band-Aid, they may use gauze and tape if they do not have [employer]. Patient continues with lymphedema to bilateral lower extremities, nursing reached out to  to see why patient has not received lymphedema wraps yet, order was placed in our wound care orders for nursing to look into this as well. We have discontinued use of Ace wrap's as they are being improperly wrapped and transition to use of Tubigrip.   In the meantime patient is to maintain frequent elevation, low-sodium diet, proper protein intake, proper glycemic control, she understands and agrees with the plan and will follow-up in 3 weeks. Procedures - none    Subjective:   Presents today for care of left foot diabetic ulceration. Facility has been applying Band-Aid as patient states they do not have the dressings we use. She is also concerned she has not received her lymphedema wraps yet.           The following portions of the patient's history were reviewed and updated as appropriate:   Patient Active Problem List   Diagnosis    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, with bone involvement without evidence of necrosis ([address])    H/O bariatric surgery    Anxiety    Acquired hypothyroidism    Psychiatric illness    Type 2 diabetes mellitus with diabetic neuropathy, without long-term current use of insulin ([address])    Osteomyelitis of left foot (HCC)    Diabetic foot ulcer ([address])    Acquired absence of other left toe(s) ([address])    Essential hypertension    Arthritis    Other chronic pain    OCD (obsessive compulsive disorder)    PTSD (post-traumatic stress disorder)    Right foot ulcer, with fat layer exposed ([address])    Gastroesophageal reflux disease without esophagitis    Migraine without status migrainosus, not intractable    Obstructive sleep apnea    Morbid obesity with BMI of 40.0-44.9, adult ([address])    Homeless single person    Hx of right BKA ([address])    Bipolar 1 disorder, depressed ([address])    Abnormal x-ray of cervical spine    Acquired equinovarus deformity of left foot    Acute osteomyelitis of metatarsal bone of right foot (HCC)    Allergy to bee sting    Amenorrhea    Asthma, intrinsic    Atypical squamous cell changes of undetermined significance (ASCUS) on cervical cytology with positive high risk human papilloma virus (HPV)    Bipolar affective disorder in remission (HCC)    Chronic paroxysmal hemicrania, not intractable    Diastasis of rectus abdominis    Drusen body, unspecified laterality    Folate deficiency    Hyperlipidemia associated with type 2 diabetes mellitus     Impaired intestinal absorption    Instability of left knee joint    Intertriginous dermatitis associated with moisture    Intestinal malabsorption following gastrectomy    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)    Radiculopathy, lumbar region    Right cervical radiculopathy    S/P laparoscopic sleeve gastrectomy    Chronic right-sided low back pain with left-sided sciatica     Past Medical History:   Diagnosis Date    Anxiety     Arthritis     Asthma     Chronic pain     Diabetes mellitus ([address])     Hypothyroidism     Lymphedema     left leg    Manic bipolar I disorder (HCC)     Morbid obesity (HCC)     OCD (obsessive compulsive disorder)     PTSD (post-traumatic stress disorder)      Past Surgical History:   Procedure Laterality Date    CHOLECYSTECTOMY      CHOLECYSTECTOMY      CLOSURE DELAYED PRIMARY Left 5/18/2023    Procedure: CLOSURE DELAYED PRIMARY;  Surgeon: Tommy Zuh DPM;  Location: [facility];  Service: Podiatry    LEG AMPUTATION THROUGH LOWER TIBIA AND FIBULA Right 3/23/2023    Procedure: AMPUTATION BELOW KNEE (BKA);   Surgeon: Carlos Ruiz MD;  Location: [facility];  Service: General    OK AMPUTATION FOOT TRANSMETARSAL Right 9/4/2021    Procedure: AMPUTATION TRANSMETATARSAL (TMA);  Surgeon: Manuel Sladana DPM;  [facility]: [facility];  Service: Podiatry    OK AMPUTATION METATARSAL W/TOE SINGLE Left 9/8/2019    Procedure: PARTIAL 3RD RAY RESECTION, PSB TOE FILLET FLAP;  Surgeon: Kylee Alonso DPM;  Location[facility];  Service: Podiatry    OK AMPUTATION METATARSAL W/TOE SINGLE Left 3/23/2023    Procedure: RAY RESECTION FOOT;  Surgeon: Vito Toro DPM;  Location[facility];  Service: Podiatry    OK AMPUTATION METATARSAL W/TOE SINGLE Left 5/8/2023    Procedure: Left first RAY RESECTION FOOT;  Surgeon: Colleen Mills DPM;  Location: [facility];  Service: Podiatry    TOE AMPUTATION Right 1/13/2021    Procedure: AMPUTATION 2ND TOE;  Surgeon: Juana Trujillo DPM;  [facility];  Service: Podiatry    WOUND DEBRIDEMENT Left 2/17/2022    Procedure: DEBRIDEMENT GREAT TOE WOUND;  Surgeon: Juana Trujillo DPM;  [facility];  Service: Podiatry    WOUND DEBRIDEMENT Left 5/16/2023    Procedure: DEBRIDEMENT WOUND [facility] OUT);   Surgeon: Omid Chua DPM;  Location: [facility];  Service: Podiatry     Social History     Socioeconomic History    Marital status: Single     Spouse name: Not on file    Number of children: 2    Years of education: 11    Highest education level: 11th grade   Occupational History    Not on file   Tobacco Use    Smoking status: Every Day     Packs/day: 0.50     Years: 15.00     Total pack years: 7.50     Types: Cigarettes     Passive exposure: Current    Smokeless tobacco: Never   Vaping Use    Vaping Use: Former   Substance and Sexual Activity    Alcohol use: Never    Drug use: Yes     Types: Marijuana    Sexual activity: Not Currently   Other Topics Concern    Not on file   Social History Narrative    Not on file     Social Determinants of Health     Financial Resource Strain: Not on file   Food Insecurity: Food Insecurity Present (5/9/2023)    Hunger Vital Sign     Worried About Running Out of Food in the Last Year: Sometimes true     Ran Out of Food in the Last Year: Sometimes true   Transportation Needs: Unmet Transportation Needs (5/9/2023)    PRAPARE - Transportation     Lack of Transportation (Medical): Yes     Lack of Transportation (Non-Medical): Yes   Physical Activity: Not on file   Stress: Not on file   Social Connections: Not on file   Intimate Partner Violence: Not on file   Housing Stability: High Risk (5/9/2023)    Housing Stability Vital Sign     Unable to Pay for Housing in the Last Year: Yes     Number of [address] in the Last Year: 2     Unstable Housing in the Last Year: Yes        Current Outpatient Medications:     acetaminophen (TYLENOL) 325 mg tablet, Take 2 tablets (650 mg total) by mouth every 6 (six) hours as needed for mild pain, headaches or fever, Disp: , Rfl: 0    bisacodyl (DULCOLAX) 5 mg EC tablet, Take 1 tablet (5 mg total) by mouth daily as needed for constipation Take two the afternoon before colonoscopy., Disp: 4 tablet, Rfl: 0    cloNIDine (CATAPRES) 0.2 mg tablet, Take 0.2 mg by mouth 2 (two) times a day, Disp: , Rfl:     docusate sodium (COLACE) 100 mg capsule, Take 1 capsule (100 mg total) by mouth 2 (two) times a day, Disp: , Rfl: 0    doxycycline hyclate (VIBRAMYCIN) 100 mg capsule, , Disp: , Rfl:     EPINEPHrine (EPIPEN) 0.3 mg/0.3 mL SOAJ, , Disp: , Rfl:     fenofibrate (TRICOR) 54 MG tablet, TAKE 1 TABLET (54 MG TOTAL) BY MOUTH DAILY. , Disp: , Rfl:     folic acid (FOLVITE) 1 mg tablet, Take 1,000 mcg by mouth daily, Disp: , Rfl:     Glucagon, rDNA, (Glucagon Emergency) 1 MG KIT, , Disp: , Rfl:     hydrOXYzine HCL (ATARAX) 50 mg tablet, Take 50 mg by mouth in the morning, Disp: , Rfl:     hydrOXYzine HCL (ATARAX) 50 mg tablet, Take 2 tablets (100 mg total) by mouth daily at bedtime, Disp: 30 tablet, Rfl: 0    insulin glargine (LANTUS) 100 units/mL subcutaneous injection, Inject 12 Units under the skin daily at bedtime, Disp: 10 mL, Rfl: 0    insulin lispro (HumaLOG) 100 units/mL injection, Inject 1-6 Units under the skin 3 (three) times a day before meals, Disp: , Rfl: 0    lamoTRIgine (LaMICtal) 150 MG tablet, Take 200 mg by mouth 2 (two) times a day, Disp: , Rfl:     levothyroxine 50 mcg tablet, Take 1 tablet (50 mcg total) by mouth daily, Disp: 30 tablet, Rfl: 0    melatonin 3 mg, Take 3 tablets (9 mg total) by mouth daily at bedtime, Disp: 30 tablet, Rfl: 0    metFORMIN (GLUCOPHAGE) 500 mg tablet, Take 1 tablet (500 mg total) by mouth 2 (two) times a day with meals, Disp: 60 tablet, Rfl: 0    methocarbamol (ROBAXIN) 750 mg tablet, TAKE 1 TABLET (750 MG TOTAL) BY MOUTH 4 (FOUR) TIMES A DAY AS NEEDED FOR MUSCLE SPASMS., Disp: , Rfl:     pantoprazole (PROTONIX) 40 mg tablet, Take 1 tablet (40 mg total) by mouth 2 (two) times a day before breakfast and lunch, Disp: 30 tablet, Rfl: 0    polyethylene glycol (MIRALAX) 17 g packet, Take 17 g by mouth daily, Disp: , Rfl: 0    polyethylene glycol (MiraLax) 17 GM/SCOOP powder, Take 238 g by mouth once for 1 dose Take 238 g my mouth. Use as directed, Disp: 238 g, Rfl: 0    pregabalin (LYRICA) 150 mg capsule, Take 150 mg by mouth 2 (two) times a day, Disp: , Rfl:     senna (SENOKOT) 8.6 mg, Take 2 tablets (17.2 mg total) by mouth daily at bedtime, Disp: , Rfl: 0    ziprasidone (GEODON) 80 mg capsule, Take 80 mg by mouth 2 (two) times a day with meals , Disp: , Rfl:   Family History   Problem Relation Age of Onset    Hyperlipidemia Mother     Diabetes Mother     Hypertension Mother     Heart attack Father        Review of Systems   Constitutional:  Negative for chills and fever. HENT:  Negative for ear pain and sore throat. Eyes:  Negative for pain and visual disturbance. Respiratory:  Negative for cough and shortness of breath. Cardiovascular:  Negative for chest pain and palpitations. Gastrointestinal:  Negative for abdominal pain and vomiting. Genitourinary:  Negative for dysuria and hematuria. Musculoskeletal:  Positive for gait problem. Negative for arthralgias and back pain. Skin:  Positive for color change and wound. Negative for rash. Neurological:  Positive for numbness. Negative for seizures and syncope. Psychiatric/Behavioral: Negative. All other systems reviewed and are negative. Objective:  /84   Pulse 80   Temp (!) 96.9 °F (36.1 °C)   Resp 20   LMP  (LMP Unknown)     Physical Exam  Constitutional:       Appearance: Normal appearance. She is well-developed. She is obese. HENT:      Head: Normocephalic and atraumatic.       Nose: Nose normal.      Mouth/Throat:      Mouth: Mucous membranes are moist.      Pharynx: Oropharynx is clear. Eyes:      Conjunctiva/sclera: Conjunctivae normal.   Cardiovascular:      Pulses:           Dorsalis pedis pulses are 2+ on the right side and 2+ on the left side. Posterior tibial pulses are 2+ on the right side and 2+ on the left side. Pulmonary:      Effort: Pulmonary effort is normal.   Musculoskeletal:         General: Normal range of motion. Cervical back: Normal range of motion. Right lower le+ Pitting Edema present. Left lower le+ Pitting Edema present. Comments: BKA right, partial 1st ray and 3rd toe amputation left   Skin:     General: Skin is warm and dry. Capillary Refill: Capillary refill takes less than 2 seconds. Neurological:      General: No focal deficit present. Mental Status: She is alert and oriented to person, place, and time. Mental status is at baseline. Sensory: Sensory deficit present. Coordination: Coordination abnormal.      Gait: Gait abnormal.      Deep Tendon Reflexes: Reflexes abnormal.   Psychiatric:         Mood and Affect: Mood normal.         Behavior: Behavior normal.         Thought Content:  Thought content normal.         Judgment: Judgment normal.           Wound 23 Diabetic Ulcer Foot Left;Medial (Active)   Enter Christopher Engman score: Gimenez Grade 1: Partial or full-thickness ulcer (superficial) 23 140   Wound Image   23 140   Wound Description Epithelialization 23 140   Yvonne-wound Assessment Intact 23 140   Wound Length (cm) 0.1 cm 23 142   Wound Width (cm) 0.1 cm 23 142   Wound Depth (cm) 0.1 cm 23 142   Wound Surface Area (cm^2) 0.01 cm^2 23 142   Wound Volume (cm^3) 0.001 cm^3 23 142   Calculated Wound Volume (cm^3) 0 cm^3 23 142   Change in Wound Size % 100 23 142   Drainage Amount Scant 23 142   Drainage Description Serous 23 142   Non-staged Wound Description Full thickness 11/01/23 1429   Dressing Status Intact 11/01/23 1409                No results found. Wound Instructions:  Orders Placed This Encounter   Procedures    Wound cleansing and dressings     Left foot wound    Wash your hands with soap and water. Remove old dressing, discard into plastic bag and place in trash. Cleanse the wound with soap and water prior to applying a clean dressing. Do not use tissue or cotton balls. Do not scrub the wound. Pat dry using gauze. Shower yes     Apply single layer of xeroform  to the open wound. Cover with gauze and tape, no bandaid. Secure with spandagrip G daily until lymphedema wraps are obtained. Please order lymphedema wraps ASAP. Change dressing every other day  Do not apply ace wraps to left lower leg. Done today  Return in three weeks. Standing Status:   Future     Standing Expiration Date:   11/1/2024         Meaghan Chaidez, DPM, DPM, FACFAS    Portions of the record may have been created with voice recognition software. Occasional wrong word or "sound a like" substitutions may have occurred due to the inherent limitations of voice recognition software. Read the chart carefully and recognize, using context, where substitutions have occurred.

## 2023-11-16 ENCOUNTER — OFFICE VISIT (OUTPATIENT)
Dept: SURGERY | Facility: HOSPITAL | Age: 48
End: 2023-11-16
Payer: COMMERCIAL

## 2023-11-16 VITALS
BODY MASS INDEX: 47.09 KG/M2 | DIASTOLIC BLOOD PRESSURE: 52 MMHG | SYSTOLIC BLOOD PRESSURE: 141 MMHG | TEMPERATURE: 97.3 F | HEART RATE: 76 BPM | WEIGHT: 293 LBS | RESPIRATION RATE: 16 BRPM | HEIGHT: 66 IN

## 2023-11-16 DIAGNOSIS — M62.08 RECTUS DIASTASIS: Primary | ICD-10-CM

## 2023-11-16 PROCEDURE — 99242 OFF/OP CONSLTJ NEW/EST SF 20: CPT | Performed by: SURGERY

## 2023-11-22 ENCOUNTER — TELEPHONE (OUTPATIENT)
Dept: GASTROENTEROLOGY | Facility: CLINIC | Age: 48
End: 2023-11-22

## 2023-11-22 ENCOUNTER — OFFICE VISIT (OUTPATIENT)
Dept: WOUND CARE | Facility: HOSPITAL | Age: 48
End: 2023-11-22
Payer: COMMERCIAL

## 2023-11-22 VITALS
SYSTOLIC BLOOD PRESSURE: 132 MMHG | DIASTOLIC BLOOD PRESSURE: 78 MMHG | TEMPERATURE: 96.6 F | HEART RATE: 72 BPM | RESPIRATION RATE: 18 BRPM

## 2023-11-22 DIAGNOSIS — L97.421 DIABETIC ULCER OF LEFT MIDFOOT ASSOCIATED WITH TYPE 2 DIABETES MELLITUS, LIMITED TO BREAKDOWN OF SKIN (HCC): Primary | ICD-10-CM

## 2023-11-22 DIAGNOSIS — I89.0 LYMPHEDEMA OF BOTH LOWER EXTREMITIES: ICD-10-CM

## 2023-11-22 DIAGNOSIS — E11.42 DIABETIC POLYNEUROPATHY ASSOCIATED WITH TYPE 2 DIABETES MELLITUS (HCC): ICD-10-CM

## 2023-11-22 DIAGNOSIS — E11.621 DIABETIC ULCER OF LEFT MIDFOOT ASSOCIATED WITH TYPE 2 DIABETES MELLITUS, LIMITED TO BREAKDOWN OF SKIN (HCC): Primary | ICD-10-CM

## 2023-11-22 PROCEDURE — 99213 OFFICE O/P EST LOW 20 MIN: CPT | Performed by: PODIATRIST

## 2023-11-22 NOTE — TELEPHONE ENCOUNTER
Scheduled date of COLONOSCOPY(as of today):12/21/23  Physician performing COLONOSCOPY:DR MCNEIL  Location of COLONOSCOPY:[facility]  Instructions reviewed with patient by:TK   Clearances: NO  CALLED PT-THIS WAS NOT DONE AT CHECK OUT DAY OF OV

## 2023-11-22 NOTE — PROGRESS NOTES
Patient ID: Janny Justin is a 53 y.o. female Date of Birth 1975       Chief Complaint   Patient presents with    Follow Up Wound Care Visit     WOUND CARE       Allergies:  Latex, Vancomycin, Aspirin, Barium iodide, Codeine, and Penicillins    Diagnosis:  1. Diabetic ulcer of left midfoot associated with type 2 diabetes mellitus, limited to breakdown of skin ([address])    2. Diabetic polyneuropathy associated with type 2 diabetes mellitus (HCC)    3. Lymphedema of both lower extremities       Diagnosis ICD-10-CM Associated Orders   1. Diabetic ulcer of left midfoot associated with type 2 diabetes mellitus, limited to breakdown of skin ([address])  E11.621     L97.421       2. Diabetic polyneuropathy associated with type 2 diabetes mellitus (HCC)  E11.42       3. Lymphedema of both lower extremities  I89.0            Assessment & Plan:  Diabetic Gimenez grade 0 ulceration at site of partial first ray resection left foot, no debridement performed, the wound is well epithelialized and healed, discontinue all dressings, today we applied Skin-Prep and silicone bordered foam which to be kept in place for 1 week, she may remove to take a shower and then reapply. She may get foot wet in shower now. Patient continues with lymphedema to left lower extremity, nursing reached out to facility last week and left a message for the  who never called back, again a phone call was made today to find out status of lymphedema wraps. Continue Tubigrip in the meantime. Patient was given a prescription for 1 pair diabetic shoes, she will need a check a type of shoe to accommodate her lymphedema. She will also need custom molded inserts to accommodate for her multiple amputations of her left foot. At this time as patient's wound is well-healed she is discharged from [address] [place] [address] wound management center, she is advised if she has any new or recurrent wounds to call immediately.   Patient will follow-up in my office in the next 3 to 4 weeks for diabetic footcare. Subjective:   Amada Cortez presents today for care of left foot diabetic ulceration, she states facility has been changing dressings as directed and she has no new complaints.           The following portions of the patient's history were reviewed and updated as appropriate:   Patient Active Problem List   Diagnosis    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, with bone involvement without evidence of necrosis ([address])    H/O bariatric surgery    Anxiety    Acquired hypothyroidism    Psychiatric illness    Type 2 diabetes mellitus with diabetic neuropathy, without long-term current use of insulin ([address])    Osteomyelitis of left foot (HCC)    Diabetic foot ulcer ([address])    Acquired absence of other left toe(s) ([address])    Essential hypertension    Arthritis    Other chronic pain    OCD (obsessive compulsive disorder)    PTSD (post-traumatic stress disorder)    Right foot ulcer, with fat layer exposed ([address])    Gastroesophageal reflux disease without esophagitis    Migraine without status migrainosus, not intractable    Obstructive sleep apnea    Morbid obesity with BMI of 40.0-44.9, adult ([address])    Homeless single person    Hx of right BKA ([address])    Bipolar 1 disorder, depressed ([address])    Abnormal x-ray of cervical spine    Acquired equinovarus deformity of left foot    Acute osteomyelitis of metatarsal bone of right foot (HCC)    Allergy to bee sting    Amenorrhea    Asthma, intrinsic    Atypical squamous cell changes of undetermined significance (ASCUS) on cervical cytology with positive high risk human papilloma virus (HPV)    Bipolar affective disorder in remission (HCC)    Chronic paroxysmal hemicrania, not intractable    Diastasis of rectus abdominis    Drusen body, unspecified laterality    Folate deficiency    Hyperlipidemia associated with type 2 diabetes mellitus     Impaired intestinal absorption    Instability of left knee joint    Intertriginous dermatitis associated with moisture    Intestinal malabsorption following gastrectomy    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)    Radiculopathy, lumbar region    Right cervical radiculopathy    S/P laparoscopic sleeve gastrectomy    Chronic right-sided low back pain with left-sided sciatica     Past Medical History:   Diagnosis Date    Anxiety     Arthritis     Asthma     Chronic pain     Diabetes mellitus (HCC)     Hypothyroidism     Lymphedema     left leg    Manic bipolar I disorder (HCC)     Morbid obesity (HCC)     OCD (obsessive compulsive disorder)     PTSD (post-traumatic stress disorder)      Past Surgical History:   Procedure Laterality Date    CHOLECYSTECTOMY      CHOLECYSTECTOMY      CLOSURE DELAYED PRIMARY Left 5/18/2023    Procedure: CLOSURE DELAYED PRIMARY;  Surgeon: Phyllis Duncan DPM;  [facility];  Service: Podiatry    LEG AMPUTATION THROUGH LOWER TIBIA AND FIBULA Right 3/23/2023    Procedure: AMPUTATION BELOW KNEE (BKA);   Surgeon: Celia Brito MD;  [facility];  Service: General    IL AMPUTATION FOOT TRANSMETARSAL Right 9/4/2021    Procedure: AMPUTATION TRANSMETATARSAL (TMA);  Surgeon: Nicholas Borrero DPM;  [facility];  Service: Podiatry    IL AMPUTATION METATARSAL W/TOE SINGLE Left 9/8/2019    Procedure: PARTIAL 3RD RAY RESECTION, PSB TOE FILLET FLAP;  Surgeon: Ion Jacinto DPM;  [facility];  Service: Podiatry    IL AMPUTATION METATARSAL W/TOE SINGLE Left 3/23/2023    Procedure: RAY RESECTION FOOT;  Surgeon: Parish Toro DPM;  [facility];  Service: Podiatry    IL AMPUTATION METATARSAL W/TOE SINGLE Left 5/8/2023    Procedure: Left first RAY RESECTION FOOT;  Surgeon: Dudley Munson DPM;  [facility];  Service: Podiatry    TOE AMPUTATION Right 1/13/2021    Procedure: AMPUTATION 2ND TOE;  Surgeon: Nicholas Borrero DPM;  [facility];  Service: Podiatry WOUND DEBRIDEMENT Left 2/17/2022    Procedure: DEBRIDEMENT GREAT TOE WOUND;  Surgeon: Nicholas Borrero DPM;  [facility];  Service: Podiatry    WOUND DEBRIDEMENT Left 5/16/2023    Procedure: DEBRIDEMENT WOUND [facility] OUT);   Surgeon: Phyllis Duncan DPM;  [facility];  Service: Podiatry     Social History     Socioeconomic History    Marital status: Single     Spouse name: Not on file    Number of children: 2    Years of education: 11    Highest education level: 11th grade   Occupational History    Not on file   Tobacco Use    Smoking status: Every Day     Packs/day: 0.50     Years: 15.00     Total pack years: 7.50     Types: Cigarettes     Passive exposure: Current    Smokeless tobacco: Never    Tobacco comments:     6 cigarettes a day   Vaping Use    Vaping Use: Former   Substance and Sexual Activity    Alcohol use: Never    Drug use: Yes    Sexual activity: Not Currently   Other Topics Concern    Not on file   Social History Narrative    Not on file     Social Determinants of Health     Financial Resource Strain: Not on file   Food Insecurity: Food Insecurity Present (5/9/2023)    Hunger Vital Sign     Worried About Running Out of Food in the Last Year: Sometimes true     Ran Out of Food in the Last Year: Sometimes true   Transportation Needs: Unmet Transportation Needs (5/9/2023)    PRAPARE - Transportation     Lack of Transportation (Medical): Yes     Lack of Transportation (Non-Medical): Yes   Physical Activity: Not on file   Stress: Not on file   Social Connections: Not on file   Intimate Partner Violence: Not on file   Housing Stability: High Risk (5/9/2023)    Housing Stability Vital Sign     Unable to Pay for Housing in the Last Year: Yes     Number of [address] in the Last Year: 2     Unstable Housing in the Last Year: Yes        Current Outpatient Medications:     acetaminophen (TYLENOL) 325 mg tablet, Take 2 tablets (650 mg total) by mouth every 6 (six) hours as needed for mild pain, headaches or fever, Disp: , Rfl: 0    bisacodyl (DULCOLAX) 5 mg EC tablet, Take 1 tablet (5 mg total) by mouth daily as needed for constipation Take two the afternoon before colonoscopy., Disp: 4 tablet, Rfl: 0    cloNIDine (CATAPRES) 0.2 mg tablet, Take 0.2 mg by mouth 2 (two) times a day, Disp: , Rfl:     docusate sodium (COLACE) 100 mg capsule, Take 1 capsule (100 mg total) by mouth 2 (two) times a day, Disp: , Rfl: 0    doxycycline hyclate (VIBRAMYCIN) 100 mg capsule, , Disp: , Rfl:     EPINEPHrine (EPIPEN) 0.3 mg/0.3 mL SOAJ, , Disp: , Rfl:     fenofibrate (TRICOR) 54 MG tablet, TAKE 1 TABLET (54 MG TOTAL) BY MOUTH DAILY. , Disp: , Rfl:     folic acid (FOLVITE) 1 mg tablet, Take 1,000 mcg by mouth daily, Disp: , Rfl:     Glucagon, rDNA, (Glucagon Emergency) 1 MG KIT, , Disp: , Rfl:     hydrOXYzine HCL (ATARAX) 50 mg tablet, Take 50 mg by mouth in the morning, Disp: , Rfl:     hydrOXYzine HCL (ATARAX) 50 mg tablet, Take 2 tablets (100 mg total) by mouth daily at bedtime, Disp: 30 tablet, Rfl: 0    insulin glargine (LANTUS) 100 units/mL subcutaneous injection, Inject 12 Units under the skin daily at bedtime, Disp: 10 mL, Rfl: 0    insulin lispro (HumaLOG) 100 units/mL injection, Inject 1-6 Units under the skin 3 (three) times a day before meals, Disp: , Rfl: 0    lamoTRIgine (LaMICtal) 150 MG tablet, Take 200 mg by mouth 2 (two) times a day, Disp: , Rfl:     levothyroxine 50 mcg tablet, Take 1 tablet (50 mcg total) by mouth daily, Disp: 30 tablet, Rfl: 0    melatonin 3 mg, Take 3 tablets (9 mg total) by mouth daily at bedtime, Disp: 30 tablet, Rfl: 0    metFORMIN (GLUCOPHAGE) 500 mg tablet, Take 1 tablet (500 mg total) by mouth 2 (two) times a day with meals, Disp: 60 tablet, Rfl: 0    methocarbamol (ROBAXIN) 750 mg tablet, TAKE 1 TABLET (750 MG TOTAL) BY MOUTH 4 (FOUR) TIMES A DAY AS NEEDED FOR MUSCLE SPASMS., Disp: , Rfl:     pantoprazole (PROTONIX) 40 mg tablet, Take 1 tablet (40 mg total) by mouth 2 (two) times a day before breakfast and lunch, Disp: 30 tablet, Rfl: 0    polyethylene glycol (MIRALAX) 17 g packet, Take 17 g by mouth daily, Disp: , Rfl: 0    polyethylene glycol (MiraLax) 17 GM/SCOOP powder, Take 238 g by mouth once for 1 dose Take 238 g my mouth. Use as directed, Disp: 238 g, Rfl: 0    pregabalin (LYRICA) 150 mg capsule, Take 150 mg by mouth 2 (two) times a day, Disp: , Rfl:     senna (SENOKOT) 8.6 mg, Take 2 tablets (17.2 mg total) by mouth daily at bedtime, Disp: , Rfl: 0    ziprasidone (GEODON) 80 mg capsule, Take 80 mg by mouth 2 (two) times a day with meals , Disp: , Rfl:   Family History   Problem Relation Age of Onset    Hyperlipidemia Mother     Diabetes Mother     Hypertension Mother     Heart attack Father        Review of Systems   Constitutional:  Negative for chills and fever. HENT:  Negative for ear pain and sore throat. Eyes:  Negative for pain and visual disturbance. Respiratory:  Negative for cough and shortness of breath. Cardiovascular:  Negative for chest pain and palpitations. Gastrointestinal:  Negative for abdominal pain and vomiting. Genitourinary:  Negative for dysuria and hematuria. Musculoskeletal:  Positive for gait problem. Negative for arthralgias and back pain. Skin:  Positive for color change and wound. Negative for rash. Neurological:  Positive for numbness. Negative for seizures and syncope. Psychiatric/Behavioral: Negative. All other systems reviewed and are negative. Objective:  /78   Pulse 72   Temp (!) 96.6 °F (35.9 °C)   Resp 18   LMP  (LMP Unknown)     Physical Exam  Constitutional:       Appearance: Normal appearance. She is well-developed. She is obese. HENT:      Head: Normocephalic and atraumatic. Nose: Nose normal.      Mouth/Throat:      Mouth: Mucous membranes are moist.      Pharynx: Oropharynx is clear.    Eyes:      Conjunctiva/sclera: Conjunctivae normal.   Cardiovascular:      Pulses: Dorsalis pedis pulses are detected w/ Doppler on the left side. Posterior tibial pulses are detected w/ Doppler on the left side. Pulmonary:      Effort: Pulmonary effort is normal.   Musculoskeletal:         General: Normal range of motion. Cervical back: Normal range of motion. Left lower le+ Edema present. Comments: BKA right   Skin:     General: Skin is warm and dry. Capillary Refill: Capillary refill takes less than 2 seconds. Neurological:      General: No focal deficit present. Mental Status: She is alert and oriented to person, place, and time. Mental status is at baseline. Sensory: Sensory deficit present. Coordination: Coordination abnormal.      Gait: Gait abnormal.      Deep Tendon Reflexes: Reflexes abnormal.   Psychiatric:         Mood and Affect: Mood normal.         Behavior: Behavior normal.         Thought Content: Thought content normal.         Judgment: Judgment normal.           Wound 23 Diabetic Ulcer Foot Left;Medial (Active)   Enter Matheus Rankin score: Gimenez Grade 1: Partial or full-thickness ulcer (superficial) 10/18/23 1425   Wound Image   23   Wound Description [place] 23   Yvonne-wound Assessment Intact;Edema 23   Wound Length (cm) 0.0 cm 10/18/23 142   Wound Width (cm) 0.0 cm 10/18/23 1425   Wound Depth (cm) 0.0 cm 10/18/23 1425   Wound Surface Area (cm^2) 0.00 cm^2 10/18/23 1425   Wound Volume (cm^3) 0.000 cm^3 10/18/23 1425   Calculated Wound Volume (cm^3) 0 cm^3 10/18/23 1425   Change in Wound Size % 100 10/18/23 142   Drainage Amount None 23   Drainage Description Yellow 10/18/23 1425   Non-staged Wound Description Full thickness 10/18/23 142   Treatments Cleansed 23   Wound packed? No 23   Dressing Changed Changed 23   Patient Tolerance Tolerated well 23   Dressing Status Intact 10/18/23 1425                No results found.     Wound Instructions:  No orders of the defined types were placed in this encounter. Vanessa Chavez, DPM, DPM, FACFAS    Portions of the record may have been created with voice recognition software. Occasional wrong word or "sound a like" substitutions may have occurred due to the inherent limitations of voice recognition software. Read the chart carefully and recognize, using context, where substitutions have occurred.

## 2023-12-13 ENCOUNTER — TELEPHONE (OUTPATIENT)
Dept: GASTROENTEROLOGY | Facility: CLINIC | Age: 48
End: 2023-12-13

## 2023-12-13 NOTE — PROGRESS NOTES
Assessment/Plan:   Amber Garcia is a 53 y. o.female who is here for Hernia (New patient referred by Alison Palma DO, for possible hernia. Was evaluated at [facility] for abdominal pain, r/o possible abdominal wall hernia. Obstructive series and CT done 10/6/23. No hernia noted.)  . Plan: Rectus diastasis - no evidence of hernia on exam or CT, discussed this is not a hernia and repair would be cosmetic    Preoperative Clearance: None    HPI:  Amber Garcia is a 53 y. o.female who was referred for evaluation of Hernia (New patient referred by Alison Palma DO, for possible hernia. Was evaluated at [facility] for abdominal pain, r/o possible abdominal wall hernia. Obstructive series and CT done 10/6/23. No hernia noted.)  . Currently abd bulge.      ROS:  General ROS: negative  negative for - chills, fatigue, fever or night sweats, weight loss  Respiratory ROS: no cough, shortness of breath, or wheezing  Cardiovascular ROS: no chest pain or dyspnea on exertion  Genito-Urinary ROS: no dysuria, trouble voiding, or hematuria  Musculoskeletal ROS: negative for - gait disturbance, joint pain or muscle pain  Neurological ROS: no TIA or stroke symptoms      [unfilled]  Latex, Vancomycin, Aspirin, Barium iodide, Codeine, and Penicillins    Current Outpatient Medications:     acetaminophen (TYLENOL) 325 mg tablet, Take 2 tablets (650 mg total) by mouth every 6 (six) hours as needed for mild pain, headaches or fever, Disp: , Rfl: 0    bisacodyl (DULCOLAX) 5 mg EC tablet, Take 1 tablet (5 mg total) by mouth daily as needed for constipation Take two the afternoon before colonoscopy., Disp: 4 tablet, Rfl: 0    cloNIDine (CATAPRES) 0.2 mg tablet, Take 0.2 mg by mouth 2 (two) times a day, Disp: , Rfl:     docusate sodium (COLACE) 100 mg capsule, Take 1 capsule (100 mg total) by mouth 2 (two) times a day, Disp: , Rfl: 0    EPINEPHrine (EPIPEN) 0.3 mg/0.3 mL SOAJ, , Disp: , Rfl:     fenofibrate (TRICOR) 54 MG tablet, TAKE 1 TABLET (54 MG TOTAL) BY MOUTH DAILY. , Disp: , Rfl:     folic acid (FOLVITE) 1 mg tablet, Take 1,000 mcg by mouth daily, Disp: , Rfl:     Glucagon, rDNA, (Glucagon Emergency) 1 MG KIT, , Disp: , Rfl:     hydrOXYzine HCL (ATARAX) 50 mg tablet, Take 50 mg by mouth in the morning, Disp: , Rfl:     hydrOXYzine HCL (ATARAX) 50 mg tablet, Take 2 tablets (100 mg total) by mouth daily at bedtime, Disp: 30 tablet, Rfl: 0    insulin glargine (LANTUS) 100 units/mL subcutaneous injection, Inject 12 Units under the skin daily at bedtime, Disp: 10 mL, Rfl: 0    insulin lispro (HumaLOG) 100 units/mL injection, Inject 1-6 Units under the skin 3 (three) times a day before meals, Disp: , Rfl: 0    lamoTRIgine (LaMICtal) 150 MG tablet, Take 200 mg by mouth 2 (two) times a day, Disp: , Rfl:     levothyroxine 50 mcg tablet, Take 1 tablet (50 mcg total) by mouth daily, Disp: 30 tablet, Rfl: 0    melatonin 3 mg, Take 3 tablets (9 mg total) by mouth daily at bedtime, Disp: 30 tablet, Rfl: 0    metFORMIN (GLUCOPHAGE) 500 mg tablet, Take 1 tablet (500 mg total) by mouth 2 (two) times a day with meals, Disp: 60 tablet, Rfl: 0    methocarbamol (ROBAXIN) 750 mg tablet, TAKE 1 TABLET (750 MG TOTAL) BY MOUTH 4 (FOUR) TIMES A DAY AS NEEDED FOR MUSCLE SPASMS., Disp: , Rfl:     polyethylene glycol (MIRALAX) 17 g packet, Take 17 g by mouth daily, Disp: , Rfl: 0    pregabalin (LYRICA) 150 mg capsule, Take 150 mg by mouth 2 (two) times a day, Disp: , Rfl:     senna (SENOKOT) 8.6 mg, Take 2 tablets (17.2 mg total) by mouth daily at bedtime, Disp: , Rfl: 0    ziprasidone (GEODON) 80 mg capsule, Take 80 mg by mouth 2 (two) times a day with meals , Disp: , Rfl:     doxycycline hyclate (VIBRAMYCIN) 100 mg capsule, , Disp: , Rfl:     pantoprazole (PROTONIX) 40 mg tablet, Take 1 tablet (40 mg total) by mouth 2 (two) times a day before breakfast and lunch, Disp: 30 tablet, Rfl: 0    polyethylene glycol (MiraLax) 17 GM/SCOOP powder, Take 238 g by mouth once for 1 dose Take 238 g my mouth. Use as directed, Disp: 238 g, Rfl: 0  Past Medical History:   Diagnosis Date    Anxiety     Arthritis     Asthma     Chronic pain     Diabetes mellitus ([address])     Hypothyroidism     Lymphedema     left leg    Manic bipolar I disorder (HCC)     Morbid obesity (HCC)     OCD (obsessive compulsive disorder)     PTSD (post-traumatic stress disorder)      Past Surgical History:   Procedure Laterality Date    CHOLECYSTECTOMY      CHOLECYSTECTOMY      CLOSURE DELAYED PRIMARY Left 5/18/2023    Procedure: CLOSURE DELAYED PRIMARY;  Surgeon: Stacey Alfred DPM;  [facility];  Service: Podiatry    LEG AMPUTATION THROUGH LOWER TIBIA AND FIBULA Right 3/23/2023    Procedure: AMPUTATION BELOW KNEE (BKA); Surgeon: Snow Zuniga MD;  [facility];  Service: General    ND AMPUTATION FOOT TRANSMETARSAL Right 9/4/2021    Procedure: AMPUTATION TRANSMETATARSAL (TMA);  Surgeon: Salvador Whitten DPM;  [facility];  Service: Podiatry    ND AMPUTATION METATARSAL W/TOE SINGLE Left 9/8/2019    Procedure: PARTIAL 3RD RAY RESECTION, PSB TOE FILLET FLAP;  Surgeon: Jodi Tellez DPM;  [facility];  Service: Podiatry    ND AMPUTATION METATARSAL W/TOE SINGLE Left 3/23/2023    Procedure: RAY RESECTION FOOT;  Surgeon: Robles Toro DPM;  [facility];  Service: Podiatry    ND AMPUTATION METATARSAL W/TOE SINGLE Left 5/8/2023    Procedure: Left first RAY RESECTION FOOT;  Surgeon: Kiersten Flowers DPM;  [facility];  Service: Podiatry    TOE AMPUTATION Right 1/13/2021    Procedure: AMPUTATION 2ND TOE;  Surgeon: Salvador Whitten DPM;  [facility];  Service: Podiatry    WOUND DEBRIDEMENT Left 2/17/2022    Procedure: DEBRIDEMENT GREAT TOE WOUND;  Surgeon: Salvador Whitten DPM;  [facility];  Service: Podiatry    WOUND DEBRIDEMENT Left 5/16/2023    Procedure: DEBRIDEMENT WOUND [facility]);   Surgeon: Stacey Alfred SORAYA;  [facility];  Service: Podiatry     Family History   Problem Relation Age of Onset    Hyperlipidemia Mother     Diabetes Mother     Hypertension Mother     Heart attack Father       reports that she has been smoking cigarettes. She has a 7.5 pack-year smoking history. She has been exposed to tobacco smoke. She has never used smokeless tobacco. She reports current drug use. She reports that she does not drink alcohol. Labs:   Lab Results   Component Value Date    WBC 7.36 05/22/2023    WBC 7.64 05/19/2023    WBC 9.20 05/17/2023    WBC 8.85 09/09/2015    HGB 9.2 (L) 05/22/2023    HGB 9.4 (L) 05/19/2023    HGB 9.1 (L) 05/17/2023    HGB 14.4 09/09/2015     (H) 05/22/2023     (H) 05/19/2023     (H) 05/17/2023     09/09/2015     Lab Results   Component Value Date    ALT 15 05/03/2023    ALT 15 03/20/2023    ALT 18 02/16/2022    ALT 54 09/09/2015    AST 16 05/03/2023    AST 21 03/20/2023    AST 16 02/16/2022    AST 30 09/09/2015     This SmartLink has not been configured with any valid records. PHYSICAL EXAM  General Appearance:    Alert, cooperative, no distress,    Head:    Normocephalic without obvious abnormality   Eyes:    PERRL, conjunctiva/corneas clear, EOM's intact        Neck:   Supple, no adenopathy, no JVD   Back:     Symmetric, no spinal or CVA tenderness   Lungs:     Clear to auscultation bilaterally, no wheezing or rhonchi   Heart:    Regular rate and rhythm, S1 and S2 normal, no murmur   Abdomen:     SoftB +BS ND NT + rectus diastasis   Extremities:   Extremities normal. No clubbing, cyanosis or edema   Psych:   Normal Affect, AOx3. Neurologic:  Skin:   CNII-XII intact. Strength symmetric, speech intact    Warm, dry, intact, no visible rashes or lesions         Physical Exam              Some portions of this record may have been generated with voice recognition software. There may be translation, syntax,  or grammatical errors.  Occasional wrong word or "sound-a-like" substitutions may have occurred due to the inherent limitations of the voice recognition software. Read the chart carefully and recognize, using context, where substitutions may have occurred. If you have any questions, please contact the dictating provider for clarification or correction, as needed. This encounter has been coded by a non-certified coder.

## 2023-12-13 NOTE — TELEPHONE ENCOUNTER
Procedure confirmed  Colonoscopy     Via: Spoke with Alexandre Butt, Patient's  [phone]    Instructions given: Given to Patient at Visit   Diabetic medication instructions given    Prep Given: Miralax/Dulcolax    Call the office if there are any questions.

## 2023-12-20 ENCOUNTER — TELEPHONE (OUTPATIENT)
Age: 48
End: 2023-12-20

## 2023-12-20 NOTE — TELEPHONE ENCOUNTER
Scheduled date of colonoscopy (as of today):02/27/2024  Physician performing colonoscopy:Dr. Baltazar  Location of colonoscopy:[facility]        Spoke with Gianna from the endo center regarding cancellation

## 2023-12-20 NOTE — TELEPHONE ENCOUNTER
Patients GI provider:  Dr. OUMOU Bonilla    Number to return call: Jordyn [phone] care giver    Reason for call: Jordyn the caregiver called in to reschedule patient's colonoscopy. Jordyn states the rehab center allowed patient to eat solid food the day before the colonoscopy. Jordyn states [facility] is requesting a prep to be called in verbally for patient. Nurse Dee from the 2nd floor can be reached at [phone].    Scheduled procedure/appointment date if applicable: Apt/procedure 02/27/2024

## 2024-01-03 NOTE — PROGRESS NOTES
Patient ID: Na Alatorre is a 48 y.o. female Date of Birth 1975       Chief Complaint   Patient presents with    Foot Problem     Wound check; left foot    Diabetes     DFC             Diagnosis:  1. Type 2 diabetes mellitus with diabetic neuropathy, without long-term current use of insulin ([facility])    2. Hx of right BKA (HCC)    3. Onychomycosis    4. Lymphedema  -     Compression Stocking    Patient presents for at-risk diabetic foot care.  Patient has no acute concerns today.  Patient has significant lower extremity risk due to neuropathy, parasthesia, edema, and trophic skin changes to the lower extremity. Most recent HBA1c is unknown, FBS this am 235, Last visit with PCP 10/6/23.    On exam patient has thickened, hypertrophic, discolored, brittle toenails with subungual debris and tenderness x 3-patient with BKA right, left partial first ray resection and amputation of toes 3 and 5.  Callus: none  Patient does have BL lower extremity edema -she has known lymphedema  Patient's skin is atrophic, thickened nails, and decreased pedal hair  Patient has decreased pinprick and vibratory sensation to his feet and parasthesia  Patient does not have any  pedal ulcerations.  Partial first ray resection site left foot continues to be well-healed.    Today's treatment includes:  Debridement of toenails. Using nail nipper, bret, and curette, nails were sharply debrided, reduced in thickness and length. Devitalized nail tissue and fungal debris excised and removed. Patient tolerated well.      Patient given prescription for close toed knee-high lymphedema/compression stockings 10 to 15 mmHg to be worn during all waking hours.    Discussed proper shoe gear, daily inspections of feet, and general foot health with patient. Patient has Q7 findings and is recommended for at risk foot care every 9-10 weeks.    Patients most recent complete clinical foot exam was on: 6/15/23        The following portions of the patient's history  "were reviewed and updated as appropriate: allergies, current medications, past family history, past medical history, past social history, past surgical history, and problem list.        Objective:  /71 (BP Location: Right arm, Patient Position: Sitting, Cuff Size: Large)   Pulse 69   Ht 5' 5.5\" (1.664 m)   Wt 136 kg (300 lb) Comment: stated  LMP  (LMP Unknown)   BMI 49.16 kg/m²       No pertinent results found.      Kirsten Serrano DPM, SORAYA, FACFAS    Portions of the record may have been created with voice recognition software. Occasional wrong word or \"sound a like\" substitutions may have occurred due to the inherent limitations of voice recognition software. Read the chart carefully and recognize, using context, where substitutions have occurred.  "

## 2024-01-04 ENCOUNTER — OFFICE VISIT (OUTPATIENT)
Dept: PODIATRY | Facility: CLINIC | Age: 49
End: 2024-01-04
Payer: COMMERCIAL

## 2024-01-04 VITALS
DIASTOLIC BLOOD PRESSURE: 71 MMHG | WEIGHT: 293 LBS | BODY MASS INDEX: 47.09 KG/M2 | SYSTOLIC BLOOD PRESSURE: 107 MMHG | HEIGHT: 66 IN | HEART RATE: 69 BPM

## 2024-01-04 DIAGNOSIS — B35.1 ONYCHOMYCOSIS: ICD-10-CM

## 2024-01-04 DIAGNOSIS — I89.0 LYMPHEDEMA: ICD-10-CM

## 2024-01-04 DIAGNOSIS — Z89.511 HX OF RIGHT BKA (HCC): ICD-10-CM

## 2024-01-04 DIAGNOSIS — E11.40 TYPE 2 DIABETES MELLITUS WITH DIABETIC NEUROPATHY, WITHOUT LONG-TERM CURRENT USE OF INSULIN (HCC): Primary | ICD-10-CM

## 2024-01-04 PROCEDURE — 99212 OFFICE O/P EST SF 10 MIN: CPT | Performed by: PODIATRIST

## 2024-01-04 PROCEDURE — 11720 DEBRIDE NAIL 1-5: CPT | Performed by: PODIATRIST

## 2024-01-04 RX ORDER — DIPHENHYDRAMINE HCL 25 MG
25 CAPSULE ORAL EVERY 6 HOURS PRN
COMMUNITY

## 2024-01-04 RX ORDER — OXYCODONE HCL 10 MG/1
10 TABLET, FILM COATED, EXTENDED RELEASE ORAL EVERY 12 HOURS SCHEDULED
COMMUNITY

## 2024-01-08 NOTE — CONSULTS
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 and secondary cellulitis  2  DM type 2   3  Bipolar 1 disorder by history  4  Hypothyroidism     Recommendations:    Discuss with the primary service  1  Pending final culture results will switch antibiotics to cefepime 1 g q 12 hours IV and metronidazole 500 mg q 8 hours p o   2  If patient is stable continue the above antibiotics for up to 48 hours postoperatively  3  Scheduled tomorrow for left partial 3rd ray resection       HISTORY OF PRESENT ILLNESS:    Reason for Consult:  Osteomyelitis left 3rd toe  HPI: Ty Agustin is a 38y o  year old female with a history of diabetes type 2 was admitted yesterday with chronic nonhealing ulceration on the plantar aspect of the left foot with surrounding cellulitis  She had noticed that the area had become more inflamed over the preceding several weeks despite being placed on oral clindamycin  Here an MRI of the left foot done today revealed osteomyelitis of the 3rd plantar metatarsal head, and 3rd MTP complex effusion that was suspicious for septic arthropathy  Wound cultures from yesterday are showing 3+ beta hemolytic group B strep so far as well as 2+ gram-negative rods  Patient had previously been admitted for similar findings in June, 2019 at which time wound cultures were positive for MRSA, Enterobacter cloacae and beta-hemolytic Streptococcus group B patient and been treated without incident with IV cefazolin and metronidazole and was eventually discharged on cephalexin and doxycycline p o   Here the patient has been on clindamycin 600 mg q 8 hours IV  She has a history of a rash to penicillins but has successfully taken cephalosporins in the past   Patient is scheduled tomorrow for partial 3rd ray resection  Review of Systems findings are confined to the left foot with pain, draining ulceration, and erythema surrounding the wound  A [id number] point system-based review of systems is otherwise negative  PAST MEDICAL HISTORY:  Past Medical History:   Diagnosis Date    Anxiety     Arthritis     Asthma     Chronic pain     Diabetes mellitus ([address] )     Disease of thyroid gland     Lymphedema     left leg    Manic bipolar I disorder (HCC)     Morbid obesity (HCC)     OCD (obsessive compulsive disorder)     PTSD (post-traumatic stress disorder)      Past Surgical History:   Procedure Laterality Date    CHOLECYSTECTOMY      CHOLECYSTECTOMY         FAMILY HISTORY:  Non-contributory    SOCIAL HISTORY:  Social History   Single  Social History     Substance and Sexual Activity   Alcohol Use Never    Frequency: Never    Drinks per session: Patient refused    Binge frequency: Never     Social History     Substance and Sexual Activity   Drug Use Never     Social History     Tobacco Use   Smoking Status Current Every Day Smoker    Packs/day: 0 50    Years: 15 00    Pack years: 7 50   Smokeless Tobacco Never Used       ALLERGIES:  Allergies   Allergen Reactions    Aspirin     Barium Iodide Itching     Face became very red and itchy     Codeine     Latex     Vancomycin     Penicillins Other (See Comments) and Rash    Can take cephalosporins    MEDICATIONS:  All current active medications have been reviewed        PHYSICAL EXAM:  Temp:  [98 1 °F (36 7 °C)-98 6 °F (37 °C)] 98 3 °F (36 8 °C)  HR:  [60-78] 78  Resp:  [16-18] 16  BP: ()/(61-71) 108/71  SpO2:  [95 %-96 %] 96 %  Temp (24hrs), Av 3 °F (36 8 °C), Min:98 1 °F (36 7 °C), Max:98 6 °F (37 °C)  Current: Temperature: 98 3 °F (36 8 °C)    Intake/Output Summary (Last 24 hours) at 9/7/2019 1955  Last data filed at 9/7/2019 1900  Gross per 24 hour   Intake 1432 ml   Output 1950 ml   Net -518 ml       General Appearance:  Appearing moderately obese  nontoxic, and in no distress, appears stated age   Head:  Normocephalic, without obvious abnormality, atraumatic   Eyes:  PERRL, conjunctiva pink and sclera anicteric, both eyes   Nose: Nares normal, mucosa normal, no drainage   Throat: Oropharynx moist without lesions; lips, mucosa, and tongue normal; teeth and gums normal   Neck: Supple, symmetrical, trachea midline, no adenopathy, no tenderness/mass/nodules   Back:   Symmetric, no curvature, ROM normal, no CVA tenderness   Lungs:   Clear to auscultation bilaterally, no audible wheezes, rhonchi and rales, respirations unlabored   Chest Wall:  No tenderness or deformity   Heart:  Regular rate and rhythm, S1, S2 normal, no murmur, rub or gallop   Abdomen:   Soft, non-tender, protuberant non-distended, positive bowel sounds, no masses, no organomegaly    No CVA tenderness   Extremities: Left foot with +2 swelling with redness, approximately 2 x 1 cm plantar ulcer below the 3rd toe  Serosanguineous drainage   Skin: As above   Neurologic: Alert and oriented times 3, extremity strength 5/5 and symmetric           Invasive Devices:   Peripheral IV 09/06/19 Left Antecubital (Active)   Site Assessment Clean;Dry; Intact 9/7/2019  4:00 PM   Dressing Type Transparent 9/7/2019  4:00 PM   Line Status Saline locked; Flushed 9/7/2019  4:00 PM   Dressing Status Clean;Dry; Intact 9/7/2019  4:00 PM   Dressing Change Due 09/10/19 9/7/2019 10:00 AM       LABS, IMAGING, & OTHER STUDIES:  Lab Results:      I have personally reviewed pertinent labs      Results from last 7 days   Lab Units 09/07/19  0603 09/06/19  1732 09/06/19  1324   WBC Thousand/uL 10 09 14 75*  --    HEMOGLOBIN g/dL 13 1 13 8  --    PLATELETS Thousands/uL 373 426* 466*     Results from last 7 days   Lab Units 09/07/19  0603 09/06/19  1732 SODIUM mmol/L 136 135*   POTASSIUM mmol/L 3 9 4 2   CHLORIDE mmol/L 103 101   CO2 mmol/L 25 26   BUN mg/dL 9 10   CREATININE mg/dL 0 76 0 79   EGFR ml/min/1 73sq m 96 92   CALCIUM mg/dL 9 4 10 1   AST U/L  --  12   ALT U/L  --  25   ALK PHOS U/L  --  216*     Results from last 7 days   Lab Units 09/06/19  1341 09/06/19  1324 09/06/19  1252   BLOOD CULTURE  No Growth at 24 hrs  No Growth at 24 hrs   --    GRAM STAIN RESULT   --   --  No polys seen*  3+ Gram positive cocci in pairs and chains*  2+ Gram negative rods*   WOUND CULTURE   --   --  3+ Growth of Beta Hemolytic Streptococcus Group B*  2+ Growth of Gram Negative Dino*       Imaging Studies:   I have personally reviewed pertinent imaging study reports and images in PACS  EKG, Pathology, and Other Studies:   I have personally reviewed pertinent reports  show

## 2024-01-11 ENCOUNTER — TELEPHONE (OUTPATIENT)
Dept: PODIATRY | Facility: CLINIC | Age: 49
End: 2024-01-11

## 2024-01-11 NOTE — TELEPHONE ENCOUNTER
Caller: Na Alatorre    Doctor/Office: Kirsten Serrano DPM    #: [phone]    Escalation: I went into Na's chart to see when she is due to be seen again.  I gave her an appointment for 2/2/24.

## 2024-02-01 NOTE — PROGRESS NOTES
Patient ID: Na Alatorre is a 49 y.o. female Date of Birth 1975       Chief Complaint   Patient presents with    Foot Problem     Left foot wound             Diagnosis:  1. Type 2 diabetes mellitus with diabetic neuropathy, without long-term current use of insulin (HCC)    2. Onychomycosis    3. Lymphedema    4. Hx of right BKA (HCC)      Patient presents for at-risk diabetic foot care.  Patient has no acute concerns today.  Patient has significant lower extremity risk due to neuropathy, parasthesia, edema, and trophic skin changes to the lower extremity. Most recent HBA1c she pineda snot know and info not in epic/care everywhere, FBS this am 304, Last visit with PCP 10/6/23.  She has gotten her new diabetic shoe and custom molded insert and has broken them in and wears them all the time.    On exam patient has thickened, hypertrophic, discolored, brittle toenails with subungual debris and tenderness 2,4,5 (BKA right and prior amp 1,3 left)  Callus: none  Patient does have  BL lower extremity edema - known lymphedema  Patient's skin is atrophic, thickened nails, and decreased pedal hair  Patient has decreased pinprick and vibratory sensation to his feet and parasthesia  Patient does not have any  pedal ulcerations.  Left foot pedal ulceration from amputation site is well-healed.    Today's treatment includes:  Debridement of toenails. Using nail nipper, bret, and curette, nails were sharply debrided, reduced in thickness and length. Devitalized nail tissue and fungal debris excised and removed. Patient tolerated well.      Discussed proper shoe gear, daily inspections of feet, and general foot health with patient. Patient has Q7 findings and is recommended for at risk foot care every 9-10 weeks.    Patients most recent complete clinical foot exam was on: 6/15/23     The following portions of the patient's history were reviewed and updated as appropriate: allergies, current medications, past family history, past  "medical history, past social history, past surgical history, and problem list.        Objective:  /73 (BP Location: Right arm, Patient Position: Sitting, Cuff Size: Adult)   Pulse 75   Ht 5' 5.5\" (1.664 m) Comment: stated  Wt 136 kg (300 lb) Comment: stated  LMP  (LMP Unknown)   BMI 49.16 kg/m²             No pertinent results found.      Kirsten Serrano DPM, SORAYA, FACFAS    Portions of the record may have been created with voice recognition software. Occasional wrong word or \"sound a like\" substitutions may have occurred due to the inherent limitations of voice recognition software. Read the chart carefully and recognize, using context, where substitutions have occurred.  "

## 2024-02-02 ENCOUNTER — OFFICE VISIT (OUTPATIENT)
Dept: PODIATRY | Facility: CLINIC | Age: 49
End: 2024-02-02
Payer: COMMERCIAL

## 2024-02-02 VITALS
SYSTOLIC BLOOD PRESSURE: 108 MMHG | HEIGHT: 66 IN | WEIGHT: 293 LBS | BODY MASS INDEX: 47.09 KG/M2 | HEART RATE: 75 BPM | DIASTOLIC BLOOD PRESSURE: 73 MMHG

## 2024-02-02 DIAGNOSIS — B35.1 ONYCHOMYCOSIS: ICD-10-CM

## 2024-02-02 DIAGNOSIS — Z89.511 HX OF RIGHT BKA (HCC): ICD-10-CM

## 2024-02-02 DIAGNOSIS — I89.0 LYMPHEDEMA: ICD-10-CM

## 2024-02-02 DIAGNOSIS — E11.40 TYPE 2 DIABETES MELLITUS WITH DIABETIC NEUROPATHY, WITHOUT LONG-TERM CURRENT USE OF INSULIN (HCC): Primary | ICD-10-CM

## 2024-02-02 PROCEDURE — 11720 DEBRIDE NAIL 1-5: CPT | Performed by: PODIATRIST

## 2024-02-02 RX ORDER — NYSTATIN 100000 [USP'U]/G
POWDER TOPICAL
COMMUNITY
Start: 2023-11-09

## 2024-02-02 RX ORDER — LIDOCAINE 50 MG/G
PATCH TOPICAL
COMMUNITY
Start: 2024-01-05

## 2024-02-14 ENCOUNTER — PATIENT MESSAGE (OUTPATIENT)
Dept: GASTROENTEROLOGY | Facility: CLINIC | Age: 49
End: 2024-02-14

## 2024-02-14 PROBLEM — E11.621 DIABETIC FOOT ULCER (HCC): Status: RESOLVED | Noted: 2019-09-10 | Resolved: 2024-02-14

## 2024-02-14 PROBLEM — L97.509 DIABETIC FOOT ULCER (HCC): Status: RESOLVED | Noted: 2019-09-10 | Resolved: 2024-02-14

## 2024-02-14 NOTE — PROGRESS NOTES
Cardiology Consultation     Na Alatorre  [id number]  1975  [facility] CARDIOLOGY [place]  [address] [address]      1. Essential hypertension  POCT ECG      2. Hyperlipidemia associated with type 2 diabetes mellitus         3. Type 2 diabetes mellitus with diabetic neuropathy, without long-term current use of insulin ([facility])        4. 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, with bone involvement without evidence of necrosis ([facility])        5. Obstructive sleep apnea        6. Morbid obesity with BMI of 45.0-49.9, adult ([facility])        7. H/O bariatric surgery        8. Bilateral lower extremity edema  Echo complete w/ contrast if indicated      9. Cardiac murmur  Echo complete w/ contrast if indicated          Discussion/Summary:  Abnormal EKG  - EKG in February 2022 showed possible ectopic atrial bradycardia at a rate of 52 bpm  - Last EKG in the system on 5/9/2023 rhythm with an incomplete RBBB  - EKG today shows normal sinus rhythm, overall normal ECG  - Patient reports her facility did an EKG a couple weeks ago, then started Lasix 10 mg daily and sent her to see cardiology, she does not know what the EKG showed and does not have a copy of it  - Patient denies any palpitations or other cardiac symptoms at this time  Hypertension  - Continue clonidine 0.2 mg twice daily and lasix 10 mg daily  -Blood pressure is well-controlled today  LE edema   - reports long history of LE lymphedema   -Was started on Lasix 10 mg daily at the beginning of of February 2024 at her facility  - Will check a transthoracic echo to evaluate for RV dysfunction and pulm hypertension  Cardiac murmur  - Has a mild systolic murmur on exam  - Will check a transthoracic echo to better evaluate  Hyperlipidemia  - Continue with fenofibrate 54 mg daily  - Lipid profile 5/5/2023 showed total cholesterol 179, triglycerides 178, HDL 30,   Type 2 diabetes  - Hemoglobin  A1c 7.2 on 5/5/2023  Status post right BKA  - On 3/20/2023  Osteomyelitis of left foot  - Diagnosed in May 2023  Psychiatric illness  - History of bipolar disorder, OCD, and PTSD  - History of prior inpatient psychiatric admissions   Morbid obesity  - BMI of 49  - History of bariatric surgery  Hypothyroidism    Follow-up in 6 to 8 weeks.    History of Present Illness:  Na Alatorre is a 49 y.o. year old female with a past medical history of hypertension, hyperlipidemia, type 2 diabetes, osteomyelitis of the left foot diagnosed in May 2023, status post right BKA, psychiatric illness, and hypothyroidism.  She reports feeling very tired today.  She did not sleep well last night due to a persistent cough.  She is wheelchair-bound due to a right BKA as well as an infection in her left foot.  She reports they did an EKG at her facility couple weeks ago and was abnormal, so she was sent to cardiology for further evaluation.  She did not bring the EKG and she does not know what it showed.  She also reports her facility started on Lasix 10 mg daily after the EKG.  Denies any prior cardiac problems.  Denies any chest pain, palpitations, shortness of breath, or other concerning cardiac symptoms.    Patient Active Problem List   Diagnosis    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, with bone involvement without evidence of necrosis (HCC)    H/O bariatric surgery    Anxiety    Acquired hypothyroidism    Psychiatric illness    Type 2 diabetes mellitus with diabetic neuropathy, without long-term current use of insulin (HCC)    Osteomyelitis of left foot (HCC)    Acquired absence of other left toe(s) (HCC)    Essential hypertension    Arthritis    Other chronic pain    OCD (obsessive compulsive disorder)    PTSD (post-traumatic stress disorder)    Right foot ulcer, with fat layer exposed (HCC)    Gastroesophageal reflux disease without esophagitis    Migraine without status  migrainosus, not intractable    Obstructive sleep apnea    Morbid obesity with BMI of 45.0-49.9, adult ([facility])    Homeless single person    Hx of right BKA ([facility])    Bipolar 1 disorder, depressed ([facility])    Abnormal x-ray of cervical spine    Acquired equinovarus deformity of left foot    Acute osteomyelitis of metatarsal bone of right foot ([facility])    Allergy to bee sting    Amenorrhea    Asthma, intrinsic    Atypical squamous cell changes of undetermined significance (ASCUS) on cervical cytology with positive high risk human papilloma virus (HPV)    Bipolar affective disorder in remission ([facility])    Chronic paroxysmal hemicrania, not intractable    Diastasis of rectus abdominis    Drusen body, unspecified laterality    Folate deficiency    Hyperlipidemia associated with type 2 diabetes mellitus     Impaired intestinal absorption    Instability of left knee joint    Intertriginous dermatitis associated with moisture    Intestinal malabsorption following gastrectomy    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 ([facility])    Radiculopathy, lumbar region    Right cervical radiculopathy    S/P laparoscopic sleeve gastrectomy    Chronic right-sided low back pain with left-sided sciatica    Lymphedema of both lower extremities    Diabetic polyneuropathy associated with type 2 diabetes mellitus ([facility])    Lymphedema     Past Medical History:   Diagnosis Date    Anxiety     Arthritis     Asthma     Chronic pain     Diabetes mellitus ([facility])     Hypothyroidism     Lymphedema     left leg    Manic bipolar I disorder ([facility])     Morbid obesity ([facility])     OCD (obsessive compulsive disorder)     PTSD (post-traumatic stress disorder)      Social History     Socioeconomic History    Marital status: Single     Spouse name: Not on file    Number of children: 2    Years of education: 11    Highest education level: 11th grade   Occupational History    Not on file   Tobacco Use    Smoking status: Every Day     Current packs/day: 0.50     Average  packs/day: 0.5 packs/day for 15.0 years (7.5 ttl pk-yrs)     Types: Cigarettes     Passive exposure: Current    Smokeless tobacco: Never    Tobacco comments:     6 cigarettes a day   Vaping Use    Vaping status: Former   Substance and Sexual Activity    Alcohol use: Never    Drug use: Yes    Sexual activity: Not Currently   Other Topics Concern    Not on file   Social History Narrative    Not on file     Social Determinants of Health     Financial Resource Strain: High Risk (11/19/2022)    Received from [employer]    Overall Financial Resource Strain (CARDIA)     Difficulty of Paying Living Expenses: Hard   Food Insecurity: Food Insecurity Present (5/9/2023)    Hunger Vital Sign     Worried About Running Out of Food in the Last Year: Sometimes true     Ran Out of Food in the Last Year: Sometimes true   Transportation Needs: Unmet Transportation Needs (5/9/2023)    PRAPARE - Transportation     Lack of Transportation (Medical): Yes     Lack of Transportation (Non-Medical): Yes   Physical Activity: Not on file   Stress: No Stress Concern Present (11/10/2022)    Received from [employer]    [employer] [place] [employer] - Occupational Stress Questionnaire     Feeling of Stress : Only a little   Social Connections: Not on file   Intimate Partner Violence: Unknown (11/10/2022)    Received from [employer]    Humiliation, Afraid, Rape, and Kick questionnaire     Fear of Current or Ex-Partner: No     Emotionally Abused: Not on file     Physically Abused: Not on file     Sexually Abused: Not on file   Housing Stability: High Risk (5/9/2023)    Housing Stability Vital Sign     Unable to Pay for Housing in the Last Year: Yes     Number of Places Lived in the Last Year: 2     Unstable Housing in the Last Year: Yes      Family History   Problem Relation Age of Onset    Hyperlipidemia Mother     Diabetes Mother     Hypertension Mother     Heart attack Father      Past Surgical History:   Procedure  Laterality Date    CHOLECYSTECTOMY      CHOLECYSTECTOMY      CLOSURE DELAYED PRIMARY Left 5/18/2023    Procedure: CLOSURE DELAYED PRIMARY;  Surgeon: Lindsay Bonilla DPM;  [facility];  Service: Podiatry    LEG AMPUTATION THROUGH LOWER TIBIA AND FIBULA Right 3/23/2023    Procedure: AMPUTATION BELOW KNEE (BKA);  Surgeon: Akilah Palafox MD;  [facility];  Service: General    WI AMPUTATION FOOT TRANSMETARSAL Right 9/4/2021    Procedure: AMPUTATION TRANSMETATARSAL (TMA);  Surgeon: Devon Domingo DPM;  [facility];  Service: Podiatry    WI AMPUTATION METATARSAL W/TOE SINGLE Left 9/8/2019    Procedure: PARTIAL 3RD RAY RESECTION, PSB TOE FILLET FLAP;  Surgeon: Kai Elliott DPM;  [facility];  Service: Podiatry    WI AMPUTATION METATARSAL W/TOE SINGLE Left 3/23/2023    Procedure: RAY RESECTION FOOT;  Surgeon: Shadi Toro DPM;  [facility];  Service: Podiatry    WI AMPUTATION METATARSAL W/TOE SINGLE Left 5/8/2023    Procedure: Left first RAY RESECTION FOOT;  Surgeon: Wally Singh DPM;  [facility];  Service: Podiatry    TOE AMPUTATION Right 1/13/2021    Procedure: AMPUTATION 2ND TOE;  Surgeon: Devon Domingo DPM;  [facility];  Service: Podiatry    WOUND DEBRIDEMENT Left 2/17/2022    Procedure: DEBRIDEMENT GREAT TOE WOUND;  Surgeon: Devon Domingo DPM;  [facility];  Service: Podiatry    WOUND DEBRIDEMENT Left 5/16/2023    Procedure: DEBRIDEMENT WOUND (WASH OUT);  Surgeon: Lindsay Bonilla DPM;  [facility];  Service: Podiatry       Current Outpatient Medications:     acetaminophen (TYLENOL) 325 mg tablet, Take 2 tablets (650 mg total) by mouth every 6 (six) hours as needed for mild pain, headaches or fever, Disp: , Rfl: 0    bisacodyl (DULCOLAX) 5 mg EC tablet, Take 1 tablet (5 mg total) by mouth daily as needed for constipation Take two the afternoon before colonoscopy. (Patient taking differently:  Take 10 mg by mouth daily as needed for constipation Take two the afternoon before colonoscopy.), Disp: 4 tablet, Rfl: 0    cetirizine (ZyrTEC Allergy) 10 mg tablet, Take 10 mg by mouth daily, Disp: , Rfl:     cloNIDine (CATAPRES) 0.2 mg tablet, Take 0.2 mg by mouth 2 (two) times a day, Disp: , Rfl:     diphenhydrAMINE (BENADRYL) 25 mg capsule, Take 25 mg by mouth every 6 (six) hours as needed for itching, Disp: , Rfl:     docusate sodium (COLACE) 100 mg capsule, Take 1 capsule (100 mg total) by mouth 2 (two) times a day, Disp: , Rfl: 0    EPINEPHrine (EPIPEN) 0.3 mg/0.3 mL SOAJ, , Disp: , Rfl:     fenofibrate (TRICOR) 54 MG tablet, TAKE 1 TABLET (54 MG TOTAL) BY MOUTH DAILY., Disp: , Rfl:     folic acid (FOLVITE) 1 mg tablet, Take 1,000 mcg by mouth daily, Disp: , Rfl:     furosemide (LASIX) 20 mg tablet, Take 10 mg by mouth daily, Disp: , Rfl:     Glucagon, rDNA, (Glucagon Emergency) 1 MG KIT, , Disp: , Rfl:     guaiFENesin (ROBITUSSIN) 100 MG/5ML oral liquid, Take 200 mg by mouth 3 (three) times a day as needed for cough, Disp: , Rfl:     hydrOXYzine HCL (ATARAX) 50 mg tablet, Take 50 mg by mouth in the morning, Disp: , Rfl:     hydrOXYzine HCL (ATARAX) 50 mg tablet, Take 2 tablets (100 mg total) by mouth daily at bedtime, Disp: 30 tablet, Rfl: 0    insulin glargine (LANTUS) 100 units/mL subcutaneous injection, Inject 12 Units under the skin daily at bedtime, Disp: 10 mL, Rfl: 0    insulin lispro (HumaLOG) 100 units/mL injection, Inject 1-6 Units under the skin 3 (three) times a day before meals, Disp: , Rfl: 0    lamoTRIgine (LaMICtal) 150 MG tablet, Take 200 mg by mouth 2 (two) times a day, Disp: , Rfl:     levothyroxine 50 mcg tablet, Take 1 tablet (50 mcg total) by mouth daily, Disp: 30 tablet, Rfl: 0    loperamide (IMODIUM A-D) 2 MG tablet, Take 2 mg by mouth 4 (four) times a day as needed for diarrhea, Disp: , Rfl:     magnesium hydroxide (MILK OF MAGNESIA) 400 mg/5 mL oral suspension, Take 30 mL by mouth  daily as needed for constipation, Disp: , Rfl:     melatonin 3 mg, Take 3 tablets (9 mg total) by mouth daily at bedtime (Patient taking differently: Take 20 mg by mouth daily at bedtime), Disp: 30 tablet, Rfl: 0    metFORMIN (GLUCOPHAGE) 500 mg tablet, Take 1 tablet (500 mg total) by mouth 2 (two) times a day with meals, Disp: 60 tablet, Rfl: 0    methocarbamol (ROBAXIN) 750 mg tablet, TAKE 1 TABLET (750 MG TOTAL) BY MOUTH 4 (FOUR) TIMES A DAY AS NEEDED FOR MUSCLE SPASMS., Disp: , Rfl:     ondansetron (Zofran) 4 mg tablet, Take 4 mg by mouth every 8 (eight) hours as needed for nausea or vomiting, Disp: , Rfl:     oxyCODONE (OxyCONTIN) 10 mg 12 hr tablet, Take 10 mg by mouth every 12 (twelve) hours, Disp: , Rfl:     pantoprazole (PROTONIX) 40 mg tablet, Take 1 tablet (40 mg total) by mouth 2 (two) times a day before breakfast and lunch, Disp: 30 tablet, Rfl: 0    polyethylene glycol (MIRALAX) 17 g packet, Take 17 g by mouth daily, Disp: , Rfl: 0    pregabalin (LYRICA) 150 mg capsule, Take 150 mg by mouth 2 (two) times a day, Disp: , Rfl:     senna (SENOKOT) 8.6 mg, Take 2 tablets (17.2 mg total) by mouth daily at bedtime, Disp: , Rfl: 0    sodium phosphate-biphosphate (FLEET) 7-19 g 118 mL enema, Insert 1 enema into the rectum, Disp: , Rfl:     ziprasidone (GEODON) 80 mg capsule, Take 80 mg by mouth 2 (two) times a day with meals , Disp: , Rfl:     doxycycline hyclate (VIBRAMYCIN) 100 mg capsule, , Disp: , Rfl:     lidocaine (LIDODERM) 5 %, , Disp: , Rfl:     nystatin powder, , Disp: , Rfl:     polyethylene glycol (MiraLax) 17 GM/SCOOP powder, Take 238 g by mouth once for 1 dose Take 238 g my mouth. Use as directed, Disp: 238 g, Rfl: 0  Allergies   Allergen Reactions    Latex Hives and Shortness Of Breath    Vancomycin Rash and Itching     Other reaction(s): Hives / Urticaria    Aspirin Vomiting and Rash     Other reaction(s): Nausea and Vomiting    Barium Iodide Itching and Rash     Face became very red and itchy   "   Codeine Vomiting and Rash     Other reaction(s): Nausea and Vomiting    Penicillins Rash and Other (See Comments)     Patient has tolerated cefepime without difficulty         Labs:  Lab Results   Component Value Date    ALT 15 2023    AST 16 2023    BUN 13 2023    CALCIUM 9.3 2023     2023    CO2 28 2023    CREATININE 0.77 2023    HDL 30 (L) 2023    HCT 31.5 (L) 2023    HGB 9.2 (L) 2023    HGBA1C 7.2 (H) 2023    MG 2.0 2023    PHOS 1.9 (L) 2023     (H) 2023    K 4.1 2023     2015    TRIG 178 (H) 2023    WBC 7.36 2023       Imaging: No results found.    EC/15/2024: Normal sinus rhythm, normal ECG    Review of Systems:  Review of Systems   Constitutional:  Positive for fatigue. Negative for chills, diaphoresis and fever.   HENT:  Negative for congestion.    Eyes:  Negative for photophobia and visual disturbance.   Respiratory:  Positive for cough. Negative for chest tightness and shortness of breath.    Cardiovascular:  Positive for leg swelling. Negative for chest pain and palpitations.   Gastrointestinal:  Negative for abdominal distention, abdominal pain, diarrhea, nausea and vomiting.   Genitourinary:  Negative for difficulty urinating and dysuria.   Musculoskeletal:  Positive for arthralgias. Negative for joint swelling.   Skin:  Negative for color change, pallor and rash.   Neurological:  Negative for dizziness, syncope, numbness and headaches.   Psychiatric/Behavioral:  Negative for agitation, behavioral problems and confusion.          Vitals:    02/15/24 1049   BP: 110/70   Pulse: 72      Vitals:     Height: 5' 5.5\" (166.4 cm)     Physical Exam:  General appearance:  Appears stated age, alert, well appearing and in no distress  HEENT:  PERRLA, EOMI, no scleral icterus, no conjunctival pallor  NECK:  Supple, No elevated JVP, no thyromegaly, no carotid bruits  HEART:  Regular " rate and rhythm, normal S1/S2, no S3/S4,2/6 systolic murmur  LUNGS:  Clear to auscultation bilaterally  ABDOMEN:  Soft, non-tender, positive bowel sounds, no rebound or guarding, no organomegaly   EXTREMITIES:  + Bilateral lower extremity lymphedema, status post right BKA  VASCULAR:  Normal pedal pulses   SKIN: No lesions or rashes on exposed skin  NEURO:  CN II-XII intact, no focal deficits

## 2024-02-15 ENCOUNTER — CONSULT (OUTPATIENT)
Dept: CARDIOLOGY CLINIC | Facility: CLINIC | Age: 49
End: 2024-02-15
Payer: COMMERCIAL

## 2024-02-15 VITALS
BODY MASS INDEX: 49.16 KG/M2 | DIASTOLIC BLOOD PRESSURE: 70 MMHG | HEIGHT: 66 IN | SYSTOLIC BLOOD PRESSURE: 110 MMHG | HEART RATE: 72 BPM

## 2024-02-15 DIAGNOSIS — E11.40 TYPE 2 DIABETES MELLITUS WITH DIABETIC NEUROPATHY, WITHOUT LONG-TERM CURRENT USE OF INSULIN (HCC): ICD-10-CM

## 2024-02-15 DIAGNOSIS — E11.69 HYPERLIPIDEMIA ASSOCIATED WITH TYPE 2 DIABETES MELLITUS: ICD-10-CM

## 2024-02-15 DIAGNOSIS — R01.1 CARDIAC MURMUR: ICD-10-CM

## 2024-02-15 DIAGNOSIS — G47.33 OBSTRUCTIVE SLEEP APNEA: ICD-10-CM

## 2024-02-15 DIAGNOSIS — E66.01 MORBID OBESITY WITH BMI OF 45.0-49.9, ADULT (HCC): ICD-10-CM

## 2024-02-15 DIAGNOSIS — L97.416 DIABETIC ULCER OF RIGHT MIDFOOT ASSOCIATED WITH DIABETES MELLITUS DUE TO UNDERLYING CONDITION, WITH BONE INVOLVEMENT WITHOUT EVIDENCE OF NECROSIS (HCC): ICD-10-CM

## 2024-02-15 DIAGNOSIS — E08.621 DIABETIC ULCER OF RIGHT MIDFOOT ASSOCIATED WITH DIABETES MELLITUS DUE TO UNDERLYING CONDITION, WITH BONE INVOLVEMENT WITHOUT EVIDENCE OF NECROSIS (HCC): ICD-10-CM

## 2024-02-15 DIAGNOSIS — E78.5 HYPERLIPIDEMIA ASSOCIATED WITH TYPE 2 DIABETES MELLITUS: ICD-10-CM

## 2024-02-15 DIAGNOSIS — I10 ESSENTIAL HYPERTENSION: Primary | Chronic | ICD-10-CM

## 2024-02-15 DIAGNOSIS — Z98.84 H/O BARIATRIC SURGERY: ICD-10-CM

## 2024-02-15 DIAGNOSIS — R60.0 BILATERAL LOWER EXTREMITY EDEMA: ICD-10-CM

## 2024-02-15 PROCEDURE — 93000 ELECTROCARDIOGRAM COMPLETE: CPT | Performed by: STUDENT IN AN ORGANIZED HEALTH CARE EDUCATION/TRAINING PROGRAM

## 2024-02-15 PROCEDURE — 99244 OFF/OP CNSLTJ NEW/EST MOD 40: CPT | Performed by: STUDENT IN AN ORGANIZED HEALTH CARE EDUCATION/TRAINING PROGRAM

## 2024-02-15 RX ORDER — FUROSEMIDE 20 MG/1
10 TABLET ORAL DAILY
COMMUNITY

## 2024-02-15 RX ORDER — ONDANSETRON 4 MG/1
4 TABLET, FILM COATED ORAL EVERY 8 HOURS PRN
COMMUNITY

## 2024-02-15 RX ORDER — GUAIFENESIN 200 MG/10ML
200 LIQUID ORAL 3 TIMES DAILY PRN
COMMUNITY

## 2024-02-15 RX ORDER — LOPERAMIDE HYDROCHLORIDE 2 MG/1
2 TABLET ORAL 4 TIMES DAILY PRN
COMMUNITY

## 2024-02-15 RX ORDER — CETIRIZINE HYDROCHLORIDE 10 MG/1
10 TABLET ORAL DAILY
COMMUNITY

## 2024-02-15 RX ORDER — ENEMA 19; 7 G/133ML; G/133ML
1 ENEMA RECTAL
COMMUNITY

## 2024-02-26 ENCOUNTER — TELEPHONE (OUTPATIENT)
Age: 49
End: 2024-02-26

## 2024-02-26 NOTE — TELEPHONE ENCOUNTER
Scheduled date of colonoscopy (as of today): 3/8/24  Physician performing colonoscopy: Chad  Location of colonoscopy: [facility]    
no difficulties

## 2024-03-06 ENCOUNTER — TELEPHONE (OUTPATIENT)
Age: 49
End: 2024-03-06

## 2024-03-06 NOTE — TELEPHONE ENCOUNTER
Pt's  Jordyn calling stating that pt stated she is not doing liquid diet today an will do her liquid diet tomorrow. Jordyn saying she is suppose to start today. I informed her that pt's procedure is 3/8/24 and she is suppose to be on a liquid diet tomorrow all day 3/7/24 and start her prep in evening. Per Jordyn okay and thank you.

## 2024-03-08 ENCOUNTER — ANESTHESIA EVENT (OUTPATIENT)
Dept: GASTROENTEROLOGY | Facility: HOSPITAL | Age: 49
End: 2024-03-08

## 2024-03-08 ENCOUNTER — ANESTHESIA (OUTPATIENT)
Dept: GASTROENTEROLOGY | Facility: HOSPITAL | Age: 49
End: 2024-03-08

## 2024-03-08 ENCOUNTER — HOSPITAL ENCOUNTER (OUTPATIENT)
Dept: GASTROENTEROLOGY | Facility: HOSPITAL | Age: 49
Setting detail: OUTPATIENT SURGERY
End: 2024-03-08
Attending: INTERNAL MEDICINE
Payer: COMMERCIAL

## 2024-03-08 VITALS
TEMPERATURE: 98 F | RESPIRATION RATE: 12 BRPM | WEIGHT: 293 LBS | OXYGEN SATURATION: 96 % | BODY MASS INDEX: 48.67 KG/M2 | DIASTOLIC BLOOD PRESSURE: 63 MMHG | SYSTOLIC BLOOD PRESSURE: 127 MMHG | HEART RATE: 77 BPM

## 2024-03-08 DIAGNOSIS — F39 MOOD DISORDER (HCC): ICD-10-CM

## 2024-03-08 DIAGNOSIS — Z12.11 COLON CANCER SCREENING: ICD-10-CM

## 2024-03-08 PROCEDURE — 45378 DIAGNOSTIC COLONOSCOPY: CPT | Performed by: INTERNAL MEDICINE

## 2024-03-08 RX ORDER — SODIUM CHLORIDE 9 MG/ML
INJECTION, SOLUTION INTRAVENOUS CONTINUOUS PRN
Status: DISCONTINUED | OUTPATIENT
Start: 2024-03-08 | End: 2024-03-08

## 2024-03-08 RX ORDER — PROPOFOL 10 MG/ML
INJECTION, EMULSION INTRAVENOUS AS NEEDED
Status: DISCONTINUED | OUTPATIENT
Start: 2024-03-08 | End: 2024-03-08

## 2024-03-08 RX ORDER — MIDAZOLAM HYDROCHLORIDE 2 MG/2ML
INJECTION, SOLUTION INTRAMUSCULAR; INTRAVENOUS AS NEEDED
Status: DISCONTINUED | OUTPATIENT
Start: 2024-03-08 | End: 2024-03-08

## 2024-03-08 RX ORDER — LIDOCAINE HYDROCHLORIDE 10 MG/ML
INJECTION, SOLUTION EPIDURAL; INFILTRATION; INTRACAUDAL; PERINEURAL AS NEEDED
Status: DISCONTINUED | OUTPATIENT
Start: 2024-03-08 | End: 2024-03-08

## 2024-03-08 RX ADMIN — PROPOFOL 50 MG: 10 INJECTION, EMULSION INTRAVENOUS at 15:50

## 2024-03-08 RX ADMIN — MIDAZOLAM 2 MG: 1 INJECTION INTRAMUSCULAR; INTRAVENOUS at 14:54

## 2024-03-08 RX ADMIN — PROPOFOL 100 MG: 10 INJECTION, EMULSION INTRAVENOUS at 15:48

## 2024-03-08 RX ADMIN — SODIUM CHLORIDE: 0.9 INJECTION, SOLUTION INTRAVENOUS at 15:02

## 2024-03-08 RX ADMIN — LIDOCAINE HYDROCHLORIDE 50 MG: 10 INJECTION, SOLUTION EPIDURAL; INFILTRATION; INTRACAUDAL; PERINEURAL at 15:48

## 2024-03-08 NOTE — H&P
History and Physical - [facility]  Na Alatorre 49 y.o. female MRN: [id number]                  HPI: Na Alatorre is a 49 y.o. year old female who presents for screening colonoscopy.      REVIEW OF SYSTEMS: Per the HPI, and otherwise unremarkable.    Historical Information   Past Medical History:   Diagnosis Date    Anxiety     Arthritis     Asthma     Chronic pain     Diabetes mellitus (HCC)     Hypothyroidism     Lymphedema     left leg    Manic bipolar I disorder (HCC)     Morbid obesity (HCC)     OCD (obsessive compulsive disorder)     PTSD (post-traumatic stress disorder)      Past Surgical History:   Procedure Laterality Date    CHOLECYSTECTOMY      CHOLECYSTECTOMY      CLOSURE DELAYED PRIMARY Left 5/18/2023    Procedure: CLOSURE DELAYED PRIMARY;  Surgeon: Lindsay Bonilla DPM;  [facility];  Service: Podiatry    LEG AMPUTATION THROUGH LOWER TIBIA AND FIBULA Right 3/23/2023    Procedure: AMPUTATION BELOW KNEE (BKA);  Surgeon: Akilah Palafox MD;  [facility];  Service: General    MT AMPUTATION FOOT TRANSMETARSAL Right 9/4/2021    Procedure: AMPUTATION TRANSMETATARSAL (TMA);  Surgeon: Devon Domingo DPM;  [facility];  Service: Podiatry    MT AMPUTATION METATARSAL W/TOE SINGLE Left 9/8/2019    Procedure: PARTIAL 3RD RAY RESECTION, PSB TOE FILLET FLAP;  Surgeon: Kai Elliott DPM;  [facility];  Service: Podiatry    MT AMPUTATION METATARSAL W/TOE SINGLE Left 3/23/2023    Procedure: RAY RESECTION FOOT;  Surgeon: Shadi Toro DPM;  [facility];  Service: Podiatry    MT AMPUTATION METATARSAL W/TOE SINGLE Left 5/8/2023    Procedure: Left first RAY RESECTION FOOT;  Surgeon: Wally Singh DPM;  [facility];  Service: Podiatry    TOE AMPUTATION Right 1/13/2021    Procedure: AMPUTATION 2ND TOE;  Surgeon: Devon Domingo DPM;  [facility];  Service: Podiatry    WOUND DEBRIDEMENT Left 2/17/2022     Procedure: DEBRIDEMENT GREAT TOE WOUND;  Surgeon: Devon Domingo DPM;  [facility];  Service: Podiatry    WOUND DEBRIDEMENT Left 5/16/2023    Procedure: DEBRIDEMENT WOUND (WASH OUT);  Surgeon: Lindsay Bonilla DPM;  [facility];  Service: Podiatry     Social History   Social History     Substance and Sexual Activity   Alcohol Use Never     Social History     Substance and Sexual Activity   Drug Use Yes     Social History     Tobacco Use   Smoking Status Every Day    Current packs/day: 0.50    Average packs/day: 0.5 packs/day for 15.0 years (7.5 ttl pk-yrs)    Types: Cigarettes    Passive exposure: Current   Smokeless Tobacco Never   Tobacco Comments    6 cigarettes a day     Family History   Problem Relation Age of Onset    Hyperlipidemia Mother     Diabetes Mother     Hypertension Mother     Heart attack Father        Meds/Allergies       Current Outpatient Medications:     acetaminophen (TYLENOL) 325 mg tablet    cetirizine (ZyrTEC Allergy) 10 mg tablet    cloNIDine (CATAPRES) 0.2 mg tablet    diphenhydrAMINE (BENADRYL) 25 mg capsule    fenofibrate (TRICOR) 54 MG tablet    folic acid (FOLVITE) 1 mg tablet    furosemide (LASIX) 20 mg tablet    guaiFENesin (ROBITUSSIN) 100 MG/5ML oral liquid    hydrOXYzine HCL (ATARAX) 50 mg tablet    hydrOXYzine HCL (ATARAX) 50 mg tablet    insulin glargine (LANTUS) 100 units/mL subcutaneous injection    insulin lispro (HumaLOG) 100 units/mL injection    lamoTRIgine (LaMICtal) 150 MG tablet    levothyroxine 50 mcg tablet    melatonin 3 mg    metFORMIN (GLUCOPHAGE) 500 mg tablet    methocarbamol (ROBAXIN) 750 mg tablet    ondansetron (Zofran) 4 mg tablet    oxyCODONE (OxyCONTIN) 10 mg 12 hr tablet    pantoprazole (PROTONIX) 40 mg tablet    polyethylene glycol (MIRALAX) 17 g packet    pregabalin (LYRICA) 150 mg capsule    ziprasidone (GEODON) 80 mg capsule    bisacodyl (DULCOLAX) 5 mg EC tablet    docusate sodium (COLACE) 100 mg capsule    doxycycline hyclate  (VIBRAMYCIN) 100 mg capsule    EPINEPHrine (EPIPEN) 0.3 mg/0.3 mL SOAJ    Glucagon, rDNA, (Glucagon Emergency) 1 MG KIT    lidocaine (LIDODERM) 5 %    loperamide (IMODIUM A-D) 2 MG tablet    magnesium hydroxide (MILK OF MAGNESIA) 400 mg/5 mL oral suspension    nystatin powder    polyethylene glycol (MiraLax) 17 GM/SCOOP powder    senna (SENOKOT) 8.6 mg    sodium phosphate-biphosphate (FLEET) 7-19 g 118 mL enema    Allergies   Allergen Reactions    Latex Hives and Shortness Of Breath    Vancomycin Rash and Itching     Other reaction(s): Hives / Urticaria    Aspirin Vomiting and Rash     Other reaction(s): Nausea and Vomiting    Barium Iodide Itching and Rash     Face became very red and itchy     Codeine Vomiting and Rash     Other reaction(s): Nausea and Vomiting    Penicillins Rash and Other (See Comments)     Patient has tolerated cefepime without difficulty       Objective     /59   Pulse 64   Temp 97.7 °F (36.5 °C) (Oral)   Resp 20   Wt 135 kg (297 lb)   LMP  (LMP Unknown)   SpO2 98%   BMI 48.67 kg/m²       PHYSICAL EXAM    Gen: NAD  Head: NCAT  CV: RRR  CHEST: Clear  ABD: soft, NT/ND  EXT: no edema      ASSESSMENT/PLAN:  This is a 49 y.o. year old female here for colonoscopy, and she is stable and optimized for her procedure.

## 2024-03-08 NOTE — ANESTHESIA POSTPROCEDURE EVALUATION
Post-Op Assessment Note    CV Status:  Stable  Pain Score: 0    Pain management: adequate       Mental Status:  Awake   Hydration Status:  Stable and euvolemic   PONV Controlled:  None   Airway Patency:  Patent     Post Op Vitals Reviewed: Yes      Staff: CRNA               BP   119/73   Temp      Pulse 74   Resp 18   SpO2 99

## 2024-03-08 NOTE — ANESTHESIA PREPROCEDURE EVALUATION
Procedure:  COLONOSCOPY    Relevant Problems   CARDIO   (+) Essential hypertension   (+) Hyperlipidemia associated with type 2 diabetes mellitus    (+) Migraine without status migrainosus, not intractable      ENDO   (+) Acquired hypothyroidism   (+) Type 2 diabetes mellitus with diabetic neuropathy, without long-term current use of insulin (HCC)      GI/HEPATIC   (+) Gastroesophageal reflux disease without esophagitis   (+) H/O bariatric surgery      MUSCULOSKELETAL   (+) Arthritis   (+) Chronic right-sided low back pain with left-sided sciatica   (+) Diastasis of rectus abdominis      NEURO/PSYCH   (+) Anxiety   (+) Chronic paroxysmal hemicrania, not intractable   (+) Chronic right-sided low back pain with left-sided sciatica   (+) Diabetic polyneuropathy associated with type 2 diabetes mellitus (HCC)   (+) Migraine without status migrainosus, not intractable   (+) OCD (obsessive compulsive disorder)   (+) Other chronic pain   (+) PTSD (post-traumatic stress disorder)      PULMONARY   (+) Asthma, intrinsic   (+) Obstructive sleep apnea      Other   (+) Acute osteomyelitis of metatarsal bone of right foot (HCC)   (+) Osteomyelitis of left foot (HCC)        Physical Exam    Airway    Mallampati score: II  TM Distance: >3 FB  Neck ROM: full     Dental    upper dentures and lower dentures    Cardiovascular      Pulmonary      Other Findings  post-pubertal.      Anesthesia Plan  ASA Score- 3     Anesthesia Type- IV sedation with anesthesia with ASA Monitors.         Additional Monitors:     Airway Plan:            Plan Factors-    Chart reviewed.    Patient summary reviewed.    Patient is a current smoker.              Induction- intravenous.    Postoperative Plan-     Informed Consent- Anesthetic plan and risks discussed with patient.  I personally reviewed this patient with the CRNA. Discussed and agreed on the Anesthesia Plan with the CRNA..

## 2024-05-16 NOTE — PROGRESS NOTES
Patient ID: Na Alatorre is a 49 y.o. female Date of Birth 1975       Chief Complaint   Patient presents with    Diabetes     [facility]    Foot Problem     Wound care    Foot Pain     Leg pain due to edema             Diagnosis:  1. Type 2 diabetes mellitus with diabetic neuropathy, without long-term current use of insulin (HCC)  2. Onychomycosis  3. Lymphedema  -     Ambulatory Referral to Physical Therapy; Future  -     Compression Stocking  4. Hx of right BKA (HCC)    Patient presents for at-risk diabetic foot care.  Patient has no acute concerns today.  Patient has significant lower extremity risk due to neuropathy, parasthesia, edema, and trophic skin changes to the lower extremity. Most recent HBA1c she does not know and info not in epic/care everywhere, FBS this am  - she does not know. Last visit with PCP 10/6/23.  She has gotten her new diabetic shoe and custom molded insert and has broken them in and wears them all the time.    On exam patient has thickened, hypertrophic, discolored, brittle toenails with subungual debris and tenderness 2,4,5 (BKA right and prior amp 1,3 left)  Callus: none  Patient does have  BL lower extremity edema - known lymphedema  Patient's skin is atrophic, thickened nails, and decreased pedal hair  Patient has decreased pinprick and vibratory sensation to his feet and parasthesia  Patient does not have any  pedal ulcerations.  Left foot pedal ulceration from amputation site is well-healed.  Lymphedema left with + 3 pitting edema    Today's treatment includes:  Debridement of toenails. Using nail nipper, bret, and curette, nails were sharply debrided, reduced in thickness and length. Devitalized nail tissue and fungal debris excised and removed. Patient tolerated well.        Rx for Lymphedema therapy  Rx 3 knee high compression stocking 20-30 mm HG    Discussed proper shoe gear, daily inspections of feet, and general foot health with patient. Patient has Q7 findings and is  "recommended for at risk foot care every 9-10 weeks.    Patients most recent complete clinical foot exam was on: 6/15/23     The following portions of the patient's history were reviewed and updated as appropriate: allergies, current medications, past family history, past medical history, past social history, past surgical history, and problem list.        Objective:  /66 (BP Location: Right arm, Patient Position: Sitting, Cuff Size: Large) Comment (BP Location): lower arm  Pulse 84   Ht 5' 5.5\" (1.664 m) Comment: verbal  Wt 135 kg (297 lb) Comment: verbal  LMP  (LMP Unknown)   BMI 48.67 kg/m²             No pertinent results found.      Kirsten Serrano, SORAYA, DPM, FACFAS    Portions of the record may have been created with voice recognition software. Occasional wrong word or \"sound a like\" substitutions may have occurred due to the inherent limitations of voice recognition software. Read the chart carefully and recognize, using context, where substitutions have occurred.  "

## 2024-05-17 ENCOUNTER — OFFICE VISIT (OUTPATIENT)
Dept: PODIATRY | Facility: CLINIC | Age: 49
End: 2024-05-17
Payer: COMMERCIAL

## 2024-05-17 VITALS — RESPIRATION RATE: 8 BRPM

## 2024-05-17 VITALS — RESPIRATION RATE: 12 BRPM

## 2024-05-17 VITALS — RESPIRATION RATE: 17 BRPM | DIASTOLIC BLOOD PRESSURE: 56 MMHG | SYSTOLIC BLOOD PRESSURE: 118 MMHG

## 2024-05-17 VITALS
HEART RATE: 84 BPM | WEIGHT: 293 LBS | HEIGHT: 66 IN | BODY MASS INDEX: 47.09 KG/M2 | DIASTOLIC BLOOD PRESSURE: 66 MMHG | SYSTOLIC BLOOD PRESSURE: 118 MMHG

## 2024-05-17 VITALS — RESPIRATION RATE: 11 BRPM

## 2024-05-17 DIAGNOSIS — I89.0 LYMPHEDEMA: ICD-10-CM

## 2024-05-17 DIAGNOSIS — E11.40 TYPE 2 DIABETES MELLITUS WITH DIABETIC NEUROPATHY, WITHOUT LONG-TERM CURRENT USE OF INSULIN (HCC): Primary | ICD-10-CM

## 2024-05-17 DIAGNOSIS — Z89.511 HX OF RIGHT BKA (HCC): ICD-10-CM

## 2024-05-17 DIAGNOSIS — B35.1 ONYCHOMYCOSIS: ICD-10-CM

## 2024-05-17 PROCEDURE — 99212 OFFICE O/P EST SF 10 MIN: CPT | Performed by: PODIATRIST

## 2024-05-17 PROCEDURE — 11720 DEBRIDE NAIL 1-5: CPT | Performed by: PODIATRIST

## 2024-05-17 RX ORDER — BISACODYL 10 MG
10 SUPPOSITORY, RECTAL RECTAL DAILY PRN
COMMUNITY

## 2024-05-17 RX ORDER — BUPRENORPHINE AND NALOXONE 4; 1 MG/1; MG/1
FILM, SOLUBLE BUCCAL; SUBLINGUAL
COMMUNITY
Start: 2024-05-14

## 2024-05-17 RX ORDER — SEMAGLUTIDE 0.68 MG/ML
INJECTION, SOLUTION SUBCUTANEOUS
COMMUNITY
Start: 2024-04-25

## 2024-05-17 RX ORDER — NICOTINE POLACRILEX 4 MG
15 LOZENGE BUCCAL ONCE AS NEEDED
COMMUNITY

## 2024-05-17 NOTE — PATIENT INSTRUCTIONS
Instructions for facility  It is of utmost necessity patient to receive lymphedema therapy for left lower extremity -order has been given to patient  Patient given prescription for 3 left lower leg compression garments 20 to 30 mmHg knee-high  Encouraged frequent elevation, low-sodium diet, proper protein intake, proper glycemic control.

## 2024-05-18 ENCOUNTER — HOSPITAL ENCOUNTER (INPATIENT)
Dept: HOSPITAL 99 - 3 WEST ACU | Age: 49
LOS: 2 days | Discharge: NURSING, REHAB OR LONG-TERM CARE | DRG: 871 | End: 2024-05-20
Payer: COMMERCIAL

## 2024-05-18 VITALS — RESPIRATION RATE: 16 BRPM | DIASTOLIC BLOOD PRESSURE: 51 MMHG | SYSTOLIC BLOOD PRESSURE: 116 MMHG

## 2024-05-18 VITALS — RESPIRATION RATE: 11 BRPM

## 2024-05-18 VITALS — DIASTOLIC BLOOD PRESSURE: 81 MMHG | RESPIRATION RATE: 8 BRPM | SYSTOLIC BLOOD PRESSURE: 126 MMHG

## 2024-05-18 VITALS — SYSTOLIC BLOOD PRESSURE: 125 MMHG | DIASTOLIC BLOOD PRESSURE: 73 MMHG | RESPIRATION RATE: 15 BRPM

## 2024-05-18 VITALS — SYSTOLIC BLOOD PRESSURE: 128 MMHG | DIASTOLIC BLOOD PRESSURE: 80 MMHG | RESPIRATION RATE: 18 BRPM

## 2024-05-18 VITALS — DIASTOLIC BLOOD PRESSURE: 46 MMHG | RESPIRATION RATE: 16 BRPM | SYSTOLIC BLOOD PRESSURE: 97 MMHG

## 2024-05-18 VITALS — SYSTOLIC BLOOD PRESSURE: 110 MMHG | DIASTOLIC BLOOD PRESSURE: 59 MMHG | RESPIRATION RATE: 16 BRPM

## 2024-05-18 VITALS — RESPIRATION RATE: 15 BRPM | SYSTOLIC BLOOD PRESSURE: 119 MMHG | DIASTOLIC BLOOD PRESSURE: 72 MMHG

## 2024-05-18 VITALS — RESPIRATION RATE: 16 BRPM

## 2024-05-18 VITALS — DIASTOLIC BLOOD PRESSURE: 79 MMHG | RESPIRATION RATE: 11 BRPM | SYSTOLIC BLOOD PRESSURE: 126 MMHG

## 2024-05-18 VITALS — SYSTOLIC BLOOD PRESSURE: 97 MMHG | DIASTOLIC BLOOD PRESSURE: 53 MMHG

## 2024-05-18 VITALS — RESPIRATION RATE: 8 BRPM

## 2024-05-18 VITALS — RESPIRATION RATE: 9 BRPM

## 2024-05-18 VITALS — DIASTOLIC BLOOD PRESSURE: 48 MMHG | SYSTOLIC BLOOD PRESSURE: 97 MMHG | RESPIRATION RATE: 18 BRPM

## 2024-05-18 VITALS — DIASTOLIC BLOOD PRESSURE: 59 MMHG | SYSTOLIC BLOOD PRESSURE: 117 MMHG | RESPIRATION RATE: 18 BRPM

## 2024-05-18 VITALS — DIASTOLIC BLOOD PRESSURE: 59 MMHG | RESPIRATION RATE: 16 BRPM | SYSTOLIC BLOOD PRESSURE: 110 MMHG

## 2024-05-18 VITALS — RESPIRATION RATE: 10 BRPM

## 2024-05-18 VITALS — BODY MASS INDEX: 54.7 KG/M2 | BODY MASS INDEX: 54.2 KG/M2

## 2024-05-18 VITALS — DIASTOLIC BLOOD PRESSURE: 66 MMHG | RESPIRATION RATE: 16 BRPM | SYSTOLIC BLOOD PRESSURE: 111 MMHG

## 2024-05-18 VITALS — RESPIRATION RATE: 13 BRPM

## 2024-05-18 VITALS — RESPIRATION RATE: 20 BRPM

## 2024-05-18 VITALS — DIASTOLIC BLOOD PRESSURE: 64 MMHG | RESPIRATION RATE: 16 BRPM | SYSTOLIC BLOOD PRESSURE: 111 MMHG

## 2024-05-18 VITALS — RESPIRATION RATE: 15 BRPM

## 2024-05-18 DIAGNOSIS — I10: ICD-10-CM

## 2024-05-18 DIAGNOSIS — I5A: ICD-10-CM

## 2024-05-18 DIAGNOSIS — G89.4: ICD-10-CM

## 2024-05-18 DIAGNOSIS — K21.9: ICD-10-CM

## 2024-05-18 DIAGNOSIS — E11.9: ICD-10-CM

## 2024-05-18 DIAGNOSIS — J69.0: ICD-10-CM

## 2024-05-18 DIAGNOSIS — Z79.4: ICD-10-CM

## 2024-05-18 DIAGNOSIS — A41.9: Primary | ICD-10-CM

## 2024-05-18 DIAGNOSIS — E03.9: ICD-10-CM

## 2024-05-18 DIAGNOSIS — D50.9: ICD-10-CM

## 2024-05-18 DIAGNOSIS — F31.9: ICD-10-CM

## 2024-05-18 DIAGNOSIS — E66.01: ICD-10-CM

## 2024-05-18 DIAGNOSIS — N17.9: ICD-10-CM

## 2024-05-18 LAB
ALBUMIN SERPL-MCNC: 3.8 G/DL (ref 3.5–5)
ALP SERPL-CCNC: 103 U/L (ref 38–126)
ALT SERPL-CCNC: 34 U/L (ref 0–35)
AST SERPL-CCNC: 53 U/L (ref 14–36)
BUN SERPL-MCNC: 19 MG/DL (ref 7–17)
BUN SERPL-MCNC: 21 MG/DL (ref 7–17)
CALCIUM SERPL-MCNC: 9 MG/DL (ref 8.4–10.2)
CALCIUM SERPL-MCNC: 9.4 MG/DL (ref 8.4–10.2)
CHLORIDE SERPL-SCNC: 102 MMOL/L (ref 98–107)
CHLORIDE SERPL-SCNC: 99 MMOL/L (ref 98–107)
CO2 SERPL-SCNC: 26 MMOL/L (ref 22–30)
CO2 SERPL-SCNC: 27 MMOL/L (ref 22–30)
EGFR: 46.41
EGFR: > 60
ERYTHROCYTE [DISTWIDTH] IN BLOOD BY AUTOMATED COUNT: 20.9 % (ref 11.5–14.5)
ERYTHROCYTE [DISTWIDTH] IN BLOOD BY AUTOMATED COUNT: 21.2 % (ref 11.5–14.5)
ESTIMATED CREATININE CLEARANCE: 86 ML/MIN
GLUCOSE - POINT OF CARE: 124 MG/DL (ref 70–99)
GLUCOSE - POINT OF CARE: 160 MG/DL (ref 70–99)
GLUCOSE - POINT OF CARE: 194 MG/DL (ref 70–99)
GLUCOSE - POINT OF CARE: 203 MG/DL (ref 70–99)
GLUCOSE SERPL-MCNC: 110 MG/DL (ref 70–99)
GLUCOSE SERPL-MCNC: 96 MG/DL (ref 70–99)
HBA1C MFR BLD: 8.2 % (ref 4–5.6)
HCT VFR BLD AUTO: 23.3 % (ref 37–47)
HCT VFR BLD AUTO: 25.1 % (ref 37–47)
HGB BLD-MCNC: 6.6 G/DL (ref 12–16)
HGB BLD-MCNC: 7.2 G/DL (ref 12–16)
IRON SERPL-MCNC: 27 UG/DL (ref 37–170)
MCHC RBC AUTO-ENTMCNC: 28.3 G/DL (ref 33–37)
MCHC RBC AUTO-ENTMCNC: 28.7 G/DL (ref 33–37)
MCV RBC AUTO: 60.5 FL (ref 81–99)
MCV RBC AUTO: 61.1 FL (ref 81–99)
NRBC BLD AUTO-RTO: 0 %
PLATELET # BLD AUTO: 373 10^3/UL (ref 130–400)
PLATELET # BLD AUTO: 437 10^3/UL (ref 130–400)
POTASSIUM SERPL-SCNC: 4.1 MMOL/L (ref 3.5–5.1)
POTASSIUM SERPL-SCNC: 4.4 MMOL/L (ref 3.5–5.1)
PROT SERPL-MCNC: 6.9 G/DL (ref 6.3–8.2)
SODIUM SERPL-SCNC: 135 MMOL/L (ref 135–145)
SODIUM SERPL-SCNC: 135 MMOL/L (ref 135–145)
TIBC SERPL-MCNC: 435 UG/DL (ref 265–497)
TROPONIN I SERPL-MCNC: 0.1 NG/ML
TROPONIN I SERPL-MCNC: 0.13 NG/ML
TROPONIN I SERPL-MCNC: 0.14 NG/ML
VENOUS BLOOD GAS B.E.: 2.1 MMOL/L
VENOUS BLOOD GAS O2 SAT %: 97.6 %

## 2024-05-18 PROCEDURE — 30233N1 TRANSFUSION OF NONAUTOLOGOUS RED BLOOD CELLS INTO PERIPHERAL VEIN, PERCUTANEOUS APPROACH: ICD-10-PCS | Performed by: INTERNAL MEDICINE

## 2024-05-18 PROCEDURE — P9016 RBC LEUKOCYTES REDUCED: HCPCS

## 2024-05-18 RX ADMIN — PANTOPRAZOLE SODIUM 40 MG: 40 TABLET, DELAYED RELEASE ORAL at 20:08

## 2024-05-18 RX ADMIN — GUAIFENESIN 1200 MG: 600 TABLET, EXTENDED RELEASE ORAL at 09:01

## 2024-05-18 RX ADMIN — IPRATROPIUM BROMIDE AND ALBUTEROL SULFATE 3 ML: 2.5; .5 SOLUTION RESPIRATORY (INHALATION) at 06:56

## 2024-05-18 RX ADMIN — METRONIDAZOLE 100: 5 INJECTION, SOLUTION INTRAVENOUS at 22:12

## 2024-05-18 RX ADMIN — LAMOTRIGINE 200 MG: 100 TABLET ORAL at 20:14

## 2024-05-18 RX ADMIN — CLONIDINE HYDROCHLORIDE 0.2 MG: 0.2 TABLET ORAL at 09:00

## 2024-05-18 RX ADMIN — DOCUSATE SODIUM 100 MG: 100 CAPSULE, LIQUID FILLED ORAL at 20:08

## 2024-05-18 RX ADMIN — PREGABALIN 150 MG: 75 CAPSULE ORAL at 09:01

## 2024-05-18 RX ADMIN — INSULIN ASPART 5 UNITS: 100 INJECTION, SOLUTION INTRAVENOUS; SUBCUTANEOUS at 12:27

## 2024-05-18 RX ADMIN — WATER 10 ML: 1 INJECTION INTRAMUSCULAR; INTRAVENOUS; SUBCUTANEOUS at 22:13

## 2024-05-18 RX ADMIN — GUAIFENESIN 1200 MG: 600 TABLET, EXTENDED RELEASE ORAL at 20:07

## 2024-05-18 RX ADMIN — IPRATROPIUM BROMIDE AND ALBUTEROL SULFATE 3 ML: 2.5; .5 SOLUTION RESPIRATORY (INHALATION) at 00:41

## 2024-05-18 RX ADMIN — WATER 10 ML: 1 INJECTION INTRAMUSCULAR; INTRAVENOUS; SUBCUTANEOUS at 06:22

## 2024-05-18 RX ADMIN — PANTOPRAZOLE SODIUM 40 MG: 40 TABLET, DELAYED RELEASE ORAL at 09:02

## 2024-05-18 RX ADMIN — DOXYCYCLINE 260 MG: 100 INJECTION, POWDER, LYOPHILIZED, FOR SOLUTION INTRAVENOUS at 02:39

## 2024-05-18 RX ADMIN — ACETAMINOPHEN 650 MG: 325 TABLET ORAL at 15:29

## 2024-05-18 RX ADMIN — SODIUM CHLORIDE 1000: 900 INJECTION, SOLUTION INTRAVENOUS at 02:04

## 2024-05-18 RX ADMIN — INSULIN ASPART 1 UNITS: 100 INJECTION, SOLUTION INTRAVENOUS; SUBCUTANEOUS at 17:29

## 2024-05-18 RX ADMIN — CEFEPIME 2000 MG: 2 INJECTION, POWDER, FOR SOLUTION INTRAVENOUS at 22:12

## 2024-05-18 RX ADMIN — FOLIC ACID 1 MG: 1 TABLET ORAL at 09:00

## 2024-05-18 RX ADMIN — ENOXAPARIN SODIUM 40 MG: 40 INJECTION SUBCUTANEOUS at 17:24

## 2024-05-18 RX ADMIN — BUPRENORPHINE 4 MG: 2 TABLET SUBLINGUAL at 09:02

## 2024-05-18 RX ADMIN — METRONIDAZOLE 100: 5 INJECTION, SOLUTION INTRAVENOUS at 15:29

## 2024-05-18 RX ADMIN — DOCUSATE SODIUM 100 MG: 100 CAPSULE, LIQUID FILLED ORAL at 09:00

## 2024-05-18 RX ADMIN — INSULIN ASPART 1 UNITS: 100 INJECTION, SOLUTION INTRAVENOUS; SUBCUTANEOUS at 12:27

## 2024-05-18 RX ADMIN — INSULIN GLARGINE 0.2 UNITS: 100 INJECTION, SOLUTION SUBCUTANEOUS at 22:15

## 2024-05-18 RX ADMIN — PREGABALIN 150 MG: 75 CAPSULE ORAL at 20:07

## 2024-05-18 RX ADMIN — CEFEPIME 2000 MG: 2 INJECTION, POWDER, FOR SOLUTION INTRAVENOUS at 06:21

## 2024-05-18 RX ADMIN — CEFEPIME: 2 INJECTION, POWDER, FOR SOLUTION INTRAVENOUS at 22:14

## 2024-05-18 RX ADMIN — INSULIN ASPART 5 UNITS: 100 INJECTION, SOLUTION INTRAVENOUS; SUBCUTANEOUS at 17:29

## 2024-05-18 RX ADMIN — INSULIN ASPART 5 UNITS: 100 INJECTION, SOLUTION INTRAVENOUS; SUBCUTANEOUS at 08:59

## 2024-05-18 RX ADMIN — IPRATROPIUM BROMIDE AND ALBUTEROL SULFATE: 2.5; .5 SOLUTION RESPIRATORY (INHALATION) at 16:33

## 2024-05-18 RX ADMIN — INSULIN ASPART: 100 INJECTION, SOLUTION INTRAVENOUS; SUBCUTANEOUS at 08:58

## 2024-05-18 RX ADMIN — LAMOTRIGINE 200 MG: 100 TABLET ORAL at 09:01

## 2024-05-18 RX ADMIN — CLONIDINE HYDROCHLORIDE 0.2 MG: 0.2 TABLET ORAL at 20:08

## 2024-05-18 RX ADMIN — ZIPRASIDONE HCL 80 MG: 20 CAPSULE ORAL at 09:00

## 2024-05-18 RX ADMIN — BUPRENORPHINE 4 MG: 2 TABLET SUBLINGUAL at 20:07

## 2024-05-18 RX ADMIN — CEFEPIME 2000 MG: 2 INJECTION, POWDER, FOR SOLUTION INTRAVENOUS at 15:29

## 2024-05-18 RX ADMIN — POLYETHYLENE GLYCOL 3350 17 GRAMS: 17 POWDER, FOR SOLUTION ORAL at 09:01

## 2024-05-18 RX ADMIN — CETIRIZINE HYDROCHLORIDE 10 MG: 10 TABLET ORAL at 22:15

## 2024-05-18 RX ADMIN — SODIUM FERRIC GLUCONATE COMPLEX IN SUCROSE 110 MG: 12.5 INJECTION INTRAVENOUS at 17:22

## 2024-05-18 RX ADMIN — SENNOSIDES 17.2 MG: 8.6 TABLET ORAL at 22:13

## 2024-05-18 RX ADMIN — METRONIDAZOLE 100: 5 INJECTION, SOLUTION INTRAVENOUS at 06:20

## 2024-05-18 RX ADMIN — IPRATROPIUM BROMIDE AND ALBUTEROL SULFATE 3 ML: 2.5; .5 SOLUTION RESPIRATORY (INHALATION) at 11:28

## 2024-05-18 RX ADMIN — CEFTRIAXONE 1000 MG: 1 INJECTION, POWDER, FOR SOLUTION INTRAMUSCULAR; INTRAVENOUS at 02:04

## 2024-05-18 RX ADMIN — WATER 10 ML: 1 INJECTION INTRAMUSCULAR; INTRAVENOUS; SUBCUTANEOUS at 15:29

## 2024-05-18 RX ADMIN — ZIPRASIDONE HCL 80 MG: 20 CAPSULE ORAL at 20:09

## 2024-05-18 RX ADMIN — LEVOTHYROXINE SODIUM 50 MCG: 0.05 TABLET ORAL at 06:22

## 2024-05-18 RX ADMIN — IPRATROPIUM BROMIDE AND ALBUTEROL SULFATE 3 ML: 2.5; .5 SOLUTION RESPIRATORY (INHALATION) at 19:53

## 2024-05-19 VITALS — RESPIRATION RATE: 18 BRPM | DIASTOLIC BLOOD PRESSURE: 65 MMHG | SYSTOLIC BLOOD PRESSURE: 103 MMHG

## 2024-05-19 VITALS — RESPIRATION RATE: 20 BRPM | DIASTOLIC BLOOD PRESSURE: 59 MMHG | SYSTOLIC BLOOD PRESSURE: 97 MMHG

## 2024-05-19 VITALS — HEART RATE: 89 BPM | SYSTOLIC BLOOD PRESSURE: 129 MMHG | OXYGEN SATURATION: 95 % | DIASTOLIC BLOOD PRESSURE: 86 MMHG

## 2024-05-19 VITALS — RESPIRATION RATE: 14 BRPM | DIASTOLIC BLOOD PRESSURE: 70 MMHG | SYSTOLIC BLOOD PRESSURE: 126 MMHG

## 2024-05-19 VITALS — RESPIRATION RATE: 19 BRPM | DIASTOLIC BLOOD PRESSURE: 86 MMHG | SYSTOLIC BLOOD PRESSURE: 129 MMHG

## 2024-05-19 VITALS — RESPIRATION RATE: 18 BRPM

## 2024-05-19 VITALS — DIASTOLIC BLOOD PRESSURE: 59 MMHG | SYSTOLIC BLOOD PRESSURE: 128 MMHG | RESPIRATION RATE: 16 BRPM

## 2024-05-19 VITALS — DIASTOLIC BLOOD PRESSURE: 44 MMHG | RESPIRATION RATE: 18 BRPM | SYSTOLIC BLOOD PRESSURE: 102 MMHG

## 2024-05-19 VITALS — RESPIRATION RATE: 20 BRPM

## 2024-05-19 LAB
BUN SERPL-MCNC: 14 MG/DL (ref 7–17)
CALCIUM SERPL-MCNC: 9 MG/DL (ref 8.4–10.2)
CHLORIDE SERPL-SCNC: 104 MMOL/L (ref 98–107)
CO2 SERPL-SCNC: 26 MMOL/L (ref 22–30)
EGFR: > 60
ERYTHROCYTE [DISTWIDTH] IN BLOOD BY AUTOMATED COUNT: 22.1 % (ref 11.5–14.5)
ESTIMATED CREATININE CLEARANCE: 118 ML/MIN
GLUCOSE - POINT OF CARE: 170 MG/DL (ref 70–99)
GLUCOSE - POINT OF CARE: 183 MG/DL (ref 70–99)
GLUCOSE - POINT OF CARE: 200 MG/DL (ref 70–99)
GLUCOSE - POINT OF CARE: 220 MG/DL (ref 70–99)
GLUCOSE SERPL-MCNC: 137 MG/DL (ref 70–99)
HCT VFR BLD AUTO: 26 % (ref 37–47)
HGB BLD-MCNC: 7.6 G/DL (ref 12–16)
MCHC RBC AUTO-ENTMCNC: 29.2 G/DL (ref 33–37)
MCV RBC AUTO: 61.8 FL (ref 81–99)
PLATELET # BLD AUTO: 370 10^3/UL (ref 130–400)
POTASSIUM SERPL-SCNC: 4.7 MMOL/L (ref 3.5–5.1)
SODIUM SERPL-SCNC: 138 MMOL/L (ref 135–145)

## 2024-05-19 RX ADMIN — GUAIFENESIN 1200 MG: 600 TABLET, EXTENDED RELEASE ORAL at 20:16

## 2024-05-19 RX ADMIN — INSULIN ASPART 1 UNITS: 100 INJECTION, SOLUTION INTRAVENOUS; SUBCUTANEOUS at 17:35

## 2024-05-19 RX ADMIN — GUAIFENESIN 1200 MG: 600 TABLET, EXTENDED RELEASE ORAL at 08:54

## 2024-05-19 RX ADMIN — ZIPRASIDONE HCL 80 MG: 20 CAPSULE ORAL at 20:16

## 2024-05-19 RX ADMIN — INSULIN ASPART 1 UNITS: 100 INJECTION, SOLUTION INTRAVENOUS; SUBCUTANEOUS at 08:57

## 2024-05-19 RX ADMIN — INSULIN GLARGINE 0.2 UNITS: 100 INJECTION, SOLUTION SUBCUTANEOUS at 21:59

## 2024-05-19 RX ADMIN — INSULIN ASPART 3 UNITS: 100 INJECTION, SOLUTION INTRAVENOUS; SUBCUTANEOUS at 12:41

## 2024-05-19 RX ADMIN — CETIRIZINE HYDROCHLORIDE 10 MG: 10 TABLET ORAL at 22:03

## 2024-05-19 RX ADMIN — CLONIDINE HYDROCHLORIDE: 0.2 TABLET ORAL at 08:53

## 2024-05-19 RX ADMIN — CLONIDINE HYDROCHLORIDE 0.2 MG: 0.2 TABLET ORAL at 20:17

## 2024-05-19 RX ADMIN — SENNOSIDES: 8.6 TABLET ORAL at 22:00

## 2024-05-19 RX ADMIN — WATER 10 ML: 1 INJECTION INTRAMUSCULAR; INTRAVENOUS; SUBCUTANEOUS at 22:00

## 2024-05-19 RX ADMIN — INSULIN ASPART 5 UNITS: 100 INJECTION, SOLUTION INTRAVENOUS; SUBCUTANEOUS at 12:40

## 2024-05-19 RX ADMIN — METRONIDAZOLE 100: 5 INJECTION, SOLUTION INTRAVENOUS at 21:59

## 2024-05-19 RX ADMIN — POLYETHYLENE GLYCOL 3350: 17 POWDER, FOR SOLUTION ORAL at 08:54

## 2024-05-19 RX ADMIN — IPRATROPIUM BROMIDE AND ALBUTEROL SULFATE 3 ML: 2.5; .5 SOLUTION RESPIRATORY (INHALATION) at 07:57

## 2024-05-19 RX ADMIN — METRONIDAZOLE 100: 5 INJECTION, SOLUTION INTRAVENOUS at 14:08

## 2024-05-19 RX ADMIN — SODIUM FERRIC GLUCONATE COMPLEX IN SUCROSE 110 MG: 12.5 INJECTION INTRAVENOUS at 15:13

## 2024-05-19 RX ADMIN — DOCUSATE SODIUM: 100 CAPSULE, LIQUID FILLED ORAL at 20:56

## 2024-05-19 RX ADMIN — LEVOTHYROXINE SODIUM 50 MCG: 0.05 TABLET ORAL at 05:55

## 2024-05-19 RX ADMIN — WATER 10 ML: 1 INJECTION INTRAMUSCULAR; INTRAVENOUS; SUBCUTANEOUS at 15:13

## 2024-05-19 RX ADMIN — INSULIN ASPART 5 UNITS: 100 INJECTION, SOLUTION INTRAVENOUS; SUBCUTANEOUS at 17:35

## 2024-05-19 RX ADMIN — PANTOPRAZOLE SODIUM 40 MG: 40 TABLET, DELAYED RELEASE ORAL at 20:17

## 2024-05-19 RX ADMIN — WATER 10 ML: 1 INJECTION INTRAMUSCULAR; INTRAVENOUS; SUBCUTANEOUS at 05:55

## 2024-05-19 RX ADMIN — BUPRENORPHINE 4 MG: 2 TABLET SUBLINGUAL at 08:55

## 2024-05-19 RX ADMIN — CEFEPIME 2000 MG: 2 INJECTION, POWDER, FOR SOLUTION INTRAVENOUS at 21:59

## 2024-05-19 RX ADMIN — ENOXAPARIN SODIUM 40 MG: 40 INJECTION SUBCUTANEOUS at 17:36

## 2024-05-19 RX ADMIN — INSULIN ASPART 5 UNITS: 100 INJECTION, SOLUTION INTRAVENOUS; SUBCUTANEOUS at 08:57

## 2024-05-19 RX ADMIN — LAMOTRIGINE 200 MG: 100 TABLET ORAL at 20:16

## 2024-05-19 RX ADMIN — PANTOPRAZOLE SODIUM 40 MG: 40 TABLET, DELAYED RELEASE ORAL at 08:54

## 2024-05-19 RX ADMIN — IPRATROPIUM BROMIDE AND ALBUTEROL SULFATE 3 ML: 2.5; .5 SOLUTION RESPIRATORY (INHALATION) at 15:27

## 2024-05-19 RX ADMIN — PREGABALIN 150 MG: 75 CAPSULE ORAL at 08:55

## 2024-05-19 RX ADMIN — CEFEPIME 2000 MG: 2 INJECTION, POWDER, FOR SOLUTION INTRAVENOUS at 15:13

## 2024-05-19 RX ADMIN — IPRATROPIUM BROMIDE AND ALBUTEROL SULFATE 3 ML: 2.5; .5 SOLUTION RESPIRATORY (INHALATION) at 20:22

## 2024-05-19 RX ADMIN — FOLIC ACID 1 MG: 1 TABLET ORAL at 08:55

## 2024-05-19 RX ADMIN — METRONIDAZOLE 100: 5 INJECTION, SOLUTION INTRAVENOUS at 05:54

## 2024-05-19 RX ADMIN — BUPRENORPHINE 4 MG: 2 TABLET SUBLINGUAL at 20:14

## 2024-05-19 RX ADMIN — CEFEPIME 2000 MG: 2 INJECTION, POWDER, FOR SOLUTION INTRAVENOUS at 05:55

## 2024-05-19 RX ADMIN — LAMOTRIGINE 200 MG: 100 TABLET ORAL at 08:55

## 2024-05-19 RX ADMIN — PREGABALIN 150 MG: 75 CAPSULE ORAL at 20:14

## 2024-05-19 RX ADMIN — IPRATROPIUM BROMIDE AND ALBUTEROL SULFATE 3 ML: 2.5; .5 SOLUTION RESPIRATORY (INHALATION) at 11:39

## 2024-05-19 RX ADMIN — ZIPRASIDONE HCL 80 MG: 20 CAPSULE ORAL at 08:56

## 2024-05-19 RX ADMIN — DOCUSATE SODIUM: 100 CAPSULE, LIQUID FILLED ORAL at 08:54

## 2024-05-20 VITALS — SYSTOLIC BLOOD PRESSURE: 149 MMHG | RESPIRATION RATE: 19 BRPM | DIASTOLIC BLOOD PRESSURE: 68 MMHG

## 2024-05-20 VITALS — SYSTOLIC BLOOD PRESSURE: 148 MMHG | RESPIRATION RATE: 12 BRPM | DIASTOLIC BLOOD PRESSURE: 64 MMHG

## 2024-05-20 VITALS — RESPIRATION RATE: 19 BRPM | SYSTOLIC BLOOD PRESSURE: 156 MMHG | DIASTOLIC BLOOD PRESSURE: 80 MMHG

## 2024-05-20 VITALS — RESPIRATION RATE: 18 BRPM

## 2024-05-20 LAB
ERYTHROCYTE [DISTWIDTH] IN BLOOD BY AUTOMATED COUNT: 22.4 % (ref 11.5–14.5)
GLUCOSE - POINT OF CARE: 174 MG/DL (ref 70–99)
GLUCOSE - POINT OF CARE: 277 MG/DL (ref 70–99)
HCT VFR BLD AUTO: 28.1 % (ref 37–47)
HGB BLD-MCNC: 8.1 G/DL (ref 12–16)
MCHC RBC AUTO-ENTMCNC: 28.8 G/DL (ref 33–37)
MCV RBC AUTO: 62.9 FL (ref 81–99)
PLATELET # BLD AUTO: 375 10^3/UL (ref 130–400)

## 2024-05-20 RX ADMIN — GUAIFENESIN 1200 MG: 600 TABLET, EXTENDED RELEASE ORAL at 09:11

## 2024-05-20 RX ADMIN — METRONIDAZOLE 100: 5 INJECTION, SOLUTION INTRAVENOUS at 05:59

## 2024-05-20 RX ADMIN — SODIUM FERRIC GLUCONATE COMPLEX IN SUCROSE 110 MG: 12.5 INJECTION INTRAVENOUS at 13:21

## 2024-05-20 RX ADMIN — INSULIN ASPART 5 UNITS: 100 INJECTION, SOLUTION INTRAVENOUS; SUBCUTANEOUS at 12:40

## 2024-05-20 RX ADMIN — POLYETHYLENE GLYCOL 3350: 17 POWDER, FOR SOLUTION ORAL at 09:13

## 2024-05-20 RX ADMIN — ZIPRASIDONE HCL 80 MG: 20 CAPSULE ORAL at 09:13

## 2024-05-20 RX ADMIN — IPRATROPIUM BROMIDE AND ALBUTEROL SULFATE 3 ML: 2.5; .5 SOLUTION RESPIRATORY (INHALATION) at 08:03

## 2024-05-20 RX ADMIN — PANTOPRAZOLE SODIUM 40 MG: 40 TABLET, DELAYED RELEASE ORAL at 09:12

## 2024-05-20 RX ADMIN — INSULIN ASPART 1 UNITS: 100 INJECTION, SOLUTION INTRAVENOUS; SUBCUTANEOUS at 09:11

## 2024-05-20 RX ADMIN — CLONIDINE HYDROCHLORIDE 0.2 MG: 0.2 TABLET ORAL at 09:12

## 2024-05-20 RX ADMIN — LEVOTHYROXINE SODIUM 50 MCG: 0.05 TABLET ORAL at 05:59

## 2024-05-20 RX ADMIN — IPRATROPIUM BROMIDE AND ALBUTEROL SULFATE 3 ML: 2.5; .5 SOLUTION RESPIRATORY (INHALATION) at 11:45

## 2024-05-20 RX ADMIN — BUPRENORPHINE 4 MG: 2 TABLET SUBLINGUAL at 09:12

## 2024-05-20 RX ADMIN — FUROSEMIDE 20 MG: 20 TABLET ORAL at 12:40

## 2024-05-20 RX ADMIN — LAMOTRIGINE 200 MG: 100 TABLET ORAL at 09:13

## 2024-05-20 RX ADMIN — WATER 10 ML: 1 INJECTION INTRAMUSCULAR; INTRAVENOUS; SUBCUTANEOUS at 13:21

## 2024-05-20 RX ADMIN — FOLIC ACID 1 MG: 1 TABLET ORAL at 09:13

## 2024-05-20 RX ADMIN — CEFEPIME 2000 MG: 2 INJECTION, POWDER, FOR SOLUTION INTRAVENOUS at 05:58

## 2024-05-20 RX ADMIN — WATER 10 ML: 1 INJECTION INTRAMUSCULAR; INTRAVENOUS; SUBCUTANEOUS at 05:59

## 2024-05-20 RX ADMIN — INSULIN ASPART 5 UNITS: 100 INJECTION, SOLUTION INTRAVENOUS; SUBCUTANEOUS at 09:11

## 2024-05-20 RX ADMIN — DOCUSATE SODIUM: 100 CAPSULE, LIQUID FILLED ORAL at 09:12

## 2024-05-20 RX ADMIN — CEFEPIME 2000 MG: 2 INJECTION, POWDER, FOR SOLUTION INTRAVENOUS at 13:21

## 2024-05-20 RX ADMIN — PREGABALIN 150 MG: 75 CAPSULE ORAL at 09:12

## 2024-05-31 VITALS — DIASTOLIC BLOOD PRESSURE: 81 MMHG | SYSTOLIC BLOOD PRESSURE: 141 MMHG | RESPIRATION RATE: 18 BRPM

## 2024-05-31 VITALS — DIASTOLIC BLOOD PRESSURE: 71 MMHG | SYSTOLIC BLOOD PRESSURE: 127 MMHG

## 2024-05-31 VITALS — DIASTOLIC BLOOD PRESSURE: 76 MMHG | SYSTOLIC BLOOD PRESSURE: 127 MMHG

## 2024-05-31 VITALS — SYSTOLIC BLOOD PRESSURE: 137 MMHG | DIASTOLIC BLOOD PRESSURE: 78 MMHG

## 2024-05-31 LAB
ALBUMIN SERPL-MCNC: 4.1 G/DL (ref 3.5–5)
ALP SERPL-CCNC: 118 U/L (ref 38–126)
ALT SERPL-CCNC: 20 U/L (ref 0–35)
ANISOCYTOSIS BLD QL: (no result)
AST SERPL-CCNC: 47 U/L (ref 14–36)
BUN SERPL-MCNC: 14 MG/DL (ref 7–17)
CALCIUM SERPL-MCNC: 10.1 MG/DL (ref 8.4–10.2)
CHLORIDE SERPL-SCNC: 98 MMOL/L (ref 98–107)
CO2 SERPL-SCNC: 29 MMOL/L (ref 22–30)
EGFR: > 60
ERYTHROCYTE [DISTWIDTH] IN BLOOD BY AUTOMATED COUNT: 28.9 % (ref 11.5–14.5)
GLUCOSE SERPL-MCNC: 135 MG/DL (ref 70–99)
HCT VFR BLD AUTO: 34.1 % (ref 37–47)
HGB BLD-MCNC: 10 G/DL (ref 12–16)
HYPOCHROMIA BLD QL: (no result)
MACROCYTES BLD QL: (no result)
MCHC RBC AUTO-ENTMCNC: 29.3 G/DL (ref 33–37)
MCV RBC AUTO: 65.7 FL (ref 81–99)
NRBC BLD AUTO-RTO: 0 %
OVALOCYTES BLD QL SMEAR: (no result)
PLATELET # BLD AUTO: 403 10^3/UL (ref 130–400)
POTASSIUM SERPL-SCNC: 4.4 MMOL/L (ref 3.5–5.1)
PROT SERPL-MCNC: 7.7 G/DL (ref 6.3–8.2)
SODIUM SERPL-SCNC: 138 MMOL/L (ref 135–145)
STOMATOCYTES BLD QL SMEAR: (no result)
TROPONIN I SERPL-MCNC: 0.01 NG/ML
VENOUS BLOOD GAS B.E.: 4.3 MMOL/L
VENOUS BLOOD GAS O2 SAT %: 85.7 %

## 2024-06-01 ENCOUNTER — HOSPITAL ENCOUNTER (OUTPATIENT)
Dept: HOSPITAL 99 - 4 WEST ACU | Age: 49
Setting detail: OBSERVATION
LOS: 1 days | Discharge: NURSING, REHAB OR LONG-TERM CARE | End: 2024-06-02
Payer: COMMERCIAL

## 2024-06-01 VITALS — DIASTOLIC BLOOD PRESSURE: 71 MMHG | RESPIRATION RATE: 16 BRPM | SYSTOLIC BLOOD PRESSURE: 103 MMHG

## 2024-06-01 VITALS — DIASTOLIC BLOOD PRESSURE: 92 MMHG | SYSTOLIC BLOOD PRESSURE: 133 MMHG

## 2024-06-01 VITALS — RESPIRATION RATE: 18 BRPM

## 2024-06-01 VITALS — RESPIRATION RATE: 18 BRPM | DIASTOLIC BLOOD PRESSURE: 73 MMHG | SYSTOLIC BLOOD PRESSURE: 119 MMHG

## 2024-06-01 VITALS — SYSTOLIC BLOOD PRESSURE: 119 MMHG | HEART RATE: 100 BPM | OXYGEN SATURATION: 96 % | DIASTOLIC BLOOD PRESSURE: 73 MMHG

## 2024-06-01 VITALS — DIASTOLIC BLOOD PRESSURE: 108 MMHG | SYSTOLIC BLOOD PRESSURE: 151 MMHG

## 2024-06-01 VITALS — DIASTOLIC BLOOD PRESSURE: 75 MMHG | RESPIRATION RATE: 16 BRPM | SYSTOLIC BLOOD PRESSURE: 117 MMHG

## 2024-06-01 VITALS — HEART RATE: 100 BPM | DIASTOLIC BLOOD PRESSURE: 73 MMHG | OXYGEN SATURATION: 96 % | SYSTOLIC BLOOD PRESSURE: 119 MMHG

## 2024-06-01 VITALS — RESPIRATION RATE: 18 BRPM | DIASTOLIC BLOOD PRESSURE: 82 MMHG | SYSTOLIC BLOOD PRESSURE: 118 MMHG

## 2024-06-01 VITALS — RESPIRATION RATE: 18 BRPM | SYSTOLIC BLOOD PRESSURE: 117 MMHG | DIASTOLIC BLOOD PRESSURE: 74 MMHG

## 2024-06-01 VITALS — DIASTOLIC BLOOD PRESSURE: 71 MMHG | SYSTOLIC BLOOD PRESSURE: 132 MMHG | RESPIRATION RATE: 16 BRPM

## 2024-06-01 VITALS — BODY MASS INDEX: 54 KG/M2

## 2024-06-01 VITALS — SYSTOLIC BLOOD PRESSURE: 110 MMHG | DIASTOLIC BLOOD PRESSURE: 51 MMHG

## 2024-06-01 DIAGNOSIS — E03.9: ICD-10-CM

## 2024-06-01 DIAGNOSIS — Z87.01: ICD-10-CM

## 2024-06-01 DIAGNOSIS — F31.9: ICD-10-CM

## 2024-06-01 DIAGNOSIS — E66.2: ICD-10-CM

## 2024-06-01 DIAGNOSIS — Z90.49: ICD-10-CM

## 2024-06-01 DIAGNOSIS — D64.9: ICD-10-CM

## 2024-06-01 DIAGNOSIS — Z88.5: ICD-10-CM

## 2024-06-01 DIAGNOSIS — Z91.041: ICD-10-CM

## 2024-06-01 DIAGNOSIS — Z91.040: ICD-10-CM

## 2024-06-01 DIAGNOSIS — E78.00: ICD-10-CM

## 2024-06-01 DIAGNOSIS — Z71.3: ICD-10-CM

## 2024-06-01 DIAGNOSIS — Z88.6: ICD-10-CM

## 2024-06-01 DIAGNOSIS — F11.90: ICD-10-CM

## 2024-06-01 DIAGNOSIS — F17.200: ICD-10-CM

## 2024-06-01 DIAGNOSIS — E11.9: ICD-10-CM

## 2024-06-01 DIAGNOSIS — F12.90: ICD-10-CM

## 2024-06-01 DIAGNOSIS — Z59.01: ICD-10-CM

## 2024-06-01 DIAGNOSIS — G89.4: ICD-10-CM

## 2024-06-01 DIAGNOSIS — Z79.84: ICD-10-CM

## 2024-06-01 DIAGNOSIS — R55: ICD-10-CM

## 2024-06-01 DIAGNOSIS — R45.88: ICD-10-CM

## 2024-06-01 DIAGNOSIS — Z62.810: ICD-10-CM

## 2024-06-01 DIAGNOSIS — R05.9: ICD-10-CM

## 2024-06-01 DIAGNOSIS — Z89.511: ICD-10-CM

## 2024-06-01 DIAGNOSIS — K21.9: ICD-10-CM

## 2024-06-01 DIAGNOSIS — Z79.85: ICD-10-CM

## 2024-06-01 DIAGNOSIS — J96.21: Primary | ICD-10-CM

## 2024-06-01 DIAGNOSIS — Z11.52: ICD-10-CM

## 2024-06-01 DIAGNOSIS — Z79.4: ICD-10-CM

## 2024-06-01 DIAGNOSIS — K76.0: ICD-10-CM

## 2024-06-01 DIAGNOSIS — Z98.84: ICD-10-CM

## 2024-06-01 DIAGNOSIS — Z88.0: ICD-10-CM

## 2024-06-01 DIAGNOSIS — I10: ICD-10-CM

## 2024-06-01 DIAGNOSIS — J45.909: ICD-10-CM

## 2024-06-01 DIAGNOSIS — G93.41: ICD-10-CM

## 2024-06-01 DIAGNOSIS — Z91.410: ICD-10-CM

## 2024-06-01 LAB
BUN SERPL-MCNC: 13 MG/DL (ref 7–17)
CALCIUM SERPL-MCNC: 9.3 MG/DL (ref 8.4–10.2)
CHLORIDE SERPL-SCNC: 100 MMOL/L (ref 98–107)
CO2 SERPL-SCNC: 28 MMOL/L (ref 22–30)
EGFR: > 60
ERYTHROCYTE [DISTWIDTH] IN BLOOD BY AUTOMATED COUNT: 28.2 % (ref 11.5–14.5)
ESTIMATED CREATININE CLEARANCE: > 125 ML/MIN
GLUCOSE - POINT OF CARE: 178 MG/DL (ref 70–99)
GLUCOSE - POINT OF CARE: 182 MG/DL (ref 70–99)
GLUCOSE - POINT OF CARE: 248 MG/DL (ref 70–99)
GLUCOSE - POINT OF CARE: 297 MG/DL (ref 70–99)
GLUCOSE SERPL-MCNC: 142 MG/DL (ref 70–99)
HCT VFR BLD AUTO: 32.4 % (ref 37–47)
HGB BLD-MCNC: 9.2 G/DL (ref 12–16)
MCHC RBC AUTO-ENTMCNC: 28.4 G/DL (ref 33–37)
MCV RBC AUTO: 67.1 FL (ref 81–99)
PLATELET # BLD AUTO: 356 10^3/UL (ref 130–400)
POTASSIUM SERPL-SCNC: 4.2 MMOL/L (ref 3.5–5.1)
SODIUM SERPL-SCNC: 135 MMOL/L (ref 135–145)

## 2024-06-01 PROCEDURE — G0378 HOSPITAL OBSERVATION PER HR: HCPCS

## 2024-06-01 RX ADMIN — INSULIN ASPART 5 UNITS: 100 INJECTION, SOLUTION INTRAVENOUS; SUBCUTANEOUS at 09:46

## 2024-06-01 RX ADMIN — Medication 20 MG: at 21:12

## 2024-06-01 RX ADMIN — GUAIFENESIN 1200 MG: 600 TABLET, EXTENDED RELEASE ORAL at 08:34

## 2024-06-01 RX ADMIN — LAMOTRIGINE 200 MG: 100 TABLET ORAL at 21:11

## 2024-06-01 RX ADMIN — FUROSEMIDE 20 MG: 20 TABLET ORAL at 08:34

## 2024-06-01 RX ADMIN — INSULIN GLARGINE 0.24 UNITS: 100 INJECTION, SOLUTION SUBCUTANEOUS at 22:02

## 2024-06-01 RX ADMIN — PREGABALIN 150 MG: 75 CAPSULE ORAL at 08:34

## 2024-06-01 RX ADMIN — PANTOPRAZOLE SODIUM 40 MG: 40 TABLET, DELAYED RELEASE ORAL at 08:33

## 2024-06-01 RX ADMIN — IPRATROPIUM BROMIDE AND ALBUTEROL SULFATE 3 ML: 2.5; .5 SOLUTION RESPIRATORY (INHALATION) at 19:23

## 2024-06-01 RX ADMIN — BUPRENORPHINE 4 MG: 2 TABLET SUBLINGUAL at 14:16

## 2024-06-01 RX ADMIN — LURASIDONE HYDROCHLORIDE 40 MG: 40 TABLET, FILM COATED ORAL at 21:17

## 2024-06-01 RX ADMIN — INSULIN ASPART 5 UNITS: 100 INJECTION, SOLUTION INTRAVENOUS; SUBCUTANEOUS at 18:11

## 2024-06-01 RX ADMIN — CLONIDINE HYDROCHLORIDE 0.2 MG: 0.2 TABLET ORAL at 08:35

## 2024-06-01 RX ADMIN — PREGABALIN 150 MG: 75 CAPSULE ORAL at 21:13

## 2024-06-01 RX ADMIN — INSULIN ASPART: 100 INJECTION, SOLUTION INTRAVENOUS; SUBCUTANEOUS at 18:29

## 2024-06-01 RX ADMIN — CETIRIZINE HYDROCHLORIDE 10 MG: 10 TABLET ORAL at 21:16

## 2024-06-01 RX ADMIN — BENZONATATE 200 MG: 100 CAPSULE ORAL at 21:13

## 2024-06-01 RX ADMIN — INSULIN ASPART 2 UNITS: 100 INJECTION, SOLUTION INTRAVENOUS; SUBCUTANEOUS at 18:12

## 2024-06-01 RX ADMIN — INSULIN ASPART 1 UNITS: 100 INJECTION, SOLUTION INTRAVENOUS; SUBCUTANEOUS at 09:46

## 2024-06-01 RX ADMIN — BUDESONIDE 0.25 MG: 0.25 INHALANT RESPIRATORY (INHALATION) at 19:23

## 2024-06-01 RX ADMIN — ENOXAPARIN SODIUM 40 MG: 40 INJECTION SUBCUTANEOUS at 17:13

## 2024-06-01 RX ADMIN — IPRATROPIUM BROMIDE AND ALBUTEROL SULFATE 3 ML: 2.5; .5 SOLUTION RESPIRATORY (INHALATION) at 11:41

## 2024-06-01 RX ADMIN — LEVOTHYROXINE SODIUM 50 MCG: 0.05 TABLET ORAL at 05:15

## 2024-06-01 RX ADMIN — LAMOTRIGINE 200 MG: 100 TABLET ORAL at 08:33

## 2024-06-01 RX ADMIN — BUDESONIDE 0.25 MG: 0.25 INHALANT RESPIRATORY (INHALATION) at 07:31

## 2024-06-01 RX ADMIN — PANTOPRAZOLE SODIUM 40 MG: 40 TABLET, DELAYED RELEASE ORAL at 21:14

## 2024-06-01 RX ADMIN — CLONIDINE HYDROCHLORIDE 0.2 MG: 0.2 TABLET ORAL at 21:14

## 2024-06-01 RX ADMIN — GUAIFENESIN 600 MG: 600 TABLET, EXTENDED RELEASE ORAL at 21:14

## 2024-06-01 RX ADMIN — BUPRENORPHINE 4 MG: 2 TABLET SUBLINGUAL at 21:13

## 2024-06-01 RX ADMIN — INSULIN ASPART: 100 INJECTION, SOLUTION INTRAVENOUS; SUBCUTANEOUS at 14:37

## 2024-06-01 RX ADMIN — IPRATROPIUM BROMIDE AND ALBUTEROL SULFATE 3 ML: 2.5; .5 SOLUTION RESPIRATORY (INHALATION) at 07:30

## 2024-06-01 RX ADMIN — IPRATROPIUM BROMIDE AND ALBUTEROL SULFATE 3 ML: 2.5; .5 SOLUTION RESPIRATORY (INHALATION) at 15:29

## 2024-06-01 SDOH — ECONOMIC STABILITY - HOUSING INSECURITY: SHELTERED HOMELESSNESS: Z59.01

## 2024-06-02 VITALS — DIASTOLIC BLOOD PRESSURE: 79 MMHG | RESPIRATION RATE: 18 BRPM | SYSTOLIC BLOOD PRESSURE: 146 MMHG

## 2024-06-02 VITALS — DIASTOLIC BLOOD PRESSURE: 72 MMHG | RESPIRATION RATE: 18 BRPM | SYSTOLIC BLOOD PRESSURE: 126 MMHG

## 2024-06-02 VITALS — RESPIRATION RATE: 18 BRPM

## 2024-06-02 VITALS — SYSTOLIC BLOOD PRESSURE: 94 MMHG | DIASTOLIC BLOOD PRESSURE: 45 MMHG | RESPIRATION RATE: 16 BRPM

## 2024-06-02 VITALS — DIASTOLIC BLOOD PRESSURE: 66 MMHG | RESPIRATION RATE: 18 BRPM | SYSTOLIC BLOOD PRESSURE: 130 MMHG

## 2024-06-02 LAB
GLUCOSE - POINT OF CARE: 174 MG/DL (ref 70–99)
GLUCOSE - POINT OF CARE: 177 MG/DL (ref 70–99)
GLUCOSE - POINT OF CARE: 204 MG/DL (ref 70–99)

## 2024-06-02 RX ADMIN — INSULIN ASPART: 100 INJECTION, SOLUTION INTRAVENOUS; SUBCUTANEOUS at 12:04

## 2024-06-02 RX ADMIN — GUAIFENESIN 600 MG: 600 TABLET, EXTENDED RELEASE ORAL at 20:23

## 2024-06-02 RX ADMIN — INSULIN ASPART 2 UNITS: 100 INJECTION, SOLUTION INTRAVENOUS; SUBCUTANEOUS at 16:51

## 2024-06-02 RX ADMIN — IPRATROPIUM BROMIDE AND ALBUTEROL SULFATE 3 ML: 2.5; .5 SOLUTION RESPIRATORY (INHALATION) at 11:31

## 2024-06-02 RX ADMIN — ENOXAPARIN SODIUM 40 MG: 40 INJECTION SUBCUTANEOUS at 17:03

## 2024-06-02 RX ADMIN — INSULIN ASPART 4 UNITS: 100 INJECTION, SOLUTION INTRAVENOUS; SUBCUTANEOUS at 09:06

## 2024-06-02 RX ADMIN — INSULIN ASPART 5 UNITS: 100 INJECTION, SOLUTION INTRAVENOUS; SUBCUTANEOUS at 16:50

## 2024-06-02 RX ADMIN — BUDESONIDE 0.25 MG: 0.25 INHALANT RESPIRATORY (INHALATION) at 07:32

## 2024-06-02 RX ADMIN — BENZONATATE 200 MG: 100 CAPSULE ORAL at 08:18

## 2024-06-02 RX ADMIN — INSULIN ASPART 5 UNITS: 100 INJECTION, SOLUTION INTRAVENOUS; SUBCUTANEOUS at 09:05

## 2024-06-02 RX ADMIN — GUAIFENESIN 600 MG: 600 TABLET, EXTENDED RELEASE ORAL at 08:20

## 2024-06-02 RX ADMIN — PREGABALIN 150 MG: 75 CAPSULE ORAL at 08:19

## 2024-06-02 RX ADMIN — LURASIDONE HYDROCHLORIDE 40 MG: 40 TABLET, FILM COATED ORAL at 08:20

## 2024-06-02 RX ADMIN — Medication 20 MG: at 21:41

## 2024-06-02 RX ADMIN — BUPRENORPHINE 4 MG: 2 TABLET SUBLINGUAL at 08:19

## 2024-06-02 RX ADMIN — CETIRIZINE HYDROCHLORIDE 10 MG: 10 TABLET ORAL at 21:40

## 2024-06-02 RX ADMIN — IPRATROPIUM BROMIDE AND ALBUTEROL SULFATE 3 ML: 2.5; .5 SOLUTION RESPIRATORY (INHALATION) at 15:24

## 2024-06-02 RX ADMIN — LURASIDONE HYDROCHLORIDE 40 MG: 40 TABLET, FILM COATED ORAL at 20:23

## 2024-06-02 RX ADMIN — PANTOPRAZOLE SODIUM 40 MG: 40 TABLET, DELAYED RELEASE ORAL at 08:20

## 2024-06-02 RX ADMIN — PREGABALIN 150 MG: 75 CAPSULE ORAL at 20:23

## 2024-06-02 RX ADMIN — LAMOTRIGINE 200 MG: 100 TABLET ORAL at 20:22

## 2024-06-02 RX ADMIN — INSULIN GLARGINE 0.24 UNITS: 100 INJECTION, SOLUTION SUBCUTANEOUS at 21:45

## 2024-06-02 RX ADMIN — BENZONATATE 200 MG: 100 CAPSULE ORAL at 15:50

## 2024-06-02 RX ADMIN — CLONIDINE HYDROCHLORIDE 0.2 MG: 0.2 TABLET ORAL at 20:23

## 2024-06-02 RX ADMIN — BUPRENORPHINE 4 MG: 2 TABLET SUBLINGUAL at 20:23

## 2024-06-02 RX ADMIN — ACETAMINOPHEN 650 MG: 325 TABLET ORAL at 13:52

## 2024-06-02 RX ADMIN — IPRATROPIUM BROMIDE AND ALBUTEROL SULFATE 3 ML: 2.5; .5 SOLUTION RESPIRATORY (INHALATION) at 19:57

## 2024-06-02 RX ADMIN — IPRATROPIUM BROMIDE AND ALBUTEROL SULFATE 3 ML: 2.5; .5 SOLUTION RESPIRATORY (INHALATION) at 07:32

## 2024-06-02 RX ADMIN — FUROSEMIDE 20 MG: 20 TABLET ORAL at 08:21

## 2024-06-02 RX ADMIN — PANTOPRAZOLE SODIUM 40 MG: 40 TABLET, DELAYED RELEASE ORAL at 20:23

## 2024-06-02 RX ADMIN — INSULIN ASPART: 100 INJECTION, SOLUTION INTRAVENOUS; SUBCUTANEOUS at 12:03

## 2024-06-02 RX ADMIN — BUDESONIDE 0.25 MG: 0.25 INHALANT RESPIRATORY (INHALATION) at 19:57

## 2024-06-02 RX ADMIN — CLONIDINE HYDROCHLORIDE 0.2 MG: 0.2 TABLET ORAL at 08:21

## 2024-06-02 RX ADMIN — BENZONATATE 200 MG: 100 CAPSULE ORAL at 21:40

## 2024-06-02 RX ADMIN — LAMOTRIGINE 200 MG: 100 TABLET ORAL at 08:20

## 2024-06-02 RX ADMIN — LEVOTHYROXINE SODIUM 50 MCG: 0.05 TABLET ORAL at 06:07

## 2024-06-04 ENCOUNTER — HOSPITAL ENCOUNTER (OUTPATIENT)
Dept: HOSPITAL 99 - HWRAD | Age: 49
End: 2024-06-04
Payer: COMMERCIAL

## 2024-06-04 DIAGNOSIS — J18.2: Primary | ICD-10-CM

## 2024-06-04 DIAGNOSIS — R06.02: ICD-10-CM

## 2024-09-04 NOTE — TELEPHONE ENCOUNTER
Patient calling because she cannot make her appt on 2/3 due to no transportation  email sent to practice admin  (3) slightly limited

## 2024-09-09 NOTE — PATIENT INSTRUCTIONS
Orders Placed This Encounter   Procedures    Wound cleansing and dressings     Wound healed. Discharge from [facility] today. Follow up with Dr Tyron Gonzalez at [facility] office in 3 weeks       Wound cleansing and dressings      Left foot wound     Wash your hands with soap and water. Remove old dressing, discard into plastic bag and place in trash. Cleanse the wound with soap and water prior to applying a clean dressing. Do not use tissue or cotton balls. Do not scrub the wound. Pat dry using gauze. Shower yes   Cover with skin prep and allevyn with gentle border  Secure with spandagrip G daily until lymphedema wraps are obtained. Please order lymphedema wraps ASAP. Change dressing 3xweek  Do not apply ace wraps to left lower leg.   Done today     Standing Status:   Future     Standing Expiration Date:   11/22/2024
33F hx migraines,  EGA 24 weeks sent from OB office for evaluation of L sided HA and facial numbness. Pt reports multiple concerns that she brought up at her office visit today. Notes She has been feeling mild SOB at rest and more so w exertion when she walks or is working as a . SOB has been ongoing for past 1 week. She also notes 3 days of L sided facial tingling to entire L side of face, sparing scalp. Tingling assoc with HA for past 2 days which is L periorbital in location. Describes as intermittent, relieved by tylenol but then returns. Assoc w floaters, no flashers, no pain w eye mvmt, no eye discharge. No f/c. No CP. No LE swelling. +Nausea. No urinary sx. +Pelvic pressure for past 4 days worse w standing, assoc w vaginal fullness/swelling, No vag DC, VB, leakage of fluid or contractions. +FM.

## 2024-11-08 ENCOUNTER — TELEPHONE (OUTPATIENT)
Dept: NEUROLOGY | Facility: CLINIC | Age: 49
End: 2024-11-08

## 2024-11-29 NOTE — PATIENT INSTRUCTIONS
Orders Placed This Encounter   Procedures    Wound off loading     Wound off loading  TCC EZ Instructions:  Do not get cast wet  Contact wound center if there is a foul odor or becomes uncomfortable due to feeling tight or swelling  Do not use objects down inside of cast to scratch  Do not walk on cast without walking boot  Ensure to take smaller steps as well as lighter foot steps to help prevent the cast from cracking; extra layer of fiberglass applied around ankle and dorsal foot area  Standing Status:   Future     Standing Expiration Date:   6/11/2022    Wound cleansing and dressings     Right foot wound:  Do not get cast wet  Use cast cover or sponge bathe  At wound care center today  Leg washed with soap and water  Wound cleansed with normal saline  Patted dry with gauze  Puraocl to wound base covered with Silver alginate applied to wound  Covered with ABD secured with cast padding  Change dressing weekly at wound care center       Standing Status:   Future     Standing Expiration Date:   6/11/2022
Yes
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
## 2024-12-06 ENCOUNTER — OFFICE VISIT (OUTPATIENT)
Dept: NEUROLOGY | Facility: CLINIC | Age: 49
End: 2024-12-06
Payer: COMMERCIAL

## 2024-12-06 VITALS
OXYGEN SATURATION: 97 % | DIASTOLIC BLOOD PRESSURE: 74 MMHG | TEMPERATURE: 97.3 F | SYSTOLIC BLOOD PRESSURE: 114 MMHG | HEART RATE: 81 BPM

## 2024-12-06 DIAGNOSIS — F40.240 CLAUSTROPHOBIA: ICD-10-CM

## 2024-12-06 DIAGNOSIS — R40.4 TRANSIENT ALTERATION OF AWARENESS: Primary | ICD-10-CM

## 2024-12-06 DIAGNOSIS — R40.4 STARING EPISODES: ICD-10-CM

## 2024-12-06 PROCEDURE — 99205 OFFICE O/P NEW HI 60 MIN: CPT | Performed by: PSYCHIATRY & NEUROLOGY

## 2024-12-06 RX ORDER — ESCITALOPRAM OXALATE 10 MG/1
10 TABLET ORAL DAILY
COMMUNITY
Start: 2024-12-03

## 2024-12-06 RX ORDER — BUPRENORPHINE AND NALOXONE 2; .5 MG/1; MG/1
2 FILM, SOLUBLE BUCCAL; SUBLINGUAL 2 TIMES DAILY
COMMUNITY
Start: 2024-11-14

## 2024-12-06 RX ORDER — DIAZEPAM 5 MG/1
TABLET ORAL
Qty: 2 TABLET | Refills: 0 | Status: SHIPPED | OUTPATIENT
Start: 2024-12-06

## 2024-12-06 RX ORDER — BUDESONIDE AND FORMOTEROL FUMARATE DIHYDRATE 80; 4.5 UG/1; UG/1
2 AEROSOL RESPIRATORY (INHALATION) 2 TIMES DAILY
COMMUNITY
End: 2024-12-06

## 2024-12-06 RX ORDER — LAMOTRIGINE 25 MG/1
50 TABLET ORAL 2 TIMES DAILY
COMMUNITY
Start: 2024-12-03

## 2024-12-06 RX ORDER — LOSARTAN POTASSIUM 25 MG/1
25 TABLET ORAL DAILY
COMMUNITY
Start: 2024-12-03

## 2024-12-06 RX ORDER — LURASIDONE HYDROCHLORIDE 40 MG/1
40 TABLET, FILM COATED ORAL 2 TIMES DAILY
COMMUNITY
Start: 2024-08-06

## 2024-12-06 RX ORDER — LAMOTRIGINE 200 MG/1
200 TABLET ORAL 2 TIMES DAILY
COMMUNITY
Start: 2024-12-03

## 2024-12-06 RX ORDER — BUDESONIDE AND FORMOTEROL FUMARATE DIHYDRATE 80; 4.5 UG/1; UG/1
2 AEROSOL RESPIRATORY (INHALATION) 2 TIMES DAILY
COMMUNITY

## 2024-12-06 RX ORDER — BUPRENORPHINE AND NALOXONE 4; 1 MG/1; MG/1
4 FILM, SOLUBLE BUCCAL; SUBLINGUAL 2 TIMES DAILY
COMMUNITY

## 2024-12-06 NOTE — PATIENT INSTRUCTIONS
Staring spells are more likely nonepileptic behaviors of inattention versus epileptic seizures (but the description of these events are not completely conclusive but does not have other features typically seen in epileptic seizures).  - routine EEG study; if routine EEG study is normal will need 24 hours ambulatory EEG study.  If these studies are normal and she continues to have these spells will need inpatient video EEG monitoring study at an epilepsy monitoring unit (EMU)  - MRI brain w/wo contrast  - check BMP and lamotrigine level prior to the MRI brain study.  - I can write for diazepam 5mg tab, take one tab 15-30 minutes prior to MRI study, may repeat one additional tab if still having anxiety symptoms near time of study.  Side effects include excessive sedation, paradoxical agitation, ataxia, slurred speech, glaucoma, and respiratory depression (in the setting of multiple CNS sedating medications).  Please have someone else drive the patient to and from the MRI location.  - try to get a video recording of these events for review; have the patient sign a consent form for her to use her cell phone to video record these spells for future review    Follow-up with advanced practitioner in 3 months.

## 2024-12-08 PROBLEM — E11.43 DIABETIC GASTROPARESIS  (HCC): Status: ACTIVE | Noted: 2024-12-08

## 2024-12-08 PROBLEM — M86.9 OSTEOMYELITIS OF LEFT FOOT (HCC): Status: RESOLVED | Noted: 2019-09-06 | Resolved: 2024-12-08

## 2024-12-08 PROBLEM — M86.171 ACUTE OSTEOMYELITIS OF METATARSAL BONE OF RIGHT FOOT (HCC): Status: RESOLVED | Noted: 2023-05-09 | Resolved: 2024-12-08

## 2024-12-08 PROBLEM — K31.84 DIABETIC GASTROPARESIS  (HCC): Status: ACTIVE | Noted: 2024-12-08

## 2024-12-08 PROBLEM — F99 PSYCHIATRIC ILLNESS: Status: RESOLVED | Noted: 2019-06-29 | Resolved: 2024-12-08

## 2024-12-08 PROBLEM — F31.9 BIPOLAR 1 DISORDER, DEPRESSED (HCC): Status: ACTIVE | Noted: 2017-04-15

## 2024-12-08 NOTE — ASSESSMENT & PLAN NOTE
Staring spells are more likely nonepileptic behaviors of inattention versus epileptic seizures (but the description of these events are not completely conclusive but does not have other features typically seen in epileptic seizures).  - routine EEG study; if routine EEG study is normal will need 24 hours ambulatory EEG study.  If these studies are normal and she continues to have these spells will need inpatient video EEG monitoring study at an epilepsy monitoring unit (EMU)  - MRI brain w/wo contrast  - check BMP and lamotrigine level prior to the MRI brain study.

## 2024-12-24 ENCOUNTER — NURSE TRIAGE (OUTPATIENT)
Age: 49
End: 2024-12-24

## 2024-12-24 NOTE — TELEPHONE ENCOUNTER
"Pt and Aide from [facility] called stating that last week pt had an episode of stabbing chest pain that lasted about 10-15 minutes. Pt states that she did not tell her a nurse so her vitals were not obtained during that time but this morning her Bp was 123/70. Pt states she has had no further episodes.     Spoke to Aide and scheduled an appointment for 1/17 advised to proceed to ED or call 911 if chest pain reoccurs.   Aide feels pt also had an EKG that day of the chest pain and she is going to see if she can send a copy along with pt to her visit.   Answer Assessment - Initial Assessment Questions  1. LOCATION: \"Where does it hurt?\"        Center of chest into the left fight   2. RADIATION: \"Does the pain go anywhere else?\" (e.g., into neck, jaw, arms, back)      Denies   3. ONSET: \"When did the chest pain begin?\" (Minutes, hours or days)       Last week- took about 10-15 minutes to go away   4. PATTERN: \"Does the pain come and go, or has it been constant since it started?\"  \"Does it get worse with exertion?\"       Most in wheelchair unless she has prosthetic on   5. DURATION: \"How long does it last\" (e.g., seconds, minutes, hours)      10-15 minutes   6. SEVERITY: \"How bad is the pain?\"  (e.g., Scale 1-10; mild, moderate, or severe)      9 pain scale   7. CARDIAC RISK FACTORS: \"Do you have any history of heart problems or risk factors for heart disease?\" (e.g., angina, prior heart attack; diabetes, high blood pressure, high cholesterol, smoker, or strong family history of heart disease)      High cholesterol, diabetes   8. PULMONARY RISK FACTORS: \"Do you have any history of lung disease?\"  (e.g., blood clots in lung, asthma, emphysema, birth control pills)      Denies   9. CAUSE: \"What do you think is causing the chest pain?\"      Unsure   10. OTHER SYMPTOMS: \"Do you have any other symptoms?\" (e.g., dizziness, nausea, vomiting, sweating, fever, difficulty breathing, cough)        Denies    Protocols " used: Chest Pain-Adult-[place]

## 2025-01-14 ENCOUNTER — HOSPITAL ENCOUNTER (INPATIENT)
Dept: HOSPITAL 99 - ED | Age: 50
LOS: 1 days | Discharge: NURSING, REHAB OR LONG-TERM CARE | DRG: 638 | End: 2025-01-15
Payer: COMMERCIAL

## 2025-01-14 VITALS — DIASTOLIC BLOOD PRESSURE: 54 MMHG | SYSTOLIC BLOOD PRESSURE: 110 MMHG | RESPIRATION RATE: 16 BRPM

## 2025-01-14 VITALS — DIASTOLIC BLOOD PRESSURE: 76 MMHG | SYSTOLIC BLOOD PRESSURE: 122 MMHG

## 2025-01-14 VITALS — BODY MASS INDEX: 35.6 KG/M2

## 2025-01-14 DIAGNOSIS — E11.628: Primary | ICD-10-CM

## 2025-01-14 DIAGNOSIS — K21.9: ICD-10-CM

## 2025-01-14 DIAGNOSIS — I89.0: ICD-10-CM

## 2025-01-14 DIAGNOSIS — D50.9: ICD-10-CM

## 2025-01-14 DIAGNOSIS — L97.521: ICD-10-CM

## 2025-01-14 DIAGNOSIS — Z79.85: ICD-10-CM

## 2025-01-14 DIAGNOSIS — F17.210: ICD-10-CM

## 2025-01-14 DIAGNOSIS — Z89.511: ICD-10-CM

## 2025-01-14 DIAGNOSIS — E11.40: ICD-10-CM

## 2025-01-14 DIAGNOSIS — F31.9: ICD-10-CM

## 2025-01-14 DIAGNOSIS — E66.812: ICD-10-CM

## 2025-01-14 DIAGNOSIS — Z79.899: ICD-10-CM

## 2025-01-14 DIAGNOSIS — Z98.84: ICD-10-CM

## 2025-01-14 DIAGNOSIS — J45.909: ICD-10-CM

## 2025-01-14 DIAGNOSIS — E78.00: ICD-10-CM

## 2025-01-14 DIAGNOSIS — L03.032: ICD-10-CM

## 2025-01-14 DIAGNOSIS — Z79.84: ICD-10-CM

## 2025-01-14 DIAGNOSIS — Z89.422: ICD-10-CM

## 2025-01-14 DIAGNOSIS — Z88.5: ICD-10-CM

## 2025-01-14 DIAGNOSIS — E11.621: ICD-10-CM

## 2025-01-14 DIAGNOSIS — E66.2: ICD-10-CM

## 2025-01-14 DIAGNOSIS — Z88.1: ICD-10-CM

## 2025-01-14 DIAGNOSIS — I10: ICD-10-CM

## 2025-01-14 DIAGNOSIS — Z79.890: ICD-10-CM

## 2025-01-14 DIAGNOSIS — E03.9: ICD-10-CM

## 2025-01-14 DIAGNOSIS — Z88.0: ICD-10-CM

## 2025-01-14 LAB
ALBUMIN SERPL-MCNC: 4 G/DL (ref 3.5–5)
ALP SERPL-CCNC: 87 U/L (ref 38–126)
ALT SERPL-CCNC: 14 U/L (ref 0–35)
AST SERPL-CCNC: 23 U/L (ref 14–36)
BUN SERPL-MCNC: 11 MG/DL (ref 7–17)
CALCIUM SERPL-MCNC: 9.2 MG/DL (ref 8.4–10.2)
CHLORIDE SERPL-SCNC: 97 MMOL/L (ref 98–107)
CO2 SERPL-SCNC: 31 MMOL/L (ref 22–30)
EGFR: > 60
ERYTHROCYTE [DISTWIDTH] IN BLOOD BY AUTOMATED COUNT: 16 % (ref 11.5–14.5)
GLUCOSE SERPL-MCNC: 88 MG/DL (ref 70–99)
HCT VFR BLD AUTO: 38.5 % (ref 37–47)
HGB BLD-MCNC: 12.5 G/DL (ref 12–16)
MCHC RBC AUTO-ENTMCNC: 32.5 G/DL (ref 33–37)
MCV RBC AUTO: 79.4 FL (ref 81–99)
NRBC BLD AUTO-RTO: 0 %
PLATELET # BLD AUTO: 321 10^3/UL (ref 130–400)
POTASSIUM SERPL-SCNC: 4.1 MMOL/L (ref 3.5–5.1)
PROT SERPL-MCNC: 6.7 G/DL (ref 6.3–8.2)
SODIUM SERPL-SCNC: 135 MMOL/L (ref 135–145)

## 2025-01-14 RX ADMIN — MORPHINE SULFATE 4 MG: 4 INJECTION INTRAVENOUS at 22:42

## 2025-01-14 RX ADMIN — MEROPENEM 1000 MG: 1 INJECTION, POWDER, FOR SOLUTION INTRAVENOUS at 22:42

## 2025-01-14 RX ADMIN — SODIUM CHLORIDE 500: 900 INJECTION, SOLUTION INTRAVENOUS at 22:42

## 2025-01-15 VITALS — RESPIRATION RATE: 20 BRPM | DIASTOLIC BLOOD PRESSURE: 54 MMHG | SYSTOLIC BLOOD PRESSURE: 102 MMHG

## 2025-01-15 VITALS — DIASTOLIC BLOOD PRESSURE: 90 MMHG | SYSTOLIC BLOOD PRESSURE: 130 MMHG | RESPIRATION RATE: 18 BRPM

## 2025-01-15 VITALS — DIASTOLIC BLOOD PRESSURE: 93 MMHG | SYSTOLIC BLOOD PRESSURE: 137 MMHG | RESPIRATION RATE: 17 BRPM

## 2025-01-15 LAB
ALBUMIN SERPL-MCNC: 3.9 G/DL (ref 3.5–5)
ALP SERPL-CCNC: 95 U/L (ref 38–126)
ALT SERPL-CCNC: 16 U/L (ref 0–35)
AST SERPL-CCNC: 29 U/L (ref 14–36)
BUN SERPL-MCNC: 11 MG/DL (ref 7–17)
CALCIUM SERPL-MCNC: 9.4 MG/DL (ref 8.4–10.2)
CHLORIDE SERPL-SCNC: 97 MMOL/L (ref 98–107)
CO2 SERPL-SCNC: 31 MMOL/L (ref 22–30)
EGFR: > 60
ERYTHROCYTE [DISTWIDTH] IN BLOOD BY AUTOMATED COUNT: 15.8 % (ref 11.5–14.5)
ESTIMATED CREATININE CLEARANCE: 80 ML/MIN
GLUCOSE - POINT OF CARE: 105 MG/DL (ref 70–99)
GLUCOSE - POINT OF CARE: 88 MG/DL (ref 70–99)
GLUCOSE SERPL-MCNC: 86 MG/DL (ref 70–99)
HBA1C MFR BLD: 5.8 % (ref 4–5.6)
HCT VFR BLD AUTO: 40.1 % (ref 37–47)
HGB BLD-MCNC: 13.1 G/DL (ref 12–16)
MCHC RBC AUTO-ENTMCNC: 32.7 G/DL (ref 33–37)
MCV RBC AUTO: 77.3 FL (ref 81–99)
NRBC BLD AUTO-RTO: 0 %
PLATELET # BLD AUTO: 352 10^3/UL (ref 130–400)
POTASSIUM SERPL-SCNC: 3.6 MMOL/L (ref 3.5–5.1)
PROT SERPL-MCNC: 6.8 G/DL (ref 6.3–8.2)
SODIUM SERPL-SCNC: 136 MMOL/L (ref 135–145)

## 2025-01-15 RX ADMIN — ONDANSETRON HYDROCHLORIDE 4 MG: 2 SOLUTION INTRAMUSCULAR; INTRAVENOUS at 01:27

## 2025-01-15 RX ADMIN — CEFDINIR 300 MG: 300 CAPSULE ORAL at 14:54

## 2025-01-15 RX ADMIN — HYDROMORPHONE HYDROCHLORIDE 0.5 MG: 1 INJECTION, SOLUTION INTRAMUSCULAR; INTRAVENOUS; SUBCUTANEOUS at 01:27

## 2025-01-15 RX ADMIN — BUPRENORPHINE 2 MG: 2 TABLET SUBLINGUAL at 13:17

## 2025-01-15 RX ADMIN — HYDROMORPHONE HYDROCHLORIDE 0.5 MG: 1 INJECTION, SOLUTION INTRAMUSCULAR; INTRAVENOUS; SUBCUTANEOUS at 11:40

## 2025-01-15 RX ADMIN — HYDROMORPHONE HYDROCHLORIDE 0.5 MG: 1 INJECTION, SOLUTION INTRAMUSCULAR; INTRAVENOUS; SUBCUTANEOUS at 06:38

## 2025-01-15 RX ADMIN — MEROPENEM 100 MG: 1 INJECTION, POWDER, FOR SOLUTION INTRAVENOUS at 08:13

## 2025-01-15 RX ADMIN — DAPTOMYCIN 8 MG: 500 INJECTION, POWDER, LYOPHILIZED, FOR SOLUTION INTRAVENOUS at 00:25

## 2025-01-15 RX ADMIN — HEPARIN SODIUM 5000 UNITS: 5000 INJECTION, SOLUTION INTRAVENOUS; SUBCUTANEOUS at 08:17

## 2025-01-19 ENCOUNTER — HOSPITAL ENCOUNTER (EMERGENCY)
Dept: HOSPITAL 99 - EMR | Age: 50
LOS: 1 days | Discharge: NURSING, REHAB OR LONG-TERM CARE | End: 2025-01-20
Payer: COMMERCIAL

## 2025-01-19 VITALS — SYSTOLIC BLOOD PRESSURE: 122 MMHG | RESPIRATION RATE: 18 BRPM | DIASTOLIC BLOOD PRESSURE: 74 MMHG

## 2025-01-19 VITALS — RESPIRATION RATE: 16 BRPM | SYSTOLIC BLOOD PRESSURE: 129 MMHG | DIASTOLIC BLOOD PRESSURE: 82 MMHG

## 2025-01-19 VITALS — BODY MASS INDEX: 36.4 KG/M2

## 2025-01-19 DIAGNOSIS — Z88.5: ICD-10-CM

## 2025-01-19 DIAGNOSIS — M79.89: ICD-10-CM

## 2025-01-19 DIAGNOSIS — G89.29: ICD-10-CM

## 2025-01-19 DIAGNOSIS — Z88.6: ICD-10-CM

## 2025-01-19 DIAGNOSIS — J45.909: ICD-10-CM

## 2025-01-19 DIAGNOSIS — M79.675: Primary | ICD-10-CM

## 2025-01-19 DIAGNOSIS — Z90.49: ICD-10-CM

## 2025-01-19 DIAGNOSIS — D50.9: ICD-10-CM

## 2025-01-19 DIAGNOSIS — E66.9: ICD-10-CM

## 2025-01-19 DIAGNOSIS — E11.40: ICD-10-CM

## 2025-01-19 DIAGNOSIS — Z88.8: ICD-10-CM

## 2025-01-19 DIAGNOSIS — Z89.511: ICD-10-CM

## 2025-01-19 DIAGNOSIS — Z91.040: ICD-10-CM

## 2025-01-19 DIAGNOSIS — E78.00: ICD-10-CM

## 2025-01-19 DIAGNOSIS — G47.33: ICD-10-CM

## 2025-01-19 DIAGNOSIS — R11.0: ICD-10-CM

## 2025-01-19 DIAGNOSIS — R19.7: ICD-10-CM

## 2025-01-19 DIAGNOSIS — Z88.0: ICD-10-CM

## 2025-01-19 DIAGNOSIS — Z98.84: ICD-10-CM

## 2025-01-19 DIAGNOSIS — Z89.422: ICD-10-CM

## 2025-01-19 DIAGNOSIS — K21.9: ICD-10-CM

## 2025-01-19 DIAGNOSIS — F31.9: ICD-10-CM

## 2025-01-19 DIAGNOSIS — L53.9: ICD-10-CM

## 2025-01-19 DIAGNOSIS — F17.200: ICD-10-CM

## 2025-01-19 DIAGNOSIS — F41.9: ICD-10-CM

## 2025-01-19 DIAGNOSIS — Z88.1: ICD-10-CM

## 2025-01-19 DIAGNOSIS — K30: ICD-10-CM

## 2025-01-19 DIAGNOSIS — E03.9: ICD-10-CM

## 2025-01-19 DIAGNOSIS — I10: ICD-10-CM

## 2025-01-19 LAB
ALBUMIN SERPL-MCNC: 3.9 G/DL (ref 3.5–5)
ALP SERPL-CCNC: 96 U/L (ref 38–126)
ALT SERPL-CCNC: 17 U/L (ref 0–35)
AST SERPL-CCNC: 27 U/L (ref 14–36)
BUN SERPL-MCNC: 10 MG/DL (ref 7–17)
CALCIUM SERPL-MCNC: 9.2 MG/DL (ref 8.4–10.2)
CHLORIDE SERPL-SCNC: 103 MMOL/L (ref 98–107)
CO2 SERPL-SCNC: 25 MMOL/L (ref 22–30)
EGFR: > 60
ERYTHROCYTE [DISTWIDTH] IN BLOOD BY AUTOMATED COUNT: 15.8 % (ref 11.5–14.5)
ESTIMATED CREATININE CLEARANCE: 90 ML/MIN
GLUCOSE SERPL-MCNC: 120 MG/DL (ref 70–99)
HCT VFR BLD AUTO: 39.6 % (ref 37–47)
HGB BLD-MCNC: 12.6 G/DL (ref 12–16)
MCHC RBC AUTO-ENTMCNC: 31.8 G/DL (ref 33–37)
MCV RBC AUTO: 78.3 FL (ref 81–99)
NRBC BLD AUTO-RTO: 0 %
PLATELET # BLD AUTO: 330 10^3/UL (ref 130–400)
POTASSIUM SERPL-SCNC: 3.9 MMOL/L (ref 3.5–5.1)
PROT SERPL-MCNC: 6.5 G/DL (ref 6.3–8.2)
SODIUM SERPL-SCNC: 138 MMOL/L (ref 135–145)

## 2025-01-19 PROCEDURE — 99283 EMERGENCY DEPT VISIT LOW MDM: CPT

## 2025-01-20 VITALS — RESPIRATION RATE: 18 BRPM | SYSTOLIC BLOOD PRESSURE: 132 MMHG | DIASTOLIC BLOOD PRESSURE: 75 MMHG

## 2025-01-21 ENCOUNTER — HOSPITAL ENCOUNTER (OUTPATIENT)
Facility: MEDICAL CENTER | Age: 50
Discharge: HOME | End: 2025-01-21
Payer: COMMERCIAL

## 2025-01-21 DIAGNOSIS — R40.4 TRANSIENT ALTERATION OF AWARENESS: ICD-10-CM

## 2025-01-21 PROCEDURE — 70553 MRI BRAIN STEM W/O & W/DYE: CPT

## 2025-01-21 PROCEDURE — A9585 GADOBUTROL INJECTION: HCPCS | Performed by: PSYCHIATRY & NEUROLOGY

## 2025-01-21 RX ORDER — GADOBUTROL 604.72 MG/ML
13 INJECTION INTRAVENOUS
Status: COMPLETED | OUTPATIENT
Start: 2025-01-21 | End: 2025-01-21

## 2025-01-21 RX ADMIN — GADOBUTROL 13 ML: 604.72 INJECTION INTRAVENOUS at 16:43

## 2025-01-22 ENCOUNTER — RESULTS FOLLOW-UP (OUTPATIENT)
Dept: NEUROLOGY | Facility: CLINIC | Age: 50
End: 2025-01-22

## 2025-02-17 ENCOUNTER — HOSPITAL ENCOUNTER (OUTPATIENT)
Dept: NEUROLOGY | Facility: CLINIC | Age: 50
Discharge: HOME | End: 2025-02-17
Payer: COMMERCIAL

## 2025-02-17 DIAGNOSIS — R40.4 TRANSIENT ALTERATION OF AWARENESS: ICD-10-CM

## 2025-02-17 PROCEDURE — 95813 EEG EXTND MNTR 61-119 MIN: CPT | Performed by: PSYCHIATRY & NEUROLOGY

## 2025-02-17 PROCEDURE — 95813 EEG EXTND MNTR 61-119 MIN: CPT

## 2025-02-19 NOTE — TELEPHONE ENCOUNTER
----- Message from Keturah James MD sent at 2/18/2025  4:35 PM EST -----  EEG shows background slowing, which could be due to presence of multiple psychotropic medications.  To better determine if her staring spells or episodes of unresponsive behaviors are seizures; I recommend an inpatient epilepsy monitoring unit study (EMU).  If she is not able to be admitted to an inpatient setting for 3-7 days, then we can try a 48-72 hours ambulatory EEG study (no video so we cannot see what is happening).

## 2025-02-26 ENCOUNTER — TELEPHONE (OUTPATIENT)
Dept: NEUROLOGY | Facility: CLINIC | Age: 50
End: 2025-02-26

## 2025-02-26 NOTE — TELEPHONE ENCOUNTER
made a call for appt reminder with Dr. James 3/6/25@ 1:00pm and also mentioned [facility] office address &phone number as well. informed arrive 10 to 15 mins early for the appt.

## 2025-03-12 ENCOUNTER — TELEPHONE (OUTPATIENT)
Dept: NEUROLOGY | Facility: CLINIC | Age: 50
End: 2025-03-12

## 2025-03-12 DIAGNOSIS — R40.4 STARING EPISODES: ICD-10-CM

## 2025-03-12 DIAGNOSIS — R40.4 TRANSIENT ALTERATION OF AWARENESS: Primary | ICD-10-CM

## 2025-03-12 NOTE — TELEPHONE ENCOUNTER
[email or web address]      Call returned to Aide. Scheduled EMU for 5/12/2025 8am admission time. Added to EMU calendar. ADT21 entered. Notified precert of admission via teams and added to spreadsheet. EMU education sent via email to above.     Precert -EMU admission for 5/12/2025 8am

## 2025-03-12 NOTE — TELEPHONE ENCOUNTER
Jacklyn Gregorio   GR    3/11/25 11:02 AM  Note     Aide called in to get pt scheduled for her EMU.      Aide#: [phone]          There is a discrepancy in the prescription request received and what is in the chart.  I tried to call patient for clarification but there was no answer and no voice mail.

## 2025-05-08 NOTE — TELEPHONE ENCOUNTER
Chart reviewed: [email or web address] confirmed. Auth obtained.    Call placed to [facility], [phone]. Confirmed admission scheduled for 5/12/2025 8am. No further questions at this time.

## 2025-05-12 ENCOUNTER — HOSPITAL ENCOUNTER (INPATIENT)
Facility: HOSPITAL | Age: 50
LOS: 3 days | Discharge: NURSING, REHAB OR LONG-TERM CARE | End: 2025-05-15
Attending: PSYCHIATRY & NEUROLOGY | Admitting: PSYCHIATRY & NEUROLOGY
Payer: COMMERCIAL

## 2025-05-12 ENCOUNTER — TELEPHONE (OUTPATIENT)
Age: 50
End: 2025-05-12

## 2025-05-12 ENCOUNTER — APPOINTMENT (INPATIENT)
Dept: NEUROLOGY | Facility: CLINIC | Age: 50
End: 2025-05-12
Attending: PSYCHIATRY & NEUROLOGY
Payer: COMMERCIAL

## 2025-05-12 LAB
ALBUMIN SERPL BCG-MCNC: 3.8 G/DL (ref 3.5–5)
ALP SERPL-CCNC: 68 U/L (ref 34–104)
ALT SERPL W P-5'-P-CCNC: 8 U/L (ref 7–52)
ANION GAP SERPL CALCULATED.3IONS-SCNC: 5 MMOL/L (ref 4–13)
AST SERPL W P-5'-P-CCNC: 12 U/L (ref 13–39)
ATRIAL RATE: 58 BPM
BASOPHILS # BLD AUTO: 0.02 THOUSANDS/ÂΜL (ref 0–0.1)
BASOPHILS NFR BLD AUTO: 0 % (ref 0–1)
BILIRUB SERPL-MCNC: 0.3 MG/DL (ref 0.2–1)
BUN SERPL-MCNC: 16 MG/DL (ref 5–25)
CALCIUM SERPL-MCNC: 9.1 MG/DL (ref 8.4–10.2)
CHLORIDE SERPL-SCNC: 99 MMOL/L (ref 96–108)
CO2 SERPL-SCNC: 33 MMOL/L (ref 21–32)
CREAT SERPL-MCNC: 1.04 MG/DL (ref 0.6–1.3)
EOSINOPHIL # BLD AUTO: 0.27 THOUSAND/ÂΜL (ref 0–0.61)
EOSINOPHIL NFR BLD AUTO: 4 % (ref 0–6)
ERYTHROCYTE [DISTWIDTH] IN BLOOD BY AUTOMATED COUNT: 14.8 % (ref 11.6–15.1)
GFR SERPL CREATININE-BSD FRML MDRD: 63 ML/MIN/1.73SQ M
GLUCOSE SERPL-MCNC: 87 MG/DL (ref 65–140)
HCG SERPL QL: NEGATIVE
HCT VFR BLD AUTO: 40.4 % (ref 34.8–46.1)
HGB BLD-MCNC: 12.4 G/DL (ref 11.5–15.4)
IMM GRANULOCYTES # BLD AUTO: 0.02 THOUSAND/UL (ref 0–0.2)
IMM GRANULOCYTES NFR BLD AUTO: 0 % (ref 0–2)
LYMPHOCYTES # BLD AUTO: 3.31 THOUSANDS/ÂΜL (ref 0.6–4.47)
LYMPHOCYTES NFR BLD AUTO: 48 % (ref 14–44)
MCH RBC QN AUTO: 26.2 PG (ref 26.8–34.3)
MCHC RBC AUTO-ENTMCNC: 30.7 G/DL (ref 31.4–37.4)
MCV RBC AUTO: 85 FL (ref 82–98)
MONOCYTES # BLD AUTO: 0.41 THOUSAND/ÂΜL (ref 0.17–1.22)
MONOCYTES NFR BLD AUTO: 6 % (ref 4–12)
NEUTROPHILS # BLD AUTO: 2.91 THOUSANDS/ÂΜL (ref 1.85–7.62)
NEUTS SEG NFR BLD AUTO: 42 % (ref 43–75)
NRBC BLD AUTO-RTO: 0 /100 WBCS
P AXIS: 20 DEGREES
PLATELET # BLD AUTO: 298 THOUSANDS/UL (ref 149–390)
PMV BLD AUTO: 9 FL (ref 8.9–12.7)
POTASSIUM SERPL-SCNC: 3.8 MMOL/L (ref 3.5–5.3)
PR INTERVAL: 228 MS
PROT SERPL-MCNC: 6.6 G/DL (ref 6.4–8.4)
QRS AXIS: 95 DEGREES
QRSD INTERVAL: 94 MS
QT INTERVAL: 454 MS
QTC INTERVAL: 446 MS
RBC # BLD AUTO: 4.73 MILLION/UL (ref 3.81–5.12)
SODIUM SERPL-SCNC: 137 MMOL/L (ref 135–147)
T WAVE AXIS: 50 DEGREES
VENTRICULAR RATE: 58 BPM
WBC # BLD AUTO: 6.94 THOUSAND/UL (ref 4.31–10.16)

## 2025-05-12 PROCEDURE — 80053 COMPREHEN METABOLIC PANEL: CPT | Performed by: PSYCHIATRY & NEUROLOGY

## 2025-05-12 PROCEDURE — 93010 ELECTROCARDIOGRAM REPORT: CPT | Performed by: STUDENT IN AN ORGANIZED HEALTH CARE EDUCATION/TRAINING PROGRAM

## 2025-05-12 PROCEDURE — 85025 COMPLETE CBC W/AUTO DIFF WBC: CPT | Performed by: PSYCHIATRY & NEUROLOGY

## 2025-05-12 PROCEDURE — 95700 EEG CONT REC W/VID EEG TECH: CPT

## 2025-05-12 PROCEDURE — 4A10X4Z MONITORING OF CENTRAL NERVOUS ELECTRICAL ACTIVITY, EXTERNAL APPROACH: ICD-10-PCS | Performed by: PSYCHIATRY & NEUROLOGY

## 2025-05-12 PROCEDURE — 99223 1ST HOSP IP/OBS HIGH 75: CPT | Performed by: PSYCHIATRY & NEUROLOGY

## 2025-05-12 PROCEDURE — 95715 VEEG EA 12-26HR INTMT MNTR: CPT

## 2025-05-12 PROCEDURE — 84703 CHORIONIC GONADOTROPIN ASSAY: CPT | Performed by: PSYCHIATRY & NEUROLOGY

## 2025-05-12 PROCEDURE — 93005 ELECTROCARDIOGRAM TRACING: CPT

## 2025-05-12 RX ORDER — FUROSEMIDE 20 MG/1
20 TABLET ORAL
Status: DISCONTINUED | OUTPATIENT
Start: 2025-05-12 | End: 2025-05-12

## 2025-05-12 RX ORDER — LURASIDONE HYDROCHLORIDE 40 MG/1
40 TABLET, FILM COATED ORAL
Status: DISCONTINUED | OUTPATIENT
Start: 2025-05-13 | End: 2025-05-15 | Stop reason: HOSPADM

## 2025-05-12 RX ORDER — HYDROXYZINE HYDROCHLORIDE 50 MG/1
50 TABLET, FILM COATED ORAL
Status: DISCONTINUED | OUTPATIENT
Start: 2025-05-12 | End: 2025-05-15 | Stop reason: HOSPADM

## 2025-05-12 RX ORDER — EPINEPHRINE 1 MG/ML
0.3 INJECTION, SOLUTION, CONCENTRATE INTRAVENOUS ONCE AS NEEDED
Status: DISCONTINUED | OUTPATIENT
Start: 2025-05-12 | End: 2025-05-15 | Stop reason: HOSPADM

## 2025-05-12 RX ORDER — LOSARTAN POTASSIUM 25 MG/1
25 TABLET ORAL DAILY
Status: DISCONTINUED | OUTPATIENT
Start: 2025-05-12 | End: 2025-05-15 | Stop reason: HOSPADM

## 2025-05-12 RX ORDER — BUDESONIDE AND FORMOTEROL FUMARATE DIHYDRATE 80; 4.5 UG/1; UG/1
2 AEROSOL RESPIRATORY (INHALATION)
Status: DISCONTINUED | OUTPATIENT
Start: 2025-05-12 | End: 2025-05-15 | Stop reason: HOSPADM

## 2025-05-12 RX ORDER — LAMOTRIGINE 25 MG/1
50 TABLET ORAL 2 TIMES DAILY
Status: DISCONTINUED | OUTPATIENT
Start: 2025-05-12 | End: 2025-05-12

## 2025-05-12 RX ORDER — LORAZEPAM 2 MG/ML
2 INJECTION INTRAMUSCULAR EVERY 8 HOURS PRN
Status: DISCONTINUED | OUTPATIENT
Start: 2025-05-12 | End: 2025-05-15 | Stop reason: HOSPADM

## 2025-05-12 RX ORDER — NYSTATIN 100000 [USP'U]/G
POWDER TOPICAL 2 TIMES DAILY PRN
Status: DISCONTINUED | OUTPATIENT
Start: 2025-05-12 | End: 2025-05-15 | Stop reason: HOSPADM

## 2025-05-12 RX ORDER — CLONIDINE HYDROCHLORIDE 0.2 MG/1
0.2 TABLET ORAL EVERY 12 HOURS SCHEDULED
Status: DISCONTINUED | OUTPATIENT
Start: 2025-05-12 | End: 2025-05-12

## 2025-05-12 RX ORDER — ONDANSETRON 2 MG/ML
4 INJECTION INTRAMUSCULAR; INTRAVENOUS EVERY 6 HOURS PRN
Status: DISCONTINUED | OUTPATIENT
Start: 2025-05-12 | End: 2025-05-15 | Stop reason: HOSPADM

## 2025-05-12 RX ORDER — POLYETHYLENE GLYCOL 3350 17 G/17G
17 POWDER, FOR SOLUTION ORAL DAILY PRN
Status: DISCONTINUED | OUTPATIENT
Start: 2025-05-12 | End: 2025-05-15 | Stop reason: HOSPADM

## 2025-05-12 RX ORDER — SENNOSIDES 8.6 MG
17.2 TABLET ORAL
Status: DISCONTINUED | OUTPATIENT
Start: 2025-05-12 | End: 2025-05-15 | Stop reason: HOSPADM

## 2025-05-12 RX ORDER — DOCUSATE SODIUM 100 MG/1
100 CAPSULE, LIQUID FILLED ORAL 2 TIMES DAILY
Status: DISCONTINUED | OUTPATIENT
Start: 2025-05-12 | End: 2025-05-12

## 2025-05-12 RX ORDER — LEVOTHYROXINE SODIUM 50 UG/1
50 TABLET ORAL
Status: DISCONTINUED | OUTPATIENT
Start: 2025-05-13 | End: 2025-05-15 | Stop reason: HOSPADM

## 2025-05-12 RX ORDER — PANTOPRAZOLE SODIUM 40 MG/1
40 TABLET, DELAYED RELEASE ORAL
Status: DISCONTINUED | OUTPATIENT
Start: 2025-05-12 | End: 2025-05-12

## 2025-05-12 RX ORDER — CLONIDINE HYDROCHLORIDE 0.2 MG/1
0.2 TABLET ORAL 2 TIMES DAILY
Status: DISCONTINUED | OUTPATIENT
Start: 2025-05-12 | End: 2025-05-12

## 2025-05-12 RX ORDER — LORATADINE 10 MG/1
10 TABLET ORAL DAILY
Status: DISCONTINUED | OUTPATIENT
Start: 2025-05-12 | End: 2025-05-12

## 2025-05-12 RX ORDER — ACETAMINOPHEN 325 MG/1
650 TABLET ORAL EVERY 6 HOURS PRN
Status: DISCONTINUED | OUTPATIENT
Start: 2025-05-12 | End: 2025-05-15 | Stop reason: HOSPADM

## 2025-05-12 RX ORDER — METHOCARBAMOL 750 MG/1
750 TABLET, FILM COATED ORAL EVERY 6 HOURS PRN
Status: DISCONTINUED | OUTPATIENT
Start: 2025-05-12 | End: 2025-05-15 | Stop reason: HOSPADM

## 2025-05-12 RX ORDER — BUPRENORPHINE 8 MG/1
8 TABLET SUBLINGUAL DAILY
Status: DISCONTINUED | OUTPATIENT
Start: 2025-05-12 | End: 2025-05-13

## 2025-05-12 RX ORDER — ESCITALOPRAM OXALATE 10 MG/1
10 TABLET ORAL
Status: DISCONTINUED | OUTPATIENT
Start: 2025-05-12 | End: 2025-05-15 | Stop reason: HOSPADM

## 2025-05-12 RX ORDER — PANTOPRAZOLE SODIUM 40 MG/1
40 TABLET, DELAYED RELEASE ORAL
Status: DISCONTINUED | OUTPATIENT
Start: 2025-05-12 | End: 2025-05-15 | Stop reason: HOSPADM

## 2025-05-12 RX ORDER — FOLIC ACID 1 MG/1
1000 TABLET ORAL DAILY
Status: DISCONTINUED | OUTPATIENT
Start: 2025-05-12 | End: 2025-05-15 | Stop reason: HOSPADM

## 2025-05-12 RX ORDER — ENOXAPARIN SODIUM 100 MG/ML
40 INJECTION SUBCUTANEOUS EVERY 12 HOURS SCHEDULED
Status: DISCONTINUED | OUTPATIENT
Start: 2025-05-12 | End: 2025-05-15 | Stop reason: HOSPADM

## 2025-05-12 RX ORDER — LAMOTRIGINE 100 MG/1
250 TABLET ORAL 2 TIMES DAILY
Status: DISCONTINUED | OUTPATIENT
Start: 2025-05-12 | End: 2025-05-15 | Stop reason: HOSPADM

## 2025-05-12 RX ORDER — BUPRENORPHINE AND NALOXONE 8; 2 MG/1; MG/1
8 FILM, SOLUBLE BUCCAL; SUBLINGUAL 2 TIMES DAILY
Status: DISCONTINUED | OUTPATIENT
Start: 2025-05-12 | End: 2025-05-12

## 2025-05-12 RX ORDER — LURASIDONE HYDROCHLORIDE 40 MG/1
40 TABLET, FILM COATED ORAL
Status: DISCONTINUED | OUTPATIENT
Start: 2025-05-12 | End: 2025-05-12

## 2025-05-12 RX ORDER — FUROSEMIDE 20 MG/1
20 TABLET ORAL
Status: DISCONTINUED | OUTPATIENT
Start: 2025-05-13 | End: 2025-05-15 | Stop reason: HOSPADM

## 2025-05-12 RX ORDER — LAMOTRIGINE 100 MG/1
250 TABLET ORAL 2 TIMES DAILY
Status: DISCONTINUED | OUTPATIENT
Start: 2025-05-12 | End: 2025-05-12

## 2025-05-12 RX ORDER — DOCUSATE SODIUM 100 MG/1
100 CAPSULE, LIQUID FILLED ORAL 2 TIMES DAILY PRN
Status: DISCONTINUED | OUTPATIENT
Start: 2025-05-12 | End: 2025-05-15 | Stop reason: HOSPADM

## 2025-05-12 RX ORDER — LORATADINE 10 MG/1
10 TABLET ORAL DAILY
Status: DISCONTINUED | OUTPATIENT
Start: 2025-05-13 | End: 2025-05-15 | Stop reason: HOSPADM

## 2025-05-12 RX ORDER — LORAZEPAM 2 MG/ML
1 INJECTION INTRAMUSCULAR EVERY 8 HOURS PRN
Status: DISCONTINUED | OUTPATIENT
Start: 2025-05-12 | End: 2025-05-15 | Stop reason: HOSPADM

## 2025-05-12 RX ORDER — CLONIDINE HYDROCHLORIDE 0.2 MG/1
0.2 TABLET ORAL EVERY 12 HOURS SCHEDULED
Status: DISCONTINUED | OUTPATIENT
Start: 2025-05-12 | End: 2025-05-15 | Stop reason: HOSPADM

## 2025-05-12 RX ORDER — HYDROXYZINE HYDROCHLORIDE 50 MG/1
50 TABLET, FILM COATED ORAL EVERY 6 HOURS PRN
Status: DISCONTINUED | OUTPATIENT
Start: 2025-05-12 | End: 2025-05-15 | Stop reason: HOSPADM

## 2025-05-12 RX ADMIN — CLONIDINE HYDROCHLORIDE 0.2 MG: 0.2 TABLET ORAL at 20:40

## 2025-05-12 RX ADMIN — LURASIDONE HYDROCHLORIDE 60 MG: 40 TABLET, FILM COATED ORAL at 20:40

## 2025-05-12 RX ADMIN — ONDANSETRON 4 MG: 2 INJECTION INTRAMUSCULAR; INTRAVENOUS at 17:03

## 2025-05-12 RX ADMIN — ENOXAPARIN SODIUM 40 MG: 40 INJECTION SUBCUTANEOUS at 20:40

## 2025-05-12 RX ADMIN — Medication 19.5 MG: at 20:40

## 2025-05-12 RX ADMIN — ESCITALOPRAM OXALATE 10 MG: 10 TABLET ORAL at 20:40

## 2025-05-12 RX ADMIN — HYDROXYZINE HYDROCHLORIDE 50 MG: 50 TABLET, FILM COATED ORAL at 20:52

## 2025-05-12 RX ADMIN — METFORMIN HYDROCHLORIDE 500 MG: 500 TABLET, FILM COATED ORAL at 16:57

## 2025-05-12 RX ADMIN — ACETAMINOPHEN 650 MG: 325 TABLET, FILM COATED ORAL at 20:46

## 2025-05-12 RX ADMIN — LAMOTRIGINE 250 MG: 100 TABLET ORAL at 16:57

## 2025-05-12 RX ADMIN — PANTOPRAZOLE SODIUM 40 MG: 40 TABLET, DELAYED RELEASE ORAL at 20:40

## 2025-05-12 RX ADMIN — BUPRENORPHINE HYDROCHLORIDE 8 MG: 8 TABLET SUBLINGUAL at 17:44

## 2025-05-12 NOTE — TELEPHONE ENCOUNTER
Aide from [facility] called to see of patient can still come because they are running late.  It was for Neurology.  I was going to transfer, but she disconnected when I put her on hold.

## 2025-05-12 NOTE — ASSESSMENT & PLAN NOTE
Patient does have a history of migraines and does not take anything for them.  She did have a migraine today after getting hooked up for EEG.

## 2025-05-12 NOTE — ASSESSMENT & PLAN NOTE
"Lab Results   Component Value Date    HGBA1C 7.2 (H) 05/05/2023     Currently stable  Continue home meds  POC glucose checks prior to meals-if stable can likely discontinue after a day or 2.    No results for input(s): \"POCGLU\" in the last 72 hours.    "

## 2025-05-12 NOTE — ASSESSMENT & PLAN NOTE
Patient denies any trouble moving her bowels.  Per nursing facility she frequently refuses her bowel regimen but it has been added to her medication list here to be consistent with her home meds.

## 2025-05-12 NOTE — TELEPHONE ENCOUNTER
Aide from [facility] called in to advised patient will be arriving about 15 mins late to visit.   I have transferred to [facility]  so jeannine caller has all the appointment details confirmed.

## 2025-05-12 NOTE — PLAN OF CARE
Problem: PAIN - ADULT  Goal: Verbalizes/displays adequate comfort level or baseline comfort level  Description: Interventions:- Encourage patient to monitor pain and request assistance- Assess pain using appropriate pain scale- Administer analgesics based on type and severity of pain and evaluate response- Implement non-pharmacological measures as appropriate and evaluate response- Consider cultural and social influences on pain and pain management- Notify physician/advanced practitioner if interventions unsuccessful or patient reports new pain  Outcome: Progressing     Problem: INFECTION - ADULT  Goal: Absence or prevention of progression during hospitalization  Description: INTERVENTIONS:- Assess and monitor for signs and symptoms of infection- Monitor lab/diagnostic results- Monitor all insertion sites, i.e. indwelling lines, tubes, and drains- Monitor endotracheal if appropriate and nasal secretions for changes in amount and color- [place] appropriate cooling/warming therapies per order- Administer medications as ordered- Instruct and encourage patient and family to use good hand hygiene technique- Identify and instruct in appropriate isolation precautions for identified infection/condition  Outcome: Progressing  Goal: Absence of fever/infection during neutropenic period  Description: INTERVENTIONS:- Monitor WBC  Outcome: Progressing     Problem: SAFETY ADULT  Goal: Patient will remain free of falls  Description: INTERVENTIONS:- Educate patient/family on patient safety including physical limitations- Instruct patient to call for assistance with activity - Consult OT/PT to assist with strengthening/mobility - Keep Call bell within reach- Keep bed low and locked with side rails adjusted as appropriate- Keep care items and personal belongings within reach- Initiate and maintain comfort rounds- Make Fall Risk Sign visible to staff- Offer Toileting every 2 Hours, in advance of need- Initiate/Maintain bed alarm-  Apply yellow socks and bracelet for high fall risk patients- Consider moving patient to room near nurses station  Outcome: Progressing  Goal: Maintain or return to baseline ADL function  Description: INTERVENTIONS:-  Assess patient's ability to carry out ADLs; assess patient's baseline for ADL function and identify physical deficits which impact ability to perform ADLs (bathing, care of mouth/teeth, toileting, grooming, dressing, etc.)- Assess/evaluate cause of self-care deficits - Assess range of motion- Assess patient's mobility; develop plan if impaired- Assess patient's need for assistive devices and provide as appropriate- Encourage maximum independence but intervene and supervise when necessary- Involve family in performance of ADLs- Assess for home care needs following discharge - Consider OT consult to assist with ADL evaluation and planning for discharge- Provide patient education as appropriate  Outcome: Progressing  Goal: Maintains/Returns to pre admission functional level  Description: INTERVENTIONS:- Perform AM-PAC 6 Click Basic Mobility/ Daily Activity assessment daily.- Set and communicate daily mobility goal to care team and patient/family/caregiver. - Collaborate with rehabilitation services on mobility goals if consulted- Perform Range of Motion 3 times a day.- Reposition patient every 2 hours.- Dangle patient 3 times a day- Stand patient 2 times a day- Ambulate patient 3 times a day- Out of bed to chair 2 times a day - Out of bed for meals 3 times a day- Out of bed for toileting- Record patient progress and toleration of activity level   Outcome: Progressing     Problem: DISCHARGE PLANNING  Goal: Discharge to home or other facility with appropriate resources  Description: INTERVENTIONS:- Identify barriers to discharge w/patient and caregiver- Arrange for needed discharge resources and transportation as appropriate- Identify discharge learning needs (meds, wound care, etc.)- Arrange for  interpretive services to assist at discharge as needed- Refer to Case Management Department for coordinating discharge planning if the patient needs post-hospital services based on physician/advanced practitioner order or complex needs related to functional status, cognitive ability, or social support system  Outcome: Progressing     Problem: Knowledge Deficit  Goal: Patient/family/caregiver demonstrates understanding of disease process, treatment plan, medications, and discharge instructions  Description: Complete learning assessment and assess knowledge base.Interventions:- Provide teaching at level of understanding- Provide teaching via preferred learning methods  Outcome: Progressing

## 2025-05-12 NOTE — ASSESSMENT & PLAN NOTE
Na Alatorre is a 50 y.o. female with PMH as described below who is admitted to the EMU for spell characterization. Given the duration of the episodes, their semiology, and patient's multiple comorbidities, differential remains wide including seizure, non-epileptic metabolic - will plan for EMU admission (possibly prolonged) to better characterize events    Will start continuous video EEG monitoring to capture and characterize events.   Will obtain single lead EKG monitoring   Medications:    Continue home lamotrigine for now  Other meds as described elsewhere  Ativan 2 mg as needed for more than two complex partial seizures or 1 Generalized tonic clonic seizure in a 24 hour period.   Additional diagnostic tests:  Sleep deprivation, HV, Photic, etc will be deferred for the first 24 hrs of observation but will likely be implemented to some extent during this admission  Seizure/fall/status epilepticus precautions.   Check labs with [facility] drug levels, CBC and CMP

## 2025-05-12 NOTE — ASSESSMENT & PLAN NOTE
"Lab Results   Component Value Date    HGBA1C 7.2 (H) 05/05/2023       No results for input(s): \"POCGLU\" in the last 72 hours.    Blood Sugar Average: Last 72 hrs:        No associated orders from this encounter found during lookback period of 72 hours.  "
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
## 2025-05-13 ENCOUNTER — APPOINTMENT (INPATIENT)
Dept: NEUROLOGY | Facility: CLINIC | Age: 50
End: 2025-05-13
Attending: PSYCHIATRY & NEUROLOGY
Payer: COMMERCIAL

## 2025-05-13 PROCEDURE — 95715 VEEG EA 12-26HR INTMT MNTR: CPT

## 2025-05-13 PROCEDURE — 99232 SBSQ HOSP IP/OBS MODERATE 35: CPT | Performed by: PSYCHIATRY & NEUROLOGY

## 2025-05-13 PROCEDURE — 95720 EEG PHY/QHP EA INCR W/VEEG: CPT | Performed by: PSYCHIATRY & NEUROLOGY

## 2025-05-13 RX ORDER — BUPRENORPHINE 8 MG/1
8 TABLET SUBLINGUAL EVERY 12 HOURS
Status: DISCONTINUED | OUTPATIENT
Start: 2025-05-13 | End: 2025-05-15 | Stop reason: HOSPADM

## 2025-05-13 RX ADMIN — LORATADINE 10 MG: 10 TABLET ORAL at 08:48

## 2025-05-13 RX ADMIN — METFORMIN HYDROCHLORIDE 500 MG: 500 TABLET, FILM COATED ORAL at 08:49

## 2025-05-13 RX ADMIN — PANTOPRAZOLE SODIUM 40 MG: 40 TABLET, DELAYED RELEASE ORAL at 06:00

## 2025-05-13 RX ADMIN — LEVOTHYROXINE SODIUM 50 MCG: 0.05 TABLET ORAL at 05:56

## 2025-05-13 RX ADMIN — ACETAMINOPHEN 650 MG: 325 TABLET, FILM COATED ORAL at 12:02

## 2025-05-13 RX ADMIN — ENOXAPARIN SODIUM 40 MG: 40 INJECTION SUBCUTANEOUS at 08:48

## 2025-05-13 RX ADMIN — LURASIDONE HYDROCHLORIDE 40 MG: 40 TABLET, FILM COATED ORAL at 08:52

## 2025-05-13 RX ADMIN — BUPRENORPHINE HYDROCHLORIDE 8 MG: 8 TABLET SUBLINGUAL at 09:49

## 2025-05-13 RX ADMIN — CLONIDINE HYDROCHLORIDE 0.2 MG: 0.2 TABLET ORAL at 20:30

## 2025-05-13 RX ADMIN — PANTOPRAZOLE SODIUM 40 MG: 40 TABLET, DELAYED RELEASE ORAL at 20:30

## 2025-05-13 RX ADMIN — ESCITALOPRAM OXALATE 10 MG: 10 TABLET ORAL at 20:30

## 2025-05-13 RX ADMIN — FOLIC ACID 1000 MCG: 1 TABLET ORAL at 08:49

## 2025-05-13 RX ADMIN — METHOCARBAMOL 750 MG: 750 TABLET ORAL at 20:30

## 2025-05-13 RX ADMIN — LAMOTRIGINE 250 MG: 100 TABLET ORAL at 17:31

## 2025-05-13 RX ADMIN — BUPRENORPHINE HYDROCHLORIDE 8 MG: 8 TABLET SUBLINGUAL at 21:12

## 2025-05-13 RX ADMIN — ENOXAPARIN SODIUM 40 MG: 40 INJECTION SUBCUTANEOUS at 20:30

## 2025-05-13 RX ADMIN — ONDANSETRON 4 MG: 2 INJECTION INTRAMUSCULAR; INTRAVENOUS at 17:31

## 2025-05-13 RX ADMIN — LAMOTRIGINE 250 MG: 100 TABLET ORAL at 08:48

## 2025-05-13 RX ADMIN — LURASIDONE HYDROCHLORIDE 60 MG: 40 TABLET, FILM COATED ORAL at 20:35

## 2025-05-13 RX ADMIN — ACETAMINOPHEN 650 MG: 325 TABLET, FILM COATED ORAL at 20:30

## 2025-05-13 RX ADMIN — METFORMIN HYDROCHLORIDE 500 MG: 500 TABLET, FILM COATED ORAL at 17:31

## 2025-05-13 NOTE — PLAN OF CARE
Problem: PAIN - ADULT  Goal: Verbalizes/displays adequate comfort level or baseline comfort level  Description: Interventions:- Encourage patient to monitor pain and request assistance- Assess pain using appropriate pain scale- Administer analgesics based on type and severity of pain and evaluate response- Implement non-pharmacological measures as appropriate and evaluate response- Consider cultural and social influences on pain and pain management- Notify physician/advanced practitioner if interventions unsuccessful or patient reports new pain  Outcome: Progressing     Problem: INFECTION - ADULT  Goal: Absence or prevention of progression during hospitalization  Description: INTERVENTIONS:- Assess and monitor for signs and symptoms of infection- Monitor lab/diagnostic results- Monitor all insertion sites, i.e. indwelling lines, tubes, and drains- Monitor endotracheal if appropriate and nasal secretions for changes in amount and color- [place] appropriate cooling/warming therapies per order- Administer medications as ordered- Instruct and encourage patient and family to use good hand hygiene technique- Identify and instruct in appropriate isolation precautions for identified infection/condition  Outcome: Progressing     Problem: SAFETY ADULT  Goal: Patient will remain free of falls  Description: INTERVENTIONS:- Educate patient/family on patient safety including physical limitations- Instruct patient to call for assistance with activity - Consult OT/PT to assist with strengthening/mobility - Keep Call bell within reach- Keep bed low and locked with side rails adjusted as appropriate- Keep care items and personal belongings within reach- Initiate and maintain comfort rounds- Make Fall Risk Sign visible to staff- Offer Toileting every 2 Hours, in advance of need- Initiate/Maintain bed alarm- Apply yellow socks and bracelet for high fall risk patients- Consider moving patient to room near nurses station  Outcome:  Progressing     Problem: DISCHARGE PLANNING  Goal: Discharge to home or other facility with appropriate resources  Description: INTERVENTIONS:- Identify barriers to discharge w/patient and caregiver- Arrange for needed discharge resources and transportation as appropriate- Identify discharge learning needs (meds, wound care, etc.)- Arrange for interpretive services to assist at discharge as needed- Refer to Case Management Department for coordinating discharge planning if the patient needs post-hospital services based on physician/advanced practitioner order or complex needs related to functional status, cognitive ability, or social support system  Outcome: Progressing     Problem: Knowledge Deficit  Goal: Patient/family/caregiver demonstrates understanding of disease process, treatment plan, medications, and discharge instructions  Description: Complete learning assessment and assess knowledge base.Interventions:- Provide teaching at level of understanding- Provide teaching via preferred learning methods  Outcome: Progressing

## 2025-05-13 NOTE — PLAN OF CARE
Problem: PAIN - ADULT  Goal: Verbalizes/displays adequate comfort level or baseline comfort level  Description: Interventions:- Encourage patient to monitor pain and request assistance- Assess pain using appropriate pain scale- Administer analgesics based on type and severity of pain and evaluate response- Implement non-pharmacological measures as appropriate and evaluate response- Consider cultural and social influences on pain and pain management- Notify physician/advanced practitioner if interventions unsuccessful or patient reports new pain  Outcome: Progressing     Problem: INFECTION - ADULT  Goal: Absence or prevention of progression during hospitalization  Description: INTERVENTIONS:- Assess and monitor for signs and symptoms of infection- Monitor lab/diagnostic results- Monitor all insertion sites, i.e. indwelling lines, tubes, and drains- Monitor endotracheal if appropriate and nasal secretions for changes in amount and color- [place] appropriate cooling/warming therapies per order- Administer medications as ordered- Instruct and encourage patient and family to use good hand hygiene technique- Identify and instruct in appropriate isolation precautions for identified infection/condition  Outcome: Progressing  Goal: Absence of fever/infection during neutropenic period  Description: INTERVENTIONS:- Monitor WBC  Outcome: Progressing     Problem: SAFETY ADULT  Goal: Patient will remain free of falls  Description: INTERVENTIONS:- Educate patient/family on patient safety including physical limitations- Instruct patient to call for assistance with activity - Consult OT/PT to assist with strengthening/mobility - Keep Call bell within reach- Keep bed low and locked with side rails adjusted as appropriate- Keep care items and personal belongings within reach- Initiate and maintain comfort rounds- Make Fall Risk Sign visible to staff- Offer Toileting every 2 Hours, in advance of need- Initiate/Maintain bed alarm-  Apply yellow socks and bracelet for high fall risk patients- Consider moving patient to room near nurses station  Outcome: Progressing  Goal: Maintain or return to baseline ADL function  Description: INTERVENTIONS:-  Assess patient's ability to carry out ADLs; assess patient's baseline for ADL function and identify physical deficits which impact ability to perform ADLs (bathing, care of mouth/teeth, toileting, grooming, dressing, etc.)- Assess/evaluate cause of self-care deficits - Assess range of motion- Assess patient's mobility; develop plan if impaired- Assess patient's need for assistive devices and provide as appropriate- Encourage maximum independence but intervene and supervise when necessary- Involve family in performance of ADLs- Assess for home care needs following discharge - Consider OT consult to assist with ADL evaluation and planning for discharge- Provide patient education as appropriate  Outcome: Progressing  Goal: Maintains/Returns to pre admission functional level  Description: INTERVENTIONS:- Perform AM-PAC 6 Click Basic Mobility/ Daily Activity assessment daily.- Set and communicate daily mobility goal to care team and patient/family/caregiver. - Collaborate with rehabilitation services on mobility goals if consulted- Perform Range of Motion 3 times a day.- Reposition patient every 2 hours.- Dangle patient 3 times a day- Stand patient 2 times a day- Ambulate patient 3 times a day- Out of bed to chair 2 times a day - Out of bed for meals 3 times a day- Out of bed for toileting- Record patient progress and toleration of activity level   Outcome: Progressing     Problem: DISCHARGE PLANNING  Goal: Discharge to home or other facility with appropriate resources  Description: INTERVENTIONS:- Identify barriers to discharge w/patient and caregiver- Arrange for needed discharge resources and transportation as appropriate- Identify discharge learning needs (meds, wound care, etc.)- Arrange for  interpretive services to assist at discharge as needed- Refer to Case Management Department for coordinating discharge planning if the patient needs post-hospital services based on physician/advanced practitioner order or complex needs related to functional status, cognitive ability, or social support system  Outcome: Progressing     Problem: Knowledge Deficit  Goal: Patient/family/caregiver demonstrates understanding of disease process, treatment plan, medications, and discharge instructions  Description: Complete learning assessment and assess knowledge base.Interventions:- Provide teaching at level of understanding- Provide teaching via preferred learning methods  Outcome: Progressing

## 2025-05-13 NOTE — PROGRESS NOTES
Epilepsy Monitoring Unit Follow-up Note  Patient Name: Na Alatorre  : 1975  MRN: [id number]  Date: 25 Time: 3:07 PM     LOS: 1 day   Attending: Aamir Doyle MD     Assessment/Plan:  Assessment & Plan  Transient alteration of awareness  Na Alatorre is a 50 y.o. female with spells of staring and unresponsiveness concerning for seizure vs nonepileptic spells. She has risk factors for PNES. Photic stimulation today. Sleep deprive tonight. No events so far. Continuous VEEG is required to characterize her events in order to guide therapy.     Anxiety  Stable continue home meds (lurasidone, lamotrigine, escitalopram, clonidine)   Acquired hypothyroidism  Stable. Home meds.   Type 2 diabetes mellitus with diabetic neuropathy, without long-term current use of insulin ([facility])  She tells me that her most rece A1c was under 5 and that POC glucose measurements are not needed at her facility.     Essential hypertension  Stable continue home meds (furosemide, losartan). BP on lower side today.   PTSD (post-traumatic stress disorder)  Stable  Migraine without status migrainosus, not intractable  Headache triggered by photic stimulation today.   Obstructive sleep apnea  Historical problem, no reported issues with gasping, snoring; does not use CPAP   Bipolar 1 disorder, depressed (HCC)  Stable. Home meds.   Diabetic gastroparesis  (HCC)  Patient denies any trouble moving her bowels. Per nursing facility she frequently refuses her bowel regimen but it has been added to her medication list here to be consistent with her home meds.              Interval History:  BP soft today, diuretic held. Headache after photic stimulation. Sitting up in bed with TV on and using pencils.     Summary of Events/Seizures:  None during this admission    ROS: Ten systems were reviewed and negative except for what is detailed above.    Medications   Current Facility-Administered Medications   Medication Dose Route Frequency  "Provider Last Rate    acetaminophen  650 mg Oral Q6H PRN Morelia Story MD      budesonide-formoterol  2 puff Inhalation BID Morelia tSory MD      buprenorphine  8 mg Sublingual Daily Morelia Story MD      cloNIDine  0.2 mg Oral Q12H RADHA Morelia Story MD      docusate sodium  100 mg Oral BID PRN Morelia Story MD      enoxaparin  40 mg Subcutaneous Q12H [facility] Morelia Story MD      EPINEPHrine PF  0.3 mg Intramuscular Once PRN Morelia Story MD      escitalopram  10 mg Oral HS Morelia Story MD      folic acid  1,000 mcg Oral Daily Morelia Story MD      furosemide  20 mg Oral BID (diuretic) Aamir Doyle MD      hydrOXYzine HCL  50 mg Oral HS PRN Morelia Story MD      hydrOXYzine HCL  50 mg Oral Q6H PRN Morelia Story MD      lamoTRIgine  250 mg Oral BID Morelia Story MD      levothyroxine  50 mcg Oral Early Morning Morelia Story MD      loratadine  10 mg Oral Daily Morelia Story MD      LORazepam  1 mg Intramuscular Q8H PRN Morelia Story MD      LORazepam  2 mg Intravenous Q8H PRN Morelia Story MD      losartan  25 mg Oral Daily Morelia Story MD      lurasidone  40 mg Oral Daily With Breakfast Morelia Story MD      lurasidone  60 mg Oral HS Morelia Story MD      melatonin  19.5 mg Oral HS Morelia Story MD      metFORMIN  500 mg Oral BID With Meals Morelia Story MD      methocarbamol  750 mg Oral Q6H PRN Morelia Story MD      nystatin   Topical BID PRN Morelia Story MD      ondansetron  4 mg Intravenous Q6H PRN Morelia Story MD      pantoprazole  40 mg Oral BID AC Aamir Doyle MD      polyethylene glycol  17 g Oral Daily PRN Morelia Story MD      semaglutide (0.25 or 0.5 mg/dose)  0.25 mg Subcutaneous Q7 Days Morelia Story MD      senna  17.2 mg Oral HS Morelia Story MD         Physical Exam   BP 96/57   Pulse 67   Temp 98.4 °F (36.9 °C)   Resp 18   Ht 5' 5\" (1.651 m)   Wt 90.7 kg (200 lb)   LMP  (LMP Unknown)   SpO2 92%   BMI 33.28 kg/m²    No distress. R BKA    Neurologic Exam  Mental Status:  A & 0 x 3  Language: fluent, comprehension intact  Cranial Nerves:  L pupil larger than right. VFFTC in right eye, but limited peripheral " vision in left eye. EOMI full.  Face symmetric.   Motor:  No drift.   Coordination: Finger to nose intact    Test Results:  VEEG Results the last 24 hrs: Please see separate report.        I have spent a total time of 20 minutes in caring for this patient on the day of the visit/encounter including Diagnostic results, Instructions for management, Patient and family education, Impressions, Counseling / Coordination of care, Documenting in the medical record, Reviewing/placing orders in the medical record (including tests, medications, and/or procedures), Obtaining or reviewing history  , and Communicating with other healthcare professionals .    Aamir Doyle MD Date: 5/13/2025 Time: 3:17 PM

## 2025-05-13 NOTE — UTILIZATION REVIEW
Initial Clinical Review    Admission: Date/Time/Statement:   Admission Orders (From admission, onward)       Ordered        05/12/25 0905  Inpatient Admission  Once                          Orders Placed This Encounter   Procedures    Inpatient Admission     Standing Status:   Standing     Number of Occurrences:   1     Level of Care:   Level 2 Stepdown / HOT [14]     Bed Type:   EMU [8]     Estimated length of stay:   More than 2 Midnights     Certification:   I certify that inpatient services are medically necessary for this patient for a duration of greater than two midnights. See H&P and MD Progress Notes for additional information about the patient's course of treatment.         Initial Presentation: 50 y.o. female presents for planned inpatient step down admission for evaluation and treatment of seizure like events.  She has spells of staring and unresponsiveness concerning for seizure vs nonepileptic spells. At risk for PNES.  Patient reports loud noise and bright lights have provoked events in the past.  Differential remains wide including seizure, non-epileptic metabolic.  Plan includes: video EEG, continue home lamotrigine, sleep deprivation, HV, photic  stimulation.      Date: 5-13-25    Day 2: inpatient step down  Video EEG in progress.  Plan for photic stimulation. Sleep deprive tonight. No events so far. Continuous video is required to capture and characterize her events to guide therapy.    Date: 5-14-25   On the afternoon of 5/13 there was an event of staring and then startle response when called by staff resulting in panic and additional staring with a period of unresponsiveness. No EEG correlate. The patient considers this to be her typical event. The event was about 1 minute, shorter than the reported events at her facility.  She recalls staff at her facility describing her longest event as 2-1/2 minutes, but Dr. James's note describes events lasting up to 15 minutes.  Will attempt to capture an  additional event today after sleep deprivation last night and plan to discharge her back to her facility tomorrow.           Scheduled Medications:  budesonide-formoterol, 2 puff, Inhalation, BID  buprenorphine, 8 mg, Sublingual, Daily  cloNIDine, 0.2 mg, Oral, Q12H RADHA  enoxaparin, 40 mg, Subcutaneous, Q12H RADHA  escitalopram, 10 mg, Oral, HS  folic acid, 1,000 mcg, Oral, Daily  furosemide, 20 mg, Oral, BID (diuretic)  lamoTRIgine, 250 mg, Oral, BID  levothyroxine, 50 mcg, Oral, Early Morning  loratadine, 10 mg, Oral, Daily  losartan, 25 mg, Oral, Daily  lurasidone, 40 mg, Oral, Daily With Breakfast  lurasidone, 60 mg, Oral, HS  melatonin, 19.5 mg, Oral, HS  metFORMIN, 500 mg, Oral, BID With Meals  pantoprazole, 40 mg, Oral, BID AC  semaglutide (0.25 or 0.5 mg/dose), 0.25 mg, Subcutaneous, Q7 Days  senna, 17.2 mg, Oral, HS      Continuous IV Infusions:     PRN Meds:  acetaminophen, 650 mg, Oral, Q6H PRN  docusate sodium, 100 mg, Oral, BID PRN  EPINEPHrine PF, 0.3 mg, Intramuscular, Once PRN  hydrOXYzine HCL, 50 mg, Oral, HS PRN  hydrOXYzine HCL, 50 mg, Oral, Q6H PRN  LORazepam, 1 mg, Intramuscular, Q8H PRN  LORazepam, 2 mg, Intravenous, Q8H PRN  methocarbamol, 750 mg, Oral, Q6H PRN  nystatin, , Topical, BID PRN  ondansetron, 4 mg, Intravenous, Q6H PRN  polyethylene glycol, 17 g, Oral, Daily PRN      ED Triage Vitals   Temperature Pulse Respirations Blood Pressure SpO2 Pain Score   05/12/25 1048 05/12/25 0900 05/12/25 1048 05/12/25 0900 05/12/25 0900 05/12/25 0900   (!) 96.2 °F   (35.7 °C) 56 16 113/78 96 % No Pain     Weight (last 2 days)       Date/Time Weight    05/12/25 09:00:12 90.7 (200)            Vital Signs (last 3 days)       Date/Time Temp Pulse Resp BP MAP (mmHg) SpO2 O2 Device Pain    05/13/25 0845 -- 82 -- 104/68 80 95 % -- --    05/13/25 08:43:07 -- 79 -- 104/68 80 92 % -- --    05/13/25 07:33:41 98.4 °F (36.9 °C) 64 18 103/60 74 93 % -- --    05/13/25 05:56:27 -- 63 -- 103/59 74 94 % -- --    05/12/25  2046 -- -- -- -- -- -- -- 8    05/12/25 20:37:54 98.9 °F (37.2 °C) 64 16 115/74 88 93 % -- --    05/12/25 2000 -- -- -- -- -- -- None (Room air) --    05/12/25 15:17:37 98.2 °F (36.8 °C) 61 15 106/68 81 95 % None (Room air) --    05/12/25 14:22:03 -- 68 -- 108/68 81 96 % -- --    05/12/25 10:48:59 96.2 °F (35.7 °C) 62 16 110/68 82 97 % -- --    05/12/25 09:00:12 -- 56 -- 113/78 90 96 % -- --    05/12/25 0900 -- -- -- -- -- -- -- No Pain         Pertinent Labs/Diagnostic Test Results:       Cardiology:  ECG 12 lead   Final  (05/12 1137)   Sinus bradycardia with 1st degree A-V block   Rightward axis   Low voltage QRS   Borderline ECG   When compared with ECG of 09-May-2023 13:12,   WI interval has increased   Incomplete right bundle branch block is no longer Present              Results from last 7 days   Lab Units 05/12/25  1041   WBC Thousand/uL 6.94   HEMOGLOBIN g/dL 12.4   HEMATOCRIT % 40.4   PLATELETS Thousands/uL 298   TOTAL NEUT ABS Thousands/µL 2.91         Results from last 7 days   Lab Units 05/12/25  1041   SODIUM mmol/L 137   POTASSIUM mmol/L 3.8   CHLORIDE mmol/L 99   CO2 mmol/L 33*   ANION GAP mmol/L 5   BUN mg/dL 16   CREATININE mg/dL 1.04   EGFR ml/min/1.73sq m 63   CALCIUM mg/dL 9.1     Results from last 7 days   Lab Units 05/12/25  1041   AST U/L 12*   ALT U/L 8   ALK PHOS U/L 68   TOTAL PROTEIN g/dL 6.6   ALBUMIN g/dL 3.8   TOTAL BILIRUBIN mg/dL 0.30     Results from last 7 days   Lab Units 05/12/25  1041   GLUCOSE RANDOM mg/dL 87       Past Medical History:   Diagnosis Date    Acute osteomyelitis of metatarsal bone of right foot (HCC) 05/09/2023    Anxiety     Arthritis     Asthma     Chronic pain     Diabetes mellitus (HCC)     Hypothyroidism     Lymphedema     left leg    Manic bipolar I disorder (HCC)     Morbid obesity (HCC)     OCD (obsessive compulsive disorder)     Osteomyelitis of left foot (HCC) 09/06/2019    Added automatically from request for surgery [id number]      PTSD (post-traumatic  stress disorder)      Present on Admission:   Essential hypertension   Anxiety   Acquired hypothyroidism   Type 2 diabetes mellitus with diabetic neuropathy, without long-term current use of insulin (HCC)   Obstructive sleep apnea   PTSD (post-traumatic stress disorder)   Bipolar 1 disorder, depressed (HCC)   Hyperlipidemia associated with type 2 diabetes mellitus  (HCC)   Transient alteration of awareness   Diabetic gastroparesis  (HCC)   Migraine without status migrainosus, not intractable      Admitting Diagnosis: Transient alteration of awareness    Age/Sex: 50 y.o. female    Network Utilization Review Department  ATTENTION: Please call with any questions or concerns to [phone] and carefully listen to the prompts so that you are directed to the right person. All voicemails are confidential.   For Discharge needs, contact Care Management DC Support Team at [phone] opt. [phone]  Send all requests for admission clinical reviews, approved or denied determinations and any other requests to dedicated fax number below belonging to the campus where the patient is receiving treatment. List of dedicated fax numbers for the Facilities:  FACILITY NAME UR FAX NUMBER   ADMISSION DENIALS (Administrative/Medical Necessity) [phone]   DISCHARGE SUPPORT TEAM (NETWORK) [phone]   PARENT CHILD HEALTH (Maternity/NICU/Pediatrics) [phone]   [facility] [phone]   [facility] [phone]   [facility] [phone]   [facility] [phone]   [facility] [phone]   [facility] [phone]   [facility] [phone]   [facility] [phone]   [facility] [phone]   [facility] [phone]   [facility]  [phone]   [facility] [phone]         Elidel Counseling: Patient may experience a mild burning sensation during topical application. Elidel is not approved in children less than 2 years of age. There have been case reports of hematologic and skin malignancies in patients using topical calcineurin inhibitors although causality is questionable.

## 2025-05-13 NOTE — ASSESSMENT & PLAN NOTE
"Lab Results   Component Value Date    HGBA1C 7.2 (H) 05/05/2023       No results for input(s): \"POCGLU\" in the last 72 hours.    Blood Sugar Average: Last 72 hrs:      "

## 2025-05-13 NOTE — ASSESSMENT & PLAN NOTE
She tells me that her most rece A1c was under 5 and that POC glucose measurements are not needed at her facility.

## 2025-05-13 NOTE — ASSESSMENT & PLAN NOTE
Na Alatorre is a 50 y.o. female with spells of staring and unresponsiveness concerning for seizure vs nonepileptic spells. She has risk factors for PNES. Photic stimulation today. Sleep deprive tonight. No events so far. Continuous VEEG is required to characterize her events in order to guide therapy.

## 2025-05-14 ENCOUNTER — APPOINTMENT (INPATIENT)
Dept: NEUROLOGY | Facility: CLINIC | Age: 50
End: 2025-05-14
Attending: PSYCHIATRY & NEUROLOGY
Payer: COMMERCIAL

## 2025-05-14 PROCEDURE — 95720 EEG PHY/QHP EA INCR W/VEEG: CPT | Performed by: PSYCHIATRY & NEUROLOGY

## 2025-05-14 PROCEDURE — 95715 VEEG EA 12-26HR INTMT MNTR: CPT

## 2025-05-14 PROCEDURE — 99233 SBSQ HOSP IP/OBS HIGH 50: CPT | Performed by: PSYCHIATRY & NEUROLOGY

## 2025-05-14 RX ADMIN — PANTOPRAZOLE SODIUM 40 MG: 40 TABLET, DELAYED RELEASE ORAL at 08:01

## 2025-05-14 RX ADMIN — HYDROXYZINE HYDROCHLORIDE 50 MG: 50 TABLET, FILM COATED ORAL at 20:54

## 2025-05-14 RX ADMIN — LEVOTHYROXINE SODIUM 50 MCG: 0.05 TABLET ORAL at 08:05

## 2025-05-14 RX ADMIN — BUPRENORPHINE HYDROCHLORIDE 8 MG: 8 TABLET SUBLINGUAL at 08:05

## 2025-05-14 RX ADMIN — ONDANSETRON 4 MG: 2 INJECTION INTRAMUSCULAR; INTRAVENOUS at 19:35

## 2025-05-14 RX ADMIN — METFORMIN HYDROCHLORIDE 500 MG: 500 TABLET, FILM COATED ORAL at 18:43

## 2025-05-14 RX ADMIN — LORATADINE 10 MG: 10 TABLET ORAL at 08:05

## 2025-05-14 RX ADMIN — LAMOTRIGINE 250 MG: 100 TABLET ORAL at 08:05

## 2025-05-14 RX ADMIN — FUROSEMIDE 20 MG: 20 TABLET ORAL at 08:01

## 2025-05-14 RX ADMIN — CLONIDINE HYDROCHLORIDE 0.2 MG: 0.2 TABLET ORAL at 08:05

## 2025-05-14 RX ADMIN — ENOXAPARIN SODIUM 40 MG: 40 INJECTION SUBCUTANEOUS at 20:47

## 2025-05-14 RX ADMIN — CLONIDINE HYDROCHLORIDE 0.2 MG: 0.2 TABLET ORAL at 20:48

## 2025-05-14 RX ADMIN — FOLIC ACID 1000 MCG: 1 TABLET ORAL at 08:05

## 2025-05-14 RX ADMIN — PANTOPRAZOLE SODIUM 40 MG: 40 TABLET, DELAYED RELEASE ORAL at 20:48

## 2025-05-14 RX ADMIN — METFORMIN HYDROCHLORIDE 500 MG: 500 TABLET, FILM COATED ORAL at 08:05

## 2025-05-14 RX ADMIN — ACETAMINOPHEN 650 MG: 325 TABLET, FILM COATED ORAL at 18:42

## 2025-05-14 RX ADMIN — ESCITALOPRAM OXALATE 10 MG: 10 TABLET ORAL at 20:48

## 2025-05-14 RX ADMIN — LURASIDONE HYDROCHLORIDE 40 MG: 40 TABLET, FILM COATED ORAL at 08:06

## 2025-05-14 RX ADMIN — LAMOTRIGINE 250 MG: 100 TABLET ORAL at 18:43

## 2025-05-14 RX ADMIN — LOSARTAN POTASSIUM 25 MG: 25 TABLET, FILM COATED ORAL at 08:05

## 2025-05-14 RX ADMIN — Medication 19.5 MG: at 20:48

## 2025-05-14 RX ADMIN — ENOXAPARIN SODIUM 40 MG: 40 INJECTION SUBCUTANEOUS at 08:05

## 2025-05-14 RX ADMIN — BUPRENORPHINE HYDROCHLORIDE 8 MG: 8 TABLET SUBLINGUAL at 20:47

## 2025-05-14 RX ADMIN — HYDROXYZINE HYDROCHLORIDE 50 MG: 50 TABLET, FILM COATED ORAL at 20:55

## 2025-05-14 NOTE — PROGRESS NOTES
Epilepsy Monitoring Unit Follow-up Note  Patient Name: Na Alatorre  : 1975  MRN: [id number]  Date: 25 Time: 11:23 AM     LOS: 2 days   Attending: Aamir Doyle MD     Assessment/Plan:  Assessment & Plan  Transient alteration of awareness  Na Alatorre is a 50 y.o. female with spells of staring and unresponsiveness concerning for seizure vs nonepileptic spells. She has risk factors for PNES. Photic stimulation . Sleep deprived  into .  Continuous VEEG is required to characterize her events in order to guide therapy.     On the afternoon of  there was an event of staring and then startle response when called by staff resulting in panic and additional staring with a period of unresponsiveness. No EEG correlate. The patient considers this to be her typical event. The event was about 1 minute, shorter than the reported events at her facility.  She recalls staff at her facility describing her longest event as 2-1/2 minutes, but Dr. James's note describes events lasting up to 15 minutes.  Will attempt to capture an additional event today after sleep deprivation last night and plan to discharge her back to her facility tomorrow.  Anxiety  Stable continue home meds (lurasidone, lamotrigine, escitalopram, clonidine)   Acquired hypothyroidism  Stable. Home meds.   Type 2 diabetes mellitus with diabetic neuropathy, without long-term current use of insulin ([facility])  She tells me that her most rece A1c was under 5 and that POC glucose measurements are not needed at her facility.     Essential hypertension  Stable continue home meds (furosemide, losartan). BP on lower side today.   PTSD (post-traumatic stress disorder)  Stable  Migraine without status migrainosus, not intractable  Headache triggered by photic stimulation today.   Obstructive sleep apnea  Historical problem, no reported issues with gasping, snoring; does not use CPAP   Bipolar 1 disorder, depressed (HCC)  Stable. Home meds.    Diabetic gastroparesis  (HCC)  Patient denies any trouble moving her bowels. Per nursing facility she frequently refuses her bowel regimen but it has been added to her medication list here to be consistent with her home meds.              Interval History:  Buprenorphine initially only given once daily, but per PDMP she receives twice daily dosing. Dosing updated on 5/13. MAR requested from facility, but not received.     Summary of Events/Seizures:  Yes. See impression.    ROS: Ten systems were reviewed and negative except for what is detailed above.    Medications   Current Facility-Administered Medications   Medication Dose Route Frequency Provider Last Rate    acetaminophen  650 mg Oral Q6H PRN Morelia Story MD      budesonide-formoterol  2 puff Inhalation BID Morelia Story MD      buprenorphine  8 mg Sublingual Q12H Aamir Doyle MD      cloNIDine  0.2 mg Oral Q12H RADHA Morelia Story MD      docusate sodium  100 mg Oral BID PRN Morelia Story MD      enoxaparin  40 mg Subcutaneous Q12H RADHA Story MD      EPINEPHrine PF  0.3 mg Intramuscular Once PRN Morelia tSory MD      escitalopram  10 mg Oral HS Morelia Story MD      folic acid  1,000 mcg Oral Daily Morelia Story MD      furosemide  20 mg Oral BID (diuretic) Aamir Doyle MD      hydrOXYzine HCL  50 mg Oral HS PRN Morelia Story MD      hydrOXYzine HCL  50 mg Oral Q6H PRN Morelia Story MD      lamoTRIgine  250 mg Oral BID Morelia Story MD      levothyroxine  50 mcg Oral Early Morning Morelia Story MD      loratadine  10 mg Oral Daily Morelia Story MD      LORazepam  1 mg Intramuscular Q8H PRN Morelia Story MD      LORazepam  2 mg Intravenous Q8H PRN Morelia tSory MD      losartan  25 mg Oral Daily Morelia Story MD      lurasidone  40 mg Oral Daily With Breakfast Morelia Story MD      lurasidone  60 mg Oral HS Morelia Story MD      melatonin  19.5 mg Oral HS Morelia Story MD      metFORMIN  500 mg Oral BID With Meals Morelia Story MD      methocarbamol  750 mg Oral Q6H PRN Morelia Story MD      nystatin   Topical BID PRN Morelia Story MD       "ondansetron  4 mg Intravenous Q6H PRN Morelia Story MD      pantoprazole  40 mg Oral BID AC Aamir Doyle MD      polyethylene glycol  17 g Oral Daily PRN Morelia Story MD      semaglutide (0.25 or 0.5 mg/dose)  0.25 mg Subcutaneous Q7 Days Morelia Story MD      senna  17.2 mg Oral HS Moreila Story MD         Physical Exam   /93   Pulse 65   Temp 98.6 °F (37 °C)   Resp 14   Ht 5' 5\" (1.651 m)   Wt 90.7 kg (200 lb)   LMP  (LMP Unknown)   SpO2 95%   BMI 33.28 kg/m²    No distress. R BKA    Neurologic Exam  Mental Status:  A & 0 x 3  Language: fluent, comprehension intact  Cranial Nerves:   EOMI full.  Face symmetric.   Motor:  No drift. 5/5 in uppers.   Coordination: Finger to nose intact    Test Results:  VEEG Results the last 24 hrs: Please see separate report.        I have spent a total time of 45 minutes in caring for this patient on the day of the visit/encounter including Diagnostic results, Instructions for management, Patient and family education, Impressions, Counseling / Coordination of care, Documenting in the medical record, Reviewing/placing orders in the medical record (including tests, medications, and/or procedures), Obtaining or reviewing history  , and Communicating with other healthcare professionals .    Aamir Doyle MD Date: 5/14/2025 Time: 11:49 AM    "

## 2025-05-14 NOTE — PLAN OF CARE
Problem: PAIN - ADULT  Goal: Verbalizes/displays adequate comfort level or baseline comfort level  Description: Interventions:- Encourage patient to monitor pain and request assistance- Assess pain using appropriate pain scale- Administer analgesics based on type and severity of pain and evaluate response- Implement non-pharmacological measures as appropriate and evaluate response- Consider cultural and social influences on pain and pain management- Notify physician/advanced practitioner if interventions unsuccessful or patient reports new pain  Outcome: Progressing     Problem: INFECTION - ADULT  Goal: Absence or prevention of progression during hospitalization  Description: INTERVENTIONS:- Assess and monitor for signs and symptoms of infection- Monitor lab/diagnostic results- Monitor all insertion sites, i.e. indwelling lines, tubes, and drains- Monitor endotracheal if appropriate and nasal secretions for changes in amount and color- [place] appropriate cooling/warming therapies per order- Administer medications as ordered- Instruct and encourage patient and family to use good hand hygiene technique- Identify and instruct in appropriate isolation precautions for identified infection/condition  Outcome: Progressing  Goal: Absence of fever/infection during neutropenic period  Description: INTERVENTIONS:- Monitor WBC  Outcome: Progressing     Problem: SAFETY ADULT  Goal: Patient will remain free of falls  Description: INTERVENTIONS:- Educate patient/family on patient safety including physical limitations- Instruct patient to call for assistance with activity - Consult OT/PT to assist with strengthening/mobility - Keep Call bell within reach- Keep bed low and locked with side rails adjusted as appropriate- Keep care items and personal belongings within reach- Initiate and maintain comfort rounds- Make Fall Risk Sign visible to staff- Offer Toileting every 2 Hours, in advance of need- Initiate/Maintain bed alarm-  Apply yellow socks and bracelet for high fall risk patients- Consider moving patient to room near nurses station  Outcome: Progressing  Goal: Maintain or return to baseline ADL function  Description: INTERVENTIONS:-  Assess patient's ability to carry out ADLs; assess patient's baseline for ADL function and identify physical deficits which impact ability to perform ADLs (bathing, care of mouth/teeth, toileting, grooming, dressing, etc.)- Assess/evaluate cause of self-care deficits - Assess range of motion- Assess patient's mobility; develop plan if impaired- Assess patient's need for assistive devices and provide as appropriate- Encourage maximum independence but intervene and supervise when necessary- Involve family in performance of ADLs- Assess for home care needs following discharge - Consider OT consult to assist with ADL evaluation and planning for discharge- Provide patient education as appropriate  Outcome: Progressing  Goal: Maintains/Returns to pre admission functional level  Description: INTERVENTIONS:- Perform AM-PAC 6 Click Basic Mobility/ Daily Activity assessment daily.- Set and communicate daily mobility goal to care team and patient/family/caregiver. - Collaborate with rehabilitation services on mobility goals if consulted- Perform Range of Motion 3 times a day.- Reposition patient every 2 hours.- Dangle patient 3 times a day- Stand patient 2 times a day- Ambulate patient 3 times a day- Out of bed to chair 2 times a day - Out of bed for meals 3 times a day- Out of bed for toileting- Record patient progress and toleration of activity level   Outcome: Progressing     Problem: DISCHARGE PLANNING  Goal: Discharge to home or other facility with appropriate resources  Description: INTERVENTIONS:- Identify barriers to discharge w/patient and caregiver- Arrange for needed discharge resources and transportation as appropriate- Identify discharge learning needs (meds, wound care, etc.)- Arrange for  interpretive services to assist at discharge as needed- Refer to Case Management Department for coordinating discharge planning if the patient needs post-hospital services based on physician/advanced practitioner order or complex needs related to functional status, cognitive ability, or social support system  Outcome: Progressing     Problem: Knowledge Deficit  Goal: Patient/family/caregiver demonstrates understanding of disease process, treatment plan, medications, and discharge instructions  Description: Complete learning assessment and assess knowledge base.Interventions:- Provide teaching at level of understanding- Provide teaching via preferred learning methods  Outcome: Progressing

## 2025-05-14 NOTE — PLAN OF CARE
Problem: PAIN - ADULT  Goal: Verbalizes/displays adequate comfort level or baseline comfort level  Description: Interventions:- Encourage patient to monitor pain and request assistance- Assess pain using appropriate pain scale- Administer analgesics based on type and severity of pain and evaluate response- Implement non-pharmacological measures as appropriate and evaluate response- Consider cultural and social influences on pain and pain management- Notify physician/advanced practitioner if interventions unsuccessful or patient reports new pain  Outcome: Progressing     Problem: INFECTION - ADULT  Goal: Absence or prevention of progression during hospitalization  Description: INTERVENTIONS:- Assess and monitor for signs and symptoms of infection- Monitor lab/diagnostic results- Monitor all insertion sites, i.e. indwelling lines, tubes, and drains- Monitor endotracheal if appropriate and nasal secretions for changes in amount and color- [place] appropriate cooling/warming therapies per order- Administer medications as ordered- Instruct and encourage patient and family to use good hand hygiene technique- Identify and instruct in appropriate isolation precautions for identified infection/condition  Outcome: Progressing  Goal: Absence of fever/infection during neutropenic period  Description: INTERVENTIONS:- Monitor WBC  Outcome: Progressing     Problem: SAFETY ADULT  Goal: Patient will remain free of falls  Description: INTERVENTIONS:- Educate patient/family on patient safety including physical limitations- Instruct patient to call for assistance with activity - Consult OT/PT to assist with strengthening/mobility - Keep Call bell within reach- Keep bed low and locked with side rails adjusted as appropriate- Keep care items and personal belongings within reach- Initiate and maintain comfort rounds- Make Fall Risk Sign visible to staff- Offer Toileting every 2 Hours, in advance of need- Initiate/Maintain bed alarm-  Apply yellow socks and bracelet for high fall risk patients- Consider moving patient to room near nurses station  Outcome: Progressing  Goal: Maintain or return to baseline ADL function  Description: INTERVENTIONS:-  Assess patient's ability to carry out ADLs; assess patient's baseline for ADL function and identify physical deficits which impact ability to perform ADLs (bathing, care of mouth/teeth, toileting, grooming, dressing, etc.)- Assess/evaluate cause of self-care deficits - Assess range of motion- Assess patient's mobility; develop plan if impaired- Assess patient's need for assistive devices and provide as appropriate- Encourage maximum independence but intervene and supervise when necessary- Involve family in performance of ADLs- Assess for home care needs following discharge - Consider OT consult to assist with ADL evaluation and planning for discharge- Provide patient education as appropriate  Outcome: Progressing  Goal: Maintains/Returns to pre admission functional level  Description: INTERVENTIONS:- Perform AM-PAC 6 Click Basic Mobility/ Daily Activity assessment daily.- Set and communicate daily mobility goal to care team and patient/family/caregiver. - Collaborate with rehabilitation services on mobility goals if consulted- Perform Range of Motion 3 times a day.- Reposition patient every 2 hours.- Dangle patient 3 times a day- Stand patient 2 times a day- Ambulate patient 3 times a day- Out of bed to chair 2 times a day - Out of bed for meals 3 times a day- Out of bed for toileting- Record patient progress and toleration of activity level   Outcome: Progressing     Problem: DISCHARGE PLANNING  Goal: Discharge to home or other facility with appropriate resources  Description: INTERVENTIONS:- Identify barriers to discharge w/patient and caregiver- Arrange for needed discharge resources and transportation as appropriate- Identify discharge learning needs (meds, wound care, etc.)- Arrange for  interpretive services to assist at discharge as needed- Refer to Case Management Department for coordinating discharge planning if the patient needs post-hospital services based on physician/advanced practitioner order or complex needs related to functional status, cognitive ability, or social support system  Outcome: Progressing     Problem: Knowledge Deficit  Goal: Patient/family/caregiver demonstrates understanding of disease process, treatment plan, medications, and discharge instructions  Description: Complete learning assessment and assess knowledge base.Interventions:- Provide teaching at level of understanding- Provide teaching via preferred learning methods  Outcome: Progressing

## 2025-05-15 VITALS
DIASTOLIC BLOOD PRESSURE: 58 MMHG | HEART RATE: 70 BPM | RESPIRATION RATE: 18 BRPM | WEIGHT: 200 LBS | BODY MASS INDEX: 33.32 KG/M2 | SYSTOLIC BLOOD PRESSURE: 96 MMHG | TEMPERATURE: 97.4 F | HEIGHT: 65 IN | OXYGEN SATURATION: 96 %

## 2025-05-15 PROCEDURE — 99238 HOSP IP/OBS DSCHRG MGMT 30/<: CPT | Performed by: PSYCHIATRY & NEUROLOGY

## 2025-05-15 RX ADMIN — LORATADINE 10 MG: 10 TABLET ORAL at 08:10

## 2025-05-15 RX ADMIN — FOLIC ACID 1000 MCG: 1 TABLET ORAL at 08:10

## 2025-05-15 RX ADMIN — ACETAMINOPHEN 650 MG: 325 TABLET, FILM COATED ORAL at 07:30

## 2025-05-15 RX ADMIN — LOSARTAN POTASSIUM 25 MG: 25 TABLET, FILM COATED ORAL at 08:10

## 2025-05-15 RX ADMIN — METFORMIN HYDROCHLORIDE 500 MG: 500 TABLET, FILM COATED ORAL at 08:10

## 2025-05-15 RX ADMIN — LEVOTHYROXINE SODIUM 50 MCG: 0.05 TABLET ORAL at 07:25

## 2025-05-15 RX ADMIN — ONDANSETRON 4 MG: 2 INJECTION INTRAMUSCULAR; INTRAVENOUS at 08:19

## 2025-05-15 RX ADMIN — LAMOTRIGINE 250 MG: 100 TABLET ORAL at 08:10

## 2025-05-15 RX ADMIN — FUROSEMIDE 20 MG: 20 TABLET ORAL at 08:10

## 2025-05-15 RX ADMIN — BUPRENORPHINE HYDROCHLORIDE 8 MG: 8 TABLET SUBLINGUAL at 08:10

## 2025-05-15 RX ADMIN — PANTOPRAZOLE SODIUM 40 MG: 40 TABLET, DELAYED RELEASE ORAL at 07:26

## 2025-05-15 RX ADMIN — CLONIDINE HYDROCHLORIDE 0.2 MG: 0.2 TABLET ORAL at 08:10

## 2025-05-15 RX ADMIN — ENOXAPARIN SODIUM 40 MG: 40 INJECTION SUBCUTANEOUS at 08:10

## 2025-05-15 RX ADMIN — LURASIDONE HYDROCHLORIDE 40 MG: 40 TABLET, FILM COATED ORAL at 08:11

## 2025-05-15 NOTE — CASE MANAGEMENT
Case Management Discharge Planning Note    Patient name Na Alatorre  Location [facility]-01 MRN [id number]  : 1975 Date 5/15/2025       Current Admission Date: 2025  Current Admission Diagnosis:Transient alteration of awareness   Patient Active Problem List    Diagnosis Date Noted    Diabetic gastroparesis  ([facility]) 2024    Staring episodes 2024    Transient alteration of awareness 2024    Lymphedema 2024    Diabetic polyneuropathy associated with type 2 diabetes mellitus ([facility]) 2023    Asthma, intrinsic 2023    Hx of right BKA ([facility]) 2023    Homeless single person 2023    Morbid obesity with BMI of 45.0-49.9, adult ([facility]) 2023    Obstructive sleep apnea 2022    Diastasis of rectus abdominis 2021    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 ([facility]) 2021    Gastroesophageal reflux disease without esophagitis 2021    Migraine without status migrainosus, not intractable 2021    Impaired intestinal absorption 2021    Intestinal malabsorption following gastrectomy 2021    Right foot ulcer, with fat layer exposed ([facility]) 2021    Intertriginous dermatitis associated with moisture 2021    Arthritis 2021    Other chronic pain 2021    OCD (obsessive compulsive disorder)     PTSD (post-traumatic stress disorder)     Essential hypertension 2021    Acquired absence of other left toe(s) ([facility]) 2019    Folate deficiency 2019    S/P laparoscopic sleeve gastrectomy 2019    Type 2 diabetes mellitus with diabetic neuropathy, without long-term current use of insulin ([facility]) 2019    Acquired hypothyroidism 2019    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, with bone involvement without evidence of necrosis ([facility]) 2019    H/O bariatric surgery 2019    Anxiety 2019    Drusen body, unspecified laterality  01/25/2019    Radiculopathy, lumbar region 09/12/2018    Chronic right-sided low back pain with left-sided sciatica 09/04/2018    Hyperlipidemia associated with type 2 diabetes mellitus  (HCC) 09/25/2017    Abnormal x-ray of cervical spine 08/14/2017    Right cervical radiculopathy 08/11/2017    Bipolar 1 disorder, depressed (HCC) 04/15/2017    Amenorrhea 11/29/2016    Instability of left knee joint 05/25/2016    Allergy to bee sting 04/25/2016    Acquired equinovarus deformity of left foot 05/19/2015    Atypical squamous cell changes of undetermined significance (ASCUS) on cervical cytology with positive high risk human papilloma virus (HPV) 02/23/2015      LOS (days): 3  Geometric Mean LOS (GMLOS) (days): 2.7  Days to GMLOS:-0.4     OBJECTIVE:  Risk of Unplanned Readmission Score: 13.98         Current admission status: Inpatient   Preferred Pharmacy:   [facility] [address]  [facility] [address]  Phone: [phone] Fax: [phone]    [place] [address] [address] [address]  Phone: [phone] Fax: [phone]    Primary Care Provider: Neftali Maki DO    Primary Insurance: [facility], [facility]  Secondary Insurance:     DISCHARGE DETAILS:    Discharge planning discussed with:: Patient    Treatment Team Recommendation: Facility Return  Discharge Destination Plan:: Facility Return  Transport at Discharge : Wheelchair van     Number/Name of Dispatcher: [employer]  Transported by (Company and Unit #): Dominique  ETA of Transport (Date): 05/15/25  ETA of Transport (Time): 1330        Additional Comments: Patient for return to [facility] today- facility made aware.  Transport arranged via [employer] at 1:30.  Patient, provider and nurse made aware    [facility]

## 2025-05-15 NOTE — PLAN OF CARE
Problem: PAIN - ADULT  Goal: Verbalizes/displays adequate comfort level or baseline comfort level  Description: Interventions:- Encourage patient to monitor pain and request assistance- Assess pain using appropriate pain scale- Administer analgesics based on type and severity of pain and evaluate response- Implement non-pharmacological measures as appropriate and evaluate response- Consider cultural and social influences on pain and pain management- Notify physician/advanced practitioner if interventions unsuccessful or patient reports new pain  Outcome: Progressing     Problem: INFECTION - ADULT  Goal: Absence or prevention of progression during hospitalization  Description: INTERVENTIONS:- Assess and monitor for signs and symptoms of infection- Monitor lab/diagnostic results- Monitor all insertion sites, i.e. indwelling lines, tubes, and drains- Monitor endotracheal if appropriate and nasal secretions for changes in amount and color- [place] appropriate cooling/warming therapies per order- Administer medications as ordered- Instruct and encourage patient and family to use good hand hygiene technique- Identify and instruct in appropriate isolation precautions for identified infection/condition  Outcome: Progressing  Goal: Absence of fever/infection during neutropenic period  Description: INTERVENTIONS:- Monitor WBC  Outcome: Progressing     Problem: SAFETY ADULT  Goal: Patient will remain free of falls  Description: INTERVENTIONS:- Educate patient/family on patient safety including physical limitations- Instruct patient to call for assistance with activity - Consult OT/PT to assist with strengthening/mobility - Keep Call bell within reach- Keep bed low and locked with side rails adjusted as appropriate- Keep care items and personal belongings within reach- Initiate and maintain comfort rounds- Make Fall Risk Sign visible to staff- Offer Toileting every 2 Hours, in advance of need- Initiate/Maintain bed alarm-  Apply yellow socks and bracelet for high fall risk patients- Consider moving patient to room near nurses station  Outcome: Progressing  Goal: Maintain or return to baseline ADL function  Description: INTERVENTIONS:-  Assess patient's ability to carry out ADLs; assess patient's baseline for ADL function and identify physical deficits which impact ability to perform ADLs (bathing, care of mouth/teeth, toileting, grooming, dressing, etc.)- Assess/evaluate cause of self-care deficits - Assess range of motion- Assess patient's mobility; develop plan if impaired- Assess patient's need for assistive devices and provide as appropriate- Encourage maximum independence but intervene and supervise when necessary- Involve family in performance of ADLs- Assess for home care needs following discharge - Consider OT consult to assist with ADL evaluation and planning for discharge- Provide patient education as appropriate  Outcome: Progressing  Goal: Maintains/Returns to pre admission functional level  Description: INTERVENTIONS:- Perform AM-PAC 6 Click Basic Mobility/ Daily Activity assessment daily.- Set and communicate daily mobility goal to care team and patient/family/caregiver. - Collaborate with rehabilitation services on mobility goals if consulted- Perform Range of Motion 3 times a day.- Reposition patient every 2 hours.- Dangle patient 3 times a day- Stand patient 2 times a day- Ambulate patient 3 times a day- Out of bed to chair 2 times a day - Out of bed for meals 3 times a day- Out of bed for toileting- Record patient progress and toleration of activity level   Outcome: Progressing     Problem: DISCHARGE PLANNING  Goal: Discharge to home or other facility with appropriate resources  Description: INTERVENTIONS:- Identify barriers to discharge w/patient and caregiver- Arrange for needed discharge resources and transportation as appropriate- Identify discharge learning needs (meds, wound care, etc.)- Arrange for  interpretive services to assist at discharge as needed- Refer to Case Management Department for coordinating discharge planning if the patient needs post-hospital services based on physician/advanced practitioner order or complex needs related to functional status, cognitive ability, or social support system  Outcome: Progressing     Problem: Knowledge Deficit  Goal: Patient/family/caregiver demonstrates understanding of disease process, treatment plan, medications, and discharge instructions  Description: Complete learning assessment and assess knowledge base.Interventions:- Provide teaching at level of understanding- Provide teaching via preferred learning methods  Outcome: Progressing

## 2025-05-15 NOTE — DISCHARGE SUMMARY
Epilepsy Monitoring Unit Discharge Summary    Patient Name: Na Alatorre   YOB: 1975   MRN: [id number]   Date of Admission: 5/12/2025   Date of Discharge:     Referring Physician: Keturah James MD   Attending on Admission: Aamir Doyle MD  Attending on Discharge: Aamir Doyle MD      Discharge Diagnoses:  Transient Alteration of Awareness R40.4  PTSD  Bipolar 1 d/o  DM2  Gastroparesis  Hypothyroidism  HTN  HLD      Consultations:  None    Procedures:  Continuous Video EEG      Disposition:  Discharge to facility    Admission Medications:  Prior to Admission Medications   Prescriptions Last Dose Informant Patient Reported? Taking?   EPINEPHrine (EPIPEN) 0.3 mg/0.3 mL SOAJ More than a month Self Yes No   Glucagon, rDNA, (Glucagon Emergency) 1 MG KIT Unknown Self Yes No   Melatonin 5 MG TABS 5/11/2025 Bedtime  Yes Yes   Sig: Take 20 mg by mouth daily at bedtime   Ozempic, 0.25 or 0.5 MG/DOSE, 2 MG/3ML injection pen Past Week  Yes Yes   acetaminophen (TYLENOL) 325 mg tablet Unknown Self No No   Sig: Take 2 tablets (650 mg total) by mouth every 6 (six) hours as needed for mild pain, headaches or fever   budesonide-formoterol (SYMBICORT) 80-4.5 MCG/ACT inhaler Unknown  Yes No   Sig: Inhale 2 puffs 2 (two) times a day Rinse mouth after use.   buprenorphine-naloxone (Suboxone) 2-0.5 mg 5/11/2025  Yes Yes   Sig: Place 2 mg under the tongue 2 (two) times a day   buprenorphine-naloxone (Suboxone) 4-1 MG 5/11/2025  Yes Yes   Sig: Place 4 mg under the tongue 2 (two) times a day   cetirizine (ZyrTEC Allergy) 10 mg tablet 5/11/2025  Yes Yes   Sig: Take 10 mg by mouth daily   cloNIDine (CATAPRES) 0.2 mg tablet 5/11/2025 Self Yes Yes   Sig: Take 0.2 mg by mouth 2 (two) times a day   diazepam (VALIUM) 5 mg tablet Unknown  No No   Sig: Take 1 tab as needed for anxiety 30 minutes prior to MRI study, may repeat second dose if needed if still anxious 30 minutes later.   diphenhydrAMINE (BENADRYL) 25 mg  capsule Unknown Outside Facility (Specify) Yes No   Sig: Take 25 mg by mouth every 6 (six) hours as needed for itching   docusate sodium (COLACE) 100 mg capsule Unknown Self No No   Sig: Take 1 capsule (100 mg total) by mouth 2 (two) times a day   escitalopram (LEXAPRO) 10 mg tablet 5/11/2025 Evening  Yes Yes   Sig: Take 10 mg by mouth daily   fenofibrate (TRICOR) 54 MG tablet 5/11/2025 Morning Self Yes Yes   Sig: TAKE 1 TABLET (54 MG TOTAL) BY MOUTH DAILY.   folic acid (FOLVITE) 1 mg tablet 5/11/2025 Morning Self Yes Yes   Sig: Take 1,000 mcg by mouth daily   furosemide (LASIX) 20 mg tablet 5/11/2025 Morning  Yes Yes   Sig: Take 10 mg by mouth daily   glucose 40 % Unknown  Yes No   Sig: Take 15 g by mouth once as needed for low blood sugar (for BS <60 and able to take PO food/liquids)   hydrOXYzine HCL (ATARAX) 50 mg tablet 5/11/2025 Outside Facility (Specify) Yes Yes   Sig: Take 50 mg by mouth every 6 (six) hours as needed for anxiety (Max dose of 100 mg TID)   hydrOXYzine HCL (ATARAX) 50 mg tablet 5/11/2025 Bedtime Self No Yes   Sig: Take 2 tablets (100 mg total) by mouth daily at bedtime   lamoTRIgine (LaMICtal) 200 MG tablet 5/11/2025  Yes Yes   Sig: Take 200 mg by mouth 2 (two) times a day   lamoTRIgine (LaMICtal) 25 mg tablet 5/11/2025  Yes Yes   Sig: Take 50 mg by mouth 2 (two) times a day   levothyroxine 50 mcg tablet 5/12/2025 Morning Self No Yes   Sig: Take 1 tablet (50 mcg total) by mouth daily   loperamide (IMODIUM A-D) 2 MG tablet Unknown Outside Facility (Specify) Yes No   Sig: Take 2 mg by mouth every 12 (twelve) hours as needed for diarrhea   losartan (COZAAR) 25 mg tablet 5/11/2025 Morning  Yes Yes   Sig: Take 25 mg by mouth daily   lurasidone (LATUDA) 40 mg tablet 5/11/2025 Bedtime  Yes Yes   Sig: Take 40 mg by mouth 2 (two) times a day   magnesium hydroxide (MILK OF MAGNESIA) 400 mg/5 mL oral suspension Unknown Outside Facility (Specify) Yes No   Sig: Take 30 mL by mouth daily as needed for  constipation   metFORMIN (GLUCOPHAGE) 500 mg tablet 5/11/2025 Self No Yes   Sig: Take 1 tablet (500 mg total) by mouth 2 (two) times a day with meals   methocarbamol (ROBAXIN) 750 mg tablet Unknown Self Yes No   Sig: TAKE 1 TABLET (750 MG TOTAL) BY MOUTH 4 (FOUR) TIMES A DAY AS NEEDED FOR MUSCLE SPASMS.   nystatin powder Past Week  Yes Yes   ondansetron (Zofran) 4 mg tablet Unknown  Yes No   Sig: Take 4 mg by mouth every 8 (eight) hours as needed for nausea or vomiting   pantoprazole (PROTONIX) 40 mg tablet 5/11/2025 Self No Yes   Sig: Take 1 tablet (40 mg total) by mouth 2 (two) times a day before breakfast and lunch   polyethylene glycol (MIRALAX) 17 g packet Unknown Self No No   Sig: Take 17 g by mouth daily   senna (SENOKOT) 8.6 mg Unknown Self No No   Sig: Take 2 tablets (17.2 mg total) by mouth daily at bedtime   sodium phosphate-biphosphate (FLEET) 7-19 g 118 mL enema Unknown  Yes No   Sig: Insert 1 enema into the rectum      Facility-Administered Medications: None       Discharge Medications:  CONTINUE all prior to admission medications unchanged    THE INTENTION ON DISCHARGE IS TO HAVE THE PATIENT CONTINUE ALL PRIOR TO ADMISSION MEDICATIONS UNCHANGED. THE LIST DURING THIS HOSPITALIZATION MAY NOT BE COMPLETELY ACCURATE AS A MAR FROM THE FACILITY WAS NOT AVAILABLE DURING THIS ADMISSION.     Follow-up Appointments/Referrals:  Follow-up with [facility] in 4-8 weeks after discharge with Dr. James    Recommended follow-up testing:  None    Diet: continue prior to admission diet  Activity: As tolerated    Indication for Admission:  Na Alatorre is a 50 y.o. female with spells of staring and unresponsiveness concerning for seizure vs nonepileptic spells. She has risk factors for PNES.     Hospital Course:  The patient was admitted to the EMU and monitored with continuous video EEG. 3 events of staring and unresponsiveness were captured. See below.     Activation procedures:  Sleep  deprivation  Photic stimulation        EMU Conclusion  Summary of interictal findings:  Normal EEG    Summary of event findings:  There were 3 events of staring with unresponsiveness that had no EEG correlate. The events lasted 2-3 minutes and were followed by panic/anxiety and on one occasion attempt to leave the room. The events are considered psychogenic nonepileptic spells.     Seizure Classification:  N/a    Epilepsy Classification:  N/a    EMU findings and discussion:  This admission it is concluded that the patient's habitual events of staring and unresponsiveness are psychogenic nonepileptic spells (PNES).  The diagnosis was discussed with the patient on the day of discharge.  However, this will need to be reviewed in detail with her again.  I discussed that the treatment involves addressing the underlying causes such as PTSD, bipolar disorder and anxiety through psychotherapy with a counselor/therapist.  She currently sees a therapist every 2 weeks and she should discuss this new diagnosis during her sessions.        Aamir Doyle MD  [facility]  [place], [place]

## 2025-05-15 NOTE — DISCHARGE INSTR - AVS FIRST PAGE
"It is very important that you establish regular care with a psychologist/counselor to address the underlying cause(s) of your psychogenic nonepileptic spells. This will likely involve weekly cognitive behavioral therapy sessions (talk therapy) with a psychologist trained in treating functional neurological disorder or \"conversion disorder\". If you haven't already, you may also benefit from establishing regular care with a psychiatrist who can prescribe medications to treat mood problems including anxiety and/or depression that may be driving your nonepileptic spells. Show your psychologist and psychiatrist this paperwork when you see them.     You do not need to call EMS for events that are similar to the events we captured during this admission (staring with unresponsiveness). Get to a safe place if you feel an event coming on. Do your best at trying to relax, knowing that the events are not due to epileptic seizures, will end on there own and are not going to damage your brain. Events that are atypically prolonged, are significantly different in character or cause injury may require evaluation in the hospital.     "

## 2025-05-16 NOTE — UTILIZATION REVIEW
NOTIFICATION OF ADMISSION DISCHARGE   This is a Notification of Discharge from [facility]. Please be advised that this patient has been discharge from our facility. Below you will find the admission and discharge date and time including the patient’s disposition.   UTILIZATION REVIEW CONTACT:  Utilization Review Assistants  Network Utilization Review Department  Phone: [phone] x carefully listen to the prompts. All voicemails are confidential.  Email: [email or web address]     ADMISSION INFORMATION  PRESENTATION DATE: 5/12/2025  8:51 AM  OBERVATION ADMISSION DATE: N/A  INPATIENT ADMISSION DATE: 5/12/25  8:51 AM   DISCHARGE DATE: 5/15/2025  1:31 PM   DISPOSITION:Non [facility] SNF/TCU/SNU    Network Utilization Review Department  ATTENTION: Please call with any questions or concerns to [phone] and carefully listen to the prompts so that you are directed to the right person. All voicemails are confidential.   For Discharge needs, contact Care Management DC Support Team at [phone] opt. 2  Send all requests for admission clinical reviews, approved or denied determinations and any other requests to dedicated fax number below belonging to the campus where the patient is receiving treatment. List of dedicated fax numbers for the Facilities:  FACILITY NAME UR FAX NUMBER   ADMISSION DENIALS (Administrative/Medical Necessity) [phone]   DISCHARGE SUPPORT TEAM ([facility]) [phone]   PARENT CHILD HEALTH (Maternity/NICU/Pediatrics) [phone]   [facility] [phone]   [facility] [phone]   [facility] [phone]   [facility] [phone]   [facility] [phone]   [facility] [phone]   [facility] [phone]   [facility] [phone]   [facility]  [facility] [phone]   Critical access hospital [phone]   [facility] [phone]   [facility] [phone]

## 2025-05-17 PROCEDURE — 95720 EEG PHY/QHP EA INCR W/VEEG: CPT | Performed by: PSYCHIATRY & NEUROLOGY

## 2025-06-03 VITALS — DIASTOLIC BLOOD PRESSURE: 72 MMHG | SYSTOLIC BLOOD PRESSURE: 106 MMHG

## 2025-06-03 VITALS — SYSTOLIC BLOOD PRESSURE: 108 MMHG | DIASTOLIC BLOOD PRESSURE: 73 MMHG

## 2025-06-03 LAB
ALBUMIN SERPL-MCNC: 4.5 G/DL (ref 3.5–5)
ALP SERPL-CCNC: 80 U/L (ref 38–126)
ALT SERPL-CCNC: 15 U/L (ref 0–35)
AST SERPL-CCNC: 19 U/L (ref 14–36)
BASOPHILS # BLD AUTO: 0.1 10^3/UL (ref 0–0.2)
BASOPHILS NFR BLD AUTO: 0.4 % (ref 0–2)
BILIRUB SERPL-MCNC: 0.6 MG/DL (ref 0.2–1.3)
BUN SERPL-MCNC: 23 MG/DL (ref 7–17)
CALCIUM SERPL-MCNC: 9.8 MG/DL (ref 8.4–10.2)
CHLORIDE SERPL-SCNC: 101 MMOL/L (ref 98–107)
CO2 SERPL-SCNC: 26 MMOL/L (ref 22–30)
COMMENT: (no result)
CREAT SERPL-MCNC: 1.3 MG/DL (ref 0.6–1)
EGFR: 50.41
EOSINOPHIL # BLD AUTO: 0.1 10^3/UL (ref 0–0.7)
EOSINOPHIL NFR BLD AUTO: 1.2 % (ref 0–6)
ERYTHROCYTE [DISTWIDTH] IN BLOOD BY AUTOMATED COUNT: 13.8 % (ref 11.5–14.5)
ESTIMATED CREATININE CLEARANCE: (no result) ML/MIN
GLUCOSE SERPL-MCNC: 128 MG/DL (ref 70–99)
HCT VFR BLD AUTO: 41.7 % (ref 37–47)
HGB BLD-MCNC: 13.5 G/DL (ref 12–16)
IMM GRANULOCYTES # BLD AUTO: 0 10^3/UL (ref 0–0.05)
IMM GRANULOCYTES NFR BLD AUTO: 0.2 % (ref 0–0.5)
LACTATE SERPL-MCNC: 2.7 MMOL/L (ref 0.7–2)
LYMPHOCYTES # BLD AUTO: 2.3 10^3/UL (ref 1.2–3.4)
LYMPHOCYTES NFR BLD AUTO: 19.4 % (ref 20.5–51.1)
MCH RBC QN AUTO: 26.3 PG (ref 27–31)
MCHC RBC AUTO-ENTMCNC: 32.4 G/DL (ref 33–37)
MCV RBC AUTO: 81.3 FL (ref 81–99)
MONOCYTES # BLD AUTO: 0.8 10^3/UL (ref 0.1–0.6)
MONOCYTES NFR BLD AUTO: 6.2 % (ref 1.7–9.3)
NEUTROPHILS # BLD AUTO: 8.7 10^3/UL (ref 1.4–6.5)
NEUTROPHILS NFR BLD AUTO: 72.6 % (ref 42.2–75.2)
NRBC BLD AUTO-RTO: 0 %
PLATELET # BLD AUTO: 360 10^3/UL (ref 130–400)
PMV BLD AUTO: 8.7 FL (ref 7.4–10.4)
POTASSIUM SERPL-SCNC: 4.2 MMOL/L (ref 3.5–5.1)
PROT SERPL-MCNC: 7.4 G/DL (ref 6.3–8.2)
RBC # BLD AUTO: 5.13 10^6/UL (ref 4.2–5.4)
SODIUM SERPL-SCNC: 136 MMOL/L (ref 135–145)
WBC # BLD AUTO: 12 10^3/UL (ref 4.8–10.8)

## 2025-06-03 RX ADMIN — SODIUM CHLORIDE 1000: 900 INJECTION, SOLUTION INTRAVENOUS at 23:47

## 2025-06-04 VITALS — DIASTOLIC BLOOD PRESSURE: 73 MMHG | SYSTOLIC BLOOD PRESSURE: 112 MMHG

## 2025-06-04 VITALS — SYSTOLIC BLOOD PRESSURE: 125 MMHG | DIASTOLIC BLOOD PRESSURE: 76 MMHG

## 2025-06-04 VITALS — DIASTOLIC BLOOD PRESSURE: 94 MMHG | SYSTOLIC BLOOD PRESSURE: 133 MMHG

## 2025-06-04 VITALS — SYSTOLIC BLOOD PRESSURE: 143 MMHG | DIASTOLIC BLOOD PRESSURE: 87 MMHG

## 2025-06-04 VITALS — SYSTOLIC BLOOD PRESSURE: 131 MMHG | DIASTOLIC BLOOD PRESSURE: 81 MMHG

## 2025-06-04 VITALS — SYSTOLIC BLOOD PRESSURE: 138 MMHG

## 2025-06-04 VITALS — SYSTOLIC BLOOD PRESSURE: 107 MMHG | DIASTOLIC BLOOD PRESSURE: 71 MMHG

## 2025-06-04 VITALS — SYSTOLIC BLOOD PRESSURE: 138 MMHG | DIASTOLIC BLOOD PRESSURE: 81 MMHG

## 2025-06-04 VITALS — DIASTOLIC BLOOD PRESSURE: 73 MMHG | SYSTOLIC BLOOD PRESSURE: 106 MMHG

## 2025-06-04 LAB
BUN SERPL-MCNC: 22 MG/DL (ref 7–17)
CALCIUM SERPL-MCNC: 9.2 MG/DL (ref 8.4–10.2)
CHLORIDE SERPL-SCNC: 108 MMOL/L (ref 98–107)
CO2 SERPL-SCNC: 23 MMOL/L (ref 22–30)
COMMENT: (no result)
CREAT SERPL-MCNC: 1 MG/DL (ref 0.6–1)
EGFR: > 60
ERYTHROCYTE [DISTWIDTH] IN BLOOD BY AUTOMATED COUNT: 13.8 % (ref 11.5–14.5)
ERYTHROCYTE [SEDIMENTATION RATE] IN BLOOD: 9 MM/HOUR (ref 0–20)
ESTIMATED CREATININE CLEARANCE: 75 ML/MIN
GLUCOSE - POINT OF CARE: 102 MG/DL (ref 70–99)
GLUCOSE - POINT OF CARE: 109 MG/DL (ref 70–99)
GLUCOSE - POINT OF CARE: 99 MG/DL (ref 70–99)
GLUCOSE SERPL-MCNC: 92 MG/DL (ref 70–99)
HBA1C MFR BLD: 5.3 % (ref 4–5.6)
HCT VFR BLD AUTO: 37.6 % (ref 37–47)
HGB BLD-MCNC: 12.4 G/DL (ref 12–16)
LACTATE SERPL-MCNC: 2 MMOL/L (ref 0.7–2)
MCH RBC QN AUTO: 26.4 PG (ref 27–31)
MCHC RBC AUTO-ENTMCNC: 33 G/DL (ref 33–37)
MCV RBC AUTO: 80 FL (ref 81–99)
PLATELET # BLD AUTO: 300 10^3/UL (ref 130–400)
PMV BLD AUTO: 8.7 FL (ref 7.4–10.4)
POTASSIUM SERPL-SCNC: 3.8 MMOL/L (ref 3.5–5.1)
RBC # BLD AUTO: 4.7 10^6/UL (ref 4.2–5.4)
SODIUM SERPL-SCNC: 138 MMOL/L (ref 135–145)
WBC # BLD AUTO: 10 10^3/UL (ref 4.8–10.8)

## 2025-06-04 RX ADMIN — LURASIDONE HYDROCHLORIDE 60 MG: 60 TABLET, FILM COATED ORAL at 10:21

## 2025-06-04 RX ADMIN — ACETAMINOPHEN 650 MG: 325 TABLET ORAL at 15:29

## 2025-06-04 RX ADMIN — ANTI-FUNGAL POWDER MICONAZOLE NITRATE TALC FREE 1 APPLIC: 1.42 POWDER TOPICAL at 21:53

## 2025-06-04 RX ADMIN — LAMOTRIGINE 200 MG: 100 TABLET ORAL at 10:19

## 2025-06-04 RX ADMIN — HYDROXYZINE HYDROCHLORIDE 100 MG: 25 TABLET, FILM COATED ORAL at 22:02

## 2025-06-04 RX ADMIN — PANTOPRAZOLE SODIUM 40 MG: 40 TABLET, DELAYED RELEASE ORAL at 10:18

## 2025-06-04 RX ADMIN — FOLIC ACID 1 MG: 1 TABLET ORAL at 10:19

## 2025-06-04 RX ADMIN — LAMOTRIGINE 50 MG: 25 TABLET, CHEWABLE ORAL at 10:18

## 2025-06-04 RX ADMIN — BUPRENORPHINE 8 MG: 8 TABLET SUBLINGUAL at 10:21

## 2025-06-04 RX ADMIN — ONDANSETRON HYDROCHLORIDE 4 MG: 2 SOLUTION INTRAMUSCULAR; INTRAVENOUS at 23:28

## 2025-06-04 RX ADMIN — AMOXICILLIN AND CLAVULANATE POTASSIUM 1 TABLET: 875; 125 TABLET, FILM COATED ORAL at 18:02

## 2025-06-04 RX ADMIN — ENOXAPARIN SODIUM 40 MG: 40 INJECTION SUBCUTANEOUS at 18:02

## 2025-06-04 RX ADMIN — CETIRIZINE HYDROCHLORIDE 10 MG: 10 TABLET ORAL at 22:03

## 2025-06-04 RX ADMIN — DAPTOMYCIN 8 MG: 500 INJECTION, POWDER, LYOPHILIZED, FOR SOLUTION INTRAVENOUS at 03:25

## 2025-06-04 RX ADMIN — CLONIDINE HYDROCHLORIDE 0.2 MG: 0.1 TABLET ORAL at 10:19

## 2025-06-04 RX ADMIN — MEROPENEM 1000 MG: 1 INJECTION, POWDER, FOR SOLUTION INTRAVENOUS at 00:04

## 2025-06-04 RX ADMIN — ANTI-FUNGAL POWDER MICONAZOLE NITRATE TALC FREE 1 APPLIC: 1.42 POWDER TOPICAL at 10:20

## 2025-06-04 RX ADMIN — CLONIDINE HYDROCHLORIDE 0.2 MG: 0.1 TABLET ORAL at 22:02

## 2025-06-04 RX ADMIN — SODIUM CHLORIDE 1000: 900 INJECTION, SOLUTION INTRAVENOUS at 04:08

## 2025-06-04 RX ADMIN — ESCITALOPRAM OXALATE 10 MG: 10 TABLET, FILM COATED ORAL at 10:19

## 2025-06-04 RX ADMIN — LEVOTHYROXINE SODIUM 50 MCG: 0.05 TABLET ORAL at 04:08

## 2025-06-04 RX ADMIN — ONDANSETRON HYDROCHLORIDE 4 MG: 2 SOLUTION INTRAMUSCULAR; INTRAVENOUS at 18:03

## 2025-06-04 RX ADMIN — BUDESONIDE AND FORMOTEROL FUMARATE DIHYDRATE 2 PUFF: 80; 4.5 AEROSOL RESPIRATORY (INHALATION) at 08:31

## 2025-06-04 RX ADMIN — PANTOPRAZOLE SODIUM 40 MG: 40 TABLET, DELAYED RELEASE ORAL at 21:54

## 2025-06-04 RX ADMIN — ACETAMINOPHEN 650 MG: 325 TABLET ORAL at 04:15

## 2025-06-04 RX ADMIN — ACETAMINOPHEN 650 MG: 325 TABLET ORAL at 22:03

## 2025-06-04 RX ADMIN — BUDESONIDE AND FORMOTEROL FUMARATE DIHYDRATE: 80; 4.5 AEROSOL RESPIRATORY (INHALATION) at 19:51

## 2025-06-04 RX ADMIN — LAMOTRIGINE 50 MG: 25 TABLET, CHEWABLE ORAL at 21:54

## 2025-06-04 RX ADMIN — LURASIDONE HYDROCHLORIDE 40 MG: 40 TABLET, FILM COATED ORAL at 22:03

## 2025-06-04 RX ADMIN — Medication 25 MCG: at 10:19

## 2025-06-04 RX ADMIN — AMOXICILLIN AND CLAVULANATE POTASSIUM 1 TABLET: 250; 125 TABLET, FILM COATED ORAL at 15:21

## 2025-06-04 RX ADMIN — SODIUM CHLORIDE 1000: 900 INJECTION, SOLUTION INTRAVENOUS at 15:30

## 2025-06-04 RX ADMIN — LAMOTRIGINE 200 MG: 100 TABLET ORAL at 21:54

## 2025-06-04 RX ADMIN — BUPRENORPHINE 8 MG: 8 TABLET SUBLINGUAL at 22:01

## 2025-06-05 VITALS — DIASTOLIC BLOOD PRESSURE: 63 MMHG | SYSTOLIC BLOOD PRESSURE: 116 MMHG

## 2025-06-05 VITALS — DIASTOLIC BLOOD PRESSURE: 64 MMHG | SYSTOLIC BLOOD PRESSURE: 104 MMHG

## 2025-06-05 VITALS — DIASTOLIC BLOOD PRESSURE: 62 MMHG | SYSTOLIC BLOOD PRESSURE: 107 MMHG

## 2025-06-05 LAB
BASOPHILS # BLD AUTO: 0.1 10^3/UL (ref 0–0.2)
BASOPHILS NFR BLD AUTO: 0.5 % (ref 0–2)
BUN SERPL-MCNC: 18 MG/DL (ref 7–17)
CALCIUM SERPL-MCNC: 9.6 MG/DL (ref 8.4–10.2)
CHLORIDE SERPL-SCNC: 107 MMOL/L (ref 98–107)
CO2 SERPL-SCNC: 25 MMOL/L (ref 22–30)
COMMENT: (no result)
CREAT SERPL-MCNC: 0.9 MG/DL (ref 0.6–1)
EGFR: > 60
EOSINOPHIL # BLD AUTO: 0.3 10^3/UL (ref 0–0.7)
EOSINOPHIL NFR BLD AUTO: 2.7 % (ref 0–6)
ERYTHROCYTE [DISTWIDTH] IN BLOOD BY AUTOMATED COUNT: 13.7 % (ref 11.5–14.5)
ESTIMATED CREATININE CLEARANCE: 84 ML/MIN
GLUCOSE - POINT OF CARE: 109 MG/DL (ref 70–99)
GLUCOSE - POINT OF CARE: 112 MG/DL (ref 70–99)
GLUCOSE - POINT OF CARE: 113 MG/DL (ref 70–99)
GLUCOSE - POINT OF CARE: 93 MG/DL (ref 70–99)
GLUCOSE SERPL-MCNC: 91 MG/DL (ref 70–99)
HCT VFR BLD AUTO: 36.8 % (ref 37–47)
HGB BLD-MCNC: 11.9 G/DL (ref 12–16)
IMM GRANULOCYTES # BLD AUTO: 0 10^3/UL (ref 0–0.05)
IMM GRANULOCYTES NFR BLD AUTO: 0.3 % (ref 0–0.5)
LYMPHOCYTES # BLD AUTO: 4.7 10^3/UL (ref 1.2–3.4)
LYMPHOCYTES NFR BLD AUTO: 42.1 % (ref 20.5–51.1)
MCH RBC QN AUTO: 26.3 PG (ref 27–31)
MCHC RBC AUTO-ENTMCNC: 32.3 G/DL (ref 33–37)
MCV RBC AUTO: 81.2 FL (ref 81–99)
MONOCYTES # BLD AUTO: 0.8 10^3/UL (ref 0.1–0.6)
MONOCYTES NFR BLD AUTO: 7.1 % (ref 1.7–9.3)
NEUTROPHILS # BLD AUTO: 5.3 10^3/UL (ref 1.4–6.5)
NEUTROPHILS NFR BLD AUTO: 47.3 % (ref 42.2–75.2)
NRBC BLD AUTO-RTO: 0 %
PLATELET # BLD AUTO: 279 10^3/UL (ref 130–400)
PMV BLD AUTO: 8.7 FL (ref 7.4–10.4)
POTASSIUM SERPL-SCNC: 4.2 MMOL/L (ref 3.5–5.1)
RBC # BLD AUTO: 4.53 10^6/UL (ref 4.2–5.4)
SODIUM SERPL-SCNC: 138 MMOL/L (ref 135–145)
URATE SERPL-MCNC: 4.4 MG/DL (ref 2.5–6.2)
WBC # BLD AUTO: 11.2 10^3/UL (ref 4.8–10.8)

## 2025-06-05 RX ADMIN — LAMOTRIGINE 200 MG: 100 TABLET ORAL at 21:58

## 2025-06-05 RX ADMIN — LURASIDONE HYDROCHLORIDE 40 MG: 40 TABLET, FILM COATED ORAL at 21:57

## 2025-06-05 RX ADMIN — ACETAMINOPHEN 650 MG: 325 TABLET ORAL at 17:32

## 2025-06-05 RX ADMIN — Medication 25 MCG: at 08:32

## 2025-06-05 RX ADMIN — HYDROXYZINE HYDROCHLORIDE 100 MG: 25 TABLET, FILM COATED ORAL at 21:57

## 2025-06-05 RX ADMIN — LAMOTRIGINE 50 MG: 25 TABLET, CHEWABLE ORAL at 08:31

## 2025-06-05 RX ADMIN — FOLIC ACID 1 MG: 1 TABLET ORAL at 08:31

## 2025-06-05 RX ADMIN — BUDESONIDE AND FORMOTEROL FUMARATE DIHYDRATE: 80; 4.5 AEROSOL RESPIRATORY (INHALATION) at 07:29

## 2025-06-05 RX ADMIN — BUPRENORPHINE 8 MG: 8 TABLET SUBLINGUAL at 08:31

## 2025-06-05 RX ADMIN — ESCITALOPRAM OXALATE 10 MG: 10 TABLET, FILM COATED ORAL at 08:31

## 2025-06-05 RX ADMIN — ENOXAPARIN SODIUM 40 MG: 40 INJECTION SUBCUTANEOUS at 17:27

## 2025-06-05 RX ADMIN — PANTOPRAZOLE SODIUM 40 MG: 40 TABLET, DELAYED RELEASE ORAL at 08:31

## 2025-06-05 RX ADMIN — ACETAMINOPHEN 650 MG: 325 TABLET ORAL at 13:31

## 2025-06-05 RX ADMIN — ACETAMINOPHEN 650 MG: 325 TABLET ORAL at 08:49

## 2025-06-05 RX ADMIN — PROMETHAZINE HYDROCHLORIDE 50.5 MG: 25 INJECTION INTRAMUSCULAR; INTRAVENOUS at 17:57

## 2025-06-05 RX ADMIN — BUPRENORPHINE 8 MG: 8 TABLET SUBLINGUAL at 20:19

## 2025-06-05 RX ADMIN — ANTI-FUNGAL POWDER MICONAZOLE NITRATE TALC FREE 1 APPLIC: 1.42 POWDER TOPICAL at 20:18

## 2025-06-05 RX ADMIN — Medication 10 MG: at 21:57

## 2025-06-05 RX ADMIN — LEVOTHYROXINE SODIUM 50 MCG: 0.05 TABLET ORAL at 06:37

## 2025-06-05 RX ADMIN — LURASIDONE HYDROCHLORIDE 60 MG: 60 TABLET, FILM COATED ORAL at 09:37

## 2025-06-05 RX ADMIN — CLONIDINE HYDROCHLORIDE 0.2 MG: 0.1 TABLET ORAL at 20:18

## 2025-06-05 RX ADMIN — ANTI-FUNGAL POWDER MICONAZOLE NITRATE TALC FREE 1 APPLIC: 1.42 POWDER TOPICAL at 08:32

## 2025-06-05 RX ADMIN — LAMOTRIGINE 200 MG: 100 TABLET ORAL at 08:31

## 2025-06-05 RX ADMIN — ONDANSETRON HYDROCHLORIDE 4 MG: 2 SOLUTION INTRAMUSCULAR; INTRAVENOUS at 13:31

## 2025-06-05 RX ADMIN — CETIRIZINE HYDROCHLORIDE 10 MG: 10 TABLET ORAL at 21:57

## 2025-06-05 RX ADMIN — AMOXICILLIN AND CLAVULANATE POTASSIUM 1 TABLET: 875; 125 TABLET, FILM COATED ORAL at 20:18

## 2025-06-05 RX ADMIN — BUDESONIDE AND FORMOTEROL FUMARATE DIHYDRATE: 80; 4.5 AEROSOL RESPIRATORY (INHALATION) at 20:32

## 2025-06-05 RX ADMIN — Medication 10 MG: at 00:29

## 2025-06-05 RX ADMIN — CLONIDINE HYDROCHLORIDE 0.2 MG: 0.1 TABLET ORAL at 08:31

## 2025-06-05 RX ADMIN — AMOXICILLIN AND CLAVULANATE POTASSIUM 1 TABLET: 875; 125 TABLET, FILM COATED ORAL at 08:31

## 2025-06-05 RX ADMIN — PANTOPRAZOLE SODIUM 40 MG: 40 TABLET, DELAYED RELEASE ORAL at 20:19

## 2025-06-05 RX ADMIN — LAMOTRIGINE 50 MG: 25 TABLET, CHEWABLE ORAL at 20:19

## 2025-06-06 ENCOUNTER — HOSPITAL ENCOUNTER (INPATIENT)
Dept: HOSPITAL 99 - 3 WEST ACU | Age: 50
LOS: 2 days | Discharge: NURSING, REHAB OR LONG-TERM CARE | DRG: 504 | End: 2025-06-08
Payer: COMMERCIAL

## 2025-06-06 VITALS — SYSTOLIC BLOOD PRESSURE: 128 MMHG | DIASTOLIC BLOOD PRESSURE: 66 MMHG

## 2025-06-06 VITALS — SYSTOLIC BLOOD PRESSURE: 125 MMHG | DIASTOLIC BLOOD PRESSURE: 78 MMHG

## 2025-06-06 VITALS
BODY MASS INDEX: 33.1 KG/M2 | BODY MASS INDEX: 33.2 KG/M2 | BODY MASS INDEX: 33.3 KG/M2 | BODY MASS INDEX: 32.9 KG/M2 | BODY MASS INDEX: 37.1 KG/M2

## 2025-06-06 VITALS — DIASTOLIC BLOOD PRESSURE: 95 MMHG | SYSTOLIC BLOOD PRESSURE: 149 MMHG

## 2025-06-06 VITALS — DIASTOLIC BLOOD PRESSURE: 54 MMHG | SYSTOLIC BLOOD PRESSURE: 97 MMHG

## 2025-06-06 VITALS — DIASTOLIC BLOOD PRESSURE: 94 MMHG | SYSTOLIC BLOOD PRESSURE: 144 MMHG

## 2025-06-06 VITALS — SYSTOLIC BLOOD PRESSURE: 154 MMHG | DIASTOLIC BLOOD PRESSURE: 94 MMHG

## 2025-06-06 VITALS — DIASTOLIC BLOOD PRESSURE: 80 MMHG | SYSTOLIC BLOOD PRESSURE: 137 MMHG

## 2025-06-06 VITALS — DIASTOLIC BLOOD PRESSURE: 83 MMHG | SYSTOLIC BLOOD PRESSURE: 152 MMHG

## 2025-06-06 VITALS — DIASTOLIC BLOOD PRESSURE: 82 MMHG | OXYGEN SATURATION: 2 % | SYSTOLIC BLOOD PRESSURE: 137 MMHG

## 2025-06-06 VITALS — OXYGEN SATURATION: 2 %

## 2025-06-06 DIAGNOSIS — D50.9: ICD-10-CM

## 2025-06-06 DIAGNOSIS — F17.200: ICD-10-CM

## 2025-06-06 DIAGNOSIS — E78.00: ICD-10-CM

## 2025-06-06 DIAGNOSIS — Z88.5: ICD-10-CM

## 2025-06-06 DIAGNOSIS — Z79.51: ICD-10-CM

## 2025-06-06 DIAGNOSIS — Z79.890: ICD-10-CM

## 2025-06-06 DIAGNOSIS — F31.9: ICD-10-CM

## 2025-06-06 DIAGNOSIS — G89.4: ICD-10-CM

## 2025-06-06 DIAGNOSIS — N17.9: ICD-10-CM

## 2025-06-06 DIAGNOSIS — E11.42: ICD-10-CM

## 2025-06-06 DIAGNOSIS — E66.2: ICD-10-CM

## 2025-06-06 DIAGNOSIS — M86.672: Primary | ICD-10-CM

## 2025-06-06 DIAGNOSIS — F41.9: ICD-10-CM

## 2025-06-06 DIAGNOSIS — E03.9: ICD-10-CM

## 2025-06-06 DIAGNOSIS — E87.20: ICD-10-CM

## 2025-06-06 DIAGNOSIS — I10: ICD-10-CM

## 2025-06-06 DIAGNOSIS — K21.9: ICD-10-CM

## 2025-06-06 DIAGNOSIS — Z88.6: ICD-10-CM

## 2025-06-06 DIAGNOSIS — E11.69: ICD-10-CM

## 2025-06-06 DIAGNOSIS — Z79.84: ICD-10-CM

## 2025-06-06 DIAGNOSIS — Z91.040: ICD-10-CM

## 2025-06-06 DIAGNOSIS — J45.909: ICD-10-CM

## 2025-06-06 DIAGNOSIS — Z98.84: ICD-10-CM

## 2025-06-06 LAB
GLUCOSE - POINT OF CARE: 102 MG/DL (ref 70–99)
GLUCOSE - POINT OF CARE: 109 MG/DL (ref 70–99)
GLUCOSE - POINT OF CARE: 119 MG/DL (ref 70–99)
GLUCOSE - POINT OF CARE: 96 MG/DL (ref 70–99)
GLUCOSE - POINT OF CARE: 99 MG/DL (ref 70–99)

## 2025-06-06 PROCEDURE — 88311 DECALCIFY TISSUE: CPT

## 2025-06-06 PROCEDURE — 88305 TISSUE EXAM BY PATHOLOGIST: CPT

## 2025-06-06 PROCEDURE — 0Y6W0Z3 DETACHMENT AT LEFT 4TH TOE, LOW, OPEN APPROACH: ICD-10-PCS | Performed by: PODIATRIST

## 2025-06-06 RX ADMIN — LAMOTRIGINE 50 MG: 25 TABLET, CHEWABLE ORAL at 19:57

## 2025-06-06 RX ADMIN — CLONIDINE HYDROCHLORIDE 0.2 MG: 0.1 TABLET ORAL at 21:25

## 2025-06-06 RX ADMIN — FOLIC ACID 1 MG: 1 TABLET ORAL at 08:20

## 2025-06-06 RX ADMIN — PANTOPRAZOLE SODIUM 40 MG: 40 TABLET, DELAYED RELEASE ORAL at 19:53

## 2025-06-06 RX ADMIN — HYDROXYZINE HYDROCHLORIDE 50 MG: 25 TABLET, FILM COATED ORAL at 10:49

## 2025-06-06 RX ADMIN — ONDANSETRON HYDROCHLORIDE 4 MG: 2 SOLUTION INTRAMUSCULAR; INTRAVENOUS at 22:43

## 2025-06-06 RX ADMIN — AMOXICILLIN AND CLAVULANATE POTASSIUM 1 TABLET: 875; 125 TABLET, FILM COATED ORAL at 19:53

## 2025-06-06 RX ADMIN — SODIUM CHLORIDE 1000: 900 INJECTION, SOLUTION INTRAVENOUS at 11:03

## 2025-06-06 RX ADMIN — ANTI-FUNGAL POWDER MICONAZOLE NITRATE TALC FREE 1 APPLIC: 1.42 POWDER TOPICAL at 08:20

## 2025-06-06 RX ADMIN — ANTI-FUNGAL POWDER MICONAZOLE NITRATE TALC FREE 1 APPLIC: 1.42 POWDER TOPICAL at 19:57

## 2025-06-06 RX ADMIN — AMOXICILLIN AND CLAVULANATE POTASSIUM 1 TABLET: 875; 125 TABLET, FILM COATED ORAL at 08:20

## 2025-06-06 RX ADMIN — LAMOTRIGINE 200 MG: 100 TABLET ORAL at 08:20

## 2025-06-06 RX ADMIN — LEVOTHYROXINE SODIUM 50 MCG: 0.05 TABLET ORAL at 05:47

## 2025-06-06 RX ADMIN — SODIUM CHLORIDE 1000: 900 INJECTION, SOLUTION INTRAVENOUS at 23:15

## 2025-06-06 RX ADMIN — ACETAMINOPHEN 650 MG: 325 TABLET ORAL at 05:47

## 2025-06-06 RX ADMIN — ACETAMINOPHEN 650 MG: 325 TABLET ORAL at 22:26

## 2025-06-06 RX ADMIN — ENOXAPARIN SODIUM 40 MG: 40 INJECTION SUBCUTANEOUS at 17:14

## 2025-06-06 RX ADMIN — ONDANSETRON HYDROCHLORIDE 4 MG: 2 SOLUTION INTRAMUSCULAR; INTRAVENOUS at 13:21

## 2025-06-06 RX ADMIN — Medication 25 MCG: at 08:20

## 2025-06-06 RX ADMIN — ACETAMINOPHEN 650 MG: 325 TABLET ORAL at 17:19

## 2025-06-06 RX ADMIN — LURASIDONE HYDROCHLORIDE 60 MG: 60 TABLET, FILM COATED ORAL at 08:20

## 2025-06-06 RX ADMIN — LAMOTRIGINE 50 MG: 25 TABLET, CHEWABLE ORAL at 08:20

## 2025-06-06 RX ADMIN — PANTOPRAZOLE SODIUM 40 MG: 40 TABLET, DELAYED RELEASE ORAL at 08:20

## 2025-06-06 RX ADMIN — BUDESONIDE AND FORMOTEROL FUMARATE DIHYDRATE: 80; 4.5 AEROSOL RESPIRATORY (INHALATION) at 19:27

## 2025-06-06 RX ADMIN — HYDROXYZINE HYDROCHLORIDE 100 MG: 25 TABLET, FILM COATED ORAL at 21:25

## 2025-06-06 RX ADMIN — BUPRENORPHINE 8 MG: 8 TABLET SUBLINGUAL at 19:54

## 2025-06-06 RX ADMIN — LAMOTRIGINE 200 MG: 100 TABLET ORAL at 19:56

## 2025-06-06 RX ADMIN — ESCITALOPRAM OXALATE 10 MG: 10 TABLET, FILM COATED ORAL at 08:20

## 2025-06-06 RX ADMIN — LURASIDONE HYDROCHLORIDE 40 MG: 40 TABLET, FILM COATED ORAL at 21:25

## 2025-06-06 RX ADMIN — CLONIDINE HYDROCHLORIDE: 0.1 TABLET ORAL at 08:26

## 2025-06-06 RX ADMIN — CETIRIZINE HYDROCHLORIDE 10 MG: 10 TABLET ORAL at 21:25

## 2025-06-06 RX ADMIN — ONDANSETRON HYDROCHLORIDE 4 MG: 2 SOLUTION INTRAMUSCULAR; INTRAVENOUS at 16:40

## 2025-06-06 RX ADMIN — Medication 10 MG: at 21:25

## 2025-06-06 RX ADMIN — BUPRENORPHINE 8 MG: 8 TABLET SUBLINGUAL at 08:20

## 2025-06-06 RX ADMIN — BUDESONIDE AND FORMOTEROL FUMARATE DIHYDRATE: 80; 4.5 AEROSOL RESPIRATORY (INHALATION) at 08:00

## 2025-06-06 RX ADMIN — ACETAMINOPHEN 650 MG: 325 TABLET ORAL at 10:48

## 2025-06-07 VITALS — DIASTOLIC BLOOD PRESSURE: 77 MMHG | SYSTOLIC BLOOD PRESSURE: 137 MMHG

## 2025-06-07 VITALS — SYSTOLIC BLOOD PRESSURE: 115 MMHG | DIASTOLIC BLOOD PRESSURE: 78 MMHG

## 2025-06-07 VITALS — DIASTOLIC BLOOD PRESSURE: 87 MMHG | SYSTOLIC BLOOD PRESSURE: 141 MMHG

## 2025-06-07 VITALS — DIASTOLIC BLOOD PRESSURE: 65 MMHG | SYSTOLIC BLOOD PRESSURE: 102 MMHG

## 2025-06-07 VITALS — SYSTOLIC BLOOD PRESSURE: 152 MMHG | DIASTOLIC BLOOD PRESSURE: 98 MMHG

## 2025-06-07 VITALS — SYSTOLIC BLOOD PRESSURE: 119 MMHG | DIASTOLIC BLOOD PRESSURE: 73 MMHG

## 2025-06-07 LAB
BUN SERPL-MCNC: 8 MG/DL (ref 7–17)
CALCIUM SERPL-MCNC: 9.4 MG/DL (ref 8.4–10.2)
CHLORIDE SERPL-SCNC: 107 MMOL/L (ref 98–107)
CO2 SERPL-SCNC: 27 MMOL/L (ref 22–30)
COMMENT: (no result)
CREAT SERPL-MCNC: 0.7 MG/DL (ref 0.6–1)
EGFR: > 60
ERYTHROCYTE [DISTWIDTH] IN BLOOD BY AUTOMATED COUNT: 13.8 % (ref 11.5–14.5)
ESTIMATED CREATININE CLEARANCE: 108 ML/MIN
GLUCOSE - POINT OF CARE: 105 MG/DL (ref 70–99)
GLUCOSE - POINT OF CARE: 112 MG/DL (ref 70–99)
GLUCOSE - POINT OF CARE: 125 MG/DL (ref 70–99)
GLUCOSE - POINT OF CARE: 129 MG/DL (ref 70–99)
GLUCOSE SERPL-MCNC: 105 MG/DL (ref 70–99)
HCT VFR BLD AUTO: 38 % (ref 37–47)
HGB BLD-MCNC: 12 G/DL (ref 12–16)
MCH RBC QN AUTO: 25.9 PG (ref 27–31)
MCHC RBC AUTO-ENTMCNC: 31.6 G/DL (ref 33–37)
MCV RBC AUTO: 81.9 FL (ref 81–99)
PLATELET # BLD AUTO: 276 10^3/UL (ref 130–400)
PMV BLD AUTO: 9 FL (ref 7.4–10.4)
POTASSIUM SERPL-SCNC: 5.1 MMOL/L (ref 3.5–5.1)
RBC # BLD AUTO: 4.64 10^6/UL (ref 4.2–5.4)
SODIUM SERPL-SCNC: 138 MMOL/L (ref 135–145)
WBC # BLD AUTO: 9.9 10^3/UL (ref 4.8–10.8)

## 2025-06-07 RX ADMIN — AMOXICILLIN AND CLAVULANATE POTASSIUM 1 TABLET: 875; 125 TABLET, FILM COATED ORAL at 20:55

## 2025-06-07 RX ADMIN — LAMOTRIGINE 200 MG: 100 TABLET ORAL at 08:01

## 2025-06-07 RX ADMIN — ACETAMINOPHEN 650 MG: 325 TABLET ORAL at 03:15

## 2025-06-07 RX ADMIN — BUDESONIDE AND FORMOTEROL FUMARATE DIHYDRATE: 80; 4.5 AEROSOL RESPIRATORY (INHALATION) at 07:58

## 2025-06-07 RX ADMIN — HYDROXYZINE HYDROCHLORIDE 100 MG: 25 TABLET, FILM COATED ORAL at 21:01

## 2025-06-07 RX ADMIN — ONDANSETRON HYDROCHLORIDE 4 MG: 2 SOLUTION INTRAMUSCULAR; INTRAVENOUS at 14:17

## 2025-06-07 RX ADMIN — ESCITALOPRAM OXALATE 10 MG: 10 TABLET, FILM COATED ORAL at 08:02

## 2025-06-07 RX ADMIN — LURASIDONE HYDROCHLORIDE 60 MG: 60 TABLET, FILM COATED ORAL at 08:01

## 2025-06-07 RX ADMIN — CLONIDINE HYDROCHLORIDE 0.2 MG: 0.1 TABLET ORAL at 20:55

## 2025-06-07 RX ADMIN — ANTI-FUNGAL POWDER MICONAZOLE NITRATE TALC FREE 1 APPLIC: 1.42 POWDER TOPICAL at 08:08

## 2025-06-07 RX ADMIN — SODIUM CHLORIDE 1000: 900 INJECTION, SOLUTION INTRAVENOUS at 12:48

## 2025-06-07 RX ADMIN — BUDESONIDE AND FORMOTEROL FUMARATE DIHYDRATE: 80; 4.5 AEROSOL RESPIRATORY (INHALATION) at 18:03

## 2025-06-07 RX ADMIN — BUPRENORPHINE 8 MG: 8 TABLET SUBLINGUAL at 08:01

## 2025-06-07 RX ADMIN — PANTOPRAZOLE SODIUM 40 MG: 40 TABLET, DELAYED RELEASE ORAL at 08:01

## 2025-06-07 RX ADMIN — ACETAMINOPHEN 650 MG: 325 TABLET ORAL at 18:04

## 2025-06-07 RX ADMIN — LAMOTRIGINE 200 MG: 100 TABLET ORAL at 20:56

## 2025-06-07 RX ADMIN — FOLIC ACID 1 MG: 1 TABLET ORAL at 08:01

## 2025-06-07 RX ADMIN — LAMOTRIGINE 50 MG: 25 TABLET, CHEWABLE ORAL at 08:00

## 2025-06-07 RX ADMIN — PANTOPRAZOLE SODIUM 40 MG: 40 TABLET, DELAYED RELEASE ORAL at 20:56

## 2025-06-07 RX ADMIN — ACETAMINOPHEN 650 MG: 325 TABLET ORAL at 08:13

## 2025-06-07 RX ADMIN — CLONIDINE HYDROCHLORIDE 0.2 MG: 0.1 TABLET ORAL at 08:01

## 2025-06-07 RX ADMIN — LAMOTRIGINE 50 MG: 25 TABLET, CHEWABLE ORAL at 20:56

## 2025-06-07 RX ADMIN — BUPRENORPHINE 8 MG: 8 TABLET SUBLINGUAL at 20:55

## 2025-06-07 RX ADMIN — ANTI-FUNGAL POWDER MICONAZOLE NITRATE TALC FREE: 1.42 POWDER TOPICAL at 21:02

## 2025-06-07 RX ADMIN — HYDROXYZINE HYDROCHLORIDE 50 MG: 25 TABLET, FILM COATED ORAL at 18:09

## 2025-06-07 RX ADMIN — LEVOTHYROXINE SODIUM 50 MCG: 0.05 TABLET ORAL at 06:20

## 2025-06-07 RX ADMIN — LURASIDONE HYDROCHLORIDE 40 MG: 40 TABLET, FILM COATED ORAL at 21:02

## 2025-06-07 RX ADMIN — Medication 10 MG: at 21:01

## 2025-06-07 RX ADMIN — Medication 25 MCG: at 08:01

## 2025-06-07 RX ADMIN — ONDANSETRON HYDROCHLORIDE 4 MG: 2 SOLUTION INTRAMUSCULAR; INTRAVENOUS at 08:50

## 2025-06-07 RX ADMIN — ENOXAPARIN SODIUM: 40 INJECTION SUBCUTANEOUS at 18:12

## 2025-06-07 RX ADMIN — CETIRIZINE HYDROCHLORIDE 10 MG: 10 TABLET ORAL at 21:01

## 2025-06-07 RX ADMIN — AMOXICILLIN AND CLAVULANATE POTASSIUM 1 TABLET: 875; 125 TABLET, FILM COATED ORAL at 08:00

## 2025-06-08 VITALS — DIASTOLIC BLOOD PRESSURE: 70 MMHG | SYSTOLIC BLOOD PRESSURE: 113 MMHG

## 2025-06-08 LAB — GLUCOSE - POINT OF CARE: 100 MG/DL (ref 70–99)

## 2025-06-08 RX ADMIN — ONDANSETRON HYDROCHLORIDE 4 MG: 2 SOLUTION INTRAMUSCULAR; INTRAVENOUS at 05:21

## 2025-06-08 RX ADMIN — HYDROXYZINE HYDROCHLORIDE 50 MG: 25 TABLET, FILM COATED ORAL at 05:21

## 2025-06-08 RX ADMIN — LURASIDONE HYDROCHLORIDE 60 MG: 60 TABLET, FILM COATED ORAL at 08:16

## 2025-06-08 RX ADMIN — Medication 25 MCG: at 08:16

## 2025-06-08 RX ADMIN — ONDANSETRON HYDROCHLORIDE 4 MG: 2 SOLUTION INTRAMUSCULAR; INTRAVENOUS at 11:21

## 2025-06-08 RX ADMIN — LAMOTRIGINE 200 MG: 100 TABLET ORAL at 08:16

## 2025-06-08 RX ADMIN — ACETAMINOPHEN 650 MG: 325 TABLET ORAL at 05:21

## 2025-06-08 RX ADMIN — BUPRENORPHINE 8 MG: 8 TABLET SUBLINGUAL at 08:15

## 2025-06-08 RX ADMIN — PANTOPRAZOLE SODIUM 40 MG: 40 TABLET, DELAYED RELEASE ORAL at 08:16

## 2025-06-08 RX ADMIN — ESCITALOPRAM OXALATE 10 MG: 10 TABLET, FILM COATED ORAL at 08:16

## 2025-06-08 RX ADMIN — FOLIC ACID 1 MG: 1 TABLET ORAL at 08:16

## 2025-06-08 RX ADMIN — ANTI-FUNGAL POWDER MICONAZOLE NITRATE TALC FREE 1 APPLIC: 1.42 POWDER TOPICAL at 08:20

## 2025-06-08 RX ADMIN — LEVOTHYROXINE SODIUM 50 MCG: 0.05 TABLET ORAL at 05:22

## 2025-06-08 RX ADMIN — CLONIDINE HYDROCHLORIDE 0.2 MG: 0.1 TABLET ORAL at 08:15

## 2025-06-08 RX ADMIN — AMOXICILLIN AND CLAVULANATE POTASSIUM 1 TABLET: 875; 125 TABLET, FILM COATED ORAL at 08:15

## 2025-06-08 RX ADMIN — ACETAMINOPHEN 650 MG: 325 TABLET ORAL at 11:18

## 2025-06-08 RX ADMIN — LAMOTRIGINE 50 MG: 25 TABLET, CHEWABLE ORAL at 08:15

## 2025-06-08 RX ADMIN — BUDESONIDE AND FORMOTEROL FUMARATE DIHYDRATE: 80; 4.5 AEROSOL RESPIRATORY (INHALATION) at 08:23

## (undated) DEVICE — WEBRIL 6 IN UNSTERILE

## (undated) DEVICE — GAUZE SPONGES,16 PLY: Brand: CURITY

## (undated) DEVICE — BULB SYRINGE,IRRIGATION WITH PROTECTIVE CAP: Brand: DOVER

## (undated) DEVICE — STRETCH BANDAGE: Brand: CURITY

## (undated) DEVICE — 2000CC GUARDIAN II: Brand: GUARDIAN

## (undated) DEVICE — ACE WRAP 4 IN STERILE

## (undated) DEVICE — ACE WRAP 4 IN UNSTERILE

## (undated) DEVICE — SUPER SPONGES,MEDIUM: Brand: KERLIX

## (undated) DEVICE — PENCIL ELECTROSURG E-Z CLEAN -0035H

## (undated) DEVICE — INTENDED FOR TISSUE SEPARATION, AND OTHER PROCEDURES THAT REQUIRE A SHARP SURGICAL BLADE TO PUNCTURE OR CUT.: Brand: BARD-PARKER SAFETY BLADES SIZE 15, STERILE

## (undated) DEVICE — SUT ETHILON 3-0 FS-1 18 IN 663G

## (undated) DEVICE — BLADE SAGITTAL 25.6 X 9.5MM

## (undated) DEVICE — KERLIX BANDAGE ROLL: Brand: KERLIX

## (undated) DEVICE — PROXIMATE SKIN STAPLERS (35 WIDE) CONTAINS 35 STAINLESS STEEL STAPLES (FIXED HEAD): Brand: PROXIMATE

## (undated) DEVICE — GLOVE INDICATOR PI UNDERGLOVE SZ 8 BLUE

## (undated) DEVICE — SUT VICRYL 3-0 PS-2 18 IN J497G

## (undated) DEVICE — BLADE SAGITTAL 63.0 X 19.5MM

## (undated) DEVICE — PLUMEPEN PRO 10FT

## (undated) DEVICE — DRESSING XEROFORM 5 X 9

## (undated) DEVICE — JP CHAN DRN SIL RND 10FR FULL W/TROCAR: Brand: JACKSON-PRATT

## (undated) DEVICE — INTENDED FOR TISSUE SEPARATION, AND OTHER PROCEDURES THAT REQUIRE A SHARP SURGICAL BLADE TO PUNCTURE OR CUT.: Brand: BARD-PARKER ® CARBON RIB-BACK BLADES

## (undated) DEVICE — WET SKIN PREP TRAY: Brand: MEDLINE INDUSTRIES, INC.

## (undated) DEVICE — SKIN MARKER DUAL TIP WITH RULER CAP, FLEXIBLE RULER AND LABELS: Brand: DEVON

## (undated) DEVICE — PREMIUM DRY TRAY LF: Brand: MEDLINE INDUSTRIES, INC.

## (undated) DEVICE — CHLORAPREP HI-LITE 26ML ORANGE

## (undated) DEVICE — HALF SHEET: Brand: CONVERTORS

## (undated) DEVICE — GLOVE SRG BIOGEL 6.5

## (undated) DEVICE — CURITY NON-ADHERENT STRIPS: Brand: CURITY

## (undated) DEVICE — CAST PADDING 4 IN SYNTHETIC NON-STRL

## (undated) DEVICE — SUT VICRYL 3-0 PS-2 27 IN J427H

## (undated) DEVICE — CURITY STRETCH BANDAGE: Brand: CURITY

## (undated) DEVICE — SYRINGE 10ML LL

## (undated) DEVICE — OCCLUSIVE GAUZE STRIP,3% BISMUTH TRIBROMOPHENATE IN PETROLATUM BLEND: Brand: XEROFORM

## (undated) DEVICE — SPONGE PVP SCRUB WING STERILE

## (undated) DEVICE — GLOVE SRG BIOGEL 7.5

## (undated) DEVICE — NEEDLE 25G X 1 1/2

## (undated) DEVICE — ABDOMINAL PAD: Brand: DERMACEA

## (undated) DEVICE — GLOVE INDICATOR PI UNDERGLOVE SZ 7 BLUE

## (undated) DEVICE — JACKSON-PRATT 100CC BULB RESERVOIR: Brand: CARDINAL HEALTH

## (undated) DEVICE — GLOVE PI ULTRA TOUCH SZ.6.5

## (undated) DEVICE — CULTURE TUBE AEROBIC

## (undated) DEVICE — UNIVERSAL MAJOR EXTREMITY,KIT: Brand: CARDINAL HEALTH

## (undated) DEVICE — SPONGE LAP 18 X 18 IN STRL RFD

## (undated) DEVICE — CUFF TOURNIQUET 18 X 4 IN QUICK CONNECT DISP 1 BLADDER

## (undated) DEVICE — COBAN 4 IN STERILE

## (undated) DEVICE — SPONGE STICK WITH PVP-I: Brand: KENDALL

## (undated) DEVICE — BETHLEHEM UNIVERSAL  MIONR EXT: Brand: CARDINAL HEALTH

## (undated) DEVICE — CUFF TOURNIQUET 34 X 4 IN QUICK CONNECT DISP 1BLA

## (undated) DEVICE — GLOVE SRG LF STRL BGL SKNSNS 7 PF

## (undated) DEVICE — SURGICAL GOWN, XL SMARTSLEEVE: Brand: CONVERTORS

## (undated) DEVICE — TUBING SUCTION 5MM X 12 FT

## (undated) DEVICE — SUT VICRYL 0 CT-2 18 IN J727D

## (undated) DEVICE — SUT VICRYL 3-0 SH 27 IN J416H

## (undated) DEVICE — SUT VICRYL 2-0 SH 27 IN UNDYED J417H

## (undated) DEVICE — NEEDLE 18 G X 1 1/2

## (undated) DEVICE — CULTURE TUBE ANAEROBIC

## (undated) DEVICE — IMPERVIOUS STOCKINETTE: Brand: DEROYAL

## (undated) DEVICE — POV-IOD SOLUTION 4OZ BT

## (undated) DEVICE — SUT VICRYL 0 REEL 54 IN J287G

## (undated) DEVICE — GLOVE SRG BIOGEL ECLIPSE 7.5

## (undated) DEVICE — STOCKINETTE REGULAR

## (undated) DEVICE — SUT PROLENE 4-0 PS-2 18 IN 8682H

## (undated) DEVICE — COBAN 6 IN STERILE

## (undated) DEVICE — ASTOUND STANDARD SURGICAL GOWN, XL: Brand: CONVERTORS

## (undated) DEVICE — SUT VICRYL 0 SH 27 IN J418

## (undated) DEVICE — FRAZIER SUCTION INSTRUMENT 18 FR W/OBTURATOR, NO CONTROL VENT: Brand: FRAZIER

## (undated) DEVICE — GLOVE INDICATOR PI UNDERGLOVE SZ 7.5 BLUE

## (undated) DEVICE — MEDI-VAC YANKAUER SUCTION HANDLE W/BULBOUS AND CONTROL VENT: Brand: CARDINAL HEALTH

## (undated) DEVICE — INTENDED FOR TISSUE SEPARATION, AND OTHER PROCEDURES THAT REQUIRE A SHARP SURGICAL BLADE TO PUNCTURE OR CUT.: Brand: BARD-PARKER SAFETY BLADES SIZE 10, STERILE

## (undated) DEVICE — 10FR FRAZIER SUCTION HANDLE: Brand: CARDINAL HEALTH

## (undated) DEVICE — IODOFORM PACKING STRIP: Brand: CURITY

## (undated) DEVICE — GLOVE PI ULTRA TOUCH SZ.7.0

## (undated) DEVICE — CYSTO TUBING SINGLE IRRIGATION

## (undated) DEVICE — PADDING CAST 4 IN  COTTON STRL